# Patient Record
Sex: FEMALE | Race: WHITE | NOT HISPANIC OR LATINO | Employment: UNEMPLOYED | URBAN - METROPOLITAN AREA
[De-identification: names, ages, dates, MRNs, and addresses within clinical notes are randomized per-mention and may not be internally consistent; named-entity substitution may affect disease eponyms.]

---

## 2017-03-07 ENCOUNTER — TRANSCRIBE ORDERS (OUTPATIENT)
Dept: LAB | Facility: HOSPITAL | Age: 57
End: 2017-03-07

## 2017-03-07 ENCOUNTER — LAB (OUTPATIENT)
Dept: LAB | Facility: HOSPITAL | Age: 57
End: 2017-03-07

## 2017-03-07 DIAGNOSIS — M25.572 LEFT ANKLE PAIN, UNSPECIFIED CHRONICITY: Primary | ICD-10-CM

## 2017-03-07 DIAGNOSIS — M25.572 LEFT ANKLE PAIN, UNSPECIFIED CHRONICITY: ICD-10-CM

## 2017-03-07 LAB
25(OH)D3 SERPL-MCNC: 42.8 NG/ML
CALCIUM SPEC-SCNC: 10.3 MG/DL (ref 8.7–10.4)
CRP SERPL-MCNC: 0.4 MG/DL (ref 0–10)
ERYTHROCYTE [SEDIMENTATION RATE] IN BLOOD: 14 MM/HR (ref 0–30)
HBA1C MFR BLD: 5.5 % (ref 4.8–5.6)
PREALB SERPL-MCNC: 33.8 MG/DL (ref 10–40)
TSH SERPL DL<=0.05 MIU/L-ACNC: 1.42 MIU/ML (ref 0.35–5.35)

## 2017-03-07 PROCEDURE — 86140 C-REACTIVE PROTEIN: CPT | Performed by: ORTHOPAEDIC SURGERY

## 2017-03-07 PROCEDURE — 84134 ASSAY OF PREALBUMIN: CPT | Performed by: ORTHOPAEDIC SURGERY

## 2017-03-07 PROCEDURE — 82306 VITAMIN D 25 HYDROXY: CPT | Performed by: ORTHOPAEDIC SURGERY

## 2017-03-07 PROCEDURE — 85652 RBC SED RATE AUTOMATED: CPT | Performed by: ORTHOPAEDIC SURGERY

## 2017-03-07 PROCEDURE — 83036 HEMOGLOBIN GLYCOSYLATED A1C: CPT | Performed by: ORTHOPAEDIC SURGERY

## 2017-03-07 PROCEDURE — 84443 ASSAY THYROID STIM HORMONE: CPT | Performed by: ORTHOPAEDIC SURGERY

## 2017-03-07 PROCEDURE — 36415 COLL VENOUS BLD VENIPUNCTURE: CPT | Performed by: ORTHOPAEDIC SURGERY

## 2017-03-07 PROCEDURE — 82310 ASSAY OF CALCIUM: CPT | Performed by: ORTHOPAEDIC SURGERY

## 2017-03-22 ENCOUNTER — APPOINTMENT (OUTPATIENT)
Dept: PREADMISSION TESTING | Facility: HOSPITAL | Age: 57
End: 2017-03-22

## 2017-03-22 VITALS — HEIGHT: 65 IN | BODY MASS INDEX: 26.59 KG/M2 | WEIGHT: 159.61 LBS

## 2017-03-22 LAB
DEPRECATED RDW RBC AUTO: 46 FL (ref 37–54)
ERYTHROCYTE [DISTWIDTH] IN BLOOD BY AUTOMATED COUNT: 12.6 % (ref 11.3–14.5)
HCT VFR BLD AUTO: 37.6 % (ref 34.5–44)
HGB BLD-MCNC: 12 G/DL (ref 11.5–15.5)
MCH RBC QN AUTO: 32 PG (ref 27–31)
MCHC RBC AUTO-ENTMCNC: 31.9 G/DL (ref 32–36)
MCV RBC AUTO: 100.3 FL (ref 80–99)
PLATELET # BLD AUTO: 378 10*3/MM3 (ref 150–450)
PMV BLD AUTO: 9.1 FL (ref 6–12)
POTASSIUM BLD-SCNC: 4.6 MMOL/L (ref 3.5–5.5)
RBC # BLD AUTO: 3.75 10*6/MM3 (ref 3.89–5.14)
WBC NRBC COR # BLD: 5.72 10*3/MM3 (ref 3.5–10.8)

## 2017-03-22 PROCEDURE — 93010 ELECTROCARDIOGRAM REPORT: CPT | Performed by: INTERNAL MEDICINE

## 2017-03-22 RX ORDER — ZOLPIDEM TARTRATE 12.5 MG/1
12.5 TABLET, FILM COATED, EXTENDED RELEASE ORAL NIGHTLY
COMMUNITY
End: 2022-10-05 | Stop reason: HOSPADM

## 2017-03-22 RX ORDER — ATORVASTATIN CALCIUM 80 MG/1
80 TABLET, FILM COATED ORAL DAILY
COMMUNITY
End: 2017-03-22

## 2017-03-22 RX ORDER — LISINOPRIL 20 MG/1
20 TABLET ORAL DAILY
COMMUNITY
End: 2017-03-22

## 2017-03-22 RX ORDER — ATORVASTATIN CALCIUM 80 MG/1
80 TABLET, FILM COATED ORAL DAILY
COMMUNITY
End: 2019-05-15 | Stop reason: HOSPADM

## 2017-03-22 RX ORDER — FLUOXETINE HYDROCHLORIDE 20 MG/1
20 CAPSULE ORAL DAILY
COMMUNITY

## 2017-03-22 RX ORDER — LATANOPROST 50 UG/ML
1 SOLUTION/ DROPS OPHTHALMIC NIGHTLY
COMMUNITY
End: 2017-03-22

## 2017-03-22 RX ORDER — FLUOXETINE HYDROCHLORIDE 20 MG/1
20 CAPSULE ORAL DAILY
COMMUNITY
End: 2017-03-22

## 2017-03-22 RX ORDER — LATANOPROST 50 UG/ML
1 SOLUTION/ DROPS OPHTHALMIC NIGHTLY
COMMUNITY

## 2017-03-22 RX ORDER — LISINOPRIL 20 MG/1
40 TABLET ORAL DAILY
COMMUNITY
End: 2019-05-15 | Stop reason: HOSPADM

## 2017-03-24 ENCOUNTER — APPOINTMENT (OUTPATIENT)
Dept: GENERAL RADIOLOGY | Facility: HOSPITAL | Age: 57
End: 2017-03-24

## 2017-03-24 ENCOUNTER — HOSPITAL ENCOUNTER (OUTPATIENT)
Facility: HOSPITAL | Age: 57
Setting detail: HOSPITAL OUTPATIENT SURGERY
Discharge: HOME | End: 2017-03-24
Attending: ORTHOPAEDIC SURGERY | Admitting: ORTHOPAEDIC SURGERY

## 2017-03-24 ENCOUNTER — ANESTHESIA EVENT (OUTPATIENT)
Dept: PERIOP | Facility: HOSPITAL | Age: 57
End: 2017-03-24

## 2017-03-24 ENCOUNTER — ANESTHESIA (OUTPATIENT)
Dept: PERIOP | Facility: HOSPITAL | Age: 57
End: 2017-03-24

## 2017-03-24 VITALS
SYSTOLIC BLOOD PRESSURE: 117 MMHG | RESPIRATION RATE: 18 BRPM | DIASTOLIC BLOOD PRESSURE: 75 MMHG | HEART RATE: 92 BPM | TEMPERATURE: 97.5 F | OXYGEN SATURATION: 98 %

## 2017-03-24 DIAGNOSIS — M67.472 DIGITAL MUCOUS CYST OF TOE OF LEFT FOOT: ICD-10-CM

## 2017-03-24 PROBLEM — S82.62XK: Status: ACTIVE | Noted: 2017-03-24

## 2017-03-24 LAB
B-HCG UR QL: NEGATIVE
GLUCOSE BLDC GLUCOMTR-MCNC: 95 MG/DL (ref 70–130)
INTERNAL NEGATIVE CONTROL: REACTIVE
INTERNAL POSITIVE CONTROL: REACTIVE
Lab: NORMAL

## 2017-03-24 PROCEDURE — 25010000002 MIDAZOLAM PER 1 MG: Performed by: NURSE ANESTHETIST, CERTIFIED REGISTERED

## 2017-03-24 PROCEDURE — C1713 ANCHOR/SCREW BN/BN,TIS/BN: HCPCS | Performed by: ORTHOPAEDIC SURGERY

## 2017-03-24 PROCEDURE — 82962 GLUCOSE BLOOD TEST: CPT

## 2017-03-24 PROCEDURE — 25010000002 DEXAMETHASONE SODIUM PHOSPHATE 10 MG/ML SOLUTION: Performed by: NURSE ANESTHETIST, CERTIFIED REGISTERED

## 2017-03-24 PROCEDURE — 25010000002 PROPOFOL 10 MG/ML EMULSION: Performed by: NURSE ANESTHETIST, CERTIFIED REGISTERED

## 2017-03-24 PROCEDURE — 88304 TISSUE EXAM BY PATHOLOGIST: CPT | Performed by: ORTHOPAEDIC SURGERY

## 2017-03-24 PROCEDURE — 25010000002 ONDANSETRON PER 1 MG: Performed by: NURSE ANESTHETIST, CERTIFIED REGISTERED

## 2017-03-24 PROCEDURE — 25010000002 FENTANYL CITRATE (PF) 100 MCG/2ML SOLUTION: Performed by: NURSE ANESTHETIST, CERTIFIED REGISTERED

## 2017-03-24 PROCEDURE — 25010000002 ROPIVACAINE PER 1 MG: Performed by: NURSE ANESTHETIST, CERTIFIED REGISTERED

## 2017-03-24 PROCEDURE — 25010000002 PHENYLEPHRINE PER 1 ML: Performed by: NURSE ANESTHETIST, CERTIFIED REGISTERED

## 2017-03-24 PROCEDURE — 25010000002 DEXAMETHASONE PER 1 MG: Performed by: NURSE ANESTHETIST, CERTIFIED REGISTERED

## 2017-03-24 PROCEDURE — 76000 FLUOROSCOPY <1 HR PHYS/QHP: CPT

## 2017-03-24 PROCEDURE — 25010000003 CEFAZOLIN IN DEXTROSE 2-4 GM/100ML-% SOLUTION: Performed by: ORTHOPAEDIC SURGERY

## 2017-03-24 PROCEDURE — 25010000002 PROPOFOL 1000 MG/ML EMULSION: Performed by: NURSE ANESTHETIST, CERTIFIED REGISTERED

## 2017-03-24 PROCEDURE — 25010000002 BUPRENORPHINE PER 0.1 MG: Performed by: NURSE ANESTHETIST, CERTIFIED REGISTERED

## 2017-03-24 DEVICE — PLT FIB PERIART LK D/L 4H 80 LT: Type: IMPLANTABLE DEVICE | Site: ANKLE | Status: FUNCTIONAL

## 2017-03-24 DEVICE — SCRW ULS LK 2.7X16MM: Type: IMPLANTABLE DEVICE | Site: ANKLE | Status: FUNCTIONAL

## 2017-03-24 DEVICE — SCRW ULS LK 2.7X12MM: Type: IMPLANTABLE DEVICE | Site: ANKLE | Status: FUNCTIONAL

## 2017-03-24 DEVICE — SCRW PERIART S/TAP HD/2.7MM 3.5X14MM: Type: IMPLANTABLE DEVICE | Site: ANKLE | Status: FUNCTIONAL

## 2017-03-24 DEVICE — SCRW PERIART S/TAP HD/2.7MM 3.5X12MM: Type: IMPLANTABLE DEVICE | Site: ANKLE | Status: FUNCTIONAL

## 2017-03-24 RX ORDER — FAMOTIDINE 10 MG/ML
20 INJECTION, SOLUTION INTRAVENOUS
Status: DISCONTINUED | OUTPATIENT
Start: 2017-03-24 | End: 2017-03-24 | Stop reason: HOSPADM

## 2017-03-24 RX ORDER — SODIUM CHLORIDE, SODIUM LACTATE, POTASSIUM CHLORIDE, CALCIUM CHLORIDE 600; 310; 30; 20 MG/100ML; MG/100ML; MG/100ML; MG/100ML
100 INJECTION, SOLUTION INTRAVENOUS CONTINUOUS
Status: DISCONTINUED | OUTPATIENT
Start: 2017-03-24 | End: 2017-03-24 | Stop reason: HOSPADM

## 2017-03-24 RX ORDER — FENTANYL CITRATE 50 UG/ML
50 INJECTION, SOLUTION INTRAMUSCULAR; INTRAVENOUS
Status: DISCONTINUED | OUTPATIENT
Start: 2017-03-24 | End: 2017-03-24 | Stop reason: HOSPADM

## 2017-03-24 RX ORDER — BUPIVACAINE HYDROCHLORIDE 2.5 MG/ML
INJECTION, SOLUTION EPIDURAL; INFILTRATION; INTRACAUDAL AS NEEDED
Status: DISCONTINUED | OUTPATIENT
Start: 2017-03-24 | End: 2017-03-24 | Stop reason: HOSPADM

## 2017-03-24 RX ORDER — ONDANSETRON 4 MG/1
4 TABLET, FILM COATED ORAL ONCE AS NEEDED
Status: DISCONTINUED | OUTPATIENT
Start: 2017-03-24 | End: 2017-03-24 | Stop reason: HOSPADM

## 2017-03-24 RX ORDER — ROPIVACAINE HYDROCHLORIDE 2 MG/ML
8 INJECTION, SOLUTION EPIDURAL; INFILTRATION CONTINUOUS
Status: DISCONTINUED | OUTPATIENT
Start: 2017-03-24 | End: 2017-03-24 | Stop reason: HOSPADM

## 2017-03-24 RX ORDER — FAMOTIDINE 20 MG/1
20 TABLET, FILM COATED ORAL
Status: DISCONTINUED | OUTPATIENT
Start: 2017-03-24 | End: 2017-03-24 | Stop reason: HOSPADM

## 2017-03-24 RX ORDER — PROPOFOL 10 MG/ML
VIAL (ML) INTRAVENOUS AS NEEDED
Status: DISCONTINUED | OUTPATIENT
Start: 2017-03-24 | End: 2017-03-24 | Stop reason: SURG

## 2017-03-24 RX ORDER — DOCUSATE SODIUM 100 MG/1
100 CAPSULE, LIQUID FILLED ORAL 2 TIMES DAILY
Qty: 60 CAPSULE | Refills: 0 | Status: ON HOLD | OUTPATIENT
Start: 2017-03-24 | End: 2022-09-26

## 2017-03-24 RX ORDER — MAGNESIUM HYDROXIDE 1200 MG/15ML
LIQUID ORAL AS NEEDED
Status: DISCONTINUED | OUTPATIENT
Start: 2017-03-24 | End: 2017-03-24 | Stop reason: HOSPADM

## 2017-03-24 RX ORDER — BUPRENORPHINE HYDROCHLORIDE 0.32 MG/ML
INJECTION INTRAMUSCULAR; INTRAVENOUS AS NEEDED
Status: DISCONTINUED | OUTPATIENT
Start: 2017-03-24 | End: 2017-03-24 | Stop reason: SURG

## 2017-03-24 RX ORDER — CEFAZOLIN SODIUM 2 G/100ML
2 INJECTION, SOLUTION INTRAVENOUS ONCE
Status: COMPLETED | OUTPATIENT
Start: 2017-03-24 | End: 2017-03-24

## 2017-03-24 RX ORDER — FENTANYL CITRATE 50 UG/ML
INJECTION, SOLUTION INTRAMUSCULAR; INTRAVENOUS AS NEEDED
Status: DISCONTINUED | OUTPATIENT
Start: 2017-03-24 | End: 2017-03-24 | Stop reason: SURG

## 2017-03-24 RX ORDER — MIDAZOLAM HYDROCHLORIDE 1 MG/ML
INJECTION INTRAMUSCULAR; INTRAVENOUS AS NEEDED
Status: DISCONTINUED | OUTPATIENT
Start: 2017-03-24 | End: 2017-03-24 | Stop reason: SURG

## 2017-03-24 RX ORDER — SODIUM CHLORIDE 0.9 % (FLUSH) 0.9 %
1-10 SYRINGE (ML) INJECTION AS NEEDED
Status: DISCONTINUED | OUTPATIENT
Start: 2017-03-24 | End: 2017-03-24 | Stop reason: HOSPADM

## 2017-03-24 RX ORDER — LABETALOL HYDROCHLORIDE 5 MG/ML
5 INJECTION, SOLUTION INTRAVENOUS
Status: DISCONTINUED | OUTPATIENT
Start: 2017-03-24 | End: 2017-03-24 | Stop reason: HOSPADM

## 2017-03-24 RX ORDER — DEXAMETHASONE SODIUM PHOSPHATE 10 MG/ML
INJECTION, SOLUTION INTRAMUSCULAR; INTRAVENOUS AS NEEDED
Status: DISCONTINUED | OUTPATIENT
Start: 2017-03-24 | End: 2017-03-24 | Stop reason: SURG

## 2017-03-24 RX ORDER — BUPIVACAINE HYDROCHLORIDE 2.5 MG/ML
INJECTION, SOLUTION EPIDURAL; INFILTRATION; INTRACAUDAL AS NEEDED
Status: DISCONTINUED | OUTPATIENT
Start: 2017-03-24 | End: 2017-03-24 | Stop reason: SURG

## 2017-03-24 RX ORDER — SODIUM CHLORIDE, SODIUM LACTATE, POTASSIUM CHLORIDE, CALCIUM CHLORIDE 600; 310; 30; 20 MG/100ML; MG/100ML; MG/100ML; MG/100ML
9 INJECTION, SOLUTION INTRAVENOUS CONTINUOUS PRN
Status: DISCONTINUED | OUTPATIENT
Start: 2017-03-24 | End: 2017-03-24 | Stop reason: HOSPADM

## 2017-03-24 RX ORDER — LIDOCAINE HYDROCHLORIDE 10 MG/ML
0.5 INJECTION, SOLUTION EPIDURAL; INFILTRATION; INTRACAUDAL; PERINEURAL ONCE AS NEEDED
Status: COMPLETED | OUTPATIENT
Start: 2017-03-24 | End: 2017-03-24

## 2017-03-24 RX ORDER — OXYCODONE HYDROCHLORIDE AND ACETAMINOPHEN 5; 325 MG/1; MG/1
1 TABLET ORAL ONCE AS NEEDED
Status: DISCONTINUED | OUTPATIENT
Start: 2017-03-24 | End: 2017-03-24 | Stop reason: HOSPADM

## 2017-03-24 RX ORDER — OXYCODONE AND ACETAMINOPHEN 7.5; 325 MG/1; MG/1
1-2 TABLET ORAL EVERY 4 HOURS PRN
Qty: 60 TABLET | Refills: 0 | Status: SHIPPED | OUTPATIENT
Start: 2017-03-24 | End: 2019-05-15 | Stop reason: HOSPADM

## 2017-03-24 RX ORDER — ATRACURIUM BESYLATE 10 MG/ML
INJECTION, SOLUTION INTRAVENOUS AS NEEDED
Status: DISCONTINUED | OUTPATIENT
Start: 2017-03-24 | End: 2017-03-24 | Stop reason: SURG

## 2017-03-24 RX ORDER — MEPERIDINE HYDROCHLORIDE 25 MG/ML
12.5 INJECTION INTRAMUSCULAR; INTRAVENOUS; SUBCUTANEOUS
Status: DISCONTINUED | OUTPATIENT
Start: 2017-03-24 | End: 2017-03-24 | Stop reason: HOSPADM

## 2017-03-24 RX ORDER — OXYCODONE AND ACETAMINOPHEN 7.5; 325 MG/1; MG/1
1 TABLET ORAL ONCE AS NEEDED
Status: DISCONTINUED | OUTPATIENT
Start: 2017-03-24 | End: 2017-03-24 | Stop reason: HOSPADM

## 2017-03-24 RX ORDER — ONDANSETRON 2 MG/ML
4 INJECTION INTRAMUSCULAR; INTRAVENOUS ONCE AS NEEDED
Status: DISCONTINUED | OUTPATIENT
Start: 2017-03-24 | End: 2017-03-24 | Stop reason: HOSPADM

## 2017-03-24 RX ORDER — ONDANSETRON 2 MG/ML
INJECTION INTRAMUSCULAR; INTRAVENOUS AS NEEDED
Status: DISCONTINUED | OUTPATIENT
Start: 2017-03-24 | End: 2017-03-24 | Stop reason: SURG

## 2017-03-24 RX ORDER — NALOXONE HCL 0.4 MG/ML
0.4 VIAL (ML) INJECTION AS NEEDED
Status: DISCONTINUED | OUTPATIENT
Start: 2017-03-24 | End: 2017-03-24 | Stop reason: HOSPADM

## 2017-03-24 RX ORDER — DEXAMETHASONE SODIUM PHOSPHATE 4 MG/ML
INJECTION, SOLUTION INTRA-ARTICULAR; INTRALESIONAL; INTRAMUSCULAR; INTRAVENOUS; SOFT TISSUE AS NEEDED
Status: DISCONTINUED | OUTPATIENT
Start: 2017-03-24 | End: 2017-03-24 | Stop reason: SURG

## 2017-03-24 RX ORDER — LIDOCAINE HYDROCHLORIDE 10 MG/ML
INJECTION, SOLUTION EPIDURAL; INFILTRATION; INTRACAUDAL; PERINEURAL AS NEEDED
Status: DISCONTINUED | OUTPATIENT
Start: 2017-03-24 | End: 2017-03-24 | Stop reason: SURG

## 2017-03-24 RX ADMIN — PROPOFOL 25 MCG/KG/MIN: 10 INJECTION, EMULSION INTRAVENOUS at 07:40

## 2017-03-24 RX ADMIN — DEXAMETHASONE SODIUM PHOSPHATE 8 MG: 4 INJECTION, SOLUTION INTRAMUSCULAR; INTRAVENOUS at 07:37

## 2017-03-24 RX ADMIN — FENTANYL CITRATE 100 MCG: 50 INJECTION, SOLUTION INTRAMUSCULAR; INTRAVENOUS at 07:37

## 2017-03-24 RX ADMIN — BUPRENORPHINE HYDROCHLORIDE 0.15 MG: 0.3 INJECTION INTRAMUSCULAR; INTRAVENOUS at 07:37

## 2017-03-24 RX ADMIN — BUPIVACAINE HYDROCHLORIDE 30 ML: 2.5 INJECTION, SOLUTION EPIDURAL; INFILTRATION; INTRACAUDAL; PERINEURAL at 07:37

## 2017-03-24 RX ADMIN — SODIUM CHLORIDE, POTASSIUM CHLORIDE, SODIUM LACTATE AND CALCIUM CHLORIDE 9 ML/HR: 600; 310; 30; 20 INJECTION, SOLUTION INTRAVENOUS at 06:58

## 2017-03-24 RX ADMIN — BUPIVACAINE HYDROCHLORIDE 15 ML: 2.5 INJECTION, SOLUTION EPIDURAL; INFILTRATION; INTRACAUDAL; PERINEURAL at 07:10

## 2017-03-24 RX ADMIN — MIDAZOLAM HYDROCHLORIDE 2 MG: 1 INJECTION, SOLUTION INTRAMUSCULAR; INTRAVENOUS at 07:00

## 2017-03-24 RX ADMIN — OXYCODONE HYDROCHLORIDE AND ACETAMINOPHEN 1 TABLET: 7.5; 325 TABLET ORAL at 10:36

## 2017-03-24 RX ADMIN — LIDOCAINE HYDROCHLORIDE 30 MG: 10 INJECTION, SOLUTION EPIDURAL; INFILTRATION; INTRACAUDAL; PERINEURAL at 07:37

## 2017-03-24 RX ADMIN — BUPRENORPHINE HYDROCHLORIDE 0.15 MG: 0.3 INJECTION INTRAMUSCULAR; INTRAVENOUS at 07:00

## 2017-03-24 RX ADMIN — DEXAMETHASONE SODIUM PHOSPHATE 2 MG: 10 INJECTION, SOLUTION INTRAMUSCULAR; INTRAVENOUS at 07:37

## 2017-03-24 RX ADMIN — FAMOTIDINE 20 MG: 20 TABLET, FILM COATED ORAL at 06:58

## 2017-03-24 RX ADMIN — FENTANYL CITRATE 100 MCG: 50 INJECTION, SOLUTION INTRAMUSCULAR; INTRAVENOUS at 07:00

## 2017-03-24 RX ADMIN — LIDOCAINE HYDROCHLORIDE 0.5 ML: 10 INJECTION, SOLUTION EPIDURAL; INFILTRATION; INTRACAUDAL; PERINEURAL at 06:59

## 2017-03-24 RX ADMIN — ATRACURIUM BESYLATE 50 MG: 10 INJECTION, SOLUTION INTRAVENOUS at 07:37

## 2017-03-24 RX ADMIN — DEXAMETHASONE SODIUM PHOSPHATE 2 MG: 10 INJECTION, SOLUTION INTRAMUSCULAR; INTRAVENOUS at 07:00

## 2017-03-24 RX ADMIN — FENTANYL CITRATE 50 MCG: 50 INJECTION, SOLUTION INTRAMUSCULAR; INTRAVENOUS at 09:35

## 2017-03-24 RX ADMIN — SODIUM CHLORIDE, POTASSIUM CHLORIDE, SODIUM LACTATE AND CALCIUM CHLORIDE: 600; 310; 30; 20 INJECTION, SOLUTION INTRAVENOUS at 09:10

## 2017-03-24 RX ADMIN — EPHEDRINE SULFATE 10 MG: 50 INJECTION INTRAMUSCULAR; INTRAVENOUS; SUBCUTANEOUS at 08:20

## 2017-03-24 RX ADMIN — PHENYLEPHRINE HYDROCHLORIDE 100 MCG: 10 INJECTION INTRAVENOUS at 07:50

## 2017-03-24 RX ADMIN — CEFAZOLIN SODIUM 2 G: 2 INJECTION, SOLUTION INTRAVENOUS at 07:36

## 2017-03-24 RX ADMIN — BUPIVACAINE HYDROCHLORIDE 20 ML: 2.5 INJECTION, SOLUTION EPIDURAL; INFILTRATION; INTRACAUDAL; PERINEURAL at 07:00

## 2017-03-24 RX ADMIN — PROPOFOL 200 MG: 10 INJECTION, EMULSION INTRAVENOUS at 07:37

## 2017-03-24 RX ADMIN — ONDANSETRON 4 MG: 2 INJECTION INTRAMUSCULAR; INTRAVENOUS at 09:34

## 2017-03-24 RX ADMIN — Medication 8 ML/HR: at 09:51

## 2017-03-24 NOTE — ANESTHESIA PROCEDURE NOTES
Peripheral Block    Patient location during procedure: pre-op  Reason for block: at surgeon's request and post-op pain management  Performed by  Anesthesiologist: MAME CARMONA  CRNA: MAME POND  Assisted by: ROBERT VILLAGOMEZ  Preanesthetic Checklist  Completed: patient identified, site marked, surgical consent, pre-op evaluation, timeout performed, IV checked, risks and benefits discussed and monitors and equipment checked  Peripheral Block Prep:  Sterile barriers:cap, gloves, mask and sterile barriers  Prep: ChloraPrep  Patient monitoring: blood pressure monitoring, continuous pulse oximetry and EKG  Peripheral Procedure  Guidance:ultrasound guided  Images:still images obtained    Laterality:left  Block Type:popliteal  Injection Technique:catheter  Needle Type:echogenic  Needle Gauge:18 G  Catheter Size:20 G  Medications  Local Injected:bupivacaine 0.25% without epinephrine Local Amount Injected:20 (mls)mL  Post Assessment  Injection Assessment: negative aspiration for heme, no paresthesia on injection and incremental injection  Patient Tolerance:comfortable throughout block  Complications:no  Additional Notes  Procedure:                    CATHETER at skin: 10                                           Analgesia was achieved with 1% Lidocaine 2 ml for infiltration of skin      The pt was placed in  lateral position.  The Insertion site was  prepped and Draped in sterile fashion.  The pt was anesthetized with  IV Sedation( see meds).  Skin and cutaneous tissue was infiltrated and anesthetized with 1% Lidocaine via a 25g needle.  A BBraun 4 inch 18g echogenic needle was then  inserted approximately 3 cm proximal to the popliteal tin a at the lateral mid biceps femoris and advanced In-plane with Ultrasound guidance.  Normal Saline PSF was utilized for hydrodissection of tissue.  The popliteal artery was visualized and the common peroneal and tibial bifurcation was located.  LA injection spread was  visualized, injection was incremental 1-5ml, injection pressure was normal or little, no intraneural injection, no vascular injection.  .  A BBraun 20g wire stylet catheter was placed via the needle with ultrasound visualization and confirmation with NS fluid bolus. The labeled Catheter was then secured at insertions site with skin afix,  mastisol, steristrips  and a CHG transparent dressing was placed over. Thank you

## 2017-03-24 NOTE — OP NOTE
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     PROCEDURES PERFORMED:   1.  Exploration of 4-5 interspace with excision of left metatarsophalangeal cyst with decompression of 4-5 interspace.    2.  Open treatment left lateral malleolus nonunion with iliac crest bone graft.      SURGEON: Frank Larson MD    ANESTHESIA: General with regional block for iliac crest bone graft site and indwelling popliteal block for the ankle.     ESTIMATED BLOOD LOSS: Minimal.     TOURNIQUET TIME: See nursing record.     COMPLICATIONS: None apparent.     IMPLANTS USED: Devika locking small frag periarticular fibular plate.     INDICATIONS: Painful nonunion.      DESCRIPTION OF PROCEDURE: After informed consent was obtained, the patient was taken to the operating room. After the smooth induction of general and regional anesthesia, the patient's was given a dose of IV Kefzol. After sterile prep and drape and timeout to verify the site and the procedures to be performed, we began with an incision just over the iliac crest about 3 fingerbreadths behind the ASIS.  We dissected sharply to the outer table.  We then dissected soft tissue in a full-thickness fashion over the superior aspect of the crest. We then created a trap door using an osteotome on the proximal, distal, and lateral cortices. We lifted up the trap door which gave access to the crest cancellous graft.  This was removed using a gouge and curette. This was saved in a specimen cup. We then thoroughly irrigated the incision and closed our trap door down and we closed our deep capsule using 0 Vicryl in an interrupted fashion. We closed our subcutaneous area using 3-0 Vicryl in an interrupted fashion and the skin was closed using a running Monocryl with a Prineo dressing. At this point, we then moved  our attention to the ankle.  We exsanguinated the left lower extremity using an Esmarch bandage and inflated the tourniquet to 300 mmHg. We made a lateral incision directed over the lateral malleolus. Dissected sharply and bluntly, protecting neurovascular structures. We identified the periosteum. This was dissected off of the fibula to create full-thickness flaps for coverage at the end of the case. We used C-arm to help identify our nonunion site. This was excised and was involving about 75% of the cortical area. There was a fragment of bone slipped superiorly towards the syndesmosis. This piece of bone was removed.  The soft tissue and fibrous tissue were removed from the nonunion site. This left us with bleeding cancellous bony beds on both the proximal and distal fragments.  We pulled the fibula out to the correct length and this was clamped. We did not feel that her bone quality was acceptable enough and sufficient enough to tolerate a lag screw and at this point we applied a 4-hole locking periarticular direct lateral fibular plate. This was applied proximally first using a 3.5 cortical screw. We verified that our plate was appropriately located. We then placed 3 points of fixation distally. We placed 2 more points of fixation proximally. We then packed the fracture site and nonunion site with our cancellous bone graft. We then took final C-arm images which showed our nonunion site to be difficult to visualize due to the packing of graft. We took final C-arm images that verified that our ankle mortise was appropriately aligned and symmetric.  There was a negative cotton test at the end of the procedure. We then thoroughly irrigated the incision protecting our bone graft. We closed our periosteum over the plate with a running 2-0 Vicryl. Subcutaneous layer was closed using 3-0 Vicryl and the skin was closed using interrupted nylon's. We then moved our attention to another procedure.      At this point, a  longitudinal incision was made at the 4-5 interspace dissecting sharply and bluntly. We identified our MTP capsule. A cyst was identified and the cyst wall was removed and sent for pathology. We then released the intermetatarsal ligament between the 4th and 5th metatarsals to decompress the digital nerve. We thoroughly irrigated that incision and we closed the skin using nylon suture and placed sterile dressings over the incisions. We took down the drapes, placed a well-padded AO splint in neutral ankle dorsiflexion and deflated the tourniquet. The patient was allowed to wake from anesthetic and transferred to her stretcher and into the recovery room in stable condition.     POSTOPERATIVE PLAN:   1.  Non-weightbearing on left lower extremity.   2.  Discharge home when cleared by anesthesia.  3.  She will be on Percocet for pain control.   4.  She will be given a stool softener.   5.  I will see her back in the office in 2 weeks' time for a wound check and suture removal.   6.  We will follow her radiographically until she appears healed.    7.  We will plan on letting her bear weight at about 6 weeks.         Frank Larson M.D.  EMMANUEL/murtaza  DD: 03/24/2017 10:12:37  DT: 03/24/2017 11:13:35  Voice Rec. ID #[id number]  Voice Original ID #[id number]  Doc ID #[id number]  Rev. #0  cc:    Lona Deleon MD

## 2017-03-24 NOTE — H&P
Patient Care Team:      Chief complaint:  L foot and ankle pain    Subjective:    Patient is a 56 y.o.female presents with old non union ankle fracture   Review of Systems:  General ROS: negative  Cardiovascular ROS: no chest pain or dyspnea on exertion  Respiratory ROS: no cough, shortness of breath, or wheezing      Allergies: No Known Allergies       Latex: no  Contrast Dye: no    Home Meds    Prescriptions Prior to Admission   Medication Sig Dispense Refill Last Dose   • atorvastatin (LIPITOR) 80 MG tablet Take 80 mg by mouth Daily.   3/24/2017 at 0400   • FLUoxetine (PROzac) 20 MG capsule Take 20 mg by mouth Daily.   3/23/2017 at 2200   • latanoprost (XALATAN) 0.005 % ophthalmic solution Administer 1 drop to both eyes Every Night.   3/23/2017 at 2200   • lisinopril (PRINIVIL,ZESTRIL) 20 MG tablet Take 20 mg by mouth Daily.   3/24/2017 at 0400   • metFORMIN (GLUCOPHAGE) 500 MG tablet Take 500 mg by mouth 3 (Three) Times a Day.   3/23/2017 at 1900   • Multiple Vitamins-Minerals (MULTIVITAMIN ADULT PO) Take 1 tablet by mouth Daily.   3/23/2017 at 2200   • Zolpidem Tartrate (AMBIEN CR PO) Take 12.5 mg by mouth Every Night.   3/23/2017 at 2200     PMH:   Past Medical History:   Diagnosis Date   • Anxiety and depression    • Diabetes mellitus    • Hypertension    • Wears glasses      PSH:    Past Surgical History:   Procedure Laterality Date   • AUGMENTATION MAMMAPLASTY Bilateral 1999   • BREAST AUGMENTATION     • BREAST EXCISIONAL BIOPSY Right 2014   • DILATION AND CURETTAGE, DIAGNOSTIC / THERAPEUTIC     • WRIST SURGERY       Immunization History: pneumo: no   Flu: 10/16  Tetanus: ?  Social History:   Tobacco: quit 28 yrs    Alcohol: wine with meals      Physical Exam:/79 (BP Location: Right arm, Patient Position: Lying)  Pulse 77  Temp 98.2 °F (36.8 °C) (Temporal Artery )   LMP 01/22/2016 (Approximate)  SpO2 98%      General Appearance:    Alert, cooperative, no distress, appears stated age   Head:     Normocephalic, without obvious abnormality, atraumatic   Lungs:     Clear to auscultation bilaterally, respirations unlabored    Heart:   Regular rate and rhythm, S1 and S2 normal, no murmur, rub    or gallop    Abdomen:    Soft without tenderness   Breast Exam:    deferred   Genitalia:    deferred   Extremities:   Extremities normal, atraumatic, no cyanosis or edema   Skin:   Skin color, texture, turgor normal, no rashes or lesions   Neurologic:   Grossly intact        Cancer Patient: __ yes __no __unknown; If yes, clinical stage T:__ N:__M:__, stage group    Impression: Painful L foot and ankle    Plan: ORIF with iliac bone graft L ankle, cyst excision, 4/5 innerspace, L foot  Crescencio Self PA-C 3/24/2017 6:56 AM

## 2017-03-24 NOTE — ANESTHESIA PROCEDURE NOTES
Airway  Airway not difficult    General Information and Staff    Patient location during procedure: OR  Anesthesiologist: MAME CARMONA  CRNA: MAME POND    Indications and Patient Condition  Indications for airway management: airway protection    Preoxygenated: yes  MILS not maintained throughout  Mask difficulty assessment: 1 - vent by mask    Final Airway Details  Final airway type: endotracheal airway      Successful airway: ETT  Cuffed: yes   Successful intubation technique: direct laryngoscopy  Endotracheal tube insertion site: oral  Blade: Naranjo  Blade size: #3  ETT size: 7.0 mm  Cormack-Lehane Classification: grade I - full view of glottis  Placement verified by: chest auscultation and capnometry   Cuff volume (mL): 5  Measured from: lips  ETT to lips (cm): 21  Number of attempts at approach: 1    Additional Comments  Negative epigastric sounds, Breath sound equal bilaterally with symmetric chest rise and fall

## 2017-03-24 NOTE — ANESTHESIA PREPROCEDURE EVALUATION
Anesthesia Evaluation     Patient summary reviewed and Nursing notes reviewed   no history of anesthetic complications:  NPO Status: > 8 hours   Airway   Mallampati: II  TM distance: >3 FB  Neck ROM: full  no difficulty expected  Dental - normal exam     Pulmonary    (+) a smoker Current, decreased breath sounds,   Cardiovascular   Exercise tolerance: good (4-7 METS)    ECG reviewed  Rhythm: regular  Rate: normal    (+) hypertension well controlled, hyperlipidemia      Neuro/Psych  (+) psychiatric history Anxiety,    GI/Hepatic/Renal/Endo    (+)  diabetes mellitus type 2 well controlled,     Musculoskeletal     Abdominal  - normal exam    Abdomen: soft.   Substance History      OB/GYN          Other                                    Anesthesia Plan    ASA 2     general and regional     intravenous induction   Anesthetic plan and risks discussed with patient.    Plan discussed with CRNA.

## 2017-03-24 NOTE — BRIEF OP NOTE
ANKLE OPEN REDUCTION INTERNAL FIXATION, ILIAC CREST BONE GRAFT  Procedure Note    Philomena Davis  3/24/2017    Pre-op Diagnosis:   Lateral malleolus non union and 5th MTP cyst    Post-op Diagnosis:     same  Procedure/CPT® Codes:      Procedure(s):  LEFT ANKLE OPEN REDUCTION INTERNAL FIXATION WITH ILIAC CREST BONE GRAFT , EXCISION CYST 4TH/5TH INNERSPACE LEFT FOOT  ILIAC CREST BONE GRAFT    Surgeon(s):  Frank Larson MD    Anesthesia: General with Block    Staff:   Circulator: Rebecca Varela RN  Scrub Person: Roberto Oliva  Nursing Assistant: Jerry Hernandez  Assistant: Crescencio Self PA-C    Estimated Blood Loss:min    Specimens:                  ID Type Source Tests Collected by Time Destination   A : left foot cyst from between 4th and 5th toes Tissue Toe, Left TISSUE EXAM Frank Larson MD 3/24/2017 0937              Complications: none apparent      Frank Larson MD     Date: 3/24/2017  Time: 10:04 AM

## 2017-03-24 NOTE — NURSING NOTE
Acute Pain Service:  On-Q teaching completed with patient's boyfriend Joesph.  Video demonstration, handout and bracelet provided with [facility] on call central phone number.  Instructed to call with any questions or concerns.  Patient verbalized understanding.  Service will continue to follow until catheter DC'd.  Please contact Joesph at [phone] if needed.

## 2017-03-24 NOTE — ANESTHESIA PROCEDURE NOTES
Peripheral Block    Patient location during procedure: post-op  Reason for block: at surgeon's request and post-op pain management  Performed by  Anesthesiologist: MAME CARMONA  CRNA: MAME POND  Assisted by: ROBERT VILLAGOMEZ  Preanesthetic Checklist  Completed: patient identified, site marked, surgical consent, pre-op evaluation, timeout performed, risks and benefits discussed and monitors and equipment checked  Peripheral Block Prep:  Sterile barriers:cap, gloves, mask and sterile barriers  Prep: ChloraPrep  Patient monitoring: blood pressure monitoring, continuous pulse oximetry and EKG  Peripheral Procedure  Guidance:ultrasound guided  Images:still images obtained    Laterality:left  Block Type:adductor canal block  Injection Technique:single-shot  Needle Type:Tuohy  Needle Gauge:18 G  Catheter Size:20 G (20g)  Medications  Local Injected:bupivacaine 0.25% without epinephrine Local Amount Injected:15 (ml)mL  Post Assessment  Injection Assessment: negative aspiration for heme, incremental injection and no paresthesia on injection  Patient Tolerance:comfortable throughout block  Complications:no  Additional Notes  Procedure:                                        Analgesia was achieved with 1% Lidocaine 2 ml for infiltration of skin       The pt was placed in the Supine position.  The Insertion site was  prepped and Draped in sterile fashion.  A BBraun 4 inch 18g echogenic needle was then  inserted approximately midline, mid-thigh and advanced In-plane with Ultrasound guidance.  Normal Saline PSF was utilized for hydrodissection of tissue.  The Vastus medialis and Sartorius muscle where visualized and the needle tip was placed in the adductor canal,  lateral to the femoral artery.  LA injection spread was visualized, injection was incremental 1-5ml, injection pressure was normal or little, no intraneural injection, no vascular injection.  LA dose was injected thru the needle(see dose above).  Thank  you.

## 2017-03-24 NOTE — PLAN OF CARE
Problem: Perioperative Period (Adult)  Goal: Signs and Symptoms of Listed Potential Problems Will be Absent or Manageable (Perioperative Period)  Outcome: Ongoing (interventions implemented as appropriate)    03/24/17 0630   Perioperative Period   Problems Assessed (Perioperative Period) pain   Problems Present (Perioperative Period) none

## 2017-03-24 NOTE — ANESTHESIA PROCEDURE NOTES
"Peripheral Block    Patient location during procedure: pre-op  Reason for block: at surgeon's request and post-op pain management  Performed by  Anesthesiologist: MAME CARMONA  CRNA: JUNIOR HARPREET EDWARDS  Preanesthetic Checklist  Completed: patient identified, site marked, surgical consent, pre-op evaluation, timeout performed, IV checked, risks and benefits discussed and monitors and equipment checked  Peripheral Block Prep:  Sterile barriers:cap, gloves, sterile barriers and mask  Prep: ChloraPrep  Patient monitoring: blood pressure monitoring, continuous pulse oximetry and EKG  Peripheral Procedure  Nursing cardiac assessment comments yes: Sedation, GA, Spinal,Epidural   Guidance:ultrasound guided  Images:still images obtained    Laterality:left  Block Type:TAP  Injection Technique:single-shot  Needle Type:short-bevel  Needle Gauge:20 G  Needle gauge: 20g 4\" Stimuplex.   Medications  Comment:Block Injection:  LA dose for Left block       Adjuncts:  Decadron 2mg PSF, Buprenex 0.15mg (Per total volume of LA)  Local Injected:bupivacaine 0.25% without epinephrine Local Amount Injected:30mL  Post Assessment  Injection Assessment: negative aspiration for heme, incremental injection and no paresthesia on injection  Patient Tolerance:comfortable throughout block  Complications:no  Additional Notes  The pt was placed in the Supine Position and was  anesthetized with:       General Anesthesia     Under Ultrasound guidance, a BBraun 4inch 360 degree needle was advanced with Normal Saline hydro dissection of tissue.  The Internal Oblique and Transversus Abdominus muscles where visualized.  At or before the aponeurosis of Internal Oblique, local anesthetic spread was visualized in the Transversus Abdominus Plane. Injection was made incrementally with aspiration every 5 mls.  There was no  intravascular injection,  injection pressure was normal, there was no neural injection, and the procedure was completed without " difficulty.  Thank You.

## 2017-03-24 NOTE — ANESTHESIA POSTPROCEDURE EVALUATION
Patient: Philomena KAMARA Thorpe    Procedure Summary     Date Anesthesia Start Anesthesia Stop Room / Location    03/24/17 0732  [facility] / [facility]       Procedure Diagnosis Surgeon Provider    LEFT ANKLE OPEN REDUCTION INTERNAL FIXATION WITH ILIAC CREST BONE GRAFT , EXCISION CYST 4TH/5TH INNERSPACE LEFT FOOT (Left Ankle); ILIAC CREST BONE GRAFT (Left Hip) No diagnosis on file. MD Sunil Mitchell MD          Anesthesia Type: general, regional  Last vitals  BP   126/90   Temp 97.6   Pulse 102   Resp 16   SpO2 99     Post Anesthesia Care and Evaluation    Patient location during evaluation: PACU  Patient participation: complete - patient participated  Level of consciousness: awake and alert  Pain score: 0  Pain management: adequate  Airway patency: patent  Anesthetic complications: No anesthetic complications  PONV Status: none  Cardiovascular status: hemodynamically stable and acceptable  Respiratory status: nonlabored ventilation, acceptable and nasal cannula  Hydration status: acceptable

## 2017-03-26 NOTE — PROGRESS NOTES
[facility]    Nerve Cath Post Op Call    Patient Name: Philomena Davis  :  1960  MRN:  [id number]  Date of Discharge: 3/24/2017    Nerve Cath Post Op Call:    Analgesia:Excellent  Pain Score:2/10  Side Effects:None  Catheter Site:clean  Patient Controlled ON Q pump infusion rate: 6ml/hr  Catheter Plan:Will continue with plan at home without changes and The patient was instructed to call ON CALL Anesthesia provider for any questions or problems

## 2017-03-26 NOTE — PROGRESS NOTES
[facility]    Nerve Cath Post Op Call    Patient Name: Philomena Davis  :  1960  MRN:  [id number]  Date of Discharge: 3/24/2017    Nerve Cath Post Op Call:    Catheter Plan:Patient called/No answer/Message left to call [facility] pain service for any questions or complaints

## 2017-03-27 LAB
CYTO UR: NORMAL
LAB AP CASE REPORT: NORMAL
LAB AP CLINICAL INFORMATION: NORMAL
Lab: NORMAL
PATH REPORT.FINAL DX SPEC: NORMAL
PATH REPORT.GROSS SPEC: NORMAL

## 2017-03-29 NOTE — PROGRESS NOTES
[facility]    Nerve Cath Post Op Call    Patient Name: Philomena Davis  :  1960  MRN:  [id number]  Date of Discharge: 3/24/2017    Nerve Cath Post Op Call:    Catheter Plan:Patient called/No answer/Message left to call [facility] pain service for any questions or complaints

## 2017-03-29 NOTE — PROGRESS NOTES
[facility]    Nerve Cath Post Op Call    Patient Name: Philomena Davis  :  1960  MRN:  [id number]  Date of Discharge: 3/24/2017    Nerve Cath Post Op Call:    Analgesia:Good  Catheter Plan:The patient was instructed to call ON CALL Anesthesia provider for any questions or problems and Patient/Family member report nerve catheter previously discontinued, tip intact

## 2017-04-21 RX ORDER — TRETINOIN 1 MG/G
CREAM TOPICAL
Refills: 2 | OUTPATIENT
Start: 2017-04-21

## 2017-06-13 ENCOUNTER — TRANSCRIBE ORDERS (OUTPATIENT)
Dept: ADMINISTRATIVE | Facility: HOSPITAL | Age: 57
End: 2017-06-13

## 2017-06-13 DIAGNOSIS — Z12.31 VISIT FOR SCREENING MAMMOGRAM: Primary | ICD-10-CM

## 2017-07-24 ENCOUNTER — APPOINTMENT (OUTPATIENT)
Dept: MAMMOGRAPHY | Facility: HOSPITAL | Age: 57
End: 2017-07-24

## 2017-07-31 ENCOUNTER — APPOINTMENT (OUTPATIENT)
Dept: MAMMOGRAPHY | Facility: HOSPITAL | Age: 57
End: 2017-07-31

## 2017-08-04 ENCOUNTER — HOSPITAL ENCOUNTER (OUTPATIENT)
Dept: MAMMOGRAPHY | Facility: HOSPITAL | Age: 57
Discharge: HOME | End: 2017-08-04
Admitting: INTERNAL MEDICINE

## 2017-08-04 DIAGNOSIS — Z12.31 VISIT FOR SCREENING MAMMOGRAM: ICD-10-CM

## 2017-08-04 PROCEDURE — 77063 BREAST TOMOSYNTHESIS BI: CPT

## 2017-08-04 PROCEDURE — G0202 SCR MAMMO BI INCL CAD: HCPCS

## 2017-08-04 PROCEDURE — 77067 SCR MAMMO BI INCL CAD: CPT | Performed by: RADIOLOGY

## 2017-08-04 PROCEDURE — 77063 BREAST TOMOSYNTHESIS BI: CPT | Performed by: RADIOLOGY

## 2017-08-21 ENCOUNTER — APPOINTMENT (OUTPATIENT)
Dept: MAMMOGRAPHY | Facility: HOSPITAL | Age: 57
End: 2017-08-21

## 2017-08-25 ENCOUNTER — APPOINTMENT (OUTPATIENT)
Dept: MAMMOGRAPHY | Facility: HOSPITAL | Age: 57
End: 2017-08-25

## 2017-09-06 ENCOUNTER — APPOINTMENT (OUTPATIENT)
Dept: MAMMOGRAPHY | Facility: HOSPITAL | Age: 57
End: 2017-09-06

## 2017-09-11 ENCOUNTER — HOSPITAL ENCOUNTER (OUTPATIENT)
Dept: MAMMOGRAPHY | Facility: HOSPITAL | Age: 57
Discharge: HOME | End: 2017-09-11
Admitting: INTERNAL MEDICINE

## 2017-09-11 ENCOUNTER — HOSPITAL ENCOUNTER (OUTPATIENT)
Dept: MAMMOGRAPHY | Facility: HOSPITAL | Age: 57
Discharge: HOME | End: 2017-09-11

## 2017-09-11 ENCOUNTER — HOSPITAL ENCOUNTER (OUTPATIENT)
Dept: ULTRASOUND IMAGING | Facility: HOSPITAL | Age: 57
Discharge: HOME | End: 2017-09-11

## 2017-09-11 ENCOUNTER — HOSPITAL ENCOUNTER (OUTPATIENT)
Dept: ULTRASOUND IMAGING | Facility: HOSPITAL | Age: 57
Discharge: HOME | End: 2017-09-11
Attending: RADIOLOGY

## 2017-09-11 DIAGNOSIS — R92.8 ABNORMAL MAMMOGRAM: ICD-10-CM

## 2017-09-11 PROCEDURE — G0279 TOMOSYNTHESIS, MAMMO: HCPCS

## 2017-09-11 PROCEDURE — 19083 BX BREAST 1ST LESION US IMAG: CPT | Performed by: RADIOLOGY

## 2017-09-11 PROCEDURE — G0206 DX MAMMO INCL CAD UNI: HCPCS

## 2017-09-11 PROCEDURE — 88305 TISSUE EXAM BY PATHOLOGIST: CPT | Performed by: RADIOLOGY

## 2017-09-11 PROCEDURE — 77065 DX MAMMO INCL CAD UNI: CPT | Performed by: RADIOLOGY

## 2017-09-11 PROCEDURE — A4648 IMPLANTABLE TISSUE MARKER: HCPCS

## 2017-09-11 PROCEDURE — 76642 ULTRASOUND BREAST LIMITED: CPT | Performed by: RADIOLOGY

## 2017-09-11 PROCEDURE — 77061 BREAST TOMOSYNTHESIS UNI: CPT | Performed by: RADIOLOGY

## 2017-09-11 PROCEDURE — 76642 ULTRASOUND BREAST LIMITED: CPT

## 2017-09-11 RX ORDER — LIDOCAINE HYDROCHLORIDE AND EPINEPHRINE 10; 10 MG/ML; UG/ML
10 INJECTION, SOLUTION INFILTRATION; PERINEURAL ONCE
Status: COMPLETED | OUTPATIENT
Start: 2017-09-11 | End: 2017-09-11

## 2017-09-11 RX ORDER — LIDOCAINE HYDROCHLORIDE 10 MG/ML
5 INJECTION, SOLUTION INFILTRATION; PERINEURAL ONCE
Status: COMPLETED | OUTPATIENT
Start: 2017-09-11 | End: 2017-09-11

## 2017-09-11 RX ADMIN — LIDOCAINE HYDROCHLORIDE 5 ML: 10 INJECTION, SOLUTION INFILTRATION; PERINEURAL at 17:21

## 2017-09-11 RX ADMIN — LIDOCAINE HYDROCHLORIDE,EPINEPHRINE BITARTRATE 9 ML: 10; .01 INJECTION, SOLUTION INFILTRATION; PERINEURAL at 17:21

## 2017-09-11 NOTE — PROGRESS NOTES
Alert and orientated. Post-care instructions explained prior to procedure. Ice packs given. Verbalizes and demonstrates understanding of post-care instructions and written copy given.  Keerthi Latham  RT applied steri-strips & gauze bandage and reports pt in no distress when departing.

## 2017-09-13 LAB
CYTO UR: NORMAL
LAB AP CASE REPORT: NORMAL
LAB AP CLINICAL INFORMATION: NORMAL
LAB AP DIAGNOSIS COMMENT: NORMAL
Lab: NORMAL
PATH REPORT.FINAL DX SPEC: NORMAL
PATH REPORT.GROSS SPEC: NORMAL

## 2017-09-14 ENCOUNTER — TELEPHONE (OUTPATIENT)
Dept: MAMMOGRAPHY | Facility: HOSPITAL | Age: 57
End: 2017-09-14

## 2017-09-14 NOTE — TELEPHONE ENCOUNTER
09.14.17 @ 1410: Pt notified of pathology results and recommendations. Verbalizes understanding. Denies discomfort. Denies any signs and symptoms of infection. Patient desires Dr REN Alicea for surgical consult. Patient notified of appointment on 09.26.17 @ 1130. Told to bring photo ID, insurance cards & list of current medications. Patient was encouraged to call back with any questions or concerns. Patient verbalized understanding.

## 2017-10-10 ENCOUNTER — TRANSCRIBE ORDERS (OUTPATIENT)
Dept: MAMMOGRAPHY | Facility: HOSPITAL | Age: 57
End: 2017-10-10

## 2017-10-10 DIAGNOSIS — N63.10 UNSPECIFIED LUMP IN THE RIGHT BREAST, UNSPECIFIED QUADRANT: Primary | ICD-10-CM

## 2017-11-01 ENCOUNTER — APPOINTMENT (OUTPATIENT)
Dept: PREADMISSION TESTING | Facility: HOSPITAL | Age: 57
End: 2017-11-01

## 2017-11-03 ENCOUNTER — APPOINTMENT (OUTPATIENT)
Dept: MAMMOGRAPHY | Facility: HOSPITAL | Age: 57
End: 2017-11-03
Attending: SURGERY

## 2017-11-15 ENCOUNTER — APPOINTMENT (OUTPATIENT)
Dept: PREADMISSION TESTING | Facility: HOSPITAL | Age: 57
End: 2017-11-15

## 2017-11-15 LAB
HCT VFR BLD AUTO: 39.8 % (ref 34.5–44)
POTASSIUM BLD-SCNC: 4.5 MMOL/L (ref 3.5–5.5)

## 2017-11-15 PROCEDURE — 84132 ASSAY OF SERUM POTASSIUM: CPT | Performed by: SURGERY

## 2017-11-15 PROCEDURE — 36415 COLL VENOUS BLD VENIPUNCTURE: CPT

## 2017-11-15 PROCEDURE — 93005 ELECTROCARDIOGRAM TRACING: CPT

## 2017-11-15 PROCEDURE — 85014 HEMATOCRIT: CPT | Performed by: SURGERY

## 2017-11-15 PROCEDURE — 93010 ELECTROCARDIOGRAM REPORT: CPT | Performed by: INTERNAL MEDICINE

## 2017-11-17 ENCOUNTER — LAB REQUISITION (OUTPATIENT)
Dept: LAB | Facility: HOSPITAL | Age: 57
End: 2017-11-17

## 2017-11-17 ENCOUNTER — HOSPITAL ENCOUNTER (OUTPATIENT)
Dept: MAMMOGRAPHY | Facility: HOSPITAL | Age: 57
Discharge: HOME | End: 2017-11-17
Attending: SURGERY | Admitting: SURGERY

## 2017-11-17 ENCOUNTER — HOSPITAL ENCOUNTER (OUTPATIENT)
Dept: MAMMOGRAPHY | Facility: HOSPITAL | Age: 57
Discharge: HOME | End: 2017-11-17

## 2017-11-17 ENCOUNTER — HOSPITAL ENCOUNTER (OUTPATIENT)
Dept: ULTRASOUND IMAGING | Facility: HOSPITAL | Age: 57
Discharge: HOME | End: 2017-11-17

## 2017-11-17 DIAGNOSIS — N63.10 UNSPECIFIED LUMP IN THE RIGHT BREAST, UNSPECIFIED QUADRANT: ICD-10-CM

## 2017-11-17 DIAGNOSIS — N64.0 FISSURE AND FISTULA OF NIPPLE: ICD-10-CM

## 2017-11-17 PROCEDURE — 76098 X-RAY EXAM SURGICAL SPECIMEN: CPT

## 2017-11-17 PROCEDURE — 88307 TISSUE EXAM BY PATHOLOGIST: CPT | Performed by: SURGERY

## 2017-11-17 RX ORDER — LIDOCAINE HYDROCHLORIDE 10 MG/ML
5 INJECTION, SOLUTION INFILTRATION; PERINEURAL ONCE
Status: COMPLETED | OUTPATIENT
Start: 2017-11-17 | End: 2017-11-17

## 2017-11-17 RX ADMIN — METHYLENE BLUE 10 MG: 10 INJECTION INTRAVENOUS at 08:50

## 2017-11-17 RX ADMIN — LIDOCAINE HYDROCHLORIDE 5 ML: 10 INJECTION, SOLUTION INFILTRATION; PERINEURAL at 08:40

## 2017-11-21 PROCEDURE — 77065 DX MAMMO INCL CAD UNI: CPT | Performed by: RADIOLOGY

## 2017-11-21 PROCEDURE — 76098 X-RAY EXAM SURGICAL SPECIMEN: CPT | Performed by: RADIOLOGY

## 2017-11-21 PROCEDURE — 19285 PERQ DEV BREAST 1ST US IMAG: CPT | Performed by: RADIOLOGY

## 2017-12-01 ENCOUNTER — TRANSCRIBE ORDERS (OUTPATIENT)
Dept: MAMMOGRAPHY | Facility: HOSPITAL | Age: 57
End: 2017-12-01

## 2017-12-01 DIAGNOSIS — R92.8 ABNORMAL MAMMOGRAM: Primary | ICD-10-CM

## 2018-05-18 ENCOUNTER — APPOINTMENT (OUTPATIENT)
Dept: MAMMOGRAPHY | Facility: HOSPITAL | Age: 58
End: 2018-05-18

## 2018-08-13 ENCOUNTER — APPOINTMENT (OUTPATIENT)
Dept: MAMMOGRAPHY | Facility: HOSPITAL | Age: 58
End: 2018-08-13

## 2018-11-08 ENCOUNTER — TRANSCRIBE ORDERS (OUTPATIENT)
Dept: ADMINISTRATIVE | Facility: HOSPITAL | Age: 58
End: 2018-11-08

## 2018-11-08 DIAGNOSIS — R92.8 ABNORMAL MAMMOGRAM: Primary | ICD-10-CM

## 2019-05-10 ENCOUNTER — HOSPITAL ENCOUNTER (INPATIENT)
Facility: HOSPITAL | Age: 59
LOS: 5 days | Discharge: HOME | End: 2019-05-15
Attending: EMERGENCY MEDICINE | Admitting: INTERNAL MEDICINE

## 2019-05-10 DIAGNOSIS — E86.0 DEHYDRATION: ICD-10-CM

## 2019-05-10 DIAGNOSIS — K92.0 HEMATEMESIS WITH NAUSEA: ICD-10-CM

## 2019-05-10 DIAGNOSIS — F10.930 ALCOHOL WITHDRAWAL SYNDROME WITHOUT COMPLICATION (HCC): Primary | ICD-10-CM

## 2019-05-10 DIAGNOSIS — F10.29 ALCOHOL DEPENDENCE WITH UNSPECIFIED ALCOHOL-INDUCED DISORDER (HCC): ICD-10-CM

## 2019-05-10 DIAGNOSIS — R74.02 ELEVATED SERUM LACTATE DEHYDROGENASE: ICD-10-CM

## 2019-05-10 PROBLEM — IMO0001 ALCOHOLISM /ALCOHOL ABUSE: Status: ACTIVE | Noted: 2019-05-10

## 2019-05-10 PROBLEM — F41.1 GENERALIZED ANXIETY DISORDER: Status: ACTIVE | Noted: 2019-05-10

## 2019-05-10 PROBLEM — I10 ESSENTIAL HYPERTENSION: Status: ACTIVE | Noted: 2019-05-10

## 2019-05-10 PROBLEM — E78.5 HYPERLIPIDEMIA: Status: ACTIVE | Noted: 2019-05-10

## 2019-05-10 PROBLEM — F10.939 ALCOHOL WITHDRAWAL (HCC): Status: ACTIVE | Noted: 2019-05-10

## 2019-05-10 PROBLEM — E11.9 DIABETES MELLITUS, TYPE 2: Status: ACTIVE | Noted: 2019-05-10

## 2019-05-10 PROBLEM — H40.9 GLAUCOMA: Status: ACTIVE | Noted: 2019-05-10

## 2019-05-10 LAB
ABO GROUP BLD: NORMAL
ALBUMIN SERPL-MCNC: 4.8 G/DL (ref 3.5–5.2)
ALBUMIN/GLOB SERPL: 1.4 G/DL
ALP SERPL-CCNC: 100 U/L (ref 39–117)
ALT SERPL W P-5'-P-CCNC: 79 U/L (ref 1–33)
AMPHET+METHAMPHET UR QL: NEGATIVE
AMPHETAMINES UR QL: NEGATIVE
ANION GAP SERPL CALCULATED.3IONS-SCNC: 30 MMOL/L
AST SERPL-CCNC: 130 U/L (ref 1–32)
BACTERIA UR QL AUTO: ABNORMAL /HPF
BARBITURATES UR QL SCN: NEGATIVE
BASOPHILS # BLD AUTO: 0.03 10*3/MM3 (ref 0–0.2)
BASOPHILS NFR BLD AUTO: 0.3 % (ref 0–1.5)
BENZODIAZ UR QL SCN: NEGATIVE
BILIRUB SERPL-MCNC: 1.3 MG/DL (ref 0.2–1.2)
BILIRUB UR QL STRIP: NEGATIVE
BLD GP AB SCN SERPL QL: NEGATIVE
BUN BLD-MCNC: 12 MG/DL (ref 6–20)
BUN/CREAT SERPL: 14.5 (ref 7–25)
BUPRENORPHINE SERPL-MCNC: NEGATIVE NG/ML
CALCIUM SPEC-SCNC: 11.5 MG/DL (ref 8.6–10.5)
CANNABINOIDS SERPL QL: NEGATIVE
CHLORIDE SERPL-SCNC: 87 MMOL/L (ref 98–107)
CLARITY UR: ABNORMAL
CO2 SERPL-SCNC: 19 MMOL/L (ref 22–29)
COCAINE UR QL: NEGATIVE
COLOR UR: YELLOW
CREAT BLD-MCNC: 0.83 MG/DL (ref 0.57–1)
D-LACTATE SERPL-SCNC: 0.9 MMOL/L (ref 0.5–2)
D-LACTATE SERPL-SCNC: 4.7 MMOL/L (ref 0.5–2)
DEPRECATED RDW RBC AUTO: 55.5 FL (ref 37–54)
EOSINOPHIL # BLD AUTO: 0.01 10*3/MM3 (ref 0–0.4)
EOSINOPHIL NFR BLD AUTO: 0.1 % (ref 0.3–6.2)
ERYTHROCYTE [DISTWIDTH] IN BLOOD BY AUTOMATED COUNT: 14.1 % (ref 12.3–15.4)
ETHANOL BLD-MCNC: 14 MG/DL (ref 0–10)
FOLATE SERPL-MCNC: <2 NG/ML (ref 4.78–24.2)
GASTROCULT GAST QL: NEGATIVE
GFR SERPL CREATININE-BSD FRML MDRD: 71 ML/MIN/1.73
GLOBULIN UR ELPH-MCNC: 3.4 GM/DL
GLUCOSE BLD-MCNC: 65 MG/DL (ref 65–99)
GLUCOSE BLDC GLUCOMTR-MCNC: 66 MG/DL (ref 70–130)
GLUCOSE BLDC GLUCOMTR-MCNC: 87 MG/DL (ref 70–130)
GLUCOSE UR STRIP-MCNC: NEGATIVE MG/DL
HCT VFR BLD AUTO: 40.3 % (ref 34–46.6)
HGB BLD-MCNC: 13.1 G/DL (ref 12–15.9)
HGB UR QL STRIP.AUTO: NEGATIVE
HOLD SPECIMEN: NORMAL
HYALINE CASTS UR QL AUTO: ABNORMAL /LPF
IMM GRANULOCYTES # BLD AUTO: 0.03 10*3/MM3 (ref 0–0.05)
IMM GRANULOCYTES NFR BLD AUTO: 0.3 % (ref 0–0.5)
INR PPP: 1.06 (ref 0.85–1.16)
KETONES UR QL STRIP: ABNORMAL
LEUKOCYTE ESTERASE UR QL STRIP.AUTO: NEGATIVE
LYMPHOCYTES # BLD AUTO: 1.44 10*3/MM3 (ref 0.7–3.1)
LYMPHOCYTES NFR BLD AUTO: 14.8 % (ref 19.6–45.3)
MAGNESIUM SERPL-MCNC: 2.2 MG/DL (ref 1.6–2.6)
MCH RBC QN AUTO: 35.3 PG (ref 26.6–33)
MCHC RBC AUTO-ENTMCNC: 32.5 G/DL (ref 31.5–35.7)
MCV RBC AUTO: 108.6 FL (ref 79–97)
METHADONE UR QL SCN: NEGATIVE
MONOCYTES # BLD AUTO: 1.03 10*3/MM3 (ref 0.1–0.9)
MONOCYTES NFR BLD AUTO: 10.6 % (ref 5–12)
NEUTROPHILS # BLD AUTO: 7.25 10*3/MM3 (ref 1.7–7)
NEUTROPHILS NFR BLD AUTO: 74.2 % (ref 42.7–76)
NITRITE UR QL STRIP: NEGATIVE
OPIATES UR QL: NEGATIVE
OXYCODONE UR QL SCN: NEGATIVE
PCP UR QL SCN: NEGATIVE
PH GAST: 2 [PH]
PH UR STRIP.AUTO: <=5 [PH] (ref 5–8)
PLATELET # BLD AUTO: 297 10*3/MM3 (ref 140–450)
PMV BLD AUTO: 10.4 FL (ref 6–12)
POTASSIUM BLD-SCNC: 4.7 MMOL/L (ref 3.5–5.2)
PROPOXYPH UR QL: NEGATIVE
PROT SERPL-MCNC: 8.2 G/DL (ref 6–8.5)
PROT UR QL STRIP: ABNORMAL
PROTHROMBIN TIME: 13.3 SECONDS (ref 11.2–14.3)
RBC # BLD AUTO: 3.71 10*6/MM3 (ref 3.77–5.28)
RBC # UR: ABNORMAL /HPF
REF LAB TEST METHOD: ABNORMAL
RH BLD: POSITIVE
SODIUM BLD-SCNC: 136 MMOL/L (ref 136–145)
SP GR UR STRIP: 1.02 (ref 1–1.03)
SQUAMOUS #/AREA URNS HPF: ABNORMAL /HPF
T&S EXPIRATION DATE: NORMAL
TRICYCLICS UR QL SCN: NEGATIVE
UROBILINOGEN UR QL STRIP: ABNORMAL
VIT B12 BLD-MCNC: 681 PG/ML (ref 211–946)
WBC NRBC COR # BLD: 9.76 10*3/MM3 (ref 3.4–10.8)
WBC UR QL AUTO: ABNORMAL /HPF
WHOLE BLOOD HOLD SPECIMEN: NORMAL
WHOLE BLOOD HOLD SPECIMEN: NORMAL

## 2019-05-10 PROCEDURE — 99223 1ST HOSP IP/OBS HIGH 75: CPT | Performed by: INTERNAL MEDICINE

## 2019-05-10 PROCEDURE — 82607 VITAMIN B-12: CPT | Performed by: EMERGENCY MEDICINE

## 2019-05-10 PROCEDURE — 99285 EMERGENCY DEPT VISIT HI MDM: CPT

## 2019-05-10 PROCEDURE — 25010000002 LORAZEPAM PER 2 MG

## 2019-05-10 PROCEDURE — G0378 HOSPITAL OBSERVATION PER HR: HCPCS

## 2019-05-10 PROCEDURE — 86850 RBC ANTIBODY SCREEN: CPT | Performed by: EMERGENCY MEDICINE

## 2019-05-10 PROCEDURE — 80307 DRUG TEST PRSMV CHEM ANLYZR: CPT | Performed by: EMERGENCY MEDICINE

## 2019-05-10 PROCEDURE — 86900 BLOOD TYPING SEROLOGIC ABO: CPT | Performed by: EMERGENCY MEDICINE

## 2019-05-10 PROCEDURE — 86901 BLOOD TYPING SEROLOGIC RH(D): CPT | Performed by: EMERGENCY MEDICINE

## 2019-05-10 PROCEDURE — 25010000002 THIAMINE PER 100 MG: Performed by: EMERGENCY MEDICINE

## 2019-05-10 PROCEDURE — 80306 DRUG TEST PRSMV INSTRMNT: CPT | Performed by: EMERGENCY MEDICINE

## 2019-05-10 PROCEDURE — HZ2ZZZZ DETOXIFICATION SERVICES FOR SUBSTANCE ABUSE TREATMENT: ICD-10-PCS | Performed by: INTERNAL MEDICINE

## 2019-05-10 PROCEDURE — 25010000002 ONDANSETRON PER 1 MG: Performed by: EMERGENCY MEDICINE

## 2019-05-10 PROCEDURE — 86900 BLOOD TYPING SEROLOGIC ABO: CPT

## 2019-05-10 PROCEDURE — 83735 ASSAY OF MAGNESIUM: CPT | Performed by: EMERGENCY MEDICINE

## 2019-05-10 PROCEDURE — 82746 ASSAY OF FOLIC ACID SERUM: CPT | Performed by: EMERGENCY MEDICINE

## 2019-05-10 PROCEDURE — 85610 PROTHROMBIN TIME: CPT | Performed by: EMERGENCY MEDICINE

## 2019-05-10 PROCEDURE — 86901 BLOOD TYPING SEROLOGIC RH(D): CPT

## 2019-05-10 PROCEDURE — 83605 ASSAY OF LACTIC ACID: CPT | Performed by: EMERGENCY MEDICINE

## 2019-05-10 PROCEDURE — 80053 COMPREHEN METABOLIC PANEL: CPT | Performed by: EMERGENCY MEDICINE

## 2019-05-10 PROCEDURE — 82271 OCCULT BLOOD OTHER SOURCES: CPT | Performed by: EMERGENCY MEDICINE

## 2019-05-10 PROCEDURE — 81001 URINALYSIS AUTO W/SCOPE: CPT | Performed by: EMERGENCY MEDICINE

## 2019-05-10 PROCEDURE — 85025 COMPLETE CBC W/AUTO DIFF WBC: CPT | Performed by: EMERGENCY MEDICINE

## 2019-05-10 PROCEDURE — 25010000002 LORAZEPAM PER 2 MG: Performed by: EMERGENCY MEDICINE

## 2019-05-10 PROCEDURE — 82962 GLUCOSE BLOOD TEST: CPT

## 2019-05-10 PROCEDURE — 93005 ELECTROCARDIOGRAM TRACING: CPT | Performed by: EMERGENCY MEDICINE

## 2019-05-10 RX ORDER — ONDANSETRON 2 MG/ML
4 INJECTION INTRAMUSCULAR; INTRAVENOUS ONCE
Status: COMPLETED | OUTPATIENT
Start: 2019-05-10 | End: 2019-05-10

## 2019-05-10 RX ORDER — ONDANSETRON 2 MG/ML
4 INJECTION INTRAMUSCULAR; INTRAVENOUS EVERY 6 HOURS PRN
Status: DISCONTINUED | OUTPATIENT
Start: 2019-05-10 | End: 2019-05-15 | Stop reason: HOSPADM

## 2019-05-10 RX ORDER — ONDANSETRON 4 MG/1
4 TABLET, FILM COATED ORAL EVERY 6 HOURS PRN
Status: DISCONTINUED | OUTPATIENT
Start: 2019-05-10 | End: 2019-05-15 | Stop reason: HOSPADM

## 2019-05-10 RX ORDER — LORAZEPAM 2 MG/ML
1 INJECTION INTRAMUSCULAR EVERY 6 HOURS
Status: DISCONTINUED | OUTPATIENT
Start: 2019-05-10 | End: 2019-05-10 | Stop reason: ALTCHOICE

## 2019-05-10 RX ORDER — SODIUM CHLORIDE 9 MG/ML
75 INJECTION, SOLUTION INTRAVENOUS CONTINUOUS
Status: DISCONTINUED | OUTPATIENT
Start: 2019-05-10 | End: 2019-05-13

## 2019-05-10 RX ORDER — LORAZEPAM 2 MG/ML
1 INJECTION INTRAMUSCULAR ONCE
Status: COMPLETED | OUTPATIENT
Start: 2019-05-10 | End: 2019-05-10

## 2019-05-10 RX ORDER — LORAZEPAM 1 MG/1
1 TABLET ORAL
Status: DISCONTINUED | OUTPATIENT
Start: 2019-05-10 | End: 2019-05-15 | Stop reason: HOSPADM

## 2019-05-10 RX ORDER — DIAZEPAM 2 MG/1
2 TABLET ORAL ONCE
Status: COMPLETED | OUTPATIENT
Start: 2019-05-10 | End: 2019-05-11

## 2019-05-10 RX ORDER — LORAZEPAM 2 MG/ML
INJECTION INTRAMUSCULAR
Status: COMPLETED
Start: 2019-05-10 | End: 2019-05-10

## 2019-05-10 RX ORDER — LORAZEPAM 1 MG/1
2 TABLET ORAL
Status: DISCONTINUED | OUTPATIENT
Start: 2019-05-10 | End: 2019-05-15 | Stop reason: HOSPADM

## 2019-05-10 RX ORDER — LORAZEPAM 2 MG/ML
2 INJECTION INTRAMUSCULAR
Status: DISCONTINUED | OUTPATIENT
Start: 2019-05-10 | End: 2019-05-15 | Stop reason: HOSPADM

## 2019-05-10 RX ORDER — PANTOPRAZOLE SODIUM 40 MG/10ML
40 INJECTION, POWDER, LYOPHILIZED, FOR SOLUTION INTRAVENOUS
Status: DISCONTINUED | OUTPATIENT
Start: 2019-05-11 | End: 2019-05-13

## 2019-05-10 RX ORDER — PANTOPRAZOLE SODIUM 40 MG/10ML
80 INJECTION, POWDER, LYOPHILIZED, FOR SOLUTION INTRAVENOUS ONCE
Status: COMPLETED | OUTPATIENT
Start: 2019-05-10 | End: 2019-05-10

## 2019-05-10 RX ORDER — LISINOPRIL 40 MG/1
40 TABLET ORAL DAILY
Status: DISCONTINUED | OUTPATIENT
Start: 2019-05-11 | End: 2019-05-15 | Stop reason: HOSPADM

## 2019-05-10 RX ORDER — LATANOPROST 50 UG/ML
1 SOLUTION/ DROPS OPHTHALMIC NIGHTLY
Status: DISCONTINUED | OUTPATIENT
Start: 2019-05-10 | End: 2019-05-15 | Stop reason: HOSPADM

## 2019-05-10 RX ORDER — DIAZEPAM 5 MG/1
10 TABLET ORAL 3 TIMES DAILY
Status: DISCONTINUED | OUTPATIENT
Start: 2019-05-10 | End: 2019-05-12

## 2019-05-10 RX ORDER — FAMOTIDINE 10 MG/ML
20 INJECTION, SOLUTION INTRAVENOUS ONCE
Status: COMPLETED | OUTPATIENT
Start: 2019-05-10 | End: 2019-05-10

## 2019-05-10 RX ORDER — SODIUM CHLORIDE 0.9 % (FLUSH) 0.9 %
10 SYRINGE (ML) INJECTION AS NEEDED
Status: DISCONTINUED | OUTPATIENT
Start: 2019-05-10 | End: 2019-05-15 | Stop reason: HOSPADM

## 2019-05-10 RX ORDER — LORAZEPAM 2 MG/ML
1 INJECTION INTRAMUSCULAR EVERY 8 HOURS
Status: DISCONTINUED | OUTPATIENT
Start: 2019-05-11 | End: 2019-05-10 | Stop reason: ALTCHOICE

## 2019-05-10 RX ORDER — LORAZEPAM 2 MG/ML
1 INJECTION INTRAMUSCULAR
Status: DISCONTINUED | OUTPATIENT
Start: 2019-05-10 | End: 2019-05-15 | Stop reason: HOSPADM

## 2019-05-10 RX ORDER — TRAZODONE HYDROCHLORIDE 50 MG/1
50 TABLET ORAL NIGHTLY
Status: DISCONTINUED | OUTPATIENT
Start: 2019-05-10 | End: 2019-05-15 | Stop reason: HOSPADM

## 2019-05-10 RX ADMIN — ONDANSETRON 4 MG: 2 INJECTION INTRAMUSCULAR; INTRAVENOUS at 18:06

## 2019-05-10 RX ADMIN — LATANOPROST 1 DROP: 50 SOLUTION OPHTHALMIC at 23:08

## 2019-05-10 RX ADMIN — LORAZEPAM 1 MG: 2 INJECTION INTRAMUSCULAR; INTRAVENOUS at 19:06

## 2019-05-10 RX ADMIN — FAMOTIDINE 20 MG: 10 INJECTION, SOLUTION INTRAVENOUS at 19:57

## 2019-05-10 RX ADMIN — TRAZODONE HYDROCHLORIDE 50 MG: 50 TABLET ORAL at 23:12

## 2019-05-10 RX ADMIN — SODIUM CHLORIDE 1000 ML: 9 INJECTION, SOLUTION INTRAVENOUS at 19:57

## 2019-05-10 RX ADMIN — PANTOPRAZOLE SODIUM 80 MG: 40 INJECTION, POWDER, FOR SOLUTION INTRAVENOUS at 19:58

## 2019-05-10 RX ADMIN — SODIUM CHLORIDE 100 ML/HR: 9 INJECTION, SOLUTION INTRAVENOUS at 23:10

## 2019-05-10 RX ADMIN — LORAZEPAM 1 MG: 2 INJECTION INTRAMUSCULAR at 19:06

## 2019-05-10 RX ADMIN — THIAMINE HYDROCHLORIDE 1000 ML/HR: 100 INJECTION, SOLUTION INTRAMUSCULAR; INTRAVENOUS at 18:07

## 2019-05-10 RX ADMIN — DIAZEPAM 10 MG: 5 TABLET ORAL at 23:08

## 2019-05-10 RX ADMIN — LORAZEPAM 1 MG: 2 INJECTION INTRAMUSCULAR; INTRAVENOUS at 18:06

## 2019-05-10 NOTE — ED PROVIDER NOTES
Subjective   Philomena Davis is a 58 y.o.female who presents to the ED with complaints of hematemesis. The patient reports she has vomited blood for the past three days. She states she is currently detoxing from alcohol and her last drink was earlier today. She has not taken any medication for her symptoms. She also complains of nausea, epistaxis, diarrhea, and a cough, but she denies any hematochezia, fever, congestion, dysuria, hematuria, change in frequency of urination, change in urgency of urination, or abdominal pain. Mroeover, she states she drinks about a pint per day. There are no other complaints at this time.         History provided by:  Patient  Vomiting   The primary symptoms include nausea, vomiting (hematemesis), diarrhea and hematemesis. Primary symptoms do not include fever, abdominal pain, hematochezia or dysuria. The illness began 2 days ago. The onset was sudden. The problem has not changed since onset.  Risk factors for a gastrointestinal illness include alcohol use.       Review of Systems   Constitutional: Negative for fever.   HENT: Positive for nosebleeds. Negative for congestion.    Respiratory: Positive for cough.    Gastrointestinal: Positive for diarrhea, hematemesis, nausea and vomiting (hematemesis). Negative for abdominal pain, blood in stool and hematochezia.   Genitourinary: Negative for dysuria, frequency, hematuria and urgency.   All other systems reviewed and are negative.      Past Medical History:   Diagnosis Date   • Anxiety and depression    • Closed displaced fracture of lateral malleolus of left fibula    • Diabetes mellitus (CMS/HCC)    • Ganglion cyst of left foot    • Glaucoma    • Hypertension    • Wears glasses        Allergies   Allergen Reactions   • Codeine Nausea Only       Past Surgical History:   Procedure Laterality Date   • ANKLE OPEN REDUCTION INTERNAL FIXATION Left 3/24/2017    Procedure: LEFT ANKLE OPEN REDUCTION INTERNAL FIXATION WITH ILIAC CREST BONE  GRAFT , EXCISION CYST 4TH/5TH INNERSPACE LEFT FOOT;  Surgeon: Frank Larson MD;  Location:  [facility];  Service:    • AUGMENTATION MAMMAPLASTY Bilateral 1999   • BREAST AUGMENTATION     • BREAST EXCISIONAL BIOPSY Right 2014   • DILATION AND CURETTAGE, DIAGNOSTIC / THERAPEUTIC     • VA FIX NON/MALUNION FEMUR,W GRAFT Left 3/24/2017    Procedure: ILIAC CREST BONE GRAFT;  Surgeon: Frank Larson MD;  Location:  [facility];  Service: Orthopedics   • WRIST SURGERY         Family History   Problem Relation Age of Onset   • Cancer Mother    • No Known Problems Father    • No Known Problems Sister    • No Known Problems Brother    • No Known Problems Daughter    • No Known Problems Son    • No Known Problems Maternal Grandmother    • Breast cancer Paternal Grandmother 68        met to brain   • No Known Problems Maternal Aunt    • No Known Problems Paternal Aunt    • Rheum arthritis Other    • Heart disease Other    • Cancer Other    • Stroke Other    • Hypertension Other    • BRCA 1/2 Neg Hx    • Colon cancer Neg Hx    • Endometrial cancer Neg Hx    • Ovarian cancer Neg Hx        Social History     Socioeconomic History   • Marital status:      Spouse name: Not on file   • Number of children: Not on file   • Years of education: Not on file   • Highest education level: Not on file   Tobacco Use   • Smoking status: Former Smoker     Packs/day: 1.50     Years: 20.00     Pack years: 30.00     Types: Cigarettes     Last attempt to quit: 3/22/2009     Years since quitting: 10.1   • Smokeless tobacco: Never Used   Substance and Sexual Activity   • Alcohol use: Yes     Alcohol/week: 4.2 oz     Types: 7 Glasses of wine per week     Comment: 1/5 VODKA EVERY TWO DAYS   • Drug use: No   • Sexual activity: Defer         Objective   Physical Exam   Constitutional: She is oriented to person, place, and time. She appears well-developed and well-nourished. No distress.   HENT:   Head: Normocephalic and atraumatic.    Nose: Nose normal.   Eyes: Conjunctivae are normal. No scleral icterus.   Neck: Normal range of motion. Neck supple.   Cardiovascular: Normal rate, regular rhythm, normal heart sounds and intact distal pulses.   No murmur heard.  Pulmonary/Chest: Effort normal and breath sounds normal. No stridor. No respiratory distress.   Abdominal: Soft. Bowel sounds are normal. There is no tenderness.   Musculoskeletal: Normal range of motion. She exhibits no edema.   Neurological: She is alert and oriented to person, place, and time. She displays tremor.   Skin: Skin is warm and dry. No pallor.   Psychiatric: She has a normal mood and affect. Her behavior is normal.   Nursing note and vitals reviewed.      Procedures         ED Course  ED Course as of May 11 0046   Fri May 10, 2019   1934 She had significant tremor with progressive increase.  She is treated with lorazepam a milligram initially with a repeat milligram.  Her symptoms are improved.  She received a banana bag.  Her ketones were 160 and her lactate is 4.7.  She has no sign of obvious infection but is quite dehydrated.  She will be treated with a second liter of fluids.  Her nausea is now controlled.  [HH]   1943 I discussed findings at length with the patient who absolutely needs inpatient rehab however at the current time she needs hydration and an initial medical evaluation and treatment.  A second liter of fluids is pending.  Have discussed the finding with Dr. Jonas will admit for definitive inpatient care to the hospitalist monitored care service.  [HH]      ED Course User Index  [HH] Akshat Mcrae MD     Recent Results (from the past 24 hour(s))   Comprehensive Metabolic Panel    Collection Time: 05/10/19  5:57 PM   Result Value Ref Range    Glucose 65 65 - 99 mg/dL    BUN 12 6 - 20 mg/dL    Creatinine 0.83 0.57 - 1.00 mg/dL    Sodium 136 136 - 145 mmol/L    Potassium 4.7 3.5 - 5.2 mmol/L    Chloride 87 (L) 98 - 107 mmol/L    CO2 19.0 (L) 22.0 - 29.0  mmol/L    Calcium 11.5 (H) 8.6 - 10.5 mg/dL    Total Protein 8.2 6.0 - 8.5 g/dL    Albumin 4.80 3.50 - 5.20 g/dL    ALT (SGPT) 79 (H) 1 - 33 U/L    AST (SGOT) 130 (H) 1 - 32 U/L    Alkaline Phosphatase 100 39 - 117 U/L    Total Bilirubin 1.3 (H) 0.2 - 1.2 mg/dL    eGFR Non African Amer 71 >60 mL/min/1.73    Globulin 3.4 gm/dL    A/G Ratio 1.4 g/dL    BUN/Creatinine Ratio 14.5 7.0 - 25.0    Anion Gap 30.0 mmol/L   Protime-INR    Collection Time: 05/10/19  5:57 PM   Result Value Ref Range    Protime 13.3 11.2 - 14.3 Seconds    INR 1.06 0.85 - 1.16   Lactic Acid, Plasma    Collection Time: 05/10/19  5:57 PM   Result Value Ref Range    Lactate 4.7 (C) 0.5 - 2.0 mmol/L   Magnesium    Collection Time: 05/10/19  5:57 PM   Result Value Ref Range    Magnesium 2.2 1.6 - 2.6 mg/dL   Light Blue Top    Collection Time: 05/10/19  5:57 PM   Result Value Ref Range    Extra Tube hold for add-on    Green Top (Gel)    Collection Time: 05/10/19  5:57 PM   Result Value Ref Range    Extra Tube Hold for add-ons.    Lavender Top    Collection Time: 05/10/19  5:57 PM   Result Value Ref Range    Extra Tube hold for add-on    Gold Top - SST    Collection Time: 05/10/19  5:57 PM   Result Value Ref Range    Extra Tube Hold for add-ons.    CBC Auto Differential    Collection Time: 05/10/19  5:57 PM   Result Value Ref Range    WBC 9.76 3.40 - 10.80 10*3/mm3    RBC 3.71 (L) 3.77 - 5.28 10*6/mm3    Hemoglobin 13.1 12.0 - 15.9 g/dL    Hematocrit 40.3 34.0 - 46.6 %    .6 (H) 79.0 - 97.0 fL    MCH 35.3 (H) 26.6 - 33.0 pg    MCHC 32.5 31.5 - 35.7 g/dL    RDW 14.1 12.3 - 15.4 %    RDW-SD 55.5 (H) 37.0 - 54.0 fl    MPV 10.4 6.0 - 12.0 fL    Platelets 297 140 - 450 10*3/mm3    Neutrophil % 74.2 42.7 - 76.0 %    Lymphocyte % 14.8 (L) 19.6 - 45.3 %    Monocyte % 10.6 5.0 - 12.0 %    Eosinophil % 0.1 (L) 0.3 - 6.2 %    Basophil % 0.3 0.0 - 1.5 %    Immature Grans % 0.3 0.0 - 0.5 %    Neutrophils, Absolute 7.25 (H) 1.70 - 7.00 10*3/mm3    Lymphocytes,  Absolute 1.44 0.70 - 3.10 10*3/mm3    Monocytes, Absolute 1.03 (H) 0.10 - 0.90 10*3/mm3    Eosinophils, Absolute 0.01 0.00 - 0.40 10*3/mm3    Basophils, Absolute 0.03 0.00 - 0.20 10*3/mm3    Immature Grans, Absolute 0.03 0.00 - 0.05 10*3/mm3   Ethanol    Collection Time: 05/10/19  5:57 PM   Result Value Ref Range    Ethanol 14 (H) 0 - 10 mg/dL   Lactic Acid, Reflex Timer (This will reflex a repeat order 3-3:15 hours after ordered.)    Collection Time: 05/10/19  5:57 PM   Result Value Ref Range    Extra Tube Hold for add-ons.    Vitamin B12    Collection Time: 05/10/19  5:57 PM   Result Value Ref Range    Vitamin B-12 681 211 - 946 pg/mL   Folate    Collection Time: 05/10/19  5:57 PM   Result Value Ref Range    Folate <2.00 (L) 4.78 - 24.20 ng/mL   Type & Screen    Collection Time: 05/10/19  6:01 PM   Result Value Ref Range    ABO Type O     RH type Positive     Antibody Screen Negative     T&S Expiration Date 5/13/2019 11:59:59 PM    Occult Blood Gastric / Duodenum - Gastric Contents,    Collection Time: 05/10/19  6:33 PM   Result Value Ref Range    Gastroccult Negative Negative    pH, Gastric 2    ABO RH Specimen Verification    Collection Time: 05/10/19  6:50 PM   Result Value Ref Range    ABO Type O     RH type Positive    Urinalysis With Microscopic If Indicated (No Culture) - Urine, Clean Catch    Collection Time: 05/10/19  7:08 PM   Result Value Ref Range    Color, UA Yellow Yellow, Straw    Appearance, UA Cloudy (A) Clear    pH, UA <=5.0 5.0 - 8.0    Specific Gravity, UA 1.022 1.001 - 1.030    Glucose, UA Negative Negative    Ketones, UA >=160 mg/dL (4+) (A) Negative    Bilirubin, UA Negative Negative    Blood, UA Negative Negative    Protein, UA 30 mg/dL (1+) (A) Negative    Leuk Esterase, UA Negative Negative    Nitrite, UA Negative Negative    Urobilinogen, UA 0.2 E.U./dL 0.2 - 1.0 E.U./dL   Urine Drug Screen - Urine, Clean Catch    Collection Time: 05/10/19  7:08 PM   Result Value Ref Range    THC,  Screen, Urine Negative Negative    Phencyclidine (PCP), Urine Negative Negative    Cocaine Screen, Urine Negative Negative    Methamphetamine, Ur Negative Negative    Opiate Screen Negative Negative    Amphetamine Screen, Urine Negative Negative    Benzodiazepine Screen, Urine Negative Negative    Tricyclic Antidepressants Screen Negative Negative    Methadone Screen, Urine Negative Negative    Barbiturates Screen, Urine Negative Negative    Oxycodone Screen, Urine Negative Negative    Propoxyphene Screen Negative Negative    Buprenorphine, Screen, Urine Negative Negative   Urinalysis, Microscopic Only - Urine, Clean Catch    Collection Time: 05/10/19  7:08 PM   Result Value Ref Range    RBC, UA 0-2 None Seen, 0-2 /HPF    WBC, UA 3-5 (A) None Seen, 0-2 /HPF    Bacteria, UA None Seen None Seen, Trace /HPF    Squamous Epithelial Cells, UA 7-12 (A) None Seen, 0-2 /HPF    Hyaline Casts, UA 0-6 0 - 6 /LPF    Methodology Automated Microscopy    POC Glucose Once    Collection Time: 05/10/19 10:06 PM   Result Value Ref Range    Glucose 66 (L) 70 - 130 mg/dL   Lactic Acid, Reflex    Collection Time: 05/10/19 10:25 PM   Result Value Ref Range    Lactate 0.9 0.5 - 2.0 mmol/L   POC Glucose Once    Collection Time: 05/10/19 11:01 PM   Result Value Ref Range    Glucose 87 70 - 130 mg/dL     Note: In addition to lab results from this visit, the labs listed above may include labs taken at another facility or during a different encounter within the last 24 hours. Please correlate lab times with ED admission and discharge times for further clarification of the services performed during this visit.    No orders to display     Vitals:    05/10/19 2030 05/10/19 2127 05/10/19 2130 05/10/19 2132   BP: 131/73 127/84 136/86    BP Location:  Right arm Left arm    Patient Position:  Lying Lying    Pulse: 105  98    Resp:   18    Temp:   98.6 °F (37 °C)    TempSrc:   Oral    SpO2: 94%  99%    Weight:    73.2 kg (161 lb 6.4 oz)   Height:     "162.6 cm (64\")     Medications   sodium chloride 0.9 % flush 10 mL (not administered)   latanoprost (XALATAN) 0.005 % ophthalmic solution 1 drop (1 drop Both Eyes Given 5/10/19 2308)   lisinopril (PRINIVIL,ZESTRIL) tablet 40 mg (not administered)   sodium chloride 0.9 % infusion (100 mL/hr Intravenous New Bag 5/10/19 2310)   ondansetron (ZOFRAN) tablet 4 mg (not administered)     Or   ondansetron (ZOFRAN) injection 4 mg (not administered)   LORazepam (ATIVAN) tablet 1 mg (not administered)     Or   LORazepam (ATIVAN) injection 1 mg (not administered)     Or   LORazepam (ATIVAN) tablet 2 mg (not administered)     Or   LORazepam (ATIVAN) injection 2 mg (not administered)     Or   LORazepam (ATIVAN) injection 2 mg (not administered)     Or   LORazepam (ATIVAN) injection 2 mg (not administered)   diazePAM (VALIUM) tablet 2 mg (2 mg Oral Not Given 5/10/19 2258)   pantoprazole (PROTONIX) injection 40 mg (not administered)   diazePAM (VALIUM) tablet 10 mg (10 mg Oral Given 5/10/19 2308)   traZODone (DESYREL) tablet 50 mg (50 mg Oral Given 5/10/19 2312)   multiple vitamin (M.V.I. Adult) 10 mL, thiamine (B-1) 100 mg, folic acid 1 mg in sodium chloride 0.9 % 1,000 mL infusion (0 mL/hr Intravenous Stopped 5/10/19 1906)   LORazepam (ATIVAN) injection 1 mg (1 mg Intravenous Given 5/10/19 1806)   ondansetron (ZOFRAN) injection 4 mg (4 mg Intravenous Given 5/10/19 1806)   LORazepam (ATIVAN) injection 1 mg (1 mg Intravenous Given 5/10/19 1906)   sodium chloride 0.9 % bolus 1,000 mL (0 mL Intravenous Stopped 5/10/19 2309)   famotidine (PEPCID) injection 20 mg (20 mg Intravenous Given 5/10/19 1957)   pantoprazole (PROTONIX) injection 80 mg (80 mg Intravenous Given 5/10/19 1958)     ECG/EMG Results (last 24 hours)     ** No results found for the last 24 hours. **        ECG 12 Lead   Final Result   Test Reason : GI Bleed   Blood Pressure : **/** mmHG   Vent. Rate : 097 BPM     Atrial Rate : 097 BPM      P-R Int : 126 ms          QRS " Dur : 074 ms       QT Int : 362 ms       P-R-T Axes : 038 045 033 degrees      QTc Int : 459 ms      Normal sinus rhythm   Nonspecific T wave abnormality   Abnormal ECG   When compared with ECG of 15-NOV-2017 12:16,   Inverted T waves have replaced nonspecific T wave abnormality in Anterior   leads   Confirmed by CHRISTELLE MCRAE MD ([id number]) on 5/10/2019 6:29:57 PM      Referred By:  juan           Confirmed By:CHRISTELLE MCRAE MD                          [facility]    Final diagnoses:   Alcohol withdrawal syndrome without complication (CMS/HCC)   Hematemesis with nausea   Dehydration   Elevated serum lactate dehydrogenase   Alcohol dependence with unspecified alcohol-induced disorder (CMS/HCC)       Documentation assistance provided by shannon Cardona.  Information recorded by the scribe was done at my direction and has been verified and validated by me.     Cedric Cardona  05/10/19 1731       Cedric Cardona  05/10/19 1834       Cedric Cardona  05/10/19 1946       Christelle Mcrae MD  05/11/19 0046

## 2019-05-11 PROBLEM — F10.930 ALCOHOL WITHDRAWAL SYNDROME WITHOUT COMPLICATION (HCC): Status: ACTIVE | Noted: 2019-05-11

## 2019-05-11 LAB
ABO GROUP BLD: NORMAL
ALBUMIN SERPL-MCNC: 3.7 G/DL (ref 3.5–5.2)
ALBUMIN/GLOB SERPL: 1.4 G/DL
ALP SERPL-CCNC: 87 U/L (ref 39–117)
ALT SERPL W P-5'-P-CCNC: 56 U/L (ref 1–33)
ANION GAP SERPL CALCULATED.3IONS-SCNC: 18 MMOL/L
AST SERPL-CCNC: 96 U/L (ref 1–32)
BASOPHILS # BLD AUTO: 0.03 10*3/MM3 (ref 0–0.2)
BASOPHILS NFR BLD AUTO: 0.5 % (ref 0–1.5)
BILIRUB SERPL-MCNC: 1.5 MG/DL (ref 0.2–1.2)
BUN BLD-MCNC: 13 MG/DL (ref 6–20)
BUN/CREAT SERPL: 15.1 (ref 7–25)
CALCIUM SPEC-SCNC: 9.6 MG/DL (ref 8.6–10.5)
CHLORIDE SERPL-SCNC: 97 MMOL/L (ref 98–107)
CO2 SERPL-SCNC: 22 MMOL/L (ref 22–29)
CREAT BLD-MCNC: 0.86 MG/DL (ref 0.57–1)
DEPRECATED RDW RBC AUTO: 55.8 FL (ref 37–54)
EOSINOPHIL # BLD AUTO: 0.02 10*3/MM3 (ref 0–0.4)
EOSINOPHIL NFR BLD AUTO: 0.3 % (ref 0.3–6.2)
ERYTHROCYTE [DISTWIDTH] IN BLOOD BY AUTOMATED COUNT: 14 % (ref 12.3–15.4)
GFR SERPL CREATININE-BSD FRML MDRD: 68 ML/MIN/1.73
GLOBULIN UR ELPH-MCNC: 2.7 GM/DL
GLUCOSE BLD-MCNC: 92 MG/DL (ref 65–99)
GLUCOSE BLDC GLUCOMTR-MCNC: 114 MG/DL (ref 70–130)
GLUCOSE BLDC GLUCOMTR-MCNC: 90 MG/DL (ref 70–130)
GLUCOSE BLDC GLUCOMTR-MCNC: 92 MG/DL (ref 70–130)
GLUCOSE BLDC GLUCOMTR-MCNC: 99 MG/DL (ref 70–130)
HBA1C MFR BLD: <4.3 % (ref 4.8–5.6)
HCT VFR BLD AUTO: 35.8 % (ref 34–46.6)
HGB BLD-MCNC: 11.5 G/DL (ref 12–15.9)
IMM GRANULOCYTES # BLD AUTO: 0.04 10*3/MM3 (ref 0–0.05)
IMM GRANULOCYTES NFR BLD AUTO: 0.6 % (ref 0–0.5)
LYMPHOCYTES # BLD AUTO: 1.09 10*3/MM3 (ref 0.7–3.1)
LYMPHOCYTES NFR BLD AUTO: 17.5 % (ref 19.6–45.3)
MAGNESIUM SERPL-MCNC: 1.9 MG/DL (ref 1.6–2.6)
MCH RBC QN AUTO: 34.8 PG (ref 26.6–33)
MCHC RBC AUTO-ENTMCNC: 32.1 G/DL (ref 31.5–35.7)
MCV RBC AUTO: 108.5 FL (ref 79–97)
MONOCYTES # BLD AUTO: 0.86 10*3/MM3 (ref 0.1–0.9)
MONOCYTES NFR BLD AUTO: 13.8 % (ref 5–12)
NEUTROPHILS # BLD AUTO: 4.24 10*3/MM3 (ref 1.7–7)
NEUTROPHILS NFR BLD AUTO: 67.9 % (ref 42.7–76)
PHOSPHATE SERPL-MCNC: 1.1 MG/DL (ref 2.5–4.5)
PHOSPHATE SERPL-MCNC: 1.8 MG/DL (ref 2.5–4.5)
PLATELET # BLD AUTO: 219 10*3/MM3 (ref 140–450)
PMV BLD AUTO: 10.9 FL (ref 6–12)
POTASSIUM BLD-SCNC: 4.3 MMOL/L (ref 3.5–5.2)
PROT SERPL-MCNC: 6.4 G/DL (ref 6–8.5)
RBC # BLD AUTO: 3.3 10*6/MM3 (ref 3.77–5.28)
RH BLD: POSITIVE
SODIUM BLD-SCNC: 137 MMOL/L (ref 136–145)
WBC NRBC COR # BLD: 6.24 10*3/MM3 (ref 3.4–10.8)

## 2019-05-11 PROCEDURE — 84100 ASSAY OF PHOSPHORUS: CPT | Performed by: INTERNAL MEDICINE

## 2019-05-11 PROCEDURE — 99233 SBSQ HOSP IP/OBS HIGH 50: CPT | Performed by: INTERNAL MEDICINE

## 2019-05-11 PROCEDURE — 85025 COMPLETE CBC W/AUTO DIFF WBC: CPT | Performed by: INTERNAL MEDICINE

## 2019-05-11 PROCEDURE — 80053 COMPREHEN METABOLIC PANEL: CPT | Performed by: INTERNAL MEDICINE

## 2019-05-11 PROCEDURE — 83036 HEMOGLOBIN GLYCOSYLATED A1C: CPT | Performed by: INTERNAL MEDICINE

## 2019-05-11 PROCEDURE — 83735 ASSAY OF MAGNESIUM: CPT | Performed by: INTERNAL MEDICINE

## 2019-05-11 PROCEDURE — 82962 GLUCOSE BLOOD TEST: CPT

## 2019-05-11 RX ORDER — POTASSIUM CHLORIDE 750 MG/1
40 CAPSULE, EXTENDED RELEASE ORAL AS NEEDED
Status: DISCONTINUED | OUTPATIENT
Start: 2019-05-11 | End: 2019-05-15 | Stop reason: HOSPADM

## 2019-05-11 RX ORDER — ACETAMINOPHEN 325 MG/1
650 TABLET ORAL EVERY 6 HOURS PRN
Status: DISCONTINUED | OUTPATIENT
Start: 2019-05-11 | End: 2019-05-15 | Stop reason: HOSPADM

## 2019-05-11 RX ORDER — POTASSIUM CHLORIDE 7.45 MG/ML
10 INJECTION INTRAVENOUS
Status: DISCONTINUED | OUTPATIENT
Start: 2019-05-11 | End: 2019-05-15 | Stop reason: HOSPADM

## 2019-05-11 RX ORDER — THIAMINE MONONITRATE (VIT B1) 100 MG
100 TABLET ORAL DAILY
Status: DISCONTINUED | OUTPATIENT
Start: 2019-05-11 | End: 2019-05-15 | Stop reason: HOSPADM

## 2019-05-11 RX ORDER — DIAZEPAM 2 MG/1
2 TABLET ORAL ONCE
Status: DISCONTINUED | OUTPATIENT
Start: 2019-05-11 | End: 2019-05-13

## 2019-05-11 RX ORDER — MAGNESIUM SULFATE HEPTAHYDRATE 40 MG/ML
4 INJECTION, SOLUTION INTRAVENOUS AS NEEDED
Status: DISCONTINUED | OUTPATIENT
Start: 2019-05-11 | End: 2019-05-15 | Stop reason: HOSPADM

## 2019-05-11 RX ORDER — MAGNESIUM SULFATE HEPTAHYDRATE 40 MG/ML
2 INJECTION, SOLUTION INTRAVENOUS AS NEEDED
Status: DISCONTINUED | OUTPATIENT
Start: 2019-05-11 | End: 2019-05-15 | Stop reason: HOSPADM

## 2019-05-11 RX ORDER — POTASSIUM CHLORIDE 1.5 G/1.77G
40 POWDER, FOR SOLUTION ORAL AS NEEDED
Status: DISCONTINUED | OUTPATIENT
Start: 2019-05-11 | End: 2019-05-15 | Stop reason: HOSPADM

## 2019-05-11 RX ADMIN — SODIUM CHLORIDE 100 ML/HR: 9 INJECTION, SOLUTION INTRAVENOUS at 14:18

## 2019-05-11 RX ADMIN — SODIUM CHLORIDE 100 ML/HR: 9 INJECTION, SOLUTION INTRAVENOUS at 04:36

## 2019-05-11 RX ADMIN — PANTOPRAZOLE SODIUM 40 MG: 40 INJECTION, POWDER, FOR SOLUTION INTRAVENOUS at 05:06

## 2019-05-11 RX ADMIN — DIAZEPAM 10 MG: 5 TABLET ORAL at 08:33

## 2019-05-11 RX ADMIN — DIAZEPAM 10 MG: 5 TABLET ORAL at 15:35

## 2019-05-11 RX ADMIN — LISINOPRIL 40 MG: 40 TABLET ORAL at 08:33

## 2019-05-11 RX ADMIN — THIAMINE HCL TAB 100 MG 100 MG: 100 TAB at 10:35

## 2019-05-11 RX ADMIN — MAGNESIUM SULFATE HEPTAHYDRATE 4 G: 40 INJECTION, SOLUTION INTRAVENOUS at 19:55

## 2019-05-11 RX ADMIN — POTASSIUM & SODIUM PHOSPHATES POWDER PACK 280-160-250 MG 2 PACKET: 280-160-250 PACK at 23:00

## 2019-05-11 RX ADMIN — DIAZEPAM 10 MG: 5 TABLET ORAL at 22:00

## 2019-05-11 RX ADMIN — LATANOPROST 1 DROP: 50 SOLUTION OPHTHALMIC at 21:50

## 2019-05-11 RX ADMIN — DIAZEPAM 2 MG: 2 TABLET ORAL at 20:21

## 2019-05-11 RX ADMIN — TRAZODONE HYDROCHLORIDE 50 MG: 50 TABLET ORAL at 22:00

## 2019-05-11 RX ADMIN — POTASSIUM & SODIUM PHOSPHATES POWDER PACK 280-160-250 MG 2 PACKET: 280-160-250 PACK at 15:35

## 2019-05-11 NOTE — PROGRESS NOTES
Malnutrition Severity Assessment    Patient Name:  Philomena Davis  YOB: 1960  MRN: [id number]  Admit Date:  5/10/2019    Patient meets criteria for : Severe malnutrition    Comments:  Based on patient recent wt loss and poor PO intake, patient meets criteria for severe malnutrition r/t acute illness/injury. MD please attest and include in pt diagnosis as you deem appropriate.        Malnutrition Type: Acute Illness/Injury Malnutrition     Malnutrition Type (last 8 hours)      Malnutrition Severity Assessment     Row Name 05/11/19 1407       Malnutrition Severity Assessment    Malnutrition Type  Acute Illness/Injury Malnutrition    Row Name 05/11/19 1407       Weight Status (Acute)    Weight Loss  Severe (>5% / 1 mo) 10.6% x 2 months per patient report     Row Name 05/11/19 1407       Energy Intake Status (Acute)    Energy Intake  Severe (< or equal to 50% / > or equal to 5d) minimal PO intake in the past month due increased ETOH consumption     Row Name 05/11/19 1407       Criteria Met (Must meet criteria for severity in at least 2 of these categories: M Wasting, Fat Loss, Fluid, Secondary Signs, Wt. Status, Intake)    Patient meets criteria for   Severe malnutrition          Electronically signed by:  Rosey Rivero RD  05/11/19 2:09 PM

## 2019-05-11 NOTE — H&P
"[facility]  HISTORY AND PHYSICAL    Patient Name: Philomena Davis  : 1960  MRN: [id number]  Primary Care Physician: Lona Deleon MD    Subjective   Subjective     Chief Complaint:  Vomiting blood     HPI:  Philomena Davsi is a 58 y.o. female with a past medical history significant for essential hypertension, hyperlipidemia, glaucoma and alcohol abuse presents to the ED with complaints of \"vomiting blood.\"  Patient reports that she currently drinks about a fifth of vodka every other day for the past several years.  She began drinking as a teenager and her addiction has progressed over the past several years.  She reports vomiting blood for several weeks but states over the past two days it significantly worsened.  Initially her emesis was pink tinged but over the past two days she has been vomiting bile with a significant amount of bright red blood.  She denies any chest pain, shortness of air, abdominal pain, diarrhea, diaphoresis or cough.  She states that she has never sought help for her alcohol addiction, this is the first.  She reports she is currently detoxing and her last drink was earlier today. She has never had any type of upper of lower scoping in the past.  Patient will be admitted to [facility] Under the care of the Hospitalist for further evaluation and treatment.     Review of Systems   Constitutional: Positive for appetite change. Negative for activity change, chills, diaphoresis, fatigue, fever and unexpected weight change.   HENT: Negative.    Eyes: Negative for photophobia and visual disturbance.   Respiratory: Negative for cough and shortness of breath.    Cardiovascular: Negative for chest pain, palpitations and leg swelling.   Gastrointestinal: Positive for nausea and vomiting. Negative for abdominal distention, abdominal pain, blood in stool, constipation and diarrhea.   Genitourinary: Negative.    Musculoskeletal: Negative.    Skin: Negative.  "   Neurological: Negative.    Psychiatric/Behavioral: Negative.         Otherwise 10-system ROS reviewed and is negative except as mentioned in the HPI.    Personal History     Past Medical History:   Diagnosis Date   • Anxiety and depression    • Closed displaced fracture of lateral malleolus of left fibula    • Diabetes mellitus (CMS/HCC)    • Ganglion cyst of left foot    • Glaucoma    • Hypertension    • Wears glasses        Past Surgical History:   Procedure Laterality Date   • ANKLE OPEN REDUCTION INTERNAL FIXATION Left 3/24/2017    Procedure: LEFT ANKLE OPEN REDUCTION INTERNAL FIXATION WITH ILIAC CREST BONE GRAFT , EXCISION CYST 4TH/5TH INNERSPACE LEFT FOOT;  Surgeon: Frank Larson MD;  Location:  [facility] OR;  Service:    • AUGMENTATION MAMMAPLASTY Bilateral 1999   • BREAST AUGMENTATION     • BREAST EXCISIONAL BIOPSY Right 2014   • DILATION AND CURETTAGE, DIAGNOSTIC / THERAPEUTIC     • KS FIX NON/MALUNION FEMUR,W GRAFT Left 3/24/2017    Procedure: ILIAC CREST BONE GRAFT;  Surgeon: Frank Larson MD;  Location:  [facility] OR;  Service: Orthopedics   • WRIST SURGERY         Family History: family history includes Breast cancer (age of onset: 68) in her paternal grandmother; Cancer in her mother and other; Heart disease in her other; Hypertension in her other; No Known Problems in her brother, daughter, father, maternal aunt, maternal grandmother, paternal aunt, sister, and son; Rheum arthritis in her other; Stroke in her other.     Social History:  reports that she quit smoking about 10 years ago. Her smoking use included cigarettes. She has a 30.00 pack-year smoking history. She has never used smokeless tobacco. She reports that she drinks about 4.2 oz of alcohol per week. She reports that she does not use drugs.    Medications:    (Not in a hospital admission)    Allergies   Allergen Reactions   • Codeine Nausea Only       Objective   Objective     Vital Signs:   Temp:  [98 °F (36.7 °C)] 98 °F  (36.7 °C)  Heart Rate:  [91-99] 99  Resp:  [18] 18  BP: (134-148)/(86-99) 134/86        Physical Exam   Constitutional: She is oriented to person, place, and time. She appears well-developed and well-nourished. No distress.   Alert and oriented x4   HENT:   Head: Normocephalic and atraumatic.   Eyes: Conjunctivae and EOM are normal. Pupils are equal, round, and reactive to light. Right eye exhibits no discharge. Left eye exhibits no discharge. No scleral icterus.   Neck: Normal range of motion. Neck supple. No JVD present. No tracheal deviation present. No thyromegaly present.   Cardiovascular: Normal rate, regular rhythm, normal heart sounds and intact distal pulses. Exam reveals no gallop and no friction rub.   No murmur heard.  Pulmonary/Chest: Effort normal and breath sounds normal. No stridor. No respiratory distress. She has no wheezes. She has no rales. She exhibits no tenderness.   Abdominal: Soft. Bowel sounds are normal. She exhibits no distension and no mass. There is no tenderness. There is no rebound and no guarding. No hernia.   Musculoskeletal: Normal range of motion. She exhibits no edema, tenderness or deformity.   Lymphadenopathy:     She has no cervical adenopathy.   Neurological: She is alert and oriented to person, place, and time.   Skin: Skin is warm and dry. No rash noted. She is not diaphoretic. No erythema. No pallor.   Psychiatric: She has a normal mood and affect. Her behavior is normal. Judgment and thought content normal.   Nursing note and vitals reviewed.       Results Reviewed:  I have personally reviewed current lab, radiology, and data and agree.    Results from last 7 days   Lab Units 05/10/19  1757   WBC 10*3/mm3 9.76   HEMOGLOBIN g/dL 13.1   HEMATOCRIT % 40.3   PLATELETS 10*3/mm3 297   INR  1.06     Results from last 7 days   Lab Units 05/10/19  1757   SODIUM mmol/L 136   POTASSIUM mmol/L 4.7   CHLORIDE mmol/L 87*   CO2 mmol/L 19.0*   BUN mg/dL 12   CREATININE mg/dL 0.83    GLUCOSE mg/dL 65   CALCIUM mg/dL 11.5*   ALT (SGPT) U/L 79*   AST (SGOT) U/L 130*     No results found for: BNP  No results found for: PHART, PCO2  Imaging Results (last 24 hours)     ** No results found for the last 24 hours. **        Results for orders placed during the hospital encounter of 06/10/14   CONVERTED (HISTORICAL) ECHO    Narrative Patient:      SIMONA JIMENEZ    Med Rec#:     [id number]               :          1960            Date:         06/10/2014            Age:          53y                   Height:       165 cm / 65.0 in  Weight:       84 kg / 185.1 lbs  Sex:          F                     BSA:          1.91  Room#:        [facility]                       Sonographer:  Kavitha Richter RDCS  Referring:    KEYSHA  Reading:      Deandre Larkin MD  ______________________________________________________________________    Transthoracic Echocardiogram    Indication:  DYSPNEA  BP:           125/70    Conclusions  1. The study quality is technically difficult;  all measurements are  approximate.   2. Mild concentric left ventricular hypertrophy is observed.  3. There is normal left ventricular systolic function.  4. Abnormal left ventricular diastolic filling is observed, consistent  with impaired relaxation.   5. The right ventricular global systolic function is normal.  6. There is no evidence of aortic stenosis.  7. There is no evidence of mitral valve prolapse.  8. There is mild mitral regurgitation.   9. There is mild tricuspid regurgitation.  10. There is no evidence of pericardial effusion.  11. There is no dilatation of the aortic root.  12. The venous system appears normal.    Findings       Technical Comments:  The study quality is technically difficult.  The study is technically  limited due to poor acoustic windows.  The study is technically limited  due to patient body habitus. BREAST IMPLANTS     Left Ventricle:  The left ventricular chamber size is normal. Mild concentric  left  ventricular hypertrophy is observed. Global left ventricular wall motion  and contractility are within normal limits. There is normal left  ventricular systolic function. The estimated ejection fraction is  greater than 65%.  Abnormal left ventricular diastolic filling is  observed, consistent with impaired relaxation.      Left Atrium:  The left atrial chamber size is normal.     Right Ventricle:  The right ventricular cavity size is normal. The right ventricular  global systolic function is normal.   Right Atrium:  The right atrial cavity size is normal. No atrial septal defect is  visualized.     Aortic Valve:  The aortic valve is trileaflet. There is no evidence of aortic  regurgitation. There is no evidence of aortic stenosis.     Mitral Valve:  The mitral valve leaflets appear normal. There is no evidence of mitral  valve prolapse. There is mild mitral regurgitation.  There is no  evidence of mitral stenosis.     Tricuspid Valve:  The tricuspid valve leaflets are normal.  There is mild tricuspid  regurgitation.     Pulmonic Valve:  The pulmonic valve appears normal. There is a trace pulmonic  regurgitation.      Pericardium:  There is no evidence of pericardial effusion.     Aorta:  There is no dilatation of the aortic root.     Pulmonary Artery:  The main pulmonary artery appears normal.     Venous:  The venous system appears normal.     Measurements   Chambers  Name                    Value           RVIDd (AP) 2D           2.2 cm          IVSd (2D)               1.2 cm          LVIDd (2D)              4.4 cm          LVIDs (2D)              2.8 cm          LVPWd (2D)              1.2 cm          EF (2D)                 68 %            Ao root diameter (2D)   2.8 cm          LA dimension (AP) 2D    3.5 cm            Diastolic/Systolic Function  Name                    Value           MV E-wave Vmax          0.8 m/sec       MV A-wave Vmax          0.81 m/sec        Tricuspid Valve  Name                   "  Value           TR Vmax                 2.2 m/sec       RAP                     10 mmHg         RVSP                    29 mmHg           Pulmonic Valve/Qp:Qs  Name                    Value           PV acceleration time    154 msec          Electronically signed by: Deandre Larkin MD on 06/10/2014 10:59:01       Assessment/Plan   Assessment / Plan     Active Hospital Problems    Diagnosis   • Alcohol withdrawal (CMS/HCC)   • Alcoholism /alcohol abuse (CMS/HCC)   • Hematemesis   • Diabetes mellitus, type 2 (CMS/HCC)   • Essential hypertension   • Generalized anxiety disorder   • Hyperlipidemia         Assessment & Plan:  58 year old female presents to the ED with hematemesis for the past several weeks that has progressively worsened over the past two days.     1) Hematemesis  -? Kristen díaz tear   -H&H stable at 13.1 and 40.3  -gastric occult negative  -continue protonix and pepcid  - Consider GI consult if hemoglobin drops or patient continues to have hematemesis. Suspect this will be self-limited as history suspicious for kristen díaz.   -type and screen    2) Alcohol withdrawal  -start CIWA protocol, scheduled and prn ativan  -s/p 2 mg of IV ativan in the ED  - Start scheduled valium 10mg TID.  -rally pack given in the ED  -check b12 and folate  -thiamine daily  -consult CM/SS  -reports she drinks fifth of vodka every other day   -liver enzymes : ALT: 79 AST: 130 tbili: 1.3    3) Dehydration  -lactic acid 4.7  -s/p 1L normal saline in the ED  -continue aggressive hydration  -reflex lactic pending    4) essential hypertension  -continue lisinopril    5) Hyperlipidemia  -reports she stopped taking her lipitor recently for \"no reason.\"   -check FLP    6) Glaucoma  -continue xalatan drops    7) Diabetes mellitus type 2  -reports was told she was \"prediabetic\"   -currently on metformin  -check hgb A1c  -fingersticks achs  -may need to start ldssi, glucose currently 65        DVT prophylaxis:scds    CODE STATUS:  " Full code  There are no questions and answers to display.       Admission Status:  I believe this patient meets INPATIENT status due to the need for care which can only be reasonably provided in an hospital setting such as possible need for aggressive/expedited ancillary services and/or consultation services, IV medications, close physician monitoring, and/or procedures.  In such, I feel patient’s risk for adverse outcomes and need for care warrant INPATIENT evaluation and predict the patient’s care encounter to likely last beyond 2 midnights.    CANDY Nascimento   05/10/19   8:27 PM      Brief Attending Admission Attestation     I have seen and examined the patient, performing an independent face-to-face diagnostic evaluation with plan of care reviewed and developed with the advanced practice clinician (APC).      Brief Summary Statement:   Philomena Davis is a 58 y.o. female with history of alcohol dependence, HTN, anxiety/depression who presents with alcohol withdrawal. Patient reports that she drinks 1/5th of vodka daily starting first thing in the morning. She states that her drinking is related to her long standing anxiety and depression. She previously had been sober for 8 years in the past. She really wants to quit and is interested in going to rehab. Tachycardic on my exam but no significant tremors but very anxious and tearful. She also reports a history of hematemesis and suspect that this is secondary to kristen díaz given her history. Hopefully this will be self limited but will repeat Hg in the am. Currently her Hg is stable from prior Hg in 2017.  Will start CIWA protocol. Start scheduled valium 10TID and taper. She has a metabolic acidosis with elevated lactate. Will continue IVFs and obtain serial lactates until resolution. SW consult.       Remainder of detailed HPI is as noted above and has been reviewed and/or edited by me for completeness.      Attending Physical Exam:  Constitutional:  tearful and anxious, no acute distress  Eyes: PERRLA, sclerae anicteric, no conjunctival injection  HENT: NCAT, mucous membranes moist  Neck: Supple, no thyromegaly, no lymphadenopathy, trachea midline  Respiratory: Clear to auscultation bilaterally, nonlabored respirations   Cardiovascular: tachycardic, no murmurs, rubs, or gallops, palpable pedal pulses bilaterally  Gastrointestinal: Positive bowel sounds, soft, nontender, nondistended  Musculoskeletal: No bilateral ankle edema, no clubbing or cyanosis to extremities  Psychiatric: Anxious, mood labile.   Neurologic: Oriented x 3, strength symmetric in all extremities, no tremors  Skin: No rashes        Brief Assessment/Plan :  See above for further detailed assessment and plan developed with APC which I have reviewed and/or edited for completeness.      Electronically signed by Liliya Jonas MD, 05/10/19, 10:41 PM.

## 2019-05-11 NOTE — PROGRESS NOTES
Discharge Planning Assessment  [facility]     Patient Name: Philomena Davis  MRN: [id number]  Today's Date: 5/11/2019    Admit Date: 5/10/2019    Discharge Needs Assessment     Row Name 05/11/19 1226       Living Environment    Lives With  alone    Current Living Arrangements  home/apartment/condo    Primary Care Provided by  self    Provides Primary Care For  no one    Family Caregiver if Needed  other relative(s);friend(s)    Quality of Family Relationships  helpful;supportive       Resource/Environmental Concerns    Resource/Environmental Concerns  financial    Financial Concerns  food, unable to afford       Transition Planning    Patient/Family Anticipates Transition to  home;other (see comments) interested in alcohol rehab    Patient/Family Anticipated Services at Transition      Transportation Anticipated  family or friend will provide;other (see comments) May need help with transportation       Discharge Needs Assessment    Readmission Within the Last 30 Days  no previous admission in last 30 days    Concerns to be Addressed  discharge planning;substance/tobacco abuse/use;basic needs    Equipment Currently Used at Home  none    Current Discharge Risk  substance use/abuse;lives alone;financial support inadequate        Discharge Plan     Row Name 05/11/19 1228       Plan    Plan  Home vs other    Patient/Family in Agreement with Plan  yes    Plan Comments  Met with patient in the room to initiate discharge planning. Patient lives alone in a home in [place]. She is independent with ADLs and mobility and does not use any DME. Patient states she has not been caring for herself d/t her drinking. CM will the  to speak with patient. Patient states she would like information on alcohol rehab options as well as information on other  available to her. Patient may need assistance with transportation at discharge. CM will continue to follow.         Destination      No  service coordination in this encounter.      Durable Medical Equipment      No service coordination in this encounter.      Dialysis/Infusion      No service coordination in this encounter.      Home Medical Care      No service coordination in this encounter.      Therapy      No service coordination in this encounter.      Community Resources      No service coordination in this encounter.          Demographic Summary     Row Name 05/11/19 1224       General Information    Admission Type  inpatient    Referral Source  admission list    Reason for Consult  discharge planning    General Information Comments  PCP is Lona Deleon       Contact Information    Permission Granted to Share Info With  ;family/designee cousinMadelin        Functional Status     Row Name 05/11/19 1225       Functional Status    Usual Activity Tolerance  good       Functional Status, IADL    Medications  independent    Meal Preparation  independent    Housekeeping  independent    Laundry  independent    Shopping  independent       Employment/    Employment/ Comments  Confirmed with patient that she has medical and rx coverage through [employer]. She has been able to afford her medications so far.         Psychosocial    No documentation.       Abuse/Neglect    No documentation.       Legal    No documentation.       Substance Abuse    No documentation.       Patient Forms    No documentation.           Tasha Cornell

## 2019-05-11 NOTE — PROGRESS NOTES
"                  Clinical Nutrition     Nutrition Assessment  Reason for Visit:   Identified at risk by screening criteria    Patient Name: Philomena Davis  YOB: 1960  MRN: [id number]  Date of Encounter: 05/11/19 1:41 PM  Admission date: 5/10/2019       -. Recommend change thiamine to 500mg Q8hrs x 3 days, then 100mg daily   -  recommend start folic acid 1 mg daily   - will change diet to regular without restriction (cardiac and diabetic restriction not warranted at this time)    - check phos and mag levels   - replace electrolytes as indicated     Nutrition Assessment   Admission Diagnosis    Alcohol withdrawal (CMS/HCC)    Additional applicable diagnosis, conditions, procedures    Alcoholism /alcohol abuse (CMS/HCC)    Hematemesis    Alcohol withdrawal syndrome without complication (CMS/HCC)    Applicable PMH/ PSxH    Diabetes mellitus, type 2 (CMS/HCC)    Essential hypertension    Generalized anxiety disorder    Hyperlipidemia    Glaucoma     Reported/Observed/Food/Nutrition Related History:     Reports weight loss in the past couple of month due to increased alcohol intake and gastritis sx.  Feels appetite starting to  since admission.  No issues with hematemesis.  C/o swallowing difficulty, note plan for GI consult once withdrawal sx managed.      Denies any history of DMII.    Anthropometrics   Admit weight: 161# (standing scale)  Admit height: 64\"  BMI: 27.6    IBW: 120#    UBW:-180#    Weight changes: 19# weight change in past two months per patient (10.6%)     Labs reviewed     Results from last 7 days   Lab Units 05/11/19  0525   SODIUM mmol/L 137   POTASSIUM mmol/L 4.3   CHLORIDE mmol/L 97*   CO2 mmol/L 22.0   BUN mg/dL 13   CREATININE mg/dL 0.86   CALCIUM mg/dL 9.6   BILIRUBIN mg/dL 1.5*   ALK PHOS U/L 87   ALT (SGPT) U/L 56*   AST (SGOT) U/L 96*   GLUCOSE mg/dL 92     Results from last 7 days   Lab Units 05/11/19  1128 05/11/19  0710 05/10/19  2301 05/10/19  2206   GLUCOSE mg/dL " 90 92 87 66*     Lab Results   Lab Value Date/Time    HGBA1C <4.30 (L) 05/11/2019 0526    HGBA1C 5.50 03/07/2017 1511     Medications reviewed   Pertinent:  Protonix, thiamine 100mg daily     Current Nutrition Prescription     PO: Diet Regular, cardiac, DM     Intake: isuf data     Nutrition Diagnosis   5/11  Problem Malnutrition    Etiology Weight loss, decreased intake    Signs/Symptoms Report of 19# weight loss x 2 months due to decreased appetite r/t increased ETOH consumption     Nutrition Intervention     Interventions Goal    General: Nutrition support treatment    Nutrition Interventions   1.  Follow treatment progress, Care plan reviewed, Interview for preferences, Supplement provided   - will start Ensure HP daily   -. Recommend change thiamine to 500mg Q8hrs x 3 days, then 100mg daily   -  recommend start folic acid 1 mg daily   - will change diet to regular without restriction (cardiac and diabetic restriction not warranted at this time)    - check phos and mag levels   - replace electrolytes as indicated     Monitor/ Evaluation    Per protocol, Pertinent labs, Symptoms    Will Continue to follow per protocol    Rosey Rivero RD  Time Spent: 30min

## 2019-05-11 NOTE — PROGRESS NOTES
[facility] Medicine Services  PROGRESS NOTE    Patient Name: Philomena Davis  : 1960  MRN: [id number]    Date of Admission: 5/10/2019  Length of Stay: 0  Primary Care Physician: Lona Deleon MD    Subjective   Subjective     CC:  Vomiting     HPI:  Able to eat some food today. Nausea and vomiting improved. Dysphagia over the last few months.     Review of Systems  Gen- No fevers, chills  CV- No chest pain, palpitations  Resp- No cough, dyspnea  GI- + N/V no D, abd pain    Otherwise ROS is negative except as mentioned in the HPI.    Objective   Objective     Vital Signs:   Temp:  [98 °F (36.7 °C)-99.3 °F (37.4 °C)] 99.3 °F (37.4 °C)  Heart Rate:  [] 99  Resp:  [16-18] 16  BP: (105-148)/(65-99) 105/65        Physical Exam:  Constitutional: No acute distress, awake, alert  HENT: NCAT, mucous membranes moist  Respiratory: Clear to auscultation bilaterally, respiratory effort normal   Cardiovascular: tachycardic, no murmurs, rubs, or gallops, palpable pedal pulses bilaterally  Gastrointestinal: Positive bowel sounds, soft, nontender, nondistended  Musculoskeletal: No bilateral ankle edema  Psychiatric: Appropriate affect, cooperative  Neurologic: Oriented x 3, strength symmetric in all extremities, Cranial Nerves grossly intact to confrontation, speech clear  Skin: No rashes    Results Reviewed:  I have personally reviewed current lab, radiology, and data and agree.    Results from last 7 days   Lab Units 19  0525 05/10/19  1757   WBC 10*3/mm3 6.24 9.76   HEMOGLOBIN g/dL 11.5* 13.1   HEMATOCRIT % 35.8 40.3   PLATELETS 10*3/mm3 219 297   INR   --  1.06     Results from last 7 days   Lab Units 19  0525 05/10/19  1757   SODIUM mmol/L 137 136   POTASSIUM mmol/L 4.3 4.7   CHLORIDE mmol/L 97* 87*   CO2 mmol/L 22.0 19.0*   BUN mg/dL 13 12   CREATININE mg/dL 0.86 0.83   GLUCOSE mg/dL 92 65   CALCIUM mg/dL 9.6 11.5*   ALT (SGPT) U/L 56* 79*   AST (SGOT) U/L 96* 130*      Estimated Creatinine Clearance: 73.5 mL/min (by C-G formula based on SCr of 0.86 mg/dL).    No results found for: BNP    Microbiology Results Abnormal     None          Imaging Results (last 24 hours)     ** No results found for the last 24 hours. **          Results for orders placed during the hospital encounter of 06/10/14   CONVERTED (HISTORICAL) ECHO    Narrative Patient:      SIMONA JIMENEZ    Med Rec#:     [id number]               :          1960            Date:         06/10/2014            Age:          53y                   Height:       165 cm / 65.0 in  Weight:       84 kg / 185.1 lbs  Sex:          F                     BSA:          1.91  Room#:        [facility]                       Sonographer:  Kavitha Richter ROSARIO  Referring:    KEYSHA  Reading:      Deandre Larkin MD  ______________________________________________________________________    Transthoracic Echocardiogram    Indication:  DYSPNEA  BP:           125/70    Conclusions  1. The study quality is technically difficult;  all measurements are  approximate.   2. Mild concentric left ventricular hypertrophy is observed.  3. There is normal left ventricular systolic function.  4. Abnormal left ventricular diastolic filling is observed, consistent  with impaired relaxation.   5. The right ventricular global systolic function is normal.  6. There is no evidence of aortic stenosis.  7. There is no evidence of mitral valve prolapse.  8. There is mild mitral regurgitation.   9. There is mild tricuspid regurgitation.  10. There is no evidence of pericardial effusion.  11. There is no dilatation of the aortic root.  12. The venous system appears normal.    Findings       Technical Comments:  The study quality is technically difficult.  The study is technically  limited due to poor acoustic windows.  The study is technically limited  due to patient body habitus. BREAST IMPLANTS     Left Ventricle:  The left ventricular chamber size is  normal. Mild concentric left  ventricular hypertrophy is observed. Global left ventricular wall motion  and contractility are within normal limits. There is normal left  ventricular systolic function. The estimated ejection fraction is  greater than 65%.  Abnormal left ventricular diastolic filling is  observed, consistent with impaired relaxation.      Left Atrium:  The left atrial chamber size is normal.     Right Ventricle:  The right ventricular cavity size is normal. The right ventricular  global systolic function is normal.   Right Atrium:  The right atrial cavity size is normal. No atrial septal defect is  visualized.     Aortic Valve:  The aortic valve is trileaflet. There is no evidence of aortic  regurgitation. There is no evidence of aortic stenosis.     Mitral Valve:  The mitral valve leaflets appear normal. There is no evidence of mitral  valve prolapse. There is mild mitral regurgitation.  There is no  evidence of mitral stenosis.     Tricuspid Valve:  The tricuspid valve leaflets are normal.  There is mild tricuspid  regurgitation.     Pulmonic Valve:  The pulmonic valve appears normal. There is a trace pulmonic  regurgitation.      Pericardium:  There is no evidence of pericardial effusion.     Aorta:  There is no dilatation of the aortic root.     Pulmonary Artery:  The main pulmonary artery appears normal.     Venous:  The venous system appears normal.     Measurements   Chambers  Name                    Value           RVIDd (AP) 2D           2.2 cm          IVSd (2D)               1.2 cm          LVIDd (2D)              4.4 cm          LVIDs (2D)              2.8 cm          LVPWd (2D)              1.2 cm          EF (2D)                 68 %            Ao root diameter (2D)   2.8 cm          LA dimension (AP) 2D    3.5 cm            Diastolic/Systolic Function  Name                    Value           MV E-wave Vmax          0.8 m/sec       MV A-wave Vmax          0.81 m/sec        Tricuspid  Valve  Name                    Value           TR Vmax                 2.2 m/sec       RAP                     10 mmHg         RVSP                    29 mmHg           Pulmonic Valve/Qp:Qs  Name                    Value           PV acceleration time    154 msec          Electronically signed by: Deandre Larkin MD on 06/10/2014 10:59:01       I have reviewed the medications:    Current Facility-Administered Medications:   •  diazePAM (VALIUM) tablet 10 mg, 10 mg, Oral, TID, Liliya Jonas MD, 10 mg at 05/11/19 0833  •  diazePAM (VALIUM) tablet 2 mg, 2 mg, Oral, Once, Liliya Jonas MD  •  latanoprost (XALATAN) 0.005 % ophthalmic solution 1 drop, 1 drop, Both Eyes, Nightly, Liliya Jonas MD, 1 drop at 05/10/19 2308  •  lisinopril (PRINIVIL,ZESTRIL) tablet 40 mg, 40 mg, Oral, Daily, Liliya Jonas MD, 40 mg at 05/11/19 0833  •  LORazepam (ATIVAN) tablet 1 mg, 1 mg, Oral, Q2H PRN **OR** LORazepam (ATIVAN) injection 1 mg, 1 mg, Intravenous, Q2H PRN **OR** LORazepam (ATIVAN) tablet 2 mg, 2 mg, Oral, Q1H PRN **OR** LORazepam (ATIVAN) injection 2 mg, 2 mg, Intravenous, Q1H PRN **OR** LORazepam (ATIVAN) injection 2 mg, 2 mg, Intravenous, Q15 Min PRN **OR** LORazepam (ATIVAN) injection 2 mg, 2 mg, Intramuscular, Q15 Min PRN, Liliya Jonas MD  •  ondansetron (ZOFRAN) tablet 4 mg, 4 mg, Oral, Q6H PRN **OR** ondansetron (ZOFRAN) injection 4 mg, 4 mg, Intravenous, Q6H PRN, Liliya Jonas MD  •  pantoprazole (PROTONIX) injection 40 mg, 40 mg, Intravenous, Q AM, Graham, Brooke, APRN, 40 mg at 05/11/19 0506  •  sodium chloride 0.9 % flush 10 mL, 10 mL, Intravenous, PRN, Akshat Mcrae MD  •  sodium chloride 0.9 % infusion, 100 mL/hr, Intravenous, Continuous, Liliya Jonas MD, Last Rate: 100 mL/hr at 05/11/19 0745, 100 mL/hr at 05/11/19 0745  •  traZODone (DESYREL) tablet 50 mg, 50 mg, Oral, Nightly, Liliya Jonas MD, 50 mg at 05/10/19 1670      Assessment/Plan   Assessment / Plan     Active  "Hospital Problems    Diagnosis POA   • Alcohol withdrawal syndrome without complication (CMS/HCC) [F10.230] Yes   • Alcohol withdrawal (CMS/HCC) [F10.239] Yes   • Alcoholism /alcohol abuse (CMS/HCC) [F10.20] Yes   • Hematemesis [K92.0] Yes   • Diabetes mellitus, type 2 (CMS/HCC) [E11.9] Yes   • Essential hypertension [I10] Yes   • Generalized anxiety disorder [F41.1] Yes   • Hyperlipidemia [E78.5] Yes   • Glaucoma [H40.9] Yes          Brief Hospital Course to date:  58 year old female presents to the ED with hematemesis for the past several weeks that has progressively worsened over the past two days.      Hematemesis  Dysphagia   -? Esperanza díaz tear   -- h/h dropped but likely dilutional   -gastric occult negative  -continue protonix and pepcid  - progressive dysphagia over the last few months - hasn't been able to keep much down   - Consider GI consult after withdrawal managed      Alcohol withdrawal  Complex social situation   - Has been drinking 1/5 ETOH daily; this has progressed over the last 1.5 years. Minimal support system. Currently with very difficult finances. Sober in the past for 8 years when she had a support system. Has not tried to get sober recently. No history of seizures/DTs mostly because she hasn't not quit drinking.   - Continue scheduled valium  - CIWA and PRN ativan   - Cont thiamine   - Social work consult     Dehydration - improved   -lactic acid 4.7 on admission and improved. Continue fluids      essential hypertension  -continue lisinopril     Hyperlipidemia  -reports she stopped taking her lipitor recently for \"no reason.\"   -check FLP     Glaucoma  -continue xalatan drops      Diabetes mellitus type 2  -reports was told she was \"prediabetic\"   - A1C <4.3%; continue fingerstick mostly for hypoglycemia      DVT prophylaxis:scds    CODE STATUS:   Code Status and Medical Interventions:   Ordered at: 05/10/19 2111     Level Of Support Discussed With:    Patient     Code Status:    CPR     " Medical Interventions (Level of Support Prior to Arrest):    Full         Electronically signed by Terri Rodriguez DO, 05/11/19, 10:06 AM.

## 2019-05-11 NOTE — PAYOR COMM NOTE
"Anisha Holliday RN   Phone [phone]  Fax [phone]    Philomena Davis (58 y.o. Female)     Date of Birth Social Security Number Address Home Phone MRN    1960  [address] [facility] [address] [phone] [id number]    Rastafari Marital Status          Restorationism        Admission Date Admission Type Admitting Provider Attending Provider Department, Room/Bed    5/10/19 Emergency Liliya oJnas MD Roesch, Lyndsey, DO [facility] 6B, N640/1    Discharge Date Discharge Disposition Discharge Destination                       Attending Provider:  Terri Rodriguez DO    Allergies:  Codeine    Isolation:  None   Infection:  None   Code Status:  CPR    Ht:  162.6 cm (64\")   Wt:  77.2 kg (170 lb 3.1 oz)    Admission Cmt:  None   Principal Problem:  None                Active Insurance as of 5/10/2019     Primary Coverage     Payor Plan Insurance Group Employer/Plan Group    [employer] [place] [employer] [place]ICA[place]      Payor Plan Address Payor Plan Phone Number Payor Plan Fax Number Effective Dates    [address] [address] [phone]  2018 - None Entered    [facility] [id number]       Subscriber Name Subscriber Birth Date Member ID       PHILOMENA DAVIS [id number]                 Emergency Contacts      (Rel.) Home Phone Work Phone Mobile Phone    Nikki Arias (Friend) -- -- [phone]               History & Physical      Liliya Jonas MD at 5/10/2019  8:27 PM          [facility] Medicine Services  HISTORY AND PHYSICAL    Patient Name: Philomena Davis  : 1960  MRN: [id number]  Primary Care Physician: Lona Deleon MD    Subjective   Subjective     Chief Complaint:  Vomiting blood     HPI:  Philomena Davis is a 58 y.o. female with a past medical history significant for essential hypertension, hyperlipidemia, glaucoma and alcohol abuse presents to the ED with complaints of \"vomiting blood.\"  Patient reports that she " currently drinks about a fifth of vodka every other day for the past several years.  She began drinking as a teenager and her addiction has progressed over the past several years.  She reports vomiting blood for several weeks but states over the past two days it significantly worsened.  Initially her emesis was pink tinged but over the past two days she has been vomiting bile with a significant amount of bright red blood.  She denies any chest pain, shortness of air, abdominal pain, diarrhea, diaphoresis or cough.  She states that she has never sought help for her alcohol addiction, this is the first.  She reports she is currently detoxing and her last drink was earlier today. She has never had any type of upper of lower scoping in the past.  Patient will be admitted to [facility] Under the care of the Hospitalist for further evaluation and treatment.     Review of Systems   Constitutional: Positive for appetite change. Negative for activity change, chills, diaphoresis, fatigue, fever and unexpected weight change.   HENT: Negative.    Eyes: Negative for photophobia and visual disturbance.   Respiratory: Negative for cough and shortness of breath.    Cardiovascular: Negative for chest pain, palpitations and leg swelling.   Gastrointestinal: Positive for nausea and vomiting. Negative for abdominal distention, abdominal pain, blood in stool, constipation and diarrhea.   Genitourinary: Negative.    Musculoskeletal: Negative.    Skin: Negative.    Neurological: Negative.    Psychiatric/Behavioral: Negative.         Otherwise 10-system ROS reviewed and is negative except as mentioned in the HPI.    Personal History     Past Medical History:   Diagnosis Date   • Anxiety and depression    • Closed displaced fracture of lateral malleolus of left fibula    • Diabetes mellitus (CMS/HCC)    • Ganglion cyst of left foot    • Glaucoma    • Hypertension    • Wears glasses        Past Surgical History:   Procedure Laterality Date   •  ANKLE OPEN REDUCTION INTERNAL FIXATION Left 3/24/2017    Procedure: LEFT ANKLE OPEN REDUCTION INTERNAL FIXATION WITH ILIAC CREST BONE GRAFT , EXCISION CYST 4TH/5TH INNERSPACE LEFT FOOT;  Surgeon: Frank Larson MD;  Location: [facility] OR;  Service:    • AUGMENTATION MAMMAPLASTY Bilateral 1999   • BREAST AUGMENTATION     • BREAST EXCISIONAL BIOPSY Right 2014   • DILATION AND CURETTAGE, DIAGNOSTIC / THERAPEUTIC     • NC FIX NON/MALUNION FEMUR,W GRAFT Left 3/24/2017    Procedure: ILIAC CREST BONE GRAFT;  Surgeon: Frank Larson MD;  Location: [facility] OR;  Service: Orthopedics   • WRIST SURGERY         Family History: family history includes Breast cancer (age of onset: 68) in her paternal grandmother; Cancer in her mother and other; Heart disease in her other; Hypertension in her other; No Known Problems in her brother, daughter, father, maternal aunt, maternal grandmother, paternal aunt, sister, and son; Rheum arthritis in her other; Stroke in her other.     Social History:  reports that she quit smoking about 10 years ago. Her smoking use included cigarettes. She has a 30.00 pack-year smoking history. She has never used smokeless tobacco. She reports that she drinks about 4.2 oz of alcohol per week. She reports that she does not use drugs.    Medications:    (Not in a hospital admission)    Allergies   Allergen Reactions   • Codeine Nausea Only       Objective   Objective     Vital Signs:   Temp:  [98 °F (36.7 °C)] 98 °F (36.7 °C)  Heart Rate:  [91-99] 99  Resp:  [18] 18  BP: (134-148)/(86-99) 134/86        Physical Exam   Constitutional: She is oriented to person, place, and time. She appears well-developed and well-nourished. No distress.   Alert and oriented x4   HENT:   Head: Normocephalic and atraumatic.   Eyes: Conjunctivae and EOM are normal. Pupils are equal, round, and reactive to light. Right eye exhibits no discharge. Left eye exhibits no discharge. No scleral icterus.   Neck: Normal range  of motion. Neck supple. No JVD present. No tracheal deviation present. No thyromegaly present.   Cardiovascular: Normal rate, regular rhythm, normal heart sounds and intact distal pulses. Exam reveals no gallop and no friction rub.   No murmur heard.  Pulmonary/Chest: Effort normal and breath sounds normal. No stridor. No respiratory distress. She has no wheezes. She has no rales. She exhibits no tenderness.   Abdominal: Soft. Bowel sounds are normal. She exhibits no distension and no mass. There is no tenderness. There is no rebound and no guarding. No hernia.   Musculoskeletal: Normal range of motion. She exhibits no edema, tenderness or deformity.   Lymphadenopathy:     She has no cervical adenopathy.   Neurological: She is alert and oriented to person, place, and time.   Skin: Skin is warm and dry. No rash noted. She is not diaphoretic. No erythema. No pallor.   Psychiatric: She has a normal mood and affect. Her behavior is normal. Judgment and thought content normal.   Nursing note and vitals reviewed.       Results Reviewed:  I have personally reviewed current lab, radiology, and data and agree.    Results from last 7 days   Lab Units 05/10/19  1757   WBC 10*3/mm3 9.76   HEMOGLOBIN g/dL 13.1   HEMATOCRIT % 40.3   PLATELETS 10*3/mm3 297   INR  1.06     Results from last 7 days   Lab Units 05/10/19  1757   SODIUM mmol/L 136   POTASSIUM mmol/L 4.7   CHLORIDE mmol/L 87*   CO2 mmol/L 19.0*   BUN mg/dL 12   CREATININE mg/dL 0.83   GLUCOSE mg/dL 65   CALCIUM mg/dL 11.5*   ALT (SGPT) U/L 79*   AST (SGOT) U/L 130*     No results found for: BNP  No results found for: PHART, PCO2  Imaging Results (last 24 hours)     ** No results found for the last 24 hours. **        Results for orders placed during the hospital encounter of 06/10/14   CONVERTED (HISTORICAL) ECHO    Narrative Patient:      SIMONA JIMENEZ    Med Rec#:     [id number]               :          1960            Date:         06/10/2014            Age:           53y                   Height:       165 cm / 65.0 in  Weight:       84 kg / 185.1 lbs  Sex:          F                     BSA:          1.91  Room#:        [facility]                       Sonographer:  Kavitha Richter [facility]  Referring:    KEYSHA  Reading:      Deandre Larkin MD  ______________________________________________________________________    Transthoracic Echocardiogram    Indication:  DYSPNEA  BP:           125/70    Conclusions  1. The study quality is technically difficult;  all measurements are  approximate.   2. Mild concentric left ventricular hypertrophy is observed.  3. There is normal left ventricular systolic function.  4. Abnormal left ventricular diastolic filling is observed, consistent  with impaired relaxation.   5. The right ventricular global systolic function is normal.  6. There is no evidence of aortic stenosis.  7. There is no evidence of mitral valve prolapse.  8. There is mild mitral regurgitation.   9. There is mild tricuspid regurgitation.  10. There is no evidence of pericardial effusion.  11. There is no dilatation of the aortic root.  12. The venous system appears normal.    Findings       Technical Comments:  The study quality is technically difficult.  The study is technically  limited due to poor acoustic windows.  The study is technically limited  due to patient body habitus. BREAST IMPLANTS     Left Ventricle:  The left ventricular chamber size is normal. Mild concentric left  ventricular hypertrophy is observed. Global left ventricular wall motion  and contractility are within normal limits. There is normal left  ventricular systolic function. The estimated ejection fraction is  greater than 65%.  Abnormal left ventricular diastolic filling is  observed, consistent with impaired relaxation.      Left Atrium:  The left atrial chamber size is normal.     Right Ventricle:  The right ventricular cavity size is normal. The right ventricular  global systolic function is  normal.   Right Atrium:  The right atrial cavity size is normal. No atrial septal defect is  visualized.     Aortic Valve:  The aortic valve is trileaflet. There is no evidence of aortic  regurgitation. There is no evidence of aortic stenosis.     Mitral Valve:  The mitral valve leaflets appear normal. There is no evidence of mitral  valve prolapse. There is mild mitral regurgitation.  There is no  evidence of mitral stenosis.     Tricuspid Valve:  The tricuspid valve leaflets are normal.  There is mild tricuspid  regurgitation.     Pulmonic Valve:  The pulmonic valve appears normal. There is a trace pulmonic  regurgitation.      Pericardium:  There is no evidence of pericardial effusion.     Aorta:  There is no dilatation of the aortic root.     Pulmonary Artery:  The main pulmonary artery appears normal.     Venous:  The venous system appears normal.     Measurements   Chambers  Name                    Value           RVIDd (AP) 2D           2.2 cm          IVSd (2D)               1.2 cm          LVIDd (2D)              4.4 cm          LVIDs (2D)              2.8 cm          LVPWd (2D)              1.2 cm          EF (2D)                 68 %            Ao root diameter (2D)   2.8 cm          LA dimension (AP) 2D    3.5 cm            Diastolic/Systolic Function  Name                    Value           MV E-wave Vmax          0.8 m/sec       MV A-wave Vmax          0.81 m/sec        Tricuspid Valve  Name                    Value           TR Vmax                 2.2 m/sec       RAP                     10 mmHg         RVSP                    29 mmHg           Pulmonic Valve/Qp:Qs  Name                    Value           PV acceleration time    154 msec          Electronically signed by: Deandre Larkin MD on 06/10/2014 10:59:01       Assessment/Plan   Assessment / Plan     Active Hospital Problems    Diagnosis   • Alcohol withdrawal (CMS/HCC)   • Alcoholism /alcohol abuse (CMS/HCC)   • Hematemesis   • Diabetes  "mellitus, type 2 (CMS/[facility])   • Essential hypertension   • Generalized anxiety disorder   • Hyperlipidemia         Assessment & Plan:  58 year old female presents to the ED with hematemesis for the past several weeks that has progressively worsened over the past two days.     1) Hematemesis  -? Kristen díaz tear   -H&H stable at 13.1 and 40.3  -gastric occult negative  -continue protonix and pepcid  - Consider GI consult if hemoglobin drops or patient continues to have hematemesis. Suspect this will be self-limited as history suspicious for kristen díaz.   -type and screen    2) Alcohol withdrawal  -start CIWA protocol, scheduled and prn ativan  -s/p 2 mg of IV ativan in the ED  - Start scheduled valium 10mg TID.  -rally pack given in the ED  -check b12 and folate  -thiamine daily  -consult CM/SS  -reports she drinks fifth of vodka every other day   -liver enzymes : ALT: 79 AST: 130 tbili: 1.3    3) Dehydration  -lactic acid 4.7  -s/p 1L normal saline in the ED  -continue aggressive hydration  -reflex lactic pending    4) essential hypertension  -continue lisinopril    5) Hyperlipidemia  -reports she stopped taking her lipitor recently for \"no reason.\"   -check FLP    6) Glaucoma  -continue xalatan drops    7) Diabetes mellitus type 2  -reports was told she was \"prediabetic\"   -currently on metformin  -check hgb A1c  -fingersticks achs  -may need to start ldssi, glucose currently 65        DVT prophylaxis:scds    CODE STATUS:  Full code  There are no questions and answers to display.       Admission Status:  I believe this patient meets INPATIENT status due to the need for care which can only be reasonably provided in an hospital setting such as possible need for aggressive/expedited ancillary services and/or consultation services, IV medications, close physician monitoring, and/or procedures.  In such, I feel patient’s risk for adverse outcomes and need for care warrant INPATIENT evaluation and predict the " patient’s care encounter to likely last beyond 2 midnights.    CANDY Nascimento   05/10/19   8:27 PM      Brief Attending Admission Attestation     I have seen and examined the patient, performing an independent face-to-face diagnostic evaluation with plan of care reviewed and developed with the advanced practice clinician (APC).      Brief Summary Statement:   Philomena Davis is a 58 y.o. female with history of alcohol dependence, HTN, anxiety/depression who presents with alcohol withdrawal. Patient reports that she drinks 1/5th of vodka daily starting first thing in the morning. She states that her drinking is related to her long standing anxiety and depression. She previously had been sober for 8 years in the past. She really wants to quit and is interested in going to rehab. Tachycardic on my exam but no significant tremors but very anxious and tearful. She also reports a history of hematemesis and suspect that this is secondary to kristen díaz given her history. Hopefully this will be self limited but will repeat Hg in the am. Currently her Hg is stable from prior Hg in 2017.  Will start CIWA protocol. Start scheduled valium 10TID and taper. She has a metabolic acidosis with elevated lactate. Will continue IVFs and obtain serial lactates until resolution. SW consult.       Remainder of detailed HPI is as noted above and has been reviewed and/or edited by me for completeness.      Attending Physical Exam:  Constitutional: tearful and anxious, no acute distress  Eyes: PERRLA, sclerae anicteric, no conjunctival injection  HENT: NCAT, mucous membranes moist  Neck: Supple, no thyromegaly, no lymphadenopathy, trachea midline  Respiratory: Clear to auscultation bilaterally, nonlabored respirations   Cardiovascular: tachycardic, no murmurs, rubs, or gallops, palpable pedal pulses bilaterally  Gastrointestinal: Positive bowel sounds, soft, nontender, nondistended  Musculoskeletal: No bilateral ankle edema, no  clubbing or cyanosis to extremities  Psychiatric: Anxious, mood labile.   Neurologic: Oriented x 3, strength symmetric in all extremities, no tremors  Skin: No rashes        Brief Assessment/Plan :  See above for further detailed assessment and plan developed with APC which I have reviewed and/or edited for completeness.      Electronically signed by Liliya Jonas MD, 05/10/19, 10:41 PM.             Electronically signed by Liliya Jonas MD at 5/10/2019 10:50 PM          Emergency Department Notes      Akshat Mcrae MD at 5/10/2019  5:24 PM          Subjective   Philomena Davis is a 58 y.o.female who presents to the ED with complaints of hematemesis. The patient reports she has vomited blood for the past three days. She states she is currently detoxing from alcohol and her last drink was earlier today. She has not taken any medication for her symptoms. She also complains of nausea, epistaxis, diarrhea, and a cough, but she denies any hematochezia, fever, congestion, dysuria, hematuria, change in frequency of urination, change in urgency of urination, or abdominal pain. Mroeover, she states she drinks about a pint per day. There are no other complaints at this time.         History provided by:  Patient  Vomiting   The primary symptoms include nausea, vomiting (hematemesis), diarrhea and hematemesis. Primary symptoms do not include fever, abdominal pain, hematochezia or dysuria. The illness began 2 days ago. The onset was sudden. The problem has not changed since onset.  Risk factors for a gastrointestinal illness include alcohol use.       Review of Systems   Constitutional: Negative for fever.   HENT: Positive for nosebleeds. Negative for congestion.    Respiratory: Positive for cough.    Gastrointestinal: Positive for diarrhea, hematemesis, nausea and vomiting (hematemesis). Negative for abdominal pain, blood in stool and hematochezia.   Genitourinary: Negative for dysuria, frequency, hematuria and  urgency.   All other systems reviewed and are negative.      Past Medical History:   Diagnosis Date   • Anxiety and depression    • Closed displaced fracture of lateral malleolus of left fibula    • Diabetes mellitus (CMS/HCC)    • Ganglion cyst of left foot    • Glaucoma    • Hypertension    • Wears glasses        Allergies   Allergen Reactions   • Codeine Nausea Only       Past Surgical History:   Procedure Laterality Date   • ANKLE OPEN REDUCTION INTERNAL FIXATION Left 3/24/2017    Procedure: LEFT ANKLE OPEN REDUCTION INTERNAL FIXATION WITH ILIAC CREST BONE GRAFT , EXCISION CYST 4TH/5TH INNERSPACE LEFT FOOT;  Surgeon: Frank Larson MD;  Location:  [facility] OR;  Service:    • AUGMENTATION MAMMAPLASTY Bilateral 1999   • BREAST AUGMENTATION     • BREAST EXCISIONAL BIOPSY Right 2014   • DILATION AND CURETTAGE, DIAGNOSTIC / THERAPEUTIC     • DE FIX NON/MALUNION FEMUR,W GRAFT Left 3/24/2017    Procedure: ILIAC CREST BONE GRAFT;  Surgeon: Frank Larson MD;  Location:  [facility] OR;  Service: Orthopedics   • WRIST SURGERY         Family History   Problem Relation Age of Onset   • Cancer Mother    • No Known Problems Father    • No Known Problems Sister    • No Known Problems Brother    • No Known Problems Daughter    • No Known Problems Son    • No Known Problems Maternal Grandmother    • Breast cancer Paternal Grandmother 68        met to brain   • No Known Problems Maternal Aunt    • No Known Problems Paternal Aunt    • Rheum arthritis Other    • Heart disease Other    • Cancer Other    • Stroke Other    • Hypertension Other    • BRCA 1/2 Neg Hx    • Colon cancer Neg Hx    • Endometrial cancer Neg Hx    • Ovarian cancer Neg Hx        Social History     Socioeconomic History   • Marital status:      Spouse name: Not on file   • Number of children: Not on file   • Years of education: Not on file   • Highest education level: Not on file   Tobacco Use   • Smoking status: Former Smoker     Packs/day:  1.50     Years: 20.00     Pack years: 30.00     Types: Cigarettes     Last attempt to quit: 3/22/2009     Years since quitting: 10.1   • Smokeless tobacco: Never Used   Substance and Sexual Activity   • Alcohol use: Yes     Alcohol/week: 4.2 oz     Types: 7 Glasses of wine per week     Comment: 1/5 VODKA EVERY TWO DAYS   • Drug use: No   • Sexual activity: Defer         Objective   Physical Exam   Constitutional: She is oriented to person, place, and time. She appears well-developed and well-nourished. No distress.   HENT:   Head: Normocephalic and atraumatic.   Nose: Nose normal.   Eyes: Conjunctivae are normal. No scleral icterus.   Neck: Normal range of motion. Neck supple.   Cardiovascular: Normal rate, regular rhythm, normal heart sounds and intact distal pulses.   No murmur heard.  Pulmonary/Chest: Effort normal and breath sounds normal. No stridor. No respiratory distress.   Abdominal: Soft. Bowel sounds are normal. There is no tenderness.   Musculoskeletal: Normal range of motion. She exhibits no edema.   Neurological: She is alert and oriented to person, place, and time. She displays tremor.   Skin: Skin is warm and dry. No pallor.   Psychiatric: She has a normal mood and affect. Her behavior is normal.   Nursing note and vitals reviewed.      Procedures        ED Course  ED Course as of May 11 0046   Fri May 10, 2019   1934 She had significant tremor with progressive increase.  She is treated with lorazepam a milligram initially with a repeat milligram.  Her symptoms are improved.  She received a banana bag.  Her ketones were 160 and her lactate is 4.7.  She has no sign of obvious infection but is quite dehydrated.  She will be treated with a second liter of fluids.  Her nausea is now controlled.  [HH]   1943 I discussed findings at length with the patient who absolutely needs inpatient rehab however at the current time she needs hydration and an initial medical evaluation and treatment.  A second liter of  fluids is pending.  Have discussed the finding with Dr. Jonas will admit for definitive inpatient care to the hospitalist monitored care service.  [HH]      ED Course User Index  [HH] Akshat Mcrae MD     Recent Results (from the past 24 hour(s))   Comprehensive Metabolic Panel    Collection Time: 05/10/19  5:57 PM   Result Value Ref Range    Glucose 65 65 - 99 mg/dL    BUN 12 6 - 20 mg/dL    Creatinine 0.83 0.57 - 1.00 mg/dL    Sodium 136 136 - 145 mmol/L    Potassium 4.7 3.5 - 5.2 mmol/L    Chloride 87 (L) 98 - 107 mmol/L    CO2 19.0 (L) 22.0 - 29.0 mmol/L    Calcium 11.5 (H) 8.6 - 10.5 mg/dL    Total Protein 8.2 6.0 - 8.5 g/dL    Albumin 4.80 3.50 - 5.20 g/dL    ALT (SGPT) 79 (H) 1 - 33 U/L    AST (SGOT) 130 (H) 1 - 32 U/L    Alkaline Phosphatase 100 39 - 117 U/L    Total Bilirubin 1.3 (H) 0.2 - 1.2 mg/dL    eGFR Non African Amer 71 >60 mL/min/1.73    Globulin 3.4 gm/dL    A/G Ratio 1.4 g/dL    BUN/Creatinine Ratio 14.5 7.0 - 25.0    Anion Gap 30.0 mmol/L   Protime-INR    Collection Time: 05/10/19  5:57 PM   Result Value Ref Range    Protime 13.3 11.2 - 14.3 Seconds    INR 1.06 0.85 - 1.16   Lactic Acid, Plasma    Collection Time: 05/10/19  5:57 PM   Result Value Ref Range    Lactate 4.7 (C) 0.5 - 2.0 mmol/L   Magnesium    Collection Time: 05/10/19  5:57 PM   Result Value Ref Range    Magnesium 2.2 1.6 - 2.6 mg/dL   Light Blue Top    Collection Time: 05/10/19  5:57 PM   Result Value Ref Range    Extra Tube hold for add-on    Green Top (Gel)    Collection Time: 05/10/19  5:57 PM   Result Value Ref Range    Extra Tube Hold for add-ons.    Lavender Top    Collection Time: 05/10/19  5:57 PM   Result Value Ref Range    Extra Tube hold for add-on    Gold Top - SST    Collection Time: 05/10/19  5:57 PM   Result Value Ref Range    Extra Tube Hold for add-ons.    CBC Auto Differential    Collection Time: 05/10/19  5:57 PM   Result Value Ref Range    WBC 9.76 3.40 - 10.80 10*3/mm3    RBC 3.71 (L) 3.77 - 5.28 10*6/mm3     Hemoglobin 13.1 12.0 - 15.9 g/dL    Hematocrit 40.3 34.0 - 46.6 %    .6 (H) 79.0 - 97.0 fL    MCH 35.3 (H) 26.6 - 33.0 pg    MCHC 32.5 31.5 - 35.7 g/dL    RDW 14.1 12.3 - 15.4 %    RDW-SD 55.5 (H) 37.0 - 54.0 fl    MPV 10.4 6.0 - 12.0 fL    Platelets 297 140 - 450 10*3/mm3    Neutrophil % 74.2 42.7 - 76.0 %    Lymphocyte % 14.8 (L) 19.6 - 45.3 %    Monocyte % 10.6 5.0 - 12.0 %    Eosinophil % 0.1 (L) 0.3 - 6.2 %    Basophil % 0.3 0.0 - 1.5 %    Immature Grans % 0.3 0.0 - 0.5 %    Neutrophils, Absolute 7.25 (H) 1.70 - 7.00 10*3/mm3    Lymphocytes, Absolute 1.44 0.70 - 3.10 10*3/mm3    Monocytes, Absolute 1.03 (H) 0.10 - 0.90 10*3/mm3    Eosinophils, Absolute 0.01 0.00 - 0.40 10*3/mm3    Basophils, Absolute 0.03 0.00 - 0.20 10*3/mm3    Immature Grans, Absolute 0.03 0.00 - 0.05 10*3/mm3   Ethanol    Collection Time: 05/10/19  5:57 PM   Result Value Ref Range    Ethanol 14 (H) 0 - 10 mg/dL   Lactic Acid, Reflex Timer (This will reflex a repeat order 3-3:15 hours after ordered.)    Collection Time: 05/10/19  5:57 PM   Result Value Ref Range    Extra Tube Hold for add-ons.    Vitamin B12    Collection Time: 05/10/19  5:57 PM   Result Value Ref Range    Vitamin B-12 681 211 - 946 pg/mL   Folate    Collection Time: 05/10/19  5:57 PM   Result Value Ref Range    Folate <2.00 (L) 4.78 - 24.20 ng/mL   Type & Screen    Collection Time: 05/10/19  6:01 PM   Result Value Ref Range    ABO Type O     RH type Positive     Antibody Screen Negative     T&S Expiration Date 5/13/2019 11:59:59 PM    Occult Blood Gastric / Duodenum - Gastric Contents,    Collection Time: 05/10/19  6:33 PM   Result Value Ref Range    Gastroccult Negative Negative    pH, Gastric 2    ABO RH Specimen Verification    Collection Time: 05/10/19  6:50 PM   Result Value Ref Range    ABO Type O     RH type Positive    Urinalysis With Microscopic If Indicated (No Culture) - Urine, Clean Catch    Collection Time: 05/10/19  7:08 PM   Result Value Ref Range     Color, UA Yellow Yellow, Straw    Appearance, UA Cloudy (A) Clear    pH, UA <=5.0 5.0 - 8.0    Specific Gravity, UA 1.022 1.001 - 1.030    Glucose, UA Negative Negative    Ketones, UA >=160 mg/dL (4+) (A) Negative    Bilirubin, UA Negative Negative    Blood, UA Negative Negative    Protein, UA 30 mg/dL (1+) (A) Negative    Leuk Esterase, UA Negative Negative    Nitrite, UA Negative Negative    Urobilinogen, UA 0.2 E.U./dL 0.2 - 1.0 E.U./dL   Urine Drug Screen - Urine, Clean Catch    Collection Time: 05/10/19  7:08 PM   Result Value Ref Range    THC, Screen, Urine Negative Negative    Phencyclidine (PCP), Urine Negative Negative    Cocaine Screen, Urine Negative Negative    Methamphetamine, Ur Negative Negative    Opiate Screen Negative Negative    Amphetamine Screen, Urine Negative Negative    Benzodiazepine Screen, Urine Negative Negative    Tricyclic Antidepressants Screen Negative Negative    Methadone Screen, Urine Negative Negative    Barbiturates Screen, Urine Negative Negative    Oxycodone Screen, Urine Negative Negative    Propoxyphene Screen Negative Negative    Buprenorphine, Screen, Urine Negative Negative   Urinalysis, Microscopic Only - Urine, Clean Catch    Collection Time: 05/10/19  7:08 PM   Result Value Ref Range    RBC, UA 0-2 None Seen, 0-2 /HPF    WBC, UA 3-5 (A) None Seen, 0-2 /HPF    Bacteria, UA None Seen None Seen, Trace /HPF    Squamous Epithelial Cells, UA 7-12 (A) None Seen, 0-2 /HPF    Hyaline Casts, UA 0-6 0 - 6 /LPF    Methodology Automated Microscopy    POC Glucose Once    Collection Time: 05/10/19 10:06 PM   Result Value Ref Range    Glucose 66 (L) 70 - 130 mg/dL   Lactic Acid, Reflex    Collection Time: 05/10/19 10:25 PM   Result Value Ref Range    Lactate 0.9 0.5 - 2.0 mmol/L   POC Glucose Once    Collection Time: 05/10/19 11:01 PM   Result Value Ref Range    Glucose 87 70 - 130 mg/dL     Note: In addition to lab results from this visit, the labs listed above may include labs taken  "at another facility or during a different encounter within the last 24 hours. Please correlate lab times with ED admission and discharge times for further clarification of the services performed during this visit.    No orders to display     Vitals:    05/10/19 2030 05/10/19 2127 05/10/19 2130 05/10/19 2132   BP: 131/73 127/84 136/86    BP Location:  Right arm Left arm    Patient Position:  Lying Lying    Pulse: 105  98    Resp:   18    Temp:   98.6 °F (37 °C)    TempSrc:   Oral    SpO2: 94%  99%    Weight:    73.2 kg (161 lb 6.4 oz)   Height:    162.6 cm (64\")     Medications   sodium chloride 0.9 % flush 10 mL (not administered)   latanoprost (XALATAN) 0.005 % ophthalmic solution 1 drop (1 drop Both Eyes Given 5/10/19 2308)   lisinopril (PRINIVIL,ZESTRIL) tablet 40 mg (not administered)   sodium chloride 0.9 % infusion (100 mL/hr Intravenous New Bag 5/10/19 2310)   ondansetron (ZOFRAN) tablet 4 mg (not administered)     Or   ondansetron (ZOFRAN) injection 4 mg (not administered)   LORazepam (ATIVAN) tablet 1 mg (not administered)     Or   LORazepam (ATIVAN) injection 1 mg (not administered)     Or   LORazepam (ATIVAN) tablet 2 mg (not administered)     Or   LORazepam (ATIVAN) injection 2 mg (not administered)     Or   LORazepam (ATIVAN) injection 2 mg (not administered)     Or   LORazepam (ATIVAN) injection 2 mg (not administered)   diazePAM (VALIUM) tablet 2 mg (2 mg Oral Not Given 5/10/19 2258)   pantoprazole (PROTONIX) injection 40 mg (not administered)   diazePAM (VALIUM) tablet 10 mg (10 mg Oral Given 5/10/19 2308)   traZODone (DESYREL) tablet 50 mg (50 mg Oral Given 5/10/19 2312)   multiple vitamin (M.V.I. Adult) 10 mL, thiamine (B-1) 100 mg, folic acid 1 mg in sodium chloride 0.9 % 1,000 mL infusion (0 mL/hr Intravenous Stopped 5/10/19 1906)   LORazepam (ATIVAN) injection 1 mg (1 mg Intravenous Given 5/10/19 1806)   ondansetron (ZOFRAN) injection 4 mg (4 mg Intravenous Given 5/10/19 1806)   LORazepam " (ATIVAN) injection 1 mg (1 mg Intravenous Given 5/10/19 1906)   sodium chloride 0.9 % bolus 1,000 mL (0 mL Intravenous Stopped 5/10/19 2309)   famotidine (PEPCID) injection 20 mg (20 mg Intravenous Given 5/10/19 1957)   pantoprazole (PROTONIX) injection 80 mg (80 mg Intravenous Given 5/10/19 1958)     ECG/EMG Results (last 24 hours)     ** No results found for the last 24 hours. **        ECG 12 Lead   Final Result   Test Reason : GI Bleed   Blood Pressure : **/** mmHG   Vent. Rate : 097 BPM     Atrial Rate : 097 BPM      P-R Int : 126 ms          QRS Dur : 074 ms       QT Int : 362 ms       P-R-T Axes : 038 045 033 degrees      QTc Int : 459 ms      Normal sinus rhythm   Nonspecific T wave abnormality   Abnormal ECG   When compared with ECG of 15-NOV-2017 12:16,   Inverted T waves have replaced nonspecific T wave abnormality in Anterior   leads   Confirmed by CHRISTELLE MCRAE MD ([id number]) on 5/10/2019 6:29:57 PM      Referred By:  juan           Confirmed By:CHRISTELLE MCRAE MD                          [facility]    Final diagnoses:   Alcohol withdrawal syndrome without complication (CMS/HCC)   Hematemesis with nausea   Dehydration   Elevated serum lactate dehydrogenase   Alcohol dependence with unspecified alcohol-induced disorder (CMS/HCC)       Documentation assistance provided by shannon Cardona.  Information recorded by the scribe was done at my direction and has been verified and validated by me.     Cedric Cardona  05/10/19 1731       Cedric Cardona  05/10/19 1834       Cedric Cardona  05/10/19 1946       Christelle Mcrae MD  05/11/19 0046      Electronically signed by Christelle Mcrae MD at 5/11/2019 12:46 AM       ICU Vital Signs     Row Name 05/11/19 1126 05/11/19 0833 05/11/19 0800 05/11/19 0710 05/11/19 0627       Height and Weight    Weight  --  --  --  --  77.2 kg (170 lb 3.1 oz)    Weight Method  --  --  --  --  Bed scale       Vitals    Temp  97.8 °F (36.6 °C)  --  --  99.3 °F (37.4 °C)  --    Temp src  " Oral  --  --  Oral  --    Pulse  89  99  --  104  --    Heart Rate Source  Monitor  --  --  Monitor  --    Resp  16  --  --  16  --    Resp Rate Source  Visual  --  --  Visual  --    BP  116/76  105/65  --  105/65  --    Noninvasive MAP (mmHg)  88  --  --  79  --    BP Location  Right arm  --  --  Right arm  --    BP Method  Automatic  --  --  Automatic  --    Patient Position  Lying  --  --  Lying  --       Oxygen Therapy    Device (Oxygen Therapy)  --  --  room air  --  --    Row Name 05/11/19 0323 05/10/19 2200 05/10/19 2132 05/10/19 2130 05/10/19 2127       Height and Weight    Height  --  --  162.6 cm (64\")  --  --    Height Method  --  --  Stated  --  --    Weight  --  --  73.2 kg (161 lb 6.4 oz)  --  --    Weight Method  --  --  Standing scale  --  --    Ideal Body Weight (IBW) (kg)  --  --  55  --  --    BSA (Calculated - sq m)  --  --  1.79 sq meters  --  --    BMI (Calculated)  --  --  27.7  --  --    Weight in (lb) to have BMI = 25  --  --  145.3  --  --       Vitals    Temp  98.6 °F (37 °C)  --  --  98.6 °F (37 °C)  --    Temp src  Oral  --  --  Oral  --    Pulse  --  --  --  98  --    Heart Rate Source  Monitor  --  --  Monitor  --    Resp  16  --  --  18  --    Resp Rate Source  Visual  --  --  Visual  --    BP  105/68  --  --  136/86  127/84    Noninvasive MAP (mmHg)  --  --  --  100  101    BP Location  Right arm  --  --  Left arm  Right arm    BP Method  Automatic  --  --  Automatic  Automatic    Patient Position  Lying  --  --  Lying  Lying       Oxygen Therapy    SpO2  --  --  --  99 %  --    Pulse Oximetry Type  --  --  --  Continuous  --    Device (Oxygen Therapy)  room air  room air  --  room air  --    Row Name 05/10/19 2030 05/10/19 2000 05/10/19 1930 05/10/19 1900 05/10/19 1830       Vitals    Pulse  105  115  101  98  98    BP  131/73  128/78  126/75  127/73  129/82    Noninvasive MAP (mmHg)  95  96  93  93  91       Oxygen Therapy    SpO2  94 %  93 %  92 %  96 %  96 %    Device (Oxygen " "Therapy)  --  --  room air  --  --    Row Name 05/10/19 18:28:31 05/10/19 1803 05/10/19 1730 05/10/19 1720          Height and Weight    Height  --  --  --  162.6 cm (64\")     Height Method  --  --  --  Stated     Weight  --  --  --  54.4 kg (120 lb)     Weight Method  --  --  --  Stated     Ideal Body Weight (IBW) (kg)  --  --  --  55     BSA (Calculated - sq m)  --  --  --  1.57 sq meters     BMI (Calculated)  --  --  --  20.6     Weight in (lb) to have BMI = 25  --  --  --  145.3        Vitals    Temp  --  --  --  98 °F (36.7 °C)     Temp src  --  --  --  Oral     Pulse  99  103  98  91     Heart Rate Source  --  --  --  Monitor     Resp  --  --  --  18     Resp Rate Source  --  --  --  Visual     BP  134/86  134/88  148/93  148/99     Noninvasive MAP (mmHg)  --  112  122  --     BP Method  --  --  --  Automatic     Patient Position  --  --  --  Sitting        Oxygen Therapy    SpO2  96 %  98 %  98 %  98 %     Pulse Oximetry Type  Continuous  --  --  Continuous     Device (Oxygen Therapy)  room air  --  --  room air         CIWA (last 2 days)     Date/Time CIWA-Ar Score    05/11/19 1200  0    05/11/19 0800  1    05/11/19 0600  1    05/11/19 0400  1    05/11/19 0200  1    05/11/19 0000  1    05/10/19 2200  2    05/10/19 1930  2    05/10/19 17:26:27  2        Hospital Medications (all)       Dose Frequency Start End    acetaminophen (TYLENOL) tablet 650 mg 650 mg Every 6 Hours PRN 5/11/2019     Sig - Route: Take 2 tablets by mouth Every 6 (Six) Hours As Needed for Mild Pain . - Oral    diazePAM (VALIUM) tablet 10 mg 10 mg 3 Times Daily 5/10/2019 5/20/2019    Sig - Route: Take 2 tablets by mouth 3 (Three) Times a Day. - Oral    diazePAM (VALIUM) tablet 2 mg 2 mg Once 5/10/2019     Sig - Route: Take 1 tablet by mouth 1 (One) Time. - Oral    famotidine (PEPCID) injection 20 mg 20 mg Once 5/10/2019 5/10/2019    Sig - Route: Infuse 2 mL into a venous catheter 1 (One) Time. - Intravenous    latanoprost (XALATAN) 0.005 % " "ophthalmic solution 1 drop 1 drop Nightly 5/10/2019     Sig - Route: Administer 1 drop to both eyes Every Night. - Both Eyes    lisinopril (PRINIVIL,ZESTRIL) tablet 40 mg 40 mg Daily 5/11/2019     Sig - Route: Take 1 tablet by mouth Daily. - Oral    LORazepam (ATIVAN) injection 1 mg 1 mg Once 5/10/2019 5/10/2019    Sig - Route: Infuse 0.5 mL into a venous catheter 1 (One) Time. - Intravenous    LORazepam (ATIVAN) injection 1 mg 1 mg Once 5/10/2019 5/10/2019    Sig - Route: Infuse 0.5 mL into a venous catheter 1 (One) Time. - Intravenous    LORazepam (ATIVAN) injection 1 mg 1 mg Every 2 Hours PRN 5/10/2019 5/20/2019    Sig - Route: Infuse 0.5 mL into a venous catheter Every 2 (Two) Hours As Needed for Withdrawal (For CIWA-Ar 8-10). - Intravenous    Linked Group 1:  \"Or\" Linked Group Details        LORazepam (ATIVAN) injection 2 mg 2 mg Every 1 Hour PRN 5/10/2019 5/20/2019    Sig - Route: Infuse 1 mL into a venous catheter Every 1 (One) Hour As Needed for Withdrawal (For CIWA-Ar 11-15). - Intravenous    Linked Group 1:  \"Or\" Linked Group Details        LORazepam (ATIVAN) injection 2 mg 2 mg Every 15 Minutes PRN 5/10/2019 5/20/2019    Sig - Route: Infuse 1 mL into a venous catheter Every 15 (Fifteen) Minutes As Needed for Anxiety (For CIWA-Ar Greater Than 15.  Repeat Dose in 15 Minutes if CIWA-Ar Does Not Decrease). - Intravenous    Linked Group 1:  \"Or\" Linked Group Details        LORazepam (ATIVAN) injection 2 mg 2 mg Every 15 Minutes PRN 5/10/2019 5/20/2019    Sig - Route: Inject 1 mL into the appropriate muscle as directed by prescriber Every 15 (Fifteen) Minutes As Needed for Withdrawal (If Unable to Administer IV - For CIWA-Ar Greater Than 15.  Repeat Dose in 15 Minutes if CIWA-Ar Does Not Decrease). - Intramuscular    Linked Group 1:  \"Or\" Linked Group Details        LORazepam (ATIVAN) tablet 1 mg 1 mg Every 2 Hours PRN 5/10/2019 5/20/2019    Sig - Route: Take 1 tablet by mouth Every 2 (Two) Hours As Needed for " "Withdrawal (For CIWA-Ar 8-10). - Oral    Linked Group 1:  \"Or\" Linked Group Details        LORazepam (ATIVAN) tablet 2 mg 2 mg Every 1 Hour PRN 5/10/2019 5/20/2019    Sig - Route: Take 2 tablets by mouth Every 1 (One) Hour As Needed for Withdrawal (For CIWA-Ar 11-15). - Oral    Linked Group 1:  \"Or\" Linked Group Details        multiple vitamin (M.V.I. Adult) 10 mL, thiamine (B-1) 100 mg, folic acid 1 mg in sodium chloride 0.9 % 1,000 mL infusion 1,000 mL/hr Once 5/10/2019 5/10/2019    Sig - Route: Infuse 1,000 mL/hr into a venous catheter 1 (One) Time. - Intravenous    ondansetron (ZOFRAN) injection 4 mg 4 mg Once 5/10/2019 5/10/2019    Sig - Route: Infuse 2 mL into a venous catheter 1 (One) Time. - Intravenous    ondansetron (ZOFRAN) injection 4 mg 4 mg Every 6 Hours PRN 5/10/2019     Sig - Route: Infuse 2 mL into a venous catheter Every 6 (Six) Hours As Needed for Nausea or Vomiting. - Intravenous    Linked Group 2:  \"Or\" Linked Group Details        ondansetron (ZOFRAN) tablet 4 mg 4 mg Every 6 Hours PRN 5/10/2019     Sig - Route: Take 1 tablet by mouth Every 6 (Six) Hours As Needed for Nausea or Vomiting. - Oral    Linked Group 2:  \"Or\" Linked Group Details        pantoprazole (PROTONIX) injection 40 mg 40 mg Every Early Morning 5/11/2019     Sig - Route: Infuse 10 mL into a venous catheter Every Morning. - Intravenous    pantoprazole (PROTONIX) injection 80 mg 80 mg Once 5/10/2019 5/10/2019    Sig - Route: Infuse 20 mL into a venous catheter 1 (One) Time. - Intravenous    sodium chloride 0.9 % bolus 1,000 mL 1,000 mL Once 5/10/2019 5/10/2019    Sig - Route: Infuse 1,000 mL into a venous catheter 1 (One) Time. - Intravenous    sodium chloride 0.9 % flush 10 mL 10 mL As Needed 5/10/2019     Sig - Route: Infuse 10 mL into a venous catheter As Needed for Line Care. - Intravenous    sodium chloride 0.9 % infusion 100 mL/hr Continuous 5/10/2019     Sig - Route: Infuse 100 mL/hr into a venous catheter Continuous. - " "Intravenous    thiamine (VITAMIN B-1) tablet 100 mg 100 mg Daily 5/11/2019     Sig - Route: Take 1 tablet by mouth Daily. - Oral    traZODone (DESYREL) tablet 50 mg 50 mg Nightly 5/10/2019     Sig - Route: Take 1 tablet by mouth Every Night. - Oral    LORazepam (ATIVAN) injection 1 mg (Discontinued) 1 mg Every 6 Hours 5/10/2019 5/10/2019    Sig - Route: Infuse 0.5 mL into a venous catheter Every 6 (Six) Hours. - Intravenous    Reason for Discontinue: Alternate therapy    Linked Group 3:  \"Followed by\" Linked Group Details        LORazepam (ATIVAN) injection 1 mg (Discontinued) 1 mg Every 8 Hours 5/11/2019 5/10/2019    Sig - Route: Infuse 0.5 mL into a venous catheter Every 8 (Eight) Hours. - Intravenous    Reason for Discontinue: Alternate therapy    Linked Group 3:  \"Followed by\" Linked Group Details              Lab Results (last 48 hours)     Procedure Component Value Units Date/Time    POC Glucose Once [[id number]]  (Normal) Collected:  05/11/19 1128    Specimen:  Blood Updated:  05/11/19 1129     Glucose 90 mg/dL     Hemoglobin A1c [[id number]]  (Abnormal) Collected:  05/11/19 0526    Specimen:  Blood Updated:  05/11/19 0726     Hemoglobin A1C <4.30 %     Narrative:       Hemoglobin A1C Ranges:    Increased Risk for Diabetes  5.7% to 6.4%  Diabetes                     >= 6.5%  Diabetic Goal                < 7.0%    CBC Auto Differential [[id number]]  (Abnormal) Collected:  05/11/19 0525    Specimen:  Blood Updated:  05/11/19 0715     WBC 6.24 10*3/mm3      RBC 3.30 10*6/mm3      Hemoglobin 11.5 g/dL      Hematocrit 35.8 %      .5 fL      MCH 34.8 pg      MCHC 32.1 g/dL      RDW 14.0 %      RDW-SD 55.8 fl      MPV 10.9 fL      Platelets 219 10*3/mm3      Neutrophil % 67.9 %      Lymphocyte % 17.5 %      Monocyte % 13.8 %      Eosinophil % 0.3 %      Basophil % 0.5 %      Immature Grans % 0.6 %      Neutrophils, Absolute 4.24 10*3/mm3      Lymphocytes, Absolute 1.09 10*3/mm3      Monocytes, Absolute 0.86 " 10*3/mm3      Eosinophils, Absolute 0.02 10*3/mm3      Basophils, Absolute 0.03 10*3/mm3      Immature Grans, Absolute 0.04 10*3/mm3     POC Glucose Once [[id number]]  (Normal) Collected:  05/11/19 0710    Specimen:  Blood Updated:  05/11/19 0711     Glucose 92 mg/dL     Comprehensive Metabolic Panel [[id number]]  (Abnormal) Collected:  05/11/19 0525    Specimen:  Blood Updated:  05/11/19 0711     Glucose 92 mg/dL      BUN 13 mg/dL      Creatinine 0.86 mg/dL      Sodium 137 mmol/L      Potassium 4.3 mmol/L      Chloride 97 mmol/L      CO2 22.0 mmol/L      Calcium 9.6 mg/dL      Total Protein 6.4 g/dL      Albumin 3.70 g/dL      ALT (SGPT) 56 U/L      AST (SGOT) 96 U/L      Alkaline Phosphatase 87 U/L      Total Bilirubin 1.5 mg/dL      eGFR Non African Amer 68 mL/min/1.73      Globulin 2.7 gm/dL      A/G Ratio 1.4 g/dL      BUN/Creatinine Ratio 15.1     Anion Gap 18.0 mmol/L     Narrative:       GFR Normal >60  Chronic Kidney Disease <60  Kidney Failure <15    POC Glucose Once [[id number]]  (Normal) Collected:  05/10/19 2301    Specimen:  Blood Updated:  05/10/19 2305     Glucose 87 mg/dL     Folate [[id number]]  (Abnormal) Collected:  05/10/19 1757    Specimen:  Blood Updated:  05/10/19 2258     Folate <2.00 ng/mL     Lactic Acid, Reflex [[id number]]  (Normal) Collected:  05/10/19 2225    Specimen:  Blood Updated:  05/10/19 2256     Lactate 0.9 mmol/L      Comment: Falsely depressed results may occur on samples drawn from patients receiving N-Acetylcysteine (NAC) or Metamizole.       Vitamin B12 [[id number]]  (Normal) Collected:  05/10/19 1757    Specimen:  Blood Updated:  05/10/19 2253     Vitamin B-12 681 pg/mL     POC Glucose Once [[id number]]  (Abnormal) Collected:  05/10/19 2206    Specimen:  Blood Updated:  05/10/19 2216     Glucose 66 mg/dL     Lactic Acid, Reflex Timer (This will reflex a repeat order 3-3:15 hours after ordered.) [[id number]] Collected:  05/10/19 1757    Specimen:  Blood Updated:  05/10/19 6147      Extra Tube Hold for add-ons.     Comment: Auto resulted.       Urine Drug Screen - Urine, Clean Catch [[id number]]  (Normal) Collected:  05/10/19 1908    Specimen:  Urine, Clean Catch Updated:  05/10/19 1935     THC, Screen, Urine Negative     Phencyclidine (PCP), Urine Negative     Cocaine Screen, Urine Negative     Methamphetamine, Ur Negative     Opiate Screen Negative     Amphetamine Screen, Urine Negative     Benzodiazepine Screen, Urine Negative     Tricyclic Antidepressants Screen Negative     Methadone Screen, Urine Negative     Barbiturates Screen, Urine Negative     Oxycodone Screen, Urine Negative     Propoxyphene Screen Negative     Buprenorphine, Screen, Urine Negative    Narrative:       Cutoff For Drugs Screened:    Amphetamines               500 ng/ml  Barbiturates               200 ng/ml  Benzodiazepines            150 ng/ml  Cocaine                    150 ng/ml  Methadone                  200 ng/ml  Opiates                    100 ng/ml  Phencyclidine               25 ng/ml  THC                            50 ng/ml  Methamphetamine            500 ng/ml  Tricyclic Antidepressants  300 ng/ml  Oxycodone                  100 ng/ml  Propoxyphene               300 ng/ml  Buprenorphine               10 ng/ml    The normal value for all drugs tested is negative. This report includes unconfirmed screening results, with the cutoff values listed, to be used for medical treatment purposes only.  Unconfirmed results must not be used for non-medical purposes such as employment or legal testing.  Clinical consideration should be applied to any drug of abuse test, particularly when unconfirmed results are used.      Urinalysis With Microscopic If Indicated (No Culture) - Urine, Clean Catch [[id number]]  (Abnormal) Collected:  05/10/19 1908    Specimen:  Urine, Clean Catch Updated:  05/10/19 1928     Color, UA Yellow     Appearance, UA Cloudy     pH, UA <=5.0     Specific Gravity, UA 1.022     Glucose, UA Negative      Ketones, UA >=160 mg/dL (4+)     Bilirubin, UA Negative     Blood, UA Negative     Protein, UA 30 mg/dL (1+)     Leuk Esterase, UA Negative     Nitrite, UA Negative     Urobilinogen, UA 0.2 E.U./dL    Urinalysis, Microscopic Only - Urine, Clean Catch [[id number]]  (Abnormal) Collected:  05/10/19 1908    Specimen:  Urine, Clean Catch Updated:  05/10/19 1928     RBC, UA 0-2 /HPF      WBC, UA 3-5 /HPF      Bacteria, UA None Seen /HPF      Squamous Epithelial Cells, UA 7-12 /HPF      Hyaline Casts, UA 0-6 /LPF      Methodology Automated Microscopy    Occult Blood Gastric / Duodenum - Gastric Contents, [[id number]] Collected:  05/10/19 1833    Specimen:  Gastric Contents Updated:  05/10/19 1901     Gastroccult Negative     pH, Gastric 2     Comment: pH not reported at [place]       [place] Draw [[id number]] Collected:  05/10/19 1757    Specimen:  Blood Updated:  05/10/19 1900    Narrative:       The following orders were created for panel order [place] Draw.  Procedure                               Abnormality         Status                     ---------                               -----------         ------                     Light Blue Top[[id number]]                                   Final result               Green Top (Gel)[[id number]]                                  Final result               Lavender Top[[id number]]                                     Final result               Gold Top - SST[[id number]]                                   Final result               Green Top (No Gel)[[id number]]                                                            Please view results for these tests on the individual orders.    Light Blue Top [[id number]] Collected:  05/10/19 1757    Specimen:  Blood Updated:  05/10/19 1900     Extra Tube hold for add-on     Comment: Auto resulted       Green Top (Gel) [[id number]] Collected:  05/10/19 1757    Specimen:  Blood Updated:  05/10/19 1900     Extra Tube Hold for add-ons.     Comment:  Auto resulted.       Lavender Top [[id number]] Collected:  05/10/19 1757    Specimen:  Blood Updated:  05/10/19 1900     Extra Tube hold for add-on     Comment: Auto resulted       Gold Top - SST [[id number]] Collected:  05/10/19 1757    Specimen:  Blood Updated:  05/10/19 1900     Extra Tube Hold for add-ons.     Comment: Auto resulted.       Comprehensive Metabolic Panel [[id number]]  (Abnormal) Collected:  05/10/19 1757    Specimen:  Blood Updated:  05/10/19 1831     Glucose 65 mg/dL      BUN 12 mg/dL      Creatinine 0.83 mg/dL      Sodium 136 mmol/L      Potassium 4.7 mmol/L      Chloride 87 mmol/L      CO2 19.0 mmol/L      Calcium 11.5 mg/dL      Total Protein 8.2 g/dL      Albumin 4.80 g/dL      ALT (SGPT) 79 U/L      AST (SGOT) 130 U/L      Alkaline Phosphatase 100 U/L      Total Bilirubin 1.3 mg/dL      eGFR Non African Amer 71 mL/min/1.73      Globulin 3.4 gm/dL      A/G Ratio 1.4 g/dL      BUN/Creatinine Ratio 14.5     Anion Gap 30.0 mmol/L     Narrative:       GFR Normal >60  Chronic Kidney Disease <60  Kidney Failure <15    Ethanol [[id number]]  (Abnormal) Collected:  05/10/19 1757    Specimen:  Blood Updated:  05/10/19 1831     Ethanol 14 mg/dL     Magnesium [[id number]]  (Normal) Collected:  05/10/19 1757    Specimen:  Blood Updated:  05/10/19 1831     Magnesium 2.2 mg/dL     Lactic Acid, Plasma [[id number]]  (Abnormal) Collected:  05/10/19 1757    Specimen:  Blood Updated:  05/10/19 1831     Lactate 4.7 mmol/L      Comment: Falsely depressed results may occur on samples drawn from patients receiving N-Acetylcysteine (NAC) or Metamizole.       Protime-INR [[id number]]  (Normal) Collected:  05/10/19 1757    Specimen:  Blood Updated:  05/10/19 1827     Protime 13.3 Seconds      INR 1.06    CBC & Differential [[id number]] Collected:  05/10/19 1757    Specimen:  Blood Updated:  05/10/19 1815    Narrative:       The following orders were created for panel order CBC & Differential.  Procedure                                Abnormality         Status                     ---------                               -----------         ------                     Scan Slide[[id number]]                                                                  CBC Auto Differential[[id number]]        Abnormal            Final result                 Please view results for these tests on the individual orders.    CBC Auto Differential [[id number]]  (Abnormal) Collected:  05/10/19 1757    Specimen:  Blood Updated:  05/10/19 1815     WBC 9.76 10*3/mm3      RBC 3.71 10*6/mm3      Hemoglobin 13.1 g/dL      Hematocrit 40.3 %      .6 fL      MCH 35.3 pg      MCHC 32.5 g/dL      RDW 14.1 %      RDW-SD 55.5 fl      MPV 10.4 fL      Platelets 297 10*3/mm3      Neutrophil % 74.2 %      Lymphocyte % 14.8 %      Monocyte % 10.6 %      Eosinophil % 0.1 %      Basophil % 0.3 %      Immature Grans % 0.3 %      Neutrophils, Absolute 7.25 10*3/mm3      Lymphocytes, Absolute 1.44 10*3/mm3      Monocytes, Absolute 1.03 10*3/mm3      Eosinophils, Absolute 0.01 10*3/mm3      Basophils, Absolute 0.03 10*3/mm3      Immature Grans, Absolute 0.03 10*3/mm3           Lab Results (last 48 hours)     Procedure Component Value Units Date/Time    POC Glucose Once [[id number]]  (Normal) Collected:  05/11/19 1128    Specimen:  Blood Updated:  05/11/19 1129     Glucose 90 mg/dL     Hemoglobin A1c [[id number]]  (Abnormal) Collected:  05/11/19 0526    Specimen:  Blood Updated:  05/11/19 0726     Hemoglobin A1C <4.30 %     Narrative:       Hemoglobin A1C Ranges:    Increased Risk for Diabetes  5.7% to 6.4%  Diabetes                     >= 6.5%  Diabetic Goal                < 7.0%    CBC Auto Differential [[id number]]  (Abnormal) Collected:  05/11/19 0525    Specimen:  Blood Updated:  05/11/19 0715     WBC 6.24 10*3/mm3      RBC 3.30 10*6/mm3      Hemoglobin 11.5 g/dL      Hematocrit 35.8 %      .5 fL      MCH 34.8 pg      MCHC 32.1 g/dL      RDW 14.0 %      RDW-SD 55.8 fl       MPV 10.9 fL      Platelets 219 10*3/mm3      Neutrophil % 67.9 %      Lymphocyte % 17.5 %      Monocyte % 13.8 %      Eosinophil % 0.3 %      Basophil % 0.5 %      Immature Grans % 0.6 %      Neutrophils, Absolute 4.24 10*3/mm3      Lymphocytes, Absolute 1.09 10*3/mm3      Monocytes, Absolute 0.86 10*3/mm3      Eosinophils, Absolute 0.02 10*3/mm3      Basophils, Absolute 0.03 10*3/mm3      Immature Grans, Absolute 0.04 10*3/mm3     POC Glucose Once [[id number]]  (Normal) Collected:  05/11/19 0710    Specimen:  Blood Updated:  05/11/19 0711     Glucose 92 mg/dL     Comprehensive Metabolic Panel [[id number]]  (Abnormal) Collected:  05/11/19 0525    Specimen:  Blood Updated:  05/11/19 0711     Glucose 92 mg/dL      BUN 13 mg/dL      Creatinine 0.86 mg/dL      Sodium 137 mmol/L      Potassium 4.3 mmol/L      Chloride 97 mmol/L      CO2 22.0 mmol/L      Calcium 9.6 mg/dL      Total Protein 6.4 g/dL      Albumin 3.70 g/dL      ALT (SGPT) 56 U/L      AST (SGOT) 96 U/L      Alkaline Phosphatase 87 U/L      Total Bilirubin 1.5 mg/dL      eGFR Non African Amer 68 mL/min/1.73      Globulin 2.7 gm/dL      A/G Ratio 1.4 g/dL      BUN/Creatinine Ratio 15.1     Anion Gap 18.0 mmol/L     Narrative:       GFR Normal >60  Chronic Kidney Disease <60  Kidney Failure <15    POC Glucose Once [[id number]]  (Normal) Collected:  05/10/19 2301    Specimen:  Blood Updated:  05/10/19 2305     Glucose 87 mg/dL     Folate [[id number]]  (Abnormal) Collected:  05/10/19 1757    Specimen:  Blood Updated:  05/10/19 2258     Folate <2.00 ng/mL     Lactic Acid, Reflex [[id number]]  (Normal) Collected:  05/10/19 2225    Specimen:  Blood Updated:  05/10/19 2256     Lactate 0.9 mmol/L      Comment: Falsely depressed results may occur on samples drawn from patients receiving N-Acetylcysteine (NAC) or Metamizole.       Vitamin B12 [[id number]]  (Normal) Collected:  05/10/19 1757    Specimen:  Blood Updated:  05/10/19 2253     Vitamin B-12 681 pg/mL     POC  Glucose Once [[id number]]  (Abnormal) Collected:  05/10/19 2206    Specimen:  Blood Updated:  05/10/19 2216     Glucose 66 mg/dL     Lactic Acid, Reflex Timer (This will reflex a repeat order 3-3:15 hours after ordered.) [[id number]] Collected:  05/10/19 1757    Specimen:  Blood Updated:  05/10/19 2145     Extra Tube Hold for add-ons.     Comment: Auto resulted.       Urine Drug Screen - Urine, Clean Catch [[id number]]  (Normal) Collected:  05/10/19 1908    Specimen:  Urine, Clean Catch Updated:  05/10/19 1935     THC, Screen, Urine Negative     Phencyclidine (PCP), Urine Negative     Cocaine Screen, Urine Negative     Methamphetamine, Ur Negative     Opiate Screen Negative     Amphetamine Screen, Urine Negative     Benzodiazepine Screen, Urine Negative     Tricyclic Antidepressants Screen Negative     Methadone Screen, Urine Negative     Barbiturates Screen, Urine Negative     Oxycodone Screen, Urine Negative     Propoxyphene Screen Negative     Buprenorphine, Screen, Urine Negative    Narrative:       Cutoff For Drugs Screened:    Amphetamines               500 ng/ml  Barbiturates               200 ng/ml  Benzodiazepines            150 ng/ml  Cocaine                    150 ng/ml  Methadone                  200 ng/ml  Opiates                    100 ng/ml  Phencyclidine               25 ng/ml  THC                            50 ng/ml  Methamphetamine            500 ng/ml  Tricyclic Antidepressants  300 ng/ml  Oxycodone                  100 ng/ml  Propoxyphene               300 ng/ml  Buprenorphine               10 ng/ml    The normal value for all drugs tested is negative. This report includes unconfirmed screening results, with the cutoff values listed, to be used for medical treatment purposes only.  Unconfirmed results must not be used for non-medical purposes such as employment or legal testing.  Clinical consideration should be applied to any drug of abuse test, particularly when unconfirmed results are used.       Urinalysis With Microscopic If Indicated (No Culture) - Urine, Clean Catch [[id number]]  (Abnormal) Collected:  05/10/19 1908    Specimen:  Urine, Clean Catch Updated:  05/10/19 1928     Color, UA Yellow     Appearance, UA Cloudy     pH, UA <=5.0     Specific Gravity, UA 1.022     Glucose, UA Negative     Ketones, UA >=160 mg/dL (4+)     Bilirubin, UA Negative     Blood, UA Negative     Protein, UA 30 mg/dL (1+)     Leuk Esterase, UA Negative     Nitrite, UA Negative     Urobilinogen, UA 0.2 E.U./dL    Urinalysis, Microscopic Only - Urine, Clean Catch [[id number]]  (Abnormal) Collected:  05/10/19 1908    Specimen:  Urine, Clean Catch Updated:  05/10/19 1928     RBC, UA 0-2 /HPF      WBC, UA 3-5 /HPF      Bacteria, UA None Seen /HPF      Squamous Epithelial Cells, UA 7-12 /HPF      Hyaline Casts, UA 0-6 /LPF      Methodology Automated Microscopy    Occult Blood Gastric / Duodenum - Gastric Contents, [[id number]] Collected:  05/10/19 1833    Specimen:  Gastric Contents Updated:  05/10/19 1901     Gastroccult Negative     pH, Gastric 2     Comment: pH not reported at [facility]       [place] Draw [[id number]] Collected:  05/10/19 1757    Specimen:  Blood Updated:  05/10/19 1900    Narrative:       The following orders were created for panel order [place] Draw.  Procedure                               Abnormality         Status                     ---------                               -----------         ------                     Light Blue Top[[id number]]                                   Final result               Green Top (Gel)[[id number]]                                  Final result               Lavender Top[[id number]]                                     Final result               Gold Top - SST[[id number]]                                   Final result               Green Top (No Gel)[[id number]]                                                            Please view results for these tests on the individual orders.     Light Blue Top [[id number]] Collected:  05/10/19 1757    Specimen:  Blood Updated:  05/10/19 1900     Extra Tube hold for add-on     Comment: Auto resulted       Green Top (Gel) [[id number]] Collected:  05/10/19 1757    Specimen:  Blood Updated:  05/10/19 1900     Extra Tube Hold for add-ons.     Comment: Auto resulted.       Lavender Top [[id number]] Collected:  05/10/19 1757    Specimen:  Blood Updated:  05/10/19 1900     Extra Tube hold for add-on     Comment: Auto resulted       Gold Top - SST [[id number]] Collected:  05/10/19 1757    Specimen:  Blood Updated:  05/10/19 1900     Extra Tube Hold for add-ons.     Comment: Auto resulted.       Comprehensive Metabolic Panel [[id number]]  (Abnormal) Collected:  05/10/19 1757    Specimen:  Blood Updated:  05/10/19 1831     Glucose 65 mg/dL      BUN 12 mg/dL      Creatinine 0.83 mg/dL      Sodium 136 mmol/L      Potassium 4.7 mmol/L      Chloride 87 mmol/L      CO2 19.0 mmol/L      Calcium 11.5 mg/dL      Total Protein 8.2 g/dL      Albumin 4.80 g/dL      ALT (SGPT) 79 U/L      AST (SGOT) 130 U/L      Alkaline Phosphatase 100 U/L      Total Bilirubin 1.3 mg/dL      eGFR Non African Amer 71 mL/min/1.73      Globulin 3.4 gm/dL      A/G Ratio 1.4 g/dL      BUN/Creatinine Ratio 14.5     Anion Gap 30.0 mmol/L     Narrative:       GFR Normal >60  Chronic Kidney Disease <60  Kidney Failure <15    Ethanol [[id number]]  (Abnormal) Collected:  05/10/19 1757    Specimen:  Blood Updated:  05/10/19 1831     Ethanol 14 mg/dL     Magnesium [[id number]]  (Normal) Collected:  05/10/19 1757    Specimen:  Blood Updated:  05/10/19 1831     Magnesium 2.2 mg/dL     Lactic Acid, Plasma [[id number]]  (Abnormal) Collected:  05/10/19 1757    Specimen:  Blood Updated:  05/10/19 1831     Lactate 4.7 mmol/L      Comment: Falsely depressed results may occur on samples drawn from patients receiving N-Acetylcysteine (NAC) or Metamizole.       Protime-INR [[id number]]  (Normal) Collected:  05/10/19 1181     Specimen:  Blood Updated:  05/10/19 1827     Protime 13.3 Seconds      INR 1.06    CBC & Differential [[id number]] Collected:  05/10/19 1757    Specimen:  Blood Updated:  05/10/19 1815    Narrative:       The following orders were created for panel order CBC & Differential.  Procedure                               Abnormality         Status                     ---------                               -----------         ------                     Scan Slide[[id number]]                                                                  CBC Auto Differential[[id number]]        Abnormal            Final result                 Please view results for these tests on the individual orders.    CBC Auto Differential [[id number]]  (Abnormal) Collected:  05/10/19 1757    Specimen:  Blood Updated:  05/10/19 1815     WBC 9.76 10*3/mm3      RBC 3.71 10*6/mm3      Hemoglobin 13.1 g/dL      Hematocrit 40.3 %      .6 fL      MCH 35.3 pg      MCHC 32.5 g/dL      RDW 14.1 %      RDW-SD 55.5 fl      MPV 10.4 fL      Platelets 297 10*3/mm3      Neutrophil % 74.2 %      Lymphocyte % 14.8 %      Monocyte % 10.6 %      Eosinophil % 0.1 %      Basophil % 0.3 %      Immature Grans % 0.3 %      Neutrophils, Absolute 7.25 10*3/mm3      Lymphocytes, Absolute 1.44 10*3/mm3      Monocytes, Absolute 1.03 10*3/mm3      Eosinophils, Absolute 0.01 10*3/mm3      Basophils, Absolute 0.03 10*3/mm3      Immature Grans, Absolute 0.03 10*3/mm3            Physician Progress Notes (last 48 hours) (Notes from 2019 12:11 PM through 2019 12:11 PM)      Terri Rodriguez, DO at 2019 10:06 AM              [facility] Medicine Services  PROGRESS NOTE    Patient Name: Philomena Davis  : 1960  MRN: [id number]    Date of Admission: 5/10/2019  Length of Stay: 0  Primary Care Physician: Lona Deleon MD    Subjective   Subjective     CC:  Vomiting     HPI:  Able to eat some food today. Nausea and vomiting improved.  Dysphagia over the last few months.     Review of Systems  Gen- No fevers, chills  CV- No chest pain, palpitations  Resp- No cough, dyspnea  GI- + N/V no D, abd pain    Otherwise ROS is negative except as mentioned in the HPI.    Objective   Objective     Vital Signs:   Temp:  [98 °F (36.7 °C)-99.3 °F (37.4 °C)] 99.3 °F (37.4 °C)  Heart Rate:  [] 99  Resp:  [16-18] 16  BP: (105-148)/(65-99) 105/65        Physical Exam:  Constitutional: No acute distress, awake, alert  HENT: NCAT, mucous membranes moist  Respiratory: Clear to auscultation bilaterally, respiratory effort normal   Cardiovascular: tachycardic, no murmurs, rubs, or gallops, palpable pedal pulses bilaterally  Gastrointestinal: Positive bowel sounds, soft, nontender, nondistended  Musculoskeletal: No bilateral ankle edema  Psychiatric: Appropriate affect, cooperative  Neurologic: Oriented x 3, strength symmetric in all extremities, Cranial Nerves grossly intact to confrontation, speech clear  Skin: No rashes    Results Reviewed:  I have personally reviewed current lab, radiology, and data and agree.    Results from last 7 days   Lab Units 05/11/19  0525 05/10/19  1757   WBC 10*3/mm3 6.24 9.76   HEMOGLOBIN g/dL 11.5* 13.1   HEMATOCRIT % 35.8 40.3   PLATELETS 10*3/mm3 219 297   INR   --  1.06     Results from last 7 days   Lab Units 05/11/19  0525 05/10/19  1757   SODIUM mmol/L 137 136   POTASSIUM mmol/L 4.3 4.7   CHLORIDE mmol/L 97* 87*   CO2 mmol/L 22.0 19.0*   BUN mg/dL 13 12   CREATININE mg/dL 0.86 0.83   GLUCOSE mg/dL 92 65   CALCIUM mg/dL 9.6 11.5*   ALT (SGPT) U/L 56* 79*   AST (SGOT) U/L 96* 130*     Estimated Creatinine Clearance: 73.5 mL/min (by C-G formula based on SCr of 0.86 mg/dL).    No results found for: BNP    Microbiology Results Abnormal     None          Imaging Results (last 24 hours)     ** No results found for the last 24 hours. **          Results for orders placed during the hospital encounter of 06/10/14   CONVERTED  (HISTORICAL) ECHO    Narrative Patient:      SIMONA JIMENEZ    [facility] Rec#:     [id number]               :          1960            Date:         06/10/2014            Age:          53y                   Height:       165 cm / 65.0 in  Weight:       84 kg / 185.1 lbs  Sex:          F                     BSA:          1.91  Room#:        [facility]                       Sonographer:  Kavitha Richter [facility] [[email or web address]]  Referring:    KEYSHA  Reading:      Deandre Larkin MD  ______________________________________________________________________    Transthoracic Echocardiogram    Indication:  DYSPNEA  BP:           125/70    Conclusions  1. The study quality is technically difficult;  all measurements are  approximate.   2. Mild concentric left ventricular hypertrophy is observed.  3. There is normal left ventricular systolic function.  4. Abnormal left ventricular diastolic filling is observed, consistent  with impaired relaxation.   5. The right ventricular global systolic function is normal.  6. There is no evidence of aortic stenosis.  7. There is no evidence of mitral valve prolapse.  8. There is mild mitral regurgitation.   9. There is mild tricuspid regurgitation.  10. There is no evidence of pericardial effusion.  11. There is no dilatation of the aortic root.  12. The venous system appears normal.    Findings       Technical Comments:  The study quality is technically difficult.  The study is technically  limited due to poor acoustic windows.  The study is technically limited  due to patient body habitus. BREAST IMPLANTS     Left Ventricle:  The left ventricular chamber size is normal. Mild concentric left  ventricular hypertrophy is observed. Global left ventricular wall motion  and contractility are within normal limits. There is normal left  ventricular systolic function. The estimated ejection fraction is  greater than 65%.  Abnormal left ventricular diastolic filling is  observed, consistent with impaired  relaxation.      Left Atrium:  The left atrial chamber size is normal.     Right Ventricle:  The right ventricular cavity size is normal. The right ventricular  global systolic function is normal.   Right Atrium:  The right atrial cavity size is normal. No atrial septal defect is  visualized.     Aortic Valve:  The aortic valve is trileaflet. There is no evidence of aortic  regurgitation. There is no evidence of aortic stenosis.     Mitral Valve:  The mitral valve leaflets appear normal. There is no evidence of mitral  valve prolapse. There is mild mitral regurgitation.  There is no  evidence of mitral stenosis.     Tricuspid Valve:  The tricuspid valve leaflets are normal.  There is mild tricuspid  regurgitation.     Pulmonic Valve:  The pulmonic valve appears normal. There is a trace pulmonic  regurgitation.      Pericardium:  There is no evidence of pericardial effusion.     Aorta:  There is no dilatation of the aortic root.     Pulmonary Artery:  The main pulmonary artery appears normal.     Venous:  The venous system appears normal.     Measurements   Chambers  Name                    Value           RVIDd (AP) 2D           2.2 cm          IVSd (2D)               1.2 cm          LVIDd (2D)              4.4 cm          LVIDs (2D)              2.8 cm          LVPWd (2D)              1.2 cm          EF (2D)                 68 %            Ao root diameter (2D)   2.8 cm          LA dimension (AP) 2D    3.5 cm            Diastolic/Systolic Function  Name                    Value           MV E-wave Vmax          0.8 m/sec       MV A-wave Vmax          0.81 m/sec        Tricuspid Valve  Name                    Value           TR Vmax                 2.2 m/sec       RAP                     10 mmHg         RVSP                    29 mmHg           Pulmonic Valve/Qp:Qs  Name                    Value           PV acceleration time    154 msec          Electronically signed by: Deandre Larkin MD on 06/10/2014 10:59:01        I have reviewed the medications:    Current Facility-Administered Medications:   •  diazePAM (VALIUM) tablet 10 mg, 10 mg, Oral, TID, Liliya Jonas MD, 10 mg at 05/11/19 0833  •  diazePAM (VALIUM) tablet 2 mg, 2 mg, Oral, Once, Liliya Jonas MD  •  latanoprost (XALATAN) 0.005 % ophthalmic solution 1 drop, 1 drop, Both Eyes, Nightly, Liliya Jonas MD, 1 drop at 05/10/19 2308  •  lisinopril (PRINIVIL,ZESTRIL) tablet 40 mg, 40 mg, Oral, Daily, Liliya Jonas MD, 40 mg at 05/11/19 0833  •  LORazepam (ATIVAN) tablet 1 mg, 1 mg, Oral, Q2H PRN **OR** LORazepam (ATIVAN) injection 1 mg, 1 mg, Intravenous, Q2H PRN **OR** LORazepam (ATIVAN) tablet 2 mg, 2 mg, Oral, Q1H PRN **OR** LORazepam (ATIVAN) injection 2 mg, 2 mg, Intravenous, Q1H PRN **OR** LORazepam (ATIVAN) injection 2 mg, 2 mg, Intravenous, Q15 Min PRN **OR** LORazepam (ATIVAN) injection 2 mg, 2 mg, Intramuscular, Q15 Min PRN, Liliya Jonas MD  •  ondansetron (ZOFRAN) tablet 4 mg, 4 mg, Oral, Q6H PRN **OR** ondansetron (ZOFRAN) injection 4 mg, 4 mg, Intravenous, Q6H PRN, Liliya Jonas MD  •  pantoprazole (PROTONIX) injection 40 mg, 40 mg, Intravenous, Q AM, Brooke Stevenson, APRN, 40 mg at 05/11/19 0506  •  sodium chloride 0.9 % flush 10 mL, 10 mL, Intravenous, PRN, Akshat Mcrae MD  •  sodium chloride 0.9 % infusion, 100 mL/hr, Intravenous, Continuous, Liliya Jonas MD, Last Rate: 100 mL/hr at 05/11/19 0745, 100 mL/hr at 05/11/19 0745  •  traZODone (DESYREL) tablet 50 mg, 50 mg, Oral, Nightly, Liliya Jonas MD, 50 mg at 05/10/19 2312      Assessment/Plan   Assessment / Plan     Active Hospital Problems    Diagnosis POA   • Alcohol withdrawal syndrome without complication (CMS/HCC) [F10.230] Yes   • Alcohol withdrawal (CMS/HCC) [F10.239] Yes   • Alcoholism /alcohol abuse (CMS/HCC) [F10.20] Yes   • Hematemesis [K92.0] Yes   • Diabetes mellitus, type 2 (CMS/HCC) [E11.9] Yes   • Essential hypertension [I10] Yes   •  "Generalized anxiety disorder [F41.1] Yes   • Hyperlipidemia [E78.5] Yes   • Glaucoma [H40.9] Yes          Brief Hospital Course to date:  58 year old female presents to the ED with hematemesis for the past several weeks that has progressively worsened over the past two days.      Hematemesis  Dysphagia   -? Esperanza díaz tear   -- h/h dropped but likely dilutional   -gastric occult negative  -continue protonix and pepcid  - progressive dysphagia over the last few months - hasn't been able to keep much down   - Consider GI consult after withdrawal managed      Alcohol withdrawal  Complex social situation   - Has been drinking 1/5 ETOH daily; this has progressed over the last 1.5 years. Minimal support system. Currently with very difficult finances. Sober in the past for 8 years when she had a support system. Has not tried to get sober recently. No history of seizures/DTs mostly because she hasn't not quit drinking.   - Continue scheduled valium  - CIWA and PRN ativan   - Cont thiamine   - Social work consult     Dehydration - improved   -lactic acid 4.7 on admission and improved. Continue fluids      essential hypertension  -continue lisinopril     Hyperlipidemia  -reports she stopped taking her lipitor recently for \"no reason.\"   -check FLP     Glaucoma  -continue xalatan drops      Diabetes mellitus type 2  -reports was told she was \"prediabetic\"   - A1C <4.3%; continue fingerstick mostly for hypoglycemia      DVT prophylaxis:scds    CODE STATUS:   Code Status and Medical Interventions:   Ordered at: 05/10/19 2111     Level Of Support Discussed With:    Patient     Code Status:    CPR     Medical Interventions (Level of Support Prior to Arrest):    Full         Electronically signed by Terri Rodriguez DO, 05/11/19, 10:06 AM.        Electronically signed by Terri Rodriguez DO at 5/11/2019 10:16 AM       Consult Notes (last 48 hours) (Notes from 5/9/2019 12:11 PM through 5/11/2019 12:11 PM)     No notes of this type " exist for this encounter.

## 2019-05-11 NOTE — PLAN OF CARE
Problem: Fall Risk (Adult)  Goal: Identify Related Risk Factors and Signs and Symptoms  Outcome: Ongoing (interventions implemented as appropriate)    Goal: Absence of Fall  Outcome: Ongoing (interventions implemented as appropriate)      Problem: Patient Care Overview  Goal: Plan of Care Review  Outcome: Ongoing (interventions implemented as appropriate)  Patient A+Ox4, has denied pain or SOA.  No s/s of withdrawal noted.  IV fluids continue, electrolyte replacement given.  S   05/11/19 7522   Coping/Psychosocial   Plan of Care Reviewed With patient   Plan of Care Review   Progress no change     Goal: Discharge Needs Assessment  Outcome: Ongoing (interventions implemented as appropriate)      Problem: Alcohol Withdrawal Acute, Risk/Actual (Adult)  Goal: Signs and Symptoms of Listed Potential Problems Will be Absent, Minimized or Managed (Alcohol Withdrawal Acute, Risk/Actual)  Outcome: Ongoing (interventions implemented as appropriate)

## 2019-05-11 NOTE — PLAN OF CARE
Problem: Fall Risk (Adult)  Goal: Identify Related Risk Factors and Signs and Symptoms  Outcome: Ongoing (interventions implemented as appropriate)   05/11/19 0243   Fall Risk (Adult)   Related Risk Factors (Fall Risk) culprit medication(s);fatigue/slow reaction;polypharmacy;environment unfamiliar   Signs and Symptoms (Fall Risk) presence of risk factors     Goal: Absence of Fall  Outcome: Ongoing (interventions implemented as appropriate)   05/11/19 0243   Fall Risk (Adult)   Absence of Fall making progress toward outcome       Problem: Patient Care Overview  Goal: Plan of Care Review  Outcome: Ongoing (interventions implemented as appropriate)   05/11/19 0243   Coping/Psychosocial   Plan of Care Reviewed With patient   Plan of Care Review   Progress no change   OTHER   Outcome Summary New admit       Problem: Alcohol Withdrawal Acute, Risk/Actual (Adult)  Goal: Signs and Symptoms of Listed Potential Problems Will be Absent, Minimized or Managed (Alcohol Withdrawal Acute, Risk/Actual)  Outcome: Ongoing (interventions implemented as appropriate)   05/11/19 0243   Goal/Outcome Evaluation   Problems Assessed (Alcohol Withdrawal Syndrome) all   Problems Present (Alcohol W/D Syndrome) situational response

## 2019-05-12 LAB
ANION GAP SERPL CALCULATED.3IONS-SCNC: 13 MMOL/L
BUN BLD-MCNC: 10 MG/DL (ref 6–20)
BUN/CREAT SERPL: 15.4 (ref 7–25)
CALCIUM SPEC-SCNC: 8.5 MG/DL (ref 8.6–10.5)
CHLORIDE SERPL-SCNC: 103 MMOL/L (ref 98–107)
CO2 SERPL-SCNC: 24 MMOL/L (ref 22–29)
CREAT BLD-MCNC: 0.65 MG/DL (ref 0.57–1)
DEPRECATED RDW RBC AUTO: 53.9 FL (ref 37–54)
ERYTHROCYTE [DISTWIDTH] IN BLOOD BY AUTOMATED COUNT: 13.8 % (ref 12.3–15.4)
GFR SERPL CREATININE-BSD FRML MDRD: 94 ML/MIN/1.73
GLUCOSE BLD-MCNC: 110 MG/DL (ref 65–99)
GLUCOSE BLDC GLUCOMTR-MCNC: 104 MG/DL (ref 70–130)
GLUCOSE BLDC GLUCOMTR-MCNC: 120 MG/DL (ref 70–130)
GLUCOSE BLDC GLUCOMTR-MCNC: 146 MG/DL (ref 70–130)
GLUCOSE BLDC GLUCOMTR-MCNC: 97 MG/DL (ref 70–130)
HCT VFR BLD AUTO: 33.3 % (ref 34–46.6)
HGB BLD-MCNC: 11 G/DL (ref 12–15.9)
MAGNESIUM SERPL-MCNC: 2.8 MG/DL (ref 1.6–2.6)
MCH RBC QN AUTO: 35.5 PG (ref 26.6–33)
MCHC RBC AUTO-ENTMCNC: 33 G/DL (ref 31.5–35.7)
MCV RBC AUTO: 107.4 FL (ref 79–97)
PHOSPHATE SERPL-MCNC: 1.3 MG/DL (ref 2.5–4.5)
PHOSPHATE SERPL-MCNC: 1.5 MG/DL (ref 2.5–4.5)
PLATELET # BLD AUTO: 172 10*3/MM3 (ref 140–450)
PMV BLD AUTO: 10.5 FL (ref 6–12)
POTASSIUM BLD-SCNC: 4.1 MMOL/L (ref 3.5–5.2)
RBC # BLD AUTO: 3.1 10*6/MM3 (ref 3.77–5.28)
SODIUM BLD-SCNC: 140 MMOL/L (ref 136–145)
WBC NRBC COR # BLD: 4.21 10*3/MM3 (ref 3.4–10.8)

## 2019-05-12 PROCEDURE — 82962 GLUCOSE BLOOD TEST: CPT

## 2019-05-12 PROCEDURE — 83735 ASSAY OF MAGNESIUM: CPT | Performed by: INTERNAL MEDICINE

## 2019-05-12 PROCEDURE — 84100 ASSAY OF PHOSPHORUS: CPT | Performed by: INTERNAL MEDICINE

## 2019-05-12 PROCEDURE — 99233 SBSQ HOSP IP/OBS HIGH 50: CPT | Performed by: INTERNAL MEDICINE

## 2019-05-12 PROCEDURE — 85027 COMPLETE CBC AUTOMATED: CPT | Performed by: INTERNAL MEDICINE

## 2019-05-12 PROCEDURE — 80048 BASIC METABOLIC PNL TOTAL CA: CPT | Performed by: INTERNAL MEDICINE

## 2019-05-12 PROCEDURE — 84100 ASSAY OF PHOSPHORUS: CPT | Performed by: PHYSICIAN ASSISTANT

## 2019-05-12 RX ORDER — DIAZEPAM 5 MG/1
5 TABLET ORAL EVERY 8 HOURS PRN
Status: DISCONTINUED | OUTPATIENT
Start: 2019-05-12 | End: 2019-05-12

## 2019-05-12 RX ORDER — DIAZEPAM 5 MG/1
10 TABLET ORAL 2 TIMES DAILY
Status: DISCONTINUED | OUTPATIENT
Start: 2019-05-12 | End: 2019-05-12

## 2019-05-12 RX ORDER — LORAZEPAM 1 MG/1
1 TABLET ORAL EVERY 8 HOURS PRN
Status: COMPLETED | OUTPATIENT
Start: 2019-05-12 | End: 2019-05-15

## 2019-05-12 RX ORDER — DIAZEPAM 5 MG/1
10 TABLET ORAL 3 TIMES DAILY
Status: DISCONTINUED | OUTPATIENT
Start: 2019-05-12 | End: 2019-05-13

## 2019-05-12 RX ADMIN — POTASSIUM & SODIUM PHOSPHATES POWDER PACK 280-160-250 MG 2 PACKET: 280-160-250 PACK at 11:55

## 2019-05-12 RX ADMIN — DIAZEPAM 10 MG: 5 TABLET ORAL at 21:11

## 2019-05-12 RX ADMIN — DIAZEPAM 10 MG: 5 TABLET ORAL at 08:04

## 2019-05-12 RX ADMIN — DIAZEPAM 10 MG: 5 TABLET ORAL at 15:01

## 2019-05-12 RX ADMIN — POTASSIUM PHOSPHATE, MONOBASIC AND POTASSIUM PHOSPHATE, DIBASIC 30 MMOL: 224; 236 INJECTION, SOLUTION INTRAVENOUS at 21:11

## 2019-05-12 RX ADMIN — SODIUM CHLORIDE 100 ML/HR: 9 INJECTION, SOLUTION INTRAVENOUS at 04:39

## 2019-05-12 RX ADMIN — PANTOPRAZOLE SODIUM 40 MG: 40 INJECTION, POWDER, FOR SOLUTION INTRAVENOUS at 04:44

## 2019-05-12 RX ADMIN — TRAZODONE HYDROCHLORIDE 50 MG: 50 TABLET ORAL at 21:11

## 2019-05-12 RX ADMIN — SODIUM CHLORIDE 100 ML/HR: 9 INJECTION, SOLUTION INTRAVENOUS at 14:04

## 2019-05-12 RX ADMIN — POTASSIUM & SODIUM PHOSPHATES POWDER PACK 280-160-250 MG 2 PACKET: 280-160-250 PACK at 04:41

## 2019-05-12 RX ADMIN — LATANOPROST 1 DROP: 50 SOLUTION OPHTHALMIC at 21:11

## 2019-05-12 RX ADMIN — THIAMINE HCL TAB 100 MG 100 MG: 100 TAB at 08:04

## 2019-05-12 RX ADMIN — LISINOPRIL 40 MG: 40 TABLET ORAL at 08:04

## 2019-05-12 NOTE — PLAN OF CARE
Problem: Fall Risk (Adult)  Goal: Identify Related Risk Factors and Signs and Symptoms  Outcome: Ongoing (interventions implemented as appropriate)   05/12/19 0357   Fall Risk (Adult)   Related Risk Factors (Fall Risk) depression/anxiety;impaired vision;environment unfamiliar   Signs and Symptoms (Fall Risk) presence of risk factors     Goal: Absence of Fall  Outcome: Ongoing (interventions implemented as appropriate)   05/12/19 0357   Fall Risk (Adult)   Absence of Fall making progress toward outcome       Problem: Patient Care Overview  Goal: Plan of Care Review  Outcome: Ongoing (interventions implemented as appropriate)   05/12/19 0357   Coping/Psychosocial   Plan of Care Reviewed With patient   Plan of Care Review   Progress improving       Problem: Alcohol Withdrawal Acute, Risk/Actual (Adult)  Goal: Signs and Symptoms of Listed Potential Problems Will be Absent, Minimized or Managed (Alcohol Withdrawal Acute, Risk/Actual)  Outcome: Ongoing (interventions implemented as appropriate)   05/12/19 0357   Goal/Outcome Evaluation   Problems Assessed (Alcohol Withdrawal Syndrome) all   Problems Present (Alcohol W/D Syndrome) none

## 2019-05-12 NOTE — PLAN OF CARE
Problem: Fall Risk (Adult)  Goal: Identify Related Risk Factors and Signs and Symptoms  Outcome: Ongoing (interventions implemented as appropriate)    Goal: Absence of Fall  Outcome: Ongoing (interventions implemented as appropriate)      Problem: Patient Care Overview  Goal: Plan of Care Review  Outcome: Ongoing (interventions implemented as appropriate)  Patient Ciwa scores all less than 3.  No s/s of withdrawal noted.  Patients VSS.     05/12/19 1317   Coping/Psychosocial   Plan of Care Reviewed With patient   Plan of Care Review   Progress no change     Goal: Discharge Needs Assessment  Outcome: Ongoing (interventions implemented as appropriate)      Problem: Alcohol Withdrawal Acute, Risk/Actual (Adult)  Goal: Signs and Symptoms of Listed Potential Problems Will be Absent, Minimized or Managed (Alcohol Withdrawal Acute, Risk/Actual)  Outcome: Ongoing (interventions implemented as appropriate)

## 2019-05-12 NOTE — PROGRESS NOTES
[facility] Medicine Services  PROGRESS NOTE    Patient Name: Philomena Davis  : 1960  MRN: [id number]    Date of Admission: 5/10/2019  Length of Stay: 1  Primary Care Physician: Lona Deleon MD    Subjective   Subjective     CC:  Vomiting     HPI:  Able to eat some food and drinking some water today. Nausea, vomiting and hematemesis resolved. Patient reports that she was upset because she had a panic attack and was frustrated because the PO valium did not help. She is concerned about going down on the valium today. She states that a lot of her anxiety is financial as she has no money in her back account and doesn't know what she is going to do. She isn't sure if she will go to rehab.     Review of Systems  Gen- No fevers, chills  CV- No chest pain, palpitations  Resp- No cough, dyspnea  GI- No nausea/vomiting, abd pain    Otherwise ROS is negative except as mentioned in the HPI.    Objective   Objective     Vital Signs:   Temp:  [98 °F (36.7 °C)-98.8 °F (37.1 °C)] 98.4 °F (36.9 °C)  Heart Rate:  [] 90  Resp:  [16-18] 18  BP: (101-139)/(67-96) 139/96        Physical Exam:  Constitutional: No acute distress, awake, alert  HENT: NCAT, mucous membranes moist  Respiratory: Clear to auscultation bilaterally, respiratory effort normal   Cardiovascular: RRR, no murmurs, rubs, or gallops, palpable pedal pulses bilaterally  Gastrointestinal: Positive bowel sounds, soft, nontender, nondistended  Musculoskeletal: No bilateral ankle edema  Psychiatric: Appropriate affect, cooperative  Neurologic: Oriented x 3, strength symmetric in all extremities, Cranial Nerves grossly intact to confrontation, speech clear  Skin: No rashes    Results Reviewed:  I have personally reviewed current lab, radiology, and data and agree.    Results from last 7 days   Lab Units 19  0613 19  0525 05/10/19  1757   WBC 10*3/mm3 4.21 6.24 9.76   HEMOGLOBIN g/dL 11.0* 11.5* 13.1   HEMATOCRIT %  33.3* 35.8 40.3   PLATELETS 10*3/mm3 172 219 297   INR   --   --  1.06     Results from last 7 days   Lab Units 19  0800 19  0525 05/10/19  1757   SODIUM mmol/L 140 137 136   POTASSIUM mmol/L 4.1 4.3 4.7   CHLORIDE mmol/L 103 97* 87*   CO2 mmol/L 24.0 22.0 19.0*   BUN mg/dL 10 13 12   CREATININE mg/dL 0.65 0.86 0.83   GLUCOSE mg/dL 110* 92 65   CALCIUM mg/dL 8.5* 9.6 11.5*   ALT (SGPT) U/L  --  56* 79*   AST (SGOT) U/L  --  96* 130*     Estimated Creatinine Clearance: 97.9 mL/min (by C-G formula based on SCr of 0.65 mg/dL).    No results found for: BNP    Microbiology Results Abnormal     None          Imaging Results (last 24 hours)     ** No results found for the last 24 hours. **          Results for orders placed during the hospital encounter of 06/10/14   CONVERTED (HISTORICAL) ECHO    Narrative Patient:      SIMONA JIMENEZ    [facility] Rec#:     [id number]               :          1960            Date:         06/10/2014            Age:          53y                   Height:       165 cm / 65.0 in  Weight:       84 kg / 185.1 lbs  Sex:          F                     BSA:          1.91  Room#:        [facility]                       Sonographer:  Kavitha Richter [facility]  Referring:    KEYSHA  Reading:      Deandre Larkin MD  ______________________________________________________________________    Transthoracic Echocardiogram    Indication:  DYSPNEA  BP:           125/70    Conclusions  1. The study quality is technically difficult;  all measurements are  approximate.   2. Mild concentric left ventricular hypertrophy is observed.  3. There is normal left ventricular systolic function.  4. Abnormal left ventricular diastolic filling is observed, consistent  with impaired relaxation.   5. The right ventricular global systolic function is normal.  6. There is no evidence of aortic stenosis.  7. There is no evidence of mitral valve prolapse.  8. There is mild mitral regurgitation.   9. There is mild  tricuspid regurgitation.  10. There is no evidence of pericardial effusion.  11. There is no dilatation of the aortic root.  12. The venous system appears normal.    Findings       Technical Comments:  The study quality is technically difficult.  The study is technically  limited due to poor acoustic windows.  The study is technically limited  due to patient body habitus. BREAST IMPLANTS     Left Ventricle:  The left ventricular chamber size is normal. Mild concentric left  ventricular hypertrophy is observed. Global left ventricular wall motion  and contractility are within normal limits. There is normal left  ventricular systolic function. The estimated ejection fraction is  greater than 65%.  Abnormal left ventricular diastolic filling is  observed, consistent with impaired relaxation.      Left Atrium:  The left atrial chamber size is normal.     Right Ventricle:  The right ventricular cavity size is normal. The right ventricular  global systolic function is normal.   Right Atrium:  The right atrial cavity size is normal. No atrial septal defect is  visualized.     Aortic Valve:  The aortic valve is trileaflet. There is no evidence of aortic  regurgitation. There is no evidence of aortic stenosis.     Mitral Valve:  The mitral valve leaflets appear normal. There is no evidence of mitral  valve prolapse. There is mild mitral regurgitation.  There is no  evidence of mitral stenosis.     Tricuspid Valve:  The tricuspid valve leaflets are normal.  There is mild tricuspid  regurgitation.     Pulmonic Valve:  The pulmonic valve appears normal. There is a trace pulmonic  regurgitation.      Pericardium:  There is no evidence of pericardial effusion.     Aorta:  There is no dilatation of the aortic root.     Pulmonary Artery:  The main pulmonary artery appears normal.     Venous:  The venous system appears normal.     Measurements   Chambers  Name                    Value           RVIDd (AP) 2D           2.2 cm           IVSd (2D)               1.2 cm          LVIDd (2D)              4.4 cm          LVIDs (2D)              2.8 cm          LVPWd (2D)              1.2 cm          EF (2D)                 68 %            Ao root diameter (2D)   2.8 cm          LA dimension (AP) 2D    3.5 cm            Diastolic/Systolic Function  Name                    Value           MV E-wave Vmax          0.8 m/sec       MV A-wave Vmax          0.81 m/sec        Tricuspid Valve  Name                    Value           TR Vmax                 2.2 m/sec       RAP                     10 mmHg         RVSP                    29 mmHg           Pulmonic Valve/Qp:Qs  Name                    Value           PV acceleration time    154 msec          Electronically signed by: Deandre Larkin MD on 06/10/2014 10:59:01       I have reviewed the medications:    Current Facility-Administered Medications:   •  acetaminophen (TYLENOL) tablet 650 mg, 650 mg, Oral, Q6H PRN, Terri Rodriguez DO  •  diazePAM (VALIUM) tablet 10 mg, 10 mg, Oral, TID, Liliya Jonas MD  •  diazePAM (VALIUM) tablet 2 mg, 2 mg, Oral, Once, Haider Ku MD  •  diazePAM (VALIUM) tablet 5 mg, 5 mg, Oral, Q8H PRN, Liliya Jonas MD  •  latanoprost (XALATAN) 0.005 % ophthalmic solution 1 drop, 1 drop, Both Eyes, Nightly, Liliya Jonas MD, 1 drop at 05/11/19 2150  •  lisinopril (PRINIVIL,ZESTRIL) tablet 40 mg, 40 mg, Oral, Daily, Liliya Jonas MD, 40 mg at 05/12/19 0804  •  LORazepam (ATIVAN) tablet 1 mg, 1 mg, Oral, Q2H PRN **OR** LORazepam (ATIVAN) injection 1 mg, 1 mg, Intravenous, Q2H PRN **OR** LORazepam (ATIVAN) tablet 2 mg, 2 mg, Oral, Q1H PRN **OR** LORazepam (ATIVAN) injection 2 mg, 2 mg, Intravenous, Q1H PRN **OR** LORazepam (ATIVAN) injection 2 mg, 2 mg, Intravenous, Q15 Min PRN **OR** LORazepam (ATIVAN) injection 2 mg, 2 mg, Intramuscular, Q15 Min PRN, Liliya Jonas MD  •  Magnesium Sulfate 2 gram Bolus, followed by 8 gram infusion (total Mg dose 10  grams)- Mg less than or equal to 1mg/dL, 2 g, Intravenous, PRN **OR** Magnesium Sulfate 2 gram / 50mL Infusion (GIVE X 3 BAGS TO EQUAL 6GM TOTAL DOSE) - Mg 1.1 - 1.5 mg/dl, 2 g, Intravenous, PRN **OR** Magnesium Sulfate 4 gram infusion- Mg 1.6-1.9 mg/dL, 4 g, Intravenous, PRN, Terri Rodriguez DO, Last Rate: 25 mL/hr at 05/11/19 1955, 4 g at 05/11/19 1955  •  ondansetron (ZOFRAN) tablet 4 mg, 4 mg, Oral, Q6H PRN **OR** ondansetron (ZOFRAN) injection 4 mg, 4 mg, Intravenous, Q6H PRN, Liliya Jonas MD  •  pantoprazole (PROTONIX) injection 40 mg, 40 mg, Intravenous, Q AM, Graham, Brooke, APRN, 40 mg at 05/12/19 0444  •  potassium & sodium phosphates (PHOS-NAK) 280-160-250 MG packet - for Phosphorus less than 1.25 mg/dL, 2 packet, Oral, Q6H PRN **OR** potassium & sodium phosphates (PHOS-NAK) 280-160-250 MG packet - for Phosphorus 1.25 - 2.5 mg/dL, 2 packet, Oral, Once PRN, Liliya Jonas MD  •  potassium chloride (MICRO-K) CR capsule 40 mEq, 40 mEq, Oral, PRN **OR** potassium chloride (KLOR-CON) packet 40 mEq, 40 mEq, Oral, PRN **OR** potassium chloride 10 mEq in 100 mL IVPB, 10 mEq, Intravenous, Q1H PRN, Terri Rodriguez DO  •  sodium chloride 0.9 % flush 10 mL, 10 mL, Intravenous, PRN, Akshat Mcrae MD  •  sodium chloride 0.9 % infusion, 100 mL/hr, Intravenous, Continuous, Liliya Jonas MD, Last Rate: 100 mL/hr at 05/12/19 0439, 100 mL/hr at 05/12/19 0439  •  thiamine (VITAMIN B-1) tablet 100 mg, 100 mg, Oral, Daily, Terri Rodriguez DO, 100 mg at 05/12/19 0804  •  traZODone (DESYREL) tablet 50 mg, 50 mg, Oral, Nightly, Liliya Jonas MD, 50 mg at 05/11/19 2200      Assessment/Plan   Assessment / Plan     Active Hospital Problems    Diagnosis POA   • Alcohol withdrawal syndrome without complication (CMS/HCC) [F10.230] Yes   • Alcohol withdrawal (CMS/HCC) [F10.239] Yes   • Alcoholism /alcohol abuse (CMS/HCC) [F10.20] Yes   • Hematemesis [K92.0] Yes   • Diabetes mellitus, type 2 (CMS/HCC) [E11.9]  "Yes   • Essential hypertension [I10] Yes   • Generalized anxiety disorder [F41.1] Yes   • Hyperlipidemia [E78.5] Yes   • Glaucoma [H40.9] Yes          Brief Hospital Course to date:  58 year old female presents to the ED with hematemesis for the past several weeks that has progressively worsened over the past two days.      Hematemesis  Dysphagia   -? Esperanza díaz tear. h/h dropped but likely dilutional but now stable.   -gastric occult negative  -continue protonix and pepcid  - progressive dysphagia over the last few months - hasn't been able to keep much down   - Consider GI consult after withdrawal managed       Alcohol withdrawal  Complex social situation   - Has been drinking 1/5 ETOH daily; this has progressed over the last 1.5 years. Minimal support system. Currently with very difficult finances. Sober in the past for 8 years when she had a support system. Has not tried to get sober recently. No history of seizures/DTs mostly because she hasn't not quit drinking.   - Continue scheduled valium TID today. Plan to decrease to BID tomorrow.   - Added additional PRN ativan PO for anxiety/panic attacks (only 3 doses available)  - CIWA and PRN ativan   - Cont thiamine   - Social work consult     Dehydration - improved   -lactic acid 4.7 on admission and improved. Continue fluids     Hypophosphatemia:  - Likely secondary to alcoholism and poor nutrition.   - Replace per protocol.   - Repeat phos in the am.      essential hypertension  -continue lisinopril     Hyperlipidemia  -reports she stopped taking her lipitor recently for \"no reason.\"   -check FLP     Glaucoma  -continue xalatan drops      Diabetes mellitus type 2  -reports was told she was \"prediabetic\"   - A1C <4.3%; continue fingerstick mostly for hypoglycemia      DVT prophylaxis:scds    Dispo: Patient unsure is she would like to go to inpatient rehab at discharge. Will need to talk with SW tomorrow.     CODE STATUS:   Code Status and Medical Interventions: "   Ordered at: 05/10/19 2111     Level Of Support Discussed With:    Patient     Code Status:    CPR     Medical Interventions (Level of Support Prior to Arrest):    Full         Electronically signed by Liliya Jonas MD, 05/12/19, 11:53 AM.

## 2019-05-13 LAB
ANION GAP SERPL CALCULATED.3IONS-SCNC: 10 MMOL/L
BUN BLD-MCNC: 5 MG/DL (ref 6–20)
BUN/CREAT SERPL: 7.9 (ref 7–25)
CALCIUM SPEC-SCNC: 7.4 MG/DL (ref 8.6–10.5)
CHLORIDE SERPL-SCNC: 111 MMOL/L (ref 98–107)
CO2 SERPL-SCNC: 23 MMOL/L (ref 22–29)
CREAT BLD-MCNC: 0.63 MG/DL (ref 0.57–1)
GFR SERPL CREATININE-BSD FRML MDRD: 98 ML/MIN/1.73
GLUCOSE BLD-MCNC: 103 MG/DL (ref 65–99)
GLUCOSE BLDC GLUCOMTR-MCNC: 113 MG/DL (ref 70–130)
GLUCOSE BLDC GLUCOMTR-MCNC: 98 MG/DL (ref 70–130)
MAGNESIUM SERPL-MCNC: 2 MG/DL (ref 1.6–2.6)
PHOSPHATE SERPL-MCNC: 1.9 MG/DL (ref 2.5–4.5)
PHOSPHATE SERPL-MCNC: 2.5 MG/DL (ref 2.5–4.5)
POTASSIUM BLD-SCNC: 4.1 MMOL/L (ref 3.5–5.2)
SODIUM BLD-SCNC: 144 MMOL/L (ref 136–145)

## 2019-05-13 PROCEDURE — 83735 ASSAY OF MAGNESIUM: CPT | Performed by: INTERNAL MEDICINE

## 2019-05-13 PROCEDURE — 99232 SBSQ HOSP IP/OBS MODERATE 35: CPT | Performed by: PHYSICIAN ASSISTANT

## 2019-05-13 PROCEDURE — 84100 ASSAY OF PHOSPHORUS: CPT | Performed by: INTERNAL MEDICINE

## 2019-05-13 PROCEDURE — 80048 BASIC METABOLIC PNL TOTAL CA: CPT | Performed by: INTERNAL MEDICINE

## 2019-05-13 PROCEDURE — 82962 GLUCOSE BLOOD TEST: CPT

## 2019-05-13 RX ORDER — DIAZEPAM 5 MG/1
10 TABLET ORAL EVERY 12 HOURS
Status: DISCONTINUED | OUTPATIENT
Start: 2019-05-13 | End: 2019-05-15 | Stop reason: HOSPADM

## 2019-05-13 RX ORDER — PANTOPRAZOLE SODIUM 40 MG/1
40 TABLET, DELAYED RELEASE ORAL
Status: DISCONTINUED | OUTPATIENT
Start: 2019-05-14 | End: 2019-05-15 | Stop reason: HOSPADM

## 2019-05-13 RX ADMIN — DIAZEPAM 10 MG: 5 TABLET ORAL at 08:05

## 2019-05-13 RX ADMIN — TRAZODONE HYDROCHLORIDE 50 MG: 50 TABLET ORAL at 21:21

## 2019-05-13 RX ADMIN — LORAZEPAM 1 MG: 1 TABLET ORAL at 15:54

## 2019-05-13 RX ADMIN — SODIUM CHLORIDE 75 ML/HR: 9 INJECTION, SOLUTION INTRAVENOUS at 10:09

## 2019-05-13 RX ADMIN — DIAZEPAM 10 MG: 5 TABLET ORAL at 20:32

## 2019-05-13 RX ADMIN — SODIUM CHLORIDE 100 ML/HR: 9 INJECTION, SOLUTION INTRAVENOUS at 00:39

## 2019-05-13 RX ADMIN — LISINOPRIL 40 MG: 40 TABLET ORAL at 08:05

## 2019-05-13 RX ADMIN — LATANOPROST 1 DROP: 50 SOLUTION OPHTHALMIC at 20:32

## 2019-05-13 RX ADMIN — PANTOPRAZOLE SODIUM 40 MG: 40 INJECTION, POWDER, FOR SOLUTION INTRAVENOUS at 06:37

## 2019-05-13 RX ADMIN — SODIUM PHOSPHATE, MONOBASIC, MONOHYDRATE 15 MMOL: 276; 142 INJECTION, SOLUTION INTRAVENOUS at 13:48

## 2019-05-13 RX ADMIN — THIAMINE HCL TAB 100 MG 100 MG: 100 TAB at 08:05

## 2019-05-13 NOTE — PROGRESS NOTES
Continued Stay Note  [facility]     Patient Name: Philomena Davis  MRN: [id number]  Today's Date: 5/13/2019    Admit Date: 5/10/2019    Discharge Plan     Row Name 05/13/19 1347       Plan    Plan  Social work provided the patient with alcohol recovery resources and explained the [place] inpatient and intensive outpatient services.  The patient  is also requesting information about financial services. Social  Work is gathering more social service information for the patient to take back to the patient today.        Discharge Codes    No documentation.       Expected Discharge Date and Time     Expected Discharge Date Expected Discharge Time    May 15, 2019             SILKE Quiles

## 2019-05-13 NOTE — PROGRESS NOTES
[facility] Medicine Services  PROGRESS NOTE    Patient Name: Philomena Davis  : 1960  MRN: [id number]    Date of Admission: 5/10/2019  Length of Stay: 2  Primary Care Physician: Lona Deleon MD    Subjective     CC: f/u hematemesis, ETOH abuse     HPI:  Eating/drinking better, agreeable to have IV fluids stopped. No further nausea/vomiting or hematemesis. Anxious to have valium decreased today - we discussed CIWA protocol and PRNs, this made her feel better. Agreeable to meet with social work to discuss rehab options.     Review of Systems  Gen- No fevers, chills  CV- No chest pain, palpitations  Resp- No cough, dyspnea  GI- No nausea/vomiting, abd pain    Otherwise ROS is negative except as mentioned in the HPI.    Objective     Vital Signs:   Temp:  [97.8 °F (36.6 °C)-99.5 °F (37.5 °C)] 97.8 °F (36.6 °C)  Heart Rate:  [80-96] 93  Resp:  [18] 18  BP: (114-129)/(73-86) 127/86        Physical Exam:  Constitutional: No acute distress, awake, alert  HENT: NCAT, mucous membranes moist  Respiratory: Clear to auscultation bilaterally, respiratory effort normal   Cardiovascular: RRR, no murmurs, rubs, or gallops, palpable pedal pulses bilaterally  Gastrointestinal: Positive bowel sounds, soft, nontender, nondistended  Musculoskeletal: No bilateral ankle edema  Psychiatric: Appropriate affect, cooperative  Neurologic: Oriented x 3, strength symmetric in all extremities, Cranial Nerves grossly intact to confrontation, speech clear  Skin: No rashes    Results Reviewed:  I have personally reviewed current lab, radiology, and data and agree.    Results from last 7 days   Lab Units 19  0613 19  0525 05/10/19  1757   WBC 10*3/mm3 4.21 6.24 9.76   HEMOGLOBIN g/dL 11.0* 11.5* 13.1   HEMATOCRIT % 33.3* 35.8 40.3   PLATELETS 10*3/mm3 172 219 297   INR   --   --  1.06     Results from last 7 days   Lab Units 19  0713 19  0800 19  0525 05/10/19  1757   SODIUM mmol/L  144 140 137 136   POTASSIUM mmol/L 4.1 4.1 4.3 4.7   CHLORIDE mmol/L 111* 103 97* 87*   CO2 mmol/L 23.0 24.0 22.0 19.0*   BUN mg/dL 5* 10 13 12   CREATININE mg/dL 0.63 0.65 0.86 0.83   GLUCOSE mg/dL 103* 110* 92 65   CALCIUM mg/dL 7.4* 8.5* 9.6 11.5*   ALT (SGPT) U/L  --   --  56* 79*   AST (SGOT) U/L  --   --  96* 130*     Estimated Creatinine Clearance: 104.8 mL/min (by C-G formula based on SCr of 0.63 mg/dL).  No results found for: BNP    Microbiology Results Abnormal     None        Imaging Results (last 24 hours)     ** No results found for the last 24 hours. **        I have reviewed the medications:  Scheduled Meds:  diazePAM 10 mg Oral Q12H   latanoprost 1 drop Both Eyes Nightly   lisinopril 40 mg Oral Daily   [START ON 5/14/2019] pantoprazole 40 mg Oral QAM AC   thiamine 100 mg Oral Daily   traZODone 50 mg Oral Nightly     PRN Meds:  •  acetaminophen  •  LORazepam **OR** LORazepam **OR** LORazepam **OR** LORazepam **OR** LORazepam **OR** LORazepam  •  LORazepam  •  magnesium sulfate **OR** magnesium sulfate **OR** magnesium sulfate  •  ondansetron **OR** ondansetron  •  potassium & sodium phosphates **OR** potassium & sodium phosphates  •  potassium chloride **OR** potassium chloride **OR** potassium chloride  •  sodium chloride    Assessment / Plan     Active Hospital Problems    Diagnosis POA   • Alcohol withdrawal syndrome without complication (CMS/HCC) [F10.230] Yes   • Alcohol withdrawal (CMS/HCC) [F10.239] Yes   • Alcoholism /alcohol abuse (CMS/HCC) [F10.20] Yes   • Hematemesis [K92.0] Yes   • Diabetes mellitus, type 2 (CMS/HCC) [E11.9] Yes   • Essential hypertension [I10] Yes   • Generalized anxiety disorder [F41.1] Yes   • Hyperlipidemia [E78.5] Yes   • Glaucoma [H40.9] Yes     Brief Hospital Course to date:  58 year old female presents to the ED with hematemesis for the past several weeks that has progressively worsened over the past two days.      Hematemesis  Dysphagia   -? Esperanza díaz tear.  "h/h dropped but likely dilutional but now stable.   -gastric occult negative  -continue protonix and pepcid  - progressive dysphagia over the last few months - hasn't been able to keep much down   - Consider GI consult after withdrawal managed       Alcohol withdrawal  Complex social situation   - Has been drinking 1/5 ETOH daily; this has progressed over the last 1.5 years. Minimal support system. Currently with very difficult finances. Sober in the past for 8 years when she had a support system. Has not tried to get sober recently. No history of seizures/DTs mostly because she hasn't not quit drinking.   - Decrease scheduled valium to BID today    - PRN ativan PO TID for anxiety/panic attacks   - CIWA and PRN ativan   - Cont thiamine   - Social work consult     Generalized anxiety  - Consider SSRI +/- buspar     Dehydration, resolved  - IV fluids stopped     Hypophosphatemia:  - Likely secondary to alcoholism and poor nutrition.   - Replace per protocol.   - Repeat phos in the am.      essential hypertension  -continue lisinopril     Hyperlipidemia  -reports she stopped taking her lipitor recently for \"no reason.\"   - AM FLP      Glaucoma  -continue xalatan drops      Diabetes mellitus type 2  -reports was told she was \"prediabetic\"   - A1C <4.3%; no hypoglycemia at this point. Stop accuchecks       DVT prophylaxis:scds    Dispo: Patient unsure if she would like to go to inpatient rehab at discharge. SW consulted to discuss options.     CODE STATUS:   Code Status and Medical Interventions:   Ordered at: 05/10/19 2111     Level Of Support Discussed With:    Patient     Code Status:    CPR     Medical Interventions (Level of Support Prior to Arrest):    Full     Electronically signed by Wendy Patricia PA-C, 05/13/19, 12:14 PM.      "

## 2019-05-13 NOTE — PLAN OF CARE
Problem: Fall Risk (Adult)  Goal: Identify Related Risk Factors and Signs and Symptoms  Outcome: Ongoing (interventions implemented as appropriate)    Goal: Absence of Fall  Outcome: Ongoing (interventions implemented as appropriate)      Problem: Patient Care Overview  Goal: Plan of Care Review  Outcome: Ongoing (interventions implemented as appropriate)   05/13/19 4576   Coping/Psychosocial   Plan of Care Reviewed With patient   Plan of Care Review   Progress no change   OTHER   Outcome Summary No s/sx of ETOH withdrawal noted; CIWA score between 0-1 during shift; Phos being replaced per MD order; NAD noted during shift; Will cont to monitor     Goal: Discharge Needs Assessment  Outcome: Ongoing (interventions implemented as appropriate)      Problem: Alcohol Withdrawal Acute, Risk/Actual (Adult)  Goal: Signs and Symptoms of Listed Potential Problems Will be Absent, Minimized or Managed (Alcohol Withdrawal Acute, Risk/Actual)  Outcome: Ongoing (interventions implemented as appropriate)

## 2019-05-13 NOTE — PLAN OF CARE
Problem: Fall Risk (Adult)  Goal: Identify Related Risk Factors and Signs and Symptoms  Outcome: Ongoing (interventions implemented as appropriate)    Goal: Absence of Fall  Outcome: Ongoing (interventions implemented as appropriate)      Problem: Patient Care Overview  Goal: Plan of Care Review  Outcome: Ongoing (interventions implemented as appropriate)  Patient reports she feels like she is catching a cold, also at times her heart rate is in the low 100s.  Ciwas have been 3 or less, no PRNs given as of 1300.  Patient continues to have loose stools at times, MD aware.  IV fluids d/c'd.  Patient at times emotional, 1:1 time spent with the patient with + outcomes.     05/13/19 1304   Coping/Psychosocial   Plan of Care Reviewed With patient   Plan of Care Review   Progress no change     Goal: Discharge Needs Assessment  Outcome: Ongoing (interventions implemented as appropriate)      Problem: Alcohol Withdrawal Acute, Risk/Actual (Adult)  Goal: Signs and Symptoms of Listed Potential Problems Will be Absent, Minimized or Managed (Alcohol Withdrawal Acute, Risk/Actual)  Outcome: Ongoing (interventions implemented as appropriate)

## 2019-05-14 LAB
ANION GAP SERPL CALCULATED.3IONS-SCNC: 9 MMOL/L
BUN BLD-MCNC: 4 MG/DL (ref 6–20)
BUN/CREAT SERPL: 7.1 (ref 7–25)
CALCIUM SPEC-SCNC: 8.6 MG/DL (ref 8.6–10.5)
CHLORIDE SERPL-SCNC: 110 MMOL/L (ref 98–107)
CHOLEST SERPL-MCNC: 214 MG/DL (ref 0–200)
CO2 SERPL-SCNC: 23 MMOL/L (ref 22–29)
CREAT BLD-MCNC: 0.56 MG/DL (ref 0.57–1)
DEPRECATED RDW RBC AUTO: 56.3 FL (ref 37–54)
ERYTHROCYTE [DISTWIDTH] IN BLOOD BY AUTOMATED COUNT: 14.2 % (ref 12.3–15.4)
GFR SERPL CREATININE-BSD FRML MDRD: 112 ML/MIN/1.73
GLUCOSE BLD-MCNC: 106 MG/DL (ref 65–99)
HCT VFR BLD AUTO: 38.5 % (ref 34–46.6)
HDLC SERPL-MCNC: 62 MG/DL (ref 40–60)
HGB BLD-MCNC: 12.2 G/DL (ref 12–15.9)
LDLC SERPL CALC-MCNC: 118 MG/DL (ref 0–100)
LDLC/HDLC SERPL: 1.9 {RATIO}
MAGNESIUM SERPL-MCNC: 2.2 MG/DL (ref 1.6–2.6)
MCH RBC QN AUTO: 34.9 PG (ref 26.6–33)
MCHC RBC AUTO-ENTMCNC: 31.7 G/DL (ref 31.5–35.7)
MCV RBC AUTO: 110 FL (ref 79–97)
PHOSPHATE SERPL-MCNC: 2.3 MG/DL (ref 2.5–4.5)
PHOSPHATE SERPL-MCNC: 2.4 MG/DL (ref 2.5–4.5)
PHOSPHATE SERPL-MCNC: 2.4 MG/DL (ref 2.5–4.5)
PLATELET # BLD AUTO: 211 10*3/MM3 (ref 140–450)
PMV BLD AUTO: 11 FL (ref 6–12)
POTASSIUM BLD-SCNC: 4.1 MMOL/L (ref 3.5–5.2)
RBC # BLD AUTO: 3.5 10*6/MM3 (ref 3.77–5.28)
SODIUM BLD-SCNC: 142 MMOL/L (ref 136–145)
TRIGL SERPL-MCNC: 172 MG/DL (ref 0–150)
VLDLC SERPL-MCNC: 34.4 MG/DL
WBC NRBC COR # BLD: 4.89 10*3/MM3 (ref 3.4–10.8)

## 2019-05-14 PROCEDURE — 85027 COMPLETE CBC AUTOMATED: CPT | Performed by: PHYSICIAN ASSISTANT

## 2019-05-14 PROCEDURE — 84100 ASSAY OF PHOSPHORUS: CPT | Performed by: INTERNAL MEDICINE

## 2019-05-14 PROCEDURE — 80048 BASIC METABOLIC PNL TOTAL CA: CPT | Performed by: PHYSICIAN ASSISTANT

## 2019-05-14 PROCEDURE — 80061 LIPID PANEL: CPT | Performed by: PHYSICIAN ASSISTANT

## 2019-05-14 PROCEDURE — 99232 SBSQ HOSP IP/OBS MODERATE 35: CPT | Performed by: PHYSICIAN ASSISTANT

## 2019-05-14 PROCEDURE — 84100 ASSAY OF PHOSPHORUS: CPT | Performed by: PHYSICIAN ASSISTANT

## 2019-05-14 PROCEDURE — 83735 ASSAY OF MAGNESIUM: CPT | Performed by: PHYSICIAN ASSISTANT

## 2019-05-14 RX ORDER — FOLIC ACID 1 MG/1
1 TABLET ORAL DAILY
Status: DISCONTINUED | OUTPATIENT
Start: 2019-05-14 | End: 2019-05-15 | Stop reason: HOSPADM

## 2019-05-14 RX ORDER — LANOLIN ALCOHOL/MO/W.PET/CERES
500 CREAM (GRAM) TOPICAL DAILY
Status: DISCONTINUED | OUTPATIENT
Start: 2019-05-14 | End: 2019-05-15 | Stop reason: HOSPADM

## 2019-05-14 RX ADMIN — DIAZEPAM 10 MG: 5 TABLET ORAL at 21:24

## 2019-05-14 RX ADMIN — LORAZEPAM 2 MG: 1 TABLET ORAL at 15:39

## 2019-05-14 RX ADMIN — PANTOPRAZOLE SODIUM 40 MG: 40 TABLET, DELAYED RELEASE ORAL at 08:25

## 2019-05-14 RX ADMIN — FOLIC ACID 1 MG: 1 TABLET ORAL at 12:34

## 2019-05-14 RX ADMIN — TRAZODONE HYDROCHLORIDE 50 MG: 50 TABLET ORAL at 21:25

## 2019-05-14 RX ADMIN — LATANOPROST 1 DROP: 50 SOLUTION OPHTHALMIC at 21:25

## 2019-05-14 RX ADMIN — THIAMINE HCL TAB 100 MG 100 MG: 100 TAB at 08:25

## 2019-05-14 RX ADMIN — LORAZEPAM 1 MG: 1 TABLET ORAL at 03:45

## 2019-05-14 RX ADMIN — DIAZEPAM 10 MG: 5 TABLET ORAL at 08:25

## 2019-05-14 RX ADMIN — CYANOCOBALAMIN TAB 1000 MCG 500 MCG: 1000 TAB at 15:39

## 2019-05-14 RX ADMIN — LISINOPRIL 40 MG: 40 TABLET ORAL at 08:25

## 2019-05-14 NOTE — PROGRESS NOTES
Continued Stay Note  [facility]ano     Patient Name: Philomena Davis  MRN: [id number]  Today's Date: 5/14/2019    Admit Date: 5/10/2019    Discharge Plan     Row Name 05/14/19 1355       Plan    Plan  update    Patient/Family in Agreement with Plan  yes    Plan Comments  Spoke with patient at bedside regarding discharge plan.  Patient reports that she was seen by SW who provided resources.  Patient indicates that she has reviewed the information and has decided to first go home and then pursue outpatient rehab.  No discharge needs verbalized.  CM following.  Patient plan is to discharge home via with LY for transport.      Final Discharge Disposition Code  01 - home or self-care        Discharge Codes    No documentation.       Expected Discharge Date and Time     Expected Discharge Date Expected Discharge Time    May 15, 2019             Dalia Reveles RN

## 2019-05-14 NOTE — PLAN OF CARE
Problem: Fall Risk (Adult)  Goal: Identify Related Risk Factors and Signs and Symptoms  Outcome: Ongoing (interventions implemented as appropriate)    Goal: Absence of Fall  Outcome: Ongoing (interventions implemented as appropriate)      Problem: Patient Care Overview  Goal: Plan of Care Review  Outcome: Ongoing (interventions implemented as appropriate)   05/14/19 0453   Coping/Psychosocial   Plan of Care Reviewed With patient   Plan of Care Review   Progress no change   OTHER   Outcome Summary No acute overnight events-- pt c/o diarrhea and anxiousness this shift. PRN Ativan given for anxiety. No other s/sx of ETOH withdrawal noted this shift. VSS. Continue current POC       Problem: Alcohol Withdrawal Acute, Risk/Actual (Adult)  Goal: Signs and Symptoms of Listed Potential Problems Will be Absent, Minimized or Managed (Alcohol Withdrawal Acute, Risk/Actual)  Outcome: Ongoing (interventions implemented as appropriate)

## 2019-05-14 NOTE — PLAN OF CARE
Problem: Patient Care Overview  Goal: Plan of Care Review  Outcome: Ongoing (interventions implemented as appropriate)   05/14/19 3091   Coping/Psychosocial   Plan of Care Reviewed With patient   Plan of Care Review   Progress improving   OTHER   Outcome Summary The patient was anxious throughout the shift, she asked for and was given Ativan in the early afternoon. She is ready to go home. No acute issues arose during the shift. Will continue POC.

## 2019-05-14 NOTE — PROGRESS NOTES
[facility] Medicine Services  PROGRESS NOTE    Patient Name: Philomena Davis  : 1960  MRN: [id number]    Date of Admission: 5/10/2019  Length of Stay: 3  Primary Care Physician: Lona Deleon MD    Subjective     CC: f/u hematemesis, ETOH abuse     HPI:  Doing well. Biggest issue is anxiety. She is considering outpatient rehab but is concerned about her financial situation and wants to go home to get things sorted out first. She is tearful. Phos still low despite continued replacement, both PO and IV. She reports she was eating minimally for months prior to admission due to drinking and finances.     Review of Systems  Gen- No fevers, chills  CV- No chest pain, palpitations  Resp- No cough, dyspnea  GI- No nausea/vomiting, abd pain    Otherwise ROS is negative except as mentioned in the HPI.    Objective     Vital Signs:   Temp:  [97.7 °F (36.5 °C)-98.8 °F (37.1 °C)] 97.7 °F (36.5 °C)  Heart Rate:  [] 81  Resp:  [18-20] 18  BP: (123-144)/(76-92) 144/87        Physical Exam:  Constitutional: No acute distress, awake, alert  HENT: NCAT, mucous membranes moist  Respiratory: Clear to auscultation bilaterally, respiratory effort normal   Cardiovascular: RRR, no murmurs, rubs, or gallops, palpable pedal pulses bilaterally  Gastrointestinal: Positive bowel sounds, soft, nontender, nondistended  Musculoskeletal: No bilateral ankle edema  Psychiatric: Appropriate affect, cooperative  Neurologic: Oriented x 3, strength symmetric in all extremities, Cranial Nerves grossly intact to confrontation, speech clear  Skin: No rashes    Results Reviewed:  I have personally reviewed current lab, radiology, and data and agree.    Results from last 7 days   Lab Units 19  0725 19  0613 19  0525 05/10/19  1757   WBC 10*3/mm3 4.89 4.21 6.24 9.76   HEMOGLOBIN g/dL 12.2 11.0* 11.5* 13.1   HEMATOCRIT % 38.5 33.3* 35.8 40.3   PLATELETS 10*3/mm3 211 172 219 297   INR   --   --   --   1.06     Results from last 7 days   Lab Units 05/14/19  0725 05/13/19  0713 05/12/19  0800 05/11/19  0525 05/10/19  1757   SODIUM mmol/L 142 144 140 137 136   POTASSIUM mmol/L 4.1 4.1 4.1 4.3 4.7   CHLORIDE mmol/L 110* 111* 103 97* 87*   CO2 mmol/L 23.0 23.0 24.0 22.0 19.0*   BUN mg/dL 4* 5* 10 13 12   CREATININE mg/dL 0.56* 0.63 0.65 0.86 0.83   GLUCOSE mg/dL 106* 103* 110* 92 65   CALCIUM mg/dL 8.6 7.4* 8.5* 9.6 11.5*   ALT (SGPT) U/L  --   --   --  56* 79*   AST (SGOT) U/L  --   --   --  96* 130*     Results for SIMONA JIMENEZ (MRN [id number]) as of 5/14/2019 12:50   Ref. Range 5/13/2019 12:18 5/14/2019 00:08 5/14/2019 07:25   Phosphorus Latest Ref Range: 2.5 - 4.5 mg/dL 1.9 (C) 2.4 (L) 2.3 (L)     Estimated Creatinine Clearance: 117.9 mL/min (A) (by C-G formula based on SCr of 0.56 mg/dL (L)).  No results found for: BNP    Microbiology Results Abnormal     None        Imaging Results (last 24 hours)     ** No results found for the last 24 hours. **        I have reviewed the medications:  Scheduled Meds:    diazePAM 10 mg Oral Q12H   folic acid 1 mg Oral Daily   latanoprost 1 drop Both Eyes Nightly   lisinopril 40 mg Oral Daily   pantoprazole 40 mg Oral QAM AC   thiamine 100 mg Oral Daily   traZODone 50 mg Oral Nightly     PRN Meds:  •  acetaminophen  •  LORazepam **OR** LORazepam **OR** LORazepam **OR** LORazepam **OR** LORazepam **OR** LORazepam  •  LORazepam  •  magnesium sulfate **OR** magnesium sulfate **OR** magnesium sulfate  •  ondansetron **OR** ondansetron  •  potassium chloride **OR** potassium chloride **OR** potassium chloride  •  potassium phosphate infusion greater than 15 mMoles **OR** potassium phosphate infusion greater than 15 mMoles **OR** potassium phosphate **OR** sodium phosphate IVPB **OR** sodium phosphate IVPB **OR** sodium phosphate IVPB  •  sodium chloride    Assessment / Plan     Active Hospital Problems    Diagnosis POA   • Alcohol withdrawal syndrome without complication  "(CMS/formerly [facility]) [F10.230] Yes   • Alcohol withdrawal (CMS/formerly [facility]) [F10.239] Yes   • Alcoholism /alcohol abuse (CMS/formerly [facility]) [F10.20] Yes   • Hematemesis [K92.0] Yes   • Diabetes mellitus, type 2 (CMS/formerly [facility]) [E11.9] Yes   • Essential hypertension [I10] Yes   • Generalized anxiety disorder [F41.1] Yes   • Hyperlipidemia [E78.5] Yes   • Glaucoma [H40.9] Yes     Brief Hospital Course to date:  58 year old female presents to the ED with hematemesis for the past several weeks that has progressively worsened over the past two days.      Hematemesis  Dysphagia   -? Esperanza díaz tear. h/h dropped but likely dilutional but now stable.   -gastric occult negative  -continue protonix and pepcid  - progressive dysphagia over the last few months - hasn't been able to keep much down   - Consider GI consult after withdrawal managed       Alcohol withdrawal  Complex social situation   - Has been drinking 1/5 ETOH daily; this has progressed over the last 1.5 years. Minimal support system. Currently with very difficult finances. Sober in the past for 8 years when she had a support system. Has not tried to get sober recently. No history of seizures/DTs mostly because she hasn't not quit drinking.   - Decrease scheduled valium to BID today  - send home with quick taper at d/c   - PRN ativan PO TID for anxiety/panic attacks   - CIWA and PRN ativan   - Cont thiamine   - Social work consult     Generalized anxiety  - Takes Prozac at baseline, may need dose increased, consider addition of Buspar for anxiety    Dehydration, resolved  - IV fluids stopped     Hypophosphatemia:  - Likely secondary to alcoholism and poor nutrition.   - Replace per protocol.   - Repeat phos in the am.      essential hypertension  -continue lisinopril     Hyperlipidemia  -reports she stopped taking her lipitor recently for \"no reason.\"   - Total cholesterol 214,  and trigs 172   - Hold off on statin. 10-year ASCVD risk = 1.5%      Glaucoma  -continue xalatan drops      " "Diabetes mellitus type 2  -reports was told she was \"prediabetic\"   - A1C <4.3%; no hypoglycemia at this point. Stop accuchecks       DVT prophylaxis:scds    Dispo: Home tomorrow.     CODE STATUS:   Code Status and Medical Interventions:   Ordered at: 05/10/19 2111     Level Of Support Discussed With:    Patient     Code Status:    CPR     Medical Interventions (Level of Support Prior to Arrest):    Full     Electronically signed by Wendy Patricia PA-C, 05/14/19, 12:48 PM.      "

## 2019-05-14 NOTE — PROGRESS NOTES
"                  Clinical Nutrition     Nutrition Assessment  Reason for Visit:   Follow-up protocol    Patient Name: Philomena Davis  YOB: 1960  MRN: [id number]  Date of Encounter: 05/14/19 12:38 PM  Admission date: 5/10/2019     - RD recommends replacing low phosphorus as able.   - Continue to monitor electrolytes and replace per protocol.     Nutrition Assessment   Admission Diagnosis    Alcohol withdrawal (CMS/HCC)    Additional applicable diagnosis, conditions, procedures    Alcoholism /alcohol abuse (CMS/HCC)    Hematemesis    Alcohol withdrawal syndrome without complication (CMS/HCC)    Applicable PMH/ PSxH    Diabetes mellitus, type 2 (CMS/HCC)    Essential hypertension    Generalized anxiety disorder    Hyperlipidemia    Glaucoma     Anthropometrics   Admit weight: 161# (standing scale)  Admit height: 64\"  BMI: 27.6    IBW: 120#    UBW:180#    Weight changes: 19# weight change in past two months per patient (10.6%)     Labs reviewed   Yes; Phosphorus 2.3    Results from last 7 days   Lab Units 05/14/19  0725  05/11/19  0525   SODIUM mmol/L 142   < > 137   POTASSIUM mmol/L 4.1   < > 4.3   CHLORIDE mmol/L 110*   < > 97*   CO2 mmol/L 23.0   < > 22.0   BUN mg/dL 4*   < > 13   CREATININE mg/dL 0.56*   < > 0.86   CALCIUM mg/dL 8.6   < > 9.6   BILIRUBIN mg/dL  --   --  1.5*   ALK PHOS U/L  --   --  87   ALT (SGPT) U/L  --   --  56*   AST (SGOT) U/L  --   --  96*   GLUCOSE mg/dL 106*   < > 92    < > = values in this interval not displayed.     Results from last 7 days   Lab Units 05/13/19  1148 05/13/19  0746 05/12/19  2111 05/12/19  1617 05/12/19  1137 05/12/19  0712   GLUCOSE mg/dL 113 98 104 120 146* 97     Lab Results   Lab Value Date/Time    HGBA1C <4.30 (L) 05/11/2019 0526    HGBA1C 5.50 03/07/2017 1511     Medications reviewed   Pertinent:  Protonix, thiamine, valium  PRN: ativan, zofran, Micro-K    Current Nutrition Prescription     PO: Diet Regular     Oral Nutrition Supplement: Ensure HP " daily with Breakfast    Intake: 75% of 7 meals     Nutrition Diagnosis   5/11; reviewed 5/14  Problem Malnutrition    Etiology Weight loss, decreased intake    Signs/Symptoms Report of 19# weight loss x 2 months due to decreased appetite r/t increased ETOH consumption   Status: ongoing    Nutrition Intervention     Interventions Goal    General: Nutrition support treatment    Nutrition Interventions   1.  Follow treatment progress, Care plan reviewed   - RD recommends replacing low phosphorus as able.   - Continue to monitor electrolytes and replace per protocol.     Monitor/ Evaluation    Per protocol, PO intake, Supplement intake, Pertinent labs, Symptoms    Will Continue to follow per protocol    Nelli Mcgregor RD  Time Spent: 15 minutes

## 2019-05-15 VITALS
BODY MASS INDEX: 31.28 KG/M2 | DIASTOLIC BLOOD PRESSURE: 81 MMHG | SYSTOLIC BLOOD PRESSURE: 133 MMHG | HEIGHT: 64 IN | OXYGEN SATURATION: 93 % | WEIGHT: 183.2 LBS | TEMPERATURE: 97.8 F | RESPIRATION RATE: 18 BRPM | HEART RATE: 86 BPM

## 2019-05-15 LAB
ANION GAP SERPL CALCULATED.3IONS-SCNC: 10 MMOL/L
BUN BLD-MCNC: 5 MG/DL (ref 6–20)
BUN/CREAT SERPL: 9.1 (ref 7–25)
CALCIUM SPEC-SCNC: 8.9 MG/DL (ref 8.6–10.5)
CHLORIDE SERPL-SCNC: 107 MMOL/L (ref 98–107)
CO2 SERPL-SCNC: 25 MMOL/L (ref 22–29)
CREAT BLD-MCNC: 0.55 MG/DL (ref 0.57–1)
GFR SERPL CREATININE-BSD FRML MDRD: 114 ML/MIN/1.73
GLUCOSE BLD-MCNC: 102 MG/DL (ref 65–99)
PHOSPHATE SERPL-MCNC: 3.2 MG/DL (ref 2.5–4.5)
POTASSIUM BLD-SCNC: 3.8 MMOL/L (ref 3.5–5.2)
SODIUM BLD-SCNC: 142 MMOL/L (ref 136–145)

## 2019-05-15 PROCEDURE — 84100 ASSAY OF PHOSPHORUS: CPT | Performed by: PHYSICIAN ASSISTANT

## 2019-05-15 PROCEDURE — 99239 HOSP IP/OBS DSCHRG MGMT >30: CPT | Performed by: NURSE PRACTITIONER

## 2019-05-15 PROCEDURE — 80048 BASIC METABOLIC PNL TOTAL CA: CPT | Performed by: PHYSICIAN ASSISTANT

## 2019-05-15 RX ORDER — PANTOPRAZOLE SODIUM 40 MG/1
40 TABLET, DELAYED RELEASE ORAL
Qty: 30 TABLET | Refills: 0 | Status: SHIPPED | OUTPATIENT
Start: 2019-05-16

## 2019-05-15 RX ORDER — CHOLECALCIFEROL (VITAMIN D3) 125 MCG
500 CAPSULE ORAL DAILY
Qty: 30 TABLET | Refills: 0 | Status: ON HOLD | OUTPATIENT
Start: 2019-05-16 | End: 2022-09-26

## 2019-05-15 RX ORDER — TRAZODONE HYDROCHLORIDE 50 MG/1
50 TABLET ORAL NIGHTLY
Qty: 15 TABLET | Refills: 0 | Status: SHIPPED | OUTPATIENT
Start: 2019-05-15 | End: 2022-12-04 | Stop reason: HOSPADM

## 2019-05-15 RX ORDER — FOLIC ACID 1 MG/1
1 TABLET ORAL DAILY
Qty: 30 TABLET | Refills: 0 | Status: ON HOLD | OUTPATIENT
Start: 2019-05-16 | End: 2022-09-26

## 2019-05-15 RX ORDER — LISINOPRIL 40 MG/1
40 TABLET ORAL DAILY
Qty: 30 TABLET | Refills: 0 | Status: SHIPPED | OUTPATIENT
Start: 2019-05-16 | End: 2022-10-05 | Stop reason: HOSPADM

## 2019-05-15 RX ORDER — DIAZEPAM 10 MG/1
10 TABLET ORAL EVERY 12 HOURS
Qty: 7 TABLET | Refills: 0 | Status: ON HOLD | OUTPATIENT
Start: 2019-05-15 | End: 2022-09-26

## 2019-05-15 RX ORDER — LANOLIN ALCOHOL/MO/W.PET/CERES
100 CREAM (GRAM) TOPICAL DAILY
Qty: 30 TABLET | Refills: 0 | Status: ON HOLD | OUTPATIENT
Start: 2019-05-16 | End: 2022-09-26

## 2019-05-15 RX ADMIN — LISINOPRIL 40 MG: 40 TABLET ORAL at 09:37

## 2019-05-15 RX ADMIN — THIAMINE HCL TAB 100 MG 100 MG: 100 TAB at 09:37

## 2019-05-15 RX ADMIN — PANTOPRAZOLE SODIUM 40 MG: 40 TABLET, DELAYED RELEASE ORAL at 09:37

## 2019-05-15 RX ADMIN — CYANOCOBALAMIN TAB 1000 MCG 500 MCG: 1000 TAB at 09:37

## 2019-05-15 RX ADMIN — FOLIC ACID 1 MG: 1 TABLET ORAL at 09:37

## 2019-05-15 RX ADMIN — LORAZEPAM 1 MG: 1 TABLET ORAL at 12:11

## 2019-05-15 RX ADMIN — DIAZEPAM 10 MG: 5 TABLET ORAL at 09:37

## 2019-05-15 NOTE — DISCHARGE SUMMARY
[facility] Medicine Services  DISCHARGE SUMMARY    Patient Name: Philomena Davis  : 1960  MRN: [id number]    Date of Admission: 5/10/2019  Date of Discharge:  5/15/19  Primary Care Physician: Lona Deleon MD    Consults     No orders found from 2019 to 2019.          Hospital Course     Presenting Problem:   Alcohol withdrawal syndrome without complication (CMS/HCC) [F10.230]    Active Hospital Problems    Diagnosis  POA   • Alcohol withdrawal syndrome without complication (CMS/HCC) [F10.230]  Yes   • Alcohol withdrawal (CMS/HCC) [F10.239]  Yes   • Alcoholism /alcohol abuse (CMS/HCC) [F10.20]  Yes   • Hematemesis [K92.0]  Yes   • Diabetes mellitus, type 2 (CMS/HCC) [E11.9]  Yes   • Essential hypertension [I10]  Yes   • Generalized anxiety disorder [F41.1]  Yes   • Hyperlipidemia [E78.5]  Yes   • Glaucoma [H40.9]  Yes      Resolved Hospital Problems   No resolved problems to display.          Hospital Course:  Philomena Davis is a 58 y.o. female  presented to the ED with hematemesis for the past several weeks that has progressively worsened over the past two days. Patient endorses ETOH abuse that has been progressive over the last 1-2 years with several life stressors. Likely hematemesis was due to a Esperanza Badillo tear as patient endorses she had a lot of dry heaving, and gastric occult was negative and no further event of hematemesis throughout hospital course. Started on PPI and Valium, which will be tapered after discharge home. Given materials for outpatient rehab for ETOH abuse. Cont home Prozac for anxiety. PCP follow up in 1 week.     Discharge Follow Up Recommendations for labs/diagnostics:  PCP 1 week     Day of Discharge     HPI:   Sitting up in bed, NAD. Eager to return home to her dog today. Feels well, eating and sleeping well. No active signs or symptoms of withdrawal. Is interested in outpatient alcohol rehab and has a desire to stop drinking again.  "States she was scared to death when she saw the blood in her vomit, and she currently has no desire to drink. \"A wake up call\". Denies f/c, n/v/d, soa or cp     Review of Systems    Otherwise ROS is negative except as mentioned in the HPI.    Vital Signs:   Temp:  [97.6 °F (36.4 °C)-98.5 °F (36.9 °C)] 97.8 °F (36.6 °C)  Heart Rate:  [75-91] 86  Resp:  [18] 18  BP: (123-146)/(78-98) 133/81     Physical Exam:  Constitutional: Awake, alert, sitting up in bed   Eyes: PERRLA, sclerae anicteric, no conjunctival injection  HENT: NCAT, mucous membranes moist  Neck: Supple, no thyromegaly, no lymphadenopathy, trachea midline  Respiratory: Clear to auscultation bilaterally, nonlabored respirations   Cardiovascular: RRR, no murmurs, rubs, or gallops, palpable pedal pulses bilaterally  Gastrointestinal: Positive bowel sounds, soft, nontender, nondistended  Musculoskeletal: No bilateral ankle edema, no clubbing or cyanosis to extremities  Psychiatric: Appropriate affect, cooperative  Neurologic: Oriented x 3, strength symmetric in all extremities, Cranial Nerves grossly intact to confrontation, speech clear  Skin: No rashes     Pertinent  and/or Most Recent Results     Results from last 7 days   Lab Units 05/15/19  0528 05/14/19  0725 05/13/19  0713 05/12/19  0800 05/12/19  0613 05/11/19  0525 05/10/19  1757   WBC 10*3/mm3  --  4.89  --   --  4.21 6.24 9.76   HEMOGLOBIN g/dL  --  12.2  --   --  11.0* 11.5* 13.1   HEMATOCRIT %  --  38.5  --   --  33.3* 35.8 40.3   PLATELETS 10*3/mm3  --  211  --   --  172 219 297   SODIUM mmol/L 142 142 144 140  --  137 136   POTASSIUM mmol/L 3.8 4.1 4.1 4.1  --  4.3 4.7   CHLORIDE mmol/L 107 110* 111* 103  --  97* 87*   CO2 mmol/L 25.0 23.0 23.0 24.0  --  22.0 19.0*   BUN mg/dL 5* 4* 5* 10  --  13 12   CREATININE mg/dL 0.55* 0.56* 0.63 0.65  --  0.86 0.83   GLUCOSE mg/dL 102* 106* 103* 110*  --  92 65   CALCIUM mg/dL 8.9 8.6 7.4* 8.5*  --  9.6 11.5*     Results from last 7 days   Lab Units " 19  0525 05/10/19  1757   BILIRUBIN mg/dL 1.5* 1.3*   ALK PHOS U/L 87 100   ALT (SGPT) U/L 56* 79*   AST (SGOT) U/L 96* 130*   PROTIME Seconds  --  13.3   INR   --  1.06     Results from last 7 days   Lab Units 19  0725   CHOLESTEROL mg/dL 214*   TRIGLYCERIDES mg/dL 172*   HDL CHOL mg/dL 62*     Results from last 7 days   Lab Units 19  0526 05/10/19  2225 05/10/19  1757   HEMOGLOBIN A1C % <4.30*  --   --    LACTATE mmol/L  --  0.9 4.7*       Brief Urine Lab Results  (Last result in the past 365 days)      Color   Clarity   Blood   Leuk Est   Nitrite   Protein   CREAT   Urine HCG        05/10/19 1908 Yellow Cloudy Negative Negative Negative 30 mg/dL (1+)               Microbiology Results Abnormal     None          Imaging Results (all)     None                    Results for orders placed during the hospital encounter of 06/10/14   CONVERTED (HISTORICAL) ECHO    Narrative Patient:      SIMONA JIMENEZ    [facility] Rec#:     [id number]               :          1960            Date:         06/10/2014            Age:          53y                   Height:       165 cm / 65.0 in  Weight:       84 kg / 185.1 lbs  Sex:          F                     BSA:          1.91  Room#:        [facility]                       Sonographer:  Kavitha Richter [facility]  Referring:    KEYSHA  Reading:      Deandre Larkin MD  ______________________________________________________________________    Transthoracic Echocardiogram    Indication:  DYSPNEA  BP:           125/70    Conclusions  1. The study quality is technically difficult;  all measurements are  approximate.   2. Mild concentric left ventricular hypertrophy is observed.  3. There is normal left ventricular systolic function.  4. Abnormal left ventricular diastolic filling is observed, consistent  with impaired relaxation.   5. The right ventricular global systolic function is normal.  6. There is no evidence of aortic stenosis.  7. There is no evidence of mitral  valve prolapse.  8. There is mild mitral regurgitation.   9. There is mild tricuspid regurgitation.  10. There is no evidence of pericardial effusion.  11. There is no dilatation of the aortic root.  12. The venous system appears normal.    Findings       Technical Comments:  The study quality is technically difficult.  The study is technically  limited due to poor acoustic windows.  The study is technically limited  due to patient body habitus. BREAST IMPLANTS     Left Ventricle:  The left ventricular chamber size is normal. Mild concentric left  ventricular hypertrophy is observed. Global left ventricular wall motion  and contractility are within normal limits. There is normal left  ventricular systolic function. The estimated ejection fraction is  greater than 65%.  Abnormal left ventricular diastolic filling is  observed, consistent with impaired relaxation.      Left Atrium:  The left atrial chamber size is normal.     Right Ventricle:  The right ventricular cavity size is normal. The right ventricular  global systolic function is normal.   Right Atrium:  The right atrial cavity size is normal. No atrial septal defect is  visualized.     Aortic Valve:  The aortic valve is trileaflet. There is no evidence of aortic  regurgitation. There is no evidence of aortic stenosis.     Mitral Valve:  The mitral valve leaflets appear normal. There is no evidence of mitral  valve prolapse. There is mild mitral regurgitation.  There is no  evidence of mitral stenosis.     Tricuspid Valve:  The tricuspid valve leaflets are normal.  There is mild tricuspid  regurgitation.     Pulmonic Valve:  The pulmonic valve appears normal. There is a trace pulmonic  regurgitation.      Pericardium:  There is no evidence of pericardial effusion.     Aorta:  There is no dilatation of the aortic root.     Pulmonary Artery:  The main pulmonary artery appears normal.     Venous:  The venous system appears normal.     Measurements    Chambers  Name                    Value           RVIDd (AP) 2D           2.2 cm          IVSd (2D)               1.2 cm          LVIDd (2D)              4.4 cm          LVIDs (2D)              2.8 cm          LVPWd (2D)              1.2 cm          EF (2D)                 68 %            Ao root diameter (2D)   2.8 cm          LA dimension (AP) 2D    3.5 cm            Diastolic/Systolic Function  Name                    Value           MV E-wave Vmax          0.8 m/sec       MV A-wave Vmax          0.81 m/sec        Tricuspid Valve  Name                    Value           TR Vmax                 2.2 m/sec       RAP                     10 mmHg         RVSP                    29 mmHg           Pulmonic Valve/Qp:Qs  Name                    Value           PV acceleration time    154 msec          Electronically signed by: Deandre Larkin MD on 06/10/2014 10:59:01         Discharge Details        Discharge Medications      New Medications      Instructions Start Date   cyanocobalamin 500 MCG tablet  Commonly known as:  VITAMIN B-12   500 mcg, Oral, Daily   Start Date:  5/16/2019     diazePAM 10 MG tablet  Commonly known as:  VALIUM   10 mg, Oral, Every 12 Hours, Take 1 tab twice daily x 2 days, then 1 tab daily x 2 days, then 0.5 tab daily x 2 days      folic acid 1 MG tablet  Commonly known as:  FOLVITE   1 mg, Oral, Daily   Start Date:  5/16/2019     pantoprazole 40 MG EC tablet  Commonly known as:  PROTONIX   40 mg, Oral, Every Morning Before Breakfast   Start Date:  5/16/2019     thiamine 100 MG tablet  Commonly known as:  VITAMIN B1   100 mg, Oral, Daily   Start Date:  5/16/2019     traZODone 50 MG tablet  Commonly known as:  DESYREL   50 mg, Oral, Nightly         Changes to Medications      Instructions Start Date   lisinopril 40 MG tablet  Commonly known as:  PRINIVILZESTRIL  What changed:  medication strength   40 mg, Oral, Daily   Start Date:  5/16/2019        Continue These Medications      Instructions Start  Date   AMBIEN CR PO   12.5 mg, Oral, Nightly      docusate sodium 100 MG capsule  Commonly known as:  COLACE   100 mg, Oral, 2 Times Daily      FLUoxetine 20 MG capsule  Commonly known as:  PROzac   20 mg, Oral, Daily      latanoprost 0.005 % ophthalmic solution  Commonly known as:  XALATAN   1 drop, Both Eyes, Nightly      MULTIVITAMIN ADULT PO   1 tablet, Oral, Daily         Stop These Medications    atorvastatin 80 MG tablet  Commonly known as:  LIPITOR     metFORMIN 500 MG tablet  Commonly known as:  GLUCOPHAGE     oxyCODONE-acetaminophen 5-325 MG per tablet  Commonly known as:  PERCOCET     oxyCODONE-acetaminophen 7.5-325 MG per tablet  Commonly known as:  PERCOCET            Allergies   Allergen Reactions   • Codeine Nausea Only         Discharge Disposition:  Home or Self Care    Discharge Diet:  Diet Order   Procedures   • Diet Regular         Discharge Activity:   Activity Instructions     Activity as Tolerated              CODE STATUS:    Code Status and Medical Interventions:   Ordered at: 05/10/19 2111     Level Of Support Discussed With:    Patient     Code Status:    CPR     Medical Interventions (Level of Support Prior to Arrest):    Full         No future appointments.    Additional Instructions for the Follow-ups that You Need to Schedule     Discharge Follow-up with PCP   As directed       Currently Documented PCP:    Lona Deleon MD    PCP Phone Number:    [phone]     Follow Up Details:  1 week               Time Spent on Discharge:  40 minutes    Electronically signed by CANDY Monroe, 05/15/19, 11:33 AM.

## 2019-05-15 NOTE — PLAN OF CARE
Problem: Patient Care Overview  Goal: Plan of Care Review  Outcome: Ongoing (interventions implemented as appropriate)   05/15/19 1207   Coping/Psychosocial   Plan of Care Reviewed With patient   Plan of Care Review   Progress improving   OTHER   Outcome Summary VSS, patient anxious at times, no s&s of ETOH withdrawl noted. Patient to be discharged, all instructions reviewed.      Goal: Individualization and Mutuality  Outcome: Ongoing (interventions implemented as appropriate)    Goal: Discharge Needs Assessment  Outcome: Ongoing (interventions implemented as appropriate)    Goal: Interprofessional Rounds/Family Conf  Outcome: Ongoing (interventions implemented as appropriate)      Problem: Alcohol Withdrawal Acute, Risk/Actual (Adult)  Goal: Signs and Symptoms of Listed Potential Problems Will be Absent, Minimized or Managed (Alcohol Withdrawal Acute, Risk/Actual)  Outcome: Ongoing (interventions implemented as appropriate)

## 2019-05-15 NOTE — PROGRESS NOTES
Case Management Discharge Note    Final Note: Spoke with patient at bedside regarding discharge plan.  Patient reports that she is going home and  has the information provided to her by DANNA.  No discharge need verbalized.  Patient plan is to discharge home today and pursue outpatient ETOH rehab via car with family to transport.     Destination      No service has been selected for the patient.      Durable Medical Equipment      No service has been selected for the patient.      Dialysis/Infusion      No service has been selected for the patient.      Home Medical Care      No service has been selected for the patient.      Therapy      No service has been selected for the patient.      Community Resources      No service has been selected for the patient.             Final Discharge Disposition Code: 01 - home or self-care

## 2019-05-15 NOTE — PLAN OF CARE
Problem: Fall Risk (Adult)  Goal: Identify Related Risk Factors and Signs and Symptoms  Outcome: Ongoing (interventions implemented as appropriate)    Goal: Absence of Fall  Outcome: Ongoing (interventions implemented as appropriate)      Problem: Patient Care Overview  Goal: Plan of Care Review  Outcome: Ongoing (interventions implemented as appropriate)   05/15/19 0308   Coping/Psychosocial   Plan of Care Reviewed With patient   Plan of Care Review   Progress improving   OTHER   Outcome Summary Pt rested well overnight. VSS. No s/sx of withdrawal noted. Plan to D/C home today       Problem: Alcohol Withdrawal Acute, Risk/Actual (Adult)  Goal: Signs and Symptoms of Listed Potential Problems Will be Absent, Minimized or Managed (Alcohol Withdrawal Acute, Risk/Actual)  Outcome: Ongoing (interventions implemented as appropriate)

## 2019-05-15 NOTE — DISCHARGE INSTR - APPOINTMENTS
Lona Deleon MD  PCP - General  PCP - Family Medicine Internal Medicine [phone] [address] [id number]     Next Steps: Follow up in 1 week(s)        APPOINTMENT: Thursday  May 23, 2019 @ 3:00

## 2019-05-16 ENCOUNTER — READMISSION MANAGEMENT (OUTPATIENT)
Dept: CALL CENTER | Facility: HOSPITAL | Age: 59
End: 2019-05-16

## 2019-05-16 NOTE — OUTREACH NOTE
Prep Survey      Responses   Facility patient discharged from?  [facility]   Is patient eligible?  No   What are the reasons patient is not eligible?  Behavioral [facility] [ETOH abuse]   Does the patient have one of the following disease processes/diagnoses(primary or secondary)?  Other   Prep survey completed?  Yes          Alena Contreras RN

## 2020-02-04 ENCOUNTER — TRANSCRIBE ORDERS (OUTPATIENT)
Dept: ADMINISTRATIVE | Facility: HOSPITAL | Age: 60
End: 2020-02-04

## 2020-02-06 ENCOUNTER — TRANSCRIBE ORDERS (OUTPATIENT)
Dept: MAMMOGRAPHY | Facility: HOSPITAL | Age: 60
End: 2020-02-06

## 2020-02-06 DIAGNOSIS — N60.19 FIBROCYSTIC BREAST DISEASE (FCBD), UNSPECIFIED LATERALITY: Primary | ICD-10-CM

## 2020-03-05 ENCOUNTER — APPOINTMENT (OUTPATIENT)
Dept: MAMMOGRAPHY | Facility: HOSPITAL | Age: 60
End: 2020-03-05

## 2020-04-02 ENCOUNTER — HOSPITAL ENCOUNTER (OUTPATIENT)
Dept: MAMMOGRAPHY | Facility: HOSPITAL | Age: 60
Discharge: HOME | End: 2020-04-02

## 2020-04-22 ENCOUNTER — APPOINTMENT (OUTPATIENT)
Dept: MAMMOGRAPHY | Facility: HOSPITAL | Age: 60
End: 2020-04-22

## 2022-09-26 ENCOUNTER — HOSPITAL ENCOUNTER (INPATIENT)
Facility: HOSPITAL | Age: 62
LOS: 9 days | Discharge: NURSING, REHAB OR LONG-TERM CARE | End: 2022-10-05
Attending: EMERGENCY MEDICINE | Admitting: HOSPITALIST

## 2022-09-26 ENCOUNTER — APPOINTMENT (OUTPATIENT)
Dept: ULTRASOUND IMAGING | Facility: HOSPITAL | Age: 62
End: 2022-09-26

## 2022-09-26 ENCOUNTER — APPOINTMENT (OUTPATIENT)
Dept: GENERAL RADIOLOGY | Facility: HOSPITAL | Age: 62
End: 2022-09-26

## 2022-09-26 DIAGNOSIS — K29.20 ACUTE ALCOHOLIC GASTRITIS WITHOUT HEMORRHAGE: ICD-10-CM

## 2022-09-26 DIAGNOSIS — F10.932 ALCOHOL WITHDRAWAL SYNDROME WITH PERCEPTUAL DISTURBANCE: ICD-10-CM

## 2022-09-26 DIAGNOSIS — E87.20 LACTIC ACIDOSIS: ICD-10-CM

## 2022-09-26 DIAGNOSIS — F10.930 ALCOHOL WITHDRAWAL SYNDROME WITHOUT COMPLICATION: Primary | ICD-10-CM

## 2022-09-26 DIAGNOSIS — I10 ESSENTIAL HYPERTENSION: ICD-10-CM

## 2022-09-26 DIAGNOSIS — E86.9 VOLUME DEPLETION: ICD-10-CM

## 2022-09-26 PROBLEM — R79.89 ELEVATED LFTS: Status: ACTIVE | Noted: 2022-09-26

## 2022-09-26 LAB
ALBUMIN SERPL-MCNC: 3.6 G/DL (ref 3.5–5.2)
ALBUMIN/GLOB SERPL: 0.9 G/DL
ALP SERPL-CCNC: 165 U/L (ref 39–117)
ALT SERPL W P-5'-P-CCNC: 44 U/L (ref 1–33)
ANION GAP SERPL CALCULATED.3IONS-SCNC: 16 MMOL/L (ref 5–15)
AST SERPL-CCNC: 130 U/L (ref 1–32)
BASOPHILS # BLD AUTO: 0.04 10*3/MM3 (ref 0–0.2)
BASOPHILS NFR BLD AUTO: 0.4 % (ref 0–1.5)
BILIRUB SERPL-MCNC: 2.1 MG/DL (ref 0–1.2)
BUN SERPL-MCNC: 3 MG/DL (ref 8–23)
BUN/CREAT SERPL: 5.7 (ref 7–25)
CALCIUM SPEC-SCNC: 9.4 MG/DL (ref 8.6–10.5)
CHLORIDE SERPL-SCNC: 90 MMOL/L (ref 98–107)
CO2 SERPL-SCNC: 24 MMOL/L (ref 22–29)
CREAT SERPL-MCNC: 0.53 MG/DL (ref 0.57–1)
D-LACTATE SERPL-SCNC: 4.5 MMOL/L (ref 0.5–2)
DEPRECATED RDW RBC AUTO: 58.8 FL (ref 37–54)
EGFRCR SERPLBLD CKD-EPI 2021: 106 ML/MIN/1.73
EOSINOPHIL # BLD AUTO: 0.04 10*3/MM3 (ref 0–0.4)
EOSINOPHIL NFR BLD AUTO: 0.4 % (ref 0.3–6.2)
ERYTHROCYTE [DISTWIDTH] IN BLOOD BY AUTOMATED COUNT: 13.2 % (ref 12.3–15.4)
ETHANOL BLD-MCNC: <10 MG/DL (ref 0–10)
FOLATE SERPL-MCNC: <2 NG/ML (ref 4.78–24.2)
GLOBULIN UR ELPH-MCNC: 3.9 GM/DL
GLUCOSE SERPL-MCNC: 106 MG/DL (ref 65–99)
HCT VFR BLD AUTO: 36 % (ref 34–46.6)
HGB BLD-MCNC: 13.1 G/DL (ref 12–15.9)
HOLD SPECIMEN: NORMAL
IMM GRANULOCYTES # BLD AUTO: 0.05 10*3/MM3 (ref 0–0.05)
IMM GRANULOCYTES NFR BLD AUTO: 0.5 % (ref 0–0.5)
INR PPP: 1.03 (ref 0.84–1.13)
LIPASE SERPL-CCNC: 33 U/L (ref 13–60)
LYMPHOCYTES # BLD AUTO: 1.41 10*3/MM3 (ref 0.7–3.1)
LYMPHOCYTES NFR BLD AUTO: 13.1 % (ref 19.6–45.3)
MACROCYTES BLD QL SMEAR: NORMAL
MCH RBC QN AUTO: 43.8 PG (ref 26.6–33)
MCHC RBC AUTO-ENTMCNC: 36.4 G/DL (ref 31.5–35.7)
MCV RBC AUTO: 120.4 FL (ref 79–97)
MONOCYTES # BLD AUTO: 0.83 10*3/MM3 (ref 0.1–0.9)
MONOCYTES NFR BLD AUTO: 7.7 % (ref 5–12)
NEUTROPHILS NFR BLD AUTO: 77.9 % (ref 42.7–76)
NEUTROPHILS NFR BLD AUTO: 8.36 10*3/MM3 (ref 1.7–7)
NRBC BLD AUTO-RTO: 0 /100 WBC (ref 0–0.2)
NT-PROBNP SERPL-MCNC: 101.9 PG/ML (ref 0–900)
PLAT MORPH BLD: NORMAL
PLATELET # BLD AUTO: 243 10*3/MM3 (ref 140–450)
PMV BLD AUTO: 9.4 FL (ref 6–12)
POTASSIUM SERPL-SCNC: 4.1 MMOL/L (ref 3.5–5.2)
PROT SERPL-MCNC: 7.5 G/DL (ref 6–8.5)
PROTHROMBIN TIME: 13.4 SECONDS (ref 11.4–14.4)
RBC # BLD AUTO: 2.99 10*6/MM3 (ref 3.77–5.28)
SODIUM SERPL-SCNC: 130 MMOL/L (ref 136–145)
STOMATOCYTES BLD QL SMEAR: NORMAL
TROPONIN T SERPL-MCNC: <0.01 NG/ML (ref 0–0.03)
VIT B12 BLD-MCNC: 302 PG/ML (ref 211–946)
WBC MORPH BLD: NORMAL
WBC NRBC COR # BLD: 10.73 10*3/MM3 (ref 3.4–10.8)
WHOLE BLOOD HOLD COAG: NORMAL
WHOLE BLOOD HOLD SPECIMEN: NORMAL

## 2022-09-26 PROCEDURE — 85025 COMPLETE CBC W/AUTO DIFF WBC: CPT | Performed by: EMERGENCY MEDICINE

## 2022-09-26 PROCEDURE — 25010000002 MIDAZOLAM PER 1 MG: Performed by: INTERNAL MEDICINE

## 2022-09-26 PROCEDURE — 83880 ASSAY OF NATRIURETIC PEPTIDE: CPT | Performed by: EMERGENCY MEDICINE

## 2022-09-26 PROCEDURE — 25010000002 THIAMINE PER 100 MG: Performed by: EMERGENCY MEDICINE

## 2022-09-26 PROCEDURE — 71045 X-RAY EXAM CHEST 1 VIEW: CPT

## 2022-09-26 PROCEDURE — 82746 ASSAY OF FOLIC ACID SERUM: CPT | Performed by: INTERNAL MEDICINE

## 2022-09-26 PROCEDURE — 83690 ASSAY OF LIPASE: CPT | Performed by: INTERNAL MEDICINE

## 2022-09-26 PROCEDURE — 25010000002 ONDANSETRON PER 1 MG: Performed by: EMERGENCY MEDICINE

## 2022-09-26 PROCEDURE — 80053 COMPREHEN METABOLIC PANEL: CPT | Performed by: EMERGENCY MEDICINE

## 2022-09-26 PROCEDURE — 25010000002 MAGNESIUM SULFATE IN D5W 1G/100ML (PREMIX) 1-5 GM/100ML-% SOLUTION: Performed by: EMERGENCY MEDICINE

## 2022-09-26 PROCEDURE — 93005 ELECTROCARDIOGRAM TRACING: CPT | Performed by: EMERGENCY MEDICINE

## 2022-09-26 PROCEDURE — 99285 EMERGENCY DEPT VISIT HI MDM: CPT

## 2022-09-26 PROCEDURE — 85610 PROTHROMBIN TIME: CPT | Performed by: INTERNAL MEDICINE

## 2022-09-26 PROCEDURE — 76981 USE PARENCHYMA: CPT

## 2022-09-26 PROCEDURE — 93005 ELECTROCARDIOGRAM TRACING: CPT

## 2022-09-26 PROCEDURE — 76705 ECHO EXAM OF ABDOMEN: CPT

## 2022-09-26 PROCEDURE — 82077 ASSAY SPEC XCP UR&BREATH IA: CPT | Performed by: EMERGENCY MEDICINE

## 2022-09-26 PROCEDURE — 82607 VITAMIN B-12: CPT | Performed by: INTERNAL MEDICINE

## 2022-09-26 PROCEDURE — 36415 COLL VENOUS BLD VENIPUNCTURE: CPT

## 2022-09-26 PROCEDURE — 99223 1ST HOSP IP/OBS HIGH 75: CPT | Performed by: INTERNAL MEDICINE

## 2022-09-26 PROCEDURE — 25010000002 DIAZEPAM PER 5 MG: Performed by: EMERGENCY MEDICINE

## 2022-09-26 PROCEDURE — 85007 BL SMEAR W/DIFF WBC COUNT: CPT | Performed by: EMERGENCY MEDICINE

## 2022-09-26 PROCEDURE — 84484 ASSAY OF TROPONIN QUANT: CPT | Performed by: EMERGENCY MEDICINE

## 2022-09-26 PROCEDURE — 83605 ASSAY OF LACTIC ACID: CPT | Performed by: EMERGENCY MEDICINE

## 2022-09-26 RX ORDER — MIDAZOLAM HYDROCHLORIDE 1 MG/ML
2 INJECTION INTRAMUSCULAR; INTRAVENOUS
Status: ACTIVE | OUTPATIENT
Start: 2022-09-26 | End: 2022-10-03

## 2022-09-26 RX ORDER — DIAZEPAM 2 MG/1
8 TABLET ORAL EVERY 12 HOURS
Status: COMPLETED | OUTPATIENT
Start: 2022-09-30 | End: 2022-10-01

## 2022-09-26 RX ORDER — MIDAZOLAM HYDROCHLORIDE 1 MG/ML
1 INJECTION INTRAMUSCULAR; INTRAVENOUS DAILY
Status: DISCONTINUED | OUTPATIENT
Start: 2022-10-04 | End: 2022-09-26

## 2022-09-26 RX ORDER — PANTOPRAZOLE SODIUM 40 MG/1
40 TABLET, DELAYED RELEASE ORAL
Status: DISCONTINUED | OUTPATIENT
Start: 2022-09-29 | End: 2022-10-05 | Stop reason: HOSPADM

## 2022-09-26 RX ORDER — MIDAZOLAM HYDROCHLORIDE 1 MG/ML
2 INJECTION INTRAMUSCULAR; INTRAVENOUS EVERY 8 HOURS
Status: DISCONTINUED | OUTPATIENT
Start: 2022-09-28 | End: 2022-09-26

## 2022-09-26 RX ORDER — TRAZODONE HYDROCHLORIDE 50 MG/1
50 TABLET ORAL NIGHTLY
Status: DISCONTINUED | OUTPATIENT
Start: 2022-09-26 | End: 2022-10-05 | Stop reason: HOSPADM

## 2022-09-26 RX ORDER — MAGNESIUM SULFATE 1 G/100ML
1 INJECTION INTRAVENOUS ONCE
Status: COMPLETED | OUTPATIENT
Start: 2022-09-26 | End: 2022-09-26

## 2022-09-26 RX ORDER — SODIUM CHLORIDE 0.9 % (FLUSH) 0.9 %
10 SYRINGE (ML) INJECTION AS NEEDED
Status: DISCONTINUED | OUTPATIENT
Start: 2022-09-26 | End: 2022-10-05 | Stop reason: HOSPADM

## 2022-09-26 RX ORDER — UBIDECARENONE 75 MG
100 CAPSULE ORAL DAILY
Status: DISCONTINUED | OUTPATIENT
Start: 2022-09-26 | End: 2022-09-27

## 2022-09-26 RX ORDER — DIAZEPAM 2 MG/1
8 TABLET ORAL EVERY 8 HOURS
Status: COMPLETED | OUTPATIENT
Start: 2022-09-28 | End: 2022-09-29

## 2022-09-26 RX ORDER — FOLIC ACID 5 MG/ML
1 INJECTION, SOLUTION INTRAMUSCULAR; INTRAVENOUS; SUBCUTANEOUS DAILY
Status: DISCONTINUED | OUTPATIENT
Start: 2022-09-26 | End: 2022-09-26

## 2022-09-26 RX ORDER — ONDANSETRON 2 MG/ML
4 INJECTION INTRAMUSCULAR; INTRAVENOUS ONCE
Status: COMPLETED | OUTPATIENT
Start: 2022-09-26 | End: 2022-09-26

## 2022-09-26 RX ORDER — MIDAZOLAM HYDROCHLORIDE 1 MG/ML
2 INJECTION INTRAMUSCULAR; INTRAVENOUS EVERY 6 HOURS
Status: DISCONTINUED | OUTPATIENT
Start: 2022-09-26 | End: 2022-09-26

## 2022-09-26 RX ORDER — PANTOPRAZOLE SODIUM 40 MG/10ML
40 INJECTION, POWDER, LYOPHILIZED, FOR SOLUTION INTRAVENOUS EVERY 12 HOURS SCHEDULED
Status: COMPLETED | OUTPATIENT
Start: 2022-09-26 | End: 2022-09-28

## 2022-09-26 RX ORDER — MIDAZOLAM HYDROCHLORIDE 1 MG/ML
4 INJECTION INTRAMUSCULAR; INTRAVENOUS
Status: ACTIVE | OUTPATIENT
Start: 2022-09-26 | End: 2022-10-03

## 2022-09-26 RX ORDER — ACETAMINOPHEN 500 MG
500 TABLET ORAL EVERY 6 HOURS PRN
Status: DISCONTINUED | OUTPATIENT
Start: 2022-09-26 | End: 2022-10-05 | Stop reason: HOSPADM

## 2022-09-26 RX ORDER — LORAZEPAM 1 MG/1
1 TABLET ORAL
Status: DISPENSED | OUTPATIENT
Start: 2022-09-26 | End: 2022-10-03

## 2022-09-26 RX ORDER — CLONIDINE HYDROCHLORIDE 0.1 MG/1
0.1 TABLET ORAL EVERY 6 HOURS PRN
Status: ACTIVE | OUTPATIENT
Start: 2022-09-26 | End: 2022-10-01

## 2022-09-26 RX ORDER — THIAMINE HYDROCHLORIDE 100 MG/ML
100 INJECTION, SOLUTION INTRAMUSCULAR; INTRAVENOUS ONCE
Status: COMPLETED | OUTPATIENT
Start: 2022-09-26 | End: 2022-09-26

## 2022-09-26 RX ORDER — SODIUM CHLORIDE 9 MG/ML
100 INJECTION, SOLUTION INTRAVENOUS CONTINUOUS
Status: ACTIVE | OUTPATIENT
Start: 2022-09-26 | End: 2022-09-27

## 2022-09-26 RX ORDER — MIDAZOLAM HYDROCHLORIDE 1 MG/ML
2 INJECTION INTRAMUSCULAR; INTRAVENOUS EVERY 12 HOURS
Status: DISCONTINUED | OUTPATIENT
Start: 2022-09-30 | End: 2022-09-26

## 2022-09-26 RX ORDER — DIAZEPAM 5 MG/1
5 TABLET ORAL DAILY
Status: DISCONTINUED | OUTPATIENT
Start: 2022-10-04 | End: 2022-10-04

## 2022-09-26 RX ORDER — FOLIC ACID 1 MG/1
1 TABLET ORAL ONCE
Status: DISCONTINUED | OUTPATIENT
Start: 2022-09-26 | End: 2022-09-26

## 2022-09-26 RX ORDER — LORAZEPAM 1 MG/1
2 TABLET ORAL
Status: ACTIVE | OUTPATIENT
Start: 2022-09-26 | End: 2022-10-03

## 2022-09-26 RX ORDER — DIAZEPAM 5 MG/ML
5 INJECTION, SOLUTION INTRAMUSCULAR; INTRAVENOUS ONCE
Status: COMPLETED | OUTPATIENT
Start: 2022-09-26 | End: 2022-09-26

## 2022-09-26 RX ORDER — MIDAZOLAM HYDROCHLORIDE 1 MG/ML
2 INJECTION INTRAMUSCULAR; INTRAVENOUS ONCE
Status: COMPLETED | OUTPATIENT
Start: 2022-09-26 | End: 2022-09-26

## 2022-09-26 RX ORDER — PANTOPRAZOLE SODIUM 40 MG/10ML
40 INJECTION, POWDER, LYOPHILIZED, FOR SOLUTION INTRAVENOUS ONCE
Status: COMPLETED | OUTPATIENT
Start: 2022-09-26 | End: 2022-09-26

## 2022-09-26 RX ORDER — FOLIC ACID 1 MG/1
1 TABLET ORAL DAILY
Status: DISCONTINUED | OUTPATIENT
Start: 2022-10-01 | End: 2022-10-05 | Stop reason: HOSPADM

## 2022-09-26 RX ORDER — MIDAZOLAM HYDROCHLORIDE 1 MG/ML
1 INJECTION INTRAMUSCULAR; INTRAVENOUS EVERY 12 HOURS
Status: DISCONTINUED | OUTPATIENT
Start: 2022-10-02 | End: 2022-09-26

## 2022-09-26 RX ORDER — ONDANSETRON 4 MG/1
4 TABLET, FILM COATED ORAL EVERY 6 HOURS PRN
Status: DISCONTINUED | OUTPATIENT
Start: 2022-09-26 | End: 2022-10-05 | Stop reason: HOSPADM

## 2022-09-26 RX ORDER — FLUOXETINE HYDROCHLORIDE 20 MG/1
20 CAPSULE ORAL DAILY
Status: DISCONTINUED | OUTPATIENT
Start: 2022-09-26 | End: 2022-10-05 | Stop reason: HOSPADM

## 2022-09-26 RX ORDER — ACETAMINOPHEN 160 MG/5ML
500 SOLUTION ORAL EVERY 6 HOURS PRN
Status: DISCONTINUED | OUTPATIENT
Start: 2022-09-26 | End: 2022-10-05 | Stop reason: HOSPADM

## 2022-09-26 RX ORDER — ONDANSETRON 2 MG/ML
4 INJECTION INTRAMUSCULAR; INTRAVENOUS EVERY 6 HOURS PRN
Status: DISCONTINUED | OUTPATIENT
Start: 2022-09-26 | End: 2022-10-05 | Stop reason: HOSPADM

## 2022-09-26 RX ORDER — DIAZEPAM 5 MG/1
5 TABLET ORAL EVERY 12 HOURS
Status: DISPENSED | OUTPATIENT
Start: 2022-10-02 | End: 2022-10-03

## 2022-09-26 RX ORDER — LATANOPROST 50 UG/ML
1 SOLUTION/ DROPS OPHTHALMIC NIGHTLY
Status: DISCONTINUED | OUTPATIENT
Start: 2022-09-26 | End: 2022-10-05 | Stop reason: HOSPADM

## 2022-09-26 RX ORDER — ENOXAPARIN SODIUM 100 MG/ML
40 INJECTION SUBCUTANEOUS DAILY
Status: CANCELLED | OUTPATIENT
Start: 2022-09-26

## 2022-09-26 RX ORDER — FOLIC ACID 1 MG/1
1 TABLET ORAL DAILY
Status: DISCONTINUED | OUTPATIENT
Start: 2022-10-01 | End: 2022-09-26

## 2022-09-26 RX ORDER — SODIUM CHLORIDE 0.9 % (FLUSH) 0.9 %
10 SYRINGE (ML) INJECTION EVERY 12 HOURS SCHEDULED
Status: DISCONTINUED | OUTPATIENT
Start: 2022-09-26 | End: 2022-10-05 | Stop reason: HOSPADM

## 2022-09-26 RX ORDER — DIAZEPAM 2 MG/1
8 TABLET ORAL EVERY 6 HOURS SCHEDULED
Status: COMPLETED | OUTPATIENT
Start: 2022-09-26 | End: 2022-09-27

## 2022-09-26 RX ADMIN — SODIUM CHLORIDE 100 ML/HR: 9 INJECTION, SOLUTION INTRAVENOUS at 20:06

## 2022-09-26 RX ADMIN — THIAMINE HYDROCHLORIDE 100 MG: 100 INJECTION, SOLUTION INTRAMUSCULAR; INTRAVENOUS at 06:46

## 2022-09-26 RX ADMIN — MIDAZOLAM 2 MG: 1 INJECTION INTRAMUSCULAR; INTRAVENOUS at 09:10

## 2022-09-26 RX ADMIN — DIAZEPAM 8 MG: 2 TABLET ORAL at 17:40

## 2022-09-26 RX ADMIN — TRAZODONE HYDROCHLORIDE 50 MG: 50 TABLET ORAL at 20:07

## 2022-09-26 RX ADMIN — ONDANSETRON 4 MG: 2 INJECTION INTRAMUSCULAR; INTRAVENOUS at 06:44

## 2022-09-26 RX ADMIN — FOLIC ACID 1 MG: 5 INJECTION, SOLUTION INTRAMUSCULAR; INTRAVENOUS; SUBCUTANEOUS at 13:29

## 2022-09-26 RX ADMIN — SODIUM CHLORIDE 1000 ML: 9 INJECTION, SOLUTION INTRAVENOUS at 06:58

## 2022-09-26 RX ADMIN — Medication 10 ML: at 20:07

## 2022-09-26 RX ADMIN — DIAZEPAM 8 MG: 2 TABLET ORAL at 23:57

## 2022-09-26 RX ADMIN — LORAZEPAM 1 MG: 1 TABLET ORAL at 11:21

## 2022-09-26 RX ADMIN — SODIUM CHLORIDE 100 ML/HR: 9 INJECTION, SOLUTION INTRAVENOUS at 09:13

## 2022-09-26 RX ADMIN — FLUOXETINE HYDROCHLORIDE 20 MG: 20 CAPSULE ORAL at 11:22

## 2022-09-26 RX ADMIN — LORAZEPAM 1 MG: 1 TABLET ORAL at 20:07

## 2022-09-26 RX ADMIN — PANTOPRAZOLE SODIUM 40 MG: 40 INJECTION, POWDER, FOR SOLUTION INTRAVENOUS at 20:06

## 2022-09-26 RX ADMIN — DIAZEPAM 8 MG: 2 TABLET ORAL at 13:29

## 2022-09-26 RX ADMIN — MAGNESIUM SULFATE HEPTAHYDRATE 1 G: 1 INJECTION, SOLUTION INTRAVENOUS at 06:56

## 2022-09-26 RX ADMIN — PANTOPRAZOLE SODIUM 40 MG: 40 INJECTION, POWDER, FOR SOLUTION INTRAVENOUS at 06:43

## 2022-09-26 RX ADMIN — DIAZEPAM 5 MG: 5 INJECTION, SOLUTION INTRAMUSCULAR; INTRAVENOUS at 06:50

## 2022-09-27 LAB
ALBUMIN SERPL-MCNC: 2.8 G/DL (ref 3.5–5.2)
ALBUMIN/GLOB SERPL: 1 G/DL
ALP SERPL-CCNC: 123 U/L (ref 39–117)
ALT SERPL W P-5'-P-CCNC: 30 U/L (ref 1–33)
ANION GAP SERPL CALCULATED.3IONS-SCNC: 7 MMOL/L (ref 5–15)
AST SERPL-CCNC: 84 U/L (ref 1–32)
BASOPHILS # BLD AUTO: 0.02 10*3/MM3 (ref 0–0.2)
BASOPHILS NFR BLD AUTO: 0.3 % (ref 0–1.5)
BILIRUB SERPL-MCNC: 2.1 MG/DL (ref 0–1.2)
BUN SERPL-MCNC: 6 MG/DL (ref 8–23)
BUN/CREAT SERPL: 9.4 (ref 7–25)
CALCIUM SPEC-SCNC: 8.3 MG/DL (ref 8.6–10.5)
CHLORIDE SERPL-SCNC: 102 MMOL/L (ref 98–107)
CO2 SERPL-SCNC: 27 MMOL/L (ref 22–29)
CREAT SERPL-MCNC: 0.64 MG/DL (ref 0.57–1)
D-LACTATE SERPL-SCNC: 1.6 MMOL/L (ref 0.5–2)
DEPRECATED RDW RBC AUTO: 61.1 FL (ref 37–54)
EGFRCR SERPLBLD CKD-EPI 2021: 101.3 ML/MIN/1.73
EOSINOPHIL # BLD AUTO: 0.04 10*3/MM3 (ref 0–0.4)
EOSINOPHIL NFR BLD AUTO: 0.6 % (ref 0.3–6.2)
ERYTHROCYTE [DISTWIDTH] IN BLOOD BY AUTOMATED COUNT: 13.5 % (ref 12.3–15.4)
GLOBULIN UR ELPH-MCNC: 2.9 GM/DL
GLUCOSE SERPL-MCNC: 92 MG/DL (ref 65–99)
HCT VFR BLD AUTO: 30 % (ref 34–46.6)
HGB BLD-MCNC: 10.7 G/DL (ref 12–15.9)
IMM GRANULOCYTES # BLD AUTO: 0.04 10*3/MM3 (ref 0–0.05)
IMM GRANULOCYTES NFR BLD AUTO: 0.6 % (ref 0–0.5)
LYMPHOCYTES # BLD AUTO: 1.22 10*3/MM3 (ref 0.7–3.1)
LYMPHOCYTES NFR BLD AUTO: 19.2 % (ref 19.6–45.3)
MCH RBC QN AUTO: 44.2 PG (ref 26.6–33)
MCHC RBC AUTO-ENTMCNC: 35.7 G/DL (ref 31.5–35.7)
MCV RBC AUTO: 124 FL (ref 79–97)
MONOCYTES # BLD AUTO: 0.54 10*3/MM3 (ref 0.1–0.9)
MONOCYTES NFR BLD AUTO: 8.5 % (ref 5–12)
NEUTROPHILS NFR BLD AUTO: 4.48 10*3/MM3 (ref 1.7–7)
NEUTROPHILS NFR BLD AUTO: 70.8 % (ref 42.7–76)
NRBC BLD AUTO-RTO: 0 /100 WBC (ref 0–0.2)
PLATELET # BLD AUTO: 148 10*3/MM3 (ref 140–450)
PMV BLD AUTO: 9.3 FL (ref 6–12)
POTASSIUM SERPL-SCNC: 3.7 MMOL/L (ref 3.5–5.2)
PROT SERPL-MCNC: 5.7 G/DL (ref 6–8.5)
QT INTERVAL: 344 MS
QTC INTERVAL: 469 MS
RBC # BLD AUTO: 2.42 10*6/MM3 (ref 3.77–5.28)
SODIUM SERPL-SCNC: 136 MMOL/L (ref 136–145)
WBC NRBC COR # BLD: 6.34 10*3/MM3 (ref 3.4–10.8)

## 2022-09-27 PROCEDURE — 85025 COMPLETE CBC W/AUTO DIFF WBC: CPT | Performed by: INTERNAL MEDICINE

## 2022-09-27 PROCEDURE — 97161 PT EVAL LOW COMPLEX 20 MIN: CPT | Performed by: PHYSICAL THERAPIST

## 2022-09-27 PROCEDURE — 83605 ASSAY OF LACTIC ACID: CPT | Performed by: INTERNAL MEDICINE

## 2022-09-27 PROCEDURE — 25010000002 CYANOCOBALAMIN PER 1000 MCG: Performed by: INTERNAL MEDICINE

## 2022-09-27 PROCEDURE — 97165 OT EVAL LOW COMPLEX 30 MIN: CPT

## 2022-09-27 PROCEDURE — 97535 SELF CARE MNGMENT TRAINING: CPT

## 2022-09-27 PROCEDURE — 80053 COMPREHEN METABOLIC PANEL: CPT | Performed by: INTERNAL MEDICINE

## 2022-09-27 PROCEDURE — 99233 SBSQ HOSP IP/OBS HIGH 50: CPT | Performed by: INTERNAL MEDICINE

## 2022-09-27 RX ORDER — CYANOCOBALAMIN 1000 UG/ML
1000 INJECTION, SOLUTION INTRAMUSCULAR; SUBCUTANEOUS
Status: DISCONTINUED | OUTPATIENT
Start: 2022-11-07 | End: 2022-10-05 | Stop reason: HOSPADM

## 2022-09-27 RX ORDER — CYANOCOBALAMIN 1000 UG/ML
1000 INJECTION, SOLUTION INTRAMUSCULAR; SUBCUTANEOUS WEEKLY
Status: DISCONTINUED | OUTPATIENT
Start: 2022-10-10 | End: 2022-10-05 | Stop reason: HOSPADM

## 2022-09-27 RX ORDER — CYANOCOBALAMIN 1000 UG/ML
1000 INJECTION, SOLUTION INTRAMUSCULAR; SUBCUTANEOUS DAILY
Status: COMPLETED | OUTPATIENT
Start: 2022-09-27 | End: 2022-10-03

## 2022-09-27 RX ORDER — KETOROLAC TROMETHAMINE 10 MG/1
10 TABLET, FILM COATED ORAL EVERY 6 HOURS PRN
Status: DISPENSED | OUTPATIENT
Start: 2022-09-27 | End: 2022-10-02

## 2022-09-27 RX ORDER — CYANOCOBALAMIN 1000 UG/ML
1000 INJECTION, SOLUTION INTRAMUSCULAR; SUBCUTANEOUS DAILY
Status: DISCONTINUED | OUTPATIENT
Start: 2022-09-27 | End: 2022-09-27 | Stop reason: SDUPTHER

## 2022-09-27 RX ADMIN — Medication 10 ML: at 20:05

## 2022-09-27 RX ADMIN — DICLOFENAC 4 G: 10 GEL TOPICAL at 12:12

## 2022-09-27 RX ADMIN — PANTOPRAZOLE SODIUM 40 MG: 40 INJECTION, POWDER, FOR SOLUTION INTRAVENOUS at 20:04

## 2022-09-27 RX ADMIN — VITAM B12 100 MCG: 100 TAB at 08:33

## 2022-09-27 RX ADMIN — CYANOCOBALAMIN 1000 MCG: 1000 INJECTION, SOLUTION INTRAMUSCULAR; SUBCUTANEOUS at 11:25

## 2022-09-27 RX ADMIN — KETOROLAC TROMETHAMINE 10 MG: 10 TABLET, FILM COATED ORAL at 17:15

## 2022-09-27 RX ADMIN — FOLIC ACID 1 MG: 5 INJECTION, SOLUTION INTRAMUSCULAR; INTRAVENOUS; SUBCUTANEOUS at 09:32

## 2022-09-27 RX ADMIN — DICLOFENAC 4 G: 10 GEL TOPICAL at 20:06

## 2022-09-27 RX ADMIN — LATANOPROST 1 DROP: 50 SOLUTION OPHTHALMIC at 20:05

## 2022-09-27 RX ADMIN — LORAZEPAM 1 MG: 1 TABLET ORAL at 16:53

## 2022-09-27 RX ADMIN — PANTOPRAZOLE SODIUM 40 MG: 40 INJECTION, POWDER, FOR SOLUTION INTRAVENOUS at 08:33

## 2022-09-27 RX ADMIN — Medication 10 ML: at 09:35

## 2022-09-27 RX ADMIN — DIAZEPAM 8 MG: 2 TABLET ORAL at 05:53

## 2022-09-27 RX ADMIN — TRAZODONE HYDROCHLORIDE 50 MG: 50 TABLET ORAL at 20:05

## 2022-09-27 RX ADMIN — DIAZEPAM 8 MG: 2 TABLET ORAL at 23:16

## 2022-09-27 RX ADMIN — DIAZEPAM 8 MG: 2 TABLET ORAL at 18:31

## 2022-09-27 RX ADMIN — FLUOXETINE HYDROCHLORIDE 20 MG: 20 CAPSULE ORAL at 08:32

## 2022-09-27 RX ADMIN — DIAZEPAM 8 MG: 2 TABLET ORAL at 12:31

## 2022-09-28 ENCOUNTER — APPOINTMENT (OUTPATIENT)
Dept: GENERAL RADIOLOGY | Facility: HOSPITAL | Age: 62
End: 2022-09-28

## 2022-09-28 LAB
ALBUMIN SERPL-MCNC: 2.4 G/DL (ref 3.5–5.2)
ALBUMIN/GLOB SERPL: 0.8 G/DL
ALP SERPL-CCNC: 140 U/L (ref 39–117)
ALT SERPL W P-5'-P-CCNC: 32 U/L (ref 1–33)
ANION GAP SERPL CALCULATED.3IONS-SCNC: 9 MMOL/L (ref 5–15)
AST SERPL-CCNC: 129 U/L (ref 1–32)
BILIRUB SERPL-MCNC: 1.9 MG/DL (ref 0–1.2)
BUN SERPL-MCNC: 7 MG/DL (ref 8–23)
BUN/CREAT SERPL: 11.5 (ref 7–25)
CALCIUM SPEC-SCNC: 8.2 MG/DL (ref 8.6–10.5)
CHLORIDE SERPL-SCNC: 102 MMOL/L (ref 98–107)
CO2 SERPL-SCNC: 25 MMOL/L (ref 22–29)
CREAT SERPL-MCNC: 0.61 MG/DL (ref 0.57–1)
DEPRECATED RDW RBC AUTO: 62.2 FL (ref 37–54)
EGFRCR SERPLBLD CKD-EPI 2021: 102.5 ML/MIN/1.73
ERYTHROCYTE [DISTWIDTH] IN BLOOD BY AUTOMATED COUNT: 13.8 % (ref 12.3–15.4)
GLOBULIN UR ELPH-MCNC: 3 GM/DL
GLUCOSE SERPL-MCNC: 95 MG/DL (ref 65–99)
HCT VFR BLD AUTO: 27 % (ref 34–46.6)
HGB BLD-MCNC: 9.8 G/DL (ref 12–15.9)
INR PPP: 1.13 (ref 0.84–1.13)
MCH RBC QN AUTO: 44.5 PG (ref 26.6–33)
MCHC RBC AUTO-ENTMCNC: 36.3 G/DL (ref 31.5–35.7)
MCV RBC AUTO: 122.7 FL (ref 79–97)
PLATELET # BLD AUTO: 138 10*3/MM3 (ref 140–450)
PMV BLD AUTO: 9.3 FL (ref 6–12)
POTASSIUM SERPL-SCNC: 4.1 MMOL/L (ref 3.5–5.2)
PROT SERPL-MCNC: 5.4 G/DL (ref 6–8.5)
PROTHROMBIN TIME: 14.4 SECONDS (ref 11.4–14.4)
RBC # BLD AUTO: 2.2 10*6/MM3 (ref 3.77–5.28)
SODIUM SERPL-SCNC: 136 MMOL/L (ref 136–145)
WBC NRBC COR # BLD: 6.45 10*3/MM3 (ref 3.4–10.8)

## 2022-09-28 PROCEDURE — 97530 THERAPEUTIC ACTIVITIES: CPT

## 2022-09-28 PROCEDURE — 85610 PROTHROMBIN TIME: CPT | Performed by: INTERNAL MEDICINE

## 2022-09-28 PROCEDURE — 97116 GAIT TRAINING THERAPY: CPT | Performed by: PHYSICAL THERAPIST

## 2022-09-28 PROCEDURE — 97535 SELF CARE MNGMENT TRAINING: CPT

## 2022-09-28 PROCEDURE — 80053 COMPREHEN METABOLIC PANEL: CPT | Performed by: INTERNAL MEDICINE

## 2022-09-28 PROCEDURE — 99232 SBSQ HOSP IP/OBS MODERATE 35: CPT | Performed by: INTERNAL MEDICINE

## 2022-09-28 PROCEDURE — 97110 THERAPEUTIC EXERCISES: CPT | Performed by: PHYSICAL THERAPIST

## 2022-09-28 PROCEDURE — 85027 COMPLETE CBC AUTOMATED: CPT | Performed by: INTERNAL MEDICINE

## 2022-09-28 PROCEDURE — 25010000002 CYANOCOBALAMIN PER 1000 MCG: Performed by: INTERNAL MEDICINE

## 2022-09-28 PROCEDURE — 73130 X-RAY EXAM OF HAND: CPT

## 2022-09-28 RX ORDER — HYDROXYZINE HYDROCHLORIDE 25 MG/1
25 TABLET, FILM COATED ORAL 3 TIMES DAILY PRN
Status: DISCONTINUED | OUTPATIENT
Start: 2022-09-28 | End: 2022-10-05 | Stop reason: HOSPADM

## 2022-09-28 RX ORDER — ECHINACEA PURPUREA EXTRACT 125 MG
1 TABLET ORAL AS NEEDED
Status: DISCONTINUED | OUTPATIENT
Start: 2022-09-28 | End: 2022-10-05 | Stop reason: HOSPADM

## 2022-09-28 RX ORDER — HYDROCODONE BITARTRATE AND ACETAMINOPHEN 5; 325 MG/1; MG/1
1 TABLET ORAL EVERY 6 HOURS PRN
Status: DISCONTINUED | OUTPATIENT
Start: 2022-09-28 | End: 2022-10-05 | Stop reason: HOSPADM

## 2022-09-28 RX ADMIN — DIAZEPAM 8 MG: 2 TABLET ORAL at 16:05

## 2022-09-28 RX ADMIN — SALINE NASAL SPRAY 1 SPRAY: 1.5 SOLUTION NASAL at 04:04

## 2022-09-28 RX ADMIN — TRAZODONE HYDROCHLORIDE 50 MG: 50 TABLET ORAL at 21:07

## 2022-09-28 RX ADMIN — DICLOFENAC 4 G: 10 GEL TOPICAL at 08:30

## 2022-09-28 RX ADMIN — Medication 10 ML: at 21:06

## 2022-09-28 RX ADMIN — LATANOPROST 1 DROP: 50 SOLUTION OPHTHALMIC at 21:07

## 2022-09-28 RX ADMIN — FLUOXETINE HYDROCHLORIDE 20 MG: 20 CAPSULE ORAL at 08:29

## 2022-09-28 RX ADMIN — PANTOPRAZOLE SODIUM 40 MG: 40 INJECTION, POWDER, FOR SOLUTION INTRAVENOUS at 08:30

## 2022-09-28 RX ADMIN — CYANOCOBALAMIN 1000 MCG: 1000 INJECTION, SOLUTION INTRAMUSCULAR; SUBCUTANEOUS at 08:30

## 2022-09-28 RX ADMIN — HYDROCODONE BITARTRATE AND ACETAMINOPHEN 1 TABLET: 5; 325 TABLET ORAL at 16:56

## 2022-09-28 RX ADMIN — DICLOFENAC 4 G: 10 GEL TOPICAL at 21:07

## 2022-09-28 RX ADMIN — FOLIC ACID 1 MG: 5 INJECTION, SOLUTION INTRAMUSCULAR; INTRAVENOUS; SUBCUTANEOUS at 08:30

## 2022-09-28 RX ADMIN — KETOROLAC TROMETHAMINE 10 MG: 10 TABLET, FILM COATED ORAL at 09:40

## 2022-09-28 RX ADMIN — Medication 10 ML: at 08:30

## 2022-09-28 RX ADMIN — DIAZEPAM 8 MG: 2 TABLET ORAL at 08:29

## 2022-09-28 RX ADMIN — PANTOPRAZOLE SODIUM 40 MG: 40 INJECTION, POWDER, FOR SOLUTION INTRAVENOUS at 21:06

## 2022-09-29 LAB
ALBUMIN SERPL-MCNC: 3 G/DL (ref 3.5–5.2)
ALBUMIN/GLOB SERPL: 0.9 G/DL
ALP SERPL-CCNC: 146 U/L (ref 39–117)
ALT SERPL W P-5'-P-CCNC: 39 U/L (ref 1–33)
ANION GAP SERPL CALCULATED.3IONS-SCNC: 7 MMOL/L (ref 5–15)
AST SERPL-CCNC: 139 U/L (ref 1–32)
BILIRUB SERPL-MCNC: 1.5 MG/DL (ref 0–1.2)
BUN SERPL-MCNC: 7 MG/DL (ref 8–23)
BUN/CREAT SERPL: 12.3 (ref 7–25)
CA-I SERPL ISE-MCNC: 1.27 MMOL/L (ref 1.12–1.32)
CALCIUM SPEC-SCNC: 8.8 MG/DL (ref 8.6–10.5)
CHLORIDE SERPL-SCNC: 103 MMOL/L (ref 98–107)
CO2 SERPL-SCNC: 27 MMOL/L (ref 22–29)
CREAT SERPL-MCNC: 0.57 MG/DL (ref 0.57–1)
DEPRECATED RDW RBC AUTO: 63.2 FL (ref 37–54)
EGFRCR SERPLBLD CKD-EPI 2021: 104.2 ML/MIN/1.73
ERYTHROCYTE [DISTWIDTH] IN BLOOD BY AUTOMATED COUNT: 14 % (ref 12.3–15.4)
GLOBULIN UR ELPH-MCNC: 3.2 GM/DL
GLUCOSE SERPL-MCNC: 95 MG/DL (ref 65–99)
HCT VFR BLD AUTO: 29.1 % (ref 34–46.6)
HGB BLD-MCNC: 10.5 G/DL (ref 12–15.9)
MAGNESIUM SERPL-MCNC: 2 MG/DL (ref 1.6–2.4)
MCH RBC QN AUTO: 44.1 PG (ref 26.6–33)
MCHC RBC AUTO-ENTMCNC: 36.1 G/DL (ref 31.5–35.7)
MCV RBC AUTO: 122.3 FL (ref 79–97)
PHOSPHATE SERPL-MCNC: 3.2 MG/DL (ref 2.5–4.5)
PLATELET # BLD AUTO: 154 10*3/MM3 (ref 140–450)
PMV BLD AUTO: 9.5 FL (ref 6–12)
POTASSIUM SERPL-SCNC: 4 MMOL/L (ref 3.5–5.2)
PROT SERPL-MCNC: 6.2 G/DL (ref 6–8.5)
RBC # BLD AUTO: 2.38 10*6/MM3 (ref 3.77–5.28)
SODIUM SERPL-SCNC: 137 MMOL/L (ref 136–145)
WBC NRBC COR # BLD: 6.92 10*3/MM3 (ref 3.4–10.8)

## 2022-09-29 PROCEDURE — 97116 GAIT TRAINING THERAPY: CPT

## 2022-09-29 PROCEDURE — 97535 SELF CARE MNGMENT TRAINING: CPT

## 2022-09-29 PROCEDURE — 83735 ASSAY OF MAGNESIUM: CPT | Performed by: INTERNAL MEDICINE

## 2022-09-29 PROCEDURE — 25010000002 CYANOCOBALAMIN PER 1000 MCG: Performed by: INTERNAL MEDICINE

## 2022-09-29 PROCEDURE — 97110 THERAPEUTIC EXERCISES: CPT

## 2022-09-29 PROCEDURE — 84100 ASSAY OF PHOSPHORUS: CPT | Performed by: INTERNAL MEDICINE

## 2022-09-29 PROCEDURE — 82330 ASSAY OF CALCIUM: CPT | Performed by: INTERNAL MEDICINE

## 2022-09-29 PROCEDURE — 99232 SBSQ HOSP IP/OBS MODERATE 35: CPT | Performed by: INTERNAL MEDICINE

## 2022-09-29 PROCEDURE — 85027 COMPLETE CBC AUTOMATED: CPT | Performed by: INTERNAL MEDICINE

## 2022-09-29 PROCEDURE — 80053 COMPREHEN METABOLIC PANEL: CPT | Performed by: INTERNAL MEDICINE

## 2022-09-29 RX ORDER — OXYMETAZOLINE HYDROCHLORIDE 0.05 G/100ML
2 SPRAY NASAL 2 TIMES DAILY
Qty: 2 ML | Refills: 0
Start: 2022-09-29 | End: 2022-10-02

## 2022-09-29 RX ORDER — POLYETHYLENE GLYCOL 3350 17 G/17G
17 POWDER, FOR SOLUTION ORAL DAILY
Status: ON HOLD
Start: 2022-09-29 | End: 2022-12-01

## 2022-09-29 RX ORDER — CYANOCOBALAMIN 1000 UG/ML
1000 INJECTION, SOLUTION INTRAMUSCULAR; SUBCUTANEOUS DAILY
Qty: 4 ML | Refills: 0 | Status: ON HOLD | OUTPATIENT
Start: 2022-09-30 | End: 2022-12-01

## 2022-09-29 RX ORDER — DIAZEPAM 2 MG/1
TABLET ORAL
Qty: 42 TABLET | Refills: 0 | Status: SHIPPED | OUTPATIENT
Start: 2022-09-29 | End: 2022-10-04 | Stop reason: HOSPADM

## 2022-09-29 RX ORDER — OXYMETAZOLINE HYDROCHLORIDE 0.05 G/100ML
2 SPRAY NASAL 2 TIMES DAILY
Status: DISPENSED | OUTPATIENT
Start: 2022-09-29 | End: 2022-10-02

## 2022-09-29 RX ORDER — FOLIC ACID 1 MG/1
1 TABLET ORAL DAILY
Qty: 30 TABLET | Refills: 0 | Status: SHIPPED | OUTPATIENT
Start: 2022-10-01

## 2022-09-29 RX ORDER — POLYETHYLENE GLYCOL 3350 17 G/17G
17 POWDER, FOR SOLUTION ORAL DAILY
Status: DISCONTINUED | OUTPATIENT
Start: 2022-09-29 | End: 2022-10-05 | Stop reason: HOSPADM

## 2022-09-29 RX ADMIN — DIAZEPAM 8 MG: 2 TABLET ORAL at 08:31

## 2022-09-29 RX ADMIN — TRAZODONE HYDROCHLORIDE 50 MG: 50 TABLET ORAL at 20:55

## 2022-09-29 RX ADMIN — CYANOCOBALAMIN 1000 MCG: 1000 INJECTION, SOLUTION INTRAMUSCULAR; SUBCUTANEOUS at 08:32

## 2022-09-29 RX ADMIN — KETOROLAC TROMETHAMINE 10 MG: 10 TABLET, FILM COATED ORAL at 00:24

## 2022-09-29 RX ADMIN — OXYMETAZOLINE HCL 2 SPRAY: 0.05 SPRAY NASAL at 20:58

## 2022-09-29 RX ADMIN — POLYETHYLENE GLYCOL 3350 17 G: 17 POWDER, FOR SOLUTION ORAL at 11:54

## 2022-09-29 RX ADMIN — DICLOFENAC 4 G: 10 GEL TOPICAL at 08:32

## 2022-09-29 RX ADMIN — Medication 10 ML: at 20:56

## 2022-09-29 RX ADMIN — HYDROCODONE BITARTRATE AND ACETAMINOPHEN 1 TABLET: 5; 325 TABLET ORAL at 09:08

## 2022-09-29 RX ADMIN — PANTOPRAZOLE SODIUM 40 MG: 40 TABLET, DELAYED RELEASE ORAL at 08:31

## 2022-09-29 RX ADMIN — DIAZEPAM 8 MG: 2 TABLET ORAL at 00:24

## 2022-09-29 RX ADMIN — LATANOPROST 1 DROP: 50 SOLUTION OPHTHALMIC at 20:56

## 2022-09-29 RX ADMIN — DIAZEPAM 8 MG: 2 TABLET ORAL at 16:42

## 2022-09-29 RX ADMIN — FOLIC ACID 1 MG: 5 INJECTION, SOLUTION INTRAMUSCULAR; INTRAVENOUS; SUBCUTANEOUS at 08:32

## 2022-09-29 RX ADMIN — FLUOXETINE HYDROCHLORIDE 20 MG: 20 CAPSULE ORAL at 08:33

## 2022-09-29 RX ADMIN — Medication 10 ML: at 08:31

## 2022-09-30 ENCOUNTER — HOME HEALTH ADMISSION (OUTPATIENT)
Dept: HOME HEALTH SERVICES | Facility: HOME HEALTHCARE | Age: 62
End: 2022-09-30

## 2022-09-30 PROCEDURE — 25010000002 CYANOCOBALAMIN PER 1000 MCG: Performed by: INTERNAL MEDICINE

## 2022-09-30 PROCEDURE — 99232 SBSQ HOSP IP/OBS MODERATE 35: CPT | Performed by: INTERNAL MEDICINE

## 2022-09-30 RX ADMIN — DIAZEPAM 8 MG: 2 TABLET ORAL at 00:44

## 2022-09-30 RX ADMIN — LATANOPROST 1 DROP: 50 SOLUTION OPHTHALMIC at 20:02

## 2022-09-30 RX ADMIN — DICLOFENAC 4 G: 10 GEL TOPICAL at 11:37

## 2022-09-30 RX ADMIN — DIAZEPAM 8 MG: 2 TABLET ORAL at 11:37

## 2022-09-30 RX ADMIN — FOLIC ACID 1 MG: 5 INJECTION, SOLUTION INTRAMUSCULAR; INTRAVENOUS; SUBCUTANEOUS at 08:39

## 2022-09-30 RX ADMIN — HYDROCODONE BITARTRATE AND ACETAMINOPHEN 1 TABLET: 5; 325 TABLET ORAL at 12:00

## 2022-09-30 RX ADMIN — Medication 10 ML: at 20:03

## 2022-09-30 RX ADMIN — DICLOFENAC 4 G: 10 GEL TOPICAL at 20:02

## 2022-09-30 RX ADMIN — HYDROCODONE BITARTRATE AND ACETAMINOPHEN 1 TABLET: 5; 325 TABLET ORAL at 17:28

## 2022-09-30 RX ADMIN — Medication 10 ML: at 08:39

## 2022-09-30 RX ADMIN — TRAZODONE HYDROCHLORIDE 50 MG: 50 TABLET ORAL at 20:02

## 2022-09-30 RX ADMIN — HYDROXYZINE HYDROCHLORIDE 25 MG: 25 TABLET, FILM COATED ORAL at 17:28

## 2022-09-30 RX ADMIN — DICLOFENAC 4 G: 10 GEL TOPICAL at 08:40

## 2022-09-30 RX ADMIN — PANTOPRAZOLE SODIUM 40 MG: 40 TABLET, DELAYED RELEASE ORAL at 05:22

## 2022-09-30 RX ADMIN — DICLOFENAC 4 G: 10 GEL TOPICAL at 17:21

## 2022-09-30 RX ADMIN — CYANOCOBALAMIN 1000 MCG: 1000 INJECTION, SOLUTION INTRAMUSCULAR; SUBCUTANEOUS at 08:39

## 2022-09-30 RX ADMIN — POLYETHYLENE GLYCOL 3350 17 G: 17 POWDER, FOR SOLUTION ORAL at 08:38

## 2022-09-30 RX ADMIN — OXYMETAZOLINE HCL 2 SPRAY: 0.05 SPRAY NASAL at 08:38

## 2022-09-30 RX ADMIN — OXYMETAZOLINE HCL 2 SPRAY: 0.05 SPRAY NASAL at 20:02

## 2022-09-30 RX ADMIN — FLUOXETINE HYDROCHLORIDE 20 MG: 20 CAPSULE ORAL at 08:38

## 2022-10-01 ENCOUNTER — APPOINTMENT (OUTPATIENT)
Dept: GENERAL RADIOLOGY | Facility: HOSPITAL | Age: 62
End: 2022-10-01

## 2022-10-01 PROCEDURE — 99232 SBSQ HOSP IP/OBS MODERATE 35: CPT | Performed by: INTERNAL MEDICINE

## 2022-10-01 PROCEDURE — 25010000002 CYANOCOBALAMIN PER 1000 MCG: Performed by: INTERNAL MEDICINE

## 2022-10-01 PROCEDURE — 71045 X-RAY EXAM CHEST 1 VIEW: CPT

## 2022-10-01 RX ADMIN — DIAZEPAM 8 MG: 2 TABLET ORAL at 01:21

## 2022-10-01 RX ADMIN — PANTOPRAZOLE SODIUM 40 MG: 40 TABLET, DELAYED RELEASE ORAL at 05:43

## 2022-10-01 RX ADMIN — OXYMETAZOLINE HCL 2 SPRAY: 0.05 SPRAY NASAL at 20:52

## 2022-10-01 RX ADMIN — DICLOFENAC 4 G: 10 GEL TOPICAL at 11:47

## 2022-10-01 RX ADMIN — TRAZODONE HYDROCHLORIDE 50 MG: 50 TABLET ORAL at 20:49

## 2022-10-01 RX ADMIN — FLUOXETINE HYDROCHLORIDE 20 MG: 20 CAPSULE ORAL at 09:44

## 2022-10-01 RX ADMIN — HYDROCODONE BITARTRATE AND ACETAMINOPHEN 1 TABLET: 5; 325 TABLET ORAL at 20:49

## 2022-10-01 RX ADMIN — HYDROCODONE BITARTRATE AND ACETAMINOPHEN 1 TABLET: 5; 325 TABLET ORAL at 09:44

## 2022-10-01 RX ADMIN — Medication 10 ML: at 09:44

## 2022-10-01 RX ADMIN — DICLOFENAC 4 G: 10 GEL TOPICAL at 20:49

## 2022-10-01 RX ADMIN — DICLOFENAC 4 G: 10 GEL TOPICAL at 17:51

## 2022-10-01 RX ADMIN — FOLIC ACID 1 MG: 1 TABLET ORAL at 09:44

## 2022-10-01 RX ADMIN — OXYMETAZOLINE HCL 2 SPRAY: 0.05 SPRAY NASAL at 09:44

## 2022-10-01 RX ADMIN — LATANOPROST 1 DROP: 50 SOLUTION OPHTHALMIC at 20:49

## 2022-10-01 RX ADMIN — DIAZEPAM 8 MG: 2 TABLET ORAL at 11:46

## 2022-10-01 RX ADMIN — DICLOFENAC 4 G: 10 GEL TOPICAL at 09:44

## 2022-10-01 RX ADMIN — Medication 10 ML: at 20:55

## 2022-10-01 RX ADMIN — CYANOCOBALAMIN 1000 MCG: 1000 INJECTION, SOLUTION INTRAMUSCULAR; SUBCUTANEOUS at 09:43

## 2022-10-01 RX ADMIN — POLYETHYLENE GLYCOL 3350 17 G: 17 POWDER, FOR SOLUTION ORAL at 09:44

## 2022-10-01 NOTE — PROGRESS NOTES
[facility] Medicine Services  PROGRESS NOTE    Patient Name: Philomena Davis  : 1960  MRN: [id number]    Date of Admission: 2022  Primary Care Physician: No primary care provider on file.    Subjective   Subjective     CC:  etoh WD    HPI:  Feels ok today without complaints.  Feels ready to go to [facility] though a little nervous about switching facilities.    ROS:  Gen- No fevers, chills  CV- No chest pain, palpitations  Resp- No cough, dyspnea  GI- No N/V/D, abd pain        Objective   Objective     Vital Signs:   Temp:  [98 °F (36.7 °C)-99 °F (37.2 °C)] 99 °F (37.2 °C)  Heart Rate:  [] 92  Resp:  [18] 18  BP: (130-145)/(83-89) 130/88  Flow (L/min):  [2] 2     Physical Exam:  Constitutional: No acute distress, awake, alert, sitting up in bed  HENT: NCAT, mucous membranes moist  Respiratory: Clear to auscultation bilaterally, respiratory effort normal   Cardiovascular: RRR, no murmurs, rubs, or gallops  Gastrointestinal: Positive bowel sounds, soft, nontender, nondistended  Musculoskeletal: trace if any swelling of distal RUE, normal rom of hand  Psychiatric: Appropriate affect, cooperative  Neurologic: Oriented x 3, strength symmetric in all extremities, Cranial Nerves grossly intact to confrontation, speech clear  Skin: No rashes      Results Reviewed:  LAB RESULTS:      Lab 22  0422 22  0320 22  0400 22  0359 22  0604   WBC 6.92 6.45 6.34  --  10.73   HEMOGLOBIN 10.5* 9.8* 10.7*  --  13.1   HEMATOCRIT 29.1* 27.0* 30.0*  --  36.0   PLATELETS 154 138* 148  --  243   NEUTROS ABS  --   --  4.48  --  8.36*   IMMATURE GRANS (ABS)  --   --  0.04  --  0.05   LYMPHS ABS  --   --  1.22  --  1.41   MONOS ABS  --   --  0.54  --  0.83   EOS ABS  --   --  0.04  --  0.04   .3* 122.7* 124.0*  --  120.4*   LACTATE  --   --   --  1.6 4.5*   PROTIME  --  14.4  --   --  13.4         Lab 22  0422 22  0320 22  0400 22  0604   SODIUM  137 136 136 130*   POTASSIUM 4.0 4.1 3.7 4.1   CHLORIDE 103 102 102 90*   CO2 27.0 25.0 27.0 24.0   ANION GAP 7.0 9.0 7.0 16.0*   BUN 7* 7* 6* 3*   CREATININE 0.57 0.61 0.64 0.53*   EGFR 104.2 102.5 101.3 106.0   GLUCOSE 95 95 92 106*   CALCIUM 8.8 8.2* 8.3* 9.4   IONIZED CALCIUM 1.27  --   --   --    MAGNESIUM 2.0  --   --   --    PHOSPHORUS 3.2  --   --   --          Lab 09/29/22  0422 09/28/22  0320 09/27/22  0400 09/26/22  0604   TOTAL PROTEIN 6.2 5.4* 5.7* 7.5   ALBUMIN 3.00* 2.40* 2.80* 3.60   GLOBULIN 3.2 3.0 2.9 3.9   ALT (SGPT) 39* 32 30 44*   AST (SGOT) 139* 129* 84* 130*   BILIRUBIN 1.5* 1.9* 2.1* 2.1*   ALK PHOS 146* 140* 123* 165*   LIPASE  --   --   --  33         Lab 09/28/22  0320 09/26/22  0604   PROBNP  --  101.9   TROPONIN T  --  <0.010   PROTIME 14.4 13.4   INR 1.13 1.03             Lab 09/26/22  0810   FOLATE <2.00*   VITAMIN B 12 302         Brief Urine Lab Results     None          Microbiology Results Abnormal     None          No radiology results from the last 24 hrs        I have reviewed the medications:  Scheduled Meds:cyanocobalamin, 1,000 mcg, Intramuscular, Daily   Followed by  [START ON 10/10/2022] cyanocobalamin, 1,000 mcg, Intramuscular, Weekly   Followed by  [START ON 11/7/2022] cyanocobalamin, 1,000 mcg, Intramuscular, Q30 Days  [START ON 10/2/2022] diazePAM, 5 mg, Oral, Q12H   Followed by  [START ON 10/4/2022] diazePAM, 5 mg, Oral, Daily  Diclofenac Sodium, 4 g, Topical, 4x Daily  FLUoxetine, 20 mg, Oral, Daily  folic acid, 1 mg, Oral, Daily  latanoprost, 1 drop, Both Eyes, Nightly  oxymetazoline, 2 spray, Each Nare, BID  pantoprazole, 40 mg, Oral, Q AM  polyethylene glycol, 17 g, Oral, Daily  sodium chloride, 10 mL, Intravenous, Q12H  traZODone, 50 mg, Oral, Nightly      Continuous Infusions:   PRN Meds:.•  acetaminophen **OR** acetaminophen **OR** acetaminophen  •  HYDROcodone-acetaminophen  •  hydrOXYzine  •  ketorolac  •  LORazepam **OR** midazolam **OR** LORazepam **OR**  midazolam **OR** midazolam  •  ondansetron **OR** ondansetron  •  sodium chloride  •  sodium chloride  •  sodium chloride    Assessment & Plan   Assessment & Plan     Active Hospital Problems    Diagnosis  POA   • **Alcohol withdrawal syndrome with perceptual disturbance (HCC) [F10.932]  Yes   • Elevated LFTs [R79.89]  Yes   • Hyperlipidemia [E78.5]  Yes   • Essential hypertension [I10]  Yes   • Generalized anxiety disorder [F41.1]  Yes      Resolved Hospital Problems   No resolved problems to display.        Brief Hospital Course to date:  Philomena Davis is a 62 y.o. female with chronic heavy alcohol abuse who presents with multiple complaints, predominantly abdominal pain and whole body swelling     Assessment/plan     EtOH abuse with dependence  Early alcohol withdrawal  Abnormal LFTs/hyperbilirubinemia  Cirrhosis  - lactate potentially from volume depletion, but could be poor hepatic clearance, given her complaints of swelling will change fluid bolus to continuous  - Reports drinking from when she wakes up until she goes to bed  - INR mildly elevated  - US liver shows suggestive of clinically significant portal HTN, hepatic cirrhosis, echogenic sludge in gallbladder  -Scheduled Valium, taper ordered, CIWA protocol w/ additional PRN's  -PO protonix  --addiction team following.   Patient declines rehab, AA meetings.     Respiratory failure  --sats 80-86% on RA.   --no acute on CXR 9/26.  Repeat CXR today.  Add nebs.  Likely will need home O2      Acute debility  -Reports inability to ambulate safely due to swelling and generalized weakness  -PT/OT     Macrocytosis  - Previous folate in 2019 undetectable; folate low with low normal B12, will replace both  -s/p folate IV x day 5/5 days, transition to p.o.    Painful Hands  --xrays showed mild OA of DIP joints and moderate OA of first carpometacarpal joint  --Continue PRN toradol    Anxiety  --continue vistaril prn     Hypertension  Hyperlipidemia  -Holding  lisinopril 2/2 potential BP changes with regular scheduled benzodiazepines  -PRN clonidine for SBP >180mmHg    Expected Discharge Location and Transportation: [facility] pending insurance approval  Expected Discharge Date: 10/2/22    DVT prophylaxis:  Mechanical DVT prophylaxis orders are present.     AM-PAC 6 Clicks Score (PT): 19 (10/01/22 0800)    CODE STATUS:   Code Status and Medical Interventions:   Ordered at: 09/26/22 0746     Code Status (Patient has no pulse and is not breathing):    CPR (Attempt to Resuscitate)     Medical Interventions (Patient has pulse or is breathing):    Full Support       Daniel Al MD  10/01/22

## 2022-10-01 NOTE — PLAN OF CARE
Goal Outcome Evaluation:         Waiting on insurance approval to go to .  Pt up to bathroom frequently.  She appears unstable.  BSC used to keep pt safe from falls.  Will continue to monitor.

## 2022-10-02 LAB — URATE SERPL-MCNC: 5.3 MG/DL (ref 2.4–5.7)

## 2022-10-02 PROCEDURE — 25010000002 CYANOCOBALAMIN PER 1000 MCG: Performed by: INTERNAL MEDICINE

## 2022-10-02 PROCEDURE — 84550 ASSAY OF BLOOD/URIC ACID: CPT | Performed by: HOSPITALIST

## 2022-10-02 PROCEDURE — 99232 SBSQ HOSP IP/OBS MODERATE 35: CPT | Performed by: HOSPITALIST

## 2022-10-02 RX ADMIN — Medication 10 ML: at 09:00

## 2022-10-02 RX ADMIN — POLYETHYLENE GLYCOL 3350 17 G: 17 POWDER, FOR SOLUTION ORAL at 09:00

## 2022-10-02 RX ADMIN — Medication 10 ML: at 20:51

## 2022-10-02 RX ADMIN — PANTOPRAZOLE SODIUM 40 MG: 40 TABLET, DELAYED RELEASE ORAL at 05:14

## 2022-10-02 RX ADMIN — FOLIC ACID 1 MG: 1 TABLET ORAL at 09:00

## 2022-10-02 RX ADMIN — CYANOCOBALAMIN 1000 MCG: 1000 INJECTION, SOLUTION INTRAMUSCULAR; SUBCUTANEOUS at 09:00

## 2022-10-02 RX ADMIN — HYDROCODONE BITARTRATE AND ACETAMINOPHEN 1 TABLET: 5; 325 TABLET ORAL at 12:47

## 2022-10-02 RX ADMIN — DICLOFENAC 4 G: 10 GEL TOPICAL at 17:23

## 2022-10-02 RX ADMIN — FLUOXETINE HYDROCHLORIDE 20 MG: 20 CAPSULE ORAL at 09:00

## 2022-10-02 RX ADMIN — DIAZEPAM 5 MG: 5 TABLET ORAL at 23:32

## 2022-10-02 RX ADMIN — LATANOPROST 1 DROP: 50 SOLUTION OPHTHALMIC at 20:51

## 2022-10-02 RX ADMIN — TRAZODONE HYDROCHLORIDE 50 MG: 50 TABLET ORAL at 20:51

## 2022-10-02 RX ADMIN — HYDROXYZINE HYDROCHLORIDE 25 MG: 25 TABLET, FILM COATED ORAL at 20:51

## 2022-10-02 RX ADMIN — SALINE NASAL SPRAY 1 SPRAY: 1.5 SOLUTION NASAL at 09:04

## 2022-10-02 RX ADMIN — DICLOFENAC 4 G: 10 GEL TOPICAL at 09:00

## 2022-10-02 RX ADMIN — DICLOFENAC 4 G: 10 GEL TOPICAL at 12:43

## 2022-10-02 RX ADMIN — HYDROCODONE BITARTRATE AND ACETAMINOPHEN 1 TABLET: 5; 325 TABLET ORAL at 05:15

## 2022-10-02 RX ADMIN — DICLOFENAC 4 G: 10 GEL TOPICAL at 20:51

## 2022-10-02 NOTE — PROGRESS NOTES
[facility] Medicine Services  PROGRESS NOTE    Patient Name: Philomena Davis  : 1960  MRN: [id number]    Date of Admission: 2022  Primary Care Physician: No primary care provider on file.    Subjective   Subjective     CC:  ETOH use    HPI:  Notes joint pain in R hand/elbow, with stiffness. No f/c. Good appetite/thirst. No n/v. No dyspnea.    Review of Systems   Constitutional: Positive for activity change and fatigue.   HENT: Negative.    Respiratory: Negative.    Cardiovascular: Negative.    Gastrointestinal: Negative.    Genitourinary: Negative.    Musculoskeletal: Positive for arthralgias and myalgias.   Neurological: Negative.    Psychiatric/Behavioral: Negative.        Objective   Objective     Vital Signs:   Temp:  [97.5 °F (36.4 °C)-98.3 °F (36.8 °C)] 97.5 °F (36.4 °C)  Heart Rate:  [100-108] 108  Resp:  [18-20] 20  BP: (136)/(78) 136/78  Flow (L/min):  [2] 2     Physical Exam:  NAD, alert and oriented  OP clear, MMM  Neck supple  No LAD  Tachy  CTAB  +BS, ND, NT, soft  MASSEY  R hand stiffness, without obvious effusions of hand/wrist/elbow, TTP elbow  No rashes  Normal affect    Results Reviewed:  LAB RESULTS:      Lab 22  0422 22  0320 22  0400 22  0359 22  0604   WBC 6.92 6.45 6.34  --  10.73   HEMOGLOBIN 10.5* 9.8* 10.7*  --  13.1   HEMATOCRIT 29.1* 27.0* 30.0*  --  36.0   PLATELETS 154 138* 148  --  243   NEUTROS ABS  --   --  4.48  --  8.36*   IMMATURE GRANS (ABS)  --   --  0.04  --  0.05   LYMPHS ABS  --   --  1.22  --  1.41   MONOS ABS  --   --  0.54  --  0.83   EOS ABS  --   --  0.04  --  0.04   .3* 122.7* 124.0*  --  120.4*   LACTATE  --   --   --  1.6 4.5*   PROTIME  --  14.4  --   --  13.4         Lab 22  0422 22  0320 22  0400 22  0604   SODIUM 137 136 136 130*   POTASSIUM 4.0 4.1 3.7 4.1   CHLORIDE 103 102 102 90*   CO2 27.0 25.0 27.0 24.0   ANION GAP 7.0 9.0 7.0 16.0*   BUN 7* 7* 6* 3*    CREATININE 0.57 0.61 0.64 0.53*   EGFR 104.2 102.5 101.3 106.0   GLUCOSE 95 95 92 106*   CALCIUM 8.8 8.2* 8.3* 9.4   IONIZED CALCIUM 1.27  --   --   --    MAGNESIUM 2.0  --   --   --    PHOSPHORUS 3.2  --   --   --          Lab 09/29/22  0422 09/28/22  0320 09/27/22  0400 09/26/22  0604   TOTAL PROTEIN 6.2 5.4* 5.7* 7.5   ALBUMIN 3.00* 2.40* 2.80* 3.60   GLOBULIN 3.2 3.0 2.9 3.9   ALT (SGPT) 39* 32 30 44*   AST (SGOT) 139* 129* 84* 130*   BILIRUBIN 1.5* 1.9* 2.1* 2.1*   ALK PHOS 146* 140* 123* 165*   LIPASE  --   --   --  33         Lab 09/28/22  0320 09/26/22  0604   PROBNP  --  101.9   TROPONIN T  --  <0.010   PROTIME 14.4 13.4   INR 1.13 1.03             Lab 09/26/22  0810   FOLATE <2.00*   VITAMIN B 12 302         Brief Urine Lab Results     None          Microbiology Results Abnormal     None          XR Chest 1 View    Result Date: 10/1/2022  XR CHEST 1 VW-  Date of Exam: 10/1/2022 1:28 PM  Indication: following hypoxia; F10.930-Alcohol use, unspecified with withdrawal, uncomplicated; K29.20-Alcoholic gastritis without bleeding; E87.20-Acidosis, unspecified; E86.9-Volume depletion, unspecified; F10.932-Alcohol use, unspecified with withdrawal with perceptual disturbance; I10-Essential (primary) hypertension.  Comparison:?09/26/2022  Technique:?A single view of the chest was obtained.  FINDINGS:  ?Heart size and pulmonary vessels are within normal limits.  There is a new small right pleural effusion.  There is right basilar atelectasis. Left lung appears clear.  No evidence pneumothorax.        Impression:  New small right pleural effusion with minimal right basilar atelectasis.   This report was finalized on 10/1/2022 2:50 PM by Noel Baker MD.            I have reviewed the medications:  Scheduled Meds:cyanocobalamin, 1,000 mcg, Intramuscular, Daily   Followed by  [START ON 10/10/2022] cyanocobalamin, 1,000 mcg, Intramuscular, Weekly   Followed by  [START ON 11/7/2022] cyanocobalamin, 1,000 mcg,  Intramuscular, Q30 Days  diazePAM, 5 mg, Oral, Q12H   Followed by  [START ON 10/4/2022] diazePAM, 5 mg, Oral, Daily  Diclofenac Sodium, 4 g, Topical, 4x Daily  FLUoxetine, 20 mg, Oral, Daily  folic acid, 1 mg, Oral, Daily  latanoprost, 1 drop, Both Eyes, Nightly  pantoprazole, 40 mg, Oral, Q AM  polyethylene glycol, 17 g, Oral, Daily  sodium chloride, 10 mL, Intravenous, Q12H  traZODone, 50 mg, Oral, Nightly      Continuous Infusions:   PRN Meds:.•  acetaminophen **OR** acetaminophen **OR** acetaminophen  •  HYDROcodone-acetaminophen  •  hydrOXYzine  •  ketorolac  •  LORazepam **OR** midazolam **OR** LORazepam **OR** midazolam **OR** midazolam  •  ondansetron **OR** ondansetron  •  sodium chloride  •  sodium chloride  •  sodium chloride    Assessment & Plan   Assessment & Plan     Active Hospital Problems    Diagnosis  POA   • **Alcohol withdrawal syndrome with perceptual disturbance (HCC) [F10.932]  Yes   • Elevated LFTs [R79.89]  Yes   • Hyperlipidemia [E78.5]  Yes   • Essential hypertension [I10]  Yes   • Generalized anxiety disorder [F41.1]  Yes      Resolved Hospital Problems   No resolved problems to display.        Brief Hospital Course to date:  Philomena Davis is a 62 y.o. female with chronic heavy alcohol abuse who presents with multiple complaints, predominantly abdominal pain and whole body swelling     Assessment/plan     ETOH abuse with dependence  Early alcohol withdrawal  Abnormal LFTs/hyperbilirubinemia  Cirrhosis  -US c/w portal HTN, cirrhosis  -PO protonix  -addiction team following    Respiratory failure  -nebs prn, pulmonary toilet, wean oxygen as tolerated     Acute debility  -Reports inability to ambulate safely due to swelling and generalized weakness  -PT/OT     Macrocytosis  -Previous folate in 2019 undetectable; folate low with low normal B12, will replace both  -s/p folate IV x day 5/5 days, transitioned to p.o.    Painful Hands  --xrays showed mild OA of DIP joints and moderate OA of  first carpometacarpal joint  --Continue PRN toradol  --check uric acid    Anxiety  --continue vistaril prn     Hypertension  Hyperlipidemia  -Holding lisinopril 2/2 potential BP changes with regular scheduled benzodiazepines  -PRN clonidine for SBP >180mmHg    Expected Discharge Location and Transportation: [facility] pending insurance approval  Expected Discharge Date: 10/3/22    DVT prophylaxis:  Mechanical DVT prophylaxis orders are present.     AM-PAC 6 Clicks Score (PT): 20 (10/01/22 2000)    CODE STATUS:   Code Status and Medical Interventions:   Ordered at: 09/26/22 0746     Code Status (Patient has no pulse and is not breathing):    CPR (Attempt to Resuscitate)     Medical Interventions (Patient has pulse or is breathing):    Full Support       Jose J Hutchins MD  10/02/22

## 2022-10-02 NOTE — PLAN OF CARE
Problem: Alcohol Withdrawal  Goal: Alcohol Withdrawal Symptom Control  Outcome: Ongoing, Progressing     Problem: Acute Neurologic Deterioration (Alcohol Withdrawal)  Goal: Optimal Neurologic Function  Outcome: Ongoing, Progressing     Problem: Substance Misuse (Alcohol Withdrawal)  Goal: Readiness for Change Identified  Outcome: Ongoing, Progressing  Intervention: Promote Psychosocial Wellbeing  Recent Flowsheet Documentation  Taken 10/2/2022 1600 by Harley Hernandez RN  Family/Support System Care:   support provided   self-care encouraged  Taken 10/2/2022 1400 by Harley Hernandez RN  Family/Support System Care:   self-care encouraged   support provided  Taken 10/2/2022 1247 by Harley Hernandez RN  Family/Support System Care:   self-care encouraged   support provided  Taken 10/2/2022 1000 by Harley Hernandez RN  Family/Support System Care:   self-care encouraged   support provided  Taken 10/2/2022 0800 by Harley Hernandez RN  Family/Support System Care:   self-care encouraged   support provided     Problem: Adult Inpatient Plan of Care  Goal: Plan of Care Review  Outcome: Ongoing, Progressing  Flowsheets (Taken 10/2/2022 1731)  Progress: improving  Plan of Care Reviewed With: patient  Goal: Patient-Specific Goal (Individualized)  Outcome: Ongoing, Progressing  Goal: Absence of Hospital-Acquired Illness or Injury  Outcome: Ongoing, Progressing  Intervention: Identify and Manage Fall Risk  Recent Flowsheet Documentation  Taken 10/2/2022 1600 by Harley Hernandez RN  Safety Promotion/Fall Prevention:   activity supervised   assistive device/personal items within reach   clutter free environment maintained   toileting scheduled   safety round/check completed   room organization consistent   nonskid shoes/slippers when out of bed   fall prevention program maintained  Taken 10/2/2022 1400 by Harley Hernandez RN  Safety Promotion/Fall Prevention:   activity supervised   clutter free environment maintained    assistive device/personal items within reach   toileting scheduled   safety round/check completed   room organization consistent   nonskid shoes/slippers when out of bed   fall prevention program maintained  Taken 10/2/2022 1247 by Harley Hernandez RN  Safety Promotion/Fall Prevention:   activity supervised   clutter free environment maintained   assistive device/personal items within reach   toileting scheduled   safety round/check completed   room organization consistent   nonskid shoes/slippers when out of bed   fall prevention program maintained  Taken 10/2/2022 1000 by Harley Hernandez RN  Safety Promotion/Fall Prevention:   clutter free environment maintained   assistive device/personal items within reach   activity supervised   safety round/check completed   room organization consistent   toileting scheduled   nonskid shoes/slippers when out of bed   fall prevention program maintained  Taken 10/2/2022 0800 by Harley Hernandez RN  Safety Promotion/Fall Prevention:   activity supervised   assistive device/personal items within reach   clutter free environment maintained   fall prevention program maintained   nonskid shoes/slippers when out of bed   safety round/check completed   toileting scheduled   room organization consistent  Intervention: Prevent Skin Injury  Recent Flowsheet Documentation  Taken 10/2/2022 1600 by Harley Hernandez RN  Body Position:   position changed independently   supine, legs elevated  Skin Protection:   adhesive use limited   incontinence pads utilized   tubing/devices free from skin contact   transparent dressing maintained   skin-to-skin areas padded   skin-to-device areas padded  Taken 10/2/2022 1400 by Harley Hernandez RN  Body Position:   position changed independently   supine, legs elevated  Skin Protection:   adhesive use limited   incontinence pads utilized   tubing/devices free from skin contact   transparent dressing maintained   skin-to-device areas padded  Taken  10/2/2022 1247 by Harley Hernandez RN  Body Position:   position changed independently   supine, legs elevated  Skin Protection:   adhesive use limited   incontinence pads utilized   tubing/devices free from skin contact   transparent dressing maintained   skin-to-skin areas padded   skin-to-device areas padded  Taken 10/2/2022 1000 by Harley Hernandez RN  Body Position:   position changed independently   supine, legs elevated  Skin Protection:   adhesive use limited   tubing/devices free from skin contact   transparent dressing maintained   skin-to-device areas padded   skin-to-skin areas padded  Taken 10/2/2022 0800 by Harley Hernandez RN  Body Position:   position changed independently   supine, legs elevated  Skin Protection:   adhesive use limited   incontinence pads utilized   tubing/devices free from skin contact   transparent dressing maintained   skin-to-skin areas padded   skin-to-device areas padded  Intervention: Prevent and Manage VTE (Venous Thromboembolism) Risk  Recent Flowsheet Documentation  Taken 10/2/2022 1600 by Harley Hernandez RN  Activity Management:   activity adjusted per tolerance   activity encouraged  Taken 10/2/2022 1400 by Harley Hernanedz RN  Activity Management:   activity adjusted per tolerance   activity encouraged  Taken 10/2/2022 1247 by Harley Hernandez RN  Activity Management:   activity adjusted per tolerance   activity encouraged  Taken 10/2/2022 1000 by Harley Hernandez RN  Activity Management:   activity adjusted per tolerance   activity encouraged  Taken 10/2/2022 0800 by Harley Hernandez RN  Activity Management:   activity adjusted per tolerance   activity encouraged  Intervention: Prevent Infection  Recent Flowsheet Documentation  Taken 10/2/2022 1600 by Harley Hernandez RN  Infection Prevention:   environmental surveillance performed   hand hygiene promoted   personal protective equipment utilized   rest/sleep promoted  Taken 10/2/2022 1400 by Harley Hernandez  RN  Infection Prevention:   environmental surveillance performed   hand hygiene promoted   personal protective equipment utilized   rest/sleep promoted  Taken 10/2/2022 1247 by Harley Hernandez RN  Infection Prevention:   environmental surveillance performed   hand hygiene promoted   rest/sleep promoted   personal protective equipment utilized  Taken 10/2/2022 1000 by Harley Hernandez RN  Infection Prevention:   environmental surveillance performed   hand hygiene promoted   personal protective equipment utilized   rest/sleep promoted  Taken 10/2/2022 0800 by Harley Hernandez RN  Infection Prevention:   environmental surveillance performed   rest/sleep promoted  Goal: Optimal Comfort and Wellbeing  Outcome: Ongoing, Progressing  Intervention: Monitor Pain and Promote Comfort  Recent Flowsheet Documentation  Taken 10/2/2022 1600 by Harley Hernandez RN  Pain Management Interventions:   quiet environment facilitated   relaxation techniques promoted  Taken 10/2/2022 1400 by Harley Hernandez RN  Pain Management Interventions:   quiet environment facilitated   relaxation techniques promoted  Taken 10/2/2022 1300 by Harley Hernandez RN  Pain Management Interventions:   quiet environment facilitated   relaxation techniques promoted  Taken 10/2/2022 1247 by Harley Hernandez RN  Pain Management Interventions:   see MAR   quiet environment facilitated   relaxation techniques promoted   pillow support provided   position adjusted  Taken 10/2/2022 1000 by Harley Hernandez RN  Pain Management Interventions:   quiet environment facilitated   relaxation techniques promoted  Taken 10/2/2022 0800 by Harley Hernandez RN  Pain Management Interventions:   quiet environment facilitated   relaxation techniques promoted  Intervention: Provide Person-Centered Care  Recent Flowsheet Documentation  Taken 10/2/2022 1600 by Harley Hernandez RN  Trust Relationship/Rapport:   care explained   choices provided   emotional support  provided   empathic listening provided   questions answered   questions encouraged   reassurance provided   thoughts/feelings acknowledged  Taken 10/2/2022 1400 by Harley Hernandez RN  Trust Relationship/Rapport:   care explained   choices provided   emotional support provided   empathic listening provided   questions encouraged   reassurance provided   thoughts/feelings acknowledged   questions answered  Taken 10/2/2022 1247 by Harley Hernandez RN  Trust Relationship/Rapport:   care explained   choices provided   emotional support provided   empathic listening provided   thoughts/feelings acknowledged   reassurance provided   questions encouraged   questions answered  Taken 10/2/2022 1000 by Harley Hernandez RN  Trust Relationship/Rapport:   choices provided   emotional support provided   care explained   empathic listening provided   thoughts/feelings acknowledged   reassurance provided   questions encouraged   questions answered  Taken 10/2/2022 0800 by Harley Hernandez RN  Trust Relationship/Rapport:   care explained   choices provided   emotional support provided   empathic listening provided   reassurance provided   thoughts/feelings acknowledged   questions encouraged   questions answered  Goal: Readiness for Transition of Care  Outcome: Ongoing, Progressing     Problem: Fall Injury Risk  Goal: Absence of Fall and Fall-Related Injury  Outcome: Ongoing, Progressing  Intervention: Identify and Manage Contributors  Recent Flowsheet Documentation  Taken 10/2/2022 0800 by Harley Hernandez RN  Medication Review/Management: medications reviewed  Intervention: Promote Injury-Free Environment  Recent Flowsheet Documentation  Taken 10/2/2022 1600 by Harley Hernandez RN  Safety Promotion/Fall Prevention:   activity supervised   assistive device/personal items within reach   clutter free environment maintained   toileting scheduled   safety round/check completed   room organization consistent   nonskid  shoes/slippers when out of bed   fall prevention program maintained  Taken 10/2/2022 1400 by Harley Hernandez RN  Safety Promotion/Fall Prevention:   activity supervised   clutter free environment maintained   assistive device/personal items within reach   toileting scheduled   safety round/check completed   room organization consistent   nonskid shoes/slippers when out of bed   fall prevention program maintained  Taken 10/2/2022 1247 by Harley Hernandez RN  Safety Promotion/Fall Prevention:   activity supervised   clutter free environment maintained   assistive device/personal items within reach   toileting scheduled   safety round/check completed   room organization consistent   nonskid shoes/slippers when out of bed   fall prevention program maintained  Taken 10/2/2022 1000 by Harley Hernandez RN  Safety Promotion/Fall Prevention:   clutter free environment maintained   assistive device/personal items within reach   activity supervised   safety round/check completed   room organization consistent   toileting scheduled   nonskid shoes/slippers when out of bed   fall prevention program maintained  Taken 10/2/2022 0800 by Harley Hernandez RN  Safety Promotion/Fall Prevention:   activity supervised   assistive device/personal items within reach   clutter free environment maintained   fall prevention program maintained   nonskid shoes/slippers when out of bed   safety round/check completed   toileting scheduled   room organization consistent   Goal Outcome Evaluation:  Plan of Care Reviewed With: patient        Progress: improving

## 2022-10-02 NOTE — PLAN OF CARE
Goal Outcome Evaluation:  Plan of Care Reviewed With: patient           Outcome Evaluation: VSS, NSR on monitor; c/o general weakness;  very weak states has tenederness in right arm extending into her hand and tenderness in left hand; LS diminshed, 02 down to 2 liters; resting with eyes closed in no apparent distress ; bed alarm activated call light within her reach; will conitnued to monitor

## 2022-10-03 PROCEDURE — 99232 SBSQ HOSP IP/OBS MODERATE 35: CPT | Performed by: NURSE PRACTITIONER

## 2022-10-03 PROCEDURE — 97530 THERAPEUTIC ACTIVITIES: CPT

## 2022-10-03 PROCEDURE — 97535 SELF CARE MNGMENT TRAINING: CPT

## 2022-10-03 PROCEDURE — 25010000002 CYANOCOBALAMIN PER 1000 MCG: Performed by: INTERNAL MEDICINE

## 2022-10-03 PROCEDURE — 97110 THERAPEUTIC EXERCISES: CPT

## 2022-10-03 RX ADMIN — POLYETHYLENE GLYCOL 3350 17 G: 17 POWDER, FOR SOLUTION ORAL at 08:32

## 2022-10-03 RX ADMIN — HYDROXYZINE HYDROCHLORIDE 25 MG: 25 TABLET, FILM COATED ORAL at 08:32

## 2022-10-03 RX ADMIN — DICLOFENAC 4 G: 10 GEL TOPICAL at 11:55

## 2022-10-03 RX ADMIN — DICLOFENAC 4 G: 10 GEL TOPICAL at 20:05

## 2022-10-03 RX ADMIN — FOLIC ACID 1 MG: 1 TABLET ORAL at 08:32

## 2022-10-03 RX ADMIN — HYDROXYZINE HYDROCHLORIDE 25 MG: 25 TABLET, FILM COATED ORAL at 20:05

## 2022-10-03 RX ADMIN — FLUOXETINE HYDROCHLORIDE 20 MG: 20 CAPSULE ORAL at 08:33

## 2022-10-03 RX ADMIN — Medication 10 ML: at 08:33

## 2022-10-03 RX ADMIN — DIAZEPAM 5 MG: 5 TABLET ORAL at 11:55

## 2022-10-03 RX ADMIN — DICLOFENAC 4 G: 10 GEL TOPICAL at 18:13

## 2022-10-03 RX ADMIN — TRAZODONE HYDROCHLORIDE 50 MG: 50 TABLET ORAL at 20:05

## 2022-10-03 RX ADMIN — DICLOFENAC 4 G: 10 GEL TOPICAL at 08:32

## 2022-10-03 RX ADMIN — LATANOPROST 1 DROP: 50 SOLUTION OPHTHALMIC at 20:05

## 2022-10-03 RX ADMIN — CYANOCOBALAMIN 1000 MCG: 1000 INJECTION, SOLUTION INTRAMUSCULAR; SUBCUTANEOUS at 08:32

## 2022-10-03 RX ADMIN — PANTOPRAZOLE SODIUM 40 MG: 40 TABLET, DELAYED RELEASE ORAL at 05:26

## 2022-10-03 NOTE — PAYOR COMM NOTE
"Latoya Le RN  Utilization Management  P:[phone]  F:[phone]  Auth #[id number]  Philomena Davis (62 y.o. Female)             Date of Birth   1960    Social Security Number       Address   [address] [place] [address]    Home Phone   [phone]    MRN   [id number]       Nondenominational   Cheondoism    Marital Status                               Admission Date   9/26/22    Admission Type   Emergency    Admitting Provider   Jose J Hutchins MD    Attending Provider   Jose J Hutchins MD    Department, Room/Bed   [facility] 5H, S581/1       Discharge Date       Discharge Disposition       Discharge Destination                               Attending Provider: Jose J Hutchins MD    Allergies: Codeine    Isolation: None   Infection: None   Code Status: CPR   Advance Care Planning Activity    Ht: 165.1 cm (65\")   Wt: 81.7 kg (180 lb 3.2 oz)    Admission Cmt: None   Principal Problem: Alcohol withdrawal syndrome with perceptual disturbance (HCC) [F10.932]                 Active Insurance as of 9/26/2022     Primary Coverage     Payor Plan Insurance Group Employer/Plan Group    [employer] [place] [employer] [place]      Payor Plan Address Payor Plan Phone Number Payor Plan Fax Number Effective Dates    [address] [address] [phone]  11/8/2018 - None Entered    [facility] [id number]       Subscriber Name Subscriber Birth Date Member ID       PHILOMENA DAVIS 1960 [id number]                 Emergency Contacts      (Rel.) Home Phone Work Phone Mobile Phone    Madelin Null (Relative) [phone] -- [phone]              Current Facility-Administered Medications   Medication Dose Route Frequency Provider Last Rate Last Admin   • acetaminophen (TYLENOL) tablet 500 mg  500 mg Oral Q6H PRN Ravi Self, DO        Or   • acetaminophen (TYLENOL) 160 MG/5ML solution 500 mg  500 mg Oral Q6H PRN Ravi Self DO        Or   • acetaminophen (TYLENOL) suppository " 325 mg  325 mg Rectal Q6H PRN Ravi Self DO       • [START ON 10/10/2022] cyanocobalamin injection 1,000 mcg  1,000 mcg Intramuscular Weekly Daniel Al MD        Followed by   • [START ON 11/7/2022] cyanocobalamin injection 1,000 mcg  1,000 mcg Intramuscular Q30 Days Daniel Al MD       • diazePAM (VALIUM) tablet 5 mg  5 mg Oral Q12H Ravi Self DO   5 mg at 10/03/22 1155    Followed by   • [START ON 10/4/2022] diazePAM (VALIUM) tablet 5 mg  5 mg Oral Daily Ravi Self DO       • Diclofenac Sodium (VOLTAREN) 1 % gel 4 g  4 g Topical 4x Daily Daniel Al MD   4 g at 10/03/22 1155   • FLUoxetine (PROzac) capsule 20 mg  20 mg Oral Daily Ravi Self DO   20 mg at 10/03/22 0833   • folic acid (FOLVITE) tablet 1 mg  1 mg Oral Daily Ravi Self DO   1 mg at 10/03/22 0832   • HYDROcodone-acetaminophen (NORCO) 5-325 MG per tablet 1 tablet  1 tablet Oral Q6H PRN Daniel Al MD   1 tablet at 10/02/22 1247   • hydrOXYzine (ATARAX) tablet 25 mg  25 mg Oral TID PRN Daniel Al MD   25 mg at 10/03/22 0832   • latanoprost (XALATAN) 0.005 % ophthalmic solution 1 drop  1 drop Both Eyes Nightly Ravi Self DO   1 drop at 10/02/22 2051   • ondansetron (ZOFRAN) tablet 4 mg  4 mg Oral Q6H PRN Ravi Self DO        Or   • ondansetron (ZOFRAN) injection 4 mg  4 mg Intravenous Q6H PRN Ravi Self DO       • pantoprazole (PROTONIX) EC tablet 40 mg  40 mg Oral Q AM Ravi Self DO   40 mg at 10/03/22 0526   • polyethylene glycol (MIRALAX) packet 17 g  17 g Oral Daily Daniel Al MD   17 g at 10/03/22 0832   • sodium chloride 0.9 % flush 10 mL  10 mL Intravenous PRN Rj Villa MD       • sodium chloride 0.9 % flush 10 mL  10 mL Intravenous Q12H Ravi Self DO   10 mL at 10/03/22 0833   • sodium chloride 0.9 % flush 10 mL  10 mL Intravenous PRN Ravi Self DO       • sodium chloride nasal spray 1  spray  1 spray Each Nare Ana Rosa Duarte APRN   1 spray at 10/02/22 0904   • traZODone (DESYREL) tablet 50 mg  50 mg Oral Nightly Ravi Self DO   50 mg at 10/02/22 2051     Lab Results (last 48 hours)     Procedure Component Value Units Date/Time    Uric Acid [[id number]]  (Normal) Collected: 10/02/22 1412    Specimen: Blood Updated: 10/02/22 1517     Uric Acid 5.3 mg/dL      Comment: Falsely depressed results may occur on samples drawn from patients receiving N-Acetylcysteine (NAC) or Metamizole.             Imaging Results (Last 48 Hours)     Procedure Component Value Units Date/Time    XR Chest 1 View [[id number]] Collected: 10/01/22 1449     Updated: 10/01/22 1453    Narrative:      XR CHEST 1 VW-     Date of Exam: 10/1/2022 1:28 PM     Indication: following hypoxia; F10.930-Alcohol use, unspecified with  withdrawal, uncomplicated; K29.20-Alcoholic gastritis without bleeding;  E87.20-Acidosis, unspecified; E86.9-Volume depletion, unspecified;  F10.932-Alcohol use, unspecified with withdrawal with perceptual  disturbance; I10-Essential (primary) hypertension.     Comparison:?09/26/2022     Technique:?A single view of the chest was obtained.     FINDINGS:     ?Heart size and pulmonary vessels are within normal limits.  There is a  new small right pleural effusion.  There is right basilar atelectasis.   Left lung appears clear.  No evidence pneumothorax.             Impression:         New small right pleural effusion with minimal right basilar atelectasis.        This report was finalized on 10/1/2022 2:50 PM by Noel Baker MD.           Operative/Procedure Notes (last 48 hours)  Notes from 10/01/22 1446 through 10/03/22 1446   No notes of this type exist for this encounter.          Physician Progress Notes (last 48 hours)      Melisa Villalobos APRN at 10/03/22 1020              [facility] Medicine Services  PROGRESS NOTE    Patient Name: Philomena KAMARA  Susan  : 1960  MRN: [id number]    Date of Admission: 2022  Primary Care Physician: No primary care provider on file.    Subjective   Subjective     CC:  ETOH use    HPI:  Patient seen resting back in bed in no acute distress.  Awake and alert.  Watching TV.  States she still feels very weak and anxious but no tremors.  Denies shortness of air with oxygen in place and at rest.  Had a good bowel movement this morning.  States she continues to have bilateral hands and forearm numbness and tingling issues.  Stable.  Awaiting rehab.    Review of Systems   Gen- No fevers, chills  CV- No chest pain, palpitations  Resp- No cough, dyspnea  GI- No N/V/D, abd pain    Objective   Objective     Vital Signs:   Temp:  [98.1 °F (36.7 °C)-99.1 °F (37.3 °C)] 98.2 °F (36.8 °C)  Heart Rate:  [] 102  Resp:  [16-18] 16  BP: (121-132)/(75-84) 132/77  Flow (L/min):  [2] 2     Physical Exam:  Constitutional: No acute distress, awake, alert.  Resting up in bed.  No visitors at bedside.  HENT: NCAT, mucous membranes moist  Respiratory: Clear to auscultation bilaterally, respiratory effort normal on 2 LNC with sats 93%.  Cardiovascular: RRR, no murmurs, rubs, or gallops  Gastrointestinal: Positive bowel sounds, soft, nontender, nondistended  Musculoskeletal: Trace bilateral ankle edema.  MASSEY spontaneously.  BLE hands and forearms stiff and tingly.  (Stable per patient).  Psychiatric: Appropriate affect, cooperative  Neurologic: Oriented x 3, strength symmetric in all extremities, Cranial Nerves grossly intact to confrontation, speech clear and appropriate.  Follows commands.  Skin: No rashes      Results Reviewed:  LAB RESULTS:      Lab 22  0422 22  0320 22  0400 22  0359   WBC 6.92 6.45 6.34  --    HEMOGLOBIN 10.5* 9.8* 10.7*  --    HEMATOCRIT 29.1* 27.0* 30.0*  --    PLATELETS 154 138* 148  --    NEUTROS ABS  --   --  4.48  --    IMMATURE GRANS (ABS)  --   --  0.04  --    LYMPHS ABS  --   --  1.22   --    MONOS ABS  --   --  0.54  --    EOS ABS  --   --  0.04  --    .3* 122.7* 124.0*  --    LACTATE  --   --   --  1.6   PROTIME  --  14.4  --   --          Lab 09/29/22 0422 09/28/22 0320 09/27/22  0400   SODIUM 137 136 136   POTASSIUM 4.0 4.1 3.7   CHLORIDE 103 102 102   CO2 27.0 25.0 27.0   ANION GAP 7.0 9.0 7.0   BUN 7* 7* 6*   CREATININE 0.57 0.61 0.64   EGFR 104.2 102.5 101.3   GLUCOSE 95 95 92   CALCIUM 8.8 8.2* 8.3*   IONIZED CALCIUM 1.27  --   --    MAGNESIUM 2.0  --   --    PHOSPHORUS 3.2  --   --          Lab 09/29/22 0422 09/28/22 0320 09/27/22  0400   TOTAL PROTEIN 6.2 5.4* 5.7*   ALBUMIN 3.00* 2.40* 2.80*   GLOBULIN 3.2 3.0 2.9   ALT (SGPT) 39* 32 30   AST (SGOT) 139* 129* 84*   BILIRUBIN 1.5* 1.9* 2.1*   ALK PHOS 146* 140* 123*         Lab 09/28/22  0320   PROTIME 14.4   INR 1.13                 Brief Urine Lab Results     None          Microbiology Results Abnormal     None          XR Chest 1 View    Result Date: 10/1/2022  XR CHEST 1 VW-  Date of Exam: 10/1/2022 1:28 PM  Indication: following hypoxia; F10.930-Alcohol use, unspecified with withdrawal, uncomplicated; K29.20-Alcoholic gastritis without bleeding; E87.20-Acidosis, unspecified; E86.9-Volume depletion, unspecified; F10.932-Alcohol use, unspecified with withdrawal with perceptual disturbance; I10-Essential (primary) hypertension.  Comparison:?09/26/2022  Technique:?A single view of the chest was obtained.  FINDINGS:  ?Heart size and pulmonary vessels are within normal limits.  There is a new small right pleural effusion.  There is right basilar atelectasis. Left lung appears clear.  No evidence pneumothorax.        Impression:  New small right pleural effusion with minimal right basilar atelectasis.   This report was finalized on 10/1/2022 2:50 PM by Noel Baker MD.            I have reviewed the medications:  Scheduled Meds:[START ON 10/10/2022] cyanocobalamin, 1,000 mcg, Intramuscular, Weekly   Followed by  [START ON  11/7/2022] cyanocobalamin, 1,000 mcg, Intramuscular, Q30 Days  diazePAM, 5 mg, Oral, Q12H   Followed by  [START ON 10/4/2022] diazePAM, 5 mg, Oral, Daily  Diclofenac Sodium, 4 g, Topical, 4x Daily  FLUoxetine, 20 mg, Oral, Daily  folic acid, 1 mg, Oral, Daily  latanoprost, 1 drop, Both Eyes, Nightly  pantoprazole, 40 mg, Oral, Q AM  polyethylene glycol, 17 g, Oral, Daily  sodium chloride, 10 mL, Intravenous, Q12H  traZODone, 50 mg, Oral, Nightly      Continuous Infusions:   PRN Meds:.•  acetaminophen **OR** acetaminophen **OR** acetaminophen  •  HYDROcodone-acetaminophen  •  hydrOXYzine  •  ondansetron **OR** ondansetron  •  sodium chloride  •  sodium chloride  •  sodium chloride    Assessment & Plan   Assessment & Plan     Active Hospital Problems    Diagnosis  POA   • **Alcohol withdrawal syndrome with perceptual disturbance (HCC) [F10.932]  Yes   • Elevated LFTs [R79.89]  Yes   • Hyperlipidemia [E78.5]  Yes   • Essential hypertension [I10]  Yes   • Generalized anxiety disorder [F41.1]  Yes      Resolved Hospital Problems   No resolved problems to display.        Brief Hospital Course to date:  Philomena Davis is a 62 y.o. female with chronic heavy alcohol abuse who presents with multiple complaints, predominantly abdominal pain and whole body swelling     This patient's problems and plans were partially entered by my partner and updated as appropriate by me 10/03/22.    Assessment/plan:  Patient is new to me today     ETOH abuse with dependence  Early alcohol withdrawal  Abnormal LFTs/hyperbilirubinemia  Cirrhosis  -US c/w portal HTN, cirrhosis  -PO protonix  -addiction team following  -- Continue seizure/withdrawal precautions  -- Last labs 9/29.  We will recheck a.m. labs.    Respiratory failure  -nebs prn, pulmonary toilet, wean oxygen as tolerated  --Stable on 2 LNC.  Sats drop on room air.  Denies shortness of breath at rest on oxygen.     Acute debility  -Reports inability to ambulate safely due to  swelling and generalized weakness  -PT/OT following.  --Patient now awaiting short-term rehab.  CM following for disposition.     Macrocytosis  -Previous folate in 2019 undetectable; folate low with low normal B12, partner prior replaced both  -s/p folate IV x day 5/5 days, transitioned to p.o.    Painful Hands/forearms  --xrays showed mild OA of DIP joints and moderate OA of first carpometacarpal joint  --No erythema, or edema noted.  --Continue PRN toradol  --checked uric acid level, normal.    Anxiety  --continue vistaril prn  --Stable     Hypertension  Hyperlipidemia  -Holding lisinopril 2/2 potential BP changes with regular scheduled benzodiazepines  -PRN clonidine for SBP >180mmHg    Expected Discharge Location and Transportation: [facility] pending insurance approval  Expected Discharge Date: ~10/4/22    DVT prophylaxis:  Mechanical DVT prophylaxis orders are present.     AM-PAC 6 Clicks Score (PT): 21 (10/03/22 0832)    CODE STATUS:   Code Status and Medical Interventions:   Ordered at: 22 0746     Code Status (Patient has no pulse and is not breathing):    CPR (Attempt to Resuscitate)     Medical Interventions (Patient has pulse or is breathing):    Full Support       CANDY Villagomez  10/03/22                Electronically signed by Melisa Villalobos APRN at 10/03/22 1255     Jose J Hutchins MD at 10/02/22 0957              [facility] Medicine Services  PROGRESS NOTE    Patient Name: Philomena Davis  : 1960  MRN: [id number]    Date of Admission: 2022  Primary Care Physician: No primary care provider on file.    Subjective   Subjective     CC:  ETOH use    HPI:  Notes joint pain in R hand/elbow, with stiffness. No f/c. Good appetite/thirst. No n/v. No dyspnea.    Review of Systems   Constitutional: Positive for activity change and fatigue.   HENT: Negative.    Respiratory: Negative.    Cardiovascular: Negative.    Gastrointestinal: Negative.     Genitourinary: Negative.    Musculoskeletal: Positive for arthralgias and myalgias.   Neurological: Negative.    Psychiatric/Behavioral: Negative.        Objective   Objective     Vital Signs:   Temp:  [97.5 °F (36.4 °C)-98.3 °F (36.8 °C)] 97.5 °F (36.4 °C)  Heart Rate:  [100-108] 108  Resp:  [18-20] 20  BP: (136)/(78) 136/78  Flow (L/min):  [2] 2     Physical Exam:  NAD, alert and oriented  OP clear, MMM  Neck supple  No LAD  Tachy  CTAB  +BS, ND, NT, soft  MASSEY  R hand stiffness, without obvious effusions of hand/wrist/elbow, TTP elbow  No rashes  Normal affect    Results Reviewed:  LAB RESULTS:      Lab 09/29/22 0422 09/28/22 0320 09/27/22 0400 09/27/22  0359 09/26/22  0604   WBC 6.92 6.45 6.34  --  10.73   HEMOGLOBIN 10.5* 9.8* 10.7*  --  13.1   HEMATOCRIT 29.1* 27.0* 30.0*  --  36.0   PLATELETS 154 138* 148  --  243   NEUTROS ABS  --   --  4.48  --  8.36*   IMMATURE GRANS (ABS)  --   --  0.04  --  0.05   LYMPHS ABS  --   --  1.22  --  1.41   MONOS ABS  --   --  0.54  --  0.83   EOS ABS  --   --  0.04  --  0.04   .3* 122.7* 124.0*  --  120.4*   LACTATE  --   --   --  1.6 4.5*   PROTIME  --  14.4  --   --  13.4         Lab 09/29/22 0422 09/28/22 0320 09/27/22  0400 09/26/22  0604   SODIUM 137 136 136 130*   POTASSIUM 4.0 4.1 3.7 4.1   CHLORIDE 103 102 102 90*   CO2 27.0 25.0 27.0 24.0   ANION GAP 7.0 9.0 7.0 16.0*   BUN 7* 7* 6* 3*   CREATININE 0.57 0.61 0.64 0.53*   EGFR 104.2 102.5 101.3 106.0   GLUCOSE 95 95 92 106*   CALCIUM 8.8 8.2* 8.3* 9.4   IONIZED CALCIUM 1.27  --   --   --    MAGNESIUM 2.0  --   --   --    PHOSPHORUS 3.2  --   --   --          Lab 09/29/22 0422 09/28/22 0320 09/27/22 0400 09/26/22  0604   TOTAL PROTEIN 6.2 5.4* 5.7* 7.5   ALBUMIN 3.00* 2.40* 2.80* 3.60   GLOBULIN 3.2 3.0 2.9 3.9   ALT (SGPT) 39* 32 30 44*   AST (SGOT) 139* 129* 84* 130*   BILIRUBIN 1.5* 1.9* 2.1* 2.1*   ALK PHOS 146* 140* 123* 165*   LIPASE  --   --   --  33         Lab 09/28/22 0320 09/26/22  0604    PROBNP  --  101.9   TROPONIN T  --  <0.010   PROTIME 14.4 13.4   INR 1.13 1.03             Lab 09/26/22  0810   FOLATE <2.00*   VITAMIN B 12 302         Brief Urine Lab Results     None          Microbiology Results Abnormal     None          XR Chest 1 View    Result Date: 10/1/2022  XR CHEST 1 VW-  Date of Exam: 10/1/2022 1:28 PM  Indication: following hypoxia; F10.930-Alcohol use, unspecified with withdrawal, uncomplicated; K29.20-Alcoholic gastritis without bleeding; E87.20-Acidosis, unspecified; E86.9-Volume depletion, unspecified; F10.932-Alcohol use, unspecified with withdrawal with perceptual disturbance; I10-Essential (primary) hypertension.  Comparison:?09/26/2022  Technique:?A single view of the chest was obtained.  FINDINGS:  ?Heart size and pulmonary vessels are within normal limits.  There is a new small right pleural effusion.  There is right basilar atelectasis. Left lung appears clear.  No evidence pneumothorax.        Impression:  New small right pleural effusion with minimal right basilar atelectasis.   This report was finalized on 10/1/2022 2:50 PM by Noel Baker MD.            I have reviewed the medications:  Scheduled Meds:cyanocobalamin, 1,000 mcg, Intramuscular, Daily   Followed by  [START ON 10/10/2022] cyanocobalamin, 1,000 mcg, Intramuscular, Weekly   Followed by  [START ON 11/7/2022] cyanocobalamin, 1,000 mcg, Intramuscular, Q30 Days  diazePAM, 5 mg, Oral, Q12H   Followed by  [START ON 10/4/2022] diazePAM, 5 mg, Oral, Daily  Diclofenac Sodium, 4 g, Topical, 4x Daily  FLUoxetine, 20 mg, Oral, Daily  folic acid, 1 mg, Oral, Daily  latanoprost, 1 drop, Both Eyes, Nightly  pantoprazole, 40 mg, Oral, Q AM  polyethylene glycol, 17 g, Oral, Daily  sodium chloride, 10 mL, Intravenous, Q12H  traZODone, 50 mg, Oral, Nightly      Continuous Infusions:   PRN Meds:.•  acetaminophen **OR** acetaminophen **OR** acetaminophen  •  HYDROcodone-acetaminophen  •  hydrOXYzine  •  ketorolac  •  LORazepam  **OR** midazolam **OR** LORazepam **OR** midazolam **OR** midazolam  •  ondansetron **OR** ondansetron  •  sodium chloride  •  sodium chloride  •  sodium chloride    Assessment & Plan   Assessment & Plan     Active Hospital Problems    Diagnosis  POA   • **Alcohol withdrawal syndrome with perceptual disturbance (HCC) [F10.932]  Yes   • Elevated LFTs [R79.89]  Yes   • Hyperlipidemia [E78.5]  Yes   • Essential hypertension [I10]  Yes   • Generalized anxiety disorder [F41.1]  Yes      Resolved Hospital Problems   No resolved problems to display.        Brief Hospital Course to date:  Philomena Davis is a 62 y.o. female with chronic heavy alcohol abuse who presents with multiple complaints, predominantly abdominal pain and whole body swelling     Assessment/plan     ETOH abuse with dependence  Early alcohol withdrawal  Abnormal LFTs/hyperbilirubinemia  Cirrhosis  -US c/w portal HTN, cirrhosis  -PO protonix  -addiction team following    Respiratory failure  -nebs prn, pulmonary toilet, wean oxygen as tolerated     Acute debility  -Reports inability to ambulate safely due to swelling and generalized weakness  -PT/OT     Macrocytosis  -Previous folate in 2019 undetectable; folate low with low normal B12, will replace both  -s/p folate IV x day 5/5 days, transitioned to p.o.    Painful Hands  --xrays showed mild OA of DIP joints and moderate OA of first carpometacarpal joint  --Continue PRN toradol  --check uric acid    Anxiety  --continue vistaril prn     Hypertension  Hyperlipidemia  -Holding lisinopril 2/2 potential BP changes with regular scheduled benzodiazepines  -PRN clonidine for SBP >180mmHg    Expected Discharge Location and Transportation: [facility] pending insurance approval  Expected Discharge Date: 10/3/22    DVT prophylaxis:  Mechanical DVT prophylaxis orders are present.     AM-PAC 6 Clicks Score (PT): 20 (10/01/22 2000)    CODE STATUS:   Code Status and Medical Interventions:   Ordered at: 09/26/22 0746     Code  Status (Patient has no pulse and is not breathing):    CPR (Attempt to Resuscitate)     Medical Interventions (Patient has pulse or is breathing):    Full Support       Jose J Hutchins MD  10/02/22                Electronically signed by Jose J Hutchins MD at 10/02/22 0968

## 2022-10-03 NOTE — CASE MANAGEMENT/SOCIAL WORK
Continued Stay Note  BHARAT Solano     Patient Name: Philomena Davis  MRN: [id number]  Today's Date: 10/3/2022    Admit Date: 9/26/2022    Plan: still waiting on insurance to approve [facility]   Discharge Plan     Row Name 10/03/22 1154       Plan    Plan still waiting on insurance to approve [facility]    Plan Comments I talked with Venkata/HILL this morning and talked with patient. [facility] is still waiting on insurance approval. I discussed this with patient and made her aware if insurance denies then she will need to go home. Patient started c/o about her hands still being numb and I pointed out to patient she needs to be out of the bed and sitting up in the chair during the day. Patient is on RA, RW in the room I ordered last week. Patient doesn't have a PCP, have one scheduled for 10-6 if discharged. Once she goes to her PCP then they can arrange HH if still needed. Анна    Final Discharge Disposition Code 62 - inpatient rehab facility               Discharge Codes    No documentation.               Expected Discharge Date and Time     Expected Discharge Date Expected Discharge Time    Oct 1, 2022             Jackie George, RN

## 2022-10-03 NOTE — PLAN OF CARE
Problem: Adult Inpatient Plan of Care  Goal: Absence of Hospital-Acquired Illness or Injury  Intervention: Identify and Manage Fall Risk  Recent Flowsheet Documentation  Taken 10/3/2022 0200 by Vanessa Al RN  Safety Promotion/Fall Prevention:   assistive device/personal items within reach   activity supervised   clutter free environment maintained  Taken 10/3/2022 0000 by Vanesas Al RN  Safety Promotion/Fall Prevention:   activity supervised   assistive device/personal items within reach   clutter free environment maintained  Taken 10/2/2022 2200 by Vanessa Al RN  Safety Promotion/Fall Prevention:   activity supervised   clutter free environment maintained   assistive device/personal items within reach  Taken 10/2/2022 2000 by Vanessa Al RN  Safety Promotion/Fall Prevention:   activity supervised   assistive device/personal items within reach   clutter free environment maintained  Intervention: Prevent Skin Injury  Recent Flowsheet Documentation  Taken 10/3/2022 0200 by Vanessa Al RN  Skin Protection: adhesive use limited  Taken 10/3/2022 0000 by Vanessa Al RN  Skin Protection: adhesive use limited  Taken 10/2/2022 2200 by Vanessa Al RN  Skin Protection: adhesive use limited  Taken 10/2/2022 2000 by Vanessa Al RN  Skin Protection:   adhesive use limited   tubing/devices free from skin contact   skin-to-device areas padded   incontinence pads utilized  Intervention: Prevent and Manage VTE (Venous Thromboembolism) Risk  Recent Flowsheet Documentation  Taken 10/3/2022 0200 by Vanessa Al RN  Activity Management: activity adjusted per tolerance  Taken 10/2/2022 2200 by Vanessa Al RN  Activity Management: activity adjusted per tolerance  Taken 10/2/2022 2000 by Vanessa Al RN  Activity Management: activity adjusted per tolerance  Intervention: Prevent Infection  Recent Flowsheet Documentation  Taken 10/3/2022 0200 by Vanessa Al RN  Infection Prevention:  rest/sleep promoted  Taken 10/3/2022 0000 by Vanessa Al RN  Infection Prevention: rest/sleep promoted  Taken 10/2/2022 2200 by Vanessa Al RN  Infection Prevention: rest/sleep promoted  Taken 10/2/2022 2000 by Vanessa Al RN  Infection Prevention: rest/sleep promoted     Problem: Fall Injury Risk  Goal: Absence of Fall and Fall-Related Injury  Intervention: Promote Injury-Free Environment  Recent Flowsheet Documentation  Taken 10/3/2022 0200 by Vanessa Al RN  Safety Promotion/Fall Prevention:   assistive device/personal items within reach   activity supervised   clutter free environment maintained  Taken 10/3/2022 0000 by Vanessa Al RN  Safety Promotion/Fall Prevention:   activity supervised   assistive device/personal items within reach   clutter free environment maintained  Taken 10/2/2022 2200 by Vanessa Al RN  Safety Promotion/Fall Prevention:   activity supervised   clutter free environment maintained   assistive device/personal items within reach  Taken 10/2/2022 2000 by Vanessa Al RN  Safety Promotion/Fall Prevention:   activity supervised   assistive device/personal items within reach   clutter free environment maintained   Goal Outcome Evaluation:

## 2022-10-03 NOTE — PLAN OF CARE
Goal Outcome Evaluation:  Plan of Care Reviewed With: patient           Outcome Evaluation: Pt mod ind supine to sit,  SBA STS,  CGA to ambulate in room with RW, setup to brush teeth standing sinkside,  min A LBD,  min A sit to supine.  Pt completed 10 reps BUE TE reporting fatigue and declined  strengthening d/t hand pain.  Pt progressing but limited by weakness and decreased activity tolerance.

## 2022-10-03 NOTE — PROGRESS NOTES
[facility]  PROGRESS NOTE    Patient Name: Philomena Davis  : 1960  MRN: [id number]    Date of Admission: 2022  Primary Care Physician: No primary care provider on file.    Subjective   Subjective     CC:  ETOH use    HPI:  Patient seen resting back in bed in no acute distress.  Awake and alert.  Watching TV.  States she still feels very weak and anxious but no tremors.  Denies shortness of air with oxygen in place and at rest.  Had a good bowel movement this morning.  States she continues to have bilateral hands and forearm numbness and tingling issues.  Stable.  Awaiting rehab.    Review of Systems   Gen- No fevers, chills  CV- No chest pain, palpitations  Resp- No cough, dyspnea  GI- No N/V/D, abd pain    Objective   Objective     Vital Signs:   Temp:  [98.1 °F (36.7 °C)-99.1 °F (37.3 °C)] 98.2 °F (36.8 °C)  Heart Rate:  [] 102  Resp:  [16-18] 16  BP: (121-132)/(75-84) 132/77  Flow (L/min):  [2] 2     Physical Exam:  Constitutional: No acute distress, awake, alert.  Resting up in bed.  No visitors at bedside.  HENT: NCAT, mucous membranes moist  Respiratory: Clear to auscultation bilaterally, respiratory effort normal on 2 LNC with sats 93%.  Cardiovascular: RRR, no murmurs, rubs, or gallops  Gastrointestinal: Positive bowel sounds, soft, nontender, nondistended  Musculoskeletal: Trace bilateral ankle edema.  MASSEY spontaneously.  BLE hands and forearms stiff and tingly.  (Stable per patient).  Psychiatric: Appropriate affect, cooperative  Neurologic: Oriented x 3, strength symmetric in all extremities, Cranial Nerves grossly intact to confrontation, speech clear and appropriate.  Follows commands.  Skin: No rashes      Results Reviewed:  LAB RESULTS:      Lab 22  0422 22  0320 22  0400 22  0359   WBC 6.92 6.45 6.34  --    HEMOGLOBIN 10.5* 9.8* 10.7*  --    HEMATOCRIT 29.1* 27.0* 30.0*  --    PLATELETS 154 138* 148  --     NEUTROS ABS  --   --  4.48  --    IMMATURE GRANS (ABS)  --   --  0.04  --    LYMPHS ABS  --   --  1.22  --    MONOS ABS  --   --  0.54  --    EOS ABS  --   --  0.04  --    .3* 122.7* 124.0*  --    LACTATE  --   --   --  1.6   PROTIME  --  14.4  --   --          Lab 09/29/22 0422 09/28/22 0320 09/27/22  0400   SODIUM 137 136 136   POTASSIUM 4.0 4.1 3.7   CHLORIDE 103 102 102   CO2 27.0 25.0 27.0   ANION GAP 7.0 9.0 7.0   BUN 7* 7* 6*   CREATININE 0.57 0.61 0.64   EGFR 104.2 102.5 101.3   GLUCOSE 95 95 92   CALCIUM 8.8 8.2* 8.3*   IONIZED CALCIUM 1.27  --   --    MAGNESIUM 2.0  --   --    PHOSPHORUS 3.2  --   --          Lab 09/29/22 0422 09/28/22 0320 09/27/22  0400   TOTAL PROTEIN 6.2 5.4* 5.7*   ALBUMIN 3.00* 2.40* 2.80*   GLOBULIN 3.2 3.0 2.9   ALT (SGPT) 39* 32 30   AST (SGOT) 139* 129* 84*   BILIRUBIN 1.5* 1.9* 2.1*   ALK PHOS 146* 140* 123*         Lab 09/28/22  0320   PROTIME 14.4   INR 1.13                 Brief Urine Lab Results     None          Microbiology Results Abnormal     None          XR Chest 1 View    Result Date: 10/1/2022  XR CHEST 1 VW-  Date of Exam: 10/1/2022 1:28 PM  Indication: following hypoxia; F10.930-Alcohol use, unspecified with withdrawal, uncomplicated; K29.20-Alcoholic gastritis without bleeding; E87.20-Acidosis, unspecified; E86.9-Volume depletion, unspecified; F10.932-Alcohol use, unspecified with withdrawal with perceptual disturbance; I10-Essential (primary) hypertension.  Comparison:?09/26/2022  Technique:?A single view of the chest was obtained.  FINDINGS:  ?Heart size and pulmonary vessels are within normal limits.  There is a new small right pleural effusion.  There is right basilar atelectasis. Left lung appears clear.  No evidence pneumothorax.        Impression:  New small right pleural effusion with minimal right basilar atelectasis.   This report was finalized on 10/1/2022 2:50 PM by Noel Baker MD.            I have reviewed the medications:  Scheduled  Meds:[START ON 10/10/2022] cyanocobalamin, 1,000 mcg, Intramuscular, Weekly   Followed by  [START ON 11/7/2022] cyanocobalamin, 1,000 mcg, Intramuscular, Q30 Days  diazePAM, 5 mg, Oral, Q12H   Followed by  [START ON 10/4/2022] diazePAM, 5 mg, Oral, Daily  Diclofenac Sodium, 4 g, Topical, 4x Daily  FLUoxetine, 20 mg, Oral, Daily  folic acid, 1 mg, Oral, Daily  latanoprost, 1 drop, Both Eyes, Nightly  pantoprazole, 40 mg, Oral, Q AM  polyethylene glycol, 17 g, Oral, Daily  sodium chloride, 10 mL, Intravenous, Q12H  traZODone, 50 mg, Oral, Nightly      Continuous Infusions:   PRN Meds:.•  acetaminophen **OR** acetaminophen **OR** acetaminophen  •  HYDROcodone-acetaminophen  •  hydrOXYzine  •  ondansetron **OR** ondansetron  •  sodium chloride  •  sodium chloride  •  sodium chloride    Assessment & Plan   Assessment & Plan     Active Hospital Problems    Diagnosis  POA   • **Alcohol withdrawal syndrome with perceptual disturbance (HCC) [F10.932]  Yes   • Elevated LFTs [R79.89]  Yes   • Hyperlipidemia [E78.5]  Yes   • Essential hypertension [I10]  Yes   • Generalized anxiety disorder [F41.1]  Yes      Resolved Hospital Problems   No resolved problems to display.        Brief Hospital Course to date:  Philomena Davis is a 62 y.o. female with chronic heavy alcohol abuse who presents with multiple complaints, predominantly abdominal pain and whole body swelling     This patient's problems and plans were partially entered by my partner and updated as appropriate by me 10/03/22.    Assessment/plan:  Patient is new to me today     ETOH abuse with dependence  Early alcohol withdrawal  Abnormal LFTs/hyperbilirubinemia  Cirrhosis  -US c/w portal HTN, cirrhosis  -PO protonix  -addiction team following  -- Continue seizure/withdrawal precautions  -- Last labs 9/29.  We will recheck a.m. labs.    Respiratory failure  -nebs prn, pulmonary toilet, wean oxygen as tolerated  --Stable on 2 LNC.  Sats drop on room air.  Denies shortness  of breath at rest on oxygen.     Acute debility  -Reports inability to ambulate safely due to swelling and generalized weakness  -PT/OT following.  --Patient now awaiting short-term rehab.  CM following for disposition.     Macrocytosis  -Previous folate in 2019 undetectable; folate low with low normal B12, partner prior replaced both  -s/p folate IV x day 5/5 days, transitioned to p.o.    Painful Hands/forearms  --xrays showed mild OA of DIP joints and moderate OA of first carpometacarpal joint  --No erythema, or edema noted.  --Continue PRN toradol  --checked uric acid level, normal.    Anxiety  --continue vistaril prn  --Stable     Hypertension  Hyperlipidemia  -Holding lisinopril 2/2 potential BP changes with regular scheduled benzodiazepines  -PRN clonidine for SBP >180mmHg    Expected Discharge Location and Transportation: [facility] pending insurance approval  Expected Discharge Date: ~10/4/22    DVT prophylaxis:  Mechanical DVT prophylaxis orders are present.     AM-PAC 6 Clicks Score (PT): 21 (10/03/22 0832)    CODE STATUS:   Code Status and Medical Interventions:   Ordered at: 09/26/22 0746     Code Status (Patient has no pulse and is not breathing):    CPR (Attempt to Resuscitate)     Medical Interventions (Patient has pulse or is breathing):    Full Support       Melisa Villalobos, APRN  10/03/22

## 2022-10-03 NOTE — PLAN OF CARE
Problem: Alcohol Withdrawal  Goal: Alcohol Withdrawal Symptom Control  Outcome: Ongoing, Progressing  Intervention: Minimize or Manage Alcohol Withdrawal Symptoms  Description: Assess and monitor withdrawal symptom severity with a validated alcohol withdrawal tool.  Verify last intake of alcohol and consumption habits; recognize that time since last alcohol use and average daily amount consumed may not predict the severity of alcohol withdrawal.  Assess physiologic status frequently, including neurologic, hemodynamic and cardiac condition. Be prepared to implement emergency measures if symptoms progress or condition deteriorates.  Assess and monitor airway, breathing and circulation; maintain close surveillance for deterioration.  Maintain patent airway with use of positioning, airway adjuncts and secretion clearance.  Provide oxygen therapy judiciously, if hypoxemia present; use lung-protective measures, if positive-pressure ventilation needed.  Screen for malnutrition risk, such as unintentional weight loss and poor appetite; if vitamin and mineral deficiency suspected, such as thiamine, anticipate providing supplementation.  Provide intravenous fluids; monitor and address fluid and electrolyte imbalances; reintroduce oral fluids when safe to swallow.  Anticipate administering pharmacologic treatment using a loading dose strategy, such as benzodiazepine, a predetermined fixed medication tapering schedule with additional dosing as needed or a symptom-triggered approach.  Provide a safe environment (e.g., seizure precautions, fall risk). Adjust environment to minimize stimulation.  Recent Flowsheet Documentation  Taken 10/3/2022 0832 by Eileen Pierre RN  Sensory Stimulation Regulation: care clustered     Problem: Acute Neurologic Deterioration (Alcohol Withdrawal)  Goal: Optimal Neurologic Function  Outcome: Ongoing, Progressing  Intervention: Minimize or Manage Acute Neurologic Symptoms  Description: Maintain  close observation. Assess neurologic status frequently for progression of symptoms. Monitor electrocardiogram closely; address changes in rate and rhythm.  Elevate head of bed; place head in midline position, as tolerated, to enhance venous outflow and maintain cerebral perfusion.  Minimize stimulation and activity that increases intrathoracic or intra-abdominal pressure, such as hip flexion, Valsalva maneuver, coughing or vomiting. Provide a quiet and calm environment.  Anticipate fluid and pharmacologic therapy to maintain blood pressure and cerebral perfusion; titrate to targeted threshold and patient response.  Administer pharmacologic therapy as prescribed (e.g., benzodiazepine, sedative, anticonvulsant, beta-blocker, thiamine).  Avoid hypoglycemia; maintain glycemic control.  Manage fever to preserve cerebral metabolism; use rapid cooling measures, such as external-cooling device, sponge or tub bath, internal-cooling method.  Anticipate the need for pharmacologic therapy for refractory delirium tremens.  Anticipate diagnostic testing, such as EEG (electroencephalogram), MRI (magnetic resonance imaging) or CT (computed tomography) for new onset seizures or unexplained neurologic symptoms.  Recent Flowsheet Documentation  Taken 10/3/2022 0832 by Eileen Pierre RN  Sensory Stimulation Regulation: care clustered  Airway/Ventilation Management: calming measures promoted     Problem: Substance Misuse (Alcohol Withdrawal)  Goal: Readiness for Change Identified  Outcome: Ongoing, Progressing     Problem: Adult Inpatient Plan of Care  Goal: Plan of Care Review  Outcome: Ongoing, Progressing  Flowsheets  Taken 10/3/2022 1023 by Arelis Amezcua OT  Plan of Care Reviewed With: patient  Outcome Evaluation: Pt mod ind supine to sit,  SBA STS,  CGA to ambulate in room with RW, setup to brush teeth standing sinkside,  min A LBD,  min A sit to supine.  Pt completed 10 reps BUE TE reporting fatigue and declined   strengthening d/t hand pain.  Pt progressing but limtied by weakness and decreased activity tolerance.  Taken 10/2/2022 1731 by Harley Hernandez RN  Progress: improving  Goal: Patient-Specific Goal (Individualized)  Outcome: Ongoing, Progressing  Flowsheets (Taken 10/2/2022 0232 by Nuria Villa LPN)  Patient-Specific Goals (Include Timeframe): free from injury with this admission  Individualized Care Needs:   monitor for proper swallowing of medication     Anxieties, Fears or Concerns: pt appears to general anxieties  Goal: Absence of Hospital-Acquired Illness or Injury  Outcome: Ongoing, Progressing  Intervention: Identify and Manage Fall Risk  Description: Perform standard risk assessment on admission using a validated tool or comprehensive approach appropriate to the patient; reassess fall risk frequently, with change in status or transfer to another level of care.  Communicate fall injury risk to interprofessional healthcare team.  Determine need for increased observation, equipment and environmental modification, such as low bed, signage and supportive, nonskid footwear.  Adjust safety measures to individual developmental age, stage and identified risk factors.  Reinforce the importance of safety and physical activity with patient and family.  Perform regular intentional rounding to assess need for position change, pain assessment and personal needs, including assistance with toileting.  Flowsheets  Taken 10/3/2022 1655  Safety Promotion/Fall Prevention:   activity supervised   assistive device/personal items within reach   clutter free environment maintained   fall prevention program maintained   muscle strengthening facilitated   room organization consistent   safety round/check completed   toileting scheduled  Taken 10/3/2022 1500  Safety Promotion/Fall Prevention:   activity supervised   clutter free environment maintained   fall prevention program maintained   nonskid shoes/slippers when out of bed    safety round/check completed   room organization consistent   toileting scheduled  Taken 10/3/2022 1200  Safety Promotion/Fall Prevention:   activity supervised   assistive device/personal items within reach   clutter free environment maintained   fall prevention program maintained   nonskid shoes/slippers when out of bed   room organization consistent   safety round/check completed   toileting scheduled  Taken 10/3/2022 1059  Safety Promotion/Fall Prevention:   activity supervised   assistive device/personal items within reach   clutter free environment maintained   fall prevention program maintained   nonskid shoes/slippers when out of bed   room organization consistent   safety round/check completed   toileting scheduled  Taken 10/3/2022 0832  Safety Promotion/Fall Prevention:   activity supervised   assistive device/personal items within reach   clutter free environment maintained   fall prevention program maintained   nonskid shoes/slippers when out of bed   room organization consistent   safety round/check completed   toileting scheduled  Intervention: Prevent Skin Injury  Description: Perform a screening for skin injury risk, such as pressure or moisture associated skin damage on admission and at regular intervals throughout hospital stay.  Keep all areas of skin (especially folds) clean and dry.  Maintain adequate skin hydration.  Relieve and redistribute pressure and protect bony prominences; implement measures based on patient-specific risk factors.  Match turning and repositioning schedule to clinical condition.  Encourage weight shift frequently; assist with reposition if unable to complete independently.  Float heels off bed; avoid pressure on the Achilles tendon.  Keep skin free from extended contact with medical devices.  Encourage functional activity and mobility, as early as tolerated.  Use aids (e.g., slide boards, mechanical lift) during transfer.  Flowsheets  Taken 10/3/2022 1500 by Ignacio  DANDY Arellano  Body Position: position changed independently  Taken 10/3/2022 1200 by Eileen Pierre RN  Body Position: position changed independently  Taken 10/3/2022 1059 by Eileen Pierre RN  Body Position: position changed independently  Taken 10/3/2022 0832 by Eileen Pierre RN  Body Position: position changed independently  Taken 10/3/2022 0400 by Vanessa Al RN  Skin Protection: adhesive use limited  Intervention: Prevent and Manage VTE (Venous Thromboembolism) Risk  Description: Assess for VTE (venous thromboembolism) risk.  Encourage and assist with early ambulation.  Initiate and maintain compression or other therapy, as indicated, based on identified risk in accordance with organizational protocol and provider order.  Encourage both active and passive leg exercises while in bed, if unable to ambulate.  Flowsheets  Taken 10/3/2022 1505 by Maryjane Ceballos PCT  Activity Management: activity adjusted per tolerance  Taken 10/3/2022 1500 by Eileen Pierre RN  Activity Management:   activity encouraged   up to bedside commode  Taken 10/3/2022 1200 by Eileen Pierre RN  Activity Management:   activity adjusted per tolerance   patient refuses activity   ambulated outside room  Taken 10/3/2022 0832 by Eileen Pierre RN  Activity Management:   activity adjusted per tolerance   dorsiflexion/plantar flexion performed  VTE Prevention/Management:   patient refused intervention   sequential compression devices off  Range of Motion: active ROM (range of motion) encouraged  Intervention: Prevent Infection  Description: Maintain skin and mucous membrane integrity; promote hand, oral and pulmonary hygiene.  Optimize fluid balance, nutrition, sleep and glycemic control to maximize infection resistance.  Identify potential sources of infection early to prevent or mitigate progression of infection (e.g., wound, lines, devices).  Evaluate ongoing need for invasive devices; remove promptly when no longer  indicated.  Flowsheets  Taken 10/3/2022 1655  Infection Prevention: hand hygiene promoted  Taken 10/3/2022 1500  Infection Prevention: equipment surfaces disinfected  Taken 10/3/2022 1200  Infection Prevention: hand hygiene promoted  Taken 10/3/2022 1059  Infection Prevention: hand hygiene promoted  Taken 10/3/2022 0832  Infection Prevention: hand hygiene promoted  Goal: Optimal Comfort and Wellbeing  Outcome: Ongoing, Progressing  Intervention: Monitor Pain and Promote Comfort  Description: Assess pain level, treatment efficacy and patient response at regular intervals using a consistent pain scale.  Consider the presence and impact of preexisting chronic pain.  Encourage patient and caregiver involvement in pain assessment, interventions and safety measures.  Flowsheets (Taken 10/2/2022 1800 by Harley Hernandez RN)  Pain Management Interventions:   quiet environment facilitated   relaxation techniques promoted  Intervention: Provide Person-Centered Care  Description: Use a family-focused approach to care.  Develop trust and rapport by proactively providing information, encouraging questions, addressing concerns and offering reassurance.  Acknowledge emotional response to hospitalization.  Recognize and utilize personal coping strategies.  Honor spiritual and cultural preferences.  Flowsheets (Taken 10/3/2022 1059)  Trust Relationship/Rapport: care explained  Goal: Readiness for Transition of Care  Outcome: Ongoing, Progressing  Intervention: Mutually Develop Transition Plan  Description: Identify available resources for support (e.g., family, friends, community).  Identify and address barriers to ongoing treatment and home management (e.g., environmental, financial).  Provide opportunities to practice self-management skills.  Assess and monitor emotional readiness for transition.  Establish or reconnect linkage with outpatient providers or community-based services.  Flowsheets  Taken 9/26/2022 1049 by Click,  Gabby BROTHERS RN  Discharge Coordination/Progress: Lives in [place] by herself.  Does have friends that can come help if needed.  No DME or history of HH.  Has advanced directive.  Equipment Currently Used at Home: none  Transportation Anticipated: family or friend will provide  Transportation Concerns: none  Concerns to be Addressed: denies needs/concerns at this time  Patient/Family Anticipated Services at Transition:   Patient/Family Anticipates Transition to: home  Taken 9/26/2022 1014 by Dora Tariq RN  Anticipated Changes Related to Illness: none  Current Discharge Risk:   substance use/abuse   lives alone   chronically ill  Readmission Within the Last 30 Days: no previous admission in last 30 days     Problem: Fall Injury Risk  Goal: Absence of Fall and Fall-Related Injury  Outcome: Ongoing, Progressing  Intervention: Identify and Manage Contributors  Description: Develop a fall prevention plan with the patient and caregiver/family.  Provide reorientation, appropriate sensory stimulation and routines with changes in mental status to decrease risk of fall.  Promote use of personal vision and auditory aids.  Assess assistance level required for safe and effective self-care; provide support as needed, such as toileting, mobilization. For age 65 and older, implement timed toileting with assistance.  Encourage physical activity, such as performance of mobility and self-care at highest level of patient ability, multicomponent exercise program and provision of appropriate assistive devices.  If fall occurs, assess the severity of injury; implement fall injury protocol. Determine the cause and revise fall injury prevention plan.  Regularly review medication contribution to fall risk; adjust medication administration times to minimize risk of falling.  Consider risk related to polypharmacy and age.  Balance adequate pain management with potential for oversedation.  Flowsheets  Taken 10/3/2022  1655  Medication Review/Management: medications reviewed  Taken 10/3/2022 1500  Medication Review/Management: medications reviewed  Taken 10/3/2022 1200  Medication Review/Management: medications reviewed  Taken 10/3/2022 1059  Medication Review/Management: medications reviewed  Intervention: Promote Injury-Free Environment  Description: Provide a safe, barrier-free environment that encourages independent activity.  Keep care area uncluttered and well-lighted.  Determine need for increased observation or monitoring.  Avoid use of devices that minimize mobility, such as restraints or indwelling urinary catheter.  Recent Flowsheet Documentation  Taken 10/3/2022 1655 by Eileen Pierre, RN  Safety Promotion/Fall Prevention:   activity supervised   assistive device/personal items within reach   clutter free environment maintained   fall prevention program maintained   muscle strengthening facilitated   room organization consistent   safety round/check completed   toileting scheduled  Taken 10/3/2022 1500 by Eileen Pierre, RN  Safety Promotion/Fall Prevention:   activity supervised   clutter free environment maintained   fall prevention program maintained   nonskid shoes/slippers when out of bed   safety round/check completed   room organization consistent   toileting scheduled  Taken 10/3/2022 1200 by Eileen Pierre, RN  Safety Promotion/Fall Prevention:   activity supervised   assistive device/personal items within reach   clutter free environment maintained   fall prevention program maintained   nonskid shoes/slippers when out of bed   room organization consistent   safety round/check completed   toileting scheduled  Taken 10/3/2022 1059 by Eileen Pierre, RN  Safety Promotion/Fall Prevention:   activity supervised   assistive device/personal items within reach   clutter free environment maintained   fall prevention program maintained   nonskid shoes/slippers when out of bed   room organization consistent   safety  round/check completed   toileting scheduled  Taken 10/3/2022 0832 by Eileen Pierre, RN  Safety Promotion/Fall Prevention:   activity supervised   assistive device/personal items within reach   clutter free environment maintained   fall prevention program maintained   nonskid shoes/slippers when out of bed   room organization consistent   safety round/check completed   toileting scheduled   Goal Outcome Evaluation:      Pt alert and oriented x4 vss on 2LNC. Pt had multiple bowel movements this shift. pt c/o pain in hands and reports it keeps her from doing adl's. See mar for treatment. pt ambulated 100ft outside of room with nursing staff x1 today. Up to chair x1 for supper.

## 2022-10-04 PROBLEM — F10.932 ALCOHOL WITHDRAWAL SYNDROME WITH PERCEPTUAL DISTURBANCE: Status: RESOLVED | Noted: 2019-05-11 | Resolved: 2022-10-04

## 2022-10-04 LAB
ALBUMIN SERPL-MCNC: 3.2 G/DL (ref 3.5–5.2)
ALBUMIN/GLOB SERPL: 0.8 G/DL
ALP SERPL-CCNC: 129 U/L (ref 39–117)
ALT SERPL W P-5'-P-CCNC: 39 U/L (ref 1–33)
ANION GAP SERPL CALCULATED.3IONS-SCNC: 10 MMOL/L (ref 5–15)
AST SERPL-CCNC: 113 U/L (ref 1–32)
BASOPHILS # BLD AUTO: 0.04 10*3/MM3 (ref 0–0.2)
BASOPHILS NFR BLD AUTO: 0.5 % (ref 0–1.5)
BILIRUB SERPL-MCNC: 1.3 MG/DL (ref 0–1.2)
BUN SERPL-MCNC: 5 MG/DL (ref 8–23)
BUN/CREAT SERPL: 11.9 (ref 7–25)
CALCIUM SPEC-SCNC: 9.3 MG/DL (ref 8.6–10.5)
CHLORIDE SERPL-SCNC: 100 MMOL/L (ref 98–107)
CO2 SERPL-SCNC: 27 MMOL/L (ref 22–29)
CREAT SERPL-MCNC: 0.42 MG/DL (ref 0.57–1)
DEPRECATED RDW RBC AUTO: 62 FL (ref 37–54)
EGFRCR SERPLBLD CKD-EPI 2021: 112.1 ML/MIN/1.73
EOSINOPHIL # BLD AUTO: 0.07 10*3/MM3 (ref 0–0.4)
EOSINOPHIL NFR BLD AUTO: 0.9 % (ref 0.3–6.2)
ERYTHROCYTE [DISTWIDTH] IN BLOOD BY AUTOMATED COUNT: 13.6 % (ref 12.3–15.4)
GLOBULIN UR ELPH-MCNC: 3.9 GM/DL
GLUCOSE SERPL-MCNC: 142 MG/DL (ref 65–99)
HCT VFR BLD AUTO: 31.3 % (ref 34–46.6)
HGB BLD-MCNC: 11 G/DL (ref 12–15.9)
IMM GRANULOCYTES # BLD AUTO: 0.06 10*3/MM3 (ref 0–0.05)
IMM GRANULOCYTES NFR BLD AUTO: 0.7 % (ref 0–0.5)
LYMPHOCYTES # BLD AUTO: 1.24 10*3/MM3 (ref 0.7–3.1)
LYMPHOCYTES NFR BLD AUTO: 15.3 % (ref 19.6–45.3)
MCH RBC QN AUTO: 43.1 PG (ref 26.6–33)
MCHC RBC AUTO-ENTMCNC: 35.1 G/DL (ref 31.5–35.7)
MCV RBC AUTO: 122.7 FL (ref 79–97)
MONOCYTES # BLD AUTO: 1 10*3/MM3 (ref 0.1–0.9)
MONOCYTES NFR BLD AUTO: 12.3 % (ref 5–12)
NEUTROPHILS NFR BLD AUTO: 5.71 10*3/MM3 (ref 1.7–7)
NEUTROPHILS NFR BLD AUTO: 70.3 % (ref 42.7–76)
NRBC BLD AUTO-RTO: 0 /100 WBC (ref 0–0.2)
PLATELET # BLD AUTO: 253 10*3/MM3 (ref 140–450)
PMV BLD AUTO: 9.5 FL (ref 6–12)
POTASSIUM SERPL-SCNC: 3.6 MMOL/L (ref 3.5–5.2)
PROT SERPL-MCNC: 7.1 G/DL (ref 6–8.5)
RBC # BLD AUTO: 2.55 10*6/MM3 (ref 3.77–5.28)
SODIUM SERPL-SCNC: 137 MMOL/L (ref 136–145)
WBC NRBC COR # BLD: 8.12 10*3/MM3 (ref 3.4–10.8)

## 2022-10-04 PROCEDURE — 85025 COMPLETE CBC W/AUTO DIFF WBC: CPT | Performed by: NURSE PRACTITIONER

## 2022-10-04 PROCEDURE — 80053 COMPREHEN METABOLIC PANEL: CPT | Performed by: NURSE PRACTITIONER

## 2022-10-04 PROCEDURE — 99239 HOSP IP/OBS DSCHRG MGMT >30: CPT | Performed by: NURSE PRACTITIONER

## 2022-10-04 RX ORDER — HYDROXYZINE HYDROCHLORIDE 25 MG/1
25 TABLET, FILM COATED ORAL EVERY 12 HOURS SCHEDULED
Status: DISCONTINUED | OUTPATIENT
Start: 2022-10-04 | End: 2022-10-05 | Stop reason: HOSPADM

## 2022-10-04 RX ADMIN — DIAZEPAM 5 MG: 5 TABLET ORAL at 00:34

## 2022-10-04 RX ADMIN — PANTOPRAZOLE SODIUM 40 MG: 40 TABLET, DELAYED RELEASE ORAL at 05:59

## 2022-10-04 RX ADMIN — LATANOPROST 1 DROP: 50 SOLUTION OPHTHALMIC at 20:15

## 2022-10-04 RX ADMIN — DIAZEPAM 5 MG: 5 TABLET ORAL at 08:54

## 2022-10-04 RX ADMIN — FLUOXETINE HYDROCHLORIDE 20 MG: 20 CAPSULE ORAL at 08:54

## 2022-10-04 RX ADMIN — HYDROXYZINE HYDROCHLORIDE 25 MG: 25 TABLET, FILM COATED ORAL at 16:00

## 2022-10-04 RX ADMIN — FOLIC ACID 1 MG: 1 TABLET ORAL at 08:54

## 2022-10-04 RX ADMIN — SALINE NASAL SPRAY 1 SPRAY: 1.5 SOLUTION NASAL at 20:17

## 2022-10-04 RX ADMIN — POLYETHYLENE GLYCOL 3350 17 G: 17 POWDER, FOR SOLUTION ORAL at 08:54

## 2022-10-04 RX ADMIN — Medication 10 ML: at 08:55

## 2022-10-04 RX ADMIN — TRAZODONE HYDROCHLORIDE 50 MG: 50 TABLET ORAL at 20:15

## 2022-10-04 RX ADMIN — Medication 10 ML: at 20:16

## 2022-10-04 RX ADMIN — DICLOFENAC 4 G: 10 GEL TOPICAL at 20:15

## 2022-10-04 NOTE — PLAN OF CARE
Goal Outcome Evaluation:  Plan of Care Reviewed With: patient      Progress: no change      Problem: Adult Inpatient Plan of Care  Goal: Absence of Hospital-Acquired Illness or Injury  Intervention: Identify and Manage Fall Risk  Recent Flowsheet Documentation  Taken 10/3/2022 2000 by Dominick Dickerson RN  Safety Promotion/Fall Prevention: activity supervised  Intervention: Prevent Skin Injury  Recent Flowsheet Documentation  Taken 10/3/2022 2000 by Dominick Dickerson RN  Body Position: position changed independently  Intervention: Prevent and Manage VTE (Venous Thromboembolism) Risk  Recent Flowsheet Documentation  Taken 10/3/2022 2000 by Dominick Dickerson RN  Activity Management: up to bedside commode     Problem: Adult Inpatient Plan of Care  Goal: Absence of Hospital-Acquired Illness or Injury  Intervention: Prevent Skin Injury  Recent Flowsheet Documentation  Taken 10/3/2022 2000 by Dominick Dickerson RN  Body Position: position changed independently     Problem: Adult Inpatient Plan of Care  Goal: Absence of Hospital-Acquired Illness or Injury  Intervention: Prevent and Manage VTE (Venous Thromboembolism) Risk  Recent Flowsheet Documentation  Taken 10/3/2022 2000 by Dominick Dickerson RN  Activity Management: up to bedside commode

## 2022-10-04 NOTE — PLAN OF CARE
"Goal Outcome Evaluation: pt was prepared for discharged, received call from  pt not able to discharge because she's being \"tapered off\" of valium and MD at [facility] won't accept her. Notified pt. Will CTM.     Problem: Alcohol Withdrawal  Goal: Alcohol Withdrawal Symptom Control  Outcome: Met     Problem: Acute Neurologic Deterioration (Alcohol Withdrawal)  Goal: Optimal Neurologic Function  Outcome: Met     Problem: Substance Misuse (Alcohol Withdrawal)  Goal: Readiness for Change Identified  Outcome: Met     Problem: Adult Inpatient Plan of Care  Goal: Plan of Care Review  Outcome: Met  Goal: Patient-Specific Goal (Individualized)  Outcome: Met  Goal: Absence of Hospital-Acquired Illness or Injury  Outcome: Met  Intervention: Identify and Manage Fall Risk  Description: Perform standard risk assessment on admission using a validated tool or comprehensive approach appropriate to the patient; reassess fall risk frequently, with change in status or transfer to another level of care.  Communicate fall injury risk to interprofessional healthcare team.  Determine need for increased observation, equipment and environmental modification, such as low bed, signage and supportive, nonskid footwear.  Adjust safety measures to individual developmental age, stage and identified risk factors.  Reinforce the importance of safety and physical activity with patient and family.  Perform regular intentional rounding to assess need for position change, pain assessment and personal needs, including assistance with toileting.  Recent Flowsheet Documentation  Taken 10/4/2022 0370 by Maria Alejandra Zamora RN  Safety Promotion/Fall Prevention:  • activity supervised  • clutter free environment maintained  • assistive device/personal items within reach  • fall prevention program maintained  • nonskid shoes/slippers when out of bed  • room organization consistent  • toileting scheduled  • safety round/check completed  Intervention: " Prevent Skin Injury  Description: Perform a screening for skin injury risk, such as pressure or moisture associated skin damage on admission and at regular intervals throughout hospital stay.  Keep all areas of skin (especially folds) clean and dry.  Maintain adequate skin hydration.  Relieve and redistribute pressure and protect bony prominences; implement measures based on patient-specific risk factors.  Match turning and repositioning schedule to clinical condition.  Encourage weight shift frequently; assist with reposition if unable to complete independently.  Float heels off bed; avoid pressure on the Achilles tendon.  Keep skin free from extended contact with medical devices.  Encourage functional activity and mobility, as early as tolerated.  Use aids (e.g., slide boards, mechanical lift) during transfer.  Recent Flowsheet Documentation  Taken 10/4/2022 0858 by Maria Alejandra Zamora, RN  Body Position: position changed independently  Skin Protection: adhesive use limited  Intervention: Prevent and Manage VTE (Venous Thromboembolism) Risk  Description: Assess for VTE (venous thromboembolism) risk.  Encourage and assist with early ambulation.  Initiate and maintain compression or other therapy, as indicated, based on identified risk in accordance with organizational protocol and provider order.  Encourage both active and passive leg exercises while in bed, if unable to ambulate.  Recent Flowsheet Documentation  Taken 10/4/2022 0858 by Maria Alejandra Zamora, RN  Activity Management: activity adjusted per tolerance  Intervention: Prevent Infection  Description: Maintain skin and mucous membrane integrity; promote hand, oral and pulmonary hygiene.  Optimize fluid balance, nutrition, sleep and glycemic control to maximize infection resistance.  Identify potential sources of infection early to prevent or mitigate progression of infection (e.g., wound, lines, devices).  Evaluate ongoing need for invasive devices; remove  promptly when no longer indicated.  Recent Flowsheet Documentation  Taken 10/4/2022 0858 by Maria Alejandra Zamora RN  Infection Prevention: environmental surveillance performed  Goal: Optimal Comfort and Wellbeing  Outcome: Met  Intervention: Monitor Pain and Promote Comfort  Description: Assess pain level, treatment efficacy and patient response at regular intervals using a consistent pain scale.  Consider the presence and impact of preexisting chronic pain.  Encourage patient and caregiver involvement in pain assessment, interventions and safety measures.  Recent Flowsheet Documentation  Taken 10/4/2022 0858 by Maria Alejandra Zamora RN  Pain Management Interventions: see MAR  Intervention: Provide Person-Centered Care  Description: Use a family-focused approach to care.  Develop trust and rapport by proactively providing information, encouraging questions, addressing concerns and offering reassurance.  Acknowledge emotional response to hospitalization.  Recognize and utilize personal coping strategies.  Honor spiritual and cultural preferences.  Recent Flowsheet Documentation  Taken 10/4/2022 0858 by Maria Alejandra Zamora RN  Trust Relationship/Rapport:  • care explained  • choices provided  • empathic listening provided  • questions answered  • thoughts/feelings acknowledged  Goal: Readiness for Transition of Care  Outcome: Met     Problem: Adult Inpatient Plan of Care  Goal: Optimal Comfort and Wellbeing  Outcome: Met  Intervention: Monitor Pain and Promote Comfort  Description: Assess pain level, treatment efficacy and patient response at regular intervals using a consistent pain scale.  Consider the presence and impact of preexisting chronic pain.  Encourage patient and caregiver involvement in pain assessment, interventions and safety measures.  Recent Flowsheet Documentation  Taken 10/4/2022 0858 by Maria Alejandra Zamora RN  Pain Management Interventions: see MAR  Intervention: Provide Person-Centered  Care  Description: Use a family-focused approach to care.  Develop trust and rapport by proactively providing information, encouraging questions, addressing concerns and offering reassurance.  Acknowledge emotional response to hospitalization.  Recognize and utilize personal coping strategies.  Honor spiritual and cultural preferences.  Recent Flowsheet Documentation  Taken 10/4/2022 0858 by Maria Alejandra Zamora, RN  Trust Relationship/Rapport:  • care explained  • choices provided  • empathic listening provided  • questions answered  • thoughts/feelings acknowledged     Problem: Fall Injury Risk  Goal: Absence of Fall and Fall-Related Injury  Outcome: Met  Intervention: Identify and Manage Contributors  Description: Develop a fall prevention plan with the patient and caregiver/family.  Provide reorientation, appropriate sensory stimulation and routines with changes in mental status to decrease risk of fall.  Promote use of personal vision and auditory aids.  Assess assistance level required for safe and effective self-care; provide support as needed, such as toileting, mobilization. For age 65 and older, implement timed toileting with assistance.  Encourage physical activity, such as performance of mobility and self-care at highest level of patient ability, multicomponent exercise program and provision of appropriate assistive devices.  If fall occurs, assess the severity of injury; implement fall injury protocol. Determine the cause and revise fall injury prevention plan.  Regularly review medication contribution to fall risk; adjust medication administration times to minimize risk of falling.  Consider risk related to polypharmacy and age.  Balance adequate pain management with potential for oversedation.  Recent Flowsheet Documentation  Taken 10/4/2022 0858 by Maria Alejandra Zamora, RN  Medication Review/Management: medications reviewed  Intervention: Promote Injury-Free Environment  Description: Provide a safe,  barrier-free environment that encourages independent activity.  Keep care area uncluttered and well-lighted.  Determine need for increased observation or monitoring.  Avoid use of devices that minimize mobility, such as restraints or indwelling urinary catheter.  Recent Flowsheet Documentation  Taken 10/4/2022 0804 by Maria Alejandra Zamora, RN  Safety Promotion/Fall Prevention:  • activity supervised  • clutter free environment maintained  • assistive device/personal items within reach  • fall prevention program maintained  • nonskid shoes/slippers when out of bed  • room organization consistent  • toileting scheduled  • safety round/check completed

## 2022-10-04 NOTE — DISCHARGE SUMMARY
[facility] Medicine Services  TRANSFER SUMMARY    Patient Name: Philomena Davis  : 1960  MRN: [id number]    Date of Admission: 2022  Date of Discharge:  10/4/2022  Length of Stay: 8  Primary Care Physician: No primary care provider on file.    Consults     No orders found from 2022 to 2022.          Hospital Course     Presenting Problem:   Alcohol withdrawal syndrome without complication (HCC) [F10.930]    Active Hospital Problems    Diagnosis  POA   • Elevated LFTs [R79.89]  Yes   • Hyperlipidemia [E78.5]  Yes   • Essential hypertension [I10]  Yes   • Generalized anxiety disorder [F41.1]  Yes      Resolved Hospital Problems    Diagnosis Date Resolved POA   • **Alcohol withdrawal syndrome with perceptual disturbance (HCC) [F10.932] 10/04/2022 Yes          Hospital Course:  Philomena Davis is a 62 y.o. female with chronic heavy alcohol abuse who presents with multiple complaints, predominantly abdominal pain and whole body swelling     ETOH abuse with dependence  Early alcohol withdrawal  Abnormal LFTs/hyperbilirubinemia  Cirrhosis  -US c/w portal HTN, cirrhosis  -PO protonix  -addiction team following  -- Continue seizure/withdrawal precautions  --  Labs stable.  Completed slow Valium taper prior to discharge today.  No further benzos required.     Respiratory failure  -nebs prn, pulmonary toilet, wean oxygen as tolerated  --Stable on 2 LNC.  Sats drop on room air.  Denies shortness of breath at rest on oxygen.     Acute debility  -Reports inability to ambulate safely due to swelling and generalized weakness  -PT/OT following.  -- Patient has not received a bed at rehab with plans to transfer today.     Macrocytosis  -Previous folate in 2019 undetectable; folate low with low normal B12, partner prior replaced both  -s/p folate IV x day 5/5 days, transitioned to p.o. follow-up PCP     Painful Hands/forearms  --xrays showed mild OA of DIP joints and moderate OA of  first carpometacarpal joint  --No erythema, or edema noted.  --Continue PRN toradol  --checked uric acid level, normal.  --Follow-up PCP     Anxiety  --continue vistaril prn  --Stable     Hypertension  Hyperlipidemia  -Holding lisinopril 2/2 potential BP changes with regular scheduled benzodiazepines.  Completed Valium taper.  No further withdrawal issues.  Valium discontinued  -PRN clonidine for SBP >180mmHg    Patient is seen resting back in bed in no acute distress.  Hemodynamically stable and afebrile.  Cleared for transfer to rehab today.  Going on medications below.  Meds and follow-ups as noted.        Discharge Follow Up Recommendations for outpatient labs/diagnostics:  Patient is cleared for transfer to rehab today.  Going on medications as below.  Follow-up as noted    --To be seen by provider at rehab on arrival, PCP 1 week of discharge    Day of Discharge     HPI:   Patient seen resting up in bed awake and alert.  No acute distress.  Chronically ill-appearing.  Denies nausea or vomiting.  Bilateral hand and forearm pain intermittent but currently still 8/10 scale.  Pain medications help.  No seizure-like activity.  Overall feels much improved but anxious about going to rehab.    Review of Systems  Gen- No fevers, chills  CV- No chest pain, palpitations  Resp- No cough, dyspnea  GI- No N/V/D, abd pain      Vital Signs:   Temp:  [97.7 °F (36.5 °C)-99 °F (37.2 °C)] 99 °F (37.2 °C)  Heart Rate:  [] 101  Resp:  [14-22] 18  BP: (124-133)/(78-86) 124/81     Physical Exam:  Constitutional: No acute distress, awake, alert.  Resting up in bed. No visitors at bedside.  HENT: NCAT, mucous membranes moist  Respiratory: Clear to auscultation bilaterally, respiratory effort normal on 2 LNC with sats 93%.  Cardiovascular: RRR, no murmurs, rubs, or gallops  Gastrointestinal: Positive bowel sounds, soft, nontender, nondistended  Musculoskeletal: Trace bilateral ankle edema.  MASSEY spontaneously. BLE hands and forearms  stiff and tingly intermittently (stable).  Psychiatric: Appropriate affect, cooperative  Neurologic: Oriented x 3, strength symmetric in all extremities, Cranial Nerves grossly intact to confrontation, speech clear and appropriate.  Follows commands.  Skin: No rashes    Pertinent Results     Results from last 7 days   Lab Units 10/04/22  0410 09/29/22  0422 09/28/22  0320   WBC 10*3/mm3 8.12 6.92 6.45   HEMOGLOBIN g/dL 11.0* 10.5* 9.8*   HEMATOCRIT % 31.3* 29.1* 27.0*   PLATELETS 10*3/mm3 253 154 138*   SODIUM mmol/L 137 137 136   POTASSIUM mmol/L 3.6 4.0 4.1   CHLORIDE mmol/L 100 103 102   CO2 mmol/L 27.0 27.0 25.0   BUN mg/dL 5* 7* 7*   CREATININE mg/dL 0.42* 0.57 0.61   GLUCOSE mg/dL 142* 95 95   CALCIUM mg/dL 9.3 8.8 8.2*     Results from last 7 days   Lab Units 10/04/22  0410 09/29/22  0422 09/28/22  0320   BILIRUBIN mg/dL 1.3* 1.5* 1.9*   ALK PHOS U/L 129* 146* 140*   ALT (SGPT) U/L 39* 39* 32   AST (SGOT) U/L 113* 139* 129*   PROTIME Seconds  --   --  14.4   INR   --   --  1.13           Invalid input(s): TG, LDLCALC, LDLREALC      Brief Urine Lab Results     None          Microbiology Results Abnormal     None          Imaging Results (All)     Procedure Component Value Units Date/Time    XR Chest 1 View [[id number]] Collected: 10/01/22 1449     Updated: 10/01/22 1453    Narrative:      XR CHEST 1 VW-     Date of Exam: 10/1/2022 1:28 PM     Indication: following hypoxia; F10.930-Alcohol use, unspecified with  withdrawal, uncomplicated; K29.20-Alcoholic gastritis without bleeding;  E87.20-Acidosis, unspecified; E86.9-Volume depletion, unspecified;  F10.932-Alcohol use, unspecified with withdrawal with perceptual  disturbance; I10-Essential (primary) hypertension.     Comparison:?09/26/2022     Technique:?A single view of the chest was obtained.     FINDINGS:     ?Heart size and pulmonary vessels are within normal limits.  There is a  new small right pleural effusion.  There is right basilar atelectasis.   Left  lung appears clear.  No evidence pneumothorax.             Impression:         New small right pleural effusion with minimal right basilar atelectasis.        This report was finalized on 10/1/2022 2:50 PM by Noel Baker MD.       XR Hand 3+ View Bilateral [[id number]] Collected: 09/28/22 1526     Updated: 09/28/22 1531    Narrative:      XR HAND 3+ VW BILATERAL-     Date of Exam: 9/28/2022 2:50 PM     Indication: bilateral hand pain; F10.230-Alcohol dependence with  withdrawal, uncomplicated; K29.20-Alcoholic gastritis without bleeding;  E87.2-Acidosis; E86.9-Volume depletion, unspecified     Comparison Exams: None available.     FINDINGS:     Right Hand and Wrist: Mild diffuse osteopenia. Alignment is anatomic. No  focal soft tissue swelling. Mild joint space narrowing interphalangeal  joints. Moderate joint space narrowing first carpal metacarpal joint..  No osseous erosions. No chondrocalcinosis or periosteal reaction. No  fractures or dislocations.     Left Hand and Wrist: Mild diffuse osteopenia. Alignment is anatomic. No  focal soft tissue swelling. Mild joint space narrowing interphalangeal  joints. Moderate joint space narrowing first carpal metacarpal joint..  No osseous erosions. No chondrocalcinosis or periosteal reaction. No  fractures or dislocations.       Impression:      No radiographic findings of erosive arthritis. Mild osteoarthritis  distal interphalangeal joints and moderate osteoarthritis first  carpometacarpal joint. Osteopenia.     This report was finalized on 9/28/2022 3:28 PM by Isabela Henderson MD.       US Liver [[id number]] Collected: 09/27/22 0847     Updated: 09/27/22 0858    Narrative:      EXAMINATION: US LIVER-     DATE OF EXAM:9/26/2022 11:15 AM     INDICATION: Alcohol abuse, elevated liver enzymes, possible alcoholic  cirrhosis.     COMPARISON: None available.     TECHNIQUE:  Sonographic grayscale, color Doppler, and spectral Doppler images of the  liver were obtained with  representative examples submitted to PACS for  interpretation.      Point shear wave elastography (pSWE) of the liver was performed using  [employer] software/hardware.      Reference Guidelines from:   Jean-Paul RG, Jamie SR, Vinay D, et al. Update to the Society of  Radiologists in Ultrasound Liver Elastography Consensus Statement.  Radiology. Volume 296:Number2-August 2020; p.263-274     FINDINGS:  US LIVER:  The hepatic echogenicity is diffusely increased. The liver has a nodular  contour consistent with hepatic cirrhosis. There is normal directional  flow in the main portal vein. The hepatic veins are patent. There is  echogenic sludge in the gallbladder. The common bile duct caliber is  normal measuring 4 mm. The right kidney is normal. It measures 9.1 x 4.5  x 4.3 cm. There is no ascites.     US ELASTOGRAPHY:  10 shear wave speed measurements were obtained using a right intercostal  approach. The IQR/Median was 0.16 which is < 0.30 indicating a good  quality dataset.     The median liver shear wave speed is 3.05 m/s. This rules in compensated  advanced chronic liver disease and is suggestive of clinically  significant portal hypertension..     NOTE:  In some patients with NAFLD, the cutoff values for compensated advanced  chronic liver disease may be lower and follow up or additional testing  would be recommended with those between 1.5 and 1.7 m/s.     Several conditions can increase liver stiffness unrelated to liver  fibrosis including acute hepatitis, liver inflammation, ALT levels >5x  normal, obstructive cholestasis, hepatic congestion, and infiltrative  liver diseases such as amyloidosis, lymphoma, or extramedullary  hematopoiesis. In all these conditions, however, stiffness values within  the normal range exclude significant fibrosis.       Impression:      1. Median liver shear wave speed is 3.05 m/s. This rules in compensated  advanced chronic liver disease and is suggestive of clinically  significant  portal hypertension.  2. Hepatic cirrhosis.  3. Echogenic sludge in the gallbladder.        This report was finalized on 9/27/2022 8:55 AM by Kraig Kohler MD.       XR Chest 1 View [[id number]] Collected: 09/26/22 0628     Updated: 09/26/22 0629    Narrative:      FRONTAL VIEW OF THE CHEST    CLINICAL INDICATION: Shortness of breath    COMPARISON: May 27, 2014.    FINDINGS:     Lines: None    No focal consolidation, pleural effusion or pneumothorax. Cardiomediastinal morphology is normal.  Osseous structures are unremarkable.        Impression:      No acute cardiopulmonary abnormality.    Electronically signed by:  Sujata Witt D.O.    9/26/2022 4:28 AM Mountain Time                  Discharge Details        Discharge Medications      New Medications      Instructions Start Date   cyanocobalamin 1000 MCG/ML injection   Inject 1 mL into the appropriate muscle as directed by prescriber once daily for 4 doses. Then weekly for 1 month. Then use monthly.      folic acid 1 MG tablet  Commonly known as: FOLVITE   1 mg, Oral, Daily      polyethylene glycol 17 g packet  Commonly known as: MIRALAX   17 g, Oral, Daily         Continue These Medications      Instructions Start Date   FLUoxetine 20 MG capsule  Commonly known as: PROzac   20 mg, Oral, Daily      latanoprost 0.005 % ophthalmic solution  Commonly known as: XALATAN   1 drop, Both Eyes, Nightly      multivitamin with minerals tablet tablet   1 tablet, Oral, Daily      pantoprazole 40 MG EC tablet  Commonly known as: PROTONIX   40 mg, Oral, Every Morning Before Breakfast      traZODone 50 MG tablet  Commonly known as: DESYREL   50 mg, Oral, Nightly         Stop These Medications    lisinopril 40 MG tablet  Commonly known as: PRINIVIL,ZESTRIL     zolpidem CR 12.5 MG CR tablet  Commonly known as: AMBIEN CR        ASK your doctor about these medications      Instructions Start Date   oxymetazoline 0.05 % nasal spray  Commonly known as: AFRIN  Ask about: Should I take this  medication?   2 sprays, Nasal, 2 Times Daily             Allergies   Allergen Reactions   • Codeine Nausea Only         Discharge Disposition:  Rehab Facility or Unit (DC - External)    Discharge Diet:  Diet Order   Procedures   • Diet Regular; GI Soft       Discharge Activity:  Activity Instructions     Activity as Tolerated      Measure Blood Pressure              CODE STATUS:    Code Status and Medical Interventions:   Ordered at: 09/26/22 0746     Code Status (Patient has no pulse and is not breathing):    CPR (Attempt to Resuscitate)     Medical Interventions (Patient has pulse or is breathing):    Full Support         Future Appointments   Date Time Provider Department Center   10/6/2022 11:30 AM Ainsley Blackmon APRN [facility]       Additional Instructions for the Follow-ups that You Need to Schedule     Discharge Follow-up with PCP   As directed       Currently Documented PCP:    No primary care provider on file.    PCP Phone Number:    None     Follow Up Details: with PCP in 1 week         Discharge Follow-up with Specified Provider: hepatology/GI   As directed      To: hepatology/GI    Follow Up Details: for new cirrhosis, likely 2nd to etoh                 Electronically signed by CANDY Villagomez, 10/04/22, 12:08 PM EDT.      Time Spent on Discharge: I spent 40 minutes on this discharge activity which included: face-to-face encounter with the patient, reviewing the data in the system, coordination of the care with the nursing staff as well as consultants, documentation, and entering orders.

## 2022-10-04 NOTE — CASE MANAGEMENT/SOCIAL WORK
Continued Stay Note  [facility]     Patient Name: Philomena Davis  MRN: [id number]  Today's Date: 10/4/2022    Admit Date: 9/26/2022    Plan: Discharge for 10-4  cancelled to [facility]   Discharge Plan     Row Name 10/04/22 1525       Plan    Plan Discharge for 10-4  cancelled to [facility]    Patient/Family in Agreement with Plan yes    Plan Comments Discharge for  cancelled today per MD at [facility] related to the tapering of PO Valium. I notified  and SHAKIRA Peterson and  felt it was ok to discontinue the Valium since patient was at the end of tapering dose, just a couple days left. All this information passed to Venkata with  and she talked with  admitting MD and still refused to take patient with Valium discontinued.  MD felt this was not the right thing to do. Patient aware not discharging today. Venkata with  asked to push back discharge til tomorrow and se how patient does 24 hours off Valium and hopefully MD will take her 10-5. Her insurance auth is good til 10-10. [facility] van will  on 10-5 at 1430. Анна    Final Discharge Disposition Code 62 - inpatient rehab facility    Row Name 10/04/22 1402       Plan    Plan Discharge for 1-4 cancelled    Plan Comments Discharge for [facility] cancelled today per MD at  b/c of tapering dose of Valium. I notified  and NP Nathalie Peterson and  felt it was okay to discontinue the Valium but MD at [facility] said no to this and still refused taking patient. I called the pharmacist on [address] and patient's taper is up on 10-7. Patient has been tapering for several days and felt it was ok to stop Valium too. Pharmacist offered to call someone at  about this. I called Venkata back at  and she said to push d/c back til tomorrow and leave Valium off and she how she does over the next 24 hours off Valium. I cancelled [facility] van today pushed back to 10-5 at 1430. Patient aware not going today. Анна    Final Discharge Disposition Code 62 - inpatient rehab  facility               Discharge Codes    No documentation.               Expected Discharge Date and Time     Expected Discharge Date Expected Discharge Time    Oct 4, 2022             Jackie George RN

## 2022-10-04 NOTE — CASE MANAGEMENT/SOCIAL WORK
Discharge Planning Assessment  [facility]     Patient Name: Philomena Davis  MRN: [id number]  Today's Date: 10/4/2022    Admit Date: 9/26/2022    Plan: [facility] today 10-4   Discharge Needs Assessment    No documentation.                Discharge Plan     Row Name 10/04/22 1142       Plan    Plan [facility] today 10-4    Patient/Family in Agreement with Plan yes    Plan Comments Patient has  bed today at [facility], acute bed,  10-4. Insurance approved. [facility] van to  at 1430 at the maternity entrance, [address]. Please, have patient at entrance by 1415. Covid test not needed. I talked with patient at  to let her know the good news and she still agrees to go. Please, send RW with her. I ordered that last week when we thought she was going home. Call report to [facility] @ [phone] and fax discharge summary to [phone]. Анна    Final Discharge Disposition Code 62 - inpatient rehab facility              Continued Care and Services - Admitted Since 9/26/2022     Destination Coordination complete.    Service Provider Request Status Selected Services Address Phone Fax Patient Preferred    [facility]   Selected Inpatient Rehabilitation [address] [id number] [phone] --          Durable Medical Equipment Coordination complete.    Service Provider Request Status Selected Services Address Phone Fax Patient Preferred    [facility]   Selected Durable Medical Equipment [address] [facility] [address] [phone] --              Expected Discharge Date and Time     Expected Discharge Date Expected Discharge Time    Oct 4, 2022          Demographic Summary    No documentation.                Functional Status    No documentation.                Psychosocial    No documentation.                Abuse/Neglect    No documentation.                Legal    No documentation.                Substance Abuse    No documentation.                Patient  Forms    No documentation.                   Jackie George RN

## 2022-10-05 VITALS
WEIGHT: 175.8 LBS | TEMPERATURE: 98.2 F | HEIGHT: 65 IN | BODY MASS INDEX: 29.29 KG/M2 | RESPIRATION RATE: 20 BRPM | OXYGEN SATURATION: 93 % | SYSTOLIC BLOOD PRESSURE: 131 MMHG | HEART RATE: 101 BPM | DIASTOLIC BLOOD PRESSURE: 77 MMHG

## 2022-10-05 PROCEDURE — 97110 THERAPEUTIC EXERCISES: CPT

## 2022-10-05 PROCEDURE — 97535 SELF CARE MNGMENT TRAINING: CPT

## 2022-10-05 RX ORDER — HYDROXYZINE HYDROCHLORIDE 25 MG/1
25 TABLET, FILM COATED ORAL EVERY 12 HOURS SCHEDULED
Qty: 10 TABLET | Refills: 0 | Status: SHIPPED | OUTPATIENT
Start: 2022-10-05 | End: 2022-10-10

## 2022-10-05 RX ADMIN — PANTOPRAZOLE SODIUM 40 MG: 40 TABLET, DELAYED RELEASE ORAL at 05:19

## 2022-10-05 RX ADMIN — FLUOXETINE HYDROCHLORIDE 20 MG: 20 CAPSULE ORAL at 08:39

## 2022-10-05 RX ADMIN — SALINE NASAL SPRAY 1 SPRAY: 1.5 SOLUTION NASAL at 08:39

## 2022-10-05 RX ADMIN — FOLIC ACID 1 MG: 1 TABLET ORAL at 08:39

## 2022-10-05 RX ADMIN — Medication 10 ML: at 08:39

## 2022-10-05 RX ADMIN — HYDROXYZINE HYDROCHLORIDE 25 MG: 25 TABLET, FILM COATED ORAL at 08:39

## 2022-10-05 RX ADMIN — DICLOFENAC 4 G: 10 GEL TOPICAL at 08:39

## 2022-10-05 RX ADMIN — POLYETHYLENE GLYCOL 3350 17 G: 17 POWDER, FOR SOLUTION ORAL at 08:40

## 2022-10-05 NOTE — PAYOR COMM NOTE
"Latoya Le RN  Utilization Management  P:[phone]  F:[phone]  Auth# [id number]  Philomena Davis (62 y.o. Female)             Date of Birth   1960    Social Security Number       Address   [address] [place] [address]    Home Phone   [phone]    MRN   [id number]       Denominational   Amish    Marital Status                               Admission Date   22    Admission Type   Emergency    Admitting Provider   Jose J Hutchins MD    Attending Provider       Department, Room/Bed   [facility] 5H, S581/1       Discharge Date   10/5/2022    Discharge Disposition   Rehab Facility or Unit (DC - External)    Discharge Destination                               Attending Provider: (none)   Allergies: Codeine    Isolation: None   Infection: None   Code Status: CPR   Advance Care Planning Activity    Ht: 165.1 cm (65\")   Wt: 79.7 kg (175 lb 12.8 oz)    Admission Cmt: None   Principal Problem: Alcohol withdrawal syndrome with perceptual disturbance (HCC) [F10.932]                 Active Insurance as of 2022     Primary Coverage     Payor Plan Insurance Group Employer/Plan Group    [employer] [place] [employer] [place]ICA[place]      Payor Plan Address Payor Plan Phone Number Payor Plan Fax Number Effective Dates    [address] [address] [phone]  2018 - None Entered    [place] [address]       Subscriber Name Subscriber Birth Date Member ID       PHILOMENA DAVIS 1960 [id number]                 Emergency Contacts      (Rel.) Home Phone Work Phone Mobile Phone    Madelin Null (Relative) [phone] -- [phone]               Discharge Summary      Daniel Al MD at 22 1141              [facility]  DISCHARGE SUMMARY    Patient Name: Philomena Davis  : 1960  MRN: [id number]    Date of Admission: 2022  5:44 AM  Date of Discharge:  2022  Primary Care Physician: No primary care provider on " file.    Consults     No orders found from 8/28/2022 to 9/27/2022.          Hospital Course     Presenting Problem:   Alcohol withdrawal syndrome without complication (HCC) [F10.230]    Active Hospital Problems    Diagnosis  POA   • **Alcohol withdrawal syndrome with perceptual disturbance (HCC) [F10.232]  Yes   • Elevated LFTs [R79.89]  Yes   • Hyperlipidemia [E78.5]  Yes   • Essential hypertension [I10]  Yes   • Generalized anxiety disorder [F41.1]  Yes      Resolved Hospital Problems   No resolved problems to display.          Hospital Course:  Philomena Davis is a 62 y.o. female female with chronic heavy alcohol abuse who presents with multiple complaints, predominantly abdominal pain and whole body swelling     Assessment/plan     EtOH abuse with dependence  Early alcohol withdrawal  Abnormal LFTs/hyperbilirubinemia  Cirrhosis  - lactate potentially from volume depletion, but could be poor hepatic clearance, given her complaints of swelling will change fluid bolus to continuous  - Reports drinking from when she wakes up until she goes to bed  - INR mildly elevated  - US liver shows suggestive of clinically significant portal HTN, hepatic cirrhosis, echogenic sludge in gallbladder  -Scheduled Valium, not needing PRN's, will continue valium taper at home.  Hold home ambien.  Advised no drinking with valium and no driving.  -BID IV Protonix x day 3/3 then transition to po  --addiction team following.   Patient declines rehab, AA meetings.   --New cirrhosis needs f/u with GI outpatient.  Counseled about new cirrhosis diagnosis and advised can not drink any more etoh.      Acute debility  -Reports inability to ambulate safely due to swelling and generalized weakness  -PT/OT recs rolling walker with HH PT     Macrocytosis  - Previous folate in 2019 undetectable; folate low with low normal B12, will replace both  -s/p 4 days IV folate.  Will transition to PO folic acid daily at home     Painful Hands  --xrays show  mild OA DIP joints and moderate OA first  prn tylenol(no more than 2GM/day)/ibuprofen at home     Anxiety     Hypertension  Hyperlipidemia  -Holding lisinopril as BP has been controlled without it here (may be d/t the benzos for WD)    Discharge Follow Up Recommendations for outpatient labs/diagnostics:   f/u with PCP in 1 week  F/u with hepatology for cirrhosis    Day of Discharge     HPI:   WD is ok, no tremors.  States that would like home health to help at home.    Review of Systems  Gen- No fevers, chills  CV- No chest pain, palpitations  Resp- No cough, dyspnea  GI- No N/V/D, abd pain        Vital Signs:   Temp:  [97.6 °F (36.4 °C)-98.4 °F (36.9 °C)] 98.4 °F (36.9 °C)  Heart Rate:  [] 100  Resp:  [16-20] 18  BP: ()/(54-89) 138/88  Flow (L/min):  [1-3] 1      Physical Exam:  Constitutional: No acute distress, awake, alert  HENT: NCAT, mucous membranes moist  Respiratory: Clear to auscultation bilaterally, respiratory effort normal   Cardiovascular: RRR, no murmurs, rubs, or gallops  Gastrointestinal: Positive bowel sounds, soft, nontender, nondistended  Musculoskeletal: No bilateral ankle edema  Psychiatric: Appropriate affect, cooperative  Neurologic: Oriented x 3, strength symmetric in all extremities, Cranial Nerves grossly intact to confrontation, speech clear, no tremor  Skin: No rashes      Pertinent  and/or Most Recent Results     LAB RESULTS:      Lab 09/29/22  0422 09/28/22  0320 09/27/22  0400 09/27/22  0359 09/26/22  0604   WBC 6.92 6.45 6.34  --  10.73   HEMOGLOBIN 10.5* 9.8* 10.7*  --  13.1   HEMATOCRIT 29.1* 27.0* 30.0*  --  36.0   PLATELETS 154 138* 148  --  243   NEUTROS ABS  --   --  4.48  --  8.36*   IMMATURE GRANS (ABS)  --   --  0.04  --  0.05   LYMPHS ABS  --   --  1.22  --  1.41   MONOS ABS  --   --  0.54  --  0.83   EOS ABS  --   --  0.04  --  0.04   .3* 122.7* 124.0*  --  120.4*   LACTATE  --   --   --  1.6 4.5*   PROTIME  --  14.4  --   --  13.4         Lab  09/29/22 0422 09/28/22 0320 09/27/22 0400 09/26/22  0604   SODIUM 137 136 136 130*   POTASSIUM 4.0 4.1 3.7 4.1   CHLORIDE 103 102 102 90*   CO2 27.0 25.0 27.0 24.0   ANION GAP 7.0 9.0 7.0 16.0*   BUN 7* 7* 6* 3*   CREATININE 0.57 0.61 0.64 0.53*   EGFR 104.2 102.5 101.3 106.0   GLUCOSE 95 95 92 106*   CALCIUM 8.8 8.2* 8.3* 9.4   IONIZED CALCIUM 1.27  --   --   --    MAGNESIUM 2.0  --   --   --    PHOSPHORUS 3.2  --   --   --          Lab 09/29/22 0422 09/28/22 0320 09/27/22 0400 09/26/22  0604   TOTAL PROTEIN 6.2 5.4* 5.7* 7.5   ALBUMIN 3.00* 2.40* 2.80* 3.60   GLOBULIN 3.2 3.0 2.9 3.9   ALT (SGPT) 39* 32 30 44*   AST (SGOT) 139* 129* 84* 130*   BILIRUBIN 1.5* 1.9* 2.1* 2.1*   ALK PHOS 146* 140* 123* 165*   LIPASE  --   --   --  33         Lab 09/28/22 0320 09/26/22  0604   PROBNP  --  101.9   TROPONIN T  --  <0.010   PROTIME 14.4 13.4   INR 1.13 1.03             Lab 09/26/22  0810   FOLATE <2.00*   VITAMIN B 12 302         Brief Urine Lab Results     None        Microbiology Results (last 10 days)     ** No results found for the last 240 hours. **          US Liver    Result Date: 9/27/2022  EXAMINATION: US LIVER-  DATE OF EXAM:9/26/2022 11:15 AM  INDICATION: Alcohol abuse, elevated liver enzymes, possible alcoholic cirrhosis.  COMPARISON: None available.  TECHNIQUE: Sonographic grayscale, color Doppler, and spectral Doppler images of the liver were obtained with representative examples submitted to PACS for interpretation.  Point shear wave elastography (pSWE) of the liver was performed using Siemens software/hardware.  Reference Guidelines from: Jean-Paul HERNÁNDEZ, Jamie SR, Vinay D, et al. Update to the Society of Radiologists in Ultrasound Liver Elastography Consensus Statement. Radiology. Volume 296:Number2-August 2020; p.263-274  FINDINGS: US LIVER: The hepatic echogenicity is diffusely increased. The liver has a nodular contour consistent with hepatic cirrhosis. There is normal directional flow in the main  portal vein. The hepatic veins are patent. There is echogenic sludge in the gallbladder. The common bile duct caliber is normal measuring 4 mm. The right kidney is normal. It measures 9.1 x 4.5 x 4.3 cm. There is no ascites.  US ELASTOGRAPHY: 10 shear wave speed measurements were obtained using a right intercostal approach. The IQR/Median was 0.16 which is < 0.30 indicating a good quality dataset.  The median liver shear wave speed is 3.05 m/s. This rules in compensated advanced chronic liver disease and is suggestive of clinically significant portal hypertension..  NOTE: In some patients with NAFLD, the cutoff values for compensated advanced chronic liver disease may be lower and follow up or additional testing would be recommended with those between 1.5 and 1.7 m/s.  Several conditions can increase liver stiffness unrelated to liver fibrosis including acute hepatitis, liver inflammation, ALT levels >5x normal, obstructive cholestasis, hepatic congestion, and infiltrative liver diseases such as amyloidosis, lymphoma, or extramedullary hematopoiesis. In all these conditions, however, stiffness values within the normal range exclude significant fibrosis.      1. Median liver shear wave speed is 3.05 m/s. This rules in compensated advanced chronic liver disease and is suggestive of clinically significant portal hypertension. 2. Hepatic cirrhosis. 3. Echogenic sludge in the gallbladder.   This report was finalized on 9/27/2022 8:55 AM by Kraig Kohler MD.      XR Chest 1 View    Result Date: 9/26/2022  FRONTAL VIEW OF THE CHEST CLINICAL INDICATION: Shortness of breath COMPARISON: May 27, 2014. FINDINGS: Lines: None No focal consolidation, pleural effusion or pneumothorax. Cardiomediastinal morphology is normal.  Osseous structures are unremarkable.     No acute cardiopulmonary abnormality. Electronically signed by:  Sujata Witt D.O.  9/26/2022 4:28 AM Mountain Time    XR Hand 3+ View Bilateral    Result Date:  9/28/2022  XR HAND 3+ VW BILATERAL-  Date of Exam: 9/28/2022 2:50 PM  Indication: bilateral hand pain; F10.230-Alcohol dependence with withdrawal, uncomplicated; K29.20-Alcoholic gastritis without bleeding; E87.2-Acidosis; E86.9-Volume depletion, unspecified  Comparison Exams: None available.  FINDINGS:  Right Hand and Wrist: Mild diffuse osteopenia. Alignment is anatomic. No focal soft tissue swelling. Mild joint space narrowing interphalangeal joints. Moderate joint space narrowing first carpal metacarpal joint.. No osseous erosions. No chondrocalcinosis or periosteal reaction. No fractures or dislocations.  Left Hand and Wrist: Mild diffuse osteopenia. Alignment is anatomic. No focal soft tissue swelling. Mild joint space narrowing interphalangeal joints. Moderate joint space narrowing first carpal metacarpal joint.. No osseous erosions. No chondrocalcinosis or periosteal reaction. No fractures or dislocations.      No radiographic findings of erosive arthritis. Mild osteoarthritis distal interphalangeal joints and moderate osteoarthritis first carpometacarpal joint. Osteopenia.  This report was finalized on 9/28/2022 3:28 PM by Isabela Henderson MD.                    Plan for Follow-up of Pending Labs/Results:     Discharge Details        Discharge Medications      New Medications      Instructions Start Date   cyanocobalamin 1000 MCG/ML injection   1,000 mcg, Intramuscular, Daily, Daily x 4 more days Then weekly x 1 mos Then monthly   Start Date: September 30, 2022     diazePAM 2 MG tablet  Commonly known as: VALIUM   Take 4 tabs TID x 1 day Then take 4 tabs BID x 2 days Then take 2 tabs BID x 2 days Then take 1 tab BID x 2 days Then 1 tab daily x 2 days Then off      folic acid 1 MG tablet  Commonly known as: FOLVITE   1 mg, Oral, Daily   Start Date: October 1, 2022     oxymetazoline 0.05 % nasal spray  Commonly known as: AFRIN   2 sprays, Nasal, 2 Times Daily      polyethylene glycol 17 g packet  Commonly known  as: MIRALAX   17 g, Oral, Daily         Continue These Medications      Instructions Start Date   FLUoxetine 20 MG capsule  Commonly known as: PROzac   20 mg, Oral, Daily      latanoprost 0.005 % ophthalmic solution  Commonly known as: XALATAN   1 drop, Both Eyes, Nightly      multivitamin with minerals tablet tablet   1 tablet, Oral, Daily      pantoprazole 40 MG EC tablet  Commonly known as: PROTONIX   40 mg, Oral, Every Morning Before Breakfast      traZODone 50 MG tablet  Commonly known as: DESYREL   50 mg, Oral, Nightly         Stop These Medications    lisinopril 40 MG tablet  Commonly known as: PRINIVIL,ZESTRIL     zolpidem CR 12.5 MG CR tablet  Commonly known as: AMBIEN CR            Allergies   Allergen Reactions   • Codeine Nausea Only         Discharge Disposition:  Home or Self Care    Diet:  Hospital:  Diet Order   Procedures   • Diet Regular; GI Soft       Activity:      Restrictions or Other Recommendations:         CODE STATUS:    Code Status and Medical Interventions:   Ordered at: 09/26/22 0746     Code Status (Patient has no pulse and is not breathing):    CPR (Attempt to Resuscitate)     Medical Interventions (Patient has pulse or is breathing):    Full Support       No future appointments.    Additional Instructions for the Follow-ups that You Need to Schedule     Discharge Follow-up with PCP   As directed       Currently Documented PCP:    No primary care provider on file.    PCP Phone Number:    None     Follow Up Details: with PCP in 1 week                     Daniel Al MD  09/29/22      Time Spent on Discharge:  I spent  42 minutes on this discharge activity which included: face-to-face encounter with the patient, reviewing the data in the system, coordination of the care with the nursing staff as well as consultants, documentation, and entering orders.            Electronically signed by Daniel Al MD at 09/29/22 1144     Melisa Villalobos APRN at 10/04/22 0451      Attestation signed by Jose J Hutchins MD at 10/04/22 2382    I have reviewed this documentation and agree.                      [facility]  TRANSFER SUMMARY    Patient Name: Philomena Davis  : 1960  MRN: [id number]    Date of Admission: 2022  Date of Discharge:  10/4/2022  Length of Stay: 8  Primary Care Physician: No primary care provider on file.    Consults     No orders found from 2022 to 2022.          Hospital Course     Presenting Problem:   Alcohol withdrawal syndrome without complication (HCC) [F10.930]    Active Hospital Problems    Diagnosis  POA   • Elevated LFTs [R79.89]  Yes   • Hyperlipidemia [E78.5]  Yes   • Essential hypertension [I10]  Yes   • Generalized anxiety disorder [F41.1]  Yes      Resolved Hospital Problems    Diagnosis Date Resolved POA   • **Alcohol withdrawal syndrome with perceptual disturbance (HCC) [F10.932] 10/04/2022 Yes          Hospital Course:  Philomena Davis is a 62 y.o. female with chronic heavy alcohol abuse who presents with multiple complaints, predominantly abdominal pain and whole body swelling     ETOH abuse with dependence  Early alcohol withdrawal  Abnormal LFTs/hyperbilirubinemia  Cirrhosis  -US c/w portal HTN, cirrhosis  -PO protonix  -addiction team following  -- Continue seizure/withdrawal precautions  --  Labs stable.  Completed slow Valium taper prior to discharge today.  No further benzos required.     Respiratory failure  -nebs prn, pulmonary toilet, wean oxygen as tolerated  --Stable on 2 LNC.  Sats drop on room air.  Denies shortness of breath at rest on oxygen.     Acute debility  -Reports inability to ambulate safely due to swelling and generalized weakness  -PT/OT following.  -- Patient has not received a bed at rehab with plans to transfer today.     Macrocytosis  -Previous folate in 2019 undetectable; folate low with low normal B12, partner prior replaced both  -s/p folate IV x day   days, transitioned to p.o. follow-up PCP     Painful Hands/forearms  --xrays showed mild OA of DIP joints and moderate OA of first carpometacarpal joint  --No erythema, or edema noted.  --Continue PRN toradol  --checked uric acid level, normal.  --Follow-up PCP     Anxiety  --continue vistaril prn  --Stable     Hypertension  Hyperlipidemia  -Holding lisinopril 2/2 potential BP changes with regular scheduled benzodiazepines.  Completed Valium taper.  No further withdrawal issues.  Valium discontinued  -PRN clonidine for SBP >180mmHg    Patient is seen resting back in bed in no acute distress.  Hemodynamically stable and afebrile.  Cleared for transfer to rehab today.  Going on medications below.  Meds and follow-ups as noted.        Discharge Follow Up Recommendations for outpatient labs/diagnostics:  Patient is cleared for transfer to rehab today.  Going on medications as below.  Follow-up as noted    --To be seen by provider at rehab on arrival, PCP 1 week of discharge    Day of Discharge     HPI:   Patient seen resting up in bed awake and alert.  No acute distress.  Chronically ill-appearing.  Denies nausea or vomiting.  Bilateral hand and forearm pain intermittent but currently still 8/10 scale.  Pain medications help.  No seizure-like activity.  Overall feels much improved but anxious about going to rehab.    Review of Systems  Gen- No fevers, chills  CV- No chest pain, palpitations  Resp- No cough, dyspnea  GI- No N/V/D, abd pain      Vital Signs:   Temp:  [97.7 °F (36.5 °C)-99 °F (37.2 °C)] 99 °F (37.2 °C)  Heart Rate:  [] 101  Resp:  [14-22] 18  BP: (124-133)/(78-86) 124/81     Physical Exam:  Constitutional: No acute distress, awake, alert.  Resting up in bed. No visitors at bedside.  HENT: NCAT, mucous membranes moist  Respiratory: Clear to auscultation bilaterally, respiratory effort normal on 2 LNC with sats 93%.  Cardiovascular: RRR, no murmurs, rubs, or gallops  Gastrointestinal: Positive bowel  sounds, soft, nontender, nondistended  Musculoskeletal: Trace bilateral ankle edema.  MASSEY spontaneously. BLE hands and forearms stiff and tingly intermittently (stable).  Psychiatric: Appropriate affect, cooperative  Neurologic: Oriented x 3, strength symmetric in all extremities, Cranial Nerves grossly intact to confrontation, speech clear and appropriate.  Follows commands.  Skin: No rashes    Pertinent Results     Results from last 7 days   Lab Units 10/04/22  0410 09/29/22  0422 09/28/22  0320   WBC 10*3/mm3 8.12 6.92 6.45   HEMOGLOBIN g/dL 11.0* 10.5* 9.8*   HEMATOCRIT % 31.3* 29.1* 27.0*   PLATELETS 10*3/mm3 253 154 138*   SODIUM mmol/L 137 137 136   POTASSIUM mmol/L 3.6 4.0 4.1   CHLORIDE mmol/L 100 103 102   CO2 mmol/L 27.0 27.0 25.0   BUN mg/dL 5* 7* 7*   CREATININE mg/dL 0.42* 0.57 0.61   GLUCOSE mg/dL 142* 95 95   CALCIUM mg/dL 9.3 8.8 8.2*     Results from last 7 days   Lab Units 10/04/22  0410 09/29/22  0422 09/28/22  0320   BILIRUBIN mg/dL 1.3* 1.5* 1.9*   ALK PHOS U/L 129* 146* 140*   ALT (SGPT) U/L 39* 39* 32   AST (SGOT) U/L 113* 139* 129*   PROTIME Seconds  --   --  14.4   INR   --   --  1.13           Invalid input(s): TG, LDLCALC, LDLREALC      Brief Urine Lab Results     None          Microbiology Results Abnormal     None          Imaging Results (All)     Procedure Component Value Units Date/Time    XR Chest 1 View [[id number]] Collected: 10/01/22 1449     Updated: 10/01/22 1453    Narrative:      XR CHEST 1 VW-     Date of Exam: 10/1/2022 1:28 PM     Indication: following hypoxia; F10.930-Alcohol use, unspecified with  withdrawal, uncomplicated; K29.20-Alcoholic gastritis without bleeding;  E87.20-Acidosis, unspecified; E86.9-Volume depletion, unspecified;  F10.932-Alcohol use, unspecified with withdrawal with perceptual  disturbance; I10-Essential (primary) hypertension.     Comparison:?09/26/2022     Technique:?A single view of the chest was obtained.     FINDINGS:     ?Heart size and  pulmonary vessels are within normal limits.  There is a  new small right pleural effusion.  There is right basilar atelectasis.   Left lung appears clear.  No evidence pneumothorax.             Impression:         New small right pleural effusion with minimal right basilar atelectasis.        This report was finalized on 10/1/2022 2:50 PM by Noel Baker MD.       XR Hand 3+ View Bilateral [[id number]] Collected: 09/28/22 1526     Updated: 09/28/22 1531    Narrative:      XR HAND 3+ VW BILATERAL-     Date of Exam: 9/28/2022 2:50 PM     Indication: bilateral hand pain; F10.230-Alcohol dependence with  withdrawal, uncomplicated; K29.20-Alcoholic gastritis without bleeding;  E87.2-Acidosis; E86.9-Volume depletion, unspecified     Comparison Exams: None available.     FINDINGS:     Right Hand and Wrist: Mild diffuse osteopenia. Alignment is anatomic. No  focal soft tissue swelling. Mild joint space narrowing interphalangeal  joints. Moderate joint space narrowing first carpal metacarpal joint..  No osseous erosions. No chondrocalcinosis or periosteal reaction. No  fractures or dislocations.     Left Hand and Wrist: Mild diffuse osteopenia. Alignment is anatomic. No  focal soft tissue swelling. Mild joint space narrowing interphalangeal  joints. Moderate joint space narrowing first carpal metacarpal joint..  No osseous erosions. No chondrocalcinosis or periosteal reaction. No  fractures or dislocations.       Impression:      No radiographic findings of erosive arthritis. Mild osteoarthritis  distal interphalangeal joints and moderate osteoarthritis first  carpometacarpal joint. Osteopenia.     This report was finalized on 9/28/2022 3:28 PM by Isabela Henderson MD.       US Liver [[id number]] Collected: 09/27/22 0847     Updated: 09/27/22 0858    Narrative:      EXAMINATION: US LIVER-     DATE OF EXAM:9/26/2022 11:15 AM     INDICATION: Alcohol abuse, elevated liver enzymes, possible alcoholic  cirrhosis.     COMPARISON: None  available.     TECHNIQUE:  Sonographic grayscale, color Doppler, and spectral Doppler images of the  liver were obtained with representative examples submitted to PACS for  interpretation.      Point shear wave elastography (pSWE) of the liver was performed using  Siemens software/hardware.      Reference Guidelines from:   Jean-Paul HERNÁNDEZ, Jamie SR, Vinay D, et al. Update to the Society of  Radiologists in Ultrasound Liver Elastography Consensus Statement.  Radiology. Volume 296:Number2-August 2020; p.263-274     FINDINGS:  US LIVER:  The hepatic echogenicity is diffusely increased. The liver has a nodular  contour consistent with hepatic cirrhosis. There is normal directional  flow in the main portal vein. The hepatic veins are patent. There is  echogenic sludge in the gallbladder. The common bile duct caliber is  normal measuring 4 mm. The right kidney is normal. It measures 9.1 x 4.5  x 4.3 cm. There is no ascites.     US ELASTOGRAPHY:  10 shear wave speed measurements were obtained using a right intercostal  approach. The IQR/Median was 0.16 which is < 0.30 indicating a good  quality dataset.     The median liver shear wave speed is 3.05 m/s. This rules in compensated  advanced chronic liver disease and is suggestive of clinically  significant portal hypertension..     NOTE:  In some patients with NAFLD, the cutoff values for compensated advanced  chronic liver disease may be lower and follow up or additional testing  would be recommended with those between 1.5 and 1.7 m/s.     Several conditions can increase liver stiffness unrelated to liver  fibrosis including acute hepatitis, liver inflammation, ALT levels >5x  normal, obstructive cholestasis, hepatic congestion, and infiltrative  liver diseases such as amyloidosis, lymphoma, or extramedullary  hematopoiesis. In all these conditions, however, stiffness values within  the normal range exclude significant fibrosis.       Impression:      1. Median liver shear wave  speed is 3.05 m/s. This rules in compensated  advanced chronic liver disease and is suggestive of clinically  significant portal hypertension.  2. Hepatic cirrhosis.  3. Echogenic sludge in the gallbladder.        This report was finalized on 9/27/2022 8:55 AM by Kraig Kohler MD.       XR Chest 1 View [[id number]] Collected: 09/26/22 0628     Updated: 09/26/22 0629    Narrative:      FRONTAL VIEW OF THE CHEST    CLINICAL INDICATION: Shortness of breath    COMPARISON: May 27, 2014.    FINDINGS:     Lines: None    No focal consolidation, pleural effusion or pneumothorax. Cardiomediastinal morphology is normal.  Osseous structures are unremarkable.        Impression:      No acute cardiopulmonary abnormality.    Electronically signed by:  Sujata Witt D.O.    9/26/2022 4:28 AM Mountain Time                  Discharge Details        Discharge Medications      New Medications      Instructions Start Date   cyanocobalamin 1000 MCG/ML injection   Inject 1 mL into the appropriate muscle as directed by prescriber once daily for 4 doses. Then weekly for 1 month. Then use monthly.      folic acid 1 MG tablet  Commonly known as: FOLVITE   1 mg, Oral, Daily      polyethylene glycol 17 g packet  Commonly known as: MIRALAX   17 g, Oral, Daily         Continue These Medications      Instructions Start Date   FLUoxetine 20 MG capsule  Commonly known as: PROzac   20 mg, Oral, Daily      latanoprost 0.005 % ophthalmic solution  Commonly known as: XALATAN   1 drop, Both Eyes, Nightly      multivitamin with minerals tablet tablet   1 tablet, Oral, Daily      pantoprazole 40 MG EC tablet  Commonly known as: PROTONIX   40 mg, Oral, Every Morning Before Breakfast      traZODone 50 MG tablet  Commonly known as: DESYREL   50 mg, Oral, Nightly         Stop These Medications    lisinopril 40 MG tablet  Commonly known as: PRINIVIL,ZESTRIL     zolpidem CR 12.5 MG CR tablet  Commonly known as: AMBIEN CR        ASK your doctor about these medications       Instructions Start Date   oxymetazoline 0.05 % nasal spray  Commonly known as: AFRIN  Ask about: Should I take this medication?   2 sprays, Nasal, 2 Times Daily             Allergies   Allergen Reactions   • Codeine Nausea Only         Discharge Disposition:  Rehab Facility or Unit (DC - External)    Discharge Diet:  Diet Order   Procedures   • Diet Regular; GI Soft       Discharge Activity:  Activity Instructions     Activity as Tolerated      Measure Blood Pressure              CODE STATUS:    Code Status and Medical Interventions:   Ordered at: 22 0746     Code Status (Patient has no pulse and is not breathing):    CPR (Attempt to Resuscitate)     Medical Interventions (Patient has pulse or is breathing):    Full Support         Future Appointments   Date Time Provider Department Center   10/6/2022 11:30 AM Ainsley Blackmon APRN [facility]       Additional Instructions for the Follow-ups that You Need to Schedule     Discharge Follow-up with PCP   As directed       Currently Documented PCP:    No primary care provider on file.    PCP Phone Number:    None     Follow Up Details: with PCP in 1 week         Discharge Follow-up with Specified Provider: hepatology/GI   As directed      To: hepatology/GI    Follow Up Details: for new cirrhosis, likely 2nd to etoh                 Electronically signed by CANDY Villagomez, 10/04/22, 12:08 PM EDT.      Time Spent on Discharge: I spent 40 minutes on this discharge activity which included: face-to-face encounter with the patient, reviewing the data in the system, coordination of the care with the nursing staff as well as consultants, documentation, and entering orders.        Electronically signed by Jose J Hutchins MD at 10/04/22 1343     Jose J Hutchins MD at 10/05/22 1118              [facility] Medicine Services  TRANSFER SUMMARY    Patient Name: Philomena Davis  : 1960  MRN: [id number]    Date of  Admission: 9/26/2022  Date of Discharge:  10/4/2022  Length of Stay: 9  Primary Care Physician: No primary care provider on file.    Consults     No orders found from 8/28/2022 to 9/27/2022.          Hospital Course     Presenting Problem:   Alcohol withdrawal syndrome without complication (HCC) [F10.930]    Active Hospital Problems    Diagnosis  POA   • Elevated LFTs [R79.89]  Yes   • Hyperlipidemia [E78.5]  Yes   • Essential hypertension [I10]  Yes   • Generalized anxiety disorder [F41.1]  Yes      Resolved Hospital Problems    Diagnosis Date Resolved POA   • **Alcohol withdrawal syndrome with perceptual disturbance (HCC) [F10.932] 10/04/2022 Yes          Hospital Course:  Philomena Davis is a 62 y.o. female with chronic heavy alcohol abuse who presents with multiple complaints, predominantly abdominal pain and whole body swelling     ETOH abuse with dependence  Early alcohol withdrawal  Abnormal LFTs/hyperbilirubinemia  Cirrhosis  -US c/w portal HTN, cirrhosis  -PO protonix  -addiction team following  -completed her valium taper on 10/4 without incident     Respiratory failure  -nebs prn, pulmonary toilet, wean oxygen as tolerated  --Stable on 2 LNC.  Sats drop on room air.  Denies shortness of breath at rest on oxygen.     Acute debility  -Reports inability to ambulate safely due to swelling and generalized weakness  -PT/OT following.  -- Patient has not received a bed at rehab with plans to transfer today.     Macrocytosis  -Previous folate in 2019 undetectable; folate low with low normal B12, partner prior replaced both  -s/p folate IV x day 5/5 days, transitioned to p.o. follow-up PCP     Painful Hands/forearms  --xrays showed mild OA of DIP joints and moderate OA of first carpometacarpal joint  --No erythema, or edema noted.  --Continue PRN toradol  --checked uric acid level, normal.  --Follow-up PCP  --continues to improve with mobility     Anxiety  --continue vistaril  prn  --Stable     Hypertension  Hyperlipidemia  -Holding lisinopril 2/2 potential BP changes with regular scheduled benzodiazepines.  Completed Valium taper.  No further withdrawal issues.  Valium discontinued  -PRN clonidine for SBP >180mmHg    Patient is seen resting back in bed in no acute distress.  Hemodynamically stable and afebrile.  Cleared for transfer to rehab today.  Going on medications below.  Meds and follow-ups as noted.        Discharge Follow Up Recommendations for outpatient labs/diagnostics:  Patient is cleared for transfer to rehab today.  Going on medications as below.  Follow-up as noted    --To be seen by provider at rehab on arrival, PCP 1 week of discharge    Day of Discharge     HPI:   Notes joint stiffness/improving.  Review of Systems  Gen- No fevers, chills  CV- No chest pain, palpitations  Resp- No cough, dyspnea  GI- No N/V/D, abd pain      Vital Signs:   Temp:  [97.9 °F (36.6 °C)-98.4 °F (36.9 °C)] 98.4 °F (36.9 °C)  Heart Rate:  [94-99] 98  Resp:  [16-18] 16  BP: (120-129)/(71-83) 123/81     Physical Exam:  Constitutional: No acute distress, awake, alert.  Resting up in bed. No visitors at bedside.  HENT: NCAT, mucous membranes moist  Respiratory: Clear to auscultation bilaterally, respiratory effort normal on 2 LNC with sats 93%.  Cardiovascular: RRR, no murmurs, rubs, or gallops  Gastrointestinal: Positive bowel sounds, soft, nontender, nondistended  Musculoskeletal: Trace bilateral ankle edema.  MASSEY spontaneously. BLE hands and forearms stiff and tingly intermittently (stable).  Psychiatric: Appropriate affect, cooperative  Neurologic: Oriented x 3, strength symmetric in all extremities, Cranial Nerves grossly intact to confrontation, speech clear and appropriate.  Follows commands.  Skin: No rashes    Pertinent Results     Results from last 7 days   Lab Units 10/04/22  0410 09/29/22  0422   WBC 10*3/mm3 8.12 6.92   HEMOGLOBIN g/dL 11.0* 10.5*   HEMATOCRIT % 31.3* 29.1*    PLATELETS 10*3/mm3 253 154   SODIUM mmol/L 137 137   POTASSIUM mmol/L 3.6 4.0   CHLORIDE mmol/L 100 103   CO2 mmol/L 27.0 27.0   BUN mg/dL 5* 7*   CREATININE mg/dL 0.42* 0.57   GLUCOSE mg/dL 142* 95   CALCIUM mg/dL 9.3 8.8     Results from last 7 days   Lab Units 10/04/22  0410 09/29/22  0422   BILIRUBIN mg/dL 1.3* 1.5*   ALK PHOS U/L 129* 146*   ALT (SGPT) U/L 39* 39*   AST (SGOT) U/L 113* 139*           Invalid input(s): TG, LDLCALC, LDLREALC      Brief Urine Lab Results     None          Microbiology Results Abnormal     None          Imaging Results (All)     Procedure Component Value Units Date/Time    XR Chest 1 View [[id number]] Collected: 10/01/22 1449     Updated: 10/01/22 1453    Narrative:      XR CHEST 1 VW-     Date of Exam: 10/1/2022 1:28 PM     Indication: following hypoxia; F10.930-Alcohol use, unspecified with  withdrawal, uncomplicated; K29.20-Alcoholic gastritis without bleeding;  E87.20-Acidosis, unspecified; E86.9-Volume depletion, unspecified;  F10.932-Alcohol use, unspecified with withdrawal with perceptual  disturbance; I10-Essential (primary) hypertension.     Comparison:?09/26/2022     Technique:?A single view of the chest was obtained.     FINDINGS:     ?Heart size and pulmonary vessels are within normal limits.  There is a  new small right pleural effusion.  There is right basilar atelectasis.   Left lung appears clear.  No evidence pneumothorax.             Impression:         New small right pleural effusion with minimal right basilar atelectasis.        This report was finalized on 10/1/2022 2:50 PM by Noel Baker MD.       XR Hand 3+ View Bilateral [[id number]] Collected: 09/28/22 1526     Updated: 09/28/22 1531    Narrative:      XR HAND 3+ VW BILATERAL-     Date of Exam: 9/28/2022 2:50 PM     Indication: bilateral hand pain; F10.230-Alcohol dependence with  withdrawal, uncomplicated; K29.20-Alcoholic gastritis without bleeding;  E87.2-Acidosis; E86.9-Volume depletion,  unspecified     Comparison Exams: None available.     FINDINGS:     Right Hand and Wrist: Mild diffuse osteopenia. Alignment is anatomic. No  focal soft tissue swelling. Mild joint space narrowing interphalangeal  joints. Moderate joint space narrowing first carpal metacarpal joint..  No osseous erosions. No chondrocalcinosis or periosteal reaction. No  fractures or dislocations.     Left Hand and Wrist: Mild diffuse osteopenia. Alignment is anatomic. No  focal soft tissue swelling. Mild joint space narrowing interphalangeal  joints. Moderate joint space narrowing first carpal metacarpal joint..  No osseous erosions. No chondrocalcinosis or periosteal reaction. No  fractures or dislocations.       Impression:      No radiographic findings of erosive arthritis. Mild osteoarthritis  distal interphalangeal joints and moderate osteoarthritis first  carpometacarpal joint. Osteopenia.     This report was finalized on 9/28/2022 3:28 PM by Isabela Henderson MD.       US Liver [[id number]] Collected: 09/27/22 0847     Updated: 09/27/22 0858    Narrative:      EXAMINATION: US LIVER-     DATE OF EXAM:9/26/2022 11:15 AM     INDICATION: Alcohol abuse, elevated liver enzymes, possible alcoholic  cirrhosis.     COMPARISON: None available.     TECHNIQUE:  Sonographic grayscale, color Doppler, and spectral Doppler images of the  liver were obtained with representative examples submitted to PACS for  interpretation.      Point shear wave elastography (pSWE) of the liver was performed using  Siem[employer] software/hardware.      Reference Guidelines from:   Jean-Paul HERNÁNDEZ, Jamie SR, Vinay D, et al. Update to the Society of  Radiologists in Ultrasound Liver Elastography Consensus Statement.  Radiology. Volume 296:Number2-August 2020; p.263-274     FINDINGS:  US LIVER:  The hepatic echogenicity is diffusely increased. The liver has a nodular  contour consistent with hepatic cirrhosis. There is normal directional  flow in the main portal vein. The hepatic  veins are patent. There is  echogenic sludge in the gallbladder. The common bile duct caliber is  normal measuring 4 mm. The right kidney is normal. It measures 9.1 x 4.5  x 4.3 cm. There is no ascites.     US ELASTOGRAPHY:  10 shear wave speed measurements were obtained using a right intercostal  approach. The IQR/Median was 0.16 which is < 0.30 indicating a good  quality dataset.     The median liver shear wave speed is 3.05 m/s. This rules in compensated  advanced chronic liver disease and is suggestive of clinically  significant portal hypertension..     NOTE:  In some patients with NAFLD, the cutoff values for compensated advanced  chronic liver disease may be lower and follow up or additional testing  would be recommended with those between 1.5 and 1.7 m/s.     Several conditions can increase liver stiffness unrelated to liver  fibrosis including acute hepatitis, liver inflammation, ALT levels >5x  normal, obstructive cholestasis, hepatic congestion, and infiltrative  liver diseases such as amyloidosis, lymphoma, or extramedullary  hematopoiesis. In all these conditions, however, stiffness values within  the normal range exclude significant fibrosis.       Impression:      1. Median liver shear wave speed is 3.05 m/s. This rules in compensated  advanced chronic liver disease and is suggestive of clinically  significant portal hypertension.  2. Hepatic cirrhosis.  3. Echogenic sludge in the gallbladder.        This report was finalized on 9/27/2022 8:55 AM by Kraig Kohler MD.       XR Chest 1 View [[id number]] Collected: 09/26/22 0628     Updated: 09/26/22 0629    Narrative:      FRONTAL VIEW OF THE CHEST    CLINICAL INDICATION: Shortness of breath    COMPARISON: May 27, 2014.    FINDINGS:     Lines: None    No focal consolidation, pleural effusion or pneumothorax. Cardiomediastinal morphology is normal.  Osseous structures are unremarkable.        Impression:      No acute cardiopulmonary  abnormality.    Electronically signed by:  Sujata Witt D.O.    9/26/2022 4:28 AM Mountain Time                  Discharge Details        Discharge Medications      New Medications      Instructions Start Date   cyanocobalamin 1000 MCG/ML injection   Inject 1 mL into the appropriate muscle as directed by prescriber once daily for 4 doses. Then weekly for 1 month. Then use monthly.      folic acid 1 MG tablet  Commonly known as: FOLVITE   1 mg, Oral, Daily      hydrOXYzine 25 MG tablet  Commonly known as: ATARAX   25 mg, Oral, Every 12 Hours Scheduled      polyethylene glycol 17 g packet  Commonly known as: MIRALAX   17 g, Oral, Daily         Continue These Medications      Instructions Start Date   FLUoxetine 20 MG capsule  Commonly known as: PROzac   20 mg, Oral, Daily      latanoprost 0.005 % ophthalmic solution  Commonly known as: XALATAN   1 drop, Both Eyes, Nightly      multivitamin with minerals tablet tablet   1 tablet, Oral, Daily      pantoprazole 40 MG EC tablet  Commonly known as: PROTONIX   40 mg, Oral, Every Morning Before Breakfast      traZODone 50 MG tablet  Commonly known as: DESYREL   50 mg, Oral, Nightly         Stop These Medications    lisinopril 40 MG tablet  Commonly known as: PRINIVIL,ZESTRIL     zolpidem CR 12.5 MG CR tablet  Commonly known as: AMBIEN CR        ASK your doctor about these medications      Instructions Start Date   oxymetazoline 0.05 % nasal spray  Commonly known as: AFRIN  Ask about: Should I take this medication?   2 sprays, Nasal, 2 Times Daily             Allergies   Allergen Reactions   • Codeine Nausea Only         Discharge Disposition:  Rehab Facility or Unit (DC - External)    Discharge Diet:  Diet Order   Procedures   • Diet Regular; GI Soft       Discharge Activity:  Activity Instructions     Activity as Tolerated      Measure Blood Pressure              CODE STATUS:    Code Status and Medical Interventions:   Ordered at: 09/26/22 0746     Code Status (Patient has no  pulse and is not breathing):    CPR (Attempt to Resuscitate)     Medical Interventions (Patient has pulse or is breathing):    Full Support         Future Appointments   Date Time Provider Department Center   10/6/2022 11:30 AM Ainsley Blackmon APRN [facility]       Additional Instructions for the Follow-ups that You Need to Schedule     Discharge Follow-up with PCP   As directed       Currently Documented PCP:    No primary care provider on file.    PCP Phone Number:    None     Follow Up Details: with PCP in 1 week         Discharge Follow-up with Specified Provider: hepatology/GI   As directed      To: hepatology/GI    Follow Up Details: for new cirrhosis, likely 2nd to etoh                 Electronically signed by CANDY Villagomez, 10/04/22, 12:08 PM EDT.      Time Spent on Discharge: I spent 40 minutes on this discharge activity which included: face-to-face encounter with the patient, reviewing the data in the system, coordination of the care with the nursing staff as well as consultants, documentation, and entering orders.        Electronically signed by Jose J Hutchins MD at 10/05/22 1125

## 2022-10-05 NOTE — NURSING NOTE
Attempted to call report to nurse receiving patient. [place] stated she would have the nurse call me and took my name and number. Patient leaving by [facility] carlos at 1430. Will attempt to call again.

## 2022-10-05 NOTE — CASE MANAGEMENT/SOCIAL WORK
Case Management Discharge Note      Final Note: Patient is transferring to [facility] today, 10-5. Patient going to the [facility] and call report to [phone] and fax dishcarge summary to [phone]. Covid test not needed. [facility] van to poick up at 1430 at the Maternity Entrance, [address] at 1430. Please have patient at entrance by 1415. I talked with patient this morning and she agrees to go. Patient is nervous about leaving here and going to a new place. Анна         Selected Continued Care - Admitted Since 9/26/2022     Destination Coordination complete.    Service Provider Selected Services Address Phone Fax Patient Preferred    [facility]  Inpatient Rehabilitation [address] [address] [phone] --          Durable Medical Equipment Coordination complete.    Service Provider Selected Services Address Phone Fax Patient Preferred    [facility] [place]  Durable Medical Equipment [address] [place] [address] [phone] --          Dialysis/Infusion    No services have been selected for the patient.              Home Medical Care    No services have been selected for the patient.              Therapy    No services have been selected for the patient.              Community Resources    No services have been selected for the patient.              Community & DME    No services have been selected for the patient.                  Transportation Services  Ambulance: [facility] Transportation    Final Discharge Disposition Code: 62 - inpatient rehab facility

## 2022-10-05 NOTE — THERAPY TREATMENT NOTE
Patient Name: Philomena Davis  : 1960    MRN: [id number]                              Today's Date: 10/5/2022       Admit Date: 2022    Visit Dx:     ICD-10-CM ICD-9-CM   1. Alcohol withdrawal syndrome without complication (HCC)  F10.230 291.81   2. Acute alcoholic gastritis without hemorrhage  K29.20 535.30   3. Lactic acidosis  E87.2 276.2   4. Volume depletion  E86.9 276.50   5. Alcohol withdrawal syndrome with perceptual disturbance (HCC)  F10.232 291.81   6. 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   • Digital mucous cyst of toe of left foot   • Alcohol withdrawal (HCC)   • Alcoholism /alcohol abuse   • Hematemesis   • Diabetes mellitus, type 2 (HCC)   • Essential hypertension   • Generalized anxiety disorder   • Hyperlipidemia   • Glaucoma   • Elevated LFTs     Past Medical History:   Diagnosis Date   • Anxiety and depression    • Closed displaced fracture of lateral malleolus of left fibula    • Diabetes mellitus (HCC)    • Ganglion cyst of left foot    • Glaucoma    • Hypertension    • Wears glasses      Past Surgical History:   Procedure Laterality Date   • ANKLE OPEN REDUCTION INTERNAL FIXATION Left 3/24/2017    Procedure: LEFT ANKLE OPEN REDUCTION INTERNAL FIXATION WITH ILIAC CREST BONE GRAFT , EXCISION CYST 4TH/5TH INNERSPACE LEFT FOOT;  Surgeon: Frank Larson MD;  Location:  [facility];  Service:    • AUGMENTATION MAMMAPLASTY Bilateral    • BREAST AUGMENTATION     • BREAST EXCISIONAL BIOPSY Right    • DILATION AND CURETTAGE, DIAGNOSTIC / THERAPEUTIC     • IA FIX NON/MALUNION FEMUR,W GRAFT Left 3/24/2017    Procedure: ILIAC CREST BONE GRAFT;  Surgeon: Frank Larson MD;  Location:  [facility];  Service: Orthopedics   • WRIST SURGERY        General Information     Row Name 10/05/22 0735          OT Time and Intention    Document Type therapy note (daily note)  -AC      Mode of Treatment occupational therapy  -     Row Name 10/05/22 0735          General Information    Patient Profile Reviewed yes  -     Prior Level of Function independent:;all household mobility;ADL's  -     Existing Precautions/Restrictions fall;seizures  -     Row Name 10/05/22 0735          Cognition    Orientation Status (Cognition) oriented x 3  -     Row Name 10/05/22 0735          Safety Issues, Functional Mobility    Safety Issues Affecting Function (Mobility) safety precaution awareness;insight into deficits/self-awareness  -     Impairments Affecting Function (Mobility) balance;endurance/activity tolerance;pain;strength  -           User Key  (r) = Recorded By, (t) = Taken By, (c) = Cosigned By    Initials Name Provider Type     Arelis Amezcua OT Occupational Therapist                 Mobility/ADL's     Row Name 10/05/22 0735          Bed Mobility    Bed Mobility supine-sit;sit-supine  -     Supine-Sit [place] (Bed Mobility) modified independence  -     Sit-Supine [place] (Bed Mobility) modified independence  -     Assistive Device (Bed Mobility) head of bed elevated;bed rails  -     Row Name 10/05/22 0735          Transfers    Transfers sit-stand transfer  -     Sit-Stand [place] (Transfers) standby assist  -     [place] Level (Toilet Transfer) minimum assist (75% patient effort)  -     Assistive Device (Toilet Transfer) commode;grab bars/safety frame  -     Row Name 10/05/22 0735          Sit-Stand Transfer    Assistive Device (Sit-Stand Transfers) walker, front-wheeled  -     Row Name 10/05/22 0735          Toilet Transfer    Type (Toilet Transfer) sit-stand  -     Row Name 10/05/22 0735          Functional Mobility    Functional Mobility- Ind. Level supervision required  -     Functional Mobility- Device walker, front-wheeled  -     Functional Mobility-Distance (Feet) 30  -     Functional Mobility- Safety Issues step length decreased   -     Row Name 10/05/22 0735          Activities of Daily Living    BADL Assessment/Intervention lower body dressing;grooming;toileting  -     Row Name 10/05/22 0735          Lower Body Dressing Assessment/Training    [place] Level (Lower Body Dressing) don;socks;minimum assist (75% patient effort)  -     Position (Lower Body Dressing) edge of bed sitting  -     Row Name 10/05/22 0735          Grooming Assessment/Training    [place] Level (Grooming) wash face, hands;set up  -     Position (Grooming) unsupported sitting  -     Row Name 10/05/22 0735          Toileting Assessment/Training    [place] Level (Toileting) adjust/manage clothing;perform perineal hygiene;standby assist  -     Assistive Devices (Toileting) commode;grab bar/safety frame  -     Position (Toileting) unsupported sitting;supported standing  -           User Key  (r) = Recorded By, (t) = Taken By, (c) = Cosigned By    Initials Name Provider Type    Arelis Wen, OT Occupational Therapist               Obj/Interventions     Row Name 10/05/22 0838          Shoulder (Therapeutic Exercise)    Shoulder AROM (Therapeutic Exercise) bilateral;flexion;extension;aBduction;aDduction  12 reps  -     Row Name 10/05/22 0838          Elbow/Forearm (Therapeutic Exercise)    Elbow/Forearm (Therapeutic Exercise) AROM (active range of motion)  -     Elbow/Forearm AROM (Therapeutic Exercise) bilateral;flexion;extension  12 reps  -     Row Name 10/05/22 0838          Motor Skills    Therapeutic Exercise shoulder;elbow/forearm  -           User Key  (r) = Recorded By, (t) = Taken By, (c) = Cosigned By    Initials Name Provider Type    Arelis Wen, OT Occupational Therapist               Goals/Plan    No documentation.                Clinical Impression     Row Name 10/05/22 0839          Pain Assessment    Pretreatment Pain Rating 6/10  -     Posttreatment Pain Rating 6/10  -     Pain Location - Side/Orientation  Bilateral  -AC     Pain Location - hand  -AC     Pain Intervention(s) Repositioned;Ambulation/increased activity  -AC     Row Name 10/05/22 0839          Plan of Care Review    Plan of Care Reviewed With patient  -AC     Outcome Evaluation Pt mod ind supine to sit,  min A to don socks, setup to wash face/hands,  min A commode transfer, SBA toileting tasks,  supervision to ambulate in room with RW.  Pt is progressing, but limtied by weakness and decreased activity tolerance.  Recommend IP rehab upon d/c.  -AC     Row Name 10/05/22 0839          Vital Signs    Intra Systolic BP Rehab 123  -AC     Intra Treatment Diastolic BP 81  -AC     Pretreatment Heart Rate (beats/min) 102  -AC     Posttreatment Heart Rate (beats/min) 101  -AC     Pre SpO2 (%) 93  -AC     O2 Delivery Pre Treatment supplemental O2  -AC     O2 Delivery Intra Treatment supplemental O2  -AC     Post SpO2 (%) 94  -AC     O2 Delivery Post Treatment supplemental O2  -AC     Pre Patient Position Supine  -AC     Post Patient Position Supine  -AC     Row Name 10/05/22 0839          Positioning and Restraints    Pre-Treatment Position in bed  -AC     Post Treatment Position bed  -AC     In Bed notified nsg;supine;call light within reach;encouraged to call for assist;exit alarm on  -AC           User Key  (r) = Recorded By, (t) = Taken By, (c) = Cosigned By    Initials Name Provider Type    AC Arelis Amezcua, OT Occupational Therapist               Outcome Measures     Row Name 10/05/22 0844          How much help from another is currently needed...    Putting on and taking off regular lower body clothing? 3  -AC     Bathing (including washing, rinsing, and drying) 3  -AC     Toileting (which includes using toilet bed pan or urinal) 3  -AC     Putting on and taking off regular upper body clothing 3  -AC     Taking care of personal grooming (such as brushing teeth) 3  -AC     Eating meals 3  -AC     AM-PAC 6 Clicks Score (OT) 18  -AC     Row Name 10/05/22 0844           Functional Assessment    Outcome Measure Options AM-PAC 6 Clicks Daily Activity (OT)  -AC           User Key  (r) = Recorded By, (t) = Taken By, (c) = Cosigned By    Initials Name Provider Type    Arelis Wen OT Occupational Therapist                Occupational Therapy Education                 Title: PT OT SLP Therapies (In Progress)     Topic: Occupational Therapy (In Progress)     Point: ADL training (Done)     Description:   Instruct learner(s) on proper safety adaptation and remediation techniques during self care or transfers.   Instruct in proper use of assistive devices.              Learning Progress Summary           Patient Acceptance, E, VU by AC at 10/5/2022 0844    Acceptance, E, NR by AC at 10/3/2022 1029    Acceptance, TB, NR by PS at 9/30/2022 1505    Acceptance, E, VU by DEANGELO at 9/29/2022 1201    Comment: Energy conservation/pacing; PLB; incentive spirometer use    Acceptance, E, VU by AC at 9/28/2022 1537    Acceptance, E, VU by  at 9/27/2022 0845                   Point: Home exercise program (In Progress)     Description:   Instruct learner(s) on appropriate technique for monitoring, assisting and/or progressing therapeutic exercises/activities.              Learning Progress Summary           Patient Acceptance, TB, NR by PS at 9/30/2022 1505                   Point: Precautions (In Progress)     Description:   Instruct learner(s) on prescribed precautions during self-care and functional transfers.              Learning Progress Summary           Patient Acceptance, TB, NR by PS at 9/30/2022 1505    Acceptance, E, VU by DEANGELO at 9/29/2022 1201    Comment: Energy conservation/pacing; PLB; incentive spirometer use                   Point: Body mechanics (In Progress)     Description:   Instruct learner(s) on proper positioning and spine alignment during self-care, functional mobility activities and/or exercises.              Learning Progress Summary           Patient Acceptance,  TB, NR by PS at 9/30/2022 1505    Acceptance, E, VU by DEANGELO at 9/29/2022 1201    Comment: Energy conservation/pacing; PLB; incentive spirometer use                               User Key     Initials Effective Dates Name Provider Type Discipline     06/16/21 -  Arelis Amezcua, OT Occupational Therapist OT    PS 06/16/21 -  Elizabeth Maurice, RN Registered Nurse Nurse    DEANGELO 06/16/21 -  Vanessa Panchal OT Occupational Therapist OT              OT Recommendation and Plan  Planned Therapy Interventions (OT): activity tolerance training, BADL retraining, functional balance retraining, occupation/activity based interventions, patient/caregiver education/training, strengthening exercise, transfer/mobility retraining  Therapy Frequency (OT): daily  Plan of Care Review  Plan of Care Reviewed With: patient  Outcome Evaluation: Pt mod ind supine to sit,  min A to don socks, setup to wash face/hands,  min A commode transfer, SBA toileting tasks,  supervision to ambulate in room with RW.  Pt is progressing, but limtied by weakness and decreased activity tolerance.  Recommend IP rehab upon d/c.     Time Calculation:    Time Calculation- OT     Row Name 10/05/22 0735             Time Calculation- OT    OT Start Time 0735  -AC      OT Received On 10/05/22  -AC      OT Goal Re-Cert Due Date 10/07/22  -AC              Timed Charges    65519 - OT Therapeutic Exercise Minutes 5  -AC      21165 - OT Therapeutic Activity Minutes 5  -AC      75322 - OT Self Care/Mgmt Minutes 20  -AC              Total Minutes    Timed Charges Total Minutes 30  -AC       Total Minutes 30  -AC            User Key  (r) = Recorded By, (t) = Taken By, (c) = Cosigned By    Initials Name Provider Type    AC Arelis Amezcua, OT Occupational Therapist              Therapy Charges for Today     Code Description Service Date Service Provider Modifiers Qty    11124596384 HC OT THER PROC EA 15 MIN 10/5/2022 Arelis Amezcua OT GO 1    15141115978 HC OT SELF  CARE/MGMT/TRAIN EA 15 MIN 10/5/2022 Arelis Amezcua, OT GO 1               Arelis Amezcua, OT  10/5/2022

## 2022-10-05 NOTE — DISCHARGE SUMMARY
[facility] Medicine Services  TRANSFER SUMMARY    Patient Name: Philomena Davis  : 1960  MRN: [id number]    Date of Admission: 2022  Date of Discharge:  10/4/2022  Length of Stay: 9  Primary Care Physician: No primary care provider on file.    Consults     No orders found from 2022 to 2022.          Hospital Course     Presenting Problem:   Alcohol withdrawal syndrome without complication (HCC) [F10.930]    Active Hospital Problems    Diagnosis  POA   • Elevated LFTs [R79.89]  Yes   • Hyperlipidemia [E78.5]  Yes   • Essential hypertension [I10]  Yes   • Generalized anxiety disorder [F41.1]  Yes      Resolved Hospital Problems    Diagnosis Date Resolved POA   • **Alcohol withdrawal syndrome with perceptual disturbance (HCC) [F10.932] 10/04/2022 Yes          Hospital Course:  Philomena Davis is a 62 y.o. female with chronic heavy alcohol abuse who presents with multiple complaints, predominantly abdominal pain and whole body swelling     ETOH abuse with dependence  Early alcohol withdrawal  Abnormal LFTs/hyperbilirubinemia  Cirrhosis  -US c/w portal HTN, cirrhosis  -PO protonix  -addiction team following  -completed her valium taper on 10/4 without incident     Respiratory failure  -nebs prn, pulmonary toilet, wean oxygen as tolerated  --Stable on 2 LNC.  Sats drop on room air.  Denies shortness of breath at rest on oxygen.     Acute debility  -Reports inability to ambulate safely due to swelling and generalized weakness  -PT/OT following.  -- Patient has not received a bed at rehab with plans to transfer today.     Macrocytosis  -Previous folate in 2019 undetectable; folate low with low normal B12, partner prior replaced both  -s/p folate IV x day 5/5 days, transitioned to p.o. follow-up PCP     Painful Hands/forearms  --xrays showed mild OA of DIP joints and moderate OA of first carpometacarpal joint  --No erythema, or edema noted.  --Continue PRN toradol  --checked  uric acid level, normal.  --Follow-up PCP  --continues to improve with mobility     Anxiety  --continue vistaril prn  --Stable     Hypertension  Hyperlipidemia  -Holding lisinopril 2/2 potential BP changes with regular scheduled benzodiazepines.  Completed Valium taper.  No further withdrawal issues.  Valium discontinued  -PRN clonidine for SBP >180mmHg    Patient is seen resting back in bed in no acute distress.  Hemodynamically stable and afebrile.  Cleared for transfer to rehab today.  Going on medications below.  Meds and follow-ups as noted.        Discharge Follow Up Recommendations for outpatient labs/diagnostics:  Patient is cleared for transfer to rehab today.  Going on medications as below.  Follow-up as noted    --To be seen by provider at rehab on arrival, PCP 1 week of discharge    Day of Discharge     HPI:   Notes joint stiffness/improving.  Review of Systems  Gen- No fevers, chills  CV- No chest pain, palpitations  Resp- No cough, dyspnea  GI- No N/V/D, abd pain      Vital Signs:   Temp:  [97.9 °F (36.6 °C)-98.4 °F (36.9 °C)] 98.4 °F (36.9 °C)  Heart Rate:  [94-99] 98  Resp:  [16-18] 16  BP: (120-129)/(71-83) 123/81     Physical Exam:  Constitutional: No acute distress, awake, alert.  Resting up in bed. No visitors at bedside.  HENT: NCAT, mucous membranes moist  Respiratory: Clear to auscultation bilaterally, respiratory effort normal on 2 LNC with sats 93%.  Cardiovascular: RRR, no murmurs, rubs, or gallops  Gastrointestinal: Positive bowel sounds, soft, nontender, nondistended  Musculoskeletal: Trace bilateral ankle edema.  MASSEY spontaneously. BLE hands and forearms stiff and tingly intermittently (stable).  Psychiatric: Appropriate affect, cooperative  Neurologic: Oriented x 3, strength symmetric in all extremities, Cranial Nerves grossly intact to confrontation, speech clear and appropriate.  Follows commands.  Skin: No rashes    Pertinent Results     Results from last 7 days   Lab Units  10/04/22  0410 09/29/22  0422   WBC 10*3/mm3 8.12 6.92   HEMOGLOBIN g/dL 11.0* 10.5*   HEMATOCRIT % 31.3* 29.1*   PLATELETS 10*3/mm3 253 154   SODIUM mmol/L 137 137   POTASSIUM mmol/L 3.6 4.0   CHLORIDE mmol/L 100 103   CO2 mmol/L 27.0 27.0   BUN mg/dL 5* 7*   CREATININE mg/dL 0.42* 0.57   GLUCOSE mg/dL 142* 95   CALCIUM mg/dL 9.3 8.8     Results from last 7 days   Lab Units 10/04/22  0410 09/29/22  0422   BILIRUBIN mg/dL 1.3* 1.5*   ALK PHOS U/L 129* 146*   ALT (SGPT) U/L 39* 39*   AST (SGOT) U/L 113* 139*           Invalid input(s): TG, LDLCALC, LDLREALC      Brief Urine Lab Results     None          Microbiology Results Abnormal     None          Imaging Results (All)     Procedure Component Value Units Date/Time    XR Chest 1 View [[id number]] Collected: 10/01/22 1449     Updated: 10/01/22 1453    Narrative:      XR CHEST 1 VW-     Date of Exam: 10/1/2022 1:28 PM     Indication: following hypoxia; F10.930-Alcohol use, unspecified with  withdrawal, uncomplicated; K29.20-Alcoholic gastritis without bleeding;  E87.20-Acidosis, unspecified; E86.9-Volume depletion, unspecified;  F10.932-Alcohol use, unspecified with withdrawal with perceptual  disturbance; I10-Essential (primary) hypertension.     Comparison:?09/26/2022     Technique:?A single view of the chest was obtained.     FINDINGS:     ?Heart size and pulmonary vessels are within normal limits.  There is a  new small right pleural effusion.  There is right basilar atelectasis.   Left lung appears clear.  No evidence pneumothorax.             Impression:         New small right pleural effusion with minimal right basilar atelectasis.        This report was finalized on 10/1/2022 2:50 PM by Noel Baker MD.       XR Hand 3+ View Bilateral [[id number]] Collected: 09/28/22 1526     Updated: 09/28/22 1531    Narrative:      XR HAND 3+ VW BILATERAL-     Date of Exam: 9/28/2022 2:50 PM     Indication: bilateral hand pain; F10.230-Alcohol dependence with  withdrawal,  uncomplicated; K29.20-Alcoholic gastritis without bleeding;  E87.2-Acidosis; E86.9-Volume depletion, unspecified     Comparison Exams: None available.     FINDINGS:     Right Hand and Wrist: Mild diffuse osteopenia. Alignment is anatomic. No  focal soft tissue swelling. Mild joint space narrowing interphalangeal  joints. Moderate joint space narrowing first carpal metacarpal joint..  No osseous erosions. No chondrocalcinosis or periosteal reaction. No  fractures or dislocations.     Left Hand and Wrist: Mild diffuse osteopenia. Alignment is anatomic. No  focal soft tissue swelling. Mild joint space narrowing interphalangeal  joints. Moderate joint space narrowing first carpal metacarpal joint..  No osseous erosions. No chondrocalcinosis or periosteal reaction. No  fractures or dislocations.       Impression:      No radiographic findings of erosive arthritis. Mild osteoarthritis  distal interphalangeal joints and moderate osteoarthritis first  carpometacarpal joint. Osteopenia.     This report was finalized on 9/28/2022 3:28 PM by Isabela Henderson MD.       US Liver [[id number]] Collected: 09/27/22 0847     Updated: 09/27/22 0858    Narrative:      EXAMINATION: US LIVER-     DATE OF EXAM:9/26/2022 11:15 AM     INDICATION: Alcohol abuse, elevated liver enzymes, possible alcoholic  cirrhosis.     COMPARISON: None available.     TECHNIQUE:  Sonographic grayscale, color Doppler, and spectral Doppler images of the  liver were obtained with representative examples submitted to PACS for  interpretation.      Point shear wave elastography (pSWE) of the liver was performed using  [employer] software/hardware.      Reference Guidelines from:   Jean-Paul RG, Jamie SR, Vinay D, et al. Update to the Society of  Radiologists in Ultrasound Liver Elastography Consensus Statement.  Radiology. Volume 296:Number2-August 2020; p.263-274     FINDINGS:  US LIVER:  The hepatic echogenicity is diffusely increased. The liver has a nodular  contour  consistent with hepatic cirrhosis. There is normal directional  flow in the main portal vein. The hepatic veins are patent. There is  echogenic sludge in the gallbladder. The common bile duct caliber is  normal measuring 4 mm. The right kidney is normal. It measures 9.1 x 4.5  x 4.3 cm. There is no ascites.     US ELASTOGRAPHY:  10 shear wave speed measurements were obtained using a right intercostal  approach. The IQR/Median was 0.16 which is < 0.30 indicating a good  quality dataset.     The median liver shear wave speed is 3.05 m/s. This rules in compensated  advanced chronic liver disease and is suggestive of clinically  significant portal hypertension..     NOTE:  In some patients with NAFLD, the cutoff values for compensated advanced  chronic liver disease may be lower and follow up or additional testing  would be recommended with those between 1.5 and 1.7 m/s.     Several conditions can increase liver stiffness unrelated to liver  fibrosis including acute hepatitis, liver inflammation, ALT levels >5x  normal, obstructive cholestasis, hepatic congestion, and infiltrative  liver diseases such as amyloidosis, lymphoma, or extramedullary  hematopoiesis. In all these conditions, however, stiffness values within  the normal range exclude significant fibrosis.       Impression:      1. Median liver shear wave speed is 3.05 m/s. This rules in compensated  advanced chronic liver disease and is suggestive of clinically  significant portal hypertension.  2. Hepatic cirrhosis.  3. Echogenic sludge in the gallbladder.        This report was finalized on 9/27/2022 8:55 AM by Kraig Kohler MD.       XR Chest 1 View [[id number]] Collected: 09/26/22 0628     Updated: 09/26/22 0629    Narrative:      FRONTAL VIEW OF THE CHEST    CLINICAL INDICATION: Shortness of breath    COMPARISON: May 27, 2014.    FINDINGS:     Lines: None    No focal consolidation, pleural effusion or pneumothorax. Cardiomediastinal morphology is normal.   Osseous structures are unremarkable.        Impression:      No acute cardiopulmonary abnormality.    Electronically signed by:  Sujata Witt D.O.    9/26/2022 4:28 AM Mountain Time                  Discharge Details        Discharge Medications      New Medications      Instructions Start Date   cyanocobalamin 1000 MCG/ML injection   Inject 1 mL into the appropriate muscle as directed by prescriber once daily for 4 doses. Then weekly for 1 month. Then use monthly.      folic acid 1 MG tablet  Commonly known as: FOLVITE   1 mg, Oral, Daily      hydrOXYzine 25 MG tablet  Commonly known as: ATARAX   25 mg, Oral, Every 12 Hours Scheduled      polyethylene glycol 17 g packet  Commonly known as: MIRALAX   17 g, Oral, Daily         Continue These Medications      Instructions Start Date   FLUoxetine 20 MG capsule  Commonly known as: PROzac   20 mg, Oral, Daily      latanoprost 0.005 % ophthalmic solution  Commonly known as: XALATAN   1 drop, Both Eyes, Nightly      multivitamin with minerals tablet tablet   1 tablet, Oral, Daily      pantoprazole 40 MG EC tablet  Commonly known as: PROTONIX   40 mg, Oral, Every Morning Before Breakfast      traZODone 50 MG tablet  Commonly known as: DESYREL   50 mg, Oral, Nightly         Stop These Medications    lisinopril 40 MG tablet  Commonly known as: PRINIVIL,ZESTRIL     zolpidem CR 12.5 MG CR tablet  Commonly known as: AMBIEN CR        ASK your doctor about these medications      Instructions Start Date   oxymetazoline 0.05 % nasal spray  Commonly known as: AFRIN  Ask about: Should I take this medication?   2 sprays, Nasal, 2 Times Daily             Allergies   Allergen Reactions   • Codeine Nausea Only         Discharge Disposition:  Rehab Facility or Unit (DC - External)    Discharge Diet:  Diet Order   Procedures   • Diet Regular; GI Soft       Discharge Activity:  Activity Instructions     Activity as Tolerated      Measure Blood Pressure              CODE STATUS:    Code Status  and Medical Interventions:   Ordered at: 09/26/22 0746     Code Status (Patient has no pulse and is not breathing):    CPR (Attempt to Resuscitate)     Medical Interventions (Patient has pulse or is breathing):    Full Support         Future Appointments   Date Time Provider Department Center   10/6/2022 11:30 AM Ainsley Blackmon APRN [facility]       Additional Instructions for the Follow-ups that You Need to Schedule     Discharge Follow-up with PCP   As directed       Currently Documented PCP:    No primary care provider on file.    PCP Phone Number:    None     Follow Up Details: with PCP in 1 week         Discharge Follow-up with Specified Provider: hepatology/GI   As directed      To: hepatology/GI    Follow Up Details: for new cirrhosis, likely 2nd to etoh                 Electronically signed by CANDY Villagomez, 10/04/22, 12:08 PM EDT.      Time Spent on Discharge: I spent 40 minutes on this discharge activity which included: face-to-face encounter with the patient, reviewing the data in the system, coordination of the care with the nursing staff as well as consultants, documentation, and entering orders.

## 2022-10-05 NOTE — PLAN OF CARE
Goal Outcome Evaluation:  Plan of Care Reviewed With: patient           Outcome Evaluation: Pt mod ind supine to sit,  min A to don socks, setup to wash face/hands,  min A commode transfer, SBA toileting tasks,  supervision to ambulate in room with RW.  Pt is progressing, but limtied by weakness and decreased activity tolerance.  Recommend IP rehab upon d/c.

## 2022-10-17 ENCOUNTER — DOCUMENTATION (OUTPATIENT)
Dept: SOCIAL WORK | Facility: HOSPITAL | Age: 62
End: 2022-10-17

## 2022-11-28 ENCOUNTER — APPOINTMENT (OUTPATIENT)
Dept: GENERAL RADIOLOGY | Facility: HOSPITAL | Age: 62
End: 2022-11-28

## 2022-11-28 ENCOUNTER — HOSPITAL ENCOUNTER (INPATIENT)
Facility: HOSPITAL | Age: 62
LOS: 6 days | Discharge: HOME HEALTH | End: 2022-12-04
Attending: EMERGENCY MEDICINE | Admitting: INTERNAL MEDICINE

## 2022-11-28 DIAGNOSIS — R53.1 GENERALIZED WEAKNESS: Primary | ICD-10-CM

## 2022-11-28 DIAGNOSIS — Z78.9 IMPAIRED MOBILITY AND ADLS: ICD-10-CM

## 2022-11-28 DIAGNOSIS — F41.9 ANXIETY: ICD-10-CM

## 2022-11-28 DIAGNOSIS — Z74.09 IMPAIRED MOBILITY AND ADLS: ICD-10-CM

## 2022-11-28 DIAGNOSIS — R63.4 WEIGHT LOSS: ICD-10-CM

## 2022-11-28 DIAGNOSIS — K70.30 ALCOHOLIC CIRRHOSIS OF LIVER WITHOUT ASCITES: ICD-10-CM

## 2022-11-28 DIAGNOSIS — E87.6 ACUTE HYPOKALEMIA: ICD-10-CM

## 2022-11-28 DIAGNOSIS — E46 MALNUTRITION, UNSPECIFIED TYPE: ICD-10-CM

## 2022-11-28 LAB
ALBUMIN SERPL-MCNC: 3.5 G/DL (ref 3.5–5.2)
ALBUMIN/GLOB SERPL: 0.9 G/DL
ALP SERPL-CCNC: 70 U/L (ref 39–117)
ALT SERPL W P-5'-P-CCNC: 20 U/L (ref 1–33)
ANION GAP SERPL CALCULATED.3IONS-SCNC: 19 MMOL/L (ref 5–15)
AST SERPL-CCNC: 67 U/L (ref 1–32)
BACTERIA UR QL AUTO: ABNORMAL /HPF
BASOPHILS # BLD AUTO: 0.06 10*3/MM3 (ref 0–0.2)
BASOPHILS NFR BLD AUTO: 1.9 % (ref 0–1.5)
BILIRUB SERPL-MCNC: 1.6 MG/DL (ref 0–1.2)
BILIRUB UR QL STRIP: ABNORMAL
BUN SERPL-MCNC: 11 MG/DL (ref 8–23)
BUN/CREAT SERPL: 10.5 (ref 7–25)
CALCIUM SPEC-SCNC: 9.6 MG/DL (ref 8.6–10.5)
CHLORIDE SERPL-SCNC: 89 MMOL/L (ref 98–107)
CK SERPL-CCNC: 170 U/L (ref 20–180)
CLARITY UR: ABNORMAL
CO2 SERPL-SCNC: 29 MMOL/L (ref 22–29)
COD CRY URNS QL: ABNORMAL /HPF
COLOR UR: ABNORMAL
CREAT SERPL-MCNC: 1.05 MG/DL (ref 0.57–1)
DEPRECATED RDW RBC AUTO: 57.2 FL (ref 37–54)
EGFRCR SERPLBLD CKD-EPI 2021: 61 ML/MIN/1.73
EOSINOPHIL # BLD AUTO: 0.04 10*3/MM3 (ref 0–0.4)
EOSINOPHIL NFR BLD AUTO: 1.3 % (ref 0.3–6.2)
ERYTHROCYTE [DISTWIDTH] IN BLOOD BY AUTOMATED COUNT: 15.5 % (ref 12.3–15.4)
FLUAV RNA RESP QL NAA+PROBE: NOT DETECTED
FLUBV RNA RESP QL NAA+PROBE: NOT DETECTED
GLOBULIN UR ELPH-MCNC: 4 GM/DL
GLUCOSE SERPL-MCNC: 82 MG/DL (ref 65–99)
GLUCOSE UR STRIP-MCNC: NEGATIVE MG/DL
HCT VFR BLD AUTO: 39.9 % (ref 34–46.6)
HGB BLD-MCNC: 13.1 G/DL (ref 12–15.9)
HGB UR QL STRIP.AUTO: NEGATIVE
HOLD SPECIMEN: NORMAL
HYALINE CASTS UR QL AUTO: ABNORMAL /LPF
IMM GRANULOCYTES # BLD AUTO: 0.01 10*3/MM3 (ref 0–0.05)
IMM GRANULOCYTES NFR BLD AUTO: 0.3 % (ref 0–0.5)
KETONES UR QL STRIP: ABNORMAL
LEUKOCYTE ESTERASE UR QL STRIP.AUTO: ABNORMAL
LYMPHOCYTES # BLD AUTO: 0.91 10*3/MM3 (ref 0.7–3.1)
LYMPHOCYTES NFR BLD AUTO: 28.4 % (ref 19.6–45.3)
MAGNESIUM SERPL-MCNC: 1.8 MG/DL (ref 1.6–2.4)
MAGNESIUM SERPL-MCNC: 1.8 MG/DL (ref 1.6–2.4)
MCH RBC QN AUTO: 33.6 PG (ref 26.6–33)
MCHC RBC AUTO-ENTMCNC: 32.8 G/DL (ref 31.5–35.7)
MCV RBC AUTO: 102.3 FL (ref 79–97)
MONOCYTES # BLD AUTO: 0.52 10*3/MM3 (ref 0.1–0.9)
MONOCYTES NFR BLD AUTO: 16.3 % (ref 5–12)
NEUTROPHILS NFR BLD AUTO: 1.66 10*3/MM3 (ref 1.7–7)
NEUTROPHILS NFR BLD AUTO: 51.8 % (ref 42.7–76)
NITRITE UR QL STRIP: NEGATIVE
NRBC BLD AUTO-RTO: 0 /100 WBC (ref 0–0.2)
PH UR STRIP.AUTO: 6 [PH] (ref 5–8)
PHOSPHATE SERPL-MCNC: 3.6 MG/DL (ref 2.5–4.5)
PLATELET # BLD AUTO: 167 10*3/MM3 (ref 140–450)
PMV BLD AUTO: 11.7 FL (ref 6–12)
POTASSIUM SERPL-SCNC: 2.5 MMOL/L (ref 3.5–5.2)
PROT SERPL-MCNC: 7.5 G/DL (ref 6–8.5)
PROT UR QL STRIP: ABNORMAL
RBC # BLD AUTO: 3.9 10*6/MM3 (ref 3.77–5.28)
RBC # UR STRIP: ABNORMAL /HPF
REF LAB TEST METHOD: ABNORMAL
SARS-COV-2 RNA RESP QL NAA+PROBE: NOT DETECTED
SODIUM SERPL-SCNC: 137 MMOL/L (ref 136–145)
SP GR UR STRIP: 1.02 (ref 1–1.03)
SQUAMOUS #/AREA URNS HPF: ABNORMAL /HPF
TROPONIN T SERPL-MCNC: 0.02 NG/ML (ref 0–0.03)
TSH SERPL DL<=0.05 MIU/L-ACNC: 5.31 UIU/ML (ref 0.27–4.2)
UROBILINOGEN UR QL STRIP: ABNORMAL
WBC # UR STRIP: ABNORMAL /HPF
WBC NRBC COR # BLD: 3.2 10*3/MM3 (ref 3.4–10.8)
WHOLE BLOOD HOLD COAG: NORMAL
WHOLE BLOOD HOLD SPECIMEN: NORMAL

## 2022-11-28 PROCEDURE — 80050 GENERAL HEALTH PANEL: CPT | Performed by: EMERGENCY MEDICINE

## 2022-11-28 PROCEDURE — 83735 ASSAY OF MAGNESIUM: CPT | Performed by: INTERNAL MEDICINE

## 2022-11-28 PROCEDURE — 99223 1ST HOSP IP/OBS HIGH 75: CPT | Performed by: INTERNAL MEDICINE

## 2022-11-28 PROCEDURE — 93005 ELECTROCARDIOGRAM TRACING: CPT | Performed by: EMERGENCY MEDICINE

## 2022-11-28 PROCEDURE — 36415 COLL VENOUS BLD VENIPUNCTURE: CPT

## 2022-11-28 PROCEDURE — 84484 ASSAY OF TROPONIN QUANT: CPT | Performed by: EMERGENCY MEDICINE

## 2022-11-28 PROCEDURE — 83735 ASSAY OF MAGNESIUM: CPT | Performed by: EMERGENCY MEDICINE

## 2022-11-28 PROCEDURE — 0 POTASSIUM CHLORIDE 10 MEQ/100ML SOLUTION: Performed by: EMERGENCY MEDICINE

## 2022-11-28 PROCEDURE — 0 MAGNESIUM SULFATE 4 GM/100ML SOLUTION: Performed by: INTERNAL MEDICINE

## 2022-11-28 PROCEDURE — 99285 EMERGENCY DEPT VISIT HI MDM: CPT

## 2022-11-28 PROCEDURE — 81001 URINALYSIS AUTO W/SCOPE: CPT | Performed by: EMERGENCY MEDICINE

## 2022-11-28 PROCEDURE — 87636 SARSCOV2 & INF A&B AMP PRB: CPT | Performed by: INTERNAL MEDICINE

## 2022-11-28 PROCEDURE — 71045 X-RAY EXAM CHEST 1 VIEW: CPT

## 2022-11-28 PROCEDURE — 25010000002 HEPARIN (PORCINE) PER 1000 UNITS: Performed by: INTERNAL MEDICINE

## 2022-11-28 PROCEDURE — 82550 ASSAY OF CK (CPK): CPT | Performed by: INTERNAL MEDICINE

## 2022-11-28 PROCEDURE — 25010000002 CYANOCOBALAMIN PER 1000 MCG: Performed by: INTERNAL MEDICINE

## 2022-11-28 PROCEDURE — 99284 EMERGENCY DEPT VISIT MOD MDM: CPT

## 2022-11-28 PROCEDURE — 84100 ASSAY OF PHOSPHORUS: CPT | Performed by: INTERNAL MEDICINE

## 2022-11-28 RX ORDER — ONDANSETRON 4 MG/1
4 TABLET, FILM COATED ORAL EVERY 6 HOURS PRN
Status: DISCONTINUED | OUTPATIENT
Start: 2022-11-28 | End: 2022-12-04 | Stop reason: HOSPADM

## 2022-11-28 RX ORDER — HYDROXYZINE HYDROCHLORIDE 25 MG/1
25 TABLET, FILM COATED ORAL 2 TIMES DAILY
COMMUNITY
End: 2022-12-04 | Stop reason: HOSPADM

## 2022-11-28 RX ORDER — POTASSIUM CHLORIDE 750 MG/1
40 CAPSULE, EXTENDED RELEASE ORAL AS NEEDED
Status: DISCONTINUED | OUTPATIENT
Start: 2022-11-28 | End: 2022-12-04 | Stop reason: HOSPADM

## 2022-11-28 RX ORDER — ACETAMINOPHEN 500 MG
500 TABLET ORAL EVERY 8 HOURS PRN
Status: DISCONTINUED | OUTPATIENT
Start: 2022-11-28 | End: 2022-12-04 | Stop reason: HOSPADM

## 2022-11-28 RX ORDER — HYDROXYZINE PAMOATE 25 MG/1
25 CAPSULE ORAL 2 TIMES DAILY PRN
Status: DISCONTINUED | OUTPATIENT
Start: 2022-11-28 | End: 2022-11-28

## 2022-11-28 RX ORDER — POTASSIUM CHLORIDE 7.45 MG/ML
10 INJECTION INTRAVENOUS ONCE
Status: DISCONTINUED | OUTPATIENT
Start: 2022-11-28 | End: 2022-11-28

## 2022-11-28 RX ORDER — MAGNESIUM SULFATE HEPTAHYDRATE 40 MG/ML
2 INJECTION, SOLUTION INTRAVENOUS AS NEEDED
Status: DISCONTINUED | OUTPATIENT
Start: 2022-11-28 | End: 2022-12-04 | Stop reason: HOSPADM

## 2022-11-28 RX ORDER — FLUTICASONE PROPIONATE AND SALMETEROL 250; 50 UG/1; UG/1
2 POWDER RESPIRATORY (INHALATION)
Status: ON HOLD | COMMUNITY
End: 2022-12-04 | Stop reason: SDUPTHER

## 2022-11-28 RX ORDER — POTASSIUM CHLORIDE 7.45 MG/ML
10 INJECTION INTRAVENOUS
Status: DISCONTINUED | OUTPATIENT
Start: 2022-11-28 | End: 2022-12-04 | Stop reason: HOSPADM

## 2022-11-28 RX ORDER — CYANOCOBALAMIN 1000 UG/ML
1000 INJECTION, SOLUTION INTRAMUSCULAR; SUBCUTANEOUS DAILY
Status: COMPLETED | OUTPATIENT
Start: 2022-11-28 | End: 2022-11-29

## 2022-11-28 RX ORDER — ONDANSETRON 2 MG/ML
4 INJECTION INTRAMUSCULAR; INTRAVENOUS EVERY 6 HOURS PRN
Status: DISCONTINUED | OUTPATIENT
Start: 2022-11-28 | End: 2022-12-04 | Stop reason: HOSPADM

## 2022-11-28 RX ORDER — HEPARIN SODIUM 5000 [USP'U]/ML
5000 INJECTION, SOLUTION INTRAVENOUS; SUBCUTANEOUS EVERY 8 HOURS SCHEDULED
Status: DISCONTINUED | OUTPATIENT
Start: 2022-11-28 | End: 2022-12-04 | Stop reason: HOSPADM

## 2022-11-28 RX ORDER — LATANOPROST 50 UG/ML
1 SOLUTION/ DROPS OPHTHALMIC NIGHTLY
Status: DISCONTINUED | OUTPATIENT
Start: 2022-11-28 | End: 2022-12-04 | Stop reason: HOSPADM

## 2022-11-28 RX ORDER — SODIUM CHLORIDE 9 MG/ML
40 INJECTION, SOLUTION INTRAVENOUS AS NEEDED
Status: DISCONTINUED | OUTPATIENT
Start: 2022-11-28 | End: 2022-12-04 | Stop reason: HOSPADM

## 2022-11-28 RX ORDER — POTASSIUM CHLORIDE 7.45 MG/ML
10 INJECTION INTRAVENOUS ONCE
Status: COMPLETED | OUTPATIENT
Start: 2022-11-28 | End: 2022-11-28

## 2022-11-28 RX ORDER — SODIUM CHLORIDE 0.9 % (FLUSH) 0.9 %
10 SYRINGE (ML) INJECTION AS NEEDED
Status: DISCONTINUED | OUTPATIENT
Start: 2022-11-28 | End: 2022-12-04 | Stop reason: HOSPADM

## 2022-11-28 RX ORDER — HYDROXYZINE HYDROCHLORIDE 25 MG/1
25 TABLET, FILM COATED ORAL 3 TIMES DAILY PRN
Status: DISCONTINUED | OUTPATIENT
Start: 2022-11-28 | End: 2022-12-04 | Stop reason: HOSPADM

## 2022-11-28 RX ORDER — PANTOPRAZOLE SODIUM 40 MG/1
40 TABLET, DELAYED RELEASE ORAL
Status: DISCONTINUED | OUTPATIENT
Start: 2022-11-29 | End: 2022-11-30

## 2022-11-28 RX ORDER — SODIUM CHLORIDE 0.9 % (FLUSH) 0.9 %
10 SYRINGE (ML) INJECTION EVERY 12 HOURS SCHEDULED
Status: DISCONTINUED | OUTPATIENT
Start: 2022-11-28 | End: 2022-12-04 | Stop reason: HOSPADM

## 2022-11-28 RX ORDER — TRAZODONE HYDROCHLORIDE 50 MG/1
100 TABLET ORAL NIGHTLY
Status: DISCONTINUED | OUTPATIENT
Start: 2022-11-28 | End: 2022-11-30

## 2022-11-28 RX ORDER — MAGNESIUM SULFATE HEPTAHYDRATE 40 MG/ML
4 INJECTION, SOLUTION INTRAVENOUS AS NEEDED
Status: DISCONTINUED | OUTPATIENT
Start: 2022-11-28 | End: 2022-12-04 | Stop reason: HOSPADM

## 2022-11-28 RX ORDER — MULTIPLE VITAMINS W/ MINERALS TAB 9MG-400MCG
1 TAB ORAL DAILY
Status: DISCONTINUED | OUTPATIENT
Start: 2022-11-29 | End: 2022-12-04 | Stop reason: HOSPADM

## 2022-11-28 RX ORDER — TRAZODONE HYDROCHLORIDE 50 MG/1
50 TABLET ORAL NIGHTLY
Status: DISCONTINUED | OUTPATIENT
Start: 2022-11-28 | End: 2022-11-28

## 2022-11-28 RX ORDER — FOLIC ACID 1 MG/1
1 TABLET ORAL DAILY
Status: DISCONTINUED | OUTPATIENT
Start: 2022-11-29 | End: 2022-12-04 | Stop reason: HOSPADM

## 2022-11-28 RX ORDER — FLUOXETINE HYDROCHLORIDE 20 MG/1
20 CAPSULE ORAL DAILY
Status: DISCONTINUED | OUTPATIENT
Start: 2022-11-29 | End: 2022-11-29

## 2022-11-28 RX ORDER — DEXTROSE AND SODIUM CHLORIDE 5; .9 G/100ML; G/100ML
75 INJECTION, SOLUTION INTRAVENOUS CONTINUOUS
Status: DISCONTINUED | OUTPATIENT
Start: 2022-11-28 | End: 2022-11-30

## 2022-11-28 RX ORDER — POTASSIUM CHLORIDE 1.5 G/1.77G
40 POWDER, FOR SOLUTION ORAL AS NEEDED
Status: DISCONTINUED | OUTPATIENT
Start: 2022-11-28 | End: 2022-12-04 | Stop reason: HOSPADM

## 2022-11-28 RX ORDER — IBUPROFEN 600 MG/1
600 TABLET ORAL EVERY 8 HOURS PRN
COMMUNITY
End: 2022-12-04 | Stop reason: HOSPADM

## 2022-11-28 RX ORDER — HYDROXYZINE HYDROCHLORIDE 25 MG/1
25 TABLET, FILM COATED ORAL 2 TIMES DAILY PRN
Status: DISCONTINUED | OUTPATIENT
Start: 2022-11-28 | End: 2022-11-28

## 2022-11-28 RX ADMIN — POTASSIUM CHLORIDE 40 MEQ: 750 CAPSULE, EXTENDED RELEASE ORAL at 18:48

## 2022-11-28 RX ADMIN — DEXTROSE AND SODIUM CHLORIDE 75 ML/HR: 5; 900 INJECTION, SOLUTION INTRAVENOUS at 18:00

## 2022-11-28 RX ADMIN — POTASSIUM CHLORIDE 10 MEQ: 7.46 INJECTION, SOLUTION INTRAVENOUS at 17:04

## 2022-11-28 RX ADMIN — TRAZODONE HYDROCHLORIDE 100 MG: 50 TABLET ORAL at 20:25

## 2022-11-28 RX ADMIN — LATANOPROST 1 DROP: 50 SOLUTION OPHTHALMIC at 20:25

## 2022-11-28 RX ADMIN — POTASSIUM CHLORIDE 10 MEQ: 7.46 INJECTION, SOLUTION INTRAVENOUS at 15:09

## 2022-11-28 RX ADMIN — HYDROXYZINE HYDROCHLORIDE 25 MG: 25 TABLET, FILM COATED ORAL at 20:25

## 2022-11-28 RX ADMIN — MAGNESIUM SULFATE HEPTAHYDRATE 4 G: 40 INJECTION, SOLUTION INTRAVENOUS at 19:41

## 2022-11-28 RX ADMIN — HEPARIN SODIUM 5000 UNITS: 5000 INJECTION INTRAVENOUS; SUBCUTANEOUS at 20:25

## 2022-11-28 RX ADMIN — CYANOCOBALAMIN 1000 MCG: 1000 INJECTION, SOLUTION INTRAMUSCULAR; SUBCUTANEOUS at 18:49

## 2022-11-29 ENCOUNTER — ANESTHESIA EVENT (OUTPATIENT)
Dept: GASTROENTEROLOGY | Facility: HOSPITAL | Age: 62
End: 2022-11-29

## 2022-11-29 ENCOUNTER — APPOINTMENT (OUTPATIENT)
Dept: CT IMAGING | Facility: HOSPITAL | Age: 62
End: 2022-11-29

## 2022-11-29 PROBLEM — R63.4 WEIGHT LOSS: Status: ACTIVE | Noted: 2022-11-28

## 2022-11-29 PROBLEM — K70.30 ALCOHOLIC CIRRHOSIS OF LIVER WITHOUT ASCITES: Status: ACTIVE | Noted: 2022-11-28

## 2022-11-29 LAB
ALBUMIN SERPL-MCNC: 3.1 G/DL (ref 3.5–5.2)
ALBUMIN/GLOB SERPL: 0.9 G/DL
ALP SERPL-CCNC: 63 U/L (ref 39–117)
ALT SERPL W P-5'-P-CCNC: 17 U/L (ref 1–33)
AMMONIA BLD-SCNC: 28 UMOL/L (ref 11–51)
ANION GAP SERPL CALCULATED.3IONS-SCNC: 19 MMOL/L (ref 5–15)
AST SERPL-CCNC: 63 U/L (ref 1–32)
BASOPHILS # BLD AUTO: 0.06 10*3/MM3 (ref 0–0.2)
BASOPHILS NFR BLD AUTO: 1.6 % (ref 0–1.5)
BILIRUB SERPL-MCNC: 1.2 MG/DL (ref 0–1.2)
BUN SERPL-MCNC: 9 MG/DL (ref 8–23)
BUN/CREAT SERPL: 10.5 (ref 7–25)
CALCIUM SPEC-SCNC: 8.6 MG/DL (ref 8.6–10.5)
CHLORIDE SERPL-SCNC: 94 MMOL/L (ref 98–107)
CO2 SERPL-SCNC: 23 MMOL/L (ref 22–29)
CREAT SERPL-MCNC: 0.86 MG/DL (ref 0.57–1)
DEPRECATED RDW RBC AUTO: 54.5 FL (ref 37–54)
EGFRCR SERPLBLD CKD-EPI 2021: 77.5 ML/MIN/1.73
EOSINOPHIL # BLD AUTO: 0.04 10*3/MM3 (ref 0–0.4)
EOSINOPHIL NFR BLD AUTO: 1.1 % (ref 0.3–6.2)
ERYTHROCYTE [DISTWIDTH] IN BLOOD BY AUTOMATED COUNT: 15.2 % (ref 12.3–15.4)
GLOBULIN UR ELPH-MCNC: 3.3 GM/DL
GLUCOSE BLDC GLUCOMTR-MCNC: 73 MG/DL (ref 70–130)
GLUCOSE BLDC GLUCOMTR-MCNC: 81 MG/DL (ref 70–130)
GLUCOSE BLDC GLUCOMTR-MCNC: 88 MG/DL (ref 70–130)
GLUCOSE BLDC GLUCOMTR-MCNC: 90 MG/DL (ref 70–130)
GLUCOSE SERPL-MCNC: 52 MG/DL (ref 65–99)
HBV SURFACE AB SER RIA-ACNC: NORMAL
HBV SURFACE AG SERPL QL IA: NORMAL
HCT VFR BLD AUTO: 35.4 % (ref 34–46.6)
HCV AB SER DONR QL: NORMAL
HGB BLD-MCNC: 11.8 G/DL (ref 12–15.9)
IMM GRANULOCYTES # BLD AUTO: 0.01 10*3/MM3 (ref 0–0.05)
IMM GRANULOCYTES NFR BLD AUTO: 0.3 % (ref 0–0.5)
LYMPHOCYTES # BLD AUTO: 1.29 10*3/MM3 (ref 0.7–3.1)
LYMPHOCYTES NFR BLD AUTO: 34.2 % (ref 19.6–45.3)
MAGNESIUM SERPL-MCNC: 3.5 MG/DL (ref 1.6–2.4)
MCH RBC QN AUTO: 33.5 PG (ref 26.6–33)
MCHC RBC AUTO-ENTMCNC: 33.3 G/DL (ref 31.5–35.7)
MCV RBC AUTO: 100.6 FL (ref 79–97)
MONOCYTES # BLD AUTO: 0.67 10*3/MM3 (ref 0.1–0.9)
MONOCYTES NFR BLD AUTO: 17.8 % (ref 5–12)
NEUTROPHILS NFR BLD AUTO: 1.7 10*3/MM3 (ref 1.7–7)
NEUTROPHILS NFR BLD AUTO: 45 % (ref 42.7–76)
NRBC BLD AUTO-RTO: 0 /100 WBC (ref 0–0.2)
PLATELET # BLD AUTO: 146 10*3/MM3 (ref 140–450)
PMV BLD AUTO: 11.8 FL (ref 6–12)
POTASSIUM SERPL-SCNC: 3.9 MMOL/L (ref 3.5–5.2)
PROT SERPL-MCNC: 6.4 G/DL (ref 6–8.5)
RBC # BLD AUTO: 3.52 10*6/MM3 (ref 3.77–5.28)
SODIUM SERPL-SCNC: 136 MMOL/L (ref 136–145)
WBC NRBC COR # BLD: 3.77 10*3/MM3 (ref 3.4–10.8)

## 2022-11-29 PROCEDURE — 87340 HEPATITIS B SURFACE AG IA: CPT | Performed by: PHYSICIAN ASSISTANT

## 2022-11-29 PROCEDURE — 97162 PT EVAL MOD COMPLEX 30 MIN: CPT

## 2022-11-29 PROCEDURE — 82140 ASSAY OF AMMONIA: CPT | Performed by: INTERNAL MEDICINE

## 2022-11-29 PROCEDURE — 80053 COMPREHEN METABOLIC PANEL: CPT | Performed by: INTERNAL MEDICINE

## 2022-11-29 PROCEDURE — 82962 GLUCOSE BLOOD TEST: CPT

## 2022-11-29 PROCEDURE — 0 IOPAMIDOL PER 1 ML: Performed by: INTERNAL MEDICINE

## 2022-11-29 PROCEDURE — 86706 HEP B SURFACE ANTIBODY: CPT | Performed by: PHYSICIAN ASSISTANT

## 2022-11-29 PROCEDURE — 25010000002 HEPARIN (PORCINE) PER 1000 UNITS: Performed by: INTERNAL MEDICINE

## 2022-11-29 PROCEDURE — 97166 OT EVAL MOD COMPLEX 45 MIN: CPT

## 2022-11-29 PROCEDURE — 83735 ASSAY OF MAGNESIUM: CPT | Performed by: INTERNAL MEDICINE

## 2022-11-29 PROCEDURE — 86803 HEPATITIS C AB TEST: CPT | Performed by: PHYSICIAN ASSISTANT

## 2022-11-29 PROCEDURE — 74178 CT ABD&PLV WO CNTR FLWD CNTR: CPT

## 2022-11-29 PROCEDURE — 99232 SBSQ HOSP IP/OBS MODERATE 35: CPT | Performed by: INTERNAL MEDICINE

## 2022-11-29 PROCEDURE — 99222 1ST HOSP IP/OBS MODERATE 55: CPT | Performed by: PHYSICIAN ASSISTANT

## 2022-11-29 PROCEDURE — 25010000002 CYANOCOBALAMIN PER 1000 MCG: Performed by: INTERNAL MEDICINE

## 2022-11-29 PROCEDURE — 85025 COMPLETE CBC W/AUTO DIFF WBC: CPT | Performed by: INTERNAL MEDICINE

## 2022-11-29 RX ORDER — SODIUM CHLORIDE 0.9 % (FLUSH) 0.9 %
10 SYRINGE (ML) INJECTION EVERY 12 HOURS SCHEDULED
Status: CANCELLED | OUTPATIENT
Start: 2022-11-29

## 2022-11-29 RX ORDER — MIDAZOLAM HYDROCHLORIDE 1 MG/ML
1 INJECTION INTRAMUSCULAR; INTRAVENOUS
Status: CANCELLED | OUTPATIENT
Start: 2022-11-29

## 2022-11-29 RX ORDER — LIDOCAINE HYDROCHLORIDE 10 MG/ML
0.5 INJECTION, SOLUTION EPIDURAL; INFILTRATION; INTRACAUDAL; PERINEURAL ONCE AS NEEDED
Status: CANCELLED | OUTPATIENT
Start: 2022-11-29

## 2022-11-29 RX ORDER — THIAMINE HYDROCHLORIDE 100 MG/ML
100 INJECTION, SOLUTION INTRAMUSCULAR; INTRAVENOUS DAILY
Status: DISCONTINUED | OUTPATIENT
Start: 2022-11-30 | End: 2022-12-01

## 2022-11-29 RX ORDER — FLUOXETINE HYDROCHLORIDE 20 MG/1
40 CAPSULE ORAL DAILY
Status: DISCONTINUED | OUTPATIENT
Start: 2022-11-30 | End: 2022-12-04 | Stop reason: HOSPADM

## 2022-11-29 RX ORDER — ZOLPIDEM TARTRATE 5 MG/1
5 TABLET ORAL NIGHTLY PRN
Status: DISCONTINUED | OUTPATIENT
Start: 2022-11-29 | End: 2022-12-04 | Stop reason: HOSPADM

## 2022-11-29 RX ORDER — FAMOTIDINE 20 MG/1
20 TABLET, FILM COATED ORAL ONCE
Status: CANCELLED | OUTPATIENT
Start: 2022-11-29 | End: 2022-11-29

## 2022-11-29 RX ADMIN — HEPARIN SODIUM 5000 UNITS: 5000 INJECTION INTRAVENOUS; SUBCUTANEOUS at 05:37

## 2022-11-29 RX ADMIN — HEPARIN SODIUM 5000 UNITS: 5000 INJECTION INTRAVENOUS; SUBCUTANEOUS at 20:11

## 2022-11-29 RX ADMIN — THIAMINE HCL TAB 100 MG 100 MG: 100 TAB at 09:32

## 2022-11-29 RX ADMIN — HEPARIN SODIUM 5000 UNITS: 5000 INJECTION INTRAVENOUS; SUBCUTANEOUS at 17:32

## 2022-11-29 RX ADMIN — CYANOCOBALAMIN 1000 MCG: 1000 INJECTION, SOLUTION INTRAMUSCULAR; SUBCUTANEOUS at 12:15

## 2022-11-29 RX ADMIN — IOPAMIDOL 85 ML: 755 INJECTION, SOLUTION INTRAVENOUS at 15:19

## 2022-11-29 RX ADMIN — FOLIC ACID 1 MG: 1 TABLET ORAL at 09:32

## 2022-11-29 RX ADMIN — PANTOPRAZOLE SODIUM 40 MG: 40 TABLET, DELAYED RELEASE ORAL at 09:32

## 2022-11-29 RX ADMIN — ZOLPIDEM TARTRATE 5 MG: 5 TABLET ORAL at 20:11

## 2022-11-29 RX ADMIN — LATANOPROST 1 DROP: 50 SOLUTION OPHTHALMIC at 20:11

## 2022-11-29 RX ADMIN — TRAZODONE HYDROCHLORIDE 100 MG: 50 TABLET ORAL at 20:11

## 2022-11-29 RX ADMIN — Medication 10 ML: at 09:33

## 2022-11-29 RX ADMIN — HYDROXYZINE HYDROCHLORIDE 25 MG: 25 TABLET, FILM COATED ORAL at 09:45

## 2022-11-29 RX ADMIN — HYDROXYZINE HYDROCHLORIDE 25 MG: 25 TABLET, FILM COATED ORAL at 20:11

## 2022-11-29 RX ADMIN — MULTIPLE VITAMINS W/ MINERALS TAB 1 TABLET: TAB at 09:32

## 2022-11-29 RX ADMIN — FLUOXETINE 20 MG: 20 CAPSULE ORAL at 09:32

## 2022-11-30 ENCOUNTER — ANESTHESIA (OUTPATIENT)
Dept: GASTROENTEROLOGY | Facility: HOSPITAL | Age: 62
End: 2022-11-30

## 2022-11-30 PROBLEM — K21.9 GERD WITHOUT ESOPHAGITIS: Status: ACTIVE | Noted: 2022-11-30

## 2022-11-30 PROBLEM — E43 SEVERE MALNUTRITION: Status: ACTIVE | Noted: 2022-11-30

## 2022-11-30 LAB
25(OH)D3 SERPL-MCNC: <5 NG/ML (ref 30–100)
ALBUMIN SERPL-MCNC: 3.9 G/DL (ref 3.5–5.2)
ALBUMIN/GLOB SERPL: 1.5 G/DL
ALP SERPL-CCNC: 71 U/L (ref 39–117)
ALPHA-FETOPROTEIN: 2.54 NG/ML (ref 0–8.3)
ALT SERPL W P-5'-P-CCNC: 16 U/L (ref 1–33)
ANION GAP SERPL CALCULATED.3IONS-SCNC: 12 MMOL/L (ref 5–15)
AST SERPL-CCNC: 65 U/L (ref 1–32)
BILIRUB SERPL-MCNC: 1.1 MG/DL (ref 0–1.2)
BUN SERPL-MCNC: 5 MG/DL (ref 8–23)
BUN/CREAT SERPL: 6 (ref 7–25)
CALCIUM SPEC-SCNC: 8.4 MG/DL (ref 8.6–10.5)
CHLORIDE SERPL-SCNC: 98 MMOL/L (ref 98–107)
CO2 SERPL-SCNC: 27 MMOL/L (ref 22–29)
CREAT SERPL-MCNC: 0.83 MG/DL (ref 0.57–1)
EGFRCR SERPLBLD CKD-EPI 2021: 80.8 ML/MIN/1.73
FERRITIN SERPL-MCNC: 1286 NG/ML (ref 13–150)
GLOBULIN UR ELPH-MCNC: 2.6 GM/DL
GLUCOSE BLDC GLUCOMTR-MCNC: 120 MG/DL (ref 70–130)
GLUCOSE BLDC GLUCOMTR-MCNC: 65 MG/DL (ref 70–130)
GLUCOSE BLDC GLUCOMTR-MCNC: 71 MG/DL (ref 70–130)
GLUCOSE BLDC GLUCOMTR-MCNC: 71 MG/DL (ref 70–130)
GLUCOSE BLDC GLUCOMTR-MCNC: 80 MG/DL (ref 70–130)
GLUCOSE BLDC GLUCOMTR-MCNC: 84 MG/DL (ref 70–130)
GLUCOSE BLDC GLUCOMTR-MCNC: 98 MG/DL (ref 70–130)
GLUCOSE SERPL-MCNC: 77 MG/DL (ref 65–99)
INR PPP: 1.18 (ref 0.84–1.13)
IRON 24H UR-MRATE: 87 MCG/DL (ref 37–145)
IRON SATN MFR SERPL: 46 % (ref 20–50)
POTASSIUM SERPL-SCNC: 3.4 MMOL/L (ref 3.5–5.2)
PROT SERPL-MCNC: 6.5 G/DL (ref 6–8.5)
PROTHROMBIN TIME: 14.9 SECONDS (ref 11.4–14.4)
SODIUM SERPL-SCNC: 137 MMOL/L (ref 136–145)
T4 FREE SERPL-MCNC: 1.32 NG/DL (ref 0.93–1.7)
TIBC SERPL-MCNC: 188 MCG/DL (ref 298–536)
TRANSFERRIN SERPL-MCNC: 126 MG/DL (ref 200–360)

## 2022-11-30 PROCEDURE — 25010000002 HEPARIN (PORCINE) PER 1000 UNITS: Performed by: INTERNAL MEDICINE

## 2022-11-30 PROCEDURE — 25010000002 THIAMINE PER 100 MG: Performed by: INTERNAL MEDICINE

## 2022-11-30 PROCEDURE — 82962 GLUCOSE BLOOD TEST: CPT

## 2022-11-30 PROCEDURE — 25010000002 PROPOFOL 10 MG/ML EMULSION: Performed by: NURSE ANESTHETIST, CERTIFIED REGISTERED

## 2022-11-30 PROCEDURE — 82105 ALPHA-FETOPROTEIN SERUM: CPT | Performed by: PHYSICIAN ASSISTANT

## 2022-11-30 PROCEDURE — 82728 ASSAY OF FERRITIN: CPT | Performed by: INTERNAL MEDICINE

## 2022-11-30 PROCEDURE — 84466 ASSAY OF TRANSFERRIN: CPT | Performed by: INTERNAL MEDICINE

## 2022-11-30 PROCEDURE — 0DB78ZX EXCISION OF STOMACH, PYLORUS, VIA NATURAL OR ARTIFICIAL OPENING ENDOSCOPIC, DIAGNOSTIC: ICD-10-PCS | Performed by: INTERNAL MEDICINE

## 2022-11-30 PROCEDURE — 85610 PROTHROMBIN TIME: CPT | Performed by: INTERNAL MEDICINE

## 2022-11-30 PROCEDURE — 82306 VITAMIN D 25 HYDROXY: CPT | Performed by: PHYSICIAN ASSISTANT

## 2022-11-30 PROCEDURE — 99233 SBSQ HOSP IP/OBS HIGH 50: CPT | Performed by: INTERNAL MEDICINE

## 2022-11-30 PROCEDURE — 86708 HEPATITIS A ANTIBODY: CPT | Performed by: PHYSICIAN ASSISTANT

## 2022-11-30 PROCEDURE — 83540 ASSAY OF IRON: CPT | Performed by: INTERNAL MEDICINE

## 2022-11-30 PROCEDURE — 43239 EGD BIOPSY SINGLE/MULTIPLE: CPT | Performed by: INTERNAL MEDICINE

## 2022-11-30 PROCEDURE — 84439 ASSAY OF FREE THYROXINE: CPT | Performed by: INTERNAL MEDICINE

## 2022-11-30 PROCEDURE — 80053 COMPREHEN METABOLIC PANEL: CPT | Performed by: INTERNAL MEDICINE

## 2022-11-30 PROCEDURE — 97530 THERAPEUTIC ACTIVITIES: CPT

## 2022-11-30 PROCEDURE — 88305 TISSUE EXAM BY PATHOLOGIST: CPT | Performed by: INTERNAL MEDICINE

## 2022-11-30 RX ORDER — PROPOFOL 10 MG/ML
VIAL (ML) INTRAVENOUS AS NEEDED
Status: DISCONTINUED | OUTPATIENT
Start: 2022-11-30 | End: 2022-11-30 | Stop reason: SURG

## 2022-11-30 RX ORDER — MIRTAZAPINE 15 MG/1
7.5 TABLET, FILM COATED ORAL NIGHTLY
Status: DISCONTINUED | OUTPATIENT
Start: 2022-11-30 | End: 2022-12-04 | Stop reason: HOSPADM

## 2022-11-30 RX ORDER — SODIUM CHLORIDE, SODIUM LACTATE, POTASSIUM CHLORIDE, CALCIUM CHLORIDE 600; 310; 30; 20 MG/100ML; MG/100ML; MG/100ML; MG/100ML
9 INJECTION, SOLUTION INTRAVENOUS CONTINUOUS
Status: DISCONTINUED | OUTPATIENT
Start: 2022-11-30 | End: 2022-12-04 | Stop reason: HOSPADM

## 2022-11-30 RX ORDER — PANTOPRAZOLE SODIUM 40 MG/1
40 TABLET, DELAYED RELEASE ORAL
Status: DISCONTINUED | OUTPATIENT
Start: 2022-11-30 | End: 2022-12-04 | Stop reason: HOSPADM

## 2022-11-30 RX ORDER — SODIUM CHLORIDE 0.9 % (FLUSH) 0.9 %
10 SYRINGE (ML) INJECTION AS NEEDED
Status: DISCONTINUED | OUTPATIENT
Start: 2022-11-30 | End: 2022-11-30 | Stop reason: HOSPADM

## 2022-11-30 RX ORDER — TRAZODONE HYDROCHLORIDE 50 MG/1
50 TABLET ORAL NIGHTLY
Status: DISCONTINUED | OUTPATIENT
Start: 2022-11-30 | End: 2022-11-30

## 2022-11-30 RX ORDER — MELATONIN
1000 DAILY
Status: DISCONTINUED | OUTPATIENT
Start: 2022-12-01 | End: 2022-12-04 | Stop reason: HOSPADM

## 2022-11-30 RX ORDER — CHOLECALCIFEROL (VITAMIN D3) 125 MCG
5 CAPSULE ORAL NIGHTLY
Status: DISCONTINUED | OUTPATIENT
Start: 2022-11-30 | End: 2022-12-04 | Stop reason: HOSPADM

## 2022-11-30 RX ORDER — SODIUM CHLORIDE 9 MG/ML
40 INJECTION, SOLUTION INTRAVENOUS AS NEEDED
Status: DISCONTINUED | OUTPATIENT
Start: 2022-11-30 | End: 2022-11-30 | Stop reason: HOSPADM

## 2022-11-30 RX ORDER — IPRATROPIUM BROMIDE AND ALBUTEROL SULFATE 2.5; .5 MG/3ML; MG/3ML
3 SOLUTION RESPIRATORY (INHALATION) ONCE AS NEEDED
Status: DISCONTINUED | OUTPATIENT
Start: 2022-11-30 | End: 2022-11-30 | Stop reason: HOSPADM

## 2022-11-30 RX ORDER — LIDOCAINE HYDROCHLORIDE 10 MG/ML
INJECTION, SOLUTION EPIDURAL; INFILTRATION; INTRACAUDAL; PERINEURAL AS NEEDED
Status: DISCONTINUED | OUTPATIENT
Start: 2022-11-30 | End: 2022-11-30 | Stop reason: SURG

## 2022-11-30 RX ORDER — FAMOTIDINE 10 MG/ML
20 INJECTION, SOLUTION INTRAVENOUS ONCE
Status: COMPLETED | OUTPATIENT
Start: 2022-11-30 | End: 2022-11-30

## 2022-11-30 RX ORDER — ONDANSETRON 2 MG/ML
4 INJECTION INTRAMUSCULAR; INTRAVENOUS ONCE AS NEEDED
Status: DISCONTINUED | OUTPATIENT
Start: 2022-11-30 | End: 2022-11-30 | Stop reason: HOSPADM

## 2022-11-30 RX ADMIN — LIDOCAINE HYDROCHLORIDE 100 MG: 10 INJECTION, SOLUTION EPIDURAL; INFILTRATION; INTRACAUDAL; PERINEURAL at 08:59

## 2022-11-30 RX ADMIN — PANTOPRAZOLE SODIUM 40 MG: 40 TABLET, DELAYED RELEASE ORAL at 16:44

## 2022-11-30 RX ADMIN — PROPOFOL 30 MG: 10 INJECTION, EMULSION INTRAVENOUS at 09:01

## 2022-11-30 RX ADMIN — PROPOFOL 80 MG: 10 INJECTION, EMULSION INTRAVENOUS at 08:59

## 2022-11-30 RX ADMIN — Medication 5 MG: at 20:12

## 2022-11-30 RX ADMIN — FLUOXETINE 40 MG: 20 CAPSULE ORAL at 10:51

## 2022-11-30 RX ADMIN — HYDROXYZINE HYDROCHLORIDE 25 MG: 25 TABLET, FILM COATED ORAL at 16:44

## 2022-11-30 RX ADMIN — FAMOTIDINE 20 MG: 10 INJECTION INTRAVENOUS at 08:21

## 2022-11-30 RX ADMIN — Medication 10 ML: at 08:18

## 2022-11-30 RX ADMIN — HEPARIN SODIUM 5000 UNITS: 5000 INJECTION INTRAVENOUS; SUBCUTANEOUS at 05:15

## 2022-11-30 RX ADMIN — LATANOPROST 1 DROP: 50 SOLUTION OPHTHALMIC at 20:13

## 2022-11-30 RX ADMIN — PROPOFOL 30 MG: 10 INJECTION, EMULSION INTRAVENOUS at 09:03

## 2022-11-30 RX ADMIN — PANTOPRAZOLE SODIUM 40 MG: 40 TABLET, DELAYED RELEASE ORAL at 10:50

## 2022-11-30 RX ADMIN — PROPOFOL 30 MG: 10 INJECTION, EMULSION INTRAVENOUS at 09:05

## 2022-11-30 RX ADMIN — MULTIPLE VITAMINS W/ MINERALS TAB 1 TABLET: TAB at 10:51

## 2022-11-30 RX ADMIN — MIRTAZAPINE 7.5 MG: 15 TABLET, FILM COATED ORAL at 20:12

## 2022-11-30 RX ADMIN — HEPARIN SODIUM 5000 UNITS: 5000 INJECTION INTRAVENOUS; SUBCUTANEOUS at 20:12

## 2022-11-30 RX ADMIN — FOLIC ACID 1 MG: 1 TABLET ORAL at 10:51

## 2022-11-30 RX ADMIN — DEXTROSE AND SODIUM CHLORIDE 75 ML/HR: 5; 900 INJECTION, SOLUTION INTRAVENOUS at 10:52

## 2022-11-30 RX ADMIN — THIAMINE HYDROCHLORIDE 100 MG: 100 INJECTION, SOLUTION INTRAMUSCULAR; INTRAVENOUS at 10:51

## 2022-11-30 RX ADMIN — HEPARIN SODIUM 5000 UNITS: 5000 INJECTION INTRAVENOUS; SUBCUTANEOUS at 16:44

## 2022-11-30 RX ADMIN — SODIUM CHLORIDE, POTASSIUM CHLORIDE, SODIUM LACTATE AND CALCIUM CHLORIDE 9 ML/HR: 600; 310; 30; 20 INJECTION, SOLUTION INTRAVENOUS at 08:40

## 2022-11-30 RX ADMIN — Medication 10 ML: at 10:51

## 2022-11-30 RX ADMIN — SODIUM CHLORIDE, POTASSIUM CHLORIDE, SODIUM LACTATE AND CALCIUM CHLORIDE 9 ML/HR: 600; 310; 30; 20 INJECTION, SOLUTION INTRAVENOUS at 08:18

## 2022-11-30 NOTE — ANESTHESIA POSTPROCEDURE EVALUATION
Patient: Philomena Davis    Procedure Summary     Date: 11/30/22 Room / Location:  [facility] /  [facility]    Anesthesia Start: 0855 Anesthesia Stop: 0915    Procedure: ESOPHAGOGASTRODUODENOSCOPY Diagnosis:       Alcoholic cirrhosis of liver without ascites (HCC)      Weight loss      (Alcoholic cirrhosis of liver without ascites (HCC) [K70.30])      (Weight loss [R63.4])    Surgeons: Arelis Solano MD Provider: Noah Montero MD    Anesthesia Type: general, MAC ASA Status: 3          Anesthesia Type: general, MAC    Vitals  Vitals Value Taken Time   /53 11/30/22 0915   Temp 97.6 °F (36.4 °C) 11/30/22 0915   Pulse 77 11/30/22 0915   Resp 14 11/30/22 0915   SpO2 93 % 11/30/22 0915           Post Anesthesia Care and Evaluation    Patient location during evaluation: PACU  Patient participation: complete - patient participated  Level of consciousness: awake and alert  Pain management: adequate    Airway patency: patent  Anesthetic complications: No anesthetic complications  PONV Status: none  Cardiovascular status: hemodynamically stable and acceptable  Respiratory status: nonlabored ventilation, acceptable and nasal cannula  Hydration status: acceptable

## 2022-11-30 NOTE — ANESTHESIA PREPROCEDURE EVALUATION
Anesthesia Evaluation     Patient summary reviewed and Nursing notes reviewed   NPO Solid Status: > 8 hours  NPO Liquid Status: > 2 hours           Airway   Mallampati: II  TM distance: >3 FB  Neck ROM: full  No difficulty expected  Dental      Pulmonary    (+) a smoker,   Cardiovascular     ECG reviewed    (+) hypertension, hyperlipidemia,   (-) past MI, dysrhythmias, angina, cardiac stents    ROS comment: ECG NSR q in inf leads TW abn     ECHO 2014 Mild concentric LVH   normal left ventricular systolic function.  LVDDwith impaired relaxation.       Neuro/Psych  (+) psychiatric history (ETOH dependence ) Anxiety and Depression,    (-) seizures, CVA  GI/Hepatic/Renal/Endo    (+)  GI bleeding , liver disease cirrhosis, diabetes mellitus (a1C <5) type 2 well controlled,     ROS Comment: No ascites no known Esophageal varices     Musculoskeletal     Abdominal    Substance History      OB/GYN          Other        ROS/Med Hx Other: HCT 35   Admitted for weakness malnutition in alcoholic pt who states she hasnt used EtOh for 2 months    Pt concerned with what she claims is oversedation last night.  BP low in pre op -responded to IV fluid bolus      Phys Exam Other:  [place] 3 grade 1 view                Anesthesia Plan    ASA 3     general and MAC     (PFL TIVA Aim MAP >65 )  intravenous induction     Anesthetic plan, risks, benefits, and alternatives have been provided, discussed and informed consent has been obtained with: patient.    Plan discussed with CRNA.        CODE STATUS:    Level Of Support Discussed With: Patient  Code Status (Patient has no pulse and is not breathing): CPR (Attempt to Resuscitate)  Medical Interventions (Patient has pulse or is breathing): Full Support

## 2022-12-01 ENCOUNTER — APPOINTMENT (OUTPATIENT)
Dept: CT IMAGING | Facility: HOSPITAL | Age: 62
End: 2022-12-01

## 2022-12-01 LAB
ANION GAP SERPL CALCULATED.3IONS-SCNC: 9 MMOL/L (ref 5–15)
BUN SERPL-MCNC: 3 MG/DL (ref 8–23)
BUN/CREAT SERPL: 4.3 (ref 7–25)
CALCIUM SPEC-SCNC: 8.5 MG/DL (ref 8.6–10.5)
CHLORIDE SERPL-SCNC: 104 MMOL/L (ref 98–107)
CK SERPL-CCNC: 280 U/L (ref 20–180)
CO2 SERPL-SCNC: 28 MMOL/L (ref 22–29)
CREAT SERPL-MCNC: 0.7 MG/DL (ref 0.57–1)
CYTO UR: NORMAL
EGFRCR SERPLBLD CKD-EPI 2021: 99.2 ML/MIN/1.73
GLUCOSE BLDC GLUCOMTR-MCNC: 100 MG/DL (ref 70–130)
GLUCOSE BLDC GLUCOMTR-MCNC: 78 MG/DL (ref 70–130)
GLUCOSE BLDC GLUCOMTR-MCNC: 89 MG/DL (ref 70–130)
GLUCOSE BLDC GLUCOMTR-MCNC: 95 MG/DL (ref 70–130)
GLUCOSE SERPL-MCNC: 87 MG/DL (ref 65–99)
HAV AB SER QL IA: POSITIVE
LAB AP CASE REPORT: NORMAL
LAB AP CLINICAL INFORMATION: NORMAL
PATH REPORT.FINAL DX SPEC: NORMAL
PATH REPORT.GROSS SPEC: NORMAL
POTASSIUM SERPL-SCNC: 3 MMOL/L (ref 3.5–5.2)
SODIUM SERPL-SCNC: 141 MMOL/L (ref 136–145)
VIT B12 BLD-MCNC: >2000 PG/ML (ref 211–946)

## 2022-12-01 PROCEDURE — 82962 GLUCOSE BLOOD TEST: CPT

## 2022-12-01 PROCEDURE — 25010000002 THIAMINE PER 100 MG: Performed by: INTERNAL MEDICINE

## 2022-12-01 PROCEDURE — 82550 ASSAY OF CK (CPK): CPT | Performed by: INTERNAL MEDICINE

## 2022-12-01 PROCEDURE — 25010000002 HEPARIN (PORCINE) PER 1000 UNITS: Performed by: INTERNAL MEDICINE

## 2022-12-01 PROCEDURE — 99232 SBSQ HOSP IP/OBS MODERATE 35: CPT | Performed by: INTERNAL MEDICINE

## 2022-12-01 PROCEDURE — 71250 CT THORAX DX C-: CPT

## 2022-12-01 PROCEDURE — 80048 BASIC METABOLIC PNL TOTAL CA: CPT | Performed by: INTERNAL MEDICINE

## 2022-12-01 PROCEDURE — 97530 THERAPEUTIC ACTIVITIES: CPT

## 2022-12-01 PROCEDURE — 82607 VITAMIN B-12: CPT | Performed by: INTERNAL MEDICINE

## 2022-12-01 RX ADMIN — HYDROXYZINE HYDROCHLORIDE 25 MG: 25 TABLET, FILM COATED ORAL at 16:24

## 2022-12-01 RX ADMIN — LATANOPROST 1 DROP: 50 SOLUTION OPHTHALMIC at 20:35

## 2022-12-01 RX ADMIN — HEPARIN SODIUM 5000 UNITS: 5000 INJECTION INTRAVENOUS; SUBCUTANEOUS at 14:00

## 2022-12-01 RX ADMIN — THIAMINE HYDROCHLORIDE 100 MG: 100 INJECTION, SOLUTION INTRAMUSCULAR; INTRAVENOUS at 09:09

## 2022-12-01 RX ADMIN — FOLIC ACID 1 MG: 1 TABLET ORAL at 09:08

## 2022-12-01 RX ADMIN — POTASSIUM CHLORIDE 40 MEQ: 750 CAPSULE, EXTENDED RELEASE ORAL at 14:40

## 2022-12-01 RX ADMIN — PANTOPRAZOLE SODIUM 40 MG: 40 TABLET, DELAYED RELEASE ORAL at 09:09

## 2022-12-01 RX ADMIN — HEPARIN SODIUM 5000 UNITS: 5000 INJECTION INTRAVENOUS; SUBCUTANEOUS at 20:33

## 2022-12-01 RX ADMIN — MULTIPLE VITAMINS W/ MINERALS TAB 1 TABLET: TAB at 09:08

## 2022-12-01 RX ADMIN — MIRTAZAPINE 7.5 MG: 15 TABLET, FILM COATED ORAL at 20:32

## 2022-12-01 RX ADMIN — HEPARIN SODIUM 5000 UNITS: 5000 INJECTION INTRAVENOUS; SUBCUTANEOUS at 05:41

## 2022-12-01 RX ADMIN — FLUOXETINE 40 MG: 20 CAPSULE ORAL at 09:08

## 2022-12-01 RX ADMIN — Medication 1000 UNITS: at 09:09

## 2022-12-01 RX ADMIN — Medication 10 ML: at 09:08

## 2022-12-01 NOTE — THERAPY TREATMENT NOTE
Patient Name: Philomena Davis  : 1960    MRN: [id number]                              Today's Date: 2022       Admit Date: 2022    Visit Dx:     ICD-10-CM ICD-9-CM   1. Generalized weakness  R53.1 780.79   2. Acute hypokalemia  E87.6 276.8   3. Anxiety  F41.9 300.00   4. Malnutrition, unspecified type (HCC)  E46 263.9   5. Alcoholic cirrhosis of liver without ascites (HCC)  K70.30 571.2   6. Weight loss  R63.4 783.21   7. 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   • Digital mucous cyst of toe of left foot   • Alcohol withdrawal (HCC)   • Alcoholism /alcohol abuse   • Hematemesis   • Diabetes mellitus, type 2 (HCC)   • Essential hypertension   • Generalized anxiety disorder   • Hyperlipidemia   • Glaucoma   • Elevated LFTs   • Generalized weakness   • Alcoholic cirrhosis of liver without ascites (HCC)   • Weight loss   • Severe malnutrition (HCC)   • GERD without esophagitis     Past Medical History:   Diagnosis Date   • Anxiety and depression    • Cirrhosis of liver (HCC)    • Closed displaced fracture of lateral malleolus of left fibula    • Diabetes mellitus (HCC)    • Ganglion cyst of left foot    • Glaucoma    • Hypertension    • Wears glasses      Past Surgical History:   Procedure Laterality Date   • ANKLE OPEN REDUCTION INTERNAL FIXATION Left 3/24/2017    Procedure: LEFT ANKLE OPEN REDUCTION INTERNAL FIXATION WITH ILIAC CREST BONE GRAFT , EXCISION CYST / INNERSPACE LEFT FOOT;  Surgeon: Frank Larson MD;  Location: [facility] OR;  Service:    • AUGMENTATION MAMMAPLASTY Bilateral    • BREAST AUGMENTATION     • BREAST EXCISIONAL BIOPSY Right    • DILATION AND CURETTAGE, DIAGNOSTIC / THERAPEUTIC     • AK FIX NON/MALUNION FEMUR,W GRAFT Left 3/24/2017    Procedure: ILIAC CREST BONE GRAFT;  Surgeon: Frank Larson MD;  Location: [facility] OR;   Service: Orthopedics   • WRIST SURGERY        General Information     Row Name 12/01/22 0930          OT Time and Intention    Document Type therapy note (daily note)  -TB     Mode of Treatment occupational therapy;individual therapy  -TB     Row Name 12/01/22 0930          General Information    Patient Profile Reviewed yes  -TB     Existing Precautions/Restrictions fall  -TB     Barriers to Rehab medically complex;previous functional deficit  -TB     Row Name 12/01/22 0930          Cognition    Orientation Status (Cognition) oriented x 4  -TB     Row Name 12/01/22 0930          Safety Issues, Functional Mobility    Impairments Affecting Function (Mobility) balance;endurance/activity tolerance;pain;strength  -TB           User Key  (r) = Recorded By, (t) = Taken By, (c) = Cosigned By    Initials Name Provider Type    TB Chelsea Luciano, OT Occupational Therapist                 Mobility/ADL's     Row Name 12/01/22 0931          Bed Mobility    Bed Mobility supine-sit;scooting/bridging  -TB     Scooting/Bridging [place] (Bed Mobility) supervision  -TB     Supine-Sit [place] (Bed Mobility) supervision  -TB     Comment, (Bed Mobility) Transitions to EOB sitting with encouragement and time  -TB     Row Name 12/01/22 0931          Transfers    Transfers sit-stand transfer;stand-sit transfer;bed-chair transfer;toilet transfer  -TB     Row Name 12/01/22 0931          Bed-Chair Transfer    Bed-Chair [place] (Transfers) contact guard;1 person assist;verbal cues  -TB     Assistive Device (Bed-Chair Transfers) walker, front-wheeled  -TB     Row Name 12/01/22 0931          Sit-Stand Transfer    Sit-Stand [place] (Transfers) contact guard;1 person assist;verbal cues  -TB     Assistive Device (Sit-Stand Transfers) walker, front-wheeled  -TB     Row Name 12/01/22 0931          Stand-Sit Transfer    Stand-Sit [place] (Transfers) contact guard;1 person assist;verbal cues  -TB     Assistive Device  (Stand-Sit Transfers) walker, front-wheeled  -TB     Row Name 12/01/22 0931          Toilet Transfer    Type (Toilet Transfer) sit-stand;stand-sit  -TB     [place] (Toilet Transfer) contact guard;1 person assist;verbal cues  -TB     Assistive Device (Toilet Transfer) commode, bedside without drop arms;walker, front-wheeled  -TB     Comment, (Toilet Transfer) Pt to [facility] this session due to low BP and Low BG  -TB     Row Name 12/01/22 0931          Activities of Daily Living    BADL Assessment/Intervention upper body dressing;grooming;feeding;toileting  -TB     Row Name 12/01/22 0931          Upper Body Dressing Assessment/Training    [place] Level (Upper Body Dressing) don;augustine/jacquie;set up;verbal cues  -TB     Position (Upper Body Dressing) edge of bed sitting  -TB     Row Name 12/01/22 0931          Toileting Assessment/Training    [place] Level (Toileting) contact guard assist;adjust/manage clothing;set up;perform perineal hygiene  -TB     Position (Toileting) unsupported sitting;supported standing  -TB     Row Name 12/01/22 0931          Grooming Assessment/Training    [place] (Grooming) set up;wash face, hands  -TB     Position (Grooming) supported sitting  -TB     Row Name 12/01/22 0931          Self-Feeding Assessment/Training    [place] (Feeding) set up;prepare tray/open items;independent;scoop food and bring to mouth;liquids to mouth  -TB     Position (Self-Feeding) supported sitting  -TB     Comment, (Feeding) Limited with set-up due to peripheral neuropathy B hands  -TB           User Key  (r) = Recorded By, (t) = Taken By, (c) = Cosigned By    Initials Name Provider Type    Chelsea Cr OT Occupational Therapist               Obj/Interventions     Row Name 12/01/22 0933          Balance    Balance Assessment sitting dynamic balance;sit to stand dynamic balance;standing dynamic balance  -TB     Dynamic Sitting Balance supervision  -TB     Position,  Sitting Balance unsupported;sitting in chair;sitting edge of bed  BSC  -TB     Sit to Stand Dynamic Balance contact guard;1-person assist;verbal cues  -TB     Dynamic Standing Balance contact guard;1-person assist;verbal cues  -TB     Position/Device Used, Standing Balance walker, front-wheeled  -TB     Balance Interventions sitting;standing;sit to stand;supported;dynamic;dynamic reaching;occupation based/functional task;UE activity with balance activity  -TB           User Key  (r) = Recorded By, (t) = Taken By, (c) = Cosigned By    Initials Name Provider Type    TB Chelsea Luciano, [facility] Occupational Therapist               Goals/Plan    No documentation.                Clinical Impression     Row Name 12/01/22 0934          Pain Assessment    Pain Intervention(s) Ambulation/increased activity;Repositioned  -TB     Additional Documentation Pain Scale: FACES Pre/Post-Treatment (Group)  -TB     Row Name 12/01/22 0934          Pain Scale: FACES Pre/Post-Treatment    Pain: FACES Scale, Pretreatment 2-->hurts little bit  -TB     Posttreatment Pain Rating 2-->hurts little bit  -TB     Pain Location generalized  -TB     Row Name 12/01/22 0934          Plan of Care Review    Plan of Care Reviewed With patient  -TB     Progress improving  -TB     Outcome Evaluation Pt is in better spirits today and participates in therapy with good effort. Transitions to sitting EOB with Supervision. CGAx1 transfers to BSC and chair using RW. Continues to be limited by strength and endurance deficits. Set-up simple UB ADLs. OT will continue to follow IP.  -TB     Row Name 12/01/22 0934          Therapy Plan Review/Discharge Plan (OT)    Anticipated Discharge Disposition (OT) home with assist;home with home health  -TB     Row Name 12/01/22 0934          Vital Signs    Pre Systolic BP Rehab --  RN cleared OT  -TB     Pre SpO2 (%) 98  -TB     O2 Delivery Pre Treatment supplemental O2  -TB     O2 Delivery Intra Treatment supplemental O2   -TB     Post SpO2 (%) 96  -TB     O2 Delivery Post Treatment supplemental O2  -TB     Pre Patient Position Supine  -TB     Intra Patient Position Standing  -TB     Post Patient Position Sitting  -TB     Row Name 12/01/22 0934          Positioning and Restraints    Pre-Treatment Position in bed  -TB     Post Treatment Position chair  -TB     In Chair reclined;notified nsg;call light within reach;encouraged to call for assist;exit alarm on;waffle cushion;legs elevated  -TB           User Key  (r) = Recorded By, (t) = Taken By, (c) = Cosigned By    Initials Name Provider Type    Chelsea Cr OT Occupational Therapist               Outcome Measures     Row Name 12/01/22 0936          How much help from another is currently needed...    Putting on and taking off regular lower body clothing? 2  -TB     Bathing (including washing, rinsing, and drying) 3  -TB     Toileting (which includes using toilet bed pan or urinal) 3  -TB     Putting on and taking off regular upper body clothing 3  -TB     Taking care of personal grooming (such as brushing teeth) 3  -TB     Eating meals 3  -TB     AM-PAC 6 Clicks Score (OT) 17  -TB     Row Name 12/01/22 0936          Functional Assessment    Outcome Measure Options AM-PAC 6 Clicks Daily Activity (OT)  -TB           User Key  (r) = Recorded By, (t) = Taken By, (c) = Cosigned By    Initials Name Provider Type    Chelsea Cr OT Occupational Therapist                Occupational Therapy Education     Title: PT OT SLP Therapies (In Progress)     Topic: Occupational Therapy (In Progress)     Point: ADL training (Done)     Description:   Instruct learner(s) on proper safety adaptation and remediation techniques during self care or transfers.   Instruct in proper use of assistive devices.              Learning Progress Summary           Patient Acceptance, E,D, VU,DU,NR by TB at 12/1/2022 0937    Acceptance, E,D, VU,NR by TB at 11/30/2022 1515    Acceptance, E,D,  JOSE,LEXY,NR by  at 11/29/2022 1155                   Point: Home exercise program (Done)     Description:   Instruct learner(s) on appropriate technique for monitoring, assisting and/or progressing therapeutic exercises/activities.              Learning Progress Summary           Patient Acceptance, E,D, JOSE,NR by  at 11/30/2022 1515                   Point: Precautions (Not Started)     Description:   Instruct learner(s) on prescribed precautions during self-care and functional transfers.              Learner Progress:  Not documented in this visit.          Point: Body mechanics (Not Started)     Description:   Instruct learner(s) on proper positioning and spine alignment during self-care, functional mobility activities and/or exercises.              Learner Progress:  Not documented in this visit.                      User Key     Initials Effective Dates Name Provider Type Discipline     06/16/21 -  Chelsea Luciano OT Occupational Therapist OT              OT Recommendation and Plan  Therapy Frequency (OT): daily  Plan of Care Review  Plan of Care Reviewed With: patient  Progress: improving  Outcome Evaluation: Pt is in better spirits today and participates in therapy with good effort. Transitions to sitting EOB with Supervision. CGAx1 transfers to BSC and chair using RW. Continues to be limited by strength and endurance deficits. Set-up simple UB ADLs. OT will continue to follow IP.     Time Calculation:    Time Calculation- OT     Row Name 12/01/22 0824             Time Calculation- OT    OT Start Time 0824  -      OT Received On 12/01/22  -      OT Goal Re-Cert Due Date 12/09/22  -            User Key  (r) = Recorded By, (t) = Taken By, (c) = Cosigned By    Initials Name Provider Type     Chelsea Luciano OT Occupational Therapist              Therapy Charges for Today     Code Description Service Date Service Provider Modifiers Qty    [id number]  OT THERAPEUTIC ACT EA 15 MIN  11/30/2022 Chelsea Luciano OT GO 3    [id number]  OT THERAPEUTIC ACT EA 15 MIN 12/1/2022 Chelsea Luciano OT GO 3               Chelsea Luciano OT  12/1/2022

## 2022-12-01 NOTE — PLAN OF CARE
Goal Outcome Evaluation:                 Problem: Adult Inpatient Plan of Care  Goal: Absence of Hospital-Acquired Illness or Injury  Intervention: Identify and Manage Fall Risk  Recent Flowsheet Documentation  Taken 12/1/2022 0200 by Bobby Pedro RN  Safety Promotion/Fall Prevention:   activity supervised   safety round/check completed   toileting scheduled  Taken 12/1/2022 0000 by Bobby Pedro RN  Safety Promotion/Fall Prevention:   activity supervised   safety round/check completed   toileting scheduled  Taken 11/30/2022 2200 by Bobby Pedro RN  Safety Promotion/Fall Prevention:   activity supervised   safety round/check completed   toileting scheduled  Taken 11/30/2022 2000 by Bobby Pedro RN  Safety Promotion/Fall Prevention:   activity supervised   safety round/check completed   toileting scheduled  Intervention: Prevent Skin Injury  Recent Flowsheet Documentation  Taken 12/1/2022 0200 by Bobby Pedro RN  Body Position: supine  Skin Protection: adhesive use limited  Taken 12/1/2022 0000 by Bobby Pedro RN  Body Position: supine  Skin Protection: adhesive use limited  Taken 11/30/2022 2200 by Bobby Pedro RN  Body Position: supine  Skin Protection: adhesive use limited  Taken 11/30/2022 2000 by Bobby Pedro RN  Body Position: supine  Skin Protection: adhesive use limited  Intervention: Prevent and Manage VTE (Venous Thromboembolism) Risk  Recent Flowsheet Documentation  Taken 12/1/2022 0200 by Bobby Pedro RN  VTE Prevention/Management:   bilateral   sequential compression devices off  Taken 12/1/2022 0000 by Bobby Pedro RN  VTE Prevention/Management:   bilateral   sequential compression devices off  Taken 11/30/2022 2200 by Bobby Pedro RN  VTE Prevention/Management:   bilateral   sequential compression devices off  Taken 11/30/2022 2000 by Bobby Pedro RN  VTE Prevention/Management:   bilateral   sequential compression devices off  Intervention: Prevent  Infection  Recent Flowsheet Documentation  Taken 12/1/2022 0200 by Bobby Pedro RN  Infection Prevention: environmental surveillance performed  Taken 12/1/2022 0000 by Bobby Pedro RN  Infection Prevention: environmental surveillance performed  Taken 11/30/2022 2200 by Bobby Pedro RN  Infection Prevention: environmental surveillance performed  Taken 11/30/2022 2000 by Bobby Pedro RN  Infection Prevention: environmental surveillance performed  Goal: Optimal Comfort and Wellbeing  Intervention: Monitor Pain and Promote Comfort  Recent Flowsheet Documentation  Taken 12/1/2022 0200 by Bobby Pedro RN  Pain Management Interventions: quiet environment facilitated  Taken 12/1/2022 0000 by Bobby Pedro RN  Pain Management Interventions: quiet environment facilitated  Taken 11/30/2022 2200 by Bobby Pedro RN  Pain Management Interventions: quiet environment facilitated  Taken 11/30/2022 2000 by Bobby Pedor RN  Pain Management Interventions: quiet environment facilitated  Intervention: Provide Person-Centered Care  Recent Flowsheet Documentation  Taken 12/1/2022 0200 by Bobby Pedro RN  Trust Relationship/Rapport: care explained  Taken 12/1/2022 0000 by Bobby Pedro RN  Trust Relationship/Rapport: care explained  Taken 11/30/2022 2200 by Bobby Pedro RN  Trust Relationship/Rapport: care explained  Taken 11/30/2022 2000 by Bobby Pedro RN  Trust Relationship/Rapport: care explained     VSS, voids well, rested throughout the night, will continue to monitor for changes.

## 2022-12-01 NOTE — PROGRESS NOTES
"                    Clinical Nutrition     Patient Name: Philomena Davis  YOB: 1960  MRN: [id number]  Date of Encounter: 22 10:53 EST  Admission date: 2022    Reason for Visit   Follow up    EMR reviewed    Yes    Diet Nutrition Related History     Patient reports her appetite as \"good\" and is eating 44% x 4 recorded meals. Patient reports drinking all of her Boost+ at lunch and dinner. Patient reports her esophagus is sore due to her procedure yesterday and is on a GI Soft diet. Discussed some patient preferences and took her order for lunch. (Message sent to the kitchen.) Patient denies any N/V/D and her last BM was on 22.     Current Nutrition Prescription    Diet: Gastrointestinal Diets; Fiber-Restricted; Texture: Regular Texture (IDDSI 7); Fluid Consistency: Thin (IDDSI 0)  Orders Placed This Encounter      Dietary Nutrition Supplements Boost Plus    Average Intake from [place]% x 4 meals    Actions:    Care plan reviewed, Interview for preferences, Menu provided, Encourage intake    Monitor Per Protocol    Maryjane Landers,   Time Spent: 20 minutes   "
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
## 2022-12-01 NOTE — CASE MANAGEMENT/SOCIAL WORK
Continued Stay Note   [facility]     Patient Name: Philomena Davis  MRN: [id number]  Today's Date: 12/1/2022    Admit Date: 11/28/2022    Plan: Home with HH   Discharge Plan     Row Name 12/01/22 1717       Plan    Plan Home with HH    Plan Comments Case mgt f/u. I spoke with Ms Davis again re: d/c plan. Advised that [facility] has declined referral. Plan is home with Home Health services,update referral faxed to [facility] for PT/OT/skilled nursing. She is reluctant to d/c home, but verbalizes understanding. Case mgt will con't to follow               Discharge Codes    No documentation.               Expected Discharge Date and Time     Expected Discharge Date Expected Discharge Time    Dec 2, 2022             Sonja C Kellerman, RN

## 2022-12-01 NOTE — PROGRESS NOTES
"GI Daily Progress Note  Subjective:    Chief Complaint:  \"I am a little sore\"    Patient reports that she is due for a colonoscopy.  I clarified with Dr. Anderson and patient is due for one but instead of doing this inpatient, better to get calorie count and colonoscopy as an outpatient to let esophagitis heal.   Patient denies abdominal pain    Objective:    BP 95/61 (BP Location: Left arm, Patient Position: Lying)   Pulse 87   Temp 98.3 °F (36.8 °C) (Oral)   Resp 16   Ht 162.6 cm (64\")   Wt 67.1 kg (148 lb)   LMP 01/22/2016 (Approximate)   SpO2 99%   BMI 25.40 kg/m²     Physical Exam  Constitutional:       Appearance: Normal appearance.   HENT:      Nose: Nose normal.      Mouth/Throat:      Mouth: Mucous membranes are dry.   Cardiovascular:      Rate and Rhythm: Normal rate and regular rhythm.   Pulmonary:      Effort: Pulmonary effort is normal.   Abdominal:      General: Abdomen is flat. Bowel sounds are normal.      Palpations: Abdomen is soft.   Neurological:      Mental Status: She is alert.   Psychiatric:         Mood and Affect: Mood normal.         Behavior: Behavior normal.         Lab  Lab Results   Component Value Date    WBC 3.77 11/29/2022    HGB 11.8 (L) 11/29/2022    HGB 13.1 11/28/2022    HGB 11.0 (L) 10/04/2022    .6 (H) 11/29/2022     11/29/2022    INR 1.18 (H) 11/30/2022    INR 1.13 09/28/2022    INR 1.03 09/26/2022    INR 1.06 05/10/2019       Lab Results   Component Value Date    GLUCOSE 87 12/01/2022    BUN 3 (L) 12/01/2022    CREATININE 0.70 12/01/2022    EGFRIFNONA 114 05/15/2019    BCR 4.3 (L) 12/01/2022     12/01/2022    K 3.0 (L) 12/01/2022    CO2 28.0 12/01/2022    CALCIUM 8.5 (L) 12/01/2022    ALBUMIN 3.90 11/30/2022    ALKPHOS 71 11/30/2022    BILITOT 1.1 11/30/2022    ALT 16 11/30/2022    AST 65 (H) 11/30/2022           Assessment:      Generalized weakness    Generalized anxiety disorder    Alcoholic cirrhosis of liver without ascites (HCC)    Weight loss   "  Severe malnutrition (HCC)    GERD without esophagitis            1. Loss of appetite   2. Alcohol liver cirrhosis.  MELD 9   3. Hypokalemia, improved.    4. Unintentional weight loss     Discussed case with Dr Anderson.  Agree that patient needs nutrition assessment rather than colonoscopy.  Awaiting AFP as well  Continue thiamine  Noted CT did not show mass    Need outpatient GI care         Arelis Solano MD  12/01/22  15:01 EST

## 2022-12-01 NOTE — CONSULTS
Nutrition Services    Patient Name:  Philomena Davis  YOB: 1960  MRN: [id number]  Admit Date:  11/28/2022    Discussed pt in great detail with MD. Concerns about PO adequacy. Initiated 2 day calorie count. Will continue to monitor PO intake daily.      Electronically signed by:  Vanessa Paredes MS,RD,LD  12/01/22 14:14 EST

## 2022-12-01 NOTE — PROGRESS NOTES
[facility] Medicine Services  PROGRESS NOTE    Patient Name: Philomena Davis  : 1960  MRN: [id number]    Date of Admission: 2022  Primary Care Physician: Provider, No Known    Subjective   Subjective     CC:  weakness    HPI:  Eating little. Picked at oatmeal this morning. Not drinking Ensures. Denies pain w swallowing or nausea, just lack of appetite. We discuss eating even when you don't feel hungry  Profound weakness persists    ROS:  Gen- No fevers, chills  CV- No chest pain, palpitations  Resp- No cough, dyspnea  GI- No N/V/D, abd pain     Objective   Objective     Vital Signs:   Temp:  [97.9 °F (36.6 °C)-98.5 °F (36.9 °C)] 98.3 °F (36.8 °C)  Heart Rate:  [70-88] 87  Resp:  [16] 16  BP: ()/(52-69) 95/61  Flow (L/min):  [1.5-2] 1.5     Physical Exam:  Constitutional: Chronically ill appearing female sitting up in recliner picking at breakfast  HENT: NCAT, mucous membranes moist  Respiratory: Clear to auscultation bilaterally, respiratory effort normal   Cardiovascular: RRR, no murmurs, rubs, or gallops  Gastrointestinal: Soft, nontender, nondistended  Musculoskeletal: Muscle tone decreased throughout, no joint effusions appreciated  Psychiatric: Anxious, teary about circumstances  Neurologic: Alert and oriented, facial movements symmetric and spontaneous movement of all 4 extremities grossly equal bilaterally, speech clear  Skin: No rashes    Results Reviewed: Hypokalemia persists (not replaced yesterday)  LAB RESULTS:      Lab 22  0647 22  0352 22  1129   WBC  --  3.77 3.20*   HEMOGLOBIN  --  11.8* 13.1   HEMATOCRIT  --  35.4 39.9   PLATELETS  --  146 167   NEUTROS ABS  --  1.70 1.66*   IMMATURE GRANS (ABS)  --  0.01 0.01   LYMPHS ABS  --  1.29 0.91   MONOS ABS  --  0.67 0.52   EOS ABS  --  0.04 0.04   MCV  --  100.6* 102.3*   PROTIME 14.9*  --   --          Lab 22  0358 22  0647 22  0352 22  1755 22  1129   SODIUM  141 137 136  --  137   POTASSIUM 3.0* 3.4* 3.9  --  2.5*   CHLORIDE 104 98 94*  --  89*   CO2 28.0 27.0 23.0  --  29.0   ANION GAP 9.0 12.0 19.0*  --  19.0*   BUN 3* 5* 9  --  11   CREATININE 0.70 0.83 0.86  --  1.05*   EGFR 99.2 80.8 77.5  --  61.0   GLUCOSE 87 77 52*  --  82   CALCIUM 8.5* 8.4* 8.6  --  9.6   MAGNESIUM  --   --  3.5* 1.8 1.8   PHOSPHORUS  --   --   --   --  3.6   TSH  --   --   --   --  5.310*         Lab 11/30/22  0647 11/29/22  0352 11/28/22  1129   TOTAL PROTEIN 6.5 6.4 7.5   ALBUMIN 3.90 3.10* 3.50   GLOBULIN 2.6 3.3 4.0   ALT (SGPT) 16 17 20   AST (SGOT) 65* 63* 67*   BILIRUBIN 1.1 1.2 1.6*   ALK PHOS 71 63 70         Lab 11/30/22  0647 11/28/22  1129   TROPONIN T  --  0.023   PROTIME 14.9*  --    INR 1.18*  --              Lab 12/01/22  0358 11/30/22  0647   IRON  --  87   IRON SATURATION  --  46   TIBC  --  188*   TRANSFERRIN  --  126*   FERRITIN  --  1,286.00*   VITAMIN B 12 >2,000*  --          Brief Urine Lab Results  (Last result in the past 365 days)      Color   Clarity   Blood   Leuk Est   Nitrite   Protein   CREAT   Urine HCG        11/28/22 1545 Dark Yellow   Cloudy   Negative   Trace   Negative   30 mg/dL (1+)                 Microbiology Results Abnormal     Procedure Component Value - Date/Time    COVID PRE-OP / PRE-PROCEDURE SCREENING ORDER (NO ISOLATION) - Swab, Nasopharynx [[id number]]  (Normal) Collected: 11/28/22 1743    Lab Status: Final result Specimen: Swab from Nasopharynx Updated: 11/28/22 1803    Narrative:      The following orders were created for panel order COVID PRE-OP / PRE-PROCEDURE SCREENING ORDER (NO ISOLATION) - Swab, Nasopharynx.  Procedure                               Abnormality         Status                     ---------                               -----------         ------                     COVID-19 and FLU A/B PCR...[[id number]]  Normal              Final result                 Please view results for these tests on the individual orders.     COVID-19 and FLU A/B PCR - Swab, Nasopharynx [[id number]]  (Normal) Collected: 11/28/22 1743    Lab Status: Final result Specimen: Swab from Nasopharynx Updated: 11/28/22 1803     COVID19 Not Detected     Influenza A PCR Not Detected     Influenza B PCR Not Detected    Narrative:      Fact sheet for providers: https://www.fda.gov/media/096631/download    Fact sheet for patients: https://www.fda.gov/media/972572/download    Test performed by PCR.          CT Abdomen Pelvis With & Without Contrast    Result Date: 11/29/2022  DATE OF EXAM: 11/29/2022 2:58 PM  PROCEDURE: CT ABDOMEN PELVIS W WO CONTRAST-  INDICATIONS: LIVER MASS PROTOCOL; R53.1-Weakness; E87.6-Hypokalemia; F41.9-Anxiety disorder, unspecified; E46-Unspecified protein-calorie malnutrition; K70.30-Alcoholic cirrhosis of liver without ascites; R63.4-Abnormal weight loss  COMPARISON: No comparisons available.  TECHNIQUE: Routine transaxial slices were obtained through the abdomen and pelvis without the administration of intravenous contrast. Additional scanning through the abdomen and pelvis followed by the intravenous administration of 100 mL of Isovue 300. Reconstructed coronal and sagittal images were also obtained. Automated exposure control and iterative construction methods were used.  The radiation dose reduction device was turned on for each scan per the ALARA (As Low as Reasonably Achievable) protocol.  FINDINGS: The lung bases are grossly clear. Evaluation of the body wall soft tissues demonstrates no acute or suspicious findings. Evaluation of the osseous structures demonstrates no evidence of acute fracture or aggressive osseous lesion. The liver demonstrates homogeneous arterial and portal venous enhancement, without evidence of suspicious focal parenchymal lesion. There is no biliary ductal dilatation. The gallbladder is mildly distended, otherwise normal. The spleen, pancreas and bilateral adrenal glands demonstrate homogeneous enhancement  without evidence of suspicious focal lesion. A simple appearing renal cyst is present on the left. The kidneys demonstrate symmetric nephrogram and contrast excretion without evidence of hydronephrosis or contour deforming mass. Small and large bowel loops are nondilated. There is no suspicious focal bowel wall thickening. The appendix is normal. There is no free fluid or pneumoperitoneum. Mildly atherosclerotic, nonaneurysmal abdominal aorta. No bulky retroperitoneal lymphadenopathy. The pelvic viscera demonstrate no acute findings.      Impression: Essentially normal contrast-enhanced CT of the abdomen and pelvis. Specifically the liver demonstrates homogeneous multiphase peripheral enhancement, without evidence of suspicious focal parenchymal lesion.  This report was finalized on 11/29/2022 7:04 PM by Anderson Kessler.          I have reviewed the medications:  Scheduled Meds:cholecalciferol, 1,000 Units, Oral, Daily  FLUoxetine, 40 mg, Oral, Daily  folic acid, 1 mg, Oral, Daily  heparin (porcine), 5,000 Units, Subcutaneous, Q8H  latanoprost, 1 drop, Both Eyes, Nightly  melatonin, 5 mg, Oral, Nightly  mirtazapine, 7.5 mg, Oral, Nightly  multivitamin with minerals, 1 tablet, Oral, Daily  pantoprazole, 40 mg, Oral, BID AC  sodium chloride, 10 mL, Intravenous, Q12H  [START ON 12/2/2022] thiamine, 100 mg, Oral, Daily      Continuous Infusions:lactated ringers, 9 mL/hr, Last Rate: 9 mL/hr (11/30/22 0855)      PRN Meds:.•  acetaminophen  •  hydrOXYzine  •  magnesium sulfate **OR** magnesium sulfate **OR** magnesium sulfate  •  ondansetron **OR** ondansetron  •  potassium chloride **OR** potassium chloride **OR** potassium chloride  •  sodium chloride  •  sodium chloride  •  sodium chloride  •  zolpidem    Assessment & Plan   Assessment & Plan     Active Hospital Problems    Diagnosis  POA   • **Generalized weakness [R53.1]  Yes   • Severe malnutrition (HCC) [E43]  Yes   • GERD without esophagitis [K21.9]  Yes   •  "Alcoholic cirrhosis of liver without ascites (HCC) [K70.30]  Yes   • Weight loss [R63.4]  Yes   • Generalized anxiety disorder [F41.1]  Yes      Resolved Hospital Problems   No resolved problems to display.        Brief Hospital Course to date:  Philomena Davis is a 62 y.o. female w h/o heavy ETOH use who presents after recent admission in 10/2022 with profound debility, weakness, anorexia.     1) Profound debility, weight loss, anorexia  -down 30 lbs from last admission 10/2022. Patient reports \"not eating for weeks\" at home after [facility] dispo early October. 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 Patient up to date on mammogram and colonoscopy  -suspect depression component: started mirtazapine 11/30  -d/w nutrition again this date. Patient w very poor PO intake - counseled on eating even when not feeling hungry    2) Alcoholic liver cirrhosis, compensated  -MELD=9, failure to thrive out of proportion to cirrhosis  -EGD negative for varices, f/u needed in 3 years  -needs Hep B vaccine before dc (Hep A Ab positive)  -f/u with GI clinic    3) H/o heavy alcohol use - stopped 8/2022 per patient report. Counseled  4) Esophagitis - PPI bid x 30 days, then daily thereafter    Mood disorder - fluoxetine + new start mirtazapine as above  Hypoglycemia in setting of NPO status - monitor on diet  Folate deficiency - folate <2 on 9/26/2022, continue replacement  Vit D deficiency - Vit D <5, profound. Replace w 1,000 units daily - repeat needed in 30 days      Expected Discharge Location and Transportation: home  Expected Discharge Date: 12/2    DVT prophylaxis:  Medical DVT prophylaxis orders are present.     AM-PAC 6 Clicks " Score (PT): 18 (11/30/22 1045)    CODE STATUS:   Code Status and Medical Interventions:   Ordered at: 11/28/22 1520     Level Of Support Discussed With:    Patient     Code Status (Patient has no pulse and is not breathing):    CPR (Attempt to Resuscitate)     Medical Interventions (Patient has pulse or is breathing):    Full Support       Unique Anderson MD  12/01/22

## 2022-12-01 NOTE — PLAN OF CARE
Problem: Adult Inpatient Plan of Care  Goal: Plan of Care Review  Recent Flowsheet Documentation  Taken 12/1/2022 0934 by Chelsea Luciano OT  Progress: improving  Plan of Care Reviewed With: patient  Outcome Evaluation: Pt is in better spirits today and participates in therapy with good effort. Transitions to sitting EOB with Supervision. CGAx1 transfers to BSC and chair using RW. Continues to be limited by strength and endurance deficits. Set-up simple UB ADLs. OT will continue to follow IP.

## 2022-12-02 LAB
ALBUMIN SERPL-MCNC: 2.3 G/DL (ref 3.5–5.2)
ANION GAP SERPL CALCULATED.3IONS-SCNC: 8 MMOL/L (ref 5–15)
BUN SERPL-MCNC: 3 MG/DL (ref 8–23)
BUN/CREAT SERPL: 4.7 (ref 7–25)
CALCIUM SPEC-SCNC: 7.8 MG/DL (ref 8.6–10.5)
CHLORIDE SERPL-SCNC: 107 MMOL/L (ref 98–107)
CO2 SERPL-SCNC: 25 MMOL/L (ref 22–29)
CREAT SERPL-MCNC: 0.64 MG/DL (ref 0.57–1)
EGFRCR SERPLBLD CKD-EPI 2021: 101.3 ML/MIN/1.73
GLUCOSE BLDC GLUCOMTR-MCNC: 103 MG/DL (ref 70–130)
GLUCOSE BLDC GLUCOMTR-MCNC: 146 MG/DL (ref 70–130)
GLUCOSE BLDC GLUCOMTR-MCNC: 71 MG/DL (ref 70–130)
GLUCOSE BLDC GLUCOMTR-MCNC: 91 MG/DL (ref 70–130)
GLUCOSE SERPL-MCNC: 78 MG/DL (ref 65–99)
MAGNESIUM SERPL-MCNC: 2.1 MG/DL (ref 1.6–2.4)
PHOSPHATE SERPL-MCNC: 2.5 MG/DL (ref 2.5–4.5)
POTASSIUM SERPL-SCNC: 3.3 MMOL/L (ref 3.5–5.2)
SODIUM SERPL-SCNC: 140 MMOL/L (ref 136–145)

## 2022-12-02 PROCEDURE — 97116 GAIT TRAINING THERAPY: CPT

## 2022-12-02 PROCEDURE — 80069 RENAL FUNCTION PANEL: CPT | Performed by: INTERNAL MEDICINE

## 2022-12-02 PROCEDURE — 99232 SBSQ HOSP IP/OBS MODERATE 35: CPT | Performed by: PHYSICIAN ASSISTANT

## 2022-12-02 PROCEDURE — 83735 ASSAY OF MAGNESIUM: CPT | Performed by: INTERNAL MEDICINE

## 2022-12-02 PROCEDURE — 97530 THERAPEUTIC ACTIVITIES: CPT

## 2022-12-02 PROCEDURE — 25010000002 HEPARIN (PORCINE) PER 1000 UNITS: Performed by: INTERNAL MEDICINE

## 2022-12-02 PROCEDURE — 82962 GLUCOSE BLOOD TEST: CPT

## 2022-12-02 PROCEDURE — 99232 SBSQ HOSP IP/OBS MODERATE 35: CPT | Performed by: INTERNAL MEDICINE

## 2022-12-02 RX ORDER — BUSPIRONE HYDROCHLORIDE 10 MG/1
5 TABLET ORAL 3 TIMES DAILY
Status: DISCONTINUED | OUTPATIENT
Start: 2022-12-02 | End: 2022-12-04 | Stop reason: HOSPADM

## 2022-12-02 RX ADMIN — SODIUM CHLORIDE 500 ML: 9 INJECTION, SOLUTION INTRAVENOUS at 00:29

## 2022-12-02 RX ADMIN — HEPARIN SODIUM 5000 UNITS: 5000 INJECTION INTRAVENOUS; SUBCUTANEOUS at 15:03

## 2022-12-02 RX ADMIN — PANTOPRAZOLE SODIUM 40 MG: 40 TABLET, DELAYED RELEASE ORAL at 08:38

## 2022-12-02 RX ADMIN — MIRTAZAPINE 7.5 MG: 15 TABLET, FILM COATED ORAL at 22:12

## 2022-12-02 RX ADMIN — BUSPIRONE HYDROCHLORIDE 5 MG: 10 TABLET ORAL at 18:20

## 2022-12-02 RX ADMIN — BUSPIRONE HYDROCHLORIDE 5 MG: 10 TABLET ORAL at 22:12

## 2022-12-02 RX ADMIN — Medication 5 MG: at 22:12

## 2022-12-02 RX ADMIN — SODIUM CHLORIDE 500 ML: 9 INJECTION, SOLUTION INTRAVENOUS at 04:20

## 2022-12-02 RX ADMIN — LATANOPROST 1 DROP: 50 SOLUTION OPHTHALMIC at 22:11

## 2022-12-02 RX ADMIN — Medication 1000 UNITS: at 08:38

## 2022-12-02 RX ADMIN — FOLIC ACID 1 MG: 1 TABLET ORAL at 08:38

## 2022-12-02 RX ADMIN — MULTIPLE VITAMINS W/ MINERALS TAB 1 TABLET: TAB at 08:38

## 2022-12-02 RX ADMIN — Medication 10 ML: at 22:12

## 2022-12-02 RX ADMIN — FLUOXETINE 40 MG: 20 CAPSULE ORAL at 08:38

## 2022-12-02 RX ADMIN — POTASSIUM CHLORIDE 40 MEQ: 750 CAPSULE, EXTENDED RELEASE ORAL at 15:03

## 2022-12-02 RX ADMIN — PANTOPRAZOLE SODIUM 40 MG: 40 TABLET, DELAYED RELEASE ORAL at 18:21

## 2022-12-02 RX ADMIN — THIAMINE HCL TAB 100 MG 100 MG: 100 TAB at 08:38

## 2022-12-02 RX ADMIN — HEPARIN SODIUM 5000 UNITS: 5000 INJECTION INTRAVENOUS; SUBCUTANEOUS at 22:11

## 2022-12-02 RX ADMIN — POTASSIUM CHLORIDE 40 MEQ: 750 CAPSULE, EXTENDED RELEASE ORAL at 08:53

## 2022-12-02 NOTE — CASE MANAGEMENT/SOCIAL WORK
Discharge Planning Assessment  [facility]     Patient Name: Philomena Davis  MRN: [id number]  Today's Date: 12/2/2022    Admit Date: 11/28/2022    Plan: Home tomorrow with    Discharge Needs Assessment    No documentation.                Discharge Plan     Row Name 12/02/22 1444       Plan    Plan Home tomorrow with     Patient/Family in Agreement with Plan yes    Plan Comments Plan is home tomorrow with HH. I called [facility] for nursing and PT. I called the office at [phone] and fax never rec'd. I talked with them and told them pateint d/c on 12-3. I refaxed the order for nursing and PT and clinical information to [phone]. HH told me they do not have OT for [facility]. So patient will get nusing and PT. Анна    Final Discharge Disposition Code 06 - home with home health care              Continued Care and Services - Admitted Since 11/28/2022     Home Medical Care Coordination complete.    Service Provider Request Status Selected Services Address Phone Fax Patient Preferred    [facility] [place]  Selected Home Health Services ,  Home Nursing ,  Home Rehabilitation [address] [facility] [address] [id number] [phone] --            Selected Continued Care - Prior Encounters Includes continued care and service providers with selected services from prior encounters from 8/30/2022 to 12/2/2022    Discharged on 10/5/2022 Admission date: 9/26/2022 - Discharge disposition: Rehab Facility or Unit (DC - External)    Destination     Service Provider Selected Services Address Phone Fax Patient Preferred    [facility] [address] [facility] [id number] [phone] --          Durable Medical Equipment     Service Provider Selected Services Address Phone Fax Patient Preferred    [facility] [place] [facility] [address] [facility] [address] [phone] --                     Expected Discharge Date and Time     Expected Discharge Date Expected Discharge Time    Dec 2, 2022          Demographic Summary    No documentation.                Functional Status    No documentation.                Psychosocial    No documentation.                Abuse/Neglect    No documentation.                Legal    No documentation.                Substance Abuse    No documentation.                Patient Forms    No documentation.                   Jackie George RN

## 2022-12-02 NOTE — PROGRESS NOTES
Follow up on labs for EtOH cirrhosis without ascites; AFP normal. MELD-Na 9. Per recommendation yesterday by Dr. Solano, pt should be optimized from nutritional standpoint and follow up in outpatient setting for further GI care and CSY.    GI will sign off. Please call with questions.

## 2022-12-02 NOTE — DISCHARGE PLACEMENT REQUEST
"Philomena Davis (62 y.o. Female) Clnical information and orders for Nursing and PT. I am aware that you do not offer OT so ignore that order. Call me if you have questions Jackie George, [phone].    Date of Birth   1960    Social Security Number       Address   [address] [address]    Home Phone   [phone]    MRN   [id number]       Mormonism   Yazdanism    Marital Status                               Admission Date   22    Admission Type   Emergency    Admitting Provider   Unique Anderson MD    Attending Provider   Unique Anderson MD    Department, Room/Bed   [facility] 3H, S381/1       Discharge Date       Discharge Disposition       Discharge Destination                               Attending Provider: Unique Anderson MD    Allergies: Codeine    Isolation: None   Infection: None   Code Status: CPR    Ht: 162.6 cm (64\")   Wt: 67.1 kg (148 lb)    Admission Cmt: None   Principal Problem: Generalized weakness [R53.1]                 Active Insurance as of 2022     Primary Coverage     Payor Plan Insurance Group Employer/Plan Group    [employer] [place] [employer] [place]      Payor Plan Address Payor Plan Phone Number Payor Plan Fax Number Effective Dates    [address] [address] [phone]  2018 - None Entered    [facility] [id number]       Subscriber Name Subscriber Birth Date Member ID       PHILOMENA DAVIS 1960 [id number]                 Emergency Contacts      (Rel.) Home Phone Work Phone Mobile Phone    Madelin Null (Relative) [phone] -- [phone]    MARCELLO ZAIDI (Friend) -- -- [phone]        [address] [address]  Phone:  [phone]  Fax:  [phone] Date: Dec 1, 2022      Ambulatory Referral to Home Health     Patient:  Philomena Davis MRN:  [id number]   [address] [id number] :  1960  SSN:    Phone: [phone] Sex:  F "      INSURANCE PAYOR PLAN GROUP # SUBSCRIBER ID   [facility]:    [id number]   [id number]      Referring Provider Information:  LADONNA LOBATO Phone: [phone] Fax: [phone]       Referral Information:   # Visits:  999 Referral Type: Home Health [42]   Urgency:  Routine Referral Reason: Specialty Services Required   Start Date: Dec 1, 2022 End Date:  To be determined by Insurer   Diagnosis: Generalized weakness (R53.1 [ICD-10-CM] 780.79 [ICD-9-CM])  Malnutrition, unspecified type (HCC) (E46 [ICD-10-CM] 263.9 [ICD-9-CM])  Alcoholic cirrhosis of liver without ascites (HCC) (K70.30 [ICD-10-CM] 571.2 [ICD-9-CM])  Weight loss (R63.4 [ICD-10-CM] 783.21 [ICD-9-CM])  Impaired mobility and ADLs (Z74.09,Z78.9 [ICD-10-CM] V49.89 [ICD-9-CM])      Refer to Dept:   Refer to Provider:   Refer to Provider Phone:   Refer to Facility:       Face to Face Visit Date: 12/1/2022  Follow-up provider for Plan of Care? I treated the patient in an acute care facility and will not continue treatment after discharge.  Follow-up provider: LADONNA LOBATO [[id number]]  Reason/Clinical Findings: recent hospitalization for weakness,malnutrition  Describe mobility limitations that make leaving home difficult: impaired functional gait ,mobility,ADLs,  Nursing/Therapeutic Services Requested: Physical Therapy  Nursing/Therapeutic Services Requested: Occupational Therapy  Nursing/Therapeutic Services Requested: Skilled Nursing  Skilled nursing orders: Medication education (monitor nutrition)  Skilled nursing orders: Other  PT orders: Gait Training  PT orders: Strengthening  Weight Bearing Status: Full Weight Bearing  Occupational orders: Activities of daily living  Occupational orders: Strengthening  Occupational orders: Home safety assessment  Frequency: 1 Week 1     This document serves as a request of services and does not constitute Insurance authorization or approval of services.  To determine eligibility, please contact the members  Insurance carrier to verify and review coverage.     If you have medical questions regarding this request for services. Please contact [address] at [phone] during normal business hours.        Authorizing Provider:Unique Anderson MD  Authorizing Provider's NPI: [id number]  Order Entered By: Kellerman, Sonja C, RN 2022  4:50 PM     Electronically signed by: Unique Anderson MD 2022  4:50 PM            History & Physical      Anastacia, MD Jossy at 22 1721              [facility] Medicine Services  HISTORY AND PHYSICAL    Patient Name: Philomena Davis  : 1960  MRN: [id number]  Primary Care Physician: Provider, No Known  Date of admission: 2022      Subjective    Subjective     Chief Complaint:  Loss of appetite and weakness     HPI:  Philomena Davsi is a 62 y.o. female who lives alone at home and has history of cirrhosis from Etoh as well as HTN.  She was recently admitted from late September to early October for ETOH withdrawal, and at that time was diagnosed with cirrhosis.  She was dicharged to [facility] for 3 weeks where she did well, then went home ambulatory with a walker.  For the first two weeks of November she felt fine.  However, she then stopped eating - had no appetite or 'no will to make food' - she is not sure which.  She has been subsisting on Boost and juice but no solid food.  She denies fevers, chills, abd pain, diarrhea, nausea/vomiting, or blood per rectum.  Along with the loss of appetite and poor intake, she has progressively lost strength and now can barely stand. She has near-sycnopal spells at times when she stands, that cause her to sit back down to avoid LOC.     Since discharge in early October, she has not had any alcohol.     Review of Systems   Gen- No fevers, chills  CV- No chest pain, palpitations  Resp- No cough, dyspnea.  History of COPD, uses a discus she cannot name  GI- No N/V/D, abd pain.  Denies abd swelling  beyond baseline   Neuro - no tremor no seizure no somnolence or confusion       All other systems reviewed and are negative.     Personal History     Past Medical History:   Diagnosis Date   • Anxiety and depression    • Closed displaced fracture of lateral malleolus of left fibula    • Diabetes mellitus (HCC)    • Ganglion cyst of left foot    • Glaucoma    • Hypertension    • Wears glasses              Past Surgical History:   Procedure Laterality Date   • ANKLE OPEN REDUCTION INTERNAL FIXATION Left 3/24/2017    Procedure: LEFT ANKLE OPEN REDUCTION INTERNAL FIXATION WITH ILIAC CREST BONE GRAFT , EXCISION CYST 4TH/5TH INNERSPACE LEFT FOOT;  Surgeon: Frank Larson MD;  Location:  [facility];  Service:    • AUGMENTATION MAMMAPLASTY Bilateral 1999   • BREAST AUGMENTATION     • BREAST EXCISIONAL BIOPSY Right 2014   • DILATION AND CURETTAGE, DIAGNOSTIC / THERAPEUTIC     • [place] FIX NON/MALUNION FEMUR,W GRAFT Left 3/24/2017    Procedure: ILIAC CREST BONE GRAFT;  Surgeon: Frank Larson MD;  Location:  [facility] OR;  Service: Orthopedics   • WRIST SURGERY         Family History: family history includes Breast cancer (age of onset: 68) in her paternal grandmother; Cancer in her mother and another family member; Heart disease in an other family member; Hypertension in an other family member; No Known Problems in her brother, daughter, father, maternal aunt, maternal grandmother, paternal aunt, sister, and son; Rheum arthritis in an other family member; Stroke in an other family member. Otherwise pertinent FHx was reviewed and unremarkable.     Social History:  reports that she quit smoking about 13 years ago. Her smoking use included cigarettes. She has a 30.00 pack-year smoking history. She has never used smokeless tobacco. She reports current alcohol use of about 7.0 standard drinks per week. She reports that she does not use drugs.  Social History     Social History Narrative   • Not on file        Medications:  Available home medication information reviewed.  (Not in a hospital admission)      Allergies   Allergen Reactions   • Codeine Nausea Only       Objective    Objective     Vital Signs:   Temp:  [98.1 °F (36.7 °C)] 98.1 °F (36.7 °C)  Heart Rate:  [85-93] 87  Resp:  [16] 16  BP: (109-120)/(74-81) 120/75       Physical Exam   Constitutional: Awake, alert very pleasant, seen in ED.  Conversant and a good historian.   Eyes: PER, sclerae anicteric, no conjunctival injection  HENT: NCAT, mucous membranes moist  Neck: Supple, trachea midline  Respiratory: Clear to auscultation bilaterally, nonlabored respirations   Cardiovascular: RRR, no murmurs,  Gastrointestinal: Positive bowel sounds, soft, nontender, mildly distended, no rebound, sl enlarged liver   Musculoskeletal: mild nonlpitting ankle edema, no calf tenderness, no clubbing or cyanosis to extremities  Psychiatric: Appropriate affect, cooperative  Neurologic: Oriented x 3, strength symmetric in all extremities, Cranial Nerves grossly intact to confrontation, speech clear  Skin: diffuse dry flaking skin      Result Review:  I have personally reviewed the results from the time of this admission to 11/28/2022 15:07 EST and agree with these findings:  []  Laboratory list / accordion  []  Microbiology  []  Radiology  []  EKG/Telemetry   []  Cardiology/Vascular   []  Pathology  []  Old records  []  Other:  Most notable findings include: K 2.5 Creat 1.0, recent liver US shows cirrhosis         LAB RESULTS:      Lab 11/28/22  1129   WBC 3.20*   HEMOGLOBIN 13.1   HEMATOCRIT 39.9   PLATELETS 167   NEUTROS ABS 1.66*   IMMATURE GRANS (ABS) 0.01   LYMPHS ABS 0.91   MONOS ABS 0.52   EOS ABS 0.04   .3*         Lab 11/28/22  1129   SODIUM 137   POTASSIUM 2.5*   CHLORIDE 89*   CO2 29.0   ANION GAP 19.0*   BUN 11   CREATININE 1.05*   EGFR 61.0   GLUCOSE 82   CALCIUM 9.6   MAGNESIUM 1.8         Lab 11/28/22  1129   TOTAL PROTEIN 7.5   ALBUMIN 3.50   GLOBULIN  4.0   ALT (SGPT) 20   AST (SGOT) 67*   BILIRUBIN 1.6*   ALK PHOS 70         Lab 11/28/22  1129   TROPONIN T 0.023                     Microbiology Results (last 10 days)     ** No results found for the last 240 hours. **          XR Chest 1 View    Result Date: 11/28/2022  DATE OF EXAM: 11/28/2022 11:05 AM  PROCEDURE: XR CHEST 1 VW-  INDICATIONS: Weak/Dizzy/AMS triage protocol.  COMPARISON: Chest x-ray 10/1/2022  TECHNIQUE: Single frontal view of the chest.  FINDINGS: Normal cardiomediastinal silhouette. The lungs are clear without focal consolidation. No pleural effusion or pneumothorax. No acute osseous findings. Bilateral breast prostheses are noted.      Impression: No acute cardiopulmonary findings.  This report was finalized on 11/28/2022 11:30 AM by Dusty Mckoy MD.            Assessment & Plan   Assessment & Plan     There are no active hospital problems to display for this patient.    62 yr old woman with history of HTN, depression, heavy etoh use diagnosed with cirrhosis last 9/2021 and recently admitted from 9/26 to 10/5/2022 for alcohol withdrawal and a new diagnosis of cirrhosis.  She now presents with progressive weakness over two weeks in the setting of entirely losing her appetite and taking in very little po.       Weakness, acute on chronic   Loss of appetite   - suspect weakness is mostly from poor intake and hypokalemia; also check phosphorus and TSH and ammonia   - continue replacing folic acid and B12  - she requests broth today - will start with clears but plan to advance to high protein, low socium diet ASAP   - monitor PO intake, consider nutrition consult   - PT/OT  - check COVID as possible cause of loss of appetite     Hypokalemia  - replace  - check phosphorus and magnesium     JAMMIE  - -baseline creatinine is 0.4, now is 1.0  - poor PO intake recently  - will give D5NS x 500ml     Cirrhosis  etoh dependence  - states she stopped drinking after her discharge 10/5/2022  - check ammonia  in AM  - last admission, liver US with elastography showed cirrhosis and portal HTN  - consider GI consult    DVT prophylaxis:  heparin      CODE STATUS:  Full, discussed w her  There are no questions and answers to display.         Jossy Galaviz MD  22      Electronically signed by Jossy Galaviz MD at 22 1552          Physician Progress Notes (most recent note)      Ainsley Naranjo PA-C at 22 1202     Attestation signed by Arelis Solano MD at 22 1211    I have reviewed this documentation and agree.                Follow up on labs for EtOH cirrhosis without ascites; AFP normal. MELD-Na 9. Per recommendation yesterday by Dr. Solano, pt should be optimized from nutritional standpoint and follow up in outpatient setting for further GI care and CSY.    GI will sign off. Please call with questions.     Electronically signed by Arelis Solano MD at 22 1211          Physical Therapy Notes (most recent note)      Ean Curiel, PT at 22 1015  Version 2 of 2         Patient Name: Philomena Davis  : 1960    MRN: [id number]                              Today's Date: 2022       Admit Date: 2022    Visit Dx:     ICD-10-CM ICD-9-CM   1. Generalized weakness  R53.1 780.79   2. Acute hypokalemia  E87.6 276.8   3. Anxiety  F41.9 300.00   4. Malnutrition, unspecified type (HCC)  E46 263.9   5. Alcoholic cirrhosis of liver without ascites (HCC)  K70.30 571.2   6. Weight loss  R63.4 783.21   7. 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   • Digital mucous cyst of toe of left foot   • Alcohol withdrawal (HCC)   • Alcoholism /alcohol abuse   • Hematemesis   • Diabetes mellitus, type 2 (HCC)   • Essential hypertension   • Generalized anxiety disorder   • Hyperlipidemia   • Glaucoma   • Elevated LFTs   • Generalized weakness   • Alcoholic cirrhosis of liver  without ascites (HCC)   • Weight loss   • Severe malnutrition (HCC)   • GERD without esophagitis     Past Medical History:   Diagnosis Date   • Anxiety and depression    • Cirrhosis of liver (HCC)    • Closed displaced fracture of lateral malleolus of left fibula    • Diabetes mellitus (HCC)    • Ganglion cyst of left foot    • Glaucoma    • Hypertension    • Wears glasses      Past Surgical History:   Procedure Laterality Date   • ANKLE OPEN REDUCTION INTERNAL FIXATION Left 3/24/2017    Procedure: LEFT ANKLE OPEN REDUCTION INTERNAL FIXATION WITH ILIAC CREST BONE GRAFT , EXCISION CYST 4TH/5TH INNERSPACE LEFT FOOT;  Surgeon: Frank Larson MD;  Location:  [facility];  Service:    • AUGMENTATION MAMMAPLASTY Bilateral 1999   • BREAST AUGMENTATION     • BREAST EXCISIONAL BIOPSY Right 2014   • DILATION AND CURETTAGE, DIAGNOSTIC / THERAPEUTIC     • ENDOSCOPY N/A 11/30/2022    Procedure: ESOPHAGOGASTRODUODENOSCOPY;  Surgeon: Arelis Solano MD;  Location:  [facility];  Service: Gastroenterology;  Laterality: N/A;   • [place] FIX NON/MALUNION FEMUR,W GRAFT Left 3/24/2017    Procedure: ILIAC CREST BONE GRAFT;  Surgeon: Frank Larson MD;  Location:  [facility];  Service: Orthopedics   • WRIST SURGERY        General Information     Row Name 12/02/22 1119          Physical Therapy Time and Intention    Document Type therapy note (daily note)  -SC     Mode of Treatment physical therapy  -SC     Row Name 12/02/22 1119          General Information    Patient Profile Reviewed yes  -SC     Existing Precautions/Restrictions fall  check BP  -SC     Row Name 12/02/22 1119          Cognition    Orientation Status (Cognition) oriented x 4  -SC     Row Name 12/02/22 1119          Safety Issues, Functional Mobility    Impairments Affecting Function (Mobility) balance;endurance/activity tolerance;pain;strength  -SC     Comment, Safety Issues/Impairments (Mobility) alert, emotional-crying at times, following commands  -SC            User Key  (r) = Recorded By, (t) = Taken By, (c) = Cosigned By    Initials Name Provider Type    SC Ean Curiel PT Physical Therapist               Mobility     Row Name 12/02/22 1120          Bed Mobility    Bed Mobility supine-sit;scooting/bridging  -SC     Scooting/Bridging Travis (Bed Mobility) modified independence  -SC     Supine-Sit Travis (Bed Mobility) modified independence  -SC     Assistive Device (Bed Mobility) bed rails  -SC     Comment, (Bed Mobility) extra time needed to get to EOB. Some dizziness noted in sitting that improved with time  -SC     Row Name 12/02/22 1120          Transfers    Comment, (Transfers) cue for hand placement getting off EOB and commode. Patient required extra help getting off commode with some leg shaking noted  -SC     Row Name 12/02/22 [facility]0          Sit-Stand Transfer    Sit-Stand Travis (Transfers) 1 person assist;verbal cues;minimum assist (75% patient effort)  -SC     Assistive Device (Sit-Stand Transfers) walker, front-wheeled  -SC     Row Name 12/02/22 [facility]0          Gait/Stairs (Locomotion)    Travis Level (Gait) 1 person assist;contact guard  -SC     Assistive Device (Gait) walker, front-wheeled  -SC     Distance in Feet (Gait) 40  -SC     Deviations/Abnormal Patterns (Gait) bilateral deviations;weight shifting decreased;stride length decreased;gait speed decreased  -SC     Bilateral Gait Deviations forward flexed posture  -SC     Comment, (Gait/Stairs) Cues to stay closer to walker during ambulaiton and improve posture. Patient demonstrated very slow unsteady gait. No LOB  -SC           User Key  (r) = Recorded By, (t) = Taken By, (c) = Cosigned By    Initials Name Provider Type    SC Ean Curiel PT Physical Therapist               Obj/Interventions     Row Name 12/02/22 1123          Motor Skills    Therapeutic Exercise ankle;knee  spirometer x10  -SC     Row Name 12/02/22 1123          Knee (Therapeutic Exercise)    Knee  (Therapeutic Exercise) AROM (active range of motion)  -SC     Knee AROM (Therapeutic Exercise) bilateral;flexion;extension;sitting;10 repetitions  -[facility] Name 12/02/22 1123          Ankle (Therapeutic Exercise)    Ankle (Therapeutic Exercise) AROM (active range of motion)  -SC     Ankle AROM (Therapeutic Exercise) bilateral;dorsiflexion;plantarflexion  -[facility] Name 12/02/22 1123          Balance    Dynamic Standing Balance 1-person assist;contact guard  -SC     Position/Device Used, Standing Balance supported;walker, rolling  -SC     Comment, Balance unsteady gait that get worse with more ambulation  -SC           User Key  (r) = Recorded By, (t) = Taken By, (c) = Cosigned By    Initials Name Provider Type    SC Ean Curiel, PT Physical Therapist               Goals/Plan    No documentation.                Clinical Impression     [facility] Name 12/02/22 1125          Pain    Pretreatment Pain Rating 0/10 - no pain  -SC     Posttreatment Pain Rating 0/10 - no pain  -[facility] Name 12/02/22 1125          Plan of Care Review    Plan of Care Reviewed With patient  -SC     Progress no change  -SC     Outcome Evaluation Patient continues to be very weak overall, requiring assistance to get up off commods.  -[facility] Name 12/02/22 1125          Therapy Assessment/Plan (PT)    Rehab Potential (PT) good, to achieve stated therapy goals  -SC     Criteria for Skilled Interventions Met (PT) yes;skilled treatment is necessary;meets criteria  -SC     Therapy Frequency (PT) daily  -[facility] Name 12/02/22 1125          Vital Signs    Pre Systolic BP Rehab 117  -SC     Pre Treatment Diastolic BP 70  sitting EOB  -SC     Intra SpO2 (%) 88  -SC     O2 Delivery Intra Treatment room air  -SC     Post SpO2 (%) 95  -SC     O2 Delivery Post Treatment supplemental O2  -SC     Recovery Time 1 minute when o2 replaced  -[facility] Name 12/02/22 1125          Positioning and Restraints    Pre-Treatment Position in bed  -SC     Post  Treatment Position chair  -SC     In Chair notified nsg;reclined;sitting;call light within reach;encouraged to call for assist;exit alarm on  -SC           User Key  (r) = Recorded By, (t) = Taken By, (c) = Cosigned By    Initials Name Provider Type    Ean Godwin PT Physical Therapist               Outcome Measures     Row Name 12/02/22 1127          How much help from another person do you currently need...    Turning from your back to your side while in flat bed without using bedrails? 4  -SC     Moving from lying on back to sitting on the side of a flat bed without bedrails? 3  -SC     Moving to and from a bed to a chair (including a wheelchair)? 3  -SC     Standing up from a chair using your arms (e.g., wheelchair, bedside chair)? 3  -SC     Climbing 3-5 steps with a railing? 1  -SC     To walk in hospital room? 3  -SC     AM-PAC 6 Clicks Score (PT) 17  -SC     Highest level of mobility 5 --> Static standing  -SC     Row Name 12/02/22 1127          Functional Assessment    Outcome Measure Options AM-PAC 6 Clicks Basic Mobility (PT)  -SC           User Key  (r) = Recorded By, (t) = Taken By, (c) = Cosigned By    Initials Name Provider Type    Ean Godwin PT Physical Therapist                             Physical Therapy Education     Title: PT OT SLP Therapies (In Progress)     Topic: Physical Therapy (Done)     Point: Mobility training (Done)     Learning Progress Summary           Patient SHERRIE Kiser VU by SC at 12/2/2022 1128    Comment: reviewed spirometer use    Acceptance, E,D, VU,DU by  at 11/29/2022 1354                   Point: Home exercise program (Done)     Learning Progress Summary           Patient SHERRIE Kiser, VU by SC at 12/2/2022 1128    Comment: reviewed spirometer use    Acceptance, E,D, VU,DU by  at 11/29/2022 1354                   Point: Body mechanics (Done)     Learning Progress Summary           Patient SHERRIE Kiser, VU by SC at 12/2/2022 1128    Comment: reviewed spirometer  use    Acceptance, E,D, VU,DU by  at 11/29/2022 1354                   Point: Precautions (Done)     Learning Progress Summary           Patient Eager, SHERRIE VU by SC at 12/2/2022 1128    Comment: reviewed spirometer use    Acceptance, E,D, VU,DU by  at 11/29/2022 1354                               User Key     Initials Effective Dates Name Provider Type Discipline    SC 06/16/21 -  Ean Curiel PT Physical Therapist PT    HP 06/01/21 -  Faiza Calhoun PT Physical Therapist PT              PT Recommendation and Plan     Plan of Care Reviewed With: patient  Progress: no change  Outcome Evaluation: Patient continues to be very weak overall, requiring assistance to get up off commods.     Time Calculation:    PT Charges     Row Name 12/02/22 1015             Time Calculation    Start Time 1015  -SC      PT Received On 12/02/22  -SC      PT Goal Re-Cert Due Date 12/09/22  -SC         Time Calculation- PT    Total Timed Code Minutes- PT 23 minute(s)  -SC         Timed Charges    38708 - PT Therapeutic Exercise Minutes 5  -SC      12799 - Gait Training Minutes  8  -SC      06637 - PT Therapeutic Activity Minutes 10  -SC         Total Minutes    Timed Charges Total Minutes 23  -SC       Total Minutes 23  -SC            User Key  (r) = Recorded By, (t) = Taken By, (c) = Cosigned By    Initials Name Provider Type    SC Ean Curiel, PT Physical Therapist              Therapy Charges for Today     Code Description Service Date Service Provider Modifiers Qty    58429395061 HC GAIT TRAINING EA 15 MIN 12/2/2022 Ean Curiel, PT GP 1    88968017387 HC PT THERAPEUTIC ACT EA 15 MIN 12/2/2022 Ean Curiel PT GP 1          PT G-Codes  Outcome Measure Options: AM-PAC 6 Clicks Basic Mobility (PT)  AM-PAC 6 Clicks Score (PT): 17  AM-PAC 6 Clicks Score (OT): 17  PT Discharge Summary  Anticipated Discharge Disposition (PT): home with assist, home with home health    Ean Curiel PT  12/2/2022      Electronically signed  by Ean Curiel PT at 22 1133     Ean Curiel PT at 22 1015  Version 1 of 2         Patient Name: Philomena Davis  : 1960    MRN: [id number]                              Today's Date: 2022       Admit Date: 2022    Visit Dx:     ICD-10-CM ICD-9-CM   1. Generalized weakness  R53.1 780.79   2. Acute hypokalemia  E87.6 276.8   3. Anxiety  F41.9 300.00   4. Malnutrition, unspecified type (HCC)  E46 263.9   5. Alcoholic cirrhosis of liver without ascites (HCC)  K70.30 571.2   6. Weight loss  R63.4 783.21   7. 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   • Digital mucous cyst of toe of left foot   • Alcohol withdrawal (HCC)   • Alcoholism /alcohol abuse   • Hematemesis   • Diabetes mellitus, type 2 (HCC)   • Essential hypertension   • Generalized anxiety disorder   • Hyperlipidemia   • Glaucoma   • Elevated LFTs   • Generalized weakness   • Alcoholic cirrhosis of liver without ascites (HCC)   • Weight loss   • Severe malnutrition (HCC)   • GERD without esophagitis     Past Medical History:   Diagnosis Date   • Anxiety and depression    • Cirrhosis of liver (HCC)    • Closed displaced fracture of lateral malleolus of left fibula    • Diabetes mellitus (HCC)    • Ganglion cyst of left foot    • Glaucoma    • Hypertension    • Wears glasses      Past Surgical History:   Procedure Laterality Date   • ANKLE OPEN REDUCTION INTERNAL FIXATION Left 3/24/2017    Procedure: LEFT ANKLE OPEN REDUCTION INTERNAL FIXATION WITH ILIAC CREST BONE GRAFT , EXCISION CYST 4TH/5TH INNERSPACE LEFT FOOT;  Surgeon: Frank Larson MD;  Location: [facility];  Service:    • AUGMENTATION MAMMAPLASTY Bilateral    • BREAST AUGMENTATION     • BREAST EXCISIONAL BIOPSY Right    • DILATION AND CURETTAGE, DIAGNOSTIC / THERAPEUTIC     • ENDOSCOPY N/A 2022    Procedure:  ESOPHAGOGASTRODUODENOSCOPY;  Surgeon: Arelis Solano MD;  Location: [facility] ENDOSCOPY;  Service: Gastroenterology;  Laterality: N/A;   • WI FIX NON/MALUNION FEMUR,W GRAFT Left 3/24/2017    Procedure: ILIAC CREST BONE GRAFT;  Surgeon: Frank Larson MD;  Location: [facility] OR;  Service: Orthopedics   • WRIST SURGERY        General Information     Row Name 12/02/22 1119          Physical Therapy Time and Intention    Document Type therapy note (daily note)  -SC     Mode of Treatment physical therapy  -SC     Row Name 12/02/22 1119          General Information    Patient Profile Reviewed yes  -SC     Existing Precautions/Restrictions fall  check BP  -SC     Row Name 12/02/22 1119          Cognition    Orientation Status (Cognition) oriented x 4  -SC     Row Name 12/02/22 1119          Safety Issues, Functional Mobility    Impairments Affecting Function (Mobility) balance;endurance/activity tolerance;pain;strength  -SC     Comment, Safety Issues/Impairments (Mobility) alert, emotional-crying at times, following commands  -SC           User Key  (r) = Recorded By, (t) = Taken By, (c) = Cosigned By    Initials Name Provider Type    SC Ean Curiel, PT Physical Therapist               Mobility     Row Name 12/02/22 1120          Bed Mobility    Bed Mobility supine-sit;scooting/bridging  -SC     Scooting/Bridging [place] (Bed Mobility) modified independence  -SC     Supine-Sit [place] (Bed Mobility) modified independence  -SC     Assistive Device (Bed Mobility) bed rails  -SC     Comment, (Bed Mobility) extra time needed to get to EOB. Some dizziness noted in sitting that improved with time  -SC     Row Name 12/02/22 1120          Transfers    Comment, (Transfers) cue for hand placement getting off EOB and commode. Patient required extra help getting off commode with some leg shaking noted  -SC     Row Name 12/02/22 1120          Sit-Stand Transfer    Sit-Stand [place] (Transfers) 1 person  assist;verbal cues;minimum assist (75% patient effort)  -SC     Assistive Device (Sit-Stand Transfers) walker, front-wheeled  -SC     Row Name 12/02/22 1120          Gait/Stairs (Locomotion)    [place] Level (Gait) 1 person assist;contact guard  -SC     Assistive Device (Gait) walker, front-wheeled  -SC     Distance in Feet (Gait) 40  -SC     Deviations/Abnormal Patterns (Gait) bilateral deviations;weight shifting decreased;stride length decreased;gait speed decreased  -SC     Bilateral Gait Deviations forward flexed posture  -SC     Comment, (Gait/Stairs) Cues to stay closer to walker during ambulaiton and improve posture. Patient demonstrated very slow unsteady gait. No LOB  -SC           User Key  (r) = Recorded By, (t) = Taken By, (c) = Cosigned By    Initials Name Provider Type    SC Ean Curiel, PT Physical Therapist               Obj/Interventions     Row Name 12/02/22 1123          Motor Skills    Therapeutic Exercise ankle;knee  spirometer x10  -SC     Row Name 12/02/22 1123          Knee (Therapeutic Exercise)    Knee (Therapeutic Exercise) AROM (active range of motion)  -SC     Knee AROM (Therapeutic Exercise) bilateral;flexion;extension;sitting;10 repetitions  -[facility] Name 12/02/22 1123          Ankle (Therapeutic Exercise)    Ankle (Therapeutic Exercise) AROM (active range of motion)  -SC     Ankle AROM (Therapeutic Exercise) bilateral;dorsiflexion;plantarflexion  -[facility] Name 12/02/22 1123          Balance    Dynamic Standing Balance 1-person assist;contact guard  -SC     Position/Device Used, Standing Balance supported;walker, rolling  -SC     Comment, Balance unsteady gait that get worse with more ambulation  -SC           User Key  (r) = Recorded By, (t) = Taken By, (c) = Cosigned By    Initials Name Provider Type    SC Ean Curiel, PT Physical Therapist               Goals/Plan    No documentation.                Clinical Impression     Row Name 12/02/22 1125          Pain     Pretreatment Pain Rating 0/10 - no pain  -SC     Posttreatment Pain Rating 0/10 - no pain  -[facility] Name 12/02/22 1125          Plan of Care Review    Plan of Care Reviewed With patient  -SC     Progress no change  -SC     Outcome Evaluation Patient continues to be very weak oveall, requiring assistance to get up off commods.  -[facility] Name 12/02/22 1125          Therapy Assessment/Plan (PT)    Rehab Potential (PT) good, to achieve stated therapy goals  -SC     Criteria for Skilled Interventions Met (PT) yes;skilled treatment is necessary;meets criteria  -SC     Therapy Frequency (PT) daily  -[facility] Name 12/02/22 1125          Vital Signs    Pre Systolic BP Rehab 117  -SC     Pre Treatment Diastolic BP 70  sitting EOB  -SC     Intra SpO2 (%) 88  -SC     O2 Delivery Intra Treatment room air  -SC     Post SpO2 (%) 95  -SC     O2 Delivery Post Treatment supplemental O2  -SC     Recovery Time 1 minute when o2 replaced  -[facility] Name 12/02/22 1125          Positioning and Restraints    Pre-Treatment Position in bed  -SC     Post Treatment Position chair  -SC     In Chair notified nsg;reclined;sitting;call light within reach;encouraged to call for assist;exit alarm on  -SC           User Key  (r) = Recorded By, (t) = Taken By, (c) = Cosigned By    Initials Name Provider Type    Ean Godwin, PT Physical Therapist               Outcome Measures     [facility] Name 12/02/22 1127          How much help from another person do you currently need...    Turning from your back to your side while in flat bed without using bedrails? 4  -SC     Moving from lying on back to sitting on the side of a flat bed without bedrails? 3  -SC     Moving to and from a bed to a chair (including a wheelchair)? 3  -SC     Standing up from a chair using your arms (e.g., wheelchair, bedside chair)? 3  -SC     Climbing 3-5 steps with a railing? 1  -SC     To walk in hospital room? 3  -SC     AM-PAC 6 Clicks Score (PT) 17  -SC     Highest  level of mobility 5 --> Static standing  -SC     Row Name 12/02/22 1127          Functional Assessment    Outcome Measure Options AM-PAC 6 Clicks Basic Mobility (PT)  -SC           User Key  (r) = Recorded By, (t) = Taken By, (c) = Cosigned By    Initials Name Provider Type    SC Ean Curiel, PT Physical Therapist                             Physical Therapy Education     Title: PT OT SLP Therapies (In Progress)     Topic: Physical Therapy (Done)     Point: Mobility training (Done)     Learning Progress Summary           Patient Eager, E, VU by SC at 12/2/2022 1128    Comment: reviewed spirometer use    Acceptance, E,D, VU,DU by  at 11/29/2022 1354                   Point: Home exercise program (Done)     Learning Progress Summary           Patient Eager, E, VU by SC at 12/2/2022 1128    Comment: reviewed spirometer use    Acceptance, E,D, VU,DU by  at 11/29/2022 1354                   Point: Body mechanics (Done)     Learning Progress Summary           Patient Eager, E, VU by SC at 12/2/2022 1128    Comment: reviewed spirometer use    Acceptance, E,D, VU,DU by  at 11/29/2022 1354                   Point: Precautions (Done)     Learning Progress Summary           Patient Eager, E, VU by SC at 12/2/2022 1128    Comment: reviewed spirometer use    Acceptance, E,D, VU,DU by  at 11/29/2022 1354                               User Key     Initials Effective Dates Name Provider Type Discipline    SC 06/16/21 -  Ean Curiel PT Physical Therapist PT     06/01/21 -  Faiza Calhoun PT Physical Therapist PT              PT Recommendation and Plan     Plan of Care Reviewed With: patient  Progress: no change  Outcome Evaluation: Patient continues to be very weak oveall, requiring assistance to get up off commods.     Time Calculation:    PT Charges     Row Name 12/02/22 1015             Time Calculation    Start Time 1015  -SC      PT Received On 12/02/22  -SC      PT Goal Re-Cert Due Date 12/09/22  -SC          Time Calculation- PT    Total Timed Code Minutes- PT 23 minute(s)  -SC         Timed Charges    32022 - PT Therapeutic Exercise Minutes 5  -SC      19592 - Gait Training Minutes  8  -SC      07330 - PT Therapeutic Activity Minutes 10  -SC         Total Minutes    Timed Charges Total Minutes 23  -SC       Total Minutes 23  -SC            User Key  (r) = Recorded By, (t) = Taken By, (c) = Cosigned By    Initials Name Provider Type    SC Ean Curiel, PT Physical Therapist              Therapy Charges for Today     Code Description Service Date Service Provider Modifiers Qty    79131332007 HC GAIT TRAINING EA 15 MIN 12/2/2022 Ean Curiel, PT GP 1    40753778778 HC PT THERAPEUTIC ACT EA 15 MIN 12/2/2022 Ean Curiel, PT GP 1          PT G-Codes  Outcome Measure Options: AM-PAC 6 Clicks Basic Mobility (PT)  AM-PAC 6 Clicks Score (PT): 17  AM-PAC 6 Clicks Score (OT): 17  PT Discharge Summary  Anticipated Discharge Disposition (PT): home with assist, home with home health    Ean Curiel PT  12/2/2022      Electronically signed by Ean Curiel PT at 12/02/22 1132

## 2022-12-02 NOTE — PROGRESS NOTES
[facility] Medicine Services  PROGRESS NOTE    Patient Name: Philomena Davis  : 1960  MRN: [id number]    Date of Admission: 2022  Primary Care Physician: Provider, No Known    Subjective   Subjective     CC:  weakness    HPI:  Ate a few bites of breakfast. Has had no Boosts to drink. Denies any n/v. Just does not like food.  We discussed importance of PO intake for her overall health  Required IVF overnight 2/2 lower Bps - not lightheaded or dizzy today    ROS:  Gen- No fevers, chills  CV- No chest pain, palpitations  Resp- No cough, dyspnea  GI- No N/V/D, abd pain     Objective   Objective     Vital Signs:   Temp:  [97 °F (36.1 °C)-98.2 °F (36.8 °C)] 98.1 °F (36.7 °C)  Heart Rate:  [68-92] 76  Resp:  [16] 16  BP: ()/(55-68) 109/68  Flow (L/min):  [2] 2     Physical Exam:  Constitutional: Chronically ill appearing female sitting up in recliner  HENT: NCAT, mucous membranes moist  Respiratory: Clear to auscultation bilaterally, respiratory effort normal   Cardiovascular: RRR, no murmurs, rubs, or gallops  Gastrointestinal: Soft, nontender, nondistended  Musculoskeletal: Muscle tone decreased throughout, no joint effusions appreciated  Psychiatric: Anxious but appropriate  Neurologic: Alert and oriented, facial movements symmetric and spontaneous movement of all 4 extremities grossly equal bilaterally, speech clear  Skin: No rashes    Results Reviewed: Hypokalemia persists but slightly improved - on protocol  LAB RESULTS:      Lab 22  0647 22  0352 22  1129   WBC  --  3.77 3.20*   HEMOGLOBIN  --  11.8* 13.1   HEMATOCRIT  --  35.4 39.9   PLATELETS  --  146 167   NEUTROS ABS  --  1.70 1.66*   IMMATURE GRANS (ABS)  --  0.01 0.01   LYMPHS ABS  --  1.29 0.91   MONOS ABS  --  0.67 0.52   EOS ABS  --  0.04 0.04   MCV  --  100.6* 102.3*   PROTIME 14.9*  --   --          Lab 22  0514 22  0358 22  0647 22  0352 22  1755 22  1129    SODIUM 140 141 137 136  --  137   POTASSIUM 3.3* 3.0* 3.4* 3.9  --  2.5*   CHLORIDE 107 104 98 94*  --  89*   CO2 25.0 28.0 27.0 23.0  --  29.0   ANION GAP 8.0 9.0 12.0 19.0*  --  19.0*   BUN 3* 3* 5* 9  --  11   CREATININE 0.64 0.70 0.83 0.86  --  1.05*   EGFR 101.3 99.2 80.8 77.5  --  61.0   GLUCOSE 78 87 77 52*  --  82   CALCIUM 7.8* 8.5* 8.4* 8.6  --  9.6   MAGNESIUM 2.1  --   --  3.5* 1.8 1.8   PHOSPHORUS 2.5  --   --   --   --  3.6   TSH  --   --   --   --   --  5.310*         Lab 12/02/22  0514 11/30/22  0647 11/29/22  0352 11/28/22  1129   TOTAL PROTEIN  --  6.5 6.4 7.5   ALBUMIN 2.30* 3.90 3.10* 3.50   GLOBULIN  --  2.6 3.3 4.0   ALT (SGPT)  --  16 17 20   AST (SGOT)  --  65* 63* 67*   BILIRUBIN  --  1.1 1.2 1.6*   ALK PHOS  --  71 63 70         Lab 11/30/22  0647 11/28/22  1129   TROPONIN T  --  0.023   PROTIME 14.9*  --    INR 1.18*  --              Lab 12/01/22  0358 11/30/22  0647   IRON  --  87   IRON SATURATION  --  46   TIBC  --  188*   TRANSFERRIN  --  126*   FERRITIN  --  1,286.00*   VITAMIN B 12 >2,000*  --          Brief Urine Lab Results  (Last result in the past 365 days)      Color   Clarity   Blood   Leuk Est   Nitrite   Protein   CREAT   Urine HCG        11/28/22 1545 Dark Yellow   Cloudy   Negative   Trace   Negative   30 mg/dL (1+)                 Microbiology Results Abnormal     Procedure Component Value - Date/Time    COVID PRE-OP / PRE-PROCEDURE SCREENING ORDER (NO ISOLATION) - Swab, Nasopharynx [[id number]]  (Normal) Collected: 11/28/22 4420    Lab Status: Final result Specimen: Swab from Nasopharynx Updated: 11/28/22 1803    Narrative:      The following orders were created for panel order COVID PRE-OP / PRE-PROCEDURE SCREENING ORDER (NO ISOLATION) - Swab, Nasopharynx.  Procedure                               Abnormality         Status                     ---------                               -----------         ------                     COVID-19 and FLU A/B PCR...[[id number]]   Normal              Final result                 Please view results for these tests on the individual orders.    COVID-19 and FLU A/B PCR - Swab, Nasopharynx [[id number]]  (Normal) Collected: 11/28/22 1743    Lab Status: Final result Specimen: Swab from Nasopharynx Updated: 11/28/22 1803     COVID19 Not Detected     Influenza A PCR Not Detected     Influenza B PCR Not Detected    Narrative:      Fact sheet for providers: https://www.fda.gov/media/211875/download    Fact sheet for patients: https://www.fda.gov/media/591565/download    Test performed by PCR.          CT Chest Without Contrast Diagnostic    Result Date: 12/2/2022  DATE OF EXAM: 12/1/2022 3:30 PM  PROCEDURE: CT CHEST WO CONTRAST DIAGNOSTIC-  INDICATIONS: Dyspnea, chronic, unclear etiology; R53.1-Weakness; E87.6-Hypokalemia; F41.9-Anxiety disorder, unspecified; E46-Unspecified protein-calorie malnutrition; K70.30-Alcoholic cirrhosis of liver without ascites; R63.4-Abnormal weight loss; Z74.09-Other reduced mobility; Z78.9-Other specified health status  COMPARISON: Chest radiograph 11/28/2022. No previous chest CT scan.  TECHNIQUE: Routine transaxial slices were obtained through the chest without the administration of intravenous contrast. Reconstructed coronal and sagittal images were also obtained. Automated exposure control and iterative construction methods were used.  The radiation dose reduction device was turned on for each scan per the ALARA (As Low as Reasonably Achievable) protocol.  FINDINGS: Bilateral breast prostheses are noted, the right breast prosthesis mostly out of the scan field. No axillary or lower cervical lymphadenopathy is seen. No mediastinal or hilar adenopathy is seen. There is a trace amount of pericardial fluid or pericardial thickening. Thoracic aorta is not enlarged, 3.3 cm in diameter. No coronary artery calcium is seen. There is trace aortic valve calcification. There is mild patchy ground glass opacity of the lingula,  nonspecific. There is minimal dependent atelectasis of the posterior lower lobes. A small, 3 mm pleural-based micronodule in the posterior left upper lobe is likely an incidental granuloma.  Included images of the upper abdomen show mild diffuse fatty liver change. No significant abnormalities are seen of the included portions of the spleen, gallbladder, pancreatic tail, adrenal glands, or upper renal poles. Bony structures appear to be intact.        Impression:  1. Small area of patchy ground glass disease in the left lower lobe, possibly postinflammatory scarring. If this represents pneumonia, it is minimal in extent. 2. Mild chronic-appearing lung changes elsewhere. 3 mm pleural-based micronodule of the posterior left lower lobe, likely an incidental granuloma. If considered by Fleischner Society recommendations, no follow-up would be needed in a low risk patient. A 12 month follow-up would be suggested in a high risk patient, with no further follow-up if it remains stable at that time.  This report was finalized on 12/2/2022 7:47 AM by Dr. Jose J Ramírez MD.          I have reviewed the medications:  Scheduled Meds:busPIRone, 5 mg, Oral, TID  cholecalciferol, 1,000 Units, Oral, Daily  FLUoxetine, 40 mg, Oral, Daily  folic acid, 1 mg, Oral, Daily  heparin (porcine), 5,000 Units, Subcutaneous, Q8H  latanoprost, 1 drop, Both Eyes, Nightly  melatonin, 5 mg, Oral, Nightly  mirtazapine, 7.5 mg, Oral, Nightly  multivitamin with minerals, 1 tablet, Oral, Daily  pantoprazole, 40 mg, Oral, BID AC  sodium chloride, 10 mL, Intravenous, Q12H  thiamine, 100 mg, Oral, Daily      Continuous Infusions:lactated ringers, 9 mL/hr, Last Rate: 9 mL/hr (11/30/22 0855)      PRN Meds:.•  acetaminophen  •  hydrOXYzine  •  magnesium sulfate **OR** magnesium sulfate **OR** magnesium sulfate  •  ondansetron **OR** ondansetron  •  potassium chloride **OR** potassium chloride **OR** potassium chloride  •  sodium chloride  •  sodium chloride  •   "sodium chloride  •  zolpidem    Assessment & Plan   Assessment & Plan     Active Hospital Problems    Diagnosis  POA   • **Generalized weakness [R53.1]  Yes   • Severe malnutrition (HCC) [E43]  Yes   • GERD without esophagitis [K21.9]  Yes   • Alcoholic cirrhosis of liver without ascites (HCC) [K70.30]  Yes   • Weight loss [R63.4]  Yes   • Generalized anxiety disorder [F41.1]  Yes      Resolved Hospital Problems   No resolved problems to display.        Brief Hospital Course to date:  Philomena Davis is a 62 y.o. female w h/o heavy ETOH use who presents after recent admission in 10/2022 with profound debility, weakness, anorexia.     1) Profound debility, weight loss, anorexia  -down 30 lbs from last admission 10/2022. Patient reports \"not eating for weeks\" at home after CH dispo early October. 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 Patient up to date on mammogram and colonoscopy (OP colonoscopy within next 1 year)  -suspect depression component: started mirtazapine 11/30  -on calorie count currently. Patient is not eating without any active GI symptoms. We discuss that further w/u is inappropriate until calorie intake is good. It is unclear to me her barriers to eating now and appears to be 2/2 lack of effort unfortunately    2) Alcoholic liver cirrhosis, compensated  -MELD=9, failure to thrive out of proportion to cirrhosis  -EGD negative for varices, f/u needed in 3 years  -needs Hep B vaccine before dc (Hep A Ab positive)  -f/u with GI clinic    3) H/o heavy alcohol use - stopped 8/2022 per patient report. Counseled  4) Esophagitis - PPI bid x 30 days, then daily thereafter    Anxiety/Mood " disorder - fluoxetine + new start mirtazapine as above. Start buspar  Insomnia - discussed risk/benefit of meds. Melatonin alone for now  Hypoglycemia in setting of NPO status - appropriate on diet  Folate deficiency - folate <2 on 9/26/2022, continue replacement  Vit D deficiency - Vit D <5, profound. Replace w 1,000 units daily - repeat needed in 30 days    Expected Discharge Location and Transportation: home w  PT/OT  Expected Discharge Date: 12/3 (Discharge date is tentative pending patient's medical condition and is subject to change)    DVT prophylaxis:  Medical DVT prophylaxis orders are present.     AM-PAC 6 Clicks Score (PT): 17 (12/02/22 1127)    CODE STATUS:   Code Status and Medical Interventions:   Ordered at: 11/28/22 1520     Level Of Support Discussed With:    Patient     Code Status (Patient has no pulse and is not breathing):    CPR (Attempt to Resuscitate)     Medical Interventions (Patient has pulse or is breathing):    Full Support       Unique Anderson MD  12/02/22

## 2022-12-02 NOTE — PROGRESS NOTES
Nutrition Services    Patient Name:  Philomena Davis  YOB: 1960  MRN: [id number]  Admit Date:  11/28/2022    Calorie Count started at breakfast today (12/2) - will calculate day 1 in the morning. Pt expressed concerns with Boost not being added to calorie count sheet - added breakfast and Lunch supplement to calorie count sheet. Pt continues to express concern with dietary restrictions. If pt is going to continue with limited PO acceptance - will plan to liberalize diet to improve intake.    Ordered Boost 4x daily  Continue calorie count  Will continue to monitor per protocol.     Electronically signed by:  Vanessa Paredes MS,RD,LD  12/02/22 14:41 EST

## 2022-12-02 NOTE — PLAN OF CARE
Goal Outcome Evaluation:                 Problem: Adult Inpatient Plan of Care  Goal: Absence of Hospital-Acquired Illness or Injury  Intervention: Identify and Manage Fall Risk  Recent Flowsheet Documentation  Taken 12/2/2022 0200 by Bobby Pedro RN  Safety Promotion/Fall Prevention:   activity supervised   safety round/check completed   toileting scheduled  Taken 12/2/2022 0000 by Bobby Pedro RN  Safety Promotion/Fall Prevention:   activity supervised   safety round/check completed   toileting scheduled  Taken 12/1/2022 2200 by Bobby Pedro RN  Safety Promotion/Fall Prevention:   activity supervised   safety round/check completed   toileting scheduled  Taken 12/1/2022 2000 by Bobby Pedro RN  Safety Promotion/Fall Prevention:   activity supervised   safety round/check completed   toileting scheduled  Intervention: Prevent Skin Injury  Recent Flowsheet Documentation  Taken 12/2/2022 0200 by Bobby Pedro RN  Body Position: supine  Skin Protection: adhesive use limited  Taken 12/2/2022 0000 by Bobby Pedro RN  Body Position: supine  Skin Protection: adhesive use limited  Taken 12/1/2022 2200 by Bobby Pedro RN  Body Position: supine  Taken 12/1/2022 2000 by Bobby Pedro RN  Body Position: supine  Skin Protection: adhesive use limited  Intervention: Prevent and Manage VTE (Venous Thromboembolism) Risk  Recent Flowsheet Documentation  Taken 12/2/2022 0200 by Bobby Pedro RN  VTE Prevention/Management:   bilateral   sequential compression devices off  Taken 12/2/2022 0000 by Bobby Pedro RN  VTE Prevention/Management:   bilateral   sequential compression devices off  Taken 12/1/2022 2200 by Bobby Pedro RN  VTE Prevention/Management:   bilateral   sequential compression devices off  Taken 12/1/2022 2000 by Bobby Pedro RN  VTE Prevention/Management:   bilateral   sequential compression devices off  Intervention: Prevent Infection  Recent Flowsheet Documentation  Taken  12/2/2022 0200 by Bobby Pedro RN  Infection Prevention: environmental surveillance performed  Taken 12/2/2022 0000 by Bobby Pedro RN  Infection Prevention: environmental surveillance performed  Taken 12/1/2022 2200 by Bobby Pedro RN  Infection Prevention: environmental surveillance performed  Taken 12/1/2022 2000 by Bobby ePdro RN  Infection Prevention: environmental surveillance performed  Goal: Optimal Comfort and Wellbeing  Intervention: Monitor Pain and Promote Comfort  Recent Flowsheet Documentation  Taken 12/2/2022 0200 by Bobby Pedro RN  Pain Management Interventions: quiet environment facilitated  Taken 12/2/2022 0000 by Bobby Pedro RN  Pain Management Interventions: quiet environment facilitated  Taken 12/1/2022 2200 by Bobby Pedro RN  Pain Management Interventions: quiet environment facilitated  Taken 12/1/2022 2000 by Bobby Pedro RN  Pain Management Interventions: quiet environment facilitated  Intervention: Provide Person-Centered Care  Recent Flowsheet Documentation  Taken 12/2/2022 0200 by Bobby Pedro RN  Trust Relationship/Rapport: care explained  Taken 12/2/2022 0000 by Bobby Pedro RN  Trust Relationship/Rapport: care explained  Taken 12/1/2022 2200 by Bobby Pedro RN  Trust Relationship/Rapport: care explained  Taken 12/1/2022 2000 by Bobby Pedro RN  Trust Relationship/Rapport: care explained     VSS, besides bolus needed for BP, rested throughout the night, will continue to monitor for changes.

## 2022-12-02 NOTE — PLAN OF CARE
Goal Outcome Evaluation:  Plan of Care Reviewed With: patient        Progress: no change  Outcome Evaluation: Patient continues to be very weak overall, requiring assistance to get up off commods.

## 2022-12-02 NOTE — THERAPY TREATMENT NOTE
Patient Name: Philomena Davis  : 1960    MRN: [id number]                              Today's Date: 2022       Admit Date: 2022    Visit Dx:     ICD-10-CM ICD-9-CM   1. Generalized weakness  R53.1 780.79   2. Acute hypokalemia  E87.6 276.8   3. Anxiety  F41.9 300.00   4. Malnutrition, unspecified type (HCC)  E46 263.9   5. Alcoholic cirrhosis of liver without ascites (HCC)  K70.30 571.2   6. Weight loss  R63.4 783.21   7. 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   • Digital mucous cyst of toe of left foot   • Alcohol withdrawal (HCC)   • Alcoholism /alcohol abuse   • Hematemesis   • Diabetes mellitus, type 2 (HCC)   • Essential hypertension   • Generalized anxiety disorder   • Hyperlipidemia   • Glaucoma   • Elevated LFTs   • Generalized weakness   • Alcoholic cirrhosis of liver without ascites (HCC)   • Weight loss   • Severe malnutrition (HCC)   • GERD without esophagitis     Past Medical History:   Diagnosis Date   • Anxiety and depression    • Cirrhosis of liver (HCC)    • Closed displaced fracture of lateral malleolus of left fibula    • Diabetes mellitus (HCC)    • Ganglion cyst of left foot    • Glaucoma    • Hypertension    • Wears glasses      Past Surgical History:   Procedure Laterality Date   • ANKLE OPEN REDUCTION INTERNAL FIXATION Left 3/24/2017    Procedure: LEFT ANKLE OPEN REDUCTION INTERNAL FIXATION WITH ILIAC CREST BONE GRAFT , EXCISION CYST  INNERSPACE LEFT FOOT;  Surgeon: Frank Larson MD;  Location: [facility] OR;  Service:    • AUGMENTATION MAMMAPLASTY Bilateral    • BREAST AUGMENTATION     • BREAST EXCISIONAL BIOPSY Right    • DILATION AND CURETTAGE, DIAGNOSTIC / THERAPEUTIC     • ENDOSCOPY N/A 2022    Procedure: ESOPHAGOGASTRODUODENOSCOPY;  Surgeon: Arelis Solano MD;  Location: [facility] ENDOSCOPY;  Service: Gastroenterology;   Laterality: N/A;   • AZ FIX NON/MALUNION FEMUR,W GRAFT Left 3/24/2017    Procedure: ILIAC CREST BONE GRAFT;  Surgeon: Frank Larson MD;  Location: [facility];  Service: Orthopedics   • WRIST SURGERY        General Information     Row Name 12/02/22 1119          Physical Therapy Time and Intention    Document Type therapy note (daily note)  -SC     Mode of Treatment physical therapy  -SC     Row Name 12/02/22 1119          General Information    Patient Profile Reviewed yes  -SC     Existing Precautions/Restrictions fall  check BP  -SC     Row Name 12/02/22 1119          Cognition    Orientation Status (Cognition) oriented x 4  -SC     Row Name 12/02/22 1119          Safety Issues, Functional Mobility    Impairments Affecting Function (Mobility) balance;endurance/activity tolerance;pain;strength  -SC     Comment, Safety Issues/Impairments (Mobility) alert, emotional-crying at times, following commands  -SC           User Key  (r) = Recorded By, (t) = Taken By, (c) = Cosigned By    Initials Name Provider Type    SC Ean Curiel PT Physical Therapist               Mobility     Row Name 12/02/22 1120          Bed Mobility    Bed Mobility supine-sit;scooting/bridging  -SC     Scooting/Bridging [place] (Bed Mobility) modified independence  -SC     Supine-Sit [place] (Bed Mobility) modified independence  -SC     Assistive Device (Bed Mobility) bed rails  -SC     Comment, (Bed Mobility) extra time needed to get to EOB. Some dizziness noted in sitting that improved with time  -SC     Row Name 12/02/22 1120          Transfers    Comment, (Transfers) cue for hand placement getting off EOB and commode. Patient required extra help getting off commode with some leg shaking noted  -SC     Row Name 12/02/22 1120          Sit-Stand Transfer    Sit-Stand [place] (Transfers) 1 person assist;verbal cues;minimum assist (75% patient effort)  -SC     Assistive Device (Sit-Stand Transfers) walker, front-wheeled   -[facility] Name 12/02/22 1120          Gait/Stairs (Locomotion)    [place] Level (Gait) 1 person assist;contact guard  -SC     Assistive Device (Gait) walker, front-wheeled  -SC     Distance in Feet (Gait) 40  -SC     Deviations/Abnormal Patterns (Gait) bilateral deviations;weight shifting decreased;stride length decreased;gait speed decreased  -SC     Bilateral Gait Deviations forward flexed posture  -SC     Comment, (Gait/Stairs) Cues to stay closer to walker during ambulaiton and improve posture. Patient demonstrated very slow unsteady gait. No LOB  -SC           User Key  (r) = Recorded By, (t) = Taken By, (c) = Cosigned By    Initials Name Provider Type    SC Ean Curiel, PT Physical Therapist               Obj/Interventions     [facility] Name 12/02/22 [facility]          Motor Skills    Therapeutic Exercise ankle;knee  spirometer x10  -[facility] Name 12/02/22 [facility]          Knee (Therapeutic Exercise)    Knee (Therapeutic Exercise) AROM (active range of motion)  -SC     Knee AROM (Therapeutic Exercise) bilateral;flexion;extension;sitting;10 repetitions  -[facility] Name 12/02/22 [facility]          Ankle (Therapeutic Exercise)    Ankle (Therapeutic Exercise) AROM (active range of motion)  -SC     Ankle AROM (Therapeutic Exercise) bilateral;dorsiflexion;plantarflexion  -[facility] Name 12/02/22 [facility]          Balance    Dynamic Standing Balance 1-person assist;contact guard  -SC     Position/Device Used, Standing Balance supported;walker, rolling  -SC     Comment, Balance unsteady gait that get worse with more ambulation  -SC           User Key  (r) = Recorded By, (t) = Taken By, (c) = Cosigned By    Initials Name Provider Type    SC Ean Curiel, PT Physical Therapist               Goals/Plan    No documentation.                Clinical Impression     [facility] Name 12/02/22 [facility]          Pain    Pretreatment Pain Rating 0/10 - no pain  -SC     Posttreatment Pain Rating 0/10 - no pain  -[facility] Name 12/02/22 [facility]           Plan of Care Review    Plan of Care Reviewed With patient  -SC     Progress no change  -SC     Outcome Evaluation Patient continues to be very weak overall, requiring assistance to get up off [employer]s.  -SC     Row Name 12/02/22 1125          Therapy Assessment/Plan (PT)    Rehab Potential (PT) good, to achieve stated therapy goals  -SC     Criteria for Skilled Interventions Met (PT) yes;skilled treatment is necessary;meets criteria  -SC     Therapy Frequency (PT) daily  -SC     Row Name 12/02/22 1125          Vital Signs    Pre Systolic BP Rehab 117  -SC     Pre Treatment Diastolic BP 70  sitting EOB  -SC     Intra SpO2 (%) 88  -SC     O2 Delivery Intra Treatment room air  -SC     Post SpO2 (%) 95  -SC     O2 Delivery Post Treatment supplemental O2  -SC     Recovery Time 1 minute when o2 replaced  -SC     Row Name 12/02/22 1125          Positioning and Restraints    Pre-Treatment Position in bed  -SC     Post Treatment Position chair  -SC     In Chair notified nsg;reclined;sitting;call light within reach;encouraged to call for assist;exit alarm on  -SC           User Key  (r) = Recorded By, (t) = Taken By, (c) = Cosigned By    Initials Name Provider Type    SC Ean Curiel, PT Physical Therapist               Outcome Measures     Row Name 12/02/22 1127          How much help from another person do you currently need...    Turning from your back to your side while in flat bed without using bedrails? 4  -SC     Moving from lying on back to sitting on the side of a flat bed without bedrails? 3  -SC     Moving to and from a bed to a chair (including a wheelchair)? 3  -SC     Standing up from a chair using your arms (e.g., wheelchair, bedside chair)? 3  -SC     Climbing 3-5 steps with a railing? 1  -SC     To walk in hospital room? 3  -SC     AM-PAC 6 Clicks Score (PT) 17  -SC     Highest level of mobility 5 --> Static standing  -SC     Row Name 12/02/22 1127          Functional Assessment    Outcome Measure  Options AM-PAC 6 Clicks Basic Mobility (PT)  -SC           User Key  (r) = Recorded By, (t) = Taken By, (c) = Cosigned By    Initials Name Provider Type    SC Ean Curiel PT Physical Therapist                             Physical Therapy Education     Title: PT OT SLP Therapies (In Progress)     Topic: Physical Therapy (Done)     Point: Mobility training (Done)     Learning Progress Summary           Patient Eager, E, VU by SC at 12/2/2022 1128    Comment: reviewed spirometer use    Acceptance, E,D, VU,DU by  at 11/29/2022 1354                   Point: Home exercise program (Done)     Learning Progress Summary           Patient Eager, E, VU by SC at 12/2/2022 1128    Comment: reviewed spirometer use    Acceptance, E,D, VU,DU by  at 11/29/2022 1354                   Point: Body mechanics (Done)     Learning Progress Summary           Patient Eager, E, VU by SC at 12/2/2022 1128    Comment: reviewed spirometer use    Acceptance, E,D, VU,DU by  at 11/29/2022 1354                   Point: Precautions (Done)     Learning Progress Summary           Patient Eager, E, VU by SC at 12/2/2022 1128    Comment: reviewed spirometer use    Acceptance, E,D, VU,DU by  at 11/29/2022 1354                               User Key     Initials Effective Dates Name Provider Type [facility] 06/16/21 -  Ean Curiel PT Physical Therapist PT     06/01/21 -  Faiza Calhoun PT Physical Therapist PT              PT Recommendation and Plan     Plan of Care Reviewed With: patient  Progress: no change  Outcome Evaluation: Patient continues to be very weak overall, requiring assistance to get up off [employer]s.     Time Calculation:    PT Charges     Row Name 12/02/22 1015             Time Calculation    Start Time 1015  -SC      PT Received On 12/02/22  -SC      PT Goal Re-Cert Due Date 12/09/22  -SC         Time Calculation- PT    Total Timed Code Minutes- PT 23 minute(s)  -SC         Timed Charges    82180 - PT Therapeutic  Exercise Minutes 5  -SC      25130 - Gait Training Minutes  8  -SC      86771 - PT Therapeutic Activity Minutes 10  -SC         Total Minutes    Timed Charges Total Minutes 23  -SC       Total Minutes 23  -SC            User Key  (r) = Recorded By, (t) = Taken By, (c) = Cosigned By    Initials Name Provider Type    SC Ean Curiel PT Physical Therapist              Therapy Charges for Today     Code Description Service Date Service Provider Modifiers Qty    32058676791 HC GAIT TRAINING EA 15 MIN 12/2/2022 Ean Curiel, PT GP 1    [id number]  PT THERAPEUTIC ACT EA 15 MIN 12/2/2022 Ean Curiel PT GP 1          PT G-Codes  Outcome Measure Options: AM-PAC 6 Clicks Basic Mobility (PT)  AM-PAC 6 Clicks Score (PT): 17  AM-PAC 6 Clicks Score (OT): 17  PT Discharge Summary  Anticipated Discharge Disposition (PT): home with assist, home with home health    Ean Curiel, PT  12/2/2022

## 2022-12-03 LAB
ANION GAP SERPL CALCULATED.3IONS-SCNC: 7 MMOL/L (ref 5–15)
BUN SERPL-MCNC: 3 MG/DL (ref 8–23)
BUN/CREAT SERPL: 4.3 (ref 7–25)
CALCIUM SPEC-SCNC: 8.3 MG/DL (ref 8.6–10.5)
CHLORIDE SERPL-SCNC: 107 MMOL/L (ref 98–107)
CO2 SERPL-SCNC: 24 MMOL/L (ref 22–29)
CREAT SERPL-MCNC: 0.7 MG/DL (ref 0.57–1)
EGFRCR SERPLBLD CKD-EPI 2021: 99.2 ML/MIN/1.73
GLUCOSE BLDC GLUCOMTR-MCNC: 111 MG/DL (ref 70–130)
GLUCOSE BLDC GLUCOMTR-MCNC: 113 MG/DL (ref 70–130)
GLUCOSE BLDC GLUCOMTR-MCNC: 92 MG/DL (ref 70–130)
GLUCOSE BLDC GLUCOMTR-MCNC: 98 MG/DL (ref 70–130)
GLUCOSE SERPL-MCNC: 95 MG/DL (ref 65–99)
MAGNESIUM SERPL-MCNC: 1.7 MG/DL (ref 1.6–2.4)
POTASSIUM SERPL-SCNC: 4.3 MMOL/L (ref 3.5–5.2)
QT INTERVAL: 438 MS
QTC INTERVAL: 505 MS
SODIUM SERPL-SCNC: 138 MMOL/L (ref 136–145)

## 2022-12-03 PROCEDURE — 97535 SELF CARE MNGMENT TRAINING: CPT

## 2022-12-03 PROCEDURE — 25010000002 HEPARIN (PORCINE) PER 1000 UNITS: Performed by: INTERNAL MEDICINE

## 2022-12-03 PROCEDURE — 83735 ASSAY OF MAGNESIUM: CPT | Performed by: INTERNAL MEDICINE

## 2022-12-03 PROCEDURE — 97530 THERAPEUTIC ACTIVITIES: CPT

## 2022-12-03 PROCEDURE — 99232 SBSQ HOSP IP/OBS MODERATE 35: CPT | Performed by: INTERNAL MEDICINE

## 2022-12-03 PROCEDURE — 97110 THERAPEUTIC EXERCISES: CPT

## 2022-12-03 PROCEDURE — 80048 BASIC METABOLIC PNL TOTAL CA: CPT | Performed by: INTERNAL MEDICINE

## 2022-12-03 PROCEDURE — 82962 GLUCOSE BLOOD TEST: CPT

## 2022-12-03 RX ADMIN — HEPARIN SODIUM 5000 UNITS: 5000 INJECTION INTRAVENOUS; SUBCUTANEOUS at 22:43

## 2022-12-03 RX ADMIN — Medication 10 ML: at 20:43

## 2022-12-03 RX ADMIN — HYDROXYZINE HYDROCHLORIDE 25 MG: 25 TABLET, FILM COATED ORAL at 12:09

## 2022-12-03 RX ADMIN — THIAMINE HCL TAB 100 MG 100 MG: 100 TAB at 08:52

## 2022-12-03 RX ADMIN — BUSPIRONE HYDROCHLORIDE 5 MG: 10 TABLET ORAL at 08:52

## 2022-12-03 RX ADMIN — Medication 5 MG: at 20:43

## 2022-12-03 RX ADMIN — PANTOPRAZOLE SODIUM 40 MG: 40 TABLET, DELAYED RELEASE ORAL at 08:52

## 2022-12-03 RX ADMIN — Medication 10 ML: at 08:52

## 2022-12-03 RX ADMIN — MIRTAZAPINE 7.5 MG: 15 TABLET, FILM COATED ORAL at 20:43

## 2022-12-03 RX ADMIN — LATANOPROST 1 DROP: 50 SOLUTION OPHTHALMIC at 22:43

## 2022-12-03 RX ADMIN — BUSPIRONE HYDROCHLORIDE 5 MG: 10 TABLET ORAL at 15:22

## 2022-12-03 RX ADMIN — MULTIPLE VITAMINS W/ MINERALS TAB 1 TABLET: TAB at 08:52

## 2022-12-03 RX ADMIN — HEPARIN SODIUM 5000 UNITS: 5000 INJECTION INTRAVENOUS; SUBCUTANEOUS at 06:38

## 2022-12-03 RX ADMIN — FOLIC ACID 1 MG: 1 TABLET ORAL at 08:52

## 2022-12-03 RX ADMIN — BUSPIRONE HYDROCHLORIDE 5 MG: 10 TABLET ORAL at 20:42

## 2022-12-03 RX ADMIN — Medication 1000 UNITS: at 08:52

## 2022-12-03 RX ADMIN — PANTOPRAZOLE SODIUM 40 MG: 40 TABLET, DELAYED RELEASE ORAL at 17:39

## 2022-12-03 RX ADMIN — SODIUM CHLORIDE 500 ML: 9 INJECTION, SOLUTION INTRAVENOUS at 01:06

## 2022-12-03 RX ADMIN — HEPARIN SODIUM 5000 UNITS: 5000 INJECTION INTRAVENOUS; SUBCUTANEOUS at 14:31

## 2022-12-03 RX ADMIN — FLUOXETINE 40 MG: 20 CAPSULE ORAL at 08:51

## 2022-12-03 NOTE — PLAN OF CARE
Goal Outcome Evaluation:  Plan of Care Reviewed With: patient        Progress: improving  Outcome Evaluation: Pt. great progress to all goals.  Very little anxiety noted.  Pt. completed toileting SBA, LBD undergarment and socks with only assist to hold up gown.  Mvmt. to bathroom and back CGA.  Goals updated as needed.  Pt. tolerated 10-15 reps each UE TE.  Recommend attempting pants next session and progressing to yellow tband TE.  Continue OT POC.

## 2022-12-03 NOTE — PROGRESS NOTES
[facility] Medicine Services  PROGRESS NOTE    Patient Name: Philomena Davis  : 1960  MRN: [id number]    Date of Admission: 2022  Primary Care Physician: Provider, No Known    Subjective   Subjective     CC:  weakness    HPI:  Ate breakfast this AM.  States that she's eating better.  Feeling better since ate some.  She denies any abdominal pain or n/v.  States that she stopped eating at home because she didn't feel good overall.  States that she is doing well with her prozac as far as depression. Feels like she needs more PT before going home.     ROS:  Gen- No fevers, chills  CV- No chest pain, palpitations  Resp- No cough, dyspnea  GI- No N/V/D, abd pain     Objective   Objective     Vital Signs:   Temp:  [97.7 °F (36.5 °C)-98.6 °F (37 °C)] 97.7 °F (36.5 °C)  Heart Rate:  [79-83] 81  Resp:  [16-17] 17  BP: ()/(52-65) 97/64  Flow (L/min):  [2] 2     Physical Exam:  Constitutional: Chronically ill appearing female sitting up in bed  HENT: NCAT, mucous membranes moist  Respiratory: Clear to auscultation bilaterally, respiratory effort normal   Cardiovascular: RRR, no murmurs, rubs, or gallops  Gastrointestinal: Soft, nontender, nondistended  Musculoskeletal: Muscle tone decreased throughout, no joint effusions appreciated  Psychiatric: Anxious but appropriate  Neurologic: Alert and oriented, facial movements symmetric and spontaneous movement of all 4 extremities grossly equal bilaterally, speech clear  Skin: No rashes    Results Reviewed: labs reviewed  LAB RESULTS:      Lab 22  0647 22  0352 22  1129   WBC  --  3.77 3.20*   HEMOGLOBIN  --  11.8* 13.1   HEMATOCRIT  --  35.4 39.9   PLATELETS  --  146 167   NEUTROS ABS  --  1.70 1.66*   IMMATURE GRANS (ABS)  --  0.01 0.01   LYMPHS ABS  --  1.29 0.91   MONOS ABS  --  0.67 0.52   EOS ABS  --  0.04 0.04   MCV  --  100.6* 102.3*   PROTIME 14.9*  --   --          Lab 22  0514 22  0358  11/30/22  0647 11/29/22  0352 11/28/22  1755 11/28/22  1129   SODIUM 140 141 137 136  --  137   POTASSIUM 3.3* 3.0* 3.4* 3.9  --  2.5*   CHLORIDE 107 104 98 94*  --  89*   CO2 25.0 28.0 27.0 23.0  --  29.0   ANION GAP 8.0 9.0 12.0 19.0*  --  19.0*   BUN 3* 3* 5* 9  --  11   CREATININE 0.64 0.70 0.83 0.86  --  1.05*   EGFR 101.3 99.2 80.8 77.5  --  61.0   GLUCOSE 78 87 77 52*  --  82   CALCIUM 7.8* 8.5* 8.4* 8.6  --  9.6   MAGNESIUM 2.1  --   --  3.5* 1.8 1.8   PHOSPHORUS 2.5  --   --   --   --  3.6   TSH  --   --   --   --   --  5.310*         Lab 12/02/22  0514 11/30/22  0647 11/29/22  0352 11/28/22  1129   TOTAL PROTEIN  --  6.5 6.4 7.5   ALBUMIN 2.30* 3.90 3.10* 3.50   GLOBULIN  --  2.6 3.3 4.0   ALT (SGPT)  --  16 17 20   AST (SGOT)  --  65* 63* 67*   BILIRUBIN  --  1.1 1.2 1.6*   ALK PHOS  --  71 63 70         Lab 11/30/22  0647 11/28/22  1129   TROPONIN T  --  0.023   PROTIME 14.9*  --    INR 1.18*  --              Lab 12/01/22  0358 11/30/22  0647   IRON  --  87   IRON SATURATION  --  46   TIBC  --  188*   TRANSFERRIN  --  126*   FERRITIN  --  1,286.00*   VITAMIN B 12 >2,000*  --          Brief Urine Lab Results  (Last result in the past 365 days)      Color   Clarity   Blood   Leuk Est   Nitrite   Protein   CREAT   Urine HCG        11/28/22 1545 Dark Yellow   Cloudy   Negative   Trace   Negative   30 mg/dL (1+)                 Microbiology Results Abnormal     Procedure Component Value - Date/Time    COVID PRE-OP / PRE-PROCEDURE SCREENING ORDER (NO ISOLATION) - Swab, Nasopharynx [[id number]]  (Normal) Collected: 11/28/22 1743    Lab Status: Final result Specimen: Swab from Nasopharynx Updated: 11/28/22 1803    Narrative:      The following orders were created for panel order COVID PRE-OP / PRE-PROCEDURE SCREENING ORDER (NO ISOLATION) - Swab, Nasopharynx.  Procedure                               Abnormality         Status                     ---------                               -----------         ------                      COVID-19 and FLU A/B PCR...[[id number]]  Normal              Final result                 Please view results for these tests on the individual orders.    COVID-19 and FLU A/B PCR - Swab, Nasopharynx [[id number]]  (Normal) Collected: 11/28/22 1743    Lab Status: Final result Specimen: Swab from Nasopharynx Updated: 11/28/22 1803     COVID19 Not Detected     Influenza A PCR Not Detected     Influenza B PCR Not Detected    Narrative:      Fact sheet for providers: https://www.fda.gov/media/814057/download    Fact sheet for patients: https://www.fda.gov/media/690169/download    Test performed by PCR.          CT Chest Without Contrast Diagnostic    Result Date: 12/2/2022  DATE OF EXAM: 12/1/2022 3:30 PM  PROCEDURE: CT CHEST WO CONTRAST DIAGNOSTIC-  INDICATIONS: Dyspnea, chronic, unclear etiology; R53.1-Weakness; E87.6-Hypokalemia; F41.9-Anxiety disorder, unspecified; E46-Unspecified protein-calorie malnutrition; K70.30-Alcoholic cirrhosis of liver without ascites; R63.4-Abnormal weight loss; Z74.09-Other reduced mobility; Z78.9-Other specified health status  COMPARISON: Chest radiograph 11/28/2022. No previous chest CT scan.  TECHNIQUE: Routine transaxial slices were obtained through the chest without the administration of intravenous contrast. Reconstructed coronal and sagittal images were also obtained. Automated exposure control and iterative construction methods were used.  The radiation dose reduction device was turned on for each scan per the ALARA (As Low as Reasonably Achievable) protocol.  FINDINGS: Bilateral breast prostheses are noted, the right breast prosthesis mostly out of the scan field. No axillary or lower cervical lymphadenopathy is seen. No mediastinal or hilar adenopathy is seen. There is a trace amount of pericardial fluid or pericardial thickening. Thoracic aorta is not enlarged, 3.3 cm in diameter. No coronary artery calcium is seen. There is trace aortic valve calcification.  There is mild patchy ground glass opacity of the lingula, nonspecific. There is minimal dependent atelectasis of the posterior lower lobes. A small, 3 mm pleural-based micronodule in the posterior left upper lobe is likely an incidental granuloma.  Included images of the upper abdomen show mild diffuse fatty liver change. No significant abnormalities are seen of the included portions of the spleen, gallbladder, pancreatic tail, adrenal glands, or upper renal poles. Bony structures appear to be intact.        Impression:  1. Small area of patchy ground glass disease in the left lower lobe, possibly postinflammatory scarring. If this represents pneumonia, it is minimal in extent. 2. Mild chronic-appearing lung changes elsewhere. 3 mm pleural-based micronodule of the posterior left lower lobe, likely an incidental granuloma. If considered by Fleischner Society recommendations, no follow-up would be needed in a low risk patient. A 12 month follow-up would be suggested in a high risk patient, with no further follow-up if it remains stable at that time.  This report was finalized on 12/2/2022 7:47 AM by Dr. Jose J Ramírez MD.          I have reviewed the medications:  Scheduled Meds:busPIRone, 5 mg, Oral, TID  cholecalciferol, 1,000 Units, Oral, Daily  FLUoxetine, 40 mg, Oral, Daily  folic acid, 1 mg, Oral, Daily  heparin (porcine), 5,000 Units, Subcutaneous, Q8H  latanoprost, 1 drop, Both Eyes, Nightly  melatonin, 5 mg, Oral, Nightly  mirtazapine, 7.5 mg, Oral, Nightly  multivitamin with minerals, 1 tablet, Oral, Daily  pantoprazole, 40 mg, Oral, BID AC  sodium chloride, 10 mL, Intravenous, Q12H  thiamine, 100 mg, Oral, Daily      Continuous Infusions:lactated ringers, 9 mL/hr, Last Rate: 9 mL/hr (11/30/22 0855)      PRN Meds:.•  acetaminophen  •  hydrOXYzine  •  magnesium sulfate **OR** magnesium sulfate **OR** magnesium sulfate  •  ondansetron **OR** ondansetron  •  potassium chloride **OR** potassium chloride **OR**  "potassium chloride  •  sodium chloride  •  sodium chloride  •  sodium chloride  •  zolpidem    Assessment & Plan   Assessment & Plan     Active Hospital Problems    Diagnosis  POA   • **Generalized weakness [R53.1]  Yes   • Severe malnutrition (HCC) [E43]  Yes   • GERD without esophagitis [K21.9]  Yes   • Alcoholic cirrhosis of liver without ascites (HCC) [K70.30]  Yes   • Weight loss [R63.4]  Yes   • Generalized anxiety disorder [F41.1]  Yes      Resolved Hospital Problems   No resolved problems to display.        Brief Hospital Course to date:  Philomena Davis is a 62 y.o. female w h/o heavy ETOH use who presents after recent admission in 10/2022 with profound debility, weakness, anorexia.     1) Profound debility, weight loss, anorexia  -down 30 lbs from last admission 10/2022. Patient reports \"not eating for weeks\" at home after [facility] dispo early October. 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 Patient up to date on mammogram and colonoscopy (OP colonoscopy within next 1 year)  -suspect depression component: started mirtazapine 11/30  -on calorie count currently. Patient is not eating without any active GI symptoms. We discuss that further w/u is inappropriate until calorie intake is good. It is unclear to me her barriers to eating now and appears to be 2/2 lack of effort unfortunately  -GI recommended to optimize nutrition and f/u GI outpatient  -eating better today.  IF not better could consider cyproheptadine 4mg qhs to help stimulate appetite.  Was started on remeron.     2) Alcoholic liver cirrhosis, compensated  -MELD=9, failure to thrive out of proportion to cirrhosis  -EGD negative for " varices, f/u needed in 3 years  -needs Hep B vaccine before dc (Hep A Ab positive)  -f/u with GI clinic    3) H/o heavy alcohol use - stopped 8/2022 per patient report. Counseled  4) Esophagitis - PPI bid x 30 days, then daily thereafter    Anxiety/Mood disorder - fluoxetine + new start mirtazapine as above. Started on buspar  Insomnia - discussed risk/benefit of meds. Melatonin alone for now  Hypoglycemia in setting of NPO status - appropriate on diet  Folate deficiency - folate <2 on 9/26/2022, continue replacement  Vit D deficiency - Vit D <5, profound. Replace w 1,000 units daily - repeat needed in 30 days    Expected Discharge Location and Transportation: home w  PT/OT  Expected Discharge Date: 12/5 (Discharge date is tentative pending patient's medical condition and is subject to change)    DVT prophylaxis:  Medical DVT prophylaxis orders are present.     AM-PAC 6 Clicks Score (PT): 17 (12/02/22 1127)    CODE STATUS:   Code Status and Medical Interventions:   Ordered at: 11/28/22 1520     Level Of Support Discussed With:    Patient     Code Status (Patient has no pulse and is not breathing):    CPR (Attempt to Resuscitate)     Medical Interventions (Patient has pulse or is breathing):    Full Support       Daniel Al MD  12/03/22

## 2022-12-03 NOTE — THERAPY TREATMENT NOTE
Patient Name: Philomena Davis  : 1960    MRN: [id number]                              Today's Date: 12/3/2022       Admit Date: 2022    Visit Dx:     ICD-10-CM ICD-9-CM   1. Generalized weakness  R53.1 780.79   2. Acute hypokalemia  E87.6 276.8   3. Anxiety  F41.9 300.00   4. Malnutrition, unspecified type (HCC)  E46 263.9   5. Alcoholic cirrhosis of liver without ascites (HCC)  K70.30 571.2   6. Weight loss  R63.4 783.21   7. 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   • Digital mucous cyst of toe of left foot   • Alcohol withdrawal (HCC)   • Alcoholism /alcohol abuse   • Hematemesis   • Diabetes mellitus, type 2 (HCC)   • Essential hypertension   • Generalized anxiety disorder   • Hyperlipidemia   • Glaucoma   • Elevated LFTs   • Generalized weakness   • Alcoholic cirrhosis of liver without ascites (HCC)   • Weight loss   • Severe malnutrition (HCC)   • GERD without esophagitis     Past Medical History:   Diagnosis Date   • Anxiety and depression    • Cirrhosis of liver (HCC)    • Closed displaced fracture of lateral malleolus of left fibula    • Diabetes mellitus (HCC)    • Ganglion cyst of left foot    • Glaucoma    • Hypertension    • Wears glasses      Past Surgical History:   Procedure Laterality Date   • ANKLE OPEN REDUCTION INTERNAL FIXATION Left 3/24/2017    Procedure: LEFT ANKLE OPEN REDUCTION INTERNAL FIXATION WITH ILIAC CREST BONE GRAFT , EXCISION CYST  INNERSPACE LEFT FOOT;  Surgeon: Frank Larson MD;  Location: [facility] OR;  Service:    • AUGMENTATION MAMMAPLASTY Bilateral    • BREAST AUGMENTATION     • BREAST EXCISIONAL BIOPSY Right    • DILATION AND CURETTAGE, DIAGNOSTIC / THERAPEUTIC     • ENDOSCOPY N/A 2022    Procedure: ESOPHAGOGASTRODUODENOSCOPY;  Surgeon: Arelis Solano MD;  Location: [facility] ENDOSCOPY;  Service: Gastroenterology;   Laterality: N/A;   • MN FIX NON/MALUNION FEMUR,W GRAFT Left 3/24/2017    Procedure: ILIAC CREST BONE GRAFT;  Surgeon: Frank Larson MD;  Location: [facility];  Service: Orthopedics   • WRIST SURGERY        General Information     Row Name 12/03/22 1606          OT Time and Intention    Document Type therapy note (daily note)  -DEANGELO     Mode of Treatment individual therapy;occupational therapy  -DEANGELO     Row Name 12/03/22 1606          General Information    Patient Profile Reviewed yes  -DEANGELO     Existing Precautions/Restrictions fall  -DEANGELO     Barriers to Rehab medically complex;previous functional deficit  -DEANGELO     Row Name 12/03/22 1606          Cognition    Orientation Status (Cognition) oriented to;person  -DEANGELO     Row Name 12/03/22 1606          Safety Issues, Functional Mobility    Safety Issues Affecting Function (Mobility) safety precaution awareness;safety precautions follow-through/compliance  -DEANGELO     Impairments Affecting Function (Mobility) balance;endurance/activity tolerance;strength;sensation/sensory awareness  -           User Key  (r) = Recorded By, (t) = Taken By, (c) = Cosigned By    Initials Name Provider Type    DEANGELO Jenny Saini, OT Occupational Therapist                 Mobility/ADL's     Row Name 12/03/22 1607          Bed Mobility    Comment, (Bed Mobility) UI on arrival  -     Row Name 12/03/22 1607          Transfers    Transfers sit-stand transfer;stand-sit transfer;toilet transfer  -     Row Name 12/03/22 1607          Sit-Stand Transfer    Sit-Stand [place] (Transfers) contact guard;verbal cues  -     Assistive Device (Sit-Stand Transfers) walker, front-wheeled  -     Comment, (Sit-Stand Transfer) intermittent cues for hand placement  -     Row Name 12/03/22 1607          Stand-Sit Transfer    Stand-Sit [place] (Transfers) contact guard;verbal cues  -     Assistive Device (Stand-Sit Transfers) walker, front-wheeled  -     Row Name 12/03/22 1607           Toilet Transfer    Type (Toilet Transfer) sit-stand;stand-sit  -DEANGELO     [place] Level (Toilet Transfer) contact guard;verbal cues  -     Assistive Device (Toilet Transfer) grab bars/safety frame;commode  -     Row Name 12/03/22 1607          Functional Mobility    Functional Mobility- Ind. Level contact guard assist  -     Functional Mobility-Distance (Feet) --  16 + 16 grossly  -     Row Name 12/03/22 1607          Activities of Daily Living    BADL Assessment/Intervention lower body dressing;grooming;toileting  -     Row Name 12/03/22 1607          Toileting Assessment/Training    [place] Level (Toileting) perform perineal hygiene;supervision  -DEANGELO     Position (Toileting) unsupported sitting  -DEANGELO     Comment, (Toileting) pt. able to get toilet paper and wipe front and back maddie area, extra time to get toilet paper from roll due to hand sensory deficit,  front gown on only  -     Row Name 12/03/22 1607          Lower Body Dressing Assessment/Training    [place] Level (Lower Body Dressing) doff;don;socks;independent;pants/bottoms;minimum assist (75% patient effort)  -DEANGELO     Position (Lower Body Dressing) supported sitting;unsupported standing  -DEANGELO     Comment, (Lower Body Dressing) pt. donned and doffed undergarment with extra time and effort, assist to hold up front gown out of way and SBA for balance  -     Row Name 12/03/22 1607          Grooming Assessment/Training    [place] Level (Grooming) supervision  -DEANGELO     Position (Grooming) sink side;unsupported standing  -DEANGELO     Comment, (Grooming) pt. washed hands post toileting with fair thoroughness, limiting self due to water cold  -DEANGELO     Row Name 12/03/22 1607          Self-Feeding Assessment/Training    [place] Level (Feeding) liquids to mouth;independent  -DEANGELO     Position (Self-Feeding) supported sitting  -DEANGELO           User Key  (r) = Recorded By, (t) = Taken By, (c) = Cosigned By    Initials Name Provider Type    DEANGELO Saini  Jenny Bianchi OT Occupational Therapist               Obj/Interventions     [facility] Name 12/03/22 1611          Shoulder (Therapeutic Exercise)    Shoulder AROM (Therapeutic Exercise) bilateral;flexion;extension;aBduction;aDduction;horizontal aBduction/aDduction;10 repetitions;sitting  -     Row Name 12/03/22 1611          Elbow/Forearm (Therapeutic Exercise)    Elbow/Forearm (Therapeutic Exercise) strengthening exercise  -     Elbow/Forearm Strengthening (Therapeutic Exercise) bilateral;flexion;extension;sitting;15 repititions  mild manual resistance tolerated  -     Row Name 12/03/22 1611          Motor Skills    Therapeutic Exercise shoulder;elbow/forearm  -[facility] Name 12/03/22 1611          Balance    Static Sitting Balance independent  -     Dynamic Sitting Balance standby assist  SBA given for safety with dressing task, but no unsteadiness noted  -     Position, Sitting Balance unsupported;sitting in chair  -     Static Standing Balance standby assist  -     Dynamic Standing Balance standby assist  -     Position/Device Used, Standing Balance supported;unsupported;walker, rolling  -     Balance Interventions sit to stand;occupation based/functional task  -     Comment, Balance LBD, grooming, toileting  -           User Key  (r) = Recorded By, (t) = Taken By, (c) = Cosigned By    Initials Name Provider Type    Jenny Zamora, OT Occupational Therapist               Goals/Plan     [facility] Name 12/03/22 1616          Transfer Goal 1 (OT)    Progress/Outcome (Transfer Goal 1, OT) good progress toward goal  -[facility] Name 12/03/22 1616          Bathing Goal 1 (OT)    Progress/Outcomes (Bathing Goal 1, OT) goal ongoing  -     Row Name 12/03/22 1616          Dressing Goal 1 (OT)    Progress/Outcome (Dressing Goal 1, OT) good progress toward goal  -[facility] Name 12/03/22 1616          Toileting Goal 1 (OT)    Progress/Outcome (Toileting Goal 1, OT) good progress toward goal  -DEANGELO     Row Name  12/03/22 1616          Strength Goal 1 (OT)    Strength Goal 1 (OT) Pt. will complete 10 reps each BUE yellow tband TE to support ADL independence.  -DEANGELO     Time Frame (Strength Goal 1, OT) long term goal (LTG);10 days  -DEANGELO     Progress/Outcome (Strength Goal 1, OT) new goal  -DEANGELO     Row Name 12/03/22 1616          Therapy Assessment/Plan (OT)    Planned Therapy Interventions (OT) activity tolerance training;BADL retraining;functional balance retraining;occupation/activity based interventions;ROM/therapeutic exercise;strengthening exercise;patient/caregiver education/training;transfer/mobility retraining  -DEANGELO           User Key  (r) = Recorded By, (t) = Taken By, (c) = Cosigned By    Initials Name Provider Type    Jenny Zamora, SAGE Occupational Therapist               Clinical Impression     Row Name 12/03/22 1612          Pain Assessment    Pretreatment Pain Rating 0/10 - no pain  -DEANGELO     Posttreatment Pain Rating 0/10 - no pain  -DEANGELO     Row Name 12/03/22 1612          Plan of Care Review    Plan of Care Reviewed With patient  -DEANGELO     Progress improving  -DEANGELO     Outcome Evaluation Pt. great progress to all goals.  Very little anxiety noted.  Pt. completed toileting SBA, LBD undergarment and socks with only assist to hold up gown.  Mvmt. to bathroom and back CGA.  Goals updated as needed.  Pt. tolerated 10-15 reps each UE TE.  Recommend attempting pants next session and progressing to yellow tband TE.  Continue OT POC.  -DEANGELO     Row Name 12/03/22 1612          Therapy Assessment/Plan (OT)    Therapy Frequency (OT) daily  -DEANGELO     Row Name 12/03/22 1612          Therapy Plan Review/Discharge Plan (OT)    Anticipated Discharge Disposition (OT) home with home health;home with assist  -DEANGELO     Row Name 12/03/22 1612          Vital Signs    Pre Systolic BP Rehab 115  -DEANGELO     Pre Treatment Diastolic BP 73  -DEANGELO     Post Systolic BP Rehab 113  -DEANGELO     Post Treatment Diastolic BP 74  -DEANGELO     Pretreatment Heart Rate  (beats/min) 86  -DEANGELO     Posttreatment Heart Rate (beats/min) 96  -DEANGELO     Pre SpO2 (%) 93  -DEANGELO     O2 Delivery Pre Treatment room air  -DEANGELO     Intra SpO2 (%) 93  -DEANGELO     O2 Delivery Intra Treatment room air  -DEANGELO     Post SpO2 (%) 95  -DEANGELO     O2 Delivery Post Treatment room air  -DEANGELO     Pre Patient Position Sitting  -DEANGELO     Intra Patient Position Standing  -DEANGELO     Post Patient Position Sitting  -DEANGELO     Row Name 12/03/22 1612          Positioning and Restraints    Pre-Treatment Position sitting in chair/recliner  -DEANGELO     Post Treatment Position chair  -DEANGELO     In Chair reclined;call light within reach;encouraged to call for assist;exit alarm on;legs elevated  -DEANGELO           User Key  (r) = Recorded By, (t) = Taken By, (c) = Cosigned By    Initials Name Provider Type    Jenny Zamora, OT Occupational Therapist               Outcome Measures     Row Name 12/03/22 1617          How much help from another is currently needed...    Putting on and taking off regular lower body clothing? 3  -DEANGELO     Bathing (including washing, rinsing, and drying) 3  -DEANGELO     Toileting (which includes using toilet bed pan or urinal) 3  -DEANGELO     Putting on and taking off regular upper body clothing 3  -DEANGELO     Taking care of personal grooming (such as brushing teeth) 3  -DEANGELO     Eating meals 3  -DEANGELO     AM-PAC 6 Clicks Score (OT) 18  -DEANGELO     Row Name 12/03/22 1520 12/03/22 0851       How much help from another person do you currently need...    Turning from your back to your side while in flat bed without using bedrails? 4  -JH 4  -KB    Moving from lying on back to sitting on the side of a flat bed without bedrails? 4  - 3  -KB    Moving to and from a bed to a chair (including a wheelchair)? 3  -JH 3  -KB    Standing up from a chair using your arms (e.g., wheelchair, bedside chair)? 3  -JH 3  -KB    Climbing 3-5 steps with a railing? 2  -JH 1  -KB    To walk in hospital room? 3  -JH 3  -KB    AM-PAC 6 Clicks Score (PT) 19  -JH 17  -KB     Highest level of mobility 6 --> Walked 10 steps or more  - 5 --> Static standing  -    Row Name 12/03/22 1617 12/03/22 1520       Functional Assessment    Outcome Measure Options AM-PAC 6 Clicks Daily Activity (OT)  -DEANGELO AM-PAC 6 Clicks Basic Mobility (PT)  -          User Key  (r) = Recorded By, (t) = Taken By, (c) = Cosigned By    Initials Name Provider Type    Jenny Zamora, OT Occupational Therapist    Paul Rendon, PT Physical Therapist    Helen Rojas, RN Registered Nurse                Occupational Therapy Education     Title: PT OT SLP Therapies (In Progress)     Topic: Occupational Therapy (In Progress)     Point: ADL training (Done)     Description:   Instruct learner(s) on proper safety adaptation and remediation techniques during self care or transfers.   Instruct in proper use of assistive devices.              Learning Progress Summary           Patient Acceptance, E,D, VU,NR by  at 12/3/2022 1617    Comment: BADL progression, UE TE, transfer safety    Acceptance, E,D, VU,DU,NR by  at 12/1/2022 0937    Acceptance, E,D, VU,NR by  at 11/30/2022 1515    Acceptance, E,D, VU,DU,NR by  at 11/29/2022 1155                   Point: Home exercise program (Done)     Description:   Instruct learner(s) on appropriate technique for monitoring, assisting and/or progressing therapeutic exercises/activities.              Learning Progress Summary           Patient Acceptance, E,D, VU,NR by  at 12/3/2022 1617    Comment: BADL progression, UE TE, transfer safety    Acceptance, E,D, VU,NR by  at 11/30/2022 1515                   Point: Precautions (Done)     Description:   Instruct learner(s) on prescribed precautions during self-care and functional transfers.              Learning Progress Summary           Patient Acceptance, E,D, VU,NR by  at 12/3/2022 1617    Comment: BADL progression, UE TE, transfer safety                   Point: Body mechanics (Not Started)     Description:    Instruct learner(s) on proper positioning and spine alignment during self-care, functional mobility activities and/or exercises.              Learner Progress:  Not documented in this visit.                      User Key     Initials Effective Dates Name Provider Type Discipline     06/16/21 -  Chelsea Luciano OT Occupational Therapist OT    DEANGELO 06/16/21 -  Jenny Saini OT Occupational Therapist OT              OT Recommendation and Plan  Planned Therapy Interventions (OT): activity tolerance training, BADL retraining, functional balance retraining, occupation/activity based interventions, ROM/therapeutic exercise, strengthening exercise, patient/caregiver education/training, transfer/mobility retraining  Therapy Frequency (OT): daily  Plan of Care Review  Plan of Care Reviewed With: patient  Progress: improving  Outcome Evaluation: Pt. great progress to all goals.  Very little anxiety noted.  Pt. completed toileting SBA, LBD undergarment and socks with only assist to hold up gown.  Mvmt. to bathroom and back CGA.  Goals updated as needed.  Pt. tolerated 10-15 reps each UE TE.  Recommend attempting pants next session and progressing to yellow tband TE.  Continue OT POC.     Time Calculation:    Time Calculation- OT     Row Name 12/03/22 1618             Time Calculation- OT    OT Start Time 1535  -DEANGELO      OT Received On 12/03/22  -DEANGELO      OT Goal Re-Cert Due Date 12/09/22  -DEANGELO         Timed Charges    38679 - OT Therapeutic Exercise Minutes 8  -DEANGELO      89436 - OT Therapeutic Activity Minutes 6  -DEANGELO      79885 - OT Self Care/Mgmt Minutes 16  -DEANGELO         Total Minutes    Timed Charges Total Minutes 30  -DEANGELO       Total Minutes 30  -DEANGELO            User Key  (r) = Recorded By, (t) = Taken By, (c) = Cosigned By    Initials Name Provider Type    Jenny Zamora, OT Occupational Therapist              Therapy Charges for Today     Code Description Service Date Service Provider Modifiers Qty    [id number]  HC OT THER PROC EA 15 MIN 12/3/2022 Jenny Saini, OT GO 1    [id number] HC OT SELF CARE/MGMT/TRAIN EA 15 MIN 12/3/2022 Jenny Saini, OT GO 1               Jenny Saini, OT  12/3/2022

## 2022-12-03 NOTE — PLAN OF CARE
Goal Outcome Evaluation:  Plan of Care Reviewed With: patient        Progress: improving  Outcome Evaluation: Patient continues to have increased weakness and anxiety limiting mobility but able to ambulate 40 feet this date with walker.

## 2022-12-03 NOTE — PLAN OF CARE
Problem: Adult Inpatient Plan of Care  Goal: Optimal Comfort and Wellbeing  Outcome: Ongoing, Progressing  Intervention: Provide Person-Centered Care  Description: Use a family-focused approach to care.  Develop trust and rapport by proactively providing information, encouraging questions, addressing concerns and offering reassurance.  Acknowledge emotional response to hospitalization.  Recognize and utilize personal coping strategies.  Honor spiritual and cultural preferences.     Problem: Skin Injury Risk Increased  Goal: Skin Health and Integrity  Outcome: Ongoing, Progressing  Intervention: Optimize Skin Protection  Description: Perform a full pressure injury risk assessment, as indicated by screening, upon admission to care unit.  Reassess skin (injury risk, full inspection) frequently (e.g., scheduled interval, with change in condition) to provide optimal early detection and prevention.  Maintain adequate tissue perfusion (e.g., encourage fluid balance; avoid crossing legs, constrictive clothing or devices) to promote tissue oxygenation.  Maintain head of bed at lowest degree of elevation tolerated, considering medical condition and other restrictions.  Avoid positioning onto an area that remains reddened.  Minimize incontinence and moisture (e.g., toileting schedule; moisture-wicking pad, diaper or incontinence collection device; skin moisture barrier).  Cleanse skin promptly and gently when soiled utilizing a pH-balanced cleanser.  Relieve and redistribute pressure (e.g., scheduled position changes, weight shifts, use of support surface, medical device repositioning, protective dressing application, use of positioning device, microclimate control, use of pressure-injury-monitor  Encourage increased activity, such as sitting in a chair at the bedside or early mobilization, when able to tolerate.     Problem: Mobility Impairment  Goal: Optimal Mobility  Outcome: Ongoing, Progressing  Intervention: Optimize  Mobility  Description: Assess mobility skills (e.g., bed, transfers, ambulation, gait, stair climbing, wheelchair) and factors influencing mobility, such as balance, safety, range of motion, strength, muscle tone, cognition and sensory processing.  Instruct in transfer and mobility techniques supporting highest level of independence while ensuring safety.  Consider any contraindications or precautions to individualize treatment plan (e.g., joint or ligament instability, weightbearing restrictions).  Encourage early mobilization and performance of daily activities, if able, while providing level of assistance needed for safety.  Schedule mobility activities when pain and fatigue are at a minimum to encourage optimal performance.  Pace activity; allow adequate time and rest periods to conserve energy.  Provide frequent encouragement, along with prompting and assistance as needed.  Individualize instructions and prompts to patient’s cognitive status to promote effective communication; simplify verbal directions, give encouragement and provide demonstrated cues as needed.  Design and implement therapeutic interventions to address impairments (e.g., functional mobility training, mat and standing balance activities, strengthening).  Train in and reinforce use of adaptive equipment and assistive devices, such as a walker or transfer board.  Utilize appropriate modalities, devices or techniques to facilitate mobility (e.g., ankle foot orthosis, electrical stimulation, sit-to-stand lift, treadmill-training).  Assess fall risk using standardized tool; implement appropriate interventions, such as behavioral or environmental modifications.  Use proper body mechanics and patient alignment during mobility to ensure safety.     Problem: Activity and Energy Impairment (Anxiety Signs/Symptoms)  Goal: Optimized Energy Level (Anxiety Signs/Symptoms)  Outcome: Ongoing, Progressing     Problem: Malnutrition  Goal: Improved Nutritional  Intake  Outcome: Ongoing, Progressing     Problem: Fall Injury Risk  Goal: Absence of Fall and Fall-Related Injury  Outcome: Ongoing, Progressing  Intervention: Identify and Manage Contributors  Description: Develop a fall prevention plan with the patient and caregiver/family.  Provide reorientation, appropriate sensory stimulation and routines with changes in mental status to decrease risk of fall.  Promote use of personal vision and auditory aids.  Assess assistance level required for safe and effective self-care; provide support as needed, such as toileting, mobilization. For age 65 and older, implement timed toileting with assistance.  Encourage physical activity, such as performance of mobility and self-care at highest level of patient ability, multicomponent exercise program and provision of appropriate assistive devices.  If fall occurs, assess the severity of injury; implement fall injury protocol. Determine the cause and revise fall injury prevention plan.  Regularly review medication contribution to fall risk; adjust medication administration times to minimize risk of falling.  Consider risk related to polypharmacy and age.  Balance adequate pain management with potential for oversedation.  Intervention: Promote Injury-Free Environment  Description: Provide a safe, barrier-free environment that encourages independent activity.  Keep care area uncluttered and well-lighted.  Determine need for increased observation or monitoring.  Avoid use of devices that minimize mobility, such as restraints or indwelling urinary catheter.   Goal Outcome Evaluation:      Patient had hypotensive episode, bolus once with 500 ml 0.9% NS, no other complaints voiced up to toilet with 1 person assist

## 2022-12-03 NOTE — THERAPY TREATMENT NOTE
Patient Name: Philomena Davis  : 1960    MRN: [id number]                              Today's Date: 12/3/2022       Admit Date: 2022    Visit Dx:     ICD-10-CM ICD-9-CM   1. Generalized weakness  R53.1 780.79   2. Acute hypokalemia  E87.6 276.8   3. Anxiety  F41.9 300.00   4. Malnutrition, unspecified type (HCC)  E46 263.9   5. Alcoholic cirrhosis of liver without ascites (HCC)  K70.30 571.2   6. Weight loss  R63.4 783.21   7. 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   • Digital mucous cyst of toe of left foot   • Alcohol withdrawal (HCC)   • Alcoholism /alcohol abuse   • Hematemesis   • Diabetes mellitus, type 2 (HCC)   • Essential hypertension   • Generalized anxiety disorder   • Hyperlipidemia   • Glaucoma   • Elevated LFTs   • Generalized weakness   • Alcoholic cirrhosis of liver without ascites (HCC)   • Weight loss   • Severe malnutrition (HCC)   • GERD without esophagitis     Past Medical History:   Diagnosis Date   • Anxiety and depression    • Cirrhosis of liver (HCC)    • Closed displaced fracture of lateral malleolus of left fibula    • Diabetes mellitus (HCC)    • Ganglion cyst of left foot    • Glaucoma    • Hypertension    • Wears glasses      Past Surgical History:   Procedure Laterality Date   • ANKLE OPEN REDUCTION INTERNAL FIXATION Left 3/24/2017    Procedure: LEFT ANKLE OPEN REDUCTION INTERNAL FIXATION WITH ILIAC CREST BONE GRAFT , EXCISION CYST  INNERSPACE LEFT FOOT;  Surgeon: Frank Larson MD;  Location: [facility] OR;  Service:    • AUGMENTATION MAMMAPLASTY Bilateral    • BREAST AUGMENTATION     • BREAST EXCISIONAL BIOPSY Right    • DILATION AND CURETTAGE, DIAGNOSTIC / THERAPEUTIC     • ENDOSCOPY N/A 2022    Procedure: ESOPHAGOGASTRODUODENOSCOPY;  Surgeon: Arelis Solano MD;  Location: [facility] ENDOSCOPY;  Service: Gastroenterology;   Laterality: N/A;   • OH FIX NON/MALUNION FEMUR,W GRAFT Left 3/24/2017    Procedure: ILIAC CREST BONE GRAFT;  Surgeon: Frank Larson MD;  [facility];  Service: Orthopedics   • WRIST SURGERY        General Information     [facility] Name 12/03/22 1503          Physical Therapy Time and Intention    Document Type therapy note (daily note)  -     Mode of Treatment physical therapy  -     Row Name 12/03/22 1503          General Information    Patient Profile Reviewed yes  -     Existing Precautions/Restrictions fall  -     Row Name 12/03/22 1503          Cognition    Orientation Status (Cognition) oriented x 4  -     Row Name 12/03/22 1503          Safety Issues, Functional Mobility    Safety Issues Affecting Function (Mobility) safety precaution awareness;safety precautions follow-through/compliance;sequencing abilities  -     Impairments Affecting Function (Mobility) balance;endurance/activity tolerance;pain;strength  -           User Key  (r) = Recorded By, (t) = Taken By, (c) = Cosigned By    Initials Name Provider Type     Paul Thurman, PT Physical Therapist               Mobility     Row Name 12/03/22 1511          Bed Mobility    Bed Mobility supine-sit;scooting/bridging  -     Scooting/Bridging [place] (Bed Mobility) modified independence  -     Supine-Sit [place] (Bed Mobility) modified independence  -     Assistive Device (Bed Mobility) bed rails  -     Comment, (Bed Mobility) Slow and extra time to transition edge of bed during bed mobility  -     Row Name 12/03/22 1511          Transfers    Comment, (Transfers) Cues for sequencing and safety with standing transfers from edge of bed and toilet  -     Row Name 12/03/22 1511          Sit-Stand Transfer    Sit-Stand [place] (Transfers) contact guard;verbal cues  -     Assistive Device (Sit-Stand Transfers) walker, front-wheeled  -     Row Name 12/03/22 1511          Gait/Stairs (Locomotion)     [place] Level (Gait) 1 person assist;contact guard  -     Assistive Device (Gait) walker, front-wheeled  -     Deviations/Abnormal Patterns (Gait) bilateral deviations;weight shifting decreased;stride length decreased;gait speed decreased  -     Bilateral Gait Deviations forward flexed posture  -     Comment, (Gait/Stairs) patient ambulated in room working on improving strength, endurance, and balance.  -           User Key  (r) = Recorded By, (t) = Taken By, (c) = Cosigned By    Initials Name Provider Type    Paul Rendon, KEILA Physical Therapist               Obj/Interventions     Row Name 12/03/22 1513          Balance    Balance Assessment standing static balance;standing dynamic balance;sitting static balance;sitting dynamic balance;sit to stand dynamic balance  -     Static Sitting Balance standby assist  -     Dynamic Sitting Balance standby assist  -     Position, Sitting Balance unsupported;sitting edge of bed  -     Sit to Stand Dynamic Balance contact guard;verbal cues  -     Static Standing Balance contact guard  -     Dynamic Standing Balance contact guard  -     Position/Device Used, Standing Balance supported;walker, rolling  -     Balance Interventions sitting;standing;sit to stand;supported;static;dynamic;minimal challenge  -           User Key  (r) = Recorded By, (t) = Taken By, (c) = Cosigned By    Initials Name Provider Type    Paul Rendon, PT Physical Therapist               Goals/Plan    No documentation.                Clinical Impression     Row Name 12/03/22 1519          Pain    Pretreatment Pain Rating 0/10 - no pain  -     Posttreatment Pain Rating 0/10 - no pain  -     Row Name 12/03/22 1519          Plan of Care Review    Plan of Care Reviewed With patient  -     Progress improving  -     Outcome Evaluation Patient continues to have increased weakness and anxiety limiting mobility but able to ambulate 40 feet this date with walker.  -      Row Name 12/03/22 1519          Therapy Assessment/Plan (PT)    Rehab Potential (PT) good, to achieve stated therapy goals  -     Criteria for Skilled Interventions Met (PT) yes;skilled treatment is necessary;meets criteria  -     Therapy Frequency (PT) daily  -[facility] Name 12/03/22 1519          Vital Signs    Pre Patient Position Supine  -     Intra Patient Position Standing  -     Post Patient Position Sitting  -[facility] Name 12/03/22 1519          Positioning and Restraints    Pre-Treatment Position in bed  -     Post Treatment Position chair  -     In Chair notified nsg;reclined;call light within reach;encouraged to call for assist;exit alarm on  -           User Key  (r) = Recorded By, (t) = Taken By, (c) = Cosigned By    Initials Name Provider Type    Paul Rendon, PT Physical Therapist               Outcome Measures     Row Name 12/03/22 1520 12/03/22 0851       How much help from another person do you currently need...    Turning from your back to your side while in flat bed without using bedrails? 4  - 4  -KB    Moving from lying on back to sitting on the side of a flat bed without bedrails? 4  - 3  -KB    Moving to and from a bed to a chair (including a wheelchair)? 3  - 3  -KB    Standing up from a chair using your arms (e.g., wheelchair, bedside chair)? 3  - 3  -KB    Climbing 3-5 steps with a railing? 2  - 1  -KB    To walk in hospital room? 3  - 3  -KB    AM-PAC 6 Clicks Score (PT) 19  - 17  -KB    Highest level of mobility 6 --> Walked 10 steps or more  - 5 --> Static standing  -[facility] Name 12/03/22 1520          Functional Assessment    Outcome Measure Options AM-PAC 6 Clicks Basic Mobility (PT)  -           User Key  (r) = Recorded By, (t) = Taken By, (c) = Cosigned By    Initials Name Provider Type    Paul Rendon, PT Physical Therapist    Helen Rojas, RN Registered Nurse                             Physical Therapy Education     Title:  PT OT SLP Therapies (In Progress)     Topic: Physical Therapy (Done)     Point: Mobility training (Done)     Learning Progress Summary           Patient Acceptance, E,TB, VU by  at 12/3/2022 1521    Eager, E, VU by SC at 12/2/2022 1128    Comment: reviewed spirometer use    Acceptance, E,D, VU,DU by  at 11/29/2022 1354                   Point: Home exercise program (Done)     Learning Progress Summary           Patient Acceptance, E,TB, VU by  at 12/3/2022 1521    Eager, E, VU by SC at 12/2/2022 1128    Comment: reviewed spirometer use    Acceptance, E,D, VU,DU by  at 11/29/2022 1354                   Point: Body mechanics (Done)     Learning Progress Summary           Patient Acceptance, E,TB, VU by  at 12/3/2022 1521    Eager, E, VU by SC at 12/2/2022 1128    Comment: reviewed spirometer use    Acceptance, E,D, VU,DU by  at 11/29/2022 1354                   Point: Precautions (Done)     Learning Progress Summary           Patient Acceptance, E,TB, VU by  at 12/3/2022 1521    Eager, E, VU by SC at 12/2/2022 1128    Comment: reviewed spirometer use    Acceptance, E,D, VU,DU by  at 11/29/2022 1354                               User Key     Initials Effective Dates Name Provider Type Discipline    SC 06/16/21 -  Ean Curiel, PT Physical Therapist PT     09/22/20 -  Paul Thurman, KEILA Physical Therapist PT     06/01/21 -  Faiza Calhoun PT Physical Therapist PT              PT Recommendation and Plan     Plan of Care Reviewed With: patient  Progress: improving  Outcome Evaluation: Patient continues to have increased weakness and anxiety limiting mobility but able to ambulate 40 feet this date with walker.     Time Calculation:    PT Charges     Row Name 12/03/22 1522             Time Calculation    Start Time 1440  -      PT Received On 12/03/22  -      PT Goal Re-Cert Due Date 12/09/22  -         Time Calculation- PT    Total Timed Code Minutes- PT 23 minute(s)  -         Timed Charges     08583 - PT Therapeutic Activity Minutes 23  -JH         Total Minutes    Timed Charges Total Minutes 23  -JH       Total Minutes 23  -JH            User Key  (r) = Recorded By, (t) = Taken By, (c) = Cosigned By    Initials Name Provider Type    Paul Rendon, PT Physical Therapist              Therapy Charges for Today     Code Description Service Date Service Provider Modifiers Qty    [id number] HC PT THERAPEUTIC ACT EA 15 MIN 12/3/2022 Paul Thurman, PT GP 2          PT G-Codes  Outcome Measure Options: AM-PAC 6 Clicks Basic Mobility (PT)  AM-PAC 6 Clicks Score (PT): 19  AM-PAC 6 Clicks Score (OT): 17  PT Discharge Summary  Anticipated Discharge Disposition (PT): home with assist, home with home health    Paul Thurman PT  12/3/2022

## 2022-12-04 VITALS
OXYGEN SATURATION: 94 % | HEART RATE: 82 BPM | SYSTOLIC BLOOD PRESSURE: 133 MMHG | WEIGHT: 148 LBS | TEMPERATURE: 98.1 F | RESPIRATION RATE: 18 BRPM | HEIGHT: 64 IN | BODY MASS INDEX: 25.27 KG/M2 | DIASTOLIC BLOOD PRESSURE: 80 MMHG

## 2022-12-04 LAB
ALBUMIN SERPL-MCNC: 2.5 G/DL (ref 3.5–5.2)
ALBUMIN/GLOB SERPL: 0.9 G/DL
ALP SERPL-CCNC: 68 U/L (ref 39–117)
ALT SERPL W P-5'-P-CCNC: 23 U/L (ref 1–33)
ANION GAP SERPL CALCULATED.3IONS-SCNC: 9 MMOL/L (ref 5–15)
AST SERPL-CCNC: 83 U/L (ref 1–32)
BILIRUB SERPL-MCNC: 1 MG/DL (ref 0–1.2)
BUN SERPL-MCNC: 6 MG/DL (ref 8–23)
BUN/CREAT SERPL: 8.5 (ref 7–25)
CA-I SERPL ISE-MCNC: 1.22 MMOL/L (ref 1.12–1.32)
CALCIUM SPEC-SCNC: 8.4 MG/DL (ref 8.6–10.5)
CHLORIDE SERPL-SCNC: 107 MMOL/L (ref 98–107)
CO2 SERPL-SCNC: 24 MMOL/L (ref 22–29)
CREAT SERPL-MCNC: 0.71 MG/DL (ref 0.57–1)
EGFRCR SERPLBLD CKD-EPI 2021: 97.5 ML/MIN/1.73
GLOBULIN UR ELPH-MCNC: 2.8 GM/DL
GLUCOSE BLDC GLUCOMTR-MCNC: 88 MG/DL (ref 70–130)
GLUCOSE BLDC GLUCOMTR-MCNC: 93 MG/DL (ref 70–130)
GLUCOSE SERPL-MCNC: 94 MG/DL (ref 65–99)
MAGNESIUM SERPL-MCNC: 1.9 MG/DL (ref 1.6–2.4)
PHOSPHATE SERPL-MCNC: 1.7 MG/DL (ref 2.5–4.5)
POTASSIUM SERPL-SCNC: 3.8 MMOL/L (ref 3.5–5.2)
PROT SERPL-MCNC: 5.3 G/DL (ref 6–8.5)
SODIUM SERPL-SCNC: 140 MMOL/L (ref 136–145)

## 2022-12-04 PROCEDURE — 83735 ASSAY OF MAGNESIUM: CPT | Performed by: INTERNAL MEDICINE

## 2022-12-04 PROCEDURE — 99239 HOSP IP/OBS DSCHRG MGMT >30: CPT | Performed by: INTERNAL MEDICINE

## 2022-12-04 PROCEDURE — 82330 ASSAY OF CALCIUM: CPT | Performed by: INTERNAL MEDICINE

## 2022-12-04 PROCEDURE — 84100 ASSAY OF PHOSPHORUS: CPT | Performed by: INTERNAL MEDICINE

## 2022-12-04 PROCEDURE — 80053 COMPREHEN METABOLIC PANEL: CPT | Performed by: INTERNAL MEDICINE

## 2022-12-04 PROCEDURE — 82962 GLUCOSE BLOOD TEST: CPT

## 2022-12-04 PROCEDURE — 25010000002 HEPARIN (PORCINE) PER 1000 UNITS: Performed by: INTERNAL MEDICINE

## 2022-12-04 RX ORDER — BUSPIRONE HYDROCHLORIDE 5 MG/1
5 TABLET ORAL 3 TIMES DAILY
Qty: 90 TABLET | Refills: 0 | Status: SHIPPED | OUTPATIENT
Start: 2022-12-04

## 2022-12-04 RX ORDER — MELATONIN
1000 DAILY
Qty: 30 TABLET | Refills: 0 | Status: SHIPPED | OUTPATIENT
Start: 2022-12-05

## 2022-12-04 RX ORDER — FLUTICASONE PROPIONATE AND SALMETEROL 250; 50 UG/1; UG/1
1 POWDER RESPIRATORY (INHALATION)
Start: 2022-12-04

## 2022-12-04 RX ORDER — LANOLIN ALCOHOL/MO/W.PET/CERES
100 CREAM (GRAM) TOPICAL DAILY
Qty: 30 TABLET | Refills: 0 | Status: SHIPPED | OUTPATIENT
Start: 2022-12-05

## 2022-12-04 RX ORDER — MIRTAZAPINE 7.5 MG/1
7.5 TABLET, FILM COATED ORAL NIGHTLY
Qty: 30 TABLET | Refills: 0 | OUTPATIENT
Start: 2022-12-04 | End: 2022-12-07

## 2022-12-04 RX ORDER — HYDROXYZINE HYDROCHLORIDE 25 MG/1
25 TABLET, FILM COATED ORAL 3 TIMES DAILY PRN
Qty: 90 TABLET | Refills: 0 | Status: SHIPPED | OUTPATIENT
Start: 2022-12-04

## 2022-12-04 RX ADMIN — FOLIC ACID 1 MG: 1 TABLET ORAL at 08:36

## 2022-12-04 RX ADMIN — HYDROXYZINE HYDROCHLORIDE 25 MG: 25 TABLET, FILM COATED ORAL at 06:00

## 2022-12-04 RX ADMIN — Medication 1000 UNITS: at 08:36

## 2022-12-04 RX ADMIN — HEPARIN SODIUM 5000 UNITS: 5000 INJECTION INTRAVENOUS; SUBCUTANEOUS at 05:34

## 2022-12-04 RX ADMIN — MULTIPLE VITAMINS W/ MINERALS TAB 1 TABLET: TAB at 08:36

## 2022-12-04 RX ADMIN — PANTOPRAZOLE SODIUM 40 MG: 40 TABLET, DELAYED RELEASE ORAL at 08:36

## 2022-12-04 RX ADMIN — FLUOXETINE 40 MG: 20 CAPSULE ORAL at 08:36

## 2022-12-04 RX ADMIN — THIAMINE HCL TAB 100 MG 100 MG: 100 TAB at 08:36

## 2022-12-04 RX ADMIN — BUSPIRONE HYDROCHLORIDE 5 MG: 10 TABLET ORAL at 08:36

## 2022-12-04 RX ADMIN — Medication 10 ML: at 08:37

## 2022-12-04 NOTE — DISCHARGE PLACEMENT REQUEST
"Case Management  Gabby Urias -[phone]    Philomena Davis (62 y.o. Female)     Date of Birth   1960    Social Security Number       Address   [address] [address]    Home Phone   [phone]    MRN   [id number]       Hinduism   Judaism    Marital Status                               Admission Date   22    Admission Type   Emergency    Admitting Provider   Daniel Al MD    Attending Provider   Daniel Al MD    Department, Room/Bed   [facility] 3H, S381/1       Discharge Date       Discharge Disposition   Home or Self Care    Discharge Destination                               Attending Provider: Daniel Al MD    Allergies: Codeine    Isolation: None   Infection: None   Code Status: CPR    Ht: 162.6 cm (64\")   Wt: 67.1 kg (148 lb)    Admission Cmt: None   Principal Problem: Generalized weakness [R53.1]                 Active Insurance as of 2022     Primary Coverage     Payor Plan Insurance Group Employer/Plan Group    [employer] [place] [employer]      Payor Plan Address Payor Plan Phone Number Payor Plan Fax Number Effective Dates    [address] [address] [phone]  2018 - None Entered    [facility] [id number]       Subscriber Name Subscriber Birth Date Member ID       PHILOMENA DAVIS 1960 [id number]                 Emergency Contacts      (Rel.) Home Phone Work Phone Mobile Phone    Madelin Null (Relative) [phone] -- [phone]    RAJNI ZAIDICY (Friend) -- -- [phone]               Discharge Summary      Daniel Al MD at [address] [place] [address] Medicine Services  DISCHARGE SUMMARY    Patient Name: Philomena Davis  : 1960  MRN: [id number]    Date of Admission: 2022 11:03 AM  Date of Discharge:  2022  Primary Care Physician: Provider, No Known    Consults     Date and Time Order Name Status Description    2022  9:47 AM Inpatient " "Gastroenterology Consult Completed           Hospital Course     Presenting Problem:   Generalized weakness [R53.1]    Active Hospital Problems    Diagnosis  POA   • **Generalized weakness [R53.1]  Yes   • Severe malnutrition (HCC) [E43]  Yes   • GERD without esophagitis [K21.9]  Yes   • Alcoholic cirrhosis of liver without ascites (HCC) [K70.30]  Yes   • Weight loss [R63.4]  Yes   • Generalized anxiety disorder [F41.1]  Yes      Resolved Hospital Problems   No resolved problems to display.          Hospital Course:  Philomena Davis is a 62 y.o. female w h/o heavy ETOH use who presents after recent admission in 10/2022 with profound debility, weakness, anorexia.      Profound debility, weight loss, anorexia  -down 30 lbs from last admission 10/2022. Patient reports \"not eating for weeks\" at home after [facility] dispo early October. 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  Continue PO thiamine at home  -TSH elevated but free T4 normal, B12 normal  -CT chest w 3 mm micronodule that will need f/u in 12 months - discussed this w patient. Patient up to date on mammogram and colonoscopy (OP colonoscopy within next 1 year)  -suspect depression component: started mirtazapine 11/30  -GI recommended to optimize nutrition and f/u GI outpatient  - Was started on remeron while here and seems to be doing better.  Currently is eating much better and feels ready to go home.      Alcoholic liver cirrhosis, compensated  -MELD=9, failure to thrive out of proportion to cirrhosis  -EGD negative for varices, f/u needed in 3 years  -Will give Hep B vaccine today prior to leaving(Hep A Ab positive)  -f/u with GI clinic     H/o heavy alcohol use - stopped 8/2022 per patient report. Counseled  Esophagitis - PPI bid x 30 days, then daily " thereafter    Hypophosphatemia  --replace     Anxiety/Mood disorder - fluoxetine + new start mirtazapine as above. Started on buspar  Insomnia - discussed risk/benefit of meds. Melatonin alone for now  Hypoglycemia in setting of NPO status - appropriate on diet  Folate deficiency - folate <2 on 9/26/2022, continue replacement  Vit D deficiency - Vit D <5, profound. Replace w 1,000 units daily - repeat needed in 30 days      Discharge Follow Up Recommendations for outpatient labs/diagnostics:  f/u with PCP in 1 week  F/u with GI    Day of Discharge     HPI:   States that she is eating and feels much better.  Feels ready to go home.      Review of Systems  Gen- No fevers, chills  CV- No chest pain, palpitations  Resp- No cough, dyspnea  GI- No N/V/D, abd pain        Vital Signs:   Temp:  [97.8 °F (36.6 °C)-98.5 °F (36.9 °C)] 98.4 °F (36.9 °C)  Heart Rate:  [] 82  Resp:  [17-18] 17  BP: (109-135)/(66-90) 118/67  Flow (L/min):  [2] 2      Physical Exam:  Constitutional: No acute distress, awake, alert  HENT: NCAT, mucous membranes moist  Respiratory: Clear to auscultation bilaterally, respiratory effort normal   Cardiovascular: RRR, no murmurs, rubs, or gallops  Gastrointestinal: Positive bowel sounds, soft, nontender, nondistended  Musculoskeletal: No bilateral ankle edema  Psychiatric: Appropriate affect, cooperative  Neurologic: Oriented x 3, strength symmetric in all extremities, Cranial Nerves grossly intact to confrontation, speech clear  Skin: No rashes      Pertinent  and/or Most Recent Results     LAB RESULTS:      Lab 11/30/22  0647 11/29/22  0352 11/28/22  1129   WBC  --  3.77 3.20*   HEMOGLOBIN  --  11.8* 13.1   HEMATOCRIT  --  35.4 39.9   PLATELETS  --  146 167   NEUTROS ABS  --  1.70 1.66*   IMMATURE GRANS (ABS)  --  0.01 0.01   LYMPHS ABS  --  1.29 0.91   MONOS ABS  --  0.67 0.52   EOS ABS  --  0.04 0.04   MCV  --  100.6* 102.3*   PROTIME 14.9*  --   --          Lab 12/04/22  0652 12/03/22  0808  12/02/22  0514 12/01/22  0358 11/30/22  0647 11/29/22  0352 11/28/22  1755 11/28/22  1129   SODIUM 140 138 140 141 137 136  --  137   POTASSIUM 3.8 4.3 3.3* 3.0* 3.4* 3.9  --  2.5*   CHLORIDE 107 107 107 104 98 94*  --  89*   CO2 24.0 24.0 25.0 28.0 27.0 23.0  --  29.0   ANION GAP 9.0 7.0 8.0 9.0 12.0 19.0*  --  19.0*   BUN 6* 3* 3* 3* 5* 9  --  11   CREATININE 0.71 0.70 0.64 0.70 0.83 0.86  --  1.05*   EGFR 97.5 99.2 101.3 99.2 80.8 77.5  --  61.0   GLUCOSE 94 95 78 87 77 52*  --  82   CALCIUM 8.4* 8.3* 7.8* 8.5* 8.4* 8.6  --  9.6   IONIZED CALCIUM 1.22  --   --   --   --   --   --   --    MAGNESIUM 1.9 1.7 2.1  --   --  3.5* 1.8 1.8   PHOSPHORUS 1.7*  --  2.5  --   --   --   --  3.6   TSH  --   --   --   --   --   --   --  5.310*         Lab 12/04/22  0652 12/02/22  0514 11/30/22  0647 11/29/22 0352 11/28/22  1129   TOTAL PROTEIN 5.3*  --  6.5 6.4 7.5   ALBUMIN 2.50* 2.30* 3.90 3.10* 3.50   GLOBULIN 2.8  --  2.6 3.3 4.0   ALT (SGPT) 23  --  16 17 20   AST (SGOT) 83*  --  65* 63* 67*   BILIRUBIN 1.0  --  1.1 1.2 1.6*   ALK PHOS 68  --  71 63 70         Lab 11/30/22  0647 11/28/22  1129   TROPONIN T  --  0.023   PROTIME 14.9*  --    INR 1.18*  --              Lab 12/01/22  0358 11/30/22  0647   IRON  --  87   IRON SATURATION  --  46   TIBC  --  188*   TRANSFERRIN  --  126*   FERRITIN  --  1,286.00*   VITAMIN B 12 >2,000*  --          Brief Urine Lab Results  (Last result in the past 365 days)      Color   Clarity   Blood   Leuk Est   Nitrite   Protein   CREAT   Urine HCG        11/28/22 1545 Dark Yellow   Cloudy   Negative   Trace   Negative   30 mg/dL (1+)               Microbiology Results (last 10 days)     Procedure Component Value - Date/Time    COVID PRE-OP / PRE-PROCEDURE SCREENING ORDER (NO ISOLATION) - Swab, Nasopharynx [[id number]]  (Normal) Collected: 11/28/22 1743    Lab Status: Final result Specimen: Swab from Nasopharynx Updated: 11/28/22 1803    Narrative:      The following orders were created for  panel order COVID PRE-OP / PRE-PROCEDURE SCREENING ORDER (NO ISOLATION) - Swab, Nasopharynx.  Procedure                               Abnormality         Status                     ---------                               -----------         ------                     COVID-19 and FLU A/B PCR...[[id number]]  Normal              Final result                 Please view results for these tests on the individual orders.    COVID-19 and FLU A/B PCR - Swab, Nasopharynx [[id number]]  (Normal) Collected: 11/28/22 1743    Lab Status: Final result Specimen: Swab from Nasopharynx Updated: 11/28/22 1803     COVID19 Not Detected     Influenza A PCR Not Detected     Influenza B PCR Not Detected    Narrative:      Fact sheet for providers: https://www.fda.gov/media/396343/download    Fact sheet for patients: https://www.fda.gov/media/112345/download    Test performed by PCR.          CT Abdomen Pelvis With & Without Contrast    Result Date: 11/29/2022  DATE OF EXAM: 11/29/2022 2:58 PM  PROCEDURE: CT ABDOMEN PELVIS W WO CONTRAST-  INDICATIONS: LIVER MASS PROTOCOL; R53.1-Weakness; E87.6-Hypokalemia; F41.9-Anxiety disorder, unspecified; E46-Unspecified protein-calorie malnutrition; K70.30-Alcoholic cirrhosis of liver without ascites; R63.4-Abnormal weight loss  COMPARISON: No comparisons available.  TECHNIQUE: Routine transaxial slices were obtained through the abdomen and pelvis without the administration of intravenous contrast. Additional scanning through the abdomen and pelvis followed by the intravenous administration of 100 mL of Isovue 300. Reconstructed coronal and sagittal images were also obtained. Automated exposure control and iterative construction methods were used.  The radiation dose reduction device was turned on for each scan per the ALARA (As Low as Reasonably Achievable) protocol.  FINDINGS: The lung bases are grossly clear. Evaluation of the body wall soft tissues demonstrates no acute or suspicious findings.  Evaluation of the osseous structures demonstrates no evidence of acute fracture or aggressive osseous lesion. The liver demonstrates homogeneous arterial and portal venous enhancement, without evidence of suspicious focal parenchymal lesion. There is no biliary ductal dilatation. The gallbladder is mildly distended, otherwise normal. The spleen, pancreas and bilateral adrenal glands demonstrate homogeneous enhancement without evidence of suspicious focal lesion. A simple appearing renal cyst is present on the left. The kidneys demonstrate symmetric nephrogram and contrast excretion without evidence of hydronephrosis or contour deforming mass. Small and large bowel loops are nondilated. There is no suspicious focal bowel wall thickening. The appendix is normal. There is no free fluid or pneumoperitoneum. Mildly atherosclerotic, nonaneurysmal abdominal aorta. No bulky retroperitoneal lymphadenopathy. The pelvic viscera demonstrate no acute findings.      Essentially normal contrast-enhanced CT of the abdomen and pelvis. Specifically the liver demonstrates homogeneous multiphase peripheral enhancement, without evidence of suspicious focal parenchymal lesion.  This report was finalized on 11/29/2022 7:04 PM by Anderson Kessler.      CT Chest Without Contrast Diagnostic    Result Date: 12/2/2022  DATE OF EXAM: 12/1/2022 3:30 PM  PROCEDURE: CT CHEST WO CONTRAST DIAGNOSTIC-  INDICATIONS: Dyspnea, chronic, unclear etiology; R53.1-Weakness; E87.6-Hypokalemia; F41.9-Anxiety disorder, unspecified; E46-Unspecified protein-calorie malnutrition; K70.30-Alcoholic cirrhosis of liver without ascites; R63.4-Abnormal weight loss; Z74.09-Other reduced mobility; Z78.9-Other specified health status  COMPARISON: Chest radiograph 11/28/2022. No previous chest CT scan.  TECHNIQUE: Routine transaxial slices were obtained through the chest without the administration of intravenous contrast. Reconstructed coronal and sagittal images were also  obtained. Automated exposure control and iterative construction methods were used.  The radiation dose reduction device was turned on for each scan per the ALARA (As Low as Reasonably Achievable) protocol.  FINDINGS: Bilateral breast prostheses are noted, the right breast prosthesis mostly out of the scan field. No axillary or lower cervical lymphadenopathy is seen. No mediastinal or hilar adenopathy is seen. There is a trace amount of pericardial fluid or pericardial thickening. Thoracic aorta is not enlarged, 3.3 cm in diameter. No coronary artery calcium is seen. There is trace aortic valve calcification. There is mild patchy ground glass opacity of the lingula, nonspecific. There is minimal dependent atelectasis of the posterior lower lobes. A small, 3 mm pleural-based micronodule in the posterior left upper lobe is likely an incidental granuloma.  Included images of the upper abdomen show mild diffuse fatty liver change. No significant abnormalities are seen of the included portions of the spleen, gallbladder, pancreatic tail, adrenal glands, or upper renal poles. Bony structures appear to be intact.         1. Small area of patchy ground glass disease in the left lower lobe, possibly postinflammatory scarring. If this represents pneumonia, it is minimal in extent. 2. Mild chronic-appearing lung changes elsewhere. 3 mm pleural-based micronodule of the posterior left lower lobe, likely an incidental granuloma. If considered by Fleischner Society recommendations, no follow-up would be needed in a low risk patient. A 12 month follow-up would be suggested in a high risk patient, with no further follow-up if it remains stable at that time.  This report was finalized on 12/2/2022 7:47 AM by Dr. Jose J Ramírez MD.      XR Chest 1 View    Result Date: 11/28/2022  DATE OF EXAM: 11/28/2022 11:05 AM  PROCEDURE: XR CHEST 1 VW-  INDICATIONS: Weak/Dizzy/AMS triage protocol.  COMPARISON: Chest x-ray 10/1/2022  TECHNIQUE: Single  frontal view of the chest.  FINDINGS: Normal cardiomediastinal silhouette. The lungs are clear without focal consolidation. No pleural effusion or pneumothorax. No acute osseous findings. Bilateral breast prostheses are noted.      No acute cardiopulmonary findings.  This report was finalized on 11/28/2022 11:30 AM by Dusty Mckoy MD.                    Plan for Follow-up of Pending Labs/Results:     Discharge Details        Discharge Medications      New Medications      Instructions Start Date   busPIRone 5 MG tablet  Commonly known as: BUSPAR   5 mg, Oral, 3 Times Daily      cholecalciferol 25 MCG (1000 UT) tablet  Commonly known as: VITAMIN D3   1,000 Units, Oral, Daily   Start Date: December 5, 2022     mirtazapine 7.5 MG tablet  Commonly known as: REMERON   7.5 mg, Oral, Nightly      thiamine 100 MG tablet  Commonly known as: VITAMIN B1   100 mg, Oral, Daily   Start Date: December 5, 2022        Changes to Medications      Instructions Start Date   Fluticasone-Salmeterol 250-50 MCG/ACT DISKUS  Commonly known as: ADVAIR/WIXELA  What changed: how much to take   1 puff, Inhalation, 2 Times Daily - RT      hydrOXYzine 25 MG tablet  Commonly known as: ATARAX  What changed:   · when to take this  · reasons to take this   25 mg, Oral, 3 Times Daily PRN         Continue These Medications      Instructions Start Date   FLUoxetine 20 MG capsule  Commonly known as: PROzac   20 mg, Oral, Daily      folic acid 1 MG tablet  Commonly known as: FOLVITE   1 mg, Oral, Daily      latanoprost 0.005 % ophthalmic solution  Commonly known as: XALATAN   1 drop, Both Eyes, Nightly      multivitamin with minerals tablet tablet   1 tablet, Oral, Daily, OTC      pantoprazole 40 MG EC tablet  Commonly known as: PROTONIX   40 mg, Oral, Every Morning Before Breakfast         Stop These Medications    ibuprofen 600 MG tablet  Commonly known as: ADVIL,MOTRIN     traZODone 50 MG tablet  Commonly known as: DESYREL            Allergies    Allergen Reactions   • Codeine Nausea Only         Discharge Disposition:  Home or Self Care    Diet:  Hospital:  Diet Order   Procedures   • Diet: Regular/House Diet; Texture: Regular Texture (IDDSI 7); Fluid Consistency: Thin (IDDSI 0)       Activity:  Activity Instructions    Activity as tolerated           Restrictions or Other Recommendations:         CODE STATUS:    Code Status and Medical Interventions:   Ordered at: 11/28/22 1520     Level Of Support Discussed With:    Patient     Code Status (Patient has no pulse and is not breathing):    CPR (Attempt to Resuscitate)     Medical Interventions (Patient has pulse or is breathing):    Full Support       No future appointments.    Additional Instructions for the Follow-ups that You Need to Schedule     Ambulatory Referral to Home Health   As directed      Face to Face Visit Date: 12/1/2022    Follow-up provider for Plan of Care?: I treated the patient in an acute care facility and will not continue treatment after discharge.    Follow-up provider: LADONNA LOBATO [[id number]]    Reason/Clinical Findings: recent hospitalization for weakness,malnutrition    Describe mobility limitations that make leaving home difficult: impaired functional gait ,mobility,ADLs,    Nursing/Therapeutic Services Requested: Physical Therapy Occupational Therapy Skilled Nursing    Skilled nursing orders: Medication education (monitor nutrition) Other    PT orders: Gait Training Strengthening    Weight Bearing Status: Full Weight Bearing    Occupational orders: Activities of daily living Strengthening Home safety assessment    Frequency: 1 Week 1         Discharge Follow-up with PCP   As directed       Currently Documented PCP:    Provider, No Known    PCP Phone Number:    None     Follow Up Details: with PCP in 1 week         Discharge Follow-up with Specified Provider: GI   As directed      To: GI    Follow Up Details: anorexia, cirrhosis                     Daniel Al,  MD  12/04/22      Time Spent on Discharge:  I spent  37  minutes on this discharge activity which included: face-to-face encounter with the patient, reviewing the data in the system, coordination of the care with the nursing staff as well as consultants, documentation, and entering orders.            Electronically signed by Daniel Al MD at 12/04/22 2559

## 2022-12-04 NOTE — PLAN OF CARE
Problem: Skin Injury Risk Increased  Goal: Skin Health and Integrity  Outcome: Ongoing, Progressing  Intervention: Optimize Skin Protection  Description: Perform a full pressure injury risk assessment, as indicated by screening, upon admission to care unit.  Reassess skin (injury risk, full inspection) frequently (e.g., scheduled interval, with change in condition) to provide optimal early detection and prevention.  Maintain adequate tissue perfusion (e.g., encourage fluid balance; avoid crossing legs, constrictive clothing or devices) to promote tissue oxygenation.  Maintain head of bed at lowest degree of elevation tolerated, considering medical condition and other restrictions.  Avoid positioning onto an area that remains reddened.  Minimize incontinence and moisture (e.g., toileting schedule; moisture-wicking pad, diaper or incontinence collection device; skin moisture barrier).  Cleanse skin promptly and gently when soiled utilizing a pH-balanced cleanser.  Relieve and redistribute pressure (e.g., scheduled position changes, weight shifts, use of support surface, medical device repositioning, protective dressing application, use of positioning device, microclimate control, use of pressure-injury-monitor  Encourage increased activity, such as sitting in a chair at the bedside or early mobilization, when able to tolerate.     Problem: Mobility Impairment  Goal: Optimal Mobility  Outcome: Ongoing, Progressing  Intervention: Optimize Mobility  Description: Assess mobility skills (e.g., bed, transfers, ambulation, gait, stair climbing, wheelchair) and factors influencing mobility, such as balance, safety, range of motion, strength, muscle tone, cognition and sensory processing.  Instruct in transfer and mobility techniques supporting highest level of independence while ensuring safety.  Consider any contraindications or precautions to individualize treatment plan (e.g., joint or ligament instability, weightbearing  restrictions).  Encourage early mobilization and performance of daily activities, if able, while providing level of assistance needed for safety.  Schedule mobility activities when pain and fatigue are at a minimum to encourage optimal performance.  Pace activity; allow adequate time and rest periods to conserve energy.  Provide frequent encouragement, along with prompting and assistance as needed.  Individualize instructions and prompts to patient’s cognitive status to promote effective communication; simplify verbal directions, give encouragement and provide demonstrated cues as needed.  Design and implement therapeutic interventions to address impairments (e.g., functional mobility training, mat and standing balance activities, strengthening).  Train in and reinforce use of adaptive equipment and assistive devices, such as a walker or transfer board.  Utilize appropriate modalities, devices or techniques to facilitate mobility (e.g., ankle foot orthosis, electrical stimulation, sit-to-stand lift, treadmill-training).  Assess fall risk using standardized tool; implement appropriate interventions, such as behavioral or environmental modifications.  Use proper body mechanics and patient alignment during mobility to ensure safety.     Problem: Activity and Energy Impairment (Anxiety Signs/Symptoms)  Goal: Optimized Energy Level (Anxiety Signs/Symptoms)  Outcome: Ongoing, Progressing     Problem: Malnutrition  Goal: Improved Nutritional Intake  Outcome: Ongoing, Progressing   Goal Outcome Evaluation:      Patient verbalized wanting to go home today and tired of being att the hospital and has a friend that can provide transportation and has home health contacted her already.

## 2022-12-04 NOTE — DISCHARGE SUMMARY
"    [facility] Medicine Services  DISCHARGE SUMMARY    Patient Name: Philomena Davis  : 1960  MRN: [id number]    Date of Admission: 2022 11:03 AM  Date of Discharge:  2022  Primary Care Physician: Provider, No Known    Consults     Date and Time Order Name Status Description    2022  9:47 AM Inpatient Gastroenterology Consult Completed           Hospital Course     Presenting Problem:   Generalized weakness [R53.1]    Active Hospital Problems    Diagnosis  POA   • **Generalized weakness [R53.1]  Yes   • Severe malnutrition (HCC) [E43]  Yes   • GERD without esophagitis [K21.9]  Yes   • Alcoholic cirrhosis of liver without ascites (HCC) [K70.30]  Yes   • Weight loss [R63.4]  Yes   • Generalized anxiety disorder [F41.1]  Yes      Resolved Hospital Problems   No resolved problems to display.          Hospital Course:  Philomena Davis is a 62 y.o. female w h/o heavy ETOH use who presents after recent admission in 10/2022 with profound debility, weakness, anorexia.      Profound debility, weight loss, anorexia  -down 30 lbs from last admission 10/2022. Patient reports \"not eating for weeks\" at home after [facility] dispo early October. 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  Continue PO thiamine at home  -TSH elevated but free T4 normal, B12 normal  -CT chest w 3 mm micronodule that will need f/u in 12 months - discussed this w patient. Patient up to date on mammogram and colonoscopy (OP colonoscopy within next 1 year)  -suspect depression component: started mirtazapine   -GI recommended to optimize nutrition and f/u GI outpatient  - Was started on remeron while here and seems to be doing better.  Currently is eating much better and feels ready to go home.      Alcoholic liver cirrhosis, " compensated  -MELD=9, failure to thrive out of proportion to cirrhosis  -EGD negative for varices, f/u needed in 3 years  -Will give Hep B vaccine today prior to leaving(Hep A Ab positive)  -f/u with GI clinic     H/o heavy alcohol use - stopped 8/2022 per patient report. Counseled  Esophagitis - PPI bid x 30 days, then daily thereafter    Hypophosphatemia  --replace     Anxiety/Mood disorder - fluoxetine + new start mirtazapine as above. Started on buspar  Insomnia - discussed risk/benefit of meds. Melatonin alone for now  Hypoglycemia in setting of NPO status - appropriate on diet  Folate deficiency - folate <2 on 9/26/2022, continue replacement  Vit D deficiency - Vit D <5, profound. Replace w 1,000 units daily - repeat needed in 30 days      Discharge Follow Up Recommendations for outpatient labs/diagnostics:  f/u with PCP in 1 week  F/u with GI    Day of Discharge     HPI:   States that she is eating and feels much better.  Feels ready to go home.      Review of Systems  Gen- No fevers, chills  CV- No chest pain, palpitations  Resp- No cough, dyspnea  GI- No N/V/D, abd pain        Vital Signs:   Temp:  [97.8 °F (36.6 °C)-98.5 °F (36.9 °C)] 98.4 °F (36.9 °C)  Heart Rate:  [] 82  Resp:  [17-18] 17  BP: (109-135)/(66-90) 118/67  Flow (L/min):  [2] 2      Physical Exam:  Constitutional: No acute distress, awake, alert  HENT: NCAT, mucous membranes moist  Respiratory: Clear to auscultation bilaterally, respiratory effort normal   Cardiovascular: RRR, no murmurs, rubs, or gallops  Gastrointestinal: Positive bowel sounds, soft, nontender, nondistended  Musculoskeletal: No bilateral ankle edema  Psychiatric: Appropriate affect, cooperative  Neurologic: Oriented x 3, strength symmetric in all extremities, Cranial Nerves grossly intact to confrontation, speech clear  Skin: No rashes      Pertinent  and/or Most Recent Results     LAB RESULTS:      Lab 11/30/22  0647 11/29/22  0352 11/28/22  1129   WBC  --  3.77  3.20*   HEMOGLOBIN  --  11.8* 13.1   HEMATOCRIT  --  35.4 39.9   PLATELETS  --  146 167   NEUTROS ABS  --  1.70 1.66*   IMMATURE GRANS (ABS)  --  0.01 0.01   LYMPHS ABS  --  1.29 0.91   MONOS ABS  --  0.67 0.52   EOS ABS  --  0.04 0.04   MCV  --  100.6* 102.3*   PROTIME 14.9*  --   --          Lab 12/04/22  0652 12/03/22  0808 12/02/22  0514 12/01/22 0358 11/30/22 0647 11/29/22 0352 11/28/22  1755 11/28/22  1129   SODIUM 140 138 140 141 137 136  --  137   POTASSIUM 3.8 4.3 3.3* 3.0* 3.4* 3.9  --  2.5*   CHLORIDE 107 107 107 104 98 94*  --  89*   CO2 24.0 24.0 25.0 28.0 27.0 23.0  --  29.0   ANION GAP 9.0 7.0 8.0 9.0 12.0 19.0*  --  19.0*   BUN 6* 3* 3* 3* 5* 9  --  11   CREATININE 0.71 0.70 0.64 0.70 0.83 0.86  --  1.05*   EGFR 97.5 99.2 101.3 99.2 80.8 77.5  --  61.0   GLUCOSE 94 95 78 87 77 52*  --  82   CALCIUM 8.4* 8.3* 7.8* 8.5* 8.4* 8.6  --  9.6   IONIZED CALCIUM 1.22  --   --   --   --   --   --   --    MAGNESIUM 1.9 1.7 2.1  --   --  3.5* 1.8 1.8   PHOSPHORUS 1.7*  --  2.5  --   --   --   --  3.6   TSH  --   --   --   --   --   --   --  5.310*         Lab 12/04/22  0652 12/02/22  0514 11/30/22  0647 11/29/22 0352 11/28/22  1129   TOTAL PROTEIN 5.3*  --  6.5 6.4 7.5   ALBUMIN 2.50* 2.30* 3.90 3.10* 3.50   GLOBULIN 2.8  --  2.6 3.3 4.0   ALT (SGPT) 23  --  16 17 20   AST (SGOT) 83*  --  65* 63* 67*   BILIRUBIN 1.0  --  1.1 1.2 1.6*   ALK PHOS 68  --  71 63 70         Lab 11/30/22  0647 11/28/22  1129   TROPONIN T  --  0.023   PROTIME 14.9*  --    INR 1.18*  --              Lab 12/01/22  0358 11/30/22  0647   IRON  --  87   IRON SATURATION  --  46   TIBC  --  188*   TRANSFERRIN  --  126*   FERRITIN  --  1,286.00*   VITAMIN B 12 >2,000*  --          Brief Urine Lab Results  (Last result in the past 365 days)      Color   Clarity   Blood   Leuk Est   Nitrite   Protein   CREAT   Urine HCG        11/28/22 1545 Dark Yellow   Cloudy   Negative   Trace   Negative   30 mg/dL (1+)               Microbiology Results  (last 10 days)     Procedure Component Value - Date/Time    COVID PRE-OP / PRE-PROCEDURE SCREENING ORDER (NO ISOLATION) - Swab, Nasopharynx [[id number]]  (Normal) Collected: 11/28/22 1743    Lab Status: Final result Specimen: Swab from Nasopharynx Updated: 11/28/22 1803    Narrative:      The following orders were created for panel order COVID PRE-OP / PRE-PROCEDURE SCREENING ORDER (NO ISOLATION) - Swab, Nasopharynx.  Procedure                               Abnormality         Status                     ---------                               -----------         ------                     COVID-19 and FLU A/B PCR...[[id number]]  Normal              Final result                 Please view results for these tests on the individual orders.    COVID-19 and FLU A/B PCR - Swab, Nasopharynx [[id number]]  (Normal) Collected: 11/28/22 1743    Lab Status: Final result Specimen: Swab from Nasopharynx Updated: 11/28/22 1803     COVID19 Not Detected     Influenza A PCR Not Detected     Influenza B PCR Not Detected    Narrative:      Fact sheet for providers: https://www.fda.gov/media/756052/download    Fact sheet for patients: https://www.fda.gov/media/734675/download    Test performed by PCR.          CT Abdomen Pelvis With & Without Contrast    Result Date: 11/29/2022  DATE OF EXAM: 11/29/2022 2:58 PM  PROCEDURE: CT ABDOMEN PELVIS W WO CONTRAST-  INDICATIONS: LIVER MASS PROTOCOL; R53.1-Weakness; E87.6-Hypokalemia; F41.9-Anxiety disorder, unspecified; E46-Unspecified protein-calorie malnutrition; K70.30-Alcoholic cirrhosis of liver without ascites; R63.4-Abnormal weight loss  COMPARISON: No comparisons available.  TECHNIQUE: Routine transaxial slices were obtained through the abdomen and pelvis without the administration of intravenous contrast. Additional scanning through the abdomen and pelvis followed by the intravenous administration of 100 mL of Isovue 300. Reconstructed coronal and sagittal images were also obtained.  Automated exposure control and iterative construction methods were used.  The radiation dose reduction device was turned on for each scan per the ALARA (As Low as Reasonably Achievable) protocol.  FINDINGS: The lung bases are grossly clear. Evaluation of the body wall soft tissues demonstrates no acute or suspicious findings. Evaluation of the osseous structures demonstrates no evidence of acute fracture or aggressive osseous lesion. The liver demonstrates homogeneous arterial and portal venous enhancement, without evidence of suspicious focal parenchymal lesion. There is no biliary ductal dilatation. The gallbladder is mildly distended, otherwise normal. The spleen, pancreas and bilateral adrenal glands demonstrate homogeneous enhancement without evidence of suspicious focal lesion. A simple appearing renal cyst is present on the left. The kidneys demonstrate symmetric nephrogram and contrast excretion without evidence of hydronephrosis or contour deforming mass. Small and large bowel loops are nondilated. There is no suspicious focal bowel wall thickening. The appendix is normal. There is no free fluid or pneumoperitoneum. Mildly atherosclerotic, nonaneurysmal abdominal aorta. No bulky retroperitoneal lymphadenopathy. The pelvic viscera demonstrate no acute findings.      Essentially normal contrast-enhanced CT of the abdomen and pelvis. Specifically the liver demonstrates homogeneous multiphase peripheral enhancement, without evidence of suspicious focal parenchymal lesion.  This report was finalized on 11/29/2022 7:04 PM by Anderson Kessler.      CT Chest Without Contrast Diagnostic    Result Date: 12/2/2022  DATE OF EXAM: 12/1/2022 3:30 PM  PROCEDURE: CT CHEST WO CONTRAST DIAGNOSTIC-  INDICATIONS: Dyspnea, chronic, unclear etiology; R53.1-Weakness; E87.6-Hypokalemia; F41.9-Anxiety disorder, unspecified; E46-Unspecified protein-calorie malnutrition; K70.30-Alcoholic cirrhosis of liver without ascites;  R63.4-Abnormal weight loss; Z74.09-Other reduced mobility; Z78.9-Other specified health status  COMPARISON: Chest radiograph 11/28/2022. No previous chest CT scan.  TECHNIQUE: Routine transaxial slices were obtained through the chest without the administration of intravenous contrast. Reconstructed coronal and sagittal images were also obtained. Automated exposure control and iterative construction methods were used.  The radiation dose reduction device was turned on for each scan per the ALARA (As Low as Reasonably Achievable) protocol.  FINDINGS: Bilateral breast prostheses are noted, the right breast prosthesis mostly out of the scan field. No axillary or lower cervical lymphadenopathy is seen. No mediastinal or hilar adenopathy is seen. There is a trace amount of pericardial fluid or pericardial thickening. Thoracic aorta is not enlarged, 3.3 cm in diameter. No coronary artery calcium is seen. There is trace aortic valve calcification. There is mild patchy ground glass opacity of the lingula, nonspecific. There is minimal dependent atelectasis of the posterior lower lobes. A small, 3 mm pleural-based micronodule in the posterior left upper lobe is likely an incidental granuloma.  Included images of the upper abdomen show mild diffuse fatty liver change. No significant abnormalities are seen of the included portions of the spleen, gallbladder, pancreatic tail, adrenal glands, or upper renal poles. Bony structures appear to be intact.         1. Small area of patchy ground glass disease in the left lower lobe, possibly postinflammatory scarring. If this represents pneumonia, it is minimal in extent. 2. Mild chronic-appearing lung changes elsewhere. 3 mm pleural-based micronodule of the posterior left lower lobe, likely an incidental granuloma. If considered by Fleischner Society recommendations, no follow-up would be needed in a low risk patient. A 12 month follow-up would be suggested in a high risk patient, with  no further follow-up if it remains stable at that time.  This report was finalized on 12/2/2022 7:47 AM by Dr. Jose J Ramírez MD.      XR Chest 1 View    Result Date: 11/28/2022  DATE OF EXAM: 11/28/2022 11:05 AM  PROCEDURE: XR CHEST 1 VW-  INDICATIONS: Weak/Dizzy/AMS triage protocol.  COMPARISON: Chest x-ray 10/1/2022  TECHNIQUE: Single frontal view of the chest.  FINDINGS: Normal cardiomediastinal silhouette. The lungs are clear without focal consolidation. No pleural effusion or pneumothorax. No acute osseous findings. Bilateral breast prostheses are noted.      No acute cardiopulmonary findings.  This report was finalized on 11/28/2022 11:30 AM by Dusty Mckoy MD.                    Plan for Follow-up of Pending Labs/Results:     Discharge Details        Discharge Medications      New Medications      Instructions Start Date   busPIRone 5 MG tablet  Commonly known as: BUSPAR   5 mg, Oral, 3 Times Daily      cholecalciferol 25 MCG (1000 UT) tablet  Commonly known as: VITAMIN D3   1,000 Units, Oral, Daily   Start Date: December 5, 2022     mirtazapine 7.5 MG tablet  Commonly known as: REMERON   7.5 mg, Oral, Nightly      thiamine 100 MG tablet  Commonly known as: VITAMIN B1   100 mg, Oral, Daily   Start Date: December 5, 2022        Changes to Medications      Instructions Start Date   Fluticasone-Salmeterol 250-50 MCG/ACT DISKUS  Commonly known as: ADVAIR/WIXELA  What changed: how much to take   1 puff, Inhalation, 2 Times Daily - RT      hydrOXYzine 25 MG tablet  Commonly known as: ATARAX  What changed:   · when to take this  · reasons to take this   25 mg, Oral, 3 Times Daily PRN         Continue These Medications      Instructions Start Date   FLUoxetine 20 MG capsule  Commonly known as: PROzac   20 mg, Oral, Daily      folic acid 1 MG tablet  Commonly known as: FOLVITE   1 mg, Oral, Daily      latanoprost 0.005 % ophthalmic solution  Commonly known as: XALATAN   1 drop, Both Eyes, Nightly      multivitamin  with minerals tablet tablet   1 tablet, Oral, Daily, OTC      pantoprazole 40 MG EC tablet  Commonly known as: PROTONIX   40 mg, Oral, Every Morning Before Breakfast         Stop These Medications    ibuprofen 600 MG tablet  Commonly known as: ADVIL,MOTRIN     traZODone 50 MG tablet  Commonly known as: DESYREL            Allergies   Allergen Reactions   • Codeine Nausea Only         Discharge Disposition:  Home or Self Care    Diet:  Hospital:  Diet Order   Procedures   • Diet: Regular/House Diet; Texture: Regular Texture (IDDSI 7); Fluid Consistency: Thin (IDDSI 0)       Activity:  Activity Instructions    Activity as tolerated           Restrictions or Other Recommendations:         CODE STATUS:    Code Status and Medical Interventions:   Ordered at: 11/28/22 1520     Level Of Support Discussed With:    Patient     Code Status (Patient has no pulse and is not breathing):    CPR (Attempt to Resuscitate)     Medical Interventions (Patient has pulse or is breathing):    Full Support       No future appointments.    Additional Instructions for the Follow-ups that You Need to Schedule     Ambulatory Referral to Home Health   As directed      Face to Face Visit Date: 12/1/2022    Follow-up provider for Plan of Care?: I treated the patient in an acute care facility and will not continue treatment after discharge.    Follow-up provider: LADONNA LOBATO [[id number]]    Reason/Clinical Findings: recent hospitalization for weakness,malnutrition    Describe mobility limitations that make leaving home difficult: impaired functional gait ,mobility,ADLs,    Nursing/Therapeutic Services Requested: Physical Therapy Occupational Therapy Skilled Nursing    Skilled nursing orders: Medication education (monitor nutrition) Other    PT orders: Gait Training Strengthening    Weight Bearing Status: Full Weight Bearing    Occupational orders: Activities of daily living Strengthening Home safety assessment    Frequency: 1 Week 1         Discharge  Follow-up with PCP   As directed       Currently Documented PCP:    Provider, No Known    PCP Phone Number:    None     Follow Up Details: with PCP in 1 week         Discharge Follow-up with Specified Provider: GI   As directed      To: GI    Follow Up Details: anorexia, cirrhosis                     Daniel Al MD  12/04/22      Time Spent on Discharge:  I spent  37  minutes on this discharge activity which included: face-to-face encounter with the patient, reviewing the data in the system, coordination of the care with the nursing staff as well as consultants, documentation, and entering orders.

## 2022-12-04 NOTE — CONSULTS
Clinical Nutrition   Reason For Visit: Assessment, Calorie count    Patient Name: Philomena Davis  YOB: 1960  MRN: [id number]  Date of Encounter: 12/03/22 20:16 EST  Admission date: 11/28/2022      Comments:   Calorie Count  12/2          1271 Kcal 85% est need 63 g PRO 79% est need      Nutrition Assessment     Admission Problem List:    Generalized weakness    Generalized anxiety disorder    Alcoholic cirrhosis of liver without ascites (HCC)    Weight loss    Severe malnutrition (HCC)    GERD without esophagitis    Esophagitis    Applicable Nutrition-Related Information: loss of appetite PTA,DM     Applicable medical tests/procedures since admission:    PMH: She  has a past medical history of Anxiety and depression, Cirrhosis of liver (HCC), Closed displaced fracture of lateral malleolus of left fibula, Diabetes mellitus (HCC), Ganglion cyst of left foot, Glaucoma, Hypertension, and Wears glasses.   PSxH: She  has a past surgical history that includes Breast excisional biopsy (Right, 2014); Dilation and curettage, diagnostic / therapeutic; Breast Augmentation; Wrist surgery; pr fix non/malunion femur,w graft (Left, 3/24/2017); Ankle fracture surgery (Left, 3/24/2017); Augmentation mammaplasty (Bilateral, 1999); and Esophagogastroduodenoscopy (N/A, 11/30/2022).        Reported/Observed/Food/Nutrition Related History     12/3  Pt rpt did well w food yesterday but today has been teary and anxious. Has menu to use.    11/29  Pt with visible mild muscle loss sitting up in chair. Reports significant anorexia and general indifference toward food for the past 1-2 months. Prior to recent appetite change, was eating microwave meals as pt lives alone. Educated on importance of high protein/low sodium diet, verbalized understanding. Pt has Boost at home but has not been drinking in the past few months. Denies difficulty chewing/swallowing or N/V/D. NKFA.     Anthropometrics   Height: 64in  Weight: 148lbs  BMI:  25.4  BMI classification: Overweight: 25.0-29.9kg/m2    IBW: 120lbs    UBW:     Last 15 Recorded Weights  View Complete Flowsheet  Weight Weight (kg) Weight (lbs) Weight Method   11/28/2022 67.132 kg 148 lb Standing scale   11/28/2022 80.74 kg 178 lb Stated   10/4/2022 79.742 kg 175 lb 12.8 oz Standing scale   10/3/2022 81.738 kg 180 lb 3.2 oz Standing scale   10/2/2022 81.307 kg 179 lb 4 oz Standing scale   10/1/2022 82.725 kg 182 lb 6 oz Standing scale   9/30/2022 85.333 kg 188 lb 2 oz Standing scale   9/29/2022 85.446 kg 188 lb 6 oz Standing scale   9/28/2022 89.721 kg 197 lb 12.8 oz Bed scale   9/27/2022 87.091 kg 192 lb Bed scale   9/26/2022 84.369 kg 186 lb Bed scale   9/26/2022 80.74 kg 178 lb Stated   5/15/2019 83.1 kg 183 lb 3.2 oz -   5/13/2019 84.5 kg 186 lb 4.6 oz -   5/12/2019 78.472 kg 173 lb -     Weight change:   Had a documented 27# weight loss in ~1month      Nutrition Focused Physical Exam     11/29  Meets criteria for severe acute malnutrition see flowsheet note.    Subcutaneous fat:  Orbital Mild   Buccal Mild   Tricep Mild   Ribs- thoracic and lumbar region, lower back, midaxillary line Unable to determine at this time     Muscle:  Temple/temporalis Mild   Clavicle/acromion- pectoralis, deltoid Mild   Shoulder- deltoid Mild   Scapula- trapezius, latissimus dorsi Mild   Interosseous- dorsal hand Mild   Thigh- quadriceps No muscle loss identified   Calf- gastrocnemius No muscle loss identified     Edema: No edema observed    Decline in functional status: Unable to determine at this time      Labs reviewed   Labs reviewed: Yes    Results from last 7 days   Lab Units 12/03/22  0808 12/02/22  0514 12/01/22  0358 11/30/22  0647 11/29/22  0352 11/28/22  1755 11/28/22  1129   SODIUM mmol/L 138 140 141   < > 136  --  137   POTASSIUM mmol/L 4.3 3.3* 3.0*   < > 3.9  --  2.5*   CHLORIDE mmol/L 107 107 104   < > 94*  --  89*   CO2 mmol/L 24.0 25.0 28.0   < > 23.0  --  29.0   BUN mg/dL 3* 3* 3*   < > 9  --   11   CREATININE mg/dL 0.70 0.64 0.70   < > 0.86  --  1.05*   GLUCOSE mg/dL 95 78 87   < > 52*  --  82   CALCIUM mg/dL 8.3* 7.8* 8.5*   < > 8.6  --  9.6   PHOSPHORUS mg/dL  --  2.5  --   --   --   --  3.6   MAGNESIUM mg/dL 1.7 2.1  --   --  3.5*   < > 1.8    < > = values in this interval not displayed.     Results from last 7 days   Lab Units 12/02/22  0514 11/30/22  0647 11/29/22  0352 11/28/22  1129   WBC 10*3/mm3  --   --  3.77 3.20*   ALBUMIN g/dL 2.30* 3.90 3.10* 3.50     Results from last 7 days   Lab Units 12/03/22  1942 12/03/22  1534 12/03/22  1111 12/03/22  0715 12/02/22  2043 12/02/22  1626   GLUCOSE mg/dL 111 92 113 98 103 146*     Lab Results   Lab Value Date/Time    HGBA1C <4.30 (L) 05/11/2019 0526    HGBA1C 5.50 03/07/2017 1511     Medications reviewed   Medications reviewed: Yes  Scheduled:vitamin D, folic acid, multi vit min, thiamine, remeron, protonix  LR    Needs Assessment  Date: 12/3   Height used:   Weight used: 67.1 Kg  IBW:     Estimated Energy needs:  ~ 1500 Kcal/da based on 20-25 Kcal/Kg      Estimated Protein needs:  ~ 80 g/da based on 1.2 g/Kg      Estimated Fluid needs: 1500 ml per RDA method    Current Nutrition Prescription   PO: Diet: Regular/House Diet; Texture: Regular Texture (IDDSI 7); Fluid Consistency: Thin (IDDSI 0)  Oral Nutrition Supplement: Boost Plus 3x/da     Average PO intake:   Calorie Count  12/2          1271 Kcal 85% est need 63 g PRO 79% est need    Nutrition Diagnosis   11/29  Problem Malnutrition Severe   Etiology Inadequate protein-energy intake in the context of acute illness   Signs/Symptoms Significant weight loss of 15.4% on 1 months, <75% of EEN in 1-2 months   Status: ongoing    11/29, 12/3  Problem Inadequate energy intake   Etiology anoerxia   Signs/Symptoms Reported decreased interest in PO intake   Status: improved on 12/2 per documentation      Goal:   General: Nutrition to support treatment  PO: Increase intake     Intervention   Intervention: Follow  treatment progress, Care plan reviewed, Advise alternate selection, Encourage intake    Monitoring/Evaluation:   Monitoring/Evaluation: Per protocol, I&O, PO intake, Supplement intake, Pertinent labs, Weight, GI status, Symptoms    Tiny Sahu RD  Time Spent: 30 min

## 2022-12-05 ENCOUNTER — NURSE TRIAGE (OUTPATIENT)
Dept: CALL CENTER | Facility: HOSPITAL | Age: 62
End: 2022-12-05

## 2022-12-05 ENCOUNTER — READMISSION MANAGEMENT (OUTPATIENT)
Dept: CALL CENTER | Facility: HOSPITAL | Age: 62
End: 2022-12-05

## 2022-12-05 NOTE — TELEPHONE ENCOUNTER
Reason for Disposition  • Caller has NON-URGENT medicine question about med that PCP or specialist prescribed and triager unable to answer question    Additional Information  • Negative: Intentional drug overdose and suicidal thoughts or ideas  • Negative: Drug overdose and triager unable to answer question  • Negative: Caller requesting a renewal or refill of a medicine patient is currently taking  • Negative: Caller requesting information unrelated to medicine  • Negative: Caller requesting information about COVID-19 Vaccine  • Negative: Caller requesting information about Emergency Contraception  • Negative: Caller requesting information about Combined Birth Control Pills  • Negative: Caller requesting information about Progestin Birth Control Pills  • Negative: Caller requesting information about Post-Op pain or medicines  • Negative: Caller requesting a prescription antibiotic (such as penicillin) for Strep throat and has a positive culture result  • Negative: Caller requesting a prescription anti-viral med (such as Tamiflu) and has influenza (flu) symptoms  • Negative: Immunization reaction suspected  • Negative: Rash while taking a medicine or within 3 days of stopping it  • Negative: Asthma and having symptoms of asthma (cough, wheezing, etc.)  • Negative: Symptom of illness (e.g., headache, abdominal pain, earache, vomiting) that are more than mild  • Negative: Breastfeeding questions about mother's medicines and diet  • Negative: MORE THAN A DOUBLE DOSE of a prescription or over-the-counter (OTC) drug  • Negative: DOUBLE DOSE (an extra dose or lesser amount) of prescription drug and any symptoms (e.g., dizziness, nausea, pain, sleepiness)  • Negative: DOUBLE DOSE (an extra dose or lesser amount) of over-the-counter (OTC) drug and any symptoms (e.g., dizziness, nausea, pain, sleepiness)  • Negative: Took another person's prescription drug  • Negative: DOUBLE DOSE (an extra dose or lesser amount) of  "prescription drug and NO symptoms  (Exception: A double dose of antibiotics.)  • Negative: Diabetes drug error or overdose (e.g., took wrong type of insulin or took extra dose)  • Negative: Caller has medication question about med NOT prescribed by PCP and triager unable to answer question (e.g., compatibility with other med, storage)  • Negative: Prescription not at pharmacy and was prescribed by PCP recently  (Exception: triager has access to EMR and prescription is recorded there. Go to Home [facility] and confirm for pharmacy.)  • Negative: Pharmacy calling with prescription question and triager unable to answer question  • Negative: Caller has URGENT medicine question about med that PCP or specialist prescribed and triager unable to answer question  • Negative: Caller wants to use a complementary or alternative medicine  • Negative: Medicine patch causing local rash or itching    Answer Assessment - Initial Assessment Questions  1. NAME of MEDICATION: \"What medicine are you calling about?\"      Potassium  2. QUESTION: \"What is your question?\" (e.g., double dose of medicine, side effect)      When did I have in the hospital  3. PRESCRIBING HCP: \"Who prescribed it?\" Reason: if prescribed by specialist, call should be referred to that group.      hospitalist  4. SYMPTOMS: \"Do you have any symptoms?\"      Tingling of bilateral hands and feet  5. SEVERITY: If symptoms are present, ask \"Are they mild, moderate or severe?\"      Mild  6. PREGNANCY:  \"Is there any chance that you are pregnant?\" \"When was your last menstrual period?\"     Nio    Protocols used: MEDICATION QUESTION CALL-ADULT-[place]      "

## 2022-12-05 NOTE — OUTREACH NOTE
Prep Survey    Flowsheet Row Responses   [facility] facility patient discharged from? [place]   Is LACE score < 7 ? No   Emergency Room discharge w/ pulse ox? No   Eligibility Not Eligible   What are the reasons patient is not eligible? Other   Does the patient have one of the following disease processes/diagnoses(primary or secondary)? Other   Prep survey completed? Yes          LIANET FISHMAN - Registered Nurse

## 2022-12-07 ENCOUNTER — HOSPITAL ENCOUNTER (EMERGENCY)
Facility: HOSPITAL | Age: 62
Discharge: HOME | End: 2022-12-07
Attending: EMERGENCY MEDICINE | Admitting: EMERGENCY MEDICINE

## 2022-12-07 VITALS
TEMPERATURE: 98 F | BODY MASS INDEX: 24.49 KG/M2 | RESPIRATION RATE: 20 BRPM | SYSTOLIC BLOOD PRESSURE: 128 MMHG | WEIGHT: 147 LBS | HEIGHT: 65 IN | HEART RATE: 90 BPM | OXYGEN SATURATION: 90 % | DIASTOLIC BLOOD PRESSURE: 86 MMHG

## 2022-12-07 DIAGNOSIS — T50.905A MEDICATION ADVERSE EFFECT, INITIAL ENCOUNTER: Primary | ICD-10-CM

## 2022-12-07 DIAGNOSIS — E87.6 ACUTE HYPOKALEMIA: ICD-10-CM

## 2022-12-07 DIAGNOSIS — N39.0 ACUTE URINARY TRACT INFECTION: ICD-10-CM

## 2022-12-07 LAB
ALBUMIN SERPL-MCNC: 3.3 G/DL (ref 3.5–5.2)
ALBUMIN/GLOB SERPL: 0.9 G/DL
ALP SERPL-CCNC: 74 U/L (ref 39–117)
ALT SERPL W P-5'-P-CCNC: 35 U/L (ref 1–33)
AMPHET+METHAMPHET UR QL: NEGATIVE
AMPHETAMINES UR QL: NEGATIVE
ANION GAP SERPL CALCULATED.3IONS-SCNC: 10 MMOL/L (ref 5–15)
AST SERPL-CCNC: 92 U/L (ref 1–32)
BACTERIA UR QL AUTO: ABNORMAL /HPF
BARBITURATES UR QL SCN: NEGATIVE
BASOPHILS # BLD AUTO: 0.06 10*3/MM3 (ref 0–0.2)
BASOPHILS NFR BLD AUTO: 1.3 % (ref 0–1.5)
BENZODIAZ UR QL SCN: POSITIVE
BILIRUB SERPL-MCNC: 1.2 MG/DL (ref 0–1.2)
BILIRUB UR QL STRIP: ABNORMAL
BUN SERPL-MCNC: 8 MG/DL (ref 8–23)
BUN/CREAT SERPL: 9.6 (ref 7–25)
BUPRENORPHINE SERPL-MCNC: NEGATIVE NG/ML
CALCIUM SPEC-SCNC: 9.6 MG/DL (ref 8.6–10.5)
CANNABINOIDS SERPL QL: NEGATIVE
CHLORIDE SERPL-SCNC: 105 MMOL/L (ref 98–107)
CLARITY UR: ABNORMAL
CO2 SERPL-SCNC: 23 MMOL/L (ref 22–29)
COCAINE UR QL: NEGATIVE
COLOR UR: ABNORMAL
CREAT SERPL-MCNC: 0.83 MG/DL (ref 0.57–1)
DEPRECATED RDW RBC AUTO: 58.7 FL (ref 37–54)
EGFRCR SERPLBLD CKD-EPI 2021: 80.8 ML/MIN/1.73
EOSINOPHIL # BLD AUTO: 0.05 10*3/MM3 (ref 0–0.4)
EOSINOPHIL NFR BLD AUTO: 1 % (ref 0.3–6.2)
ERYTHROCYTE [DISTWIDTH] IN BLOOD BY AUTOMATED COUNT: 16.6 % (ref 12.3–15.4)
ETHANOL BLD-MCNC: <10 MG/DL (ref 0–10)
GLOBULIN UR ELPH-MCNC: 3.6 GM/DL
GLUCOSE SERPL-MCNC: 122 MG/DL (ref 65–99)
GLUCOSE UR STRIP-MCNC: NEGATIVE MG/DL
HCT VFR BLD AUTO: 33.2 % (ref 34–46.6)
HGB BLD-MCNC: 11.1 G/DL (ref 12–15.9)
HGB UR QL STRIP.AUTO: NEGATIVE
HOLD SPECIMEN: NORMAL
HYALINE CASTS UR QL AUTO: ABNORMAL /LPF
IMM GRANULOCYTES # BLD AUTO: 0.01 10*3/MM3 (ref 0–0.05)
IMM GRANULOCYTES NFR BLD AUTO: 0.2 % (ref 0–0.5)
KETONES UR QL STRIP: ABNORMAL
LEUKOCYTE ESTERASE UR QL STRIP.AUTO: ABNORMAL
LYMPHOCYTES # BLD AUTO: 0.87 10*3/MM3 (ref 0.7–3.1)
LYMPHOCYTES NFR BLD AUTO: 18.1 % (ref 19.6–45.3)
MAGNESIUM SERPL-MCNC: 1.5 MG/DL (ref 1.6–2.4)
MCH RBC QN AUTO: 33.4 PG (ref 26.6–33)
MCHC RBC AUTO-ENTMCNC: 33.4 G/DL (ref 31.5–35.7)
MCV RBC AUTO: 100 FL (ref 79–97)
METHADONE UR QL SCN: NEGATIVE
MONOCYTES # BLD AUTO: 0.71 10*3/MM3 (ref 0.1–0.9)
MONOCYTES NFR BLD AUTO: 14.8 % (ref 5–12)
NEUTROPHILS NFR BLD AUTO: 3.1 10*3/MM3 (ref 1.7–7)
NEUTROPHILS NFR BLD AUTO: 64.6 % (ref 42.7–76)
NITRITE UR QL STRIP: NEGATIVE
NRBC BLD AUTO-RTO: 0 /100 WBC (ref 0–0.2)
NT-PROBNP SERPL-MCNC: 596.2 PG/ML (ref 0–900)
OPIATES UR QL: NEGATIVE
OXYCODONE UR QL SCN: NEGATIVE
PCP UR QL SCN: NEGATIVE
PH UR STRIP.AUTO: 6.5 [PH] (ref 5–8)
PLATELET # BLD AUTO: 188 10*3/MM3 (ref 140–450)
PMV BLD AUTO: 11.1 FL (ref 6–12)
POTASSIUM SERPL-SCNC: 2.9 MMOL/L (ref 3.5–5.2)
PROPOXYPH UR QL: NEGATIVE
PROT SERPL-MCNC: 6.9 G/DL (ref 6–8.5)
PROT UR QL STRIP: ABNORMAL
RBC # BLD AUTO: 3.32 10*6/MM3 (ref 3.77–5.28)
RBC # UR STRIP: ABNORMAL /HPF
REF LAB TEST METHOD: ABNORMAL
SODIUM SERPL-SCNC: 138 MMOL/L (ref 136–145)
SP GR UR STRIP: 1.02 (ref 1–1.03)
SQUAMOUS #/AREA URNS HPF: ABNORMAL /HPF
TRICYCLICS UR QL SCN: NEGATIVE
TROPONIN T SERPL-MCNC: <0.01 NG/ML (ref 0–0.03)
UROBILINOGEN UR QL STRIP: ABNORMAL
WBC # UR STRIP: ABNORMAL /HPF
WBC CASTS #/AREA URNS LPF: ABNORMAL /LPF
WBC NRBC COR # BLD: 4.8 10*3/MM3 (ref 3.4–10.8)
WHOLE BLOOD HOLD COAG: NORMAL
WHOLE BLOOD HOLD SPECIMEN: NORMAL

## 2022-12-07 PROCEDURE — 93005 ELECTROCARDIOGRAM TRACING: CPT

## 2022-12-07 PROCEDURE — 81001 URINALYSIS AUTO W/SCOPE: CPT | Performed by: EMERGENCY MEDICINE

## 2022-12-07 PROCEDURE — 36415 COLL VENOUS BLD VENIPUNCTURE: CPT

## 2022-12-07 PROCEDURE — 99283 EMERGENCY DEPT VISIT LOW MDM: CPT

## 2022-12-07 PROCEDURE — 80053 COMPREHEN METABOLIC PANEL: CPT

## 2022-12-07 PROCEDURE — 84484 ASSAY OF TROPONIN QUANT: CPT

## 2022-12-07 PROCEDURE — 85025 COMPLETE CBC W/AUTO DIFF WBC: CPT

## 2022-12-07 PROCEDURE — 87086 URINE CULTURE/COLONY COUNT: CPT | Performed by: EMERGENCY MEDICINE

## 2022-12-07 PROCEDURE — 83735 ASSAY OF MAGNESIUM: CPT | Performed by: EMERGENCY MEDICINE

## 2022-12-07 PROCEDURE — 83880 ASSAY OF NATRIURETIC PEPTIDE: CPT

## 2022-12-07 PROCEDURE — 82077 ASSAY SPEC XCP UR&BREATH IA: CPT | Performed by: EMERGENCY MEDICINE

## 2022-12-07 PROCEDURE — 80306 DRUG TEST PRSMV INSTRMNT: CPT | Performed by: EMERGENCY MEDICINE

## 2022-12-07 RX ORDER — SODIUM CHLORIDE 0.9 % (FLUSH) 0.9 %
10 SYRINGE (ML) INJECTION AS NEEDED
Status: DISCONTINUED | OUTPATIENT
Start: 2022-12-07 | End: 2022-12-07 | Stop reason: HOSPADM

## 2022-12-07 RX ORDER — CEFDINIR 300 MG/1
300 CAPSULE ORAL 2 TIMES DAILY
Qty: 14 CAPSULE | Refills: 0 | OUTPATIENT
Start: 2022-12-07 | End: 2022-12-09

## 2022-12-07 RX ORDER — POTASSIUM CHLORIDE 1.5 G/1.77G
40 POWDER, FOR SOLUTION ORAL ONCE
Status: COMPLETED | OUTPATIENT
Start: 2022-12-07 | End: 2022-12-07

## 2022-12-07 RX ORDER — POTASSIUM CHLORIDE 20 MEQ/1
20 TABLET, EXTENDED RELEASE ORAL 2 TIMES DAILY
Qty: 14 TABLET | Refills: 0 | Status: SHIPPED | OUTPATIENT
Start: 2022-12-07 | End: 2022-12-14

## 2022-12-07 RX ADMIN — POTASSIUM CHLORIDE 40 MEQ: 1.5 POWDER, FOR SOLUTION ORAL at 14:39

## 2022-12-07 NOTE — ED PROVIDER NOTES
Subjective   History of Present Illness  62-year-old female who presents with complaint of anxiety tremors and heavy sensation.  The patient reports that she was recently admitted to our hospital for what ultimately was felt to be anxiety.  She reports that she had several new medications initiated which included Remeron, and BuSpar.  She reports that she not continue taking the Remeron after she was initially discharged from the hospital.  But she does state that she accidentally took 1 last night.  In the middle the night she began to feel like her extremities were heavy and that she was twitching and jerking diffusely and felt very agitated and restless.  This is continued into this morning.  She discussed this with her pharmacist who feels the Remeron is a contributor.  She denies fever, bodies, chills.  No reported chest pain, cough or shortness of breath.  No abdominal pain, no nausea or vomiting, no reported change in bowel or urinary function.  No other medication adjustments.  She does report a previous history of alcoholism but has not drank alcohol in over 2-1/2 months.  She exhibits normal mentation and answers all questions appropriate but does appear anxious with an elevated mood.  No other acute complaints        Review of Systems   Constitutional: Positive for fatigue. Negative for chills and fever.   HENT: Negative for congestion, ear pain, postnasal drip, sinus pressure and sore throat.    Eyes: Negative for pain, redness and visual disturbance.   Respiratory: Negative for cough, chest tightness and shortness of breath.    Cardiovascular: Negative for chest pain, palpitations and leg swelling.   Gastrointestinal: Negative for abdominal pain, anal bleeding, blood in stool, diarrhea, nausea and vomiting.   Endocrine: Negative for polydipsia and polyuria.   Genitourinary: Negative for difficulty urinating, dysuria, frequency and urgency.   Musculoskeletal: Negative for arthralgias, back pain and neck  pain.   Skin: Negative for pallor and rash.   Allergic/Immunologic: Negative for environmental allergies and immunocompromised state.   Neurological: Positive for tremors. Negative for dizziness, weakness and headaches.   Hematological: Negative for adenopathy.   Psychiatric/Behavioral: Positive for agitation. Negative for confusion, self-injury and suicidal ideas. The patient is nervous/anxious.    All other systems reviewed and are negative.      Past Medical History:   Diagnosis Date   • Anxiety and depression    • Cirrhosis of liver (HCC)    • Closed displaced fracture of lateral malleolus of left fibula    • Diabetes mellitus (HCC)    • Ganglion cyst of left foot    • Glaucoma    • Hypertension    • Wears glasses        Allergies   Allergen Reactions   • Codeine Nausea Only   • Mirtazapine Anxiety       Past Surgical History:   Procedure Laterality Date   • ANKLE OPEN REDUCTION INTERNAL FIXATION Left 3/24/2017    Procedure: LEFT ANKLE OPEN REDUCTION INTERNAL FIXATION WITH ILIAC CREST BONE GRAFT , EXCISION CYST 4TH/5TH INNERSPACE LEFT FOOT;  Surgeon: Frank Larson MD;  Location:  [facility];  Service:    • AUGMENTATION MAMMAPLASTY Bilateral 1999   • BREAST AUGMENTATION     • BREAST EXCISIONAL BIOPSY Right 2014   • DILATION AND CURETTAGE, DIAGNOSTIC / THERAPEUTIC     • ENDOSCOPY N/A 11/30/2022    Procedure: ESOPHAGOGASTRODUODENOSCOPY;  Surgeon: Arelis Solano MD;  Location:  [facility];  Service: Gastroenterology;  Laterality: N/A;   • AK FIX NON/MALUNION FEMUR,W GRAFT Left 3/24/2017    Procedure: ILIAC CREST BONE GRAFT;  Surgeon: Frank Larson MD;  Location:  [facility];  Service: Orthopedics   • WRIST SURGERY         Family History   Problem Relation Age of Onset   • Cancer Mother    • No Known Problems Father    • No Known Problems Sister    • No Known Problems Brother    • No Known Problems Daughter    • No Known Problems Son    • No Known Problems Maternal Grandmother    • Breast cancer  Paternal Grandmother 68        met to brain   • No Known Problems Maternal Aunt    • No Known Problems Paternal Aunt    • Rheum arthritis Other    • Heart disease Other    • Cancer Other    • Stroke Other    • Hypertension Other    • BRCA 1/2 Neg Hx    • Colon cancer Neg Hx    • Endometrial cancer Neg Hx    • Ovarian cancer Neg Hx        Social History     Socioeconomic History   • Marital status:    Tobacco Use   • Smoking status: Former     Packs/day: 1.50     Years: 20.00     Pack years: 30.00     Types: Cigarettes     Quit date: 3/22/2009     Years since quittin.7   • Smokeless tobacco: Never   Substance and Sexual Activity   • Alcohol use: Yes     Alcohol/week: 7.0 standard drinks     Types: 7 Glasses of wine per week     Comment:  VODKA EVERY TWO DAYS   • Drug use: No   • Sexual activity: Defer           Objective   Physical Exam  Vitals and nursing note reviewed.   Constitutional:       General: She is not in acute distress.     Appearance: Normal appearance. She is well-developed. She is not toxic-appearing or diaphoretic.   HENT:      Head: Normocephalic and atraumatic.      Right Ear: External ear normal.      Left Ear: External ear normal.      Nose: Nose normal.   Eyes:      General: Lids are normal.      Pupils: Pupils are equal, round, and reactive to light.   Neck:      Trachea: No tracheal deviation.   Cardiovascular:      Rate and Rhythm: Normal rate and regular rhythm.      Pulses: No decreased pulses.      Heart sounds: Normal heart sounds. No murmur heard.    No friction rub. No gallop.   Pulmonary:      Effort: Pulmonary effort is normal. No respiratory distress.      Breath sounds: Normal breath sounds. No decreased breath sounds, wheezing, rhonchi or rales.   Abdominal:      General: Bowel sounds are normal.      Palpations: Abdomen is soft.      Tenderness: There is no abdominal tenderness. There is no guarding or rebound.   Musculoskeletal:         General: No deformity.  Normal range of motion.      Cervical back: Normal range of motion and neck supple.   Lymphadenopathy:      Cervical: No cervical adenopathy.   Skin:     General: Skin is warm and dry.      Findings: No rash.   Neurological:      Mental Status: She is alert and oriented to person, place, and time.      GCS: GCS eye subscore is 4. GCS verbal subscore is 5. GCS motor subscore is 6.      Cranial Nerves: No cranial nerve deficit.      Sensory: No sensory deficit.      Comments: She appears restless and moves her legs in a restless fashion.   Psychiatric:         Mood and Affect: Mood is anxious.         Speech: Speech normal.         Behavior: Behavior is agitated.         Thought Content: Thought content normal.         Judgment: Judgment normal.      Comments: The patient appears agitated, at times has pressured speech.  At times appears anxious and even is intermittently tearful.         Procedures           ED Course                                   ACACIA reviewed by Mario Alex MD       MDM  Number of Diagnoses or Management Options  Acute hypokalemia: new and requires workup  Acute urinary tract infection: new and requires workup  Medication adverse effect, initial encounter: new and requires workup  Diagnosis management comments: Lab evaluation shows potassium is low.  I will initiate potassium to be taken twice daily for the next 7 days.    There is also 2+ bacteria in the urine, she does not complain of acute urinary symptoms but I will cover with antibiotics in the form of Omnicef.    The patient has recently initiated mirtazapine which can cause tremor hypoesthesia       Amount and/or Complexity of Data Reviewed  Clinical lab tests: ordered and reviewed  Tests in the radiology section of CPT®: ordered and reviewed  Decide to obtain previous medical records or to obtain history from someone other than the patient: yes  Review and summarize past medical records: yes  Independent visualization of  images, tracings, or specimens: yes        Final diagnoses:   Medication adverse effect, initial encounter   Acute hypokalemia   Acute urinary tract infection       ED Disposition  ED Disposition     ED Disposition   Discharge    Condition   Stable    Comment   --             No follow-up provider specified.       Medication List      New Prescriptions    cefdinir 300 MG capsule  Commonly known as: OMNICEF  Take 1 capsule by mouth 2 (Two) Times a Day for 7 days.     potassium chloride 20 MEQ CR tablet  Commonly known as: K-DUR,KLOR-CON  Take 1 tablet by mouth 2 (Two) Times a Day for 7 days.        Stop    mirtazapine 7.5 MG tablet  Commonly known as: REMERON           Where to Get Your Medications      These medications were sent to [facility] [id number] - [place], [place] - [address] AT [facility] [address] - [phone]  - [phone] FX  [address] [address]    Phone: [phone]   · cefdinir 300 MG capsule  · potassium chloride 20 MEQ CR tablet          Mario Alex MD  12/07/22 1427

## 2022-12-07 NOTE — DISCHARGE INSTRUCTIONS
Take potassium as prescribed.    Make sure to drink plenty of fluids.    Take antibiotics as prescribed for treatment of urinary tract infection patient    Discontinue mirtazapine.    Follow-up with primary care physician for recheck in 2 to 3 days for

## 2022-12-08 ENCOUNTER — HOSPITAL ENCOUNTER (EMERGENCY)
Facility: HOSPITAL | Age: 62
Discharge: HOME | End: 2022-12-09
Attending: EMERGENCY MEDICINE | Admitting: EMERGENCY MEDICINE

## 2022-12-08 DIAGNOSIS — F41.9 ANXIETY: Primary | ICD-10-CM

## 2022-12-08 DIAGNOSIS — R19.5 OCCULT BLOOD IN STOOLS: ICD-10-CM

## 2022-12-08 DIAGNOSIS — T50.905A MEDICATION ADVERSE EFFECT, INITIAL ENCOUNTER: ICD-10-CM

## 2022-12-08 LAB
ALBUMIN SERPL-MCNC: 3.8 G/DL (ref 3.5–5.2)
ALBUMIN/GLOB SERPL: 0.9 G/DL
ALP SERPL-CCNC: 77 U/L (ref 39–117)
ALT SERPL W P-5'-P-CCNC: 41 U/L (ref 1–33)
ANION GAP SERPL CALCULATED.3IONS-SCNC: 13 MMOL/L (ref 5–15)
AST SERPL-CCNC: 88 U/L (ref 1–32)
BACTERIA SPEC AEROBE CULT: NORMAL
BASOPHILS # BLD AUTO: 0.09 10*3/MM3 (ref 0–0.2)
BASOPHILS NFR BLD AUTO: 1.4 % (ref 0–1.5)
BILIRUB SERPL-MCNC: 1.1 MG/DL (ref 0–1.2)
BUN SERPL-MCNC: 10 MG/DL (ref 8–23)
BUN/CREAT SERPL: 11.8 (ref 7–25)
CALCIUM SPEC-SCNC: 9.9 MG/DL (ref 8.6–10.5)
CHLORIDE SERPL-SCNC: 105 MMOL/L (ref 98–107)
CO2 SERPL-SCNC: 23 MMOL/L (ref 22–29)
CREAT SERPL-MCNC: 0.85 MG/DL (ref 0.57–1)
D-LACTATE SERPL-SCNC: 1.4 MMOL/L (ref 0.5–2)
DEPRECATED RDW RBC AUTO: 60.5 FL (ref 37–54)
EGFRCR SERPLBLD CKD-EPI 2021: 78.5 ML/MIN/1.73
EOSINOPHIL # BLD AUTO: 0.1 10*3/MM3 (ref 0–0.4)
EOSINOPHIL NFR BLD AUTO: 1.5 % (ref 0.3–6.2)
ERYTHROCYTE [DISTWIDTH] IN BLOOD BY AUTOMATED COUNT: 17.2 % (ref 12.3–15.4)
GLOBULIN UR ELPH-MCNC: 4.2 GM/DL
GLUCOSE SERPL-MCNC: 117 MG/DL (ref 65–99)
HCT VFR BLD AUTO: 33.8 % (ref 34–46.6)
HGB BLD-MCNC: 11.1 G/DL (ref 12–15.9)
HOLD SPECIMEN: NORMAL
HOLD SPECIMEN: NORMAL
IMM GRANULOCYTES # BLD AUTO: 0.01 10*3/MM3 (ref 0–0.05)
IMM GRANULOCYTES NFR BLD AUTO: 0.2 % (ref 0–0.5)
LYMPHOCYTES # BLD AUTO: 1.45 10*3/MM3 (ref 0.7–3.1)
LYMPHOCYTES NFR BLD AUTO: 22 % (ref 19.6–45.3)
MCH RBC QN AUTO: 32.6 PG (ref 26.6–33)
MCHC RBC AUTO-ENTMCNC: 32.8 G/DL (ref 31.5–35.7)
MCV RBC AUTO: 99.1 FL (ref 79–97)
MONOCYTES # BLD AUTO: 0.81 10*3/MM3 (ref 0.1–0.9)
MONOCYTES NFR BLD AUTO: 12.3 % (ref 5–12)
NEUTROPHILS NFR BLD AUTO: 4.14 10*3/MM3 (ref 1.7–7)
NEUTROPHILS NFR BLD AUTO: 62.6 % (ref 42.7–76)
NRBC BLD AUTO-RTO: 0 /100 WBC (ref 0–0.2)
PLATELET # BLD AUTO: 270 10*3/MM3 (ref 140–450)
PMV BLD AUTO: 10.6 FL (ref 6–12)
POTASSIUM SERPL-SCNC: 3.3 MMOL/L (ref 3.5–5.2)
PROT SERPL-MCNC: 8 G/DL (ref 6–8.5)
RBC # BLD AUTO: 3.41 10*6/MM3 (ref 3.77–5.28)
SODIUM SERPL-SCNC: 141 MMOL/L (ref 136–145)
TROPONIN T SERPL-MCNC: <0.01 NG/ML (ref 0–0.03)
WBC NRBC COR # BLD: 6.6 10*3/MM3 (ref 3.4–10.8)
WHOLE BLOOD HOLD COAG: NORMAL
WHOLE BLOOD HOLD SPECIMEN: NORMAL

## 2022-12-08 PROCEDURE — 83605 ASSAY OF LACTIC ACID: CPT | Performed by: PHYSICIAN ASSISTANT

## 2022-12-08 PROCEDURE — 36415 COLL VENOUS BLD VENIPUNCTURE: CPT

## 2022-12-08 PROCEDURE — 80053 COMPREHEN METABOLIC PANEL: CPT | Performed by: PHYSICIAN ASSISTANT

## 2022-12-08 PROCEDURE — 99283 EMERGENCY DEPT VISIT LOW MDM: CPT

## 2022-12-08 PROCEDURE — 84484 ASSAY OF TROPONIN QUANT: CPT | Performed by: PHYSICIAN ASSISTANT

## 2022-12-08 PROCEDURE — 85025 COMPLETE CBC W/AUTO DIFF WBC: CPT | Performed by: PHYSICIAN ASSISTANT

## 2022-12-09 ENCOUNTER — NURSE TRIAGE (OUTPATIENT)
Dept: CALL CENTER | Facility: HOSPITAL | Age: 62
End: 2022-12-09

## 2022-12-09 VITALS
DIASTOLIC BLOOD PRESSURE: 60 MMHG | OXYGEN SATURATION: 99 % | TEMPERATURE: 98.6 F | BODY MASS INDEX: 24.49 KG/M2 | HEIGHT: 65 IN | WEIGHT: 147 LBS | RESPIRATION RATE: 18 BRPM | SYSTOLIC BLOOD PRESSURE: 110 MMHG | HEART RATE: 80 BPM

## 2022-12-09 LAB
BACTERIA UR QL AUTO: NORMAL /HPF
BILIRUB UR QL STRIP: NEGATIVE
CLARITY UR: ABNORMAL
COLOR UR: YELLOW
GLUCOSE UR STRIP-MCNC: NEGATIVE MG/DL
HGB UR QL STRIP.AUTO: NEGATIVE
HOLD SPECIMEN: NORMAL
HYALINE CASTS UR QL AUTO: NORMAL /LPF
KETONES UR QL STRIP: NEGATIVE
LEUKOCYTE ESTERASE UR QL STRIP.AUTO: NEGATIVE
NITRITE UR QL STRIP: NEGATIVE
PH UR STRIP.AUTO: 6 [PH] (ref 5–8)
PROT UR QL STRIP: NEGATIVE
RBC # UR STRIP: NORMAL /HPF
REF LAB TEST METHOD: NORMAL
SP GR UR STRIP: 1.01 (ref 1–1.03)
SQUAMOUS #/AREA URNS HPF: NORMAL /HPF
UROBILINOGEN UR QL STRIP: ABNORMAL
WBC # UR STRIP: NORMAL /HPF

## 2022-12-09 PROCEDURE — 81001 URINALYSIS AUTO W/SCOPE: CPT | Performed by: PHYSICIAN ASSISTANT

## 2022-12-09 NOTE — ED PROVIDER NOTES
[place]    EMERGENCY DEPARTMENT ENCOUNTER      Pt Name: Philomena Davis  MRN: [id number]  YOB: 1960  Date of evaluation: 12/8/2022  Provider: CLAUDE Lange    CHIEF COMPLAINT       Chief Complaint   Patient presents with   • Rectal Bleeding         HISTORY OF PRESENT ILLNESS  (Location/Symptom, Timing/Onset, Context/Setting, Quality, Duration, Modifying Factors, Severity.)   Philomena Davis is a 62 y.o. female who presents to the emergency department with complaints that she believes she is having a reaction to her restless leg medication. Patient was seen and evaluated yesterday at this facility for the same complaints with a benign workup. Patient states that today she feels as if her fingers are moving uncontrollably. She also feels as if she has been constipated lately and noticed some blood when she wiped after a bowel movement. She was instructed to not take her Remeron but it was in her pill dispenser and she accidentally took it.      HPI   Nursing notes were reviewed.    REVIEW OF SYSTEMS    (2-9 systems for level 4, 10 or more for level 5)   Review of Systems   Constitutional: Positive for activity change. Negative for chills, fatigue and fever.   HENT: Negative.    Respiratory: Negative.    Cardiovascular: Negative.    Gastrointestinal: Positive for blood in stool and constipation. Negative for diarrhea, nausea and vomiting.   Genitourinary: Negative.    Musculoskeletal: Positive for arthralgias and myalgias.   Skin: Negative.    Neurological: Negative.    Psychiatric/Behavioral: The patient is nervous/anxious and is hyperactive.         All systems reviewed and negative except for those discussed in HPI.   PAST MEDICAL HISTORY     Past Medical History:   Diagnosis Date   • Anxiety and depression    • Cirrhosis of liver (HCC)    • Closed displaced fracture of lateral malleolus of left fibula    • Diabetes mellitus (HCC)    • Ganglion cyst of left foot    • Glaucoma    • Hypertension     • Wears glasses          SURGICAL HISTORY       Past Surgical History:   Procedure Laterality Date   • ANKLE OPEN REDUCTION INTERNAL FIXATION Left 3/24/2017    Procedure: LEFT ANKLE OPEN REDUCTION INTERNAL FIXATION WITH ILIAC CREST BONE GRAFT , EXCISION CYST 4TH/5TH INNERSPACE LEFT FOOT;  Surgeon: Frank Larson MD;  Location:  [facility];  Service:    • AUGMENTATION MAMMAPLASTY Bilateral 1999   • BREAST AUGMENTATION     • BREAST EXCISIONAL BIOPSY Right 2014   • DILATION AND CURETTAGE, DIAGNOSTIC / THERAPEUTIC     • ENDOSCOPY N/A 11/30/2022    Procedure: ESOPHAGOGASTRODUODENOSCOPY;  Surgeon: Arelsi Solano MD;  Location:  [facility];  Service: Gastroenterology;  Laterality: N/A;   • AK FIX NON/MALUNION FEMUR,W GRAFT Left 3/24/2017    Procedure: ILIAC CREST BONE GRAFT;  Surgeon: Frank Larson MD;  Location:  [facility];  Service: Orthopedics   • WRIST SURGERY           CURRENT MEDICATIONS     No current facility-administered medications for this encounter.    Current Outpatient Medications:   •  busPIRone (BUSPAR) 5 MG tablet, Take 1 tablet by mouth 3 (Three) Times a Day., Disp: 90 tablet, Rfl: 0  •  cholecalciferol (VITAMIN D3) 25 MCG (1000 UT) tablet, Take 1 tablet by mouth Daily., Disp: 30 tablet, Rfl: 0  •  FLUoxetine (PROzac) 20 MG capsule, Take 20 mg by mouth Daily., Disp: , Rfl:   •  Fluticasone-Salmeterol (ADVAIR/WIXELA) 250-50 MCG/ACT DISKUS, Inhale 1 puff 2 (Two) Times a Day., Disp: , Rfl:   •  folic acid (FOLVITE) 1 MG tablet, Take 1 tablet by mouth Daily., Disp: 30 tablet, Rfl: 0  •  hydrOXYzine (ATARAX) 25 MG tablet, Take 1 tablet by mouth 3 (Three) Times a Day As Needed for Anxiety., Disp: 90 tablet, Rfl: 0  •  latanoprost (XALATAN) 0.005 % ophthalmic solution, Administer 1 drop to both eyes Every Night., Disp: , Rfl:   •  Multiple Vitamins-Minerals (MULTIVITAMIN ADULT PO), Take 1 tablet by mouth Daily. OTC, Disp: , Rfl:   •  pantoprazole (PROTONIX) 40 MG EC tablet, Take 1 tablet by  mouth Every Morning Before Breakfast., Disp: 30 tablet, Rfl: 0  •  potassium chloride (K-DUR,KLOR-CON) 20 MEQ CR tablet, Take 1 tablet by mouth 2 (Two) Times a Day for 7 days., Disp: 14 tablet, Rfl: 0  •  thiamine (VITAMIN B1) 100 MG tablet, Take 1 tablet by mouth Daily., Disp: 30 tablet, Rfl: 0    ALLERGIES     Codeine and Mirtazapine    FAMILY HISTORY       Family History   Problem Relation Age of Onset   • Cancer Mother    • No Known Problems Father    • No Known Problems Sister    • No Known Problems Brother    • No Known Problems Daughter    • No Known Problems Son    • No Known Problems Maternal Grandmother    • Breast cancer Paternal Grandmother 68        met to brain   • No Known Problems Maternal Aunt    • No Known Problems Paternal Aunt    • Rheum arthritis Other    • Heart disease Other    • Cancer Other    • Stroke Other    • Hypertension Other    • BRCA 1/2 Neg Hx    • Colon cancer Neg Hx    • Endometrial cancer Neg Hx    • Ovarian cancer Neg Hx           SOCIAL HISTORY       Social History     Socioeconomic History   • Marital status:    Tobacco Use   • Smoking status: Former     Packs/day: 1.50     Years: 20.00     Pack years: 30.00     Types: Cigarettes     Quit date: 3/22/2009     Years since quittin.7   • Smokeless tobacco: Never   Substance and Sexual Activity   • Alcohol use: Yes     Alcohol/week: 7.0 standard drinks     Types: 7 Glasses of wine per week     Comment: 1/5 VODKA EVERY TWO DAYS   • Drug use: No   • Sexual activity: Defer         PHYSICAL EXAM    (up to 7 for level 4, 8 or more for level 5)   Physical Exam  Constitutional:       General: She is not in acute distress.     Appearance: She is not ill-appearing or toxic-appearing.   HENT:      Head: Normocephalic and atraumatic.      Nose: Nose normal.      Mouth/Throat:      Mouth: Mucous membranes are moist.   Eyes:      Extraocular Movements: Extraocular movements intact.   Cardiovascular:      Rate and Rhythm: Normal  rate.   Pulmonary:      Effort: Pulmonary effort is normal.      Breath sounds: Normal breath sounds.   Abdominal:      Palpations: Abdomen is soft.      Tenderness: There is abdominal tenderness.   Genitourinary:     Rectum: Normal. Guaiac result positive.   Musculoskeletal:      Cervical back: Normal range of motion and neck supple.   Skin:     General: Skin is warm and dry.   Neurological:      Mental Status: She is alert.   Psychiatric:         Attention and Perception: Attention and perception normal.         Mood and Affect: Mood is anxious.         Speech: Speech normal.         Behavior: Behavior is cooperative.         Thought Content: Thought content normal.         Cognition and Memory: Cognition and memory normal.         Judgment: Judgment normal.        DIAGNOSTIC RESULTS     EKG: All EKGs are interpreted by the Emergency Department Physician who either signs or Co-signs this chart in the absence of a cardiologist.    No orders to display       RADIOLOGY:   Non-plain film images such as CT, Ultrasound and MRI are read by the radiologist. Plain radiographic images are visualized and preliminarily interpreted by the emergency physician with the below findings:      [] Radiologist's Report Reviewed:  No orders to display         ED BEDSIDE ULTRASOUND:   Performed by ED Physician - none    LABS:    I have reviewed and interpreted all of the currently available lab results from this visit (if applicable):  Results for orders placed or performed during the hospital encounter of 12/08/22   Comprehensive Metabolic Panel    Specimen: Blood   Result Value Ref Range    Glucose 117 (H) 65 - 99 mg/dL    BUN 10 8 - 23 mg/dL    Creatinine 0.85 0.57 - 1.00 mg/dL    Sodium 141 136 - 145 mmol/L    Potassium 3.3 (L) 3.5 - 5.2 mmol/L    Chloride 105 98 - 107 mmol/L    CO2 23.0 22.0 - 29.0 mmol/L    Calcium 9.9 8.6 - 10.5 mg/dL    Total Protein 8.0 6.0 - 8.5 g/dL    Albumin 3.80 3.50 - 5.20 g/dL    ALT (SGPT) 41 (H) 1 - 33  U/L    AST (SGOT) 88 (H) 1 - 32 U/L    Alkaline Phosphatase 77 39 - 117 U/L    Total Bilirubin 1.1 0.0 - 1.2 mg/dL    Globulin 4.2 gm/dL    A/G Ratio 0.9 g/dL    BUN/Creatinine Ratio 11.8 7.0 - 25.0    Anion Gap 13.0 5.0 - 15.0 mmol/L    eGFR 78.5 >60.0 mL/min/1.73   Urinalysis With Microscopic If Indicated (No Culture) - Urine, Clean Catch    Specimen: Urine, Clean Catch   Result Value Ref Range    Color, UA Yellow Yellow, Straw    Appearance, UA Cloudy (A) Clear    pH, UA 6.0 5.0 - 8.0    Specific Gravity, UA 1.009 1.001 - 1.030    Glucose, UA Negative Negative    Ketones, UA Negative Negative    Bilirubin, UA Negative Negative    Blood, UA Negative Negative    Protein, UA Negative Negative    Leuk Esterase, UA Negative Negative    Nitrite, UA Negative Negative    Urobilinogen, UA 0.2 E.U./dL 0.2 - 1.0 E.U./dL   Lactic Acid, Plasma    Specimen: Blood   Result Value Ref Range    Lactate 1.4 0.5 - 2.0 mmol/L   CBC Auto Differential    Specimen: Blood   Result Value Ref Range    WBC 6.60 3.40 - 10.80 10*3/mm3    RBC 3.41 (L) 3.77 - 5.28 10*6/mm3    Hemoglobin 11.1 (L) 12.0 - 15.9 g/dL    Hematocrit 33.8 (L) 34.0 - 46.6 %    MCV 99.1 (H) 79.0 - 97.0 fL    MCH 32.6 26.6 - 33.0 pg    MCHC 32.8 31.5 - 35.7 g/dL    RDW 17.2 (H) 12.3 - 15.4 %    RDW-SD 60.5 (H) 37.0 - 54.0 fl    MPV 10.6 6.0 - 12.0 fL    Platelets 270 140 - 450 10*3/mm3    Neutrophil % 62.6 42.7 - 76.0 %    Lymphocyte % 22.0 19.6 - 45.3 %    Monocyte % 12.3 (H) 5.0 - 12.0 %    Eosinophil % 1.5 0.3 - 6.2 %    Basophil % 1.4 0.0 - 1.5 %    Immature Grans % 0.2 0.0 - 0.5 %    Neutrophils, Absolute 4.14 1.70 - 7.00 10*3/mm3    Lymphocytes, Absolute 1.45 0.70 - 3.10 10*3/mm3    Monocytes, Absolute 0.81 0.10 - 0.90 10*3/mm3    Eosinophils, Absolute 0.10 0.00 - 0.40 10*3/mm3    Basophils, Absolute 0.09 0.00 - 0.20 10*3/mm3    Immature Grans, Absolute 0.01 0.00 - 0.05 10*3/mm3    nRBC 0.0 0.0 - 0.2 /100 WBC   Troponin    Specimen: Blood   Result Value Ref Range     "Troponin T <0.010 0.000 - 0.030 ng/mL   Urinalysis, Microscopic Only - Urine, Clean Catch    Specimen: Urine, Clean Catch   Result Value Ref Range    RBC, UA 0-2 None Seen, 0-2 /HPF    WBC, UA 0-2 None Seen, 0-2 /HPF    Bacteria, UA None Seen None Seen, Trace /HPF    Squamous Epithelial Cells, UA 0-2 None Seen, 0-2 /HPF    Hyaline Casts, UA None Seen 0 - 6 /LPF    Methodology Manual Light Microscopy    Green Top (Gel)   Result Value Ref Range    Extra Tube Hold for add-ons.    Lavender Top   Result Value Ref Range    Extra Tube hold for add-on    Gold Top - SST   Result Value Ref Range    Extra Tube Hold for add-ons.    Gray Top   Result Value Ref Range    Extra Tube Hold for add-ons.    Light Blue Top   Result Value Ref Range    Extra Tube Hold for add-ons.         All other labs were within normal range or not returned as of this dictation.      EMERGENCY DEPARTMENT COURSE and DIFFERENTIAL DIAGNOSIS/MDM:   Vitals:    Vitals:    12/08/22 2004 12/09/22 0200 12/09/22 0231   BP: 130/80 106/55 110/60   BP Location: Right arm  Right arm   Patient Position: Sitting  Sitting   Pulse: 94  80   Resp: 18  18   Temp: 97.9 °F (36.6 °C)  98.6 °F (37 °C)   TempSrc: Oral  Oral   SpO2: 97% 97% 99%   Weight: 66.7 kg (147 lb)     Height: 165.1 cm (65\")         ED Course as of 12/13/22 1459   Fri Dec 09, 2022   0203 Patient presents to ED with complaints of blood in stool and symptoms concerning for gastrointestinal bleeding. Differential diagnosis includes diverticulitis, hemorrhoids or other forms of colitis. Presentation not consistent with mesenteric ischemia or ischemic colitis. No concern for life threatening gastrointestinal bleed as patient has no evidence of hemorrhagic shock. GUIAC + on exam. Labs/urinalysis obtained and reviewed demonstrate no acute or emergent abnormalities.  Hgb 11.1, Hct 33.8. Patient not in need of acute medical intervention, recommended outpatient follow up to GI for non-emergent colonoscopy. No " symptoms of medication reaction while in ED. At time of discharge disposition patient is hemodynamically stable, afebrile, nontoxic appearing, vital signs stable and able to maintain O2 sats of 99% on room air. Patient will be discharged home with symptomatic care and outpatient follow up.  [JG]      ED Course User Index  [JG] Anderson Hammond PA       MDM  Number of Diagnoses or Management Options  Anxiety: new, needed workup  Medication adverse effect, initial encounter: new, needed workup  Occult blood in stools: new, needed workup     Amount and/or Complexity of Data Reviewed  Clinical lab tests: reviewed    Risk of Complications, Morbidity, and/or Mortality  Presenting problems: moderate  Diagnostic procedures: moderate  Management options: moderate    Patient Progress  Patient progress: stable       I had a discussion with the patient/family regarding diagnosis, diagnostic results, treatment plan, and medications.  The patient/family indicated understanding of these instructions.  I spent adequate time at the bedside preceding discharge necessary to personally discuss the aftercare instructions, giving patient education, providing explanations of the results of our evaluations/findings, and my decision making to assure that the patient/family understand the plan of care.  Time was allotted to answer questions at that time and throughout the ED course.  Emphasis was placed on timely follow-up after discharge.  I also discussed the potential for the development of an acute emergent condition requiring further evaluation, admission, or even surgical intervention. I discussed that we found nothing during the visit today indicating the need for further workup, admission, or the presence of an unstable medical condition.  I encouraged the patient to return to the emergency department immediately for ANY concerns, worsening, new complaints, or if symptoms persist and unable to seek follow-up in a timely fashion.  The  patient/family expressed understanding and agreement with this plan.  The patient will follow-up with primary care for reevaluation.       MEDICATIONS ADMINISTERED IN ED:  Medications - No data to display    PROCEDURES:  Procedures          CRITICAL CARE TIME    Total Critical Care time was 0 minutes, excluding separately reportable procedures.   There was a high probability of clinically significant/life threatening deterioration in the patient's condition which required my urgent intervention.      FINAL IMPRESSION      1. Anxiety    2. Medication adverse effect, initial encounter    3. Occult blood in stools          DISPOSITION/PLAN     ED Disposition     ED Disposition   Discharge    Condition   Stable    Comment   --             PATIENT REFERRED TO:  Kia Marshall, APRN  [address] [facility] [address] [facility] [address]  [place] [address]  [phone]      Keep follow-up appointment as scheduled      DISCHARGE MEDICATIONS:     Medication List      CONTINUE taking these medications    busPIRone 5 MG tablet  Commonly known as: BUSPAR  Take 1 tablet by mouth 3 (Three) Times a Day.     cholecalciferol 25 MCG (1000 UT) tablet  Commonly known as: VITAMIN D3  Take 1 tablet by mouth Daily.     FLUoxetine 20 MG capsule  Commonly known as: PROzac     Fluticasone-Salmeterol 250-50 MCG/ACT DISKUS  Commonly known as: ADVAIR/WIXELA  Inhale 1 puff 2 (Two) Times a Day.     folic acid 1 MG tablet  Commonly known as: FOLVITE  Take 1 tablet by mouth Daily.     hydrOXYzine 25 MG tablet  Commonly known as: ATARAX  Take 1 tablet by mouth 3 (Three) Times a Day As Needed for Anxiety.     latanoprost 0.005 % ophthalmic solution  Commonly known as: XALATAN     multivitamin with minerals tablet tablet     pantoprazole 40 MG EC tablet  Commonly known as: PROTONIX  Take 1 tablet by mouth Every Morning Before Breakfast.     potassium chloride 20 MEQ CR tablet  Commonly known as: K-DUR,KLOR-CON  Take 1 tablet by mouth 2 (Two) Times a Day for 7  days.     thiamine 100 MG tablet  Commonly known as: VITAMIN B1  Take 1 tablet by mouth Daily.        STOP taking these medications    cefdinir 300 MG capsule  Commonly known as: OMNICEF                Comment: Please note this report has been produced using speech recognition software.      CLADUE Lange Jason C, PA  12/13/22 [id number]

## 2022-12-09 NOTE — DISCHARGE INSTRUCTIONS
Symptomatic care is recommended. Take all medications as prescribed and instructed. Follow up with primary care as scheduled or return to Emergency Department with worsening of symptoms.

## 2022-12-10 NOTE — TELEPHONE ENCOUNTER
Reviewed guideline with caller, caller is upset that this issue was not addressed in ED last night. Advised she can wean off the Buspar, decrease to 2 doses tomorrow and 1 on Sunday, to call PCP on Monday. States she has an appointment with  psychiatry later this week.     Reason for Disposition  • [1] Caller has medicine question about med NOT prescribed by PCP AND [2] triager unable to answer question (e.g., compatibility with other med, storage)    Additional Information  • Negative: [1] Intentional drug overdose AND [2] suicidal thoughts or ideas  • Negative: Drug overdose and triager unable to answer question  • Negative: Caller requesting information unrelated to medicine  • Negative: Caller requesting information about COVID-19 Vaccine  • Negative: Caller requesting information about Emergency Contraception  • Negative: Caller requesting information about Combined Birth Control Pills  • Negative: Caller requesting information about Progestin Birth Control Pills  • Negative: Caller requesting information about Post-Op pain or medicines  • Negative: Caller requesting a prescription antibiotic (such as Penicillin) for Strep throat and has a positive culture result  • Negative: Caller requesting a prescription anti-viral med (such as Tamiflu) and has influenza (flu)  symptoms  • Negative: Immunization reaction suspected  • Negative: Rash while taking a medicine or within 3 days of stopping it  • Negative: [1] Asthma and [2] having symptoms of asthma (cough, wheezing, etc.)  • Negative: [1] Symptom of illness (e.g., headache, abdominal pain, earache, vomiting) AND [2] more than mild  • Negative: Breastfeeding questions about mother's medicines and diet  • Negative: MORE THAN A DOUBLE DOSE of a prescription or over-the-counter (OTC) drug  • Negative: [1] DOUBLE DOSE (an extra dose or lesser amount) of prescription drug AND [2] any symptoms (e.g., dizziness, nausea, pain, sleepiness)  • Negative: [1] DOUBLE DOSE  "(an extra dose or lesser amount) of over-the-counter (OTC) drug AND [2] any symptoms (e.g., dizziness, nausea, pain, sleepiness)  • Negative: Took another person's prescription drug  • Negative: [1] DOUBLE DOSE (an extra dose or lesser amount) of prescription drug AND [2] NO symptoms (Exception: a double dose of antibiotics)  • Negative: Diabetes drug error or overdose (e.g., took wrong type of insulin or took extra dose)  • Negative: [1] Prescription refill request for ESSENTIAL medicine (i.e., likelihood of harm to patient if not taken) AND [2] triager unable to refill per department policy  • Negative: [1] Prescription not at pharmacy AND [2] was prescribed by PCP recently (Exception: triager has access to EMR and prescription is recorded there. Go to Home Care and confirm for pharmacy.)  • Negative: [1] Pharmacy calling with prescription question AND [2] triager unable to answer question  • Negative: [1] Caller has URGENT medicine question about med that PCP or specialist prescribed AND [2] triager unable to answer question  • Negative: Medicine patch causing local rash or itching    Answer Assessment - Initial Assessment Questions  1. NAME of MEDICATION: \"What medicine are you calling about?\"      Buspar   2. QUESTION: \"What is your question?\" (e.g., medication refill, side effect)      States she is having uncontrolled muscle movements, anxiety  3. PRESCRIBING HCP: \"Who prescribed it?\" Reason: if prescribed by specialist, call should be referred to that group.      Prescribed at discharge  4. SYMPTOMS: \"Do you have any symptoms?\"      Uncontrolled muscle movements, anxiety, confusion  5. SEVERITY: If symptoms are present, ask \"Are they mild, moderate or severe?\"      *No Answer*  6. PREGNANCY:  \"Is there any chance that you are pregnant?\" \"When was your last menstrual period?\"      *No Answer*    Protocols used: MEDICATION QUESTION CALL-ADULT-      "

## 2022-12-20 LAB
QT INTERVAL: 386 MS
QTC INTERVAL: 469 MS

## 2023-05-19 PROCEDURE — 87086 URINE CULTURE/COLONY COUNT: CPT | Performed by: NURSE PRACTITIONER

## 2023-05-19 PROCEDURE — 87077 CULTURE AEROBIC IDENTIFY: CPT | Performed by: NURSE PRACTITIONER

## 2023-05-19 PROCEDURE — 87186 SC STD MICRODIL/AGAR DIL: CPT | Performed by: NURSE PRACTITIONER

## 2023-05-20 ENCOUNTER — TELEPHONE (OUTPATIENT)
Dept: URGENT CARE | Facility: CLINIC | Age: 63
End: 2023-05-20

## 2023-05-24 ENCOUNTER — APPOINTMENT (OUTPATIENT)
Dept: CT IMAGING | Facility: HOSPITAL | Age: 63
End: 2023-05-24
Payer: COMMERCIAL

## 2023-05-24 ENCOUNTER — APPOINTMENT (OUTPATIENT)
Dept: GENERAL RADIOLOGY | Facility: HOSPITAL | Age: 63
End: 2023-05-24
Payer: COMMERCIAL

## 2023-05-24 ENCOUNTER — ANESTHESIA EVENT CONVERTED (OUTPATIENT)
Dept: ANESTHESIOLOGY | Facility: HOSPITAL | Age: 63
End: 2023-05-24
Payer: COMMERCIAL

## 2023-05-24 ENCOUNTER — ANESTHESIA EVENT (OUTPATIENT)
Dept: PERIOP | Facility: HOSPITAL | Age: 63
End: 2023-05-24
Payer: COMMERCIAL

## 2023-05-24 ENCOUNTER — ANESTHESIA EVENT (OUTPATIENT)
Dept: TELEMETRY | Facility: HOSPITAL | Age: 63
End: 2023-05-24
Payer: COMMERCIAL

## 2023-05-24 ENCOUNTER — HOSPITAL ENCOUNTER (OUTPATIENT)
Facility: HOSPITAL | Age: 63
Setting detail: OBSERVATION
Discharge: HOME | End: 2023-05-26
Attending: EMERGENCY MEDICINE | Admitting: INTERNAL MEDICINE
Payer: COMMERCIAL

## 2023-05-24 ENCOUNTER — ANESTHESIA (OUTPATIENT)
Dept: TELEMETRY | Facility: HOSPITAL | Age: 63
End: 2023-05-24
Payer: COMMERCIAL

## 2023-05-24 DIAGNOSIS — R53.1 GENERALIZED WEAKNESS: ICD-10-CM

## 2023-05-24 DIAGNOSIS — S82.851A CLOSED TRIMALLEOLAR FRACTURE OF RIGHT ANKLE, INITIAL ENCOUNTER: ICD-10-CM

## 2023-05-24 DIAGNOSIS — S82.851A CLOSED DISPLACED TRIMALLEOLAR FRACTURE OF RIGHT ANKLE, INITIAL ENCOUNTER: Primary | ICD-10-CM

## 2023-05-24 DIAGNOSIS — M25.571 ACUTE RIGHT ANKLE PAIN: ICD-10-CM

## 2023-05-24 PROBLEM — E87.6 HYPOKALEMIA: Status: ACTIVE | Noted: 2023-05-24

## 2023-05-24 LAB
ALBUMIN SERPL-MCNC: 4.1 G/DL (ref 3.5–5.2)
ALBUMIN/GLOB SERPL: 1.2 G/DL
ALP SERPL-CCNC: 109 U/L (ref 39–117)
ALT SERPL W P-5'-P-CCNC: 18 U/L (ref 1–33)
AMPHET+METHAMPHET UR QL: NEGATIVE
AMPHETAMINES UR QL: NEGATIVE
ANION GAP SERPL CALCULATED.3IONS-SCNC: 13 MMOL/L (ref 5–15)
ANION GAP SERPL CALCULATED.3IONS-SCNC: 15 MMOL/L (ref 5–15)
AST SERPL-CCNC: 32 U/L (ref 1–32)
BARBITURATES UR QL SCN: NEGATIVE
BASOPHILS # BLD AUTO: 0.06 10*3/MM3 (ref 0–0.2)
BASOPHILS NFR BLD AUTO: 0.8 % (ref 0–1.5)
BENZODIAZ UR QL SCN: NEGATIVE
BILIRUB SERPL-MCNC: 0.4 MG/DL (ref 0–1.2)
BUN SERPL-MCNC: 13 MG/DL (ref 8–23)
BUN SERPL-MCNC: 14 MG/DL (ref 8–23)
BUN/CREAT SERPL: 16.9 (ref 7–25)
BUN/CREAT SERPL: 18.2 (ref 7–25)
BUPRENORPHINE SERPL-MCNC: NEGATIVE NG/ML
CALCIUM SPEC-SCNC: 9.3 MG/DL (ref 8.6–10.5)
CALCIUM SPEC-SCNC: 9.3 MG/DL (ref 8.6–10.5)
CANNABINOIDS SERPL QL: NEGATIVE
CHLORIDE SERPL-SCNC: 101 MMOL/L (ref 98–107)
CHLORIDE SERPL-SCNC: 101 MMOL/L (ref 98–107)
CO2 SERPL-SCNC: 23 MMOL/L (ref 22–29)
CO2 SERPL-SCNC: 25 MMOL/L (ref 22–29)
COCAINE UR QL: NEGATIVE
CREAT SERPL-MCNC: 0.77 MG/DL (ref 0.57–1)
CREAT SERPL-MCNC: 0.77 MG/DL (ref 0.57–1)
DEPRECATED RDW RBC AUTO: 50.4 FL (ref 37–54)
EGFRCR SERPLBLD CKD-EPI 2021: 88.4 ML/MIN/1.73
EGFRCR SERPLBLD CKD-EPI 2021: 88.4 ML/MIN/1.73
EOSINOPHIL # BLD AUTO: 0.23 10*3/MM3 (ref 0–0.4)
EOSINOPHIL NFR BLD AUTO: 3.1 % (ref 0.3–6.2)
ERYTHROCYTE [DISTWIDTH] IN BLOOD BY AUTOMATED COUNT: 13.9 % (ref 12.3–15.4)
ETHANOL BLD-MCNC: 318 MG/DL (ref 0–10)
FENTANYL UR-MCNC: POSITIVE NG/ML
GLOBULIN UR ELPH-MCNC: 3.5 GM/DL
GLUCOSE BLDC GLUCOMTR-MCNC: 113 MG/DL (ref 70–130)
GLUCOSE BLDC GLUCOMTR-MCNC: 126 MG/DL (ref 70–130)
GLUCOSE BLDC GLUCOMTR-MCNC: 167 MG/DL (ref 70–130)
GLUCOSE BLDC GLUCOMTR-MCNC: 260 MG/DL (ref 70–130)
GLUCOSE BLDC GLUCOMTR-MCNC: 93 MG/DL (ref 70–130)
GLUCOSE SERPL-MCNC: 97 MG/DL (ref 65–99)
GLUCOSE SERPL-MCNC: 99 MG/DL (ref 65–99)
HBA1C MFR BLD: 5.3 % (ref 4.8–5.6)
HCT VFR BLD AUTO: 40.6 % (ref 34–46.6)
HGB BLD-MCNC: 13.6 G/DL (ref 12–15.9)
HOLD SPECIMEN: NORMAL
IMM GRANULOCYTES # BLD AUTO: 0.08 10*3/MM3 (ref 0–0.05)
IMM GRANULOCYTES NFR BLD AUTO: 1.1 % (ref 0–0.5)
LYMPHOCYTES # BLD AUTO: 1.77 10*3/MM3 (ref 0.7–3.1)
LYMPHOCYTES NFR BLD AUTO: 23.6 % (ref 19.6–45.3)
MCH RBC QN AUTO: 33.3 PG (ref 26.6–33)
MCHC RBC AUTO-ENTMCNC: 33.5 G/DL (ref 31.5–35.7)
MCV RBC AUTO: 99.5 FL (ref 79–97)
METHADONE UR QL SCN: NEGATIVE
MONOCYTES # BLD AUTO: 0.55 10*3/MM3 (ref 0.1–0.9)
MONOCYTES NFR BLD AUTO: 7.3 % (ref 5–12)
NEUTROPHILS NFR BLD AUTO: 4.82 10*3/MM3 (ref 1.7–7)
NEUTROPHILS NFR BLD AUTO: 64.1 % (ref 42.7–76)
NRBC BLD AUTO-RTO: 0 /100 WBC (ref 0–0.2)
OPIATES UR QL: NEGATIVE
OXYCODONE UR QL SCN: NEGATIVE
PCP UR QL SCN: NEGATIVE
PLATELET # BLD AUTO: 221 10*3/MM3 (ref 140–450)
PMV BLD AUTO: 9.3 FL (ref 6–12)
POTASSIUM SERPL-SCNC: 3.2 MMOL/L (ref 3.5–5.2)
POTASSIUM SERPL-SCNC: 3.2 MMOL/L (ref 3.5–5.2)
POTASSIUM SERPL-SCNC: 4.5 MMOL/L (ref 3.5–5.2)
PROPOXYPH UR QL: NEGATIVE
PROT SERPL-MCNC: 7.6 G/DL (ref 6–8.5)
RBC # BLD AUTO: 4.08 10*6/MM3 (ref 3.77–5.28)
SODIUM SERPL-SCNC: 139 MMOL/L (ref 136–145)
SODIUM SERPL-SCNC: 139 MMOL/L (ref 136–145)
TRICYCLICS UR QL SCN: NEGATIVE
WBC NRBC COR # BLD: 7.51 10*3/MM3 (ref 3.4–10.8)
WHOLE BLOOD HOLD COAG: NORMAL
WHOLE BLOOD HOLD SPECIMEN: NORMAL

## 2023-05-24 PROCEDURE — 25010000002 ONDANSETRON PER 1 MG: Performed by: EMERGENCY MEDICINE

## 2023-05-24 PROCEDURE — 82077 ASSAY SPEC XCP UR&BREATH IA: CPT | Performed by: PHYSICIAN ASSISTANT

## 2023-05-24 PROCEDURE — 96375 TX/PRO/DX INJ NEW DRUG ADDON: CPT

## 2023-05-24 PROCEDURE — 73630 X-RAY EXAM OF FOOT: CPT

## 2023-05-24 PROCEDURE — 85025 COMPLETE CBC W/AUTO DIFF WBC: CPT | Performed by: EMERGENCY MEDICINE

## 2023-05-24 PROCEDURE — 25010000002 FENTANYL CITRATE (PF) 50 MCG/ML SOLUTION: Performed by: EMERGENCY MEDICINE

## 2023-05-24 PROCEDURE — 96376 TX/PRO/DX INJ SAME DRUG ADON: CPT

## 2023-05-24 PROCEDURE — 84132 ASSAY OF SERUM POTASSIUM: CPT | Performed by: INTERNAL MEDICINE

## 2023-05-24 PROCEDURE — G0378 HOSPITAL OBSERVATION PER HR: HCPCS

## 2023-05-24 PROCEDURE — 25010000002 ROPIVACAINE PER 1 MG: Performed by: NURSE ANESTHETIST, CERTIFIED REGISTERED

## 2023-05-24 PROCEDURE — 82948 REAGENT STRIP/BLOOD GLUCOSE: CPT

## 2023-05-24 PROCEDURE — 73700 CT LOWER EXTREMITY W/O DYE: CPT

## 2023-05-24 PROCEDURE — 99285 EMERGENCY DEPT VISIT HI MDM: CPT

## 2023-05-24 PROCEDURE — 73610 X-RAY EXAM OF ANKLE: CPT

## 2023-05-24 PROCEDURE — 25010000002 HYDROMORPHONE PER 4 MG: Performed by: INTERNAL MEDICINE

## 2023-05-24 PROCEDURE — 63710000001 INSULIN LISPRO (HUMAN) PER 5 UNITS: Performed by: PHYSICIAN ASSISTANT

## 2023-05-24 PROCEDURE — 80307 DRUG TEST PRSMV CHEM ANLYZR: CPT | Performed by: PHYSICIAN ASSISTANT

## 2023-05-24 PROCEDURE — 25010000002 DEXAMETHASONE SODIUM PHOSPHATE 10 MG/ML SOLUTION: Performed by: NURSE ANESTHETIST, CERTIFIED REGISTERED

## 2023-05-24 PROCEDURE — 96374 THER/PROPH/DIAG INJ IV PUSH: CPT

## 2023-05-24 PROCEDURE — 93005 ELECTROCARDIOGRAM TRACING: CPT | Performed by: EMERGENCY MEDICINE

## 2023-05-24 PROCEDURE — 99222 1ST HOSP IP/OBS MODERATE 55: CPT | Performed by: PHYSICIAN ASSISTANT

## 2023-05-24 PROCEDURE — 83036 HEMOGLOBIN GLYCOSYLATED A1C: CPT | Performed by: PHYSICIAN ASSISTANT

## 2023-05-24 PROCEDURE — 25010000002 THIAMINE PER 100 MG: Performed by: INTERNAL MEDICINE

## 2023-05-24 PROCEDURE — 25010000002 MORPHINE PER 10 MG: Performed by: INTERNAL MEDICINE

## 2023-05-24 PROCEDURE — 25010000002 MIDAZOLAM PER 1 MG: Performed by: EMERGENCY MEDICINE

## 2023-05-24 PROCEDURE — 80053 COMPREHEN METABOLIC PANEL: CPT | Performed by: EMERGENCY MEDICINE

## 2023-05-24 RX ORDER — LORAZEPAM 2 MG/ML
2 INJECTION INTRAMUSCULAR
Status: DISCONTINUED | OUTPATIENT
Start: 2023-05-24 | End: 2023-05-26 | Stop reason: HOSPADM

## 2023-05-24 RX ORDER — BISACODYL 10 MG
10 SUPPOSITORY, RECTAL RECTAL DAILY PRN
Status: DISCONTINUED | OUTPATIENT
Start: 2023-05-24 | End: 2023-05-26 | Stop reason: HOSPADM

## 2023-05-24 RX ORDER — MORPHINE SULFATE 2 MG/ML
2 INJECTION, SOLUTION INTRAMUSCULAR; INTRAVENOUS EVERY 4 HOURS PRN
Status: DISCONTINUED | OUTPATIENT
Start: 2023-05-24 | End: 2023-05-24

## 2023-05-24 RX ORDER — SODIUM CHLORIDE, SODIUM LACTATE, POTASSIUM CHLORIDE, CALCIUM CHLORIDE 600; 310; 30; 20 MG/100ML; MG/100ML; MG/100ML; MG/100ML
100 INJECTION, SOLUTION INTRAVENOUS CONTINUOUS
Status: DISCONTINUED | OUTPATIENT
Start: 2023-05-24 | End: 2023-05-25 | Stop reason: SDUPTHER

## 2023-05-24 RX ORDER — THIAMINE HYDROCHLORIDE 100 MG/ML
200 INJECTION, SOLUTION INTRAMUSCULAR; INTRAVENOUS EVERY 8 HOURS SCHEDULED
Status: DISCONTINUED | OUTPATIENT
Start: 2023-05-24 | End: 2023-05-26 | Stop reason: HOSPADM

## 2023-05-24 RX ORDER — ONDANSETRON 4 MG/1
4 TABLET, FILM COATED ORAL EVERY 6 HOURS PRN
Status: DISCONTINUED | OUTPATIENT
Start: 2023-05-24 | End: 2023-05-25 | Stop reason: SDUPTHER

## 2023-05-24 RX ORDER — HYDROMORPHONE HYDROCHLORIDE 1 MG/ML
0.5 INJECTION, SOLUTION INTRAMUSCULAR; INTRAVENOUS; SUBCUTANEOUS
Status: DISCONTINUED | OUTPATIENT
Start: 2023-05-24 | End: 2023-05-26 | Stop reason: HOSPADM

## 2023-05-24 RX ORDER — SODIUM CHLORIDE 0.9 % (FLUSH) 0.9 %
10 SYRINGE (ML) INJECTION EVERY 12 HOURS SCHEDULED
Status: DISCONTINUED | OUTPATIENT
Start: 2023-05-24 | End: 2023-05-26 | Stop reason: HOSPADM

## 2023-05-24 RX ORDER — SODIUM CHLORIDE 9 MG/ML
40 INJECTION, SOLUTION INTRAVENOUS AS NEEDED
Status: DISCONTINUED | OUTPATIENT
Start: 2023-05-24 | End: 2023-05-26 | Stop reason: HOSPADM

## 2023-05-24 RX ORDER — ACETAMINOPHEN 325 MG/1
650 TABLET ORAL EVERY 4 HOURS PRN
Status: DISCONTINUED | OUTPATIENT
Start: 2023-05-24 | End: 2023-05-26 | Stop reason: HOSPADM

## 2023-05-24 RX ORDER — LORAZEPAM 1 MG/1
1 TABLET ORAL EVERY 8 HOURS
Status: DISCONTINUED | OUTPATIENT
Start: 2023-05-25 | End: 2023-05-24

## 2023-05-24 RX ORDER — MULTIPLE VITAMINS W/ MINERALS TAB 9MG-400MCG
1 TAB ORAL DAILY
Status: DISCONTINUED | OUTPATIENT
Start: 2023-05-24 | End: 2023-05-26 | Stop reason: HOSPADM

## 2023-05-24 RX ORDER — DEXAMETHASONE SODIUM PHOSPHATE 10 MG/ML
INJECTION, SOLUTION INTRAMUSCULAR; INTRAVENOUS
Status: COMPLETED | OUTPATIENT
Start: 2023-05-24 | End: 2023-05-24

## 2023-05-24 RX ORDER — SODIUM CHLORIDE 0.9 % (FLUSH) 0.9 %
10 SYRINGE (ML) INJECTION AS NEEDED
Status: DISCONTINUED | OUTPATIENT
Start: 2023-05-24 | End: 2023-05-26 | Stop reason: HOSPADM

## 2023-05-24 RX ORDER — PANTOPRAZOLE SODIUM 40 MG/1
40 TABLET, DELAYED RELEASE ORAL DAILY
Status: DISCONTINUED | OUTPATIENT
Start: 2023-05-24 | End: 2023-05-26 | Stop reason: HOSPADM

## 2023-05-24 RX ORDER — BUPIVACAINE HYDROCHLORIDE 2.5 MG/ML
INJECTION, SOLUTION EPIDURAL; INFILTRATION; INTRACAUDAL
Status: COMPLETED | OUTPATIENT
Start: 2023-05-24 | End: 2023-05-24

## 2023-05-24 RX ORDER — AMOXICILLIN 250 MG
2 CAPSULE ORAL 2 TIMES DAILY
Status: DISCONTINUED | OUTPATIENT
Start: 2023-05-24 | End: 2023-05-26 | Stop reason: HOSPADM

## 2023-05-24 RX ORDER — ONDANSETRON 2 MG/ML
4 INJECTION INTRAMUSCULAR; INTRAVENOUS EVERY 6 HOURS PRN
Status: DISCONTINUED | OUTPATIENT
Start: 2023-05-24 | End: 2023-05-25 | Stop reason: SDUPTHER

## 2023-05-24 RX ORDER — FENTANYL CITRATE 50 UG/ML
50 INJECTION, SOLUTION INTRAMUSCULAR; INTRAVENOUS ONCE
Status: COMPLETED | OUTPATIENT
Start: 2023-05-24 | End: 2023-05-24

## 2023-05-24 RX ORDER — LORAZEPAM 1 MG/1
1 TABLET ORAL
Status: DISCONTINUED | OUTPATIENT
Start: 2023-05-24 | End: 2023-05-26 | Stop reason: HOSPADM

## 2023-05-24 RX ORDER — POTASSIUM CHLORIDE 7.45 MG/ML
10 INJECTION INTRAVENOUS
Status: DISCONTINUED | OUTPATIENT
Start: 2023-05-24 | End: 2023-05-24

## 2023-05-24 RX ORDER — POTASSIUM CHLORIDE 750 MG/1
40 CAPSULE, EXTENDED RELEASE ORAL EVERY 4 HOURS
Status: COMPLETED | OUTPATIENT
Start: 2023-05-24 | End: 2023-05-24

## 2023-05-24 RX ORDER — IBUPROFEN 600 MG/1
1 TABLET ORAL
Status: DISCONTINUED | OUTPATIENT
Start: 2023-05-24 | End: 2023-05-26 | Stop reason: HOSPADM

## 2023-05-24 RX ORDER — BISACODYL 5 MG/1
5 TABLET, DELAYED RELEASE ORAL DAILY PRN
Status: DISCONTINUED | OUTPATIENT
Start: 2023-05-24 | End: 2023-05-26 | Stop reason: HOSPADM

## 2023-05-24 RX ORDER — ONDANSETRON 2 MG/ML
4 INJECTION INTRAMUSCULAR; INTRAVENOUS ONCE
Status: COMPLETED | OUTPATIENT
Start: 2023-05-24 | End: 2023-05-24

## 2023-05-24 RX ORDER — FAMOTIDINE 20 MG/1
20 TABLET, FILM COATED ORAL ONCE
Status: CANCELLED | OUTPATIENT
Start: 2023-05-24 | End: 2023-05-24

## 2023-05-24 RX ORDER — LORAZEPAM 1 MG/1
2 TABLET ORAL
Status: DISCONTINUED | OUTPATIENT
Start: 2023-05-24 | End: 2023-05-26 | Stop reason: HOSPADM

## 2023-05-24 RX ORDER — OXYCODONE HYDROCHLORIDE AND ACETAMINOPHEN 5; 325 MG/1; MG/1
1 TABLET ORAL EVERY 6 HOURS PRN
Status: DISCONTINUED | OUTPATIENT
Start: 2023-05-24 | End: 2023-05-26 | Stop reason: HOSPADM

## 2023-05-24 RX ORDER — CHOLECALCIFEROL (VITAMIN D3) 125 MCG
5 CAPSULE ORAL NIGHTLY PRN
Status: DISCONTINUED | OUTPATIENT
Start: 2023-05-24 | End: 2023-05-26 | Stop reason: HOSPADM

## 2023-05-24 RX ORDER — NICOTINE POLACRILEX 4 MG
15 LOZENGE BUCCAL
Status: DISCONTINUED | OUTPATIENT
Start: 2023-05-24 | End: 2023-05-26 | Stop reason: HOSPADM

## 2023-05-24 RX ORDER — MIDAZOLAM HYDROCHLORIDE 1 MG/ML
2 INJECTION INTRAMUSCULAR; INTRAVENOUS ONCE
Status: COMPLETED | OUTPATIENT
Start: 2023-05-24 | End: 2023-05-24

## 2023-05-24 RX ORDER — ROPIVACAINE HYDROCHLORIDE 2 MG/ML
INJECTION, SOLUTION EPIDURAL; INFILTRATION; PERINEURAL CONTINUOUS
Status: DISCONTINUED | OUTPATIENT
Start: 2023-05-24 | End: 2023-05-26 | Stop reason: HOSPADM

## 2023-05-24 RX ORDER — LORAZEPAM 2 MG/ML
1 INJECTION INTRAMUSCULAR
Status: DISCONTINUED | OUTPATIENT
Start: 2023-05-24 | End: 2023-05-26 | Stop reason: HOSPADM

## 2023-05-24 RX ORDER — DEXTROSE MONOHYDRATE 25 G/50ML
25 INJECTION, SOLUTION INTRAVENOUS
Status: DISCONTINUED | OUTPATIENT
Start: 2023-05-24 | End: 2023-05-26 | Stop reason: HOSPADM

## 2023-05-24 RX ORDER — POLYETHYLENE GLYCOL 3350 17 G/17G
17 POWDER, FOR SOLUTION ORAL DAILY PRN
Status: DISCONTINUED | OUTPATIENT
Start: 2023-05-24 | End: 2023-05-26 | Stop reason: HOSPADM

## 2023-05-24 RX ORDER — INSULIN LISPRO 100 [IU]/ML
2-7 INJECTION, SOLUTION INTRAVENOUS; SUBCUTANEOUS
Status: DISCONTINUED | OUTPATIENT
Start: 2023-05-24 | End: 2023-05-26 | Stop reason: HOSPADM

## 2023-05-24 RX ORDER — LORAZEPAM 1 MG/1
1 TABLET ORAL EVERY 6 HOURS
Status: DISCONTINUED | OUTPATIENT
Start: 2023-05-24 | End: 2023-05-24

## 2023-05-24 RX ORDER — HYDROCHLOROTHIAZIDE 12.5 MG/1
12.5 TABLET ORAL DAILY
Status: DISCONTINUED | OUTPATIENT
Start: 2023-05-24 | End: 2023-05-26 | Stop reason: HOSPADM

## 2023-05-24 RX ORDER — FOLIC ACID 1 MG/1
1 TABLET ORAL DAILY
Status: DISCONTINUED | OUTPATIENT
Start: 2023-05-24 | End: 2023-05-26 | Stop reason: HOSPADM

## 2023-05-24 RX ADMIN — HYDROMORPHONE HYDROCHLORIDE 0.5 MG: 1 INJECTION, SOLUTION INTRAMUSCULAR; INTRAVENOUS; SUBCUTANEOUS at 08:00

## 2023-05-24 RX ADMIN — THIAMINE HYDROCHLORIDE 200 MG: 100 INJECTION, SOLUTION INTRAMUSCULAR; INTRAVENOUS at 21:20

## 2023-05-24 RX ADMIN — THIAMINE HYDROCHLORIDE 200 MG: 100 INJECTION, SOLUTION INTRAMUSCULAR; INTRAVENOUS at 15:38

## 2023-05-24 RX ADMIN — SODIUM CHLORIDE, POTASSIUM CHLORIDE, SODIUM LACTATE AND CALCIUM CHLORIDE 100 ML/HR: 600; 310; 30; 20 INJECTION, SOLUTION INTRAVENOUS at 08:37

## 2023-05-24 RX ADMIN — FOLIC ACID 1 MG: 1 TABLET ORAL at 13:21

## 2023-05-24 RX ADMIN — OXYCODONE HYDROCHLORIDE AND ACETAMINOPHEN 1 TABLET: 5; 325 TABLET ORAL at 20:30

## 2023-05-24 RX ADMIN — BUPIVACAINE HYDROCHLORIDE 30 ML: 2.5 INJECTION, SOLUTION EPIDURAL; INFILTRATION; INTRACAUDAL; PERINEURAL at 07:56

## 2023-05-24 RX ADMIN — DOCUSATE SODIUM 50 MG AND SENNOSIDES 8.6 MG 2 TABLET: 8.6; 5 TABLET, FILM COATED ORAL at 20:30

## 2023-05-24 RX ADMIN — Medication 1000 MG: at 17:13

## 2023-05-24 RX ADMIN — ONDANSETRON 4 MG: 2 INJECTION INTRAMUSCULAR; INTRAVENOUS at 02:46

## 2023-05-24 RX ADMIN — OXYCODONE HYDROCHLORIDE AND ACETAMINOPHEN 1 TABLET: 5; 325 TABLET ORAL at 10:27

## 2023-05-24 RX ADMIN — INSULIN LISPRO 2 UNITS: 100 INJECTION, SOLUTION INTRAVENOUS; SUBCUTANEOUS at 13:21

## 2023-05-24 RX ADMIN — DEXAMETHASONE SODIUM PHOSPHATE 2 MG: 10 INJECTION, SOLUTION INTRAMUSCULAR; INTRAVENOUS at 07:57

## 2023-05-24 RX ADMIN — HYDROCHLOROTHIAZIDE 12.5 MG: 12.5 CAPSULE ORAL at 08:00

## 2023-05-24 RX ADMIN — FENTANYL CITRATE 50 MCG: 50 INJECTION, SOLUTION INTRAMUSCULAR; INTRAVENOUS at 02:47

## 2023-05-24 RX ADMIN — MORPHINE SULFATE 2 MG: 2 INJECTION, SOLUTION INTRAMUSCULAR; INTRAVENOUS at 05:19

## 2023-05-24 RX ADMIN — BUPIVACAINE HYDROCHLORIDE 30 ML: 2.5 INJECTION, SOLUTION EPIDURAL; INFILTRATION; INTRACAUDAL at 07:57

## 2023-05-24 RX ADMIN — MIDAZOLAM HYDROCHLORIDE 2 MG: 1 INJECTION, SOLUTION INTRAMUSCULAR; INTRAVENOUS at 02:47

## 2023-05-24 RX ADMIN — PANTOPRAZOLE SODIUM 40 MG: 40 TABLET, DELAYED RELEASE ORAL at 08:00

## 2023-05-24 RX ADMIN — POTASSIUM CHLORIDE 40 MEQ: 10 CAPSULE, COATED, EXTENDED RELEASE ORAL at 10:27

## 2023-05-24 RX ADMIN — MULTIPLE VITAMINS W/ MINERALS TAB 1 TABLET: TAB at 13:21

## 2023-05-24 RX ADMIN — Medication 10 ML: at 08:38

## 2023-05-24 RX ADMIN — POTASSIUM CHLORIDE 40 MEQ: 10 CAPSULE, COATED, EXTENDED RELEASE ORAL at 13:21

## 2023-05-24 NOTE — PROGRESS NOTES
[facility]    Acute pain service Inpatient Progress Note    Patient Name: Philomena Davis  :  1960  MRN:  [id number]        Acute Pain  Service Inpatient Progress Note:    Analgesia:Fair  Pain Score:8/10  LOC: alert and awake  Resp Status: room air  Cardiac: VS stable  Side Effects:None  Catheter Site:clean, dressing intact and dry  Cath type: peripheral nerve cath with ON Q  Volume: 1mL,5ml, 5ml InfuSystem Pump.  Catheter Plan:Catheter to remain Insitu and Continue catheter infusion rate unchanged  Comments: Patient patient seen and examined this morning by the acute pain service.  We were consulted by the nursing staff to help manage her pain.  I discussed her case with Dr. Baig who intends to surgically fix her right ankle fracture tomorrow morning.  He says that she is at a very low risk for compartment syndrome, and has requested us to block her today.  In meeting with the patient she is agreeable to a popliteal nerve catheter and a single shot femoral nerve block.  She does have some bilateral numbness to both lower extremities that occurs below the knee.  She is able to move her toes she has normal sensation in her toes and in her legs bilaterally.    The neuro assessment of the operative extremity includes the ability to flex and extend the toes. The neuro exam of the patient also includes sensory function throughout the operative extremity.    We will proceed with the nerve blocks this morning in anticipation of managing her pain well and surgery tomorrow.

## 2023-05-24 NOTE — CONSULTS
Orthopedic Consult  Patient: Philomena Davis                                        YOB: 1960    Date of Admission: 5/24/2023  1:45 AM    Medical Record Number: [id number]    Attending Physician: Braxton Edge MD    Consulting Physician: Deandre Reynoso MD    Reason for Consult: right ankle fracture    History of Present Illness: 62 y.o. female admitted to [facility] with Closed displaced trimalleolar fracture of right ankle, initial encounter [S82.451B].     The patient was evaluated in the emergency room and was diagnosed with ankle injury.   Secondary to the age and multiple medical comorbidities the patient was admitted to the hospitalist.   As I was on call for the emergency room I was consulted for further evaluation and treatment.   The patient was in the usual state of health and fell from standing height in ankle pain, Resulting in sudden onset ankle pain and inability to ambulate.   Denies any history of loss of consciousness, headache, vomiting, or seizures.   Denies any other injuries.   The patient is accompanied by  family members  to this hospital visit.     The patient denies any prior  pre-existing pain in the ankle.  Patient is a  home/ community ambulator. Patient denies walker/cane as an assistive device.   The patient  lives at home with  family , is quite active and independent in activities of daily living.  The patient denies history of dementia.  She does have a history of ankle fracture on the left side that was fixed by Dr. Louie Griggs several years ago    Patient denies any history of: DVT/PE, MRSA, COPD, CHF, CAD, Diabetes mellitus, Dementia or A-Fib.   The patient has history of :  The patient is on anticoagulants: none    Past medical history, past surgical history, family history ALLERGIES, and home medications have been reviewed by me.     Past Medical History:   Diagnosis Date   • Anxiety and depression    • Cirrhosis of liver    • Closed displaced fracture  of lateral malleolus of left fibula    • Diabetes mellitus    • Ganglion cyst of left foot    • Glaucoma    • Hypertension    • Wears glasses      Past Surgical History:   Procedure Laterality Date   • ANKLE OPEN REDUCTION INTERNAL FIXATION Left 3/24/2017    Procedure: LEFT ANKLE OPEN REDUCTION INTERNAL FIXATION WITH ILIAC CREST BONE GRAFT , EXCISION CYST 4TH/5TH INNERSPACE LEFT FOOT;  Surgeon: Frank Larson MD;  Location:  [facility];  Service:    • AUGMENTATION MAMMAPLASTY Bilateral    • BREAST AUGMENTATION     • BREAST EXCISIONAL BIOPSY Right    • DILATION AND CURETTAGE, DIAGNOSTIC / THERAPEUTIC     • ENDOSCOPY N/A 2022    Procedure: ESOPHAGOGASTRODUODENOSCOPY;  Surgeon: Arelis Solano MD;  Location:  [facility];  Service: Gastroenterology;  Laterality: N/A;   • [place] RPR NON/MAL FEMUR DSTL H/N W/ILIAC/AUTOG BONE Left 3/24/2017    Procedure: ILIAC CREST BONE GRAFT;  Surgeon: Frank Larson MD;  Location:  [facility];  Service: Orthopedics   • WRIST SURGERY       Social History     Occupational History   • Not on file   Tobacco Use   • Smoking status: Former     Packs/day: 1.50     Years: 20.00     Pack years: 30.00     Types: Cigarettes     Quit date: 3/22/2009     Years since quittin.1   • Smokeless tobacco: Never   Vaping Use   • Vaping Use: Never used   Substance and Sexual Activity   • Alcohol use: Yes     Alcohol/week: 7.0 standard drinks     Types: 7 Glasses of wine per week     Comment: 1/5 VODKA EVERY TWO DAYS   • Drug use: No   • Sexual activity: Defer      Social History     Social History Narrative   • Not on file     Family History   Problem Relation Age of Onset   • Cancer Mother    • No Known Problems Father    • No Known Problems Sister    • No Known Problems Brother    • No Known Problems Daughter    • No Known Problems Son    • No Known Problems Maternal Grandmother    • Breast cancer Paternal Grandmother 68        met to brain   • No Known Problems Maternal  Aunt    • No Known Problems Paternal Aunt    • Rheum arthritis Other    • Heart disease Other    • Cancer Other    • Stroke Other    • Hypertension Other    • BRCA 1/2 Neg Hx    • Colon cancer Neg Hx    • Endometrial cancer Neg Hx    • Ovarian cancer Neg Hx           Allergies   Allergen Reactions   • Buspirone Anxiety   • Codeine Nausea Only   • Mirtazapine Anxiety       Home Medications:  Medications Prior to Admission   Medication Sig Dispense Refill Last Dose   • cholecalciferol (VITAMIN D3) 25 MCG (1000 UT) tablet Take 1 tablet by mouth Daily. 30 tablet 0    • fluconazole (DIFLUCAN) 150 MG tablet Take 1 tablet today.  If symptoms remain in 3 days take the remaining tablet 2 tablet 0    • Fluticasone-Salmeterol (ADVAIR/WIXELA) 100-50 MCG/ACT DISKUS Inhale 2 (Two) Times a Day.      • hydroCHLOROthiazide (HYDRODIURIL) 12.5 MG tablet       • latanoprost (XALATAN) 0.005 % ophthalmic solution Administer 1 drop to both eyes Every Night.      • nitrofurantoin, macrocrystal-monohydrate, (MACROBID) 100 MG capsule Take 1 capsule by mouth 2 (Two) Times a Day for 5 days. 10 capsule 0    • pantoprazole (PROTONIX) 40 MG EC tablet Take 1 tablet by mouth Daily.      • ursodiol (ACTIGALL) 300 MG capsule           Current Medications:  Scheduled Meds:hydroCHLOROthiazide, 12.5 mg, Oral, Daily  insulin lispro, 2-7 Units, Subcutaneous, TID With Meals  pantoprazole, 40 mg, Oral, Daily  potassium chloride ER, 40 mEq, Oral, Q4H  senna-docusate sodium, 2 tablet, Oral, BID  sodium chloride, 10 mL, Intravenous, Q12H      Continuous Infusions:lactated ringers, 100 mL/hr, Last Rate: 100 mL/hr (05/24/23 0837)  ropivacaine,       PRN Meds:.•  acetaminophen  •  senna-docusate sodium **AND** polyethylene glycol **AND** bisacodyl **AND** bisacodyl  •  dextrose  •  dextrose  •  glucagon (human recombinant)  •  HYDROmorphone  •  melatonin  •  ondansetron **OR** ondansetron  •  oxyCODONE-acetaminophen  •  Potassium Replacement - Follow Nurse / BPA  Driven Protocol  •  sodium chloride  •  [COMPLETED] Insert Peripheral IV **AND** sodium chloride  •  sodium chloride  •  sodium chloride      Review of Systems:   A 12 point system review was reviewed with the patient and from the chart  and is negative except for as mentioned in the history.     Physical Exam: 62 y.o. female                    Vitals:    05/24/23 0545 05/24/23 0602 05/24/23 0617 05/24/23 0632   BP: 114/66 95/55 114/63 119/71   BP Location:   Left arm    Patient Position:   Lying    Pulse: 73 90 82 81   Resp:   18    Temp:   97.6 °F (36.4 °C)    TempSrc:   Oral    SpO2: 93% 94% 98% 97%   Weight:       Height:            Gait: Could not be tested , patient is nonambulatory.    Mental/HEENT/cardio/skin: The patient's general appearance was well-nourished, well-hydrated, no acute distress.  Orientation was alert and oriented ×3.  The patient's mood was normal.   Pulmonary exam shows normal late exchange, no labored breathing, or shortness of breath.    The skin exam showed normal temperature and color in the area of examination.    Extremities: right   lower extremity is in a posterior splint. Positive tenderness over the ankle, especially over the lateral malleolus.. The patient  is able to do gentle active range of motion of toes. Gross sensation is intact over the toes.    Pulses: Pulses could not be checked on the injured side Secondary to the splint. There is good capillary refill.     Diagnostic Tests:    Results from last 7 days   Lab Units 05/24/23  0152   WBC 10*3/mm3 7.51   HEMOGLOBIN g/dL 13.6   HEMATOCRIT % 40.6   PLATELETS 10*3/mm3 221     Results from last 7 days   Lab Units 05/24/23  0152   SODIUM mmol/L 139  139   POTASSIUM mmol/L 3.2*  3.2*   CHLORIDE mmol/L 101  101   CO2 mmol/L 23.0  25.0   BUN mg/dL 13  14   CREATININE mg/dL 0.77  0.77   GLUCOSE mg/dL 97  99   CALCIUM mg/dL 9.3  9.3         Lab Results   Component Value Date    URICACID 5.3 10/02/2022     No results found  for: CRYSTAL  Microbiology Results (last 10 days)     Procedure Component Value - Date/Time    STI PANEL, [facility] (CT,NG,TV) - Urine, Urine, Random Void [[id number]] Collected: 05/19/23 0911    Lab Status: Final result Specimen: Urine, Random Void Updated: 05/22/23 0837     Chlamydia Trachomatis DNA, SDA Not Detected     Neisseria gonorrhoeae PCR Not Detected     Trich vag by SHARRI Not Detected    Urine Culture - Urine, Urine, Clean Catch [[id number]]  (Abnormal)  (Susceptibility) Collected: 05/19/23 0908    Lab Status: Final result Specimen: Urine, Clean Catch Updated: 05/21/23 0934     Urine Culture >100,000 CFU/mL Escherichia coli    Narrative:      Colonization of the urinary tract without infection is common. Treatment is discouraged unless the patient is symptomatic, pregnant, or undergoing an invasive urologic procedure.    Susceptibility      Escherichia coli      ALENA      Ampicillin Susceptible      Ampicillin + Sulbactam Susceptible      Cefazolin Susceptible      Cefepime Susceptible      Ceftazidime Susceptible      Ceftriaxone Susceptible      Gentamicin Susceptible      Levofloxacin Susceptible      Nitrofurantoin Susceptible      Piperacillin + Tazobactam Susceptible      Trimethoprim + Sulfamethoxazole Susceptible                               XR Ankle 3+ View Right    Result Date: 5/24/2023  Impression: Improved alignment. Fracture dislocation of the ankle. There is now anatomic alignment of the tibiotalar joint. Ankle mortise is disrupted. Displaced trimalleolar fracture is present with soft tissue swelling. Electronically Signed: Isabela Henderson  5/24/2023 6:53 AM EDT  Workstation ID: [id number]    XR Ankle 3+ View Right    Result Date: 5/24/2023  1.  Trimalleolar ankle fracture. 2.  Lateral dislocation of the talus off the tibia. Electronically signed by:  Aníbal Gonsalez M.D.  5/24/2023 1:19 AM Mountain Time    Assessment: right Tri malleolar  ankle fracture.       Closed displaced trimalleolar fracture  of right ankle, initial encounter    Diabetes mellitus, type 2    Essential hypertension    Generalized anxiety disorder    Hyperlipidemia    Alcoholic cirrhosis of liver without ascites    GERD without esophagitis    Closed trimalleolar fracture of right ankle    Hypokalemia      Plan:  The patient is indicated for an open reduction and internal fixation of the ankle fracture. I discussed the options including nonoperative treatment versus operative treatment. The patient voiced understanding of the risks, benefits, and alternative forms of treatment that were discussed including but not limited to infection, DVT, pulmonary embolism, nonunion, malunion, posttraumatic stiffness, posttraumatic arthritis, prominent palpable hardware have been discussed in detail. Despite the above risks the patient consents to proceed with right ankle open reduction and internal fixation.   The patient is scheduled for the above surgery tentatively for 5/25/ 2023.   The patient's admitting service has seen the patient and the patient is cleared to the operating room.   Plan will be for surgery tomorrow on the 25th  CT scan today to evaluate for posterior malleolus fragment size  Okay to eat today  Anesthesia is performed nerve blocks as help the patient's from a pain control standpoint  Nonweightbearing on the right lower extremity  Mechanical DVT prophylaxis for [place]  N.p.o. after midnight for or tomorrow    Date: 5/24/2023        CC: Provider, No Known; MD Kely Stoll Wycliffe, MD

## 2023-05-24 NOTE — H&P
[facility] Medicine Services  HISTORY AND PHYSICAL    Patient Name: Philomena Davis  : 1960  MRN: [id number]  Primary Care Physician: Provider, No Known  Date of admission: 2023    Subjective   Subjective     Chief Complaint:  Fall. Right ankle pain    HPI:  Philomena Davis is a 62 y.o. female with a history of alcoholic cirrhosis, HTN, HLD, T2DM, and anxiety/depression who presents to [facility] ED for complaint of right ankle pain. She states that she has peripheral neuropathy and ongoing issues with her gait for some time now, and is reportedly seeing neurology and PT. She states that today while walking through her kitchen, her legs gave out from under her, causing her to fall to the ground. She felt immediate pain in the right ankle. EMS brought here here for further evaluation.         Review of Systems   Constitutional: Negative for chills, fatigue, fever and unexpected weight change.   HENT: Negative for nosebleeds, sore throat and trouble swallowing.    Eyes: Negative for photophobia and visual disturbance.   Respiratory: Negative for cough, shortness of breath and wheezing.    Cardiovascular: Negative for chest pain and palpitations.   Gastrointestinal: Negative for abdominal pain, diarrhea, nausea and vomiting.   Genitourinary: Negative for dysuria and hematuria.   Musculoskeletal: Positive for arthralgias (right ankle). Negative for myalgias.   Skin: Negative.    Neurological: Negative for tremors, syncope, speech difficulty and weakness.   Psychiatric/Behavioral: Negative for confusion. The patient is not nervous/anxious.           Personal History     Past Medical History:   Diagnosis Date   • Anxiety and depression    • Cirrhosis of liver    • Closed displaced fracture of lateral malleolus of left fibula    • Diabetes mellitus    • Ganglion cyst of left foot    • Glaucoma    • Hypertension    • Wears glasses              Past Surgical History:   Procedure  Laterality Date   • ANKLE OPEN REDUCTION INTERNAL FIXATION Left 3/24/2017    Procedure: LEFT ANKLE OPEN REDUCTION INTERNAL FIXATION WITH ILIAC CREST BONE GRAFT , EXCISION CYST 4TH/5TH INNERSPACE LEFT FOOT;  Surgeon: Frank Larson MD;  Location:  [facility];  Service:    • AUGMENTATION MAMMAPLASTY Bilateral 1999   • BREAST AUGMENTATION     • BREAST EXCISIONAL BIOPSY Right 2014   • DILATION AND CURETTAGE, DIAGNOSTIC / THERAPEUTIC     • ENDOSCOPY N/A 11/30/2022    Procedure: ESOPHAGOGASTRODUODENOSCOPY;  Surgeon: Arelis Solano MD;  Location:  [facility];  Service: Gastroenterology;  Laterality: N/A;   • [place] RPR NON/MAL FEMUR DSTL H/N W/ILIAC/AUTOG BONE Left 3/24/2017    Procedure: ILIAC CREST BONE GRAFT;  Surgeon: Frank Larson MD;  Location:  [facility];  Service: Orthopedics   • WRIST SURGERY         Family History:  family history includes Breast cancer (age of onset: 68) in her paternal grandmother; Cancer in her mother and another family member; Heart disease in an other family member; Hypertension in an other family member; No Known Problems in her brother, daughter, father, maternal aunt, maternal grandmother, paternal aunt, sister, and son; Rheum arthritis in an other family member; Stroke in an other family member.     Social History:  reports that she quit smoking about 14 years ago. Her smoking use included cigarettes. She has a 30.00 pack-year smoking history. She has never used smokeless tobacco. She reports current alcohol use of about 7.0 standard drinks per week. She reports that she does not use drugs.  Social History     Social History Narrative   • Not on file       Medications:  Fluticasone-Salmeterol, cholecalciferol, fluconazole, hydroCHLOROthiazide, latanoprost, nitrofurantoin (macrocrystal-monohydrate), pantoprazole, and ursodiol    Allergies   Allergen Reactions   • Buspirone Anxiety   • Codeine Nausea Only   • Mirtazapine Anxiety       Objective   Objective     Vital Signs:    Temp:  [97 °F (36.1 °C)] 97 °F (36.1 °C)  Heart Rate:  [66-87] 79  Resp:  [18] 18  BP: ()/(55-71) 100/68  Flow (L/min):  [3] 3    Physical Exam  Constitutional:       General: She is not in acute distress.     Appearance: Normal appearance.   HENT:      Head: Atraumatic.      Right Ear: External ear normal.      Left Ear: External ear normal.      Nose: Nose normal.   Eyes:      Extraocular Movements: Extraocular movements intact.      Conjunctiva/sclera: Conjunctivae normal.      Pupils: Pupils are equal, round, and reactive to light.   Cardiovascular:      Rate and Rhythm: Normal rate and regular rhythm.      Pulses: Normal pulses.      Heart sounds: Normal heart sounds. No murmur heard.  Pulmonary:      Effort: Pulmonary effort is normal. No respiratory distress.      Breath sounds: Normal breath sounds. No wheezing, rhonchi or rales.   Abdominal:      General: Bowel sounds are normal. There is no distension.      Tenderness: There is no abdominal tenderness. There is no guarding or rebound.   Musculoskeletal:         General: Tenderness present.      Cervical back: No rigidity.      Right lower leg: No edema.      Left lower leg: No edema.      Comments: Right ankle wrapped in splint.    Skin:     General: Skin is warm and dry.      Coloration: Skin is not jaundiced.      Findings: No lesion or rash.   Neurological:      General: No focal deficit present.      Mental Status: She is alert and oriented to person, place, and time.   Psychiatric:         Attention and Perception: Attention normal.         Mood and Affect: Mood normal.         Behavior: Behavior normal.         Thought Content: Thought content normal.          Result Review:  I have personally reviewed the results from the time of this admission to 5/24/2023 05:04 EDT and agree with these findings:  [x]  Laboratory list / accordion  []  Microbiology  [x]  Radiology  []  EKG/Telemetry   []  Cardiology/Vascular   []  Pathology  [x]  Old  records  []  Other:      LAB RESULTS:      Lab 05/24/23  0152   WBC 7.51   HEMOGLOBIN 13.6   HEMATOCRIT 40.6   PLATELETS 221   NEUTROS ABS 4.82   IMMATURE GRANS (ABS) 0.08*   LYMPHS ABS 1.77   MONOS ABS 0.55   EOS ABS 0.23   MCV 99.5*         Lab 05/24/23  0152   SODIUM 139  139   POTASSIUM 3.2*  3.2*   CHLORIDE 101  101   CO2 23.0  25.0   ANION GAP 15.0  13.0   BUN 13  14   CREATININE 0.77  0.77   EGFR 88.4  88.4   GLUCOSE 97  99   CALCIUM 9.3  9.3         Lab 05/24/23  0152   TOTAL PROTEIN 7.6   ALBUMIN 4.1   GLOBULIN 3.5   ALT (SGPT) 18   AST (SGOT) 32   BILIRUBIN 0.4   ALK PHOS 109                     Brief Urine Lab Results  (Last result in the past 365 days)      Color   Clarity   Blood   Leuk Est   Nitrite   Protein   CREAT   Urine HCG        05/19/23 0834 Orange  Comment: on azo   Cloudy   3+   Large (3+)   Positive   3+               Microbiology Results (last 10 days)     Procedure Component Value - Date/Time    STI PANEL, [facility] (CT,NG,TV) - Urine, Urine, Random Void [[id number]] Collected: 05/19/23 0911    Lab Status: Final result Specimen: Urine, Random Void Updated: 05/22/23 0837     Chlamydia Trachomatis DNA, SDA Not Detected     Neisseria gonorrhoeae PCR Not Detected     Trich vag by SHARRI Not Detected    Urine Culture - Urine, Urine, Clean Catch [[id number]]  (Abnormal)  (Susceptibility) Collected: 05/19/23 0908    Lab Status: Final result Specimen: Urine, Clean Catch Updated: 05/21/23 0934     Urine Culture >100,000 CFU/mL Escherichia coli    Narrative:      Colonization of the urinary tract without infection is common. Treatment is discouraged unless the patient is symptomatic, pregnant, or undergoing an invasive urologic procedure.    Susceptibility      Escherichia coli      ALENA      Ampicillin Susceptible      Ampicillin + Sulbactam Susceptible      Cefazolin Susceptible      Cefepime Susceptible      Ceftazidime Susceptible      Ceftriaxone Susceptible      Gentamicin Susceptible       Levofloxacin Susceptible      Nitrofurantoin Susceptible      Piperacillin + Tazobactam Susceptible      Trimethoprim + Sulfamethoxazole Susceptible                                 XR Ankle 3+ View Right    Result Date: 5/24/2023  EXAMINATION: XR ANKLE 3+ VW RIGHT  DATE OF EXAM: 5/24/2023 2:31 AM HISTORY: Trauma COMPARISON: None. FINDINGS: There is a trimalleolar ankle fracture with lateral dislocation of the talus off the tibia. There is diffuse soft tissue swelling.     Impression: 1.  Trimalleolar ankle fracture. 2.  Lateral dislocation of the talus off the tibia. Electronically signed by:  Aníbal Gonsalez M.D.  5/24/2023 1:19 AM Mountain Time          Assessment & Plan   Assessment & Plan       Closed displaced trimalleolar fracture of right ankle, initial encounter    Closed trimalleolar fracture of right ankle    Diabetes mellitus, type 2    Essential hypertension    Hyperlipidemia    Alcoholic cirrhosis of liver without ascites    Generalized anxiety disorder    GERD without esophagitis    Hypokalemia      Philomena Davis is a 62 y.o. female with a history of alcoholic cirrhosis, HTN, HLD, T2DM, and anxiety/depression who presents to [facility] ED for complaint of right ankle pain.     Closed trimalleolar fracture of right ankle  -XR of ankle shows a trimalleolar fracture with lateral dislocation of the talus off the tibia.  -Orthosurg consult for the am  -Pain control  -Bed rest.   -NPO for now.     Alcoholic cirrhosis  Hx alcohol abuse  -Reports not having a drink in the last 8 months.   Liver enzymes normal today.       HTN  HLD  -Takes HCTZ. Continue  -Not currently on a statin    T2DM  -Blood glucose well controlled tonight at 97  -Check A1C  -Fingerstick achs. SSI    Hypokalemia  -K+ 3.2  -Replace per protocol      DVT prophylaxis:  SCDS    CODE STATUS:  Full Code  Code Status (Patient has no pulse and is not breathing): CPR (Attempt to Resuscitate)  Medical Interventions (Patient has pulse or is  breathing): Full Support      Expected Discharge 1-2 days      This note has been completed as part of a split-shared workflow.     Signature: Electronically signed by Javad Quintero PA-C, 05/24/23, 4:59 AM EDT

## 2023-05-24 NOTE — PROGRESS NOTES
[facility] Medicine Services  ADMISSION FOLLOW-UP NOTE          Patient admitted after midnight, H&P by my partner performed earlier on today's date reviewed.  Interim findings, labs, and charting also reviewed.        [facility] Hospital Problem List has been managed and updated to include any new diagnoses:  Active Hospital Problems    Diagnosis  POA   • **Closed displaced trimalleolar fracture of right ankle, initial encounter [S82.851A]  Yes   • Closed trimalleolar fracture of right ankle [S82.851A]  Yes   • Hypokalemia [E87.6]  Unknown   • GERD without esophagitis [K21.9]  Yes   • Alcoholic cirrhosis of liver without ascites [K70.30]  Yes   • Diabetes mellitus, type 2 [E11.9]  Yes   • Essential hypertension [I10]  Yes   • Generalized anxiety disorder [F41.1]  Yes   • Hyperlipidemia [E78.5]  Yes      Resolved Hospital Problems   No resolved problems to display.     62-year-old female with history of type 2 diabetes, hyperlipidemia, general anxiety disorder, EtOH cirrhosis, GERD who presented to the ED with right ankle pain status post fall, found to have right trimalleolar fracture.    Right trimalleolar fracture s/p fall  -Ortho consulted, awaiting their evaluation  -Continue pain control, status post block  -PT/OT when appropriate    Well-controlled type 2 diabetes with A1c 4.9%  -Continue SSI for now    Hypertension  -BP currently stable continue HCTZ    EtOH cirrhosis  History of EtOH abuse  -Compensated  -Reports last echo was on Monday, couple glasses of wine  -Continue to closely monitor, will likely need CIWA    Hypokalemia  -Monitor and replete per protocol    General anxiety disorder  Hyperlipidemia  -Not on any home meds for these    Otherwise continue plan of care per admission H&P    Expected Discharge   Expected Discharge Date: 5/26/2023; Expected Discharge Time:      Braxton Edge MD  05/24/23

## 2023-05-24 NOTE — ANESTHESIA PROCEDURE NOTES
Right Single Shot Femoral Nerve block      Patient reassessed immediately prior to procedure    Start time: 5/24/2023 7:57 AM  Reason for block: at surgeon's request and post-op pain management  Performed by  CRNA/CAA: Howard Manjarrez, VIOLA  Assisted by: Anisha Talbert RN  Preanesthetic Checklist  Completed: patient identified, IV checked, site marked, risks and benefits discussed, surgical consent, monitors and equipment checked, pre-op evaluation and timeout performed  Prep:  Pt Position: supine  Sterile barriers:gloves, cap, sterile barriers, mask and washed/disinfected hands  Prep: ChloraPrep  Patient monitoring: blood pressure monitoring, continuous pulse oximetry and EKG  Procedure    Sedation: no    Guidance:ultrasound guided    ULTRASOUND INTERPRETATION.  Using ultrasound guidance a 20 G gauge needle was placed in close proximity to the femoral nerve, at which point, under ultrasound guidance anesthetic was injected in the area of the nerve and spread of the anesthesia was seen on ultrasound in close proximity thereto.  There were no abnormalities seen on ultrasound; a digital image was taken; and the patient tolerated the procedure with no complications. Images:still images obtained, printed/placed on chart    Laterality:right  Block Type:femoral  Injection Technique:single-shot  Needle Type:short-bevel  Needle Gauge:20 G  Resistance on Injection: none    Medications Used: dexamethasone sodium phosphate injection - Injection   2 mg - 5/24/2023 7:57:00 AM  bupivacaine PF (MARCAINE) 0.25 % injection - Injection   30 mL - 5/24/2023 7:57:00 AM      Medications  Preservative Free Saline:5ml    Post Assessment  Injection Assessment: negative aspiration for heme, no paresthesia on injection and incremental injection  Patient Tolerance:comfortable throughout block  Complications:no  Additional Notes  SINGLE Shot  A high-frequency linear transducer, with sterile cover, was placed in the inguinal crease to  "visualize the Femoral Vein, Artery, and Nerve (medial to lateral). The insertion site was prepped in sterile fashion. Skin and cutaneous tissue was infiltrated with 2-5 ml of 1% Lidocaine. Using ultrasound-guidance, a 20-gauge B-Laws 4\" Ultraplex 360 non-stimulating echogenic needle was then inserted and advanced in-plane from lateral to medial with ultrasound guidance. The needle was directed below Fascia Iliacus towards the Femoral nerve. Preservative-free normal saline was utilized for hydro-dissection of tissue. Local anesthetic injection spread, in incremental 3-5 ml injections, was visualized lateral to the artery to surround the femoral nerve. Aspiration every 5 ml to prevent intravascular injection. Injection was completed with negative aspiration of blood and negative intravascular injection. Injection pressures were normal with minimal resistance.           "

## 2023-05-24 NOTE — ED PROVIDER NOTES
Subjective   History of Present Illness  62-year-old female states that she was walking in her residence and lost her balance and fell.  She is brought by EMS with severe right ankle pain with deformity.  She denies other injuries.  She has a history of chronic neuropathy and balance problems, and has chronic numbness to bilateral feet which persists currently.  Patient lives alone and states that she was not near her phone and had to crawl to reach her phone prior to calling EMS.  Pain is severe to right ankle.  Patient denies right knee pain.  Pain is nonradiating.  There is associated bruising and swelling to the right ankle area.  There is no associated bleeding to the right ankle area.            Past Medical History:   Diagnosis Date   • Anxiety and depression    • Cirrhosis of liver    • Closed displaced fracture of lateral malleolus of left fibula    • Diabetes mellitus    • Ganglion cyst of left foot    • Glaucoma    • Hypertension    • Wears glasses        Allergies   Allergen Reactions   • Buspirone Anxiety   • Codeine Nausea Only   • Mirtazapine Anxiety       Past Surgical History:   Procedure Laterality Date   • ANKLE OPEN REDUCTION INTERNAL FIXATION Left 3/24/2017    Procedure: LEFT ANKLE OPEN REDUCTION INTERNAL FIXATION WITH ILIAC CREST BONE GRAFT , EXCISION CYST 4TH/5TH INNERSPACE LEFT FOOT;  Surgeon: Frank Larson MD;  Location:  [facility];  Service:    • AUGMENTATION MAMMAPLASTY Bilateral 1999   • BREAST AUGMENTATION     • BREAST EXCISIONAL BIOPSY Right 2014   • DILATION AND CURETTAGE, DIAGNOSTIC / THERAPEUTIC     • ENDOSCOPY N/A 11/30/2022    Procedure: ESOPHAGOGASTRODUODENOSCOPY;  Surgeon: Arelis Solano MD;  Location:  [facility];  Service: Gastroenterology;  Laterality: N/A;   • OR RPR NON/MAL FEMUR DSTL H/N W/ILIAC/AUTOG BONE Left 3/24/2017    Procedure: ILIAC CREST BONE GRAFT;  Surgeon: Frank Larson MD;  Location:  [facility];  Service: Orthopedics   • WRIST SURGERY          Family History   Problem Relation Age of Onset   • Cancer Mother    • No Known Problems Father    • No Known Problems Sister    • No Known Problems Brother    • No Known Problems Daughter    • No Known Problems Son    • No Known Problems Maternal Grandmother    • Breast cancer Paternal Grandmother 68        met to brain   • No Known Problems Maternal Aunt    • No Known Problems Paternal Aunt    • Rheum arthritis Other    • Heart disease Other    • Cancer Other    • Stroke Other    • Hypertension Other    • BRCA 1/2 Neg Hx    • Colon cancer Neg Hx    • Endometrial cancer Neg Hx    • Ovarian cancer Neg Hx        Social History     Socioeconomic History   • Marital status:    Tobacco Use   • Smoking status: Former     Packs/day: 1.50     Years: 20.00     Pack years: 30.00     Types: Cigarettes     Quit date: 3/22/2009     Years since quittin.1   • Smokeless tobacco: Never   Substance and Sexual Activity   • Alcohol use: Yes     Alcohol/week: 7.0 standard drinks     Types: 7 Glasses of wine per week     Comment: / VODKA EVERY TWO DAYS   • Drug use: No   • Sexual activity: Defer           Objective   Physical Exam  Vitals and nursing note reviewed.   Constitutional:       General: She is not in acute distress.     Appearance: She is not diaphoretic.   HENT:      Mouth/Throat:      Mouth: Mucous membranes are moist.   Eyes:      General: No scleral icterus.     Comments: No photophobia.   Neck:      Comments: No meningismus.  Pulmonary:      Effort: Pulmonary effort is normal.      Breath sounds: No stridor.   Musculoskeletal:         General: Deformity present.      Cervical back: Neck supple.      Comments: 2+ right dorsalis pedis pulse.  Brisk distal capillary refill to the toes of the right lower extremity.  Right ankle deformity noted without break in the skin overlying deformed area.  Ecchymosis noted overlying deformed area to right ankle.  Nontender bony prominences of the right knee and right  fibular head.  Nontender right tibial tuberosity.   Skin:     General: Skin is warm and dry.      Coloration: Skin is not cyanotic.   Neurological:      Comments: Normal speech, no dysarthria.  No facial droop.  Moves all extremities.  Decreased sensation bilateral feet to light touch.         FX Dislocation    Date/Time: 5/24/2023 6:21 AM  Performed by: Nela Nathan MD  Authorized by: Braxton Edge MD     Consent:     Consent obtained:  Verbal    Consent given by:  Patient    Risks, benefits, and alternatives were discussed: yes      Risks discussed:  Pain (Skin breakdown if reduction is not performed)    Alternatives discussed:  Observation  Universal protocol:     Imaging studies available: yes      Patient identity confirmed:  Arm band  Injury:     Injury location:  Ankle    Ankle injury location:  R ankle    Ankle fracture type: trimalleolar    Pre-procedure details:     Distal neurologic exam:  Numbness    Distal perfusion: distal pulses strong and brisk capillary refill    Sedation:     Sedation type:  Anxiolysis (Versed 2 mg IV, fentanyl IV as per EMR)  Procedure details:     Manipulation performed: yes      Reduction successful: yes      X-ray confirmed reduction: yes      Immobilization:  Splint    Splint type: Posterior slab with stirrup.    Supplies used:  Fiberglass    Additional details:  Molded, RN assisted    Attestation: Splint applied and adjusted personally by me    Post-procedure details:     Distal neurologic exam:  Numbness    Distal perfusion: distal pulses strong      Procedure completion:  Tolerated well, no immediate complications    Fracture management: I provided definitive fracture management    Comments:      Chronic numbness, unchanged.  History of neuropathy.               ED Course                                           Medical Decision Making  Cortical fracture, distal neurovascular intact.  Patient lives alone with chronic neuropathy with decree sensation to her feet as  well as balance problems.  She states that she would be unable to remain nonweightbearing to the right lower extremity if she were to be discharged.  She denies having stairs in her residence.  She does not have a walker or a wheelchair at home.    Acute right ankle pain: complicated acute illness or injury  Closed displaced trimalleolar fracture of right ankle, initial encounter: complicated acute illness or injury  Amount and/or Complexity of Data Reviewed  Independent Historian: EMS  Labs: ordered. Decision-making details documented in ED Course.  Radiology: ordered and independent interpretation performed. Decision-making details documented in ED Course.     Details: X-rays personally interpreted by me.  Currently postreduction x-ray report is not available at 06 20 4 AM.  Postreduction x-ray shows improved alignment of trimalleolar right ankle fracture, and splint in place.  I did personally also review the initial images of the right ankle prior to reduction, and it shows trimalleolar right ankle fracture with displacement of the talus laterally from the tibia.  ECG/medicine tests: ordered and independent interpretation performed. Decision-making details documented in ED Course.     Details: EKG at 0355 shows normal sinus rhythm at a rate of 74 bpm, nonspecific ST-T wave changes, no significant change compared to prior EKG from 11/20/2022.  Discussion of management or test interpretation with external provider(s): Case discussed with hospitalist Dr. Murry via [facility] chat who accepts the patient in admission.  Orthopedic surgeon on-call Dr. Reynoso was contacted through the [facility] orthopedics answering service at approximately 0540. I discussed with him by phone that he received the voicemail at approximately 6:14AM and is aware of the consultation.    Risk  Prescription drug management.  Decision regarding hospitalization.        Recent Results (from the past 24 hour(s))   Green Top (Gel)    Collection Time:  05/24/23  1:52 AM   Result Value Ref Range    Extra Tube Hold for add-ons.    Lavender Top    Collection Time: 05/24/23  1:52 AM   Result Value Ref Range    Extra Tube hold for add-on    Gold Top - SST    Collection Time: 05/24/23  1:52 AM   Result Value Ref Range    Extra Tube Hold for add-ons.    Gray Top    Collection Time: 05/24/23  1:52 AM   Result Value Ref Range    Extra Tube Hold for add-ons.    Light Blue Top    Collection Time: 05/24/23  1:52 AM   Result Value Ref Range    Extra Tube Hold for add-ons.    Basic Metabolic Panel    Collection Time: 05/24/23  1:52 AM    Specimen: Blood   Result Value Ref Range    Glucose 99 65 - 99 mg/dL    BUN 14 8 - 23 mg/dL    Creatinine 0.77 0.57 - 1.00 mg/dL    Sodium 139 136 - 145 mmol/L    Potassium 3.2 (L) 3.5 - 5.2 mmol/L    Chloride 101 98 - 107 mmol/L    CO2 25.0 22.0 - 29.0 mmol/L    Calcium 9.3 8.6 - 10.5 mg/dL    BUN/Creatinine Ratio 18.2 7.0 - 25.0    Anion Gap 13.0 5.0 - 15.0 mmol/L    eGFR 88.4 >60.0 mL/min/1.73   CBC Auto Differential    Collection Time: 05/24/23  1:52 AM    Specimen: Blood   Result Value Ref Range    WBC 7.51 3.40 - 10.80 10*3/mm3    RBC 4.08 3.77 - 5.28 10*6/mm3    Hemoglobin 13.6 12.0 - 15.9 g/dL    Hematocrit 40.6 34.0 - 46.6 %    MCV 99.5 (H) 79.0 - 97.0 fL    MCH 33.3 (H) 26.6 - 33.0 pg    MCHC 33.5 31.5 - 35.7 g/dL    RDW 13.9 12.3 - 15.4 %    RDW-SD 50.4 37.0 - 54.0 fl    MPV 9.3 6.0 - 12.0 fL    Platelets 221 140 - 450 10*3/mm3    Neutrophil % 64.1 42.7 - 76.0 %    Lymphocyte % 23.6 19.6 - 45.3 %    Monocyte % 7.3 5.0 - 12.0 %    Eosinophil % 3.1 0.3 - 6.2 %    Basophil % 0.8 0.0 - 1.5 %    Immature Grans % 1.1 (H) 0.0 - 0.5 %    Neutrophils, Absolute 4.82 1.70 - 7.00 10*3/mm3    Lymphocytes, Absolute 1.77 0.70 - 3.10 10*3/mm3    Monocytes, Absolute 0.55 0.10 - 0.90 10*3/mm3    Eosinophils, Absolute 0.23 0.00 - 0.40 10*3/mm3    Basophils, Absolute 0.06 0.00 - 0.20 10*3/mm3    Immature Grans, Absolute 0.08 (H) 0.00 - 0.05 10*3/mm3     nRBC 0.0 0.0 - 0.2 /100 WBC   Comprehensive Metabolic Panel    Collection Time: 05/24/23  1:52 AM    Specimen: Blood   Result Value Ref Range    Glucose 97 65 - 99 mg/dL    BUN 13 8 - 23 mg/dL    Creatinine 0.77 0.57 - 1.00 mg/dL    Sodium 139 136 - 145 mmol/L    Potassium 3.2 (L) 3.5 - 5.2 mmol/L    Chloride 101 98 - 107 mmol/L    CO2 23.0 22.0 - 29.0 mmol/L    Calcium 9.3 8.6 - 10.5 mg/dL    Total Protein 7.6 6.0 - 8.5 g/dL    Albumin 4.1 3.5 - 5.2 g/dL    ALT (SGPT) 18 1 - 33 U/L    AST (SGOT) 32 1 - 32 U/L    Alkaline Phosphatase 109 39 - 117 U/L    Total Bilirubin 0.4 0.0 - 1.2 mg/dL    Globulin 3.5 gm/dL    A/G Ratio 1.2 g/dL    BUN/Creatinine Ratio 16.9 7.0 - 25.0    Anion Gap 15.0 5.0 - 15.0 mmol/L    eGFR 88.4 >60.0 mL/min/1.73   ECG 12 Lead Pre-Op / Pre-Procedure    Collection Time: 05/24/23  3:55 AM   Result Value Ref Range    QT Interval 424 ms    QTC Interval 470 ms   Urine Drug Screen - Urine, Clean Catch    Collection Time: 05/24/23  4:26 AM    Specimen: Urine, Clean Catch   Result Value Ref Range    THC, Screen, Urine Negative Negative    Phencyclidine (PCP), Urine Negative Negative    Cocaine Screen, Urine Negative Negative    Methamphetamine, Ur Negative Negative    Opiate Screen Negative Negative    Amphetamine Screen, Urine Negative Negative    Benzodiazepine Screen, Urine Negative Negative    Tricyclic Antidepressants Screen Negative Negative    Methadone Screen, Urine Negative Negative    Barbiturates Screen, Urine Negative Negative    Oxycodone Screen, Urine Negative Negative    Propoxyphene Screen Negative Negative    Buprenorphine, Screen, Urine Negative Negative   Fentanyl, Urine - Urine, Clean Catch    Collection Time: 05/24/23  4:26 AM    Specimen: Urine, Clean Catch   Result Value Ref Range    Fentanyl, Urine Positive (A) Negative     Note: In addition to lab results from this visit, the labs listed above may include labs taken at another facility or during a different encounter within  the last 24 hours. Please correlate lab times with ED admission and discharge times for further clarification of the services performed during this visit.    XR Ankle 3+ View Right   Final Result      1.  Trimalleolar ankle fracture.   2.  Lateral dislocation of the talus off the tibia.       Electronically signed by:  Aníbal Gonsalez M.D.     5/24/2023 1:19 AM Mountain Time      XR Ankle 3+ View Right    (Results Pending)     Vitals:    05/24/23 0500 05/24/23 0515 05/24/23 0530 05/24/23 0545   BP: 107/62 111/69 115/68 114/66   BP Location:       Patient Position:       Pulse: 84 75 84 73   Resp:       Temp:       TempSrc:       SpO2: 98% 96% 92% 93%   Weight:       Height:         Medications   sodium chloride 0.9 % flush 10 mL (has no administration in time range)   sodium chloride 0.9 % flush 10 mL (has no administration in time range)   morphine injection 2 mg (2 mg Intravenous Given 5/24/23 0519)   dextrose (GLUTOSE) oral gel 15 g (has no administration in time range)   dextrose (D50W) (25 g/50 mL) IV injection 25 g (has no administration in time range)   glucagon (GLUCAGEN) injection 1 mg (has no administration in time range)   Insulin Lispro (humaLOG) injection 2-7 Units (has no administration in time range)   Potassium Replacement - Follow Nurse / BPA Driven Protocol (has no administration in time range)   ondansetron (ZOFRAN) injection 4 mg (4 mg Intravenous Given 5/24/23 0246)   fentaNYL citrate (PF) (SUBLIMAZE) injection 50 mcg (50 mcg Intravenous Given 5/24/23 0247)   midazolam (VERSED) injection 2 mg (2 mg Intravenous Given 5/24/23 0247)     ECG/EMG Results (last 24 hours)     Procedure Component Value Units Date/Time    ECG 12 Lead Pre-Op / Pre-Procedure [[id number]] Collected: 05/24/23 0355     Updated: 05/24/23 0355     QT Interval 424 ms      QTC Interval 470 ms     Narrative:      Test Reason : Pre-Op / Pre-Procedure  Blood Pressure :   */*   mmHG  Vent. Rate :  74 BPM     Atrial Rate :  74  BPM     P-R Int : 142 ms          QRS Dur :  92 ms      QT Int : 424 ms       P-R-T Axes :  39  68  35 degrees     QTc Int : 470 ms    Normal sinus rhythm  Normal ECG  When compared with ECG of 07-DEC-2022 12:27,  Nonspecific T wave abnormality, improved in Inferior leads  Nonspecific T wave abnormality no longer evident in Lateral leads    Referred By: EDMD           Confirmed By:         ECG 12 Lead Pre-Op / Pre-Procedure   Preliminary Result   Test Reason : Pre-Op / Pre-Procedure   Blood Pressure :   */*   mmHG   Vent. Rate :  74 BPM     Atrial Rate :  74 BPM      P-R Int : 142 ms          QRS Dur :  92 ms       QT Int : 424 ms       P-R-T Axes :  39  68  35 degrees      QTc Int : 470 ms      Normal sinus rhythm   Normal ECG   When compared with ECG of 07-DEC-2022 12:27,   Nonspecific T wave abnormality, improved in Inferior leads   Nonspecific T wave abnormality no longer evident in Lateral leads      Referred By: EDMD           Confirmed By:               Final diagnoses:   Closed displaced trimalleolar fracture of right ankle, initial encounter   Acute right ankle pain       ED Disposition  ED Disposition     ED Disposition   Decision to Admit    Condition   --    Comment   Level of Care: Telemetry [5]   Diagnosis: Closed displaced trimalleolar fracture of right ankle, initial encounter [[id number]]   Admitting Physician: PATTIE GUERIN [[id number]]   Attending Physician: PATTIE GUERIN [[id number]]               No follow-up provider specified.       Medication List      No changes were made to your prescriptions during this visit.          Nela Nathan MD  05/24/23 0667

## 2023-05-24 NOTE — CASE MANAGEMENT/SOCIAL WORK
Discharge Planning Assessment  [facility]     Patient Name: Philomena Davis  MRN: [id number]  Today's Date: 5/24/2023    Admit Date: 5/24/2023    Plan: TBD   Discharge Needs Assessment     Row Name 05/24/23 0934       Living Environment    People in Home alone    Current Living Arrangements home    Potentially Unsafe Housing Conditions none    Primary Care Provided by self    Provides Primary Care For no one    Family Caregiver if Needed none       Resource/Environmental Concerns    Resource/Environmental Concerns none    Transportation Concerns none       Food Insecurity    Within the past 12 months, you worried that your food would run out before you got the money to buy more. Never true    Within the past 12 months, the food you bought just didn't last and you didn't have money to get more. Never true       Transition Planning    Patient/Family Anticipates Transition to inpatient rehabilitation facility    Patient/Family Anticipated Services at Transition ;rehabilitation services    Transportation Anticipated family or friend will provide       Discharge Needs Assessment    Readmission Within the Last 30 Days no previous admission in last 30 days    Equipment Currently Used at Home walker, rolling;shower chair    Concerns to be Addressed discharge planning    Anticipated Changes Related to Illness none    Current Discharge Risk lives alone;physical impairment;substance use/abuse               Discharge Plan     Row Name 05/24/23 0936       Plan    Plan TBD    Plan Comments CM spoke with patient at bedside regarding DC planning. Patient resides in [facility], alone. Patient is independent with ADL’s, denies any DME use. Patient states she has a shower chair and a rolling walker in her home but does not use. Patient denies any current home health or outpatient services. Patient has medical insurance, prescription coverage and is able to afford/obtain medications without difficulty. Patient states she  has a new PCP, appointment has been set up, however, she cannot recall the name of the provider nor date of appointment. Patient has no advanced directives. Patient states she will be having surgery on Thursday, 5/25. Patient states she will most likely need rehab and is interested in a referral to [facility], if warranted. Case Management will continue to follow plan of care, make referrals to selected facilities for rehab and continue to assist with discharge planning needs as recommendations are available.    Final Discharge Disposition Code 30 - still a patient              Continued Care and Services - Admitted Since 5/24/2023    Coordination has not been started for this encounter.       Expected Discharge Date and Time     Expected Discharge Date Expected Discharge Time    May 26, 2023          Demographic Summary     Row Name 05/24/23 0932       General Information    Arrived From home    Referral Source emergency department    Reason for Consult discharge planning    Preferred Language English       Contact Information    Contact Information Comments Madelin Null Relative [phone]               Functional Status     Row Name 05/24/23 0933       Functional Status    Current Activity Tolerance poor  ankle fx       Physical Activity    On average, how many days per week do you engage in moderate to strenuous exercise (like a brisk walk)? 0 days    On average, how many minutes do you engage in exercise at this level? 0 min    Number of minutes of exercise per week 0       Assessment of Health Literacy    How often do you have someone help you read hospital materials? Often    How often do you have problems learning about your medical condition because of difficulty understanding written information? Often    How often do you have a problem understanding what is told to you about your medical condition? Often    How confident are you filling out medical forms by yourself? A little bit    Health Literacy  Moderate       Functional Status, IADL    Medications independent    Meal Preparation independent    Housekeeping independent    Laundry independent    Shopping independent       Mental Status    General Appearance WDL WDL       Mental Status Summary    Recent Changes in Mental Status/Cognitive Functioning no changes       Employment/    Employment Status unemployed               Psychosocial    No documentation.                Abuse/Neglect    No documentation.                Legal    No documentation.                Substance Abuse    No documentation.                Patient Forms    No documentation.                   Dora Tariq RN

## 2023-05-24 NOTE — ANESTHESIA PROCEDURE NOTES
Right Popliteal Catheter      Patient reassessed immediately prior to procedure    Patient location during procedure: floor  Start time: 5/24/2023 7:56 AM  Reason for block: at surgeon's request and post-op pain management  Performed by  CRNA/CAA: Howard Manjarrez, VIOLA  Assisted by: Anisha Talbert RN  Preanesthetic Checklist  Completed: patient identified, IV checked, site marked, risks and benefits discussed, surgical consent, monitors and equipment checked, pre-op evaluation and timeout performed  Prep:  Pt Position: left lateral decubitus  Sterile barriers:cap, gloves, mask and washed/disinfected hands  Prep: ChloraPrep  Patient monitoring: blood pressure monitoring, continuous pulse oximetry and EKG  Procedure    Sedation: yes  Performed under: local infiltration  Guidance:ultrasound guided    ULTRASOUND INTERPRETATION.  Using ultrasound guidance a 20 G gauge needle was placed in close proximity to the nerve, at which point, under ultrasound guidance anesthetic was injected in the area of the nerve and spread of the anesthesia was seen on ultrasound in close proximity thereto.  There were no abnormalities seen on ultrasound; a digital image was taken; and the patient tolerated the procedure with no complications. Images:still images obtained, printed/placed on chart    Laterality:right  Block Type:popliteal  Injection Technique:catheter  Needle Type:echogenic and Tuohy  Needle Gauge:18 G  Resistance on Injection: none  Catheter Size:20 G  Cath Depth at skin: 7 cm    Medications Used: bupivacaine PF (MARCAINE) 0.25 % injection - Injection   30 mL - 5/24/2023 7:56:00 AM      Medications  Preservative Free Saline:5ml    Post Assessment  Injection Assessment: negative aspiration for heme, no paresthesia on injection and incremental injection  Patient Tolerance:comfortable throughout block  Complications:no  Additional Notes  CATHETER                               A high-frequency linear transducer, with sterile  "cover, was placed in the popliteal fossa to identify the popliteal artery and vein, Tibial nerve (TN) and Common Peroneal nerve (CP). The transducer was then moved in a cephalad fashion to observe the TN and CP nerve bifurcation to form the Sciatic Nerve. The insertion site was prepped and draped in sterile fashion. Skin and cutaneous tissue was infiltrated with 2-5 ml of 1% Lidocaine. Using ultrasound-guidance, an 18-gauge Contiplex Ultra 360 Touhy needle was advanced in plane from lateral to medial. Preservative-free normal saline was utilized for hydro-dissection of tissue, advancement of Touhy needle, and to confirm final needle placement posterior to the nerves. Local anesthetic injection spread, in incremental 3-5 ml injections, to surround both nerve structures. Aspiration every 5 ml to prevent intravascular injection. Injection was completed with negative aspiration of blood and negative intravascular injection. Injection pressures were normal with minimal resistance. A 20-gauge Contiplex Echo catheter was placed through the needle and advance out the tip of the Touhy 1-3 cm. The Touhy needle was then removed, and final catheter position verified (Below/above or Anterior/Posterior) the nerve structures. The catheter was secured in the usual fashion with skin glue, benzoin, steri-strips, CHG tegaderm and Label noting \"Nerve Block Catheter\". Jerk tape applied at yellow connector and catheter connection.            "

## 2023-05-25 ENCOUNTER — ANESTHESIA EVENT CONVERTED (OUTPATIENT)
Dept: ANESTHESIOLOGY | Facility: HOSPITAL | Age: 63
End: 2023-05-25
Payer: COMMERCIAL

## 2023-05-25 ENCOUNTER — ANESTHESIA (OUTPATIENT)
Dept: PERIOP | Facility: HOSPITAL | Age: 63
End: 2023-05-25
Payer: COMMERCIAL

## 2023-05-25 ENCOUNTER — APPOINTMENT (OUTPATIENT)
Dept: GENERAL RADIOLOGY | Facility: HOSPITAL | Age: 63
End: 2023-05-25
Payer: COMMERCIAL

## 2023-05-25 LAB
ANION GAP SERPL CALCULATED.3IONS-SCNC: 10 MMOL/L (ref 5–15)
BASOPHILS # BLD AUTO: 0.04 10*3/MM3 (ref 0–0.2)
BASOPHILS NFR BLD AUTO: 0.4 % (ref 0–1.5)
BUN SERPL-MCNC: 15 MG/DL (ref 8–23)
BUN/CREAT SERPL: 19.5 (ref 7–25)
CALCIUM SPEC-SCNC: 9.6 MG/DL (ref 8.6–10.5)
CHLORIDE SERPL-SCNC: 102 MMOL/L (ref 98–107)
CO2 SERPL-SCNC: 27 MMOL/L (ref 22–29)
CREAT SERPL-MCNC: 0.77 MG/DL (ref 0.57–1)
DEPRECATED RDW RBC AUTO: 50.7 FL (ref 37–54)
EGFRCR SERPLBLD CKD-EPI 2021: 88.4 ML/MIN/1.73
EOSINOPHIL # BLD AUTO: 0.02 10*3/MM3 (ref 0–0.4)
EOSINOPHIL NFR BLD AUTO: 0.2 % (ref 0.3–6.2)
ERYTHROCYTE [DISTWIDTH] IN BLOOD BY AUTOMATED COUNT: 13.9 % (ref 12.3–15.4)
GLUCOSE BLDC GLUCOMTR-MCNC: 186 MG/DL (ref 70–130)
GLUCOSE BLDC GLUCOMTR-MCNC: 212 MG/DL (ref 70–130)
GLUCOSE BLDC GLUCOMTR-MCNC: 74 MG/DL (ref 70–130)
GLUCOSE BLDC GLUCOMTR-MCNC: 88 MG/DL (ref 70–130)
GLUCOSE SERPL-MCNC: 89 MG/DL (ref 65–99)
HCT VFR BLD AUTO: 36.8 % (ref 34–46.6)
HGB BLD-MCNC: 12.4 G/DL (ref 12–15.9)
IMM GRANULOCYTES # BLD AUTO: 0.05 10*3/MM3 (ref 0–0.05)
IMM GRANULOCYTES NFR BLD AUTO: 0.5 % (ref 0–0.5)
LYMPHOCYTES # BLD AUTO: 1.5 10*3/MM3 (ref 0.7–3.1)
LYMPHOCYTES NFR BLD AUTO: 16.1 % (ref 19.6–45.3)
MCH RBC QN AUTO: 33.5 PG (ref 26.6–33)
MCHC RBC AUTO-ENTMCNC: 33.7 G/DL (ref 31.5–35.7)
MCV RBC AUTO: 99.5 FL (ref 79–97)
MONOCYTES # BLD AUTO: 0.97 10*3/MM3 (ref 0.1–0.9)
MONOCYTES NFR BLD AUTO: 10.4 % (ref 5–12)
NEUTROPHILS NFR BLD AUTO: 6.72 10*3/MM3 (ref 1.7–7)
NEUTROPHILS NFR BLD AUTO: 72.4 % (ref 42.7–76)
NRBC BLD AUTO-RTO: 0 /100 WBC (ref 0–0.2)
PLATELET # BLD AUTO: 184 10*3/MM3 (ref 140–450)
PMV BLD AUTO: 9.7 FL (ref 6–12)
POTASSIUM SERPL-SCNC: 4.3 MMOL/L (ref 3.5–5.2)
QT INTERVAL: 424 MS
QTC INTERVAL: 470 MS
RBC # BLD AUTO: 3.7 10*6/MM3 (ref 3.77–5.28)
SODIUM SERPL-SCNC: 139 MMOL/L (ref 136–145)
WBC NRBC COR # BLD: 9.3 10*3/MM3 (ref 3.4–10.8)

## 2023-05-25 PROCEDURE — 76000 FLUOROSCOPY <1 HR PHYS/QHP: CPT

## 2023-05-25 PROCEDURE — 25010000002 HYDROMORPHONE PER 4 MG: Performed by: ORTHOPAEDIC SURGERY

## 2023-05-25 PROCEDURE — G0378 HOSPITAL OBSERVATION PER HR: HCPCS

## 2023-05-25 PROCEDURE — 25010000002 ROPIVACAINE PER 1 MG: Performed by: NURSE ANESTHETIST, CERTIFIED REGISTERED

## 2023-05-25 PROCEDURE — 25010000002 ONDANSETRON PER 1 MG

## 2023-05-25 PROCEDURE — 25010000002 ONDANSETRON PER 1 MG: Performed by: NURSE ANESTHETIST, CERTIFIED REGISTERED

## 2023-05-25 PROCEDURE — 25010000002 THIAMINE PER 100 MG: Performed by: INTERNAL MEDICINE

## 2023-05-25 PROCEDURE — 25010000002 BUPRENORPHINE PER 0.1 MG: Performed by: NURSE ANESTHETIST, CERTIFIED REGISTERED

## 2023-05-25 PROCEDURE — 25010000002 CEFAZOLIN IN DEXTROSE 2-4 GM/100ML-% SOLUTION: Performed by: ORTHOPAEDIC SURGERY

## 2023-05-25 PROCEDURE — 25010000002 DEXAMETHASONE SODIUM PHOSPHATE 10 MG/ML SOLUTION: Performed by: NURSE ANESTHETIST, CERTIFIED REGISTERED

## 2023-05-25 PROCEDURE — 25010000002 PROPOFOL 10 MG/ML EMULSION: Performed by: NURSE ANESTHETIST, CERTIFIED REGISTERED

## 2023-05-25 PROCEDURE — C1713 ANCHOR/SCREW BN/BN,TIS/BN: HCPCS | Performed by: ORTHOPAEDIC SURGERY

## 2023-05-25 PROCEDURE — 82948 REAGENT STRIP/BLOOD GLUCOSE: CPT

## 2023-05-25 PROCEDURE — 80048 BASIC METABOLIC PNL TOTAL CA: CPT | Performed by: PHYSICIAN ASSISTANT

## 2023-05-25 PROCEDURE — 85025 COMPLETE CBC W/AUTO DIFF WBC: CPT | Performed by: PHYSICIAN ASSISTANT

## 2023-05-25 PROCEDURE — 25010000002 FENTANYL CITRATE (PF) 50 MCG/ML SOLUTION

## 2023-05-25 PROCEDURE — 27822 TREATMENT OF ANKLE FRACTURE: CPT | Performed by: PHYSICIAN ASSISTANT

## 2023-05-25 PROCEDURE — 97161 PT EVAL LOW COMPLEX 20 MIN: CPT

## 2023-05-25 PROCEDURE — 99232 SBSQ HOSP IP/OBS MODERATE 35: CPT | Performed by: INTERNAL MEDICINE

## 2023-05-25 PROCEDURE — 73610 X-RAY EXAM OF ANKLE: CPT

## 2023-05-25 PROCEDURE — 25010000002 THIAMINE PER 100 MG: Performed by: ORTHOPAEDIC SURGERY

## 2023-05-25 PROCEDURE — 97530 THERAPEUTIC ACTIVITIES: CPT

## 2023-05-25 PROCEDURE — 25010000002 MIDAZOLAM PER 1 MG: Performed by: ANESTHESIOLOGY

## 2023-05-25 PROCEDURE — 96376 TX/PRO/DX INJ SAME DRUG ADON: CPT

## 2023-05-25 DEVICE — SCRW ULS LK 2.7X12MM: Type: IMPLANTABLE DEVICE | Site: ANKLE | Status: FUNCTIONAL

## 2023-05-25 DEVICE — IMPLANTABLE DEVICE: Type: IMPLANTABLE DEVICE | Site: ANKLE | Status: FUNCTIONAL

## 2023-05-25 DEVICE — SCRW PERIART S/TAP HD/2.7MM 3.5X12MM: Type: IMPLANTABLE DEVICE | Site: ANKLE | Status: FUNCTIONAL

## 2023-05-25 DEVICE — SCRW ULS LK 2.7X16MM: Type: IMPLANTABLE DEVICE | Site: ANKLE | Status: FUNCTIONAL

## 2023-05-25 DEVICE — SCRW ULS LK PT S/TAP 2.7X14MM: Type: IMPLANTABLE DEVICE | Site: ANKLE | Status: FUNCTIONAL

## 2023-05-25 DEVICE — SCRW CORT FUL/THRD S/TAP HEX/SM 2.7X16MM: Type: IMPLANTABLE DEVICE | Site: ANKLE | Status: FUNCTIONAL

## 2023-05-25 DEVICE — PLT FIB PERIART LK D/L 6H 106 RT: Type: IMPLANTABLE DEVICE | Site: ANKLE | Status: FUNCTIONAL

## 2023-05-25 DEVICE — SCRW CANN SD/ST 1/2THRD 4X40MM: Type: IMPLANTABLE DEVICE | Site: ANKLE | Status: FUNCTIONAL

## 2023-05-25 RX ORDER — BUPRENORPHINE HYDROCHLORIDE 0.32 MG/ML
INJECTION INTRAMUSCULAR; INTRAVENOUS
Status: COMPLETED | OUTPATIENT
Start: 2023-05-25 | End: 2023-05-25

## 2023-05-25 RX ORDER — TRANEXAMIC ACID 10 MG/ML
1000 INJECTION, SOLUTION INTRAVENOUS ONCE
Status: COMPLETED | OUTPATIENT
Start: 2023-05-25 | End: 2023-05-25

## 2023-05-25 RX ORDER — DEXAMETHASONE SODIUM PHOSPHATE 10 MG/ML
INJECTION, SOLUTION INTRAMUSCULAR; INTRAVENOUS
Status: COMPLETED | OUTPATIENT
Start: 2023-05-25 | End: 2023-05-25

## 2023-05-25 RX ORDER — SODIUM CHLORIDE 9 MG/ML
40 INJECTION, SOLUTION INTRAVENOUS AS NEEDED
Status: DISCONTINUED | OUTPATIENT
Start: 2023-05-25 | End: 2023-05-25 | Stop reason: HOSPADM

## 2023-05-25 RX ORDER — SODIUM CHLORIDE, SODIUM LACTATE, POTASSIUM CHLORIDE, CALCIUM CHLORIDE 600; 310; 30; 20 MG/100ML; MG/100ML; MG/100ML; MG/100ML
9 INJECTION, SOLUTION INTRAVENOUS CONTINUOUS
Status: DISCONTINUED | OUTPATIENT
Start: 2023-05-25 | End: 2023-05-26 | Stop reason: HOSPADM

## 2023-05-25 RX ORDER — FENTANYL CITRATE 50 UG/ML
INJECTION, SOLUTION INTRAMUSCULAR; INTRAVENOUS
Status: COMPLETED
Start: 2023-05-25 | End: 2023-05-25

## 2023-05-25 RX ORDER — DIPHENHYDRAMINE HYDROCHLORIDE 50 MG/ML
25 INJECTION INTRAMUSCULAR; INTRAVENOUS EVERY 6 HOURS PRN
Status: DISCONTINUED | OUTPATIENT
Start: 2023-05-25 | End: 2023-05-26 | Stop reason: HOSPADM

## 2023-05-25 RX ORDER — ACETAMINOPHEN 325 MG/1
650 TABLET ORAL ONCE AS NEEDED
Status: DISCONTINUED | OUTPATIENT
Start: 2023-05-25 | End: 2023-05-25

## 2023-05-25 RX ORDER — OXYCODONE HYDROCHLORIDE 5 MG/1
10 TABLET ORAL EVERY 4 HOURS PRN
Status: DISCONTINUED | OUTPATIENT
Start: 2023-05-25 | End: 2023-05-26 | Stop reason: HOSPADM

## 2023-05-25 RX ORDER — LIDOCAINE HYDROCHLORIDE 10 MG/ML
0.5 INJECTION, SOLUTION EPIDURAL; INFILTRATION; INTRACAUDAL; PERINEURAL ONCE AS NEEDED
Status: DISCONTINUED | OUTPATIENT
Start: 2023-05-25 | End: 2023-05-25 | Stop reason: HOSPADM

## 2023-05-25 RX ORDER — CEFAZOLIN SODIUM 2 G/100ML
2 INJECTION, SOLUTION INTRAVENOUS EVERY 8 HOURS
Status: COMPLETED | OUTPATIENT
Start: 2023-05-25 | End: 2023-05-26

## 2023-05-25 RX ORDER — ONDANSETRON 2 MG/ML
4 INJECTION INTRAMUSCULAR; INTRAVENOUS ONCE AS NEEDED
Status: COMPLETED | OUTPATIENT
Start: 2023-05-25 | End: 2023-05-25

## 2023-05-25 RX ORDER — LIDOCAINE HYDROCHLORIDE 10 MG/ML
INJECTION, SOLUTION EPIDURAL; INFILTRATION; INTRACAUDAL; PERINEURAL AS NEEDED
Status: DISCONTINUED | OUTPATIENT
Start: 2023-05-25 | End: 2023-05-25 | Stop reason: SURG

## 2023-05-25 RX ORDER — SODIUM CHLORIDE, SODIUM LACTATE, POTASSIUM CHLORIDE, CALCIUM CHLORIDE 600; 310; 30; 20 MG/100ML; MG/100ML; MG/100ML; MG/100ML
100 INJECTION, SOLUTION INTRAVENOUS CONTINUOUS
Status: DISCONTINUED | OUTPATIENT
Start: 2023-05-25 | End: 2023-05-26 | Stop reason: HOSPADM

## 2023-05-25 RX ORDER — LABETALOL HYDROCHLORIDE 5 MG/ML
5 INJECTION, SOLUTION INTRAVENOUS
Status: DISCONTINUED | OUTPATIENT
Start: 2023-05-25 | End: 2023-05-25

## 2023-05-25 RX ORDER — DEXAMETHASONE SODIUM PHOSPHATE 10 MG/ML
INJECTION, SOLUTION INTRAMUSCULAR; INTRAVENOUS AS NEEDED
Status: DISCONTINUED | OUTPATIENT
Start: 2023-05-25 | End: 2023-05-25 | Stop reason: SURG

## 2023-05-25 RX ORDER — FENTANYL CITRATE 50 UG/ML
50 INJECTION, SOLUTION INTRAMUSCULAR; INTRAVENOUS
Status: DISCONTINUED | OUTPATIENT
Start: 2023-05-25 | End: 2023-05-25

## 2023-05-25 RX ORDER — ACETAMINOPHEN 650 MG/1
650 SUPPOSITORY RECTAL EVERY 4 HOURS PRN
Status: DISCONTINUED | OUTPATIENT
Start: 2023-05-25 | End: 2023-05-25

## 2023-05-25 RX ORDER — OXYCODONE HYDROCHLORIDE 5 MG/1
5 TABLET ORAL EVERY 4 HOURS PRN
Status: DISCONTINUED | OUTPATIENT
Start: 2023-05-25 | End: 2023-05-26 | Stop reason: HOSPADM

## 2023-05-25 RX ORDER — PROPOFOL 10 MG/ML
VIAL (ML) INTRAVENOUS AS NEEDED
Status: DISCONTINUED | OUTPATIENT
Start: 2023-05-25 | End: 2023-05-25 | Stop reason: SURG

## 2023-05-25 RX ORDER — HYDROMORPHONE HCL 110MG/55ML
0.5 PATIENT CONTROLLED ANALGESIA SYRINGE INTRAVENOUS
Status: DISCONTINUED | OUTPATIENT
Start: 2023-05-25 | End: 2023-05-26 | Stop reason: HOSPADM

## 2023-05-25 RX ORDER — ASPIRIN 81 MG/1
81 TABLET ORAL EVERY 12 HOURS SCHEDULED
Status: DISCONTINUED | OUTPATIENT
Start: 2023-05-26 | End: 2023-05-26 | Stop reason: HOSPADM

## 2023-05-25 RX ORDER — SODIUM CHLORIDE 0.9 % (FLUSH) 0.9 %
10 SYRINGE (ML) INJECTION AS NEEDED
Status: DISCONTINUED | OUTPATIENT
Start: 2023-05-25 | End: 2023-05-25 | Stop reason: HOSPADM

## 2023-05-25 RX ORDER — NALOXONE HCL 0.4 MG/ML
0.1 VIAL (ML) INJECTION
Status: DISCONTINUED | OUTPATIENT
Start: 2023-05-25 | End: 2023-05-26 | Stop reason: HOSPADM

## 2023-05-25 RX ORDER — BUPIVACAINE HYDROCHLORIDE 2.5 MG/ML
INJECTION, SOLUTION EPIDURAL; INFILTRATION; INTRACAUDAL
Status: COMPLETED | OUTPATIENT
Start: 2023-05-25 | End: 2023-05-25

## 2023-05-25 RX ORDER — MAGNESIUM HYDROXIDE 1200 MG/15ML
LIQUID ORAL AS NEEDED
Status: DISCONTINUED | OUTPATIENT
Start: 2023-05-25 | End: 2023-05-25 | Stop reason: HOSPADM

## 2023-05-25 RX ORDER — DIPHENHYDRAMINE HCL 25 MG
25 CAPSULE ORAL EVERY 6 HOURS PRN
Status: DISCONTINUED | OUTPATIENT
Start: 2023-05-25 | End: 2023-05-26 | Stop reason: HOSPADM

## 2023-05-25 RX ORDER — ONDANSETRON 2 MG/ML
INJECTION INTRAMUSCULAR; INTRAVENOUS AS NEEDED
Status: DISCONTINUED | OUTPATIENT
Start: 2023-05-25 | End: 2023-05-25 | Stop reason: SURG

## 2023-05-25 RX ORDER — MELOXICAM 15 MG/1
15 TABLET ORAL DAILY
Status: DISCONTINUED | OUTPATIENT
Start: 2023-05-25 | End: 2023-05-26 | Stop reason: HOSPADM

## 2023-05-25 RX ORDER — ROPIVACAINE HYDROCHLORIDE 2 MG/ML
INJECTION, SOLUTION EPIDURAL; INFILTRATION; PERINEURAL AS NEEDED
Status: DISCONTINUED | OUTPATIENT
Start: 2023-05-25 | End: 2023-05-25 | Stop reason: SURG

## 2023-05-25 RX ORDER — TRAMADOL HYDROCHLORIDE 50 MG/1
50 TABLET ORAL EVERY 8 HOURS PRN
Status: DISCONTINUED | OUTPATIENT
Start: 2023-05-25 | End: 2023-05-26 | Stop reason: HOSPADM

## 2023-05-25 RX ORDER — EPHEDRINE SULFATE 50 MG/ML
INJECTION INTRAVENOUS AS NEEDED
Status: DISCONTINUED | OUTPATIENT
Start: 2023-05-25 | End: 2023-05-25 | Stop reason: SURG

## 2023-05-25 RX ORDER — FAMOTIDINE 10 MG/ML
20 INJECTION, SOLUTION INTRAVENOUS ONCE
Status: COMPLETED | OUTPATIENT
Start: 2023-05-25 | End: 2023-05-25

## 2023-05-25 RX ORDER — OXYCODONE HYDROCHLORIDE 5 MG/1
TABLET ORAL
Status: COMPLETED
Start: 2023-05-25 | End: 2023-05-25

## 2023-05-25 RX ORDER — OXYCODONE HYDROCHLORIDE 5 MG/1
5 TABLET ORAL ONCE AS NEEDED
Status: COMPLETED | OUTPATIENT
Start: 2023-05-25 | End: 2023-05-25

## 2023-05-25 RX ORDER — SODIUM CHLORIDE 0.9 % (FLUSH) 0.9 %
10 SYRINGE (ML) INJECTION EVERY 12 HOURS SCHEDULED
Status: DISCONTINUED | OUTPATIENT
Start: 2023-05-25 | End: 2023-05-25 | Stop reason: HOSPADM

## 2023-05-25 RX ORDER — ACETAMINOPHEN 500 MG
1000 TABLET ORAL EVERY 8 HOURS
Status: DISCONTINUED | OUTPATIENT
Start: 2023-05-25 | End: 2023-05-26 | Stop reason: HOSPADM

## 2023-05-25 RX ORDER — EPHEDRINE SULFATE 50 MG/ML
5 INJECTION, SOLUTION INTRAVENOUS ONCE AS NEEDED
Status: DISCONTINUED | OUTPATIENT
Start: 2023-05-25 | End: 2023-05-25

## 2023-05-25 RX ORDER — ONDANSETRON 4 MG/1
4 TABLET, FILM COATED ORAL EVERY 6 HOURS PRN
Status: DISCONTINUED | OUTPATIENT
Start: 2023-05-25 | End: 2023-05-26 | Stop reason: HOSPADM

## 2023-05-25 RX ORDER — CEFAZOLIN SODIUM 2 G/100ML
2 INJECTION, SOLUTION INTRAVENOUS ONCE
Status: COMPLETED | OUTPATIENT
Start: 2023-05-25 | End: 2023-05-25

## 2023-05-25 RX ORDER — ONDANSETRON 2 MG/ML
INJECTION INTRAMUSCULAR; INTRAVENOUS
Status: COMPLETED
Start: 2023-05-25 | End: 2023-05-25

## 2023-05-25 RX ORDER — ONDANSETRON 2 MG/ML
4 INJECTION INTRAMUSCULAR; INTRAVENOUS EVERY 6 HOURS PRN
Status: DISCONTINUED | OUTPATIENT
Start: 2023-05-25 | End: 2023-05-26 | Stop reason: HOSPADM

## 2023-05-25 RX ORDER — MIDAZOLAM HYDROCHLORIDE 1 MG/ML
1 INJECTION INTRAMUSCULAR; INTRAVENOUS
Status: DISCONTINUED | OUTPATIENT
Start: 2023-05-25 | End: 2023-05-25 | Stop reason: HOSPADM

## 2023-05-25 RX ADMIN — MELOXICAM 15 MG: 15 TABLET ORAL at 18:17

## 2023-05-25 RX ADMIN — Medication 10 ML: at 20:17

## 2023-05-25 RX ADMIN — OXYCODONE 5 MG: 5 TABLET ORAL at 15:33

## 2023-05-25 RX ADMIN — ACETAMINOPHEN 1000 MG: 500 TABLET ORAL at 23:28

## 2023-05-25 RX ADMIN — OXYCODONE HYDROCHLORIDE 5 MG: 5 TABLET ORAL at 15:33

## 2023-05-25 RX ADMIN — FENTANYL CITRATE 50 MCG: 50 INJECTION, SOLUTION INTRAMUSCULAR; INTRAVENOUS at 15:14

## 2023-05-25 RX ADMIN — BUPIVACAINE HYDROCHLORIDE 30 ML: 2.5 INJECTION, SOLUTION EPIDURAL; INFILTRATION; INTRACAUDAL; PERINEURAL at 11:48

## 2023-05-25 RX ADMIN — OXYCODONE 10 MG: 5 TABLET ORAL at 18:17

## 2023-05-25 RX ADMIN — SODIUM CHLORIDE, POTASSIUM CHLORIDE, SODIUM LACTATE AND CALCIUM CHLORIDE: 600; 310; 30; 20 INJECTION, SOLUTION INTRAVENOUS at 12:40

## 2023-05-25 RX ADMIN — ONDANSETRON 4 MG: 2 INJECTION INTRAMUSCULAR; INTRAVENOUS at 15:34

## 2023-05-25 RX ADMIN — DEXAMETHASONE SODIUM PHOSPHATE 6 MG: 10 INJECTION, SOLUTION INTRAMUSCULAR; INTRAVENOUS at 11:54

## 2023-05-25 RX ADMIN — PROPOFOL 200 MG: 10 INJECTION, EMULSION INTRAVENOUS at 11:45

## 2023-05-25 RX ADMIN — MIDAZOLAM HYDROCHLORIDE 2 MG: 1 INJECTION, SOLUTION INTRAMUSCULAR; INTRAVENOUS at 10:35

## 2023-05-25 RX ADMIN — BUPRENORPHINE HYDROCHLORIDE 0.3 MG: 0.32 INJECTION INTRAMUSCULAR; INTRAVENOUS at 11:48

## 2023-05-25 RX ADMIN — CEFAZOLIN SODIUM 2 G: 2 INJECTION, SOLUTION INTRAVENOUS at 20:05

## 2023-05-25 RX ADMIN — ROPIVACAINE HYDROCHLORIDE 20 ML: 2 INJECTION, SOLUTION EPIDURAL; INFILTRATION at 11:26

## 2023-05-25 RX ADMIN — THIAMINE HYDROCHLORIDE 200 MG: 100 INJECTION, SOLUTION INTRAMUSCULAR; INTRAVENOUS at 23:28

## 2023-05-25 RX ADMIN — HYDROMORPHONE HYDROCHLORIDE 0.5 MG: 1 INJECTION, SOLUTION INTRAMUSCULAR; INTRAVENOUS; SUBCUTANEOUS at 20:11

## 2023-05-25 RX ADMIN — LIDOCAINE HYDROCHLORIDE 50 MG: 10 INJECTION, SOLUTION EPIDURAL; INFILTRATION; INTRACAUDAL; PERINEURAL at 11:45

## 2023-05-25 RX ADMIN — TRANEXAMIC ACID 1000 MG: 10 INJECTION, SOLUTION INTRAVENOUS at 13:29

## 2023-05-25 RX ADMIN — ONDANSETRON 4 MG: 2 INJECTION INTRAMUSCULAR; INTRAVENOUS at 11:56

## 2023-05-25 RX ADMIN — EPHEDRINE SULFATE 15 MG: 50 INJECTION INTRAVENOUS at 12:09

## 2023-05-25 RX ADMIN — PROPOFOL 25 MCG/KG/MIN: 10 INJECTION, EMULSION INTRAVENOUS at 11:53

## 2023-05-25 RX ADMIN — ACETAMINOPHEN 1000 MG: 500 TABLET ORAL at 18:16

## 2023-05-25 RX ADMIN — THIAMINE HYDROCHLORIDE 200 MG: 100 INJECTION, SOLUTION INTRAMUSCULAR; INTRAVENOUS at 05:34

## 2023-05-25 RX ADMIN — DOCUSATE SODIUM 50 MG AND SENNOSIDES 8.6 MG 2 TABLET: 8.6; 5 TABLET, FILM COATED ORAL at 20:05

## 2023-05-25 RX ADMIN — CEFAZOLIN SODIUM 2 G: 2 INJECTION, SOLUTION INTRAVENOUS at 11:43

## 2023-05-25 RX ADMIN — SODIUM CHLORIDE, POTASSIUM CHLORIDE, SODIUM LACTATE AND CALCIUM CHLORIDE 9 ML/HR: 600; 310; 30; 20 INJECTION, SOLUTION INTRAVENOUS at 10:30

## 2023-05-25 RX ADMIN — FAMOTIDINE 20 MG: 10 INJECTION INTRAVENOUS at 10:30

## 2023-05-25 RX ADMIN — DEXAMETHASONE SODIUM PHOSPHATE 4 MG: 10 INJECTION, SOLUTION INTRAMUSCULAR; INTRAVENOUS at 11:48

## 2023-05-25 RX ADMIN — TRANEXAMIC ACID 1000 MG: 10 INJECTION, SOLUTION INTRAVENOUS at 11:51

## 2023-05-25 NOTE — OP NOTE
Operative  Note    Patient: Philomena Davis    YOB: 1960    Medical Record Number: [id number]    Attending Physician: Braxton Edge MD Kevin M Denehy, MD    Primary Care Physician: Lilly Beasley DO    Surgeon: Deandre Reynoso MD    Date of Service: 5/25/2023     Pre-op Diagnosis:   Closed trimalleolar fracture of right ankle, initial encounter [L72.998M]    Post-Op Diagnosis:  Post-Op Diagnosis Codes:     * Closed trimalleolar fracture of right ankle, initial encounter [A22.940E]    Procedure(s):  ANKLE OPEN REDUCTION INTERNAL FIXATION  right ankle  fracture  utilizing a  anatomically contoured fibular locking plate and 4.0 cannulated cancellous screws         Implant Name Type Inv. Item Serial No.  Lot No. LRB No. Used Action   SCRW ANUPAMA SD/ST 1/2THRD 4X40MM - [id number] Implant SCRW ANUPAMA SD/ST 1/2THRD 4X40MM  [employer]  Right 2 Implanted   SCRW PERIART S/TAP HD/2.7MM 3.5X12MM - [id number] Implant SCRW PERIART S/TAP HD/2.7MM 3.5X12MM  [employer]  Right 4 Implanted   SCRW FERDINAND FUL/THRD S/TAP HEX/SM 2.7X16MM - [id number] Implant SCRW FERDINAND FUL/THRD S/TAP HEX/SM 2.7X16MM  [employer]  Right 1 Implanted   SCRW FERDINAND FUL/THRD S/TAP HEX/SM 2.7X16MM - [id number] Implant SCRW FERDINAND FUL/THRD S/TAP HEX/SM 2.7X16MM  [employer]  Right 1 Explanted   SCRW ULS LK 2.7X16MM - [id number] Implant SCRW ULS LK 2.7X16MM  [employer]  Right 3 Implanted   SCRW ULS LK 2.7X16MM - [id number] Implant SCRW ULS LK 2.7X16MM  [employer]  Right 1 Explanted   SCRW ULS LK PT S/TAP 2.7X14MM - [id number] Implant SCRW ULS LK PT S/TAP 2.7X14MM  [employer]  Right 1 Implanted   SCRW ULS LK 2.7X12MM - [id number] Implant SCRW ULS LK 2.7X12MM  [employer]  Right 1 Implanted   PLT FIB PERIART LK D/L 6H 106 RT - [id number] Implant PLT FIB PERIART LK D/L 6H 106 RT  [employer]  Right 1 Implanted   PLT LK HS STR 5HL - [id number] Implant PLT LK HS STR 5HL  [employer]  Right 1 Implanted   SCRW PEG  FUL/THRD 2.5X10MM - [id number] Implant SCRW PEG FUL/THRD 2.5X10MM  [employer]  Right 3 Implanted   SCRW PEG FUL/THRD 2.5X12MM - [id number] Implant SCRW PEG FUL/THRD 2.5X12MM  [employer]  Right 1 Implanted   SCRW PEG NL 2.5X10MM - [id number] Implant SCRW PEG NL 2.5X10MM  [employer]  Right 1 Implanted   SCRW PEG NL 2.5X14MM - [id number] Implant SCRW PEG NL 2.5X14MM  [employer]  Right 1 Implanted   SCRW FERDINAND FUL/THRD S/TAP HEX/SM 2.7X16MM - [id number] Implant SCRW FERDINAND FUL/THRD S/TAP HEX/SM 2.7X16MM  [employer]  Right 1 Implanted   SCRW FERDINAND FUL/THRD S/TAP HEX/SM 2.7X16MM - [id number] Implant SCRW FERDINAND FUL/THRD S/TAP HEX/SM 2.7X16MM  [employer]  Right 1 Explanted       ANESTHESIA: General with Block  Anesthesiologist: David Ricardo MD  CRNA: Amina Turpin CRNA       Estimated Blood Loss: 100ml    Specimens: * No orders in the log *     COMPLICATIONS: Nil.     DRAINS:  External Urinary Catheter (Active)   Site Assessment Skin intact;Clean 05/25/23 0300   Application/Removal skin care provided;external catheter changed 05/25/23 0300   Collection Container Wall suction 05/24/23 2032   Wall suction (mmHG) 110 mmHG 05/24/23 2032   Securement Method Securing device 05/24/23 2032   Catheter care complete Yes 05/25/23 0300   Output (mL) 200 mL 05/25/23 0828       [REMOVED] External Urinary Catheter (Removed)       [REMOVED] External Urinary Catheter (Removed)       [REMOVED] External Urinary Catheter (Removed)       SURGEON:Deandre Reynoso M.D.    ASSISTANTS: Haider ARCE    The services of a skilled  PA were necessary for performing the procedure safely and expeditiously.  The first assist was present for the entire duration of the case and helped with positioning, retraction and closure of the incision.      INDICATIONS: The patient is a 62 y.o. female  who presented to fall with a history of injury and ankle fracture.  The patient was evaluated and scheduled for surgery . Options were discussed. The  patient was indicated for an open reduction and internal fixation of ankle fracture.       Likely risks and benefits of the procedure including, but not limited to infection, malunion, nonunion, posttraumatic stiffness, posttraumatic arthritis, possibility of injury to nerves, vessels or tendons have been discussed in detail. Despite the risks involved, the patient elected to proceed and informed consent was obtained and was scheduled for surgery. The patient was seen in the preoperative holding area and the operative site was marked.    [facility]ED[facility]: [facility] Preoperative antibiotic Kefzol  Intravenously  were given prior to the placement of tourniquet . After achieving adequate anesthesia, a well-padded tourniquet was placed over the proximal aspect of the operative thigh. The leg was prepped and draped in the usual sterile fashion. Surgical timeout was done. Correct patient surgical side and site were identified. Tourniquet was elevated to a pressure of 250 mmHg.     A skin incision was made on the medial malleolus centering over the fracture site. The skin and subcutaneous tissue were incised and the fracture site was identified. There was hematoma this was cleared. The medial malleolus fracture was reduced and temporarily fixed with two threaded K wires. The reduction of the medial malleolus was found to be satisfactory. The position of K wires for the 4.0 cancellous screws was found to be satisfactory.     Attention was then directed to the lateral malleolus.  A skin incision was made centering over the fracture site and the lateral malleolar fracture was exposed after incising the skin and subcutaneous tissue. The fracture site was cleared of the hematoma and by gentle manipulation and traction,   the lateral malleolar fragment was mobilized to restore the fibular length.  The reduction was found to be satisfactory under image intensifier views.   This was further stabilized with an anatomically contoured fibular locking plate .  The position of the plate was found to be satisfactory  locking screws were placed into the lateral malleolus, followed by non locking screws proximal to the fracture site into the shaft of the fibula.  The reduction of the fracture and position of the hardware was found to be satisfactory.  The ankle mortise was found to be restored satisfactorily.  Fibular length was found to be adequate.  There is an anterior fragment the fibula at the insertion of the ATFL that had avulsed off a large piece of bone we used a mini frag plate anteriorly on the fibula to anatomically reduce and hold this fracture fragment     Attention was again directed to the medial malleolus and cannulated reamer was utilized and partially-threaded 4.0 cannulated cancellous screws were seated over the guidewires.  The reduction of the fracture,  Fixation of the fracture and position of the hardware was found to be satisfactory and stable.  The posterior malleolus fragment was very small and satisfactorily reduced in view of this, no internal fixation is being used for the posterior malleolar fragment.    The ankle mortise was stressed with external rotation there was no widening of the syndesmosis.    The sponge count and needle count was found to be correct. The incisions were thoroughly irrigated and were closed in layers.  Sterile dressings were placed and the patient was transferred to the recovery room in a stable condition with a well-padded posterior splint.  The patient tolerated the procedure well.  There were no complications.  The patient  is being discharged home with instructions to keep the operated lower extremity elevated above heart level.  The patient  will remain nonweightbearing on the operated lower extremity.        I discussed the satisfactory performance of the procedure with the patient's family and discussed with them The postoperative  management.     5/25/2023  14:47 EDT  Deandre Reynoso MD    CC: Lilly Beasley DO; MD Kely Stoll Wycliffe, MD

## 2023-05-25 NOTE — ANESTHESIA PREPROCEDURE EVALUATION
Anesthesia Evaluation     Patient summary reviewed and Nursing notes reviewed                Airway   Mallampati: II  TM distance: >3 FB  Neck ROM: full  No difficulty expected  Dental - normal exam     Pulmonary - normal exam   (+) a smoker (2009) Former,   Cardiovascular - normal exam    (+) hypertension, hyperlipidemia,       Neuro/Psych- negative ROS  GI/Hepatic/Renal/Endo    (+)  GERD,  liver disease cirrhosis, diabetes mellitus,     Musculoskeletal (-) negative ROS    Abdominal  - normal exam    Bowel sounds: normal.   Substance History   (+) alcohol use,      OB/GYN negative ob/gyn ROS         Other                        Anesthesia Plan    ASA 3     general     (Use existing peripheral nerve block cath)  intravenous induction     Anesthetic plan, risks, benefits, and alternatives have been provided, discussed and informed consent has been obtained with: patient.    Plan discussed with CRNA.        CODE STATUS:    Code Status (Patient has no pulse and is not breathing): CPR (Attempt to Resuscitate)  Medical Interventions (Patient has pulse or is breathing): Full Support

## 2023-05-25 NOTE — PLAN OF CARE
Goal Outcome Evaluation:  Plan of Care Reviewed With: patient, significant other        Progress: no change  Outcome Evaluation: PT eval complete. Pt demonstrated good ability to maintain NWB status with STS and SPT to chair with FWW and CGAx2. No knee buckling or LOB noted. Activity limited by fatigue. Recommend d/c home with assist when medically appropriate. Plan to assess stairs and trial knee scooter next session.

## 2023-05-25 NOTE — ANESTHESIA POSTPROCEDURE EVALUATION
Patient: Philomena Davis    Procedure Summary     Date: 05/25/23 Room / Location:  [facility]  /  [facility]    Anesthesia Start: 1139 Anesthesia Stop: 1432    Procedure: ANKLE OPEN REDUCTION INTERNAL FIXATION (Right: Ankle) Diagnosis:       Closed trimalleolar fracture of right ankle, initial encounter      (Closed trimalleolar fracture of right ankle, initial encounter [S82.851A])    Surgeons: Deandre Reynoso MD Provider: David Ricardo MD    Anesthesia Type: general ASA Status: 3          Anesthesia Type: general    Vitals  Vitals Value Taken Time   BP     Temp     Pulse 67 05/25/23 1431   Resp     SpO2 93 % 05/25/23 1431   Vitals shown include unvalidated device data.        Post Anesthesia Care and Evaluation    Patient location during evaluation: PACU  Patient participation: complete - patient participated  Level of consciousness: sleepy but conscious  Pain management: adequate    Airway patency: patent  Anesthetic complications: No anesthetic complications  PONV Status: none  Cardiovascular status: hemodynamically stable and acceptable  Respiratory status: nonlabored ventilation, acceptable, nasal cannula and oral airway  Hydration status: acceptable

## 2023-05-25 NOTE — PROGRESS NOTES
[facility]    Acute pain service Inpatient Progress Note    Patient Name: Philomena Davis  :  1960  MRN:  [id number]        Acute Pain  Service Inpatient Progress Note:    Analgesia:Excellent  Pain Score:0/10  LOC: alert and awake  Side Effects:None  Catheter Site:clean, dry and dressing intact  Volume: .  Catheter Plan:Catheter to remain Insitu and Continue catheter infusion rate unchanged  Comments: Surgery today

## 2023-05-25 NOTE — THERAPY EVALUATION
Patient Name: Philomena Davis  : 1960    MRN: [id number]                              Today's Date: 2023       Admit Date: 2023    Visit Dx:     ICD-10-CM ICD-9-CM   1. Closed displaced trimalleolar fracture of right ankle, initial encounter  S82.851A 824.6   2. Acute right ankle pain  M25.571 719.47     338.19   3. 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   • Digital mucous cyst of toe of left foot   • Alcohol withdrawal   • Alcoholism /alcohol abuse   • Hematemesis   • Diabetes mellitus, type 2   • Essential hypertension   • Generalized anxiety disorder   • Hyperlipidemia   • Glaucoma   • Elevated LFTs   • Generalized weakness   • Alcoholic cirrhosis of liver without ascites   • Weight loss   • Severe malnutrition   • GERD without esophagitis   • Closed trimalleolar fracture of right ankle   • Hypokalemia   • Closed displaced trimalleolar fracture of right ankle, initial encounter     Past Medical History:   Diagnosis Date   • Anxiety and depression    • Cirrhosis of liver    • Closed displaced fracture of lateral malleolus of left fibula    • Diabetes mellitus    • Ganglion cyst of left foot    • Glaucoma    • Hypertension    • Wears glasses      Past Surgical History:   Procedure Laterality Date   • ANKLE OPEN REDUCTION INTERNAL FIXATION Left 3/24/2017    Procedure: LEFT ANKLE OPEN REDUCTION INTERNAL FIXATION WITH ILIAC CREST BONE GRAFT , EXCISION CYST  INNERSPACE LEFT FOOT;  Surgeon: Frank Larson MD;  Location: [facility] OR;  Service:    • AUGMENTATION MAMMAPLASTY Bilateral    • BREAST AUGMENTATION     • BREAST EXCISIONAL BIOPSY Right    • DILATION AND CURETTAGE, DIAGNOSTIC / THERAPEUTIC     • ENDOSCOPY N/A 2022    Procedure: ESOPHAGOGASTRODUODENOSCOPY;  Surgeon: Arelis Solano MD;  Location: [facility] ENDOSCOPY;   Service: Gastroenterology;  Laterality: N/A;   • MN RPR NON/MAL FEMUR DSTL H/N W/ILIAC/AUTOG BONE Left 3/24/2017    Procedure: ILIAC CREST BONE GRAFT;  Surgeon: Frank Larson MD;  Location: [facility];  Service: Orthopedics   • WRIST SURGERY        General Information     Row Name 05/25/23 1715          Physical Therapy Time and Intention    Document Type evaluation  -     Mode of Treatment physical therapy  -     Row Name 05/25/23 1715          General Information    Patient Profile Reviewed yes  -HP     Prior Level of Function min assist:;all household mobility;community mobility;gait;transfer;bed mobility;ADL's  -     Existing Precautions/Restrictions fall;non-weight bearing  adductor canal nerve cath  -     Barriers to Rehab none identified  -     Row Name 05/25/23 1715          Living Environment    People in Home alone;other (see comments)  SO to stay with pt at d/c  -     Row Name 05/25/23 1715          Home Main Entrance    Number of Stairs, Main Entrance three  -HP     Stair Railings, Main Entrance railing on left side (ascending)  -     Row Name 05/25/23 1715          Stairs Within Home, Primary    Stairs, Within Home, Primary pt plans to slay on main level  -     Number of Stairs, Within Home, Primary twelve  -HP     Row Name 05/25/23 1715          Cognition    Orientation Status (Cognition) oriented x 3  -HP     Row Name 05/25/23 1715          Safety Issues, Functional Mobility    Safety Issues Affecting Function (Mobility) insight into deficits/self-awareness;awareness of need for assistance;safety precaution awareness  -     Impairments Affecting Function (Mobility) balance;endurance/activity tolerance;pain;strength;sensation/sensory awareness;range of motion (ROM)  -           User Key  (r) = Recorded By, (t) = Taken By, (c) = Cosigned By    Initials Name Provider Type     Faiza Calhoun PT Physical Therapist               Mobility     Row Name 05/25/23 171           Bed Mobility    Bed Mobility supine-sit  -HP     Supine-Sit [place] (Bed Mobility) modified independence  -     Row Name 05/25/23 1717          Transfers    Comment, (Transfers) VC for sequencing with demonstration; Pt able to perform STS and SPT to chair with CGAx2. Good ability to maintain NWB on RLE. Difficulty clearing L foot from floor. Pt encouraged to utilize B UE for support and perform heel rise to pivot on LLE. No LOB noted.  -     Row Name 05/25/23 1717          Bed-Chair Transfer    Bed-Chair [place] (Transfers) contact guard;2 person assist  -     Assistive Device (Bed-Chair Transfers) walker, front-wheeled  -     Row Name 05/25/23 1717          Sit-Stand Transfer    Sit-Stand [place] (Transfers) contact guard;2 person assist  -     Assistive Device (Sit-Stand Transfers) walker, front-wheeled  -     Row Name 05/25/23 1717          Gait/Stairs (Locomotion)    [place] Level (Gait) unable to assess  -     Row Name 05/25/23 1717          Mobility    Extremity Weight-bearing Status right lower extremity  -     Right Lower Extremity (Weight-bearing Status) non weight-bearing (NWB)  -           User Key  (r) = Recorded By, (t) = Taken By, (c) = Cosigned By    Initials Name Provider Type    Faiza Lazcano PT Physical Therapist               Obj/Interventions     Row Name 05/25/23 1718          Range of Motion Comprehensive    General Range of Motion lower extremity range of motion deficits identified  -     Comment, General Range of Motion R ankle ROM limited in splint  -     Row Name 05/25/23 1718          Strength Comprehensive (MMT)    General Manual Muscle Testing (MMT) Assessment lower extremity strength deficits identified  -     Comment, General Manual Muscle Testing (MMT) Assessment Pt able to perform B SLR  -     Row Name 05/25/23 1718          Balance    Balance Assessment sitting static balance;sitting dynamic balance;sit to stand dynamic  balance;standing static balance;standing dynamic balance  -HP     Static Sitting Balance standby assist  -     Dynamic Sitting Balance standby assist  -HP     Position, Sitting Balance sitting edge of bed  -HP     Static Standing Balance contact guard  -HP     Dynamic Standing Balance contact guard  -HP     Position/Device Used, Standing Balance supported;walker, rolling  -HP     Balance Interventions standing;sitting;sit to stand;occupation based/functional task  -     Row Name 05/25/23 1718          Sensory Assessment (Somatosensory)    Sensory Assessment (Somatosensory) LE sensation intact  -           User Key  (r) = Recorded By, (t) = Taken By, (c) = Cosigned By    Initials Name Provider Type     Faiza Calhoun, PT Physical Therapist               Goals/Plan     Row Name 05/25/23 1721          Transfer Goal 1 (PT)    Activity/Assistive Device (Transfer Goal 1, PT) sit-to-stand/stand-to-sit;bed-to-chair/chair-to-bed  -HP     [place] Level/Cues Needed (Transfer Goal 1, PT) modified independence  -HP     Time Frame (Transfer Goal 1, PT) long term goal (LTG);5 days  -HP     Progress/Outcome (Transfer Goal 1, PT) goal ongoing  -     Row Name 05/25/23 1721          Gait Training Goal 1 (PT)    Activity/Assistive Device (Gait Training Goal 1, PT) gait (walking locomotion)  -HP     [place] Level (Gait Training Goal 1, PT) modified independence  -HP     Distance (Gait Training Goal 1, PT) 150  -HP     Time Frame (Gait Training Goal 1, PT) long term goal (LTG);5 days  -HP     Progress/Outcome (Gait Training Goal 1, PT) goal ongoing  -     Row Name 05/25/23 1721          Stairs Goal 1 (PT)    Activity/Assistive Device (Stairs Goal 1, PT) ascending stairs;descending stairs  -HP     [place] Level/Cues Needed (Stairs Goal 1, PT) contact guard required  -HP     Number of Stairs (Stairs Goal 1, PT) 3  -HP     Time Frame (Stairs Goal 1, PT) long term goal (LTG);5 days  -HP     Progress/Outcome (Stairs  Goal 1, PT) goal ongoing  -     Row Name 05/25/23 1721          Therapy Assessment/Plan (PT)    Planned Therapy Interventions (PT) balance training;bed mobility training;gait training;home exercise program;patient/family education;transfer training;stretching;strengthening;stair training;ROM (range of motion)  -           User Key  (r) = Recorded By, (t) = Taken By, (c) = Cosigned By    Initials Name Provider Type     Faiza Calhoun, PT Physical Therapist               Clinical Impression     Row Name 05/25/23 1719          Pain    Pretreatment Pain Rating 0/10 - no pain  -     Posttreatment Pain Rating 0/10 - no pain  -     Row Name 05/25/23 1719          Plan of Care Review    Plan of Care Reviewed With patient;significant other  -     Progress no change  -     Outcome Evaluation PT eval complete. Pt demonstrated good ability to maintain NWB status with STS and SPT to chair with FWW and CGAx2. No knee buckling or LOB noted. Activity limited by fatigue. Recommend d/c home with assist when medically appropriate. Plan to assess stairs and trial knee scooter next session.  -     Row Name 05/25/23 1719          Therapy Assessment/Plan (PT)    Rehab Potential (PT) good, to achieve stated therapy goals  -     Criteria for Skilled Interventions Met (PT) yes;meets criteria;skilled treatment is necessary  -     Therapy Frequency (PT) daily  -     Row Name 05/25/23 1719          Vital Signs    Pre Systolic BP Rehab --  VSS  -HP     Pre Patient Position Supine  -HP     Intra Patient Position Standing  -HP     Post Patient Position Sitting  -     Row Name 05/25/23 1719          Positioning and Restraints    Pre-Treatment Position in bed  -HP     Post Treatment Position chair  -HP     In Chair notified nsg;reclined;sitting;call light within reach;encouraged to call for assist;exit alarm on;with family/caregiver;legs elevated;on mechanical lift sling  -           User Key  (r) = Recorded By, (t) = Taken  By, (c) = Cosigned By    Initials Name Provider Type     Faiza Calhoun PT Physical Therapist               Outcome Measures     Row Name 05/25/23 1721          How much help from another person do you currently need...    Turning from your back to your side while in flat bed without using bedrails? 4  -HP     Moving from lying on back to sitting on the side of a flat bed without bedrails? 4  -HP     Moving to and from a bed to a chair (including a wheelchair)? 3  -HP     Standing up from a chair using your arms (e.g., wheelchair, bedside chair)? 3  -HP     Climbing 3-5 steps with a railing? 2  -HP     To walk in hospital room? 2  -HP     AM-PAC 6 Clicks Score (PT) 18  -HP     Highest level of mobility 6 --> Walked 10 steps or more  -     Row Name 05/25/23 1721          Functional Assessment    Outcome Measure Options AM-PAC 6 Clicks Basic Mobility (PT)  -           User Key  (r) = Recorded By, (t) = Taken By, (c) = Cosigned By    Initials Name Provider Type     Faiza Calhoun PT Physical Therapist                             Physical Therapy Education     Title: PT OT SLP Therapies (In Progress)     Topic: Physical Therapy (In Progress)     Point: Mobility training (Done)     Learning Progress Summary           Patient Acceptance, E,D, VU,DU by  at 5/25/2023 1722                   Point: Home exercise program (Not Started)     Learner Progress:  Not documented in this visit.          Point: Body mechanics (Done)     Learning Progress Summary           Patient Acceptance, E,D, VU,DU by  at 5/25/2023 1722                   Point: Precautions (Done)     Learning Progress Summary           Patient Acceptance, E,D, VU,DU by  at 5/25/2023 1722                               User Key     Initials Effective Dates Name Provider Type Discipline     06/01/21 -  Faiza Calhoun PT Physical Therapist PT              PT Recommendation and Plan  Planned Therapy Interventions (PT): balance training, bed mobility  training, gait training, home exercise program, patient/family education, transfer training, stretching, strengthening, stair training, ROM (range of motion)  Plan of Care Reviewed With: patient, significant other  Progress: no change  Outcome Evaluation: PT eval complete. Pt demonstrated good ability to maintain NWB status with STS and SPT to chair with FWW and CGAx2. No knee buckling or LOB noted. Activity limited by fatigue. Recommend d/c home with assist when medically appropriate. Plan to assess stairs and trial knee scooter next session.     Time Calculation:    PT Charges     Row Name 05/25/23 1700             Time Calculation    Start Time 1700  -HP      PT Received On 05/25/23  -HP      PT Goal Re-Cert Due Date 06/04/23  -HP         Timed Charges    86347 - PT Therapeutic Activity Minutes 10  -HP         Untimed Charges    PT Eval/Re-eval Minutes 23  -HP         Total Minutes    Timed Charges Total Minutes 10  -HP      Untimed Charges Total Minutes 23  -HP       Total Minutes 33  -HP            User Key  (r) = Recorded By, (t) = Taken By, (c) = Cosigned By    Initials Name Provider Type     Faiza Calhoun, KEILA Physical Therapist              Therapy Charges for Today     Code Description Service Date Service Provider Modifiers Qty    68919303632 HC PT THERAPEUTIC ACT EA 15 MIN 5/25/2023 Faiza Calhoun, PT GP 1    46020777772 HC PT EVAL LOW COMPLEXITY 2 5/25/2023 Faiza Calhoun, PT GP 1    64988306700 HC PT THER SUPP EA 15 MIN 5/25/2023 Faiza Calhoun, PT GP 3          PT G-Codes  Outcome Measure Options: AM-PAC 6 Clicks Basic Mobility (PT)  AM-PAC 6 Clicks Score (PT): 18  PT Discharge Summary  Anticipated Discharge Disposition (PT): home with assist    Faiza Calhoun PT  5/25/2023

## 2023-05-25 NOTE — DISCHARGE INSTRUCTIONS
INCISION CARE Ankle fracture:  You have a sterile Splint in place please keep clean and dry.  Sutures will be removed between 10-14 days postoperation.  This will be done during your return visit to Dr. Reynoso's office.  No creams or ointments to the incision for 1 month after surgery. After 1 month, it is recommended to massage the scar with vitamin E cream to help decrease scar formation.  Check incision every day and notify surgeon immediately if any of the following signs or symptoms are noted:  Increase in redness  Increase in swelling around the incision and of the entire extremity  Increase in pain  Drainage oozing from the incision  Pulling apart of the edges of the incision  Increase in overall body temperature (greater than 100.5 degrees)    Anticoagulants: You will be discharged on an anticoagulant. This is a prophylactic medication that helps prevent blood clots during your post-operative period. Most patients will be on *** Aspirin 81 mg Enteric coated every 12 hours orally for 30 days. Some patients, due to increased risk factors, will need to be on a stronger anticoagulant. Dr. Reynoso will discuss this need with you.   While taking the anticoagulant, you should avoid taking any additional aspirin, and limit ibuprofen (Advil or Motrin), Aleve (Naprosyn) or other non-steroidal anti-inflammatory medications.   Notify your surgeon immediately if any leda bleeding is noted in the urine, stool, vomit, or from the nose or the incision. Blood in the stool will often appear as black rather than red. Blood in urine may appear as pink. Blood in vomit may appear as brown/black like coffee grounds.  You will need to apply pressure for longer periods of time to any cuts or abrasions to stop bleeding  Avoid alcohol while taking anticoagulants  Sequential Compression Device: You maybe be discharged home with a compression device that helps promote blood flow and prevent clots in your legs. Wear these at all times  for the first two weeks.   Mobilization: The best way to avoid a blood clot is to get up and walk. 10 times a day get up and walk for 5 minutes for the first two weeks. Walking for longer periods of time will increase pain and swelling, making therapy more difficult. If taking any long travel (car or plane) in the first 1-2 months, be sure to get up and walk at least every one hour.     Stool Softeners: You will be at greater risk of constipation after surgery because of being less mobile and taking the pain medications.   Take stool softeners as instructed by your surgeon while on pain medications. Use over the counter Colace 100 mg 1-2 capsules twice daily.   If stools become too loose or too frequent, decreases the dosage or stop the stool softener.  If constipation occurs despite use of stool softeners, you are to continue the stool softeners and add a laxative (Milk of Magnesia 1 ounce daily as needed).  Dulcolax oral tabs or suppository or a fleets enema can also be utilized for constipation and can be obtained over the counter.   If above interventions are unsuccessful in inducing bowel movements, please contact your family physician's office/surgeon's office.  Drink plenty of fluids and eat fruits and vegetables during your recovery time    Pain Medications utilized after surgery are narcotics and the law requires that the following information be given to all patients that are prescribed narcotics:  CLASSIFICATION: Pain medications are called Opioids and are narcotics  LEGALITIES: It is illegal to share narcotics with others and to drive within 24 hours of taking narcotics  POTENTIAL SIDE EFFECTS: Potential side effects of opioids include: nausea, vomiting, itching, dizziness, drowsiness, dry mouth, constipation, and difficulty urinating.  POTENTIAL ADVERSE EFFECTS:   Opioid tolerance can develop with use of pain medications and this simply means that it requires more and more of the medication to control  pain; however, this is seen more in patients that use Opioids for longer periods of time.  Opioid dependence can develop with use of Opioids and this simply means that to stop the medication can cause withdrawal symptoms; however, this is seen with patients that use Opioids for longer periods of time.  Opioid addiction can develop with use of Opioids and the incidence of this is very unlikely in patients who take the medications as ordered and stop the medications as instructed.  Opioid overdose can be dangerous, but is unlikely when the medication is taken as ordered and stopped when ordered. It is important not to mix opioids with alcohol or with any type of sedative, such as Benadryl, as this can lead to over sedation and respiratory difficulty.  DOSAGE:   Pain medications may be needed consistently for the first week to decrease pain and promote adequate pain relief and participation in physical therapy.  After the initial surgical pain begins to resolve, you may begin to decrease the pain medication and only take it as needed. By the end of 6 weeks, you should be off of pain medications.  You can decrease your pain medication consumption by slowly spacing out the time in between the medication, and using 650mg Tylenol when the pain is not as severe. Do not exceed 3500mg of Tylenol in 24 hours.   Refills will not be given by the office during evening hours, on weekends, or after 6 weeks post-op.  To seek refills on pain medications during the initial 6 week post-operative period, you must call the office 48 hours in advance to request the refill. The office will then notify you when to  the prescription. DO NOT wait until you are out of the medication to request a refill. InfuBLOCK - Patient Information  Nerve Catheter Removal Instructions  When your device is empty:    Remove your catheter by pulling the dressing off slowly (like you would remove a regular bandage). The catheter should pull right out of  the skin.  Check that the BLUE tip is intact.                                                                                     If the catheter is stuck, reposition your   extremity and pull slowly until removed.  *If catheter is HURTING and WON'T come out, stop and call [phone] for further assistance.    Remove medication bag from the black carrying case.  Cut the tubing on right and left side of pump, and discard the medication bag and tubing into garbage.  Place the pump and black carrying case into the plastic bag and then place this into the return box.  Seal box with blue stickers and return to [place] postal service.    THIS IS PRE-PAID POSTAGE.   What is a pain pump?  InfuBLOCK is a postoperative, non-narcotic pain relief system that delivers local anesthetic to or near the surgical site. This is a pain minimizing therapy that delivers an anesthetic (numbing) medicine to the nerve.    The InfuBLOCK pain pump will continuously deliver a local anesthetic medication to block the pain in the area of your procedure.    Where can I find information about my pain pump?           For more information about your pain pump, scan the QR code.  For additional patient resources, visit [email or web address].                                                                                             The [employer] Nursing Hotline is Here for You 24/7.     Call 1-617.623.8403 for Assistance.  While your physician is your primary source for information about your treatment., there may be times during your treatment that you need assistance with your infusion pump. Our team of compassionate and knowledgeable Registered Nursed (RN) is here to assist every step of the way.    Answers to questions about your infusion pump                 Tubing disconnect  Assistance with pump alarms                                                      Dislodged catheter  Excessive leakage noted from pump                                          Inadequate pain control  [facility] COLD THERAPY - PATIENT INSTRUCTION SHEET    Cold Compression Therapy for your comfort and rehabilitation  Your caregivers want you to be productive in your rehab and comfortable during your stay. In keeping with those goals, you will be receiving an SMI Cold Therapy Wrap to help ease post-operative pain and swelling that might keep you from getting back on track! Your SMI Cold Therapy Wrap is effective and simple-to-use, and you will be encouraged to apply it throughout your hospital stay and at home through the duration of your recovery.    When you are ready to go home  Be sure to take your SMI Cold Therapy Wrap and both sets of Gel Bags with you for continued comfort and use throughout your rehabilitation. If you don't already have them, ask your nurse or aide to retrieve your SMI Gel Bags from the patient freezer.    Home use precautions  Always follow your medical professional's application instructions upon discharge. Your SMI Cold Therapy Wrap and Gel Bags are designed to last for months following your surgery. Never heat the Gel Bags unless specified by your healthcare provider. Supervision is advised when using this product on children or geriatric patients. To avoid danger of suffocation, please keep the outer plastic packaging away from children & pets.    Cold Therapy Instructions  Place Gel Bags in a freezer set ¾ of the way to max temperature for at least (4) hours. For best results, lay the Gel Bags flat and [id number] in the freezer. Once frozen, slide Gel Bags into the gel pouch and secure your wrap to the affected area with the straps.  Gel wraps that have been stored in a freezer for an extended period of time may require a (10) minute period of softening up in a room temperature environment before application.  The gel pouch acts as a protective barrier. NEVER place frozen bags directly onto skin, as this may cause frostbite injury.  The [facility]  Cold Therapy Wrap is designed to be able to be worm while ambulating. The compression straps can be secured well enough so that the Wrap won't fall off while moving.  Wrap Application Videos can be viewed at [email or web address].  An additional protective barrier such as clothing, a washcloth, hand-towel or pillowcase may be used during prolonged treatment applications.  The Gel-Pouch and Wrap are both Latex-Free and the Gel Bag ingredients are non toxic.    SMI Wrap care instructions  The [facility] Cold Therapy Wrap may be hand washed and hung to dry when needed.    [facility] re-order information  Additional [facility] body specific wraps and/or Gel Bags can be re-ordered from [email or web address] or call [employer]1-ICE-WRAP ([phone])

## 2023-05-25 NOTE — ANESTHESIA PROCEDURE NOTES
Airway  Urgency: elective    Date/Time: 5/25/2023 11:46 AM  Airway not difficult    General Information and Staff    Patient location during procedure: OR  CRNA/CAA: Amina Turpin CRNA    Indications and Patient Condition  Indications for airway management: airway protection    Preoxygenated: yes  Mask difficulty assessment: 1 - vent by mask    Final Airway Details  Final airway type: supraglottic airway      Successful airway: I-gel  Size 4     Number of attempts at approach: 1  Assessment: lips, teeth, and gum same as pre-op    Additional Comments  LMA placed without difficulty, ventilation with assist, equal breath sounds and symmetric chest rise and fall, atraumatic, teeth intact

## 2023-05-25 NOTE — PROGRESS NOTES
[facility] Medicine Services  PROGRESS NOTE    Patient Name: Philomena Davis  : 1960  MRN: [id number]    Date of Admission: 2023  Primary Care Physician: Provider, No Known    Subjective   Subjective     CC: Follow-up right trimalleolar fracture    HPI: No acute events overnight, patient rested well, pain is controlled.    ROS:  Gen- No fevers, chills  CV- No chest pain, palpitations  Resp- No cough, dyspnea  GI- No N/V/D, abd pain    Objective   Objective     Vital Signs:   Temp:  [97.7 °F (36.5 °C)-98.5 °F (36.9 °C)] 98.5 °F (36.9 °C)  Heart Rate:  [74-94] 85  Resp:  [16-18] 16  BP: (104-132)/(63-81) 132/81  Flow (L/min):  [2] 2     Physical Exam:  Constitutional: No acute distress, awake, alert  HENT: NCAT, mucous membranes moist  Respiratory: Clear to auscultation bilaterally, respiratory effort normal   Cardiovascular: RRR, no murmurs, rubs, or gallops  Gastrointestinal: Positive bowel sounds, soft, nontender, nondistended  Musculoskeletal: Right leg under splint  Psychiatric: Appropriate affect, cooperative  Neurologic: Oriented x 3, nonfocal  Skin: No rashes    Results Reviewed:  LAB RESULTS:      Lab 23   WBC 9.30 7.51   HEMOGLOBIN 12.4 13.6   HEMATOCRIT 36.8 40.6   PLATELETS 184 221   NEUTROS ABS 6.72 4.82   IMMATURE GRANS (ABS) 0.05 0.08*   LYMPHS ABS 1.50 1.77   MONOS ABS 0.97* 0.55   EOS ABS 0.02 0.23   MCV 99.5* 99.5*         Lab 23   SODIUM 139  --  139  139   POTASSIUM 4.3 4.5 3.2*  3.2*   CHLORIDE 102  --  101  101   CO2 27.0  --  23.0  25.0   ANION GAP 10.0  --  15.0  13.0   BUN 15  --  13  14   CREATININE 0.77  --  0.77  0.77   EGFR 88.4  --  88.4  88.4   GLUCOSE 89  --  97  99   CALCIUM 9.6  --  9.3  9.3   HEMOGLOBIN A1C  --   --  5.30         Lab 23  0152   TOTAL PROTEIN 7.6   ALBUMIN 4.1   GLOBULIN 3.5   ALT (SGPT) 18   AST (SGOT) 32   BILIRUBIN 0.4   ALK PHOS 109                      Brief Urine Lab Results  (Last result in the past 365 days)      Color   Clarity   Blood   Leuk Est   Nitrite   Protein   CREAT   Urine HCG        05/19/23 0834 Orange  Comment: on azo   Cloudy   3+   Large (3+)   Positive   3+                 Microbiology Results Abnormal     None          XR Ankle 3+ View Right    Result Date: 5/24/2023  XR ANKLE 3+ VW RIGHT Date of Exam: 5/24/2023 4:01 AM EDT Indication: stabilization of injury Comparison: Ankle x-ray 0231 hours same date Findings: Patient has been placed in cast. There is improved alignment of the fracture dislocation of the ankle. There is still abnormal widening of the mortise medially. There is a displaced fracture distal fibula Nino B with the distal fracture fragment displaced lateral by 4 mm. The medial malleolar fracture fragment is displaced laterally by 8 mm. A posterior malleolar fracture fragment is displaced posteriorly and superiorly by 5 mm. There is soft tissue swelling.     Impression: Impression: Improved alignment. Fracture dislocation of the ankle. There is now anatomic alignment of the tibiotalar joint. Ankle mortise is disrupted. Displaced trimalleolar fracture is present with soft tissue swelling. Electronically Signed: Isabela Henderson  5/24/2023 6:53 AM EDT  Workstation ID: [id number]    XR Ankle 3+ View Right    Result Date: 5/24/2023  EXAMINATION: XR ANKLE 3+ VW RIGHT  DATE OF EXAM: 5/24/2023 2:31 AM HISTORY: Trauma COMPARISON: None. FINDINGS: There is a trimalleolar ankle fracture with lateral dislocation of the talus off the tibia. There is diffuse soft tissue swelling.     Impression: 1.  Trimalleolar ankle fracture. 2.  Lateral dislocation of the talus off the tibia. Electronically signed by:  Aníbal Gonsalez M.D.  5/24/2023 1:19 AM Mountain Time    Right Single Shot Femoral Nerve block    Result Date: 5/24/2023  Howard Manjarrez, VIOLA     5/24/2023  7:57 AM Right Single Shot Femoral Nerve block Patient reassessed  immediately prior to procedure Start time: 5/24/2023 7:57 AM Reason for block: at surgeon's request and post-op pain management Performed by CRNA/CAA: Howard Manjarrez, CRNA Assisted by: Anisha Talbert RN Preanesthetic Checklist Completed: patient identified, IV checked, site marked, risks and benefits discussed, surgical consent, monitors and equipment checked, pre-op evaluation and timeout performed Prep: Pt Position: supine Sterile barriers:gloves, cap, sterile barriers, mask and washed/disinfected hands Prep: ChloraPrep Patient monitoring: blood pressure monitoring, continuous pulse oximetry and EKG Procedure Sedation: no Guidance:ultrasound guided ULTRASOUND INTERPRETATION.  Using ultrasound guidance a 20 G gauge needle was placed in close proximity to the femoral nerve, at which point, under ultrasound guidance anesthetic was injected in the area of the nerve and spread of the anesthesia was seen on ultrasound in close proximity thereto.  There were no abnormalities seen on ultrasound; a digital image was taken; and the patient tolerated the procedure with no complications. Images:still images obtained, printed/placed on chart Laterality:right Block Type:femoral Injection Technique:single-shot Needle Type:short-bevel Needle Gauge:20 G Resistance on Injection: none Medications Used: dexamethasone sodium phosphate injection - Injection  2 mg - 5/24/2023 7:57:00 AM bupivacaine PF (MARCAINE) 0.25 % injection - Injection  30 mL - 5/24/2023 7:57:00 AM Medications Preservative Free Saline:5ml Post Assessment Injection Assessment: negative aspiration for heme, no paresthesia on injection and incremental injection Patient Tolerance:comfortable throughout block Complications:no Additional Notes SINGLE Shot A high-frequency linear transducer, with sterile cover, was placed in the inguinal crease to visualize the Femoral Vein, Artery, and Nerve (medial to lateral). The insertion site was prepped in sterile fashion. Skin  "and cutaneous tissue was infiltrated with 2-5 ml of 1% Lidocaine. Using ultrasound-guidance, a 20-gauge B-Laws 4\" Ultraplex 360 non-stimulating echogenic needle was then inserted and advanced in-plane from lateral to medial with ultrasound guidance. The needle was directed below Fascia Iliacus towards the Femoral nerve. Preservative-free normal saline was utilized for hydro-dissection of tissue. Local anesthetic injection spread, in incremental 3-5 ml injections, was visualized lateral to the artery to surround the femoral nerve. Aspiration every 5 ml to prevent intravascular injection. Injection was completed with negative aspiration of blood and negative intravascular injection. Injection pressures were normal with minimal resistance.     Right Popliteal Catheter    Result Date: 5/24/2023  Howard Manjarrez CRNA     5/24/2023  7:56 AM Right Popliteal Catheter Patient reassessed immediately prior to procedure Patient location during procedure: floor Start time: 5/24/2023 7:56 AM Reason for block: at surgeon's request and post-op pain management Performed by VIOLA/CAA: Howard Manjarrez CRNA Assisted by: Anisha Talbert RN Preanesthetic Checklist Completed: patient identified, IV checked, site marked, risks and benefits discussed, surgical consent, monitors and equipment checked, pre-op evaluation and timeout performed Prep: Pt Position: left lateral decubitus Sterile barriers:cap, gloves, mask and washed/disinfected hands Prep: ChloraPrep Patient monitoring: blood pressure monitoring, continuous pulse oximetry and EKG Procedure Sedation: yes Performed under: local infiltration Guidance:ultrasound guided ULTRASOUND INTERPRETATION.  Using ultrasound guidance a 20 G gauge needle was placed in close proximity to the nerve, at which point, under ultrasound guidance anesthetic was injected in the area of the nerve and spread of the anesthesia was seen on ultrasound in close proximity thereto.  There were no abnormalities " seen on ultrasound; a digital image was taken; and the patient tolerated the procedure with no complications. Images:still images obtained, printed/placed on chart Laterality:right Block Type:popliteal Injection Technique:catheter Needle Type:echogenic and Tuohy Needle Gauge:18 G Resistance on Injection: none Catheter Size:20 G Cath Depth at skin: 7 cm Medications Used: bupivacaine PF (MARCAINE) 0.25 % injection - Injection  30 mL - 5/24/2023 7:56:00 AM Medications Preservative Free Saline:5ml Post Assessment Injection Assessment: negative aspiration for heme, no paresthesia on injection and incremental injection Patient Tolerance:comfortable throughout block Complications:no Additional Notes CATHETER                             A high-frequency linear transducer, with sterile cover, was placed in the popliteal fossa to identify the popliteal artery and vein, Tibial nerve (TN) and Common Peroneal nerve (CP). The transducer was then moved in a cephalad fashion to observe the TN and CP nerve bifurcation to form the Sciatic Nerve. The insertion site was prepped and draped in sterile fashion. Skin and cutaneous tissue was infiltrated with 2-5 ml of 1% Lidocaine. Using ultrasound-guidance, an 18-gauge Contiplex Ultra 360 Touhy needle was advanced in plane from lateral to medial. Preservative-free normal saline was utilized for hydro-dissection of tissue, advancement of Touhy needle, and to confirm final needle placement posterior to the nerves. Local anesthetic injection spread, in incremental 3-5 ml injections, to surround both nerve structures. Aspiration every 5 ml to prevent intravascular injection. Injection was completed with negative aspiration of blood and negative intravascular injection. Injection pressures were normal with minimal resistance. A 20-gauge Contiplex Echo catheter was placed through the needle and advance out the tip of the Touhy 1-3 cm. The Touhy needle was then removed, and final catheter  "position verified (Below/above or Anterior/Posterior) the nerve structures. The catheter was secured in the usual fashion with skin glue, benzoin, steri-strips, CHG tegaderm and Label noting \"Nerve Block Catheter\". Jerk tape applied at yellow connector and catheter connection.     CT Lower Extremity Right Without Contrast    Result Date: 5/24/2023  CT LOWER EXTREMITY RIGHT WO CONTRAST Date of Exam: 5/24/2023 11:15 AM EDT Indication: Fracture, tib/fib. Preop. Comparison: Ankle radiograph 5/4/2023 Technique: Axial CT images were obtained of the right lower extremity without contrast administration.  Reconstructed coronal and sagittal images were also obtained. Automated exposure control and iterative construction methods were used. Findings: There is a trimalleolar fracture. Widening of the medial ankle mortise and narrowing of the lateral mortise. The distal fibular fracture is comminuted. The distal fracture fragment involving the tip of the fibula measures about 3.2 x 1.7 x 1.3 cm. It is displaced posterior to the proximal fracture fragment by 9 mm and superiorly displaced by about 4 mm. There is a displaced fracture fragment from the anterior aspect of the fibula which is rotated and displaced medially. This fracture fragment measures approximately 1 x 0.5 x 1.6 cm. It is anteriorly displaced by 9 mm and rotated 90 degrees. The posterior malleolus fracture fragment measures 0.6 x 2.3 x 1.7 cm. It is displaced posteriorly by about 3 mm and superiorly by 3 mm. The medial malleolus fracture fragment measures 0.7 x 1.8 x 1.7 cm. It is displaced inferiorly by 6 mm. There is a fracture at the plantar aspect base of the first metatarsal. The small triangular fracture fragment is intra-articular and measures maximally 3 mm. It is nondisplaced.. The bone mineral density is normal. Soft tissues are better evaluated with MRI exam. There is soft tissue swelling most pronounced in the anterior and lateral aspect. The Achilles " tendon is intact. The peroneal tendons, extensor and flexor tendons are intact.    Impression: Impression: Displaced trimalleolar fracture of ankle. Fracture fragment from the comminuted distal fibular fracture is most displaced and rotated by 90 degrees along the anterior lateral joint line. Please see findings for details. There is a nondisplaced fracture at the base of the first metatarsal plantar aspect. Electronically Signed: Isabela Henderson  5/24/2023 12:01 PM EDT  Workstation ID: [id number]          Current medications:  Scheduled Meds:folic acid, 1 mg, Oral, Daily  hydroCHLOROthiazide, 12.5 mg, Oral, Daily  insulin lispro, 2-7 Units, Subcutaneous, TID With Meals  multivitamin with minerals, 1 tablet, Oral, Daily  pantoprazole, 40 mg, Oral, Daily  senna-docusate sodium, 2 tablet, Oral, BID  sodium chloride, 10 mL, Intravenous, Q12H  thiamine (B-1) IV, 200 mg, Intravenous, Q8H   Followed by  [START ON 5/29/2023] thiamine, 100 mg, Oral, Daily      Continuous Infusions:lactated ringers, 100 mL/hr, Last Rate: 100 mL/hr (05/24/23 1539)  ropivacaine,       PRN Meds:.•  acetaminophen  •  senna-docusate sodium **AND** polyethylene glycol **AND** bisacodyl **AND** bisacodyl  •  dextrose  •  dextrose  •  glucagon (human recombinant)  •  HYDROmorphone  •  LORazepam **OR** LORazepam **OR** LORazepam **OR** LORazepam **OR** LORazepam **OR** LORazepam  •  Magnesium Standard Dose Replacement - Follow Nurse / BPA Driven Protocol  •  melatonin  •  ondansetron **OR** ondansetron  •  oxyCODONE-acetaminophen  •  Potassium Replacement - Follow Nurse / BPA Driven Protocol  •  sodium chloride  •  [COMPLETED] Insert Peripheral IV **AND** sodium chloride  •  sodium chloride  •  sodium chloride    Assessment & Plan   Assessment & Plan     Active Hospital Problems    Diagnosis  POA   • **Closed displaced trimalleolar fracture of right ankle, initial encounter [P58.294H]  Yes   • Closed trimalleolar fracture of right ankle [R12.281A]  Yes   •  Hypokalemia [E87.6]  Unknown   • GERD without esophagitis [K21.9]  Yes   • Alcoholic cirrhosis of liver without ascites [K70.30]  Yes   • Diabetes mellitus, type 2 [E11.9]  Yes   • Essential hypertension [I10]  Yes   • Generalized anxiety disorder [F41.1]  Yes   • Hyperlipidemia [E78.5]  Yes      Resolved Hospital Problems   No resolved problems to display.        Brief Hospital Course to date:  62-year-old female with history of type 2 diabetes, hyperlipidemia, general anxiety disorder, EtOH cirrhosis, GERD who presented to the ED with right ankle pain status post fall, found to have right trimalleolar fracture.     Right trimalleolar fracture s/p fall  -Ortho consulted, plan for OR today  -Continue pain control  -PT/OT when appropriate     Well-controlled type 2 diabetes with A1c 4.9%  -Continue SSI for now      Hypertension  -BP currently stable continue HCTZ     EtOH cirrhosis  -Compensated     EtOH abuse  -Ethanol level of 318 on presentation  -Continue CIWA per protocol     Hypokalemia  -Monitor and replete per protocol     General anxiety disorder  Hyperlipidemia  -Not on any home meds for these    Expected Discharge Location and Transportation: Home versus rehab  Expected Discharge  Expected Discharge Date: 5/26/2023; Expected Discharge Time:      DVT prophylaxis:  Mechanical DVT prophylaxis orders are present.     AM-PAC 6 Clicks Score (PT): 13 (05/24/23 0800)    CODE STATUS:   Code Status and Medical Interventions:   Ordered at: 05/24/23 0502     Code Status (Patient has no pulse and is not breathing):    CPR (Attempt to Resuscitate)     Medical Interventions (Patient has pulse or is breathing):    Full Support       Braxton Edge MD  05/25/23

## 2023-05-25 NOTE — ANESTHESIA PROCEDURE NOTES
SS Adductor      Patient reassessed immediately prior to procedure    Start time: 5/25/2023 11:48 AM  Reason for block: at surgeon's request and post-op pain management  Performed by  VIOLA/CAA: Sunil Hough CRNA  Preanesthetic Checklist  Completed: patient identified, IV checked, site marked, risks and benefits discussed, surgical consent, monitors and equipment checked, pre-op evaluation and timeout performed  Prep:  Pt Position: supine  Sterile barriers:cap, gloves, mask, sterile barriers and washed/disinfected hands  Prep: ChloraPrep  Patient monitoring: blood pressure monitoring, continuous pulse oximetry and EKG  Procedure  Performed under: spinal  Guidance:ultrasound guided    ULTRASOUND INTERPRETATION.  Using ultrasound guidance a 20 G gauge needle was placed in close proximity to the nerve, at which point, under ultrasound guidance anesthetic was injected in the area of the nerve and spread of the anesthesia was seen on ultrasound in close proximity thereto.  There were no abnormalities seen on ultrasound; a digital image was taken; and the patient tolerated the procedure with no complications. Images:still images obtained, printed/placed on chart    Laterality:right  Block Type:adductor canal block  Injection Technique:single-shot  Needle Type:echogenic and short-bevel  Needle Gauge:20 G  Resistance on Injection: none  Catheter Size:20 G (20g)    Medications Used: buprenorphine (BUPRENEX) injection - Injection   0.3 mg - 5/25/2023 11:48:00 AM  dexamethasone sodium phosphate injection - Injection   4 mg - 5/25/2023 11:48:00 AM  bupivacaine PF (MARCAINE) 0.25 % injection - Injection   30 mL - 5/25/2023 11:48:00 AM      Post Assessment  Injection Assessment: negative aspiration for heme, incremental injection and no paresthesia on injection  Patient Tolerance:comfortable throughout block  Complications:no  Additional Notes  SINGLE shot   A high-frequency linear transducer, with sterile cover, was placed on  "the anterior mid-thigh (between the anterior superior iliac spine and patella). The transducer was then moved medially to identify the Sartorius muscle (Franky), Vastus Medialis muscle (VMM), Superficial Femoral Artery (SFA) and Vein. The transducer was then moved cephalad or caudad to position the SFA in the middle of the Franky. The insertion site was prepped and draped in sterile fashion. Skin and cutaneous tissue was infiltrated with 2-5 ml of 1% Lidocaine. Using ultrasound-guidance, a 20-gauge B-Laws 4\" Ultraplex 360 non-stimulating echogenic needle was advanced in plane from lateral to medial. Preservative-free normal saline was utilized for hydro-dissection of tissue, advancement of needle, and to confirm needle placement below the fascial plane of the rFanky where the Nerve to the VMM is located. Local anesthetic (LA) 5 ml deposited here. The needle continues its path lateral to the SFA at the level of the Saphenous Nerve. The remainder of the LA was deposited at the 10-11 o'clock position of the SFA. This injection created a space between the Franky and the SFA. Aspiration every 5 ml to prevent intravascular injection. Injection was completed with negative aspiration of blood and negative intravascular injection. Injection pressures were normal with minimal resistance.           "

## 2023-05-26 ENCOUNTER — TELEPHONE (OUTPATIENT)
Dept: FAMILY MEDICINE CLINIC | Facility: CLINIC | Age: 63
End: 2023-05-26

## 2023-05-26 ENCOUNTER — READMISSION MANAGEMENT (OUTPATIENT)
Dept: CALL CENTER | Facility: HOSPITAL | Age: 63
End: 2023-05-26
Payer: COMMERCIAL

## 2023-05-26 VITALS
WEIGHT: 160 LBS | BODY MASS INDEX: 27.31 KG/M2 | DIASTOLIC BLOOD PRESSURE: 79 MMHG | TEMPERATURE: 97.9 F | HEART RATE: 79 BPM | RESPIRATION RATE: 18 BRPM | SYSTOLIC BLOOD PRESSURE: 124 MMHG | HEIGHT: 64 IN | OXYGEN SATURATION: 97 %

## 2023-05-26 LAB
ANION GAP SERPL CALCULATED.3IONS-SCNC: 10 MMOL/L (ref 5–15)
BASOPHILS # BLD AUTO: 0.02 10*3/MM3 (ref 0–0.2)
BASOPHILS NFR BLD AUTO: 0.2 % (ref 0–1.5)
BUN SERPL-MCNC: 10 MG/DL (ref 8–23)
BUN/CREAT SERPL: 11.1 (ref 7–25)
CALCIUM SPEC-SCNC: 9.1 MG/DL (ref 8.6–10.5)
CHLORIDE SERPL-SCNC: 99 MMOL/L (ref 98–107)
CO2 SERPL-SCNC: 29 MMOL/L (ref 22–29)
CREAT SERPL-MCNC: 0.9 MG/DL (ref 0.57–1)
DEPRECATED RDW RBC AUTO: 51.2 FL (ref 37–54)
EGFRCR SERPLBLD CKD-EPI 2021: 73.3 ML/MIN/1.73
EOSINOPHIL # BLD AUTO: 0 10*3/MM3 (ref 0–0.4)
EOSINOPHIL NFR BLD AUTO: 0 % (ref 0.3–6.2)
ERYTHROCYTE [DISTWIDTH] IN BLOOD BY AUTOMATED COUNT: 13.7 % (ref 12.3–15.4)
GLUCOSE BLDC GLUCOMTR-MCNC: 107 MG/DL (ref 70–130)
GLUCOSE BLDC GLUCOMTR-MCNC: 95 MG/DL (ref 70–130)
GLUCOSE BLDC GLUCOMTR-MCNC: 96 MG/DL (ref 70–130)
GLUCOSE SERPL-MCNC: 117 MG/DL (ref 65–99)
HCT VFR BLD AUTO: 36.5 % (ref 34–46.6)
HGB BLD-MCNC: 12.4 G/DL (ref 12–15.9)
IMM GRANULOCYTES # BLD AUTO: 0.04 10*3/MM3 (ref 0–0.05)
IMM GRANULOCYTES NFR BLD AUTO: 0.4 % (ref 0–0.5)
LYMPHOCYTES # BLD AUTO: 0.76 10*3/MM3 (ref 0.7–3.1)
LYMPHOCYTES NFR BLD AUTO: 8 % (ref 19.6–45.3)
MCH RBC QN AUTO: 34.5 PG (ref 26.6–33)
MCHC RBC AUTO-ENTMCNC: 34 G/DL (ref 31.5–35.7)
MCV RBC AUTO: 101.7 FL (ref 79–97)
MONOCYTES # BLD AUTO: 0.99 10*3/MM3 (ref 0.1–0.9)
MONOCYTES NFR BLD AUTO: 10.4 % (ref 5–12)
NEUTROPHILS NFR BLD AUTO: 7.71 10*3/MM3 (ref 1.7–7)
NEUTROPHILS NFR BLD AUTO: 81 % (ref 42.7–76)
NRBC BLD AUTO-RTO: 0 /100 WBC (ref 0–0.2)
PLATELET # BLD AUTO: 163 10*3/MM3 (ref 140–450)
PMV BLD AUTO: 9.6 FL (ref 6–12)
POTASSIUM SERPL-SCNC: 3.6 MMOL/L (ref 3.5–5.2)
RBC # BLD AUTO: 3.59 10*6/MM3 (ref 3.77–5.28)
SODIUM SERPL-SCNC: 138 MMOL/L (ref 136–145)
WBC NRBC COR # BLD: 9.52 10*3/MM3 (ref 3.4–10.8)

## 2023-05-26 PROCEDURE — 80048 BASIC METABOLIC PNL TOTAL CA: CPT | Performed by: ORTHOPAEDIC SURGERY

## 2023-05-26 PROCEDURE — 97530 THERAPEUTIC ACTIVITIES: CPT

## 2023-05-26 PROCEDURE — 25010000002 LORAZEPAM PER 2 MG: Performed by: ORTHOPAEDIC SURGERY

## 2023-05-26 PROCEDURE — 97116 GAIT TRAINING THERAPY: CPT

## 2023-05-26 PROCEDURE — G0378 HOSPITAL OBSERVATION PER HR: HCPCS

## 2023-05-26 PROCEDURE — 25010000002 CEFAZOLIN IN DEXTROSE 2-4 GM/100ML-% SOLUTION: Performed by: ORTHOPAEDIC SURGERY

## 2023-05-26 PROCEDURE — 85025 COMPLETE CBC W/AUTO DIFF WBC: CPT | Performed by: ORTHOPAEDIC SURGERY

## 2023-05-26 PROCEDURE — 25010000002 THIAMINE PER 100 MG: Performed by: ORTHOPAEDIC SURGERY

## 2023-05-26 PROCEDURE — 99238 HOSP IP/OBS DSCHRG MGMT 30/<: CPT | Performed by: INTERNAL MEDICINE

## 2023-05-26 PROCEDURE — 82948 REAGENT STRIP/BLOOD GLUCOSE: CPT

## 2023-05-26 RX ORDER — ASPIRIN 81 MG/1
81 TABLET ORAL EVERY 12 HOURS SCHEDULED
Qty: 84 TABLET | Refills: 0 | Status: SHIPPED | OUTPATIENT
Start: 2023-05-26 | End: 2023-07-07

## 2023-05-26 RX ORDER — POTASSIUM CHLORIDE 20 MEQ/1
40 TABLET, EXTENDED RELEASE ORAL EVERY 4 HOURS
Status: COMPLETED | OUTPATIENT
Start: 2023-05-26 | End: 2023-05-26

## 2023-05-26 RX ORDER — OXYCODONE HYDROCHLORIDE AND ACETAMINOPHEN 5; 325 MG/1; MG/1
1 TABLET ORAL EVERY 6 HOURS PRN
Qty: 12 TABLET | Refills: 0 | Status: SHIPPED | OUTPATIENT
Start: 2023-05-26 | End: 2023-05-29

## 2023-05-26 RX ADMIN — LORAZEPAM 2 MG: 2 INJECTION INTRAMUSCULAR; INTRAVENOUS at 09:52

## 2023-05-26 RX ADMIN — POTASSIUM CHLORIDE 40 MEQ: 1500 TABLET, EXTENDED RELEASE ORAL at 14:18

## 2023-05-26 RX ADMIN — DOCUSATE SODIUM 50 MG AND SENNOSIDES 8.6 MG 2 TABLET: 8.6; 5 TABLET, FILM COATED ORAL at 08:48

## 2023-05-26 RX ADMIN — CEFAZOLIN SODIUM 2 G: 2 INJECTION, SOLUTION INTRAVENOUS at 03:58

## 2023-05-26 RX ADMIN — Medication 10 ML: at 08:50

## 2023-05-26 RX ADMIN — MULTIPLE VITAMINS W/ MINERALS TAB 1 TABLET: TAB at 08:48

## 2023-05-26 RX ADMIN — POTASSIUM CHLORIDE 40 MEQ: 1500 TABLET, EXTENDED RELEASE ORAL at 08:48

## 2023-05-26 RX ADMIN — PANTOPRAZOLE SODIUM 40 MG: 40 TABLET, DELAYED RELEASE ORAL at 08:48

## 2023-05-26 RX ADMIN — HYDROCHLOROTHIAZIDE 12.5 MG: 12.5 CAPSULE ORAL at 08:48

## 2023-05-26 RX ADMIN — THIAMINE HYDROCHLORIDE 200 MG: 100 INJECTION, SOLUTION INTRAMUSCULAR; INTRAVENOUS at 06:14

## 2023-05-26 RX ADMIN — OXYCODONE 10 MG: 5 TABLET ORAL at 08:48

## 2023-05-26 RX ADMIN — MELOXICAM 15 MG: 15 TABLET ORAL at 08:48

## 2023-05-26 RX ADMIN — THIAMINE HYDROCHLORIDE 200 MG: 100 INJECTION, SOLUTION INTRAMUSCULAR; INTRAVENOUS at 14:18

## 2023-05-26 RX ADMIN — Medication 81 MG: at 08:48

## 2023-05-26 RX ADMIN — FOLIC ACID 1 MG: 1 TABLET ORAL at 08:48

## 2023-05-26 RX ADMIN — OXYCODONE 5 MG: 5 TABLET ORAL at 14:18

## 2023-05-26 RX ADMIN — OXYCODONE 10 MG: 5 TABLET ORAL at 03:57

## 2023-05-26 NOTE — CASE MANAGEMENT/SOCIAL WORK
Continued Stay Note  [facility]     Patient Name: Philomena Davis  MRN: [id number]  Today's Date: 5/26/2023    Admit Date: 5/24/2023    Plan: TBD   Discharge Plan     Row Name 05/26/23 1217       Plan    Plan Comments MSW spoke with pt at bedside and provided wheels transportation information as pt had requested. Pt also asked about other resources as well as the phone number to the social security office so she can follow up with her disability application. MSW provided all of this information to pt as well as a complete [facility] resource guide packet. Pt had no further ss questions at this time.               Discharge Codes    No documentation.               Expected Discharge Date and Time     Expected Discharge Date Expected Discharge Time    May 26, 2023             SILKE Rowe

## 2023-05-26 NOTE — PLAN OF CARE
Goal Outcome Evaluation:  Plan of Care Reviewed With: patient        Progress: improving  Outcome Evaluation: Order received for additional stair training this afternoon. Patient completed brief ambulation to steps with bilat axillary crutches with CGA and completed stair training with unilateral crutch/handrail per patient's home setup with CGAx2. She demonstrates improved sequencing and balance on steps this afternoon. Boyfriend present for training and verbalized good understanding of sequencing. He was educated on use of gait belt for stair navigation. Patient is cleared from a mobility standpoint to D/C home with assist today with use of axillary crutches and then knee scooter when it arrives.

## 2023-05-26 NOTE — TELEPHONE ENCOUNTER
Contacted Deion & relayed PCP's message. He verbalized understanding & had no further questions at this time.

## 2023-05-26 NOTE — DISCHARGE PLACEMENT REQUEST
"Philomena Dvais (62 y.o. Female)     HOME HEALTH REFERRAL FROM ED DORMAN BSN, CASE MANAGEMENT, [phone].  THANK YOU      Date of Birth   1960    Social Security Number       Address   [address] [address]    Home Phone   [phone]    MRN   [id number]       Jainism   Uatsdin    Marital Status                               Admission Date   23    Admission Type   Emergency    Admitting Provider   Braxton Edge MD    Attending Provider   Braxton Edge MD    Department, Room/Bed   [facility] 3G, S356/1       Discharge Date       Discharge Disposition   Home or Self Care    Discharge Destination                               Attending Provider: Braxton Edge MD    Allergies: Buspirone, Codeine, Mirtazapine    Isolation: None   Infection: None   Code Status: CPR    Ht: 162.6 cm (64\")   Wt: 72.6 kg (160 lb)    Admission Cmt: None   Principal Problem: Closed displaced trimalleolar fracture of right ankle, initial encounter [S82.851A]                 Active Insurance as of 2023     Primary Coverage     Payor Plan Insurance Group Employer/Plan Group    [employer] [place] [employer] [place]ICA[place]      Payor Plan Address Payor Plan Phone Number Payor Plan Fax Number Effective Dates    [address] [address] [phone]  2018 - None Entered    [facility] [address]       Subscriber Name Subscriber Birth Date Member ID       PHILOMENA DAVIS 1960 [id number]                 Emergency Contacts      (Rel.) Home Phone Work Phone Mobile Phone    Madelin Null (Relative) [phone] -- [phone]    MARCELLO ZAIDI (Friend) -- -- [phone]          [facility]  [address] [facility] [address]  Phone:  [phone]  Fax:   Date: May 26, 2023      Ambulatory Referral to Home Health     Patient:  Philomena Davis MRN:  [id number]   [address] [facility] [address] :  1960  SSN:    Phone: [phone] Sex:  F "      INSURANCE PAYOR PLAN GROUP # SUBSCRIBER ID   [id number]   [id number]      Referring Provider Information:  SHELLY STEVENS Phone: [phone] Fax: [phone]       Referral Information:   # Visits:  999 Referral Type: Home Health [42]   Urgency:  Routine Referral Reason: Specialty Services Required   Start Date: May 26, 2023 End Date:  To be determined by Insurer   Diagnosis: Closed displaced trimalleolar fracture of right ankle, initial encounter (S82.851A [ICD-10-CM] 824.6 [ICD-9-CM])  Acute right ankle pain (M25.571 [ICD-10-CM] 719.47,338.19 [ICD-9-CM])  Closed trimalleolar fracture of right ankle, initial encounter (S82.851A [ICD-10-CM] 824.6 [ICD-9-CM])  Generalized weakness (R53.1 [ICD-10-CM] 780.79 [ICD-9-CM])      Refer to Dept:   Refer to Provider:   Refer to Provider Phone:   Refer to Facility:       Face to Face Visit Date: 5/26/2023  Follow-up provider for Plan of Care? I treated the patient in an acute care facility and will not continue treatment after discharge.  Follow-up provider: ROLANDO MOJICA [[id number]]  Reason/Clinical Findings: s/p right ankle surgery  Describe mobility limitations that make leaving home difficult: Impaired functional mobility, balance, gait and endurance.  Nursing/Therapeutic Services Requested: Physical Therapy  PT orders: Transfer training  PT orders: Strengthening  PT orders: Home safety assessment  PT orders: Therapeutic exercise  Frequency: Other     This document serves as a request of services and does not constitute Insurance authorization or approval of services.  To determine eligibility, please contact the members Insurance carrier to verify and review coverage.     If you have medical questions regarding this request for services. Please contact [address] at [phone] during normal business hours.        Authorizing Provider:Shelly Stevens MD  Authorizing Provider's NPI: [id number]  Order Entered By: Ron  Sapna COHEN RN 5/26/2023 12:01 PM     Electronically signed by: Braxton Edge MD 5/26/2023 12:01 PM              Operative/Procedure Notes (most recent note)      Deandre Reynoso MD at 05/25/23 1213               Operative  Note    Patient: Philomena Davis    YOB: 1960    Medical Record Number: [id number]    Attending Physician: Braxton Edge MD Kevin M Denehy, MD    Primary Care Physician: Lilly Beasley DO    Surgeon: Deandre Reynoso MD    Date of Service: 5/25/2023     Pre-op Diagnosis:   Closed trimalleolar fracture of right ankle, initial encounter [S82.851A]    Post-Op Diagnosis:  Post-Op Diagnosis Codes:     * Closed trimalleolar fracture of right ankle, initial encounter [S82.851A]    Procedure(s):  ANKLE OPEN REDUCTION INTERNAL FIXATION  right ankle  fracture  utilizing a  anatomically contoured fibular locking plate and 4.0 cannulated cancellous screws         Implant Name Type Inv. Item Serial No.  Lot No. LRB No. Used Action   SCRW ANUPAMA SD/ST 1/2THRD 4X40MM - [id number] Implant SCRW ANUPAMA SD/ST 1/2THRD 4X40MM  [employer]  Right 2 Implanted   SCRW PERIART S/TAP HD/2.7MM 3.5X12MM - [id number] Implant SCRW PERIART S/TAP HD/2.7MM 3.5X12MM  [employer]  Right 4 Implanted   SCRW FERDINAND FUL/THRD S/TAP HEX/SM 2.7X16MM - [id number] Implant SCRW FERDINAND FUL/THRD S/TAP HEX/SM 2.7X16MM  [employer]  Right 1 Implanted   SCRW FERDINAND FUL/THRD S/TAP HEX/SM 2.7X16MM - [id number] Implant SCRW FERDINAND FUL/THRD S/TAP HEX/SM 2.7X16MM  [employer]  Right 1 Explanted   SCRW ULS LK 2.7X16MM - [id number] Implant SCRW ULS LK 2.7X16MM  [employer]  Right 3 Implanted   SCRW ULS LK 2.7X16MM - [id number] Implant SCRW ULS LK 2.7X16MM  [employer]  Right 1 Explanted   SCRW ULS LK PT S/TAP 2.7X14MM - [id number] Implant SCRW ULS LK PT S/TAP 2.7X14MM  [employer]  Right 1 Implanted   SCRW ULS LK 2.7X12MM - [id number] Implant SCRW ULS LK 2.7X12MM  [employer]  Right 1 Implanted   PLT FIB PERIART LK D/L  6H 106 RT - [id number] Implant PLT FIB PERIART LK D/L 6H 106 RT  [employer]  Right 1 Implanted   PLT LK HS STR 5HL - [id number] Implant PLT LK HS STR 5HL  [employer]  Right 1 Implanted   SCRW PEG FUL/THRD 2.5X10MM - [id number] Implant SCRW PEG FUL/THRD 2.5X10MM  [employer]  Right 3 Implanted   SCRW PEG FUL/THRD 2.5X12MM - [id number] Implant SCRW PEG FUL/THRD 2.5X12MM  [employer]  Right 1 Implanted   SCRW PEG NL 2.5X10MM - [id number] Implant SCRW PEG NL 2.5X10MM  [employer]  Right 1 Implanted   SCRW PEG NL 2.5X14MM - [id number] Implant SCRW PEG NL 2.5X14MM  [employer]  Right 1 Implanted   SCRW FERDINAND FUL/THRD S/TAP HEX/SM 2.7X16MM - [id number] Implant SCRW FERDINAND FUL/THRD S/TAP HEX/SM 2.7X16MM  [employer]  Right 1 Implanted   SCRW FERDINAND FUL/THRD S/TAP HEX/SM 2.7X16MM - [id number] Implant SCRW FERDINAND FUL/THRD S/TAP HEX/SM 2.7X16MM  [employer]  Right 1 Explanted       ANESTHESIA: General with Block  Anesthesiologist: David Ricardo MD  CRNA: Amina Turpin CRNA       Estimated Blood Loss: 100ml    Specimens: * No orders in the log *     COMPLICATIONS: Nil.     DRAINS:  External Urinary Catheter (Active)   Site Assessment Skin intact;Clean 05/25/23 0300   Application/Removal skin care provided;external catheter changed 05/25/23 0300   Collection Container Wall suction 05/24/23 2032   Wall suction (mmHG) 110 mmHG 05/24/23 2032   Securement Method Securing device 05/24/23 2032   Catheter care complete Yes 05/25/23 0300   Output (mL) 200 mL 05/25/23 0828       [REMOVED] External Urinary Catheter (Removed)       [REMOVED] External Urinary Catheter (Removed)       [REMOVED] External Urinary Catheter (Removed)       SURGEON:Deandre Reynoso M.D.    ASSISTANTS: Haider ARCE    The services of a skilled  PA were necessary for performing the procedure safely and expeditiously.  The first assist was present for the entire duration of the case and helped with positioning, retraction and closure of the incision.       INDICATIONS: The patient is a 62 y.o. female  who presented to fall with a history of injury and ankle fracture.  The patient was evaluated and scheduled for surgery . Options were discussed. The patient was indicated for an open reduction and internal fixation of ankle fracture.       Likely risks and benefits of the procedure including, but not limited to infection, malunion, nonunion, posttraumatic stiffness, posttraumatic arthritis, possibility of injury to nerves, vessels or tendons have been discussed in detail. Despite the risks involved, the patient elected to proceed and informed consent was obtained and was scheduled for surgery. The patient was seen in the preoperative holding area and the operative site was marked.    [facility]ED[facility] Preoperative antibiotic Kefzol  Intravenously  were given prior to the placement of tourniquet . After achieving adequate anesthesia, a well-padded tourniquet was placed over the proximal aspect of the operative thigh. The leg was prepped and draped in the usual sterile fashion. Surgical timeout was done. Correct patient surgical side and site were identified. Tourniquet was elevated to a pressure of 250 mmHg.     A skin incision was made on the medial malleolus centering over the fracture site. The skin and subcutaneous tissue were incised and the fracture site was identified. There was hematoma this was cleared. The medial malleolus fracture was reduced and temporarily fixed with two threaded K wires. The reduction of the medial malleolus was found to be satisfactory. The position of K wires for the 4.0 cancellous screws was found to be satisfactory.     Attention was then directed to the lateral malleolus.  A skin incision was made centering over the fracture site and the lateral malleolar fracture was exposed after incising the skin and subcutaneous tissue. The fracture site was cleared of the  hematoma and by gentle manipulation and traction,   the lateral malleolar fragment was mobilized to restore the fibular length.  The reduction was found to be satisfactory under image intensifier views.  This was further stabilized with an anatomically contoured fibular locking plate .  The position of the plate was found to be satisfactory  locking screws were placed into the lateral malleolus, followed by non locking screws proximal to the fracture site into the shaft of the fibula.  The reduction of the fracture and position of the hardware was found to be satisfactory.  The ankle mortise was found to be restored satisfactorily.  Fibular length was found to be adequate.  There is an anterior fragment the fibula at the insertion of the ATFL that had avulsed off a large piece of bone we used a mini frag plate anteriorly on the fibula to anatomically reduce and hold this fracture fragment     Attention was again directed to the medial malleolus and cannulated reamer was utilized and partially-threaded 4.0 cannulated cancellous screws were seated over the guidewires.  The reduction of the fracture,  Fixation of the fracture and position of the hardware was found to be satisfactory and stable.  The posterior malleolus fragment was very small and satisfactorily reduced in view of this, no internal fixation is being used for the posterior malleolar fragment.    The ankle mortise was stressed with external rotation there was no widening of the syndesmosis.    The sponge count and needle count was found to be correct. The incisions were thoroughly irrigated and were closed in layers.  Sterile dressings were placed and the patient was transferred to the recovery room in a stable condition with a well-padded posterior splint.  The patient tolerated the procedure well.  There were no complications.  The patient  is being discharged home with instructions to keep the operated lower extremity elevated above heart level.  The  patient  will remain nonweightbearing on the operated lower extremity.        I discussed the satisfactory performance of the procedure with the patient's family and discussed with them The postoperative management.     5/25/2023  14:47 EDT  Deandre Reynoso MD    CC: Lilly Beasley DO; MD Kely Stoll Wycliffe, MD                                    Electronically signed by Deandre Reynoso MD at 05/25/23 1451          Physician Progress Notes (most recent note)      Deandre Reynoso MD at 05/26/23 0834            Orthopedic Progress Note      Patient: Philomena Davis  YOB: 1960     Date of Admission: 5/24/2023  1:45 AM Medical Record Number: [id number]     Attending Physician: Braxton Edge MD    Status Post:  Procedure(s):  ANKLE OPEN REDUCTION INTERNAL FIXATION Post Operative Day Number: 1    Subjective : No new orthopaedic complaints     Pain Relief: some relief with present medication.     Systemic Complaints: No Complaints  Vitals:    05/25/23 1858 05/25/23 2345 05/26/23 0349 05/26/23 0712   BP: 158/94 144/87 123/63 138/79   BP Location: Right arm Right arm Right arm Left arm   Patient Position: Lying Lying Lying Lying   Pulse: 119 76 66 69   Resp: 16 16 16 18   Temp: 97.4 °F (36.3 °C) 97.7 °F (36.5 °C) 97.8 °F (36.6 °C) 97.9 °F (36.6 °C)   TempSrc: Oral Oral Oral Oral   SpO2: 95% 94% 93% 99%   Weight:       Height:           Physical Exam: 62 y.o. female    General Appearance:       Alert, cooperative, in no acute distress                  Extremities:    Dressing Clean, Dry and Intact             No clinical sign of DVT        Able to do good movements of digits    Pulses:   Pulses palpable and equal bilaterally           Diagnostic Tests:     Results from last 7 days   Lab Units 05/26/23  0502 05/25/23  0357 05/24/23  0152   WBC 10*3/mm3 9.52 9.30 7.51   HEMOGLOBIN g/dL 12.4 12.4 13.6   HEMATOCRIT % 36.5 36.8 40.6   PLATELETS 10*3/mm3 163 184 221     Results from last 7 days    Lab Units 05/26/23  0502 05/25/23  0357 05/24/23 1959 05/24/23  0152   SODIUM mmol/L 138 139  --  139  139   POTASSIUM mmol/L 3.6 4.3 4.5 3.2*  3.2*   CHLORIDE mmol/L 99 102  --  101  101   CO2 mmol/L 29.0 27.0  --  23.0  25.0   BUN mg/dL 10 15  --  13  14   CREATININE mg/dL 0.90 0.77  --  0.77  0.77   GLUCOSE mg/dL 117* 89  --  97  99   CALCIUM mg/dL 9.1 9.6  --  9.3  9.3         Lab Results   Component Value Date    URICACID 5.3 10/02/2022     No results found for: CRYSTAL  Microbiology Results (last 10 days)     Procedure Component Value - Date/Time    STI PANEL, DEJAH (CT,NG,TV) - Urine, Urine, Random Void [[id number]] Collected: 05/19/23 0911    Lab Status: Final result Specimen: Urine, Random Void Updated: 05/22/23 0837     Chlamydia Trachomatis DNA, SDA Not Detected     Neisseria gonorrhoeae PCR Not Detected     Trich vag by SHARRI Not Detected    Urine Culture - Urine, Urine, Clean Catch [[id number]]  (Abnormal)  (Susceptibility) Collected: 05/19/23 0908    Lab Status: Final result Specimen: Urine, Clean Catch Updated: 05/21/23 0934     Urine Culture >100,000 CFU/mL Escherichia coli    Narrative:      Colonization of the urinary tract without infection is common. Treatment is discouraged unless the patient is symptomatic, pregnant, or undergoing an invasive urologic procedure.    Susceptibility      Escherichia coli      ALENA      Ampicillin Susceptible      Ampicillin + Sulbactam Susceptible      Cefazolin Susceptible      Cefepime Susceptible      Ceftazidime Susceptible      Ceftriaxone Susceptible      Gentamicin Susceptible      Levofloxacin Susceptible      Nitrofurantoin Susceptible      Piperacillin + Tazobactam Susceptible      Trimethoprim + Sulfamethoxazole Susceptible                               XR Ankle 3+ View Right    Result Date: 5/25/2023  Impression: Postoperative changes of open reduction internal fixation of a trimalleolar fracture with improved alignment. Electronically Signed:  Alon Beauchamp  5/25/2023 3:42 PM EDT  Workstation ID: [id number]    XR Ankle 3+ View Right    Result Date: 5/24/2023  Impression: Improved alignment. Fracture dislocation of the ankle. There is now anatomic alignment of the tibiotalar joint. Ankle mortise is disrupted. Displaced trimalleolar fracture is present with soft tissue swelling. Electronically Signed: Isabela Henderson  5/24/2023 6:53 AM EDT  Workstation ID: [id number]    XR Ankle 3+ View Right    Result Date: 5/24/2023  1.  Trimalleolar ankle fracture. 2.  Lateral dislocation of the talus off the tibia. Electronically signed by:  Aníbal Gonsalez M.D.  5/24/2023 1:19 AM Mountain Time    CT Lower Extremity Right Without Contrast    Result Date: 5/24/2023  Impression: Displaced trimalleolar fracture of ankle. Fracture fragment from the comminuted distal fibular fracture is most displaced and rotated by 90 degrees along the anterior lateral joint line. Please see findings for details. There is a nondisplaced fracture at the base of the first metatarsal plantar aspect. Electronically Signed: Isabela Henderson  5/24/2023 12:01 PM EDT  Workstation ID: [id number]        Current Medications:  Scheduled Meds:acetaminophen, 1,000 mg, Oral, Q8H  aspirin, 81 mg, Oral, Q12H  folic acid, 1 mg, Oral, Daily  hydroCHLOROthiazide, 12.5 mg, Oral, Daily  insulin lispro, 2-7 Units, Subcutaneous, TID With Meals  meloxicam, 15 mg, Oral, Daily  multivitamin with minerals, 1 tablet, Oral, Daily  pantoprazole, 40 mg, Oral, Daily  potassium chloride ER, 40 mEq, Oral, Q4H  senna-docusate sodium, 2 tablet, Oral, BID  sodium chloride, 10 mL, Intravenous, Q12H  thiamine (B-1) IV, 200 mg, Intravenous, Q8H   Followed by  [START ON 5/29/2023] thiamine, 100 mg, Oral, Daily      Continuous Infusions:lactated ringers, 9 mL/hr, Last Rate: Stopped (05/25/23 1806)  lactated ringers, 100 mL/hr, Last Rate: 100 mL/hr (05/25/23 1806)  ropivacaine,       PRN Meds:.•  acetaminophen  •  senna-docusate sodium **AND**  polyethylene glycol **AND** bisacodyl **AND** bisacodyl  •  dextrose  •  dextrose  •  diphenhydrAMINE **OR** diphenhydrAMINE  •  glucagon (human recombinant)  •  HYDROmorphone  •  HYDROmorphone **AND** naloxone  •  LORazepam **OR** LORazepam **OR** LORazepam **OR** LORazepam **OR** LORazepam **OR** LORazepam  •  Magnesium Standard Dose Replacement - Follow Nurse / BPA Driven Protocol  •  melatonin  •  ondansetron **OR** ondansetron  •  oxyCODONE  •  oxyCODONE  •  oxyCODONE-acetaminophen  •  Potassium Replacement - Follow Nurse / BPA Driven Protocol  •  sodium chloride  •  [COMPLETED] Insert Peripheral IV **AND** sodium chloride  •  sodium chloride  •  sodium chloride  •  traMADol    Assessment: Status post  No admission procedures for hospital encounter.    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   • Digital mucous cyst of toe of left foot   • Alcohol withdrawal   • Alcoholism /alcohol abuse   • Hematemesis   • Diabetes mellitus, type 2   • Essential hypertension   • Generalized anxiety disorder   • Hyperlipidemia   • Glaucoma   • Elevated LFTs   • Generalized weakness   • Alcoholic cirrhosis of liver without ascites   • Weight loss   • Severe malnutrition   • GERD without esophagitis   • Closed trimalleolar fracture of right ankle   • Hypokalemia   • Closed displaced trimalleolar fracture of right ankle, initial encounter       PLAN:   Continues current post-op course  Anticoagulation: Aspirin started, will need 6 week course   Mobilize with PT as tolerated per protocol  Follow up with me in 2 weeks     Weight Bearing: NWB  Discharge Plan: OK to plan for discharge in  today to home  from orthopaedic perspective.    Deandre Reynoso MD    Date: 5/26/2023    Time: 08:34 EDT    Electronically signed by Deandre Reynoso MD at 05/26/23 0835          Consult Notes (most recent note)      Deandre Reynoso MD at 05/24/23 1022      Consult Orders    1.  Inpatient Orthopedic Surgery Consult [[id number]] ordered by Javad Quintero PA-C at 05/24/23 5436    2. Inpatient Orthopedic Surgery Consult [[id number]] ordered by Simona Murry MD at 05/24/23 0420                   Orthopedic Consult  Patient: Philomena Davis                                        YOB: 1960    Date of Admission: 5/24/2023  1:45 AM    Medical Record Number: [id number]    Attending Physician: Braxton Edge MD    Consulting Physician: Deandre Reynoso MD    Reason for Consult: right ankle fracture    History of Present Illness: 62 y.o. female admitted to [facility] with Closed displaced trimalleolar fracture of right ankle, initial encounter [S82.514L].     The patient was evaluated in the emergency room and was diagnosed with ankle injury.   Secondary to the age and multiple medical comorbidities the patient was admitted to the hospitalist.   As I was on call for the emergency room I was consulted for further evaluation and treatment.   The patient was in the usual state of health and fell from standing height in ankle pain, Resulting in sudden onset ankle pain and inability to ambulate.   Denies any history of loss of consciousness, headache, vomiting, or seizures.   Denies any other injuries.   The patient is accompanied by  family members  to this hospital visit.     The patient denies any prior  pre-existing pain in the ankle.  Patient is a  home/ community ambulator. Patient denies walker/cane as an assistive device.   The patient  lives at home with  family , is quite active and independent in activities of daily living.  The patient denies history of dementia.  She does have a history of ankle fracture on the left side that was fixed by Dr. Louie Griggs several years ago    Patient denies any history of: DVT/PE, MRSA, COPD, CHF, CAD, Diabetes mellitus, Dementia or A-Fib.   The patient has history of :  The patient is on anticoagulants: none    Past medical  history, past surgical history, family history ALLERGIES, and home medications have been reviewed by me.     Past Medical History:   Diagnosis Date   • Anxiety and depression    • Cirrhosis of liver    • Closed displaced fracture of lateral malleolus of left fibula    • Diabetes mellitus    • Ganglion cyst of left foot    • Glaucoma    • Hypertension    • Wears glasses      Past Surgical History:   Procedure Laterality Date   • ANKLE OPEN REDUCTION INTERNAL FIXATION Left 3/24/2017    Procedure: LEFT ANKLE OPEN REDUCTION INTERNAL FIXATION WITH ILIAC CREST BONE GRAFT , EXCISION CYST 4TH/5TH INNERSPACE LEFT FOOT;  Surgeon: Frank Larson MD;  Location:  [facility];  Service:    • AUGMENTATION MAMMAPLASTY Bilateral    • BREAST AUGMENTATION     • BREAST EXCISIONAL BIOPSY Right    • DILATION AND CURETTAGE, DIAGNOSTIC / THERAPEUTIC     • ENDOSCOPY N/A 2022    Procedure: ESOPHAGOGASTRODUODENOSCOPY;  Surgeon: Arelis Solano MD;  Location:  [facility];  Service: Gastroenterology;  Laterality: N/A;   • CO RPR NON/MAL FEMUR DSTL H/N W/ILIAC/AUTOG BONE Left 3/24/2017    Procedure: ILIAC CREST BONE GRAFT;  Surgeon: Frank Larson MD;  Location:  [facility];  Service: Orthopedics   • WRIST SURGERY       Social History     Occupational History   • Not on file   Tobacco Use   • Smoking status: Former     Packs/day: 1.50     Years: 20.00     Pack years: 30.00     Types: Cigarettes     Quit date: 3/22/2009     Years since quittin.1   • Smokeless tobacco: Never   Vaping Use   • Vaping Use: Never used   Substance and Sexual Activity   • Alcohol use: Yes     Alcohol/week: 7.0 standard drinks     Types: 7 Glasses of wine per week     Comment: 1/5 VODKA EVERY TWO DAYS   • Drug use: No   • Sexual activity: Defer      Social History     Social History Narrative   • Not on file     Family History   Problem Relation Age of Onset   • Cancer Mother    • No Known Problems Father    • No Known Problems Sister     • No Known Problems Brother    • No Known Problems Daughter    • No Known Problems Son    • No Known Problems Maternal Grandmother    • Breast cancer Paternal Grandmother 68        met to brain   • No Known Problems Maternal Aunt    • No Known Problems Paternal Aunt    • Rheum arthritis Other    • Heart disease Other    • Cancer Other    • Stroke Other    • Hypertension Other    • BRCA 1/2 Neg Hx    • Colon cancer Neg Hx    • Endometrial cancer Neg Hx    • Ovarian cancer Neg Hx           Allergies   Allergen Reactions   • Buspirone Anxiety   • Codeine Nausea Only   • Mirtazapine Anxiety       Home Medications:  Medications Prior to Admission   Medication Sig Dispense Refill Last Dose   • cholecalciferol (VITAMIN D3) 25 MCG (1000 UT) tablet Take 1 tablet by mouth Daily. 30 tablet 0    • fluconazole (DIFLUCAN) 150 MG tablet Take 1 tablet today.  If symptoms remain in 3 days take the remaining tablet 2 tablet 0    • Fluticasone-Salmeterol (ADVAIR/WIXELA) 100-50 MCG/ACT DISKUS Inhale 2 (Two) Times a Day.      • hydroCHLOROthiazide (HYDRODIURIL) 12.5 MG tablet       • latanoprost (XALATAN) 0.005 % ophthalmic solution Administer 1 drop to both eyes Every Night.      • nitrofurantoin, macrocrystal-monohydrate, (MACROBID) 100 MG capsule Take 1 capsule by mouth 2 (Two) Times a Day for 5 days. 10 capsule 0    • pantoprazole (PROTONIX) 40 MG EC tablet Take 1 tablet by mouth Daily.      • ursodiol (ACTIGALL) 300 MG capsule           Current Medications:  Scheduled Meds:hydroCHLOROthiazide, 12.5 mg, Oral, Daily  insulin lispro, 2-7 Units, Subcutaneous, TID With Meals  pantoprazole, 40 mg, Oral, Daily  potassium chloride ER, 40 mEq, Oral, Q4H  senna-docusate sodium, 2 tablet, Oral, BID  sodium chloride, 10 mL, Intravenous, Q12H      Continuous Infusions:lactated ringers, 100 mL/hr, Last Rate: 100 mL/hr (05/24/23 0837)  ropivacaine,       PRN Meds:.•  acetaminophen  •  senna-docusate sodium **AND** polyethylene glycol **AND**  bisacodyl **AND** bisacodyl  •  dextrose  •  dextrose  •  glucagon (human recombinant)  •  HYDROmorphone  •  melatonin  •  ondansetron **OR** ondansetron  •  oxyCODONE-acetaminophen  •  Potassium Replacement - Follow Nurse / BPA Driven Protocol  •  sodium chloride  •  [COMPLETED] Insert Peripheral IV **AND** sodium chloride  •  sodium chloride  •  sodium chloride      Review of Systems:   A 12 point system review was reviewed with the patient and from the chart  and is negative except for as mentioned in the history.     Physical Exam: 62 y.o. female                    Vitals:    05/24/23 0545 05/24/23 0602 05/24/23 0617 05/24/23 0632   BP: 114/66 95/55 114/63 119/71   BP Location:   Left arm    Patient Position:   Lying    Pulse: 73 90 82 81   Resp:   18    Temp:   97.6 °F (36.4 °C)    TempSrc:   Oral    SpO2: 93% 94% 98% 97%   Weight:       Height:            Gait: Could not be tested , patient is nonambulatory.    Mental/HEENT/cardio/skin: The patient's general appearance was well-nourished, well-hydrated, no acute distress.  Orientation was alert and oriented ×3.  The patient's mood was normal.   Pulmonary exam shows normal late exchange, no labored breathing, or shortness of breath.    The skin exam showed normal temperature and color in the area of examination.    Extremities: right   lower extremity is in a posterior splint. Positive tenderness over the ankle, especially over the lateral malleolus.. The patient  is able to do gentle active range of motion of toes. Gross sensation is intact over the toes.    Pulses: Pulses could not be checked on the injured side Secondary to the splint. There is good capillary refill.     Diagnostic Tests:    Results from last 7 days   Lab Units 05/24/23  0152   WBC 10*3/mm3 7.51   HEMOGLOBIN g/dL 13.6   HEMATOCRIT % 40.6   PLATELETS 10*3/mm3 221     Results from last 7 days   Lab Units 05/24/23  0152   SODIUM mmol/L 139  139   POTASSIUM mmol/L 3.2*  3.2*   CHLORIDE mmol/L  101  101   CO2 mmol/L 23.0  25.0   BUN mg/dL 13  14   CREATININE mg/dL 0.77  0.77   GLUCOSE mg/dL 97  99   CALCIUM mg/dL 9.3  9.3         Lab Results   Component Value Date    URICACID 5.3 10/02/2022     No results found for: CRYSTAL  Microbiology Results (last 10 days)     Procedure Component Value - Date/Time    STI PANEL, LEXAR (CT,NG,TV) - Urine, Urine, Random Void [[id number]] Collected: 05/19/23 0911    Lab Status: Final result Specimen: Urine, Random Void Updated: 05/22/23 0837     Chlamydia Trachomatis DNA, SDA Not Detected     Neisseria gonorrhoeae PCR Not Detected     Trich vag by SHARRI Not Detected    Urine Culture - Urine, Urine, Clean Catch [[id number]]  (Abnormal)  (Susceptibility) Collected: 05/19/23 0908    Lab Status: Final result Specimen: Urine, Clean Catch Updated: 05/21/23 0934     Urine Culture >100,000 CFU/mL Escherichia coli    Narrative:      Colonization of the urinary tract without infection is common. Treatment is discouraged unless the patient is symptomatic, pregnant, or undergoing an invasive urologic procedure.    Susceptibility      Escherichia coli      ALENA      Ampicillin Susceptible      Ampicillin + Sulbactam Susceptible      Cefazolin Susceptible      Cefepime Susceptible      Ceftazidime Susceptible      Ceftriaxone Susceptible      Gentamicin Susceptible      Levofloxacin Susceptible      Nitrofurantoin Susceptible      Piperacillin + Tazobactam Susceptible      Trimethoprim + Sulfamethoxazole Susceptible                               XR Ankle 3+ View Right    Result Date: 5/24/2023  Impression: Improved alignment. Fracture dislocation of the ankle. There is now anatomic alignment of the tibiotalar joint. Ankle mortise is disrupted. Displaced trimalleolar fracture is present with soft tissue swelling. Electronically Signed: Isabela Henderson  5/24/2023 6:53 AM EDT  Workstation ID: [id number]    XR Ankle 3+ View Right    Result Date: 5/24/2023  1.  Trimalleolar ankle fracture. 2.   Lateral dislocation of the talus off the tibia. Electronically signed by:  Aníbal Gonsalez M.D.  5/24/2023 1:19 AM Mountain Time    Assessment: right Tri malleolar  ankle fracture.       Closed displaced trimalleolar fracture of right ankle, initial encounter    Diabetes mellitus, type 2    Essential hypertension    Generalized anxiety disorder    Hyperlipidemia    Alcoholic cirrhosis of liver without ascites    GERD without esophagitis    Closed trimalleolar fracture of right ankle    Hypokalemia      Plan:  The patient is indicated for an open reduction and internal fixation of the ankle fracture. I discussed the options including nonoperative treatment versus operative treatment. The patient voiced understanding of the risks, benefits, and alternative forms of treatment that were discussed including but not limited to infection, DVT, pulmonary embolism, nonunion, malunion, posttraumatic stiffness, posttraumatic arthritis, prominent palpable hardware have been discussed in detail. Despite the above risks the patient consents to proceed with right ankle open reduction and internal fixation.   The patient is scheduled for the above surgery tentatively for 5/25/ 2023.   The patient's admitting service has seen the patient and the patient is cleared to the operating room.   Plan will be for surgery tomorrow on the 25th  CT scan today to evaluate for posterior malleolus fragment size  Okay to eat today  Anesthesia is performed nerve blocks as help the patient's from a pain control standpoint  Nonweightbearing on the right lower extremity  Mechanical DVT prophylaxis for [place]  N.p.o. after midnight for or tomorrow    Date: 5/24/2023        CC: Provider, No Known; MD Kely Stoll Wycliffe, MD                  Electronically signed by Deandre Reynoso MD at 05/24/23 1031          Physical Therapy Notes (most recent note)      Angela Juarez PT at 05/26/23 0837  Version 1 of 1         Patient Name:  Philomena Davis  : 1960    MRN: [id number]                              Today's Date: 2023       Admit Date: 2023    Visit Dx:     ICD-10-CM ICD-9-CM   1. Closed displaced trimalleolar fracture of right ankle, initial encounter  S82.851A 824.6   2. Acute right ankle pain  M25.571 719.47     338.19   3. 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   • Digital mucous cyst of toe of left foot   • Alcohol withdrawal   • Alcoholism /alcohol abuse   • Hematemesis   • Diabetes mellitus, type 2   • Essential hypertension   • Generalized anxiety disorder   • Hyperlipidemia   • Glaucoma   • Elevated LFTs   • Generalized weakness   • Alcoholic cirrhosis of liver without ascites   • Weight loss   • Severe malnutrition   • GERD without esophagitis   • Closed trimalleolar fracture of right ankle   • Hypokalemia   • Closed displaced trimalleolar fracture of right ankle, initial encounter     Past Medical History:   Diagnosis Date   • Anxiety and depression    • Cirrhosis of liver    • Closed displaced fracture of lateral malleolus of left fibula    • Diabetes mellitus    • Ganglion cyst of left foot    • Glaucoma    • Hypertension    • Wears glasses      Past Surgical History:   Procedure Laterality Date   • ANKLE OPEN REDUCTION INTERNAL FIXATION Left 3/24/2017    Procedure: LEFT ANKLE OPEN REDUCTION INTERNAL FIXATION WITH ILIAC CREST BONE GRAFT , EXCISION CYST  INNERSPACE LEFT FOOT;  Surgeon: Frank Larson MD;  Location: [facility];  Service:    • AUGMENTATION MAMMAPLASTY Bilateral    • BREAST AUGMENTATION     • BREAST EXCISIONAL BIOPSY Right    • DILATION AND CURETTAGE, DIAGNOSTIC / THERAPEUTIC     • ENDOSCOPY N/A 2022    Procedure: ESOPHAGOGASTRODUODENOSCOPY;  Surgeon: Arelis Solano MD;  Location: [facility] ENDOSCOPY;  Service:  Gastroenterology;  Laterality: N/A;   • NJ RPR NON/MAL FEMUR DSTL H/N W/ILIAC/AUTOG BONE Left 3/24/2017    Procedure: ILIAC CREST BONE GRAFT;  Surgeon: Frank Larson MD;  Location: [facility];  Service: Orthopedics   • WRIST SURGERY        General Information     Row Name 05/26/23 1010          Physical Therapy Time and Intention    Document Type therapy note (daily note)  -CM     Mode of Treatment physical therapy;individual therapy  -CM     Row Name 05/26/23 1010          General Information    Patient Profile Reviewed yes  -CM     Existing Precautions/Restrictions fall;non-weight bearing;other (see comments)  popliteal nerve cath, NWB RLE  -CM     Barriers to Rehab none identified  -CM     Row Name 05/26/23 1010          Cognition    Orientation Status (Cognition) oriented x 4  -CM     Row Name 05/26/23 1010          Safety Issues, Functional Mobility    Safety Issues Affecting Function (Mobility) insight into deficits/self-awareness;safety precaution awareness  -CM     Impairments Affecting Function (Mobility) balance;endurance/activity tolerance;pain;strength;sensation/sensory awareness;range of motion (ROM)  -CM           User Key  (r) = Recorded By, (t) = Taken By, (c) = Cosigned By    Initials Name Provider Type    CM Angela Juarez, PT Physical Therapist               Mobility     Row Name 05/26/23 1011          Bed Mobility    Bed Mobility supine-sit  -CM     Supine-Sit [place] (Bed Mobility) modified independence  -CM     Assistive Device (Bed Mobility) head of bed elevated  -CM     Row Name 05/26/23 1011          Sit-Stand Transfer    Sit-Stand [place] (Transfers) contact guard  -CM     Assistive Device (Sit-Stand Transfers) walker, knee scooter  -CM     Comment, (Sit-Stand Transfer) Cues provided for optimal knee scooter placement, to set breaks prior to transfer, and for safe hand placement  -CM     Row Name 05/26/23 1011          Gait/Stairs (Locomotion)    [place] Level  (Gait) contact guard  -CM     Assistive Device (Gait) walker, knee scooter  -CM     Distance in Feet (Gait) 800  -CM     Deviations/Abnormal Patterns (Gait) gait speed decreased  -CM     [place] Level (Stairs) moderate assist (50% patient effort);2 person assist;verbal cues  -CM     Assistive Device (Stairs) crutches, axillary  -CM     Number of Steps (Stairs) 3  -CM     Ascending Technique (Stairs) step-to-step  -CM     Descending Technique (Stairs) step-to-step  -CM     Stairs, Safety Issues sequencing ability decreased  -CM     Stairs, Impairments strength decreased;impaired balance  -CM     Comment, (Gait/Stairs) Patient completed ambulation with knee scooter in singer with good safety awareness and sequencing. Cues provided for decreased speed when turning. Patient takes multiple brief standing rest breaks to complete ambulation distance on knee scooter. Patient ascended and descended 3 steps with variable assist levels at most modAx2. She requires frequent cues for sequencing with crutches to complete and assistance primarily for balance and crutch placement. Good maintenance of weightbearing status. She would benefit from additional stair training and potential with a single crutch and handrail for increased balance and steadiness.  -CM     Row Name 05/26/23 1011          Mobility    Extremity Weight-bearing Status right lower extremity  -CM     Right Lower Extremity (Weight-bearing Status) non weight-bearing (NWB)  -CM           User Key  (r) = Recorded By, (t) = Taken By, (c) = Cosigned By    Initials Name Provider Type    CM Angela Juarez PT Physical Therapist               Obj/Interventions     Row Name 05/26/23 1016          Balance    Balance Assessment sitting static balance;standing static balance;standing dynamic balance  -CM     Static Sitting Balance independent  -CM     Position, Sitting Balance unsupported;sitting edge of bed  -CM     Static Standing Balance standby assist  -CM      Dynamic Standing Balance contact guard  -CM     Position/Device Used, Standing Balance supported;other (see comments)  knee scooter  -CM     Comment, Balance balance good on knee scooter, decreased balance while on crutches on stairs requiring up to modAx2 at times  -CM           User Key  (r) = Recorded By, (t) = Taken By, (c) = Cosigned By    Initials Name Provider Type    Angela Longo PT Physical Therapist               Goals/Plan    No documentation.                Clinical Impression     Row Name 05/26/23 1017          Pain    Pretreatment Pain Rating 9/10  -CM     Posttreatment Pain Rating 9/10  -CM     Pain Location - Side/Orientation Right  -CM     Pain Location lower  -CM     Pain Location - extremity  -CM     Pre/Posttreatment Pain Comment RN administered pain medication during treatment  -CM     Pain Intervention(s) Ambulation/increased activity;Repositioned;Nursing Notified  -     Row Name 05/26/23 1017          Plan of Care Review    Plan of Care Reviewed With patient  -CM     Progress improving  -CM     Outcome Evaluation Patient showing improvement with mobility completing ambualtion on knee scooter for 800' CGA with good safety awareness. She did perform stair training with up to modAx2 and bilateral crutches demonstrating impaired balance and sequencing. She would benefit from an additional stair training session to ensure a safe D/C home. She does have a handrail and may benefit from use of single crutch and handrail. Continue to recommend D/C home with assist pending improvement with stair training.  -     Row Name 05/26/23 1017          Vital Signs    Pre Systolic BP Rehab 124  -CM     Pre Treatment Diastolic BP 79  -CM     Pretreatment Heart Rate (beats/min) 88  -CM     Intratreatment Heart Rate (beats/min) 157  -CM     Posttreatment Heart Rate (beats/min) 96  -CM     Pre SpO2 (%) 94  -CM     O2 Delivery Pre Treatment nasal cannula  -CM     Intra SpO2 (%) 99  -CM     O2 Delivery  Intra Treatment room air  -CM     Post SpO2 (%) 95  -CM     O2 Delivery Post Treatment room air  -CM     Pre Patient Position Supine  -CM     Intra Patient Position Standing  -CM     Post Patient Position Sitting  -CM     Row Name 05/26/23 1017          Positioning and Restraints    Pre-Treatment Position in bed  -CM     Post Treatment Position chair  -CM     In Chair reclined;call light within reach;encouraged to call for assist;exit alarm on;RLE elevated;waffle cushion;on mechanical lift sling;notified nsg  -CM           User Key  (r) = Recorded By, (t) = Taken By, (c) = Cosigned By    Initials Name Provider Type    Angela Longo PT Physical Therapist               Outcome Measures     Row Name 05/26/23 1022          How much help from another person do you currently need...    Turning from your back to your side while in flat bed without using bedrails? 4  -CM     Moving from lying on back to sitting on the side of a flat bed without bedrails? 4  -CM     Moving to and from a bed to a chair (including a wheelchair)? 3  -CM     Standing up from a chair using your arms (e.g., wheelchair, bedside chair)? 3  -CM     Climbing 3-5 steps with a railing? 2  -CM     To walk in hospital room? 3  -CM     AM-PAC 6 Clicks Score (PT) 19  -CM     Highest level of mobility 6 --> Walked 10 steps or more  -CM     Row Name 05/26/23 1022          Functional Assessment    Outcome Measure Options AM-PAC 6 Clicks Basic Mobility (PT)  -CM           User Key  (r) = Recorded By, (t) = Taken By, (c) = Cosigned By    Initials Name Provider Type    Angela Longo, KEILA Physical Therapist                             Physical Therapy Education     Title: PT OT SLP Therapies (In Progress)     Topic: Physical Therapy (In Progress)     Point: Mobility training (Done)     Learning Progress Summary           Patient Acceptance, E, VU by PERI at 5/26/2023 1022    Acceptance, E,D, VU,DU by UBALDO at 5/25/2023 1722                   Point:  Home exercise program (Not Started)     Learner Progress:  Not documented in this visit.          Point: Body mechanics (Done)     Learning Progress Summary           Patient Acceptance, E, VU by  at 5/26/2023 1022    Acceptance, E,D, VU,DU by  at 5/25/2023 1722                   Point: Precautions (Done)     Learning Progress Summary           Patient Acceptance, E, VU by  at 5/26/2023 1022    Acceptance, E,D, VU,DU by  at 5/25/2023 1722                               User Key     Initials Effective Dates Name Provider Type Discipline     06/01/21 -  Faiza Calhoun, KEILA Physical Therapist PT     09/22/22 -  Angela Juarez PT Physical Therapist PT              PT Recommendation and Plan     Plan of Care Reviewed With: patient  Progress: improving  Outcome Evaluation: Patient showing improvement with mobility completing ambualtion on knee scooter for 800' CGA with good safety awareness. She did perform stair training with up to modAx2 and bilateral crutches demonstrating impaired balance and sequencing. She would benefit from an additional stair training session to ensure a safe D/C home. She does have a handrail and may benefit from use of single crutch and handrail. Continue to recommend D/C home with assist pending improvement with stair training.     Time Calculation:    PT Charges     Row Name 05/26/23 1022             Time Calculation    Start Time 0837  -CM      PT Received On 05/26/23  -CM      PT Goal Re-Cert Due Date 06/04/23  -CM         Timed Charges    34870 - Gait Training Minutes  38  -CM      29412 - PT Therapeutic Activity Minutes 15  -CM         Total Minutes    Timed Charges Total Minutes 53  -CM       Total Minutes 53  -CM            User Key  (r) = Recorded By, (t) = Taken By, (c) = Cosigned By    Initials Name Provider Type    Angela Longo, PT Physical Therapist              Therapy Charges for Today     Code Description Service Date Service Provider Modifiers Qty     25579324617 HC GAIT TRAINING EA 15 MIN 5/26/2023 Angela Juarez, PT GP 3    75352904823 HC PT THERAPEUTIC ACT EA 15 MIN 5/26/2023 Angela Juarez, PT GP 1          PT G-Codes  Outcome Measure Options: AM-PAC 6 Clicks Basic Mobility (PT)  AM-PAC 6 Clicks Score (PT): 19       Angela Juarez, PT  5/26/2023      Electronically signed by Angela Juarez, PT at 05/26/23 1024

## 2023-05-26 NOTE — THERAPY TREATMENT NOTE
Patient Name: Philomena Davis  : 1960    MRN: [id number]                              Today's Date: 2023       Admit Date: 2023    Visit Dx:     ICD-10-CM ICD-9-CM   1. Closed displaced trimalleolar fracture of right ankle, initial encounter  S82.851A 824.6   2. Acute right ankle pain  M25.571 719.47     338.19   3. Closed trimalleolar fracture of right ankle, initial encounter  S82.851A 824.6   4. 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   • Digital mucous cyst of toe of left foot   • Alcohol withdrawal   • Alcoholism /alcohol abuse   • Hematemesis   • Diabetes mellitus, type 2   • Essential hypertension   • Generalized anxiety disorder   • Hyperlipidemia   • Glaucoma   • Elevated LFTs   • Generalized weakness   • Alcoholic cirrhosis of liver without ascites   • Weight loss   • Severe malnutrition   • GERD without esophagitis   • Closed trimalleolar fracture of right ankle   • Hypokalemia   • Closed displaced trimalleolar fracture of right ankle, initial encounter     Past Medical History:   Diagnosis Date   • Anxiety and depression    • Cirrhosis of liver    • Closed displaced fracture of lateral malleolus of left fibula    • Diabetes mellitus    • Ganglion cyst of left foot    • Glaucoma    • Hypertension    • Wears glasses      Past Surgical History:   Procedure Laterality Date   • ANKLE OPEN REDUCTION INTERNAL FIXATION Left 3/24/2017    Procedure: LEFT ANKLE OPEN REDUCTION INTERNAL FIXATION WITH ILIAC CREST BONE GRAFT , EXCISION CYST 4TH/5TH INNERSPACE LEFT FOOT;  Surgeon: Frank Larson MD;  Location: [facility];  Service:    • AUGMENTATION MAMMAPLASTY Bilateral    • BREAST AUGMENTATION     • BREAST EXCISIONAL BIOPSY Right    • DILATION AND CURETTAGE, DIAGNOSTIC / THERAPEUTIC     • ENDOSCOPY N/A 2022    Procedure: ESOPHAGOGASTRODUODENOSCOPY;  Surgeon: Arelis Solano,  MD;  Location: [facility] ENDOSCOPY;  Service: Gastroenterology;  Laterality: N/A;   • WV RPR NON/MAL FEMUR DSTL H/N W/ILIAC/AUTOG BONE Left 3/24/2017    Procedure: ILIAC CREST BONE GRAFT;  Surgeon: Frank Larson MD;  Location: [facility] OR;  Service: Orthopedics   • WRIST SURGERY        General Information     Row Name 05/26/23 1417 05/26/23 1010       Physical Therapy Time and Intention    Document Type therapy note (daily note)  -CM therapy note (daily note)  -CM    Mode of Treatment physical therapy;individual therapy  -CM physical therapy;individual therapy  -CM    Row Name 05/26/23 1417 05/26/23 1010       General Information    Patient Profile Reviewed yes  -CM yes  -CM    Existing Precautions/Restrictions fall;non-weight bearing;other (see comments)  popliteal nerve cath, NWB RLE  -CM fall;non-weight bearing;other (see comments)  popliteal nerve cath, NWB RLE  -CM    Barriers to Rehab none identified  -CM none identified  -CM    Row Name 05/26/23 1417 05/26/23 1010       Cognition    Orientation Status (Cognition) oriented x 4  -CM oriented x 4  -CM    Row Name 05/26/23 1417 05/26/23 1010       Safety Issues, Functional Mobility    Safety Issues Affecting Function (Mobility) insight into deficits/self-awareness;safety precaution awareness  -CM insight into deficits/self-awareness;safety precaution awareness  -CM    Impairments Affecting Function (Mobility) balance;endurance/activity tolerance;pain;strength;sensation/sensory awareness;range of motion (ROM)  -CM balance;endurance/activity tolerance;pain;strength;sensation/sensory awareness;range of motion (ROM)  -CM          User Key  (r) = Recorded By, (t) = Taken By, (c) = Cosigned By    Initials Name Provider Type    CM Angela Juarez PT Physical Therapist               Mobility     Row Name 05/26/23 1417 05/26/23 1011       Bed Mobility    Bed Mobility supine-sit;sit-supine  -CM supine-sit  -CM    Supine-Sit [place] (Bed Mobility) modified  independence  -CM modified independence  -CM    Sit-Supine [place] (Bed Mobility) modified independence  -CM --    Assistive Device (Bed Mobility) head of bed elevated  -CM head of bed elevated  -CM    Row Name 05/26/23 1417          Bed-Chair Transfer    Bed-Chair [place] (Transfers) contact guard;1 person assist  -CM     Assistive Device (Bed-Chair Transfers) crutches, axillary  -CM     Comment, (Bed-Chair Transfer) good sequencing with crutches  -CM     Row Name 05/26/23 1417 05/26/23 1011       Sit-Stand Transfer    Sit-Stand [place] (Transfers) contact guard;verbal cues  -CM contact guard  -CM    Assistive Device (Sit-Stand Transfers) crutches, axillary  -CM walker, knee scooter  -CM    Comment, (Sit-Stand Transfer) cues for optimal crutch placement prior to standing, multiple cues to remove crutches from axilla prior to sitting  -CM Cues provided for optimal knee scooter placement, to set breaks prior to transfer, and for safe hand placement  -CM    Row Name 05/26/23 1417 05/26/23 1011       Gait/Stairs (Locomotion)    [place] Level (Gait) contact guard  -CM contact guard  -CM    Assistive Device (Gait) crutches, axillary  -CM walker, knee scooter  -CM    Distance in Feet (Gait) 8  -  -CM    Deviations/Abnormal Patterns (Gait) bilateral deviations;gait speed decreased  -CM gait speed decreased  -CM    [place] Level (Stairs) contact guard;2 person assist  -CM moderate assist (50% patient effort);2 person assist;verbal cues  -CM    Assistive Device (Stairs) crutches, axillary  -CM crutches, axillary  -CM    Handrail Location (Stairs) right side (ascending);left side (descending)  -CM --    Number of Steps (Stairs) 3  -CM 3  -CM    Ascending Technique (Stairs) step-to-step  -CM step-to-step  -CM    Descending Technique (Stairs) step-to-step  -CM step-to-step  -CM    Stairs, Safety Issues -- sequencing ability decreased  -CM    Stairs, Impairments -- strength decreased;impaired  balance  -CM    Comment, (Gait/Stairs) Patient completed brief ambulation to stairs from chair and back to chair from stairs with bilat axillary crutches. Cues provided for optimal crutch placement close to feet. Stair training completed with unilateral crutch on L and handrail on R per patient's home setup. Patient demonstrates improved sequencing and balance this afternoon. Still with cues for sequencing, however boyfriend present and verbalizes very good understanding of sequencing and states he will be with patient 24/7 at D/C. Encouraged use of gait belt for stair navigation at D/C.  -CM Patient completed ambulation with knee scooter in singer with good safety awareness and sequencing. Cues provided for decreased speed when turning. Patient takes multiple brief standing rest breaks to complete ambulation distance on knee scooter. Patient ascended and descended 3 steps with variable assist levels at most modAx2. She requires frequent cues for sequencing with crutches to complete and assistance primarily for balance and crutch placement. Good maintenance of weightbearing status. She would benefit from additional stair training and potential with a single crutch and handrail for increased balance and steadiness.  -CM    Row Name 05/26/23 1417 05/26/23 1011       Mobility    Extremity Weight-bearing Status right lower extremity  -CM right lower extremity  -CM    Right Lower Extremity (Weight-bearing Status) non weight-bearing (NWB)  -CM non weight-bearing (NWB)  -CM          User Key  (r) = Recorded By, (t) = Taken By, (c) = Cosigned By    Initials Name Provider Type    Angela Longo PT Physical Therapist               Obj/Interventions     Row Name 05/26/23 1422 05/26/23 1016       Balance    Balance Assessment sitting static balance;standing static balance;standing dynamic balance  -CM sitting static balance;standing static balance;standing dynamic balance  -CM    Static Sitting Balance independent  -CM  independent  -CM    Position, Sitting Balance unsupported;sitting edge of bed  -CM unsupported;sitting edge of bed  -CM    Static Standing Balance contact guard  -CM standby assist  -CM    Dynamic Standing Balance contact guard  -CM contact guard  -CM    Position/Device Used, Standing Balance supported;crutches, axillary  -CM supported;other (see comments)  knee scooter  -CM    Comment, Balance -- balance good on knee scooter, decreased balance while on crutches on stairs requiring up to modAx2 at times  -CM          User Key  (r) = Recorded By, (t) = Taken By, (c) = Cosigned By    Initials Name Provider Type    Angela Longo PT Physical Therapist               Goals/Plan    No documentation.                Clinical Impression     Row Name 05/26/23 1422 05/26/23 1017       Pain    Pretreatment Pain Rating 8/10  -CM 9/10  -CM    Posttreatment Pain Rating 8/10  -CM 9/10  -CM    Pain Location - Side/Orientation Right  -CM Right  -CM    Pain Location lower  -CM lower  -CM    Pain Location - extremity  -CM extremity  -CM    Pre/Posttreatment Pain Comment -- RN administered pain medication during treatment  -CM    Pain Intervention(s) Ambulation/increased activity;Repositioned;Nursing Notified  -CM Ambulation/increased activity;Repositioned;Nursing Notified  -CM    Row Name 05/26/23 1422 05/26/23 1017       Plan of Care Review    Plan of Care Reviewed With patient  -CM patient  -CM    Progress improving  -CM improving  -CM    Outcome Evaluation Order received for additional stair training this afternoon. Patient completed brief ambulation to steps with bilat axillary crutches with CGA and completed stair training with unilateral crutch/handrail per patient's home setup with CGAx2. She demonstrates improved sequencing and balance on steps this afternoon. Boyfriend present for training and verbalized good understanding of sequencing. He was educated on use of gait belt for stair navigation. Patient is cleared  from a mobility standpoint to D/C home with assist today with use of axillary crutches and then knee scooter when it arrives.  -CM Patient showing improvement with mobility completing ambualtion on knee scooter for 800' CGA with good safety awareness. She did perform stair training with up to modAx2 and bilateral crutches demonstrating impaired balance and sequencing. She would benefit from an additional stair training session to ensure a safe D/C home. She does have a handrail and may benefit from use of single crutch and handrail. Continue to recommend D/C home with assist pending improvement with stair training.  -CM    Row Name 05/26/23 1422 05/26/23 1017       Vital Signs    Pre Systolic BP Rehab --  VSS  -  -CM    Pre Treatment Diastolic BP -- 79  -CM    Pretreatment Heart Rate (beats/min) -- 88  -CM    Intratreatment Heart Rate (beats/min) -- 157  -CM    Posttreatment Heart Rate (beats/min) -- 96  -CM    Pre SpO2 (%) -- 94  -CM    O2 Delivery Pre Treatment nasal cannula  -CM nasal cannula  -CM    Intra SpO2 (%) -- 99  -CM    O2 Delivery Intra Treatment room air  -CM room air  -CM    Post SpO2 (%) -- 95  -CM    O2 Delivery Post Treatment room air  -CM room air  -CM    Pre Patient Position Supine  -CM Supine  -CM    Intra Patient Position Standing  -CM Standing  -CM    Post Patient Position Supine  -CM Sitting  -CM    Row Name 05/26/23 1422 05/26/23 1017       Positioning and Restraints    Pre-Treatment Position in bed  -CM in bed  -CM    Post Treatment Position bed  -CM chair  -CM    In Bed supine;call light within reach;encouraged to call for assist;notified nsg;RLE elevated  -CM --    In Chair -- reclined;call light within reach;encouraged to call for assist;exit alarm on;RLE elevated;waffle cushion;on mechanical lift sling;notified nsg  -CM          User Key  (r) = Recorded By, (t) = Taken By, (c) = Cosigned By    Initials Name Provider Type    Angela Longo, PT Physical Therapist                Outcome Measures     Row Name 05/26/23 1428 05/26/23 1022       How much help from another person do you currently need...    Turning from your back to your side while in flat bed without using bedrails? 4  -CM 4  -CM    Moving from lying on back to sitting on the side of a flat bed without bedrails? 4  -CM 4  -CM    Moving to and from a bed to a chair (including a wheelchair)? 3  -CM 3  -CM    Standing up from a chair using your arms (e.g., wheelchair, bedside chair)? 3  -CM 3  -CM    Climbing 3-5 steps with a railing? 3  -CM 2  -CM    To walk in hospital room? 3  -CM 3  -CM    AM-PAC 6 Clicks Score (PT) 20  -CM 19  -CM    Highest level of mobility 6 --> Walked 10 steps or more  -CM 6 --> Walked 10 steps or more  -CM    Row Name 05/26/23 1428 05/26/23 1022       Functional Assessment    Outcome Measure Options AM-PAC 6 Clicks Basic Mobility (PT)  -CM AM-PAC 6 Clicks Basic Mobility (PT)  -CM          User Key  (r) = Recorded By, (t) = Taken By, (c) = Cosigned By    Initials Name Provider Type    Angela Longo, PT Physical Therapist                             Physical Therapy Education     Title: PT OT SLP Therapies (In Progress)     Topic: Physical Therapy (In Progress)     Point: Mobility training (Done)     Learning Progress Summary           Patient Acceptance, E, VU by CM at 5/26/2023 1428    Acceptance, E, VU by CM at 5/26/2023 1022    Acceptance, E,D, VU,DU by HP at 5/25/2023 1722   Significant Other Acceptance, E, VU by CM at 5/26/2023 1428                   Point: Home exercise program (Not Started)     Learner Progress:  Not documented in this visit.          Point: Body mechanics (Done)     Learning Progress Summary           Patient Acceptance, E, VU by CM at 5/26/2023 1428    Acceptance, E, VU by CM at 5/26/2023 1022    Acceptance, E,D, VU,DU by HP at 5/25/2023 1722   Significant Other Acceptance, E, VU by CM at 5/26/2023 1428                   Point: Precautions (Done)     Learning  Progress Summary           Patient Acceptance, E, VU by CM at 5/26/2023 1428    Acceptance, E, VU by CM at 5/26/2023 1022    Acceptance, E,D, VU,DU by  at 5/25/2023 1722   Significant Other Acceptance, E, VU by CM at 5/26/2023 1428                               User Key     Initials Effective Dates Name Provider Type Discipline     06/01/21 -  Faiza Calhoun PT Physical Therapist PT    CM 09/22/22 -  Angela Juarez PT Physical Therapist PT              PT Recommendation and Plan     Plan of Care Reviewed With: patient  Progress: improving  Outcome Evaluation: Order received for additional stair training this afternoon. Patient completed brief ambulation to steps with bilat axillary crutches with CGA and completed stair training with unilateral crutch/handrail per patient's home setup with CGAx2. She demonstrates improved sequencing and balance on steps this afternoon. Boyfriend present for training and verbalized good understanding of sequencing. He was educated on use of gait belt for stair navigation. Patient is cleared from a mobility standpoint to D/C home with assist today with use of axillary crutches and then knee scooter when it arrives.     Time Calculation:    PT Charges     Row Name 05/26/23 1429 05/26/23 1022          Time Calculation    Start Time 1340  -CM 0837  -CM     PT Received On 05/26/23  -CM 05/26/23  -CM     PT Goal Re-Cert Due Date 06/04/23  -CM 06/04/23  -CM        Timed Charges    34955 - Gait Training Minutes  15  -CM 38  -CM     86174 - PT Therapeutic Activity Minutes 15  -CM 15  -CM        Total Minutes    Timed Charges Total Minutes 30  -CM 53  -CM      Total Minutes 30  -CM 53  -CM           User Key  (r) = Recorded By, (t) = Taken By, (c) = Cosigned By    Initials Name Provider Type    Angela Longo, KEILA Physical Therapist              Therapy Charges for Today     Code Description Service Date Service Provider Modifiers Qty    82253446674 HC GAIT TRAINING EA 15  MIN 5/26/2023 Angela Juarez, PT GP 3    69404123544 HC PT THERAPEUTIC ACT EA 15 MIN 5/26/2023 Angela Juarez, PT GP 1    09082299661 HC GAIT TRAINING EA 15 MIN 5/26/2023 Angela Juarez, PT GP 1    32952008075 HC PT THERAPEUTIC ACT EA 15 MIN 5/26/2023 Angela Juarez, PT GP 1    08971899342 HC PT THER SUPP EA 15 MIN 5/26/2023 Angela Juarez, PT GP 2          PT G-Codes  Outcome Measure Options: AM-PAC 6 Clicks Basic Mobility (PT)  AM-PAC 6 Clicks Score (PT): 20       Angela Juarez, PT  5/26/2023

## 2023-05-26 NOTE — OUTREACH NOTE
Prep Survey    Flowsheet Row Responses   [facility] patient discharged from? [facility]   Is LACE score < 7 ? No   Eligibility [facility]   Date of Admission 05/24/23   Date of Discharge 05/26/23   Discharge Disposition Home or Self Care   Discharge diagnosis s/pANKLE OPEN REDUCTION INTERNAL FIXATION   Does the patient have one of the following disease processes/diagnoses(primary or secondary)? General Surgery   Does the patient have Home health ordered? Yes   What is the Home health agency?  [facility]   Is there a DME ordered? Yes   What DME was ordered? pt has ordered knee scooter from [employer]   Prep survey completed? Yes          Shiela FISHMAN - Registered Nurse

## 2023-05-26 NOTE — CASE MANAGEMENT/SOCIAL WORK
Discharge Planning Assessment  [facility]     Patient Name: Philomena Davis  MRN: [id number]  Today's Date: 5/26/2023    Admit Date: 5/24/2023    Plan: Home with significant other's assistance, [facility] and a kneescooter from [employer]                   Discharge Plan     Row Name 05/26/23 1300       Plan    Plan Home with significant other's assistance, [facility] and a kneescooter from [employer]    Patient/Family in Agreement with Plan yes    Plan Comments Followed up with Ms. Davis, at the bedside, for discharge planning.  Ms. Davis is being discharged to home today.    She requested a kneescooter and did not have preference for provider.  CM contacted [employer], [employer], [employer] and they do not have any kneescooters available at this time.  Ms. Davis ordered a kneescooter from [employer] and it will be delivered to the patient's home tomorrow.  She declined any additional DME needs for home.  She has a rolling walker, crutches and st cane at home.   Ms. Davis did  request to continue working with [facility] for PT.  Referral called and faxed to Deion at [facility], ph [phone], fax [phone].  [facility].    Ms. Davis also requested to see social work for resources.  Ree EDOUARD spoke with the patient today.    Ms. Davis states that her boyfriend, Juliane, will be assisting her at home as needed.  She will also have friends from [facility] check in on her and transport her to appointments.    Juliane will be transporting Ms. Davis home by personal vehicle when discharged.    CM will continue to follow.    Final Discharge Disposition Code 06 - home with home health care    Row Name 05/26/23 1217       Plan    Plan Comments MSW spoke with pt at bedside and provided wheels transportation information as pt had requested. Pt also asked about other resources as well as the phone number to the social security office so she can follow up  with her disability application. MSW provided all of this information to pt as well as a complete [facility] resource guide packet. Pt had no further ss questions at this time.              Continued Care and Services - Admitted Since 5/24/2023     Home Medical Care     Service Provider Request Status Selected Services Address Phone Fax Patient Preferred    [facility] [address] [facility] [address], [facility] [address] [phone] --              Expected Discharge Date and Time     Expected Discharge Date Expected Discharge Time    May 26, 2023                    Sapna Velasquez, RN

## 2023-05-26 NOTE — DISCHARGE SUMMARY
[facility] Medicine Services  DISCHARGE SUMMARY    Patient Name: Philomena Davis  : 1960  MRN: [id number]    Date of Admission: 2023  1:45 AM  Date of Discharge:  2023  Primary Care Physician: Lilly Beasley DO    Consults     Date and Time Order Name Status Description    2023  7:35 AM Inpatient Orthopedic Surgery Consult Completed     2023  4:27 AM Inpatient Orthopedic Surgery Consult Completed           Hospital Course     Presenting Problem: Right ankle pain    Active Hospital Problems    Diagnosis  POA   • **Closed displaced trimalleolar fracture of right ankle, initial encounter [S82.851A]  Yes   • Closed trimalleolar fracture of right ankle [S82.851A]  Yes   • Hypokalemia [E87.6]  Unknown   • GERD without esophagitis [K21.9]  Yes   • Alcoholic cirrhosis of liver without ascites [K70.30]  Yes   • Diabetes mellitus, type 2 [E11.9]  Yes   • Essential hypertension [I10]  Yes   • Generalized anxiety disorder [F41.1]  Yes   • Hyperlipidemia [E78.5]  Yes      Resolved Hospital Problems   No resolved problems to display.        Hospital Course:  62-year-old female with history of type 2 diabetes, hyperlipidemia, general anxiety disorder, EtOH cirrhosis, GERD who presented to the ED with right ankle pain status post fall, found to have right trimalleolar fracture.     Right trimalleolar fracture s/p fall  -Ortho consulted, s/p repair 2023 by Dr. Reynoso   -Continue postop management, recommend aspirin 81 mg twice daily DVT prophylaxis for 6 weeks  -Continue pain control  -PT/OT  evaluated  -She will follow-up in clinic in 2 weeks     Well-controlled type 2 diabetes with A1c 4.9%  -Continue SSI for now      Hypertension  -BP currently stable continue HCTZ     EtOH cirrhosis  -Compensated      EtOH abuse  -Ethanol level of 318 on presentation  -Continue CIWA per protocol     Hypokalemia  -Monitor and replete per protocol     General anxiety  disorder  Hyperlipidemia  -Not on any home meds for these     Discharge Follow Up Recommendations for outpatient labs/diagnostics:  Follow-up with Ortho in 2 weeks    Day of Discharge     HPI: No acute events overnight, patient rested well, pain is controlled    Review of Systems  Gen- No fevers, chills  CV- No chest pain, palpitations  Resp- No cough, dyspnea  GI- No N/V/D, abd pain    Vital Signs:   Temp:  [97.4 °F (36.3 °C)-98.2 °F (36.8 °C)] 97.9 °F (36.6 °C)  Heart Rate:  [] 82  Resp:  [12-18] 18  BP: (101-158)/(57-95) 124/79  Flow (L/min):  [2-6] 2      Physical Exam:  Constitutional: No acute distress, awake, alert  HENT: NCAT, mucous membranes moist  Respiratory: Clear to auscultation bilaterally, respiratory effort normal   Cardiovascular: RRR, no murmurs, rubs, or gallops  Gastrointestinal: Positive bowel sounds, soft, nontender, nondistended  Musculoskeletal: No bilateral ankle edema right leg under dressing  Psychiatric: Appropriate affect, cooperative  Neurologic: Oriented x 3, nonfocal  Skin: No rashes    Pertinent  and/or Most Recent Results     LAB RESULTS:      Lab 05/26/23  0502 05/25/23 0357 05/24/23 0152   WBC 9.52 9.30 7.51   HEMOGLOBIN 12.4 12.4 13.6   HEMATOCRIT 36.5 36.8 40.6   PLATELETS 163 184 221   NEUTROS ABS 7.71* 6.72 4.82   IMMATURE GRANS (ABS) 0.04 0.05 0.08*   LYMPHS ABS 0.76 1.50 1.77   MONOS ABS 0.99* 0.97* 0.55   EOS ABS 0.00 0.02 0.23   .7* 99.5* 99.5*         Lab 05/26/23  0502 05/25/23 0357 05/24/23 1959 05/24/23 0152   SODIUM 138 139  --  139  139   POTASSIUM 3.6 4.3 4.5 3.2*  3.2*   CHLORIDE 99 102  --  101  101   CO2 29.0 27.0  --  23.0  25.0   ANION GAP 10.0 10.0  --  15.0  13.0   BUN 10 15  --  13  14   CREATININE 0.90 0.77  --  0.77  0.77   EGFR 73.3 88.4  --  88.4  88.4   GLUCOSE 117* 89  --  97  99   CALCIUM 9.1 9.6  --  9.3  9.3   HEMOGLOBIN A1C  --   --   --  5.30         Lab 05/24/23  0152   TOTAL PROTEIN 7.6   ALBUMIN 4.1   GLOBULIN 3.5    ALT (SGPT) 18   AST (SGOT) 32   BILIRUBIN 0.4   ALK PHOS 109                     Brief Urine Lab Results  (Last result in the past 365 days)      Color   Clarity   Blood   Leuk Est   Nitrite   Protein   CREAT   Urine HCG        05/19/23 0834 Orange  Comment: on azo   Cloudy   3+   Large (3+)   Positive   3+               Microbiology Results (last 10 days)     Procedure Component Value - Date/Time    STI PANEL, LEXAR (CT,NG,TV) - Urine, Urine, Random Void [[id number]] Collected: 05/19/23 0911    Lab Status: Final result Specimen: Urine, Random Void Updated: 05/22/23 0837     Chlamydia Trachomatis DNA, SDA Not Detected     Neisseria gonorrhoeae PCR Not Detected     Trich vag by SHARRI Not Detected    Urine Culture - Urine, Urine, Clean Catch [[id number]]  (Abnormal)  (Susceptibility) Collected: 05/19/23 0908    Lab Status: Final result Specimen: Urine, Clean Catch Updated: 05/21/23 0934     Urine Culture >100,000 CFU/mL Escherichia coli    Narrative:      Colonization of the urinary tract without infection is common. Treatment is discouraged unless the patient is symptomatic, pregnant, or undergoing an invasive urologic procedure.    Susceptibility      Escherichia coli      ALENA      Ampicillin Susceptible      Ampicillin + Sulbactam Susceptible      Cefazolin Susceptible      Cefepime Susceptible      Ceftazidime Susceptible      Ceftriaxone Susceptible      Gentamicin Susceptible      Levofloxacin Susceptible      Nitrofurantoin Susceptible      Piperacillin + Tazobactam Susceptible      Trimethoprim + Sulfamethoxazole Susceptible                                 XR Ankle 3+ View Right    Result Date: 5/25/2023  XR ANKLE 3+ VW RIGHT Date of Exam: 5/25/2023 2:56 PM EDT Indication: post op Comparison: 5/24/2023 CT ankle, ankle radiographs 5/24/2023 Findings: Overlying cast material obscures evaluation of fine bone detail. Postoperative changes of open reduction internal fixation of a trimalleolar fracture by means of  lateral plate and screw construct and 2 partially threaded medial malleolus screws. No hardware for the posterior malleolus though alignment appears improved  from prior examinations. Ankle mortise appears grossly symmetric. Talar dome appears intact. No evidence of hardware complication or periprosthetic fracture. Overall alignment appears improved.     Impression: Postoperative changes of open reduction internal fixation of a trimalleolar fracture with improved alignment. Electronically Signed: Alon SalasNito  5/25/2023 3:42 PM EDT  Workstation ID: [id number]    XR Ankle 3+ View Right    Result Date: 5/24/2023  XR ANKLE 3+ VW RIGHT Date of Exam: 5/24/2023 4:01 AM EDT Indication: stabilization of injury Comparison: Ankle x-ray 0231 hours same date Findings: Patient has been placed in cast. There is improved alignment of the fracture dislocation of the ankle. There is still abnormal widening of the mortise medially. There is a displaced fracture distal fibula Nino B with the distal fracture fragment displaced lateral by 4 mm. The medial malleolar fracture fragment is displaced laterally by 8 mm. A posterior malleolar fracture fragment is displaced posteriorly and superiorly by 5 mm. There is soft tissue swelling.     Impression: Improved alignment. Fracture dislocation of the ankle. There is now anatomic alignment of the tibiotalar joint. Ankle mortise is disrupted. Displaced trimalleolar fracture is present with soft tissue swelling. Electronically Signed: Isabela Henderson  5/24/2023 6:53 AM EDT  Workstation ID: [id number]    XR Ankle 3+ View Right    Result Date: 5/24/2023  EXAMINATION: XR ANKLE 3+ VW RIGHT  DATE OF EXAM: 5/24/2023 2:31 AM HISTORY: Trauma COMPARISON: None. FINDINGS: There is a trimalleolar ankle fracture with lateral dislocation of the talus off the tibia. There is diffuse soft tissue swelling.     1.  Trimalleolar ankle fracture. 2.  Lateral dislocation of the talus off the tibia. Electronically signed  by:  Aníbal Gonsalez M.D.  5/24/2023 1:19 AM Mountain Time    SS Adductor    Result Date: 5/25/2023  Amina Turpin CRNA     5/25/2023 12:01 PM SS Adductor Patient reassessed immediately prior to procedure Start time: 5/25/2023 11:48 AM Reason for block: at surgeon's request and post-op pain management Performed by VIOLA/CAA: Sunil Hough, VIOLA Preanesthetic Checklist Completed: patient identified, IV checked, site marked, risks and benefits discussed, surgical consent, monitors and equipment checked, pre-op evaluation and timeout performed Prep: Pt Position: supine Sterile barriers:cap, gloves, mask, sterile barriers and washed/disinfected hands Prep: ChloraPrep Patient monitoring: blood pressure monitoring, continuous pulse oximetry and EKG Procedure Performed under: spinal Guidance:ultrasound guided ULTRASOUND INTERPRETATION.  Using ultrasound guidance a 20 G gauge needle was placed in close proximity to the nerve, at which point, under ultrasound guidance anesthetic was injected in the area of the nerve and spread of the anesthesia was seen on ultrasound in close proximity thereto.  There were no abnormalities seen on ultrasound; a digital image was taken; and the patient tolerated the procedure with no complications. Images:still images obtained, printed/placed on chart Laterality:right Block Type:adductor canal block Injection Technique:single-shot Needle Type:echogenic and short-bevel Needle Gauge:20 G Resistance on Injection: none Catheter Size:20 G (20g) Medications Used: buprenorphine (BUPRENEX) injection - Injection  0.3 mg - 5/25/2023 11:48:00 AM dexamethasone sodium phosphate injection - Injection  4 mg - 5/25/2023 11:48:00 AM bupivacaine PF (MARCAINE) 0.25 % injection - Injection  30 mL - 5/25/2023 11:48:00 AM Post Assessment Injection Assessment: negative aspiration for heme, incremental injection and no paresthesia on injection Patient Tolerance:comfortable throughout block Complications:no  "Additional Notes SINGLE shot A high-frequency linear transducer, with sterile cover, was placed on the anterior mid-thigh (between the anterior superior iliac spine and patella). The transducer was then moved medially to identify the Sartorius muscle (Franky), Vastus Medialis muscle (VMM), Superficial Femoral Artery (SFA) and Vein. The transducer was then moved cephalad or caudad to position the SFA in the middle of the Franky. The insertion site was prepped and draped in sterile fashion. Skin and cutaneous tissue was infiltrated with 2-5 ml of 1% Lidocaine. Using ultrasound-guidance, a 20-gauge B-Laws 4\" Ultraplex 360 non-stimulating echogenic needle was advanced in plane from lateral to medial. Preservative-free normal saline was utilized for hydro-dissection of tissue, advancement of needle, and to confirm needle placement below the fascial plane of the Franky where the Nerve to the VMM is located. Local anesthetic (LA) 5 ml deposited here. The needle continues its path lateral to the SFA at the level of the Saphenous Nerve. The remainder of the LA was deposited at the 10-11 o'clock position of the SFA. This injection created a space between the Franky and the SFA. Aspiration every 5 ml to prevent intravascular injection. Injection was completed with negative aspiration of blood and negative intravascular injection. Injection pressures were normal with minimal resistance.     Right Single Shot Femoral Nerve block    Result Date: 5/24/2023  Howard Manjarrez CRNA     5/24/2023  7:57 AM Right Single Shot Femoral Nerve block Patient reassessed immediately prior to procedure Start time: 5/24/2023 7:57 AM Reason for block: at surgeon's request and post-op pain management Performed by VIOLA/CAA: Howard Manjarrez CRNA Assisted by: Anisha Talbert RN Preanesthetic Checklist Completed: patient identified, IV checked, site marked, risks and benefits discussed, surgical consent, monitors and equipment checked, pre-op evaluation and " "timeout performed Prep: Pt Position: supine Sterile barriers:gloves, cap, sterile barriers, mask and washed/disinfected hands Prep: ChloraPrep Patient monitoring: blood pressure monitoring, continuous pulse oximetry and EKG Procedure Sedation: no Guidance:ultrasound guided ULTRASOUND INTERPRETATION.  Using ultrasound guidance a 20 G gauge needle was placed in close proximity to the femoral nerve, at which point, under ultrasound guidance anesthetic was injected in the area of the nerve and spread of the anesthesia was seen on ultrasound in close proximity thereto.  There were no abnormalities seen on ultrasound; a digital image was taken; and the patient tolerated the procedure with no complications. Images:still images obtained, printed/placed on chart Laterality:right Block Type:femoral Injection Technique:single-shot Needle Type:short-bevel Needle Gauge:20 G Resistance on Injection: none Medications Used: dexamethasone sodium phosphate injection - Injection  2 mg - 5/24/2023 7:57:00 AM bupivacaine PF (MARCAINE) 0.25 % injection - Injection  30 mL - 5/24/2023 7:57:00 AM Medications Preservative Free Saline:5ml Post Assessment Injection Assessment: negative aspiration for heme, no paresthesia on injection and incremental injection Patient Tolerance:comfortable throughout block Complications:no Additional Notes SINGLE Shot A high-frequency linear transducer, with sterile cover, was placed in the inguinal crease to visualize the Femoral Vein, Artery, and Nerve (medial to lateral). The insertion site was prepped in sterile fashion. Skin and cutaneous tissue was infiltrated with 2-5 ml of 1% Lidocaine. Using ultrasound-guidance, a 20-gauge B-Laws 4\" Ultraplex 360 non-stimulating echogenic needle was then inserted and advanced in-plane from lateral to medial with ultrasound guidance. The needle was directed below Fascia Iliacus towards the Femoral nerve. Preservative-free normal saline was utilized for hydro-dissection " of tissue. Local anesthetic injection spread, in incremental 3-5 ml injections, was visualized lateral to the artery to surround the femoral nerve. Aspiration every 5 ml to prevent intravascular injection. Injection was completed with negative aspiration of blood and negative intravascular injection. Injection pressures were normal with minimal resistance.     Right Popliteal Catheter    Result Date: 5/24/2023  Howard Manjarrez CRNA     5/24/2023  7:56 AM Right Popliteal Catheter Patient reassessed immediately prior to procedure Patient location during procedure: floor Start time: 5/24/2023 7:56 AM Reason for block: at surgeon's request and post-op pain management Performed by VIOLA/CAA: Howard Manjarrez CRNA Assisted by: Anisha Talbert RN Preanesthetic Checklist Completed: patient identified, IV checked, site marked, risks and benefits discussed, surgical consent, monitors and equipment checked, pre-op evaluation and timeout performed Prep: Pt Position: left lateral decubitus Sterile barriers:cap, gloves, mask and washed/disinfected hands Prep: ChloraPrep Patient monitoring: blood pressure monitoring, continuous pulse oximetry and EKG Procedure Sedation: yes Performed under: local infiltration Guidance:ultrasound guided ULTRASOUND INTERPRETATION.  Using ultrasound guidance a 20 G gauge needle was placed in close proximity to the nerve, at which point, under ultrasound guidance anesthetic was injected in the area of the nerve and spread of the anesthesia was seen on ultrasound in close proximity thereto.  There were no abnormalities seen on ultrasound; a digital image was taken; and the patient tolerated the procedure with no complications. Images:still images obtained, printed/placed on chart Laterality:right Block Type:popliteal Injection Technique:catheter Needle Type:echogenic and Tuohy Needle Gauge:18 G Resistance on Injection: none Catheter Size:20 G Cath Depth at skin: 7 cm Medications Used: bupivacaine PF  "(MARCAINE) 0.25 % injection - Injection  30 mL - 5/24/2023 7:56:00 AM Medications Preservative Free Saline:5ml Post Assessment Injection Assessment: negative aspiration for heme, no paresthesia on injection and incremental injection Patient Tolerance:comfortable throughout block Complications:no Additional Notes CATHETER                             A high-frequency linear transducer, with sterile cover, was placed in the popliteal fossa to identify the popliteal artery and vein, Tibial nerve (TN) and Common Peroneal nerve (CP). The transducer was then moved in a cephalad fashion to observe the TN and CP nerve bifurcation to form the Sciatic Nerve. The insertion site was prepped and draped in sterile fashion. Skin and cutaneous tissue was infiltrated with 2-5 ml of 1% Lidocaine. Using ultrasound-guidance, an 18-gauge Contiplex Ultra 360 Touhy needle was advanced in plane from lateral to medial. Preservative-free normal saline was utilized for hydro-dissection of tissue, advancement of Touhy needle, and to confirm final needle placement posterior to the nerves. Local anesthetic injection spread, in incremental 3-5 ml injections, to surround both nerve structures. Aspiration every 5 ml to prevent intravascular injection. Injection was completed with negative aspiration of blood and negative intravascular injection. Injection pressures were normal with minimal resistance. A 20-gauge Contiplex Echo catheter was placed through the needle and advance out the tip of the Touhy 1-3 cm. The Touhy needle was then removed, and final catheter position verified (Below/above or Anterior/Posterior) the nerve structures. The catheter was secured in the usual fashion with skin glue, benzoin, steri-strips, CHG tegaderm and Label noting \"Nerve Block Catheter\". Jerk tape applied at yellow connector and catheter connection.     FL C Arm During Surgery    Result Date: 5/25/2023  This procedure was auto-finalized with no dictation " required.    CT Lower Extremity Right Without Contrast    Result Date: 5/24/2023  CT LOWER EXTREMITY RIGHT WO CONTRAST Date of Exam: 5/24/2023 11:15 AM EDT Indication: Fracture, tib/fib. Preop. Comparison: Ankle radiograph 5/4/2023 Technique: Axial CT images were obtained of the right lower extremity without contrast administration.  Reconstructed coronal and sagittal images were also obtained. Automated exposure control and iterative construction methods were used. Findings: There is a trimalleolar fracture. Widening of the medial ankle mortise and narrowing of the lateral mortise. The distal fibular fracture is comminuted. The distal fracture fragment involving the tip of the fibula measures about 3.2 x 1.7 x 1.3 cm. It is displaced posterior to the proximal fracture fragment by 9 mm and superiorly displaced by about 4 mm. There is a displaced fracture fragment from the anterior aspect of the fibula which is rotated and displaced medially. This fracture fragment measures approximately 1 x 0.5 x 1.6 cm. It is anteriorly displaced by 9 mm and rotated 90 degrees. The posterior malleolus fracture fragment measures 0.6 x 2.3 x 1.7 cm. It is displaced posteriorly by about 3 mm and superiorly by 3 mm. The medial malleolus fracture fragment measures 0.7 x 1.8 x 1.7 cm. It is displaced inferiorly by 6 mm. There is a fracture at the plantar aspect base of the first metatarsal. The small triangular fracture fragment is intra-articular and measures maximally 3 mm. It is nondisplaced.. The bone mineral density is normal. Soft tissues are better evaluated with MRI exam. There is soft tissue swelling most pronounced in the anterior and lateral aspect. The Achilles tendon is intact. The peroneal tendons, extensor and flexor tendons are intact.    Impression: Displaced trimalleolar fracture of ankle. Fracture fragment from the comminuted distal fibular fracture is most displaced and rotated by 90 degrees along the anterior lateral  joint line. Please see findings for details. There is a nondisplaced fracture at the base of the first metatarsal plantar aspect. Electronically Signed: Isabela Henderson  5/24/2023 12:01 PM EDT  Workstation ID: [id number]                Plan for Follow-up of Pending Labs/Results:     Discharge Details        Discharge Medications      New Medications      Instructions Start Date   aspirin 81 MG EC tablet   81 mg, Oral, Every 12 Hours Scheduled      oxyCODONE-acetaminophen 5-325 MG per tablet  Commonly known as: PERCOCET   1 tablet, Oral, Every 6 Hours PRN         Continue These Medications      Instructions Start Date   cholecalciferol 25 MCG (1000 UT) tablet  Commonly known as: VITAMIN D3   1,000 Units, Oral, Daily      Fluticasone-Salmeterol 100-50 MCG/ACT DISKUS  Commonly known as: ADVAIR/WIXELA   Inhalation, 2 Times Daily - RT      hydroCHLOROthiazide 12.5 MG tablet  Commonly known as: HYDRODIURIL   No dose, route, or frequency recorded.      latanoprost 0.005 % ophthalmic solution  Commonly known as: XALATAN   1 drop, Both Eyes, Nightly      pantoprazole 40 MG EC tablet  Commonly known as: PROTONIX   1 tablet, Oral, Daily      ursodiol 300 MG capsule  Commonly known as: ACTIGALL   No dose, route, or frequency recorded.         Stop These Medications    fluconazole 150 MG tablet  Commonly known as: DIFLUCAN     nitrofurantoin (macrocrystal-monohydrate) 100 MG capsule  Commonly known as: MACROBID            Allergies   Allergen Reactions   • Buspirone Anxiety   • Codeine Nausea Only   • Mirtazapine Anxiety       Discharge Disposition: Home  Home or Self Care  Diet:  Hospital:  Diet Order   Procedures   • Diet: Regular/House Diet; Texture: Regular Texture (IDDSI 7); Fluid Consistency: Thin (IDDSI 0)       Activity: As tolerated      Restrictions or Other Recommendations:  Per Ortho       CODE STATUS:    Code Status and Medical Interventions:   Ordered at: 05/25/23 8560     Level Of Support Discussed With:    Patient      Code Status (Patient has no pulse and is not breathing):    CPR (Attempt to Resuscitate)     Medical Interventions (Patient has pulse or is breathing):    Full       Future Appointments   Date Time Provider Department Center   5/26/2023 10:15 AM Lilly Beasley DO [facility] [place] Opii, MD  05/26/23      Time Spent on Discharge:  I spent  25  minutes on this discharge activity which included: face-to-face encounter with the patient, reviewing the data in the system, coordination of the care with the nursing staff as well as consultants, documentation, and entering orders.

## 2023-05-26 NOTE — PLAN OF CARE
Goal Outcome Evaluation:  Plan of Care Reviewed With: patient        Progress: improving  Outcome Evaluation: Patient showing improvement with mobility completing ambualtion on knee scooter for 800' CGA with good safety awareness. She did perform stair training with up to modAx2 and bilateral crutches demonstrating impaired balance and sequencing. She would benefit from an additional stair training session to ensure a safe D/C home. She does have a handrail and may benefit from use of single crutch and handrail. Continue to recommend D/C home with assist pending improvement with stair training.

## 2023-05-26 NOTE — THERAPY TREATMENT NOTE
Patient Name: Philomena Davis  : 1960    MRN: [id number]                              Today's Date: 2023       Admit Date: 2023    Visit Dx:     ICD-10-CM ICD-9-CM   1. Closed displaced trimalleolar fracture of right ankle, initial encounter  S82.851A 824.6   2. Acute right ankle pain  M25.571 719.47     338.19   3. 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   • Digital mucous cyst of toe of left foot   • Alcohol withdrawal   • Alcoholism /alcohol abuse   • Hematemesis   • Diabetes mellitus, type 2   • Essential hypertension   • Generalized anxiety disorder   • Hyperlipidemia   • Glaucoma   • Elevated LFTs   • Generalized weakness   • Alcoholic cirrhosis of liver without ascites   • Weight loss   • Severe malnutrition   • GERD without esophagitis   • Closed trimalleolar fracture of right ankle   • Hypokalemia   • Closed displaced trimalleolar fracture of right ankle, initial encounter     Past Medical History:   Diagnosis Date   • Anxiety and depression    • Cirrhosis of liver    • Closed displaced fracture of lateral malleolus of left fibula    • Diabetes mellitus    • Ganglion cyst of left foot    • Glaucoma    • Hypertension    • Wears glasses      Past Surgical History:   Procedure Laterality Date   • ANKLE OPEN REDUCTION INTERNAL FIXATION Left 3/24/2017    Procedure: LEFT ANKLE OPEN REDUCTION INTERNAL FIXATION WITH ILIAC CREST BONE GRAFT , EXCISION CYST  INNERSPACE LEFT FOOT;  Surgeon: Frank Larson MD;  Location: [facility] OR;  Service:    • AUGMENTATION MAMMAPLASTY Bilateral    • BREAST AUGMENTATION     • BREAST EXCISIONAL BIOPSY Right    • DILATION AND CURETTAGE, DIAGNOSTIC / THERAPEUTIC     • ENDOSCOPY N/A 2022    Procedure: ESOPHAGOGASTRODUODENOSCOPY;  Surgeon: Arelis Solano MD;  Location: [facility] ENDOSCOPY;   Service: Gastroenterology;  Laterality: N/A;   • NH RPR NON/MAL FEMUR DSTL H/N W/ILIAC/AUTOG BONE Left 3/24/2017    Procedure: ILIAC CREST BONE GRAFT;  Surgeon: Frank Larson MD;  Location: [facility];  Service: Orthopedics   • WRIST SURGERY        General Information     Row Name 05/26/23 1010          Physical Therapy Time and Intention    Document Type therapy note (daily note)  -CM     Mode of Treatment physical therapy;individual therapy  -CM     Row Name 05/26/23 1010          General Information    Patient Profile Reviewed yes  -CM     Existing Precautions/Restrictions fall;non-weight bearing;other (see comments)  popliteal nerve cath, NWB RLE  -CM     Barriers to Rehab none identified  -CM     Row Name 05/26/23 1010          Cognition    Orientation Status (Cognition) oriented x 4  -CM     Row Name 05/26/23 1010          Safety Issues, Functional Mobility    Safety Issues Affecting Function (Mobility) insight into deficits/self-awareness;safety precaution awareness  -CM     Impairments Affecting Function (Mobility) balance;endurance/activity tolerance;pain;strength;sensation/sensory awareness;range of motion (ROM)  -CM           User Key  (r) = Recorded By, (t) = Taken By, (c) = Cosigned By    Initials Name Provider Type    CM Angela Juarez, PT Physical Therapist               Mobility     Row Name 05/26/23 1011          Bed Mobility    Bed Mobility supine-sit  -CM     Supine-Sit [place] (Bed Mobility) modified independence  -CM     Assistive Device (Bed Mobility) head of bed elevated  -CM     Row Name 05/26/23 1011          Sit-Stand Transfer    Sit-Stand [place] (Transfers) contact guard  -CM     Assistive Device (Sit-Stand Transfers) walker, knee scooter  -CM     Comment, (Sit-Stand Transfer) Cues provided for optimal knee scooter placement, to set breaks prior to transfer, and for safe hand placement  -CM     Row Name 05/26/23 1011          Gait/Stairs (Locomotion)     [place] Level (Gait) contact guard  -CM     Assistive Device (Gait) walker, knee scooter  -CM     Distance in Feet (Gait) 800  -CM     Deviations/Abnormal Patterns (Gait) gait speed decreased  -CM     [place] Level (Stairs) moderate assist (50% patient effort);2 person assist;verbal cues  -CM     Assistive Device (Stairs) crutches, axillary  -CM     Number of Steps (Stairs) 3  -CM     Ascending Technique (Stairs) step-to-step  -CM     Descending Technique (Stairs) step-to-step  -CM     Stairs, Safety Issues sequencing ability decreased  -CM     Stairs, Impairments strength decreased;impaired balance  -CM     Comment, (Gait/Stairs) Patient completed ambulation with knee scooter in singer with good safety awareness and sequencing. Cues provided for decreased speed when turning. Patient takes multiple brief standing rest breaks to complete ambulation distance on knee scooter. Patient ascended and descended 3 steps with variable assist levels at most modAx2. She requires frequent cues for sequencing with crutches to complete and assistance primarily for balance and crutch placement. Good maintenance of weightbearing status. She would benefit from additional stair training and potential with a single crutch and handrail for increased balance and steadiness.  -CM     Row Name 05/26/23 1011          Mobility    Extremity Weight-bearing Status right lower extremity  -CM     Right Lower Extremity (Weight-bearing Status) non weight-bearing (NWB)  -CM           User Key  (r) = Recorded By, (t) = Taken By, (c) = Cosigned By    Initials Name Provider Type    CM Angela Juarez PT Physical Therapist               Obj/Interventions     Row Name 05/26/23 1016          Balance    Balance Assessment sitting static balance;standing static balance;standing dynamic balance  -CM     Static Sitting Balance independent  -CM     Position, Sitting Balance unsupported;sitting edge of bed  -CM     Static Standing Balance standby  assist  -CM     Dynamic Standing Balance contact guard  -CM     Position/Device Used, Standing Balance supported;other (see comments)  knee scooter  -CM     Comment, Balance balance good on knee scooter, decreased balance while on crutches on stairs requiring up to modAx2 at times  -CM           User Key  (r) = Recorded By, (t) = Taken By, (c) = Cosigned By    Initials Name Provider Type    Angela Longo PT Physical Therapist               Goals/Plan    No documentation.                Clinical Impression     Row Name 05/26/23 1017          Pain    Pretreatment Pain Rating 9/10  -CM     Posttreatment Pain Rating 9/10  -CM     Pain Location - Side/Orientation Right  -CM     Pain Location lower  -CM     Pain Location - extremity  -CM     Pre/Posttreatment Pain Comment RN administered pain medication during treatment  -CM     Pain Intervention(s) Ambulation/increased activity;Repositioned;Nursing Notified  -     Row Name 05/26/23 1017          Plan of Care Review    Plan of Care Reviewed With patient  -CM     Progress improving  -CM     Outcome Evaluation Patient showing improvement with mobility completing ambualtion on knee scooter for 800' CGA with good safety awareness. She did perform stair training with up to modAx2 and bilateral crutches demonstrating impaired balance and sequencing. She would benefit from an additional stair training session to ensure a safe D/C home. She does have a handrail and may benefit from use of single crutch and handrail. Continue to recommend D/C home with assist pending improvement with stair training.  -     Row Name 05/26/23 1017          Vital Signs    Pre Systolic BP Rehab 124  -CM     Pre Treatment Diastolic BP 79  -CM     Pretreatment Heart Rate (beats/min) 88  -CM     Intratreatment Heart Rate (beats/min) 157  -CM     Posttreatment Heart Rate (beats/min) 96  -CM     Pre SpO2 (%) 94  -CM     O2 Delivery Pre Treatment nasal cannula  -CM     Intra SpO2 (%) 99  -CM      O2 Delivery Intra Treatment room air  -CM     Post SpO2 (%) 95  -CM     O2 Delivery Post Treatment room air  -CM     Pre Patient Position Supine  -CM     Intra Patient Position Standing  -CM     Post Patient Position Sitting  -CM     Row Name 05/26/23 1017          Positioning and Restraints    Pre-Treatment Position in bed  -CM     Post Treatment Position chair  -CM     In Chair reclined;call light within reach;encouraged to call for assist;exit alarm on;RLE elevated;waffle cushion;on mechanical lift sling;notified nsg  -CM           User Key  (r) = Recorded By, (t) = Taken By, (c) = Cosigned By    Initials Name Provider Type    Angela Longo PT Physical Therapist               Outcome Measures     Row Name 05/26/23 1022          How much help from another person do you currently need...    Turning from your back to your side while in flat bed without using bedrails? 4  -CM     Moving from lying on back to sitting on the side of a flat bed without bedrails? 4  -CM     Moving to and from a bed to a chair (including a wheelchair)? 3  -CM     Standing up from a chair using your arms (e.g., wheelchair, bedside chair)? 3  -CM     Climbing 3-5 steps with a railing? 2  -CM     To walk in hospital room? 3  -CM     AM-PAC 6 Clicks Score (PT) 19  -CM     Highest level of mobility 6 --> Walked 10 steps or more  -CM     Row Name 05/26/23 1022          Functional Assessment    Outcome Measure Options AM-PAC 6 Clicks Basic Mobility (PT)  -CM           User Key  (r) = Recorded By, (t) = Taken By, (c) = Cosigned By    Initials Name Provider Type    Angela Longo PT Physical Therapist                             Physical Therapy Education     Title: PT OT SLP Therapies (In Progress)     Topic: Physical Therapy (In Progress)     Point: Mobility training (Done)     Learning Progress Summary           Patient Acceptance, E, VU by PERI at 5/26/2023 1022    Acceptance, E,D, VU,DU by UBALDO at 5/25/2023 1722                    Point: Home exercise program (Not Started)     Learner Progress:  Not documented in this visit.          Point: Body mechanics (Done)     Learning Progress Summary           Patient Acceptance, E, VU by  at 5/26/2023 1022    Acceptance, E,D, VU,DU by  at 5/25/2023 1722                   Point: Precautions (Done)     Learning Progress Summary           Patient Acceptance, E, VU by  at 5/26/2023 1022    Acceptance, E,D, VU,DU by  at 5/25/2023 1722                               User Key     Initials Effective Dates Name Provider Type Discipline     06/01/21 -  Faiza Calhoun, KEILA Physical Therapist PT     09/22/22 -  Angela Juarez PT Physical Therapist PT              PT Recommendation and Plan     Plan of Care Reviewed With: patient  Progress: improving  Outcome Evaluation: Patient showing improvement with mobility completing ambualtion on knee scooter for 800' CGA with good safety awareness. She did perform stair training with up to modAx2 and bilateral crutches demonstrating impaired balance and sequencing. She would benefit from an additional stair training session to ensure a safe D/C home. She does have a handrail and may benefit from use of single crutch and handrail. Continue to recommend D/C home with assist pending improvement with stair training.     Time Calculation:    PT Charges     Row Name 05/26/23 1022             Time Calculation    Start Time 0837  -CM      PT Received On 05/26/23  -CM      PT Goal Re-Cert Due Date 06/04/23  -CM         Timed Charges    28881 - Gait Training Minutes  38  -CM      32119 - PT Therapeutic Activity Minutes 15  -CM         Total Minutes    Timed Charges Total Minutes 53  -CM       Total Minutes 53  -CM            User Key  (r) = Recorded By, (t) = Taken By, (c) = Cosigned By    Initials Name Provider Type    Angela Longo, PT Physical Therapist              Therapy Charges for Today     Code Description Service Date Service Provider  Modifiers Qty    56751893752 HC GAIT TRAINING EA 15 MIN 5/26/2023 Angela Juarez, PT GP 3    95629893769 HC PT THERAPEUTIC ACT EA 15 MIN 5/26/2023 Angela Juarez, PT GP 1          PT G-Codes  Outcome Measure Options: AM-PAC 6 Clicks Basic Mobility (PT)  AM-PAC 6 Clicks Score (PT): 19       Angela Juarez, PT  5/26/2023

## 2023-05-26 NOTE — PLAN OF CARE
Goal Outcome Evaluation:  Plan of Care Reviewed With: patient        Progress: improving  Outcome Evaluation: VSS, alert and oriented x4, pain controlled with infu block and prn pain medication, very pleasant and no issues overnight.

## 2023-05-26 NOTE — PROGRESS NOTES
Orthopedic Progress Note      Patient: Philomena Davis  YOB: 1960     Date of Admission: 5/24/2023  1:45 AM Medical Record Number: [id number]     Attending Physician: Braxton Edge MD    Status Post:  Procedure(s):  ANKLE OPEN REDUCTION INTERNAL FIXATION Post Operative Day Number: 1    Subjective : No new orthopaedic complaints     Pain Relief: some relief with present medication.     Systemic Complaints: No Complaints  Vitals:    05/25/23 1858 05/25/23 2345 05/26/23 0349 05/26/23 0712   BP: 158/94 144/87 123/63 138/79   BP Location: Right arm Right arm Right arm Left arm   Patient Position: Lying Lying Lying Lying   Pulse: 119 76 66 69   Resp: 16 16 16 18   Temp: 97.4 °F (36.3 °C) 97.7 °F (36.5 °C) 97.8 °F (36.6 °C) 97.9 °F (36.6 °C)   TempSrc: Oral Oral Oral Oral   SpO2: 95% 94% 93% 99%   Weight:       Height:           Physical Exam: 62 y.o. female    General Appearance:       Alert, cooperative, in no acute distress                  Extremities:    Dressing Clean, Dry and Intact             No clinical sign of DVT        Able to do good movements of digits    Pulses:   Pulses palpable and equal bilaterally           Diagnostic Tests:     Results from last 7 days   Lab Units 05/26/23  0502 05/25/23 0357 05/24/23  0152   WBC 10*3/mm3 9.52 9.30 7.51   HEMOGLOBIN g/dL 12.4 12.4 13.6   HEMATOCRIT % 36.5 36.8 40.6   PLATELETS 10*3/mm3 163 184 221     Results from last 7 days   Lab Units 05/26/23  0502 05/25/23  0357 05/24/23 1959 05/24/23  0152   SODIUM mmol/L 138 139  --  139  139   POTASSIUM mmol/L 3.6 4.3 4.5 3.2*  3.2*   CHLORIDE mmol/L 99 102  --  101  101   CO2 mmol/L 29.0 27.0  --  23.0  25.0   BUN mg/dL 10 15  --  13  14   CREATININE mg/dL 0.90 0.77  --  0.77  0.77   GLUCOSE mg/dL 117* 89  --  97  99   CALCIUM mg/dL 9.1 9.6  --  9.3  9.3         Lab Results   Component Value Date    URICACID 5.3 10/02/2022     No results found for: CRYSTAL  Microbiology Results (last 10 days)      Procedure Component Value - Date/Time    STI PANELDEJAH (CT,NG,TV) - Urine, Urine, Random Void [[id number]] Collected: 05/19/23 0911    Lab Status: Final result Specimen: Urine, Random Void Updated: 05/22/23 0837     Chlamydia Trachomatis DNA, SDA Not Detected     Neisseria gonorrhoeae PCR Not Detected     Trich vag by SHARRI Not Detected    Urine Culture - Urine, Urine, Clean Catch [[id number]]  (Abnormal)  (Susceptibility) Collected: 05/19/23 0908    Lab Status: Final result Specimen: Urine, Clean Catch Updated: 05/21/23 0934     Urine Culture >100,000 CFU/mL Escherichia coli    Narrative:      Colonization of the urinary tract without infection is common. Treatment is discouraged unless the patient is symptomatic, pregnant, or undergoing an invasive urologic procedure.    Susceptibility      Escherichia coli      ALENA      Ampicillin Susceptible      Ampicillin + Sulbactam Susceptible      Cefazolin Susceptible      Cefepime Susceptible      Ceftazidime Susceptible      Ceftriaxone Susceptible      Gentamicin Susceptible      Levofloxacin Susceptible      Nitrofurantoin Susceptible      Piperacillin + Tazobactam Susceptible      Trimethoprim + Sulfamethoxazole Susceptible                               XR Ankle 3+ View Right    Result Date: 5/25/2023  Impression: Postoperative changes of open reduction internal fixation of a trimalleolar fracture with improved alignment. Electronically Signed: Alon Beauchamp  5/25/2023 3:42 PM EDT  Workstation ID: [id number]    XR Ankle 3+ View Right    Result Date: 5/24/2023  Impression: Improved alignment. Fracture dislocation of the ankle. There is now anatomic alignment of the tibiotalar joint. Ankle mortise is disrupted. Displaced trimalleolar fracture is present with soft tissue swelling. Electronically Signed: Isabela Henderson  5/24/2023 6:53 AM EDT  Workstation ID: [id number]    XR Ankle 3+ View Right    Result Date: 5/24/2023  1.  Trimalleolar ankle fracture. 2.  Lateral dislocation  of the talus off the tibia. Electronically signed by:  Aníbal Gonsalez M.D.  5/24/2023 1:19 AM Mountain Time    CT Lower Extremity Right Without Contrast    Result Date: 5/24/2023  Impression: Displaced trimalleolar fracture of ankle. Fracture fragment from the comminuted distal fibular fracture is most displaced and rotated by 90 degrees along the anterior lateral joint line. Please see findings for details. There is a nondisplaced fracture at the base of the first metatarsal plantar aspect. Electronically Signed: Isabela Henderson  5/24/2023 12:01 PM EDT  Workstation ID: [id number]        Current Medications:  Scheduled Meds:acetaminophen, 1,000 mg, Oral, Q8H  aspirin, 81 mg, Oral, Q12H  folic acid, 1 mg, Oral, Daily  hydroCHLOROthiazide, 12.5 mg, Oral, Daily  insulin lispro, 2-7 Units, Subcutaneous, TID With Meals  meloxicam, 15 mg, Oral, Daily  multivitamin with minerals, 1 tablet, Oral, Daily  pantoprazole, 40 mg, Oral, Daily  potassium chloride ER, 40 mEq, Oral, Q4H  senna-docusate sodium, 2 tablet, Oral, BID  sodium chloride, 10 mL, Intravenous, Q12H  thiamine (B-1) IV, 200 mg, Intravenous, Q8H   Followed by  [START ON 5/29/2023] thiamine, 100 mg, Oral, Daily      Continuous Infusions:lactated ringers, 9 mL/hr, Last Rate: Stopped (05/25/23 1806)  lactated ringers, 100 mL/hr, Last Rate: 100 mL/hr (05/25/23 1806)  ropivacaine,       PRN Meds:.•  acetaminophen  •  senna-docusate sodium **AND** polyethylene glycol **AND** bisacodyl **AND** bisacodyl  •  dextrose  •  dextrose  •  diphenhydrAMINE **OR** diphenhydrAMINE  •  glucagon (human recombinant)  •  HYDROmorphone  •  HYDROmorphone **AND** naloxone  •  LORazepam **OR** LORazepam **OR** LORazepam **OR** LORazepam **OR** LORazepam **OR** LORazepam  •  Magnesium Standard Dose Replacement - Follow Nurse / BPA Driven Protocol  •  melatonin  •  ondansetron **OR** ondansetron  •  oxyCODONE  •  oxyCODONE  •  oxyCODONE-acetaminophen  •  Potassium Replacement - Follow Nurse  / BPA Driven Protocol  •  sodium chloride  •  [COMPLETED] Insert Peripheral IV **AND** sodium chloride  •  sodium chloride  •  sodium chloride  •  traMADol    Assessment: Status post  No admission procedures for hospital encounter.    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   • Digital mucous cyst of toe of left foot   • Alcohol withdrawal   • Alcoholism /alcohol abuse   • Hematemesis   • Diabetes mellitus, type 2   • Essential hypertension   • Generalized anxiety disorder   • Hyperlipidemia   • Glaucoma   • Elevated LFTs   • Generalized weakness   • Alcoholic cirrhosis of liver without ascites   • Weight loss   • Severe malnutrition   • GERD without esophagitis   • Closed trimalleolar fracture of right ankle   • Hypokalemia   • Closed displaced trimalleolar fracture of right ankle, initial encounter       PLAN:   Continues current post-op course  Anticoagulation: Aspirin started, will need 6 week course   Mobilize with PT as tolerated per protocol  Follow up with me in 2 weeks     Weight Bearing: NWB  Discharge Plan: OK to plan for discharge in  today to home  from orthopaedic perspective.    Deandre Reynoso MD    Date: 5/26/2023    Time: 08:34 EDT

## 2023-05-26 NOTE — PROGRESS NOTES
Acute pain service Inpatient Progress Note    Patient Name: Philomena Davis  :  1960  MRN:  [id number]        Acute Pain  Service Inpatient Progress Note:    Analgesia:Good  LOC: alert and awake  Resp Status: room air  Cardiac: VS stable  Side Effects:None  Catheter Site:clean, dressing intact and dry  Cath type: peripheral nerve cath with ON Q  Volume: 1mL,5ml, 5ml InfuSystem Pump.  Catheter Plan:Catheter to remain Insitu and Continue catheter infusion rate unchanged  Comments: Pt eating , very conversational, toes numb but move, reports medial ankle pain, some new lateral ankle pain, will bolus and pt to report to rn if ineffective for evaluation of pain catheter.  Thank you

## 2023-05-26 NOTE — TELEPHONE ENCOUNTER
[facility] CALLED TO SEE IF THE PATIENTS PCP WILL FOLLOW HER INTO HOME HEALTH      PLEASE ADVISE

## 2023-05-29 ENCOUNTER — TRANSITIONAL CARE MANAGEMENT TELEPHONE ENCOUNTER (OUTPATIENT)
Dept: CALL CENTER | Facility: HOSPITAL | Age: 63
End: 2023-05-29

## 2023-05-29 NOTE — OUTREACH NOTE
Call Center TCM Note    Flowsheet Row Responses   [facility] patient discharged from? [place]   Does the patient have one of the following disease processes/diagnoses(primary or secondary)? General Surgery   TCM attempt successful? No  [none listed on VR]   Unsuccessful attempts Attempt 1   Call Status Left message          Ayesha Haile RN    5/29/2023, 16:41 EDT

## 2023-05-29 NOTE — OUTREACH NOTE
Call Center TCM Note    Flowsheet Row Responses   [facility] patient discharged from? [place]   Does the patient have one of the following disease processes/diagnoses(primary or secondary)? General Surgery   TCM attempt successful? Yes   Call start time 1727   Call end time 1737   Discharge diagnosis s/pANKLE OPEN REDUCTION INTERNAL FIXATION   Person spoke with today (if not patient) and relationship patient   Meds reviewed with patient/caregiver? Yes   Is the patient having any side effects they believe may be caused by any medication additions or changes? No   Does the patient have all medications related to this admission filled (includes all antibiotics, pain medications, etc.) Yes   Is the patient taking all medications as directed (includes completed medication regime)? Yes   Comments Pt is having mobility issues and is unsure when she can get to any appointments.  She will call provider to schedule this week.  TCM complete.   Does the patient have an appointment with their PCP within 7 days of discharge? No   Nursing Interventions Patient declined scheduling/rescheduling appointment at this time   What is the Home health agency?  [facility]   Has home health visited the patient within 72 hours of discharge? Call prior to 72 hours   Home health comments Pt has called HH and reports they will visit this week.   DME comments pt is unable to use crutches due to balance issues.  Using a knee scooter.  Pt reports she is unable to get down the stairs, unable to f/u with appts.   Psychosocial issues? No   Did the patient receive a copy of their discharge instructions? Yes   Nursing interventions Reviewed instructions with patient   Is the patient/caregiver able to teach back steps to recovery at home? Set small, achievable goals for return to baseline health, Rest and rebuild strength, gradually increase activity   Is the patient/caregiver able to teach back the hierarchy of who to call/visit for  symptoms/problems? PCP, Specialist, Home health nurse, Urgent Care, ED, 911 Yes   TCM call completed? Yes   Wrap up additional comments Pt reports she is home alone.  She is having some mobility issues.  She feels she is stable at this time using a knee scooter but cannot ambulate down 3 steps to leave home.  She does have family who can help when needed.  She will call PCP to schedule appt.   Call end time [phone]   Would this patient benefit from a Referral to [facility]? No   Is the patient interested in additional calls from an ambulatory ?  NOTE:  applies to high risk patients requiring additional follow-up. No          Catie Briones RN    5/29/2023, 17:38 EDT

## 2023-05-30 ENCOUNTER — NURSE TRIAGE (OUTPATIENT)
Dept: CALL CENTER | Facility: HOSPITAL | Age: 63
End: 2023-05-30

## 2023-05-30 NOTE — OUTREACH NOTE
Case Management Call Center Follow-up    Flowsheet Row Responses   [facility] Call Center Tracking Reason? Other (specify in comments)   Other Tracking Comments Now requesting rehab   Has the Call Center Case Management Follow-up issue been resolved? No   Follow-up Comments DANNA'aime callled patinet no answer, left a mesage to discuss options. Awaiting return call.          SILKE Alexander (Kay)    5/30/2023, 11:39 CDT

## 2023-05-30 NOTE — TELEPHONE ENCOUNTER
She was discharged from [facility]- on 05/26/23- with a closed displaced trimalleolar fracture of right ankle - She is needing a CM - Requesting to go to a rehab facility. The CM she saw was Sapna Velasquez RN-send a message per CM - phone - [phone]- Explained will call CM, and send a secure message, encouraged to call Orthopedic.

## 2023-05-30 NOTE — TELEPHONE ENCOUNTER
"She was discharged from [facility]- on 05/26/23- with a closed displaced trimalleolar fracture of right ankle - She is needing a CM - Requesting to go to a rehab facility. The CM she saw was Sapna Velasquez RN-send a message per CM - phone - [phone]- Explained will call CM, and send a secure message, encouraged to call Orthopedic.   Reason for Disposition   General information question, no triage required and triager able to answer question    Additional Information   Negative: [1] Caller is not with the adult (patient) AND [2] reporting urgent symptoms   Negative: Lab result questions   Negative: Medication questions   Negative: Caller can't be reached by phone   Negative: Caller has already spoken to PCP or another triager   Negative: RN needs further essential information from caller in order to complete triage   Negative: Requesting regular office appointment   Negative: [1] Caller requesting NON-URGENT health information AND [2] PCP's office is the best resource   Negative: Health Information question, no triage required and triager able to answer question    Answer Assessment - Initial Assessment Questions  1. REASON FOR CALL or QUESTION: \"What is your reason for calling today?\" or \"How can I best help you?\" or \"What question do you have that I can help answer?\"      See note    Protocols used: Information Only Call-ADULT-    "

## 2023-06-14 ENCOUNTER — READMISSION MANAGEMENT (OUTPATIENT)
Dept: CALL CENTER | Facility: HOSPITAL | Age: 63
End: 2023-06-14
Payer: COMMERCIAL

## 2023-06-14 NOTE — OUTREACH NOTE
General Surgery Week 3 Survey      Flowsheet Row Responses   [facility] patient discharged from? [place]   Does the patient have one of the following disease processes/diagnoses(primary or secondary)? General Surgery   Week 3 attempt successful? No   Unsuccessful attempts Attempt 1   Discharge diagnosis s/pANKLE OPEN REDUCTION INTERNAL FIXATION            ABELINO TRUJILLO - Registered Nurse

## 2023-06-19 ENCOUNTER — READMISSION MANAGEMENT (OUTPATIENT)
Dept: CALL CENTER | Facility: HOSPITAL | Age: 63
End: 2023-06-19
Payer: COMMERCIAL

## 2023-06-19 NOTE — OUTREACH NOTE
General Surgery Week 3 Survey      Flowsheet Row Responses   [facility] [place] patient discharged from? [place]   Does the patient have one of the following disease processes/diagnoses(primary or secondary)? General Surgery   Week 3 attempt successful? No   Unsuccessful attempts Attempt 2   Discharge diagnosis s/pANKLE OPEN REDUCTION INTERNAL FIXATION            ABELINO TRUJILLO - Registered Nurse

## 2023-09-12 ENCOUNTER — OFFICE VISIT (OUTPATIENT)
Dept: FAMILY MEDICINE CLINIC | Facility: CLINIC | Age: 63
End: 2023-09-12
Payer: COMMERCIAL

## 2023-09-12 ENCOUNTER — LAB (OUTPATIENT)
Dept: LAB | Facility: HOSPITAL | Age: 63
End: 2023-09-12
Payer: COMMERCIAL

## 2023-09-12 VITALS
DIASTOLIC BLOOD PRESSURE: 90 MMHG | SYSTOLIC BLOOD PRESSURE: 130 MMHG | OXYGEN SATURATION: 98 % | HEIGHT: 64 IN | WEIGHT: 165 LBS | BODY MASS INDEX: 28.17 KG/M2 | HEART RATE: 110 BPM

## 2023-09-12 DIAGNOSIS — F17.200 ENCOUNTER FOR SCREENING FOR MALIGNANT NEOPLASM OF LUNG IN CURRENT SMOKER WITH 30 PACK YEAR HISTORY OR GREATER: ICD-10-CM

## 2023-09-12 DIAGNOSIS — K70.30 ALCOHOLIC CIRRHOSIS OF LIVER WITHOUT ASCITES: ICD-10-CM

## 2023-09-12 DIAGNOSIS — R79.89 ABNORMAL TSH: ICD-10-CM

## 2023-09-12 DIAGNOSIS — E66.3 OVERWEIGHT WITH BODY MASS INDEX (BMI) OF 28 TO 28.9 IN ADULT: ICD-10-CM

## 2023-09-12 DIAGNOSIS — Z12.4 SCREENING FOR MALIGNANT NEOPLASM OF CERVIX: ICD-10-CM

## 2023-09-12 DIAGNOSIS — F41.1 GENERALIZED ANXIETY DISORDER: ICD-10-CM

## 2023-09-12 DIAGNOSIS — F17.211 CIGARETTE NICOTINE DEPENDENCE IN REMISSION: ICD-10-CM

## 2023-09-12 DIAGNOSIS — Z12.2 ENCOUNTER FOR SCREENING FOR MALIGNANT NEOPLASM OF LUNG IN CURRENT SMOKER WITH 30 PACK YEAR HISTORY OR GREATER: ICD-10-CM

## 2023-09-12 DIAGNOSIS — G47.9 SLEEP DISTURBANCE: ICD-10-CM

## 2023-09-12 DIAGNOSIS — Z00.00 ENCOUNTER FOR MEDICAL EXAMINATION TO ESTABLISH CARE: ICD-10-CM

## 2023-09-12 DIAGNOSIS — Z00.00 ANNUAL PHYSICAL EXAM: ICD-10-CM

## 2023-09-12 DIAGNOSIS — G60.9 IDIOPATHIC PERIPHERAL NEUROPATHY: ICD-10-CM

## 2023-09-12 DIAGNOSIS — Z13.220 SCREENING FOR LIPID DISORDERS: ICD-10-CM

## 2023-09-12 DIAGNOSIS — Z00.00 ANNUAL PHYSICAL EXAM: Primary | ICD-10-CM

## 2023-09-12 DIAGNOSIS — Z78.0 POST-MENOPAUSAL: ICD-10-CM

## 2023-09-12 DIAGNOSIS — Z12.11 SCREEN FOR COLON CANCER: ICD-10-CM

## 2023-09-12 DIAGNOSIS — F10.90 ALCOHOL USE DISORDER: ICD-10-CM

## 2023-09-12 DIAGNOSIS — R03.0 ELEVATED BLOOD PRESSURE READING: ICD-10-CM

## 2023-09-12 PROBLEM — S82.62XK: Status: RESOLVED | Noted: 2017-03-24 | Resolved: 2023-09-12

## 2023-09-12 PROBLEM — R63.4 WEIGHT LOSS: Status: RESOLVED | Noted: 2022-11-28 | Resolved: 2023-09-12

## 2023-09-12 PROBLEM — R53.1 GENERALIZED WEAKNESS: Status: RESOLVED | Noted: 2022-11-28 | Resolved: 2023-09-12

## 2023-09-12 PROBLEM — M67.472: Status: RESOLVED | Noted: 2017-03-24 | Resolved: 2023-09-12

## 2023-09-12 PROBLEM — IMO0001 ALCOHOLISM /ALCOHOL ABUSE: Status: RESOLVED | Noted: 2019-05-10 | Resolved: 2023-09-12

## 2023-09-12 PROBLEM — S82.851A CLOSED TRIMALLEOLAR FRACTURE OF RIGHT ANKLE: Status: RESOLVED | Noted: 2023-05-24 | Resolved: 2023-09-12

## 2023-09-12 PROBLEM — S82.851A CLOSED DISPLACED TRIMALLEOLAR FRACTURE OF RIGHT ANKLE, INITIAL ENCOUNTER: Status: RESOLVED | Noted: 2023-05-24 | Resolved: 2023-09-12

## 2023-09-12 PROBLEM — K92.0 HEMATEMESIS: Status: RESOLVED | Noted: 2019-05-10 | Resolved: 2023-09-12

## 2023-09-12 PROBLEM — F10.939 ALCOHOL WITHDRAWAL: Status: RESOLVED | Noted: 2019-05-10 | Resolved: 2023-09-12

## 2023-09-12 PROBLEM — E87.6 HYPOKALEMIA: Status: RESOLVED | Noted: 2023-05-24 | Resolved: 2023-09-12

## 2023-09-12 PROBLEM — F41.0 SEVERE ANXIETY WITH PANIC: Status: ACTIVE | Noted: 2022-12-28

## 2023-09-12 LAB
ALBUMIN SERPL-MCNC: 5.3 G/DL (ref 3.5–5.2)
ALBUMIN/GLOB SERPL: 1.2 G/DL
ALP SERPL-CCNC: 167 U/L (ref 39–117)
ALT SERPL W P-5'-P-CCNC: 41 U/L (ref 1–33)
ANION GAP SERPL CALCULATED.3IONS-SCNC: 21.2 MMOL/L (ref 5–15)
AST SERPL-CCNC: 72 U/L (ref 1–32)
BILIRUB SERPL-MCNC: 2.3 MG/DL (ref 0–1.2)
BUN SERPL-MCNC: 19 MG/DL (ref 8–23)
BUN/CREAT SERPL: 13.6 (ref 7–25)
CALCIUM SPEC-SCNC: 10.8 MG/DL (ref 8.6–10.5)
CHLORIDE SERPL-SCNC: 89 MMOL/L (ref 98–107)
CHOLEST SERPL-MCNC: 430 MG/DL (ref 0–200)
CO2 SERPL-SCNC: 26.8 MMOL/L (ref 22–29)
CREAT SERPL-MCNC: 1.4 MG/DL (ref 0.57–1)
DEPRECATED RDW RBC AUTO: 42 FL (ref 37–54)
EGFRCR SERPLBLD CKD-EPI 2021: 42.9 ML/MIN/1.73
ERYTHROCYTE [DISTWIDTH] IN BLOOD BY AUTOMATED COUNT: 11.7 % (ref 12.3–15.4)
GLOBULIN UR ELPH-MCNC: 4.4 GM/DL
GLUCOSE SERPL-MCNC: 75 MG/DL (ref 65–99)
HBA1C MFR BLD: 5.6 % (ref 4.8–5.6)
HCT VFR BLD AUTO: 44.7 % (ref 34–46.6)
HDLC SERPL-MCNC: 159 MG/DL (ref 40–60)
HGB BLD-MCNC: 15.8 G/DL (ref 12–15.9)
LDLC SERPL CALC-MCNC: 243 MG/DL (ref 0–100)
LDLC/HDLC SERPL: 1.5 {RATIO}
MCH RBC QN AUTO: 34.5 PG (ref 26.6–33)
MCHC RBC AUTO-ENTMCNC: 35.3 G/DL (ref 31.5–35.7)
MCV RBC AUTO: 97.6 FL (ref 79–97)
PLATELET # BLD AUTO: 209 10*3/MM3 (ref 140–450)
PMV BLD AUTO: 9.5 FL (ref 6–12)
POTASSIUM SERPL-SCNC: 3.6 MMOL/L (ref 3.5–5.2)
PROT SERPL-MCNC: 9.7 G/DL (ref 6–8.5)
RBC # BLD AUTO: 4.58 10*6/MM3 (ref 3.77–5.28)
SODIUM SERPL-SCNC: 137 MMOL/L (ref 136–145)
TRIGL SERPL-MCNC: 159 MG/DL (ref 0–150)
TSH SERPL DL<=0.05 MIU/L-ACNC: 6.34 UIU/ML (ref 0.27–4.2)
VLDLC SERPL-MCNC: 28 MG/DL (ref 5–40)
WBC NRBC COR # BLD: 8.07 10*3/MM3 (ref 3.4–10.8)

## 2023-09-12 PROCEDURE — 80050 GENERAL HEALTH PANEL: CPT

## 2023-09-12 PROCEDURE — 83036 HEMOGLOBIN GLYCOSYLATED A1C: CPT

## 2023-09-12 PROCEDURE — 80061 LIPID PANEL: CPT

## 2023-09-12 RX ORDER — PSYLLIUM HUSK 0.4 G
CAPSULE ORAL
COMMUNITY
Start: 2023-06-05

## 2023-09-12 RX ORDER — CLONAZEPAM 0.12 MG/1
TABLET, ORALLY DISINTEGRATING ORAL
COMMUNITY
Start: 2023-05-16

## 2023-09-12 RX ORDER — AMITRIPTYLINE HYDROCHLORIDE 10 MG/1
10 TABLET, FILM COATED ORAL
COMMUNITY
Start: 2023-08-09 | End: 2023-09-12 | Stop reason: SDUPTHER

## 2023-09-12 RX ORDER — RAMELTEON 8 MG/1
8 TABLET ORAL NIGHTLY
Status: CANCELLED | OUTPATIENT
Start: 2023-09-12

## 2023-09-12 RX ORDER — RAMELTEON 8 MG/1
8 TABLET ORAL
COMMUNITY
Start: 2023-07-18 | End: 2023-09-12

## 2023-09-12 RX ORDER — CLONIDINE HYDROCHLORIDE 0.1 MG/1
0.1 TABLET ORAL
COMMUNITY
Start: 2023-05-22 | End: 2023-09-18 | Stop reason: SDUPTHER

## 2023-09-12 RX ORDER — DESVENLAFAXINE SUCCINATE 50 MG/1
50 TABLET, EXTENDED RELEASE ORAL DAILY
COMMUNITY
Start: 2023-08-07

## 2023-09-12 RX ORDER — AMITRIPTYLINE HYDROCHLORIDE 25 MG/1
25 TABLET, FILM COATED ORAL NIGHTLY
Qty: 30 TABLET | Refills: 5 | Status: SHIPPED | OUTPATIENT
Start: 2023-09-12

## 2023-09-12 NOTE — ASSESSMENT & PLAN NOTE
Mammogram: DUE 3/2024  DEXA: Ordered 9/2023  Pap: 9/12/2023  CT Lung Cancer: DUE 12/2023  BMI: 28.32  Tdap: 6/28/2016  Shingrix: 5/2023, booster DUE  Pneumonia: PCV 20 9/2023  Flu: Due at follow-up  COVID: Due at follow-up

## 2023-09-12 NOTE — ASSESSMENT & PLAN NOTE
Patient reports she does not desire to stop drinking at this time  Reviewed health risk associated with ongoing alcohol use, especially in the setting of cirrhosis  Continue to address at follow-up

## 2023-09-12 NOTE — ASSESSMENT & PLAN NOTE
Previously following with [facility], recently discharged  New behavioral health referral placed  Recommend wean and ultimate DC of benzodiazepine therapy given ongoing alcohol use

## 2023-09-12 NOTE — ASSESSMENT & PLAN NOTE
Compensated, diagnosed 2022  Following with [facility]  Ongoing alcohol use, see counseling as below  EGD done 12/2022  Referral for colonoscopy placed  May need HAV and HBV vaccinations at follow-up if not administered by GI provider

## 2023-09-12 NOTE — PROGRESS NOTES
"    New Patient Office Visit      Date: 2023   Patient Name: Philomena Davis  : 1960   MRN: [id number]     Chief Complaint:    Chief Complaint   Patient presents with    new patient preventative medicine service        History of Present Illness: Philomena Davis is a 63 y.o. female with past medical history of alcoholic cirrhosis, chronic alcohol use disorder, peripheral neuropathy, anxiety and depression who is here today to establish care/annual exam.       Subjective      HPI:  Patient presents today to establish care.  Requesting physical.  Chronic conditions and medical history were reviewed below.  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  Diagnosis was made in .  Etiology suspected to be alcohol.  After diagnosis patient did have a brief period of sobriety but reports that she is still actively drinking now, avg 10 glasses of wine per week.  Last note from  GI is reviewed (3/2023), was started on ursodiol at that time.  She is tolerating medication well.  Had EGD in  which showed esophageal varices.  She is due for colonoscopy.  Has follow-up scheduled with GI on 2023.  She reports that she has not discussed liver transplant with her provider.  She is aware that she is not a candidate as long as she is actively drinking.    She is not interested in stopping drinking at this time.  Alcohol started approximately 40 years ago.  Previously drank much more than her current consumption-drink liquor and wine.  At 1 point was drinking 5 glasses/day on average.  Alcohol consumption increased during the pandemic.  She did have a period of sobriety after her cirrhosis diagnosis was made.  States that she has tried acamprosate in the past.  Recently broke up with her boyfriend and this has caused depressed mood.  Feels like she is coping \"OK\" for the most part.    Peripheral neuropathy-symptom onset a few years ago.  She has already had consultation with neurology.  Most recent note from " 8/17/2023 was reviewed.  When her symptoms first began they progressed to the point of losing the ability to walk.  She ultimately presented to the hospital and was admitted for stroke work-up.  On hospital discharge she was discharged to [facility] for inpatient rehab.  By February 2023 she had regained the ability to walk.  Has been following with outpatient PT OT since that time.  She does have a history of frequent falls which resulted in an ankle fracture in the past.  This was surgically repaired and she is still undergoing rehab for the ankle.  She is currently taking vitamin D supplement at the recommendation of her neurologist.  Vitamin B12 has been low in the past and it appears that she was started on supplementation.  Most recent B12 levels within the last 6 months have been normal.  She does still struggle with numbness and tingling of the hands and feet but states that her weakness has improved.    Pulmonary nodule -request updated chest x-ray today.  States that she was told she had a lung nodule in the past.  She does have a history of smoking.  Quit in 2009, 30-pack-year history.  Had CT chest in December 2022 which showed a 3 mm left lower lobe nodule.  Denies current SOB or chest pain.  Denies any other occupational or chemical exposures.  Mother had lung cancer and passed from this at age 63.    Would like to discuss sleep disturbance.  Has had issues with poor sleep for years.  Denies difficulty falling asleep but has nighttime awakenings.  Will wake up after 3 to 4 hours and generally unable to go back to sleep.  Previously tried: Ambien in the past which helped (CR 12.5 and 10mg), trazodone (side effects).  Currently taking Ramelteon and Amitriptyline 10mg started by her behavioral health provider.  She was referred to sleep medicine at  but is requesting a new referral to [facility] provider.    Recently discharged from her behavioral health provider through .  Medications reported  include Pristiq 50 mg and Klonopin 0.125 mg.  She feels like currently her anxiety and depression symptoms are fairly well controlled.  Does have some situational stressors with her recent break-up.  Would like new referral.       Review of Systems:   Negative/not pertinent unless otherwise noted above in HPI.     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 03/24/2017    Closed trimalleolar fracture of right ankle 05/24/2023    Diabetes mellitus     Digital mucous cyst of toe of left foot 03/24/2017    Ganglion cyst of left foot     Glaucoma     Hypertension     Wears glasses        Past Surgical History:   Past Surgical History:   Procedure Laterality Date    ANKLE OPEN REDUCTION INTERNAL FIXATION Left 3/24/2017    Procedure: LEFT ANKLE OPEN REDUCTION INTERNAL FIXATION WITH ILIAC CREST BONE GRAFT , EXCISION CYST 4TH/5TH INNERSPACE LEFT FOOT;  Surgeon: Frank Larson MD;  Location:  [facility];  Service:     ANKLE OPEN REDUCTION INTERNAL FIXATION Right 5/25/2023    Procedure: ANKLE OPEN REDUCTION INTERNAL FIXATION;  Surgeon: Deandre Reynoso MD;  Location:  [facility];  Service: Orthopedics;  Laterality: Right;    AUGMENTATION MAMMAPLASTY Bilateral 1999    BREAST AUGMENTATION      BREAST EXCISIONAL BIOPSY Right 2014    DILATION AND CURETTAGE, DIAGNOSTIC / THERAPEUTIC      ENDOSCOPY N/A 11/30/2022    Procedure: ESOPHAGOGASTRODUODENOSCOPY;  Surgeon: Arelis Solano MD;  Location:  [facility];  Service: Gastroenterology;  Laterality: N/A;    LA RPR NON/MAL FEMUR DSTL H/N W/ILIAC/AUTOG BONE Left 3/24/2017    Procedure: ILIAC CREST BONE GRAFT;  Surgeon: Frank Larson MD;  Location:  [facility];  Service: Orthopedics    WRIST SURGERY         Family History:   Family History    Problem Relation Age of Onset    Cancer Mother     No Known Problems Father     No Known Problems Sister     No Known Problems Brother     No Known Problems Daughter     No Known Problems Son     No Known Problems Maternal Grandmother     Breast cancer Paternal Grandmother 68        met to brain    No Known Problems Maternal Aunt     No Known Problems Paternal Aunt     Rheum arthritis Other     Heart disease Other     Cancer Other     Stroke Other     Hypertension Other     BRCA 1/2 Neg Hx     Colon cancer Neg Hx     Endometrial cancer Neg Hx     Ovarian cancer Neg Hx        Social History:   Social History     Socioeconomic History    Marital status:    Tobacco Use    Smoking status: Former     Packs/day: 1.50     Years: 20.00     Pack years: 30.00     Types: Cigarettes     Quit date: 3/22/2009     Years since quittin.4    Smokeless tobacco: Never   Vaping Use    Vaping Use: Never used   Substance and Sexual Activity    Alcohol use: Yes     Alcohol/week: 7.0 standard drinks     Types: 7 Glasses of wine per week     Comment:  VODKA EVERY TWO DAYS    Drug use: No    Sexual activity: Defer       Medications:     Current Outpatient Medications:     amitriptyline (ELAVIL) 25 MG tablet, Take 1 tablet by mouth Every Night., Disp: 30 tablet, Rfl: 5    Calcium Carb-Cholecalciferol (Calcium 1000 + D) 1000-20 MG-MCG tablet, , Disp: , Rfl:     cholecalciferol (VITAMIN D3) 25 MCG (1000 UT) tablet, Take 1 tablet by mouth Daily., Disp: 30 tablet, Rfl: 0    clonazePAM (KlonoPIN) 0.125 MG disintegrating tablet, , Disp: , Rfl:     cloNIDine (CATAPRES) 0.1 MG tablet, Take 1 tablet by mouth., Disp: , Rfl:     desvenlafaxine (PRISTIQ) 50 MG 24 hr tablet, Take 1 tablet by mouth Daily., Disp: , Rfl:     Fluticasone-Salmeterol (ADVAIR/WIXELA) 100-50 MCG/ACT DISKUS, Inhale 2 (Two) Times a Day., Disp: , Rfl:     hydroCHLOROthiazide (HYDRODIURIL) 12.5 MG tablet, , Disp: , Rfl:     latanoprost (XALATAN) 0.005 % ophthalmic  solution, Administer 1 drop to both eyes Every Night., Disp: , Rfl:     pantoprazole (PROTONIX) 40 MG EC tablet, Take 1 tablet by mouth Daily., Disp: , Rfl:     ursodiol (ACTIGALL) 300 MG capsule, , Disp: , Rfl:     Allergies:   Allergies   Allergen Reactions    Buspirone Anxiety    Codeine Nausea Only    Mirtazapine Anxiety       Immunizations:  Immunization History   Administered Date(s) Administered    COVID-19 (MODERNA) 1st,2nd,3rd Dose Monovalent 04/05/2021, 05/03/2021    COVID-19 (MODERNA) BIVALENT 12+YRS 03/23/2023    COVID-19 (MODERNA) Monovalent Original Booster 06/25/2022    Flu Vaccine Quad PF >36MO 12/19/2018    Fluzone (or Fluarix & Flulaval for VFC) >6mos 12/19/2018    Influenza Quad Vaccine (Inpatient) 12/29/2017    Influenza TIV (IM) 10/15/2014    Pneumococcal Conjugate 20-Valent (PCV20) 09/12/2023    Shingrix 05/03/2023    Tdap 06/28/2016       Colorectal Screening:   ORDERED, no family history of colon cancer.  Last colonoscopy 5-6 years ago, benign polyps @ [place].    Last Completed Colonoscopy       This patient has no relevant Health Maintenance data.          Pap:  Has been over 5 years. Has had abnormal paps in the past but no subsequent procedures were done per her report.  Last Completed Pap Smear       This patient has no relevant Health Maintenance data.           Mammogram:  Up-To-Date, DUE MARCH 2024  Last Completed Mammogram            MAMMOGRAM (Every 2 Years) Next due on 3/28/2025      03/28/2023  Outside Procedure: PA TOMOSYNTHESIS MAMMOGRAPHY    09/11/2017  Mammo diagnostic digital tomosynthesis right w CAD    08/04/2017  Mammo Screening Digital Tomosynthesis Bilateral With CAD    07/22/2016  Mammo diagnostic digital tomosynthesis bilateral w cad    01/15/2016  Mammo diagnostic bilateral    Only the first 5 history entries have been loaded, but more history exists.                     CT for Smoker (Age 50-80, 20 pk yr):  Quit in 2009, DUE 12/2023  Bone Density/DEXA (Age 65 or  "high risk): DEXA scan ordered   Hep C (Age 18-79 once):  previously negative 2022  HIV (Age 15-65 once): previously negative 2022  A1c: ordered  Lipid panel: ordered    Patient has been erroneously marked as diabetic. Based on the available clinical information, she does not have diabetes and should therefore be excluded from diabetic health maintenance and quality measures for the remainder of the reporting period.        Tobacco Use: Medium Risk    Smoking Tobacco Use: Former    Smokeless Tobacco Use: Never    Passive Exposure: Not on file       Social History     Substance and Sexual Activity   Alcohol Use Yes    Alcohol/week: 7.0 standard drinks    Types: 7 Glasses of wine per week    Comment: 1/5 VODKA EVERY TWO DAYS   10 glasses/week     Social History     Substance and Sexual Activity   Drug Use No     Menopause: post menopausal, 40s?    Osteoporosis: DEXA ordered    Objective     Physical Exam:  Vital Signs:   Vitals:    09/12/23 0748   BP: 130/90   Pulse: 110   SpO2: 98%   Weight: 74.8 kg (165 lb)   Height: 162.6 cm (64\")     Body mass index is 28.32 kg/m².    Physical Exam  Exam conducted with a chaperone present.   Constitutional:       Appearance: She is normal weight. She is not ill-appearing.   HENT:      Head: Normocephalic and atraumatic.      Right Ear: Tympanic membrane normal.      Left Ear: Tympanic membrane normal.      Mouth/Throat:      Mouth: Mucous membranes are moist.      Pharynx: Oropharynx is clear. No oropharyngeal exudate or posterior oropharyngeal erythema.   Eyes:      Extraocular Movements: Extraocular movements intact.      Conjunctiva/sclera: Conjunctivae normal.   Cardiovascular:      Rate and Rhythm: Normal rate and regular rhythm.      Heart sounds: Normal heart sounds.   Pulmonary:      Breath sounds: Normal breath sounds. No wheezing or rhonchi.   Abdominal:      General: Abdomen is flat.      Palpations: Abdomen is soft.      Tenderness: There is no abdominal tenderness. "   Genitourinary:     General: Normal vulva.      Pubic Area: No rash.       Labia:         Right: No lesion.         Left: No lesion.       Comments: Labia minora and majora without lesions or rash  Urethra normal, patent, without lesions or discharge  Introitus without lesions or evidence of trauma  Vagina with scant white mucoid discharge, no lesions  Cervix without visible lesions, min friable with Pap collection  Uterus midline without mass or tenderness  Adnexa without mass or tenderness bilaterally  Perineum intact without lesions  Anus without hemorrhoids or lesions  Musculoskeletal:         General: Normal range of motion.      Cervical back: Normal range of motion and neck supple.   Lymphadenopathy:      Cervical: No cervical adenopathy.   Skin:     Findings: Bruising (Noted to arms and legs) present.   Neurological:      General: No focal deficit present.      Mental Status: She is alert.   Psychiatric:         Mood and Affect: Mood normal.         Thought Content: Thought content normal.       Procedures    Labs:   Hemoglobin A1C   Date Value Ref Range Status   05/24/2023 5.30 4.80 - 5.60 % Final   02/13/2023 4.9 <5.7 % Final     TSH   Date Value Ref Range Status   11/28/2022 5.310 (H) 0.270 - 4.200 uIU/mL Final          Assessment / Plan      Assessment/Plan:   Diagnoses and all orders for this visit:    1. Annual physical exam (Primary)  Assessment & Plan:  Mammogram: DUE 3/2024  DEXA: Ordered 9/2023  Pap: 9/12/2023  CT Lung Cancer: DUE 12/2023  BMI: 28.32  Tdap: 6/28/2016  Shingrix: 5/2023, booster DUE  Pneumonia: PCV 20 9/2023  Flu: Due at follow-up  COVID: Due at follow-up      Orders:  -     CBC (No Diff); Future  -     Lipid Panel; Future  -     Hemoglobin A1c; Future  -     Comprehensive Metabolic Panel; Future  -     TSH; Future    2. Encounter for medical examination to establish care    3. Sleep disturbance  Assessment & Plan:  Uncontrolled  Recommend DC of ramelteon given hepatic  impairment  We will trial increase of amitriptyline from 10 to 25 mg  Counseled on risk of serotonin syndrome with concomitant use of Pristiq.  Reviewed signs and symptoms to watch for.  Discussed healthy sleep hygiene practices  Referral to sleep medicine placed for [facility]    Orders:  -     Ambulatory Referral to Sleep Medicine  -     amitriptyline (ELAVIL) 25 MG tablet; Take 1 tablet by mouth Every Night.  Dispense: 30 tablet; Refill: 5  -     Ambulatory Referral to Psychiatry    4. Alcoholic cirrhosis of liver without ascites  Assessment & Plan:  Compensated, diagnosed 2022  Following with  GI  Ongoing alcohol use, see counseling as below  EGD done 12/2022  Referral for colonoscopy placed  May need HAV and HBV vaccinations at follow-up if not administered by GI provider      Orders:  -     DEXA Bone Density Axial; Future  -     Pneumococcal Conjugate Vaccine 20-Valent (PCV20)  -     Comprehensive Metabolic Panel; Future    5. Alcohol use disorder  Assessment & Plan:  Patient reports she does not desire to stop drinking at this time  Reviewed health risk associated with ongoing alcohol use, especially in the setting of cirrhosis  Continue to address at follow-up    Orders:  -     Ambulatory Referral to Psychiatry    6. Idiopathic peripheral neuropathy  Assessment & Plan:  Possible alcohol induced?  Established with neurology  Fall risk  Encouraged alcohol cessation      7. Abnormal TSH  -     TSH; Future    8. Generalized anxiety disorder  Assessment & Plan:  Previously following with  psychiatry, recently discharged  New behavioral health referral placed  Recommend wean and ultimate DC of benzodiazepine therapy given ongoing alcohol use      Orders:  -     Ambulatory Referral to Psychiatry    9. Overweight with body mass index (BMI) of 28 to 28.9 in adult  -     Lipid Panel; Future  -     Hemoglobin A1c; Future    10. Post-menopausal  -     DEXA Bone Density Axial; Future    11. Screening for  malignant neoplasm of cervix  -     LIQUID-BASED PAP SMEAR WITH HPV GENOTYPING IF ASCUS (EPIFANIO,COR,MAD); Future  -     LIQUID-BASED PAP SMEAR WITH HPV GENOTYPING IF ASCUS (EPIFANIO,COR,MAD)    12. Screen for colon cancer  -     Ambulatory Referral For Screening Colonoscopy    13. Screening for lipid disorders  -     Lipid Panel; Future    14. Cigarette nicotine dependence in remission  -     Cancel:  CT Chest Low Dose Cancer Screening WO; Future  -      CT Chest Low Dose Cancer Screening WO; Future    15. Encounter for screening for malignant neoplasm of lung in current smoker with 30 pack year history or greater  -     Cancel:  CT Chest Low Dose Cancer Screening WO; Future  -      CT Chest Low Dose Cancer Screening WO; Future    16. Elevated blood pressure reading  Comments:  Attention at follow-up        Healthcare Maintenance:  Counseling provided based on age appropriate USPSTF guidelines.  BMI is >= 25 and <30. (Overweight) The following options were offered after discussion;: weight loss educational material (shared in after visit summary)    Philomena Davis voices understanding and acceptance of this advice and will call back with any further questions or concerns. AVS with preventive healthcare tips printed for patient.     “Discussed risks/benefits to vaccination, reviewed components of the vaccine, discussed VIS, discussed informed consent, informed consent obtained. Patient/Parent was allowed to accept or refuse vaccine. Questions answered to satisfactory state of patient/Parent. We reviewed typical age appropriate and seasonally appropriate vaccinations. Reviewed immunization history and updated state vaccination form as needed. Patient was counseled on Prevnar 20    Follow Up:   Return in about 6 weeks (around 10/24/2023) for Recheck insomnia.        Unique Brandt DO  [facility] [address]

## 2023-09-12 NOTE — ASSESSMENT & PLAN NOTE
Uncontrolled  Recommend DC of ramelteon given hepatic impairment  We will trial increase of amitriptyline from 10 to 25 mg  Counseled on risk of serotonin syndrome with concomitant use of Pristiq.  Reviewed signs and symptoms to watch for.  Discussed healthy sleep hygiene practices  Referral to sleep medicine placed for [facility]

## 2023-09-12 NOTE — LETTER
[facility]  Vaccine Consent Form    Patient Name:  Philomena Davis  Patient :  1960     Vaccine(s) Ordered    Pneumococcal Conjugate Vaccine 20-Valent (PCV20)        Screening Checklist  The following questions should be completed prior to vaccination. If you answer “yes” to any question, it does not necessarily mean you should not be vaccinated. It just means we may need to clarify or ask more questions. If a question is unclear, please ask your healthcare provider to explain it.    Yes No   Any fever or moderate to severe illness today (mild illness and/or antibiotic treatment are not contraindications)?     Do you have a history of a serious reaction to any previous vaccinations, such as anaphylaxis, encephalopathy within 7 days, Guillain-Norwich syndrome within 6 weeks, seizure?     Have you received any live vaccine(s) in the past month (MMR, NICOLE)?     Do you have an anaphylactic allergy to latex (DTaP, DTaP-IPV, Hep A, Hep B, MenB, RV, Td, Tdap), baker’s yeast (Hep B, HPV), or gelatin (NICOLE, MMR)?     Do you have an anaphylactic allergy to neomycin (Rabies, NICOLE, MMR, IPV, Hep A), polymyxin B (IPV), or streptomycin (IPV)?      Any cancer, leukemia, AIDS, or other immune system disorder? (NICOLE, MMR, RV)     Do you have a parent, brother, or sister with an immune system problem (if immune competence of vaccine recipient clinically verified, can proceed)? (MMR, NICOLE)     Any recent steroid treatments for >2 weeks, chemotherapy, or radiation treatment? (NICOLE, MMR)     Have you received antibody-containing blood transfusions or IVIG in the past 11 months (recommended interval is dependent on product)? (MMR, NICOLE)     Have you taken antiviral drugs (acyclovir, famciclovir, valacyclovir) in the last 24 or 48 hours, respectively (NICOEL)?      Are you pregnant or planning to become pregnant within 1 month? (NICOLE, MMR, HPV, IPV, MenB; For hep B- refer to Engerix-B)     For infants, have you ever been told your child  has had intussusception or a medical emergency involving obstruction of the intestine (RV)? If not for an infant, can skip this question.         *Ordering Physician/APC should be consulted if “yes” is checked by the patient or guardian above.      I have received, read, and understand the Vaccine Information Statement (VIS) for each vaccine ordered above.  I have considered my health status as well as the health status of my close contacts.  I have taken the opportunity to discuss my vaccine questions with my health care provider.   I have requested that the ordered vaccine(s) be given to me.  I understand the benefits and risks of the vaccines.  I understand that I should remain in the clinic for 15 minutes after receiving the vaccine(s).  _________________________________________________________  Signature of Patient or Parent/Legal Guardian ____________________  Date

## 2023-09-13 ENCOUNTER — PATIENT MESSAGE (OUTPATIENT)
Dept: FAMILY MEDICINE CLINIC | Facility: CLINIC | Age: 63
End: 2023-09-13
Payer: COMMERCIAL

## 2023-09-13 DIAGNOSIS — G47.9 SLEEP DISTURBANCE: Primary | ICD-10-CM

## 2023-09-14 ENCOUNTER — PATIENT ROUNDING (BHMG ONLY) (OUTPATIENT)
Dept: FAMILY MEDICINE CLINIC | Facility: CLINIC | Age: 63
End: 2023-09-14
Payer: COMMERCIAL

## 2023-09-14 LAB — REF LAB TEST METHOD: NORMAL

## 2023-09-18 RX ORDER — CLONIDINE HYDROCHLORIDE 0.1 MG/1
0.2 TABLET ORAL NIGHTLY
Qty: 60 TABLET | Refills: 3 | Status: SHIPPED | OUTPATIENT
Start: 2023-09-18

## 2023-09-18 NOTE — TELEPHONE ENCOUNTER
From: Philomena Davis  To: Unique Brandt  Sent: 9/13/2023 7:27 AM EDT  Subject: Test results    I have some concerns and questions about my lab results particularly the ones that flagged high. Thank you?

## 2023-10-25 ENCOUNTER — OFFICE VISIT (OUTPATIENT)
Dept: FAMILY MEDICINE CLINIC | Facility: CLINIC | Age: 63
End: 2023-10-25
Payer: COMMERCIAL

## 2023-10-25 ENCOUNTER — LAB (OUTPATIENT)
Dept: LAB | Facility: HOSPITAL | Age: 63
End: 2023-10-25
Payer: COMMERCIAL

## 2023-10-25 VITALS
WEIGHT: 176.2 LBS | HEIGHT: 64 IN | OXYGEN SATURATION: 92 % | SYSTOLIC BLOOD PRESSURE: 110 MMHG | BODY MASS INDEX: 30.08 KG/M2 | HEART RATE: 100 BPM | DIASTOLIC BLOOD PRESSURE: 80 MMHG

## 2023-10-25 DIAGNOSIS — R79.89 ELEVATED TSH: ICD-10-CM

## 2023-10-25 DIAGNOSIS — L57.0 ACTINIC KERATOSES: ICD-10-CM

## 2023-10-25 DIAGNOSIS — G62.9 NEUROPATHY: ICD-10-CM

## 2023-10-25 DIAGNOSIS — J44.9 CHRONIC OBSTRUCTIVE PULMONARY DISEASE, UNSPECIFIED COPD TYPE: Primary | ICD-10-CM

## 2023-10-25 DIAGNOSIS — E78.5 HYPERLIPIDEMIA, UNSPECIFIED HYPERLIPIDEMIA TYPE: ICD-10-CM

## 2023-10-25 DIAGNOSIS — F10.90 ALCOHOL USE DISORDER: ICD-10-CM

## 2023-10-25 DIAGNOSIS — I10 ESSENTIAL HYPERTENSION: ICD-10-CM

## 2023-10-25 LAB
ALBUMIN SERPL-MCNC: 4.9 G/DL (ref 3.5–5.2)
ALBUMIN/GLOB SERPL: 1.1 G/DL
ALP SERPL-CCNC: 175 U/L (ref 39–117)
ALT SERPL W P-5'-P-CCNC: 28 U/L (ref 1–33)
ANION GAP SERPL CALCULATED.3IONS-SCNC: 14 MMOL/L (ref 5–15)
AST SERPL-CCNC: 70 U/L (ref 1–32)
BILIRUB SERPL-MCNC: 1 MG/DL (ref 0–1.2)
BUN SERPL-MCNC: 19 MG/DL (ref 8–23)
BUN/CREAT SERPL: 14.7 (ref 7–25)
CALCIUM SPEC-SCNC: 11.3 MG/DL (ref 8.6–10.5)
CHLORIDE SERPL-SCNC: 93 MMOL/L (ref 98–107)
CHOLEST SERPL-MCNC: 376 MG/DL (ref 0–200)
CO2 SERPL-SCNC: 31 MMOL/L (ref 22–29)
CREAT SERPL-MCNC: 1.29 MG/DL (ref 0.57–1)
EGFRCR SERPLBLD CKD-EPI 2021: 47.3 ML/MIN/1.73
FOLATE SERPL-MCNC: 9.13 NG/ML (ref 4.78–24.2)
GLOBULIN UR ELPH-MCNC: 4.3 GM/DL
GLUCOSE SERPL-MCNC: 90 MG/DL (ref 65–99)
HDLC SERPL-MCNC: 128 MG/DL (ref 40–60)
LDLC SERPL CALC-MCNC: 227 MG/DL (ref 0–100)
LDLC/HDLC SERPL: 1.74 {RATIO}
POTASSIUM SERPL-SCNC: 4.5 MMOL/L (ref 3.5–5.2)
PROT SERPL-MCNC: 9.2 G/DL (ref 6–8.5)
SODIUM SERPL-SCNC: 138 MMOL/L (ref 136–145)
T4 FREE SERPL-MCNC: 1.05 NG/DL (ref 0.93–1.7)
TRIGL SERPL-MCNC: 128 MG/DL (ref 0–150)
TSH SERPL DL<=0.05 MIU/L-ACNC: 5.57 UIU/ML (ref 0.27–4.2)
VIT B12 BLD-MCNC: 982 PG/ML (ref 211–946)
VLDLC SERPL-MCNC: 21 MG/DL (ref 5–40)

## 2023-10-25 PROCEDURE — 84443 ASSAY THYROID STIM HORMONE: CPT

## 2023-10-25 PROCEDURE — 80061 LIPID PANEL: CPT

## 2023-10-25 PROCEDURE — 84439 ASSAY OF FREE THYROXINE: CPT

## 2023-10-25 PROCEDURE — 80053 COMPREHEN METABOLIC PANEL: CPT

## 2023-10-25 PROCEDURE — 82746 ASSAY OF FOLIC ACID SERUM: CPT

## 2023-10-25 PROCEDURE — 82607 VITAMIN B-12: CPT

## 2023-10-25 RX ORDER — ALBUTEROL SULFATE 90 UG/1
2 AEROSOL, METERED RESPIRATORY (INHALATION) EVERY 4 HOURS PRN
Qty: 6.7 G | Refills: 2 | Status: SHIPPED | OUTPATIENT
Start: 2023-10-25

## 2023-10-25 RX ORDER — PREDNISONE 20 MG/1
40 TABLET ORAL DAILY
Qty: 10 TABLET | Refills: 0 | Status: SHIPPED | OUTPATIENT
Start: 2023-10-25 | End: 2023-10-30

## 2023-10-25 RX ORDER — AZITHROMYCIN 250 MG/1
TABLET, FILM COATED ORAL
Qty: 6 TABLET | Refills: 0 | Status: SHIPPED | OUTPATIENT
Start: 2023-10-25

## 2023-10-25 NOTE — ASSESSMENT & PLAN NOTE
3 week h/o cough w wheezing, symptoms c/w COPD exacerbation  Treat w Prednisone and Azithromycin  On Advair for maintenance, albuterol rescue sent to pharmacy  No longer smoking

## 2023-10-25 NOTE — ASSESSMENT & PLAN NOTE
Referral to Case Management to provide resources for detox facilities  Desires to quit in near future, declines pharmacotherapy or referral for admission today

## 2023-10-25 NOTE — ASSESSMENT & PLAN NOTE
Lab Results   Component Value Date    CHOL 430 (H) 09/12/2023    CHLPL 245 (H) 02/19/2014    TRIG 159 (H) 09/12/2023     (H) 09/12/2023     (H) 09/12/2023     Uncontrolled at last visit  Recheck FLP today  If persistently elevated will plan to start statin therapy as long as liver enzymes are stable

## 2023-10-25 NOTE — PROGRESS NOTES
Chief Complaint   Patient presents with    Follow-up     6 month follow up depression       HPI:  Philomena Davis is a 63 y.o. female with PMHx alcoholic cirrhosis, chronic alcohol use disorder, peripheral neuropathy, anxiety and depression who presents today for FU on concerns below.    Seen last month to [facility]. Addressed several issues at that visit, was asked to come back for close follow up.  Had labs drawn after initial appt which showed liver function test elevated (AST, ALT, bilirubin). She is already est w GI. Still actively drinking ~ 6 shots/day. No h/o withdrawal seizures or Dt's but afraid to stop on her own. Does not want to go to ER for admission because they have discharged her in the past. She would like additional information on inpatient detox programs in the area.   Follows w [facility] at . Has appt next month.    Her boyfriend broke up with her recently and this has been hard on her. Feels like she's coping OK. Sleep is not great but this is a chronic issue for her. She is taking her Pristiq. Not taking clonidine or amitriptyline any longer. Has already been referral to Behavioral Health.     Peripheral neuropathy symptoms are still bothersome to her. Has tingling in hands/feet.     Has cough, wheezing for the last 3 weeks. H/o tobacco use but quit ~20 years ago. She has been diagnosed w COPD and takes Advair for this. No rescue inhaler. Cough is productive w white/yellow sputum.    PE:  Vitals:    10/25/23 0819   BP: 110/80   Pulse: 100   SpO2: 92%      Body mass index is 30.24 kg/m².    Gen Appearance: NAD  HEENT: Normocephalic, PERRL, no thyromegaly, trachea midline  Heart: RRR, normal S1 and S2, no murmur  Lungs: +expiratory wheezing throughout bilateral lower lobes  MSK: Moves all extremities well, normal gait, no peripheral edema  Skin: Multiple AK's noted to bilateral arms  Pulses: Palpable and equal b/l  Neuro: No focal deficits    Current Outpatient Medications   Medication Sig Dispense  Refill    amitriptyline (ELAVIL) 25 MG tablet Take 1 tablet by mouth Every Night. 30 tablet 5    Calcium Carb-Cholecalciferol (Calcium 1000 + D) 1000-20 MG-MCG tablet       cholecalciferol (VITAMIN D3) 25 MCG (1000 UT) tablet Take 1 tablet by mouth Daily. 30 tablet 0    desvenlafaxine (PRISTIQ) 50 MG 24 hr tablet Take 1 tablet by mouth Daily.      Fluticasone-Salmeterol (ADVAIR/WIXELA) 100-50 MCG/ACT DISKUS Inhale 2 (Two) Times a Day.      hydroCHLOROthiazide (HYDRODIURIL) 12.5 MG tablet       latanoprost (XALATAN) 0.005 % ophthalmic solution Administer 1 drop to both eyes Every Night.      pantoprazole (PROTONIX) 40 MG EC tablet Take 1 tablet by mouth Daily.      ursodiol (ACTIGALL) 300 MG capsule       albuterol sulfate  (90 Base) MCG/ACT inhaler Inhale 2 puffs Every 4 (Four) Hours As Needed for Wheezing. 6.7 g 2    azithromycin (ZITHROMAX) 250 MG tablet Take 2 tablets the first day, then 1 tablet daily for 4 days. 6 tablet 0    cloNIDine (CATAPRES) 0.1 MG tablet Take 2 tablets by mouth Every Night. (Patient not taking: Reported on 10/25/2023) 60 tablet 3    predniSONE (DELTASONE) 20 MG tablet Take 2 tablets by mouth Daily for 5 days. 10 tablet 0     No current facility-administered medications for this visit.        A/P:  Diagnoses and all orders for this visit:    1. Chronic obstructive pulmonary disease, unspecified COPD type (Primary)  Assessment & Plan:  3 week h/o cough w wheezing, symptoms c/w COPD exacerbation  Treat w Prednisone and Azithromycin  On Advair for maintenance, albuterol rescue sent to pharmacy  No longer smoking        Orders:  -     albuterol sulfate  (90 Base) MCG/ACT inhaler; Inhale 2 puffs Every 4 (Four) Hours As Needed for Wheezing.  Dispense: 6.7 g; Refill: 2  -     azithromycin (ZITHROMAX) 250 MG tablet; Take 2 tablets the first day, then 1 tablet daily for 4 days.  Dispense: 6 tablet; Refill: 0  -     predniSONE (DELTASONE) 20 MG tablet; Take 2 tablets by mouth Daily for  5 days.  Dispense: 10 tablet; Refill: 0    2. Hyperlipidemia, unspecified hyperlipidemia type  Assessment & Plan:  Lab Results   Component Value Date    CHOL 430 (H) 09/12/2023    CHLPL 245 (H) 02/19/2014    TRIG 159 (H) 09/12/2023     (H) 09/12/2023     (H) 09/12/2023     Uncontrolled at last visit  Recheck FLP today  If persistently elevated will plan to start statin therapy as long as liver enzymes are stable    Orders:  -     Lipid Panel; Future    3. Essential hypertension  Assessment & Plan:  Improved, now BP at goal      4. Alcohol use disorder  Assessment & Plan:  Referral to Case Management to provide resources for detox facilities  Desires to quit in near future, declines pharmacotherapy or referral for admission today    Orders:  -     Comprehensive Metabolic Panel; Future  -     Ambulatory Referral to Case Management Caregiving/Support    5. Elevated TSH  Comments:  Recheck today  Orders:  -     TSH Rfx On Abnormal To Free T4; Future    6. Neuropathy  -     Vitamin B12; Future  -     Folate; Future    7. Actinic keratoses  -     Ambulatory Referral to Dermatology    Other orders  -     Fluzone >6 Months ([id number])         Return in about 4 weeks (around 11/22/2023) for Recheck.   -FU alcohol use, depression, neuropathy sx's    Dictated Utilizing Dragon Dictation    Please note that portions of this note were completed with a voice recognition program.    Part of this note may be an electronic transcription/translation of spoken language to printed text using the Dragon Dictation System.

## 2023-10-26 ENCOUNTER — REFERRAL TRIAGE (OUTPATIENT)
Dept: CASE MANAGEMENT | Facility: OTHER | Age: 63
End: 2023-10-26
Payer: COMMERCIAL

## 2023-10-26 ENCOUNTER — TELEPHONE (OUTPATIENT)
Dept: FAMILY MEDICINE CLINIC | Facility: CLINIC | Age: 63
End: 2023-10-26
Payer: COMMERCIAL

## 2023-10-26 RX ORDER — ROSUVASTATIN CALCIUM 5 MG/1
5 TABLET, COATED ORAL DAILY
Qty: 90 TABLET | Refills: 0 | Status: SHIPPED | OUTPATIENT
Start: 2023-10-26

## 2023-10-26 NOTE — TELEPHONE ENCOUNTER
Called pt and left v/m letting her know she has been scheduled with [facility] on 12/7/23 at 10am. She can call them at [phone] to reschedule. RELAY.

## 2023-10-27 ENCOUNTER — PATIENT OUTREACH (OUTPATIENT)
Dept: CASE MANAGEMENT | Facility: OTHER | Age: 63
End: 2023-10-27
Payer: COMMERCIAL

## 2023-10-27 NOTE — OUTREACH NOTE
Social Work Assessment  Questions/Answers      Flowsheet Row Most Recent Value   Referral Source physician   Reason for Consult community resources   Preferred Language English   Advance Care Planning Reviewed no concerns identified   People in Home alone   Current Living Arrangements home   Potentially Unsafe Housing Conditions none   Primary Care Provided by self   Provides Primary Care For no one   Family Caregiver if Needed none   Quality of Family Relationships supportive   Employment Status disabled   Current or Previous Occupation not applicable   Source of Income none   Application for Public Assistance applied   Medications independent   Meal Preparation independent   Housekeeping independent   Laundry independent   Shopping independent        SDOH updated and reviewed with the patient during this program:  Financial Resource Strain: Not on file      Food Insecurity: No Food Insecurity (5/24/2023)    Hunger Vital Sign     Worried About Running Out of Food in the Last Year: Never true     Ran Out of Food in the Last Year: Never true      Transportation Needs: No Transportation Needs (10/27/2023)    PRAPARE - Transportation     Lack of Transportation (Medical): No     Lack of Transportation (Non-Medical): No      Housing Stability: Not At Risk (10/27/2023)    Housing Stability     Current Living Arrangements: home     Potentially Unsafe Housing Conditions: none      Continuing Care   Community [facility] [place] [address], [facility] [address], [place] [address]    Phone: [phone]    OF THE [place] [address] [facility] [address], [place] [address]    Phone: [phone]   [facility]    [address], [place] [address]    Phone: [phone]    Resource for: Alcohol Use, Depression, Employment Status, Safety and Environment, Stress, Tobacco Use   Patient Outreach    SW contacted pt to discuss resources. Pt is interested in a short stay inpatient detox followed by outpatient  treatment. SW provided options for outpatient and inpatient treatment. Pt states she would rather go to the ER for detox. SW advised that it would be up to attending as to whether or not she was admitted. Pt also shared that she has no income currently. She was just denied SSI. SW provided options for help with her SSI application through her insurance as well as contact for . Pt does own her home and has SNAP benefits and denies any issues with transportation. SW encouraged her to call with questions.    Luciana CAT -   Ambulatory Case Management    10/27/2023, 15:49 EDT

## 2023-11-15 ENCOUNTER — TELEPHONE (OUTPATIENT)
Dept: FAMILY MEDICINE CLINIC | Facility: CLINIC | Age: 63
End: 2023-11-15
Payer: COMMERCIAL

## 2023-11-15 NOTE — TELEPHONE ENCOUNTER
Unique Brandt, DO  You2 minutes ago (11:21 AM)       Would recommend that she schedule in person follow up appointment

## 2023-11-15 NOTE — TELEPHONE ENCOUNTER
Caller: Philomena Davis    Relationship: Self    Best call back number: [phone]    What is the best time to reach you: ANYTIME     Who are you requesting to speak with (clinical staff, provider,  specific staff member): CLINCIAL STAFF     What was the call regarding: PATIENT STATES THAT SHE HAS FINISHED THE STEROIDS BUT SHE STILL HAS THE COUGH AND CONGESTION. SHE IS WANTING TO KNOW WHAT WOULD BE HER NEXT STEPS.     Is it okay if the provider responds through MyChart: YES

## 2023-11-17 ENCOUNTER — TELEMEDICINE (OUTPATIENT)
Dept: FAMILY MEDICINE CLINIC | Facility: CLINIC | Age: 63
End: 2023-11-17
Payer: COMMERCIAL

## 2023-11-17 DIAGNOSIS — R03.0 ELEVATED BLOOD PRESSURE READING: ICD-10-CM

## 2023-11-17 DIAGNOSIS — J44.9 CHRONIC OBSTRUCTIVE PULMONARY DISEASE, UNSPECIFIED COPD TYPE: Primary | ICD-10-CM

## 2023-11-17 RX ORDER — AMOXICILLIN AND CLAVULANATE POTASSIUM 875; 125 MG/1; MG/1
1 TABLET, FILM COATED ORAL 2 TIMES DAILY
Qty: 14 TABLET | Refills: 0 | Status: SHIPPED | OUTPATIENT
Start: 2023-11-17 | End: 2023-11-24

## 2023-11-17 RX ORDER — TIOTROPIUM BROMIDE 18 UG/1
1 CAPSULE ORAL; RESPIRATORY (INHALATION)
Qty: 30 CAPSULE | Refills: 2 | Status: SHIPPED | OUTPATIENT
Start: 2023-11-17

## 2023-11-17 NOTE — PROGRESS NOTES
Chief Complaint   Patient presents with    Cough     Persistent >4 weeks     Mode of Visit: Video  Location of patient: Home  Location of Provider: [facility]  Patient has chosen to receive care through a telehealth visit.  Does the patient consent to use a video/audio device for their medical care today: Yes  The visit included audio and video interaction: Yes      HPI:  Philomena Davis is a 63 y.o. female who presents today via  for persistent cough.    Patient presents today to discuss persistent cough symptoms.  At her last office visit in October she was treated for presumed COPD exacerbation after reporting 3-week history of productive cough and was given steroids and azithromycin.  Take these until completion at does report symptomatic improvement after finishing them, however felt like her symptoms never truly went away.  Over the last several days feels like the cough is now becoming more frequent and more productive.  Denies any fevers.  Denies any worsening shortness of breath than usual for her.  She uses Advair inhaler as maintenance therapy albuterol for rescue.  Denies having to use her albuterol inhaler.  She has never been on an antimuscarinic inhaler to her knowledge.    She also notes that when she was at her hematology appointment on 11/6 her blood pressure was very elevated.  Note is reviewed and blood pressure was recorded at 160/101.  She does state that she has been getting occasional headaches but denies any chest pain.  Does not have a cuff at home to monitor her blood pressure.  States that her last drink prior to the appointment was 1 day before.  She is still drinking.  Takes HCTZ 12.5 mg for her blood pressure.    PE:  There were no vitals filed for this visit.   There is no height or weight on file to calculate BMI.    Gen Appearance: NAD  HEENT: Normocephalic, EOMI  Pulm: Normal speech, no apparent increased WOB  Psych: Speech    Current Outpatient Medications   Medication Sig  Dispense Refill    albuterol sulfate  (90 Base) MCG/ACT inhaler Inhale 2 puffs Every 4 (Four) Hours As Needed for Wheezing. 6.7 g 2    amitriptyline (ELAVIL) 25 MG tablet Take 1 tablet by mouth Every Night. 30 tablet 5    amoxicillin-clavulanate (AUGMENTIN) 875-125 MG per tablet Take 1 tablet by mouth 2 (Two) Times a Day for 7 days. 14 tablet 0    cholecalciferol (VITAMIN D3) 25 MCG (1000 UT) tablet Take 1 tablet by mouth Daily. 30 tablet 0    desvenlafaxine (PRISTIQ) 50 MG 24 hr tablet Take 1 tablet by mouth Daily.      Fluticasone-Salmeterol (ADVAIR/WIXELA) 100-50 MCG/ACT DISKUS Inhale 2 (Two) Times a Day.      hydroCHLOROthiazide (HYDRODIURIL) 12.5 MG tablet       latanoprost (XALATAN) 0.005 % ophthalmic solution Administer 1 drop to both eyes Every Night.      pantoprazole (PROTONIX) 40 MG EC tablet Take 1 tablet by mouth Daily.      rosuvastatin (Crestor) 5 MG tablet Take 1 tablet by mouth Daily. 90 tablet 0    tiotropium (Spiriva HandiHaler) 18 MCG per inhalation capsule Place 1 capsule into inhaler and inhale Daily. 30 capsule 2    ursodiol (ACTIGALL) 300 MG capsule        No current facility-administered medications for this visit.        A/P:  Diagnoses and all orders for this visit:    1. Chronic obstructive pulmonary disease, unspecified COPD type (Primary)  -     tiotropium (Spiriva HandiHaler) 18 MCG per inhalation capsule; Place 1 capsule into inhaler and inhale Daily.  Dispense: 30 capsule; Refill: 2  -     amoxicillin-clavulanate (AUGMENTIN) 875-125 MG per tablet; Take 1 tablet by mouth 2 (Two) Times a Day for 7 days.  Dispense: 14 tablet; Refill: 0    2. Elevated blood pressure reading       COPD  -Minimal improvement after prednisone burst and Z-Priyank prescribed in October.  Continues to report productive cough >4 weeks.  Will broaden therapy with Augmentin.  We will also escalate her maintenance regimen and add Spiriva inhaler.  Patient was advised to schedule in person follow-up, has  appointment on 11/27    Elevated BP  -Advised that she needs in person appointment so we can check her blood pressure. Can take 2 tablets of her HCTZ for total of 25mg in meantime, but I strongly advised she avoid alcohol as this is likely playing a role in BP elevation. Discussed salt restriction. FU as scheduled on 11/27.    No follow-ups on file.     Dictated Utilizing Dragon Dictation    Please note that portions of this note were completed with a voice recognition program.    Part of this note may be an electronic transcription/translation of spoken language to printed text using the Dragon Dictation System.

## 2023-11-21 ENCOUNTER — PRIOR AUTHORIZATION (OUTPATIENT)
Dept: FAMILY MEDICINE CLINIC | Facility: CLINIC | Age: 63
End: 2023-11-21
Payer: COMMERCIAL

## 2023-12-06 ENCOUNTER — TELEPHONE (OUTPATIENT)
Dept: PULMONOLOGY | Facility: CLINIC | Age: 63
End: 2023-12-06

## 2023-12-06 NOTE — TELEPHONE ENCOUNTER
Caller: Philomena Davis    Relationship to patient: Self    Best call back number: [phone]    Patient is needing: PLEASE MAIL NEW PATIENT PAPER WORK

## 2023-12-14 ENCOUNTER — TELEPHONE (OUTPATIENT)
Dept: FAMILY MEDICINE CLINIC | Facility: CLINIC | Age: 63
End: 2023-12-14
Payer: COMMERCIAL

## 2023-12-15 NOTE — TELEPHONE ENCOUNTER
Left detailed message regarding pt needing to come in for a follow up    Ok for hub to relay and ananth

## 2023-12-22 NOTE — TELEPHONE ENCOUNTER
Rx Refill Note  Requested Prescriptions      No prescriptions requested or ordered in this encounter      Last office visit with prescribing clinician: 10/25/2023   Last telemedicine visit with prescribing clinician: 11/17/2023   Next office visit with prescribing clinician: Visit date not found                         Would you like a call back once the refill request has been completed: [] Yes [] No    If the office needs to give you a call back, can they leave a voicemail: [] Yes [] No    Maninder Bliss MA  12/22/23, 09:02 EST

## 2024-01-02 ENCOUNTER — PATIENT OUTREACH (OUTPATIENT)
Dept: CASE MANAGEMENT | Facility: OTHER | Age: 64
End: 2024-01-02
Payer: COMMERCIAL

## 2024-01-02 NOTE — OUTREACH NOTE
"Patient Outreach    SW contacted pt for follow up. Pt states that she had to sell her house. She will be able to remain in her home for four months and then plans to use money from the sale to get an apartment. SW offered information on low-income housing but pt is not interested. She reports she is working on SSI application but tired of \"jumping through hoops.\" SW suggested contacting an  and provided information about  and how to obtain  a private  through the [employer]. Pt asked for a list of attorneys but SW advised that SW is unable to make recommendations on a specific  to use. Pt denies any other current needs so SW will close referral.    Luciana CAT -   Ambulatory Case Management    1/2/2024, 15:11 EST  b  "

## 2024-01-09 ENCOUNTER — TELEPHONE (OUTPATIENT)
Dept: FAMILY MEDICINE CLINIC | Facility: CLINIC | Age: 64
End: 2024-01-09
Payer: COMMERCIAL

## 2024-01-09 NOTE — TELEPHONE ENCOUNTER
Pt called requesting an increase in her Clonidine. She states she is only able to sleep for 3 hours at a time. Please advise.

## 2024-01-10 ENCOUNTER — TELEMEDICINE (OUTPATIENT)
Dept: FAMILY MEDICINE CLINIC | Facility: CLINIC | Age: 64
End: 2024-01-10
Payer: COMMERCIAL

## 2024-01-10 DIAGNOSIS — G47.9 SLEEP DISTURBANCE: Primary | ICD-10-CM

## 2024-01-10 RX ORDER — CLONIDINE HYDROCHLORIDE 0.2 MG/1
0.2 TABLET ORAL 2 TIMES DAILY
Qty: 60 TABLET | Refills: 1 | Status: SHIPPED | OUTPATIENT
Start: 2024-01-10

## 2024-01-10 NOTE — PROGRESS NOTES
Chief Complaint   Patient presents with    sleep disturbance     Mode of Visit: Video  Location of patient: Home  Location of Provider: [facility]  Patient has chosen to receive care through a telehealth visit.  Does the patient consent to use a video/audio device for their medical care today: Yes  The visit included audio and video interaction: Yes    HPI:  Philomena Davis is a 63 y.o. female who presents today for sleep issues.    Patient has chronically struggled with sleep issues.  Mainly has trouble with sleep maintenance.  Can fall asleep quite easily.  She has tried and failed several medications for this in the past including trazodone (over sedation), amitriptyline (ineffective), doxepin (ineffective), OTC melatonin and other supplements (ineffective).  Clonidine was started to help with sleep and this has been the only thing to mildly improve her symptoms.  She typically takes 0.1 mg before going to sleep and another tablet if she wakes up in the middle of the night.  No longer feels that this dose is helping as much as it did when she first started.  She denies any adverse effects with this medication at its current dose.  Requesting trial of increased dose.    PE:  There were no vitals filed for this visit.   There is no height or weight on file to calculate BMI.    Gen Appearance: NAD  HEENT: Normocephalic, EOMI  Exam limited due to telehealth visit    Current Outpatient Medications   Medication Sig Dispense Refill    albuterol sulfate  (90 Base) MCG/ACT inhaler Inhale 2 puffs Every 4 (Four) Hours As Needed for Wheezing. 6.7 g 2    cholecalciferol (VITAMIN D3) 25 MCG (1000 UT) tablet Take 1 tablet by mouth Daily. 30 tablet 0    cloNIDine (Catapres) 0.2 MG tablet Take 1 tablet by mouth 2 (Two) Times a Day. 60 tablet 1    desvenlafaxine (PRISTIQ) 50 MG 24 hr tablet Take 1 tablet by mouth Daily.      Fluticasone-Salmeterol (ADVAIR/WIXELA) 100-50 MCG/ACT DISKUS Inhale 2 (Two) Times a Day.       hydroCHLOROthiazide (HYDRODIURIL) 12.5 MG tablet       latanoprost (XALATAN) 0.005 % ophthalmic solution Administer 1 drop to both eyes Every Night.      pantoprazole (PROTONIX) 40 MG EC tablet Take 1 tablet by mouth Daily.      rosuvastatin (Crestor) 5 MG tablet Take 1 tablet by mouth Daily. 90 tablet 0    tiotropium (Spiriva HandiHaler) 18 MCG per inhalation capsule Place 1 capsule into inhaler and inhale Daily. 30 capsule 2    ursodiol (ACTIGALL) 300 MG capsule        No current facility-administered medications for this visit.        A/P:  Diagnoses and all orders for this visit:    1. Sleep disturbance (Primary)  -     cloNIDine (Catapres) 0.2 MG tablet; Take 1 tablet by mouth 2 (Two) Times a Day.  Dispense: 60 tablet; Refill: 1    Sleep is still not well-controlled  Previously tried and failed trazodone (over sedation), amitriptyline (ineffective), doxepin (ineffective), OTC melatonin and other supplements (ineffective)  Given previous relief with clonidine will trial mild increase in dose to 0.2 mg.  Counseled on potential adverse effects although she has tolerated this well to date.  Suspect that longstanding alcohol use disorder is certainly a contributing factor, will continue attempts to address alcohol cessation in the future  Follow-up in 4 weeks      Return in about 4 weeks (around 2/7/2024).     Dictated Utilizing Dragon Dictation    Please note that portions of this note were completed with a voice recognition program.    Part of this note may be an electronic transcription/translation of spoken language to printed text using the Dragon Dictation System.

## 2024-01-10 NOTE — TELEPHONE ENCOUNTER
Name: Philomena Davis    Relationship: Self    Best Callback Number:     [phone]       HUB PROVIDED THE RELAY MESSAGE FROM THE OFFICE   PATIENT SCHEDULED AS REQUESTED    ADDITIONAL INFORMATION: PATIENT SCHEDULED MY CHART VISIT TODAY 1-10-24

## 2024-01-10 NOTE — TELEPHONE ENCOUNTER
Unable to reach Philomena Davis by phone or leave message.    Hub may relay message and document.    Unique Brandt, DO  You17 hours ago (4:14 PM)       Please have her schedule an appt to discuss any dose changes in meds

## 2024-01-21 DIAGNOSIS — E78.5 HYPERLIPIDEMIA, UNSPECIFIED HYPERLIPIDEMIA TYPE: ICD-10-CM

## 2024-01-22 RX ORDER — ROSUVASTATIN CALCIUM 5 MG/1
5 TABLET, COATED ORAL DAILY
Qty: 90 TABLET | Refills: 0 | Status: SHIPPED | OUTPATIENT
Start: 2024-01-22

## 2024-01-30 ENCOUNTER — HOSPITAL ENCOUNTER (OUTPATIENT)
Facility: HOSPITAL | Age: 64
Discharge: HOME | End: 2024-02-15
Attending: EMERGENCY MEDICINE | Admitting: PEDIATRICS
Payer: COMMERCIAL

## 2024-01-30 ENCOUNTER — APPOINTMENT (OUTPATIENT)
Dept: CT IMAGING | Facility: HOSPITAL | Age: 64
End: 2024-01-30
Payer: COMMERCIAL

## 2024-01-30 ENCOUNTER — APPOINTMENT (OUTPATIENT)
Dept: GENERAL RADIOLOGY | Facility: HOSPITAL | Age: 64
End: 2024-01-30
Payer: COMMERCIAL
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
## 2024-01-30 DIAGNOSIS — S62.92XA CLOSED FRACTURE OF LEFT HAND, INITIAL ENCOUNTER: ICD-10-CM

## 2024-01-30 DIAGNOSIS — K74.60 HEPATIC CIRRHOSIS, UNSPECIFIED HEPATIC CIRRHOSIS TYPE, UNSPECIFIED WHETHER ASCITES PRESENT: ICD-10-CM

## 2024-01-30 DIAGNOSIS — S62.92XD CLOSED FRACTURE OF LEFT HAND WITH ROUTINE HEALING, SUBSEQUENT ENCOUNTER: ICD-10-CM

## 2024-01-30 DIAGNOSIS — K92.1 MELENA: ICD-10-CM

## 2024-01-30 DIAGNOSIS — K92.0 HEMATEMESIS WITHOUT NAUSEA: Primary | ICD-10-CM

## 2024-01-30 DIAGNOSIS — K70.30 ALCOHOLIC CIRRHOSIS OF LIVER WITHOUT ASCITES: ICD-10-CM

## 2024-01-30 DIAGNOSIS — R53.1 GENERALIZED WEAKNESS: ICD-10-CM

## 2024-01-30 DIAGNOSIS — D50.0 BLOOD LOSS ANEMIA: ICD-10-CM

## 2024-01-30 DIAGNOSIS — K92.0 HEMATEMESIS, UNSPECIFIED WHETHER NAUSEA PRESENT: ICD-10-CM

## 2024-01-30 PROBLEM — E87.6 HYPOKALEMIA: Status: ACTIVE | Noted: 2024-01-30

## 2024-01-30 LAB
ABO GROUP BLD: NORMAL
ALBUMIN SERPL-MCNC: 3.6 G/DL (ref 3.5–5.2)
ALBUMIN/GLOB SERPL: 1 G/DL
ALP SERPL-CCNC: 176 U/L (ref 39–117)
ALT SERPL W P-5'-P-CCNC: 24 U/L (ref 1–33)
ANION GAP SERPL CALCULATED.3IONS-SCNC: 13 MMOL/L (ref 5–15)
AST SERPL-CCNC: 89 U/L (ref 1–32)
BASOPHILS # BLD AUTO: 0.06 10*3/MM3 (ref 0–0.2)
BASOPHILS NFR BLD AUTO: 0.7 % (ref 0–1.5)
BILIRUB SERPL-MCNC: 1.1 MG/DL (ref 0–1.2)
BLD GP AB SCN SERPL QL: NEGATIVE
BUN SERPL-MCNC: 32 MG/DL (ref 8–23)
BUN/CREAT SERPL: 34.4 (ref 7–25)
CALCIUM SPEC-SCNC: 9.3 MG/DL (ref 8.6–10.5)
CHLORIDE SERPL-SCNC: 87 MMOL/L (ref 98–107)
CO2 SERPL-SCNC: 34 MMOL/L (ref 22–29)
CREAT SERPL-MCNC: 0.93 MG/DL (ref 0.57–1)
D-LACTATE SERPL-SCNC: 1.1 MMOL/L (ref 0.5–2)
D-LACTATE SERPL-SCNC: 2.2 MMOL/L (ref 0.5–2)
DEPRECATED RDW RBC AUTO: 45.1 FL (ref 37–54)
DEVELOPER EXPIRATION DATE: ABNORMAL
DEVELOPER LOT NUMBER: ABNORMAL
EGFRCR SERPLBLD CKD-EPI 2021: 70.1 ML/MIN/1.73
EOSINOPHIL # BLD AUTO: 0.11 10*3/MM3 (ref 0–0.4)
EOSINOPHIL NFR BLD AUTO: 1.4 % (ref 0.3–6.2)
ERYTHROCYTE [DISTWIDTH] IN BLOOD BY AUTOMATED COUNT: 12.6 % (ref 12.3–15.4)
ETHANOL BLD-MCNC: <10 MG/DL (ref 0–10)
EXPIRATION DATE: ABNORMAL
FECAL OCCULT BLOOD SCREEN, POC: POSITIVE
GLOBULIN UR ELPH-MCNC: 3.6 GM/DL
GLUCOSE SERPL-MCNC: 133 MG/DL (ref 65–99)
HCT VFR BLD AUTO: 35.7 % (ref 34–46.6)
HCT VFR BLD AUTO: 38.5 % (ref 34–46.6)
HGB BLD-MCNC: 11.8 G/DL (ref 12–15.9)
HGB BLD-MCNC: 12.9 G/DL (ref 12–15.9)
HOLD SPECIMEN: NORMAL
IMM GRANULOCYTES # BLD AUTO: 0.11 10*3/MM3 (ref 0–0.05)
IMM GRANULOCYTES NFR BLD AUTO: 1.4 % (ref 0–0.5)
LYMPHOCYTES # BLD AUTO: 1.54 10*3/MM3 (ref 0.7–3.1)
LYMPHOCYTES NFR BLD AUTO: 19.1 % (ref 19.6–45.3)
Lab: ABNORMAL
MAGNESIUM SERPL-MCNC: 2.2 MG/DL (ref 1.6–2.4)
MCH RBC QN AUTO: 32.4 PG (ref 26.6–33)
MCHC RBC AUTO-ENTMCNC: 33.5 G/DL (ref 31.5–35.7)
MCV RBC AUTO: 96.7 FL (ref 79–97)
MONOCYTES # BLD AUTO: 1.54 10*3/MM3 (ref 0.1–0.9)
MONOCYTES NFR BLD AUTO: 19.1 % (ref 5–12)
NEGATIVE CONTROL: NEGATIVE
NEUTROPHILS NFR BLD AUTO: 4.71 10*3/MM3 (ref 1.7–7)
NEUTROPHILS NFR BLD AUTO: 58.3 % (ref 42.7–76)
NRBC BLD AUTO-RTO: 0 /100 WBC (ref 0–0.2)
PLATELET # BLD AUTO: 317 10*3/MM3 (ref 140–450)
PMV BLD AUTO: 9.9 FL (ref 6–12)
POSITIVE CONTROL: POSITIVE
POTASSIUM SERPL-SCNC: 2.2 MMOL/L (ref 3.5–5.2)
PROT SERPL-MCNC: 7.2 G/DL (ref 6–8.5)
RBC # BLD AUTO: 3.98 10*6/MM3 (ref 3.77–5.28)
RH BLD: POSITIVE
SODIUM SERPL-SCNC: 134 MMOL/L (ref 136–145)
T&S EXPIRATION DATE: NORMAL
WBC NRBC COR # BLD AUTO: 8.07 10*3/MM3 (ref 3.4–10.8)
WHOLE BLOOD HOLD COAG: NORMAL
WHOLE BLOOD HOLD SPECIMEN: NORMAL

## 2024-01-30 PROCEDURE — 82270 OCCULT BLOOD FECES: CPT | Performed by: EMERGENCY MEDICINE

## 2024-01-30 PROCEDURE — 25010000002 OCTREOTIDE PER 25 MCG: Performed by: INTERNAL MEDICINE

## 2024-01-30 PROCEDURE — 25010000002 THIAMINE PER 100 MG: Performed by: EMERGENCY MEDICINE

## 2024-01-30 PROCEDURE — 85018 HEMOGLOBIN: CPT | Performed by: INTERNAL MEDICINE

## 2024-01-30 PROCEDURE — 83735 ASSAY OF MAGNESIUM: CPT | Performed by: EMERGENCY MEDICINE

## 2024-01-30 PROCEDURE — 25810000003 SODIUM CHLORIDE 0.9 % SOLUTION: Performed by: EMERGENCY MEDICINE

## 2024-01-30 PROCEDURE — 73130 X-RAY EXAM OF HAND: CPT

## 2024-01-30 PROCEDURE — 85014 HEMATOCRIT: CPT | Performed by: INTERNAL MEDICINE

## 2024-01-30 PROCEDURE — 99291 CRITICAL CARE FIRST HOUR: CPT

## 2024-01-30 PROCEDURE — 36415 COLL VENOUS BLD VENIPUNCTURE: CPT

## 2024-01-30 PROCEDURE — 83605 ASSAY OF LACTIC ACID: CPT | Performed by: EMERGENCY MEDICINE

## 2024-01-30 PROCEDURE — 25010000002 POTASSIUM CHLORIDE 10 MEQ/100ML SOLUTION: Performed by: EMERGENCY MEDICINE

## 2024-01-30 PROCEDURE — G0378 HOSPITAL OBSERVATION PER HR: HCPCS

## 2024-01-30 PROCEDURE — 86900 BLOOD TYPING SEROLOGIC ABO: CPT | Performed by: EMERGENCY MEDICINE

## 2024-01-30 PROCEDURE — 86901 BLOOD TYPING SEROLOGIC RH(D): CPT | Performed by: EMERGENCY MEDICINE

## 2024-01-30 PROCEDURE — 25510000001 IOPAMIDOL 61 % SOLUTION: Performed by: EMERGENCY MEDICINE

## 2024-01-30 PROCEDURE — 96375 TX/PRO/DX INJ NEW DRUG ADDON: CPT

## 2024-01-30 PROCEDURE — 86850 RBC ANTIBODY SCREEN: CPT | Performed by: EMERGENCY MEDICINE

## 2024-01-30 PROCEDURE — 80053 COMPREHEN METABOLIC PANEL: CPT | Performed by: EMERGENCY MEDICINE

## 2024-01-30 PROCEDURE — 25010000002 THIAMINE PER 100 MG: Performed by: INTERNAL MEDICINE

## 2024-01-30 PROCEDURE — 82077 ASSAY SPEC XCP UR&BREATH IA: CPT | Performed by: EMERGENCY MEDICINE

## 2024-01-30 PROCEDURE — 25010000002 POTASSIUM CHLORIDE 10 MEQ/100ML SOLUTION: Performed by: INTERNAL MEDICINE

## 2024-01-30 PROCEDURE — 99223 1ST HOSP IP/OBS HIGH 75: CPT | Performed by: INTERNAL MEDICINE

## 2024-01-30 PROCEDURE — 85025 COMPLETE CBC W/AUTO DIFF WBC: CPT | Performed by: EMERGENCY MEDICINE

## 2024-01-30 PROCEDURE — 96365 THER/PROPH/DIAG IV INF INIT: CPT

## 2024-01-30 PROCEDURE — 74178 CT ABD&PLV WO CNTR FLWD CNTR: CPT

## 2024-01-30 RX ORDER — BUDESONIDE AND FORMOTEROL FUMARATE DIHYDRATE 160; 4.5 UG/1; UG/1
1 AEROSOL RESPIRATORY (INHALATION)
Status: DISCONTINUED | OUTPATIENT
Start: 2024-01-30 | End: 2024-02-15 | Stop reason: HOSPADM

## 2024-01-30 RX ORDER — ONDANSETRON 2 MG/ML
4 INJECTION INTRAMUSCULAR; INTRAVENOUS EVERY 6 HOURS PRN
Status: DISCONTINUED | OUTPATIENT
Start: 2024-01-30 | End: 2024-02-15 | Stop reason: HOSPADM

## 2024-01-30 RX ORDER — POTASSIUM CHLORIDE 20 MEQ/1
40 TABLET, EXTENDED RELEASE ORAL EVERY 4 HOURS
Status: DISCONTINUED | OUTPATIENT
Start: 2024-01-30 | End: 2024-01-30

## 2024-01-30 RX ORDER — NITROGLYCERIN 0.4 MG/1
0.4 TABLET SUBLINGUAL
Status: DISCONTINUED | OUTPATIENT
Start: 2024-01-30 | End: 2024-01-30

## 2024-01-30 RX ORDER — ONDANSETRON 4 MG/1
4 TABLET, ORALLY DISINTEGRATING ORAL EVERY 6 HOURS PRN
Status: DISCONTINUED | OUTPATIENT
Start: 2024-01-30 | End: 2024-02-15 | Stop reason: HOSPADM

## 2024-01-30 RX ORDER — SODIUM CHLORIDE 9 MG/ML
40 INJECTION, SOLUTION INTRAVENOUS AS NEEDED
Status: DISCONTINUED | OUTPATIENT
Start: 2024-01-30 | End: 2024-02-09

## 2024-01-30 RX ORDER — POTASSIUM CHLORIDE 7.45 MG/ML
10 INJECTION INTRAVENOUS
Status: DISCONTINUED | OUTPATIENT
Start: 2024-01-30 | End: 2024-01-30

## 2024-01-30 RX ORDER — LORAZEPAM 1 MG/1
1 TABLET ORAL
Status: DISCONTINUED | OUTPATIENT
Start: 2024-01-30 | End: 2024-02-01

## 2024-01-30 RX ORDER — LORAZEPAM 1 MG/1
2 TABLET ORAL
Status: DISCONTINUED | OUTPATIENT
Start: 2024-01-30 | End: 2024-02-01

## 2024-01-30 RX ORDER — ROSUVASTATIN CALCIUM 10 MG/1
5 TABLET, COATED ORAL NIGHTLY
Status: DISCONTINUED | OUTPATIENT
Start: 2024-01-30 | End: 2024-02-15 | Stop reason: HOSPADM

## 2024-01-30 RX ORDER — NITROGLYCERIN 0.4 MG/1
0.4 TABLET SUBLINGUAL
Status: DISCONTINUED | OUTPATIENT
Start: 2024-01-30 | End: 2024-02-15 | Stop reason: HOSPADM

## 2024-01-30 RX ORDER — BISACODYL 10 MG
10 SUPPOSITORY, RECTAL RECTAL DAILY PRN
Status: DISCONTINUED | OUTPATIENT
Start: 2024-01-30 | End: 2024-02-15 | Stop reason: HOSPADM

## 2024-01-30 RX ORDER — POLYETHYLENE GLYCOL 3350 17 G/17G
17 POWDER, FOR SOLUTION ORAL DAILY PRN
Status: DISCONTINUED | OUTPATIENT
Start: 2024-01-30 | End: 2024-02-15 | Stop reason: HOSPADM

## 2024-01-30 RX ORDER — PANTOPRAZOLE SODIUM 40 MG/10ML
40 INJECTION, POWDER, LYOPHILIZED, FOR SOLUTION INTRAVENOUS
Status: DISCONTINUED | OUTPATIENT
Start: 2024-01-31 | End: 2024-02-06

## 2024-01-30 RX ORDER — SODIUM CHLORIDE 0.9 % (FLUSH) 0.9 %
10 SYRINGE (ML) INJECTION AS NEEDED
Status: DISCONTINUED | OUTPATIENT
Start: 2024-01-30 | End: 2024-02-09

## 2024-01-30 RX ORDER — MIDAZOLAM HYDROCHLORIDE 1 MG/ML
2.5 INJECTION INTRAMUSCULAR; INTRAVENOUS
Status: DISCONTINUED | OUTPATIENT
Start: 2024-01-30 | End: 2024-02-01

## 2024-01-30 RX ORDER — CLONIDINE HYDROCHLORIDE 0.2 MG/1
0.2 TABLET ORAL 2 TIMES DAILY
Status: DISCONTINUED | OUTPATIENT
Start: 2024-01-30 | End: 2024-02-02

## 2024-01-30 RX ORDER — MIDAZOLAM HYDROCHLORIDE 1 MG/ML
2 INJECTION INTRAMUSCULAR; INTRAVENOUS
Status: DISCONTINUED | OUTPATIENT
Start: 2024-01-30 | End: 2024-02-01

## 2024-01-30 RX ORDER — SODIUM CHLORIDE 9 MG/ML
1000 INJECTION, SOLUTION INTRAVENOUS ONCE
Status: COMPLETED | OUTPATIENT
Start: 2024-01-30 | End: 2024-01-30

## 2024-01-30 RX ORDER — SODIUM CHLORIDE 0.9 % (FLUSH) 0.9 %
10 SYRINGE (ML) INJECTION EVERY 12 HOURS SCHEDULED
Status: DISCONTINUED | OUTPATIENT
Start: 2024-01-30 | End: 2024-02-09

## 2024-01-30 RX ORDER — PANTOPRAZOLE SODIUM 40 MG/10ML
40 INJECTION, POWDER, LYOPHILIZED, FOR SOLUTION INTRAVENOUS ONCE
Status: COMPLETED | OUTPATIENT
Start: 2024-01-30 | End: 2024-01-30

## 2024-01-30 RX ORDER — URSODIOL 300 MG/1
300 CAPSULE ORAL DAILY
Status: DISCONTINUED | OUTPATIENT
Start: 2024-01-30 | End: 2024-02-15 | Stop reason: HOSPADM

## 2024-01-30 RX ORDER — MIDAZOLAM HYDROCHLORIDE 1 MG/ML
4 INJECTION INTRAMUSCULAR; INTRAVENOUS
Status: DISCONTINUED | OUTPATIENT
Start: 2024-01-30 | End: 2024-02-01

## 2024-01-30 RX ORDER — POTASSIUM CHLORIDE 7.45 MG/ML
10 INJECTION INTRAVENOUS
Status: COMPLETED | OUTPATIENT
Start: 2024-01-30 | End: 2024-01-31

## 2024-01-30 RX ORDER — FOLIC ACID 1 MG/1
1 TABLET ORAL DAILY
Status: DISCONTINUED | OUTPATIENT
Start: 2024-01-31 | End: 2024-02-15 | Stop reason: HOSPADM

## 2024-01-30 RX ORDER — DESVENLAFAXINE SUCCINATE 50 MG/1
50 TABLET, EXTENDED RELEASE ORAL DAILY
Status: DISCONTINUED | OUTPATIENT
Start: 2024-01-30 | End: 2024-02-15 | Stop reason: HOSPADM

## 2024-01-30 RX ORDER — PANTOPRAZOLE SODIUM 40 MG/1
40 TABLET, DELAYED RELEASE ORAL DAILY
Status: CANCELLED | OUTPATIENT
Start: 2024-01-30

## 2024-01-30 RX ORDER — LORAZEPAM 1 MG/1
1 TABLET ORAL EVERY 6 HOURS
Qty: 4 TABLET | Refills: 0 | Status: DISCONTINUED | OUTPATIENT
Start: 2024-01-31 | End: 2024-01-31 | Stop reason: SDUPTHER

## 2024-01-30 RX ORDER — THIAMINE HYDROCHLORIDE 100 MG/ML
200 INJECTION, SOLUTION INTRAMUSCULAR; INTRAVENOUS EVERY 8 HOURS SCHEDULED
Status: COMPLETED | OUTPATIENT
Start: 2024-01-30 | End: 2024-02-04

## 2024-01-30 RX ORDER — LATANOPROST 50 UG/ML
1 SOLUTION/ DROPS OPHTHALMIC NIGHTLY
Status: DISCONTINUED | OUTPATIENT
Start: 2024-01-30 | End: 2024-02-15 | Stop reason: HOSPADM

## 2024-01-30 RX ORDER — BISACODYL 5 MG/1
5 TABLET, DELAYED RELEASE ORAL DAILY PRN
Status: DISCONTINUED | OUTPATIENT
Start: 2024-01-30 | End: 2024-02-15 | Stop reason: HOSPADM

## 2024-01-30 RX ORDER — THIAMINE HYDROCHLORIDE 100 MG/ML
200 INJECTION, SOLUTION INTRAMUSCULAR; INTRAVENOUS ONCE
Status: COMPLETED | OUTPATIENT
Start: 2024-01-30 | End: 2024-01-30

## 2024-01-30 RX ORDER — LORAZEPAM 1 MG/1
2 TABLET ORAL EVERY 6 HOURS
Qty: 8 TABLET | Refills: 0 | Status: DISCONTINUED | OUTPATIENT
Start: 2024-01-30 | End: 2024-01-31 | Stop reason: SDUPTHER

## 2024-01-30 RX ORDER — AMOXICILLIN 250 MG
2 CAPSULE ORAL 2 TIMES DAILY
Status: DISCONTINUED | OUTPATIENT
Start: 2024-01-30 | End: 2024-02-15 | Stop reason: HOSPADM

## 2024-01-30 RX ADMIN — POTASSIUM CHLORIDE 10 MEQ: 7.46 INJECTION, SOLUTION INTRAVENOUS at 23:21

## 2024-01-30 RX ADMIN — SODIUM CHLORIDE 1000 ML: 9 INJECTION, SOLUTION INTRAVENOUS at 13:43

## 2024-01-30 RX ADMIN — THIAMINE HYDROCHLORIDE 200 MG: 100 INJECTION, SOLUTION INTRAMUSCULAR; INTRAVENOUS at 22:09

## 2024-01-30 RX ADMIN — POTASSIUM CHLORIDE 10 MEQ: 7.46 INJECTION, SOLUTION INTRAVENOUS at 20:24

## 2024-01-30 RX ADMIN — Medication 10 ML: at 22:10

## 2024-01-30 RX ADMIN — THIAMINE HYDROCHLORIDE 200 MG: 100 INJECTION, SOLUTION INTRAMUSCULAR; INTRAVENOUS at 13:44

## 2024-01-30 RX ADMIN — POTASSIUM CHLORIDE 10 MEQ: 7.46 INJECTION, SOLUTION INTRAVENOUS at 16:06

## 2024-01-30 RX ADMIN — PANTOPRAZOLE SODIUM 40 MG: 40 INJECTION, POWDER, FOR SOLUTION INTRAVENOUS at 16:06

## 2024-01-30 RX ADMIN — POTASSIUM CHLORIDE 10 MEQ: 7.46 INJECTION, SOLUTION INTRAVENOUS at 22:11

## 2024-01-30 RX ADMIN — IOPAMIDOL 85 ML: 612 INJECTION, SOLUTION INTRAVENOUS at 15:06

## 2024-01-30 RX ADMIN — LORAZEPAM 2 MG: 1 TABLET ORAL at 23:20

## 2024-01-30 RX ADMIN — LATANOPROST 1 DROP: 50 SOLUTION OPHTHALMIC at 22:10

## 2024-01-30 RX ADMIN — SODIUM CHLORIDE 1000 ML: 9 INJECTION, SOLUTION INTRAVENOUS at 14:35

## 2024-01-30 RX ADMIN — OCTREOTIDE ACETATE 50 MCG/HR: 500 INJECTION, SOLUTION INTRAVENOUS; SUBCUTANEOUS at 20:14

## 2024-01-30 RX ADMIN — LORAZEPAM 2 MG: 1 TABLET ORAL at 18:40

## 2024-01-30 RX ADMIN — POTASSIUM CHLORIDE 10 MEQ: 7.46 INJECTION, SOLUTION INTRAVENOUS at 14:43

## 2024-01-30 RX ADMIN — POTASSIUM CHLORIDE 10 MEQ: 7.46 INJECTION, SOLUTION INTRAVENOUS at 18:25

## 2024-01-30 RX ADMIN — FOLIC ACID 1 MG: 5 INJECTION, SOLUTION INTRAMUSCULAR; INTRAVENOUS; SUBCUTANEOUS at 14:27

## 2024-01-30 NOTE — ED PROVIDER NOTES
Subjective   History of Present Illness  Patient is a pleasant 63-year-old female who presents to the emergency department with generalized weakness, vomiting, dark sticky stools and blood in her emesis.  She has long history of cirrhosis secondary to alcoholism.  She states that she has not drink in an extended period but family states that she likely has been drinking over the past week.  Patient states over the past 1 week she has been getting progressively weaker to the point she is no longer able to stand up from bed.  She tried to walk to the bathroom 2 days ago and fell, injuring her left hand.  She states that when she does try to stand up from the bed she becomes lightheaded and near syncopal.  She had not spoken with family throughout the week and thus family are coming to check on her today.  When they could not get her to answer the phone EMS was called.  Patient has had a similar episodes in the past.  She denies any bright red emesis.  Her weakness has been a progressive weakness over the past 7 days with no drastic change today.  She notes mild, generalized, abdominal discomfort but no focal pain.  Denies fever, chills, chest pain, or other acute complaints.      Review of Systems   All other systems reviewed and are negative.      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 03/24/2017    Closed trimalleolar fracture of right ankle 05/24/2023    Diabetes mellitus     Digital mucous cyst of toe of left foot 03/24/2017    Ganglion cyst of left foot     Glaucoma     Hypertension     Wears glasses        Allergies   Allergen Reactions    Buspirone Anxiety    Codeine Nausea Only    Mirtazapine Anxiety       Past Surgical History:   Procedure Laterality Date     ANKLE OPEN REDUCTION INTERNAL FIXATION Left 3/24/2017    Procedure: LEFT ANKLE OPEN REDUCTION INTERNAL FIXATION WITH ILIAC CREST BONE GRAFT , EXCISION CYST 4TH/5TH INNERSPACE LEFT FOOT;  Surgeon: Frank Larson MD;  Location:  [facility];  Service:     ANKLE OPEN REDUCTION INTERNAL FIXATION Right 2023    Procedure: ANKLE OPEN REDUCTION INTERNAL FIXATION;  Surgeon: Deandre Reynoso MD;  Location:  [facility];  Service: Orthopedics;  Laterality: Right;    AUGMENTATION MAMMAPLASTY Bilateral     BREAST AUGMENTATION      BREAST EXCISIONAL BIOPSY Right     DILATION AND CURETTAGE, DIAGNOSTIC / THERAPEUTIC      ENDOSCOPY N/A 2022    Procedure: ESOPHAGOGASTRODUODENOSCOPY;  Surgeon: Arelis Solano MD;  Location:  [facility];  Service: Gastroenterology;  Laterality: N/A;    [place] RPR NON/MAL FEMUR DSTL H/N W/ILIAC/AUTOG BONE Left 3/24/2017    Procedure: ILIAC CREST BONE GRAFT;  Surgeon: Frank Larson MD;  Location:  [facility];  Service: Orthopedics    WRIST SURGERY         Family History   Problem Relation Age of Onset    Cancer Mother     No Known Problems Father     No Known Problems Sister     No Known Problems Brother     No Known Problems Daughter     No Known Problems Son     No Known Problems Maternal Grandmother     Breast cancer Paternal Grandmother 68        met to brain    No Known Problems Maternal Aunt     No Known Problems Paternal Aunt     Rheum arthritis Other     Heart disease Other     Cancer Other     Stroke Other     Hypertension Other     BRCA 1/2 Neg Hx     Colon cancer Neg Hx     Endometrial cancer Neg Hx     Ovarian cancer Neg Hx        Social History     Socioeconomic History    Marital status:    Tobacco Use    Smoking status: Former     Packs/day: 1.50     Years: 20.00     Additional pack years: 0.00     Total pack years: 30.00     Types: Cigarettes     Quit date: 3/22/2009     Years since quittin.8    Smokeless tobacco: Never   Vaping Use    Vaping Use:  Never used   Substance and Sexual Activity    Alcohol use: Yes     Alcohol/week: 7.0 standard drinks of alcohol     Types: 7 Glasses of wine per week     Comment: 1/5 VODKA EVERY TWO DAYS    Drug use: No    Sexual activity: Defer           Objective   Physical Exam  Vitals and nursing note reviewed.   Constitutional:       General: She is not in acute distress.     Appearance: Normal appearance. She is ill-appearing.   HENT:      Head: Normocephalic and atraumatic.   Eyes:      Conjunctiva/sclera: Conjunctivae normal.      Pupils: Pupils are equal, round, and reactive to light.   Cardiovascular:      Rate and Rhythm: Normal rate and regular rhythm.      Heart sounds: Normal heart sounds.   Pulmonary:      Effort: Pulmonary effort is normal. No respiratory distress.      Breath sounds: Normal breath sounds.   Abdominal:      General: Bowel sounds are normal. There is no distension.      Palpations: Abdomen is soft. There is no mass.      Tenderness: There is no abdominal tenderness. There is no rebound.   Musculoskeletal:         General: Normal range of motion.      Cervical back: Normal range of motion and neck supple.   Skin:     General: Skin is warm and dry.      Capillary Refill: Capillary refill takes less than 2 seconds.   Neurological:      General: No focal deficit present.      Mental Status: She is alert and oriented to person, place, and time.   Psychiatric:         Behavior: Behavior normal.         Thought Content: Thought content normal.         Critical Care    Performed by: Valeriano Ellington DO  Authorized by: Valeriano Ellington DO    Critical care provider statement:     Critical care time (minutes):  35    Critical care time was exclusive of:  Separately billable procedures and treating other patients    Critical care was necessary to treat or prevent imminent or life-threatening deterioration of the following conditions:  Circulatory failure    Critical care was time spent personally by me on the following  activities:  Ordering and performing treatments and interventions, ordering and review of laboratory studies, ordering and review of radiographic studies, pulse oximetry, re-evaluation of patient's condition, review of old charts, obtaining history from patient or surrogate, examination of patient, evaluation of patient's response to treatment, discussions with consultants and development of treatment plan with patient or surrogate    I assumed direction of critical care for this patient from another provider in my specialty: no      Care discussed with: admitting provider               ED Course  ED Course as of 02/13/24 0359   Tue Jan 30, 2024   3979 Case discussed with GI, Dr. Tenorio, and internal medicine.  Patient admitted for further evaluation and management.  Patient was found to have left fifth and fourth metacarpal fractures.  Ulnar gutter splint will be placed. [CP]      ED Course User Index  [CP] Valeriano Ellington DO      Recent Results (from the past 24 hour(s))   Comprehensive Metabolic Panel    Collection Time: 01/30/24 12:34 PM    Specimen: Blood   Result Value Ref Range    Glucose 133 (H) 65 - 99 mg/dL    BUN 32 (H) 8 - 23 mg/dL    Creatinine 0.93 0.57 - 1.00 mg/dL    Sodium 134 (L) 136 - 145 mmol/L    Potassium 2.2 (C) 3.5 - 5.2 mmol/L    Chloride 87 (L) 98 - 107 mmol/L    CO2 34.0 (H) 22.0 - 29.0 mmol/L    Calcium 9.3 8.6 - 10.5 mg/dL    Total Protein 7.2 6.0 - 8.5 g/dL    Albumin 3.6 3.5 - 5.2 g/dL    ALT (SGPT) 24 1 - 33 U/L    AST (SGOT) 89 (H) 1 - 32 U/L    Alkaline Phosphatase 176 (H) 39 - 117 U/L    Total Bilirubin 1.1 0.0 - 1.2 mg/dL    Globulin 3.6 gm/dL    A/G Ratio 1.0 g/dL    BUN/Creatinine Ratio 34.4 (H) 7.0 - 25.0    Anion Gap 13.0 5.0 - 15.0 mmol/L    eGFR 70.1 >60.0 mL/min/1.73   Lactic Acid, Plasma    Collection Time: 01/30/24 12:34 PM    Specimen: Blood   Result Value Ref Range    Lactate 2.2 (C) 0.5 - 2.0 mmol/L   CBC Auto Differential    Collection Time: 01/30/24 12:34 PM    Specimen:  "Blood   Result Value Ref Range    WBC 8.07 3.40 - 10.80 10*3/mm3    RBC 3.98 3.77 - 5.28 10*6/mm3    Hemoglobin 12.9 12.0 - 15.9 g/dL    Hematocrit 38.5 34.0 - 46.6 %    MCV 96.7 79.0 - 97.0 fL    MCH 32.4 26.6 - 33.0 pg    MCHC 33.5 31.5 - 35.7 g/dL    RDW 12.6 12.3 - 15.4 %    RDW-SD 45.1 37.0 - 54.0 fl    MPV 9.9 6.0 - 12.0 fL    Platelets 317 140 - 450 10*3/mm3    Neutrophil % 58.3 42.7 - 76.0 %    Lymphocyte % 19.1 (L) 19.6 - 45.3 %    Monocyte % 19.1 (H) 5.0 - 12.0 %    Eosinophil % 1.4 0.3 - 6.2 %    Basophil % 0.7 0.0 - 1.5 %    Immature Grans % 1.4 (H) 0.0 - 0.5 %    Neutrophils, Absolute 4.71 1.70 - 7.00 10*3/mm3    Lymphocytes, Absolute 1.54 0.70 - 3.10 10*3/mm3    Monocytes, Absolute 1.54 (H) 0.10 - 0.90 10*3/mm3    Eosinophils, Absolute 0.11 0.00 - 0.40 10*3/mm3    Basophils, Absolute 0.06 0.00 - 0.20 10*3/mm3    Immature Grans, Absolute 0.11 (H) 0.00 - 0.05 10*3/mm3    nRBC 0.0 0.0 - 0.2 /100 WBC     Note: In addition to lab results from this visit, the labs listed above may include labs taken at another facility or during a different encounter within the last 24 hours. Please correlate lab times with ED admission and discharge times for further clarification of the services performed during this visit.    No orders to display     Vitals:    01/30/24 1216   BP: 93/65   BP Location: Left arm   Patient Position: Sitting   Pulse: 91   Resp: 14   Temp: 97.8 °F (36.6 °C)   TempSrc: Oral   SpO2: 91%   Weight: 67.1 kg (148 lb)   Height: 162.6 cm (64\")     Medications   sodium chloride 0.9 % flush 10 mL (has no administration in time range)     ECG/EMG Results (last 24 hours)       ** No results found for the last 24 hours. **          No orders to display                                              Medical Decision Making  Amount and/or Complexity of Data Reviewed  Independent Historian: EMS  External Data Reviewed: notes.  Labs: ordered. Decision-making details documented in ED Course.  Radiology: ordered " and independent interpretation performed. Decision-making details documented in ED Course.  ECG/medicine tests: ordered and independent interpretation performed.  Discussion of management or test interpretation with external provider(s): Case discussed with internal medicine attending.  Pt will be admitted for further evaluation and management.    Risk  Prescription drug management.  Decision regarding hospitalization.        Final diagnoses:   Hematemesis without nausea   Melena   Blood loss anemia   Alcoholic cirrhosis of liver without ascites   Closed fracture of left hand, initial encounter       ED Disposition  ED Disposition       ED Disposition   Decision to Admit    Condition   --    Comment   Level of Care: Telemetry [5]   Diagnosis: Hypokalemia [763930]   Admitting Physician: RJ GARCIA [[id number]]                 No follow-up provider specified.       Medication List      No changes were made to your prescriptions during this visit.            Valeriano Ellington DO  02/13/24 5526

## 2024-01-30 NOTE — H&P
[facility] Medicine Services  HISTORY AND PHYSICAL    Patient Name: Philomena Davis  : 1960  MRN: [id number]  Primary Care Physician: Unique Brandt DO  Date of admission: 2024      Subjective   Subjective     Chief Complaint:  too weak to walk.  Hematemesis.     HPI:  Philomena Davis is a 63 y.o. female with history of longstanding alcohol abuse and cirrhosis.  She comes to ED with generalized weakness severe enough that she cannot stand. She gives a contradictory history that amounts to:  she has not drunk alcohol in monhts; she developed a cough over a week ago, then started vomiting, had no diarrhea or fever.  States she has not eaten solid food in a week, has been drinking ginger ale all along, fell 2-3 days ago due to weakness, then crawled back into bed.  Was brought to ED by EMS after her local cousin got worried about her.    At some point she vomited 'dark red blood' into a sink.  She notes she had a BM that was 'black and tarry'.  She currently denies abdominal pain or nausea, is very eager to get food and drink, and states she has not vomited blood in over a day.      Her cousin tells me privately that there was a vodka bottle at the bedside and she does not think patient has stopped drinking; that she drinks 'all the time'.  They do not live together and cousin has not seen patient in some time.         Past Medical History:   Diagnosis Date    Alcohol withdrawal 05/10/2019    Annual physical exam 2023    Anxiety and depression     Cirrhosis of liver     Closed displaced fracture of lateral malleolus of left fibula     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    Closed trimalleolar fracture of right ankle 2023    Diabetes mellitus     Digital mucous cyst of toe of left foot 2017    Ganglion cyst of left foot     Glaucoma     Hypertension     Wears glasses            Past Surgical History:   Procedure  Laterality Date    ANKLE OPEN REDUCTION INTERNAL FIXATION Left 3/24/2017    Procedure: LEFT ANKLE OPEN REDUCTION INTERNAL FIXATION WITH ILIAC CREST BONE GRAFT , EXCISION CYST 4TH/5TH INNERSPACE LEFT FOOT;  Surgeon: Frank Larson MD;  Location:  [facility];  Service:     ANKLE OPEN REDUCTION INTERNAL FIXATION Right 5/25/2023    Procedure: ANKLE OPEN REDUCTION INTERNAL FIXATION;  Surgeon: Deandre Reynoso MD;  Location:  [facility];  Service: Orthopedics;  Laterality: Right;    AUGMENTATION MAMMAPLASTY Bilateral 1999    BREAST AUGMENTATION      BREAST EXCISIONAL BIOPSY Right 2014    DILATION AND CURETTAGE, DIAGNOSTIC / THERAPEUTIC      ENDOSCOPY N/A 11/30/2022    Procedure: ESOPHAGOGASTRODUODENOSCOPY;  Surgeon: Arelis Solano MD;  Location:  [facility];  Service: Gastroenterology;  Laterality: N/A;    [place] RPR NON/MAL FEMUR DSTL H/N W/ILIAC/AUTOG BONE Left 3/24/2017    Procedure: ILIAC CREST BONE GRAFT;  Surgeon: Frank Larson MD;  Location:  [facility];  Service: Orthopedics    WRIST SURGERY         Family History: family history includes Breast cancer (age of onset: 68) in her paternal grandmother; Cancer in her mother and another family member; Heart disease in an other family member; Hypertension in an other family member; No Known Problems in her brother, daughter, father, maternal aunt, maternal grandmother, paternal aunt, sister, and son; Rheum arthritis in an other family member; Stroke in an other family member.     Social History:  reports that she quit smoking about 14 years ago. Her smoking use included cigarettes. She has a 30.00 pack-year smoking history. She has never used smokeless tobacco. She reports current alcohol use of about 7.0 standard drinks of alcohol per week. She reports that she does not use drugs.  Social History     Social History Narrative    Not on file       Medications:  Available home medication information reviewed.  Fluticasone-Salmeterol, albuterol sulfate HFA,  cholecalciferol, cloNIDine, desvenlafaxine, hydroCHLOROthiazide, latanoprost, pantoprazole, rosuvastatin, tiotropium, and ursodiol    Allergies   Allergen Reactions    Buspirone Anxiety    Codeine Nausea Only    Mirtazapine Anxiety       Objective   Objective     Vital Signs:   Temp:  [97.8 °F (36.6 °C)] 97.8 °F (36.6 °C)  Heart Rate:  [73-93] 73  Resp:  [14] 14  BP: ()/(51-78) 109/76  Flow (L/min):  [2] 2       Physical Exam   Gen:  WD/WN owman in ED bed, looks bright and chatty, cousin present   Neuro: alert and oriented, clear speech, follows commands, grossly nonfocal, no tremor or confusion   HEENT:  NC/AT   Neck:  Supple, no LAD  Heart RRR  Lungs CTA no cough no wheeze   Abd:  Soft, nontender, no rebound or guarding, pos BS  Extrem:  No c/c/e      Result Review:  I have personally reviewed the results from the time of this admission to 1/30/2024 17:53 EST and agree with these findings:  [x]  Laboratory list / accordion  []  Microbiology  [x]  Radiology  []  EKG/Telemetry   []  Cardiology/Vascular   []  Pathology  []  Old records  []  Other:  Most notable findings include: k of 2.2  mag 1.9,  Creat nl. Hgb 12. 9       LAB RESULTS:      Lab 01/30/24  1234   WBC 8.07   HEMOGLOBIN 12.9   HEMATOCRIT 38.5   PLATELETS 317   NEUTROS ABS 4.71   IMMATURE GRANS (ABS) 0.11*   LYMPHS ABS 1.54   MONOS ABS 1.54*   EOS ABS 0.11   MCV 96.7   LACTATE 2.2*         Lab 01/30/24  1234   SODIUM 134*   POTASSIUM 2.2*   CHLORIDE 87*   CO2 34.0*   ANION GAP 13.0   BUN 32*   CREATININE 0.93   EGFR 70.1   GLUCOSE 133*   CALCIUM 9.3   MAGNESIUM 2.2         Lab 01/30/24  1234   TOTAL PROTEIN 7.2   ALBUMIN 3.6   GLOBULIN 3.6   ALT (SGPT) 24   AST (SGOT) 89*   BILIRUBIN 1.1   ALK PHOS 176*                 Lab 01/30/24  1234   ABO TYPING O   RH TYPING Positive   ANTIBODY SCREEN Negative         UA          5/19/2023    08:34   Urinalysis   Ketones, UA 1+    Leukocytes, UA Large (3+)        Microbiology Results (last 10 days)       **  No results found for the last 240 hours. **            CT Abdomen Pelvis With & Without Contrast    Result Date: 1/30/2024  CT ABDOMEN PELVIS W WO CONTRAST Date of Exam: 1/30/2024 2:52 PM EST Indication: GI bleed protocol. Comparison: 11/29/2022 Technique: Axial CT images were obtained of the abdomen and pelvis before and after the uneventful intravenous administration of 85 mL Isovue-300. Sagittal and coronal reconstructions were performed.  Automated exposure control and iterative reconstruction methods were used. Findings: There is mild calcific plaque of the abdominal aorta which is normal in size. The celiac, superior mesenteric, renal, and inferior mesenteric arteries opacify normally without plaque or stenoses. There are new patchy infiltrates in the peripheries of the  bilateral lower lobes, greatest on the right. There is fatty infiltration of the liver. There are no focal liver lesions. The gallbladder and biliary tree are nondilated. The spleen size is normal. The pancreas and adrenal glands appear normal. There is  a small left renal cyst. There is no hydronephrosis. The stomach is collapsed. There is no pelvic mass or free fluid. The bladder is poorly distended. There is no large or small bowel dilatation. There is no focal bowel wall thickening. There is a normal appendix. There are no inflammatory changes.    Impression: Impression: Patchy infiltrates of the bilateral lower lobes suggest pneumonia, greatest on the right. No acute process in the abdomen or pelvis. Electronically Signed: Lyla Reyes MD  1/30/2024 3:40 PM EST  Workstation ID: [id number]    XR Hand 3+ View Left    Result Date: 1/30/2024  XR HAND 3+ VW LEFT Date of Exam: 1/30/2024 2:04 PM EST Indication: trauma Comparison: 9/20/2022 Findings: Moderately displaced oblique fracture of the fifth metacarpal diaphysis. Minimally displaced oblique fracture of the fourth metacarpal diaphysis. No additional fracture seen. Scattered mild  degenerative changes of the interphalangeal joints.     Impression: Impression: Moderately displaced oblique fracture of the fifth metacarpal diaphysis. Minimally displaced oblique fracture of the fourth metacarpal diaphysis. Electronically Signed: Alon Beauchamp MD  1/30/2024 2:31 PM EST  Workstation ID: [id number]         Assessment & Plan   Assessment & Plan       Hypokalemia    64 yo with history of ETOHic cirrhosis, HTN DM and anxiety comes to ED for severe generalized weakness; pt states she no longer drinks etoh but family member thinks otherwise.     Weakness  Hypokalemia   Question of recent n/v causing poor intake  - replace K and recheck   - stop HCTZ   - mag is normal   - PT  - watch po intake     Etoh abuse, risk of withdrawal  - pt states no longer drinking etoh; family disagrees.  No current signs of withdrawal  - CIWA protocol  - thiamine   - chem dependency consult   - etoh level pending     Cirrhosis   - supportive care overnight ; GI consult     Question of upper GI bleed   - history unclear ;   - pt relates hematemesis and black tarry stool x 1, but not in past 1-2 days  - hgb nl  - recheck hgb q6h x 2  - PPI BID, octreotide gtt.  Discussed with GI, will consult.    - NPO for now     L hand 5th metacarpal fracture   - Dr Hathaway will see in AM     DVT prophylaxis:  Mechanical DVT prophylaxis orders are present.          CODE STATUS:    Code Status and Medical Interventions:   Ordered at: 01/30/24 7633     Level Of Support Discussed With:    Patient     Code Status (Patient has no pulse and is not breathing):    CPR (Attempt to Resuscitate)     Medical Interventions (Patient has pulse or is breathing):    Full Support       Expected Discharge   Expected discharge date/ time has not been documented.     Jossy Galaviz MD  01/30/24

## 2024-01-30 NOTE — CASE MANAGEMENT/SOCIAL WORK
Discharge Planning Assessment  [facility]     Patient Name: Philomena Davis  MRN: [id number]  Today's Date: 1/30/2024    Admit Date: 1/30/2024    Plan: Home   Discharge Needs Assessment       Row Name 01/30/24 1446       Living Environment    People in Home alone    Current Living Arrangements home    Primary Care Provided by self    Provides Primary Care For no one    Family Caregiver if Needed other relative(s)    Family Caregiver Names Madelin Null, cousin    Quality of Family Relationships helpful;involved;supportive    Able to Return to Prior Arrangements yes       Transition Planning    Patient/Family Anticipates Transition to home with family    Patient/Family Anticipated Services at Transition        Discharge Needs Assessment    Equipment Currently Used at Home shower chair;grab bar    Concerns to be Addressed denies needs/concerns at this time    Discharge Coordination/Progress Lives in a house in [place] by herself, can call on cousin Madelin if needed.  Has grab bars and a shower chair at home.  Has had [facility] home health in the past.  Has an advanced directive.                   Discharge Plan       Row Name 01/30/24 1447       Plan    Plan Home    Patient/Family in Agreement with Plan yes    Plan Comments I spoke with Ms Davis at bedside.  Ms Davis resides in a house in [place] by herself, but can call on her cousin Madelin if needed.  She has a shower chair and grab bars at home.  She has had [facility] home [facility] in the past.  She does have an advanced directive.  Her insurance is [employer], and she denies any issues or lapses in coverage.  Her insurance also helps to cover her prescriptions.  Her PCP is Uniqeu Brandt, and she gets her prescriptions filled at [facility] off of [facility] [address].  At this time, there are no discharge needs.    Final Discharge Disposition Code 01 - home or self-care                  Continued Care and Services - Admitted Since  1/30/2024    Coordination has not been started for this encounter.          Demographic Summary    No documentation.                  Functional Status       Row Name 01/30/24 1445       Functional Status    Usual Activity Tolerance good    Current Activity Tolerance good       Functional Status, IADL    Medications independent    Meal Preparation independent    Housekeeping independent    Laundry independent    Shopping independent       Mental Status    General Appearance WDL WDL                   Psychosocial    No documentation.                  Abuse/Neglect    No documentation.                  Legal    No documentation.                  Substance Abuse    No documentation.                  Patient Forms    No documentation.                     Gabby Urias RN

## 2024-01-30 NOTE — ED NOTES
Philomena Davis    Nursing Report ED to Floor:  Mental status: a&o x4  Ambulatory status: bed red while in ED  Oxygen Therapy:  2L NC  Cardiac Rhythm: NSR  Admitted from: ED  Safety Concerns:  recent fall  Social Issues: none  ED Room #:  11    ED Nurse Phone Extension - [phone] or may call [phone].      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 03/24/2017    Closed trimalleolar fracture of right ankle 05/24/2023    Diabetes mellitus     Digital mucous cyst of toe of left foot 03/24/2017    Ganglion cyst of left foot     Glaucoma     Hypertension     Wears glasses         Past Surgical History:  Past Surgical History:   Procedure Laterality Date    ANKLE OPEN REDUCTION INTERNAL FIXATION Left 3/24/2017    Procedure: LEFT ANKLE OPEN REDUCTION INTERNAL FIXATION WITH ILIAC CREST BONE GRAFT , EXCISION CYST 4TH/5TH INNERSPACE LEFT FOOT;  Surgeon: Frank Larson MD;  Location:  [facility];  Service:     ANKLE OPEN REDUCTION INTERNAL FIXATION Right 5/25/2023    Procedure: ANKLE OPEN REDUCTION INTERNAL FIXATION;  Surgeon: Deandre Reynoso MD;  Location:  [facility];  Service: Orthopedics;  Laterality: Right;    AUGMENTATION MAMMAPLASTY Bilateral 1999    BREAST AUGMENTATION      BREAST EXCISIONAL BIOPSY Right 2014    DILATION AND CURETTAGE, DIAGNOSTIC / THERAPEUTIC      ENDOSCOPY N/A 11/30/2022    Procedure: ESOPHAGOGASTRODUODENOSCOPY;  Surgeon: Arelis Solano MD;  Location:  [facility];  Service: Gastroenterology;  Laterality: N/A;    WA RPR NON/MAL FEMUR DSTL H/N W/ILIAC/AUTOG BONE Left 3/24/2017    Procedure: ILIAC CREST BONE GRAFT;  Surgeon: Frank Larson MD;   Location: [facility];  Service: Orthopedics    WRIST SURGERY          Admitting Doctor:   Jossy Galaviz MD    Consulting Provider(s):  Consults       No orders found from 1/1/2024 to 1/31/2024.             Admitting Diagnosis:   There were no encounter diagnoses.    Most Recent Vitals:   Vitals:    01/30/24 1420 01/30/24 1430 01/30/24 1440 01/30/24 1450   BP: 100/57 104/55 115/77 109/76   BP Location:       Patient Position:       Pulse:  76 73 73   Resp:       Temp:       TempSrc:       SpO2:  94% 97% 96%   Weight:       Height:           Active LDAs/IV Access:   Lines, Drains & Airways       Active LDAs       Name Placement date Placement time Site Days    Peripheral IV Left Antecubital --  --  Antecubital  --    Peripheral IV 01/30/24 1435 Right Antecubital 01/30/24 1435  Antecubital  less than 1                    Labs (abnormal labs have a star):   Labs Reviewed   COMPREHENSIVE METABOLIC PANEL - Abnormal; Notable for the following components:       Result Value    Glucose 133 (*)     BUN 32 (*)     Sodium 134 (*)     Potassium 2.2 (*)     Chloride 87 (*)     CO2 34.0 (*)     AST (SGOT) 89 (*)     Alkaline Phosphatase 176 (*)     BUN/Creatinine Ratio 34.4 (*)     All other components within normal limits    Narrative:     GFR Normal >60  Chronic Kidney Disease <60  Kidney Failure <15     LACTIC ACID, PLASMA - Abnormal; Notable for the following components:    Lactate 2.2 (*)     All other components within normal limits   CBC WITH AUTO DIFFERENTIAL - Abnormal; Notable for the following components:    Lymphocyte % 19.1 (*)     Monocyte % 19.1 (*)     Immature Grans % 1.4 (*)     Monocytes, Absolute 1.54 (*)     Immature Grans, Absolute 0.11 (*)     All other components within normal limits   POCT OCCULT BLOOD STOOL (ED ONLY) - Abnormal; Notable for the following components:    Fecal Occult Blood Positive (*)     All other components within normal limits   MAGNESIUM - Normal   ETHANOL - Normal    Narrative:      Elevated lactic acid concentration and lactate dehydrogenase(LD) activity may falsely elevate enzymatically determined ethanol levels. Not for legal purposes.    RAINBOW DRAW    Narrative:     The following orders were created for panel order [place] Draw.  Procedure                               Abnormality         Status                     ---------                               -----------         ------                     Green Top (Gel)[[id number]]                                  Final result               Lavender Top[[id number]]                                     Final result               Gold Top - SST[[id number]]                                   Final result               Lo Top[[id number]]                                         Final result               Light Blue Top[[id number]]                                   Final result                 Please view results for these tests on the individual orders.   OCCULT BLOOD X 1, STOOL   LACTIC ACID, REFLEX   HEMOGLOBIN AND HEMATOCRIT, BLOOD   TYPE AND SCREEN   CBC AND DIFFERENTIAL    Narrative:     The following orders were created for panel order CBC & Differential.  Procedure                               Abnormality         Status                     ---------                               -----------         ------                     CBC Auto Differential[[id number]]        Abnormal            Final result                 Please view results for these tests on the individual orders.   GREEN TOP   LAVENDER TOP   GOLD TOP - SST   GRAY TOP   LIGHT BLUE TOP       Meds Given in ED:   Medications   sodium chloride 0.9 % flush 10 mL (has no administration in time range)   Potassium Replacement - Follow Nurse / BPA Driven Protocol (has no administration in time range)   Magnesium Standard Dose Replacement - Follow Nurse / BPA Driven Protocol (has no administration in time range)   Phosphorus Replacement - Follow Nurse / BPA Driven Protocol (has no administration  in time range)   potassium chloride 10 mEq in 100 mL IVPB (10 mEq Intravenous New Bag 1/30/24 1606)   sodium chloride 0.9 % flush 10 mL (has no administration in time range)   sodium chloride 0.9 % flush 10 mL (has no administration in time range)   sodium chloride 0.9 % infusion 40 mL (has no administration in time range)   nitroglycerin (NITROSTAT) SL tablet 0.4 mg (has no administration in time range)   ondansetron ODT (ZOFRAN-ODT) disintegrating tablet 4 mg (has no administration in time range)     Or   ondansetron (ZOFRAN) injection 4 mg (has no administration in time range)   thiamine (B-1) injection 200 mg (has no administration in time range)     Followed by   thiamine (VITAMIN B-1) tablet 100 mg (has no administration in time range)   folic acid (FOLVITE) tablet 1 mg (has no administration in time range)   LORazepam (ATIVAN) tablet 2 mg (has no administration in time range)     Followed by   LORazepam (ATIVAN) tablet 1 mg (has no administration in time range)   LORazepam (ATIVAN) tablet 1 mg (has no administration in time range)     Or   midazolam (VERSED) injection 2 mg (has no administration in time range)     Or   LORazepam (ATIVAN) tablet 2 mg (has no administration in time range)     Or   midazolam (VERSED) injection 2.5 mg (has no administration in time range)     Or   midazolam (VERSED) injection 2 mg (has no administration in time range)     Or   midazolam (VERSED) injection 4 mg (has no administration in time range)   pantoprazole (PROTONIX) injection 40 mg (has no administration in time range)   sodium chloride 0.9 % bolus 1,000 mL (1,000 mL Intravenous New Bag 1/30/24 1343)   sodium chloride 0.9 % infusion 1,000 mL (1,000 mL Intravenous New Bag 1/30/24 1435)   folic acid 1 mg in sodium chloride 0.9 % 50 mL IVPB (0 mg Intravenous Stopped 1/30/24 1514)   thiamine (B-1) injection 200 mg (200 mg Intravenous Given 1/30/24 1344)   pantoprazole (PROTONIX) injection 40 mg (40 mg Intravenous Given 1/30/24  1606)   iopamidol (ISOVUE-300) 61 % injection 85 mL (85 mL Intravenous Given 1/30/24 1506)

## 2024-01-30 NOTE — Clinical Note
Level of Care: Telemetry [5]   Diagnosis: Hypokalemia [134209]   Admitting Physician: RJ GARCIA [[id number]]

## 2024-01-31 ENCOUNTER — ANESTHESIA EVENT (OUTPATIENT)
Dept: GASTROENTEROLOGY | Facility: HOSPITAL | Age: 64
End: 2024-01-31
Payer: COMMERCIAL

## 2024-01-31 PROBLEM — D50.0 BLOOD LOSS ANEMIA: Status: ACTIVE | Noted: 2024-01-30

## 2024-01-31 PROBLEM — K92.0 HEMATEMESIS WITHOUT NAUSEA: Status: ACTIVE | Noted: 2024-01-30

## 2024-01-31 PROBLEM — K92.1 MELENA: Status: ACTIVE | Noted: 2024-01-30

## 2024-01-31 PROBLEM — S62.92XA CLOSED FRACTURE OF LEFT HAND: Status: ACTIVE | Noted: 2024-01-31

## 2024-01-31 LAB
ALBUMIN SERPL-MCNC: 3 G/DL (ref 3.5–5.2)
ALBUMIN/GLOB SERPL: 1 G/DL
ALP SERPL-CCNC: 136 U/L (ref 39–117)
ALT SERPL W P-5'-P-CCNC: 21 U/L (ref 1–33)
ANION GAP SERPL CALCULATED.3IONS-SCNC: 12 MMOL/L (ref 5–15)
AST SERPL-CCNC: 77 U/L (ref 1–32)
BILIRUB SERPL-MCNC: 1.1 MG/DL (ref 0–1.2)
BUN SERPL-MCNC: 23 MG/DL (ref 8–23)
BUN/CREAT SERPL: 28.8 (ref 7–25)
CALCIUM SPEC-SCNC: 7.9 MG/DL (ref 8.6–10.5)
CHLORIDE SERPL-SCNC: 96 MMOL/L (ref 98–107)
CO2 SERPL-SCNC: 26 MMOL/L (ref 22–29)
CREAT SERPL-MCNC: 0.8 MG/DL (ref 0.57–1)
EGFRCR SERPLBLD CKD-EPI 2021: 83.9 ML/MIN/1.73
GLOBULIN UR ELPH-MCNC: 3.1 GM/DL
GLUCOSE SERPL-MCNC: 106 MG/DL (ref 65–99)
HCT VFR BLD AUTO: 32.1 % (ref 34–46.6)
HGB BLD-MCNC: 10.6 G/DL (ref 12–15.9)
INR PPP: 1.1 (ref 0.89–1.12)
MAGNESIUM SERPL-MCNC: 2.1 MG/DL (ref 1.6–2.4)
PHOSPHATE SERPL-MCNC: 2.9 MG/DL (ref 2.5–4.5)
POTASSIUM SERPL-SCNC: 3.1 MMOL/L (ref 3.5–5.2)
POTASSIUM SERPL-SCNC: 3.2 MMOL/L (ref 3.5–5.2)
POTASSIUM SERPL-SCNC: 4.5 MMOL/L (ref 3.5–5.2)
PROT SERPL-MCNC: 6.1 G/DL (ref 6–8.5)
PROTHROMBIN TIME: 14.3 SECONDS (ref 12.2–14.5)
SODIUM SERPL-SCNC: 134 MMOL/L (ref 136–145)

## 2024-01-31 PROCEDURE — 85610 PROTHROMBIN TIME: CPT | Performed by: PHYSICIAN ASSISTANT

## 2024-01-31 PROCEDURE — G0378 HOSPITAL OBSERVATION PER HR: HCPCS

## 2024-01-31 PROCEDURE — 94640 AIRWAY INHALATION TREATMENT: CPT

## 2024-01-31 PROCEDURE — 84132 ASSAY OF SERUM POTASSIUM: CPT | Performed by: INTERNAL MEDICINE

## 2024-01-31 PROCEDURE — 85014 HEMATOCRIT: CPT | Performed by: INTERNAL MEDICINE

## 2024-01-31 PROCEDURE — 97530 THERAPEUTIC ACTIVITIES: CPT

## 2024-01-31 PROCEDURE — 84100 ASSAY OF PHOSPHORUS: CPT | Performed by: INTERNAL MEDICINE

## 2024-01-31 PROCEDURE — 83735 ASSAY OF MAGNESIUM: CPT | Performed by: INTERNAL MEDICINE

## 2024-01-31 PROCEDURE — 94799 UNLISTED PULMONARY SVC/PX: CPT

## 2024-01-31 PROCEDURE — 99214 OFFICE O/P EST MOD 30 MIN: CPT | Performed by: PHYSICIAN ASSISTANT

## 2024-01-31 PROCEDURE — 85018 HEMOGLOBIN: CPT | Performed by: INTERNAL MEDICINE

## 2024-01-31 PROCEDURE — 25010000002 THIAMINE PER 100 MG: Performed by: INTERNAL MEDICINE

## 2024-01-31 PROCEDURE — 80053 COMPREHEN METABOLIC PANEL: CPT | Performed by: INTERNAL MEDICINE

## 2024-01-31 PROCEDURE — 25010000002 OCTREOTIDE PER 25 MCG: Performed by: INTERNAL MEDICINE

## 2024-01-31 PROCEDURE — 97161 PT EVAL LOW COMPLEX 20 MIN: CPT

## 2024-01-31 PROCEDURE — 99232 SBSQ HOSP IP/OBS MODERATE 35: CPT | Performed by: INTERNAL MEDICINE

## 2024-01-31 RX ORDER — POTASSIUM CHLORIDE 20 MEQ/1
40 TABLET, EXTENDED RELEASE ORAL EVERY 4 HOURS
Status: COMPLETED | OUTPATIENT
Start: 2024-01-31 | End: 2024-01-31

## 2024-01-31 RX ORDER — LORAZEPAM 1 MG/1
1 TABLET ORAL EVERY 6 HOURS SCHEDULED
Status: DISCONTINUED | OUTPATIENT
Start: 2024-02-01 | End: 2024-02-01

## 2024-01-31 RX ORDER — FAMOTIDINE 20 MG/1
20 TABLET, FILM COATED ORAL ONCE
Status: CANCELLED | OUTPATIENT
Start: 2024-01-31 | End: 2024-01-31

## 2024-01-31 RX ORDER — FAMOTIDINE 10 MG/ML
20 INJECTION, SOLUTION INTRAVENOUS ONCE
Status: CANCELLED | OUTPATIENT
Start: 2024-01-31 | End: 2024-01-31

## 2024-01-31 RX ORDER — MIDAZOLAM HYDROCHLORIDE 1 MG/ML
1 INJECTION INTRAMUSCULAR; INTRAVENOUS
Status: CANCELLED | OUTPATIENT
Start: 2024-01-31

## 2024-01-31 RX ADMIN — TIOTROPIUM BROMIDE INHALATION SPRAY 2 PUFF: 3.12 SPRAY, METERED RESPIRATORY (INHALATION) at 09:48

## 2024-01-31 RX ADMIN — BUDESONIDE AND FORMOTEROL FUMARATE DIHYDRATE 1 PUFF: 160; 4.5 AEROSOL RESPIRATORY (INHALATION) at 09:47

## 2024-01-31 RX ADMIN — CLONIDINE HYDROCHLORIDE 0.2 MG: 0.2 TABLET ORAL at 09:03

## 2024-01-31 RX ADMIN — DESVENLAFAXINE 50 MG: 50 TABLET, FILM COATED, EXTENDED RELEASE ORAL at 09:04

## 2024-01-31 RX ADMIN — OCTREOTIDE ACETATE 50 MCG/HR: 500 INJECTION, SOLUTION INTRAVENOUS; SUBCUTANEOUS at 23:34

## 2024-01-31 RX ADMIN — FOLIC ACID 1 MG: 1 TABLET ORAL at 09:04

## 2024-01-31 RX ADMIN — PANTOPRAZOLE SODIUM 40 MG: 40 INJECTION, POWDER, LYOPHILIZED, FOR SOLUTION INTRAVENOUS at 09:04

## 2024-01-31 RX ADMIN — THIAMINE HYDROCHLORIDE 200 MG: 100 INJECTION, SOLUTION INTRAMUSCULAR; INTRAVENOUS at 13:20

## 2024-01-31 RX ADMIN — POTASSIUM CHLORIDE 40 MEQ: 1500 TABLET, EXTENDED RELEASE ORAL at 18:11

## 2024-01-31 RX ADMIN — CLONIDINE HYDROCHLORIDE 0.2 MG: 0.2 TABLET ORAL at 21:16

## 2024-01-31 RX ADMIN — POTASSIUM CHLORIDE 40 MEQ: 1500 TABLET, EXTENDED RELEASE ORAL at 09:17

## 2024-01-31 RX ADMIN — Medication 10 ML: at 21:16

## 2024-01-31 RX ADMIN — PANTOPRAZOLE SODIUM 40 MG: 40 INJECTION, POWDER, LYOPHILIZED, FOR SOLUTION INTRAVENOUS at 18:07

## 2024-01-31 RX ADMIN — SENNOSIDES AND DOCUSATE SODIUM 2 TABLET: 8.6; 5 TABLET ORAL at 09:03

## 2024-01-31 RX ADMIN — ROSUVASTATIN CALCIUM 5 MG: 10 TABLET, FILM COATED ORAL at 21:15

## 2024-01-31 RX ADMIN — LORAZEPAM 1 MG: 1 TABLET ORAL at 23:34

## 2024-01-31 RX ADMIN — THIAMINE HYDROCHLORIDE 200 MG: 100 INJECTION, SOLUTION INTRAMUSCULAR; INTRAVENOUS at 05:14

## 2024-01-31 RX ADMIN — POTASSIUM CHLORIDE 40 MEQ: 1500 TABLET, EXTENDED RELEASE ORAL at 13:24

## 2024-01-31 RX ADMIN — Medication 10 ML: at 09:11

## 2024-01-31 RX ADMIN — URSODIOL 300 MG: 300 CAPSULE ORAL at 09:04

## 2024-01-31 RX ADMIN — OCTREOTIDE ACETATE 50 MCG/HR: 500 INJECTION, SOLUTION INTRAVENOUS; SUBCUTANEOUS at 13:17

## 2024-01-31 RX ADMIN — LORAZEPAM 2 MG: 1 TABLET ORAL at 11:28

## 2024-01-31 RX ADMIN — THIAMINE HYDROCHLORIDE 200 MG: 100 INJECTION, SOLUTION INTRAMUSCULAR; INTRAVENOUS at 21:15

## 2024-01-31 RX ADMIN — LORAZEPAM 1 MG: 1 TABLET ORAL at 18:13

## 2024-01-31 NOTE — CONSULTS
Orthopedic Consult      Patient: Philomena Davis    Date of Admission: 1/30/2024 12:11 PM    YOB: 1960    Medical Record Number: [id number]    Attending Physician: Tracie Guillermo MD    Consulting Physician: Ravi Hathaway Jr., MD      Chief Complaints: Hypokalemia [E87.6]      History of Present Illness: 63 y.o. female admitted to [facility] with Hypokalemia [E87.6]. I was consulted for further evaluation and treatment of left 5th metacarpal fracture. Onset of symptoms was unknown due to patient's inability to give reliable history.  Symptoms are associated with left hand pain and swelling.  Symptoms are aggravated by motion of LUE.   Symptoms improve with rest. This is a 62yo F with h/o alcohol abuse who was admitted to [facility] yesterday due to generalized weakness and recent hematemesis. She is unsure when her left hand became painful, but workup with [facility] ED revealed 4th and 5th MC fracture. She was splinted in an ulnar gutter splint. Denies any other motor deficits, paresthesias, or pain elsewhere.       Allergies   Allergen Reactions    Buspirone Anxiety    Codeine Nausea Only    Mirtazapine Anxiety        Home Medications:  Medications Prior to Admission   Medication Sig Dispense Refill Last Dose    albuterol sulfate  (90 Base) MCG/ACT inhaler Inhale 2 puffs Every 4 (Four) Hours As Needed for Wheezing. 6.7 g 2     cholecalciferol (VITAMIN D3) 25 MCG (1000 UT) tablet Take 1 tablet by mouth Daily. 30 tablet 0     cloNIDine (Catapres) 0.2 MG tablet Take 1 tablet by mouth 2 (Two) Times a Day. 60 tablet 1     desvenlafaxine (PRISTIQ) 50 MG 24 hr tablet Take 1 tablet by mouth Daily.       Fluticasone-Salmeterol (ADVAIR/WIXELA) 100-50 MCG/ACT DISKUS Inhale 2 (Two) Times a Day.       hydroCHLOROthiazide (HYDRODIURIL) 12.5 MG tablet        latanoprost (XALATAN) 0.005 % ophthalmic solution Administer 1 drop to both eyes Every Night.       pantoprazole (PROTONIX) 40 MG EC tablet Take 1  tablet by mouth Daily.       rosuvastatin (CRESTOR) 5 MG tablet TAKE 1 TABLET BY MOUTH DAILY 90 tablet 0     tiotropium (Spiriva HandiHaler) 18 MCG per inhalation capsule Place 1 capsule into inhaler and inhale Daily. 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 03/24/2017    Closed trimalleolar fracture of right ankle 05/24/2023    Diabetes mellitus     Digital mucous cyst of toe of left foot 03/24/2017    Ganglion cyst of left foot     Glaucoma     Hypertension     Wears glasses         Past Surgical History:   Procedure Laterality Date    ANKLE OPEN REDUCTION INTERNAL FIXATION Left 3/24/2017    Procedure: LEFT ANKLE OPEN REDUCTION INTERNAL FIXATION WITH ILIAC CREST BONE GRAFT , EXCISION CYST 4TH/5TH INNERSPACE LEFT FOOT;  Surgeon: Frank Larson MD;  Location:  [facility];  Service:     ANKLE OPEN REDUCTION INTERNAL FIXATION Right 5/25/2023    Procedure: ANKLE OPEN REDUCTION INTERNAL FIXATION;  Surgeon: Deandre Reynoso MD;  Location:  [facility];  Service: Orthopedics;  Laterality: Right;    AUGMENTATION MAMMAPLASTY Bilateral 1999    BREAST AUGMENTATION      BREAST EXCISIONAL BIOPSY Right 2014    DILATION AND CURETTAGE, DIAGNOSTIC / THERAPEUTIC      ENDOSCOPY N/A 11/30/2022    Procedure: ESOPHAGOGASTRODUODENOSCOPY;  Surgeon: Arelis Solano MD;  Location:  [facility];  Service: Gastroenterology;  Laterality: N/A;    NE RPR NON/MAL FEMUR DSTL H/N W/ILIAC/AUTOG BONE Left 3/24/2017    Procedure: ILIAC CREST BONE GRAFT;  Surgeon: Frank Larson MD;  Location:  [facility];  Service: Orthopedics    WRIST SURGERY          Social History     Occupational History    Not on file   Tobacco Use    Smoking  status: Former     Packs/day: 1.50     Years: 20.00     Additional pack years: 0.00     Total pack years: 30.00     Types: Cigarettes     Quit date: 3/22/2009     Years since quittin.8    Smokeless tobacco: Never   Vaping Use    Vaping Use: Never used   Substance and Sexual Activity    Alcohol use: Yes     Alcohol/week: 7.0 standard drinks of alcohol     Types: 7 Glasses of wine per week     Comment: 1/5 VODKA EVERY TWO DAYS    Drug use: No    Sexual activity: Defer      Social History     Social History Narrative    Not on file        Family History   Problem Relation Age of Onset    Cancer Mother     No Known Problems Father     No Known Problems Sister     No Known Problems Brother     No Known Problems Daughter     No Known Problems Son     No Known Problems Maternal Grandmother     Breast cancer Paternal Grandmother 68        met to brain    No Known Problems Maternal Aunt     No Known Problems Paternal Aunt     Rheum arthritis Other     Heart disease Other     Cancer Other     Stroke Other     Hypertension Other     BRCA 1/2 Neg Hx     Colon cancer Neg Hx     Endometrial cancer Neg Hx     Ovarian cancer Neg Hx          Review of Systems:   HEENT: Patient denies any headaches, vision changes, change in hearing, or tinnitus, Patient denies any rhinorrhea, epistaxis, sinus pain, mouth or dental problems, sore throat or hoarseness, or dysphagia  Pulmonary: Patient denies any cough, congestion, SOA, or wheezing  Cardiovascular: Patient denies any chest pain, dyspnea, palpitations, weakness, intolerance of exercise, varicosities, swelling of extremities, known murmur  Gastrointestinal:  cirrhosis  Genital/Urinary: Patient denies dysuria, change in color of urine, change in frequency of urination, pain with urgency, incontinence, retention, or nocturia.  Musculoskeletal: Patient denies increased warmth; redness; or swelling of joints; limitation of function; deformity; crepitation: pain in a joint or an  extremity, the neck, or the back, especially with movement.  Neurological: Patient denies dizziness, tremor, ataxia, difficulty in speaking, change in speech, paresthesia, loss of sensation, seizures, syncope, changes in memory.  Endocrine system: Patient denies tremors, palpitations, intolerance of heat or cold, polyuria, polydipsia, polyphagia, diaphoresis, exophthalmos, or goiter.  Psychological: Patient denies thoughts/plans or harming self or other; depression,  insomnia, night terrors, lynette, memory loss, disorientation.  Skin: Patient denies any bruising, rashes, discoloration, pruritus, wounds, ulcers, decubiti, changes in the hair or nails  Hematopoietic: recent hematemsis    Physical Exam: 63 y.o. female  General Appearance:    Alert, cooperative, in no acute distress                   Vitals:    01/30/24 2322 01/31/24 0350 01/31/24 0352 01/31/24 0700   BP: 108/68 111/54  105/56   BP Location: Right arm   Right arm   Patient Position: Lying   Lying   Pulse: 72   65   Resp: 16  16 16   Temp: 98 °F (36.7 °C)  97.9 °F (36.6 °C) 97.7 °F (36.5 °C)   TempSrc: Oral  Oral Oral   SpO2: 92%   99%   Weight:       Height:            Head:    Normocephalic, without obvious abnormality, atraumatic      Right Upper Extremity:  No obvious deformity, painless ROM shoulder, elbow, wrist, no joint instability, normal distal strength and sensation to light touch, skin intact without cyanosis, clubbing, edema; +2 radial pulse  Left Upper Extremity:  ulnar gutter splint in place with well perfused digits., normal distal strength and sensation to light touch, skin intact without cyanosis, clubbing, edema; +2 radial pulse  Right Lower Extremity:  No obvious deformity, painless ROM hip, knee, ankle, compartments soft, normal distal strength and sensation to light touch, skin intact without cyanosis, clubbing, edema; +2 dorsalis pedis pulse  Left Lower Extremity:  No obvious deformity, painless ROM hip, knee, ankle, compartments  soft, normal distal strength and sensation to light touch, skin intact without cyanosis, clubbing, edema; +2 dorsalis pedis pulse         Diagnostic Tests:    I have reviewed the labs, radiology results and diagnostic studies:AP/lateral/oblique of L hand report reviewed. 3v of L hand on my exam demonstrates evidence of minimally displaced long, oblique 5th MC shaft fracture withotu significant sagittal malalignment. There is minimally displaced 4th MC shaft fracture present, as well    Results from last 7 days   Lab Units 01/31/24  0739 01/30/24  1808 01/30/24  1234   WBC 10*3/mm3  --   --  8.07   HEMOGLOBIN g/dL 10.6*   < > 12.9   PLATELETS 10*3/mm3  --   --  317    < > = values in this interval not displayed.     Results from last 7 days   Lab Units 01/31/24  0226   SODIUM mmol/L 134*   POTASSIUM mmol/L 3.2*   CO2 mmol/L 26.0   CREATININE mg/dL 0.80   GLUCOSE mg/dL 106*           Assessment: L 4th and 5th MC shaft fractures  Patient Active Problem List   Diagnosis    Essential hypertension    Generalized anxiety disorder    Hyperlipidemia    Glaucoma    Alcoholic cirrhosis of liver without ascites    Severe malnutrition    GERD without esophagitis    Sleep disturbance    Annual physical exam    Idiopathic peripheral neuropathy    Severe anxiety with panic    Alcohol use disorder    Cigarette nicotine dependence in remission    Chronic obstructive pulmonary disease    Hypokalemia           Plan:  The patient voiced understanding of the risks, benefits, and alternative forms of treatment that were discussed and the patient consents to proceed with continues conservative managemetn. Given the lack of angulation, we discussed that non operative mgmt, likely will result in reliable healing with approx 5 weeks of splint/cast wear. She understands and wisehs to continue with conservative mgmt.    SHARON NELSON  Will arrange for f/u with Naomi Duncan PA-C in 1 week.              Ravi Hathaway Jr., MD  01/31/24  08:53  EST

## 2024-01-31 NOTE — CONSULTS
[facility] Consult    Referring Provider: No ref. provider found    PCP: Unique Brandt DO    Reason for Consultation: Hematochezia, Melena, EtOH cirrhosis     History of present illness:    Philomena Davis is a 63 y.o. female, PMH includes HTN, anxiety, GERD, EtOH abuse, COPD, is admitted via ED yesterday for evaluation of EtOH cirrhosis, melena, hematochezia.     Pt notes generalized weakness, hematemesis x 1, melena x 1 prior to coming to the hospital. She takes pantoprazole 40mg daily at home, otherwise denies breakthrough indigestion or abdominal pain. She denies GI complaints since admission. Pt follows with CLAUDE Cox at  Hepatology. She denies current EtOH use.     Patient denies associated fever, chills, diarrhea, constipation, dysphagia, hematochezia, bloating, weight loss or gain, dysuria, jaundice or bruising.    Labs at time of admission significant for Hb 12.9 > 10.6 (today). Hct 38.5, MCV 96.5, MCH 32.4, plt 317, BUN 32, Cr 0.93, Na 134, K+ 2.2, total bili 1.1, AST 89, ALT 24, alk phos 176. Hemoccult (+).     CT A/P w/wo contrast 1/30: No acute process in the abdomen or pelvis    Patient denies personal or FHx of PUD, H Pylori, pancreatitis, colitis, Celiac disease, UC, Crohn's disease, IBS, colon or gastric cancers. Pt denies EtOH, tobacco, illicit substance or NSAID use.    EGD 11/2022 with Dr. Solano. LA Grade C esophagitis. Mild portal hypertensive gastropathy. Mild antral gastritis. Normal duodenum. Recommend repeat EGD in 3 years.     Allergies:  Buspirone, Codeine, and Mirtazapine    Scheduled Meds:  budesonide-formoterol, 1 puff, Inhalation, BID - RT  cloNIDine, 0.2 mg, Oral, BID  desvenlafaxine, 50 mg, Oral, Daily  folic acid, 1 mg, Oral, Daily  latanoprost, 1 drop, Both Eyes, Nightly  LORazepam, 2 mg, Oral, Q6H   Followed by  LORazepam, 1 mg, Oral, Q6H  pantoprazole, 40 mg, Intravenous, BID AC  potassium chloride ER, 40 mEq, Oral, Q4H  rosuvastatin, 5 mg, Oral,  Nightly  senna-docusate sodium, 2 tablet, Oral, BID  sodium chloride, 10 mL, Intravenous, Q12H  sodium chloride, 10 mL, Intravenous, Q12H  thiamine (B-1) IV, 200 mg, Intravenous, Q8H   Followed by  [START ON 2/5/2024] thiamine, 100 mg, Oral, Daily  tiotropium bromide monohydrate, 2 puff, Inhalation, Daily - RT  ursodiol, 300 mg, Oral, Daily         Infusions:  octreotide (SandoSTATIN) infusion, 50 mcg/hr, Last Rate: 50 mcg/hr (01/31/24 1317)        PRN Meds:    senna-docusate sodium **AND** polyethylene glycol **AND** bisacodyl **AND** bisacodyl    LORazepam **OR** midazolam **OR** LORazepam **OR** midazolam **OR** midazolam **OR** midazolam    Magnesium Standard Dose Replacement - Follow Nurse / BPA Driven Protocol    nitroglycerin    ondansetron ODT **OR** ondansetron    Phosphorus Replacement - Follow Nurse / BPA Driven Protocol    Potassium Replacement - Follow Nurse / BPA Driven Protocol    sodium chloride    sodium chloride    sodium chloride    sodium chloride    sodium chloride    Home Meds:  Medications Prior to Admission   Medication Sig Dispense Refill Last Dose    albuterol sulfate  (90 Base) MCG/ACT inhaler Inhale 2 puffs Every 4 (Four) Hours As Needed for Wheezing. 6.7 g 2     cholecalciferol (VITAMIN D3) 25 MCG (1000 UT) tablet Take 1 tablet by mouth Daily. 30 tablet 0     cloNIDine (Catapres) 0.2 MG tablet Take 1 tablet by mouth 2 (Two) Times a Day. 60 tablet 1     desvenlafaxine (PRISTIQ) 50 MG 24 hr tablet Take 1 tablet by mouth Daily.       Fluticasone-Salmeterol (ADVAIR/WIXELA) 100-50 MCG/ACT DISKUS Inhale 2 (Two) Times a Day.       hydroCHLOROthiazide (HYDRODIURIL) 12.5 MG tablet        latanoprost (XALATAN) 0.005 % ophthalmic solution Administer 1 drop to both eyes Every Night.       pantoprazole (PROTONIX) 40 MG EC tablet Take 1 tablet by mouth Daily.       rosuvastatin (CRESTOR) 5 MG tablet TAKE 1 TABLET BY MOUTH DAILY 90 tablet 0     tiotropium (Spiriva HandiHaler) 18 MCG per  inhalation capsule Place 1 capsule into inhaler and inhale Daily. 30 capsule 2     ursodiol (ACTIGALL) 300 MG capsule           ROS: Review of Systems   Constitutional:  Positive for fatigue. Negative for chills, diaphoresis and unexpected weight change.   HENT:  Negative for drooling, facial swelling, mouth sores, nosebleeds, rhinorrhea, sore throat, tinnitus, trouble swallowing and voice change.    Respiratory:  Negative for cough, chest tightness and shortness of breath.    Cardiovascular:  Negative for chest pain, palpitations and leg swelling.   Gastrointestinal:  Positive for blood in stool and vomiting (hematemesis). Negative for abdominal distention, abdominal pain, constipation, diarrhea and nausea.        (+) melena   Genitourinary:  Negative for dysuria, flank pain and hematuria.   Musculoskeletal:  Negative for arthralgias, joint swelling and myalgias.   Skin:  Negative for color change, pallor and rash.   Neurological:  Positive for weakness. Negative for dizziness, tremors, syncope and light-headedness.   Psychiatric/Behavioral:  Negative for confusion and hallucinations.    All other systems reviewed and are negative.      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 03/24/2017    Closed trimalleolar fracture of right ankle 05/24/2023    Diabetes mellitus     Digital mucous cyst of toe of left foot 03/24/2017    Ganglion cyst of left foot     Glaucoma     Hypertension     Wears glasses        PAST SURG HX:  Past Surgical History:   Procedure Laterality Date    ANKLE OPEN REDUCTION INTERNAL FIXATION Left 3/24/2017    Procedure: LEFT ANKLE OPEN REDUCTION INTERNAL FIXATION WITH ILIAC CREST BONE GRAFT , EXCISION CYST 4TH/5TH INNERSPACE LEFT FOOT;   Surgeon: Frank Larson MD;  Location:  [facility];  Service:     ANKLE OPEN REDUCTION INTERNAL FIXATION Right 2023    Procedure: ANKLE OPEN REDUCTION INTERNAL FIXATION;  Surgeon: Deandre Reynoso MD;  Location:  [facility];  Service: Orthopedics;  Laterality: Right;    AUGMENTATION MAMMAPLASTY Bilateral     BREAST AUGMENTATION      BREAST EXCISIONAL BIOPSY Right 2014    DILATION AND CURETTAGE, DIAGNOSTIC / THERAPEUTIC      ENDOSCOPY N/A 2022    Procedure: ESOPHAGOGASTRODUODENOSCOPY;  Surgeon: Arelis Solano MD;  Location:  [facility];  Service: Gastroenterology;  Laterality: N/A;    KY RPR NON/MAL FEMUR DSTL H/N W/ILIAC/AUTOG BONE Left 3/24/2017    Procedure: ILIAC CREST BONE GRAFT;  Surgeon: Frank Larson MD;  Location:  [facility];  Service: Orthopedics    WRIST SURGERY         FAM HX:  Family History   Problem Relation Age of Onset    Cancer Mother     No Known Problems Father     No Known Problems Sister     No Known Problems Brother     No Known Problems Daughter     No Known Problems Son     No Known Problems Maternal Grandmother     Breast cancer Paternal Grandmother 68        met to brain    No Known Problems Maternal Aunt     No Known Problems Paternal Aunt     Rheum arthritis Other     Heart disease Other     Cancer Other     Stroke Other     Hypertension Other     BRCA 1/2 Neg Hx     Colon cancer Neg Hx     Endometrial cancer Neg Hx     Ovarian cancer Neg Hx        SOC HX:  Social History     Socioeconomic History    Marital status:    Tobacco Use    Smoking status: Former     Packs/day: 1.50     Years: 20.00     Additional pack years: 0.00     Total pack years: 30.00     Types: Cigarettes     Quit date: 3/22/2009     Years since quittin.8    Smokeless tobacco: Never   Vaping Use    Vaping Use: Never used   Substance and Sexual Activity    Alcohol use: Yes     Alcohol/week: 7.0 standard drinks of alcohol     Types: 7 Glasses of wine per week     Comment:   "VODKA EVERY TWO DAYS    Drug use: No    Sexual activity: Defer       PHYSICAL EXAM  BP 95/53 (BP Location: Right arm, Patient Position: Lying)   Pulse 79   Temp 98.1 °F (36.7 °C) (Oral)   Resp 16   Ht 162.6 cm (64\")   Wt 67.1 kg (148 lb)   LMP 01/22/2016 (Approximate)   SpO2 100%   BMI 25.40 kg/m²   Wt Readings from Last 3 Encounters:   01/30/24 67.1 kg (148 lb)   10/25/23 79.9 kg (176 lb 3.2 oz)   09/12/23 74.8 kg (165 lb)   ,body mass index is 25.4 kg/m².  Physical Exam  Vitals and nursing note reviewed.   Constitutional:       Appearance: Normal appearance. She is normal weight. She is not ill-appearing or diaphoretic.   HENT:      Head: Normocephalic and atraumatic.      Right Ear: External ear normal.      Left Ear: External ear normal.      Nose: Nose normal.      Mouth/Throat:      Mouth: Mucous membranes are moist.      Pharynx: Oropharynx is clear.   Eyes:      General: No scleral icterus.     Conjunctiva/sclera: Conjunctivae normal.      Pupils: Pupils are equal, round, and reactive to light.   Neck:      Thyroid: No thyromegaly.   Cardiovascular:      Rate and Rhythm: Normal rate and regular rhythm.      Pulses: Normal pulses.      Heart sounds: Normal heart sounds.   Pulmonary:      Effort: Pulmonary effort is normal.      Breath sounds: Normal breath sounds.   Abdominal:      General: Abdomen is flat. Bowel sounds are normal. There is no distension.      Tenderness: There is no abdominal tenderness.   Musculoskeletal:         General: Normal range of motion.      Cervical back: Normal range of motion and neck supple.   Skin:     General: Skin is warm and dry.      Coloration: Skin is not jaundiced.   Neurological:      General: No focal deficit present.      Mental Status: She is oriented to person, place, and time.   Psychiatric:         Mood and Affect: Mood normal.         Results Review:   I reviewed the patient's new clinical results.  I reviewed the patient's new imaging results and agree " with the interpretation.  I reviewed the patient's other test results and agree with the interpretation    Lab Results   Component Value Date    WBC 8.07 01/30/2024    HGB 10.6 (L) 01/31/2024    HGB 11.8 (L) 01/30/2024    HGB 12.9 01/30/2024    HCT 32.1 (L) 01/31/2024    MCV 96.7 01/30/2024     01/30/2024       Lab Results   Component Value Date    INR 1.0 08/09/2023    INR 1.0 02/13/2023    INR 1.18 (H) 11/30/2022       Lab Results   Component Value Date    GLUCOSE 106 (H) 01/31/2024    BUN 23 01/31/2024    CREATININE 0.80 01/31/2024    EGFRIFNONA 114 05/15/2019    BCR 28.8 (H) 01/31/2024     (L) 01/31/2024    K 3.1 (L) 01/31/2024    CO2 26.0 01/31/2024    CALCIUM 7.9 (L) 01/31/2024    ALBUMIN 3.0 (L) 01/31/2024    ALKPHOS 136 (H) 01/31/2024    BILITOT 1.1 01/31/2024    ALT 21 01/31/2024    AST 77 (H) 01/31/2024       ASSESSMENTS/PLANS    EtOH cirrhosis  Melena  Hematochezia  Blood loss anemia  Portal hypertensive gastropathy  Generalized weakness  Hypokalemia   - continue trending H/H and transfuse per hospitalist protocol    - BID PPI   - GI soft diet ordered, NPO at midnight   - plan for EGD tomorrow with Dr. Brunner   - obtain AM CMP, PT/INR   - MELD-Na, Child Ravi score pending labs    I discussed the patient's findings and my recommendations with patient. Thank you very kindly for this consultation. Will continue to follow during this hospitalization.      Ainsley Naranjo PA-C  01/31/24  14:01 EST

## 2024-01-31 NOTE — THERAPY EVALUATION
Patient Name: Philomena Davis  : 1960    MRN: [id number]                              Today's Date: 2024       Admit Date: 2024    Visit Dx: No diagnosis found.  Patient Active Problem List   Diagnosis    Essential hypertension    Generalized anxiety disorder    Hyperlipidemia    Glaucoma    Alcoholic cirrhosis of liver without ascites    Severe malnutrition    GERD without esophagitis    Sleep disturbance    Annual physical exam    Idiopathic peripheral neuropathy    Severe anxiety with panic    Alcohol use disorder    Cigarette nicotine dependence in remission    Chronic obstructive pulmonary disease    Hypokalemia     Past Medical History:   Diagnosis Date    Alcohol withdrawal 05/10/2019    Annual physical exam 2023    Anxiety and depression     Cirrhosis of liver     Closed displaced fracture of lateral malleolus of left fibula     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    Closed trimalleolar fracture of right ankle 2023    Diabetes mellitus     Digital mucous cyst of toe of left foot 2017    Ganglion cyst of left foot     Glaucoma     Hypertension     Wears glasses      Past Surgical History:   Procedure Laterality Date    ANKLE OPEN REDUCTION INTERNAL FIXATION Left 3/24/2017    Procedure: LEFT ANKLE OPEN REDUCTION INTERNAL FIXATION WITH ILIAC CREST BONE GRAFT , EXCISION CYST / INNERSPACE LEFT FOOT;  Surgeon: Frank Larson MD;  Location:  [facility];  Service:     ANKLE OPEN REDUCTION INTERNAL FIXATION Right 2023    Procedure: ANKLE OPEN REDUCTION INTERNAL FIXATION;  Surgeon: Deandre Reynoso MD;  Location:  [facility];  Service: Orthopedics;  Laterality: Right;    AUGMENTATION MAMMAPLASTY Bilateral     BREAST AUGMENTATION      BREAST EXCISIONAL BIOPSY Right     DILATION AND CURETTAGE, DIAGNOSTIC / THERAPEUTIC      ENDOSCOPY N/A 2022    Procedure: ESOPHAGOGASTRODUODENOSCOPY;  Surgeon: Arelis Solano MD;   Location: [facility] END[facility]COPY;  Service: Gastroenterology;  Laterality: N/A;    MO RPR NON/MAL FEMUR DSTL H/N W/ILIAC/AUTOG BONE Left 3/24/2017    Procedure: ILIAC CREST BONE GRAFT;  Surgeon: Frank Larson MD;  Location: [facility] OR;  Service: Orthopedics    WRIST SURGERY        General Information       Row Name 01/31/24 0906          Physical Therapy Time and Intention    Document Type evaluation  -KE     Mode of Treatment physical therapy  -       Row Name 01/31/24 0906          General Information    Patient Profile Reviewed yes  -KE     Prior Level of Function independent:;all household mobility;community mobility;gait;bed mobility;ADL's;home management;cooking;cleaning;driving  Pt ind w/ all mobility and ADLs. Has tub shower with shower chair. Owns no DME otherwise. Reports she has been mostly bed bound for past two weeks and had frequent falls  -KE     Existing Precautions/Restrictions fall;oxygen therapy device and L/min;other (see comments)  L 5th metacarpal fx  -KE     Barriers to Rehab medically complex;previous functional deficit  -KE       Row Name 01/31/24 0906          Living Environment    People in Home alone  -       Row Name 01/31/24 0906          Home Main Entrance    Number of Stairs, Main Entrance three  -KE     Stair Railings, Main Entrance railing on left side (ascending)  -       Row Name 01/31/24 0906          Stairs Within Home, Primary    Stairs, Within Home, Primary flight to second floor, pt does not use  -KE     Number of Stairs, Within Home, Primary twelve  -KE       Row Name 01/31/24 0906          Cognition    Orientation Status (Cognition) oriented x 4  -KE       Row Name 01/31/24 0906          Safety Issues, Functional Mobility    Safety Issues Affecting Function (Mobility) awareness of need for assistance;insight into deficits/self-awareness;safety precaution awareness;safety precautions follow-through/compliance  -KE     Impairments Affecting Function (Mobility)  balance;endurance/activity tolerance;pain;strength;sensation/sensory awareness  -EFRAÍN               User Key  (r) = Recorded By, (t) = Taken By, (c) = Cosigned By      Initials Name Provider Type    Corry Duke PT Physical Therapist                   Mobility       Row Name 01/31/24 0910          Bed Mobility    Bed Mobility supine-sit;sit-supine;scooting/bridging  -EFRAÍN     Scooting/Bridging [place] (Bed Mobility) contact guard;1 person assist;verbal cues  -KE     Supine-Sit [place] (Bed Mobility) minimum assist (75% patient effort);1 person assist;verbal cues  -KE     Sit-Supine [place] (Bed Mobility) contact guard;1 person assist;verbal cues  -KE     Assistive Device (Bed Mobility) bed rails;head of bed elevated  -EFRAÍN     Comment, (Bed Mobility) Pt req Minax1 at trunk to complete sup-sit; VCs for sequencing, pt req increased time and effort  -       Row Name 01/31/24 0910          Transfers    Comment, (Transfers) x2 STS from EOB; steps to HOB  -       Row Name 01/31/24 0910          Sit-Stand Transfer    Sit-Stand [place] (Transfers) minimum assist (75% patient effort);1 person assist;verbal cues  -KE     Comment, (Sit-Stand Transfer) VCs for UE push from bed  -       Row Name 01/31/24 0910          Gait/Stairs (Locomotion)    [place] Level (Gait) minimum assist (75% patient effort);1 person assist;verbal cues  -KE     Assistive Device (Gait) other (see comments)  R UE assist  -EFRAÍN     Comment, (Gait/Stairs) Pt complete x5 steps to HOB w/ R UE support, MinAx1. Pt demo minimal foot clearance with short step length. Pt demo significant unsteadiness/shakiness in stance with increased ant/post sway noted. Further mobility limited by weakness and fatigue.  -EFRAÍN               User Key  (r) = Recorded By, (t) = Taken By, (c) = Cosigned By      Initials Name Provider Type    Corry Duke PT Physical Therapist                   Obj/Interventions       Row Name 01/31/24 0980           Range of Motion Comprehensive    General Range of Motion bilateral lower extremity ROM WFL  -KE       Row Name 01/31/24 0919          Strength Comprehensive (MMT)    General Manual Muscle Testing (MMT) Assessment lower extremity strength deficits identified  -KE     Comment, General Manual Muscle Testing (MMT) Assessment DF 4+/5; PF 4+/5; knee flex/ext: 4-/5; hip flexion 3+/5  -KE       Row Name 01/31/24 0919          Balance    Balance Assessment sitting static balance;sitting dynamic balance;sit to stand dynamic balance;standing static balance;standing dynamic balance  -KE     Static Sitting Balance standby assist  -KE     Dynamic Sitting Balance supervision  -KE     Position, Sitting Balance unsupported;sitting edge of bed  -KE     Sit to Stand Dynamic Balance 1-person assist;verbal cues;minimal assist  -KE     Static Standing Balance minimal assist;1-person assist;verbal cues  -KE     Dynamic Standing Balance minimal assist;1-person assist;verbal cues  -KE     Position/Device Used, Standing Balance supported;other (see comments)  R UE support  -KE     Balance Interventions sitting;standing;sit to stand;supported;static;dynamic  -KE     Comment, Balance Pt demo significant unsteadiness in stance d/t weakness.  -KE       Row Name 01/31/24 0919          Sensory Assessment (Somatosensory)    Sensory Assessment (Somatosensory) bilateral LE  -KE     Bilateral LE Sensory Assessment impaired  -KE               User Key  (r) = Recorded By, (t) = Taken By, (c) = Cosigned By      Initials Name Provider Type    Corry Duke, PT Physical Therapist                   Goals/Plan       Row Name 01/31/24 0927          Bed Mobility Goal 1 (PT)    Activity/Assistive Device (Bed Mobility Goal 1, PT) sit to supine/supine to sit  -KE     [place] Level/Cues Needed (Bed Mobility Goal 1, PT) standby assist  -KE     Time Frame (Bed Mobility Goal 1, PT) long term goal (LTG);10 days  -KE     Progress/Outcomes (Bed Mobility  Goal 1, PT) new goal  -KE       Row Name 01/31/24 0927          Transfer Goal 1 (PT)    Activity/Assistive Device (Transfer Goal 1, PT) sit-to-stand/stand-to-sit;bed-to-chair/chair-to-bed  -KE     [place] Level/Cues Needed (Transfer Goal 1, PT) standby assist  -KE     Time Frame (Transfer Goal 1, PT) long term goal (LTG);10 days  -KE     Progress/Outcome (Transfer Goal 1, PT) new goal  -KE       Row Name 01/31/24 0927          Gait Training Goal 1 (PT)    Activity/Assistive Device (Gait Training Goal 1, PT) gait (walking locomotion)  -KE     [place] Level (Gait Training Goal 1, PT) contact guard required  -KE     Distance (Gait Training Goal 1, PT) 150'  -KE     Time Frame (Gait Training Goal 1, PT) long term goal (LTG);10 days  -KE     Progress/Outcome (Gait Training Goal 1, PT) new goal  -KE       Row Name 01/31/24 0927          Stairs Goal 1 (PT)    Activity/Assistive Device (Stairs Goal 1, PT) stairs, all skills  -KE     [place] Level/Cues Needed (Stairs Goal 1, PT) contact guard required  -KE     Number of Stairs (Stairs Goal 1, PT) 3  -KE     Time Frame (Stairs Goal 1, PT) long term goal (LTG);10 days  -KE     Progress/Outcome (Stairs Goal 1, PT) new goal  -KE       Row Name 01/31/24 0927          Therapy Assessment/Plan (PT)    Planned Therapy Interventions (PT) balance training;bed mobility training;gait training;home exercise program;patient/family education;neuromuscular re-education;postural re-education;ROM (range of motion);stair training;strengthening;stretching;transfer training  -KE               User Key  (r) = Recorded By, (t) = Taken By, (c) = Cosigned By      Initials Name Provider Type    Corry Duke, PT Physical Therapist                   Clinical Impression       Row Name 01/31/24 0922          Pain    Pretreatment Pain Rating 0/10 - no pain  -KE     Posttreatment Pain Rating 0/10 - no pain  -KE     Pain Intervention(s) Ambulation/increased activity;Repositioned  -KE        Row Name 01/31/24 0922          Plan of Care Review    Outcome Evaluation PT eval complete. Pt presents with generalized weakness, balance deficits, and decreased activity tolerance leading to impairments in functional mboility below baseline. Pt completing x5 steps to HOB w/ R UE support, MinAx1. Pt will benefit from skilled IP PT to address impairments and return to PLOF. PT rec IRF at D/c.  -       Row Name 01/31/24 0922          Therapy Assessment/Plan (PT)    Rehab Potential (PT) good, to achieve stated therapy goals  -     Criteria for Skilled Interventions Met (PT) yes;meets criteria;skilled treatment is necessary  -KE     Therapy Frequency (PT) daily  -KE       Row Name 01/31/24 0922          Vital Signs    Pre Systolic BP Rehab 103  -KE     Pre Treatment Diastolic BP 66  -KE     Intra Systolic BP Rehab 104  -KE     Intra Treatment Diastolic BP 71  -KE     Pre SpO2 (%) 86  -KE     O2 Delivery Pre Treatment room air  -KE     O2 Delivery Intra Treatment supplemental O2  -KE     Post SpO2 (%) 96  -KE     O2 Delivery Post Treatment supplemental O2  -KE     Pre Patient Position Supine  -KE     Intra Patient Position Standing  -KE     Post Patient Position Supine  -KE       Row Name 01/31/24 0922          Positioning and Restraints    Pre-Treatment Position in bed  -KE     Post Treatment Position bed  -KE     In Bed notified nsg;supine;side rails up x2;exit alarm on;encouraged to call for assist;call light within reach;with nsg  -KE               User Key  (r) = Recorded By, (t) = Taken By, (c) = Cosigned By      Initials Name Provider Type    Corry Duke, PT Physical Therapist                   Outcome Measures       Row Name 01/31/24 0928          How much help from another person do you currently need...    Turning from your back to your side while in flat bed without using bedrails? 3  -KE     Moving from lying on back to sitting on the side of a flat bed without bedrails? 3  -KE     Moving to  and from a bed to a chair (including a wheelchair)? 2  -KE     Standing up from a chair using your arms (e.g., wheelchair, bedside chair)? 3  -KE     Climbing 3-5 steps with a railing? 2  -KE     To walk in hospital room? 2  -KE     AM-PAC 6 Clicks Score (PT) 15  -KE     Highest Level of Mobility Goal 4 --> Transfer to chair/commode  -KE       Row Name 01/31/24 0928          Functional Assessment    Outcome Measure Options AM-PAC 6 Clicks Basic Mobility (PT)  -KE               User Key  (r) = Recorded By, (t) = Taken By, (c) = Cosigned By      Initials Name Provider Type    Corry Duke, KEILA Physical Therapist                                 Physical Therapy Education       Title: PT OT SLP Therapies (In Progress)       Topic: Physical Therapy (In Progress)       Point: Mobility training (In Progress)       Learning Progress Summary             Patient Acceptance, E, NR by EFRAÍN at 1/31/2024 0833                         Point: Home exercise program (In Progress)       Learning Progress Summary             Patient Acceptance, E, NR by EFRAÍN at 1/31/2024 0833                         Point: Body mechanics (In Progress)       Learning Progress Summary             Patient Acceptance, E, NR by EFRAÍN at 1/31/2024 0833                         Point: Precautions (In Progress)       Learning Progress Summary             Patient Acceptance, E, NR by EFRAÍN at 1/31/2024 0833                                         User Key       Initials Effective Dates Name Provider Type Discipline    EFRAÍN 11/16/23 -  Corry Maldonado, KEILA Physical Therapist PT                  PT Recommendation and Plan  Planned Therapy Interventions (PT): balance training, bed mobility training, gait training, home exercise program, patient/family education, neuromuscular re-education, postural re-education, ROM (range of motion), stair training, strengthening, stretching, transfer training  Outcome Evaluation: PT eval complete. Pt presents with generalized weakness,  balance deficits, and decreased activity tolerance leading to impairments in functional mboility below baseline. Pt completing x5 steps to HOB w/ R UE support, MinAx1. Pt will benefit from skilled IP PT to address impairments and return to PLOF. PT rec IRF at D/c.     Time Calculation:   PT Evaluation Complexity  History, PT Evaluation Complexity: 1-2 personal factors and/or comorbidities  Examination of Body Systems (PT Eval Complexity): total of 3 or more elements  Clinical Presentation (PT Evaluation Complexity): stable  Clinical Decision Making (PT Evaluation Complexity): low complexity  Overall Complexity (PT Evaluation Complexity): low complexity     PT Charges       Row Name 01/31/24 0929             Time Calculation    Start Time 0833  -KE      PT Received On 01/31/24  -KE      PT Goal Re-Cert Due Date 02/10/24  -KE         Timed Charges    12076 - PT Therapeutic Activity Minutes 9  -KE         Untimed Charges    PT Eval/Re-eval Minutes 50  -KE         Total Minutes    Timed Charges Total Minutes 9  -KE      Untimed Charges Total Minutes 50  -KE       Total Minutes 59  -KE                User Key  (r) = Recorded By, (t) = Taken By, (c) = Cosigned By      Initials Name Provider Type    Corry Duke, KEILA Physical Therapist                  Therapy Charges for Today       Code Description Service Date Service Provider Modifiers Qty    54397778376 HC PT EVAL LOW COMPLEXITY 4 1/31/2024 Corry Maldonado, PT GP 1    80959462383 HC PT THERAPEUTIC ACT EA 15 MIN 1/31/2024 Corry Maldonado PT GP 1            PT G-Codes  Outcome Measure Options: AM-PAC 6 Clicks Basic Mobility (PT)  AM-PAC 6 Clicks Score (PT): 15  PT Discharge Summary  Anticipated Discharge Disposition (PT): inpatient rehabilitation facility    Corry Maldonado PT  1/31/2024

## 2024-01-31 NOTE — PROGRESS NOTES
[facility] Services  PROGRESS NOTE    Patient Name: Philomena Davis  : 1960  MRN: [id number]    Date of Admission: 2024  Primary Care Physician: Unique Brandt DO    Subjective   Subjective     CC:  GIB    HPI:  Doing well this am, denies any problems overnight other than her arm hurting.     Objective   Objective     Vital Signs:   Temp:  [97.7 °F (36.5 °C)-98.4 °F (36.9 °C)] 98.1 °F (36.7 °C)  Heart Rate:  [65-93] 70  Resp:  [16-18] 16  BP: ()/(51-78) 95/53  Flow (L/min):  [2-3] 2     Physical Exam:  Constitutional: No acute distress, awake, alert  HENT: NCAT, mucous membranes moist  Respiratory: Clear to auscultation bilaterally, respiratory effort normal   Cardiovascular: RRR, no murmurs, rubs, or gallops  Gastrointestinal: Positive bowel sounds, soft, nontender, nondistended  Musculoskeletal: No bilateral ankle edema, LUE in cast  Psychiatric: Appropriate affect, cooperative  Neurologic: Oriented x 3, strength symmetric in all extremities, Cranial Nerves grossly intact to confrontation, speech clear  Skin: No rashes     Results Reviewed:  LAB RESULTS:      Lab 24  0739 24  1808 24  1234   WBC  --   --  8.07   HEMOGLOBIN 10.6* 11.8* 12.9   HEMATOCRIT 32.1* 35.7 38.5   PLATELETS  --   --  317   NEUTROS ABS  --   --  4.71   IMMATURE GRANS (ABS)  --   --  0.11*   LYMPHS ABS  --   --  1.54   MONOS ABS  --   --  1.54*   EOS ABS  --   --  0.11   MCV  --   --  96.7   LACTATE  --  1.1 2.2*         Lab 24  0739 24  0226 24  1234   SODIUM  --  134* 134*   POTASSIUM 3.1* 3.2* 2.2*   CHLORIDE  --  96* 87*   CO2  --  26.0 34.0*   ANION GAP  --  12.0 13.0   BUN  --  23 32*   CREATININE  --  0.80 0.93   EGFR  --  83.9 70.1   GLUCOSE  --  106* 133*   CALCIUM  --  7.9* 9.3   MAGNESIUM  --  2.1 2.2   PHOSPHORUS  --  2.9  --          Lab 24  0226 24  1234   TOTAL PROTEIN 6.1 7.2   ALBUMIN 3.0* 3.6   GLOBULIN 3.1 3.6   ALT  (SGPT) 21 24   AST (SGOT) 77* 89*   BILIRUBIN 1.1 1.1   ALK PHOS 136* 176*                 Lab 01/30/24  1234   ABO TYPING O   RH TYPING Positive   ANTIBODY SCREEN Negative         Brief Urine Lab Results  (Last result in the past 365 days)        Color   Clarity   Blood   Leuk Est   Nitrite   Protein   CREAT   Urine HCG        05/19/23 0834 [place]  Comment: on azo   Cloudy   3+   Large (3+)   Positive   3+                   Microbiology Results Abnormal       None            CT Abdomen Pelvis With & Without Contrast    Result Date: 1/30/2024  CT ABDOMEN PELVIS W WO CONTRAST Date of Exam: 1/30/2024 2:52 PM EST Indication: GI bleed protocol. Comparison: 11/29/2022 Technique: Axial CT images were obtained of the abdomen and pelvis before and after the uneventful intravenous administration of 85 mL Isovue-300. Sagittal and coronal reconstructions were performed.  Automated exposure control and iterative reconstruction methods were used. Findings: There is mild calcific plaque of the abdominal aorta which is normal in size. The celiac, superior mesenteric, renal, and inferior mesenteric arteries opacify normally without plaque or stenoses. There are new patchy infiltrates in the peripheries of the  bilateral lower lobes, greatest on the right. There is fatty infiltration of the liver. There are no focal liver lesions. The gallbladder and biliary tree are nondilated. The spleen size is normal. The pancreas and adrenal glands appear normal. There is  a small left renal cyst. There is no hydronephrosis. The stomach is collapsed. There is no pelvic mass or free fluid. The bladder is poorly distended. There is no large or small bowel dilatation. There is no focal bowel wall thickening. There is a normal appendix. There are no inflammatory changes.    Impression: Impression: Patchy infiltrates of the bilateral lower lobes suggest pneumonia, greatest on the right. No acute process in the abdomen or pelvis. Electronically  Signed: Lyla Reyes MD  1/30/2024 3:40 PM EST  Workstation ID: [id number]    XR Hand 3+ View Left    Result Date: 1/30/2024  XR HAND 3+ VW LEFT Date of Exam: 1/30/2024 2:04 PM EST Indication: trauma Comparison: 9/20/2022 Findings: Moderately displaced oblique fracture of the fifth metacarpal diaphysis. Minimally displaced oblique fracture of the fourth metacarpal diaphysis. No additional fracture seen. Scattered mild degenerative changes of the interphalangeal joints.     Impression: Impression: Moderately displaced oblique fracture of the fifth metacarpal diaphysis. Minimally displaced oblique fracture of the fourth metacarpal diaphysis. Electronically Signed: Alon Beauchamp MD  1/30/2024 2:31 PM EST  Workstation ID: [id number]         Current medications:  Scheduled Meds:budesonide-formoterol, 1 puff, Inhalation, BID - RT  cloNIDine, 0.2 mg, Oral, BID  desvenlafaxine, 50 mg, Oral, Daily  folic acid, 1 mg, Oral, Daily  latanoprost, 1 drop, Both Eyes, Nightly  LORazepam, 2 mg, Oral, Q6H   Followed by  LORazepam, 1 mg, Oral, Q6H  pantoprazole, 40 mg, Intravenous, BID AC  potassium chloride ER, 40 mEq, Oral, Q4H  rosuvastatin, 5 mg, Oral, Nightly  senna-docusate sodium, 2 tablet, Oral, BID  sodium chloride, 10 mL, Intravenous, Q12H  sodium chloride, 10 mL, Intravenous, Q12H  thiamine (B-1) IV, 200 mg, Intravenous, Q8H   Followed by  [START ON 2/5/2024] thiamine, 100 mg, Oral, Daily  tiotropium bromide monohydrate, 2 puff, Inhalation, Daily - RT  ursodiol, 300 mg, Oral, Daily      Continuous Infusions:octreotide (SandoSTATIN) infusion, 50 mcg/hr, Last Rate: 50 mcg/hr (01/31/24 0912)      PRN Meds:.  senna-docusate sodium **AND** polyethylene glycol **AND** bisacodyl **AND** bisacodyl    LORazepam **OR** midazolam **OR** LORazepam **OR** midazolam **OR** midazolam **OR** midazolam    Magnesium Standard Dose Replacement - Follow Nurse / BPA Driven Protocol    nitroglycerin    ondansetron ODT **OR** ondansetron     Phosphorus Replacement - Follow Nurse / BPA Driven Protocol    Potassium Replacement - Follow Nurse / BPA Driven Protocol    sodium chloride    sodium chloride    sodium chloride    sodium chloride    sodium chloride    Assessment & Plan   Assessment & Plan     Active Hospital Problems    Diagnosis  POA    Closed fracture of left hand [S62.92XA]  Unknown    Hypokalemia [E87.6]  Yes    Alcohol use disorder [F10.90]  Yes    GERD without esophagitis [K21.9]  Yes    Alcoholic cirrhosis of liver without ascites [K70.30]  Yes    Essential hypertension [I10]  Yes    Generalized anxiety disorder [F41.1]  Yes    Hyperlipidemia [E78.5]  Yes      Resolved Hospital Problems   No resolved problems to display.        Brief Hospital Course to date:  Philomena Davis is a 63 y.o. female with history of ETOHic cirrhosis, HTN, DM and anxiety comes to ED for severe generalized weakness; pt states she no longer drinks etoh but family member thinks otherwise.      Weakness  Hypokalemia   Question of recent n/v causing poor intake  - replace K   - stop HCTZ   - mag is normal   - PT  - watch po intake      Etoh abuse, risk of withdrawal  - pt states no longer drinking etoh; family disagrees.  No current signs of withdrawal  - CIWA protocol  - thiamine   - chem dependency consult   - etoh level negative on admission      Cirrhosis   - supportive care ; GI consulted     Question of upper GI bleed   - history unclear ;   - pt relates hematemesis and black tarry stool x 1, but not in past 1-2 days  - hgb nl  - continue to monitor HgB  - PPI BID, octreotide gtt.  Discussed with GI, they are consulted for today  - NPO for now      L hand 5th metacarpal fracture   - Dr Hathaway will see today       Total time spent: Time Spent: Time Spent: 35 minutes  Time spent includes time reviewing chart, face-to-face time, counseling patient/family/caregiver, ordering medications/tests/procedures, communicating with other health care professionals,  documenting clinical information in the electronic health record, and coordination of care.      Expected Discharge Location and Transportation: home  Expected Discharge   Expected Discharge Date: 2/1/2024; Expected Discharge Time:      DVT prophylaxis:  Mechanical DVT prophylaxis orders are present.         AM-PAC 6 Clicks Score (PT): 15 (01/31/24 0988)    CODE STATUS:   Code Status and Medical Interventions:   Ordered at: 01/30/24 4628     Level Of Support Discussed With:    Patient     Code Status (Patient has no pulse and is not breathing):    CPR (Attempt to Resuscitate)     Medical Interventions (Patient has pulse or is breathing):    Full Support       Tracie Guillermo MD  01/31/24

## 2024-01-31 NOTE — PLAN OF CARE
Problem: Hypertension Comorbidity  Goal: Blood Pressure in Desired Range  Outcome: Ongoing, Progressing     Problem: Electrolyte Imbalance  Goal: Electrolyte Balance  Outcome: Ongoing, Progressing   Goal Outcome Evaluation:

## 2024-01-31 NOTE — PLAN OF CARE
Goal Outcome Evaluation:              Outcome Evaluation: PT eval complete. Pt presents with generalized weakness, balance deficits, and decreased activity tolerance leading to impairments in functional mboility below baseline. Pt completing x5 steps to HOB w/ R UE support, MinAx1. Pt will benefit from skilled IP PT to address impairments and return to PLOF. PT rec IRF at D/c.      Anticipated Discharge Disposition (PT): inpatient rehabilitation facility

## 2024-02-01 ENCOUNTER — ANESTHESIA (OUTPATIENT)
Dept: GASTROENTEROLOGY | Facility: HOSPITAL | Age: 64
End: 2024-02-01
Payer: COMMERCIAL

## 2024-02-01 LAB
ALBUMIN SERPL-MCNC: 3.1 G/DL (ref 3.5–5.2)
ALBUMIN/GLOB SERPL: 1.2 G/DL
ALP SERPL-CCNC: 165 U/L (ref 39–117)
ALT SERPL W P-5'-P-CCNC: 28 U/L (ref 1–33)
ANION GAP SERPL CALCULATED.3IONS-SCNC: 9 MMOL/L (ref 5–15)
AST SERPL-CCNC: 106 U/L (ref 1–32)
BASOPHILS # BLD AUTO: 0.06 10*3/MM3 (ref 0–0.2)
BASOPHILS NFR BLD AUTO: 1.1 % (ref 0–1.5)
BILIRUB SERPL-MCNC: 0.9 MG/DL (ref 0–1.2)
BUN SERPL-MCNC: 13 MG/DL (ref 8–23)
BUN/CREAT SERPL: 16.3 (ref 7–25)
CALCIUM SPEC-SCNC: 8.3 MG/DL (ref 8.6–10.5)
CHLORIDE SERPL-SCNC: 104 MMOL/L (ref 98–107)
CO2 SERPL-SCNC: 26 MMOL/L (ref 22–29)
CREAT SERPL-MCNC: 0.8 MG/DL (ref 0.57–1)
DEPRECATED RDW RBC AUTO: 44.7 FL (ref 37–54)
EGFRCR SERPLBLD CKD-EPI 2021: 83.9 ML/MIN/1.73
EOSINOPHIL # BLD AUTO: 0.13 10*3/MM3 (ref 0–0.4)
EOSINOPHIL NFR BLD AUTO: 2.3 % (ref 0.3–6.2)
ERYTHROCYTE [DISTWIDTH] IN BLOOD BY AUTOMATED COUNT: 12.7 % (ref 12.3–15.4)
GLOBULIN UR ELPH-MCNC: 2.6 GM/DL
GLUCOSE SERPL-MCNC: 118 MG/DL (ref 65–99)
HCT VFR BLD AUTO: 32.2 % (ref 34–46.6)
HGB BLD-MCNC: 10.8 G/DL (ref 12–15.9)
IMM GRANULOCYTES # BLD AUTO: 0.17 10*3/MM3 (ref 0–0.05)
IMM GRANULOCYTES NFR BLD AUTO: 3.1 % (ref 0–0.5)
LYMPHOCYTES # BLD AUTO: 1.37 10*3/MM3 (ref 0.7–3.1)
LYMPHOCYTES NFR BLD AUTO: 24.7 % (ref 19.6–45.3)
MCH RBC QN AUTO: 32.4 PG (ref 26.6–33)
MCHC RBC AUTO-ENTMCNC: 33.5 G/DL (ref 31.5–35.7)
MCV RBC AUTO: 96.7 FL (ref 79–97)
MONOCYTES # BLD AUTO: 1.02 10*3/MM3 (ref 0.1–0.9)
MONOCYTES NFR BLD AUTO: 18.4 % (ref 5–12)
NEUTROPHILS NFR BLD AUTO: 2.79 10*3/MM3 (ref 1.7–7)
NEUTROPHILS NFR BLD AUTO: 50.4 % (ref 42.7–76)
NRBC BLD AUTO-RTO: 0 /100 WBC (ref 0–0.2)
PLATELET # BLD AUTO: 264 10*3/MM3 (ref 140–450)
PMV BLD AUTO: 9.9 FL (ref 6–12)
POTASSIUM SERPL-SCNC: 5 MMOL/L (ref 3.5–5.2)
PROT SERPL-MCNC: 5.7 G/DL (ref 6–8.5)
QT INTERVAL: 390 MS
QTC INTERVAL: 458 MS
RBC # BLD AUTO: 3.33 10*6/MM3 (ref 3.77–5.28)
SODIUM SERPL-SCNC: 139 MMOL/L (ref 136–145)
WBC NRBC COR # BLD AUTO: 5.54 10*3/MM3 (ref 3.4–10.8)

## 2024-02-01 PROCEDURE — 25010000002 THIAMINE PER 100 MG: Performed by: INTERNAL MEDICINE

## 2024-02-01 PROCEDURE — 25010000002 PROPOFOL 10 MG/ML EMULSION: Performed by: NURSE ANESTHETIST, CERTIFIED REGISTERED

## 2024-02-01 PROCEDURE — G0378 HOSPITAL OBSERVATION PER HR: HCPCS

## 2024-02-01 PROCEDURE — 93005 ELECTROCARDIOGRAM TRACING: CPT | Performed by: ANESTHESIOLOGY

## 2024-02-01 PROCEDURE — 88305 TISSUE EXAM BY PATHOLOGIST: CPT | Performed by: INTERNAL MEDICINE

## 2024-02-01 PROCEDURE — 94799 UNLISTED PULMONARY SVC/PX: CPT

## 2024-02-01 PROCEDURE — 85025 COMPLETE CBC W/AUTO DIFF WBC: CPT | Performed by: INTERNAL MEDICINE

## 2024-02-01 PROCEDURE — 43239 EGD BIOPSY SINGLE/MULTIPLE: CPT | Performed by: INTERNAL MEDICINE

## 2024-02-01 PROCEDURE — 80053 COMPREHEN METABOLIC PANEL: CPT | Performed by: INTERNAL MEDICINE

## 2024-02-01 PROCEDURE — 99232 SBSQ HOSP IP/OBS MODERATE 35: CPT | Performed by: INTERNAL MEDICINE

## 2024-02-01 PROCEDURE — 25010000002 CEFTRIAXONE PER 250 MG: Performed by: INTERNAL MEDICINE

## 2024-02-01 PROCEDURE — 25810000003 LACTATED RINGERS PER 1000 ML: Performed by: ANESTHESIOLOGY

## 2024-02-01 PROCEDURE — 25010000002 OCTREOTIDE PER 25 MCG: Performed by: INTERNAL MEDICINE

## 2024-02-01 RX ORDER — LIDOCAINE HYDROCHLORIDE 10 MG/ML
INJECTION, SOLUTION EPIDURAL; INFILTRATION; INTRACAUDAL; PERINEURAL AS NEEDED
Status: DISCONTINUED | OUTPATIENT
Start: 2024-02-01 | End: 2024-02-01 | Stop reason: SURG

## 2024-02-01 RX ORDER — CARVEDILOL 3.12 MG/1
3.12 TABLET ORAL 2 TIMES DAILY WITH MEALS
Status: DISCONTINUED | OUTPATIENT
Start: 2024-02-01 | End: 2024-02-15 | Stop reason: HOSPADM

## 2024-02-01 RX ORDER — SODIUM CHLORIDE 0.9 % (FLUSH) 0.9 %
10 SYRINGE (ML) INJECTION AS NEEDED
Status: DISCONTINUED | OUTPATIENT
Start: 2024-02-01 | End: 2024-02-01 | Stop reason: HOSPADM

## 2024-02-01 RX ORDER — SODIUM CHLORIDE, SODIUM LACTATE, POTASSIUM CHLORIDE, CALCIUM CHLORIDE 600; 310; 30; 20 MG/100ML; MG/100ML; MG/100ML; MG/100ML
9 INJECTION, SOLUTION INTRAVENOUS CONTINUOUS
Status: DISCONTINUED | OUTPATIENT
Start: 2024-02-01 | End: 2024-02-09

## 2024-02-01 RX ORDER — SODIUM CHLORIDE 9 MG/ML
40 INJECTION, SOLUTION INTRAVENOUS AS NEEDED
Status: DISCONTINUED | OUTPATIENT
Start: 2024-02-01 | End: 2024-02-01 | Stop reason: HOSPADM

## 2024-02-01 RX ORDER — SODIUM CHLORIDE 0.9 % (FLUSH) 0.9 %
10 SYRINGE (ML) INJECTION EVERY 12 HOURS SCHEDULED
Status: DISCONTINUED | OUTPATIENT
Start: 2024-02-01 | End: 2024-02-01 | Stop reason: HOSPADM

## 2024-02-01 RX ORDER — SODIUM CHLORIDE, SODIUM LACTATE, POTASSIUM CHLORIDE, CALCIUM CHLORIDE 600; 310; 30; 20 MG/100ML; MG/100ML; MG/100ML; MG/100ML
20 INJECTION, SOLUTION INTRAVENOUS CONTINUOUS
Status: DISCONTINUED | OUTPATIENT
Start: 2024-02-01 | End: 2024-02-01

## 2024-02-01 RX ORDER — PROPOFOL 10 MG/ML
VIAL (ML) INTRAVENOUS AS NEEDED
Status: DISCONTINUED | OUTPATIENT
Start: 2024-02-01 | End: 2024-02-01 | Stop reason: SURG

## 2024-02-01 RX ORDER — LIDOCAINE HYDROCHLORIDE 10 MG/ML
0.5 INJECTION, SOLUTION EPIDURAL; INFILTRATION; INTRACAUDAL; PERINEURAL ONCE AS NEEDED
Status: DISCONTINUED | OUTPATIENT
Start: 2024-02-01 | End: 2024-02-01 | Stop reason: HOSPADM

## 2024-02-01 RX ADMIN — LORAZEPAM 1 MG: 1 TABLET ORAL at 13:33

## 2024-02-01 RX ADMIN — SODIUM CHLORIDE 1000 MG: 900 INJECTION INTRAVENOUS at 15:54

## 2024-02-01 RX ADMIN — BUDESONIDE AND FORMOTEROL FUMARATE DIHYDRATE 1 PUFF: 160; 4.5 AEROSOL RESPIRATORY (INHALATION) at 21:44

## 2024-02-01 RX ADMIN — DESVENLAFAXINE 50 MG: 50 TABLET, FILM COATED, EXTENDED RELEASE ORAL at 10:12

## 2024-02-01 RX ADMIN — Medication 10 ML: at 10:13

## 2024-02-01 RX ADMIN — FOLIC ACID 1 MG: 1 TABLET ORAL at 10:12

## 2024-02-01 RX ADMIN — PROPOFOL 150 MG: 10 INJECTION, EMULSION INTRAVENOUS at 08:49

## 2024-02-01 RX ADMIN — SODIUM CHLORIDE, POTASSIUM CHLORIDE, SODIUM LACTATE AND CALCIUM CHLORIDE 20 ML/HR: 600; 310; 30; 20 INJECTION, SOLUTION INTRAVENOUS at 07:55

## 2024-02-01 RX ADMIN — PANTOPRAZOLE SODIUM 40 MG: 40 INJECTION, POWDER, LYOPHILIZED, FOR SOLUTION INTRAVENOUS at 10:13

## 2024-02-01 RX ADMIN — URSODIOL 300 MG: 300 CAPSULE ORAL at 10:12

## 2024-02-01 RX ADMIN — Medication 10 ML: at 21:59

## 2024-02-01 RX ADMIN — THIAMINE HYDROCHLORIDE 200 MG: 100 INJECTION, SOLUTION INTRAMUSCULAR; INTRAVENOUS at 21:58

## 2024-02-01 RX ADMIN — LIDOCAINE HYDROCHLORIDE 100 MG: 10 INJECTION, SOLUTION EPIDURAL; INFILTRATION; INTRACAUDAL; PERINEURAL at 08:49

## 2024-02-01 RX ADMIN — CARVEDILOL 3.12 MG: 3.12 TABLET, FILM COATED ORAL at 17:59

## 2024-02-01 RX ADMIN — THIAMINE HYDROCHLORIDE 200 MG: 100 INJECTION, SOLUTION INTRAMUSCULAR; INTRAVENOUS at 05:18

## 2024-02-01 RX ADMIN — OCTREOTIDE ACETATE 50 MCG/HR: 500 INJECTION, SOLUTION INTRAVENOUS; SUBCUTANEOUS at 10:11

## 2024-02-01 RX ADMIN — ROSUVASTATIN CALCIUM 5 MG: 10 TABLET, FILM COATED ORAL at 21:58

## 2024-02-01 RX ADMIN — LATANOPROST 1 DROP: 50 SOLUTION OPHTHALMIC at 21:58

## 2024-02-01 RX ADMIN — CLONIDINE HYDROCHLORIDE 0.2 MG: 0.2 TABLET ORAL at 22:30

## 2024-02-01 RX ADMIN — THIAMINE HYDROCHLORIDE 200 MG: 100 INJECTION, SOLUTION INTRAMUSCULAR; INTRAVENOUS at 13:33

## 2024-02-01 RX ADMIN — PANTOPRAZOLE SODIUM 40 MG: 40 INJECTION, POWDER, LYOPHILIZED, FOR SOLUTION INTRAVENOUS at 17:59

## 2024-02-01 NOTE — PLAN OF CARE
Problem: Hypertension Comorbidity  Goal: Blood Pressure in Desired Range  Outcome: Ongoing, Progressing     Problem: Electrolyte Imbalance  Goal: Electrolyte Balance  Outcome: Ongoing, Progressing     Problem: Fall Injury Risk  Goal: Absence of Fall and Fall-Related Injury  Outcome: Ongoing, Progressing  Intervention: Identify and Manage Contributors  Recent Flowsheet Documentation  Taken 1/31/2024 2200 by Ed Pandey RN  Self-Care Promotion: independence encouraged  Taken 1/31/2024 2000 by Ed Pandey RN  Self-Care Promotion: independence encouraged  Intervention: Promote Injury-Free Environment  Recent Flowsheet Documentation  Taken 2/1/2024 0400 by Ed Pandey RN  Safety Promotion/Fall Prevention:   assistive device/personal items within reach   clutter free environment maintained   safety round/check completed  Taken 2/1/2024 0200 by Ed Pandey RN  Safety Promotion/Fall Prevention:   clutter free environment maintained   assistive device/personal items within reach   safety round/check completed  Taken 2/1/2024 0000 by Ed Pandey RN  Safety Promotion/Fall Prevention:   clutter free environment maintained   assistive device/personal items within reach   safety round/check completed  Taken 1/31/2024 2200 by Ed Pandey RN  Safety Promotion/Fall Prevention:   clutter free environment maintained   assistive device/personal items within reach   safety round/check completed  Taken 1/31/2024 2000 by Ed Pandey RN  Safety Promotion/Fall Prevention:   assistive device/personal items within reach   clutter free environment maintained   safety round/check completed   Goal Outcome Evaluation:

## 2024-02-01 NOTE — ANESTHESIA POSTPROCEDURE EVALUATION
Patient: Philomena Davis    Procedure Summary       Date: 02/01/24 Room / Location:  [facility] /  [facility]    Anesthesia Start: 0845 Anesthesia Stop: 0855    Procedure: ESOPHAGOGASTRODUODENOSCOPY Diagnosis:       Hematemesis without nausea      Melena      Blood loss anemia      Alcoholic cirrhosis of liver without ascites      (Hematemesis without nausea [K92.0])      (Melena [K92.1])      (Blood loss anemia [D50.0])      (Alcoholic cirrhosis of liver without ascites [K70.30])    Surgeons: Brunner, Mark I, MD Provider: Jhoan Burrows MD    Anesthesia Type: general ASA Status: 3            Anesthesia Type: general    Vitals  No vitals data found for the desired time range.          Post Anesthesia Care and Evaluation    Patient location during evaluation: PACU  Patient participation: complete - patient participated  Level of consciousness: awake and alert  Pain management: adequate    Airway patency: patent  Anesthetic complications: No anesthetic complications  PONV Status: none  Cardiovascular status: hemodynamically stable and acceptable  Respiratory status: nonlabored ventilation, acceptable and nasal cannula  Hydration status: acceptable

## 2024-02-01 NOTE — PROGRESS NOTES
"    [facility] Medicine Services  PROGRESS NOTE    Patient Name: Philomena Davis  : 1960  MRN: [id number]    Date of Admission: 2024  Primary Care Physician: Unique Brandt DO    Subjective   Subjective     CC:  hematemesis    HPI:  Patient reports weeks without drinking but does not remember date of stopping  Reports \"vomiting blood for 9 days\" before arrival here  Weak, but is not wanting to go to rehab 2/2 dog care concerns      Objective   Objective     Vital Signs:   Temp:  [96.5 °F (35.8 °C)-98.6 °F (37 °C)] 98.4 °F (36.9 °C)  Heart Rate:  [72-91] 88  Resp:  [16-22] 16  BP: ()/(56-85) 126/78  Flow (L/min):  [2-4] 2     Physical Exam:  Constitutional: No acute distress, awake, alert female lying flat in bed  HENT: NCAT, mucous membranes moist  Respiratory: Clear to auscultation bilaterally, respiratory effort normal   Cardiovascular: RRR, no murmurs, rubs, or gallops  Gastrointestinal: Soft, nontender, nondistended  Musculoskeletal: Muscle tone mildly decreased throughout, no joint effusions appreciated  Psychiatric: Appropriate affect, cooperative  Neurologic: Alert and oriented, facial movements symmetric and spontaneous movement of all 4 extremities grossly equal bilaterally, speech clear  Skin: No rashes    Results Reviewed:  LAB RESULTS:      Lab 24  0544 24  1608 24  0739 24  1808 24  1234   WBC 5.54  --   --   --  8.07   HEMOGLOBIN 10.8*  --  10.6* 11.8* 12.9   HEMATOCRIT 32.2*  --  32.1* 35.7 38.5   PLATELETS 264  --   --   --  317   NEUTROS ABS 2.79  --   --   --  4.71   IMMATURE GRANS (ABS) 0.17*  --   --   --  0.11*   LYMPHS ABS 1.37  --   --   --  1.54   MONOS ABS 1.02*  --   --   --  1.54*   EOS ABS 0.13  --   --   --  0.11   MCV 96.7  --   --   --  96.7   LACTATE  --   --   --  1.1 2.2*   PROTIME  --  14.3  --   --   --          Lab 24  0544 24  2201 24  0739 24  0226 24  1234   SODIUM 139  -- "   --  134* 134*   POTASSIUM 5.0 4.5 3.1* 3.2* 2.2*   CHLORIDE 104  --   --  96* 87*   CO2 26.0  --   --  26.0 34.0*   ANION GAP 9.0  --   --  12.0 13.0   BUN 13  --   --  23 32*   CREATININE 0.80  --   --  0.80 0.93   EGFR 83.9  --   --  83.9 70.1   GLUCOSE 118*  --   --  106* 133*   CALCIUM 8.3*  --   --  7.9* 9.3   MAGNESIUM  --   --   --  2.1 2.2   PHOSPHORUS  --   --   --  2.9  --          Lab 02/01/24  0544 01/31/24  0226 01/30/24  1234   TOTAL PROTEIN 5.7* 6.1 7.2   ALBUMIN 3.1* 3.0* 3.6   GLOBULIN 2.6 3.1 3.6   ALT (SGPT) 28 21 24   AST (SGOT) 106* 77* 89*   BILIRUBIN 0.9 1.1 1.1   ALK PHOS 165* 136* 176*         Lab 01/31/24  1608   PROTIME 14.3   INR 1.10             Lab 01/30/24  1234   ABO TYPING O   RH TYPING Positive   ANTIBODY SCREEN Negative         Brief Urine Lab Results  (Last result in the past 365 days)        Color   Clarity   Blood   Leuk Est   Nitrite   Protein   CREAT   Urine HCG        05/19/23 0834 Orange  Comment: on azo   Cloudy   3+   Large (3+)   Positive   3+                   Microbiology Results Abnormal       None            No radiology results from the last 24 hrs        Current medications:  Scheduled Meds:budesonide-formoterol, 1 puff, Inhalation, BID - RT  carvedilol, 3.125 mg, Oral, BID With Meals  cefTRIAXone, 1,000 mg, Intravenous, Q24H  cloNIDine, 0.2 mg, Oral, BID  desvenlafaxine, 50 mg, Oral, Daily  folic acid, 1 mg, Oral, Daily  latanoprost, 1 drop, Both Eyes, Nightly  pantoprazole, 40 mg, Intravenous, BID AC  rosuvastatin, 5 mg, Oral, Nightly  senna-docusate sodium, 2 tablet, Oral, BID  sodium chloride, 10 mL, Intravenous, Q12H  sodium chloride, 10 mL, Intravenous, Q12H  thiamine (B-1) IV, 200 mg, Intravenous, Q8H   Followed by  [START ON 2/5/2024] thiamine, 100 mg, Oral, Daily  tiotropium bromide monohydrate, 2 puff, Inhalation, Daily - RT  ursodiol, 300 mg, Oral, Daily      Continuous Infusions:lactated ringers, 9 mL/hr      PRN Meds:.  senna-docusate sodium **AND**  polyethylene glycol **AND** bisacodyl **AND** bisacodyl    Magnesium Standard Dose Replacement - Follow Nurse / BPA Driven Protocol    nitroglycerin    ondansetron ODT **OR** ondansetron    Phosphorus Replacement - Follow Nurse / BPA Driven Protocol    Potassium Replacement - Follow Nurse / BPA Driven Protocol    sodium chloride    sodium chloride    sodium chloride    sodium chloride    sodium chloride    Assessment & Plan   Assessment & Plan     Active Hospital Problems    Diagnosis  POA    Closed fracture of left hand [S62.92XA]  Unknown    Hypokalemia [E87.6]  Yes    Hematemesis without nausea [K92.0]  Unknown    Melena [K92.1]  Unknown    Blood loss anemia [D50.0]  Unknown    Alcohol use disorder [F10.90]  Yes    GERD without esophagitis [K21.9]  Yes    Alcoholic cirrhosis of liver without ascites [K70.30]  Yes    Essential hypertension [I10]  Yes    Generalized anxiety disorder [F41.1]  Yes    Hyperlipidemia [E78.5]  Yes      Resolved Hospital Problems   No resolved problems to display.        Brief Hospital Course to date:  Philomena Davis is a 63 y.o. female w ETOH cirrhosis, HTN, anxiety who presented with significant weakness after reported hematemesis    Severe reflux esophagitis c/b ulceration + gastric ulcer  -BID PPI x 3 months, then daily  -repeat EGD in 3 months  -H&H stable since arrival  -d/c octreotide, abx given concomitant cirrhosis    ETOH cirrhosis w h/o ETOH use  -family report of patient abstinence different from patient's who reports sobriety x weeks  -not scoring on CIWA, d/c  -given compensated, start coreg low dose for hopeful titration up as OP    HTN - only on high dose clonidine as OP, continue. Try to add coreg as above    Severe hypokalemia 2/2 vomiting - now replaced, repeat in AM  LEFT hand 5th metacarpal fracture - f/u Naomi Duncan PA-C 1 week, [facility]  Significant debility - on PT eval 1/31 only walked 5 steps, needing assistance before return home alone    Expected  Discharge Location and Transportation: [facility] ready for rehab  Expected Discharge 2/2 (Discharge date is tentative pending patient's medical condition and is subject to change)  Expected Discharge Date: 2/2/2024; Expected Discharge Time:      DVT prophylaxis:  Mechanical DVT prophylaxis orders are present.         AM-PAC 6 Clicks Score (PT): 15 (02/01/24 1012)    CODE STATUS:   Code Status and Medical Interventions:   Ordered at: 01/30/24 9386     Level Of Support Discussed With:    Patient     Code Status (Patient has no pulse and is not breathing):    CPR (Attempt to Resuscitate)     Medical Interventions (Patient has pulse or is breathing):    Full Support       Unique Anderson MD  02/01/24

## 2024-02-01 NOTE — ANESTHESIA PREPROCEDURE EVALUATION
Anesthesia Evaluation                  Airway   Mallampati: I  TM distance: >3 FB  Neck ROM: full  No difficulty expected  Dental      Pulmonary    (+) COPD,  Cardiovascular     ECG reviewed    (+) hypertension, hyperlipidemia      Neuro/Psych  (+) numbness, psychiatric history  GI/Hepatic/Renal/Endo    (+) GERD, GI bleeding , liver disease, diabetes mellitus    Musculoskeletal     Abdominal    Substance History      OB/GYN          Other                    Anesthesia Plan    ASA 3     general     intravenous induction     Anesthetic plan, risks, benefits, and alternatives have been provided, discussed and informed consent has been obtained with: patient.    Plan discussed with CRNA.    CODE STATUS:    Level Of Support Discussed With: Patient  Code Status (Patient has no pulse and is not breathing): CPR (Attempt to Resuscitate)  Medical Interventions (Patient has pulse or is breathing): Full Support

## 2024-02-01 NOTE — BRIEF OP NOTE
ESOPHAGOGASTRODUODENOSCOPY  Progress Note    Philomena Davis  2/1/2024    EGD revealed severe (LA grade D) reflux esophagitis with extensive ulceration in the lower esophagus (1st image below)  There is moderate portal gastropathy without bleeding.  A 1 cm excavated ulcer with pigmented spot is seen in the gastric antrum (2nd image below).  Biopsy taken for H. pylori.  Duodenum normal.    >> Twice daily PPI for 3 months, then daily thereafter.  >> Await H. pylori biopsy.  >> Recommend surveillance EGD in approximately 3 months for gastric ulcer surveillance.  >> Alcohol abstinence.    Mark I. Brunner, MD     Date: 2/1/2024  Time: 09:00 EST

## 2024-02-01 NOTE — PROGRESS NOTES
Continued Stay Note  [facility]     Patient Name: Philomena Davis  MRN: [id number]  Today's Date: 2/1/2024    Admit Date: 1/30/2024    Plan: Home   Discharge Plan       Row Name 02/01/24 1539       Plan    Plan Comments Met with Mrs. Davis and spoke with her about discharge planning. She said she would agree to a [facility] referral and she also wants to go home with home health because she has a dog that she is trying to get a friend to care for and if she is unable to get a friend to help her with her dog she will have to go home. Social work explained that she would likely be medically ready for discharge soon and she has [facility] Medicaid and would have benefits for acute rehab at [facility] and home health benefits and she agreed to allow a referral to be made to [facility] with a backup plan to go home with home health.                   Discharge Codes    No documentation.                 Expected Discharge Date and Time       Expected Discharge Date Expected Discharge Time    Feb 1, 2024               SILKE Quiles

## 2024-02-01 NOTE — CASE MANAGEMENT/SOCIAL WORK
Continued Stay Note  [facility]     Patient Name: Philomena Davis  MRN: [id number]  Today's Date: 2/1/2024    Admit Date: 1/30/2024    Plan: Ongoing   Discharge Plan       Row Name 02/01/24 1852       Plan    Plan Ongoing    Plan Comments Consult received, thank you.  No BAL on admission  Has not scored on CIWA scale, during this admission.  Pt reports ETOH cessation for approx 10 days.  Declines any AUD resources.                     Discharge Codes    No documentation.                 Expected Discharge Date and Time       Expected Discharge Date Expected Discharge Time    Feb 2, 2024               Mariia Fraser RN MA,BSN-  Addiction Medicine

## 2024-02-02 LAB
ALBUMIN SERPL-MCNC: 3.3 G/DL (ref 3.5–5.2)
ALBUMIN/GLOB SERPL: 1.3 G/DL
ALP SERPL-CCNC: 146 U/L (ref 39–117)
ALT SERPL W P-5'-P-CCNC: 20 U/L (ref 1–33)
ANION GAP SERPL CALCULATED.3IONS-SCNC: 10 MMOL/L (ref 5–15)
AST SERPL-CCNC: 63 U/L (ref 1–32)
BILIRUB SERPL-MCNC: 0.8 MG/DL (ref 0–1.2)
BUN SERPL-MCNC: 7 MG/DL (ref 8–23)
BUN/CREAT SERPL: 9.5 (ref 7–25)
CALCIUM SPEC-SCNC: 8.8 MG/DL (ref 8.6–10.5)
CHLORIDE SERPL-SCNC: 97 MMOL/L (ref 98–107)
CO2 SERPL-SCNC: 27 MMOL/L (ref 22–29)
CREAT SERPL-MCNC: 0.74 MG/DL (ref 0.57–1)
CYTO UR: NORMAL
EGFRCR SERPLBLD CKD-EPI 2021: 92.2 ML/MIN/1.73
GLOBULIN UR ELPH-MCNC: 2.5 GM/DL
GLUCOSE SERPL-MCNC: 116 MG/DL (ref 65–99)
LAB AP CASE REPORT: NORMAL
LAB AP CLINICAL INFORMATION: NORMAL
PATH REPORT.FINAL DX SPEC: NORMAL
PATH REPORT.GROSS SPEC: NORMAL
POTASSIUM SERPL-SCNC: 4.4 MMOL/L (ref 3.5–5.2)
PROT SERPL-MCNC: 5.8 G/DL (ref 6–8.5)
SODIUM SERPL-SCNC: 134 MMOL/L (ref 136–145)

## 2024-02-02 PROCEDURE — 97116 GAIT TRAINING THERAPY: CPT

## 2024-02-02 PROCEDURE — 25010000002 CEFTRIAXONE PER 250 MG: Performed by: INTERNAL MEDICINE

## 2024-02-02 PROCEDURE — 97535 SELF CARE MNGMENT TRAINING: CPT

## 2024-02-02 PROCEDURE — 97530 THERAPEUTIC ACTIVITIES: CPT

## 2024-02-02 PROCEDURE — 94799 UNLISTED PULMONARY SVC/PX: CPT

## 2024-02-02 PROCEDURE — 99232 SBSQ HOSP IP/OBS MODERATE 35: CPT | Performed by: INTERNAL MEDICINE

## 2024-02-02 PROCEDURE — G0378 HOSPITAL OBSERVATION PER HR: HCPCS

## 2024-02-02 PROCEDURE — 94664 DEMO&/EVAL PT USE INHALER: CPT

## 2024-02-02 PROCEDURE — 97166 OT EVAL MOD COMPLEX 45 MIN: CPT

## 2024-02-02 PROCEDURE — 25010000002 THIAMINE PER 100 MG: Performed by: INTERNAL MEDICINE

## 2024-02-02 PROCEDURE — 25810000003 SODIUM CHLORIDE 0.9 % SOLUTION 1,000 ML FLEX CONT: Performed by: INTERNAL MEDICINE

## 2024-02-02 PROCEDURE — 80053 COMPREHEN METABOLIC PANEL: CPT | Performed by: INTERNAL MEDICINE

## 2024-02-02 RX ORDER — ACETAMINOPHEN 325 MG/1
650 TABLET ORAL EVERY 6 HOURS PRN
Status: DISCONTINUED | OUTPATIENT
Start: 2024-02-02 | End: 2024-02-15 | Stop reason: HOSPADM

## 2024-02-02 RX ORDER — CLONIDINE HYDROCHLORIDE 0.2 MG/1
0.1 TABLET ORAL 2 TIMES DAILY
Status: DISCONTINUED | OUTPATIENT
Start: 2024-02-02 | End: 2024-02-05

## 2024-02-02 RX ORDER — TRAZODONE HYDROCHLORIDE 100 MG/1
100 TABLET ORAL NIGHTLY
Status: DISCONTINUED | OUTPATIENT
Start: 2024-02-02 | End: 2024-02-02

## 2024-02-02 RX ADMIN — SENNOSIDES AND DOCUSATE SODIUM 2 TABLET: 8.6; 5 TABLET ORAL at 20:59

## 2024-02-02 RX ADMIN — THIAMINE HYDROCHLORIDE 200 MG: 100 INJECTION, SOLUTION INTRAMUSCULAR; INTRAVENOUS at 06:15

## 2024-02-02 RX ADMIN — Medication 10 ML: at 08:41

## 2024-02-02 RX ADMIN — THIAMINE HYDROCHLORIDE 200 MG: 100 INJECTION, SOLUTION INTRAMUSCULAR; INTRAVENOUS at 20:57

## 2024-02-02 RX ADMIN — CARVEDILOL 3.12 MG: 3.12 TABLET, FILM COATED ORAL at 08:38

## 2024-02-02 RX ADMIN — THIAMINE HYDROCHLORIDE 200 MG: 100 INJECTION, SOLUTION INTRAMUSCULAR; INTRAVENOUS at 14:59

## 2024-02-02 RX ADMIN — SODIUM CHLORIDE 1000 MG: 900 INJECTION INTRAVENOUS at 15:52

## 2024-02-02 RX ADMIN — FOLIC ACID 1 MG: 1 TABLET ORAL at 08:50

## 2024-02-02 RX ADMIN — DESVENLAFAXINE 50 MG: 50 TABLET, FILM COATED, EXTENDED RELEASE ORAL at 08:52

## 2024-02-02 RX ADMIN — URSODIOL 300 MG: 300 CAPSULE ORAL at 08:51

## 2024-02-02 RX ADMIN — LATANOPROST 1 DROP: 50 SOLUTION OPHTHALMIC at 20:54

## 2024-02-02 RX ADMIN — SODIUM CHLORIDE 40 ML: 9 INJECTION, SOLUTION INTRAVENOUS at 19:12

## 2024-02-02 RX ADMIN — ROSUVASTATIN CALCIUM 5 MG: 10 TABLET, FILM COATED ORAL at 20:54

## 2024-02-02 RX ADMIN — Medication 10 ML: at 21:00

## 2024-02-02 RX ADMIN — Medication 10 ML: at 08:40

## 2024-02-02 RX ADMIN — BUDESONIDE AND FORMOTEROL FUMARATE DIHYDRATE 1 PUFF: 160; 4.5 AEROSOL RESPIRATORY (INHALATION) at 19:29

## 2024-02-02 RX ADMIN — TIOTROPIUM BROMIDE INHALATION SPRAY 2 PUFF: 3.12 SPRAY, METERED RESPIRATORY (INHALATION) at 11:09

## 2024-02-02 RX ADMIN — CLONIDINE HYDROCHLORIDE 0.2 MG: 0.2 TABLET ORAL at 10:06

## 2024-02-02 RX ADMIN — BUDESONIDE AND FORMOTEROL FUMARATE DIHYDRATE 1 PUFF: 160; 4.5 AEROSOL RESPIRATORY (INHALATION) at 11:09

## 2024-02-02 RX ADMIN — PANTOPRAZOLE SODIUM 40 MG: 40 INJECTION, POWDER, LYOPHILIZED, FOR SOLUTION INTRAVENOUS at 17:41

## 2024-02-02 RX ADMIN — PANTOPRAZOLE SODIUM 40 MG: 40 INJECTION, POWDER, LYOPHILIZED, FOR SOLUTION INTRAVENOUS at 08:50

## 2024-02-02 NOTE — PROGRESS NOTES
Continued Stay Note  [facility]     Patient Name: Philomena Davis  MRN: [id number]  Today's Date: 2/2/2024    Admit Date: 1/30/2024    Plan: Ongoing   Discharge Plan       Row Name 02/02/24 1519       Plan    Plan Comments Met with the patient and told her that [employer] is working on a precert of her [employer]. If approved she may be able to go to [facility] on Saturday. [facility] van scheduled for 2 pm Saturday if she is approved and has a bed. Danielle martinez requested a private room but the patient was told that there are no guarantees that a private room will be available.                   Discharge Codes    No documentation.                 Expected Discharge Date and Time       Expected Discharge Date Expected Discharge Time    Feb 3, 2024               SILKE Quiles

## 2024-02-02 NOTE — PROGRESS NOTES
Continued Stay Note   [facility]     Patient Name: Philomena Davis  MRN: [id number]  Today's Date: 2/2/2024    Admit Date: 1/30/2024    Plan: Ongoing   Discharge Plan       Row Name 02/02/24 1205       Plan    Plan Comments Met with the patient and explained that [employer] is working on a pre cert of her [employer] and once that is approved she may be able to go to [facility] over the weekend. She does not have transportation to [facility] and case management spoke with her about wheelchair transportation to [facility].    Final Discharge Disposition Code 62 - inpatient rehab facility                   Discharge Codes    No documentation.                 Expected Discharge Date and Time       Expected Discharge Date Expected Discharge Time    Feb 2, 2024               SILKE Quiles

## 2024-02-02 NOTE — PLAN OF CARE
Goal Outcome Evaluation:  Plan of Care Reviewed With: patient           Outcome Evaluation: OT eval completed. Pt presents below baseline for ADL performance, limited by weakness, decreased standing tolerance, and coordination deficits with L hand splinted d/t metacarpal fx's. Pt Dep for donning socks, Naif for STS transfers, ModA for toileting tasks, FANG for grooming seated EOB. IP OT services warranted. Recommend IRF at discharge for optimal outcomes.      Anticipated Discharge Disposition (OT): inpatient rehabilitation facility

## 2024-02-02 NOTE — PLAN OF CARE
Problem: Hypertension Comorbidity  Goal: Blood Pressure in Desired Range  Outcome: Ongoing, Progressing     Problem: Electrolyte Imbalance  Goal: Electrolyte Balance  Outcome: Ongoing, Progressing     Problem: Fall Injury Risk  Goal: Absence of Fall and Fall-Related Injury  Outcome: Ongoing, Progressing  Intervention: Promote Injury-Free Environment  Recent Flowsheet Documentation  Taken 2/2/2024 0400 by Ed Pandey RN  Safety Promotion/Fall Prevention:   assistive device/personal items within reach   clutter free environment maintained   safety round/check completed  Taken 2/2/2024 0200 by Ed Pandey RN  Safety Promotion/Fall Prevention:   clutter free environment maintained   assistive device/personal items within reach   safety round/check completed  Taken 2/2/2024 0000 by Ed Pandey RN  Safety Promotion/Fall Prevention:   assistive device/personal items within reach   clutter free environment maintained   safety round/check completed  Taken 2/1/2024 2200 by Ed Pandey RN  Safety Promotion/Fall Prevention:   assistive device/personal items within reach   clutter free environment maintained   safety round/check completed  Taken 2/1/2024 2000 by Ed Pandey RN  Safety Promotion/Fall Prevention:   clutter free environment maintained   assistive device/personal items within reach   safety round/check completed

## 2024-02-02 NOTE — THERAPY TREATMENT NOTE
Patient Name: Philomena Davis  : 1960    MRN: [id number]                              Today's Date: 2024       Admit Date: 2024    Visit Dx:     ICD-10-CM ICD-9-CM   1. Hematemesis without nausea  K92.0 578.0   2. Melena  K92.1 578.1   3. Blood loss anemia  D50.0 280.0   4. Alcoholic cirrhosis of liver without ascites  K70.30 571.2     Patient Active Problem List   Diagnosis    Essential hypertension    Generalized anxiety disorder    Hyperlipidemia    Glaucoma    Alcoholic cirrhosis of liver without ascites    Severe malnutrition    GERD without esophagitis    Sleep disturbance    Annual physical exam    Idiopathic peripheral neuropathy    Severe anxiety with panic    Alcohol use disorder    Cigarette nicotine dependence in remission    Chronic obstructive pulmonary disease    Hypokalemia    Closed fracture of left hand    Hematemesis without nausea    Melena    Blood loss anemia     Past Medical History:   Diagnosis Date    Alcohol withdrawal 05/10/2019    Annual physical exam 2023    Anxiety and depression     Cirrhosis of liver     Closed displaced fracture of lateral malleolus of left fibula     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    Closed trimalleolar fracture of right ankle 2023    Digital mucous cyst of toe of left foot 2017    Ganglion cyst of left foot     Glaucoma     Hypertension     Wears glasses      Past Surgical History:   Procedure Laterality Date    ANKLE OPEN REDUCTION INTERNAL FIXATION Left 2017    Procedure: LEFT ANKLE OPEN REDUCTION INTERNAL FIXATION WITH ILIAC CREST BONE GRAFT , EXCISION CYST 4TH/5TH INNERSPACE LEFT FOOT;  Surgeon: Frank Larson MD;  Location:  [facility];  Service:     ANKLE OPEN REDUCTION INTERNAL FIXATION Right 2023    Procedure: ANKLE OPEN REDUCTION INTERNAL FIXATION;  Surgeon: Deandre Reynoso MD;  Location:  [facility];  Service: Orthopedics;  Laterality: Right;    AUGMENTATION  MAMMAPLASTY Bilateral 1999    BREAST AUGMENTATION      BREAST EXCISIONAL BIOPSY Right 2014    DILATION AND CURETTAGE, DIAGNOSTIC / THERAPEUTIC      ENDOSCOPY N/A 11/30/2022    Procedure: ESOPHAGOGASTRODUODENOSCOPY;  Surgeon: Arelis Solano MD;  Location:  [facility];  Service: Gastroenterology;  Laterality: N/A;    ENDOSCOPY N/A 2/1/2024    Procedure: ESOPHAGOGASTRODUODENOSCOPY;  Surgeon: Brunner, Mark I, MD;  Location:  [facility];  Service: Gastroenterology;  Laterality: N/A;    WA RPR NON/MAL FEMUR DSTL H/N W/ILIAC/AUTOG BONE Left 03/24/2017    Procedure: ILIAC CREST BONE GRAFT;  Surgeon: Frank Larson MD;  Location:  [facility];  Service: Orthopedics    WRIST SURGERY        General Information       Row Name 02/02/24 1155          Physical Therapy Time and Intention    Document Type therapy note (daily note)  -KE     Mode of Treatment physical therapy  -KE       Row Name 02/02/24 1155          General Information    Patient Profile Reviewed yes  -KE     Existing Precautions/Restrictions fall;oxygen therapy device and L/min;other (see comments);orthostatic hypotension  L 4th and 5th metacarpal fx's with ulnar gutter splint  -KE     Barriers to Rehab medically complex;previous functional deficit;ineffective coping  -KE       Row Name 02/02/24 1155          Cognition    Orientation Status (Cognition) oriented x 4  -KE       Row Name 02/02/24 1155          Safety Issues, Functional Mobility    Safety Issues Affecting Function (Mobility) insight into deficits/self-awareness;safety precaution awareness;safety precautions follow-through/compliance;sequencing abilities  -KE     Impairments Affecting Function (Mobility) balance;endurance/activity tolerance;motor planning;postural/trunk control;shortness of breath;strength;sensation/sensory awareness  -KE     Comment, Safety Issues/Impairments (Mobility) anxious with mobility  -KE               User Key  (r) = Recorded By, (t) = Taken By, (c) = Cosigned  By      Initials Name Provider Type    Corry Duke PT Physical Therapist                   Mobility       Row Name 02/02/24 1157          Bed Mobility    Bed Mobility supine-sit;sit-supine  -KE     Supine-Sit [place] (Bed Mobility) supervision;1 person assist;verbal cues  -KE     Sit-Supine [place] (Bed Mobility) minimum assist (75% patient effort);1 person assist;verbal cues  -KE     Assistive Device (Bed Mobility) head of bed elevated  -KE     Comment, (Bed Mobility) Increased time and effort needed for sup-sit; pt becoming lightheaded sitting EOB req assist for sit-sup at LEs  -KE       Row Name 02/02/24 1157          Transfers    Comment, (Transfers) x1+x5 STS from EOB  -KE       Row Name 02/02/24 1157          Sit-Stand Transfer    Sit-Stand [place] (Transfers) contact guard;1 person assist;verbal cues  -KE     Assistive Device (Sit-Stand Transfers) walker, front-wheeled  -KE     Comment, (Sit-Stand Transfer) VCs for UE placement  -KE       Row Name 02/02/24 1157          Gait/Stairs (Locomotion)    [place] Level (Gait) contact guard;1 person assist;verbal cues  -KE     Assistive Device (Gait) walker, front-wheeled  -KE     Distance in Feet (Gait) 25'  -KE     Deviations/Abnormal Patterns (Gait) bilateral deviations;gait speed decreased;stride length decreased  -KE     Bilateral Gait Deviations forward flexed posture;heel strike decreased  -KE     Comment, (Gait/Stairs) Pt amb 25' w/ FWW, CGA. Demo partial step through gait pattern with forward flexed posture and decreased heel strike. VCs for management of AD and improving gait mechanics. Further distances limited by fatigue and c/o lightheadedness.  -KE               User Key  (r) = Recorded By, (t) = Taken By, (c) = Cosigned By      Initials Name Provider Type    Corry Duke PT Physical Therapist                   Obj/Interventions       Row Name 02/02/24 1310          Balance    Balance Assessment sitting static  balance;sitting dynamic balance;sit to stand dynamic balance;standing static balance;standing dynamic balance  -KE     Static Sitting Balance supervision  -KE     Dynamic Sitting Balance supervision  -KE     Position, Sitting Balance unsupported;sitting edge of bed  -KE     Sit to Stand Dynamic Balance contact guard;1-person assist;verbal cues  -KE     Static Standing Balance contact guard  -KE     Dynamic Standing Balance contact guard  -KE     Position/Device Used, Standing Balance walker, front-wheeled  -KE     Balance Interventions sitting;standing;sit to stand;supported;static;dynamic  -KE     Comment, Balance no overt LOB, unsteadiness noted d/t weakness.  -KE               User Key  (r) = Recorded By, (t) = Taken By, (c) = Cosigned By      Initials Name Provider Type    Corry Duke, PT Physical Therapist                   Goals/Plan    No documentation.                  Clinical Impression       Row Name 02/02/24 1311          Pain    Pretreatment Pain Rating 7/10  -KE     Posttreatment Pain Rating 7/10  -KE     Pain Location - Side/Orientation Left  -KE     Pain Location - hand  -KE     Pre/Posttreatment Pain Comment Tolerated, RN aware and managing  -KE     Pain Intervention(s) Ambulation/increased activity;Repositioned  -KE       Row Name 02/02/24 1311          Plan of Care Review    Plan of Care Reviewed With patient  -KE     Progress improving  -     Outcome Evaluation Pt demo improvement for distance w/ gait training this session, amb 25' w/ FWW CGA. Pt continues to present with balance deficits, weakness, and decreased activity tolerance leading to functional mobility below baseline. Pt limited by orthostatic hypotension this session, however with good effort throughout. Plan to continue progressing current PT POC as tolerated.  -KE       Row Name 02/02/24 1311          Vital Signs    Pre Systolic BP Rehab 118  -KE     Pre Treatment Diastolic BP 76  -KE     Intra Systolic BP Rehab --   following mobility: 58/41; supine: 87/61  -KE     Post Systolic BP Rehab 92  -KE     Post Treatment Diastolic BP 62  -KE     Pretreatment Heart Rate (beats/min) 74  -KE     Pre SpO2 (%) 94  -KE     O2 Delivery Pre Treatment supplemental O2  -KE     Intra SpO2 (%) 88  -KE     O2 Delivery Intra Treatment room air  -KE     Post SpO2 (%) 93  -KE     O2 Delivery Post Treatment supplemental O2  -KE     Pre Patient Position Supine  -KE     Intra Patient Position Standing  -KE     Post Patient Position Supine  -KE       Row Name 02/02/24 1311          Positioning and Restraints    Pre-Treatment Position in bed  -KE     Post Treatment Position bed  -KE     In Bed notified nsg;supine;call light within reach;encouraged to call for assist;exit alarm on;side rails up x2  -KE               User Key  (r) = Recorded By, (t) = Taken By, (c) = Cosigned By      Initials Name Provider Type    Corry Duke, PT Physical Therapist                   Outcome Measures       Row Name 02/02/24 1315 02/02/24 0830       How much help from another person do you currently need...    Turning from your back to your side while in flat bed without using bedrails? 3  -KE 3  -MD    Moving from lying on back to sitting on the side of a flat bed without bedrails? 3  -KE 3  -MD    Moving to and from a bed to a chair (including a wheelchair)? 3  -KE 3  -MD    Standing up from a chair using your arms (e.g., wheelchair, bedside chair)? 3  -KE 3  -MD    Climbing 3-5 steps with a railing? 2  -KE 2  -MD    To walk in hospital room? 3  -KE 2  -MD    AM-PAC 6 Clicks Score (PT) 17  -KE 16  -MD    Highest Level of Mobility Goal 5 --> Static standing  -KE 5 --> Static standing  -MD      Row Name 02/02/24 1315 02/02/24 0949       Functional Assessment    Outcome Measure Options AM-PAC 6 Clicks Basic Mobility (PT)  -KE AM-PAC 6 Clicks Daily Activity (OT)  -DEANGELO              User Key  (r) = Recorded By, (t) = Taken By, (c) = Cosigned By      Initials Name Provider  Type    Vanessa Jackson, OT Occupational Therapist    MD Blackwell, Lisandra CROUCH, RN Registered Nurse    Corry Duke, KEILA Physical Therapist                                 Physical Therapy Education       Title: PT OT SLP Therapies (In Progress)       Topic: Physical Therapy (In Progress)       Point: Mobility training (In Progress)       Learning Progress Summary             Patient Acceptance, E, NR by EFRAÍN at 2/2/2024 1121    Acceptance, E, NR by EFRAÍN at 1/31/2024 0833                         Point: Home exercise program (In Progress)       Learning Progress Summary             Patient Acceptance, E, NR by EFRÍAN at 1/31/2024 0833                         Point: Body mechanics (In Progress)       Learning Progress Summary             Patient Acceptance, E, NR by EFRAÍN at 2/2/2024 1121    Acceptance, E, NR by EFRAÍN at 1/31/2024 0833                         Point: Precautions (In Progress)       Learning Progress Summary             Patient Acceptance, E, NR by EFRAÍN at 2/2/2024 1121    Acceptance, E, NR by EFRAÍN at 1/31/2024 0833                                         User Key       Initials Effective Dates Name Provider Type Discipline    EFRAÍN 11/16/23 -  Corry Maldonado, KEILA Physical Therapist PT                  PT Recommendation and Plan  Planned Therapy Interventions (PT): balance training, bed mobility training, gait training, home exercise program, patient/family education, neuromuscular re-education, postural re-education, ROM (range of motion), stair training, strengthening, stretching, transfer training  Plan of Care Reviewed With: patient  Progress: improving  Outcome Evaluation: Pt demo improvement for distance w/ gait training this session, amb 25' w/ FWW CGA. Pt continues to present with balance deficits, weakness, and decreased activity tolerance leading to functional mobility below baseline. Pt limited by orthostatic hypotension this session, however with good effort throughout. Plan to continue progressing current PT  POC as tolerated.     Time Calculation:   PT Evaluation Complexity  History, PT Evaluation Complexity: 1-2 personal factors and/or comorbidities  Examination of Body Systems (PT Eval Complexity): total of 3 or more elements  Clinical Presentation (PT Evaluation Complexity): stable  Clinical Decision Making (PT Evaluation Complexity): low complexity  Overall Complexity (PT Evaluation Complexity): low complexity     PT Charges       Row Name 02/02/24 1316             Time Calculation    Start Time 1121  -KE      PT Received On 02/02/24  -KE      PT Goal Re-Cert Due Date 02/10/24  -KE         Timed Charges    30924 - Gait Training Minutes  12  -KE      18861 - PT Therapeutic Activity Minutes 17  -KE         Total Minutes    Timed Charges Total Minutes 29  -KE       Total Minutes 29  -KE                User Key  (r) = Recorded By, (t) = Taken By, (c) = Cosigned By      Initials Name Provider Type    Corry Duke, KEILA Physical Therapist                  Therapy Charges for Today       Code Description Service Date Service Provider Modifiers Qty    38693920132 HC GAIT TRAINING EA 15 MIN 2/2/2024 Corry Maldonado, PT GP 1    11043136471 HC PT THERAPEUTIC ACT EA 15 MIN 2/2/2024 Corry Maldonado PT GP 1            PT G-Codes  Outcome Measure Options: AM-PAC 6 Clicks Basic Mobility (PT)  AM-PAC 6 Clicks Score (PT): 17  AM-PAC 6 Clicks Score (OT): 15  PT Discharge Summary  Anticipated Discharge Disposition (PT): inpatient rehabilitation facility    Corry Maldonado PT  2/2/2024

## 2024-02-02 NOTE — PLAN OF CARE
Goal Outcome Evaluation:  Plan of Care Reviewed With: patient        Progress: improving  Outcome Evaluation: Pt demo improvement for distance w/ gait training this session, amb 25' w/ FWW CGA. Pt continues to present with balance deficits, weakness, and decreased activity tolerance leading to functional mobility below baseline. Pt limited by orthostatic hypotension this session, however with good effort throughout. Plan to continue progressing current PT POC as tolerated.      Anticipated Discharge Disposition (PT): inpatient rehabilitation facility

## 2024-02-02 NOTE — PROGRESS NOTES
Antrum pathology is negative for H. Pylori.       >> Twice daily PPI for 3 months, then daily thereafter.  >> Recommend surveillance EGD in approximately 3 months for gastric ulcer surveillance.  >> Alcohol abstinence    Will sign off.  Please call for questions or concerns.

## 2024-02-02 NOTE — PROGRESS NOTES
[facility] Medicine Services  PROGRESS NOTE    Patient Name: Philomena Davis  : 1960  MRN: [id number]    Date of Admission: 2024  Primary Care Physician: Unique Brandt DO    Subjective   Subjective     CC:  hematemesis    HPI:  Insomnia and anxiety concerns - on clonidine for insomnia which has continued here. Offered hydroxyzine or buspar for anxiety support and reports prior bad experiences with both  Blood pressure mildly low when up today      Objective   Objective     Vital Signs:   Temp:  [97.7 °F (36.5 °C)-98.4 °F (36.9 °C)] 97.7 °F (36.5 °C)  Heart Rate:  [69-92] 72  Resp:  [16-18] 18  BP: ()/(69-89) 97/69  Flow (L/min):  [2] 2     Physical Exam:  Constitutional: No acute distress, awake, alert female lying flat in bed  HENT: NCAT, mucous membranes moist  Respiratory: Clear to auscultation bilaterally, respiratory effort normal   Cardiovascular: RRR, no murmurs, rubs, or gallops  Gastrointestinal: Soft, nontender, nondistended  Musculoskeletal: Muscle tone mildly decreased throughout, no joint effusions appreciated  Psychiatric: Appropriate affect, cooperative. Teary at times when discussing condition  Neurologic: Alert and oriented, facial movements symmetric and spontaneous movement of all 4 extremities grossly equal bilaterally, speech clear  Skin: No rashes    Results Reviewed:  LAB RESULTS:      Lab 24  0544 24  1608 24  0739 24  1808 24  1234   WBC 5.54  --   --   --  8.07   HEMOGLOBIN 10.8*  --  10.6* 11.8* 12.9   HEMATOCRIT 32.2*  --  32.1* 35.7 38.5   PLATELETS 264  --   --   --  317   NEUTROS ABS 2.79  --   --   --  4.71   IMMATURE GRANS (ABS) 0.17*  --   --   --  0.11*   LYMPHS ABS 1.37  --   --   --  1.54   MONOS ABS 1.02*  --   --   --  1.54*   EOS ABS 0.13  --   --   --  0.11   MCV 96.7  --   --   --  96.7   LACTATE  --   --   --  1.1 2.2*   PROTIME  --  14.3  --   --   --          Lab 24  0450 24  0544  01/31/24  2201 01/31/24  0739 01/31/24  0226 01/30/24  1234   SODIUM 134* 139  --   --  134* 134*   POTASSIUM 4.4 5.0 4.5 3.1* 3.2* 2.2*   CHLORIDE 97* 104  --   --  96* 87*   CO2 27.0 26.0  --   --  26.0 34.0*   ANION GAP 10.0 9.0  --   --  12.0 13.0   BUN 7* 13  --   --  23 32*   CREATININE 0.74 0.80  --   --  0.80 0.93   EGFR 92.2 83.9  --   --  83.9 70.1   GLUCOSE 116* 118*  --   --  106* 133*   CALCIUM 8.8 8.3*  --   --  7.9* 9.3   MAGNESIUM  --   --   --   --  2.1 2.2   PHOSPHORUS  --   --   --   --  2.9  --          Lab 02/02/24  0450 02/01/24  0544 01/31/24  0226 01/30/24  1234   TOTAL PROTEIN 5.8* 5.7* 6.1 7.2   ALBUMIN 3.3* 3.1* 3.0* 3.6   GLOBULIN 2.5 2.6 3.1 3.6   ALT (SGPT) 20 28 21 24   AST (SGOT) 63* 106* 77* 89*   BILIRUBIN 0.8 0.9 1.1 1.1   ALK PHOS 146* 165* 136* 176*         Lab 01/31/24  1608   PROTIME 14.3   INR 1.10             Lab 01/30/24  1234   ABO TYPING O   RH TYPING Positive   ANTIBODY SCREEN Negative         Brief Urine Lab Results  (Last result in the past 365 days)        Color   Clarity   Blood   Leuk Est   Nitrite   Protein   CREAT   Urine HCG        05/19/23 0834 Orange  Comment: on azo   Cloudy   3+   Large (3+)   Positive   3+                   Microbiology Results Abnormal       None            No radiology results from the last 24 hrs        Current medications:  Scheduled Meds:budesonide-formoterol, 1 puff, Inhalation, BID - RT  carvedilol, 3.125 mg, Oral, BID With Meals  cefTRIAXone, 1,000 mg, Intravenous, Q24H  cloNIDine, 0.1 mg, Oral, BID  desvenlafaxine, 50 mg, Oral, Daily  folic acid, 1 mg, Oral, Daily  latanoprost, 1 drop, Both Eyes, Nightly  pantoprazole, 40 mg, Intravenous, BID AC  rosuvastatin, 5 mg, Oral, Nightly  senna-docusate sodium, 2 tablet, Oral, BID  sodium chloride, 10 mL, Intravenous, Q12H  sodium chloride, 10 mL, Intravenous, Q12H  thiamine (B-1) IV, 200 mg, Intravenous, Q8H   Followed by  [START ON 2/5/2024] thiamine, 100 mg, Oral, Daily  tiotropium  bromide monohydrate, 2 puff, Inhalation, Daily - RT  ursodiol, 300 mg, Oral, Daily      Continuous Infusions:lactated ringers, 9 mL/hr      PRN Meds:.  senna-docusate sodium **AND** polyethylene glycol **AND** bisacodyl **AND** bisacodyl    Magnesium Standard Dose Replacement - Follow Nurse / BPA Driven Protocol    nitroglycerin    ondansetron ODT **OR** ondansetron    Phosphorus Replacement - Follow Nurse / BPA Driven Protocol    Potassium Replacement - Follow Nurse / BPA Driven Protocol    sodium chloride    sodium chloride    sodium chloride    sodium chloride    sodium chloride    Assessment & Plan   Assessment & Plan     Active Hospital Problems    Diagnosis  POA    Closed fracture of left hand [S62.92XA]  Unknown    Hypokalemia [E87.6]  Yes    Hematemesis without nausea [K92.0]  Unknown    Melena [K92.1]  Unknown    Blood loss anemia [D50.0]  Unknown    Alcohol use disorder [F10.90]  Yes    GERD without esophagitis [K21.9]  Yes    Alcoholic cirrhosis of liver without ascites [K70.30]  Yes    Essential hypertension [I10]  Yes    Generalized anxiety disorder [F41.1]  Yes    Hyperlipidemia [E78.5]  Yes      Resolved Hospital Problems   No resolved problems to display.        Brief Hospital Course to date:  Philomena Davis is a 63 y.o. female w ETOH cirrhosis, HTN, anxiety who presented with significant weakness after reported hematemesis. EGD revealed severe esophagitis. Stay c/b persistent debility    Severe reflux esophagitis c/b ulceration + gastric ulcer  -BID PPI x 3 months, then daily  -repeat EGD in 3 months recommended to assess healing  -H&H stable since arrival  -abx given concomitant cirrhosis    ETOH cirrhosis w h/o ETOH use  -family report of patient abstinence different from patient's who reports sobriety x weeks  -not scoring on CIWA, d/c  -given compensated, start coreg low dose for hopeful titration up as OP    HTN - on clonidine OP for insomnia without clear improvement in pt symptoms. Will  down-titrate back to prior 0.1 mg dose and titrate up coreg as able    Anxiety - home desvenlafaxine. Offered hydroxyzine and buspirone but patient reports poor response to both in past    Severe hypokalemia 2/2 vomiting - now replaced and appropriate  LEFT hand 5th metacarpal fracture - f/u Naomi Duncan PA-C 1 week, [facility]  Significant debility - on PT eval 1/31 only walked 5 steps, needing assistance before return home alone    Expected Discharge Location and Transportation: med ready for rehab  Expected Discharge 2/3 (Discharge date is tentative pending patient's medical condition and is subject to change)  Expected Discharge Date: 2/3/2024; Expected Discharge Time:      DVT prophylaxis:  Mechanical DVT prophylaxis orders are present.         AM-PAC 6 Clicks Score (PT): 17 (02/02/24 1315)    CODE STATUS:   Code Status and Medical Interventions:   Ordered at: 01/30/24 0134     Level Of Support Discussed With:    Patient     Code Status (Patient has no pulse and is not breathing):    CPR (Attempt to Resuscitate)     Medical Interventions (Patient has pulse or is breathing):    Full Support       Unique Anderson MD  02/02/24

## 2024-02-03 ENCOUNTER — APPOINTMENT (OUTPATIENT)
Dept: GENERAL RADIOLOGY | Facility: HOSPITAL | Age: 64
End: 2024-02-03
Payer: COMMERCIAL

## 2024-02-03 PROCEDURE — G0378 HOSPITAL OBSERVATION PER HR: HCPCS

## 2024-02-03 PROCEDURE — 71045 X-RAY EXAM CHEST 1 VIEW: CPT

## 2024-02-03 PROCEDURE — 94799 UNLISTED PULMONARY SVC/PX: CPT

## 2024-02-03 PROCEDURE — 25010000002 THIAMINE PER 100 MG: Performed by: INTERNAL MEDICINE

## 2024-02-03 PROCEDURE — 25010000002 CEFTRIAXONE PER 250 MG: Performed by: INTERNAL MEDICINE

## 2024-02-03 PROCEDURE — 94664 DEMO&/EVAL PT USE INHALER: CPT

## 2024-02-03 PROCEDURE — 99232 SBSQ HOSP IP/OBS MODERATE 35: CPT | Performed by: INTERNAL MEDICINE

## 2024-02-03 RX ORDER — ALBUTEROL SULFATE 2.5 MG/3ML
2.5 SOLUTION RESPIRATORY (INHALATION) EVERY 6 HOURS PRN
Status: DISCONTINUED | OUTPATIENT
Start: 2024-02-03 | End: 2024-02-15 | Stop reason: HOSPADM

## 2024-02-03 RX ADMIN — DESVENLAFAXINE 50 MG: 50 TABLET, FILM COATED, EXTENDED RELEASE ORAL at 09:03

## 2024-02-03 RX ADMIN — SENNOSIDES AND DOCUSATE SODIUM 2 TABLET: 8.6; 5 TABLET ORAL at 09:02

## 2024-02-03 RX ADMIN — PANTOPRAZOLE SODIUM 40 MG: 40 INJECTION, POWDER, LYOPHILIZED, FOR SOLUTION INTRAVENOUS at 17:17

## 2024-02-03 RX ADMIN — ACETAMINOPHEN 650 MG: 325 TABLET ORAL at 03:25

## 2024-02-03 RX ADMIN — THIAMINE HYDROCHLORIDE 200 MG: 100 INJECTION, SOLUTION INTRAMUSCULAR; INTRAVENOUS at 05:26

## 2024-02-03 RX ADMIN — CARVEDILOL 3.12 MG: 3.12 TABLET, FILM COATED ORAL at 09:03

## 2024-02-03 RX ADMIN — CARVEDILOL 3.12 MG: 3.12 TABLET, FILM COATED ORAL at 17:16

## 2024-02-03 RX ADMIN — THIAMINE HYDROCHLORIDE 200 MG: 100 INJECTION, SOLUTION INTRAMUSCULAR; INTRAVENOUS at 14:16

## 2024-02-03 RX ADMIN — CLONIDINE HYDROCHLORIDE 0.1 MG: 0.2 TABLET ORAL at 09:02

## 2024-02-03 RX ADMIN — FOLIC ACID 1 MG: 1 TABLET ORAL at 09:02

## 2024-02-03 RX ADMIN — Medication 10 ML: at 09:06

## 2024-02-03 RX ADMIN — THIAMINE HYDROCHLORIDE 200 MG: 100 INJECTION, SOLUTION INTRAMUSCULAR; INTRAVENOUS at 22:05

## 2024-02-03 RX ADMIN — PANTOPRAZOLE SODIUM 40 MG: 40 INJECTION, POWDER, LYOPHILIZED, FOR SOLUTION INTRAVENOUS at 08:58

## 2024-02-03 RX ADMIN — BUDESONIDE AND FORMOTEROL FUMARATE DIHYDRATE 1 PUFF: 160; 4.5 AEROSOL RESPIRATORY (INHALATION) at 09:48

## 2024-02-03 RX ADMIN — Medication 10 ML: at 22:06

## 2024-02-03 RX ADMIN — LATANOPROST 1 DROP: 50 SOLUTION OPHTHALMIC at 22:06

## 2024-02-03 RX ADMIN — SODIUM CHLORIDE 1000 MG: 900 INJECTION INTRAVENOUS at 14:11

## 2024-02-03 RX ADMIN — CLONIDINE HYDROCHLORIDE 0.1 MG: 0.2 TABLET ORAL at 22:55

## 2024-02-03 RX ADMIN — Medication 10 ML: at 22:09

## 2024-02-03 RX ADMIN — BUDESONIDE AND FORMOTEROL FUMARATE DIHYDRATE 1 PUFF: 160; 4.5 AEROSOL RESPIRATORY (INHALATION) at 23:02

## 2024-02-03 RX ADMIN — URSODIOL 300 MG: 300 CAPSULE ORAL at 09:03

## 2024-02-03 RX ADMIN — TIOTROPIUM BROMIDE INHALATION SPRAY 2 PUFF: 3.12 SPRAY, METERED RESPIRATORY (INHALATION) at 09:48

## 2024-02-03 RX ADMIN — ROSUVASTATIN CALCIUM 5 MG: 10 TABLET, FILM COATED ORAL at 22:05

## 2024-02-03 NOTE — CASE MANAGEMENT/SOCIAL WORK
Continued Stay Note  [facility]     Patient Name: Philomena Davis  MRN: [id number]  Today's Date: 2/3/2024    Admit Date: 1/30/2024    Plan: rehab   Discharge Plan       Row Name 02/03/24 0838       Plan    Plan rehab    Patient/Family in Agreement with Plan yes    Plan Comments [facility] liaison called the CM today and stated that this patient's insurance is requesting a Peer to Peer to be done within 7 business days starting from Friday 2/2/24. It will need to be submitted on Monday 2/5/24. The [facility] van transport was cancelled for today. The MD and patient were notified. CM will follow.    Final Discharge Disposition Code 62 - inpatient rehab facility                   Discharge Codes    No documentation.                 Expected Discharge Date and Time       Expected Discharge Date Expected Discharge Time    Feb 3, 2024               Jessenia Kaiser RN

## 2024-02-03 NOTE — PROGRESS NOTES
[facility] Medicine Services  PROGRESS NOTE    Patient Name: Philomena Davis  : 1960  MRN: [id number]    Date of Admission: 2024  Primary Care Physician: Unique Brandt DO    Subjective   Subjective     CC:  hematemesis    HPI:  Insomnia is a chronic persistent issue  Discouraged due to rehab denial by insurance  Working w incentive spirometer, still hanging low on O2 sats without other symptoms      Objective   Objective     Vital Signs:   Temp:  [97.9 °F (36.6 °C)-98.5 °F (36.9 °C)] 98.1 °F (36.7 °C)  Heart Rate:  [66-91] 77  Resp:  [16-18] 17  BP: ()/(57-92) 129/92  Flow (L/min):  [2] 2     Physical Exam:  Constitutional: No acute distress, awake, alert female sitting up in bed, well appearing  HENT: NCAT, mucous membranes moist  Respiratory: Clear to auscultation bilaterally, respiratory effort normal   Cardiovascular: RRR, no murmurs, rubs, or gallops  Gastrointestinal: Soft, nontender, nondistended  Musculoskeletal: Muscle tone mildly decreased throughout, LUE in cast  Psychiatric: Appropriate affect, cooperative. Teary at times when discussing condition  Neurologic: Alert and oriented, facial movements symmetric and spontaneous movement of all 4 extremities grossly equal bilaterally, speech clear  Skin: No rashes    Results Reviewed:  LAB RESULTS:      Lab 24  0544 24  1608 24  0739 24  1808 24  1234   WBC 5.54  --   --   --  8.07   HEMOGLOBIN 10.8*  --  10.6* 11.8* 12.9   HEMATOCRIT 32.2*  --  32.1* 35.7 38.5   PLATELETS 264  --   --   --  317   NEUTROS ABS 2.79  --   --   --  4.71   IMMATURE GRANS (ABS) 0.17*  --   --   --  0.11*   LYMPHS ABS 1.37  --   --   --  1.54   MONOS ABS 1.02*  --   --   --  1.54*   EOS ABS 0.13  --   --   --  0.11   MCV 96.7  --   --   --  96.7   LACTATE  --   --   --  1.1 2.2*   PROTIME  --  14.3  --   --   --          Lab 24  0450 24  0544 24  2201 24  0739 24  0226  01/30/24  1234   SODIUM 134* 139  --   --  134* 134*   POTASSIUM 4.4 5.0 4.5 3.1* 3.2* 2.2*   CHLORIDE 97* 104  --   --  96* 87*   CO2 27.0 26.0  --   --  26.0 34.0*   ANION GAP 10.0 9.0  --   --  12.0 13.0   BUN 7* 13  --   --  23 32*   CREATININE 0.74 0.80  --   --  0.80 0.93   EGFR 92.2 83.9  --   --  83.9 70.1   GLUCOSE 116* 118*  --   --  106* 133*   CALCIUM 8.8 8.3*  --   --  7.9* 9.3   MAGNESIUM  --   --   --   --  2.1 2.2   PHOSPHORUS  --   --   --   --  2.9  --          Lab 02/02/24  0450 02/01/24  0544 01/31/24  0226 01/30/24  1234   TOTAL PROTEIN 5.8* 5.7* 6.1 7.2   ALBUMIN 3.3* 3.1* 3.0* 3.6   GLOBULIN 2.5 2.6 3.1 3.6   ALT (SGPT) 20 28 21 24   AST (SGOT) 63* 106* 77* 89*   BILIRUBIN 0.8 0.9 1.1 1.1   ALK PHOS 146* 165* 136* 176*         Lab 01/31/24  1608   PROTIME 14.3   INR 1.10             Lab 01/30/24  1234   ABO TYPING O   RH TYPING Positive   ANTIBODY SCREEN Negative         Brief Urine Lab Results  (Last result in the past 365 days)        Color   Clarity   Blood   Leuk Est   Nitrite   Protein   CREAT   Urine HCG        05/19/23 0834 Orange  Comment: on azo   Cloudy   3+   Large (3+)   Positive   3+                   Microbiology Results Abnormal       None            No radiology results from the last 24 hrs        Current medications:  Scheduled Meds:budesonide-formoterol, 1 puff, Inhalation, BID - RT  carvedilol, 3.125 mg, Oral, BID With Meals  cefTRIAXone, 1,000 mg, Intravenous, Q24H  cloNIDine, 0.1 mg, Oral, BID  desvenlafaxine, 50 mg, Oral, Daily  folic acid, 1 mg, Oral, Daily  latanoprost, 1 drop, Both Eyes, Nightly  pantoprazole, 40 mg, Intravenous, BID AC  rosuvastatin, 5 mg, Oral, Nightly  senna-docusate sodium, 2 tablet, Oral, BID  sodium chloride, 10 mL, Intravenous, Q12H  sodium chloride, 10 mL, Intravenous, Q12H  thiamine (B-1) IV, 200 mg, Intravenous, Q8H   Followed by  [START ON 2/5/2024] thiamine, 100 mg, Oral, Daily  tiotropium bromide monohydrate, 2 puff, Inhalation, Daily -  RT  ursodiol, 300 mg, Oral, Daily      Continuous Infusions:lactated ringers, 9 mL/hr      PRN Meds:.  acetaminophen    senna-docusate sodium **AND** polyethylene glycol **AND** bisacodyl **AND** bisacodyl    Magnesium Standard Dose Replacement - Follow Nurse / BPA Driven Protocol    nitroglycerin    ondansetron ODT **OR** ondansetron    Phosphorus Replacement - Follow Nurse / BPA Driven Protocol    Potassium Replacement - Follow Nurse / BPA Driven Protocol    sodium chloride    sodium chloride    sodium chloride    sodium chloride    sodium chloride    Assessment & Plan   Assessment & Plan     Active Hospital Problems    Diagnosis  POA    Closed fracture of left hand [S62.92XA]  Unknown    Hypokalemia [E87.6]  Yes    Hematemesis without nausea [K92.0]  Unknown    Melena [K92.1]  Unknown    Blood loss anemia [D50.0]  Unknown    Alcohol use disorder [F10.90]  Yes    GERD without esophagitis [K21.9]  Yes    Alcoholic cirrhosis of liver without ascites [K70.30]  Yes    Essential hypertension [I10]  Yes    Generalized anxiety disorder [F41.1]  Yes    Hyperlipidemia [E78.5]  Yes      Resolved Hospital Problems   No resolved problems to display.        Brief Hospital Course to date:  Philomena Davis is a 63 y.o. female w ETOH cirrhosis, HTN, anxiety who presented with significant weakness after reported hematemesis. EGD revealed severe esophagitis. Also found to have left hand 5th metacarpal fracture which inhibits ability to complete daily casts. Stay c/b persistent debility, only able to walk 5 steps    Severe reflux esophagitis c/b ulceration + gastric ulcer  -BID PPI x 3 months, then daily  -repeat EGD in 3 months recommended to assess healing  -abx given concomitant cirrhosis    ETOH cirrhosis w h/o ETOH use  -family report of patient abstinence different from patient's who reports sobriety x weeks  -not scoring on CIWA, d/c  -given compensated, start coreg low dose for hopeful titration up as OP    LEFT hand 5th  metacarpal fracture - f/u Naomi Duncan PA-C 1 week, NWB LUE    Borderline O2 sat - get CXR today, continue incentive spirometry    HTN - on clonidine OP for insomnia without clear improvement in pt symptoms. Will down-titrate back to prior 0.1 mg dose and titrate up coreg as able    Anxiety - home desvenlafaxine. Offered hydroxyzine and buspirone but patient reports poor response to both in past    Chronic insomnia - home clonidine as above  Severe hypokalemia 2/2 vomiting - now replaced and appropriate  Significant debility - on PT eval 1/31 only walked 5 steps, needing assistance before return home alone    Expected Discharge Location and Transportation: med ready for rehab pending approval  Expected Discharge 2/3 (Discharge date is tentative pending patient's medical condition and is subject to change)  Expected Discharge Date: 2/3/2024; Expected Discharge Time:      DVT prophylaxis:  Mechanical DVT prophylaxis orders are present.         AM-PAC 6 Clicks Score (PT): 17 (02/03/24 2078)    CODE STATUS:   Code Status and Medical Interventions:   Ordered at: 01/30/24 7183     Level Of Support Discussed With:    Patient     Code Status (Patient has no pulse and is not breathing):    CPR (Attempt to Resuscitate)     Medical Interventions (Patient has pulse or is breathing):    Full Support       Unique Anderson MD  02/03/24

## 2024-02-03 NOTE — PLAN OF CARE
Problem: Hypertension Comorbidity  Goal: Blood Pressure in Desired Range  Outcome: Ongoing, Progressing     Problem: Electrolyte Imbalance  Goal: Electrolyte Balance  Outcome: Ongoing, Progressing     Problem: Fall Injury Risk  Goal: Absence of Fall and Fall-Related Injury  Outcome: Ongoing, Progressing  Intervention: Promote Injury-Free Environment  Recent Flowsheet Documentation  Taken 2/3/2024 0600 by Latoya Carrasco RN  Safety Promotion/Fall Prevention:   nonskid shoes/slippers when out of bed   safety round/check completed  Taken 2/3/2024 0400 by Latoya Carrasco RN  Safety Promotion/Fall Prevention:   nonskid shoes/slippers when out of bed   safety round/check completed  Taken 2/3/2024 0200 by Latoya Carrasco RN  Safety Promotion/Fall Prevention:   nonskid shoes/slippers when out of bed   safety round/check completed  Taken 2/3/2024 0000 by Latoya Carrasco RN  Safety Promotion/Fall Prevention:   nonskid shoes/slippers when out of bed   safety round/check completed  Taken 2/2/2024 2104 by Latoya Carrasco RN  Safety Promotion/Fall Prevention:   safety round/check completed   nonskid shoes/slippers when out of bed   assistive device/personal items within reach   clutter free environment maintained   fall prevention program maintained   lighting adjusted   room organization consistent  Taken 2/2/2024 1930 by Latoya Carrasco RN  Safety Promotion/Fall Prevention:   nonskid shoes/slippers when out of bed   safety round/check completed     Problem: Adult Inpatient Plan of Care  Goal: Plan of Care Review  Outcome: Ongoing, Progressing  Goal: Patient-Specific Goal (Individualized)  Outcome: Ongoing, Progressing  Goal: Absence of Hospital-Acquired Illness or Injury  Outcome: Ongoing, Progressing  Intervention: Identify and Manage Fall Risk  Recent Flowsheet Documentation  Taken 2/3/2024 0600 by Latoya Carrasco, RN  Safety Promotion/Fall Prevention:   nonskid shoes/slippers when out of bed   safety round/check completed  Taken 2/3/2024  0400 by Latoya Carrasco RN  Safety Promotion/Fall Prevention:   nonskid shoes/slippers when out of bed   safety round/check completed  Taken 2/3/2024 0200 by Latoya Carrasco RN  Safety Promotion/Fall Prevention:   nonskid shoes/slippers when out of bed   safety round/check completed  Taken 2/3/2024 0000 by Latoya Carrasco RN  Safety Promotion/Fall Prevention:   nonskid shoes/slippers when out of bed   safety round/check completed  Taken 2/2/2024 2104 by Latoya Carrasco RN  Safety Promotion/Fall Prevention:   safety round/check completed   nonskid shoes/slippers when out of bed   assistive device/personal items within reach   clutter free environment maintained   fall prevention program maintained   lighting adjusted   room organization consistent  Taken 2/2/2024 1930 by Latoya Carrasco RN  Safety Promotion/Fall Prevention:   nonskid shoes/slippers when out of bed   safety round/check completed  Intervention: Prevent Skin Injury  Recent Flowsheet Documentation  Taken 2/3/2024 0600 by Latoya Carrasco RN  Body Position: position changed independently  Taken 2/3/2024 0400 by Latoya Carrasco RN  Body Position: position changed independently  Taken 2/3/2024 0200 by Latoya Carrasco RN  Body Position: position changed independently  Taken 2/3/2024 0000 by Latoya Carrasco RN  Body Position: position changed independently  Taken 2/2/2024 2104 by Latoya Carrasco RN  Body Position: position changed independently  Taken 2/2/2024 1930 by Latoya Carrasco RN  Body Position: position changed independently  Intervention: Prevent and Manage VTE (Venous Thromboembolism) Risk  Recent Flowsheet Documentation  Taken 2/3/2024 0600 by Latoya Carrasco RN  Activity Management: activity minimized  Taken 2/3/2024 0400 by Latoya Carrasco RN  Activity Management: activity minimized  Taken 2/3/2024 0200 by Latyoa Carrasco RN  Activity Management: activity minimized  Taken 2/3/2024 0000 by Latoya Carrasco RN  Activity Management: activity minimized  Taken 2/2/2024 2104 by  Luna, Latoya, RN  Activity Management: activity minimized  Goal: Optimal Comfort and Wellbeing  Outcome: Ongoing, Progressing  Intervention: Monitor Pain and Promote Comfort  Recent Flowsheet Documentation  Taken 2/2/2024 2104 by Latoya Carrasco RN  Pain Management Interventions: pain management plan reviewed with patient/caregiver  Intervention: Provide Person-Centered Care  Recent Flowsheet Documentation  Taken 2/2/2024 2104 by Latoya Carrasco RN  Trust Relationship/Rapport:   choices provided   care explained   questions answered   questions encouraged  Goal: Readiness for Transition of Care  Outcome: Ongoing, Progressing     Problem: Skin Injury Risk Increased  Goal: Skin Health and Integrity  Outcome: Ongoing, Progressing  Intervention: Optimize Skin Protection  Recent Flowsheet Documentation  Taken 2/3/2024 0600 by Latoya Carrasco RN  Head of Bed (HOB) Positioning: HOB elevated  Taken 2/3/2024 0400 by Latoya Carrasco RN  Head of Bed (HOB) Positioning: HOB elevated  Taken 2/3/2024 0200 by Latoya Carrasco RN  Head of Bed (HOB) Positioning: HOB elevated  Taken 2/3/2024 0000 by Latoya Carrasco RN  Head of Bed (HOB) Positioning: HOB elevated  Taken 2/2/2024 2104 by Latoya Carrasco RN  Head of Bed (HOB) Positioning: HOB elevated  Taken 2/2/2024 1930 by Latoya Carrasco RN  Head of Bed (HOB) Positioning: HOB elevated   Goal Outcome Evaluation:

## 2024-02-04 LAB
DEPRECATED RDW RBC AUTO: 46.4 FL (ref 37–54)
ERYTHROCYTE [DISTWIDTH] IN BLOOD BY AUTOMATED COUNT: 13.2 % (ref 12.3–15.4)
HCT VFR BLD AUTO: 32.2 % (ref 34–46.6)
HCT VFR BLD AUTO: 32.2 % (ref 34–46.6)
HGB BLD-MCNC: 10.6 G/DL (ref 12–15.9)
HGB BLD-MCNC: 10.7 G/DL (ref 12–15.9)
MCH RBC QN AUTO: 31.6 PG (ref 26.6–33)
MCHC RBC AUTO-ENTMCNC: 32.9 G/DL (ref 31.5–35.7)
MCV RBC AUTO: 96.1 FL (ref 79–97)
PLATELET # BLD AUTO: 293 10*3/MM3 (ref 140–450)
PMV BLD AUTO: 10.2 FL (ref 6–12)
RBC # BLD AUTO: 3.35 10*6/MM3 (ref 3.77–5.28)
WBC NRBC COR # BLD AUTO: 4.64 10*3/MM3 (ref 3.4–10.8)

## 2024-02-04 PROCEDURE — 94664 DEMO&/EVAL PT USE INHALER: CPT

## 2024-02-04 PROCEDURE — 25010000002 THIAMINE PER 100 MG: Performed by: INTERNAL MEDICINE

## 2024-02-04 PROCEDURE — 94799 UNLISTED PULMONARY SVC/PX: CPT

## 2024-02-04 PROCEDURE — 85027 COMPLETE CBC AUTOMATED: CPT | Performed by: INTERNAL MEDICINE

## 2024-02-04 PROCEDURE — G0378 HOSPITAL OBSERVATION PER HR: HCPCS

## 2024-02-04 PROCEDURE — 85018 HEMOGLOBIN: CPT | Performed by: INTERNAL MEDICINE

## 2024-02-04 PROCEDURE — 99232 SBSQ HOSP IP/OBS MODERATE 35: CPT | Performed by: INTERNAL MEDICINE

## 2024-02-04 PROCEDURE — 85014 HEMATOCRIT: CPT | Performed by: INTERNAL MEDICINE

## 2024-02-04 PROCEDURE — 25010000002 CEFTRIAXONE PER 250 MG: Performed by: INTERNAL MEDICINE

## 2024-02-04 PROCEDURE — 94761 N-INVAS EAR/PLS OXIMETRY MLT: CPT

## 2024-02-04 RX ORDER — AZITHROMYCIN 250 MG/1
500 TABLET, FILM COATED ORAL
Status: COMPLETED | OUTPATIENT
Start: 2024-02-04 | End: 2024-02-06

## 2024-02-04 RX ORDER — BUSPIRONE HYDROCHLORIDE 5 MG/1
5 TABLET ORAL 3 TIMES DAILY
Status: DISCONTINUED | OUTPATIENT
Start: 2024-02-04 | End: 2024-02-04

## 2024-02-04 RX ADMIN — URSODIOL 300 MG: 300 CAPSULE ORAL at 08:39

## 2024-02-04 RX ADMIN — BUDESONIDE AND FORMOTEROL FUMARATE DIHYDRATE 1 PUFF: 160; 4.5 AEROSOL RESPIRATORY (INHALATION) at 08:29

## 2024-02-04 RX ADMIN — Medication 10 ML: at 20:51

## 2024-02-04 RX ADMIN — CLONIDINE HYDROCHLORIDE 0.1 MG: 0.2 TABLET ORAL at 20:50

## 2024-02-04 RX ADMIN — PANTOPRAZOLE SODIUM 40 MG: 40 INJECTION, POWDER, LYOPHILIZED, FOR SOLUTION INTRAVENOUS at 17:26

## 2024-02-04 RX ADMIN — BUDESONIDE AND FORMOTEROL FUMARATE DIHYDRATE 1 PUFF: 160; 4.5 AEROSOL RESPIRATORY (INHALATION) at 20:56

## 2024-02-04 RX ADMIN — LATANOPROST 1 DROP: 50 SOLUTION OPHTHALMIC at 20:51

## 2024-02-04 RX ADMIN — Medication 10 ML: at 20:57

## 2024-02-04 RX ADMIN — THIAMINE HYDROCHLORIDE 200 MG: 100 INJECTION, SOLUTION INTRAMUSCULAR; INTRAVENOUS at 14:57

## 2024-02-04 RX ADMIN — Medication 10 ML: at 08:41

## 2024-02-04 RX ADMIN — FOLIC ACID 1 MG: 1 TABLET ORAL at 08:39

## 2024-02-04 RX ADMIN — TIOTROPIUM BROMIDE INHALATION SPRAY 2 PUFF: 3.12 SPRAY, METERED RESPIRATORY (INHALATION) at 08:29

## 2024-02-04 RX ADMIN — CARVEDILOL 3.12 MG: 3.12 TABLET, FILM COATED ORAL at 17:26

## 2024-02-04 RX ADMIN — AZITHROMYCIN 500 MG: 250 TABLET, FILM COATED ORAL at 11:16

## 2024-02-04 RX ADMIN — DESVENLAFAXINE 50 MG: 50 TABLET, FILM COATED, EXTENDED RELEASE ORAL at 08:39

## 2024-02-04 RX ADMIN — SODIUM CHLORIDE 1000 MG: 900 INJECTION INTRAVENOUS at 14:57

## 2024-02-04 RX ADMIN — CARVEDILOL 3.12 MG: 3.12 TABLET, FILM COATED ORAL at 08:39

## 2024-02-04 RX ADMIN — ROSUVASTATIN CALCIUM 5 MG: 10 TABLET, FILM COATED ORAL at 20:50

## 2024-02-04 RX ADMIN — CLONIDINE HYDROCHLORIDE 0.1 MG: 0.2 TABLET ORAL at 10:11

## 2024-02-04 RX ADMIN — THIAMINE HYDROCHLORIDE 200 MG: 100 INJECTION, SOLUTION INTRAMUSCULAR; INTRAVENOUS at 05:57

## 2024-02-04 RX ADMIN — PANTOPRAZOLE SODIUM 40 MG: 40 INJECTION, POWDER, LYOPHILIZED, FOR SOLUTION INTRAVENOUS at 08:39

## 2024-02-04 NOTE — PLAN OF CARE
Goal Outcome Evaluation:        Pt has no new nursing issues at this time. Pt is a/ox4, 2L NC, vital signs stable. Pt has no new nursing issues at this time.      Problem: Hypertension Comorbidity  Goal: Blood Pressure in Desired Range  Outcome: Ongoing, Progressing  Intervention: Maintain Blood Pressure Management  Recent Flowsheet Documentation  Taken 2/3/2024 2000 by Cb Dobson RN  Medication Review/Management: medications reviewed     Problem: Electrolyte Imbalance  Goal: Electrolyte Balance  Outcome: Ongoing, Progressing     Problem: Fall Injury Risk  Goal: Absence of Fall and Fall-Related Injury  Outcome: Ongoing, Progressing  Intervention: Identify and Manage Contributors  Recent Flowsheet Documentation  Taken 2/4/2024 0600 by Cb Dobson RN  Self-Care Promotion: independence encouraged  Taken 2/4/2024 0200 by Cb Dobson RN  Self-Care Promotion: independence encouraged  Taken 2/3/2024 2200 by Cb Dobson RN  Self-Care Promotion: independence encouraged  Taken 2/3/2024 2000 by Cb Dobson RN  Medication Review/Management: medications reviewed  Intervention: Promote Injury-Free Environment  Recent Flowsheet Documentation  Taken 2/4/2024 0600 by Cb Dobson RN  Safety Promotion/Fall Prevention:   activity supervised   assistive device/personal items within reach   safety round/check completed   clutter free environment maintained   nonskid shoes/slippers when out of bed  Taken 2/4/2024 0400 by Cb Dobson RN  Safety Promotion/Fall Prevention:   safety round/check completed   activity supervised   assistive device/personal items within reach   clutter free environment maintained   fall prevention program maintained  Taken 2/4/2024 0200 by Cb Dobson RN  Safety Promotion/Fall Prevention:   activity supervised   assistive device/personal items within reach   safety round/check completed   clutter free environment maintained   nonskid shoes/slippers  when out of bed  Taken 2/4/2024 0000 by Cb Dobson RN  Safety Promotion/Fall Prevention:   safety round/check completed   activity supervised   assistive device/personal items within reach   clutter free environment maintained   fall prevention program maintained  Taken 2/3/2024 2200 by Cb Dobson RN  Safety Promotion/Fall Prevention:   activity supervised   assistive device/personal items within reach   safety round/check completed   clutter free environment maintained   nonskid shoes/slippers when out of bed  Taken 2/3/2024 2000 by Cb Dobson RN  Safety Promotion/Fall Prevention:   safety round/check completed   activity supervised   assistive device/personal items within reach   clutter free environment maintained   fall prevention program maintained     Problem: Adult Inpatient Plan of Care  Goal: Plan of Care Review  Outcome: Ongoing, Progressing  Goal: Patient-Specific Goal (Individualized)  Outcome: Ongoing, Progressing  Goal: Absence of Hospital-Acquired Illness or Injury  Outcome: Ongoing, Progressing  Intervention: Identify and Manage Fall Risk  Recent Flowsheet Documentation  Taken 2/4/2024 0600 by Cb Dobson RN  Safety Promotion/Fall Prevention:   activity supervised   assistive device/personal items within reach   safety round/check completed   clutter free environment maintained   nonskid shoes/slippers when out of bed  Taken 2/4/2024 0400 by Cb Dobson RN  Safety Promotion/Fall Prevention:   safety round/check completed   activity supervised   assistive device/personal items within reach   clutter free environment maintained   fall prevention program maintained  Taken 2/4/2024 0200 by Cb Dobson RN  Safety Promotion/Fall Prevention:   activity supervised   assistive device/personal items within reach   safety round/check completed   clutter free environment maintained   nonskid shoes/slippers when out of bed  Taken 2/4/2024 0000 by Bronson  DANDY Vivar  Safety Promotion/Fall Prevention:   safety round/check completed   activity supervised   assistive device/personal items within reach   clutter free environment maintained   fall prevention program maintained  Taken 2/3/2024 2200 by Cb Dobson RN  Safety Promotion/Fall Prevention:   activity supervised   assistive device/personal items within reach   safety round/check completed   clutter free environment maintained   nonskid shoes/slippers when out of bed  Taken 2/3/2024 2000 by Cb Dobson RN  Safety Promotion/Fall Prevention:   safety round/check completed   activity supervised   assistive device/personal items within reach   clutter free environment maintained   fall prevention program maintained  Intervention: Prevent Skin Injury  Recent Flowsheet Documentation  Taken 2/4/2024 0600 by Cb Dobson RN  Body Position: position changed independently  Taken 2/4/2024 0400 by Cb Dobson RN  Body Position: position changed independently  Taken 2/4/2024 0200 by Cb Dobson RN  Body Position: position changed independently  Skin Protection:   adhesive use limited   incontinence pads utilized   skin-to-device areas padded   tubing/devices free from skin contact  Taken 2/4/2024 0000 by Cb Dobson RN  Body Position: position changed independently  Skin Protection:   adhesive use limited   incontinence pads utilized   skin-to-device areas padded   tubing/devices free from skin contact  Taken 2/3/2024 2200 by Cb Dobson RN  Body Position: position changed independently  Taken 2/3/2024 2000 by Cb Dobson RN  Body Position: position changed independently  Skin Protection:   adhesive use limited   incontinence pads utilized   skin-to-device areas padded   tubing/devices free from skin contact  Intervention: Prevent and Manage VTE (Venous Thromboembolism) Risk  Recent Flowsheet Documentation  Taken 2/4/2024 0600 by Cb Dobson RN  Activity  Management: activity minimized  Taken 2/4/2024 0400 by Cb Dobson RN  Activity Management: activity minimized  Taken 2/4/2024 0200 by Cb Dobson RN  Activity Management: activity minimized  Taken 2/4/2024 0000 by Cb Dobson RN  Activity Management: activity minimized  Taken 2/3/2024 2200 by Cb Dobson RN  Activity Management: activity minimized  Taken 2/3/2024 2000 by Cb Dobson RN  Activity Management: activity encouraged  Intervention: Prevent Infection  Recent Flowsheet Documentation  Taken 2/4/2024 0600 by Cb Dobson RN  Infection Prevention:   environmental surveillance performed   rest/sleep promoted  Taken 2/4/2024 0400 by Cb Dobson RN  Infection Prevention:   hand hygiene promoted   rest/sleep promoted  Taken 2/4/2024 0200 by Cb Dobson RN  Infection Prevention:   environmental surveillance performed   rest/sleep promoted  Taken 2/4/2024 0000 by Cb Dobson RN  Infection Prevention:   hand hygiene promoted   rest/sleep promoted  Taken 2/3/2024 2200 by Cb Dobson RN  Infection Prevention:   environmental surveillance performed   rest/sleep promoted  Taken 2/3/2024 2000 by Cb Dobson RN  Infection Prevention:   cohorting utilized   hand hygiene promoted   rest/sleep promoted  Goal: Optimal Comfort and Wellbeing  Outcome: Ongoing, Progressing  Intervention: Provide Person-Centered Care  Recent Flowsheet Documentation  Taken 2/3/2024 2000 by Cb Dobson RN  Trust Relationship/Rapport:   care explained   emotional support provided   questions answered   questions encouraged   thoughts/feelings acknowledged  Goal: Readiness for Transition of Care  Outcome: Ongoing, Progressing     Problem: Skin Injury Risk Increased  Goal: Skin Health and Integrity  Outcome: Ongoing, Progressing  Intervention: Optimize Skin Protection  Recent Flowsheet Documentation  Taken 2/4/2024 0600 by Cb Dobson RN  Head of Bed  ([facility]) Positioning: HOB elevated  Taken 2/4/2024 0400 by Cb Dobson RN  Head of Bed ([facility]) Positioning: [facility] elevated  Pressure Reduction Devices:   positioning supports utilized   pressure-redistributing mattress utilized  Taken 2/4/2024 0200 by Cb Dobson RN  Pressure Reduction Techniques:   frequent weight shift encouraged   weight shift assistance provided  Head of Bed ([facility]) Positioning: [facility] elevated  Pressure Reduction Devices:   positioning supports utilized   pressure-redistributing mattress utilized  Skin Protection:   adhesive use limited   incontinence pads utilized   skin-to-device areas padded   tubing/devices free from skin contact  Taken 2/4/2024 0000 by Cb Dobson RN  Pressure Reduction Techniques:   frequent weight shift encouraged   weight shift assistance provided  Head of Bed ([facility]) Positioning: [facility] elevated  Pressure Reduction Devices:   positioning supports utilized   pressure-redistributing mattress utilized  Skin Protection:   adhesive use limited   incontinence pads utilized   skin-to-device areas padded   tubing/devices free from skin contact  Taken 2/3/2024 2200 by Cb Dobson RN  Head of Bed ([facility]) Positioning: HOB elevated  Taken 2/3/2024 2000 by Cb Dobson RN  Pressure Reduction Techniques:   frequent weight shift encouraged   weight shift assistance provided  Head of Bed ([facility]) Positioning: [facility] elevated  Pressure Reduction Devices:   pressure-redistributing mattress utilized   positioning supports utilized  Skin Protection:   adhesive use limited   incontinence pads utilized   skin-to-device areas padded   tubing/devices free from skin contact

## 2024-02-04 NOTE — PROGRESS NOTES
[facility] Medicine Services  PROGRESS NOTE    Patient Name: Philomena Davis  : 1960  MRN: [id number]    Date of Admission: 2024  Primary Care Physician: Unique Brandt DO    Subjective   Subjective     CC:  hematemesis    HPI:  Patient reports some cough but very infrequent  We discuss cxr results  Patient requests anxiety medications - we discuss options of hydroxyzine and buspar. We decide together to re-trial buspar at low dose and see  After I leave the room, she declines buspar when brought by nurse      Objective   Objective     Vital Signs:   Temp:  [98 °F (36.7 °C)-98.4 °F (36.9 °C)] 98.3 °F (36.8 °C)  Heart Rate:  [69-86] 73  Resp:  [16-18] 18  BP: (113-132)/(66-94) 124/94  Flow (L/min):  [1.5-2] 2     Physical Exam:  Constitutional: No acute distress, awake, alert female sitting up in bed, well appearing  HENT: NCAT, mucous membranes moist  Respiratory: Clear to auscultation bilaterally, respiratory effort normal on minimal O2  Cardiovascular: RRR, no murmurs, rubs, or gallops  Gastrointestinal: Soft, nontender, nondistended  Musculoskeletal: Muscle tone mildly decreased throughout, LUE in cast  Psychiatric: Appropriate affect, cooperative.  Neurologic: Alert and oriented, facial movements symmetric and spontaneous movement of all 4 extremities grossly equal bilaterally, speech clear  Skin: No rashes    Results Reviewed:  LAB RESULTS:      Lab 24  0319 24  0544 24  1608 24  0739 24  1808 24  1234   WBC 4.64 5.54  --   --   --  8.07   HEMOGLOBIN 10.6*  10.7* 10.8*  --  10.6* 11.8* 12.9   HEMATOCRIT 32.2*  32.2* 32.2*  --  32.1* 35.7 38.5   PLATELETS 293 264  --   --   --  317   NEUTROS ABS  --  2.79  --   --   --  4.71   IMMATURE GRANS (ABS)  --  0.17*  --   --   --  0.11*   LYMPHS ABS  --  1.37  --   --   --  1.54   MONOS ABS  --  1.02*  --   --   --  1.54*   EOS ABS  --  0.13  --   --   --  0.11   MCV 96.1 96.7  --   --    --  96.7   LACTATE  --   --   --   --  1.1 2.2*   PROTIME  --   --  14.3  --   --   --          Lab 02/02/24  0450 02/01/24  0544 01/31/24  2201 01/31/24  0739 01/31/24 0226 01/30/24  1234   SODIUM 134* 139  --   --  134* 134*   POTASSIUM 4.4 5.0 4.5 3.1* 3.2* 2.2*   CHLORIDE 97* 104  --   --  96* 87*   CO2 27.0 26.0  --   --  26.0 34.0*   ANION GAP 10.0 9.0  --   --  12.0 13.0   BUN 7* 13  --   --  23 32*   CREATININE 0.74 0.80  --   --  0.80 0.93   EGFR 92.2 83.9  --   --  83.9 70.1   GLUCOSE 116* 118*  --   --  106* 133*   CALCIUM 8.8 8.3*  --   --  7.9* 9.3   MAGNESIUM  --   --   --   --  2.1 2.2   PHOSPHORUS  --   --   --   --  2.9  --          Lab 02/02/24  0450 02/01/24  0544 01/31/24 0226 01/30/24  1234   TOTAL PROTEIN 5.8* 5.7* 6.1 7.2   ALBUMIN 3.3* 3.1* 3.0* 3.6   GLOBULIN 2.5 2.6 3.1 3.6   ALT (SGPT) 20 28 21 24   AST (SGOT) 63* 106* 77* 89*   BILIRUBIN 0.8 0.9 1.1 1.1   ALK PHOS 146* 165* 136* 176*         Lab 01/31/24  1608   PROTIME 14.3   INR 1.10             Lab 01/30/24  1234   ABO TYPING O   RH TYPING Positive   ANTIBODY SCREEN Negative         Brief Urine Lab Results  (Last result in the past 365 days)        Color   Clarity   Blood   Leuk Est   Nitrite   Protein   CREAT   Urine HCG        05/19/23 0834 Orange  Comment: on azo   Cloudy   3+   Large (3+)   Positive   3+                   Microbiology Results Abnormal       None            XR Chest 1 View    Result Date: 2/3/2024  XR CHEST 1 VW Date of Exam: 2/3/2024 3:30 PM EST Indication: hypoxia Comparison: None available. Findings: Heart size and pulmonary vessels are within normal limits. There is hazy lateral airspace disease. Opacities are more peripherally located in the left lung. No pleural effusion. No evidence pneumothorax. Bony structures are unremarkable.     Impression: Impression: Hazy patchy bilateral airspace disease favored to be due to multifocal pneumonia. Electronically Signed: Justin Baker MD  2/3/2024 3:59 PM EST   Workstation ID: [id number]         Current medications:  Scheduled Meds:azithromycin, 500 mg, Oral, Q24H  budesonide-formoterol, 1 puff, Inhalation, BID - RT  carvedilol, 3.125 mg, Oral, BID With Meals  cefTRIAXone, 1,000 mg, Intravenous, Q24H  cloNIDine, 0.1 mg, Oral, BID  desvenlafaxine, 50 mg, Oral, Daily  folic acid, 1 mg, Oral, Daily  latanoprost, 1 drop, Both Eyes, Nightly  pantoprazole, 40 mg, Intravenous, BID AC  rosuvastatin, 5 mg, Oral, Nightly  senna-docusate sodium, 2 tablet, Oral, BID  sodium chloride, 10 mL, Intravenous, Q12H  sodium chloride, 10 mL, Intravenous, Q12H  thiamine (B-1) IV, 200 mg, Intravenous, Q8H   Followed by  [START ON 2/5/2024] thiamine, 100 mg, Oral, Daily  tiotropium bromide monohydrate, 2 puff, Inhalation, Daily - RT  ursodiol, 300 mg, Oral, Daily      Continuous Infusions:lactated ringers, 9 mL/hr      PRN Meds:.  acetaminophen    albuterol    senna-docusate sodium **AND** polyethylene glycol **AND** bisacodyl **AND** bisacodyl    Magnesium Standard Dose Replacement - Follow Nurse / BPA Driven Protocol    nitroglycerin    ondansetron ODT **OR** ondansetron    Phosphorus Replacement - Follow Nurse / BPA Driven Protocol    Potassium Replacement - Follow Nurse / BPA Driven Protocol    sodium chloride    sodium chloride    sodium chloride    sodium chloride    sodium chloride    Assessment & Plan   Assessment & Plan     Active Hospital Problems    Diagnosis  POA    Closed fracture of left hand [S62.92XA]  Unknown    Hypokalemia [E87.6]  Yes    Hematemesis without nausea [K92.0]  Unknown    Melena [K92.1]  Unknown    Blood loss anemia [D50.0]  Unknown    Alcohol use disorder [F10.90]  Yes    GERD without esophagitis [K21.9]  Yes    Alcoholic cirrhosis of liver without ascites [K70.30]  Yes    Essential hypertension [I10]  Yes    Generalized anxiety disorder [F41.1]  Yes    Hyperlipidemia [E78.5]  Yes      Resolved Hospital Problems   No resolved problems to display.        Brief  Hospital Course to date:  Philomena Davis is a 63 y.o. female w ETOH cirrhosis, HTN, anxiety who presented with significant weakness after reported hematemesis. EGD revealed severe esophagitis. Also found to have left hand 5th metacarpal fracture which inhibits ability to complete daily casts. Stay c/b persistent debility, only able to walk 5 steps, and need for placement    Likely atypical PNA  -borderline O2 sat prompting CXR 2/3 c/w atypical PNA v atelectasis  -already on ceftriaxone for GI bleed see below for many days, start azith x 3 days. Encourage IS    Severe reflux esophagitis c/b ulceration + gastric ulcer  -BID PPI x 3 months, then daily  -repeat EGD in 3 months recommended to assess healing  -complete IV ceftriaxone given UGI bleed+ concomitant cirrhosis    ETOH cirrhosis w h/o ETOH use  -family report of patient abstinence different from patient's who reports sobriety x weeks  -given compensated, start coreg low dose for hopeful titration up as OP    LEFT hand 5th metacarpal fracture - f/u Naomi Duncan PA-C 1 week, NWB ADAME    HTN - on clonidine OP for insomnia without clear improvement in pt symptoms. Will down-titrate back to prior 0.1 mg dose and titrate up coreg as able given orthostasis    Anxiety - home desvenlafaxine. Offered hydroxyzine and buspirone but patient reports poor response to both in past    Chronic insomnia - home clonidine as above  Severe hypokalemia 2/2 vomiting - now replaced and appropriate  Significant debility - on PT eval 1/31 only walked 5 steps, needing assistance before return home alone    Expected Discharge Location and Transportation: med ready for rehab pending approval (insurance peer to peer pending before [facility])  Expected Discharge 2/5(Discharge date is tentative pending patient's medical condition and is subject to change)  Expected Discharge Date: 2/5/2024; Expected Discharge Time:      DVT prophylaxis:  Mechanical DVT prophylaxis orders are present.          AM-PAC 6 Clicks Score (PT): 17 (02/04/24 0815)    CODE STATUS:   Code Status and Medical Interventions:   Ordered at: 01/30/24 1686     Level Of Support Discussed With:    Patient     Code Status (Patient has no pulse and is not breathing):    CPR (Attempt to Resuscitate)     Medical Interventions (Patient has pulse or is breathing):    Full Support       Unique Anderson MD  02/04/24

## 2024-02-05 PROCEDURE — G0378 HOSPITAL OBSERVATION PER HR: HCPCS

## 2024-02-05 PROCEDURE — 97530 THERAPEUTIC ACTIVITIES: CPT

## 2024-02-05 PROCEDURE — 94799 UNLISTED PULMONARY SVC/PX: CPT

## 2024-02-05 PROCEDURE — 99232 SBSQ HOSP IP/OBS MODERATE 35: CPT | Performed by: INTERNAL MEDICINE

## 2024-02-05 PROCEDURE — 25010000002 CEFTRIAXONE PER 250 MG: Performed by: INTERNAL MEDICINE

## 2024-02-05 RX ORDER — CLONIDINE HYDROCHLORIDE 0.1 MG/1
0.1 TABLET ORAL NIGHTLY
Status: DISCONTINUED | OUTPATIENT
Start: 2024-02-05 | End: 2024-02-15 | Stop reason: HOSPADM

## 2024-02-05 RX ADMIN — SENNOSIDES AND DOCUSATE SODIUM 2 TABLET: 8.6; 5 TABLET ORAL at 22:25

## 2024-02-05 RX ADMIN — PANTOPRAZOLE SODIUM 40 MG: 40 INJECTION, POWDER, LYOPHILIZED, FOR SOLUTION INTRAVENOUS at 17:14

## 2024-02-05 RX ADMIN — THIAMINE HCL TAB 100 MG 100 MG: 100 TAB at 09:40

## 2024-02-05 RX ADMIN — PANTOPRAZOLE SODIUM 40 MG: 40 INJECTION, POWDER, LYOPHILIZED, FOR SOLUTION INTRAVENOUS at 09:39

## 2024-02-05 RX ADMIN — CLONIDINE HYDROCHLORIDE 0.1 MG: 0.2 TABLET ORAL at 09:40

## 2024-02-05 RX ADMIN — ROSUVASTATIN CALCIUM 5 MG: 10 TABLET, FILM COATED ORAL at 22:25

## 2024-02-05 RX ADMIN — TIOTROPIUM BROMIDE INHALATION SPRAY 2 PUFF: 3.12 SPRAY, METERED RESPIRATORY (INHALATION) at 10:06

## 2024-02-05 RX ADMIN — DESVENLAFAXINE 50 MG: 50 TABLET, FILM COATED, EXTENDED RELEASE ORAL at 09:40

## 2024-02-05 RX ADMIN — LATANOPROST 1 DROP: 50 SOLUTION OPHTHALMIC at 22:25

## 2024-02-05 RX ADMIN — Medication 10 ML: at 09:42

## 2024-02-05 RX ADMIN — CARVEDILOL 3.12 MG: 3.12 TABLET, FILM COATED ORAL at 09:40

## 2024-02-05 RX ADMIN — BUDESONIDE AND FORMOTEROL FUMARATE DIHYDRATE 1 PUFF: 160; 4.5 AEROSOL RESPIRATORY (INHALATION) at 10:06

## 2024-02-05 RX ADMIN — SODIUM CHLORIDE 1000 MG: 900 INJECTION INTRAVENOUS at 16:09

## 2024-02-05 RX ADMIN — URSODIOL 300 MG: 300 CAPSULE ORAL at 09:40

## 2024-02-05 RX ADMIN — AZITHROMYCIN 500 MG: 250 TABLET, FILM COATED ORAL at 09:40

## 2024-02-05 RX ADMIN — FOLIC ACID 1 MG: 1 TABLET ORAL at 09:40

## 2024-02-05 NOTE — PROGRESS NOTES
[facility] Medicine Services  PROGRESS NOTE    Patient Name: Philomena Davis  : 1960  MRN: [id number]    Date of Admission: 2024  Primary Care Physician: Unique Brandt DO    Subjective   Subjective     CC:  weakness    HPI:  Doing ok, but still weak.  Agreeable to rehab but insurance has denied currently      Objective   Objective     Vital Signs:   Temp:  [97.8 °F (36.6 °C)-98.4 °F (36.9 °C)] 97.8 °F (36.6 °C)  Heart Rate:  [60-77] 71  Resp:  [16] 16  BP: (103-128)/(62-90) 103/64  Flow (L/min):  [1.5-2] 2     Physical Exam:  Constitutional: No acute distress, awake, alert  HENT: NCAT, mucous membranes moist  Respiratory: Respiratory effort normal   Cardiovascular: RRR, no murmurs, rubs, or gallops  Gastrointestinal: Soft, nontender, nondistended  Musculoskeletal: No bilateral ankle edema, Left hand in splint  Psychiatric: Appropriate affect, cooperative  Neurologic: Oriented x 3, speech clear  Skin: No rashes      Results Reviewed:  LAB RESULTS:      Lab 24  0319 24  0544 24  1608 24  0739 24  1808 24  1234   WBC 4.64 5.54  --   --   --  8.07   HEMOGLOBIN 10.6*  10.7* 10.8*  --  10.6* 11.8* 12.9   HEMATOCRIT 32.2*  32.2* 32.2*  --  32.1* 35.7 38.5   PLATELETS 293 264  --   --   --  317   NEUTROS ABS  --  2.79  --   --   --  4.71   IMMATURE GRANS (ABS)  --  0.17*  --   --   --  0.11*   LYMPHS ABS  --  1.37  --   --   --  1.54   MONOS ABS  --  1.02*  --   --   --  1.54*   EOS ABS  --  0.13  --   --   --  0.11   MCV 96.1 96.7  --   --   --  96.7   LACTATE  --   --   --   --  1.1 2.2*   PROTIME  --   --  14.3  --   --   --          Lab 24  0450 24  0544 24  2201 24  0739 24  0226 24  1234   SODIUM 134* 139  --   --  134* 134*   POTASSIUM 4.4 5.0 4.5 3.1* 3.2* 2.2*   CHLORIDE 97* 104  --   --  96* 87*   CO2 27.0 26.0  --   --  26.0 34.0*   ANION GAP 10.0 9.0  --   --  12.0 13.0   BUN 7* 13  --   --  23  32*   CREATININE 0.74 0.80  --   --  0.80 0.93   EGFR 92.2 83.9  --   --  83.9 70.1   GLUCOSE 116* 118*  --   --  106* 133*   CALCIUM 8.8 8.3*  --   --  7.9* 9.3   MAGNESIUM  --   --   --   --  2.1 2.2   PHOSPHORUS  --   --   --   --  2.9  --          Lab 02/02/24  0450 02/01/24  0544 01/31/24  0226 01/30/24  1234   TOTAL PROTEIN 5.8* 5.7* 6.1 7.2   ALBUMIN 3.3* 3.1* 3.0* 3.6   GLOBULIN 2.5 2.6 3.1 3.6   ALT (SGPT) 20 28 21 24   AST (SGOT) 63* 106* 77* 89*   BILIRUBIN 0.8 0.9 1.1 1.1   ALK PHOS 146* 165* 136* 176*         Lab 01/31/24  1608   PROTIME 14.3   INR 1.10             Lab 01/30/24  1234   ABO TYPING O   RH TYPING Positive   ANTIBODY SCREEN Negative         Brief Urine Lab Results  (Last result in the past 365 days)        Color   Clarity   Blood   Leuk Est   Nitrite   Protein   CREAT   Urine HCG        05/19/23 0834 Orange  Comment: on azo   Cloudy   3+   Large (3+)   Positive   3+                   Microbiology Results Abnormal       None            XR Chest 1 View    Result Date: 2/3/2024  XR CHEST 1 VW Date of Exam: 2/3/2024 3:30 PM EST Indication: hypoxia Comparison: None available. Findings: Heart size and pulmonary vessels are within normal limits. There is hazy lateral airspace disease. Opacities are more peripherally located in the left lung. No pleural effusion. No evidence pneumothorax. Bony structures are unremarkable.     Impression: Impression: Hazy patchy bilateral airspace disease favored to be due to multifocal pneumonia. Electronically Signed: Justin Baker MD  2/3/2024 3:59 PM EST  Workstation ID: [id number]         Current medications:  Scheduled Meds:azithromycin, 500 mg, Oral, Q24H  budesonide-formoterol, 1 puff, Inhalation, BID - RT  carvedilol, 3.125 mg, Oral, BID With Meals  cefTRIAXone, 1,000 mg, Intravenous, Q24H  cloNIDine, 0.1 mg, Oral, BID  desvenlafaxine, 50 mg, Oral, Daily  folic acid, 1 mg, Oral, Daily  latanoprost, 1 drop, Both Eyes, Nightly  pantoprazole, 40 mg,  Intravenous, BID AC  rosuvastatin, 5 mg, Oral, Nightly  senna-docusate sodium, 2 tablet, Oral, BID  sodium chloride, 10 mL, Intravenous, Q12H  sodium chloride, 10 mL, Intravenous, Q12H  thiamine, 100 mg, Oral, Daily  tiotropium bromide monohydrate, 2 puff, Inhalation, Daily - RT  ursodiol, 300 mg, Oral, Daily      Continuous Infusions:lactated ringers, 9 mL/hr      PRN Meds:.  acetaminophen    albuterol    senna-docusate sodium **AND** polyethylene glycol **AND** bisacodyl **AND** bisacodyl    Magnesium Standard Dose Replacement - Follow Nurse / BPA Driven Protocol    nitroglycerin    ondansetron ODT **OR** ondansetron    Phosphorus Replacement - Follow Nurse / BPA Driven Protocol    Potassium Replacement - Follow Nurse / BPA Driven Protocol    sodium chloride    sodium chloride    sodium chloride    sodium chloride    sodium chloride    Assessment & Plan   Assessment & Plan     Active Hospital Problems    Diagnosis  POA    Closed fracture of left hand [S62.92XA]  Unknown    Hypokalemia [E87.6]  Yes    Hematemesis without nausea [K92.0]  Unknown    Melena [K92.1]  Unknown    Blood loss anemia [D50.0]  Unknown    Alcohol use disorder [F10.90]  Yes    GERD without esophagitis [K21.9]  Yes    Alcoholic cirrhosis of liver without ascites [K70.30]  Yes    Essential hypertension [I10]  Yes    Generalized anxiety disorder [F41.1]  Yes    Hyperlipidemia [E78.5]  Yes      Resolved Hospital Problems   No resolved problems to display.        Brief Hospital Course to date:  Philomena Davis is a 63 y.o. female with ETOH cirrhosis, HTN, anxiety who presented with significant weakness after reported hematemesis. EGD revealed severe esophagitis. Also found to have left hand 5th metacarpal fracture which inhibits ability to complete daily casts. Stay c/b persistent debility, and difficulty with ADLs and need for placement      This patient's problems and plans were partially entered by my partner and updated as appropriate by me  02/05/24.       Likely atypical PNA  -borderline O2 sat prompting CXR 2/3 c/w atypical PNA v atelectasis v aspiration  -already on ceftriaxone for GI bleed see below for many days, continue azith x 3 days -first dose 2/4.   -Encourage IS     Severe reflux esophagitis c/b ulceration + gastric ulcer  -BID PPI x 3 months, then daily  -repeat EGD in 3 months recommended to assess healing  -complete IV ceftriaxone given UGI bleed+ concomitant cirrhosis     ETOH cirrhosis w h/o ETOH use  -family report of patient abstinence different from patient's who reports sobriety x weeks  -given compensated, started coreg low dose for hopeful titration up as OP     LEFT hand 5th metacarpal fracture - f/u Naomi Duncan PA-C 1 week, NWB ADAME     HTN - on clonidine OP for insomnia without clear improvement in pt symptoms.   -Change to nightly due to low bps noted       Anxiety - home desvenlafaxine. Has been offered hydroxyzine and buspirone but patient reports poor response to both in past     Chronic insomnia - home clonidine as above    Severe hypokalemia 2/2 vomiting, resolved  - now replaced and appropriate    Significant debility   - needing assistance before return home alone    Expected Discharge Location and Transportation: rehab  Expected Discharge   Expected Discharge Date: 2/8/2024; Expected Discharge Time:      DVT prophylaxis:  Mechanical DVT prophylaxis orders are present.         AM-PAC 6 Clicks Score (PT): 17 (02/05/24 0800)    CODE STATUS:   Code Status and Medical Interventions:   Ordered at: 01/30/24 8317     Level Of Support Discussed With:    Patient     Code Status (Patient has no pulse and is not breathing):    CPR (Attempt to Resuscitate)     Medical Interventions (Patient has pulse or is breathing):    Full Support       Stephany Rolon, DO  02/05/24

## 2024-02-05 NOTE — PLAN OF CARE
Problem: Hypertension Comorbidity  Goal: Blood Pressure in Desired Range  Outcome: Ongoing, Progressing     Problem: Electrolyte Imbalance  Goal: Electrolyte Balance  Outcome: Ongoing, Progressing     Problem: Fall Injury Risk  Goal: Absence of Fall and Fall-Related Injury  Outcome: Ongoing, Progressing  Intervention: Promote Injury-Free Environment  Recent Flowsheet Documentation  Taken 2/5/2024 1800 by Kia Bloom RN  Safety Promotion/Fall Prevention:   safety round/check completed   activity supervised  Taken 2/5/2024 1609 by Kia Bloom RN  Safety Promotion/Fall Prevention:   safety round/check completed   activity supervised  Taken 2/5/2024 1400 by Kia Bloom RN  Safety Promotion/Fall Prevention:   safety round/check completed   activity supervised  Taken 2/5/2024 1200 by Kia Bloom RN  Safety Promotion/Fall Prevention:   safety round/check completed   activity supervised  Taken 2/5/2024 1000 by Kia Bloom RN  Safety Promotion/Fall Prevention:   safety round/check completed   activity supervised  Taken 2/5/2024 0800 by Kia Bloom RN  Safety Promotion/Fall Prevention:   safety round/check completed   activity supervised     Problem: Adult Inpatient Plan of Care  Goal: Plan of Care Review  Outcome: Ongoing, Progressing  Goal: Patient-Specific Goal (Individualized)  Outcome: Ongoing, Progressing  Goal: Absence of Hospital-Acquired Illness or Injury  Outcome: Ongoing, Progressing  Intervention: Identify and Manage Fall Risk  Recent Flowsheet Documentation  Taken 2/5/2024 1800 by Kia Bloom RN  Safety Promotion/Fall Prevention:   safety round/check completed   activity supervised  Taken 2/5/2024 1609 by Kia Bloom RN  Safety Promotion/Fall Prevention:   safety round/check completed   activity supervised  Taken 2/5/2024 1400 by Kia Bloom RN  Safety Promotion/Fall Prevention:   safety round/check completed   activity supervised  Taken 2/5/2024 1200 by Kia Bloom RN  Safety Promotion/Fall  Prevention:   safety round/check completed   activity supervised  Taken 2/5/2024 1000 by Kia Bloom RN  Safety Promotion/Fall Prevention:   safety round/check completed   activity supervised  Taken 2/5/2024 0800 by Kia Bloom RN  Safety Promotion/Fall Prevention:   safety round/check completed   activity supervised  Intervention: Prevent Skin Injury  Recent Flowsheet Documentation  Taken 2/5/2024 1800 by Kia Bloom RN  Body Position: position changed independently  Skin Protection:   adhesive use limited   incontinence pads utilized  Taken 2/5/2024 1609 by Kia Bloom RN  Body Position: position changed independently  Skin Protection:   adhesive use limited   incontinence pads utilized  Taken 2/5/2024 1400 by Kia Bloom RN  Body Position: position changed independently  Skin Protection:   adhesive use limited   incontinence pads utilized  Taken 2/5/2024 1200 by Kia Bloom RN  Body Position: position changed independently  Skin Protection:   adhesive use limited   incontinence pads utilized  Taken 2/5/2024 1000 by Kia Bloom RN  Body Position: position changed independently  Skin Protection:   adhesive use limited   incontinence pads utilized  Taken 2/5/2024 0800 by Kia Bloom RN  Body Position:   weight shifting   position changed independently  Skin Protection:   adhesive use limited   incontinence pads utilized  Intervention: Prevent and Manage VTE (Venous Thromboembolism) Risk  Recent Flowsheet Documentation  Taken 2/5/2024 1800 by Kia Bloom RN  Activity Management: activity encouraged  Taken 2/5/2024 1609 by Kia Bloom RN  Activity Management: activity encouraged  Taken 2/5/2024 1400 by Kia Bloom RN  Activity Management: activity encouraged  Taken 2/5/2024 1200 by Kia Bloom RN  Activity Management: activity encouraged  Taken 2/5/2024 1000 by Kia Bloom RN  Activity Management: activity encouraged  Taken 2/5/2024 0800 by Kia Bolom RN  Activity Management: activity minimized  Goal:  Optimal Comfort and Wellbeing  Outcome: Ongoing, Progressing  Intervention: Provide Person-Centered Care  Recent Flowsheet Documentation  Taken 2/5/2024 1800 by Kia Bloom RN  Trust Relationship/Rapport:   care explained   choices provided  Taken 2/5/2024 1609 by Kia Bloom RN  Trust Relationship/Rapport:   care explained   choices provided  Taken 2/5/2024 1400 by Kia Bloom RN  Trust Relationship/Rapport:   choices provided   care explained  Taken 2/5/2024 1200 by Kia Bloom RN  Trust Relationship/Rapport:   care explained   choices provided  Taken 2/5/2024 1000 by Kia Bloom RN  Trust Relationship/Rapport:   care explained   choices provided  Taken 2/5/2024 0800 by Kia Bloom RN  Trust Relationship/Rapport:   care explained   choices provided  Goal: Readiness for Transition of Care  Outcome: Ongoing, Progressing     Problem: Skin Injury Risk Increased  Goal: Skin Health and Integrity  Outcome: Ongoing, Progressing  Intervention: Optimize Skin Protection  Recent Flowsheet Documentation  Taken 2/5/2024 1800 by Kia Bloom RN  Pressure Reduction Techniques:   frequent weight shift encouraged   weight shift assistance provided  Head of Bed (HOB) Positioning: [facility] elevated  Pressure Reduction Devices:   pressure-redistributing mattress utilized   positioning supports utilized  Skin Protection:   adhesive use limited   incontinence pads utilized  Taken 2/5/2024 1609 by Kia Bloom RN  Pressure Reduction Techniques:   frequent weight shift encouraged   weight shift assistance provided  Head of Bed (HOB) Positioning: [facility] elevated  Pressure Reduction Devices:   pressure-redistributing mattress utilized   positioning supports utilized  Skin Protection:   adhesive use limited   incontinence pads utilized  Taken 2/5/2024 1400 by Kia Bloom RN  Pressure Reduction Techniques:   frequent weight shift encouraged   weight shift assistance provided  Head of Bed (HOB) Positioning: [facility] elevated  Pressure Reduction  Devices:   pressure-redistributing mattress utilized   positioning supports utilized  Skin Protection:   adhesive use limited   incontinence pads utilized  Taken 2/5/2024 1200 by Kia Bloom RN  Pressure Reduction Techniques:   frequent weight shift encouraged   weight shift assistance provided  Head of Bed (\[facility]\"") Positioning: \[facility]\"" elevated  Pressure Reduction Devices:   pressure-redistributing mattress utilized   positioning supports utilized  Skin Protection:   adhesive use limited   incontinence pads utilized  Taken 2/5/2024 1000 by Kia Bloom RN  Pressure Reduction Techniques:   frequent weight shift encouraged   weight shift assistance provided  Head of Bed (\[facility]\"") Positioning: \[facility]\"" elevated  Pressure Reduction Devices:   pressure-redistributing mattress utilized   positioning supports utilized  Skin Protection:   adhesive use limited   incontinence pads utilized  Taken 2/5/2024 0800 by Kia Bloom RN  Pressure Reduction Techniques:   frequent weight shift encouraged   weight shift assistance provided  Head of Bed (\[facility]\"") Positioning: \[facility]\"" elevated  Pressure Reduction Devices:   pressure-redistributing mattress utilized   positioning supports utilized  Skin Protection:   adhesive use limited   incontinence pads utilized   Goal Outcome Evaluation: VSS, 3L NC, Aox4, up with 1 assist, no c/o this shift.

## 2024-02-05 NOTE — PLAN OF CARE
Goal Outcome Evaluation:      Pt has no new nursing issues at this time. Pt is a/ox4, 2LNC, vital signs are stable. Pt has no complaints at this time.     Problem: Hypertension Comorbidity  Goal: Blood Pressure in Desired Range  Outcome: Ongoing, Progressing     Problem: Electrolyte Imbalance  Goal: Electrolyte Balance  Outcome: Ongoing, Progressing     Problem: Fall Injury Risk  Goal: Absence of Fall and Fall-Related Injury  Outcome: Ongoing, Progressing  Intervention: Identify and Manage Contributors  Recent Flowsheet Documentation  Taken 2/5/2024 0200 by Cb Dobson, RN  Self-Care Promotion: independence encouraged  Taken 2/4/2024 2200 by Cb Dobson, RN  Self-Care Promotion: independence encouraged  Intervention: Promote Injury-Free Environment  Recent Flowsheet Documentation  Taken 2/5/2024 0400 by Cb Dobson RN  Safety Promotion/Fall Prevention:   safety round/check completed   activity supervised   assistive device/personal items within reach   clutter free environment maintained   fall prevention program maintained  Taken 2/5/2024 0200 by Cb Dobson RN  Safety Promotion/Fall Prevention:   activity supervised   assistive device/personal items within reach   safety round/check completed   clutter free environment maintained   nonskid shoes/slippers when out of bed  Taken 2/5/2024 0000 by Cb Dobson, RN  Safety Promotion/Fall Prevention:   safety round/check completed   activity supervised   assistive device/personal items within reach   clutter free environment maintained   fall prevention program maintained  Taken 2/4/2024 2200 by Cb Dobson, RN  Safety Promotion/Fall Prevention:   activity supervised   assistive device/personal items within reach   safety round/check completed   clutter free environment maintained   nonskid shoes/slippers when out of bed  Taken 2/4/2024 2015 by Cb Dobson, RN  Safety Promotion/Fall Prevention:   safety round/check  completed   activity supervised   assistive device/personal items within reach   clutter free environment maintained   fall prevention program maintained     Problem: Adult Inpatient Plan of Care  Goal: Plan of Care Review  Outcome: Ongoing, Progressing  Goal: Patient-Specific Goal (Individualized)  Outcome: Ongoing, Progressing  Goal: Absence of Hospital-Acquired Illness or Injury  Outcome: Ongoing, Progressing  Intervention: Identify and Manage Fall Risk  Recent Flowsheet Documentation  Taken 2/5/2024 0400 by Cb Dobson RN  Safety Promotion/Fall Prevention:   safety round/check completed   activity supervised   assistive device/personal items within reach   clutter free environment maintained   fall prevention program maintained  Taken 2/5/2024 0200 by Cb Dobson RN  Safety Promotion/Fall Prevention:   activity supervised   assistive device/personal items within reach   safety round/check completed   clutter free environment maintained   nonskid shoes/slippers when out of bed  Taken 2/5/2024 0000 by Cb Dobson RN  Safety Promotion/Fall Prevention:   safety round/check completed   activity supervised   assistive device/personal items within reach   clutter free environment maintained   fall prevention program maintained  Taken 2/4/2024 2200 by Cb Dobson RN  Safety Promotion/Fall Prevention:   activity supervised   assistive device/personal items within reach   safety round/check completed   clutter free environment maintained   nonskid shoes/slippers when out of bed  Taken 2/4/2024 2015 by Cb Dobson RN  Safety Promotion/Fall Prevention:   safety round/check completed   activity supervised   assistive device/personal items within reach   clutter free environment maintained   fall prevention program maintained  Intervention: Prevent Skin Injury  Recent Flowsheet Documentation  Taken 2/5/2024 0400 by Cb Dobson, RN  Body Position: position changed  independently  Skin Protection:   adhesive use limited   incontinence pads utilized   skin-to-device areas padded   tubing/devices free from skin contact  Taken 2/5/2024 0200 by Cb Dobson RN  Body Position: position changed independently  Taken 2/5/2024 0000 by Cb Dobson RN  Body Position: position changed independently  Taken 2/4/2024 2200 by Cb Dobson RN  Body Position: position changed independently  Taken 2/4/2024 2015 by Cb Dobson RN  Body Position: position changed independently  Skin Protection:   adhesive use limited   incontinence pads utilized   skin-to-device areas padded   tubing/devices free from skin contact  Intervention: Prevent and Manage VTE (Venous Thromboembolism) Risk  Recent Flowsheet Documentation  Taken 2/5/2024 0400 by Cb Dobson RN  Activity Management: activity minimized  Taken 2/5/2024 0200 by Cb Dobson RN  Activity Management: activity minimized  Taken 2/5/2024 0000 by Cb Dobson RN  Activity Management: activity minimized  Taken 2/4/2024 2200 by Cb Dobson RN  Activity Management: activity minimized  Taken 2/4/2024 2015 by Cb Dobson RN  Activity Management: activity minimized  Range of Motion: active ROM (range of motion) encouraged  Intervention: Prevent Infection  Recent Flowsheet Documentation  Taken 2/5/2024 0400 by Cb Dobson RN  Infection Prevention:   hand hygiene promoted   rest/sleep promoted  Taken 2/5/2024 0200 by Cb Dobson RN  Infection Prevention:   environmental surveillance performed   rest/sleep promoted  Taken 2/5/2024 0000 by Cb Dobson RN  Infection Prevention:   hand hygiene promoted   rest/sleep promoted  Taken 2/4/2024 2200 by Cb Dobson RN  Infection Prevention:   environmental surveillance performed   rest/sleep promoted  Taken 2/4/2024 2015 by Cb Dobson RN  Infection Prevention:   cohorting utilized   environmental surveillance  performed   rest/sleep promoted  Goal: Optimal Comfort and Wellbeing  Outcome: Ongoing, Progressing  Intervention: Provide Person-Centered Care  Recent Flowsheet Documentation  Taken 2/4/2024 2015 by Cb Dobson RN  Trust Relationship/Rapport:   care explained   choices provided   reassurance provided  Goal: Readiness for Transition of Care  Outcome: Ongoing, Progressing     Problem: Skin Injury Risk Increased  Goal: Skin Health and Integrity  Outcome: Ongoing, Progressing  Intervention: Optimize Skin Protection  Recent Flowsheet Documentation  Taken 2/5/2024 0400 by bC Dobson RN  Pressure Reduction Techniques:   frequent weight shift encouraged   weight shift assistance provided  Head of Bed (HOB) Positioning: HOB elevated  Pressure Reduction Devices:   positioning supports utilized   pressure-redistributing mattress utilized  Skin Protection:   adhesive use limited   incontinence pads utilized   skin-to-device areas padded   tubing/devices free from skin contact  Taken 2/5/2024 0200 by Cb Dobson RN  Head of Bed (HOB) Positioning: HOB elevated  Taken 2/5/2024 0000 by Cb Dobson RN  Head of Bed (HOB) Positioning: HOB elevated  Taken 2/4/2024 2200 by Cb Dobson RN  Head of Bed (HOB) Positioning: HOB elevated  Taken 2/4/2024 2015 by Cb Dobson RN  Pressure Reduction Techniques:   frequent weight shift encouraged   weight shift assistance provided  Head of Bed (HOB) Positioning: HOB elevated  Pressure Reduction Devices:   positioning supports utilized   pressure-redistributing mattress utilized  Skin Protection:   adhesive use limited   incontinence pads utilized   skin-to-device areas padded   tubing/devices free from skin contact

## 2024-02-05 NOTE — PROGRESS NOTES
Continued Stay Note  [facility]     Patient Name: Philomena Davis  MRN: [id number]  Today's Date: 2/5/2024    Admit Date: 1/30/2024    Plan: rehab   Discharge Plan       Row Name 02/05/24 1636       Plan    Plan Comments The patient requested an insurance appeal to be done for rehab at [facility] and clinical has been faxed to [employer] fax and appeal called to [employer] on Monday phone: [phone] Fax:[phone]                   Discharge Codes    No documentation.                 Expected Discharge Date and Time       Expected Discharge Date Expected Discharge Time    Feb 8, 2024               SILKE Quiles

## 2024-02-05 NOTE — THERAPY TREATMENT NOTE
Patient Name: Philomena Davis  : 1960    MRN: [id number]                              Today's Date: 2024       Admit Date: 2024    Visit Dx:     ICD-10-CM ICD-9-CM   1. Hematemesis without nausea  K92.0 578.0   2. Melena  K92.1 578.1   3. Blood loss anemia  D50.0 280.0   4. Alcoholic cirrhosis of liver without ascites  K70.30 571.2     Patient Active Problem List   Diagnosis    Essential hypertension    Generalized anxiety disorder    Hyperlipidemia    Glaucoma    Alcoholic cirrhosis of liver without ascites    Severe malnutrition    GERD without esophagitis    Sleep disturbance    Annual physical exam    Idiopathic peripheral neuropathy    Severe anxiety with panic    Alcohol use disorder    Cigarette nicotine dependence in remission    Chronic obstructive pulmonary disease    Hypokalemia    Closed fracture of left hand    Hematemesis without nausea    Melena    Blood loss anemia     Past Medical History:   Diagnosis Date    Alcohol withdrawal 05/10/2019    Annual physical exam 2023    Anxiety and depression     Cirrhosis of liver     Closed displaced fracture of lateral malleolus of left fibula     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    Closed trimalleolar fracture of right ankle 2023    Digital mucous cyst of toe of left foot 2017    Ganglion cyst of left foot     Glaucoma     Hypertension     Wears glasses      Past Surgical History:   Procedure Laterality Date    ANKLE OPEN REDUCTION INTERNAL FIXATION Left 2017    Procedure: LEFT ANKLE OPEN REDUCTION INTERNAL FIXATION WITH ILIAC CREST BONE GRAFT , EXCISION CYST 4TH/5TH INNERSPACE LEFT FOOT;  Surgeon: Frank Larson MD;  Location:  [facility];  Service:     ANKLE OPEN REDUCTION INTERNAL FIXATION Right 2023    Procedure: ANKLE OPEN REDUCTION INTERNAL FIXATION;  Surgeon: Deandre Reynoso MD;  Location:  [facility];  Service: Orthopedics;  Laterality: Right;    AUGMENTATION  MAMMAPLASTY Bilateral 1999    BREAST AUGMENTATION      BREAST EXCISIONAL BIOPSY Right 2014    DILATION AND CURETTAGE, DIAGNOSTIC / THERAPEUTIC      ENDOSCOPY N/A 11/30/2022    Procedure: ESOPHAGOGASTRODUODENOSCOPY;  Surgeon: Arelis Solano MD;  Location:  [facility];  Service: Gastroenterology;  Laterality: N/A;    ENDOSCOPY N/A 2/1/2024    Procedure: ESOPHAGOGASTRODUODENOSCOPY;  Surgeon: Brunner, Mark I, MD;  Location:  [facility];  Service: Gastroenterology;  Laterality: N/A;    KY RPR NON/MAL FEMUR DSTL H/N W/ILIAC/AUTOG BONE Left 03/24/2017    Procedure: ILIAC CREST BONE GRAFT;  Surgeon: Frank Larson MD;  Location:  [facility];  Service: Orthopedics    WRIST SURGERY        General Information       Row Name 02/05/24 1402          Physical Therapy Time and Intention    Document Type therapy note (daily note)  -AE     Mode of Treatment physical therapy  -AE       Row Name 02/05/24 1402          General Information    Patient Profile Reviewed yes  -AE     Existing Precautions/Restrictions fall;oxygen therapy device and L/min;other (see comments);orthostatic hypotension  L 4th and 5th metacarpal fx's with ulnar gutter splint  -AE     Barriers to Rehab medically complex;previous functional deficit;ineffective coping  -AE       Row Name 02/05/24 1402          Cognition    Orientation Status (Cognition) oriented x 4  -AE       Row Name 02/05/24 1402          Safety Issues, Functional Mobility    Safety Issues Affecting Function (Mobility) awareness of need for assistance;insight into deficits/self-awareness;safety precaution awareness;safety precautions follow-through/compliance;sequencing abilities  -AE     Impairments Affecting Function (Mobility) balance;endurance/activity tolerance;motor planning;postural/trunk control;shortness of breath;strength;sensation/sensory awareness  -AE               User Key  (r) = Recorded By, (t) = Taken By, (c) = Cosigned By      Initials Name Provider Type    AE  Robert Gillespie, PT Physical Therapist                   Mobility       Row Name 02/05/24 1403          Bed Mobility    Bed Mobility supine-sit;sit-supine  -AE     Supine-Sit Morrison (Bed Mobility) standby assist  -AE     Sit-Supine Morrison (Bed Mobility) contact guard;1 person assist  -AE     Assistive Device (Bed Mobility) head of bed elevated;bed rails  -AE     Comment, (Bed Mobility) VCs for hand placement and sequencing. Pt required increased cues and time for bed mobility with use of bed rails to assist.  -AE       Row Name 02/05/24 1403          Transfers    Comment, (Transfers) VCs for hand placement and sequencing.  -AE       Row Name 02/05/24 1403          Sit-Stand Transfer    Sit-Stand Morrison (Transfers) contact guard;1 person assist;verbal cues  -AE     Assistive Device (Sit-Stand Transfers) walker, front-wheeled  -AE       Row Name 02/05/24 1403          Gait/Stairs (Locomotion)    Morrison Level (Gait) contact guard;1 person assist;verbal cues  -AE     Assistive Device (Gait) walker, front-wheeled  -AE     Distance in Feet (Gait) 40  -AE     Deviations/Abnormal Patterns (Gait) bilateral deviations;gait speed decreased;stride length decreased  -AE     Bilateral Gait Deviations forward flexed posture;heel strike decreased  -AE     Comment, (Gait/Stairs) Pt demo step through gait pattern with slowed obdulio and decreased gait speed. Pt required increased cues for sequencing of AD at times. Further distance limited by fatigue.  -AE               User Key  (r) = Recorded By, (t) = Taken By, (c) = Cosigned By      Initials Name Provider Type    AE Robert Gillespie, PT Physical Therapist                   Obj/Interventions       Row Name 02/05/24 2317          Balance    Balance Assessment sitting static balance;sitting dynamic balance;sit to stand dynamic balance;standing static balance;standing dynamic balance  -AE     Static Sitting Balance standby assist  -AE     Dynamic Sitting  Balance contact guard  -AE     Position, Sitting Balance unsupported;sitting edge of bed  -AE     Sit to Stand Dynamic Balance contact guard;1-person assist;verbal cues  -AE     Static Standing Balance contact guard  -AE     Dynamic Standing Balance contact guard  -AE     Position/Device Used, Standing Balance supported;walker, front-wheeled  -AE               User Key  (r) = Recorded By, (t) = Taken By, (c) = Cosigned By      Initials Name Provider Type    AE Robert Gillespie, PT Physical Therapist                   Goals/Plan    No documentation.                  Clinical Impression       Row Name 02/05/24 1439          Pain    Additional Documentation Pain Scale: FACES Pre/Post-Treatment (Group)  -AE       Row Name 02/05/24 1439          Pain Scale: FACES Pre/Post-Treatment    Pain: FACES Scale, Pretreatment 2-->hurts little bit  -AE     Posttreatment Pain Rating 2-->hurts little bit  -AE     Pain Location - Side/Orientation Left  -AE     Pain Location - hand  -AE     Pre/Posttreatment Pain Comment tolerated  -AE       Row Name 02/05/24 1433          Plan of Care Review    Plan of Care Reviewed With patient  -AE     Progress improving  -AE     Outcome Evaluation Pt continues to be limited by decreased endurance with activity and generalized weakness. Pt ambulated 40ft with CGA and RW for support. Continue to progress per pt tolerance.  -AE       Row Name 02/05/24 1439          Vital Signs    Pre Systolic BP Rehab 103  -AE     Pre Treatment Diastolic BP 64  -AE     Pre SpO2 (%) 94  -AE     O2 Delivery Pre Treatment nasal cannula  -AE     O2 Delivery Intra Treatment nasal cannula  -AE     Post SpO2 (%) 91  -AE     O2 Delivery Post Treatment nasal cannula  -AE     Pre Patient Position Supine  -AE     Intra Patient Position Standing  -AE     Post Patient Position Supine  -AE       Row Name 02/05/24 1430          Positioning and Restraints    Pre-Treatment Position in bed  -AE     Post Treatment Position bed  -AE      In Bed notified nsg;supine;call light within reach;encouraged to call for assist;side rails up x2;legs elevated  -AE               User Key  (r) = Recorded By, (t) = Taken By, (c) = Cosigned By      Initials Name Provider Type    Robert Alas, PT Physical Therapist                   Outcome Measures       Row Name 02/05/24 1443 02/05/24 0800       How much help from another person do you currently need...    Turning from your back to your side while in flat bed without using bedrails? 3  -AE 3  -EH    Moving from lying on back to sitting on the side of a flat bed without bedrails? 3  -AE 3  -EH    Moving to and from a bed to a chair (including a wheelchair)? 3  -AE 3  -EH    Standing up from a chair using your arms (e.g., wheelchair, bedside chair)? 3  -AE 3  -EH    Climbing 3-5 steps with a railing? 2  -AE 2  -EH    To walk in hospital room? 3  -AE 3  -EH    AM-PAC 6 Clicks Score (PT) 17  -AE 17  -EH    Highest Level of Mobility Goal 5 --> Static standing  -AE 5 --> Static standing  -EH      Row Name 02/05/24 1443          Functional Assessment    Outcome Measure Options AM-PAC 6 Clicks Basic Mobility (PT)  -AE               User Key  (r) = Recorded By, (t) = Taken By, (c) = Cosigned By      Initials Name Provider Type    Robert Alas, KEILA Physical Therapist    Kia Lloyd, RN Registered Nurse                                 Physical Therapy Education       Title: PT OT SLP Therapies (In Progress)       Topic: Physical Therapy (Done)       Point: Mobility training (Done)       Learning Progress Summary             Patient Acceptance, E, VU by AE at 2/5/2024 1323    Acceptance, E, VU by AG at 2/5/2024 0346    Acceptance, E, NR by KE at 2/2/2024 1121    Acceptance, E, NR by KE at 1/31/2024 0833                         Point: Home exercise program (Done)       Learning Progress Summary             Patient Acceptance, E, VU by AE at 2/5/2024 1323    Acceptance, E, VU by AG at 2/5/2024 0346     Acceptance, E, NR by KE at 1/31/2024 0833                         Point: Body mechanics (Done)       Learning Progress Summary             Patient Acceptance, E, VU by AE at 2/5/2024 1323    Acceptance, E, VU by AG at 2/5/2024 0346    Acceptance, E, NR by KE at 2/2/2024 1121    Acceptance, E, NR by KE at 1/31/2024 0833                         Point: Precautions (Done)       Learning Progress Summary             Patient Acceptance, E, VU by AE at 2/5/2024 1323    Acceptance, E, VU by AG at 2/5/2024 0346    Acceptance, E, NR by KE at 2/2/2024 1121    Acceptance, E, NR by KE at 1/31/2024 0833                                         User Key       Initials Effective Dates Name Provider Type Discipline    AE 09/21/21 -  Robert Gillespie, PT Physical Therapist PT     11/01/23 -  Cb Dobson, RN Registered Nurse Nurse     11/16/23 -  Corry Maldonado, KEILA Physical Therapist PT                  PT Recommendation and Plan     Plan of Care Reviewed With: patient  Progress: improving  Outcome Evaluation: Pt continues to be limited by decreased endurance with activity and generalized weakness. Pt ambulated 40ft with CGA and RW for support. Continue to progress per pt tolerance.     Time Calculation:         PT Charges       Row Name 02/05/24 1444             Time Calculation    Start Time 1323  -AE      PT Received On 02/05/24  -AE      PT Goal Re-Cert Due Date 02/10/24  -AE         Timed Charges    36134 - PT Therapeutic Activity Minutes 25  -AE         Total Minutes    Timed Charges Total Minutes 25  -AE       Total Minutes 25  -AE                User Key  (r) = Recorded By, (t) = Taken By, (c) = Cosigned By      Initials Name Provider Type    AE Robert Gillespie PT Physical Therapist                  Therapy Charges for Today       Code Description Service Date Service Provider Modifiers Qty    [id number] HC PT THERAPEUTIC ACT EA 15 MIN 2/5/2024 Robert Gillespie, PT GP 2            PT G-Codes  Outcome Measure  Options: AM-PAC 6 Clicks Basic Mobility (PT)  AM-PAC 6 Clicks Score (PT): 17  AM-PAC 6 Clicks Score (OT): 15  PT Discharge Summary  Anticipated Discharge Disposition (PT): inpatient rehabilitation facility    Robert Gillespie, KEILA  2/5/2024

## 2024-02-05 NOTE — PLAN OF CARE
Goal Outcome Evaluation:  Plan of Care Reviewed With: patient        Progress: improving  Outcome Evaluation: Pt continues to be limited by decreased endurance with activity and generalized weakness. Pt ambulated 40ft with CGA and RW for support. Continue to progress per pt tolerance.      Anticipated Discharge Disposition (PT): inpatient rehabilitation facility

## 2024-02-06 LAB
ALBUMIN SERPL-MCNC: 3 G/DL (ref 3.5–5.2)
ANION GAP SERPL CALCULATED.3IONS-SCNC: 9 MMOL/L (ref 5–15)
BASOPHILS # BLD AUTO: 0.07 10*3/MM3 (ref 0–0.2)
BASOPHILS NFR BLD AUTO: 1.5 % (ref 0–1.5)
BUN SERPL-MCNC: 5 MG/DL (ref 8–23)
BUN/CREAT SERPL: 8.5 (ref 7–25)
CALCIUM SPEC-SCNC: 8.8 MG/DL (ref 8.6–10.5)
CHLORIDE SERPL-SCNC: 101 MMOL/L (ref 98–107)
CO2 SERPL-SCNC: 28 MMOL/L (ref 22–29)
CREAT SERPL-MCNC: 0.59 MG/DL (ref 0.57–1)
DEPRECATED RDW RBC AUTO: 48.2 FL (ref 37–54)
EGFRCR SERPLBLD CKD-EPI 2021: 102.7 ML/MIN/1.73
EOSINOPHIL # BLD AUTO: 0.12 10*3/MM3 (ref 0–0.4)
EOSINOPHIL NFR BLD AUTO: 2.6 % (ref 0.3–6.2)
ERYTHROCYTE [DISTWIDTH] IN BLOOD BY AUTOMATED COUNT: 13.6 % (ref 12.3–15.4)
GLUCOSE SERPL-MCNC: 94 MG/DL (ref 65–99)
HCT VFR BLD AUTO: 31.2 % (ref 34–46.6)
HGB BLD-MCNC: 10.2 G/DL (ref 12–15.9)
IMM GRANULOCYTES # BLD AUTO: 0.04 10*3/MM3 (ref 0–0.05)
IMM GRANULOCYTES NFR BLD AUTO: 0.9 % (ref 0–0.5)
LYMPHOCYTES # BLD AUTO: 0.88 10*3/MM3 (ref 0.7–3.1)
LYMPHOCYTES NFR BLD AUTO: 19.3 % (ref 19.6–45.3)
MCH RBC QN AUTO: 31.4 PG (ref 26.6–33)
MCHC RBC AUTO-ENTMCNC: 32.7 G/DL (ref 31.5–35.7)
MCV RBC AUTO: 96 FL (ref 79–97)
MONOCYTES # BLD AUTO: 0.58 10*3/MM3 (ref 0.1–0.9)
MONOCYTES NFR BLD AUTO: 12.7 % (ref 5–12)
NEUTROPHILS NFR BLD AUTO: 2.86 10*3/MM3 (ref 1.7–7)
NEUTROPHILS NFR BLD AUTO: 63 % (ref 42.7–76)
NRBC BLD AUTO-RTO: 0 /100 WBC (ref 0–0.2)
PHOSPHATE SERPL-MCNC: 4.3 MG/DL (ref 2.5–4.5)
PLATELET # BLD AUTO: 252 10*3/MM3 (ref 140–450)
PMV BLD AUTO: 10.1 FL (ref 6–12)
POTASSIUM SERPL-SCNC: 3.1 MMOL/L (ref 3.5–5.2)
POTASSIUM SERPL-SCNC: 3.6 MMOL/L (ref 3.5–5.2)
RBC # BLD AUTO: 3.25 10*6/MM3 (ref 3.77–5.28)
SODIUM SERPL-SCNC: 138 MMOL/L (ref 136–145)
WBC NRBC COR # BLD AUTO: 4.55 10*3/MM3 (ref 3.4–10.8)

## 2024-02-06 PROCEDURE — 84132 ASSAY OF SERUM POTASSIUM: CPT | Performed by: INTERNAL MEDICINE

## 2024-02-06 PROCEDURE — 63710000001 ONDANSETRON ODT 4 MG TABLET DISPERSIBLE: Performed by: INTERNAL MEDICINE

## 2024-02-06 PROCEDURE — 94761 N-INVAS EAR/PLS OXIMETRY MLT: CPT

## 2024-02-06 PROCEDURE — 94799 UNLISTED PULMONARY SVC/PX: CPT

## 2024-02-06 PROCEDURE — 99232 SBSQ HOSP IP/OBS MODERATE 35: CPT | Performed by: INTERNAL MEDICINE

## 2024-02-06 PROCEDURE — 94664 DEMO&/EVAL PT USE INHALER: CPT

## 2024-02-06 PROCEDURE — 85025 COMPLETE CBC W/AUTO DIFF WBC: CPT | Performed by: INTERNAL MEDICINE

## 2024-02-06 PROCEDURE — 96374 THER/PROPH/DIAG INJ IV PUSH: CPT

## 2024-02-06 PROCEDURE — 80069 RENAL FUNCTION PANEL: CPT | Performed by: INTERNAL MEDICINE

## 2024-02-06 PROCEDURE — G0378 HOSPITAL OBSERVATION PER HR: HCPCS

## 2024-02-06 RX ORDER — POTASSIUM CHLORIDE 20 MEQ/1
40 TABLET, EXTENDED RELEASE ORAL EVERY 4 HOURS
Status: COMPLETED | OUTPATIENT
Start: 2024-02-06 | End: 2024-02-06

## 2024-02-06 RX ORDER — PANTOPRAZOLE SODIUM 40 MG/1
40 TABLET, DELAYED RELEASE ORAL
Status: DISCONTINUED | OUTPATIENT
Start: 2024-02-06 | End: 2024-02-15 | Stop reason: HOSPADM

## 2024-02-06 RX ORDER — LOPERAMIDE HYDROCHLORIDE 2 MG/1
2 CAPSULE ORAL 3 TIMES DAILY PRN
Status: DISCONTINUED | OUTPATIENT
Start: 2024-02-06 | End: 2024-02-06

## 2024-02-06 RX ORDER — HYDROCODONE BITARTRATE AND ACETAMINOPHEN 7.5; 325 MG/1; MG/1
1 TABLET ORAL EVERY 6 HOURS PRN
Status: DISPENSED | OUTPATIENT
Start: 2024-02-06 | End: 2024-02-11

## 2024-02-06 RX ORDER — SACCHAROMYCES BOULARDII 250 MG
250 CAPSULE ORAL 2 TIMES DAILY
Status: DISCONTINUED | OUTPATIENT
Start: 2024-02-06 | End: 2024-02-15 | Stop reason: HOSPADM

## 2024-02-06 RX ADMIN — THIAMINE HCL TAB 100 MG 100 MG: 100 TAB at 08:53

## 2024-02-06 RX ADMIN — HYDROCODONE BITARTRATE AND ACETAMINOPHEN 1 TABLET: 7.5; 325 TABLET ORAL at 10:14

## 2024-02-06 RX ADMIN — POTASSIUM CHLORIDE 40 MEQ: 1500 TABLET, EXTENDED RELEASE ORAL at 15:24

## 2024-02-06 RX ADMIN — FOLIC ACID 1 MG: 1 TABLET ORAL at 08:53

## 2024-02-06 RX ADMIN — LOPERAMIDE HYDROCHLORIDE 2 MG: 2 CAPSULE ORAL at 15:24

## 2024-02-06 RX ADMIN — POTASSIUM CHLORIDE 40 MEQ: 1500 TABLET, EXTENDED RELEASE ORAL at 10:15

## 2024-02-06 RX ADMIN — BUDESONIDE AND FORMOTEROL FUMARATE DIHYDRATE 1 PUFF: 160; 4.5 AEROSOL RESPIRATORY (INHALATION) at 19:25

## 2024-02-06 RX ADMIN — CARVEDILOL 3.12 MG: 3.12 TABLET, FILM COATED ORAL at 08:53

## 2024-02-06 RX ADMIN — Medication 10 ML: at 08:53

## 2024-02-06 RX ADMIN — BUDESONIDE AND FORMOTEROL FUMARATE DIHYDRATE 1 PUFF: 160; 4.5 AEROSOL RESPIRATORY (INHALATION) at 10:56

## 2024-02-06 RX ADMIN — PANTOPRAZOLE SODIUM 40 MG: 40 TABLET, DELAYED RELEASE ORAL at 18:52

## 2024-02-06 RX ADMIN — DESVENLAFAXINE 50 MG: 50 TABLET, FILM COATED, EXTENDED RELEASE ORAL at 08:53

## 2024-02-06 RX ADMIN — PANTOPRAZOLE SODIUM 40 MG: 40 INJECTION, POWDER, LYOPHILIZED, FOR SOLUTION INTRAVENOUS at 06:30

## 2024-02-06 RX ADMIN — LATANOPROST 1 DROP: 50 SOLUTION OPHTHALMIC at 22:35

## 2024-02-06 RX ADMIN — CARVEDILOL 3.12 MG: 3.12 TABLET, FILM COATED ORAL at 17:22

## 2024-02-06 RX ADMIN — POTASSIUM CHLORIDE 40 MEQ: 1500 TABLET, EXTENDED RELEASE ORAL at 06:28

## 2024-02-06 RX ADMIN — URSODIOL 300 MG: 300 CAPSULE ORAL at 08:53

## 2024-02-06 RX ADMIN — AZITHROMYCIN 500 MG: 250 TABLET, FILM COATED ORAL at 08:53

## 2024-02-06 RX ADMIN — HYDROCODONE BITARTRATE AND ACETAMINOPHEN 1 TABLET: 7.5; 325 TABLET ORAL at 17:22

## 2024-02-06 RX ADMIN — Medication 250 MG: at 22:35

## 2024-02-06 RX ADMIN — TIOTROPIUM BROMIDE INHALATION SPRAY 2 PUFF: 3.12 SPRAY, METERED RESPIRATORY (INHALATION) at 10:56

## 2024-02-06 RX ADMIN — ROSUVASTATIN CALCIUM 5 MG: 10 TABLET, FILM COATED ORAL at 22:35

## 2024-02-06 RX ADMIN — ONDANSETRON 4 MG: 4 TABLET, ORALLY DISINTEGRATING ORAL at 10:14

## 2024-02-06 NOTE — PLAN OF CARE
Goal Outcome Evaluation:  Plan of Care Reviewed With: patient        Progress: improving     Patient pain in LUE relieved by 2 doses of norco. Patient having new onset diarrhea today that was initially malodorous but changed to odorous this evening. Patient stool is frequent, but in small amounts. Patient took 1 dose of imodium before stool became odorous. Stool sample pending bowel movement.    Patient VSS throughout shift and she is resting comfortably at this time.

## 2024-02-06 NOTE — PROGRESS NOTES
Continued Stay Note  [facility]     Patient Name: Philomena Davis  MRN: [id number]  Today's Date: 2/6/2024    Admit Date: 1/30/2024    Plan: rehab   Discharge Plan       Row Name 02/06/24 1512       Plan    Plan Comments The [employer] appeal ID is [id number] for [facility] rehab and the date that the decision should be made is February 8.                   Discharge Codes    No documentation.                 Expected Discharge Date and Time       Expected Discharge Date Expected Discharge Time    Feb 9, 2024               SILKE Quiles

## 2024-02-06 NOTE — PROGRESS NOTES
[facility] Medicine Services  PROGRESS NOTE    Patient Name: Philomena Davis  : 1960  MRN: [id number]    Date of Admission: 2024  Primary Care Physician: Unique Brandt DO    Subjective   Subjective     CC:  debility    HPI:  Having some pain in her left hand, asking for something else other than tylenol      Objective   Objective     Vital Signs:   Temp:  [98.1 °F (36.7 °C)-98.4 °F (36.9 °C)] 98.2 °F (36.8 °C)  Heart Rate:  [63-86] 80  Resp:  [16-18] 18  BP: (102-138)/(58-85) 138/80  Flow (L/min):  [2-3] 2     Physical Exam:  Constitutional: No acute distress, awake, alert  HENT: NCAT, mucous membranes moist  Respiratory: Respiratory effort normal   Cardiovascular: RRR, no murmurs  Gastrointestinal: Soft, nontender, nondistended  Musculoskeletal: No bilateral ankle edema, L hand splinted  Psychiatric: Appropriate affect, cooperative  Neurologic: Oriented x 3, speech clear  Skin: No rashes      Results Reviewed:  LAB RESULTS:      Lab 24  0330 24  0319 24  0544 24  1608 24  0739 24  1808 24  1234   WBC 4.55 4.64 5.54  --   --   --  8.07   HEMOGLOBIN 10.2* 10.6*  10.7* 10.8*  --  10.6* 11.8* 12.9   HEMATOCRIT 31.2* 32.2*  32.2* 32.2*  --  32.1* 35.7 38.5   PLATELETS 252 293 264  --   --   --  317   NEUTROS ABS 2.86  --  2.79  --   --   --  4.71   IMMATURE GRANS (ABS) 0.04  --  0.17*  --   --   --  0.11*   LYMPHS ABS 0.88  --  1.37  --   --   --  1.54   MONOS ABS 0.58  --  1.02*  --   --   --  1.54*   EOS ABS 0.12  --  0.13  --   --   --  0.11   MCV 96.0 96.1 96.7  --   --   --  96.7   LACTATE  --   --   --   --   --  1.1 2.2*   PROTIME  --   --   --  14.3  --   --   --          Lab 24  0330 24  0450 24  0544 24  2201 24  0739 24  0226 24  1234   SODIUM 138 134* 139  --   --  134* 134*   POTASSIUM 3.1* 4.4 5.0 4.5 3.1* 3.2* 2.2*   CHLORIDE 101 97* 104  --   --  96* 87*   CO2 28.0 27.0 26.0   --   --  26.0 34.0*   ANION GAP 9.0 10.0 9.0  --   --  12.0 13.0   BUN 5* 7* 13  --   --  23 32*   CREATININE 0.59 0.74 0.80  --   --  0.80 0.93   EGFR 102.7 92.2 83.9  --   --  83.9 70.1   GLUCOSE 94 116* 118*  --   --  106* 133*   CALCIUM 8.8 8.8 8.3*  --   --  7.9* 9.3   MAGNESIUM  --   --   --   --   --  2.1 2.2   PHOSPHORUS 4.3  --   --   --   --  2.9  --          Lab 02/06/24  0330 02/02/24  0450 02/01/24  0544 01/31/24  0226 01/30/24  1234   TOTAL PROTEIN  --  5.8* 5.7* 6.1 7.2   ALBUMIN 3.0* 3.3* 3.1* 3.0* 3.6   GLOBULIN  --  2.5 2.6 3.1 3.6   ALT (SGPT)  --  20 28 21 24   AST (SGOT)  --  63* 106* 77* 89*   BILIRUBIN  --  0.8 0.9 1.1 1.1   ALK PHOS  --  146* 165* 136* 176*         Lab 01/31/24  1608   PROTIME 14.3   INR 1.10             Lab 01/30/24  1234   ABO TYPING O   RH TYPING Positive   ANTIBODY SCREEN Negative         Brief Urine Lab Results  (Last result in the past 365 days)        Color   Clarity   Blood   Leuk Est   Nitrite   Protein   CREAT   Urine HCG        05/19/23 0834 [place]  Comment: on azo   Cloudy   3+   Large (3+)   Positive   3+                   Microbiology Results Abnormal       None            No radiology results from the last 24 hrs        Current medications:  Scheduled Meds:budesonide-formoterol, 1 puff, Inhalation, BID - RT  carvedilol, 3.125 mg, Oral, BID With Meals  cloNIDine, 0.1 mg, Oral, Nightly  desvenlafaxine, 50 mg, Oral, Daily  folic acid, 1 mg, Oral, Daily  latanoprost, 1 drop, Both Eyes, Nightly  pantoprazole, 40 mg, Intravenous, BID AC  potassium chloride ER, 40 mEq, Oral, Q4H  rosuvastatin, 5 mg, Oral, Nightly  senna-docusate sodium, 2 tablet, Oral, BID  sodium chloride, 10 mL, Intravenous, Q12H  sodium chloride, 10 mL, Intravenous, Q12H  thiamine, 100 mg, Oral, Daily  tiotropium bromide monohydrate, 2 puff, Inhalation, Daily - RT  ursodiol, 300 mg, Oral, Daily      Continuous Infusions:lactated ringers, 9 mL/hr      PRN Meds:.  acetaminophen    albuterol     senna-docusate sodium **AND** polyethylene glycol **AND** bisacodyl **AND** bisacodyl    HYDROcodone-acetaminophen    Magnesium Standard Dose Replacement - Follow Nurse / BPA Driven Protocol    nitroglycerin    ondansetron ODT **OR** ondansetron    Phosphorus Replacement - Follow Nurse / BPA Driven Protocol    Potassium Replacement - Follow Nurse / BPA Driven Protocol    sodium chloride    sodium chloride    sodium chloride    sodium chloride    sodium chloride    Assessment & Plan   Assessment & Plan     Active Hospital Problems    Diagnosis  POA    Closed fracture of left hand [S62.92XA]  Unknown    Hypokalemia [E87.6]  Yes    Hematemesis without nausea [K92.0]  Unknown    Melena [K92.1]  Unknown    Blood loss anemia [D50.0]  Unknown    Alcohol use disorder [F10.90]  Yes    GERD without esophagitis [K21.9]  Yes    Alcoholic cirrhosis of liver without ascites [K70.30]  Yes    Essential hypertension [I10]  Yes    Generalized anxiety disorder [F41.1]  Yes    Hyperlipidemia [E78.5]  Yes      Resolved Hospital Problems   No resolved problems to display.        Brief Hospital Course to date:  Philomena Davis is a 63 y.o. female with ETOH cirrhosis, HTN, anxiety who presented with significant weakness after reported hematemesis. EGD revealed severe esophagitis. Also found to have left hand 5th metacarpal fracture which inhibits ability to complete daily casts. Stay c/b persistent debility, and difficulty with ADLs and need for placement     This patient's problems and plans were partially entered by my partner and updated as appropriate by me 02/06/24.     Likely atypical PNA, suspect aspiration  -CXR 2/3 c/w atypical PNA v atelectasis v aspiration  -completed course of rocephin and azithromax  -Encourage IS     Severe reflux esophagitis c/b ulceration + gastric ulcer  -BID PPI x 3 months, then daily  -repeat EGD in 3 months recommended to assess healing  -completed IV ceftriaxone given UGI bleed and concomitant  cirrhosis     ETOH cirrhosis w h/o ETOH use  -continue coreg with hold parameters     LEFT hand 5th metacarpal fracture - f/u Naomi Duncan PA-C 1 week, [facility]B LESLIE     HTN   - on clonidine OP for insomnia without clear improvement in pt symptoms.   -Changed to nightly due to low bps noted        Anxiety   - Continue home desvenlafaxine.   - Has been offered hydroxyzine and buspirone but patient reports poor response to both in past, refused when partner had offered them     Chronic insomnia      Severe hypokalemia 2/2 vomiting, resolved  - replace as needed     Significant debility   - needing assistance before return home alone, pending insurance appeal    Expected Discharge Location and Transportation: SNF, awaiting [id number].  If denied patient is wanting to file appeal    Expected Discharge   Expected Discharge Date: 2/9/2024; Expected Discharge Time:      DVT prophylaxis:  Mechanical DVT prophylaxis orders are present.         AM-PAC 6 Clicks Score (PT): 17 (02/05/24 2000)    CODE STATUS:   Code Status and Medical Interventions:   Ordered at: 01/30/24 4936     Level Of Support Discussed With:    Patient     Code Status (Patient has no pulse and is not breathing):    CPR (Attempt to Resuscitate)     Medical Interventions (Patient has pulse or is breathing):    Full Support       Stephany Rolon, DO  02/06/24

## 2024-02-07 LAB
ANION GAP SERPL CALCULATED.3IONS-SCNC: 8 MMOL/L (ref 5–15)
BUN SERPL-MCNC: 5 MG/DL (ref 8–23)
BUN/CREAT SERPL: 7.9 (ref 7–25)
C DIFF TOX GENS STL QL NAA+PROBE: NOT DETECTED
CALCIUM SPEC-SCNC: 8.9 MG/DL (ref 8.6–10.5)
CHLORIDE SERPL-SCNC: 106 MMOL/L (ref 98–107)
CO2 SERPL-SCNC: 26 MMOL/L (ref 22–29)
CREAT SERPL-MCNC: 0.63 MG/DL (ref 0.57–1)
EGFRCR SERPLBLD CKD-EPI 2021: 101.1 ML/MIN/1.73
GLUCOSE SERPL-MCNC: 75 MG/DL (ref 65–99)
POTASSIUM SERPL-SCNC: 5 MMOL/L (ref 3.5–5.2)
SODIUM SERPL-SCNC: 140 MMOL/L (ref 136–145)

## 2024-02-07 PROCEDURE — 97116 GAIT TRAINING THERAPY: CPT

## 2024-02-07 PROCEDURE — 80048 BASIC METABOLIC PNL TOTAL CA: CPT | Performed by: INTERNAL MEDICINE

## 2024-02-07 PROCEDURE — G0378 HOSPITAL OBSERVATION PER HR: HCPCS

## 2024-02-07 PROCEDURE — 99232 SBSQ HOSP IP/OBS MODERATE 35: CPT | Performed by: INTERNAL MEDICINE

## 2024-02-07 PROCEDURE — 97110 THERAPEUTIC EXERCISES: CPT

## 2024-02-07 PROCEDURE — 94799 UNLISTED PULMONARY SVC/PX: CPT

## 2024-02-07 PROCEDURE — 87493 C DIFF AMPLIFIED PROBE: CPT | Performed by: INTERNAL MEDICINE

## 2024-02-07 RX ORDER — LOPERAMIDE HYDROCHLORIDE 2 MG/1
2 CAPSULE ORAL 4 TIMES DAILY PRN
Status: DISCONTINUED | OUTPATIENT
Start: 2024-02-07 | End: 2024-02-15 | Stop reason: HOSPADM

## 2024-02-07 RX ADMIN — THIAMINE HCL TAB 100 MG 100 MG: 100 TAB at 08:20

## 2024-02-07 RX ADMIN — Medication 250 MG: at 22:01

## 2024-02-07 RX ADMIN — PANTOPRAZOLE SODIUM 40 MG: 40 TABLET, DELAYED RELEASE ORAL at 17:39

## 2024-02-07 RX ADMIN — CLONIDINE HYDROCHLORIDE 0.1 MG: 0.1 TABLET ORAL at 21:55

## 2024-02-07 RX ADMIN — Medication 250 MG: at 08:20

## 2024-02-07 RX ADMIN — BUDESONIDE AND FORMOTEROL FUMARATE DIHYDRATE 1 PUFF: 160; 4.5 AEROSOL RESPIRATORY (INHALATION) at 19:48

## 2024-02-07 RX ADMIN — LATANOPROST 1 DROP: 50 SOLUTION OPHTHALMIC at 21:56

## 2024-02-07 RX ADMIN — ACETAMINOPHEN 325 MG: 325 TABLET ORAL at 01:44

## 2024-02-07 RX ADMIN — ROSUVASTATIN CALCIUM 5 MG: 10 TABLET, FILM COATED ORAL at 21:53

## 2024-02-07 RX ADMIN — PANTOPRAZOLE SODIUM 40 MG: 40 TABLET, DELAYED RELEASE ORAL at 06:51

## 2024-02-07 RX ADMIN — HYDROCODONE BITARTRATE AND ACETAMINOPHEN 1 TABLET: 7.5; 325 TABLET ORAL at 06:50

## 2024-02-07 RX ADMIN — SENNOSIDES AND DOCUSATE SODIUM 2 TABLET: 8.6; 5 TABLET ORAL at 21:55

## 2024-02-07 RX ADMIN — HYDROCODONE BITARTRATE AND ACETAMINOPHEN 1 TABLET: 7.5; 325 TABLET ORAL at 15:27

## 2024-02-07 RX ADMIN — DESVENLAFAXINE 50 MG: 50 TABLET, FILM COATED, EXTENDED RELEASE ORAL at 08:20

## 2024-02-07 RX ADMIN — HYDROCODONE BITARTRATE AND ACETAMINOPHEN 1 TABLET: 7.5; 325 TABLET ORAL at 01:44

## 2024-02-07 RX ADMIN — CARVEDILOL 3.12 MG: 3.12 TABLET, FILM COATED ORAL at 17:39

## 2024-02-07 RX ADMIN — URSODIOL 300 MG: 300 CAPSULE ORAL at 08:20

## 2024-02-07 RX ADMIN — FOLIC ACID 1 MG: 1 TABLET ORAL at 08:20

## 2024-02-07 RX ADMIN — CARVEDILOL 3.12 MG: 3.12 TABLET, FILM COATED ORAL at 08:20

## 2024-02-07 NOTE — PROGRESS NOTES
[facility] Medicine Services  PROGRESS NOTE    Patient Name: Philomena Davis  : 1960  MRN: [id number]    Date of Admission: 2024  Primary Care Physician: Unique Brandt DO    Subjective   Subjective     CC:  weakness    HPI:  Diarrhea is a little better, C diff negative. Overall doing ok      Objective   Objective     Vital Signs:   Temp:  [97.8 °F (36.6 °C)-98.4 °F (36.9 °C)] 98.2 °F (36.8 °C)  Heart Rate:  [69-76] 76  Resp:  [16-18] 18  BP: ()/(66-88) 144/85  Flow (L/min):  [2] 2     Physical Exam:  Constitutional: No acute distress, awake, alert  HENT: NCAT, mucous membranes moist  Respiratory: Respiratory effort normal   Cardiovascular: RRR, no murmurs, rubs, or gallops  Gastrointestinal: Soft, nontender, nondistended  Musculoskeletal: No bilateral ankle edema  Psychiatric: Appropriate affect, cooperative  Neurologic: Oriented x 3, speech clear  Skin: No rashes      Results Reviewed:  LAB RESULTS:      Lab 24  0544 24  1608   WBC 4.55 4.64 5.54  --    HEMOGLOBIN 10.2* 10.6*  10.7* 10.8*  --    HEMATOCRIT 31.2* 32.2*  32.2* 32.2*  --    PLATELETS 252 293 264  --    NEUTROS ABS 2.86  --  2.79  --    IMMATURE GRANS (ABS) 0.04  --  0.17*  --    LYMPHS ABS 0.88  --  1.37  --    MONOS ABS 0.58  --  1.02*  --    EOS ABS 0.12  --  0.13  --    MCV 96.0 96.1 96.7  --    PROTIME  --   --   --  14.3         Lab 24  0549 24  0330 24  0450 24  0544   SODIUM 140  --  138 134* 139   POTASSIUM 5.0 3.6 3.1* 4.4 5.0   CHLORIDE 106  --  101 97* 104   CO2 26.0  --  28.0 27.0 26.0   ANION GAP 8.0  --  9.0 10.0 9.0   BUN 5*  --  5* 7* 13   CREATININE 0.63  --  0.59 0.74 0.80   EGFR 101.1  --  102.7 92.2 83.9   GLUCOSE 75  --  94 116* 118*   CALCIUM 8.9  --  8.8 8.8 8.3*   PHOSPHORUS  --   --  4.3  --   --          Lab 24  0330 24  0450 24  0544   TOTAL PROTEIN  --  5.8* 5.7*   ALBUMIN  3.0* 3.3* 3.1*   GLOBULIN  --  2.5 2.6   ALT (SGPT)  --  20 28   AST (SGOT)  --  63* 106*   BILIRUBIN  --  0.8 0.9   ALK PHOS  --  146* 165*         Lab 01/31/24  1608   PROTIME 14.3   INR 1.10                 Brief Urine Lab Results  (Last result in the past 365 days)        Color   Clarity   Blood   Leuk Est   Nitrite   Protein   CREAT   Urine HCG        05/19/23 0834 [place]  Comment: on azo   Cloudy   3+   Large (3+)   Positive   3+                   Microbiology Results Abnormal       Procedure Component Value - Date/Time    Clostridioides difficile Toxin - Stool, Per Rectum [[id number]]  (Normal) Collected: 02/07/24 0715    Lab Status: Final result Specimen: Stool from Per Rectum Updated: 02/07/24 0825    Narrative:      The following orders were created for panel order Clostridioides difficile Toxin - Stool, Per Rectum.  Procedure                               Abnormality         Status                     ---------                               -----------         ------                     Clostridioides difficile...[[id number]]  Normal              Final result                 Please view results for these tests on the individual orders.    Clostridioides difficile Toxin, PCR - Stool, Per Rectum [[id number]]  (Normal) Collected: 02/07/24 0715    Lab Status: Final result Specimen: Stool from Per Rectum Updated: 02/07/24 0825     Toxigenic C. difficile by PCR Not Detected    Narrative:      The result indicates the absence of toxigenic C. difficile from stool specimen.             No radiology results from the last 24 hrs        Current medications:  Scheduled Meds:budesonide-formoterol, 1 puff, Inhalation, BID - RT  carvedilol, 3.125 mg, Oral, BID With Meals  cloNIDine, 0.1 mg, Oral, Nightly  desvenlafaxine, 50 mg, Oral, Daily  folic acid, 1 mg, Oral, Daily  latanoprost, 1 drop, Both Eyes, Nightly  pantoprazole, 40 mg, Oral, BID AC  rosuvastatin, 5 mg, Oral, Nightly  saccharomyces boulardii, 250 mg, Oral,  BID  senna-docusate sodium, 2 tablet, Oral, BID  sodium chloride, 10 mL, Intravenous, Q12H  sodium chloride, 10 mL, Intravenous, Q12H  thiamine, 100 mg, Oral, Daily  tiotropium bromide monohydrate, 2 puff, Inhalation, Daily - RT  ursodiol, 300 mg, Oral, Daily      Continuous Infusions:lactated ringers, 9 mL/hr      PRN Meds:.  acetaminophen    albuterol    senna-docusate sodium **AND** polyethylene glycol **AND** bisacodyl **AND** bisacodyl    HYDROcodone-acetaminophen    loperamide    Magnesium Standard Dose Replacement - Follow Nurse / BPA Driven Protocol    nitroglycerin    ondansetron ODT **OR** ondansetron    Phosphorus Replacement - Follow Nurse / BPA Driven Protocol    Potassium Replacement - Follow Nurse / BPA Driven Protocol    sodium chloride    sodium chloride    sodium chloride    sodium chloride    sodium chloride    Assessment & Plan   Assessment & Plan     Active Hospital Problems    Diagnosis  POA    Closed fracture of left hand [S62.92XA]  Unknown    Hypokalemia [E87.6]  Yes    Hematemesis without nausea [K92.0]  Unknown    Melena [K92.1]  Unknown    Blood loss anemia [D50.0]  Unknown    Alcohol use disorder [F10.90]  Yes    GERD without esophagitis [K21.9]  Yes    Alcoholic cirrhosis of liver without ascites [K70.30]  Yes    Essential hypertension [I10]  Yes    Generalized anxiety disorder [F41.1]  Yes    Hyperlipidemia [E78.5]  Yes      Resolved Hospital Problems   No resolved problems to display.        Brief Hospital Course to date:  Philomena Davis is a 63 y.o. female with ETOH cirrhosis, HTN, anxiety who presented with significant weakness after reported hematemesis. EGD revealed severe esophagitis. Also found to have left hand 5th metacarpal fracture which inhibits ability to complete daily casts. Stay c/b persistent debility, and difficulty with ADLs and need for placement    Likely atypical PNA, suspect aspiration  -CXR 2/3 c/w atypical PNA v atelectasis v aspiration  -completed course of  rocephin and azithromax  -Encourage IS    Diarrhea  -continue florastor  -C. difficile negative  -Add Imodium    Severe reflux esophagitis c/b ulceration + gastric ulcer  -BID PPI x 3 months, then daily  -repeat EGD in 3 months recommended to assess healing  -completed IV ceftriaxone given UGI bleed and concomitant cirrhosis     ETOH cirrhosis w h/o ETOH use  -continue coreg with hold parameters     LEFT hand 5th metacarpal fracture - f/u Naomi Duncan PA-C 1 week, NWB LUE  -  HTN   - on clonidine OP for insomnia without clear improvement in pt symptoms.   -Changed to nightly due to low bps noted        Anxiety   - Continue home desvenlafaxine.   - Has been offered hydroxyzine and buspirone but patient reports poor response to both in past, refused when partner had offered them     Chronic insomnia      Severe hypokalemia 2/2 vomiting, resolved  - replace as needed     Significant debility/weakness- needing assistance before return home alone, pending insurance appeal      Expected Discharge Location and Transportation: Rehab?  Expected Discharge   Expected Discharge Date: 2/9/2024; Expected Discharge Time:      DVT prophylaxis:  Mechanical DVT prophylaxis orders are present.         AM-PAC 6 Clicks Score (PT): 20 (02/07/24 0906)    CODE STATUS:   Code Status and Medical Interventions:   Ordered at: 01/30/24 8599     Level Of Support Discussed With:    Patient     Code Status (Patient has no pulse and is not breathing):    CPR (Attempt to Resuscitate)     Medical Interventions (Patient has pulse or is breathing):    Full Support       Stephany Rolon, DO  02/07/24

## 2024-02-07 NOTE — PLAN OF CARE
Goal Outcome Evaluation:  Plan of Care Reviewed With: patient        Progress: improving  Outcome Evaluation: Patient ambulated 60' with CGA/Min assist x1 and STC for support, initially patient needing max reassurance and encouragement with STC for mobility, progressed to CGA with increased time and effort. Further mobility limited by weakness and fatigue, no c/o pain or dizziness this date.

## 2024-02-07 NOTE — PLAN OF CARE
Goal Outcome Evaluation:   Patient up in chair per PT this morning. Norco given for pain. VSS. Non-tele. On 2L of oxygen via nasal cannula.

## 2024-02-07 NOTE — PROGRESS NOTES
Continued Stay Note  [facility]     Patient Name: Philomena Davis  MRN: [id number]  Today's Date: 2/7/2024    Admit Date: 1/30/2024    Plan: rehab   Discharge Plan       Row Name 02/07/24 2929       Plan    Plan Comments Social work called [employer] and verified that the Appeal was submitted and [facility] required the appeal in writing and the appeal is pending Medicaid Appeal ID number [id number]. The appeal should have a decision by Thursday or Friday.      Row Name 02/07/24 1860       Plan    Plan Comments The [employer] appeal ID is [id number] for [facility] rehab and the date that the decision should be made is February 8.                   Discharge Codes    No documentation.                 Expected Discharge Date and Time       Expected Discharge Date Expected Discharge Time    Feb 9, 2024               SILKE Quiles

## 2024-02-07 NOTE — THERAPY TREATMENT NOTE
Patient Name: Philomena Davis  : 1960    MRN: [id number]                              Today's Date: 2024       Admit Date: 2024    Visit Dx:     ICD-10-CM ICD-9-CM   1. Hematemesis without nausea  K92.0 578.0   2. Melena  K92.1 578.1   3. Blood loss anemia  D50.0 280.0   4. Alcoholic cirrhosis of liver without ascites  K70.30 571.2     Patient Active Problem List   Diagnosis    Essential hypertension    Generalized anxiety disorder    Hyperlipidemia    Glaucoma    Alcoholic cirrhosis of liver without ascites    Severe malnutrition    GERD without esophagitis    Sleep disturbance    Annual physical exam    Idiopathic peripheral neuropathy    Severe anxiety with panic    Alcohol use disorder    Cigarette nicotine dependence in remission    Chronic obstructive pulmonary disease    Hypokalemia    Closed fracture of left hand    Hematemesis without nausea    Melena    Blood loss anemia     Past Medical History:   Diagnosis Date    Alcohol withdrawal 05/10/2019    Annual physical exam 2023    Anxiety and depression     Cirrhosis of liver     Closed displaced fracture of lateral malleolus of left fibula     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    Closed trimalleolar fracture of right ankle 2023    Digital mucous cyst of toe of left foot 2017    Ganglion cyst of left foot     Glaucoma     Hypertension     Wears glasses      Past Surgical History:   Procedure Laterality Date    ANKLE OPEN REDUCTION INTERNAL FIXATION Left 2017    Procedure: LEFT ANKLE OPEN REDUCTION INTERNAL FIXATION WITH ILIAC CREST BONE GRAFT , EXCISION CYST 4TH/5TH INNERSPACE LEFT FOOT;  Surgeon: Frank Larson MD;  Location:  [facility];  Service:     ANKLE OPEN REDUCTION INTERNAL FIXATION Right 2023    Procedure: ANKLE OPEN REDUCTION INTERNAL FIXATION;  Surgeon: Deandre Reynoso MD;  Location:  [facility];  Service: Orthopedics;  Laterality: Right;    AUGMENTATION  MAMMAPLASTY Bilateral 1999    BREAST AUGMENTATION      BREAST EXCISIONAL BIOPSY Right 2014    DILATION AND CURETTAGE, DIAGNOSTIC / THERAPEUTIC      ENDOSCOPY N/A 11/30/2022    Procedure: ESOPHAGOGASTRODUODENOSCOPY;  Surgeon: Arelis Solano MD;  Location:  [facility];  Service: Gastroenterology;  Laterality: N/A;    ENDOSCOPY N/A 2/1/2024    Procedure: ESOPHAGOGASTRODUODENOSCOPY;  Surgeon: Brunner, Mark I, MD;  Location:  [facility];  Service: Gastroenterology;  Laterality: N/A;    GA RPR NON/MAL FEMUR DSTL H/N W/ILIAC/AUTOG BONE Left 03/24/2017    Procedure: ILIAC CREST BONE GRAFT;  Surgeon: Frank Larson MD;  Location:  [facility];  Service: Orthopedics    WRIST SURGERY        General Information       Row Name 02/07/24 0857          Physical Therapy Time and Intention    Document Type therapy note (daily note)  -AS     Mode of Treatment physical therapy  -AS       Row Name 02/07/24 0857          General Information    Patient Profile Reviewed yes  -AS     Existing Precautions/Restrictions fall;orthostatic hypotension;other (see comments)   L 4TH & 5TH METACARPAL FX'S W/ULNAR GUTTER SPLINT, NWB L UE PER MD ANGELES, ANXIETY WITH MOBILITY  -AS     Barriers to Rehab medically complex;previous functional deficit;ineffective coping  -AS       Row Name 02/07/24 0857          Cognition    Orientation Status (Cognition) oriented x 4  -AS       Row Name 02/07/24 0857          Safety Issues, Functional Mobility    Safety Issues Affecting Function (Mobility) awareness of need for assistance;insight into deficits/self-awareness;safety precaution awareness;safety precautions follow-through/compliance;sequencing abilities  -AS     Impairments Affecting Function (Mobility) balance;endurance/activity tolerance;motor planning;postural/trunk control;shortness of breath;strength;sensation/sensory awareness  -AS     Comment, Safety Issues/Impairments (Mobility) anxiety with mobility but improved with reassurance and  increased time and education with STC with mobility  -AS               User Key  (r) = Recorded By, (t) = Taken By, (c) = Cosigned By      Initials Name Provider Type    AS Eileen Moncada PTA Physical Therapist Assistant                   Mobility       Row Name 02/07/24 0900          Bed Mobility    Supine-Sit Ruso (Bed Mobility) standby assist  -AS     Assistive Device (Bed Mobility) head of bed elevated  -AS     Comment, (Bed Mobility) cues to maintain NWB L UE, increased time and effort to reach EOB  -AS       Row Name 02/07/24 0900          Transfers    Comment, (Transfers) cues for hand placement  -AS       Row Name 02/07/24 0900          Bed-Chair Transfer    Bed-Chair Ruso (Transfers) verbal cues;minimum assist (75% patient effort);1 person assist  -AS     Assistive Device (Bed-Chair Transfers) cane, straight  -AS       [facility] Name 02/07/24 0900          Sit-Stand Transfer    Sit-Stand Ruso (Transfers) verbal cues;contact guard;1 person assist  -AS     Assistive Device (Sit-Stand Transfers) cane, straight  -AS       Row Name 02/07/24 0900          Gait/Stairs (Locomotion)    Ruso Level (Gait) verbal cues;minimum assist (75% patient effort);1 person assist;contact guard  -AS     Assistive Device (Gait) cane, straight  -AS     Distance in Feet (Gait) 60  -AS     Deviations/Abnormal Patterns (Gait) bilateral deviations;gait speed decreased;stride length decreased  -AS     Bilateral Gait Deviations forward flexed posture;heel strike decreased  -AS     Comment, (Gait/Stairs) Patient ambulated 60' with CGA/Min assist x1 and STC for support, initially patient needing max reassurance and encouragement with STC for mobility, progressed to CGA with increased time and effort. Further mobility limited by weakness and fatigue, no c/o pain or dizziness this date.  -AS       Row Name 02/07/24 0900          Mobility    Extremity Weight-bearing Status left upper extremity  -AS     Left  Upper Extremity (Weight-bearing Status) non weight-bearing (NWB)  -AS               User Key  (r) = Recorded By, (t) = Taken By, (c) = Cosigned By      Initials Name Provider Type    AS Eileen Moncada PTA Physical Therapist Assistant                   Obj/Interventions       [facility] Name 02/07/24 0905          Motor Skills    Therapeutic Exercise knee;ankle  -AS       Row Name 02/07/24 0905          Knee (Therapeutic Exercise)    Knee (Therapeutic Exercise) strengthening exercise  -AS     Knee Strengthening (Therapeutic Exercise) bilateral;marching while seated;LAQ (long arc quad);sitting;10 repetitions  -AS       Row Name 02/07/24 0905          Ankle (Therapeutic Exercise)    Ankle (Therapeutic Exercise) AROM (active range of motion)  -AS     Ankle AROM (Therapeutic Exercise) bilateral;dorsiflexion;plantarflexion;sitting;10 repetitions  -AS       Row Name 02/07/24 0905          Balance    Dynamic Standing Balance verbal cues;contact guard;minimal assist;1-person assist  -AS     Position/Device Used, Standing Balance supported;cane, straight  -AS     Comment, Balance mild unsteadiness intitially but progressed to CGA  -AS               User Key  (r) = Recorded By, (t) = Taken By, (c) = Cosigned By      Initials Name Provider Type    AS Eileen Moncada PTA Physical Therapist Assistant                   Goals/Plan    No documentation.                  Clinical Impression       Row Name 02/07/24 0905          Pain    Pretreatment Pain Rating 5/10  -AS     Posttreatment Pain Rating 0/10 - no pain  -AS     Pain Location - Side/Orientation Left  -AS     Pain Location - hand  -AS     Pain Intervention(s) Repositioned;Ambulation/increased activity  -AS       Row Name 02/07/24 0905          Plan of Care Review    Plan of Care Reviewed With patient  -AS     Progress improving  -AS     Outcome Evaluation Patient ambulated 60' with CGA/Min assist x1 and STC for support, initially patient needing max reassurance and  encouragement with STC for mobility, progressed to CGA with increased time and effort. Further mobility limited by weakness and fatigue, no c/o pain or dizziness this date.  -AS       Row Name 02/07/24 0905          Positioning and Restraints    Pre-Treatment Position in bed  -AS     Post Treatment Position chair  -AS     In Chair reclined;call light within reach;encouraged to call for assist;with other staff;legs elevated  -AS               User Key  (r) = Recorded By, (t) = Taken By, (c) = Cosigned By      Initials Name Provider Type    AS Eileen Moncada PTA Physical Therapist Assistant                   Outcome Measures       Row Name 02/07/24 0906          How much help from another person do you currently need...    Turning from your back to your side while in flat bed without using bedrails? 4  -AS     Moving from lying on back to sitting on the side of a flat bed without bedrails? 4  -AS     Moving to and from a bed to a chair (including a wheelchair)? 3  -AS     Standing up from a chair using your arms (e.g., wheelchair, bedside chair)? 3  -AS     Climbing 3-5 steps with a railing? 3  -AS     To walk in hospital room? 3  -AS     AM-PAC 6 Clicks Score (PT) 20  -AS     Highest Level of Mobility Goal 6 --> Walk 10 steps or more  -AS       Row Name 02/07/24 0906          Functional Assessment    Outcome Measure Options AM-PAC 6 Clicks Basic Mobility (PT)  -AS               User Key  (r) = Recorded By, (t) = Taken By, (c) = Cosigned By      Initials Name Provider Type    AS Eileen Moncada PTA Physical Therapist Assistant                                 Physical Therapy Education       Title: PT OT SLP Therapies (In Progress)       Topic: Physical Therapy (In Progress)       Point: Mobility training (In Progress)       Learning Progress Summary             Patient Acceptance, E, NR by AS at 2/7/2024 0906    Acceptance, E, VU by AE at 2/5/2024 1323    Acceptance, E, VU by MELODY at 2/5/2024 4221     Acceptance, E, NR by KE at 2/2/2024 1121    Acceptance, E, NR by KE at 1/31/2024 0833                         Point: Home exercise program (In Progress)       Learning Progress Summary             Patient Acceptance, E, NR by AS at 2/7/2024 0906    Acceptance, E, VU by AE at 2/5/2024 1323    Acceptance, E, VU by AG at 2/5/2024 0346    Acceptance, E, NR by KE at 1/31/2024 0833                         Point: Body mechanics (In Progress)       Learning Progress Summary             Patient Acceptance, E, NR by AS at 2/7/2024 0906    Acceptance, E, VU by AE at 2/5/2024 1323    Acceptance, E, VU by AG at 2/5/2024 0346    Acceptance, E, NR by KE at 2/2/2024 1121    Acceptance, E, NR by KE at 1/31/2024 0833                         Point: Precautions (In Progress)       Learning Progress Summary             Patient Acceptance, E, NR by AS at 2/7/2024 0906    Acceptance, E, VU by AE at 2/5/2024 1323    Acceptance, E, VU by AG at 2/5/2024 0346    Acceptance, E, NR by KE at 2/2/2024 1121    Acceptance, E, NR by KE at 1/31/2024 0833                                         User Key       Initials Effective Dates Name Provider Type Discipline    AS 04/28/23 -  Eileen Moncada, PTA Physical Therapist Assistant PT    AE 09/21/21 -  Robert Gillespie, PT Physical Therapist PT    AG 11/01/23 -  Cb Dobson, RN Registered Nurse Nurse    KE 11/16/23 -  Corry Maldonado, PT Physical Therapist PT                  PT Recommendation and Plan     Plan of Care Reviewed With: patient  Progress: improving  Outcome Evaluation: Patient ambulated 60' with CGA/Min assist x1 and STC for support, initially patient needing max reassurance and encouragement with STC for mobility, progressed to CGA with increased time and effort. Further mobility limited by weakness and fatigue, no c/o pain or dizziness this date.     Time Calculation:         PT Charges       Row Name 02/07/24 0906             Time Calculation    Start Time 0838  -AS      PT  Received On 02/07/24  -AS      PT Goal Re-Cert Due Date 02/10/24  -AS         Timed Charges    67660 - PT Therapeutic Exercise Minutes 9  -AS      98300 - Gait Training Minutes  14  -AS         Total Minutes    Timed Charges Total Minutes 23  -AS       Total Minutes 23  -AS                User Key  (r) = Recorded By, (t) = Taken By, (c) = Cosigned By      Initials Name Provider Type    AS Eileen Moncada PTA Physical Therapist Assistant                  Therapy Charges for Today       Code Description Service Date Service Provider Modifiers Qty    09452985896 HC PT THER PROC EA 15 MIN 2/7/2024 Eileen Moncada, BLAYNE GP 1    09872351275 HC GAIT TRAINING EA 15 MIN 2/7/2024 Eileen Moncada, BLAYNE GP 1            PT G-Codes  Outcome Measure Options: AM-PAC 6 Clicks Basic Mobility (PT)  AM-PAC 6 Clicks Score (PT): 20  AM-PAC 6 Clicks Score (OT): 15       Eileen Moncada PTA  2/7/2024

## 2024-02-08 PROCEDURE — G0378 HOSPITAL OBSERVATION PER HR: HCPCS

## 2024-02-08 PROCEDURE — 99232 SBSQ HOSP IP/OBS MODERATE 35: CPT | Performed by: INTERNAL MEDICINE

## 2024-02-08 PROCEDURE — 97535 SELF CARE MNGMENT TRAINING: CPT

## 2024-02-08 PROCEDURE — 97110 THERAPEUTIC EXERCISES: CPT

## 2024-02-08 PROCEDURE — 94799 UNLISTED PULMONARY SVC/PX: CPT

## 2024-02-08 RX ADMIN — LATANOPROST 1 DROP: 50 SOLUTION OPHTHALMIC at 20:49

## 2024-02-08 RX ADMIN — URSODIOL 300 MG: 300 CAPSULE ORAL at 08:56

## 2024-02-08 RX ADMIN — HYDROCODONE BITARTRATE AND ACETAMINOPHEN 1 TABLET: 7.5; 325 TABLET ORAL at 15:40

## 2024-02-08 RX ADMIN — HYDROCODONE BITARTRATE AND ACETAMINOPHEN 1 TABLET: 7.5; 325 TABLET ORAL at 09:06

## 2024-02-08 RX ADMIN — PANTOPRAZOLE SODIUM 40 MG: 40 TABLET, DELAYED RELEASE ORAL at 17:44

## 2024-02-08 RX ADMIN — Medication 250 MG: at 08:56

## 2024-02-08 RX ADMIN — BUDESONIDE AND FORMOTEROL FUMARATE DIHYDRATE 1 PUFF: 160; 4.5 AEROSOL RESPIRATORY (INHALATION) at 12:28

## 2024-02-08 RX ADMIN — CARVEDILOL 3.12 MG: 3.12 TABLET, FILM COATED ORAL at 08:56

## 2024-02-08 RX ADMIN — TIOTROPIUM BROMIDE INHALATION SPRAY 2 PUFF: 3.12 SPRAY, METERED RESPIRATORY (INHALATION) at 12:28

## 2024-02-08 RX ADMIN — LOPERAMIDE HYDROCHLORIDE 2 MG: 2 CAPSULE ORAL at 03:38

## 2024-02-08 RX ADMIN — PANTOPRAZOLE SODIUM 40 MG: 40 TABLET, DELAYED RELEASE ORAL at 08:56

## 2024-02-08 RX ADMIN — Medication 250 MG: at 20:49

## 2024-02-08 RX ADMIN — LOPERAMIDE HYDROCHLORIDE 2 MG: 2 CAPSULE ORAL at 09:06

## 2024-02-08 RX ADMIN — ROSUVASTATIN CALCIUM 5 MG: 10 TABLET, FILM COATED ORAL at 20:49

## 2024-02-08 RX ADMIN — CARVEDILOL 3.12 MG: 3.12 TABLET, FILM COATED ORAL at 17:44

## 2024-02-08 RX ADMIN — DESVENLAFAXINE 50 MG: 50 TABLET, FILM COATED, EXTENDED RELEASE ORAL at 08:56

## 2024-02-08 RX ADMIN — CLONIDINE HYDROCHLORIDE 0.1 MG: 0.1 TABLET ORAL at 22:19

## 2024-02-08 RX ADMIN — THIAMINE HCL TAB 100 MG 100 MG: 100 TAB at 08:56

## 2024-02-08 RX ADMIN — FOLIC ACID 1 MG: 1 TABLET ORAL at 08:56

## 2024-02-08 NOTE — PLAN OF CARE
Goal Outcome Evaluation:  Plan of Care Reviewed With: patient        Progress: improving  Outcome Evaluation: OT promoted adl retraining with pt demo improved lbd d/t increase freedom to use thumb and index finger to LUE - setup for donning sock. Pt completed grooming with setup, sts with cga and fxl mob in room with cga and cane. She completed te focused on improving sts and ub strength to RUE. Continue to recommend IRF.

## 2024-02-08 NOTE — PROGRESS NOTES
Clinical Nutrition   Nutrition Support Assessment  Reason for Visit: Malnutrition Severity Assessment, LOS      Patient Name: Philomena Davis  YOB: 1960  MRN: [id number]  Date of Encounter: 02/07/24 23:34 EST  Admission date: 1/30/2024    Comments: Pt meets criteria for moderate acute malnutrition based on intake hx w wasting See MSA note.    Nutrition Assessment   Admission Diagnosis:  Hypokalemia [E87.6]      Problem List:    Essential hypertension    Generalized anxiety disorder    Hyperlipidemia    Alcoholic cirrhosis of liver without ascites    GERD without esophagitis    Alcohol use disorder    Hypokalemia    Closed fracture of left hand    Hematemesis without nausea    Melena    Blood loss anemia    Diarrhea    Reflux esophagitis and gastric ulcer    Likely PNA      PMH:   She  has a past medical history of Alcohol withdrawal (05/10/2019), Annual physical exam (09/12/2023), Anxiety and depression, Cirrhosis of liver, Closed displaced fracture of lateral malleolus of left fibula, 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 (03/24/2017), Closed trimalleolar fracture of right ankle (05/24/2023), Digital mucous cyst of toe of left foot (03/24/2017), Ganglion cyst of left foot, Glaucoma, Hypertension, and Wears glasses.    PSH:  She  has a past surgical history that includes Breast excisional biopsy (Right, 2014); Dilation and curettage, diagnostic / therapeutic; Breast Augmentation; Wrist surgery; pr rpr non/mal femur dstl h/n w/iliac/autog bone (Left, 03/24/2017); Ankle fracture surgery (Left, 03/24/2017); Augmentation mammaplasty (Bilateral, 1999); Esophagogastroduodenoscopy (N/A, 11/30/2022); Ankle fracture surgery (Right, 05/25/2023); and Esophagogastroduodenoscopy (N/A, 2/1/2024).    Applicable Nutrition Concerns:   Skin:  Oral:  GI:    Applicable Interval History:         Reported/Observed/Food/Nutrition Related History:     2/7  Pt endorses period of 2  "weeks PTA w intake under 1/2 usual and p.o not currently established.  Does not want soft diet. Does not want suppl.      Anthropometrics     Flowsheet Rows      Flowsheet Row First Filed Value   Admission Height 162.6 cm (64\") Documented at 01/30/2024 1216   Admission Weight 67.1 kg (148 lb) Documented at 01/30/2024 1216     Height: Height: 162.6 cm (64\")  Last Filed Weight: Weight: 67.1 kg (148 lb) (02/01/24 0746)  Method: Weight Method: Stated  BMI: BMI (Calculated): 25.4  BMI classification: Overweight: 25.0-29.9kg/m2     UBW:  Per EMR wt of 176 lbs on 10/25 23 However note wt of 148 lbs standing scale in 2022.  Weight change:  Unable to confirm current wt at this time.     Nutrition Focused Physical Exam     Date:    2/7     Patient meets criteria for moderate acute malnutrition diagnosis, see MSA note.    Current Nutrition Prescription   PO: Diet: Regular/House Diet; Texture: Regular Texture (IDDSI 7); Fluid Consistency: Thin (IDDSI 0)  Oral Nutrition Supplement:   Intake:  7 DAYS pt w 75% x 5 meals recorded on clr liq diet over 2 days. 0% x 1 meal on solid food recorded since.      Nutrition Diagnosis   Date:  2/7            Updated:    Problem Malnutrition moderate acute    Etiology Periods of decr intake   Signs/Symptoms Intake hx w wasting   Status:     Goal:   General: Nutrition to support treatment  PO: Increase intake on solid food  EN/PN: N/A    Nutrition Intervention      Follow treatment progress, Care plan reviewed, Advise alternate selection, Menu provided, Supplement offered/refused        Monitoring/Evaluation:   Per protocol, I&O, PO intake, Pertinent labs, Weight, GI status, Symptoms      Tiny Sahu RD  Time Spent: 30 min  "

## 2024-02-08 NOTE — PLAN OF CARE
Goal Outcome Evaluation:   Dr. Rolon rewrapped patient's splint at bedside. Norco given for pain. Imodium given for loose stools. Pending insurance appeal for placement at a rehab facility. VSS. On 1L of oxygen via nasal cannula. Non-tele.

## 2024-02-08 NOTE — PROGRESS NOTES
[facility] Medicine Services  PROGRESS NOTE    Patient Name: Philomena Davis  : 1960  MRN: [id number]    Date of Admission: 2024  Primary Care Physician: Unique Brandt DO    Subjective   Subjective     CC:  Weakness    HPI:  Waiting on insurance appeal, rewrapped her finger splint at bedside. Diarrhea improving      Objective   Objective     Vital Signs:   Temp:  [97.9 °F (36.6 °C)-98.7 °F (37.1 °C)] 98.3 °F (36.8 °C)  Heart Rate:  [] 75  Resp:  [16-18] 18  BP: ()/(69-91) 115/69  Flow (L/min):  [1-2] 1     Physical Exam:  Constitutional: No acute distress, awake, alert  HENT: NCAT, mucous membranes moist  Respiratory: Respiratory effort normal   Cardiovascular: RRR, no murmurs, rubs, or gallops  Gastrointestinal: Soft, nontender, nondistended  Musculoskeletal: L hand in splint  Psychiatric: Appropriate affect, cooperative  Neurologic: Oriented x 3, speech clear  Skin: No rashes     Results Reviewed:  LAB RESULTS:      Lab 24   WBC 4.55 4.64   HEMOGLOBIN 10.2* 10.6*  10.7*   HEMATOCRIT 31.2* 32.2*  32.2*   PLATELETS 252 293   NEUTROS ABS 2.86  --    IMMATURE GRANS (ABS) 0.04  --    LYMPHS ABS 0.88  --    MONOS ABS 0.58  --    EOS ABS 0.12  --    MCV 96.0 96.1         Lab 24  0549 24  03324  0450   SODIUM 140  --  138 134*   POTASSIUM 5.0 3.6 3.1* 4.4   CHLORIDE 106  --  101 97*   CO2 26.0  --  28.0 27.0   ANION GAP 8.0  --  9.0 10.0   BUN 5*  --  5* 7*   CREATININE 0.63  --  0.59 0.74   EGFR 101.1  --  102.7 92.2   GLUCOSE 75  --  94 116*   CALCIUM 8.9  --  8.8 8.8   PHOSPHORUS  --   --  4.3  --          Lab 24  0330 24  0450   TOTAL PROTEIN  --  5.8*   ALBUMIN 3.0* 3.3*   GLOBULIN  --  2.5   ALT (SGPT)  --  20   AST (SGOT)  --  63*   BILIRUBIN  --  0.8   ALK PHOS  --  146*                     Brief Urine Lab Results  (Last result in the past 365 days)        Color   Clarity   Blood    Leuk Est   Nitrite   Protein   CREAT   Urine HCG        05/19/23 0834 [place]  Comment: on azo   Cloudy   3+   Large (3+)   Positive   3+                   Microbiology Results Abnormal       Procedure Component Value - Date/Time    Clostridioides difficile Toxin - Stool, Per Rectum [[id number]]  (Normal) Collected: 02/07/24 0715    Lab Status: Final result Specimen: Stool from Per Rectum Updated: 02/07/24 0825    Narrative:      The following orders were created for panel order Clostridioides difficile Toxin - Stool, Per Rectum.  Procedure                               Abnormality         Status                     ---------                               -----------         ------                     Clostridioides difficile...[[id number]]  Normal              Final result                 Please view results for these tests on the individual orders.    Clostridioides difficile Toxin, PCR - Stool, Per Rectum [[id number]]  (Normal) Collected: 02/07/24 0715    Lab Status: Final result Specimen: Stool from Per Rectum Updated: 02/07/24 0825     Toxigenic C. difficile by PCR Not Detected    Narrative:      The result indicates the absence of toxigenic C. difficile from stool specimen.             No radiology results from the last 24 hrs        Current medications:  Scheduled Meds:budesonide-formoterol, 1 puff, Inhalation, BID - RT  carvedilol, 3.125 mg, Oral, BID With Meals  cloNIDine, 0.1 mg, Oral, Nightly  desvenlafaxine, 50 mg, Oral, Daily  folic acid, 1 mg, Oral, Daily  latanoprost, 1 drop, Both Eyes, Nightly  pantoprazole, 40 mg, Oral, BID AC  rosuvastatin, 5 mg, Oral, Nightly  saccharomyces boulardii, 250 mg, Oral, BID  senna-docusate sodium, 2 tablet, Oral, BID  sodium chloride, 10 mL, Intravenous, Q12H  sodium chloride, 10 mL, Intravenous, Q12H  thiamine, 100 mg, Oral, Daily  tiotropium bromide monohydrate, 2 puff, Inhalation, Daily - RT  ursodiol, 300 mg, Oral, Daily      Continuous Infusions:lactated ringers, 9  mL/hr      PRN Meds:.  acetaminophen    albuterol    senna-docusate sodium **AND** polyethylene glycol **AND** bisacodyl **AND** bisacodyl    HYDROcodone-acetaminophen    loperamide    Magnesium Standard Dose Replacement - Follow Nurse / BPA Driven Protocol    nitroglycerin    ondansetron ODT **OR** ondansetron    Phosphorus Replacement - Follow Nurse / BPA Driven Protocol    Potassium Replacement - Follow Nurse / BPA Driven Protocol    sodium chloride    sodium chloride    sodium chloride    sodium chloride    sodium chloride    Assessment & Plan   Assessment & Plan     Active Hospital Problems    Diagnosis  POA    Closed fracture of left hand [S62.92XA]  Unknown    Hypokalemia [E87.6]  Yes    Hematemesis without nausea [K92.0]  Unknown    Melena [K92.1]  Unknown    Blood loss anemia [D50.0]  Unknown    Alcohol use disorder [F10.90]  Yes    GERD without esophagitis [K21.9]  Yes    Alcoholic cirrhosis of liver without ascites [K70.30]  Yes    Essential hypertension [I10]  Yes    Generalized anxiety disorder [F41.1]  Yes    Hyperlipidemia [E78.5]  Yes      Resolved Hospital Problems   No resolved problems to display.        Brief Hospital Course to date:  Philomena Davis is a 63 y.o. female with ETOH cirrhosis, HTN, anxiety who presented with significant weakness after reported hematemesis. EGD revealed severe esophagitis. Also found to have left hand 5th metacarpal fracture which inhibits ability to complete daily casts. Stay c/b persistent debility, and difficulty with ADLs and need for placement     Likely atypical PNA, suspect aspiration  -CXR 2/3 c/w atypical PNA v atelectasis v aspiration  -completed course of rocephin and azithromax  -Encourage IS     Diarrhea  -continue florastor  -C. difficile negative  -Added Imodium     Severe reflux esophagitis c/b ulceration + gastric ulcer  -BID PPI x 3 months, then daily  -Needs repeat EGD in 3 months recommended to assess healing  -completed IV ceftriaxone given UGI  bleed and concomitant cirrhosis     ETOH cirrhosis w h/o ETOH use  -continue coreg with hold parameters     LEFT hand 5th metacarpal fracture - f/u Naomi Duncan PA-C 1 week, [facility] NELSON  -attempted to call office twice  -rewrapped splint at bedside, continue to change ace wrap as needed    HTN   - on clonidine OP for insomnia   -Changed to nightly due to low bp readings        Anxiety   - Continue home desvenlafaxine.      Chronic insomnia   Severe hypokalemia 2/2 vomiting, resolved  - replace as needed     Significant debility/weakness  - needing assistance before return home alone, pending insurance appeal  -Difficulty using walker without able to bear weight on her left and      Expected Discharge Location and Transportation:?  Rehab  Expected Discharge   Expected Discharge Date: 2/9/2024; Expected Discharge Time:      DVT prophylaxis:  Mechanical DVT prophylaxis orders are present.         AM-PAC 6 Clicks Score (PT): 20 (02/08/24 0900)    CODE STATUS:   Code Status and Medical Interventions:   Ordered at: 01/30/24 0917     Level Of Support Discussed With:    Patient     Code Status (Patient has no pulse and is not breathing):    CPR (Attempt to Resuscitate)     Medical Interventions (Patient has pulse or is breathing):    Full Support       Stephany Rolon, DO  02/08/24

## 2024-02-08 NOTE — THERAPY TREATMENT NOTE
Patient Name: Philomena Davis  : 1960    MRN: [id number]                              Today's Date: 2024       Admit Date: 2024    Visit Dx:     ICD-10-CM ICD-9-CM   1. Hematemesis without nausea  K92.0 578.0   2. Melena  K92.1 578.1   3. Blood loss anemia  D50.0 280.0   4. Alcoholic cirrhosis of liver without ascites  K70.30 571.2     Patient Active Problem List   Diagnosis    Essential hypertension    Generalized anxiety disorder    Hyperlipidemia    Glaucoma    Alcoholic cirrhosis of liver without ascites    Severe malnutrition    GERD without esophagitis    Sleep disturbance    Annual physical exam    Idiopathic peripheral neuropathy    Severe anxiety with panic    Alcohol use disorder    Cigarette nicotine dependence in remission    Chronic obstructive pulmonary disease    Hypokalemia    Closed fracture of left hand    Hematemesis without nausea    Melena    Blood loss anemia     Past Medical History:   Diagnosis Date    Alcohol withdrawal 05/10/2019    Annual physical exam 2023    Anxiety and depression     Cirrhosis of liver     Closed displaced fracture of lateral malleolus of left fibula     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    Closed trimalleolar fracture of right ankle 2023    Digital mucous cyst of toe of left foot 2017    Ganglion cyst of left foot     Glaucoma     Hypertension     Wears glasses      Past Surgical History:   Procedure Laterality Date    ANKLE OPEN REDUCTION INTERNAL FIXATION Left 2017    Procedure: LEFT ANKLE OPEN REDUCTION INTERNAL FIXATION WITH ILIAC CREST BONE GRAFT , EXCISION CYST 4TH/5TH INNERSPACE LEFT FOOT;  Surgeon: Frank Larson MD;  Location:  [facility];  Service:     ANKLE OPEN REDUCTION INTERNAL FIXATION Right 2023    Procedure: ANKLE OPEN REDUCTION INTERNAL FIXATION;  Surgeon: Deandre Reynoso MD;  Location:  [facility];  Service: Orthopedics;  Laterality: Right;    AUGMENTATION  MAMMAPLASTY Bilateral 1999    BREAST AUGMENTATION      BREAST EXCISIONAL BIOPSY Right 2014    DILATION AND CURETTAGE, DIAGNOSTIC / THERAPEUTIC      ENDOSCOPY N/A 11/30/2022    Procedure: ESOPHAGOGASTRODUODENOSCOPY;  Surgeon: Arelis Solano MD;  Location:  [facility];  Service: Gastroenterology;  Laterality: N/A;    ENDOSCOPY N/A 2/1/2024    Procedure: ESOPHAGOGASTRODUODENOSCOPY;  Surgeon: Brunner, Mark I, MD;  Location:  [facility];  Service: Gastroenterology;  Laterality: N/A;    NE RPR NON/MAL FEMUR DSTL H/N W/ILIAC/AUTOG BONE Left 03/24/2017    Procedure: ILIAC CREST BONE GRAFT;  Surgeon: Frank Larson MD;  Location:  [facility];  Service: Orthopedics    WRIST SURGERY        General Information       Row Name 02/08/24 [facility]5          OT Time and Intention    Document Type therapy note (daily note)  -     Mode of Treatment occupational therapy  -       Row Name 02/08/24 1345          General Information    Patient Profile Reviewed yes  -     Existing Precautions/Restrictions fall;oxygen therapy device and L/min;other (see comments)  L 4th and 5th metacarpal fx's with ulnar gutter splint  -       Row Name 02/08/24 1345          Cognition    Orientation Status (Cognition) oriented x 4  -       Row Name 02/08/24 1348          Safety Issues, Functional Mobility    Impairments Affecting Function (Mobility) balance;endurance/activity tolerance;motor planning;postural/trunk control;shortness of breath;strength;sensation/sensory awareness  -               User Key  (r) = Recorded By, (t) = Taken By, (c) = Cosigned By      Initials Name Provider Type     Coni Rapp OT Occupational Therapist                     Mobility/ADL's       Row Name 02/08/24 1345          Bed Mobility    Bed Mobility supine-sit-supine  -SW     Supine-Sit-Supine [place] (Bed Mobility) standby assist  -     Assistive Device (Bed Mobility) bed rails;head of bed elevated  -       Row Name 02/08/24 5701           Transfers    Transfers sit-stand transfer  -       Row Name 02/08/24 1345          Sit-Stand Transfer    Sit-Stand [place] (Transfers) verbal cues;contact guard  -     Assistive Device (Sit-Stand Transfers) cane, straight  -[facility] Name 02/08/24 1345          Functional Mobility    Functional Mobility- Ind. Level contact guard assist;verbal cues required  -     Functional Mobility- Device cane, straight  -     Functional Mobility-Distance (Feet) 50  -[facility] Name 02/08/24 1345          Activities of Daily Living    BADL Assessment/Intervention lower body dressing;grooming  -[facility] Name 02/08/24 1345          Mobility    Extremity Weight-bearing Status left upper extremity  -     Left Upper Extremity (Weight-bearing Status) non weight-bearing (NWB)  -[facility] Name 02/08/24 1345          Lower Body Dressing Assessment/Training    [place] Level (Lower Body Dressing) lower body dressing skills;socks;set up  -SW     Position (Lower Body Dressing) sitting up in bed  -[facility] Name 02/08/24 1345          Grooming Assessment/Training    [place] Level (Grooming) grooming skills;hair care, combing/brushing;oral care regimen;wash face, hands;set up  -SW     Position (Grooming) sitting up in bed  -               User Key  (r) = Recorded By, (t) = Taken By, (c) = Cosigned By      Initials Name Provider Type    Coni Worthington OT Occupational Therapist                   Obj/Interventions       [facility] Name 02/08/24 1457          Shoulder (Therapeutic Exercise)    Shoulder (Therapeutic Exercise) strengthening exercise  -     Shoulder Strengthening (Therapeutic Exercise) right;flexion;extension;sitting;standing;5 repetitions;2 sets  -[facility] Name 02/08/24 1457          Motor Skills    Motor Skills functional endurance  -     Functional Endurance Stood for 1 minute statically before requiring a rest break.  -     Therapeutic Exercise shoulder  -       Row Name 02/08/24 1453           Balance    Balance Assessment sitting static balance;sitting dynamic balance;sit to stand dynamic balance;standing static balance;standing dynamic balance  -SW     Static Sitting Balance standby assist  -SW     Dynamic Sitting Balance standby assist  -SW     Position, Sitting Balance unsupported  -SW     Sit to Stand Dynamic Balance contact guard  -SW     Static Standing Balance standby assist  -SW     Dynamic Standing Balance contact guard  -SW     Position/Device Used, Standing Balance supported  -SW     Balance Interventions sitting;standing;sit to stand;supported;static;dynamic;minimal challenge;occupation based/functional task  -SW               User Key  (r) = Recorded By, (t) = Taken By, (c) = Cosigned By      Initials Name Provider Type    Coni Worthington OT Occupational Therapist                   Goals/Plan    No documentation.                  Clinical Impression       Row Name 02/08/24 1345          Pain Assessment    Pretreatment Pain Rating 0/10 - no pain  -SW     Posttreatment Pain Rating 0/10 - no pain  -SW       Row Name 02/08/24 1345          Plan of Care Review    Plan of Care Reviewed With patient  -SW     Progress improving  -SW     Outcome Evaluation OT promoted adl retraining with pt demo improved lbd d/t increase freedom to use thumb and index finger to LUE - setup for donning sock. Pt completed grooming with setup, sts with cga and fxl mob in room with cga and cane. She completed te focused on improving sts and ub strength to RUE. Continue to recommend IRF.  -SW       Row Name 02/08/24 1345          Vital Signs    Pre Systolic BP Rehab 148  -SW     Pre Treatment Diastolic BP 85  -SW     Pretreatment Heart Rate (beats/min) 88  -SW     Pre SpO2 (%) 90  -SW     O2 Delivery Pre Treatment supplemental O2  -SW     O2 Delivery Intra Treatment supplemental O2  -SW     O2 Delivery Post Treatment supplemental O2  -SW     Pre Patient Position Supine  -SW     Intra Patient Position Standing   -SW     Post Patient Position Supine  -SW       Row Name 02/08/24 1345          Positioning and Restraints    Pre-Treatment Position in bed  -SW     Post Treatment Position bed  -SW     In Bed notified nsg;supine;fowlers;call light within reach;encouraged to call for assist;exit alarm on;side rails up x2  -SW               User Key  (r) = Recorded By, (t) = Taken By, (c) = Cosigned By      Initials Name Provider Type    Coni Worthington OT Occupational Therapist                   Outcome Measures       Row Name 02/08/24 1502          How much help from another is currently needed...    Putting on and taking off regular lower body clothing? 3  -SW     Bathing (including washing, rinsing, and drying) 2  -SW     Toileting (which includes using toilet bed pan or urinal) 2  -SW     Putting on and taking off regular upper body clothing 3  -SW     Taking care of personal grooming (such as brushing teeth) 3  -SW     Eating meals 3  -SW     AM-PAC 6 Clicks Score (OT) 16  -SW       Row Name 02/08/24 0900          How much help from another person do you currently need...    Turning from your back to your side while in flat bed without using bedrails? 4  -AP     Moving from lying on back to sitting on the side of a flat bed without bedrails? 4  -AP     Moving to and from a bed to a chair (including a wheelchair)? 3  -AP     Standing up from a chair using your arms (e.g., wheelchair, bedside chair)? 3  -AP     Climbing 3-5 steps with a railing? 3  -AP     To walk in hospital room? 3  -AP     AM-PAC 6 Clicks Score (PT) 20  -AP     Highest Level of Mobility Goal 6 --> Walk 10 steps or more  -AP       Row Name 02/08/24 1502          Functional Assessment    Outcome Measure Options AM-PAC 6 Clicks Daily Activity (OT)  -SW               User Key  (r) = Recorded By, (t) = Taken By, (c) = Cosigned By      Initials Name Provider Type    Coni Worthington OT Occupational Therapist    Catie Floyd, RN Registered Nurse                     Occupational Therapy Education       Title: PT OT SLP Therapies (In Progress)       Topic: Occupational Therapy (Done)       Point: ADL training (Done)       Description:   Instruct learner(s) on proper safety adaptation and remediation techniques during self care or transfers.   Instruct in proper use of assistive devices.                  Learning Progress Summary             Patient Acceptance, E, VU by  at 2/8/2024 1502    Acceptance, E, VU by MELODY at 2/5/2024 0346    Acceptance, E, VU by DEANGELO at 2/2/2024 0949    Comment: OT POC; incentive spirometer use/goal setting; one-handed technique; deep breathing for relaxation                         Point: Home exercise program (Done)       Description:   Instruct learner(s) on appropriate technique for monitoring, assisting and/or progressing therapeutic exercises/activities.                  Learning Progress Summary             Patient Acceptance, E, VU by  at 2/8/2024 1502                         Point: Precautions (Done)       Description:   Instruct learner(s) on prescribed precautions during self-care and functional transfers.                  Learning Progress Summary             Patient Acceptance, E, VU by  at 2/8/2024 1502    Acceptance, E, VU by MELODY at 2/5/2024 0346    Acceptance, E, VU by DEANGELO at 2/2/2024 0949    Comment: OT POC; incentive spirometer use/goal setting; one-handed technique; deep breathing for relaxation                         Point: Body mechanics (Done)       Description:   Instruct learner(s) on proper positioning and spine alignment during self-care, functional mobility activities and/or exercises.                  Learning Progress Summary             Patient Acceptance, E, VU by MELODY at 2/5/2024 0346                                         User Key       Initials Effective Dates Name Provider Type Discipline    DANNA 06/16/21 -  Coni Rapp OT Occupational Therapist OT    DEANGELO 06/16/21 -  Vanessa Panchal OT Occupational Therapist OT     AG 11/01/23 -  Cb Dobson, RN Registered Nurse Nurse                  OT Recommendation and Plan     Plan of Care Review  Plan of Care Reviewed With: patient  Progress: improving  Outcome Evaluation: OT promoted adl retraining with pt demo improved lbd d/t increase freedom to use thumb and index finger to LUE - setup for donning sock. Pt completed grooming with setup, sts with cga and fxl mob in room with cga and cane. She completed te focused on improving sts and ub strength to RUE. Continue to recommend IRF.     Time Calculation:         Time Calculation- OT       Row Name 02/08/24 1345             Time Calculation- OT    OT Start Time 1345  -SW      OT Received On 02/08/24  -SW         Timed Charges    47464 - OT Therapeutic Exercise Minutes 15  -SW      28567 - OT Self Care/Mgmt Minutes 23  -SW         Total Minutes    Timed Charges Total Minutes 38  -SW       Total Minutes 38  -SW                User Key  (r) = Recorded By, (t) = Taken By, (c) = Cosigned By      Initials Name Provider Type    SW Coni Rapp OT Occupational Therapist                  Therapy Charges for Today       Code Description Service Date Service Provider Modifiers Qty    99612558626  OT THER PROC EA 15 MIN 2/8/2024 Coni Rapp OT GO 1    96978202345 HC OT SELF CARE/MGMT/TRAIN EA 15 MIN 2/8/2024 Coni Rapp OT GO 2                 Coni Rapp OT  2/8/2024

## 2024-02-08 NOTE — PROGRESS NOTES
Continued Stay Note  [facility]     Patient Name: Philomena Davis  MRN: [id number]  Today's Date: 2/8/2024    Admit Date: 1/30/2024    Plan: rehab   Discharge Plan       Row Name 02/08/24 1418       Plan    Plan Comments Called [employer] and was told that the decision for the an appeal case number [id number] will be completed on Friday.      Row Name 02/08/24 3197       Plan    Plan Comments Case management called the [employer] and was told to submit additional clinical to the insurance company to try to get the insurance company to expedite the appeal because they told  [employer] they would not expedite the appeal and case management called the insurance and had submited the appeal as expedited.                   Discharge Codes    No documentation.                 Expected Discharge Date and Time       Expected Discharge Date Expected Discharge Time    Feb 9, 2024               SILKE Quiles

## 2024-02-09 PROBLEM — E44.0 MODERATE MALNUTRITION: Status: ACTIVE | Noted: 2024-02-09

## 2024-02-09 PROCEDURE — 97530 THERAPEUTIC ACTIVITIES: CPT

## 2024-02-09 PROCEDURE — 99232 SBSQ HOSP IP/OBS MODERATE 35: CPT | Performed by: FAMILY MEDICINE

## 2024-02-09 PROCEDURE — G0378 HOSPITAL OBSERVATION PER HR: HCPCS

## 2024-02-09 PROCEDURE — 94664 DEMO&/EVAL PT USE INHALER: CPT

## 2024-02-09 PROCEDURE — 94799 UNLISTED PULMONARY SVC/PX: CPT

## 2024-02-09 RX ADMIN — BUDESONIDE AND FORMOTEROL FUMARATE DIHYDRATE 1 PUFF: 160; 4.5 AEROSOL RESPIRATORY (INHALATION) at 19:39

## 2024-02-09 RX ADMIN — HYDROCODONE BITARTRATE AND ACETAMINOPHEN 1 TABLET: 7.5; 325 TABLET ORAL at 14:13

## 2024-02-09 RX ADMIN — PANTOPRAZOLE SODIUM 40 MG: 40 TABLET, DELAYED RELEASE ORAL at 18:02

## 2024-02-09 RX ADMIN — ROSUVASTATIN CALCIUM 5 MG: 10 TABLET, FILM COATED ORAL at 20:57

## 2024-02-09 RX ADMIN — TIOTROPIUM BROMIDE INHALATION SPRAY 2 PUFF: 3.12 SPRAY, METERED RESPIRATORY (INHALATION) at 07:52

## 2024-02-09 RX ADMIN — PANTOPRAZOLE SODIUM 40 MG: 40 TABLET, DELAYED RELEASE ORAL at 09:18

## 2024-02-09 RX ADMIN — CLONIDINE HYDROCHLORIDE 0.1 MG: 0.1 TABLET ORAL at 20:57

## 2024-02-09 RX ADMIN — HYDROCODONE BITARTRATE AND ACETAMINOPHEN 1 TABLET: 7.5; 325 TABLET ORAL at 06:16

## 2024-02-09 RX ADMIN — DESVENLAFAXINE 50 MG: 50 TABLET, FILM COATED, EXTENDED RELEASE ORAL at 09:19

## 2024-02-09 RX ADMIN — CARVEDILOL 3.12 MG: 3.12 TABLET, FILM COATED ORAL at 18:03

## 2024-02-09 RX ADMIN — LATANOPROST 1 DROP: 50 SOLUTION OPHTHALMIC at 20:57

## 2024-02-09 RX ADMIN — Medication 250 MG: at 09:18

## 2024-02-09 RX ADMIN — THIAMINE HCL TAB 100 MG 100 MG: 100 TAB at 09:18

## 2024-02-09 RX ADMIN — BUDESONIDE AND FORMOTEROL FUMARATE DIHYDRATE 1 PUFF: 160; 4.5 AEROSOL RESPIRATORY (INHALATION) at 07:52

## 2024-02-09 RX ADMIN — URSODIOL 300 MG: 300 CAPSULE ORAL at 09:18

## 2024-02-09 RX ADMIN — CARVEDILOL 3.12 MG: 3.12 TABLET, FILM COATED ORAL at 09:18

## 2024-02-09 RX ADMIN — Medication 250 MG: at 20:57

## 2024-02-09 RX ADMIN — FOLIC ACID 1 MG: 1 TABLET ORAL at 09:18

## 2024-02-09 NOTE — PLAN OF CARE
Goal Outcome Evaluation:    Pt has no new nursing issues at this time. Pt is a/ox4, 2L NC, vital signs stable. Pt has no complaints at this time.       Problem: Hypertension Comorbidity  Goal: Blood Pressure in Desired Range  Outcome: Ongoing, Progressing  Intervention: Maintain Blood Pressure Management  Recent Flowsheet Documentation  Taken 2/8/2024 2005 by Cb Dobson RN  Medication Review/Management: medications reviewed     Problem: Electrolyte Imbalance  Goal: Electrolyte Balance  Outcome: Ongoing, Progressing     Problem: Fall Injury Risk  Goal: Absence of Fall and Fall-Related Injury  Outcome: Ongoing, Progressing  Intervention: Identify and Manage Contributors  Recent Flowsheet Documentation  Taken 2/9/2024 0200 by Cb Dobson RN  Self-Care Promotion: independence encouraged  Taken 2/8/2024 2200 by Cb Dobson RN  Self-Care Promotion: independence encouraged  Taken 2/8/2024 2005 by Cb Dobson, RN  Medication Review/Management: medications reviewed  Intervention: Promote Injury-Free Environment  Recent Flowsheet Documentation  Taken 2/9/2024 0200 by Cb Dobson RN  Safety Promotion/Fall Prevention:   activity supervised   assistive device/personal items within reach   safety round/check completed   clutter free environment maintained   nonskid shoes/slippers when out of bed  Taken 2/9/2024 0000 by Cb Dobson RN  Safety Promotion/Fall Prevention:   safety round/check completed   activity supervised   assistive device/personal items within reach   clutter free environment maintained   fall prevention program maintained  Taken 2/8/2024 2200 by Cb Dobson, RN  Safety Promotion/Fall Prevention:   activity supervised   assistive device/personal items within reach   safety round/check completed   clutter free environment maintained   nonskid shoes/slippers when out of bed  Taken 2/8/2024 2005 by Cb Dobson, RN  Safety Promotion/Fall Prevention:    safety round/check completed   activity supervised   assistive device/personal items within reach   clutter free environment maintained   fall prevention program maintained     Problem: Adult Inpatient Plan of Care  Goal: Plan of Care Review  Outcome: Ongoing, Progressing  Goal: Patient-Specific Goal (Individualized)  Outcome: Ongoing, Progressing  Goal: Absence of Hospital-Acquired Illness or Injury  Outcome: Ongoing, Progressing  Intervention: Identify and Manage Fall Risk  Recent Flowsheet Documentation  Taken 2/9/2024 0200 by Cb Dobson RN  Safety Promotion/Fall Prevention:   activity supervised   assistive device/personal items within reach   safety round/check completed   clutter free environment maintained   nonskid shoes/slippers when out of bed  Taken 2/9/2024 0000 by Cb Dobson RN  Safety Promotion/Fall Prevention:   safety round/check completed   activity supervised   assistive device/personal items within reach   clutter free environment maintained   fall prevention program maintained  Taken 2/8/2024 2200 by Cb Dobson RN  Safety Promotion/Fall Prevention:   activity supervised   assistive device/personal items within reach   safety round/check completed   clutter free environment maintained   nonskid shoes/slippers when out of bed  Taken 2/8/2024 2005 by Cb Dobson RN  Safety Promotion/Fall Prevention:   safety round/check completed   activity supervised   assistive device/personal items within reach   clutter free environment maintained   fall prevention program maintained  Intervention: Prevent Skin Injury  Recent Flowsheet Documentation  Taken 2/9/2024 0200 by Cb Dobson RN  Body Position: position changed independently  Taken 2/9/2024 0000 by Cb Dobson RN  Body Position: position changed independently  Skin Protection:   adhesive use limited   incontinence pads utilized   skin-to-device areas padded   tubing/devices free from skin contact  Taken  2/8/2024 2200 by Cb Dobson RN  Body Position: position changed independently  Taken 2/8/2024 2005 by Cb Dobson RN  Body Position: position changed independently  Skin Protection:   adhesive use limited   incontinence pads utilized   skin-to-device areas padded   tubing/devices free from skin contact  Intervention: Prevent and Manage VTE (Venous Thromboembolism) Risk  Recent Flowsheet Documentation  Taken 2/9/2024 0200 by Cb Dobson RN  Activity Management: activity minimized  Taken 2/9/2024 0000 by Cb Dobson RN  Activity Management: activity minimized  Taken 2/8/2024 2200 by Cb Dobson RN  Activity Management: activity minimized  Taken 2/8/2024 2005 by Cb Dobson RN  Activity Management: activity encouraged  Range of Motion: active ROM (range of motion) encouraged  Intervention: Prevent Infection  Recent Flowsheet Documentation  Taken 2/9/2024 0200 by Cb Dobson RN  Infection Prevention:   environmental surveillance performed   rest/sleep promoted  Taken 2/9/2024 0000 by Cb Dobson RN  Infection Prevention:   hand hygiene promoted   rest/sleep promoted  Taken 2/8/2024 2200 by Cb Dobson RN  Infection Prevention:   environmental surveillance performed   rest/sleep promoted  Taken 2/8/2024 2005 by Cb Dobson RN  Infection Prevention:   cohorting utilized   rest/sleep promoted   single patient room provided  Goal: Optimal Comfort and Wellbeing  Outcome: Ongoing, Progressing  Intervention: Provide Person-Centered Care  Recent Flowsheet Documentation  Taken 2/8/2024 2005 by Cb Dobson RN  Trust Relationship/Rapport:   care explained   questions answered   reassurance provided   thoughts/feelings acknowledged  Goal: Readiness for Transition of Care  Outcome: Ongoing, Progressing     Problem: Skin Injury Risk Increased  Goal: Skin Health and Integrity  Outcome: Ongoing, Progressing  Intervention: Optimize Skin  Protection  Recent Flowsheet Documentation  Taken 2/9/2024 0200 by Cb Dobson RN  Head of Bed ([facility]) Positioning: HOB elevated  Taken 2/9/2024 0000 by Cb Dobson RN  Pressure Reduction Techniques:   frequent weight shift encouraged   weight shift assistance provided  Head of Bed ([facility]) Positioning: [facility] elevated  Pressure Reduction Devices:   positioning supports utilized   pressure-redistributing mattress utilized  Skin Protection:   adhesive use limited   incontinence pads utilized   skin-to-device areas padded   tubing/devices free from skin contact  Taken 2/8/2024 2200 by Cb Dobson RN  Head of Bed ([facility]) Positioning: HOB elevated  Taken 2/8/2024 2005 by Cb Dobson RN  Pressure Reduction Techniques:   frequent weight shift encouraged   weight shift assistance provided  Head of Bed ([facility]) Positioning: [facility] elevated  Pressure Reduction Devices: positioning supports utilized  Skin Protection:   adhesive use limited   incontinence pads utilized   skin-to-device areas padded   tubing/devices free from skin contact

## 2024-02-09 NOTE — THERAPY TREATMENT NOTE
Patient Name: Philomena Davis  : 1960    MRN: [id number]                              Today's Date: 2024       Admit Date: 2024    Visit Dx:     ICD-10-CM ICD-9-CM   1. Hematemesis without nausea  K92.0 578.0   2. Melena  K92.1 578.1   3. Blood loss anemia  D50.0 280.0   4. Alcoholic cirrhosis of liver without ascites  K70.30 571.2     Patient Active Problem List   Diagnosis    Essential hypertension    Generalized anxiety disorder    Hyperlipidemia    Glaucoma    Alcoholic cirrhosis of liver without ascites    Severe malnutrition    GERD without esophagitis    Sleep disturbance    Annual physical exam    Idiopathic peripheral neuropathy    Severe anxiety with panic    Alcohol use disorder    Cigarette nicotine dependence in remission    Chronic obstructive pulmonary disease    Hypokalemia    Closed fracture of left hand    Hematemesis without nausea    Melena    Blood loss anemia     Past Medical History:   Diagnosis Date    Alcohol withdrawal 05/10/2019    Annual physical exam 2023    Anxiety and depression     Cirrhosis of liver     Closed displaced fracture of lateral malleolus of left fibula     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    Closed trimalleolar fracture of right ankle 2023    Digital mucous cyst of toe of left foot 2017    Ganglion cyst of left foot     Glaucoma     Hypertension     Wears glasses      Past Surgical History:   Procedure Laterality Date    ANKLE OPEN REDUCTION INTERNAL FIXATION Left 2017    Procedure: LEFT ANKLE OPEN REDUCTION INTERNAL FIXATION WITH ILIAC CREST BONE GRAFT , EXCISION CYST 4TH/5TH INNERSPACE LEFT FOOT;  Surgeon: Frank Larson MD;  Location:  [facility];  Service:     ANKLE OPEN REDUCTION INTERNAL FIXATION Right 2023    Procedure: ANKLE OPEN REDUCTION INTERNAL FIXATION;  Surgeon: Deandre Reynoso MD;  Location:  [facility];  Service: Orthopedics;  Laterality: Right;    AUGMENTATION  MAMMAPLASTY Bilateral 1999    BREAST AUGMENTATION      BREAST EXCISIONAL BIOPSY Right 2014    DILATION AND CURETTAGE, DIAGNOSTIC / THERAPEUTIC      ENDOSCOPY N/A 11/30/2022    Procedure: ESOPHAGOGASTRODUODENOSCOPY;  Surgeon: Arelis Solano MD;  Location:  [facility];  Service: Gastroenterology;  Laterality: N/A;    ENDOSCOPY N/A 2/1/2024    Procedure: ESOPHAGOGASTRODUODENOSCOPY;  Surgeon: Brunner, Mark I, MD;  Location:  [facility];  Service: Gastroenterology;  Laterality: N/A;    ND RPR NON/MAL FEMUR DSTL H/N W/ILIAC/AUTOG BONE Left 03/24/2017    Procedure: ILIAC CREST BONE GRAFT;  Surgeon: Frank Larson MD;  Location:  [facility];  Service: Orthopedics    WRIST SURGERY        General Information       Row Name 02/09/24 1153          Physical Therapy Time and Intention    Document Type therapy note (daily note)  -KE     Mode of Treatment physical therapy  -KE       Row Name 02/09/24 1153          General Information    Patient Profile Reviewed yes  -KE     Existing Precautions/Restrictions fall;oxygen therapy device and L/min;other (see comments);non-weight bearing;left;orthostatic hypotension  NWB L UE; L 4th and 5th metacarpal fx's with ulnar gutter splint  -KE     Barriers to Rehab medically complex;ineffective coping  -KE       Row Name 02/09/24 1153          Cognition    Orientation Status (Cognition) oriented x 4  -KE       Row Name 02/09/24 1153          Safety Issues, Functional Mobility    Safety Issues Affecting Function (Mobility) awareness of need for assistance;insight into deficits/self-awareness;safety precaution awareness;safety precautions follow-through/compliance  -KE     Impairments Affecting Function (Mobility) balance;endurance/activity tolerance;strength;sensation/sensory awareness;pain  -KE     Comment, Safety Issues/Impairments (Mobility) anxious w/ mobility, self limiting at times  -KE               User Key  (r) = Recorded By, (t) = Taken By, (c) = Cosigned By       Initials Name Provider Type    Corry Duke, KEILA Physical Therapist                   Mobility       Row Name 02/09/24 1157          Bed Mobility    Bed Mobility supine-sit-supine  -KE     Supine-Sit-Supine Clare (Bed Mobility) standby assist;1 person assist;verbal cues  -EFRAÍN     Assistive Device (Bed Mobility) head of bed elevated;bed rails  -EFRAÍN     Comment, (Bed Mobility) VCs for maintaining NWB L UE, Req increased time and effort  -EFRAÍN       Row Name 02/09/24 1157          Transfers    Comment, (Transfers) x2 STS from EOB  -EFRAÍN       Row Name 02/09/24 1157          Sit-Stand Transfer    Sit-Stand Clare (Transfers) verbal cues;contact guard  -EFRAÍN     Assistive Device (Sit-Stand Transfers) cane, straight  -KE     Comment, (Sit-Stand Transfer) Vcs for UE placement  -       Row Name 02/09/24 1157          Gait/Stairs (Locomotion)    Clare Level (Gait) contact guard;1 person assist;verbal cues  -EFRAÍN     Assistive Device (Gait) cane, straight  -KE     Distance in Feet (Gait) 70'  -KE     Deviations/Abnormal Patterns (Gait) bilateral deviations;gait speed decreased;stride length decreased  -EFRAÍN     Bilateral Gait Deviations heel strike decreased  -EFRAÍN     Comment, (Gait/Stairs) Pt amb 70' w/ SPC, CGAx1. Pt demo step through gait pattern with slow gait speed, decr heel strike, and increased mediolateral sway. Further distances limited by weakness and fatigue.  -EFRAÍN       Row Name 02/09/24 1157          Mobility    Extremity Weight-bearing Status left upper extremity  -KE     Left Upper Extremity (Weight-bearing Status) non weight-bearing (NWB)   -EFRAÍN               User Key  (r) = Recorded By, (t) = Taken By, (c) = Cosigned By      Initials Name Provider Type    Corry Duke, KEILA Physical Therapist                   Obj/Interventions       Row Name 02/09/24 1314          Motor Skills    Therapeutic Exercise hip  -EFRAÍN       Row Name 02/09/24 1314          Hip (Therapeutic Exercise)    Hip  (Therapeutic Exercise) strengthening exercise  -KE     Hip Strengthening (Therapeutic Exercise) marching while standing;10 repetitions;bilateral  UE support through elbow on L UE+use of SPC for stability  -KE       Row Name 02/09/24 1314          Balance    Balance Assessment sitting static balance;sitting dynamic balance;sit to stand dynamic balance;standing static balance;standing dynamic balance  -KE     Static Sitting Balance independent  -KE     Dynamic Sitting Balance standby assist  -KE     Position, Sitting Balance unsupported;sitting edge of bed  -KE     Sit to Stand Dynamic Balance contact guard  -KE     Static Standing Balance standby assist;1-person assist;verbal cues  -KE     Dynamic Standing Balance contact guard;1-person assist;verbal cues  -KE     Position/Device Used, Standing Balance supported;cane, straight  -KE     Balance Interventions sitting;standing;sit to stand;supported;static;dynamic  -KE     Comment, Balance mild unsteadiness but no overt LOB noted  -KE               User Key  (r) = Recorded By, (t) = Taken By, (c) = Cosigned By      Initials Name Provider Type    Corry Duke, PT Physical Therapist                   Goals/Plan    No documentation.                  Clinical Impression       Row Name 02/09/24 1317          Pain    Pretreatment Pain Rating 0/10 - no pain  -KE     Posttreatment Pain Rating 0/10 - no pain  -KE     Pain Intervention(s) Ambulation/increased activity;Repositioned  -       Row Name 02/09/24 1317          Plan of Care Review    Plan of Care Reviewed With patient  -KE     Progress improving  -KE     Outcome Evaluation Pt demo improvements requiring less assist for functional mobility, CGA to amb 70' w/ SPC. Pt req additional encouragement for continued mobility. Pt w/ good awareness and carryover of NWB L UE. Pt continues to present with balance deficits and decreased functional endurance below baseline. Plan to continue progressing current PT POC as  tolerated.  -KE       Row Name 02/09/24 1317          Vital Signs    Pre Systolic BP Rehab 109  -KE     Pre Treatment Diastolic BP 65  -KE     Pre SpO2 (%) 91  -KE     O2 Delivery Pre Treatment supplemental O2  -KE     O2 Delivery Intra Treatment supplemental O2  -KE     Post SpO2 (%) 92  -KE     O2 Delivery Post Treatment supplemental O2  -KE     Pre Patient Position Supine  -KE     Intra Patient Position Standing  -KE     Post Patient Position Supine  -KE       Row Name 02/09/24 1317          Positioning and Restraints    Pre-Treatment Position in bed  -KE     Post Treatment Position bed  -KE     In Bed notified nsg;supine;side rails up x2;exit alarm on;encouraged to call for assist;call light within reach  -KE               User Key  (r) = Recorded By, (t) = Taken By, (c) = Cosigned By      Initials Name Provider Type    Corry Duke, KEILA Physical Therapist                   Outcome Measures       Row Name 02/09/24 1320 02/09/24 0800       How much help from another person do you currently need...    Turning from your back to your side while in flat bed without using bedrails? 4  -KE 4  -EH    Moving from lying on back to sitting on the side of a flat bed without bedrails? 4  -KE 4  -EH    Moving to and from a bed to a chair (including a wheelchair)? 3  -KE 3  -EH    Standing up from a chair using your arms (e.g., wheelchair, bedside chair)? 3  -KE 3  -EH    Climbing 3-5 steps with a railing? 3  -KE 2  -EH    To walk in hospital room? 3  -KE 3  -EH    AM-PAC 6 Clicks Score (PT) 20  -KE 19  -EH    Highest Level of Mobility Goal 6 --> Walk 10 steps or more  -KE 6 --> Walk 10 steps or more  -EH      Row Name 02/09/24 1320          Functional Assessment    Outcome Measure Options AM-PAC 6 Clicks Basic Mobility (PT)  -KE               User Key  (r) = Recorded By, (t) = Taken By, (c) = Cosigned By      Initials Name Provider Type     Kia Bloom, RN Registered Nurse    Corry Duke PT Physical Therapist                                  Physical Therapy Education       Title: PT OT SLP Therapies (Done)       Topic: Physical Therapy (Done)       Point: Mobility training (Done)       Learning Progress Summary             Patient Acceptance, E, VU by KE at 2/9/2024 1126    Acceptance, E, VU by AG at 2/9/2024 0258    Acceptance, E, NR by AS at 2/7/2024 0906    Acceptance, E, VU by AE at 2/5/2024 1323    Acceptance, E, VU by AG at 2/5/2024 0346    Acceptance, E, NR by KE at 2/2/2024 1121    Acceptance, E, NR by KE at 1/31/2024 0833                         Point: Home exercise program (Done)       Learning Progress Summary             Patient Acceptance, E, VU by AG at 2/9/2024 0258    Acceptance, E, NR by AS at 2/7/2024 0906    Acceptance, E, VU by AE at 2/5/2024 1323    Acceptance, E, VU by AG at 2/5/2024 0346    Acceptance, E, NR by KE at 1/31/2024 0833                         Point: Body mechanics (Done)       Learning Progress Summary             Patient Acceptance, E, VU by KE at 2/9/2024 1126    Acceptance, E, VU by AG at 2/9/2024 0258    Acceptance, E, NR by AS at 2/7/2024 0906    Acceptance, E, VU by AE at 2/5/2024 1323    Acceptance, E, VU by AG at 2/5/2024 0346    Acceptance, E, NR by KE at 2/2/2024 1121    Acceptance, E, NR by KE at 1/31/2024 0833                         Point: Precautions (Done)       Learning Progress Summary             Patient Acceptance, E, VU by KE at 2/9/2024 1126    Acceptance, E, VU by AG at 2/9/2024 0258    Acceptance, E, NR by AS at 2/7/2024 0906    Acceptance, E, VU by AE at 2/5/2024 1323    Acceptance, E, VU by AG at 2/5/2024 0346    Acceptance, E, NR by KE at 2/2/2024 1121    Acceptance, E, NR by KE at 1/31/2024 0833                                         User Key       Initials Effective Dates Name Provider Type Discipline    AS 04/28/23 -  Eileen Moncada, PTA Physical Therapist Assistant PT    AE 09/21/21 -  Robert Gillespie, KEILA Physical Therapist PT    AG 11/01/23 -   Cb Dobson, RN Registered Nurse Nurse    EFRAÍN 11/16/23 -  Corry Maldonado, PT Physical Therapist PT                  PT Recommendation and Plan  Planned Therapy Interventions (PT): balance training, bed mobility training, gait training, home exercise program, patient/family education, neuromuscular re-education, postural re-education, ROM (range of motion), stair training, strengthening, stretching, transfer training  Plan of Care Reviewed With: patient  Progress: improving  Outcome Evaluation: Pt demo improvements requiring less assist for functional mobility, CGA to amb 70' w/ SPC. Pt req additional encouragement for continued mobility. Pt w/ good awareness and carryover of NWB L UE. Pt continues to present with balance deficits and decreased functional endurance below baseline. Plan to continue progressing current PT POC as tolerated.     Time Calculation:   PT Evaluation Complexity  History, PT Evaluation Complexity: 1-2 personal factors and/or comorbidities  Examination of Body Systems (PT Eval Complexity): total of 3 or more elements  Clinical Presentation (PT Evaluation Complexity): stable  Clinical Decision Making (PT Evaluation Complexity): low complexity  Overall Complexity (PT Evaluation Complexity): low complexity     PT Charges       Row Name 02/09/24 1322             Time Calculation    Start Time 1126  -KE      PT Received On 02/09/24  -KE      PT Goal Re-Cert Due Date 02/10/24  -KE         Timed Charges    58181 - PT Therapeutic Activity Minutes 25  -KE         Total Minutes    Timed Charges Total Minutes 25  -KE       Total Minutes 25  -KE                User Key  (r) = Recorded By, (t) = Taken By, (c) = Cosigned By      Initials Name Provider Type    Corry Duke PT Physical Therapist                  Therapy Charges for Today       Code Description Service Date Service Provider Modifiers Qty    [id number]  PT THERAPEUTIC ACT EA 15 MIN 2/9/2024 Corry Maldonado, PT GP 2             PT G-Codes  Outcome Measure Options: AM-PAC 6 Clicks Basic Mobility (PT)  AM-PAC 6 Clicks Score (PT): 20  AM-PAC 6 Clicks Score (OT): 16  PT Discharge Summary  Anticipated Discharge Disposition (PT): inpatient rehabilitation facility    Corry Maldonado, KEILA  2/9/2024

## 2024-02-09 NOTE — PROGRESS NOTES
[facility] Medicine Services  PROGRESS NOTE    Patient Name: Philomena Davis  : 1960  MRN: [id number]    Date of Admission: 2024  Primary Care Physician: Unique Brandt DO    Subjective   Subjective     CC:  Weakness    HPI:  Patient is a 64 yo F seen and examined by me this AM, she is awaiting possible insurance appeal for rehab at this time, she denies any new acute complaints this AM, continue to follow with CM at this time.       Objective   Objective     Vital Signs:   Temp:  [97.8 °F (36.6 °C)-98.3 °F (36.8 °C)] 97.8 °F (36.6 °C)  Heart Rate:  [70-90] 81  Resp:  [16-18] 18  BP: ()/(56-76) 109/65  Flow (L/min):  [1-2] 2     Physical Exam:  Constitutional: No acute distress, awake, alert, currently on 2L NC   HENT: NCAT, nares patent, mucous membranes moist  Respiratory: Respiratory effort normal   Cardiovascular: RRR, no murmurs, rubs, or gallops  Gastrointestinal: Soft, nontender, nondistended  Musculoskeletal: L hand in splint  Psychiatric: Appropriate affect, cooperative  Neurologic: Oriented x 3, speech clear  Skin: No rashes     Results Reviewed:  LAB RESULTS:      Lab 24   WBC 4.55 4.64   HEMOGLOBIN 10.2* 10.6*  10.7*   HEMATOCRIT 31.2* 32.2*  32.2*   PLATELETS 252 293   NEUTROS ABS 2.86  --    IMMATURE GRANS (ABS) 0.04  --    LYMPHS ABS 0.88  --    MONOS ABS 0.58  --    EOS ABS 0.12  --    MCV 96.0 96.1         Lab 24  0549 24  2020 24   SODIUM 140  --  138   POTASSIUM 5.0 3.6 3.1*   CHLORIDE 106  --  101   CO2 26.0  --  28.0   ANION GAP 8.0  --  9.0   BUN 5*  --  5*   CREATININE 0.63  --  0.59   EGFR 101.1  --  102.7   GLUCOSE 75  --  94   CALCIUM 8.9  --  8.8   PHOSPHORUS  --   --  4.3         Lab 24  0330   ALBUMIN 3.0*                     Brief Urine Lab Results  (Last result in the past 365 days)        Color   Clarity   Blood   Leuk Est   Nitrite   Protein   CREAT   Urine HCG        23  0834 Orange  Comment: on azo   Cloudy   3+   Large (3+)   Positive   3+                   Microbiology Results Abnormal       Procedure Component Value - Date/Time    Clostridioides difficile Toxin - Stool, Per Rectum [[id number]]  (Normal) Collected: 02/07/24 0715    Lab Status: Final result Specimen: Stool from Per Rectum Updated: 02/07/24 0825    Narrative:      The following orders were created for panel order Clostridioides difficile Toxin - Stool, Per Rectum.  Procedure                               Abnormality         Status                     ---------                               -----------         ------                     Clostridioides difficile...[[id number]]  Normal              Final result                 Please view results for these tests on the individual orders.    Clostridioides difficile Toxin, PCR - Stool, Per Rectum [[id number]]  (Normal) Collected: 02/07/24 0715    Lab Status: Final result Specimen: Stool from Per Rectum Updated: 02/07/24 0825     Toxigenic C. difficile by PCR Not Detected    Narrative:      The result indicates the absence of toxigenic C. difficile from stool specimen.             No radiology results from the last 24 hrs        Current medications:  Scheduled Meds:budesonide-formoterol, 1 puff, Inhalation, BID - RT  carvedilol, 3.125 mg, Oral, BID With Meals  cloNIDine, 0.1 mg, Oral, Nightly  desvenlafaxine, 50 mg, Oral, Daily  folic acid, 1 mg, Oral, Daily  latanoprost, 1 drop, Both Eyes, Nightly  pantoprazole, 40 mg, Oral, BID AC  rosuvastatin, 5 mg, Oral, Nightly  saccharomyces boulardii, 250 mg, Oral, BID  senna-docusate sodium, 2 tablet, Oral, BID  thiamine, 100 mg, Oral, Daily  tiotropium bromide monohydrate, 2 puff, Inhalation, Daily - RT  ursodiol, 300 mg, Oral, Daily      Continuous Infusions:     PRN Meds:.  acetaminophen    albuterol    senna-docusate sodium **AND** polyethylene glycol **AND** bisacodyl **AND** bisacodyl    HYDROcodone-acetaminophen     loperamide    Magnesium Standard Dose Replacement - Follow Nurse / BPA Driven Protocol    nitroglycerin    ondansetron ODT **OR** ondansetron    Phosphorus Replacement - Follow Nurse / BPA Driven Protocol    Potassium Replacement - Follow Nurse / BPA Driven Protocol    Assessment & Plan   Assessment & Plan     Active Hospital Problems    Diagnosis  POA    Closed fracture of left hand [S62.92XA]  Unknown    Hypokalemia [E87.6]  Yes    Hematemesis without nausea [K92.0]  Unknown    Melena [K92.1]  Unknown    Blood loss anemia [D50.0]  Unknown    Alcohol use disorder [F10.90]  Yes    GERD without esophagitis [K21.9]  Yes    Alcoholic cirrhosis of liver without ascites [K70.30]  Yes    Essential hypertension [I10]  Yes    Generalized anxiety disorder [F41.1]  Yes    Hyperlipidemia [E78.5]  Yes      Resolved Hospital Problems   No resolved problems to display.        Brief Hospital Course to date:  Philomena Davis is a 63 y.o. female with ETOH cirrhosis, HTN, anxiety who presented with significant weakness after reported hematemesis. EGD revealed severe esophagitis. Also found to have left hand 5th metacarpal fracture which inhibits ability to complete daily casts. Stay c/b persistent debility, and difficulty with ADLs and need for placement. Patient new to my services on the AM of 2/9, discussed with patient and CM this AM, currently awaiting insurance appeal at this time for possible rehab placement, continue to follow. Patient refusing other placement options at this time, if denied may have to be discharged home with home health. Continue to follow at this time.      Likely atypical PNA, suspect aspiration  -CXR 2/3 c/w atypical PNA v atelectasis v aspiration  -completed course of rocephin and azithromax  -Encourage IS     Diarrhea  -continue florastor  -C. difficile negative  -Added Imodium  - Much improved per patient.      Severe reflux esophagitis c/b ulceration + gastric ulcer  -BID PPI x 3 months, then  daily  -Needs repeat EGD in 3 months recommended to assess healing  -completed IV ceftriaxone given UGI bleed and concomitant cirrhosis     ETOH cirrhosis w h/o ETOH use  -continue coreg with hold parameters     LEFT hand 5th metacarpal fracture - f/u Naomi Duncan PA-C 1 week, [facility]  -attempted to call office twice  -rewrapped splint at bedside, continue to change ace wrap as needed  - Likely outpatient follow up in 1 week following discharge.     HTN   - on clonidine OP for insomnia   -Changed to nightly due to low bp readings        Anxiety   - Continue home desvenlafaxine.      Chronic insomnia   Severe hypokalemia 2/2 vomiting, resolved  - replace as needed     Significant debility/weakness  - needing assistance before return home alone, pending insurance appeal  -Difficulty using walker without able to bear weight on her left and      Expected Discharge Location and Transportation:?  Rehab  Expected Discharge   Expected Discharge Date: 2/9/2024; Expected Discharge Time:      DVT prophylaxis:  Mechanical DVT prophylaxis orders are present.         AM-PAC 6 Clicks Score (PT): 20 (02/09/24 1320)    CODE STATUS:   Code Status and Medical Interventions:   Ordered at: 01/30/24 0869     Level Of Support Discussed With:    Patient     Code Status (Patient has no pulse and is not breathing):    CPR (Attempt to Resuscitate)     Medical Interventions (Patient has pulse or is breathing):    Full Support       JASE Kruger, DO  02/09/24

## 2024-02-09 NOTE — PLAN OF CARE
Goal Outcome Evaluation:  Plan of Care Reviewed With: patient        Progress: improving  Outcome Evaluation: Pt demo improvements requiring less assist for functional mobility, CGA to amb 70' w/ SPC. Pt req additional encouragement for continued mobility. Pt w/ good awareness and carryover of NWB L UE. Pt continues to present with balance deficits and decreased functional endurance below baseline. Plan to continue progressing current PT POC as tolerated.      Anticipated Discharge Disposition (PT): inpatient rehabilitation facility

## 2024-02-09 NOTE — PROGRESS NOTES
Continued Stay Note  [facility]     Patient Name: Philomena Davis  MRN: [id number]  Today's Date: 2/9/2024    Admit Date: 1/30/2024    Plan: rehab   Discharge Plan       Row Name 02/09/24 1533       Plan    Plan Comments Met with the patient and provided the appeal decision paperwork for the denial for  [facility]. The patient is not agreeable to go to a skilled facility and stay for 30 days for rehab. The patient requested the option of a external appeal review that was faxed to [phone] and [email or web address] and explained to the patient that the patients appeal was denied and a external review may take longer and the patient would be able to consider home health physical therapy. She is concerned about falling at home and she wanted to try to go to [facility] to be able to get rehab first before returning home and she is not wanting to go to a skilled nursing home for inpatient rehab.    Final Discharge Disposition Code 06 - home with home health care                   Discharge Codes    No documentation.                 Expected Discharge Date and Time       Expected Discharge Date Expected Discharge Time    Feb 12, 2024               SILKE Quiles

## 2024-02-10 LAB
ALBUMIN SERPL-MCNC: 3.1 G/DL (ref 3.5–5.2)
ALBUMIN/GLOB SERPL: 1.2 G/DL
ALP SERPL-CCNC: 112 U/L (ref 39–117)
ALT SERPL W P-5'-P-CCNC: 12 U/L (ref 1–33)
ANION GAP SERPL CALCULATED.3IONS-SCNC: 8 MMOL/L (ref 5–15)
AST SERPL-CCNC: 32 U/L (ref 1–32)
BASOPHILS # BLD AUTO: 0.06 10*3/MM3 (ref 0–0.2)
BASOPHILS NFR BLD AUTO: 1.7 % (ref 0–1.5)
BILIRUB SERPL-MCNC: 0.7 MG/DL (ref 0–1.2)
BUN SERPL-MCNC: 5 MG/DL (ref 8–23)
BUN/CREAT SERPL: 8.1 (ref 7–25)
CALCIUM SPEC-SCNC: 8.9 MG/DL (ref 8.6–10.5)
CHLORIDE SERPL-SCNC: 102 MMOL/L (ref 98–107)
CO2 SERPL-SCNC: 30 MMOL/L (ref 22–29)
CREAT SERPL-MCNC: 0.62 MG/DL (ref 0.57–1)
DEPRECATED RDW RBC AUTO: 48.1 FL (ref 37–54)
EGFRCR SERPLBLD CKD-EPI 2021: 101.5 ML/MIN/1.73
EOSINOPHIL # BLD AUTO: 0.09 10*3/MM3 (ref 0–0.4)
EOSINOPHIL NFR BLD AUTO: 2.6 % (ref 0.3–6.2)
ERYTHROCYTE [DISTWIDTH] IN BLOOD BY AUTOMATED COUNT: 13.3 % (ref 12.3–15.4)
GLOBULIN UR ELPH-MCNC: 2.5 GM/DL
GLUCOSE SERPL-MCNC: 88 MG/DL (ref 65–99)
HCT VFR BLD AUTO: 31.8 % (ref 34–46.6)
HGB BLD-MCNC: 10.3 G/DL (ref 12–15.9)
IMM GRANULOCYTES # BLD AUTO: 0.01 10*3/MM3 (ref 0–0.05)
IMM GRANULOCYTES NFR BLD AUTO: 0.3 % (ref 0–0.5)
LYMPHOCYTES # BLD AUTO: 1.1 10*3/MM3 (ref 0.7–3.1)
LYMPHOCYTES NFR BLD AUTO: 31.4 % (ref 19.6–45.3)
MAGNESIUM SERPL-MCNC: 1.8 MG/DL (ref 1.6–2.4)
MCH RBC QN AUTO: 31.5 PG (ref 26.6–33)
MCHC RBC AUTO-ENTMCNC: 32.4 G/DL (ref 31.5–35.7)
MCV RBC AUTO: 97.2 FL (ref 79–97)
MONOCYTES # BLD AUTO: 0.53 10*3/MM3 (ref 0.1–0.9)
MONOCYTES NFR BLD AUTO: 15.1 % (ref 5–12)
NEUTROPHILS NFR BLD AUTO: 1.71 10*3/MM3 (ref 1.7–7)
NEUTROPHILS NFR BLD AUTO: 48.9 % (ref 42.7–76)
NRBC BLD AUTO-RTO: 0 /100 WBC (ref 0–0.2)
PLATELET # BLD AUTO: 207 10*3/MM3 (ref 140–450)
PMV BLD AUTO: 10.1 FL (ref 6–12)
POTASSIUM SERPL-SCNC: 4.1 MMOL/L (ref 3.5–5.2)
PROT SERPL-MCNC: 5.6 G/DL (ref 6–8.5)
RBC # BLD AUTO: 3.27 10*6/MM3 (ref 3.77–5.28)
SODIUM SERPL-SCNC: 140 MMOL/L (ref 136–145)
WBC NRBC COR # BLD AUTO: 3.5 10*3/MM3 (ref 3.4–10.8)

## 2024-02-10 PROCEDURE — 94799 UNLISTED PULMONARY SVC/PX: CPT

## 2024-02-10 PROCEDURE — 85025 COMPLETE CBC W/AUTO DIFF WBC: CPT | Performed by: FAMILY MEDICINE

## 2024-02-10 PROCEDURE — 80053 COMPREHEN METABOLIC PANEL: CPT | Performed by: FAMILY MEDICINE

## 2024-02-10 PROCEDURE — 99232 SBSQ HOSP IP/OBS MODERATE 35: CPT | Performed by: FAMILY MEDICINE

## 2024-02-10 PROCEDURE — 83735 ASSAY OF MAGNESIUM: CPT | Performed by: FAMILY MEDICINE

## 2024-02-10 PROCEDURE — 94664 DEMO&/EVAL PT USE INHALER: CPT

## 2024-02-10 PROCEDURE — 94761 N-INVAS EAR/PLS OXIMETRY MLT: CPT

## 2024-02-10 PROCEDURE — G0378 HOSPITAL OBSERVATION PER HR: HCPCS

## 2024-02-10 RX ADMIN — URSODIOL 300 MG: 300 CAPSULE ORAL at 09:21

## 2024-02-10 RX ADMIN — LATANOPROST 1 DROP: 50 SOLUTION OPHTHALMIC at 23:59

## 2024-02-10 RX ADMIN — HYDROCODONE BITARTRATE AND ACETAMINOPHEN 1 TABLET: 7.5; 325 TABLET ORAL at 21:21

## 2024-02-10 RX ADMIN — HYDROCODONE BITARTRATE AND ACETAMINOPHEN 1 TABLET: 7.5; 325 TABLET ORAL at 15:19

## 2024-02-10 RX ADMIN — Medication 250 MG: at 20:02

## 2024-02-10 RX ADMIN — ROSUVASTATIN CALCIUM 5 MG: 10 TABLET, FILM COATED ORAL at 20:03

## 2024-02-10 RX ADMIN — BUDESONIDE AND FORMOTEROL FUMARATE DIHYDRATE 1 PUFF: 160; 4.5 AEROSOL RESPIRATORY (INHALATION) at 21:45

## 2024-02-10 RX ADMIN — DESVENLAFAXINE 50 MG: 50 TABLET, FILM COATED, EXTENDED RELEASE ORAL at 09:21

## 2024-02-10 RX ADMIN — TIOTROPIUM BROMIDE INHALATION SPRAY 2 PUFF: 3.12 SPRAY, METERED RESPIRATORY (INHALATION) at 12:02

## 2024-02-10 RX ADMIN — BUDESONIDE AND FORMOTEROL FUMARATE DIHYDRATE 1 PUFF: 160; 4.5 AEROSOL RESPIRATORY (INHALATION) at 12:02

## 2024-02-10 RX ADMIN — THIAMINE HCL TAB 100 MG 100 MG: 100 TAB at 09:21

## 2024-02-10 RX ADMIN — FOLIC ACID 1 MG: 1 TABLET ORAL at 09:22

## 2024-02-10 RX ADMIN — PANTOPRAZOLE SODIUM 40 MG: 40 TABLET, DELAYED RELEASE ORAL at 18:10

## 2024-02-10 RX ADMIN — CLONIDINE HYDROCHLORIDE 0.1 MG: 0.1 TABLET ORAL at 23:59

## 2024-02-10 RX ADMIN — HYDROCODONE BITARTRATE AND ACETAMINOPHEN 1 TABLET: 7.5; 325 TABLET ORAL at 09:21

## 2024-02-10 RX ADMIN — PANTOPRAZOLE SODIUM 40 MG: 40 TABLET, DELAYED RELEASE ORAL at 09:21

## 2024-02-10 RX ADMIN — Medication 250 MG: at 09:22

## 2024-02-10 RX ADMIN — CARVEDILOL 3.12 MG: 3.12 TABLET, FILM COATED ORAL at 18:11

## 2024-02-10 NOTE — PROGRESS NOTES
[facility] Medicine Services  PROGRESS NOTE    Patient Name: Philomena Davsi  : 1960  MRN: [id number]    Date of Admission: 2024  Primary Care Physician: Unique Brandt DO    Subjective   Subjective     CC:  Weakness    HPI:  Patient is a 64 yo F seen and examined by this AM, resting in bed this AM, no new acute complaints this AM, awaiting possible appeal for rehab at this time, follow up with CM.       Objective   Objective     Vital Signs:   Temp:  [98.1 °F (36.7 °C)-98.6 °F (37 °C)] 98.3 °F (36.8 °C)  Heart Rate:  [75-98] 75  Resp:  [16-17] 17  BP: ()/(63-81) 126/81  Flow (L/min):  [2-3] 2.5     Physical Exam:  Constitutional: No acute distress, awake, alert, currently on 2L NC   HENT: NCAT, nares patent, mucous membranes moist  Respiratory: Respiratory effort normal, no rhonchi or wheezing   Cardiovascular: RRR, no murmurs, rubs, or gallops  Gastrointestinal: Soft, nontender, nondistended  Musculoskeletal: L hand in splint  Psychiatric: Appropriate affect, cooperative  Neurologic: Oriented x 3, speech clear  Skin: No rashes     Results Reviewed:  LAB RESULTS:      Lab 02/10/24  0709 249   WBC 3.50 4.55 4.64   HEMOGLOBIN 10.3* 10.2* 10.6*  10.7*   HEMATOCRIT 31.8* 31.2* 32.2*  32.2*   PLATELETS 207 252 293   NEUTROS ABS 1.71 2.86  --    IMMATURE GRANS (ABS) 0.01 0.04  --    LYMPHS ABS 1.10 0.88  --    MONOS ABS 0.53 0.58  --    EOS ABS 0.09 0.12  --    MCV 97.2* 96.0 96.1         Lab 02/10/24  0709 24  0549 24  0330   SODIUM 140 140  --  138   POTASSIUM 4.1 5.0 3.6 3.1*   CHLORIDE 102 106  --  101   CO2 30.0* 26.0  --  28.0   ANION GAP 8.0 8.0  --  9.0   BUN 5* 5*  --  5*   CREATININE 0.62 0.63  --  0.59   EGFR 101.5 101.1  --  102.7   GLUCOSE 88 75  --  94   CALCIUM 8.9 8.9  --  8.8   MAGNESIUM 1.8  --   --   --    PHOSPHORUS  --   --   --  4.3         Lab 02/10/24  0709 24  0330   TOTAL PROTEIN 5.6*   --    ALBUMIN 3.1* 3.0*   GLOBULIN 2.5  --    ALT (SGPT) 12  --    AST (SGOT) 32  --    BILIRUBIN 0.7  --    ALK PHOS 112  --                      Brief Urine Lab Results  (Last result in the past 365 days)        Color   Clarity   Blood   Leuk Est   Nitrite   Protein   CREAT   Urine HCG        05/19/23 0834 [place]  Comment: on azo   Cloudy   3+   Large (3+)   Positive   3+                   Microbiology Results Abnormal       Procedure Component Value - Date/Time    Clostridioides difficile Toxin - Stool, Per Rectum [[id number]]  (Normal) Collected: 02/07/24 0715    Lab Status: Final result Specimen: Stool from Per Rectum Updated: 02/07/24 0825    Narrative:      The following orders were created for panel order Clostridioides difficile Toxin - Stool, Per Rectum.  Procedure                               Abnormality         Status                     ---------                               -----------         ------                     Clostridioides difficile...[[id number]]  Normal              Final result                 Please view results for these tests on the individual orders.    Clostridioides difficile Toxin, PCR - Stool, Per Rectum [[id number]]  (Normal) Collected: 02/07/24 0715    Lab Status: Final result Specimen: Stool from Per Rectum Updated: 02/07/24 0825     Toxigenic C. difficile by PCR Not Detected    Narrative:      The result indicates the absence of toxigenic C. difficile from stool specimen.             No radiology results from the last 24 hrs        Current medications:  Scheduled Meds:budesonide-formoterol, 1 puff, Inhalation, BID - RT  carvedilol, 3.125 mg, Oral, BID With Meals  cloNIDine, 0.1 mg, Oral, Nightly  desvenlafaxine, 50 mg, Oral, Daily  folic acid, 1 mg, Oral, Daily  latanoprost, 1 drop, Both Eyes, Nightly  pantoprazole, 40 mg, Oral, BID AC  rosuvastatin, 5 mg, Oral, Nightly  saccharomyces boulardii, 250 mg, Oral, BID  senna-docusate sodium, 2 tablet, Oral, BID  thiamine, 100 mg,  Oral, Daily  tiotropium bromide monohydrate, 2 puff, Inhalation, Daily - RT  ursodiol, 300 mg, Oral, Daily      Continuous Infusions:     PRN Meds:.  acetaminophen    albuterol    senna-docusate sodium **AND** polyethylene glycol **AND** bisacodyl **AND** bisacodyl    HYDROcodone-acetaminophen    loperamide    Magnesium Standard Dose Replacement - Follow Nurse / BPA Driven Protocol    nitroglycerin    ondansetron ODT **OR** ondansetron    Phosphorus Replacement - Follow Nurse / BPA Driven Protocol    Potassium Replacement - Follow Nurse / BPA Driven Protocol    Assessment & Plan   Assessment & Plan     Active Hospital Problems    Diagnosis  POA    Moderate malnutrition [E44.0]  Yes    Closed fracture of left hand [S62.92XA]  Unknown    Hypokalemia [E87.6]  Yes    Hematemesis without nausea [K92.0]  Unknown    Melena [K92.1]  Unknown    Blood loss anemia [D50.0]  Unknown    Alcohol use disorder [F10.90]  Yes    GERD without esophagitis [K21.9]  Yes    Alcoholic cirrhosis of liver without ascites [K70.30]  Yes    Essential hypertension [I10]  Yes    Generalized anxiety disorder [F41.1]  Yes    Hyperlipidemia [E78.5]  Yes      Resolved Hospital Problems   No resolved problems to display.        Brief Hospital Course to date:  Philomena Davis is a 63 y.o. female with ETOH cirrhosis, HTN, anxiety who presented with significant weakness after reported hematemesis. EGD revealed severe esophagitis. Also found to have left hand 5th metacarpal fracture which inhibits ability to complete daily casts. Stay c/b persistent debility, and difficulty with ADLs and need for placement. Patient new to my services on the AM of 2/9, discussed with patient and CM this AM, currently awaiting insurance appeal at this time for possible rehab placement, continue to follow. Patient refusing other placement options at this time, if denied may have to be discharged home with home health. Continue to follow at this time. Continuing to await  possible appeal about rehab at this time, discussed with patient, not interested in other rehab options or long term placement secondary to her pet, no one to care for and currently boarding at this time. Follow up with CM early next week, if appeal denied may need possible d/c home with home health.      Likely atypical PNA, suspect aspiration  -CXR 2/3 c/w atypical PNA v atelectasis v aspiration  -completed course of rocephin and azithromax  -Encourage IS     Diarrhea  -continue florastor  -C. difficile negative  -Added Imodium  - Much improved per patient.      Severe reflux esophagitis c/b ulceration + gastric ulcer  -BID PPI x 3 months, then daily  -Needs repeat EGD in 3 months recommended to assess healing  -completed IV ceftriaxone given UGI bleed and concomitant cirrhosis     ETOH cirrhosis w h/o ETOH use  -continue coreg with hold parameters     LEFT hand 5th metacarpal fracture - f/u Naomi Duncan PA-C 1 week, NWB LUE  -rewrapped splint at bedside on 2/8, continue to change ace wrap as needed  - Likely outpatient follow up in 1 week following discharge.     HTN   - on clonidine OP for insomnia   -Changed to nightly due to low bp readings        Anxiety   - Continue home desvenlafaxine.      Chronic insomnia   Severe hypokalemia 2/2 vomiting, resolved  - replace as needed     Significant debility/weakness  - needing assistance before return home alone, pending insurance appeal  -Difficulty using walker without able to bear weight on her left and      Expected Discharge Location and Transportation:?  Rehab vs home with home health   Expected Discharge   Expected Discharge Date: 2/12/2024; Expected Discharge Time:      DVT prophylaxis:  Mechanical DVT prophylaxis orders are present.         AM-PAC 6 Clicks Score (PT): 19 (02/09/24 2020)    CODE STATUS:   Code Status and Medical Interventions:   Ordered at: 01/30/24 1289     Level Of Support Discussed With:    Patient     Code Status (Patient has no pulse  and is not breathing):    CPR (Attempt to Resuscitate)     Medical Interventions (Patient has pulse or is breathing):    Full Support       JASE Kruger,   02/10/24

## 2024-02-10 NOTE — PLAN OF CARE
Goal Outcome Evaluation:      Pt has no new nursing issues at this time. Pt is a/ox4, no tele, 2L NC, vital signs stable. Pt has no complaints at this time.      Problem: Hypertension Comorbidity  Goal: Blood Pressure in Desired Range  Outcome: Ongoing, Progressing  Intervention: Maintain Blood Pressure Management  Recent Flowsheet Documentation  Taken 2/9/2024 2020 by Cb Dobson RN  Medication Review/Management: medications reviewed     Problem: Electrolyte Imbalance  Goal: Electrolyte Balance  Outcome: Ongoing, Progressing     Problem: Fall Injury Risk  Goal: Absence of Fall and Fall-Related Injury  Outcome: Ongoing, Progressing  Intervention: Identify and Manage Contributors  Recent Flowsheet Documentation  Taken 2/10/2024 0200 by Cb Dobson RN  Self-Care Promotion: independence encouraged  Taken 2/9/2024 2200 by Cb Dobson RN  Self-Care Promotion: independence encouraged  Taken 2/9/2024 2020 by Cb Dobson RN  Medication Review/Management: medications reviewed  Intervention: Promote Injury-Free Environment  Recent Flowsheet Documentation  Taken 2/10/2024 0200 by Cb Dobson RN  Safety Promotion/Fall Prevention:   activity supervised   assistive device/personal items within reach   safety round/check completed   clutter free environment maintained   nonskid shoes/slippers when out of bed  Taken 2/10/2024 0000 by Cb Dobson RN  Safety Promotion/Fall Prevention:   safety round/check completed   activity supervised   assistive device/personal items within reach   clutter free environment maintained   fall prevention program maintained  Taken 2/9/2024 2200 by Cb Dobson, RN  Safety Promotion/Fall Prevention:   activity supervised   assistive device/personal items within reach   safety round/check completed   clutter free environment maintained   nonskid shoes/slippers when out of bed  Taken 2/9/2024 2020 by Cb Dobson, RN  Safety Promotion/Fall  Prevention:   safety round/check completed   activity supervised   assistive device/personal items within reach   clutter free environment maintained   fall prevention program maintained     Problem: Adult Inpatient Plan of Care  Goal: Plan of Care Review  Outcome: Ongoing, Progressing  Goal: Patient-Specific Goal (Individualized)  Outcome: Ongoing, Progressing  Goal: Absence of Hospital-Acquired Illness or Injury  Outcome: Ongoing, Progressing  Intervention: Identify and Manage Fall Risk  Recent Flowsheet Documentation  Taken 2/10/2024 0200 by Cb Dobson RN  Safety Promotion/Fall Prevention:   activity supervised   assistive device/personal items within reach   safety round/check completed   clutter free environment maintained   nonskid shoes/slippers when out of bed  Taken 2/10/2024 0000 by Cb Dobson RN  Safety Promotion/Fall Prevention:   safety round/check completed   activity supervised   assistive device/personal items within reach   clutter free environment maintained   fall prevention program maintained  Taken 2/9/2024 2200 by Cb Dobson RN  Safety Promotion/Fall Prevention:   activity supervised   assistive device/personal items within reach   safety round/check completed   clutter free environment maintained   nonskid shoes/slippers when out of bed  Taken 2/9/2024 2020 by Cb Dobson RN  Safety Promotion/Fall Prevention:   safety round/check completed   activity supervised   assistive device/personal items within reach   clutter free environment maintained   fall prevention program maintained  Intervention: Prevent Skin Injury  Recent Flowsheet Documentation  Taken 2/10/2024 0200 by Cb Dobson RN  Body Position: position changed independently  Skin Protection:   adhesive use limited   incontinence pads utilized   skin-to-device areas padded   tubing/devices free from skin contact  Taken 2/10/2024 0000 by Cb Dobson RN  Body Position: position changed  independently  Skin Protection:   adhesive use limited   incontinence pads utilized   skin-to-device areas padded   tubing/devices free from skin contact  Taken 2/9/2024 2200 by Cb Dobson RN  Body Position: position changed independently  Taken 2/9/2024 2020 by Cb Dobson RN  Body Position: position changed independently  Skin Protection:   adhesive use limited   incontinence pads utilized   skin-to-device areas padded   tubing/devices free from skin contact  Intervention: Prevent and Manage VTE (Venous Thromboembolism) Risk  Recent Flowsheet Documentation  Taken 2/10/2024 0200 by Cb Dobson RN  Activity Management: activity minimized  Taken 2/10/2024 0000 by Cb Dobson RN  Activity Management: activity minimized  Taken 2/9/2024 2200 by Cb Dobson RN  Activity Management: activity minimized  Taken 2/9/2024 2020 by Cb Dobson RN  Activity Management: activity minimized  Range of Motion: active ROM (range of motion) encouraged  Intervention: Prevent Infection  Recent Flowsheet Documentation  Taken 2/10/2024 0200 by Cb Dobson RN  Infection Prevention:   environmental surveillance performed   rest/sleep promoted  Taken 2/10/2024 0000 by Cb Dobson RN  Infection Prevention:   hand hygiene promoted   rest/sleep promoted  Taken 2/9/2024 2200 by Cb Dobson RN  Infection Prevention:   environmental surveillance performed   rest/sleep promoted  Taken 2/9/2024 2020 by Cb Dobson RN  Infection Prevention:   cohorting utilized   environmental surveillance performed   hand hygiene promoted  Goal: Optimal Comfort and Wellbeing  Outcome: Ongoing, Progressing  Intervention: Provide Person-Centered Care  Recent Flowsheet Documentation  Taken 2/9/2024 2020 by Cb Dobson RN  Trust Relationship/Rapport:   care explained   questions answered   questions encouraged   thoughts/feelings acknowledged  Goal: Readiness for Transition of  Care  Outcome: Ongoing, Progressing     Problem: Skin Injury Risk Increased  Goal: Skin Health and Integrity  Outcome: Ongoing, Progressing  Intervention: Optimize Skin Protection  Recent Flowsheet Documentation  Taken 2/10/2024 0200 by Cb Dobson RN  Pressure Reduction Techniques:   frequent weight shift encouraged   weight shift assistance provided  Head of Bed ([facility]) Positioning: [facility] elevated  Pressure Reduction Devices:   positioning supports utilized   pressure-redistributing mattress utilized  Skin Protection:   adhesive use limited   incontinence pads utilized   skin-to-device areas padded   tubing/devices free from skin contact  Taken 2/10/2024 0000 by Cb Dobson RN  Pressure Reduction Techniques:   frequent weight shift encouraged   weight shift assistance provided  Head of Bed ([facility]) Positioning: [facility] elevated  Pressure Reduction Devices:   positioning supports utilized   pressure-redistributing mattress utilized  Skin Protection:   adhesive use limited   incontinence pads utilized   skin-to-device areas padded   tubing/devices free from skin contact  Taken 2/9/2024 2200 by Cb Dobson RN  Head of Bed ([facility]) Positioning: HOB elevated  Taken 2/9/2024 2020 by Cb Dobson RN  Pressure Reduction Techniques:   frequent weight shift encouraged   weight shift assistance provided  Head of Bed ([facility]) Positioning: [facility] elevated  Pressure Reduction Devices: positioning supports utilized  Skin Protection:   adhesive use limited   incontinence pads utilized   skin-to-device areas padded   tubing/devices free from skin contact

## 2024-02-11 LAB
ALBUMIN SERPL-MCNC: 3.2 G/DL (ref 3.5–5.2)
ALBUMIN/GLOB SERPL: 1.3 G/DL
ALP SERPL-CCNC: 122 U/L (ref 39–117)
ALT SERPL W P-5'-P-CCNC: 13 U/L (ref 1–33)
ANION GAP SERPL CALCULATED.3IONS-SCNC: 7 MMOL/L (ref 5–15)
AST SERPL-CCNC: 33 U/L (ref 1–32)
BASOPHILS # BLD AUTO: 0.06 10*3/MM3 (ref 0–0.2)
BASOPHILS NFR BLD AUTO: 1.5 % (ref 0–1.5)
BILIRUB SERPL-MCNC: 0.7 MG/DL (ref 0–1.2)
BUN SERPL-MCNC: 8 MG/DL (ref 8–23)
BUN/CREAT SERPL: 11.9 (ref 7–25)
CALCIUM SPEC-SCNC: 8.8 MG/DL (ref 8.6–10.5)
CHLORIDE SERPL-SCNC: 105 MMOL/L (ref 98–107)
CO2 SERPL-SCNC: 29 MMOL/L (ref 22–29)
CREAT SERPL-MCNC: 0.67 MG/DL (ref 0.57–1)
DEPRECATED RDW RBC AUTO: 49.1 FL (ref 37–54)
EGFRCR SERPLBLD CKD-EPI 2021: 99.6 ML/MIN/1.73
EOSINOPHIL # BLD AUTO: 0.1 10*3/MM3 (ref 0–0.4)
EOSINOPHIL NFR BLD AUTO: 2.5 % (ref 0.3–6.2)
ERYTHROCYTE [DISTWIDTH] IN BLOOD BY AUTOMATED COUNT: 13.3 % (ref 12.3–15.4)
GLOBULIN UR ELPH-MCNC: 2.4 GM/DL
GLUCOSE SERPL-MCNC: 92 MG/DL (ref 65–99)
HCT VFR BLD AUTO: 33.3 % (ref 34–46.6)
HGB BLD-MCNC: 10.5 G/DL (ref 12–15.9)
IMM GRANULOCYTES # BLD AUTO: 0.03 10*3/MM3 (ref 0–0.05)
IMM GRANULOCYTES NFR BLD AUTO: 0.8 % (ref 0–0.5)
LYMPHOCYTES # BLD AUTO: 1.17 10*3/MM3 (ref 0.7–3.1)
LYMPHOCYTES NFR BLD AUTO: 29.8 % (ref 19.6–45.3)
MAGNESIUM SERPL-MCNC: 1.8 MG/DL (ref 1.6–2.4)
MCH RBC QN AUTO: 31.4 PG (ref 26.6–33)
MCHC RBC AUTO-ENTMCNC: 31.5 G/DL (ref 31.5–35.7)
MCV RBC AUTO: 99.7 FL (ref 79–97)
MONOCYTES # BLD AUTO: 0.54 10*3/MM3 (ref 0.1–0.9)
MONOCYTES NFR BLD AUTO: 13.7 % (ref 5–12)
NEUTROPHILS NFR BLD AUTO: 2.03 10*3/MM3 (ref 1.7–7)
NEUTROPHILS NFR BLD AUTO: 51.7 % (ref 42.7–76)
NRBC BLD AUTO-RTO: 0 /100 WBC (ref 0–0.2)
PLATELET # BLD AUTO: 213 10*3/MM3 (ref 140–450)
PMV BLD AUTO: 10.7 FL (ref 6–12)
POTASSIUM SERPL-SCNC: 4.2 MMOL/L (ref 3.5–5.2)
PROT SERPL-MCNC: 5.6 G/DL (ref 6–8.5)
RBC # BLD AUTO: 3.34 10*6/MM3 (ref 3.77–5.28)
SODIUM SERPL-SCNC: 141 MMOL/L (ref 136–145)
WBC NRBC COR # BLD AUTO: 3.93 10*3/MM3 (ref 3.4–10.8)

## 2024-02-11 PROCEDURE — 80053 COMPREHEN METABOLIC PANEL: CPT | Performed by: FAMILY MEDICINE

## 2024-02-11 PROCEDURE — 94664 DEMO&/EVAL PT USE INHALER: CPT

## 2024-02-11 PROCEDURE — 99232 SBSQ HOSP IP/OBS MODERATE 35: CPT | Performed by: FAMILY MEDICINE

## 2024-02-11 PROCEDURE — G0378 HOSPITAL OBSERVATION PER HR: HCPCS

## 2024-02-11 PROCEDURE — 85025 COMPLETE CBC W/AUTO DIFF WBC: CPT | Performed by: FAMILY MEDICINE

## 2024-02-11 PROCEDURE — 83735 ASSAY OF MAGNESIUM: CPT | Performed by: FAMILY MEDICINE

## 2024-02-11 PROCEDURE — 94799 UNLISTED PULMONARY SVC/PX: CPT

## 2024-02-11 RX ORDER — HYDROCODONE BITARTRATE AND ACETAMINOPHEN 7.5; 325 MG/1; MG/1
1 TABLET ORAL EVERY 6 HOURS PRN
Status: DISCONTINUED | OUTPATIENT
Start: 2024-02-11 | End: 2024-02-15 | Stop reason: HOSPADM

## 2024-02-11 RX ADMIN — CLONIDINE HYDROCHLORIDE 0.1 MG: 0.1 TABLET ORAL at 21:38

## 2024-02-11 RX ADMIN — ROSUVASTATIN CALCIUM 5 MG: 10 TABLET, FILM COATED ORAL at 21:37

## 2024-02-11 RX ADMIN — HYDROCODONE BITARTRATE AND ACETAMINOPHEN 1 TABLET: 7.5; 325 TABLET ORAL at 06:18

## 2024-02-11 RX ADMIN — Medication 250 MG: at 09:23

## 2024-02-11 RX ADMIN — FOLIC ACID 1 MG: 1 TABLET ORAL at 09:22

## 2024-02-11 RX ADMIN — LATANOPROST 1 DROP: 50 SOLUTION OPHTHALMIC at 21:38

## 2024-02-11 RX ADMIN — PANTOPRAZOLE SODIUM 40 MG: 40 TABLET, DELAYED RELEASE ORAL at 07:51

## 2024-02-11 RX ADMIN — TIOTROPIUM BROMIDE INHALATION SPRAY 2 PUFF: 3.12 SPRAY, METERED RESPIRATORY (INHALATION) at 09:32

## 2024-02-11 RX ADMIN — Medication 250 MG: at 21:39

## 2024-02-11 RX ADMIN — HYDROCODONE BITARTRATE AND ACETAMINOPHEN 1 TABLET: 7.5; 325 TABLET ORAL at 13:37

## 2024-02-11 RX ADMIN — PANTOPRAZOLE SODIUM 40 MG: 40 TABLET, DELAYED RELEASE ORAL at 17:23

## 2024-02-11 RX ADMIN — THIAMINE HCL TAB 100 MG 100 MG: 100 TAB at 09:21

## 2024-02-11 RX ADMIN — URSODIOL 300 MG: 300 CAPSULE ORAL at 09:24

## 2024-02-11 RX ADMIN — BUDESONIDE AND FORMOTEROL FUMARATE DIHYDRATE 1 PUFF: 160; 4.5 AEROSOL RESPIRATORY (INHALATION) at 21:22

## 2024-02-11 RX ADMIN — BUDESONIDE AND FORMOTEROL FUMARATE DIHYDRATE 1 PUFF: 160; 4.5 AEROSOL RESPIRATORY (INHALATION) at 09:32

## 2024-02-11 RX ADMIN — DESVENLAFAXINE 50 MG: 50 TABLET, FILM COATED, EXTENDED RELEASE ORAL at 09:25

## 2024-02-11 NOTE — PROGRESS NOTES
[facility] Medicine Services  PROGRESS NOTE    Patient Name: Philomena Davis  : 1960  MRN: [id number]    Date of Admission: 2024  Primary Care Physician: Unique Brandt DO    Subjective   Subjective     CC:  Weakness    HPI:  Patient is a 62 yo F seen and examined by me this AM, no new acute complaints or problems, resting comfortably in bed this AM, continuing to await appeal for rehab at this time.       Objective   Objective     Vital Signs:   Temp:  [97.3 °F (36.3 °C)-98.5 °F (36.9 °C)] 98.2 °F (36.8 °C)  Heart Rate:  [67-86] 75  Resp:  [16-18] 17  BP: ()/(62-80) 125/80  Flow (L/min):  [2-2.5] 2     Physical Exam:  Constitutional: No acute distress, awake, alert, currently on 1L NC   HENT: NCAT, nares patent, mucous membranes moist  Respiratory: Respiratory effort normal, no rhonchi or wheezing   Cardiovascular: RRR, no murmurs, rubs, or gallops  Gastrointestinal: Soft, nontender, nondistended  Musculoskeletal: L hand in splint  Psychiatric: Appropriate affect, cooperative  Neurologic: Oriented x 3, speech clear  Skin: No rashes     Results Reviewed:  LAB RESULTS:      Lab 24  0532 02/10/24  0709 24  0330   WBC 3.93 3.50 4.55   HEMOGLOBIN 10.5* 10.3* 10.2*   HEMATOCRIT 33.3* 31.8* 31.2*   PLATELETS 213 207 252   NEUTROS ABS 2.03 1.71 2.86   IMMATURE GRANS (ABS) 0.03 0.01 0.04   LYMPHS ABS 1.17 1.10 0.88   MONOS ABS 0.54 0.53 0.58   EOS ABS 0.10 0.09 0.12   MCV 99.7* 97.2* 96.0         Lab 24  0532 02/10/24  0709 24  0549 24  0330   SODIUM 141 140 140  --  138   POTASSIUM 4.2 4.1 5.0 3.6 3.1*   CHLORIDE 105 102 106  --  101   CO2 29.0 30.0* 26.0  --  28.0   ANION GAP 7.0 8.0 8.0  --  9.0   BUN 8 5* 5*  --  5*   CREATININE 0.67 0.62 0.63  --  0.59   EGFR 99.6 101.5 101.1  --  102.7   GLUCOSE 92 88 75  --  94   CALCIUM 8.8 8.9 8.9  --  8.8   MAGNESIUM 1.8 1.8  --   --   --    PHOSPHORUS  --   --   --   --  4.3         Lab  02/11/24  0532 02/10/24  0709 02/06/24  0330   TOTAL PROTEIN 5.6* 5.6*  --    ALBUMIN 3.2* 3.1* 3.0*   GLOBULIN 2.4 2.5  --    ALT (SGPT) 13 12  --    AST (SGOT) 33* 32  --    BILIRUBIN 0.7 0.7  --    ALK PHOS 122* 112  --                      Brief Urine Lab Results  (Last result in the past 365 days)        Color   Clarity   Blood   Leuk Est   Nitrite   Protein   CREAT   Urine HCG        05/19/23 0834 [place]  Comment: on azo   Cloudy   3+   Large (3+)   Positive   3+                   Microbiology Results Abnormal       Procedure Component Value - Date/Time    Clostridioides difficile Toxin - Stool, Per Rectum [[id number]]  (Normal) Collected: 02/07/24 0715    Lab Status: Final result Specimen: Stool from Per Rectum Updated: 02/07/24 0825    Narrative:      The following orders were created for panel order Clostridioides difficile Toxin - Stool, Per Rectum.  Procedure                               Abnormality         Status                     ---------                               -----------         ------                     Clostridioides difficile...[[id number]]  Normal              Final result                 Please view results for these tests on the individual orders.    Clostridioides difficile Toxin, PCR - Stool, Per Rectum [[id number]]  (Normal) Collected: 02/07/24 0715    Lab Status: Final result Specimen: Stool from Per Rectum Updated: 02/07/24 0825     Toxigenic C. difficile by PCR Not Detected    Narrative:      The result indicates the absence of toxigenic C. difficile from stool specimen.             No radiology results from the last 24 hrs        Current medications:  Scheduled Meds:budesonide-formoterol, 1 puff, Inhalation, BID - RT  carvedilol, 3.125 mg, Oral, BID With Meals  cloNIDine, 0.1 mg, Oral, Nightly  desvenlafaxine, 50 mg, Oral, Daily  folic acid, 1 mg, Oral, Daily  latanoprost, 1 drop, Both Eyes, Nightly  pantoprazole, 40 mg, Oral, BID AC  rosuvastatin, 5 mg, Oral,  Nightly  saccharomyces boulardii, 250 mg, Oral, BID  senna-docusate sodium, 2 tablet, Oral, BID  thiamine, 100 mg, Oral, Daily  tiotropium bromide monohydrate, 2 puff, Inhalation, Daily - RT  ursodiol, 300 mg, Oral, Daily      Continuous Infusions:     PRN Meds:.  acetaminophen    albuterol    senna-docusate sodium **AND** polyethylene glycol **AND** bisacodyl **AND** bisacodyl    HYDROcodone-acetaminophen    loperamide    Magnesium Standard Dose Replacement - Follow Nurse / BPA Driven Protocol    nitroglycerin    ondansetron ODT **OR** ondansetron    Phosphorus Replacement - Follow Nurse / BPA Driven Protocol    Potassium Replacement - Follow Nurse / BPA Driven Protocol    Assessment & Plan   Assessment & Plan     Active Hospital Problems    Diagnosis  POA    Moderate malnutrition [E44.0]  Yes    Closed fracture of left hand [S62.92XA]  Unknown    Hypokalemia [E87.6]  Yes    Hematemesis without nausea [K92.0]  Unknown    Melena [K92.1]  Unknown    Blood loss anemia [D50.0]  Unknown    Alcohol use disorder [F10.90]  Yes    GERD without esophagitis [K21.9]  Yes    Alcoholic cirrhosis of liver without ascites [K70.30]  Yes    Essential hypertension [I10]  Yes    Generalized anxiety disorder [F41.1]  Yes    Hyperlipidemia [E78.5]  Yes      Resolved Hospital Problems   No resolved problems to display.        Brief Hospital Course to date:  Philomena Davis is a 63 y.o. female with ETOH cirrhosis, HTN, anxiety who presented with significant weakness after reported hematemesis. EGD revealed severe esophagitis. Also found to have left hand 5th metacarpal fracture which inhibits ability to complete daily casts. Stay c/b persistent debility, and difficulty with ADLs and need for placement. Patient new to my services on the AM of 2/9, discussed with patient and CM this AM, currently awaiting insurance appeal at this time for possible rehab placement, continue to follow. Patient refusing other placement options at this time,  if denied may have to be discharged home with home health. Continue to follow at this time. Continuing to await possible appeal about rehab at this time, discussed with patient, not interested in other rehab options or long term placement secondary to her pet, no one to care for and currently boarding at this time. Follow up with CM early next week, if appeal denied may need possible d/c home with home health. Continue to follow, no acute changes into 2/11, follow up with CM in the AM concerning possible rehab appeal      Likely atypical PNA, suspect aspiration  -CXR 2/3 c/w atypical PNA v atelectasis v aspiration  -completed course of rocephin and azithromax  -Encourage IS  - Continue weaning oxygen as tolerated, on 1L NC on the AM of 2/11      Diarrhea  -continue florastor  -C. difficile negative  -Added Imodium  - Much improved per patient.      Severe reflux esophagitis c/b ulceration + gastric ulcer  -BID PPI x 3 months, then daily  -Needs repeat EGD in 3 months recommended to assess healing  -completed IV ceftriaxone given UGI bleed and concomitant cirrhosis     ETOH cirrhosis w h/o ETOH use  -continue coreg with hold parameters     LEFT hand 5th metacarpal fracture - f/u Naomi Duncan PA-C 1 week, NWB LUE  -rewrapped splint at bedside on 2/8, continue to change ace wrap as needed  - Likely outpatient follow up in 1 week following discharge.     HTN   - on clonidine OP for insomnia   -Changed to nightly due to low bp readings        Anxiety   - Continue home desvenlafaxine.      Chronic insomnia   Severe hypokalemia 2/2 vomiting, resolved  - replace as needed     Significant debility/weakness  - needing assistance before return home alone, pending insurance appeal  -Difficulty using walker without able to bear weight on her left and      Expected Discharge Location and Transportation:?  Rehab vs home with home health   Expected Discharge   Expected Discharge Date: 2/12/2024; Expected Discharge Time:       DVT prophylaxis:  Mechanical DVT prophylaxis orders are present.         AM-PAC 6 Clicks Score (PT): 20 (02/11/24 3324)    CODE STATUS:   Code Status and Medical Interventions:   Ordered at: 01/30/24 5102     Level Of Support Discussed With:    Patient     Code Status (Patient has no pulse and is not breathing):    CPR (Attempt to Resuscitate)     Medical Interventions (Patient has pulse or is breathing):    Full Support       JASE Kruger, DO  02/11/24

## 2024-02-11 NOTE — PLAN OF CARE
Goal Outcome Evaluation:   Pt slept well.vss.no tele No iv the patient is waiting for appeal of rehab facility. Probable dc on Monday either home or rehab.pt remains on 2.5 liters/nc

## 2024-02-12 ENCOUNTER — HOME HEALTH ADMISSION (OUTPATIENT)
Dept: HOME HEALTH SERVICES | Facility: HOME HEALTHCARE | Age: 64
End: 2024-02-12
Payer: COMMERCIAL

## 2024-02-12 LAB
ALBUMIN SERPL-MCNC: 3.1 G/DL (ref 3.5–5.2)
ALBUMIN/GLOB SERPL: 1.1 G/DL
ALP SERPL-CCNC: 124 U/L (ref 39–117)
ALT SERPL W P-5'-P-CCNC: 13 U/L (ref 1–33)
ANION GAP SERPL CALCULATED.3IONS-SCNC: 8 MMOL/L (ref 5–15)
AST SERPL-CCNC: 34 U/L (ref 1–32)
BASOPHILS # BLD AUTO: 0.05 10*3/MM3 (ref 0–0.2)
BASOPHILS NFR BLD AUTO: 1.2 % (ref 0–1.5)
BILIRUB SERPL-MCNC: 0.6 MG/DL (ref 0–1.2)
BUN SERPL-MCNC: 6 MG/DL (ref 8–23)
BUN/CREAT SERPL: 10.5 (ref 7–25)
CALCIUM SPEC-SCNC: 8.9 MG/DL (ref 8.6–10.5)
CHLORIDE SERPL-SCNC: 103 MMOL/L (ref 98–107)
CO2 SERPL-SCNC: 29 MMOL/L (ref 22–29)
CREAT SERPL-MCNC: 0.57 MG/DL (ref 0.57–1)
DEPRECATED RDW RBC AUTO: 47.8 FL (ref 37–54)
EGFRCR SERPLBLD CKD-EPI 2021: 103.5 ML/MIN/1.73
EOSINOPHIL # BLD AUTO: 0.15 10*3/MM3 (ref 0–0.4)
EOSINOPHIL NFR BLD AUTO: 3.6 % (ref 0.3–6.2)
ERYTHROCYTE [DISTWIDTH] IN BLOOD BY AUTOMATED COUNT: 13.2 % (ref 12.3–15.4)
GLOBULIN UR ELPH-MCNC: 2.9 GM/DL
GLUCOSE SERPL-MCNC: 93 MG/DL (ref 65–99)
HCT VFR BLD AUTO: 31.7 % (ref 34–46.6)
HGB BLD-MCNC: 10.3 G/DL (ref 12–15.9)
IMM GRANULOCYTES # BLD AUTO: 0.02 10*3/MM3 (ref 0–0.05)
IMM GRANULOCYTES NFR BLD AUTO: 0.5 % (ref 0–0.5)
LYMPHOCYTES # BLD AUTO: 1.11 10*3/MM3 (ref 0.7–3.1)
LYMPHOCYTES NFR BLD AUTO: 26.3 % (ref 19.6–45.3)
MAGNESIUM SERPL-MCNC: 1.8 MG/DL (ref 1.6–2.4)
MCH RBC QN AUTO: 32.2 PG (ref 26.6–33)
MCHC RBC AUTO-ENTMCNC: 32.5 G/DL (ref 31.5–35.7)
MCV RBC AUTO: 99.1 FL (ref 79–97)
MONOCYTES # BLD AUTO: 0.53 10*3/MM3 (ref 0.1–0.9)
MONOCYTES NFR BLD AUTO: 12.6 % (ref 5–12)
NEUTROPHILS NFR BLD AUTO: 2.36 10*3/MM3 (ref 1.7–7)
NEUTROPHILS NFR BLD AUTO: 55.8 % (ref 42.7–76)
NRBC BLD AUTO-RTO: 0 /100 WBC (ref 0–0.2)
PLATELET # BLD AUTO: 205 10*3/MM3 (ref 140–450)
PMV BLD AUTO: 10.7 FL (ref 6–12)
POTASSIUM SERPL-SCNC: 3.7 MMOL/L (ref 3.5–5.2)
PROT SERPL-MCNC: 6 G/DL (ref 6–8.5)
RBC # BLD AUTO: 3.2 10*6/MM3 (ref 3.77–5.28)
SODIUM SERPL-SCNC: 140 MMOL/L (ref 136–145)
WBC NRBC COR # BLD AUTO: 4.22 10*3/MM3 (ref 3.4–10.8)

## 2024-02-12 PROCEDURE — 85025 COMPLETE CBC W/AUTO DIFF WBC: CPT | Performed by: FAMILY MEDICINE

## 2024-02-12 PROCEDURE — G0378 HOSPITAL OBSERVATION PER HR: HCPCS

## 2024-02-12 PROCEDURE — 97116 GAIT TRAINING THERAPY: CPT

## 2024-02-12 PROCEDURE — 99232 SBSQ HOSP IP/OBS MODERATE 35: CPT | Performed by: FAMILY MEDICINE

## 2024-02-12 PROCEDURE — 80053 COMPREHEN METABOLIC PANEL: CPT | Performed by: FAMILY MEDICINE

## 2024-02-12 PROCEDURE — 97535 SELF CARE MNGMENT TRAINING: CPT

## 2024-02-12 PROCEDURE — 94799 UNLISTED PULMONARY SVC/PX: CPT

## 2024-02-12 PROCEDURE — 97110 THERAPEUTIC EXERCISES: CPT

## 2024-02-12 PROCEDURE — 83735 ASSAY OF MAGNESIUM: CPT | Performed by: FAMILY MEDICINE

## 2024-02-12 RX ADMIN — BUDESONIDE AND FORMOTEROL FUMARATE DIHYDRATE 1 PUFF: 160; 4.5 AEROSOL RESPIRATORY (INHALATION) at 21:01

## 2024-02-12 RX ADMIN — CLONIDINE HYDROCHLORIDE 0.1 MG: 0.1 TABLET ORAL at 21:31

## 2024-02-12 RX ADMIN — HYDROCODONE BITARTRATE AND ACETAMINOPHEN 1 TABLET: 7.5; 325 TABLET ORAL at 21:31

## 2024-02-12 RX ADMIN — ROSUVASTATIN CALCIUM 5 MG: 10 TABLET, FILM COATED ORAL at 21:31

## 2024-02-12 RX ADMIN — Medication 250 MG: at 10:17

## 2024-02-12 RX ADMIN — BUDESONIDE AND FORMOTEROL FUMARATE DIHYDRATE 1 PUFF: 160; 4.5 AEROSOL RESPIRATORY (INHALATION) at 08:22

## 2024-02-12 RX ADMIN — URSODIOL 300 MG: 300 CAPSULE ORAL at 11:01

## 2024-02-12 RX ADMIN — DESVENLAFAXINE 50 MG: 50 TABLET, FILM COATED, EXTENDED RELEASE ORAL at 11:01

## 2024-02-12 RX ADMIN — THIAMINE HCL TAB 100 MG 100 MG: 100 TAB at 10:18

## 2024-02-12 RX ADMIN — HYDROCODONE BITARTRATE AND ACETAMINOPHEN 1 TABLET: 7.5; 325 TABLET ORAL at 08:01

## 2024-02-12 RX ADMIN — PANTOPRAZOLE SODIUM 40 MG: 40 TABLET, DELAYED RELEASE ORAL at 17:27

## 2024-02-12 RX ADMIN — FOLIC ACID 1 MG: 1 TABLET ORAL at 10:18

## 2024-02-12 RX ADMIN — CARVEDILOL 3.12 MG: 3.12 TABLET, FILM COATED ORAL at 08:03

## 2024-02-12 RX ADMIN — CARVEDILOL 3.12 MG: 3.12 TABLET, FILM COATED ORAL at 17:27

## 2024-02-12 RX ADMIN — SENNOSIDES AND DOCUSATE SODIUM 2 TABLET: 8.6; 5 TABLET ORAL at 10:18

## 2024-02-12 RX ADMIN — HYDROCODONE BITARTRATE AND ACETAMINOPHEN 1 TABLET: 7.5; 325 TABLET ORAL at 13:36

## 2024-02-12 RX ADMIN — PANTOPRAZOLE SODIUM 40 MG: 40 TABLET, DELAYED RELEASE ORAL at 08:02

## 2024-02-12 RX ADMIN — Medication 250 MG: at 21:31

## 2024-02-12 RX ADMIN — SENNOSIDES AND DOCUSATE SODIUM 2 TABLET: 8.6; 5 TABLET ORAL at 21:31

## 2024-02-12 NOTE — PLAN OF CARE
Goal Outcome Evaluation:  Plan of Care Reviewed With: patient        Progress: improving  Outcome Evaluation: Pt with good participation and progress toward goals during OT session today. The pt performed in room ambulation to/from the bathroom using SPC with CGA. The pt performed sink side grooming ADLs with CGA. Toileting also completed, though Raven required due to LOB during post toileting hygiene. The pt's SpO2 dropped to 85% on RA during mobility, though steve within 30 seconds with PLB and rest. The pt remains below her functional baseline with generalized weakness, decreased activity tolerance, and balance deficits. Pt will benefit from continued IP OT services to address deficits listed. If deemed medically appropriate, continue to recommend a d/c to IRF for best outcome.      Anticipated Discharge Disposition (OT): inpatient rehabilitation facility

## 2024-02-12 NOTE — PLAN OF CARE
Goal Outcome Evaluation:  Plan of Care Reviewed With: patient        Progress: improving  Outcome Evaluation: PT recert completed. Patient demonstrates improving independence w/ mobility, but continues to be limited by weakness, impaired balance and coordination, mildly unsteady gait, and remains below baseline. She progressed forward ambulation to 125ft w/ CGA and straight cane w/ mild instability, but no overt LOB. PT continues to recommend IP rehab at D/C.      Anticipated Discharge Disposition (PT): inpatient rehabilitation facility

## 2024-02-12 NOTE — THERAPY PROGRESS REPORT/RE-CERT
Patient Name: Philomena Davis  : 1960    MRN: [id number]                              Today's Date: 2024       Admit Date: 2024    Visit Dx:     ICD-10-CM ICD-9-CM   1. Hematemesis without nausea  K92.0 578.0   2. Melena  K92.1 578.1   3. Blood loss anemia  D50.0 280.0   4. Alcoholic cirrhosis of liver without ascites  K70.30 571.2     Patient Active Problem List   Diagnosis    Essential hypertension    Generalized anxiety disorder    Hyperlipidemia    Glaucoma    Alcoholic cirrhosis of liver without ascites    Severe malnutrition    GERD without esophagitis    Sleep disturbance    Annual physical exam    Idiopathic peripheral neuropathy    Severe anxiety with panic    Alcohol use disorder    Cigarette nicotine dependence in remission    Chronic obstructive pulmonary disease    Hypokalemia    Closed fracture of left hand    Hematemesis without nausea    Melena    Blood loss anemia    Moderate malnutrition     Past Medical History:   Diagnosis Date    Alcohol withdrawal 05/10/2019    Annual physical exam 2023    Anxiety and depression     Cirrhosis of liver     Closed displaced fracture of lateral malleolus of left fibula     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    Closed trimalleolar fracture of right ankle 2023    Digital mucous cyst of toe of left foot 2017    Ganglion cyst of left foot     Glaucoma     Hypertension     Wears glasses      Past Surgical History:   Procedure Laterality Date    ANKLE OPEN REDUCTION INTERNAL FIXATION Left 2017    Procedure: LEFT ANKLE OPEN REDUCTION INTERNAL FIXATION WITH ILIAC CREST BONE GRAFT , EXCISION CYST 4TH/5TH INNERSPACE LEFT FOOT;  Surgeon: Frank Larson MD;  Location:  [facility];  Service:     ANKLE OPEN REDUCTION INTERNAL FIXATION Right 2023    Procedure: ANKLE OPEN REDUCTION INTERNAL FIXATION;  Surgeon: Deandre Reynoso MD;  Location:  [facility];  Service: Orthopedics;   Laterality: Right;    AUGMENTATION MAMMAPLASTY Bilateral 1999    BREAST AUGMENTATION      BREAST EXCISIONAL BIOPSY Right 2014    DILATION AND CURETTAGE, DIAGNOSTIC / THERAPEUTIC      ENDOSCOPY N/A 11/30/2022    Procedure: ESOPHAGOGASTRODUODENOSCOPY;  Surgeon: Arelis Solano MD;  Location:  [facility];  Service: Gastroenterology;  Laterality: N/A;    ENDOSCOPY N/A 2/1/2024    Procedure: ESOPHAGOGASTRODUODENOSCOPY;  Surgeon: Brunner, Mark I, MD;  Location:  [facility];  Service: Gastroenterology;  Laterality: N/A;    HI RPR NON/MAL FEMUR DSTL H/N W/ILIAC/AUTOG BONE Left 03/24/2017    Procedure: ILIAC CREST BONE GRAFT;  Surgeon: Frank Larson MD;  Location:  [facility];  Service: Orthopedics    WRIST SURGERY        General Information       Row Name 02/12/24 1113          Physical Therapy Time and Intention    Document Type progress note/recertification;therapy note (daily note)  -MB     Mode of Treatment physical therapy  -MB       Row Name 02/12/24 1113          General Information    Patient Profile Reviewed yes  -MB     Existing Precautions/Restrictions fall;other (see comments)  NWB L UE, L 4th and 5th metacarpal fx's with ulnar gutter splint, monitor O2  -MB     Barriers to Rehab medically complex;previous functional deficit  -MB       Row Name 02/12/24 1113          Cognition    Orientation Status (Cognition) oriented x 3  -MB       Row Name 02/12/24 1113          Safety Issues, Functional Mobility    Safety Issues Affecting Function (Mobility) awareness of need for assistance;safety precaution awareness;safety precautions follow-through/compliance  -MB     Impairments Affecting Function (Mobility) balance;endurance/activity tolerance;strength;sensation/sensory awareness;pain  -MB               User Key  (r) = Recorded By, (t) = Taken By, (c) = Cosigned By      Initials Name Provider Type    Omayra Thompson, PT Physical Therapist                   Mobility       Row Name 02/12/24 9992           Bed Mobility    Supine-Sit [place] (Bed Mobility) standby assist;verbal cues  -MB     Sit-Supine [place] (Bed Mobility) standby assist  -MB     Assistive Device (Bed Mobility) head of bed elevated;bed rails  -MB     Comment, (Bed Mobility) VCs to maintain NWB LUE.  -MB       Row Name 02/12/24 1418          Transfers    Comment, (Transfers) VCs for safe hand placement and sequencing w/ SPC.  -MB       Row Name 02/12/24 1418          Sit-Stand Transfer    Sit-Stand [place] (Transfers) contact guard;verbal cues  -MB     Assistive Device (Sit-Stand Transfers) cane, straight  -MB       Row Name 02/12/24 1418          Gait/Stairs (Locomotion)    [place] Level (Gait) contact guard;verbal cues  -MB     Assistive Device (Gait) cane, straight  -MB     Distance in Feet (Gait) 125  -MB     Deviations/Abnormal Patterns (Gait) gait speed decreased;stride length decreased  -MB     Bilateral Gait Deviations heel strike decreased;forward flexed posture  -MB     Comment, (Gait/Stairs) Pt. ambulated w/ step through gait pattern w/ slow pace, decreased B heelstrike, and mild unsteadiness, but no overt LOB. VCs for forward gaze, increased B heelstrike, and step sequence w/ SPC.  -MB       Row Name 02/12/24 1418          Mobility    Extremity Weight-bearing Status left upper extremity  -MB     Left Upper Extremity (Weight-bearing Status) non weight-bearing (NWB)  -MB               User Key  (r) = Recorded By, (t) = Taken By, (c) = Cosigned By      Initials Name Provider Type    Omayra Thompson, PT Physical Therapist                   Obj/Interventions       Row Name 02/12/24 1423          Motor Skills    Therapeutic Exercise shoulder;hip;knee;ankle  -MB       Row Name 02/12/24 1423          Shoulder (Therapeutic Exercise)    Shoulder (Therapeutic Exercise) AROM (active range of motion)  -MB     Shoulder AROM (Therapeutic Exercise) bilateral;flexion;aBduction;aDduction;10 repetitions  -MB       Row Name  02/12/24 1423          Hip (Therapeutic Exercise)    Hip (Therapeutic Exercise) strengthening exercise  -MB     Hip Strengthening (Therapeutic Exercise) bilateral;marching while seated;aBduction;aDduction;10 repetitions  -MB       Row Name 02/12/24 1423          Knee (Therapeutic Exercise)    Knee Strengthening (Therapeutic Exercise) bilateral;LAQ (long arc quad);10 repetitions  -MB       Row Name 02/12/24 1423          Ankle (Therapeutic Exercise)    Ankle AROM (Therapeutic Exercise) bilateral;dorsiflexion;plantarflexion;10 repetitions  -MB       Row Name 02/12/24 1423          Balance    Balance Assessment sitting static balance;standing static balance;standing dynamic balance  -MB     Dynamic Sitting Balance standby assist  -MB     Position, Sitting Balance unsupported;sitting edge of bed  -MB     Static Standing Balance contact guard  -MB     Dynamic Standing Balance contact guard  -MB     Position/Device Used, Standing Balance supported;cane, straight  -MB     Balance Interventions standing;sit to stand;weight shifting activity;dynamic reaching  -MB               User Key  (r) = Recorded By, (t) = Taken By, (c) = Cosigned By      Initials Name Provider Type    Omayra Thompson, PT Physical Therapist                   Goals/Plan       Row Name 02/12/24 1425          Bed Mobility Goal 1 (PT)    Activity/Assistive Device (Bed Mobility Goal 1, PT) sit to supine/supine to sit  -MB     [place] Level/Cues Needed (Bed Mobility Goal 1, PT) standby assist  -MB     Time Frame (Bed Mobility Goal 1, PT) long term goal (LTG);10 days  -MB     Progress/Outcomes (Bed Mobility Goal 1, PT) goal met  -MB       Row Name 02/12/24 1425          Transfer Goal 1 (PT)    Activity/Assistive Device (Transfer Goal 1, PT) sit-to-stand/stand-to-sit;bed-to-chair/chair-to-bed;cane, straight  -MB     [place] Level/Cues Needed (Transfer Goal 1, PT) standby assist  -MB     Time Frame (Transfer Goal 1, PT) long term goal (LTG);10  days  -MB     Progress/Outcome (Transfer Goal 1, PT) goal ongoing  -MB       Row Name 02/12/24 1425          Gait Training Goal 1 (PT)    Activity/Assistive Device (Gait Training Goal 1, PT) gait (walking locomotion)  -MB     [place] Level (Gait Training Goal 1, PT) contact guard required  -MB     Distance (Gait Training Goal 1, PT) 150'  -MB     Time Frame (Gait Training Goal 1, PT) long term goal (LTG);10 days  -MB     Progress/Outcome (Gait Training Goal 1, PT) goal ongoing  -MB       Row Name 02/12/24 1425          Stairs Goal 1 (PT)    Activity/Assistive Device (Stairs Goal 1, PT) stairs, all skills  -MB     [place] Level/Cues Needed (Stairs Goal 1, PT) contact guard required  -MB     Number of Stairs (Stairs Goal 1, PT) 3  -MB     Time Frame (Stairs Goal 1, PT) long term goal (LTG);10 days  -MB     Progress/Outcome (Stairs Goal 1, PT) goal ongoing  -MB       Row Name 02/12/24 1425          Therapy Assessment/Plan (PT)    Planned Therapy Interventions (PT) balance training;bed mobility training;gait training;home exercise program;patient/family education;postural re-education;neuromuscular re-education;stair training;strengthening;transfer training  -MB               User Key  (r) = Recorded By, (t) = Taken By, (c) = Cosigned By      Initials Name Provider Type    Omayra Thompson, PT Physical Therapist                   Clinical Impression       Row Name 02/12/24 1424          Pain    Pretreatment Pain Rating 3/10  -MB     Posttreatment Pain Rating 3/10  -MB     Pain Location - Side/Orientation Left  -MB     Pain Location - hand  -MB     Pain Intervention(s) Ambulation/increased activity;Repositioned  -MB       Row Name 02/12/24 1420          Plan of Care Review    Plan of Care Reviewed With patient  -MB     Progress improving  -MB     Outcome Evaluation PT recert completed. Patient demonstrates improving independence w/ mobility, but continues to be limited by weakness, impaired balance and  coordination, mildly unsteady gait, and remains below baseline. She progressed forward ambulation to 125ft w/ CGA and straight cane w/ mild instability, but no overt LOB. PT continues to recommend IP rehab at D/C.  -MB       Row Name 02/12/24 1424          Therapy Assessment/Plan (PT)    Rehab Potential (PT) good, to achieve stated therapy goals  -MB     Criteria for Skilled Interventions Met (PT) yes;meets criteria;skilled treatment is necessary  -MB     Therapy Frequency (PT) daily  -MB       Row Name 02/12/24 1424          Vital Signs    Pre SpO2 (%) 95  -MB     O2 Delivery Pre Treatment room air  -MB     Intra SpO2 (%) 91  -MB     O2 Delivery Intra Treatment room air  -MB     Post SpO2 (%) 94  -MB     O2 Delivery Post Treatment room air  -MB     Pre Patient Position Supine  -MB     Intra Patient Position Standing  -MB     Post Patient Position Supine  -MB       Row Name 02/12/24 1424          Positioning and Restraints    Pre-Treatment Position in bed  -MB     Post Treatment Position bed  Pt. declined sitting UIC.  -MB     In Bed notified nsg;supine;call light within reach;encouraged to call for assist;exit alarm on;LUE elevated  -MB               User Key  (r) = Recorded By, (t) = Taken By, (c) = Cosigned By      Initials Name Provider Type    Omayra Thompson, PT Physical Therapist                   Outcome Measures       Row Name 02/12/24 1429 02/12/24 0801       How much help from another person do you currently need...    Turning from your back to your side while in flat bed without using bedrails? 4  -MB 4  -PA    Moving from lying on back to sitting on the side of a flat bed without bedrails? 4  -MB 4  -PA    Moving to and from a bed to a chair (including a wheelchair)? 3  -MB 2  -PA    Standing up from a chair using your arms (e.g., wheelchair, bedside chair)? 3  -MB 2  -PA    Climbing 3-5 steps with a railing? 3  -MB 2  -PA    To walk in hospital room? 3  -MB 2  -PA    AM-PAC 6 Clicks Score (PT)  20  -MB 16  -CO    Highest Level of Mobility Goal 6 --> Walk 10 steps or more  -MB 5 --> Static standing  -CO      Row Name 02/12/24 1429 02/12/24 1415       Functional Assessment    Outcome Measure Options AM-PAC 6 Clicks Basic Mobility (PT)  -MB AM-PAC 6 Clicks Daily Activity (OT)  -KF              User Key  (r) = Recorded By, (t) = Taken By, (c) = Cosigned By      Initials Name Provider Type    CO Nuria Rachel, RN Registered Nurse    Omayra Thompson, PT Physical Therapist    Radha Lu, OT Occupational Therapist                                 Physical Therapy Education       Title: PT OT SLP Therapies (Done)       Topic: Physical Therapy (Done)       Point: Mobility training (Done)       Learning Progress Summary             Patient Acceptance, E,TB, VU by RANDELL at 2/12/2024 0350    Acceptance, E, VU by KE at 2/9/2024 1126    Acceptance, E, VU by AG at 2/9/2024 0258    Acceptance, E, NR by AS at 2/7/2024 0906    Acceptance, E, VU by AE at 2/5/2024 1323    Acceptance, E, VU by AG at 2/5/2024 0346    Acceptance, E, NR by KE at 2/2/2024 1121    Acceptance, E, NR by KE at 1/31/2024 0833                         Point: Home exercise program (Done)       Learning Progress Summary             Patient Acceptance, E,TB, VU by RANDELL at 2/12/2024 0350    Acceptance, E, VU by AG at 2/9/2024 0258    Acceptance, E, NR by AS at 2/7/2024 0906    Acceptance, E, VU by AE at 2/5/2024 1323    Acceptance, E, VU by AG at 2/5/2024 0346    Acceptance, E, NR by KE at 1/31/2024 0833                         Point: Body mechanics (Done)       Learning Progress Summary             Patient Acceptance, E,TB, VU by RANDELL at 2/12/2024 0350    Acceptance, E, VU by KE at 2/9/2024 1126    Acceptance, E, VU by AG at 2/9/2024 0258    Acceptance, E, NR by AS at 2/7/2024 0906    Acceptance, E, VU by AE at 2/5/2024 1323    Acceptance, E, VU by AG at 2/5/2024 0346    Acceptance, E, NR by EFRAÍN at 2/2/2024 1121    Acceptance, E, NR by EFRAÍN at 1/31/2024  0833                         Point: Precautions (Done)       Learning Progress Summary             Patient Acceptance, E,TB, VU by JS at 2/12/2024 0350    Acceptance, E, VU by KE at 2/9/2024 1126    Acceptance, E, VU by AG at 2/9/2024 0258    Acceptance, E, NR by AS at 2/7/2024 0906    Acceptance, E, VU by AE at 2/5/2024 1323    Acceptance, E, VU by AG at 2/5/2024 0346    Acceptance, E, NR by KE at 2/2/2024 1121    Acceptance, E, NR by KE at 1/31/2024 0833                                         User Key       Initials Effective Dates Name Provider Type Discipline    AS 04/28/23 -  Eileen Moncada, PTA Physical Therapist Assistant PT    JS 06/16/21 -  Vanessa Morley, RN Registered Nurse Nurse    AE 09/21/21 -  Robert Gillespie, PT Physical Therapist PT    AG 11/01/23 -  Cb Dobson, RN Registered Nurse Nurse    EFRAÍN 11/16/23 -  Corry Maldonado, PT Physical Therapist PT                  PT Recommendation and Plan  Planned Therapy Interventions (PT): balance training, bed mobility training, gait training, home exercise program, patient/family education, postural re-education, neuromuscular re-education, stair training, strengthening, transfer training  Plan of Care Reviewed With: patient  Progress: improving  Outcome Evaluation: PT recert completed. Patient demonstrates improving independence w/ mobility, but continues to be limited by weakness, impaired balance and coordination, mildly unsteady gait, and remains below baseline. She progressed forward ambulation to 125ft w/ CGA and straight cane w/ mild instability, but no overt LOB. PT continues to recommend IP rehab at D/C.     Time Calculation:         PT Charges       Row Name 02/12/24 1429             Time Calculation    Start Time 1113  -MB      PT Received On 02/12/24  -MB      PT Goal Re-Cert Due Date 02/22/24  -MB         Timed Charges    15914 - PT Therapeutic Exercise Minutes 23  -MB      93072 - Gait Training Minutes  15  -MB         Total  Minutes    Timed Charges Total Minutes 38  -MB       Total Minutes 38  -MB                User Key  (r) = Recorded By, (t) = Taken By, (c) = Cosigned By      Initials Name Provider Type    Omayra Thompson, PT Physical Therapist                  Therapy Charges for Today       Code Description Service Date Service Provider Modifiers Qty    [id number]  PT THER PROC EA 15 MIN 2/12/2024 Omayra Godinez, PT GP 2    [id number] HC GAIT TRAINING EA 15 MIN 2/12/2024 Omayra Godinez, PT GP 1            PT G-Codes  Outcome Measure Options: AM-PAC 6 Clicks Basic Mobility (PT)  AM-PAC 6 Clicks Score (PT): 20  AM-PAC 6 Clicks Score (OT): 18  PT Discharge Summary  Anticipated Discharge Disposition (PT): inpatient rehabilitation facility    Omayra Godinez, KEILA  2/12/2024

## 2024-02-12 NOTE — PLAN OF CARE
Goal Outcome Evaluation:  Plan of Care Reviewed With: patient           Outcome Evaluation: VSS, Patient on room air, sats 95, no distress, tolerates activities.  Worked with PT & OT today still some weakness but progressing.  PRN pain meds for hand pain.  Patient tolerating diet without complaints.  Waiting discharge with home health for rehab.  Patient states large BM today.

## 2024-02-12 NOTE — NURSING NOTE
Client remained stable throughout this shift with no complaints noted, blood pressure readings noted low and medications was not administered due to the low readings,no increase in temp noted..

## 2024-02-12 NOTE — CASE MANAGEMENT/SOCIAL WORK
Continued Stay Note  [facility]     Patient Name: Philomena Davis  MRN: [id number]  Today's Date: 2/12/2024    Admit Date: 1/30/2024    Plan: Home with    Discharge Plan       Row Name 02/12/24 1459       Plan    Plan Home with     Plan Comments Met with pt and discussed the external appeal to her insurance that was made on Friday. Pt understands this appeal could take 30 days for a decision. States she is agreeable to d/c home with  PT. Pt choose to use [facility]. Notified MD of pt's decision. Called [facility] at [facility] with referral.    Final Discharge Disposition Code 06 - home with home health care                   Discharge Codes    No documentation.                 Expected Discharge Date and Time       Expected Discharge Date Expected Discharge Time    Feb 12, 2024               SILKE Pinedo

## 2024-02-12 NOTE — CASE MANAGEMENT/SOCIAL WORK
Continued Stay Note  [facility]     Patient Name: Philomena Davis  MRN: [id number]  Today's Date: 2/12/2024    Admit Date: 1/30/2024    Plan: Home with    Discharge Plan       Row Name 02/12/24 7211       Plan    Plan Home with     Plan Comments Spoke with [facility]rington at [facility]- unable to accept referral due to staffing. Will send referral to another       Row Name 02/12/24 9414       Plan    Plan Home with     Plan Comments Met with pt and discussed the external appeal to her insurance that was made on Friday. Pt understands this appeal could take 30 days for a decision. States she is agreeable to d/c home with  PT. Pt choose to use [facility]. Notified MD of pt's decision. Called Barrington at [facility] with referral.    Final Discharge Disposition Code 06 - home with home health care                   Discharge Codes    No documentation.                 Expected Discharge Date and Time       Expected Discharge Date Expected Discharge Time    Feb 12, 2024               SILKE Pinedo

## 2024-02-12 NOTE — THERAPY PROGRESS REPORT/RE-CERT
Patient Name: Philomena Davis  : 1960    MRN: [id number]                              Today's Date: 2024       Admit Date: 2024    Visit Dx:     ICD-10-CM ICD-9-CM   1. Hematemesis without nausea  K92.0 578.0   2. Melena  K92.1 578.1   3. Blood loss anemia  D50.0 280.0   4. Alcoholic cirrhosis of liver without ascites  K70.30 571.2     Patient Active Problem List   Diagnosis    Essential hypertension    Generalized anxiety disorder    Hyperlipidemia    Glaucoma    Alcoholic cirrhosis of liver without ascites    Severe malnutrition    GERD without esophagitis    Sleep disturbance    Annual physical exam    Idiopathic peripheral neuropathy    Severe anxiety with panic    Alcohol use disorder    Cigarette nicotine dependence in remission    Chronic obstructive pulmonary disease    Hypokalemia    Closed fracture of left hand    Hematemesis without nausea    Melena    Blood loss anemia    Moderate malnutrition     Past Medical History:   Diagnosis Date    Alcohol withdrawal 05/10/2019    Annual physical exam 2023    Anxiety and depression     Cirrhosis of liver     Closed displaced fracture of lateral malleolus of left fibula     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    Closed trimalleolar fracture of right ankle 2023    Digital mucous cyst of toe of left foot 2017    Ganglion cyst of left foot     Glaucoma     Hypertension     Wears glasses      Past Surgical History:   Procedure Laterality Date    ANKLE OPEN REDUCTION INTERNAL FIXATION Left 2017    Procedure: LEFT ANKLE OPEN REDUCTION INTERNAL FIXATION WITH ILIAC CREST BONE GRAFT , EXCISION CYST 4TH/5TH INNERSPACE LEFT FOOT;  Surgeon: Frank Larson MD;  Location:  [facility];  Service:     ANKLE OPEN REDUCTION INTERNAL FIXATION Right 2023    Procedure: ANKLE OPEN REDUCTION INTERNAL FIXATION;  Surgeon: Deandre Reynoso MD;  Location:  [facility];  Service: Orthopedics;   Laterality: Right;    AUGMENTATION MAMMAPLASTY Bilateral 1999    BREAST AUGMENTATION      BREAST EXCISIONAL BIOPSY Right 2014    DILATION AND CURETTAGE, DIAGNOSTIC / THERAPEUTIC      ENDOSCOPY N/A 11/30/2022    Procedure: ESOPHAGOGASTRODUODENOSCOPY;  Surgeon: Arelis Solano MD;  Location:  [facility];  Service: Gastroenterology;  Laterality: N/A;    ENDOSCOPY N/A 2/1/2024    Procedure: ESOPHAGOGASTRODUODENOSCOPY;  Surgeon: Brunner, Mark I, MD;  Location:  [facility];  Service: Gastroenterology;  Laterality: N/A;    MI RPR NON/MAL FEMUR DSTL H/N W/ILIAC/AUTOG BONE Left 03/24/2017    Procedure: ILIAC CREST BONE GRAFT;  Surgeon: Frank Larson MD;  Location:  [facility];  Service: Orthopedics    WRIST SURGERY        General Information       Row Name 02/12/24 1407          OT Time and Intention    Document Type progress note/recertification  -KF     Mode of Treatment occupational therapy;individual therapy  -KF       Row Name 02/12/24 1407          General Information    Patient Profile Reviewed yes  -KF     Existing Precautions/Restrictions fall;other (see comments)  NWB L UE; L 4th and 5th metacarpal fx's with ulnar gutter splint; Monitor O2  -KF     Barriers to Rehab medically complex;previous functional deficit  -KF       Row Name 02/12/24 1407          Cognition    Orientation Status (Cognition) oriented x 3  -KF       Row Name 02/12/24 1407          Safety Issues, Functional Mobility    Safety Issues Affecting Function (Mobility) awareness of need for assistance;safety precaution awareness;safety precautions follow-through/compliance;problem-solving  -KF     Impairments Affecting Function (Mobility) balance;endurance/activity tolerance;strength;sensation/sensory awareness;pain;shortness of breath  -KF               User Key  (r) = Recorded By, (t) = Taken By, (c) = Cosigned By      Initials Name Provider Type    KF Radha Cabrera OT Occupational Therapist                     Mobility/ADL's        Row Name 02/12/24 1408          Bed Mobility    Bed Mobility supine-sit;sit-supine  -KF     Supine-Sit [place] (Bed Mobility) standby assist  -KF     Sit-Supine [place] (Bed Mobility) standby assist  -KF     Assistive Device (Bed Mobility) head of bed elevated;bed rails  -       Row Name 02/12/24 1408          Transfers    Transfers sit-stand transfer;stand-sit transfer;toilet transfer  -KF     Comment, (Transfers) Pt performed STS from EOB using a SPC with CGA and from commode using BSC frame with CGA. Pt declined transfer to chair this date.  -       Row Name 02/12/24 1408          Sit-Stand Transfer    Sit-Stand [place] (Transfers) contact guard  -KF     Assistive Device (Sit-Stand Transfers) cane, straight  -KF       Row Name 02/12/24 1408          Stand-Sit Transfer    Stand-Sit [place] (Transfers) contact guard  -KF     Assistive Device (Stand-Sit Transfers) cane, straight  -       Row Name 02/12/24 1408          Toilet Transfer    Type (Toilet Transfer) sit-stand;stand-sit  -KF     [place] Level (Toilet Transfer) contact guard  -KF     Assistive Device (Toilet Transfer) commode;grab bars/safety frame;commode, bedside without drop arms  -     Comment, (Toilet Transfer) BSC frame placed over top of commode to increase height  -       Row Name 02/12/24 1408          Functional Mobility    Functional Mobility- Ind. Level contact guard assist  -KF     Functional Mobility- Device cane, straight  -KF     Functional Mobility- Comment Pt ambulated to/from the bathroom using SPC with CGA. Pt's SpO2 dropped to 85% on RA, though recovered quickly with rest and PLB.  -       Row Name 02/12/24 1408          Activities of Daily Living    BADL Assessment/Intervention upper body dressing;lower body dressing;grooming;toileting  -       Row Name 02/12/24 1408          Mobility    Extremity Weight-bearing Status left upper extremity  -KF     Left Upper Extremity (Weight-bearing Status) non  weight-bearing (NWB)  -KF       Row Name 02/12/24 1408          Lower Body Dressing Assessment/Training    [place] Level (Lower Body Dressing) doff;don;socks;standby assist  -KF     Position (Lower Body Dressing) edge of bed sitting  -KF       Row Name 02/12/24 1408          Grooming Assessment/Training    [place] Level (Grooming) wash face, hands;oral care regimen;set up;contact guard assist  -KF     Position (Grooming) sink side;supported standing  -KF       Row Name 02/12/24 1408          Toileting Assessment/Training    [place] Level (Toileting) adjust/manage clothing;contact guard assist;perform perineal hygiene;minimum assist (75% patient effort)  -KF     Assistive Devices (Toileting) commode;grab bar/safety frame  -KF     Position (Toileting) supported standing  -KF     Comment, (Toileting) Pt with one LOB during standing maddie care which required Raven from therapist to correct.  -KF       Row Name 02/12/24 1408          Upper Body Dressing Assessment/Training    [place] Level (Upper Body Dressing) doff;don;pajama/robe;set up  -KF     Position (Upper Body Dressing) edge of bed sitting  -KF               User Key  (r) = Recorded By, (t) = Taken By, (c) = Cosigned By      Initials Name Provider Type    Radha Lu OT Occupational Therapist                   Obj/Interventions       Row Name 02/12/24 1411          Balance    Balance Assessment sitting static balance;sitting dynamic balance;sit to stand dynamic balance;standing static balance;standing dynamic balance  -KF     Static Sitting Balance supervision  -KF     Dynamic Sitting Balance standby assist  -KF     Position, Sitting Balance unsupported;sitting edge of bed;other (see comments)  commode  -KF     Sit to Stand Dynamic Balance contact guard  -KF     Static Standing Balance contact guard  -KF     Dynamic Standing Balance contact guard  -KF     Position/Device Used, Standing Balance supported;cane, straight  -KF     Balance  Interventions sitting;standing;sit to stand;supported;static;dynamic;occupation based/functional task  -KF               User Key  (r) = Recorded By, (t) = Taken By, (c) = Cosigned By      Initials Name Provider Type    Radha Lu OT Occupational Therapist                   Goals/Plan       Row Name 02/12/24 1414          Transfer Goal 1 (OT)    Activity/Assistive Device (Transfer Goal 1, OT) sit-to-stand/stand-to-sit;toilet  -KF     [place] Level/Cues Needed (Transfer Goal 1, OT) supervision required  -KF     Time Frame (Transfer Goal 1, OT) long term goal (LTG);10 days  -KF     Progress/Outcome (Transfer Goal 1, OT) goal revised this date  -KF       Row Name 02/12/24 1414          Dressing Goal 1 (OT)    Activity/Device (Dressing Goal 1, OT) lower body dressing  -KF     [place]/Cues Needed (Dressing Goal 1, OT) modified independence  -KF     Time Frame (Dressing Goal 1, OT) long term goal (LTG);10 days  -KF     Progress/Outcome (Dressing Goal 1, OT) goal revised this date  -KF       Row Name 02/12/24 1414          Toileting Goal 1 (OT)    Activity/Device (Toileting Goal 1, OT) adjust/manage clothing;perform perineal hygiene;commode;grab bar/safety frame  -KF     [place] Level/Cues Needed (Toileting Goal 1, OT) supervision required  -KF     Time Frame (Toileting Goal 1, OT) long term goal (LTG);10 days  -KF     Progress/Outcome (Toileting Goal 1, OT) goal revised this date  -KF               User Key  (r) = Recorded By, (t) = Taken By, (c) = Cosigned By      Initials Name Provider Type    Radha Lu OT Occupational Therapist                   Clinical Impression       Row Name 02/12/24 1412          Pain Assessment    Pretreatment Pain Rating 3/10  -KF     Posttreatment Pain Rating 3/10  -KF     Pain Location - Side/Orientation Left  -KF     Pain Location generalized  -KF     Pain Location - hand  -KF     Pain Intervention(s) Repositioned;Ambulation/increased activity;Nursing  Notified  -       Row Name 02/12/24 1412          Plan of Care Review    Plan of Care Reviewed With patient  -     Progress improving  -     Outcome Evaluation Pt with good participation and progress toward goals during OT session today. The pt performed in room ambulation to/from the bathroom using SPC with CGA. The pt performed sink side grooming ADLs with CGA. Toileting also completed, though [place] required due to LOB during post toileting hygiene. The pt's SpO2 dropped to 85% on RA during mobility, though steve within 30 seconds with PLB and rest. The pt remains below her functional baseline with generalized weakness, decreased activity tolerance, and balance deficits. Pt will benefit from continued IP OT services to address deficits listed. If deemed medically appropriate, continue to recommend a d/c to IRF for best outcome.  -       Row Name 02/12/24 1412          Therapy Assessment/Plan (OT)    Rehab Potential (OT) good, to achieve stated therapy goals  -     Criteria for Skilled Therapeutic Interventions Met (OT) yes;meets criteria;skilled treatment is necessary  -     Therapy Frequency (OT) daily  -       Row Name 02/12/24 1412          Therapy Plan Review/Discharge Plan (OT)    Anticipated Discharge Disposition (OT) inpatient rehabilitation facility  -       Row Name 02/12/24 1412          Vital Signs    Pre SpO2 (%) 93  -KF     O2 Delivery Pre Treatment room air  -KF     Intra SpO2 (%) 85   -KF     O2 Delivery Intra Treatment room air  -KF     Post SpO2 (%) 93  -KF     O2 Delivery Post Treatment room air  -KF     Pre Patient Position Supine  -KF     Intra Patient Position Standing  -KF     Post Patient Position Supine  -       Row Name 02/12/24 1412          Positioning and Restraints    Pre-Treatment Position in bed  -KF     Post Treatment Position bed  -KF     In Bed notified nsg;supine;call light within reach;encouraged to call for assist;exit alarm on  -KF               User Key  (r) =  Recorded By, (t) = Taken By, (c) = Cosigned By      Initials Name Provider Type    Radha Lu OT Occupational Therapist                   Outcome Measures       Row Name 02/12/24 1415          How much help from another is currently needed...    Putting on and taking off regular lower body clothing? 3  -KF     Bathing (including washing, rinsing, and drying) 3  -KF     Toileting (which includes using toilet bed pan or urinal) 3  -KF     Putting on and taking off regular upper body clothing 3  -KF     Taking care of personal grooming (such as brushing teeth) 3  -KF     Eating meals 3  -KF     AM-PAC 6 Clicks Score (OT) 18  -KF       Row Name 02/12/24 0801          How much help from another person do you currently need...    Turning from your back to your side while in flat bed without using bedrails? 4  -TN     Moving from lying on back to sitting on the side of a flat bed without bedrails? 4  -TN     Moving to and from a bed to a chair (including a wheelchair)? 2  -TN     Standing up from a chair using your arms (e.g., wheelchair, bedside chair)? 2  -TN     Climbing 3-5 steps with a railing? 2  -TN     To walk in hospital room? 2  -TN     AM-PAC 6 Clicks Score (PT) 16  -TN     Highest Level of Mobility Goal 5 --> Static standing  -TN       Row Name 02/12/24 1415          Functional Assessment    Outcome Measure Options AM-PAC 6 Clicks Daily Activity (OT)  -KF               User Key  (r) = Recorded By, (t) = Taken By, (c) = Cosigned By      Initials Name Provider Type    Nuria Monsivais RN Registered Nurse    Radha Lu OT Occupational Therapist                    Occupational Therapy Education       Title: PT OT SLP Therapies (Done)       Topic: Occupational Therapy (Done)       Point: ADL training (Done)       Description:   Instruct learner(s) on proper safety adaptation and remediation techniques during self care or transfers.   Instruct in proper use of assistive devices.                   Learning Progress Summary             Patient Acceptance, E,TB, VU,DU by KF at 2/12/2024 1331    Acceptance, E,TB, VU by RANDELL at 2/12/2024 0350    Acceptance, E, VU by DANNA at 2/8/2024 1502    Acceptance, E, VU by AG at 2/5/2024 0346    Acceptance, E, VU by DEANGELO at 2/2/2024 0949    Comment: OT POC; incentive spirometer use/goal setting; one-handed technique; deep breathing for relaxation                         Point: Home exercise program (Done)       Description:   Instruct learner(s) on appropriate technique for monitoring, assisting and/or progressing therapeutic exercises/activities.                  Learning Progress Summary             Patient Acceptance, E,TB, VU by RANDELL at 2/12/2024 0350    Acceptance, E, VU by DANNA at 2/8/2024 1502                         Point: Precautions (Done)       Description:   Instruct learner(s) on prescribed precautions during self-care and functional transfers.                  Learning Progress Summary             Patient Acceptance, E,TB, VU,DU by KF at 2/12/2024 1331    Acceptance, E,TB, VU by RANDELL at 2/12/2024 0350    Acceptance, E, VU by DANNA at 2/8/2024 1502    Acceptance, E, VU by AG at 2/5/2024 0346    Acceptance, E, VU by DEANGELO at 2/2/2024 0949    Comment: OT POC; incentive spirometer use/goal setting; one-handed technique; deep breathing for relaxation                         Point: Body mechanics (Done)       Description:   Instruct learner(s) on proper positioning and spine alignment during self-care, functional mobility activities and/or exercises.                  Learning Progress Summary             Patient Acceptance, E,TB, VU,DU by KF at 2/12/2024 1331    Acceptance, E,TB, VU by RANDELL at 2/12/2024 0350    Acceptance, E, VU by AG at 2/5/2024 0346                                         User Key       Initials Effective Dates Name Provider Type Discipline    RANDELL 06/16/21 -  Vanessa Morley RN Registered Nurse Nurse    DANNA 06/16/21 -  Coni Rapp OT Occupational Therapist OT    DEANGELO  06/16/21 -  Vanessa Panchal OT Occupational Therapist OT    AG 11/01/23 -  Cb Dobson, RN Registered Nurse Nurse     08/09/23 -  Radha Cabrera OT Occupational Therapist OT                  OT Recommendation and Plan  Therapy Frequency (OT): daily  Plan of Care Review  Plan of Care Reviewed With: patient  Progress: improving  Outcome Evaluation: Pt with good participation and progress toward goals during OT session today. The pt performed in room ambulation to/from the bathroom using SPC with CGA. The pt performed sink side grooming ADLs with CGA. Toileting also completed, though [place] required due to LOB during post toileting hygiene. The pt's SpO2 dropped to 85% on RA during mobility, though steve within 30 seconds with PLB and rest. The pt remains below her functional baseline with generalized weakness, decreased activity tolerance, and balance deficits. Pt will benefit from continued IP OT services to address deficits listed. If deemed medically appropriate, continue to recommend a d/c to IRF for best outcome.     Time Calculation:         Time Calculation- OT       Row Name 02/12/24 1415             Time Calculation- OT    OT Start Time 1331  -KF      OT Received On 02/12/24  -KF      OT Goal Re-Cert Due Date 02/22/24  -KF         Timed Charges    25177 - OT Therapeutic Activity Minutes 9  -KF      44689 - OT Self Care/Mgmt Minutes 25  -KF         Total Minutes    Timed Charges Total Minutes 34  -KF       Total Minutes 34  -KF                User Key  (r) = Recorded By, (t) = Taken By, (c) = Cosigned By      Initials Name Provider Type    KF Radha Cabrera OT Occupational Therapist                  Therapy Charges for Today       Code Description Service Date Service Provider Modifiers Qty    [id number] HC OT SELF CARE/MGMT/TRAIN EA 15 MIN 2/12/2024 Radha Carbera OT GO 2                 Radha Cabrera OT  2/12/2024

## 2024-02-12 NOTE — PLAN OF CARE
Goal Outcome Evaluation:  Plan of Care Reviewed With: patient           Outcome Evaluation: VSS, on 1.5LNC. Transfer from . A&OX4. UOP-WNL. no c/o pain. Pt resting well. Awaiting placement for rehab. Will continue to monitor.

## 2024-02-12 NOTE — PROGRESS NOTES
[facility] Medicine Services  PROGRESS NOTE    Patient Name: Philomena Davis  : 1960  MRN: [id number]    Date of Admission: 2024  Primary Care Physician: Unique Brandt DO    Subjective   Subjective     CC:  Weakness    HPI:  Patient is a 64 yo F seen and examined by me this AM, no new acute complaints or problems, resting comfortably in bed this AM, continuing to await appeal for rehab at this time. Patient transferred to  overnight. Continue to follow with CM, likely however patient may need to go home with home health and she has been advised this again this morning, continue to follow with CM. No other acute changes at this time.       Objective   Objective     Vital Signs:   Temp:  [97.6 °F (36.4 °C)-98.8 °F (37.1 °C)] 98.3 °F (36.8 °C)  Heart Rate:  [71-88] 84  Resp:  [16-18] 18  BP: ()/(53-79) 111/75  Flow (L/min):  [1-2] 1.5     Physical Exam:  Constitutional: No acute distress, awake, alert, currently on 1L NC   HENT: NCAT, nares patent, mucous membranes moist  Respiratory: Respiratory effort normal, no rhonchi or wheezing   Cardiovascular: RRR, no murmurs, rubs, or gallops  Gastrointestinal: Soft, nontender, nondistended  Musculoskeletal: L hand in splint  Psychiatric: Appropriate affect, cooperative  Neurologic: Oriented x 3, speech clear  Skin: No rashes     Results Reviewed:  LAB RESULTS:      Lab 24  0508 24  0532 02/10/24  0709 24  0330   WBC 4.22 3.93 3.50 4.55   HEMOGLOBIN 10.3* 10.5* 10.3* 10.2*   HEMATOCRIT 31.7* 33.3* 31.8* 31.2*   PLATELETS 205 213 207 252   NEUTROS ABS 2.36 2.03 1.71 2.86   IMMATURE GRANS (ABS) 0.02 0.03 0.01 0.04   LYMPHS ABS 1.11 1.17 1.10 0.88   MONOS ABS 0.53 0.54 0.53 0.58   EOS ABS 0.15 0.10 0.09 0.12   MCV 99.1* 99.7* 97.2* 96.0         Lab 24  0508 24  0532 02/10/24  0709 24  0549 24  0330   SODIUM 140 141 140 140  --  138   POTASSIUM 3.7 4.2 4.1 5.0 3.6 3.1*    CHLORIDE 103 105 102 106  --  101   CO2 29.0 29.0 30.0* 26.0  --  28.0   ANION GAP 8.0 7.0 8.0 8.0  --  9.0   BUN 6* 8 5* 5*  --  5*   CREATININE 0.57 0.67 0.62 0.63  --  0.59   EGFR 103.5 99.6 101.5 101.1  --  102.7   GLUCOSE 93 92 88 75  --  94   CALCIUM 8.9 8.8 8.9 8.9  --  8.8   MAGNESIUM 1.8 1.8 1.8  --   --   --    PHOSPHORUS  --   --   --   --   --  4.3         Lab 02/12/24  0508 02/11/24  0532 02/10/24  0709 02/06/24  0330   TOTAL PROTEIN 6.0 5.6* 5.6*  --    ALBUMIN 3.1* 3.2* 3.1* 3.0*   GLOBULIN 2.9 2.4 2.5  --    ALT (SGPT) 13 13 12  --    AST (SGOT) 34* 33* 32  --    BILIRUBIN 0.6 0.7 0.7  --    ALK PHOS 124* 122* 112  --                      Brief Urine Lab Results  (Last result in the past 365 days)        Color   Clarity   Blood   Leuk Est   Nitrite   Protein   CREAT   Urine HCG        05/19/23 0834 Orange  Comment: on azo   Cloudy   3+   Large (3+)   Positive   3+                   Microbiology Results Abnormal       Procedure Component Value - Date/Time    Clostridioides difficile Toxin - Stool, Per Rectum [[id number]]  (Normal) Collected: 02/07/24 0715    Lab Status: Final result Specimen: Stool from Per Rectum Updated: 02/07/24 0825    Narrative:      The following orders were created for panel order Clostridioides difficile Toxin - Stool, Per Rectum.  Procedure                               Abnormality         Status                     ---------                               -----------         ------                     Clostridioides difficile...[[id number]]  Normal              Final result                 Please view results for these tests on the individual orders.    Clostridioides difficile Toxin, PCR - Stool, Per Rectum [[id number]]  (Normal) Collected: 02/07/24 0715    Lab Status: Final result Specimen: Stool from Per Rectum Updated: 02/07/24 0825     Toxigenic C. difficile by PCR Not Detected    Narrative:      The result indicates the absence of toxigenic C. difficile from stool  specimen.             No radiology results from the last 24 hrs        Current medications:  Scheduled Meds:budesonide-formoterol, 1 puff, Inhalation, BID - RT  carvedilol, 3.125 mg, Oral, BID With Meals  cloNIDine, 0.1 mg, Oral, Nightly  desvenlafaxine, 50 mg, Oral, Daily  folic acid, 1 mg, Oral, Daily  latanoprost, 1 drop, Both Eyes, Nightly  pantoprazole, 40 mg, Oral, BID AC  rosuvastatin, 5 mg, Oral, Nightly  saccharomyces boulardii, 250 mg, Oral, BID  senna-docusate sodium, 2 tablet, Oral, BID  thiamine, 100 mg, Oral, Daily  tiotropium bromide monohydrate, 2 puff, Inhalation, Daily - RT  ursodiol, 300 mg, Oral, Daily      Continuous Infusions:     PRN Meds:.  acetaminophen    albuterol    senna-docusate sodium **AND** polyethylene glycol **AND** bisacodyl **AND** bisacodyl    HYDROcodone-acetaminophen    loperamide    Magnesium Standard Dose Replacement - Follow Nurse / BPA Driven Protocol    nitroglycerin    ondansetron ODT **OR** ondansetron    Phosphorus Replacement - Follow Nurse / BPA Driven Protocol    Potassium Replacement - Follow Nurse / BPA Driven Protocol    Assessment & Plan   Assessment & Plan     Active Hospital Problems    Diagnosis  POA    Moderate malnutrition [E44.0]  Yes    Closed fracture of left hand [S62.92XA]  Unknown    Hypokalemia [E87.6]  Yes    Hematemesis without nausea [K92.0]  Unknown    Melena [K92.1]  Unknown    Blood loss anemia [D50.0]  Unknown    Alcohol use disorder [F10.90]  Yes    GERD without esophagitis [K21.9]  Yes    Alcoholic cirrhosis of liver without ascites [K70.30]  Yes    Essential hypertension [I10]  Yes    Generalized anxiety disorder [F41.1]  Yes    Hyperlipidemia [E78.5]  Yes      Resolved Hospital Problems   No resolved problems to display.        Brief Hospital Course to date:  Philomena Davis is a 63 y.o. female with ETOH cirrhosis, HTN, anxiety who presented with significant weakness after reported hematemesis. EGD revealed severe esophagitis. Also found  to have left hand 5th metacarpal fracture which inhibits ability to complete daily casts. Stay c/b persistent debility, and difficulty with ADLs and need for placement. Patient new to my services on the AM of 2/9, discussed with patient and CM this AM, currently awaiting insurance appeal at this time for possible rehab placement, continue to follow. Patient refusing other placement options at this time, if denied may have to be discharged home with home health. Continue to follow at this time. Continuing to await possible appeal about rehab at this time, discussed with patient, not interested in other rehab options or long term placement secondary to her pet, no one to care for and currently boarding at this time. Follow up with CM early next week, if appeal denied may need possible d/c home with home health. Continue to follow, no acute changes into 2/11, follow up with CM in the AM concerning possible rehab appeal, continue to follow, awaiting further decision on appeal. Patient has been advised she will likely need to go home with home health, CM working on arranging, continue to follow.      Likely atypical PNA, suspect aspiration  -CXR 2/3 c/w atypical PNA v atelectasis v aspiration  -completed course of rocephin and azithromax  -Encourage IS  - Continue weaning oxygen as tolerated, weaned to 1L NC, continue to follow, continue to wean, may need possible home oxygen.        Severe reflux esophagitis c/b ulceration + gastric ulcer  -BID PPI x 3 months, then daily  -Needs repeat EGD in 3 months recommended to assess healing  -completed IV ceftriaxone given UGI bleed and concomitant cirrhosis     ETOH cirrhosis w h/o ETOH use  -continue coreg with hold parameters     LEFT hand 5th metacarpal fracture - f/u Naomi Duncan PA-C 1 week, NWB LUE  -rewrapped splint at bedside on 2/8, continue to change ace wrap as needed  - Likely outpatient follow up in 1 week following discharge.     HTN   - on clonidine OP for  insomnia   -Changed to nightly due to low bp readings      Anxiety   - Continue home desvenlafaxine.      Chronic insomnia   Severe hypokalemia 2/2 vomiting, resolved  - replace as needed    Diarrhea- resolved   -continue florastor  -C. difficile negative  -Added Imodium  - Much improved per patient.      Significant debility/weakness  - needing assistance before return home alone, pending insurance appeal but have advised patient may have to go home with home health, continue to follow with CM at this time.   -Difficulty using walker without able to bear weight on her left and      Expected Discharge Location and Transportation:?  Rehab vs home with home health   Expected Discharge   Expected Discharge Date: 2/12/2024; Expected Discharge Time:      DVT prophylaxis:  Mechanical DVT prophylaxis orders are present.         AM-PAC 6 Clicks Score (PT): 20 (02/12/24 8367)    CODE STATUS:   Code Status and Medical Interventions:   Ordered at: 01/30/24 9174     Level Of Support Discussed With:    Patient     Code Status (Patient has no pulse and is not breathing):    CPR (Attempt to Resuscitate)     Medical Interventions (Patient has pulse or is breathing):    Full Support       JASE Kruger, DO  02/12/24

## 2024-02-13 ENCOUNTER — DOCUMENTATION (OUTPATIENT)
Dept: HOME HEALTH SERVICES | Facility: HOME HEALTHCARE | Age: 64
End: 2024-02-13

## 2024-02-13 LAB
ALBUMIN SERPL-MCNC: 3.4 G/DL (ref 3.5–5.2)
ALBUMIN/GLOB SERPL: 1.5 G/DL
ALP SERPL-CCNC: 137 U/L (ref 39–117)
ALT SERPL W P-5'-P-CCNC: 13 U/L (ref 1–33)
ANION GAP SERPL CALCULATED.3IONS-SCNC: 8 MMOL/L (ref 5–15)
AST SERPL-CCNC: 35 U/L (ref 1–32)
BASOPHILS # BLD AUTO: 0.05 10*3/MM3 (ref 0–0.2)
BASOPHILS NFR BLD AUTO: 1.3 % (ref 0–1.5)
BILIRUB SERPL-MCNC: 0.5 MG/DL (ref 0–1.2)
BUN SERPL-MCNC: 8 MG/DL (ref 8–23)
BUN/CREAT SERPL: 10.7 (ref 7–25)
CALCIUM SPEC-SCNC: 9.1 MG/DL (ref 8.6–10.5)
CHLORIDE SERPL-SCNC: 103 MMOL/L (ref 98–107)
CO2 SERPL-SCNC: 29 MMOL/L (ref 22–29)
CREAT SERPL-MCNC: 0.75 MG/DL (ref 0.57–1)
DEPRECATED RDW RBC AUTO: 49 FL (ref 37–54)
EGFRCR SERPLBLD CKD-EPI 2021: 90.7 ML/MIN/1.73
EOSINOPHIL # BLD AUTO: 0.19 10*3/MM3 (ref 0–0.4)
EOSINOPHIL NFR BLD AUTO: 4.8 % (ref 0.3–6.2)
ERYTHROCYTE [DISTWIDTH] IN BLOOD BY AUTOMATED COUNT: 13.2 % (ref 12.3–15.4)
GLOBULIN UR ELPH-MCNC: 2.3 GM/DL
GLUCOSE SERPL-MCNC: 93 MG/DL (ref 65–99)
HCT VFR BLD AUTO: 31.9 % (ref 34–46.6)
HGB BLD-MCNC: 10.4 G/DL (ref 12–15.9)
IMM GRANULOCYTES # BLD AUTO: 0.02 10*3/MM3 (ref 0–0.05)
IMM GRANULOCYTES NFR BLD AUTO: 0.5 % (ref 0–0.5)
LYMPHOCYTES # BLD AUTO: 1.16 10*3/MM3 (ref 0.7–3.1)
LYMPHOCYTES NFR BLD AUTO: 29.3 % (ref 19.6–45.3)
MCH RBC QN AUTO: 32.4 PG (ref 26.6–33)
MCHC RBC AUTO-ENTMCNC: 32.6 G/DL (ref 31.5–35.7)
MCV RBC AUTO: 99.4 FL (ref 79–97)
MONOCYTES # BLD AUTO: 0.54 10*3/MM3 (ref 0.1–0.9)
MONOCYTES NFR BLD AUTO: 13.6 % (ref 5–12)
NEUTROPHILS NFR BLD AUTO: 2 10*3/MM3 (ref 1.7–7)
NEUTROPHILS NFR BLD AUTO: 50.5 % (ref 42.7–76)
NRBC BLD AUTO-RTO: 0 /100 WBC (ref 0–0.2)
PLATELET # BLD AUTO: 201 10*3/MM3 (ref 140–450)
PMV BLD AUTO: 10.3 FL (ref 6–12)
POTASSIUM SERPL-SCNC: 4 MMOL/L (ref 3.5–5.2)
PROT SERPL-MCNC: 5.7 G/DL (ref 6–8.5)
RBC # BLD AUTO: 3.21 10*6/MM3 (ref 3.77–5.28)
SODIUM SERPL-SCNC: 140 MMOL/L (ref 136–145)
WBC NRBC COR # BLD AUTO: 3.96 10*3/MM3 (ref 3.4–10.8)

## 2024-02-13 PROCEDURE — 99232 SBSQ HOSP IP/OBS MODERATE 35: CPT | Performed by: PEDIATRICS

## 2024-02-13 PROCEDURE — 80053 COMPREHEN METABOLIC PANEL: CPT | Performed by: FAMILY MEDICINE

## 2024-02-13 PROCEDURE — G0378 HOSPITAL OBSERVATION PER HR: HCPCS

## 2024-02-13 PROCEDURE — 94799 UNLISTED PULMONARY SVC/PX: CPT

## 2024-02-13 PROCEDURE — 85025 COMPLETE CBC W/AUTO DIFF WBC: CPT | Performed by: FAMILY MEDICINE

## 2024-02-13 RX ORDER — BENZOCAINE/MENTHOL 6 MG-10 MG
1 LOZENGE MUCOUS MEMBRANE EVERY 12 HOURS SCHEDULED
Status: DISCONTINUED | OUTPATIENT
Start: 2024-02-13 | End: 2024-02-15 | Stop reason: HOSPADM

## 2024-02-13 RX ADMIN — HYDROCODONE BITARTRATE AND ACETAMINOPHEN 1 TABLET: 7.5; 325 TABLET ORAL at 20:28

## 2024-02-13 RX ADMIN — TIOTROPIUM BROMIDE INHALATION SPRAY 2 PUFF: 3.12 SPRAY, METERED RESPIRATORY (INHALATION) at 11:40

## 2024-02-13 RX ADMIN — BUDESONIDE AND FORMOTEROL FUMARATE DIHYDRATE 1 PUFF: 160; 4.5 AEROSOL RESPIRATORY (INHALATION) at 10:43

## 2024-02-13 RX ADMIN — HYDROCORTISONE 1 APPLICATION: 1 CREAM TOPICAL at 20:17

## 2024-02-13 RX ADMIN — THIAMINE HCL TAB 100 MG 100 MG: 100 TAB at 08:03

## 2024-02-13 RX ADMIN — Medication 250 MG: at 20:17

## 2024-02-13 RX ADMIN — CARVEDILOL 3.12 MG: 3.12 TABLET, FILM COATED ORAL at 08:03

## 2024-02-13 RX ADMIN — PANTOPRAZOLE SODIUM 40 MG: 40 TABLET, DELAYED RELEASE ORAL at 17:13

## 2024-02-13 RX ADMIN — PANTOPRAZOLE SODIUM 40 MG: 40 TABLET, DELAYED RELEASE ORAL at 08:03

## 2024-02-13 RX ADMIN — ROSUVASTATIN CALCIUM 5 MG: 10 TABLET, FILM COATED ORAL at 20:16

## 2024-02-13 RX ADMIN — DESVENLAFAXINE 50 MG: 50 TABLET, FILM COATED, EXTENDED RELEASE ORAL at 08:03

## 2024-02-13 RX ADMIN — CLONIDINE HYDROCHLORIDE 0.1 MG: 0.1 TABLET ORAL at 20:16

## 2024-02-13 RX ADMIN — URSODIOL 300 MG: 300 CAPSULE ORAL at 08:03

## 2024-02-13 RX ADMIN — CARVEDILOL 3.12 MG: 3.12 TABLET, FILM COATED ORAL at 17:13

## 2024-02-13 RX ADMIN — FOLIC ACID 1 MG: 1 TABLET ORAL at 08:03

## 2024-02-13 RX ADMIN — Medication 250 MG: at 08:03

## 2024-02-13 RX ADMIN — BUDESONIDE AND FORMOTEROL FUMARATE DIHYDRATE 1 PUFF: 160; 4.5 AEROSOL RESPIRATORY (INHALATION) at 18:55

## 2024-02-13 RX ADMIN — HYDROCODONE BITARTRATE AND ACETAMINOPHEN 1 TABLET: 7.5; 325 TABLET ORAL at 14:21

## 2024-02-13 RX ADMIN — HYDROCODONE BITARTRATE AND ACETAMINOPHEN 1 TABLET: 7.5; 325 TABLET ORAL at 08:06

## 2024-02-13 RX ADMIN — LATANOPROST 1 DROP: 50 SOLUTION OPHTHALMIC at 20:17

## 2024-02-13 NOTE — PROGRESS NOTES
[facility] Medicine Services  PROGRESS NOTE    Patient Name: Philomena Davis  : 1960  MRN: [id number]    Date of Admission: 2024  Primary Care Physician: Unique Brandt DO    Subjective   Subjective     CC:  Weakness    HPI:  Pt continues to complain of weakness.  States that going home is going to be difficult and is worried about not being able to make it to outpatient appointments.  Cousin will have to be the one who takes her home.      Objective   Objective     Vital Signs:   Temp:  [97.2 °F (36.2 °C)-98.3 °F (36.8 °C)] 98 °F (36.7 °C)  Heart Rate:  [70-90] 82  Resp:  [16-18] 18  BP: ()/(60-71) 104/61     Physical Exam:  Constitutional: No acute distress, awake, alert, currently on 1L NC   Respiratory: Respiratory effort normal, no rhonchi or wheezing   Cardiovascular: RRR, no murmurs, rubs, or gallops  Gastrointestinal: Soft, nontender, nondistended  Musculoskeletal: L hand in splint  Psychiatric: Appropriate affect, cooperative  Neurologic: Oriented x 3, speech clear  Skin: No rashes     Results Reviewed:  LAB RESULTS:      Lab 24  0536 24  0508 24  0532 02/10/24  0709   WBC 3.96 4.22 3.93 3.50   HEMOGLOBIN 10.4* 10.3* 10.5* 10.3*   HEMATOCRIT 31.9* 31.7* 33.3* 31.8*   PLATELETS 201 205 213 207   NEUTROS ABS 2.00 2.36 2.03 1.71   IMMATURE GRANS (ABS) 0.02 0.02 0.03 0.01   LYMPHS ABS 1.16 1.11 1.17 1.10   MONOS ABS 0.54 0.53 0.54 0.53   EOS ABS 0.19 0.15 0.10 0.09   MCV 99.4* 99.1* 99.7* 97.2*         Lab 24  0536 24  0508 24  0532 02/10/24  0709 24  0549   SODIUM 140 140 141 140 140   POTASSIUM 4.0 3.7 4.2 4.1 5.0   CHLORIDE 103 103 105 102 106   CO2 29.0 29.0 29.0 30.0* 26.0   ANION GAP 8.0 8.0 7.0 8.0 8.0   BUN 8 6* 8 5* 5*   CREATININE 0.75 0.57 0.67 0.62 0.63   EGFR 90.7 103.5 99.6 101.5 101.1   GLUCOSE 93 93 92 88 75   CALCIUM 9.1 8.9 8.8 8.9 8.9   MAGNESIUM  --  1.8 1.8 1.8  --          Lab 24  0536  02/12/24  0508 02/11/24  0532 02/10/24  0709   TOTAL PROTEIN 5.7* 6.0 5.6* 5.6*   ALBUMIN 3.4* 3.1* 3.2* 3.1*   GLOBULIN 2.3 2.9 2.4 2.5   ALT (SGPT) 13 13 13 12   AST (SGOT) 35* 34* 33* 32   BILIRUBIN 0.5 0.6 0.7 0.7   ALK PHOS 137* 124* 122* 112                     Brief Urine Lab Results  (Last result in the past 365 days)        Color   Clarity   Blood   Leuk Est   Nitrite   Protein   CREAT   Urine HCG        05/19/23 0834 Orange  Comment: on azo   Cloudy   3+   Large (3+)   Positive   3+                   Microbiology Results Abnormal       Procedure Component Value - Date/Time    Clostridioides difficile Toxin - Stool, Per Rectum [[id number]]  (Normal) Collected: 02/07/24 0715    Lab Status: Final result Specimen: Stool from Per Rectum Updated: 02/07/24 0825    Narrative:      The following orders were created for panel order Clostridioides difficile Toxin - Stool, Per Rectum.  Procedure                               Abnormality         Status                     ---------                               -----------         ------                     Clostridioides difficile...[[id number]]  Normal              Final result                 Please view results for these tests on the individual orders.    Clostridioides difficile Toxin, PCR - Stool, Per Rectum [[id number]]  (Normal) Collected: 02/07/24 0715    Lab Status: Final result Specimen: Stool from Per Rectum Updated: 02/07/24 0825     Toxigenic C. difficile by PCR Not Detected    Narrative:      The result indicates the absence of toxigenic C. difficile from stool specimen.             No radiology results from the last 24 hrs        Current medications:  Scheduled Meds:budesonide-formoterol, 1 puff, Inhalation, BID - RT  carvedilol, 3.125 mg, Oral, BID With Meals  cloNIDine, 0.1 mg, Oral, Nightly  desvenlafaxine, 50 mg, Oral, Daily  folic acid, 1 mg, Oral, Daily  latanoprost, 1 drop, Both Eyes, Nightly  pantoprazole, 40 mg, Oral, BID AC  rosuvastatin, 5 mg,  Oral, Nightly  saccharomyces boulardii, 250 mg, Oral, BID  senna-docusate sodium, 2 tablet, Oral, BID  thiamine, 100 mg, Oral, Daily  tiotropium bromide monohydrate, 2 puff, Inhalation, Daily - RT  ursodiol, 300 mg, Oral, Daily      Continuous Infusions:     PRN Meds:.  acetaminophen    albuterol    senna-docusate sodium **AND** polyethylene glycol **AND** bisacodyl **AND** bisacodyl    HYDROcodone-acetaminophen    loperamide    Magnesium Standard Dose Replacement - Follow Nurse / BPA Driven Protocol    nitroglycerin    ondansetron ODT **OR** ondansetron    Phosphorus Replacement - Follow Nurse / BPA Driven Protocol    Potassium Replacement - Follow Nurse / BPA Driven Protocol    Assessment & Plan   Assessment & Plan     Active Hospital Problems    Diagnosis  POA    Moderate malnutrition [E44.0]  Yes    Closed fracture of left hand [S62.92XA]  Unknown    Hypokalemia [E87.6]  Yes    Hematemesis without nausea [K92.0]  Unknown    Melena [K92.1]  Unknown    Blood loss anemia [D50.0]  Unknown    Alcohol use disorder [F10.90]  Yes    GERD without esophagitis [K21.9]  Yes    Alcoholic cirrhosis of liver without ascites [K70.30]  Yes    Essential hypertension [I10]  Yes    Generalized anxiety disorder [F41.1]  Yes    Hyperlipidemia [E78.5]  Yes      Resolved Hospital Problems   No resolved problems to display.        Brief Hospital Course to date:  Philomena Davis is a 63 y.o. female with ETOH cirrhosis, HTN, anxiety who presented with significant weakness after reported hematemesis. EGD revealed severe esophagitis. Found to have left hand 5th metacarpal fracture which inhibits ability to complete daily casts. Stay c/b persistent debility, and difficulty with ADLs and need for placement.     Currently awaiting personal insurance appeal at this time for possible rehab placement, Patient refusing other placement options at this time, if denied may patient has been advised she will likely need to go home with home health, CM  working on arranging, continue to follow.      Likely atypical PNA, suspect aspiration  -CXR 2/3 c/w atypical PNA v atelectasis v aspiration  -completed course of rocephin and azithromax  -Encourage IS  - Continue weaning oxygen as tolerated, weaned to 1L NC, continue to follow, continue to wean, may need possible home oxygen.        Severe reflux esophagitis c/b ulceration + gastric ulcer  -BID PPI x 3 months, then daily  -Needs repeat EGD in 3 months recommended to assess healing  -completed IV ceftriaxone given UGI bleed and concomitant cirrhosis     ETOH cirrhosis w h/o ETOH use  -continue coreg with hold parameters     LEFT hand 5th metacarpal fracture - f/u Naomi Duncan PA-C 1 week, NWB LUE  -rewrapped splint at bedside on 2/8, continue to change ace wrap as needed  - Likely outpatient follow up in 1 week following discharge.  Discussed importance of outpatient follow up with patient today and pt expressed concerns about being able to get transport to make it to her appointments.  This was discussed with CM today.    HTN   - on clonidine OP for insomnia   -Changed to nightly due to low bp readings      Anxiety   - Continue home desvenlafaxine.      Chronic insomnia   Severe hypokalemia 2/2 vomiting, resolved  - replace as needed    Diarrhea- resolved   -continue florastor  -C. difficile negative  - Much improved per patient.      Significant debility/weakness  - needing assistance before return home alone, pending insurance appeal but have advised patient may have to go home with home health, continue to follow with CM at this time.   -Difficulty using walker without able to bear weight on her left  -PT/OT still following.      Expected Discharge Location and Transportation:?  Rehab vs home with home health   Expected Discharge   Expected Discharge Date: 2/14/2024; Expected Discharge Time:      DVT prophylaxis:  Mechanical DVT prophylaxis orders are present.         AM-PAC 6 Clicks Score (PT): 20  (02/13/24 0735)    CODE STATUS:   Code Status and Medical Interventions:   Ordered at: 01/30/24 1737     Level Of Support Discussed With:    Patient     Code Status (Patient has no pulse and is not breathing):    CPR (Attempt to Resuscitate)     Medical Interventions (Patient has pulse or is breathing):    Full Support       Anisha New MD  02/13/24

## 2024-02-13 NOTE — CASE MANAGEMENT/SOCIAL WORK
Continued Stay Note  [facility]     Patient Name: Philomena Davis  MRN: [id number]  Today's Date: 2/13/2024    Admit Date: 1/30/2024    Plan: Home with services   Discharge Plan       Row Name 02/13/24 1559       Plan    Plan Home with services    Patient/Family in Agreement with Plan yes  Patient    Plan Comments Met with patient at bedside to discuss her discharge plan.  Pleasant and engaged in conversation, updated [facility] will be able to accept for physical therapy and nursing-patient reports her plan is to go home, currently working on transportation.  Discussed hospital could arrange Lyft or inquire if she has Medicaid transportation coverage, however neither option would assist her with getting into the house.  Reports she thinks she may have a friend that could transport after work tomorrow, aware we will discuss again in the morning.  Will contact Medicaid tomorrow regarding transportation benefit for future appointments.  PT has been working with patient using a cane, reports she has a cane at home. Discussed possibly obtaining a BSC and patient agreeable, will arrange.  I reached out to PT/OT regarding if any additional DME needs/recommendations, they also agree BSC would be helpful.  Anticipate home tomorrow as long as everything in place, will f/u with Ms. Davis in am.  Discussed in unit multidisciplinary rounds this morning.  Marge Gama, Ext. [phone]    Final Discharge Disposition Code 06 - home with home health care                   Discharge Codes    No documentation.                 Expected Discharge Date and Time       Expected Discharge Date Expected Discharge Time    Feb 14, 2024               SILKE Pardo

## 2024-02-13 NOTE — PLAN OF CARE
Goal Outcome Evaluation:  Plan of Care Reviewed With: patient        Progress: improving  Outcome Evaluation: VSS, on RA. and tolerating well. Pain controlled with oral meds. Pt slept well. POC is home with home health. Will continue to monitor.

## 2024-02-13 NOTE — CASE MANAGEMENT/SOCIAL WORK
Continued Stay Note  [facility]     Patient Name: Philomena Davis  MRN: [id number]  Today's Date: 2/13/2024    Admit Date: 1/30/2024    Plan: Home with services   Discharge Plan       Row Name 02/13/24 1897       Plan    Plan Comments I spoke with patient by telephone, reports her friend will be able to transport her home on Thursday, which will be safest plan so she will have assistance.  Will change discharge home to Thursday with friend transporting.  Will update staff in rounds tomorrow morning.  Marge Gama, Ext. [phone]    Final Discharge Disposition Code 06 - home with home health care      Row Name 02/13/24 2863       Plan    Plan Home with services    Patient/Family in Agreement with Plan yes  Patient    Plan Comments Met with patient at bedside to discuss her discharge plan.  Pleasant and engaged in conversation, updated [facility] will be able to accept for physical therapy and nursing-patient reports her plan is to go home, currently working on transportation.  Discussed hospital could arrange Lyft or inquire if she has Medicaid transportation coverage, however neither option would assist her with getting into the house.  Reports she thinks she may have a friend that could transport after work tomorrow, aware we will discuss again in the morning.  Will contact Medicaid tomorrow regarding transportation benefit for future appointments.  PT has been working with patient using a cane, reports she has a cane at home. Discussed possibly obtaining a BSC and patient agreeable, will arrange.  I reached out to PT/OT to let me know if any additional DME needs/recommendations, they also agree BSC would be helpful.  Anticipate home tomorrow as long as everything in place, will f/u with Ms. Davis in am.  Discussed in unit multidisciplinary rounds this morning.    Final Discharge Disposition Code 06 - home with home health care                   Discharge Codes    No documentation.                 Expected Discharge  Date and Time       Expected Discharge Date Expected Discharge Time    Feb 14, 2024               SILKE Pardo

## 2024-02-14 LAB
ALBUMIN SERPL-MCNC: 3.3 G/DL (ref 3.5–5.2)
ALBUMIN/GLOB SERPL: 1.3 G/DL
ALP SERPL-CCNC: 136 U/L (ref 39–117)
ALT SERPL W P-5'-P-CCNC: 15 U/L (ref 1–33)
ANION GAP SERPL CALCULATED.3IONS-SCNC: 8 MMOL/L (ref 5–15)
AST SERPL-CCNC: 34 U/L (ref 1–32)
BASOPHILS # BLD AUTO: 0.05 10*3/MM3 (ref 0–0.2)
BASOPHILS NFR BLD AUTO: 1.2 % (ref 0–1.5)
BILIRUB SERPL-MCNC: 0.5 MG/DL (ref 0–1.2)
BUN SERPL-MCNC: 5 MG/DL (ref 8–23)
BUN/CREAT SERPL: 7 (ref 7–25)
CALCIUM SPEC-SCNC: 9 MG/DL (ref 8.6–10.5)
CHLORIDE SERPL-SCNC: 103 MMOL/L (ref 98–107)
CO2 SERPL-SCNC: 29 MMOL/L (ref 22–29)
CREAT SERPL-MCNC: 0.71 MG/DL (ref 0.57–1)
DEPRECATED RDW RBC AUTO: 50.3 FL (ref 37–54)
EGFRCR SERPLBLD CKD-EPI 2021: 96.9 ML/MIN/1.73
EOSINOPHIL # BLD AUTO: 0.17 10*3/MM3 (ref 0–0.4)
EOSINOPHIL NFR BLD AUTO: 4 % (ref 0.3–6.2)
ERYTHROCYTE [DISTWIDTH] IN BLOOD BY AUTOMATED COUNT: 13.5 % (ref 12.3–15.4)
GLOBULIN UR ELPH-MCNC: 2.5 GM/DL
GLUCOSE SERPL-MCNC: 86 MG/DL (ref 65–99)
HCT VFR BLD AUTO: 31.8 % (ref 34–46.6)
HGB BLD-MCNC: 10.4 G/DL (ref 12–15.9)
IMM GRANULOCYTES # BLD AUTO: 0.02 10*3/MM3 (ref 0–0.05)
IMM GRANULOCYTES NFR BLD AUTO: 0.5 % (ref 0–0.5)
LYMPHOCYTES # BLD AUTO: 1.62 10*3/MM3 (ref 0.7–3.1)
LYMPHOCYTES NFR BLD AUTO: 37.8 % (ref 19.6–45.3)
MCH RBC QN AUTO: 32.7 PG (ref 26.6–33)
MCHC RBC AUTO-ENTMCNC: 32.7 G/DL (ref 31.5–35.7)
MCV RBC AUTO: 100 FL (ref 79–97)
MONOCYTES # BLD AUTO: 0.5 10*3/MM3 (ref 0.1–0.9)
MONOCYTES NFR BLD AUTO: 11.7 % (ref 5–12)
NEUTROPHILS NFR BLD AUTO: 1.93 10*3/MM3 (ref 1.7–7)
NEUTROPHILS NFR BLD AUTO: 44.8 % (ref 42.7–76)
NRBC BLD AUTO-RTO: 0 /100 WBC (ref 0–0.2)
PLATELET # BLD AUTO: 204 10*3/MM3 (ref 140–450)
PMV BLD AUTO: 10.7 FL (ref 6–12)
POTASSIUM SERPL-SCNC: 4 MMOL/L (ref 3.5–5.2)
PROT SERPL-MCNC: 5.8 G/DL (ref 6–8.5)
RBC # BLD AUTO: 3.18 10*6/MM3 (ref 3.77–5.28)
SODIUM SERPL-SCNC: 140 MMOL/L (ref 136–145)
WBC NRBC COR # BLD AUTO: 4.29 10*3/MM3 (ref 3.4–10.8)

## 2024-02-14 PROCEDURE — 97110 THERAPEUTIC EXERCISES: CPT

## 2024-02-14 PROCEDURE — 85025 COMPLETE CBC W/AUTO DIFF WBC: CPT | Performed by: FAMILY MEDICINE

## 2024-02-14 PROCEDURE — 94761 N-INVAS EAR/PLS OXIMETRY MLT: CPT

## 2024-02-14 PROCEDURE — 97116 GAIT TRAINING THERAPY: CPT

## 2024-02-14 PROCEDURE — 94664 DEMO&/EVAL PT USE INHALER: CPT

## 2024-02-14 PROCEDURE — 99232 SBSQ HOSP IP/OBS MODERATE 35: CPT | Performed by: PEDIATRICS

## 2024-02-14 PROCEDURE — 94799 UNLISTED PULMONARY SVC/PX: CPT

## 2024-02-14 PROCEDURE — 97535 SELF CARE MNGMENT TRAINING: CPT

## 2024-02-14 PROCEDURE — 97530 THERAPEUTIC ACTIVITIES: CPT

## 2024-02-14 PROCEDURE — G0378 HOSPITAL OBSERVATION PER HR: HCPCS

## 2024-02-14 PROCEDURE — 80053 COMPREHEN METABOLIC PANEL: CPT | Performed by: FAMILY MEDICINE

## 2024-02-14 RX ADMIN — CARVEDILOL 3.12 MG: 3.12 TABLET, FILM COATED ORAL at 08:25

## 2024-02-14 RX ADMIN — URSODIOL 300 MG: 300 CAPSULE ORAL at 08:26

## 2024-02-14 RX ADMIN — HYDROCODONE BITARTRATE AND ACETAMINOPHEN 1 TABLET: 7.5; 325 TABLET ORAL at 03:29

## 2024-02-14 RX ADMIN — FOLIC ACID 1 MG: 1 TABLET ORAL at 08:25

## 2024-02-14 RX ADMIN — HYDROCORTISONE 1 APPLICATION: 1 CREAM TOPICAL at 08:27

## 2024-02-14 RX ADMIN — DESVENLAFAXINE 50 MG: 50 TABLET, FILM COATED, EXTENDED RELEASE ORAL at 08:26

## 2024-02-14 RX ADMIN — THIAMINE HCL TAB 100 MG 100 MG: 100 TAB at 08:25

## 2024-02-14 RX ADMIN — ROSUVASTATIN CALCIUM 5 MG: 10 TABLET, FILM COATED ORAL at 21:02

## 2024-02-14 RX ADMIN — TIOTROPIUM BROMIDE INHALATION SPRAY 2 PUFF: 3.12 SPRAY, METERED RESPIRATORY (INHALATION) at 07:22

## 2024-02-14 RX ADMIN — HYDROCODONE BITARTRATE AND ACETAMINOPHEN 1 TABLET: 7.5; 325 TABLET ORAL at 09:42

## 2024-02-14 RX ADMIN — LATANOPROST 1 DROP: 50 SOLUTION OPHTHALMIC at 21:03

## 2024-02-14 RX ADMIN — CARVEDILOL 3.12 MG: 3.12 TABLET, FILM COATED ORAL at 17:27

## 2024-02-14 RX ADMIN — PANTOPRAZOLE SODIUM 40 MG: 40 TABLET, DELAYED RELEASE ORAL at 08:25

## 2024-02-14 RX ADMIN — Medication 250 MG: at 08:25

## 2024-02-14 RX ADMIN — HYDROCORTISONE 1 APPLICATION: 1 CREAM TOPICAL at 21:03

## 2024-02-14 RX ADMIN — PANTOPRAZOLE SODIUM 40 MG: 40 TABLET, DELAYED RELEASE ORAL at 17:27

## 2024-02-14 RX ADMIN — CLONIDINE HYDROCHLORIDE 0.1 MG: 0.1 TABLET ORAL at 21:02

## 2024-02-14 RX ADMIN — BUDESONIDE AND FORMOTEROL FUMARATE DIHYDRATE 1 PUFF: 160; 4.5 AEROSOL RESPIRATORY (INHALATION) at 20:20

## 2024-02-14 RX ADMIN — BUDESONIDE AND FORMOTEROL FUMARATE DIHYDRATE 1 PUFF: 160; 4.5 AEROSOL RESPIRATORY (INHALATION) at 07:22

## 2024-02-14 RX ADMIN — Medication 250 MG: at 21:03

## 2024-02-14 RX ADMIN — HYDROCODONE BITARTRATE AND ACETAMINOPHEN 1 TABLET: 7.5; 325 TABLET ORAL at 16:30

## 2024-02-14 RX ADMIN — SENNOSIDES AND DOCUSATE SODIUM 2 TABLET: 8.6; 5 TABLET ORAL at 08:25

## 2024-02-14 NOTE — THERAPY TREATMENT NOTE
Patient Name: Phiolmena Davis  : 1960    MRN: [id number]                              Today's Date: 2024       Admit Date: 2024    Visit Dx:     ICD-10-CM ICD-9-CM   1. Hematemesis without nausea  K92.0 578.0   2. Melena  K92.1 578.1   3. Blood loss anemia  D50.0 280.0   4. Alcoholic cirrhosis of liver without ascites  K70.30 571.2   5. Closed fracture of left hand, initial encounter  S62.92XA 815.00   6. Hematemesis, unspecified whether nausea present  K92.0 578.0   7. Hepatic cirrhosis, unspecified hepatic cirrhosis type, unspecified whether ascites present  K74.60 571.5   8. Fall, initial encounter  W19.XXXA E888.9   9. Generalized weakness  R53.1 780.79     Patient Active Problem List   Diagnosis    Essential hypertension    Generalized anxiety disorder    Hyperlipidemia    Glaucoma    Alcoholic cirrhosis of liver without ascites    Severe malnutrition    GERD without esophagitis    Sleep disturbance    Annual physical exam    Idiopathic peripheral neuropathy    Severe anxiety with panic    Alcohol use disorder    Cigarette nicotine dependence in remission    Chronic obstructive pulmonary disease    Hypokalemia    Closed fracture of left hand    Hematemesis without nausea    Melena    Blood loss anemia    Moderate malnutrition     Past Medical History:   Diagnosis Date    Alcohol withdrawal 05/10/2019    Annual physical exam 2023    Anxiety and depression     Cirrhosis of liver     Closed displaced fracture of lateral malleolus of left fibula     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    Closed trimalleolar fracture of right ankle 2023    Digital mucous cyst of toe of left foot 2017    Ganglion cyst of left foot     Glaucoma     Hypertension     Wears glasses      Past Surgical History:   Procedure Laterality Date    ANKLE OPEN REDUCTION INTERNAL FIXATION Left 2017    Procedure: LEFT ANKLE OPEN REDUCTION INTERNAL FIXATION WITH ILIAC  CREST BONE GRAFT , EXCISION CYST 4TH/5TH INNERSPACE LEFT FOOT;  Surgeon: Frank Larson MD;  Location:  [facility];  Service:     ANKLE OPEN REDUCTION INTERNAL FIXATION Right 05/25/2023    Procedure: ANKLE OPEN REDUCTION INTERNAL FIXATION;  Surgeon: Deandre Reynoso MD;  Location:  [facility];  Service: Orthopedics;  Laterality: Right;    AUGMENTATION MAMMAPLASTY Bilateral 1999    BREAST AUGMENTATION      BREAST EXCISIONAL BIOPSY Right 2014    DILATION AND CURETTAGE, DIAGNOSTIC / THERAPEUTIC      ENDOSCOPY N/A 11/30/2022    Procedure: ESOPHAGOGASTRODUODENOSCOPY;  Surgeon: Arelis Solano MD;  Location:  [facility];  Service: Gastroenterology;  Laterality: N/A;    ENDOSCOPY N/A 2/1/2024    Procedure: ESOPHAGOGASTRODUODENOSCOPY;  Surgeon: Brunner, Mark I, MD;  Location:  [facility];  Service: Gastroenterology;  Laterality: N/A;    NH RPR NON/MAL FEMUR DSTL H/N W/ILIAC/AUTOG BONE Left 03/24/2017    Procedure: ILIAC CREST BONE GRAFT;  Surgeon: Frank Larson MD;  Location:  [facility];  Service: Orthopedics    WRIST SURGERY        General Information       Row Name 02/14/24 0819          OT Time and Intention    Document Type therapy note (daily note)  -DEANGELO     Mode of Treatment individual therapy;occupational therapy  -DEANGELO       Row Name 02/14/24 0819          General Information    Patient Profile Reviewed yes  -DEANGELO     Existing Precautions/Restrictions fall;other (see comments)  NWB L UE; L 4th and 5th metacarpal fx's with ulnar gutter splint; Monitor O2  -DEANGELO     Barriers to Rehab medically complex;previous functional deficit  -DEANGELO       Row Name 02/14/24 0819          Cognition    Orientation Status (Cognition) oriented x 3  -DEANGELO       Row Name 02/14/24 0819          Safety Issues, Functional Mobility    Impairments Affecting Function (Mobility) balance;endurance/activity tolerance;strength;sensation/sensory awareness  -DEANGELO               User Key  (r) = Recorded By, (t) = Taken By, (c) = Cosigned By       Initials Name Provider Type    DEANGELO Jenny Saini, OT Occupational Therapist                     Mobility/ADL's       Row Name 02/14/24 0820          Bed Mobility    Supine-Sit [place] (Bed Mobility) standby assist;verbal cues  -DEANGELO     Sit-Supine [place] (Bed Mobility) standby assist;verbal cues  -DEANGELO     Comment, (Bed Mobility) bed flat with rails down, cues to use elbow for WB in and out of bed and not hand/wrist  -       Row Name 02/14/24 0820          Transfers    Transfers sit-stand transfer;stand-sit transfer;toilet transfer  -       Row Name 02/14/24 0820          Sit-Stand Transfer    Sit-Stand [place] (Transfers) standby assist  -     Assistive Device (Sit-Stand Transfers) cane, straight  -       Row Name 02/14/24 0820          Stand-Sit Transfer    Stand-Sit [place] (Transfers) standby assist  -     Assistive Device (Stand-Sit Transfers) cane, straight  -       Row Name 02/14/24 0820          Toilet Transfer    Type (Toilet Transfer) sit-stand;stand-sit  -     [place] Level (Toilet Transfer) standby assist;verbal cues  -     Assistive Device (Toilet Transfer) commode, bedside with drop arms;cane, straight  -     Comment, (Toilet Transfer) cues to use elbow and not hand LUE to push up from BSC  -       Row Name 02/14/24 0820          Functional Mobility    Functional Mobility- Ind. Level contact guard assist  progressing to SBA  -     Functional Mobility- Device cane, straight  -     Functional Mobility-Distance (Feet) --  10 + 14 + 10  -     Functional Mobility- Safety Issues step length decreased  -     Functional Mobility- Comment no 02 use the date, no SOA demonstrated or verbalized  -       Row Name 02/14/24 0820          Activities of Daily Living    BADL Assessment/Intervention upper body dressing;lower body dressing;grooming;toileting  -       Row Name 02/14/24 0820          Mobility    Extremity Weight-bearing Status left upper extremity   -DEANGELO     Left Upper Extremity (Weight-bearing Status) non weight-bearing (NWB)  wrist/hand  -DEANGELO       Row Name 02/14/24 0820          Lower Body Dressing Assessment/Training    [place] Level (Lower Body Dressing) doff;don;pants/bottoms;standby assist  -DEANGELO     Position (Lower Body Dressing) edge of bed sitting;supported standing  -DEANGELO     Comment, (Lower Body Dressing) pt. leaning on bed intermittently to balance herself  -DEANGELO       Row Name 02/14/24 0820          Grooming Assessment/Training    [place] Level (Grooming) wash face, hands;oral care regimen;standby assist;hair care, combing/brushing;minimum assist (75% patient effort)  -DEANGELO     Position (Grooming) sink side;supported standing;unsupported standing  -DEANGELO     Comment, (Grooming) pt. used sink intermittently to balance self with grooming tasks, pt. able to brush hair, but assist to put in a ponytail, educated pt. on alternate technique may do one handed to secure hair from face  -DEANGELO       Row Name 02/14/24 0820          Toileting Assessment/Training    [place] Level (Toileting) adjust/manage clothing;perform perineal hygiene;supervision  -DEANGELO     Assistive Devices (Toileting) commode, bedside without drop arms  -DEANGELO     Position (Toileting) unsupported sitting  -DEANGELO     Comment, (Toileting) front gown on only  -DEANGELO       Row Name 02/14/24 0820          Upper Body Dressing Assessment/Training    [place] Level (Upper Body Dressing) doff;don;front opening garment;set up  -DEANGELO     Position (Upper Body Dressing) edge of bed sitting  -DEANGELO               User Key  (r) = Recorded By, (t) = Taken By, (c) = Cosigned By      Initials Name Provider Type    Jenny Zamora OT Occupational Therapist                   Obj/Interventions       Row Name 02/14/24 0826          Shoulder (Therapeutic Exercise)    Shoulder AROM (Therapeutic Exercise) bilateral;flexion;extension;aBduction;aDduction;horizontal aBduction/aDduction;10 repetitions;supine  had pt.  demonstrated a few reps back end of session to demonstrate understanding, encouraged pt. to complete throughout the day  -       Row Name 02/14/24 0826          Elbow/Forearm (Therapeutic Exercise)    Elbow/Forearm (Therapeutic Exercise) isometric exercise  -     Elbow/Forearm Isometrics (Therapeutic Exercise) bilateral;flexion;extension;3 second hold;10 repetitions  -       Row Name 02/14/24 0826          Motor Skills    Therapeutic Exercise shoulder;elbow/forearm  pt. also completed 10 reps gluteal squeeze with knee press  -       Row Name 02/14/24 0826          Balance    Static Sitting Balance independent  -     Dynamic Sitting Balance supervision  -DEANGELO     Position, Sitting Balance unsupported;sitting edge of bed  -     Static Standing Balance standby assist  -     Dynamic Standing Balance contact guard  -     Position/Device Used, Standing Balance unsupported;supported;cane, straight  -     Balance Interventions sit to stand;occupation based/functional task  -     Comment, Balance LBD, toileting, grooming/pt used bed and sink for intermittent support  -               User Key  (r) = Recorded By, (t) = Taken By, (c) = Cosigned By      Initials Name Provider Type    Jenny Zamora OT Occupational Therapist                   Goals/Plan       Row Name 02/14/24 0833          Transfer Goal 1 (OT)    Progress/Outcome (Transfer Goal 1, OT) good progress toward goal;goal ongoing  -       Row Name 02/14/24 0833          Dressing Goal 1 (OT)    Progress/Outcome (Dressing Goal 1, OT) good progress toward goal  -       Row Name 02/14/24 0833          Toileting Goal 1 (OT)    Progress/Outcome (Toileting Goal 1, OT) goal partially met;goal ongoing  -               User Key  (r) = Recorded By, (t) = Taken By, (c) = Cosigned By      Initials Name Provider Type    Jenny Zamora OT Occupational Therapist                   Clinical Impression       Row Name 02/14/24 0828          Pain  Assessment    Pretreatment Pain Rating 0/10 - no pain  -DEANGELO     Posttreatment Pain Rating 0/10 - no pain  -DEANGELO     Pre/Posttreatment Pain Comment per pt. premedicated  -DEANGELO       Row Name 02/14/24 0828          Plan of Care Review    Plan of Care Reviewed With patient  -DEANGELO     Progress improving  -     Outcome Evaluation Pt. demonstrated improved balance and activity tolerance resulting in improved ADL independence this session.  Pt. used bed and sink for balance support with LBD and grooming.  Two reminders to use only elbow on LUE for support.  Good progress to all goals.  Recommend UE ROM handout issued next session.  Pt. remains below baseline with ADL independence warranting continued skilled OT services for balance and endurance building and education on safety and compensatory techniques.  -DEANGELO       Row Name 02/14/24 0828          Therapy Assessment/Plan (OT)    Therapy Frequency (OT) daily  -DEANGELO       Row Name 02/14/24 0828          Therapy Plan Review/Discharge Plan (OT)    Anticipated Discharge Disposition (OT) home with home health  -DEANGELO       Row Name 02/14/24 0828          Vital Signs    Pre Systolic BP Rehab --  nurse cleared for treatment  -DEANGELO     O2 Delivery Pre Treatment room air  -DEANGELO     O2 Delivery Intra Treatment room air  -DEANGELO     O2 Delivery Post Treatment room air  -DEANGELO     Pre Patient Position Supine  -DEANGELO     Intra Patient Position Standing  -DEANGELO     Post Patient Position Supine  pt. declined sitting in recliner stating it was uncomfortable  -DEANGELO       Row Name 02/14/24 0828          Positioning and Restraints    Pre-Treatment Position in bed  -DEANGELO     Post Treatment Position bed  -DEANGELO     In Bed supine;call light within reach;encouraged to call for assist  no alarm on arrival  -               User Key  (r) = Recorded By, (t) = Taken By, (c) = Cosigned By      Initials Name Provider Type    Jenny Zamora, OT Occupational Therapist                   Outcome Measures       Row Name 02/14/24 0833           How much help from another is currently needed...    Putting on and taking off regular lower body clothing? 3  SBA  -DEANGELO     Bathing (including washing, rinsing, and drying) 3  -DEANGELO     Toileting (which includes using toilet bed pan or urinal) 3  -DEANGELO     Putting on and taking off regular upper body clothing 3  -DEANGELO     Taking care of personal grooming (such as brushing teeth) 3  -DEANGELO     Eating meals 3  -DEANGELO     AM-PAC 6 Clicks Score (OT) 18  -DEANGELO               User Key  (r) = Recorded By, (t) = Taken By, (c) = Cosigned By      Initials Name Provider Type    Jenny Zamora OT Occupational Therapist                    Occupational Therapy Education       Title: PT OT SLP Therapies (Done)       Topic: Occupational Therapy (Done)       Point: ADL training (Done)       Description:   Instruct learner(s) on proper safety adaptation and remediation techniques during self care or transfers.   Instruct in proper use of assistive devices.                  Learning Progress Summary             Patient Acceptance, E,D, VU,NR by DEANGELO at 2/14/2024 0834    Comment: UE TE, benefit of activity and effects of remaining in bed for long periods, UE precautions    Acceptance, E,TB, VU,DU by KF at 2/12/2024 1331    Acceptance, E,TB, VU by JS at 2/12/2024 0350    Acceptance, E, VU by SW at 2/8/2024 1502    Acceptance, E, VU by AG at 2/5/2024 0346    Acceptance, E, VU by JB1 at 2/2/2024 0949    Comment: OT POC; incentive spirometer use/goal setting; one-handed technique; deep breathing for relaxation                         Point: Home exercise program (Done)       Description:   Instruct learner(s) on appropriate technique for monitoring, assisting and/or progressing therapeutic exercises/activities.                  Learning Progress Summary             Patient Acceptance, E,D, VU,NR by DEANGELO at 2/14/2024 0834    Comment: UE TE, benefit of activity and effects of remaining in bed for long periods, UE precautions    Acceptance, E,TB,  VU by JS at 2/12/2024 0350    Acceptance, E, VU by DANNA at 2/8/2024 1502                         Point: Precautions (Done)       Description:   Instruct learner(s) on prescribed precautions during self-care and functional transfers.                  Learning Progress Summary             Patient Acceptance, E,D, VU,NR by DEANGELO at 2/14/2024 0834    Comment: UE TE, benefit of activity and effects of remaining in bed for long periods, UE precautions    Acceptance, E,TB, VU,DU by KF at 2/12/2024 1331    Acceptance, E,TB, VU by JS at 2/12/2024 0350    Acceptance, E, VU by DANNA at 2/8/2024 1502    Acceptance, E, VU by AG at 2/5/2024 0346    Acceptance, E, VU by JB at 2/2/2024 0949    Comment: OT POC; incentive spirometer use/goal setting; one-handed technique; deep breathing for relaxation                         Point: Body mechanics (Done)       Description:   Instruct learner(s) on proper positioning and spine alignment during self-care, functional mobility activities and/or exercises.                  Learning Progress Summary             Patient Acceptance, E,TB, VU,DU by KF at 2/12/2024 1331    Acceptance, E,TB, VU by RANDELL at 2/12/2024 0350    Acceptance, E, VU by  at 2/5/2024 0346                                         User Key       Initials Effective Dates Name Provider Type Discipline     07/11/23 -  Jenny Saini OT Occupational Therapist OT    JS 06/16/21 -  Vanessa Morley, RN Registered Nurse Nurse     06/16/21 -  Coni Rapp, OT Occupational Therapist OT    JB1 06/16/21 -  Vanessa Panchal OT Occupational Therapist OT     11/01/23 -  Cb Dobson, RN Registered Nurse Nurse     08/09/23 -  Radha Cabrera OT Occupational Therapist OT                  OT Recommendation and Plan  Therapy Frequency (OT): daily  Plan of Care Review  Plan of Care Reviewed With: patient  Progress: improving  Outcome Evaluation: Pt. demonstrated improved balance and activity tolerance resulting in improved ADL  independence this session.  Pt. used bed and sink for balance support with LBD and grooming.  Two reminders to use only elbow on LUE for support.  Good progress to all goals.  Recommend UE ROM handout issued next session.  Pt. remains below baseline with ADL independence warranting continued skilled OT services for balance and endurance building and education on safety and compensatory techniques.     Time Calculation:         Time Calculation- OT       Row Name 02/14/24 0834             Time Calculation- OT    OT Start Time 0748  -DEANGELO      OT Received On 02/14/24  -DEANGELO      OT Goal Re-Cert Due Date 02/22/24  -DEANGELO         Timed Charges    69508 - OT Therapeutic Exercise Minutes 8  -DEANGELO      31082 - OT Therapeutic Activity Minutes 4  -DEANGELO      61780 - OT Self Care/Mgmt Minutes 17  -DEANGELO         Total Minutes    Timed Charges Total Minutes 29  -DEANGELO       Total Minutes 29  -DEANGELO                User Key  (r) = Recorded By, (t) = Taken By, (c) = Cosigned By      Initials Name Provider Type    Jenny Zamora, OT Occupational Therapist                  Therapy Charges for Today       Code Description Service Date Service Provider Modifiers Qty    [id number]  OT THER PROC EA 15 MIN 2/14/2024 Jenny Saini OT GO 1    [id number]  OT SELF CARE/MGMT/TRAIN EA 15 MIN 2/14/2024 Jenny Saini OT GO 1                 Jenny Saini OT  2/14/2024

## 2024-02-14 NOTE — PROGRESS NOTES
[facility] Medicine Services  PROGRESS NOTE    Patient Name: Philomena Davis  : 1960  MRN: [id number]    Date of Admission: 2024  Primary Care Physician: Unique Brandt DO    Subjective   Subjective     CC:  Weakness    HPI:  Patient is doing well today.  Is nervous but also excited about going home.  We have tentative discharge plans for tomorrow.  Tolerating her diet.  Working with therapy.      Objective   Objective     Vital Signs:   Temp:  [97.2 °F (36.2 °C)-98.2 °F (36.8 °C)] 97.7 °F (36.5 °C)  Heart Rate:  [71-90] 90  Resp:  [16-18] 18  BP: (101-131)/(60-81) 131/81     Physical Exam:  Constitutional: No acute distress, awake, alert, currently on RA  Respiratory: Respiratory effort normal, no rhonchi or wheezing   Cardiovascular: RRR, no murmurs, rubs, or gallops  Gastrointestinal: Soft, nontender, nondistended  Musculoskeletal: L hand in splint  Psychiatric: Appropriate affect, cooperative  Neurologic: Oriented x 3, speech clear  Skin: No rashes     Results Reviewed:  LAB RESULTS:      Lab 24  05024  0532 02/10/24  0709   WBC 4.29 3.96 4.22 3.93 3.50   HEMOGLOBIN 10.4* 10.4* 10.3* 10.5* 10.3*   HEMATOCRIT 31.8* 31.9* 31.7* 33.3* 31.8*   PLATELETS 204 201 205 213 207   NEUTROS ABS 1.93 2.00 2.36 2.03 1.71   IMMATURE GRANS (ABS) 0.02 0.02 0.02 0.03 0.01   LYMPHS ABS 1.62 1.16 1.11 1.17 1.10   MONOS ABS 0.50 0.54 0.53 0.54 0.53   EOS ABS 0.17 0.19 0.15 0.10 0.09   .0* 99.4* 99.1* 99.7* 97.2*         Lab 2436 24  0508 24  0532 02/10/24  0709   SODIUM 140 140 140 141 140   POTASSIUM 4.0 4.0 3.7 4.2 4.1   CHLORIDE 103 103 103 105 102   CO2 29.0 29.0 29.0 29.0 30.0*   ANION GAP 8.0 8.0 8.0 7.0 8.0   BUN 5* 8 6* 8 5*   CREATININE 0.71 0.75 0.57 0.67 0.62   EGFR 96.9 90.7 103.5 99.6 101.5   GLUCOSE 86 93 93 92 88   CALCIUM 9.0 9.1 8.9 8.8 8.9   MAGNESIUM  --   --  1.8 1.8 1.8         Lab    HPI - Ravi Stanley is a  62 year old year-old patient with a complicated past ocular history who is a patient of Dr Venegas and Dr Roche.    Interval history: Patient notices worsening pain, irritation, blurry vision, sensitivity to light. More tearing. Patient feels very sensitive to blue light. Very difficult to open the eye OS in the morning.    PAST OCULAR SURGERY  Previous PKP OS (old)  Trabeculectomy OD (Old)  Ahmed tube OS 10/2012 with removal 2013  DSEK OS x 2 (5/17/16 & old)  Diode CPC OS 3-15-16 & 11/2014  CE/IOL (complex) OS 3/2016  Scleral patch removal 11-8-16   PKP OS/ Baerveldt tube shunt OS 3/21/17  Pars plana vitrectomy (PPV) OS 12/14/17      GTTS:    - Prednisolone QID left eye  - vanco and tobra Q2H  - Brimonidine twice a day right eye, three times a day left eye   - travatan QHS both eyes  - PFATs Q1hr left eye    ASSESSMENT & PLAN    1. Image shift with self-alternate cover and XT  - He does not have chelsea diplopia with both eyes open, but will do orthoptist exam today to see if prism could be of any benefit. Irregular astigmatism/distortion likely contributing.     2.  PKP OS    - epithelial defect with infiltrate, no significant interval change     -Cultures positive for fungal (filamentous) - left eye.    -continue fortified amphotercin B 0.15% q2 OS.    -continue fortified vanco and tobra twice a day left eye    -conjunctival foreign body supranasal - removed at slit lamp - pathology sent    -decrease pred TID OS    -continue AT every hour    - return to clinic 2 days     3. VELVET OS   -Cultured H. Flu in past   -significant PEE 2/2 medicamentosa   -continue PFATs hourly   -Bilateral lower lid punctal plugs placed 04/02/18     4. s/p Pars plana vitrectomy (PPV) OS 12/14/17   - with Dr. Venegas for floater   - vitreous strand to GHJ improved, pupil more regular   - Retina flat, dull macular reflex    - Visual deficit can also be secondary to atrophic optic nerve     - OCT mac April  02/14/24  0420 02/13/24  0536 02/12/24  0508 02/11/24  0532 02/10/24  0709   TOTAL PROTEIN 5.8* 5.7* 6.0 5.6* 5.6*   ALBUMIN 3.3* 3.4* 3.1* 3.2* 3.1*   GLOBULIN 2.5 2.3 2.9 2.4 2.5   ALT (SGPT) 15 13 13 13 12   AST (SGOT) 34* 35* 34* 33* 32   BILIRUBIN 0.5 0.5 0.6 0.7 0.7   ALK PHOS 136* 137* 124* 122* 112                     Brief Urine Lab Results  (Last result in the past 365 days)        Color   Clarity   Blood   Leuk Est   Nitrite   Protein   CREAT   Urine HCG        05/19/23 0834 Orange  Comment: on azo   Cloudy   3+   Large (3+)   Positive   3+                   Microbiology Results Abnormal       Procedure Component Value - Date/Time    Clostridioides difficile Toxin - Stool, Per Rectum [[id number]]  (Normal) Collected: 02/07/24 0715    Lab Status: Final result Specimen: Stool from Per Rectum Updated: 02/07/24 0825    Narrative:      The following orders were created for panel order Clostridioides difficile Toxin - Stool, Per Rectum.  Procedure                               Abnormality         Status                     ---------                               -----------         ------                     Clostridioides difficile...[[id number]]  Normal              Final result                 Please view results for these tests on the individual orders.    Clostridioides difficile Toxin, PCR - Stool, Per Rectum [[id number]]  (Normal) Collected: 02/07/24 0715    Lab Status: Final result Specimen: Stool from Per Rectum Updated: 02/07/24 0825     Toxigenic C. difficile by PCR Not Detected    Narrative:      The result indicates the absence of toxigenic C. difficile from stool specimen.             No radiology results from the last 24 hrs        Current medications:  Scheduled Meds:budesonide-formoterol, 1 puff, Inhalation, BID - RT  carvedilol, 3.125 mg, Oral, BID With Meals  cloNIDine, 0.1 mg, Oral, Nightly  desvenlafaxine, 50 mg, Oral, Daily  folic acid, 1 mg, Oral, Daily  hydrocortisone, 1 Application,  30th: no edema or Epiretinal membrane both eyes   - optic nerve atrophy may be contributing    - OCT retinal nerve fiber layer 4/30/18:    OD: severe thinning, avg 26 (from 97 in 12/16): poor scan today    OS: IT thinning and borderline T thinning, avg 83, mostly stable   - monitor     5. Advanced Glaucoma OU    - Continue Brimonidine and Travatan both eyes   - IOP 8/10 today, monitor     6. Trichiasis OS   - none today   - monitor     7. Meibomian gland dysfunction both eyes   - continue warm compress BID    8. PCO OS   - s/p YAG cap    - patient notices improvement, but still feels like he sees a mobile cloud that obscures the vision intermittently    Tucker Culp MD  Ophthalmology Resident, PGY-3  [facility]      Attending Physician Attestation:  Complete documentation of historical and exam elements from today's encounter can be found in the full encounter summary report (not reduplicated in this progress note).  I personally obtained the chief complaint(s) and history of present illness.  I confirmed and edited as necessary the review of systems, past medical/surgical history, family history, social history, and examination findings as documented by others; and I examined the patient myself.  I personally reviewed the relevant tests, images, and reports as documented above.  I formulated and edited as necessary the assessment and plan and discussed the findings and management plan with the patient and family. I personally reviewed the ophthalmic test(s) associated with this encounter, agree with the interpretation(s) as documented by the resident/fellow, and have edited the corresponding report(s) as necessary. I was present for the key portions of the procedure and immediately available for the remainder. - Domingo Roche MD       Topical, Q12H  latanoprost, 1 drop, Both Eyes, Nightly  pantoprazole, 40 mg, Oral, BID AC  rosuvastatin, 5 mg, Oral, Nightly  saccharomyces boulardii, 250 mg, Oral, BID  senna-docusate sodium, 2 tablet, Oral, BID  thiamine, 100 mg, Oral, Daily  tiotropium bromide monohydrate, 2 puff, Inhalation, Daily - RT  ursodiol, 300 mg, Oral, Daily      Continuous Infusions:     PRN Meds:.  acetaminophen    albuterol    senna-docusate sodium **AND** polyethylene glycol **AND** bisacodyl **AND** bisacodyl    HYDROcodone-acetaminophen    loperamide    Magnesium Standard Dose Replacement - Follow Nurse / BPA Driven Protocol    nitroglycerin    ondansetron ODT **OR** ondansetron    Phosphorus Replacement - Follow Nurse / BPA Driven Protocol    Potassium Replacement - Follow Nurse / BPA Driven Protocol    Assessment & Plan   Assessment & Plan     Active Hospital Problems    Diagnosis  POA    Moderate malnutrition [E44.0]  Yes    Closed fracture of left hand [S62.92XA]  Unknown    Hypokalemia [E87.6]  Yes    Hematemesis without nausea [K92.0]  Unknown    Melena [K92.1]  Unknown    Blood loss anemia [D50.0]  Unknown    Alcohol use disorder [F10.90]  Yes    GERD without esophagitis [K21.9]  Yes    Alcoholic cirrhosis of liver without ascites [K70.30]  Yes    Essential hypertension [I10]  Yes    Generalized anxiety disorder [F41.1]  Yes    Hyperlipidemia [E78.5]  Yes      Resolved Hospital Problems   No resolved problems to display.        Brief Hospital Course to date:  Philomena Davis is a 63 y.o. female with ETOH cirrhosis, HTN, anxiety who presented with significant weakness after reported hematemesis. EGD revealed severe esophagitis. Found to have left hand 5th metacarpal fracture which inhibits ability to complete daily casts. Stay c/b persistent debility, and difficulty with ADLs and need for placement.     Currently awaiting personal insurance appeal at this time for possible rehab placement, Patient refusing other placement  options at this time.  Therefore, current plan is for her to be d/c with home health.  D/c set up for tomorrow.      Atypical PNA, suspect aspiration  -CXR 2/3 c/w atypical PNA v atelectasis v aspiration  -completed course of rocephin and azithromax    Severe reflux esophagitis c/b ulceration + gastric ulcer  -BID PPI x 3 months, then daily  -Needs repeat EGD in 3 months recommended to assess healing  -completed IV ceftriaxone given UGI bleed and concomitant cirrhosis     ETOH cirrhosis w h/o ETOH use  -continue coreg with hold parameters  -Discussed with patient importance of ETOH abstinence     LEFT hand 5th metacarpal fracture - f/u Naomi Duncan PA-C 1 week, NWB LUE  -rewrapped splint at bedside on 2/8, continue to change ace wrap as needed  -Reached out to Dr. Hathaway today to see if she can see someone while she is still inpatient to assist in simplifying her home discharge plan due to transportation issues.      HTN   - on clonidine OP for insomnia   -Changed to nightly due to low bp readings      Anxiety   - Continue home desvenlafaxine.      Chronic insomnia   Severe hypokalemia 2/2 vomiting, resolved  - replace as needed    Diarrhea- resolved   -continue florastor       Significant debility/weakness  - needing assistance before return home alone, pending insurance appeal --plan for home with home health, continue to follow with CM at this time.   -Difficulty using walker without able to bear weight on her left  -PT/OT still following.      Expected Discharge Location and Transportation:  Home with   Expected Discharge   Expected Discharge Date: 2/15/2024; Expected Discharge Time:      DVT prophylaxis:  Mechanical DVT prophylaxis orders are present.     Copied text in this note has been reviewed and is accurate as of 02/14/24.       AM-PAC 6 Clicks Score (PT): 22 (02/13/24 2000)    CODE STATUS:   Code Status and Medical Interventions:   Ordered at: 01/30/24 1844     Level Of Support Discussed With:     Patient     Code Status (Patient has no pulse and is not breathing):    CPR (Attempt to Resuscitate)     Medical Interventions (Patient has pulse or is breathing):    Full Support       Anisha New MD  02/14/24

## 2024-02-14 NOTE — PLAN OF CARE
Goal Outcome Evaluation:              Outcome Evaluation: VSS on RA. PRN norco given x2. Gets up to bedside commode well w/ standby. No family at bedside. Pt sleeping well in between care.

## 2024-02-14 NOTE — PLAN OF CARE
Goal Outcome Evaluation:  Plan of Care Reviewed With: patient        Progress: improving  Outcome Evaluation: Pt. demonstrated improved balance and activity tolerance resulting in improved ADL independence this session.  Pt. used bed and sink for balance support with LBD and grooming.  Two reminders to use only elbow on LUE for support.  Good progress to all goals.  Recommend UE ROM handout issued next session.  Pt. remains below baseline with ADL independence warranting continued skilled OT services for balance and endurance building and education on safety and compensatory techniques.      Anticipated Discharge Disposition (OT): home with home health

## 2024-02-14 NOTE — CASE MANAGEMENT/SOCIAL WORK
Continued Stay Note  [facility]     Patient Name: Philomena Davis  MRN: [id number]  Today's Date: 2/14/2024    Admit Date: 1/30/2024    Plan: Home with services   Discharge Plan       Row Name 02/14/24 1825       Plan    Plan Home with services    Patient/Family in Agreement with Plan yes  Patient    Plan Comments Anticipate discharge home tomorrow, patient's friend will transport her home after work at 5:00pm.  [facility] will be following for PT and nursing, updated liaison with d/c plan today.  Will arrange for BSC to be delivered to patients home tomorrow afternoon. --F/U appointment made with Dr. Ravi Hathaway for cast, scheduled for 2/16 @ 11:30am at Dr. Hathaway's office-[address] [place] [address].  Will make arrangements for [employer] van/wheelchair transport to  at home on 2/16 at 10:30am for appointment, will then transport home.  [facility] will see Friday afternoon if home from appointment or on Saturday.  Will update patient with final arrangements/plan, discussed with Dr. New and nursing.  Marge Gama, Ext. [phone]    Final Discharge Disposition Code 06 - home with home health care                   Discharge Codes    No documentation.                 Expected Discharge Date and Time       Expected Discharge Date Expected Discharge Time    Feb 15, 2024               SILKE Pardo

## 2024-02-14 NOTE — THERAPY TREATMENT NOTE
Patient Name: Philomena Davis  : 1960    MRN: [id number]                              Today's Date: 2024       Admit Date: 2024    Visit Dx:     ICD-10-CM ICD-9-CM   1. Hematemesis without nausea  K92.0 578.0   2. Melena  K92.1 578.1   3. Blood loss anemia  D50.0 280.0   4. Alcoholic cirrhosis of liver without ascites  K70.30 571.2   5. Closed fracture of left hand, initial encounter  S62.92XA 815.00   6. Hematemesis, unspecified whether nausea present  K92.0 578.0   7. Hepatic cirrhosis, unspecified hepatic cirrhosis type, unspecified whether ascites present  K74.60 571.5   8. Fall, initial encounter  W19.XXXA E888.9   9. Generalized weakness  R53.1 780.79     Patient Active Problem List   Diagnosis    Essential hypertension    Generalized anxiety disorder    Hyperlipidemia    Glaucoma    Alcoholic cirrhosis of liver without ascites    Severe malnutrition    GERD without esophagitis    Sleep disturbance    Annual physical exam    Idiopathic peripheral neuropathy    Severe anxiety with panic    Alcohol use disorder    Cigarette nicotine dependence in remission    Chronic obstructive pulmonary disease    Hypokalemia    Closed fracture of left hand    Hematemesis without nausea    Melena    Blood loss anemia    Moderate malnutrition     Past Medical History:   Diagnosis Date    Alcohol withdrawal 05/10/2019    Annual physical exam 2023    Anxiety and depression     Cirrhosis of liver     Closed displaced fracture of lateral malleolus of left fibula     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    Closed trimalleolar fracture of right ankle 2023    Digital mucous cyst of toe of left foot 2017    Ganglion cyst of left foot     Glaucoma     Hypertension     Wears glasses      Past Surgical History:   Procedure Laterality Date    ANKLE OPEN REDUCTION INTERNAL FIXATION Left 2017    Procedure: LEFT ANKLE OPEN REDUCTION INTERNAL FIXATION WITH ILIAC  CREST BONE GRAFT , EXCISION CYST 4TH/5TH INNERSPACE LEFT FOOT;  Surgeon: Frank Larson MD;  Location:  [facility];  Service:     ANKLE OPEN REDUCTION INTERNAL FIXATION Right 05/25/2023    Procedure: ANKLE OPEN REDUCTION INTERNAL FIXATION;  Surgeon: Deandre Reynoso MD;  Location:  [facility];  Service: Orthopedics;  Laterality: Right;    AUGMENTATION MAMMAPLASTY Bilateral 1999    BREAST AUGMENTATION      BREAST EXCISIONAL BIOPSY Right 2014    DILATION AND CURETTAGE, DIAGNOSTIC / THERAPEUTIC      ENDOSCOPY N/A 11/30/2022    Procedure: ESOPHAGOGASTRODUODENOSCOPY;  Surgeon: Arelis Solano MD;  Location:  [facility];  Service: Gastroenterology;  Laterality: N/A;    ENDOSCOPY N/A 2/1/2024    Procedure: ESOPHAGOGASTRODUODENOSCOPY;  Surgeon: Brunner, Mark I, MD;  Location:  [facility];  Service: Gastroenterology;  Laterality: N/A;    KY RPR NON/MAL FEMUR DSTL H/N W/ILIAC/AUTOG BONE Left 03/24/2017    Procedure: ILIAC CREST BONE GRAFT;  Surgeon: Frank Larson MD;  Location:  [facility];  Service: Orthopedics    WRIST SURGERY        General Information       Row Name 02/14/24 1503          Physical Therapy Time and Intention    Document Type therapy note (daily note)  -ES     Mode of Treatment physical therapy  -ES       Row Name 02/14/24 1503          General Information    Patient Profile Reviewed yes  -ES     Existing Precautions/Restrictions fall;other (see comments)  NWB L UE; L 4th and 5th metacarpal fx's with ulnar gutter splint; Monitor O2  -ES     Barriers to Rehab medically complex;previous functional deficit  -ES       Row Name 02/14/24 1503          Cognition    Orientation Status (Cognition) oriented x 3  -ES       Row Name 02/14/24 1503          Safety Issues, Functional Mobility    Safety Issues Affecting Function (Mobility) awareness of need for assistance;insight into deficits/self-awareness;safety precaution awareness;safety precautions follow-through/compliance;sequencing abilities   -ES     Impairments Affecting Function (Mobility) balance;endurance/activity tolerance;strength;sensation/sensory awareness  -ES     Comment, Safety Issues/Impairments (Mobility) v/c for adherence to L hand NWB  -ES               User Key  (r) = Recorded By, (t) = Taken By, (c) = Cosigned By      Initials Name Provider Type    ES Mirtha Marrero, KEILA Physical Therapist                   Mobility       Row Name 02/14/24 1504          Bed Mobility    Bed Mobility supine-sit;sit-supine  -ES     Supine-Sit [place] (Bed Mobility) standby assist;verbal cues  -ES     Sit-Supine [place] (Bed Mobility) standby assist;verbal cues  -ES     Assistive Device (Bed Mobility) head of bed elevated;bed rails  -ES     Comment, (Bed Mobility) v/c for adherence to L hand/wrist NWB  -ES       Row Name 02/14/24 1504          Bed-Chair Transfer    Comment, (Bed-Chair Transfer) Pt declined sitting UIC  -ES       Row Name 02/14/24 1504          Sit-Stand Transfer    Sit-Stand [place] (Transfers) standby assist  -ES     Assistive Device (Sit-Stand Transfers) cane, straight  -ES     Comment, (Sit-Stand Transfer) STS x2: from EOB, from commode. v/c for sequencing.  -ES       Row Name 02/14/24 1504          Gait/Stairs (Locomotion)    [place] Level (Gait) standby assist  -ES     Assistive Device (Gait) cane, straight  -ES     Patient was able to Ambulate yes  -ES     Distance in Feet (Gait) 20+100  -ES     Deviations/Abnormal Patterns (Gait) gait speed decreased;stride length decreased  -ES     Bilateral Gait Deviations heel strike decreased;forward flexed posture  -ES     Comment, (Gait/Stairs) Pt ambulated 100' within room with SBA and SPC. Demo'd step-through gait pattern with decreased stride length and gait speed. V/c for sequencing with SPC and adherence to LUE NWB.  -ES       Row Name 02/14/24 1507          Mobility    Extremity Weight-bearing Status left upper extremity  -ES     Left Upper Extremity (Weight-bearing  Status) non weight-bearing (NWB)  wrist/hand  -ES               User Key  (r) = Recorded By, (t) = Taken By, (c) = Cosigned By      Initials Name Provider Type    Mirtha Hood PT Physical Therapist                   Obj/Interventions       Row Name 02/14/24 1508          Balance    Balance Assessment sitting static balance;sitting dynamic balance;sit to stand dynamic balance;standing static balance;standing dynamic balance  -ES     Static Sitting Balance independent  -ES     Dynamic Sitting Balance supervision  -ES     Position, Sitting Balance unsupported;sitting edge of bed;other (see comments)  commode  -ES     Sit to Stand Dynamic Balance standby assist  -ES     Static Standing Balance standby assist  -ES     Dynamic Standing Balance standby assist  -ES     Position/Device Used, Standing Balance supported;cane, straight  -ES     Balance Interventions sitting;standing;sit to stand;supported;static;dynamic;occupation based/functional task  -ES               User Key  (r) = Recorded By, (t) = Taken By, (c) = Cosigned By      Initials Name Provider Type    Mirtha Hood PT Physical Therapist                   Goals/Plan    No documentation.                  Clinical Impression       Row Name 02/14/24 1509          Pain    Pretreatment Pain Rating 0/10 - no pain  -ES     Posttreatment Pain Rating 0/10 - no pain  -ES     Pre/Posttreatment Pain Comment tolerated  -ES     Pain Intervention(s) Repositioned;Ambulation/increased activity  -ES       Row Name 02/14/24 1509          Plan of Care Review    Plan of Care Reviewed With patient  -ES     Progress improving  -ES     Outcome Evaluation Pt continues to demonstrate good effort with therapy, although requires cueing throughout session for adherence to L wrist/hand NWB precautions. Pt demonstrated good understanding and carryover re: sequencing with SPC. Will continue to progress as able to promote return to PLOF. PT rec home with assist and HHPT at d/c.   -ES       Row Name 02/14/24 1509          Therapy Assessment/Plan (PT)    Rehab Potential (PT) good, to achieve stated therapy goals  -ES     Criteria for Skilled Interventions Met (PT) yes;meets criteria;skilled treatment is necessary  -ES     Therapy Frequency (PT) daily  -ES       Row Name 02/14/24 1509          Vital Signs    O2 Delivery Pre Treatment room air  -ES     O2 Delivery Intra Treatment room air  -ES     O2 Delivery Post Treatment room air  -ES     Pre Patient Position Supine  -ES     Intra Patient Position Standing  -ES     Post Patient Position Supine  -ES       Row Name 02/14/24 1509          Positioning and Restraints    Pre-Treatment Position in bed  -ES     Post Treatment Position bed  -ES     In Bed fowlers;call light within reach;encouraged to call for assist;side rails up x2  exit alarm status unchanged  -ES               User Key  (r) = Recorded By, (t) = Taken By, (c) = Cosigned By      Initials Name Provider Type    ES Mirtha Marrero, PT Physical Therapist                   Outcome Measures       Row Name 02/14/24 1512 02/14/24 1000       How much help from another person do you currently need...    Turning from your back to your side while in flat bed without using bedrails? 4  -ES 4  -CJ    Moving from lying on back to sitting on the side of a flat bed without bedrails? 4  -ES 4  -CJ    Moving to and from a bed to a chair (including a wheelchair)? 4  -ES 4  -CJ    Standing up from a chair using your arms (e.g., wheelchair, bedside chair)? 3  -ES 4  -CJ    Climbing 3-5 steps with a railing? 3  -ES 3  -CJ    To walk in hospital room? 3  -ES 3  -CJ    AM-PAC 6 Clicks Score (PT) 21  -ES 22  -CJ    Highest Level of Mobility Goal 6 --> Walk 10 steps or more  -ES 7 --> Walk 25 feet or more  -CJ      Row Name 02/14/24 1512          Functional Assessment    Outcome Measure Options AM-PAC 6 Clicks Basic Mobility (PT)  -ES               User Key  (r) = Recorded By, (t) = Taken By, (c) = Cosigned  By      Initials Name Provider Type    Mirtha Hood, PT Physical Therapist    Arvin Martin RN Registered Nurse                                 Physical Therapy Education       Title: PT OT SLP Therapies (Done)       Topic: Physical Therapy (Done)       Point: Mobility training (Done)       Learning Progress Summary             Patient Acceptance, E,TB, VU by JS at 2/12/2024 0350    Acceptance, E, VU by KE at 2/9/2024 1126    Acceptance, E, VU by AG at 2/9/2024 0258    Acceptance, E, NR by AS at 2/7/2024 0906    Acceptance, E, VU by AE at 2/5/2024 1323    Acceptance, E, VU by AG at 2/5/2024 0346    Acceptance, E, NR by KE at 2/2/2024 1121    Acceptance, E, NR by KE at 1/31/2024 0833                         Point: Home exercise program (Done)       Learning Progress Summary             Patient Acceptance, E,TB, VU by JS at 2/12/2024 0350    Acceptance, E, VU by AG at 2/9/2024 0258    Acceptance, E, NR by AS at 2/7/2024 0906    Acceptance, E, VU by AE at 2/5/2024 1323    Acceptance, E, VU by AG at 2/5/2024 0346    Acceptance, E, NR by KE at 1/31/2024 0833                         Point: Body mechanics (Done)       Learning Progress Summary             Patient Acceptance, E,TB, VU by JS at 2/12/2024 0350    Acceptance, E, VU by KE at 2/9/2024 1126    Acceptance, E, VU by AG at 2/9/2024 0258    Acceptance, E, NR by AS at 2/7/2024 0906    Acceptance, E, VU by AE at 2/5/2024 1323    Acceptance, E, VU by AG at 2/5/2024 0346    Acceptance, E, NR by KE at 2/2/2024 1121    Acceptance, E, NR by KE at 1/31/2024 0833                         Point: Precautions (Done)       Learning Progress Summary             Patient Acceptance, E,TB, VU by JS at 2/12/2024 0350    Acceptance, E, VU by KE at 2/9/2024 1126    Acceptance, E, VU by AG at 2/9/2024 0258    Acceptance, E, NR by AS at 2/7/2024 0906    Acceptance, E, VU by AE at 2/5/2024 1323    Acceptance, E, VU by AG at 2/5/2024 0346    Acceptance, E, NR by KE at 2/2/2024 1121     Acceptance, E, NR by EFRAÍN at 1/31/2024 0833                                         User Key       Initials Effective Dates Name Provider Type Discipline    AS 04/28/23 -  Eileen Moncada, PTA Physical Therapist Assistant PT    JS 06/16/21 -  Vanessa Morley, RN Registered Nurse Nurse    AE 09/21/21 -  Robert Gillespie, PT Physical Therapist PT    AG 11/01/23 -  Cb Dobson, RN Registered Nurse Nurse    EFRAÍN 11/16/23 -  Corry Maldonado, PT Physical Therapist PT                  PT Recommendation and Plan     Plan of Care Reviewed With: patient  Progress: improving  Outcome Evaluation: Pt continues to demonstrate good effort with therapy, although requires cueing throughout session for adherence to L wrist/hand NWB precautions. Pt demonstrated good understanding and carryover re: sequencing with SPC. Will continue to progress as able to promote return to PLOF. PT rec home with assist and HHPT at d/c.     Time Calculation:         PT Charges       Row Name 02/14/24 1512             Time Calculation    Start Time 1430  -ES      PT Received On 02/14/24  -ES      PT Goal Re-Cert Due Date 02/22/24  -ES         Time Calculation- PT    Total Timed Code Minutes- PT 25 minute(s)  -ES         Timed Charges    83682 - Gait Training Minutes  15  -ES      02214 - PT Therapeutic Activity Minutes 10  -ES         Total Minutes    Timed Charges Total Minutes 25  -ES       Total Minutes 25  -ES                User Key  (r) = Recorded By, (t) = Taken By, (c) = Cosigned By      Initials Name Provider Type    ES Mirtha Marrero, PT Physical Therapist                  Therapy Charges for Today       Code Description Service Date Service Provider Modifiers Qty    31718182299 HC GAIT TRAINING EA 15 MIN 2/14/2024 Mirtha Marrero, PT GP 1    13084240057 HC PT THERAPEUTIC ACT EA 15 MIN 2/14/2024 Mirtha Marrero, PT GP 1            PT G-Codes  Outcome Measure Options: AM-PAC 6 Clicks Basic Mobility (PT)  AM-PAC 6 Clicks Score  (PT): 21  AM-PAC 6 Clicks Score (OT): 18  PT Discharge Summary  Anticipated Discharge Disposition (PT): home with assist, home with home health    Mirtha Marrero, PT  2/14/2024

## 2024-02-14 NOTE — PLAN OF CARE
Goal Outcome Evaluation:  Plan of Care Reviewed With: patient        Progress: improving  Outcome Evaluation: Pt continues to demonstrate good effort with therapy, although requires cueing throughout session for adherence to L wrist/hand NWB precautions. Pt demonstrated good understanding and carryover re: sequencing with SPC. Will continue to progress as able to promote return to PLOF. PT rec home with assist and HHPT at d/c.      Anticipated Discharge Disposition (PT): home with assist, home with home health

## 2024-02-15 ENCOUNTER — HOME HEALTH ADMISSION (OUTPATIENT)
Dept: HOME HEALTH SERVICES | Facility: HOME HEALTHCARE | Age: 64
End: 2024-02-15
Payer: COMMERCIAL

## 2024-02-15 ENCOUNTER — READMISSION MANAGEMENT (OUTPATIENT)
Dept: CALL CENTER | Facility: HOSPITAL | Age: 64
End: 2024-02-15
Payer: COMMERCIAL

## 2024-02-15 VITALS
HEIGHT: 64 IN | TEMPERATURE: 97 F | DIASTOLIC BLOOD PRESSURE: 77 MMHG | RESPIRATION RATE: 16 BRPM | HEART RATE: 91 BPM | WEIGHT: 148 LBS | SYSTOLIC BLOOD PRESSURE: 114 MMHG | BODY MASS INDEX: 25.27 KG/M2 | OXYGEN SATURATION: 94 %

## 2024-02-15 PROBLEM — K92.1 MELENA: Status: RESOLVED | Noted: 2024-01-30 | Resolved: 2024-02-15

## 2024-02-15 PROBLEM — K92.0 HEMATEMESIS WITHOUT NAUSEA: Status: RESOLVED | Noted: 2024-01-30 | Resolved: 2024-02-15

## 2024-02-15 PROBLEM — E87.6 HYPOKALEMIA: Status: RESOLVED | Noted: 2024-01-30 | Resolved: 2024-02-15

## 2024-02-15 PROBLEM — D50.0 BLOOD LOSS ANEMIA: Status: RESOLVED | Noted: 2024-01-30 | Resolved: 2024-02-15

## 2024-02-15 PROCEDURE — 94799 UNLISTED PULMONARY SVC/PX: CPT

## 2024-02-15 PROCEDURE — 94664 DEMO&/EVAL PT USE INHALER: CPT

## 2024-02-15 PROCEDURE — 99239 HOSP IP/OBS DSCHRG MGMT >30: CPT | Performed by: PEDIATRICS

## 2024-02-15 PROCEDURE — G0378 HOSPITAL OBSERVATION PER HR: HCPCS

## 2024-02-15 RX ORDER — FOLIC ACID 1 MG/1
1 TABLET ORAL DAILY
Qty: 30 TABLET | Refills: 0 | Status: SHIPPED | OUTPATIENT
Start: 2024-02-16

## 2024-02-15 RX ORDER — HYDROCODONE BITARTRATE AND ACETAMINOPHEN 7.5; 325 MG/1; MG/1
1 TABLET ORAL EVERY 6 HOURS PRN
Qty: 10 TABLET | Refills: 0 | Status: CANCELLED | OUTPATIENT
Start: 2024-02-15 | End: 2024-02-16

## 2024-02-15 RX ORDER — CARVEDILOL 3.12 MG/1
3.12 TABLET ORAL 2 TIMES DAILY WITH MEALS
Qty: 60 TABLET | Refills: 0 | Status: SHIPPED | OUTPATIENT
Start: 2024-02-15

## 2024-02-15 RX ORDER — LANOLIN ALCOHOL/MO/W.PET/CERES
100 CREAM (GRAM) TOPICAL DAILY
Qty: 30 TABLET | Refills: 0 | Status: CANCELLED | OUTPATIENT
Start: 2024-02-16

## 2024-02-15 RX ORDER — PANTOPRAZOLE SODIUM 40 MG/1
40 TABLET, DELAYED RELEASE ORAL
Qty: 60 TABLET | Refills: 2 | Status: CANCELLED | OUTPATIENT
Start: 2024-02-15

## 2024-02-15 RX ORDER — HYDROCODONE BITARTRATE AND ACETAMINOPHEN 7.5; 325 MG/1; MG/1
1 TABLET ORAL EVERY 6 HOURS PRN
Qty: 10 TABLET | Refills: 0 | Status: SHIPPED | OUTPATIENT
Start: 2024-02-15 | End: 2024-02-26

## 2024-02-15 RX ORDER — LANOLIN ALCOHOL/MO/W.PET/CERES
100 CREAM (GRAM) TOPICAL DAILY
Qty: 30 TABLET | Refills: 0 | Status: SHIPPED | OUTPATIENT
Start: 2024-02-16

## 2024-02-15 RX ORDER — PANTOPRAZOLE SODIUM 40 MG/1
40 TABLET, DELAYED RELEASE ORAL
Qty: 60 TABLET | Refills: 2 | Status: SHIPPED | OUTPATIENT
Start: 2024-02-15

## 2024-02-15 RX ORDER — FOLIC ACID 1 MG/1
1 TABLET ORAL DAILY
Qty: 30 TABLET | Refills: 0 | Status: CANCELLED | OUTPATIENT
Start: 2024-02-16

## 2024-02-15 RX ORDER — CARVEDILOL 3.12 MG/1
3.12 TABLET ORAL 2 TIMES DAILY WITH MEALS
Qty: 60 TABLET | Refills: 0 | Status: CANCELLED | OUTPATIENT
Start: 2024-02-15

## 2024-02-15 RX ADMIN — TIOTROPIUM BROMIDE INHALATION SPRAY 2 PUFF: 3.12 SPRAY, METERED RESPIRATORY (INHALATION) at 08:32

## 2024-02-15 RX ADMIN — BUDESONIDE AND FORMOTEROL FUMARATE DIHYDRATE 1 PUFF: 160; 4.5 AEROSOL RESPIRATORY (INHALATION) at 08:31

## 2024-02-15 RX ADMIN — CARVEDILOL 3.12 MG: 3.12 TABLET, FILM COATED ORAL at 08:20

## 2024-02-15 RX ADMIN — HYDROCODONE BITARTRATE AND ACETAMINOPHEN 1 TABLET: 7.5; 325 TABLET ORAL at 11:54

## 2024-02-15 RX ADMIN — FOLIC ACID 1 MG: 1 TABLET ORAL at 08:20

## 2024-02-15 RX ADMIN — THIAMINE HCL TAB 100 MG 100 MG: 100 TAB at 08:20

## 2024-02-15 RX ADMIN — HYDROCODONE BITARTRATE AND ACETAMINOPHEN 1 TABLET: 7.5; 325 TABLET ORAL at 06:21

## 2024-02-15 RX ADMIN — URSODIOL 300 MG: 300 CAPSULE ORAL at 08:20

## 2024-02-15 RX ADMIN — Medication 250 MG: at 08:20

## 2024-02-15 RX ADMIN — LOPERAMIDE HYDROCHLORIDE 2 MG: 2 CAPSULE ORAL at 06:21

## 2024-02-15 RX ADMIN — HYDROCORTISONE 1 APPLICATION: 1 CREAM TOPICAL at 08:20

## 2024-02-15 RX ADMIN — DESVENLAFAXINE 50 MG: 50 TABLET, FILM COATED, EXTENDED RELEASE ORAL at 08:20

## 2024-02-15 RX ADMIN — PANTOPRAZOLE SODIUM 40 MG: 40 TABLET, DELAYED RELEASE ORAL at 08:20

## 2024-02-15 NOTE — DISCHARGE SUMMARY
[facility] Medicine Services  DISCHARGE SUMMARY    Patient Name: Philomena Davis  : 1960  MRN: [id number]    Date of Admission: 2024 12:11 PM  Date of Discharge:  2/15/2024  Primary Care Physician: Unique Brandt DO    Consults       Date and Time Order Name Status Description    2024  5:47 PM Inpatient Orthopedic Surgery Consult Completed     2024  5:37 PM Inpatient Gastroenterology Consult Completed             Hospital Course     Presenting Problem: weakness    Active Hospital Problems    Diagnosis  POA    Moderate malnutrition [E44.0]  Yes    Closed fracture of left hand [S62.92XA]  Unknown    Alcohol use disorder [F10.90]  Yes    GERD without esophagitis [K21.9]  Yes    Alcoholic cirrhosis of liver without ascites [K70.30]  Yes    Essential hypertension [I10]  Yes    Generalized anxiety disorder [F41.1]  Yes    Hyperlipidemia [E78.5]  Yes      Resolved Hospital Problems    Diagnosis Date Resolved POA    Hypokalemia [E87.6] 02/15/2024 Yes    Hematemesis without nausea [K92.0] 02/15/2024 Unknown    Melena [K92.1] 02/15/2024 Unknown    Blood loss anemia [D50.0] 02/15/2024 Unknown          Hospital Course:  Philomena Davis is a 63 y.o. female with ETOH cirrhosis, HTN, anxiety who presented with significant weakness after reported hematemesis. EGD revealed severe esophagitis. Found to have left hand 5th metacarpal fracture which inhibits ability to complete daily casts. Attempted rehab placement, but she was denied.  Pt was set up with  instead.        Atypical PNA, suspect aspiration  -CXR 2/3 c/w atypical PNA v atelectasis v aspiration  -completed course of rocephin and azithromax     Severe reflux esophagitis c/b ulceration + gastric ulcer  -BID PPI x 3 months, then daily  -Needs repeat EGD in 3 months recommended to assess healing  -completed IV ceftriaxone given UGI bleed and concomitant cirrhosis     ETOH cirrhosis w h/o ETOH use  -Discussed with patient  importance of ETOH abstinence     LEFT hand 5th metacarpal fracture   -Plan for appointment tomorrow for cast placement.  CM has set up medical van transportation to appointment and back.      Anxiety   - Continue home desvenlafaxine.     Debiltity  --The patient doesn't have the ability to make it to her toilet in the home, needing something closer to use.     Discharge Follow Up Recommendations for outpatient labs/diagnostics:   Keep ortho appt tomorrow.  Follow up with PCP in 1 week    Day of Discharge     HPI:   Still feeling weak, but overall better.        Vital Signs:   Temp:  [97.1 °F (36.2 °C)-98.5 °F (36.9 °C)] 97.1 °F (36.2 °C)  Heart Rate:  [79-92] 88  Resp:  [18] 18  BP: ()/(58-83) 114/68      Physical Exam:  Constitutional: No acute distress, awake, alert, currently on RA  Respiratory: Respiratory effort normal, no rhonchi or wheezing   Cardiovascular: RRR, no murmurs, rubs, or gallops  Gastrointestinal: Soft, nontender, nondistended  Musculoskeletal: L hand in splint  Psychiatric: Appropriate affect, cooperative  Neurologic: Oriented x 3, speech clear  Skin: No rashes    Pertinent  and/or Most Recent Results     LAB RESULTS:      Lab 02/14/24 0420 02/13/24  0536 02/12/24  0508 02/11/24  0532 02/10/24  0709   WBC 4.29 3.96 4.22 3.93 3.50   HEMOGLOBIN 10.4* 10.4* 10.3* 10.5* 10.3*   HEMATOCRIT 31.8* 31.9* 31.7* 33.3* 31.8*   PLATELETS 204 201 205 213 207   NEUTROS ABS 1.93 2.00 2.36 2.03 1.71   IMMATURE GRANS (ABS) 0.02 0.02 0.02 0.03 0.01   LYMPHS ABS 1.62 1.16 1.11 1.17 1.10   MONOS ABS 0.50 0.54 0.53 0.54 0.53   EOS ABS 0.17 0.19 0.15 0.10 0.09   .0* 99.4* 99.1* 99.7* 97.2*         Lab 02/14/24 0420 02/13/24  0536 02/12/24  0508 02/11/24  0532 02/10/24  0709   SODIUM 140 140 140 141 140   POTASSIUM 4.0 4.0 3.7 4.2 4.1   CHLORIDE 103 103 103 105 102   CO2 29.0 29.0 29.0 29.0 30.0*   ANION GAP 8.0 8.0 8.0 7.0 8.0   BUN 5* 8 6* 8 5*   CREATININE 0.71 0.75 0.57 0.67 0.62   EGFR 96.9 90.7  103.5 99.6 101.5   GLUCOSE 86 93 93 92 88   CALCIUM 9.0 9.1 8.9 8.8 8.9   MAGNESIUM  --   --  1.8 1.8 1.8         Lab 02/14/24  0420 02/13/24  0536 02/12/24  0508 02/11/24  0532 02/10/24  0709   TOTAL PROTEIN 5.8* 5.7* 6.0 5.6* 5.6*   ALBUMIN 3.3* 3.4* 3.1* 3.2* 3.1*   GLOBULIN 2.5 2.3 2.9 2.4 2.5   ALT (SGPT) 15 13 13 13 12   AST (SGOT) 34* 35* 34* 33* 32   BILIRUBIN 0.5 0.5 0.6 0.7 0.7   ALK PHOS 136* 137* 124* 122* 112                     Brief Urine Lab Results  (Last result in the past 365 days)        Color   Clarity   Blood   Leuk Est   Nitrite   Protein   CREAT   Urine HCG        05/19/23 0834 Orange  Comment: on azo   Cloudy   3+   Large (3+)   Positive   3+                 Microbiology Results (last 10 days)       Procedure Component Value - Date/Time    Clostridioides difficile Toxin - Stool, Per Rectum [[id number]]  (Normal) Collected: 02/07/24 0715    Lab Status: Final result Specimen: Stool from Per Rectum Updated: 02/07/24 0825    Narrative:      The following orders were created for panel order Clostridioides difficile Toxin - Stool, Per Rectum.  Procedure                               Abnormality         Status                     ---------                               -----------         ------                     Clostridioides difficile...[[id number]]  Normal              Final result                 Please view results for these tests on the individual orders.    Clostridioides difficile Toxin, PCR - Stool, Per Rectum [[id number]]  (Normal) Collected: 02/07/24 0715    Lab Status: Final result Specimen: Stool from Per Rectum Updated: 02/07/24 0825     Toxigenic C. difficile by PCR Not Detected    Narrative:      The result indicates the absence of toxigenic C. difficile from stool specimen.             No radiology results for the last 10 days                Plan for Follow-up of Pending Labs/Results:     Discharge Details        Discharge Medications        ASK your doctor about these medications         Instructions Start Date   albuterol sulfate  (90 Base) MCG/ACT inhaler  Commonly known as: PROVENTIL HFA;VENTOLIN HFA;PROAIR HFA   2 puffs, Inhalation, Every 4 Hours PRN      cholecalciferol 25 MCG (1000 UT) tablet  Commonly known as: VITAMIN D3   1,000 Units, Oral, Daily      cloNIDine 0.2 MG tablet  Commonly known as: Catapres   0.2 mg, Oral, 2 Times Daily      desvenlafaxine 50 MG 24 hr tablet  Commonly known as: PRISTIQ   50 mg, Oral, Daily      Fluticasone-Salmeterol 100-50 MCG/ACT DISKUS  Commonly known as: ADVAIR/WIXELA   Inhalation, 2 Times Daily - RT      hydroCHLOROthiazide 12.5 MG tablet   No dose, route, or frequency recorded.      latanoprost 0.005 % ophthalmic solution  Commonly known as: XALATAN   1 drop, Both Eyes, Nightly      pantoprazole 40 MG EC tablet  Commonly known as: PROTONIX   1 tablet, Oral, Daily      rosuvastatin 5 MG tablet  Commonly known as: CRESTOR   5 mg, Oral, Daily      tiotropium 18 MCG per inhalation capsule  Commonly known as: Spiriva HandiHaler   1 capsule, Inhalation, Daily - RT      ursodiol 300 MG capsule  Commonly known as: ACTIGALL   No dose, route, or frequency recorded.               Allergies   Allergen Reactions    Buspirone Anxiety    Codeine Nausea Only    Mirtazapine Anxiety         Discharge Disposition:      Diet:  Hospital:  Diet Order   Procedures    Diet: Regular/House Diet; Texture: Regular Texture (IDDSI 7); Fluid Consistency: Thin (IDDSI 0)            Activity:      Restrictions or Other Recommendations:  As tolerated, Home PT/OT       CODE STATUS:    Code Status and Medical Interventions:   Ordered at: 01/30/24 0563     Level Of Support Discussed With:    Patient     Code Status (Patient has no pulse and is not breathing):    CPR (Attempt to Resuscitate)     Medical Interventions (Patient has pulse or is breathing):    Full Support       Future Appointments   Date Time Provider Department Center   2/29/2024  8:45 AM Marko Eldridge APRN  [facility]       Additional Instructions for the Follow-ups that You Need to Schedule       Ambulatory Referral to Home Health   As directed      Face to Face Visit Date: 2/14/2024   Follow-up provider for Plan of Care?: I will be treating the patient on an ongoing basis.  Please send me the Plan of Care for signature.   Follow-up provider: CARLEY MERA [[id number]]   Reason/Clinical Findings: HAnd Fx   Describe mobility limitations that make leaving home difficult: Impaired mobility   Nursing/Therapeutic Services Requested: Skilled Nursing Physical Therapy   Skilled nursing orders: Medication education Pain management Cardiopulmonary assessments   PT orders: Therapeutic exercise Gait Training Strengthening   Weight Bearing Status: As Tolerated   Frequency: 1 Week 1                      Anisha New MD  02/15/24      Time Spent on Discharge:  I spent  31  minutes on this discharge activity which included: face-to-face encounter with the patient, reviewing the data in the system, coordination of the care with the nursing staff as well as consultants, documentation, and entering orders.

## 2024-02-15 NOTE — PLAN OF CARE
Goal Outcome Evaluation:  Plan of Care Reviewed With: patient        Progress: no change  Outcome Evaluation: VSS on RA. No PRNs given so far this shift, pt denies pain at this time. Gets up to bedside commode well w/ standby. No family at bedside. Pt sleeping well in between care.

## 2024-02-15 NOTE — PLAN OF CARE
Goal Outcome Evaluation:              Outcome Evaluation: all d/c goals met.d/c instructions given, pt verbalized understanding. all questions answered, no concerns noted.

## 2024-02-16 ENCOUNTER — TRANSITIONAL CARE MANAGEMENT TELEPHONE ENCOUNTER (OUTPATIENT)
Dept: CALL CENTER | Facility: HOSPITAL | Age: 64
End: 2024-02-16
Payer: COMMERCIAL

## 2024-02-16 ENCOUNTER — TELEPHONE (OUTPATIENT)
Dept: FAMILY MEDICINE CLINIC | Facility: CLINIC | Age: 64
End: 2024-02-16
Payer: COMMERCIAL

## 2024-02-17 ENCOUNTER — TRANSITIONAL CARE MANAGEMENT TELEPHONE ENCOUNTER (OUTPATIENT)
Dept: CALL CENTER | Facility: HOSPITAL | Age: 64
End: 2024-02-17
Payer: COMMERCIAL

## 2024-02-18 ENCOUNTER — HOME CARE VISIT (OUTPATIENT)
Dept: HOME HEALTH SERVICES | Facility: HOME HEALTHCARE | Age: 64
End: 2024-02-18
Payer: COMMERCIAL

## 2024-02-18 VITALS
SYSTOLIC BLOOD PRESSURE: 108 MMHG | HEART RATE: 78 BPM | OXYGEN SATURATION: 91 % | RESPIRATION RATE: 16 BRPM | DIASTOLIC BLOOD PRESSURE: 62 MMHG

## 2024-02-18 PROCEDURE — G0299 HHS/HOSPICE OF RN EA 15 MIN: HCPCS

## 2024-02-19 ENCOUNTER — TELEPHONE (OUTPATIENT)
Dept: FAMILY MEDICINE CLINIC | Facility: CLINIC | Age: 64
End: 2024-02-19
Payer: COMMERCIAL

## 2024-02-19 NOTE — TELEPHONE ENCOUNTER
Caller: DANILO    Relationship: [facility], later [facility]    Best call back number: [phone]    What orders are you requesting (i.e. lab or imaging): VERBAL ORDERS FOR OCCUPATIONAL THERAPY FOR BROKEN HAND    Where will you receive your lab/imaging services: [facility]

## 2024-02-22 ENCOUNTER — HOME CARE VISIT (OUTPATIENT)
Dept: HOME HEALTH SERVICES | Facility: HOME HEALTHCARE | Age: 64
End: 2024-02-22
Payer: COMMERCIAL

## 2024-02-22 VITALS
RESPIRATION RATE: 16 BRPM | DIASTOLIC BLOOD PRESSURE: 76 MMHG | HEART RATE: 70 BPM | TEMPERATURE: 97.5 F | OXYGEN SATURATION: 98 % | SYSTOLIC BLOOD PRESSURE: 134 MMHG

## 2024-02-22 VITALS
DIASTOLIC BLOOD PRESSURE: 70 MMHG | OXYGEN SATURATION: 92 % | TEMPERATURE: 97.6 F | SYSTOLIC BLOOD PRESSURE: 118 MMHG | HEART RATE: 77 BPM

## 2024-02-22 PROCEDURE — G0151 HHCP-SERV OF PT,EA 15 MIN: HCPCS

## 2024-02-22 PROCEDURE — G0152 HHCP-SERV OF OT,EA 15 MIN: HCPCS

## 2024-02-23 NOTE — HOME HEALTH
"Eval Note:    Patient lives alone in a multi story home with her dog.  She uses only the main floor.  She states she laid in bed for 9 days due to loss of apetite secondary to GI bleed.  She fell and broke her L hand \"pinky finger\" and fifth metacarpal head when attempting to ambulate down the hallway.  She wears glasses. She does have a RW and a st cane which she uses when out of her home, but does not use any type of AD regularly. Patient very motivated to participate with therapy.     Patient's goal(s): \"I want to do therapy and get stronger\".    Services required to achieve goals: PT to focus on standing dynamic balance, bilateral LE strengthening, gait technique    Problems identified: decreased standing balance, bilateral LE strengthening, ambulation distance/technique.     Describe the Functional status and safety:    Describe any environmental issues: Large friendly dog in her home"

## 2024-02-26 ENCOUNTER — TELEMEDICINE (OUTPATIENT)
Dept: FAMILY MEDICINE CLINIC | Facility: CLINIC | Age: 64
End: 2024-02-26
Payer: COMMERCIAL

## 2024-02-26 ENCOUNTER — HOME CARE VISIT (OUTPATIENT)
Dept: HOME HEALTH SERVICES | Facility: HOME HEALTHCARE | Age: 64
End: 2024-02-26
Payer: COMMERCIAL

## 2024-02-26 DIAGNOSIS — K70.30 ALCOHOLIC CIRRHOSIS OF LIVER WITHOUT ASCITES: Primary | ICD-10-CM

## 2024-02-26 DIAGNOSIS — S62.92XD CLOSED FRACTURE OF LEFT HAND WITH ROUTINE HEALING, SUBSEQUENT ENCOUNTER: ICD-10-CM

## 2024-02-26 DIAGNOSIS — F10.90 ALCOHOL USE DISORDER: ICD-10-CM

## 2024-02-26 DIAGNOSIS — G47.9 SLEEP DISTURBANCE: ICD-10-CM

## 2024-02-26 DIAGNOSIS — Z59.9 FINANCIAL DIFFICULTIES: ICD-10-CM

## 2024-02-26 DIAGNOSIS — Z74.8 ASSISTANCE NEEDED WITH TRANSPORTATION: ICD-10-CM

## 2024-02-26 RX ORDER — TRAZODONE HYDROCHLORIDE 50 MG/1
TABLET ORAL
Qty: 30 TABLET | Refills: 0 | Status: SHIPPED | OUTPATIENT
Start: 2024-02-26

## 2024-02-26 SDOH — ECONOMIC STABILITY - INCOME SECURITY: PROBLEM RELATED TO HOUSING AND ECONOMIC CIRCUMSTANCES, UNSPECIFIED: Z59.9

## 2024-02-26 NOTE — TELEPHONE ENCOUNTER
Unique Brandt, DO  You10 hours ago (10:45 PM)       OK to give verbal   LVM for Alysia     Please relay.

## 2024-02-26 NOTE — PROGRESS NOTES
No chief complaint on file.      HPI:  Philomena Davis is a 63 y.o. female who presents today for ***        PE:  There were no vitals filed for this visit.   There is no height or weight on file to calculate BMI.    Gen Appearance: NAD  HEENT: Normocephalic, PERRL, no thyromegaly, trachea midline  Heart: RRR, normal S1 and S2, no murmur  Lungs: CTA b/l, no wheezing, no crackles  MSK: Moves all extremities well, normal gait, no peripheral edema  Pulses: Palpable and equal b/l  Neuro: No focal deficits    Current Outpatient Medications   Medication Sig Dispense Refill    albuterol sulfate  (90 Base) MCG/ACT inhaler Inhale 2 puffs Every 4 (Four) Hours As Needed for Wheezing. 6.7 g 2    carvedilol (COREG) 3.125 MG tablet Take 1 tablet by mouth 2 (Two) Times a Day With Meals. 60 tablet 0    cloNIDine (Catapres) 0.2 MG tablet Take 1 tablet by mouth 2 (Two) Times a Day. 60 tablet 1    desvenlafaxine (PRISTIQ) 50 MG 24 hr tablet Take 1 tablet by mouth Daily. Indications: Major Depressive Disorder      Fluticasone-Salmeterol (ADVAIR/WIXELA) 100-50 MCG/ACT DISKUS Inhale 2 (Two) Times a Day. Indications: COPD      folic acid (FOLVITE) 1 MG tablet Take 1 tablet by mouth Daily. 30 tablet 0    latanoprost (XALATAN) 0.005 % ophthalmic solution Administer 1 drop to both eyes Every Night. Indications: Wide-Angle Glaucoma      pantoprazole (PROTONIX) 40 MG EC tablet Take 1 tablet by mouth 2 (Two) Times a Day Before Meals. 60 tablet 2    rosuvastatin (CRESTOR) 5 MG tablet TAKE 1 TABLET BY MOUTH DAILY 90 tablet 0    thiamine (VITAMIN B1) 100 MG tablet Take 1 tablet by mouth Daily. 30 tablet 0    tiotropium (Spiriva HandiHaler) 18 MCG per inhalation capsule Place 1 capsule into inhaler and inhale Daily. 30 capsule 2    ursodiol (ACTIGALL) 300 MG capsule Take 300 mg by mouth 2 (Two) Times a Day. Take two capsules by mouth every morning and take one capsule by mouth every evening  Indications: Liver Disease       No current  facility-administered medications for this visit.        A/P:  There are no diagnoses linked to this encounter.     No follow-ups on file.     Dictated Utilizing Dragon Dictation    Please note that portions of this note were completed with a voice recognition program.    Part of this note may be an electronic transcription/translation of spoken language to printed text using the Dragon Dictation System.

## 2024-02-26 NOTE — PROGRESS NOTES
No chief complaint on file.      HPI:  Philomena Davis is a 63 y.o. female who presents today for ***      PE:  There were no vitals filed for this visit.   There is no height or weight on file to calculate BMI.    Gen Appearance: NAD  HEENT: Normocephalic, PERRL, no thyromegaly, trachea midline  Heart: RRR, normal S1 and S2, no murmur  Lungs: CTA b/l, no wheezing, no crackles  MSK: Moves all extremities well, normal gait, no peripheral edema  Pulses: Palpable and equal b/l  Neuro: No focal deficits    Current Outpatient Medications   Medication Sig Dispense Refill    albuterol sulfate  (90 Base) MCG/ACT inhaler Inhale 2 puffs Every 4 (Four) Hours As Needed for Wheezing. 6.7 g 2    carvedilol (COREG) 3.125 MG tablet Take 1 tablet by mouth 2 (Two) Times a Day With Meals. 60 tablet 0    cloNIDine (Catapres) 0.2 MG tablet Take 1 tablet by mouth 2 (Two) Times a Day. 60 tablet 1    desvenlafaxine (PRISTIQ) 50 MG 24 hr tablet Take 1 tablet by mouth Daily. Indications: Major Depressive Disorder      Fluticasone-Salmeterol (ADVAIR/WIXELA) 100-50 MCG/ACT DISKUS Inhale 2 (Two) Times a Day. Indications: COPD      folic acid (FOLVITE) 1 MG tablet Take 1 tablet by mouth Daily. 30 tablet 0    latanoprost (XALATAN) 0.005 % ophthalmic solution Administer 1 drop to both eyes Every Night. Indications: Wide-Angle Glaucoma      pantoprazole (PROTONIX) 40 MG EC tablet Take 1 tablet by mouth 2 (Two) Times a Day Before Meals. 60 tablet 2    rosuvastatin (CRESTOR) 5 MG tablet TAKE 1 TABLET BY MOUTH DAILY 90 tablet 0    thiamine (VITAMIN B1) 100 MG tablet Take 1 tablet by mouth Daily. 30 tablet 0    tiotropium (Spiriva HandiHaler) 18 MCG per inhalation capsule Place 1 capsule into inhaler and inhale Daily. 30 capsule 2    traZODone (DESYREL) 50 MG tablet Take 1/2 to 1 tablet at bedtime as needed. 30 tablet 0    ursodiol (ACTIGALL) 300 MG capsule Take 300 mg by mouth 2 (Two) Times a Day. Take two capsules by mouth every morning and  take one capsule by mouth every evening  Indications: Liver Disease       No current facility-administered medications for this visit.        A/P:  Diagnoses and all orders for this visit:    1. Sleep disturbance (Primary)  -     traZODone (DESYREL) 50 MG tablet; Take 1/2 to 1 tablet at bedtime as needed.  Dispense: 30 tablet; Refill: 0         No follow-ups on file.     Dictated Utilizing Dragon Dictation    Please note that portions of this note were completed with a voice recognition program.    Part of this note may be an electronic transcription/translation of spoken language to printed text using the Dragon Dictation System.

## 2024-02-26 NOTE — PROGRESS NOTES
Sleep Clinic Video Visit Consult Note    The patient is located at their home address in [place]. The patient presents today for telehealth service.  This service was conducted via audio/video technology through a secure [employer] video visit connection through Epic.  This provider is located in [place].  Patient stated they are in a secure environment for the session.  Patient's condition being diagnosed/treated is appropriate for telemedicine.  The provider identified himself as well as his credentials.  The patient, and/or patient's guardian, consent to be seen remotely, and when consent is given they understanding that the consent allows for patient identifiable information to be sent to a third-party as needed.  They may refuse to be seen remotely at any time.  The electronic data is encrypted and password protected, and the patient and/or guardian has been advised of the potential risk to privacy not withstanding such measures.  Patient identifiers used: Name and date of birth.     You have chosen to receive care through a telehealth visit.  Do you consent to use a video/audio connection for your medical care today? Yes     Chief Complaint  Sleep problem    Subjective     History of Present Illness:  Philomena Davis is a 63 y.o. female with a history of hypertension, liver cirrhosis, glaucoma, alcohol withdrawal, anxiety, and depression. She is referred by Dr. Brandt. She has difficulty sleeping. She has been trailed on clonidine and had decreased BP. She then started trazodone but she wakes sedated and hung over. She has been sober for several months.     Further details are as follows:    [place] Scale is (out of 24): Total score: 2     Estimated average amount of sleep per night: 4-5 hours  Number of times she wakes up at night: 3 times  Difficulty falling back asleep: yes  It usually takes minutes to go to sleep.  She feels sleepy upon waking up: yes  Rotating or night shift work:  "no    Drowsiness/Sleepiness:  She exhibits the following:  excessive daytime sleepiness  excessive daytime fatigue    Snoring/Breathing:  She exhibits the following:  none    Head Injury:  She exhibits the following:  No    Reflux:  She describes the following:  takes medication for reflux    Narcolepsy:  She exhibits the following:  none    RLS/PLMs:  She describes the following:  none    Insomnia:  She describes the following:  frequent awakenings  Broken hand recently-pain    Parasomnia:  She exhibits the following:  none    Weight:  Weight change in the last year:  gain: 0 lbs    The patient's relevant past medical, surgical, family, and social history reviewed and updated in Epic as appropriate.    Review of Systems    Objective   Vital Signs:  Ht 162.6 cm (64\")   Wt 67.1 kg (148 lb)   BMI 25.40 kg/m²              Physical Exam  Constitutional:       General: She is not in acute distress.     Appearance: Normal appearance.   Neurological:      General: No focal deficit present.      Mental Status: She is alert and oriented to person, place, and time.   Psychiatric:         Mood and Affect: Mood normal.         Behavior: Behavior normal.         Thought Content: Thought content normal.         Judgment: Judgment normal.           Result Review :              Assessment and Plan  Philomena Davis is a 63 y.o. female with a past medical history of  hypertension, liver cirrhosis, glaucoma, alcohol withdrawal, anxiety, and depression who presents for further evaluation of excessive daytime sleepiness and fatigue, nonrestorative sleep, and psychophysiologic insomnia.  The patient denies symptoms concerning for or specific to sleep apnea.  She has had insomnia for quite some time and in the past had been treated quite well with Ambien, and Shara SANDERS.  Certainly, the patient's medical history including cirrhosis, and menopause as well as anxiety and depression are significant factors relating to insomnia.  We have " discussed standard of care for insomnia which is cognitive behavioral therapy for insomnia.  I have given the patient instructions to try the [facility] online CBT-I program.  In the meantime, we will try Seroquel to see if this is helpful.  Thus far she has tried trazodone, and clonidine as well as melatonin with little effect.  We will see her back in approximately 3 months.      Diagnoses and all orders for this visit:    1. Psychophysiological insomnia (Primary)  -     QUEtiapine (SEROquel) 100 MG tablet; Take 1 tablet by mouth Every Night for 30 days.  Dispense: 30 tablet; Refill: 0    2. Overweight (BMI 25.0-29.9)                 I discussed the consequences of uncontrolled sleep apnea including hypertension, heart disease, diabetes, stroke, and dementia. I further discussed sleep apnea therapeutic options including CPAP, Weight loss, Oral dental appliance, and surgery.         Follow Up  Return in about 3 months (around 5/29/2024) for Follow up after study, Video visit.  Patient was given instructions and counseling regarding her condition or for health maintenance advice. Please see specific information pulled into the AVS if appropriate.     CANDY Vuong, ACNP-BC  Pulmonary, Critical Care Medicine, and Sleep Medicine

## 2024-02-27 ENCOUNTER — HOME CARE VISIT (OUTPATIENT)
Dept: HOME HEALTH SERVICES | Facility: HOME HEALTHCARE | Age: 64
End: 2024-02-27
Payer: COMMERCIAL

## 2024-02-27 ENCOUNTER — REFERRAL TRIAGE (OUTPATIENT)
Age: 64
End: 2024-02-27
Payer: COMMERCIAL

## 2024-02-27 VITALS
RESPIRATION RATE: 18 BRPM | SYSTOLIC BLOOD PRESSURE: 90 MMHG | DIASTOLIC BLOOD PRESSURE: 59 MMHG | TEMPERATURE: 97.7 F | OXYGEN SATURATION: 94 % | HEART RATE: 62 BPM

## 2024-02-27 PROCEDURE — G0299 HHS/HOSPICE OF RN EA 15 MIN: HCPCS

## 2024-02-27 NOTE — CASE COMMUNICATION
Philomena Davis had a skilled nursing visit today 2/27/24. Her BP was 97/59 during her visit. She is also still having diarreha every time she eats. she states this started when she was hospitalized and they ruled out c-diff. It is causing her to not want to eat. Do you want to try something to help? maybe a probiotic?     Thanks,  DANDY Lopez

## 2024-02-27 NOTE — ASSESSMENT & PLAN NOTE
Reviewed notes/documentation from recent hospitalization for GI bleed  Following w GI, needs repeat EGD and colonoscopy in 3 months (~May 2024)  Discussed importance of complete abstinence from alcohol  Cont BB, Protonix

## 2024-02-27 NOTE — ASSESSMENT & PLAN NOTE
Uncontrolled  Unfortunately options are very limited given her hepatic impairment  Trial low dose Trazodone (has had over sedation before at higher doses)  Proceed w sleep medicine consult later this week

## 2024-02-27 NOTE — HOME HEALTH
Routine Visit Note:    Skill/education provided: medication education, CP assessment    Patient/caregiver response: tolerated well    Plan for next visit: CP assessment    Other pertinent info: hypotensive at todays visit, sent message to MD. ALso still complaining of diarrhea after eating.      Helped patient schedule transportation with [employer] for appt at [facility] on 3/8/24. Will pick her up at 930am

## 2024-02-28 ENCOUNTER — PRIOR AUTHORIZATION (OUTPATIENT)
Dept: FAMILY MEDICINE CLINIC | Facility: CLINIC | Age: 64
End: 2024-02-28
Payer: COMMERCIAL

## 2024-02-28 NOTE — TELEPHONE ENCOUNTER
(Key: [id number])  Bacid capsules  Status: Question Response - N/A  Created: February 27th, 2024 [phone]    Additional Information Required  This drug/product is not covered under the pharmacy benefit. Prior Authorization is not available.

## 2024-02-29 ENCOUNTER — TELEMEDICINE (OUTPATIENT)
Dept: SLEEP MEDICINE | Facility: HOSPITAL | Age: 64
End: 2024-02-29
Payer: COMMERCIAL

## 2024-02-29 ENCOUNTER — HOME CARE VISIT (OUTPATIENT)
Dept: HOME HEALTH SERVICES | Facility: HOME HEALTHCARE | Age: 64
End: 2024-02-29
Payer: COMMERCIAL

## 2024-02-29 ENCOUNTER — PATIENT OUTREACH (OUTPATIENT)
Age: 64
End: 2024-02-29
Payer: COMMERCIAL

## 2024-02-29 VITALS — BODY MASS INDEX: 25.27 KG/M2 | HEIGHT: 64 IN | WEIGHT: 148 LBS

## 2024-02-29 DIAGNOSIS — F51.04 PSYCHOPHYSIOLOGICAL INSOMNIA: Primary | ICD-10-CM

## 2024-02-29 DIAGNOSIS — E66.3 OVERWEIGHT (BMI 25.0-29.9): ICD-10-CM

## 2024-02-29 PROCEDURE — G0155 HHCP-SVS OF CSW,EA 15 MIN: HCPCS

## 2024-02-29 PROCEDURE — G0152 HHCP-SERV OF OT,EA 15 MIN: HCPCS

## 2024-02-29 RX ORDER — QUETIAPINE FUMARATE 100 MG/1
100 TABLET, FILM COATED ORAL NIGHTLY
Qty: 30 TABLET | Refills: 0 | Status: SHIPPED | OUTPATIENT
Start: 2024-02-29 | End: 2024-03-30

## 2024-02-29 NOTE — HOME HEALTH
I met with Ms. Davis and provided her with resource information including transportation, [employer] resources to help with bills and housing, and rental property.  She said she sold her house in January because she didn't have income and her [employer] quit paying her monthly bills.  She said since that time she has gotten SSI.  She has to move out of her home by 4/1/24.  She said she has a realtor friend who is helping too.  I also provided an apartment/low cost list but she doesn't want to go to an apartment.   She plans to return to driving soon.  I did a lot of the resource gathering before meeting with her.  My visit was not long with her because she said she forgot that OT was coming and OT showed up soon after I started my visit.  I was able to provide the needed information to her and provided my phone number to her in case further assistance is needed.  She has a dog that she will have to accomodate in the move as well.  She is using [employer] transportation for appointments as needed.  She has a telehealth appointment this morning with her sleep study MD at .  She denies further needs or concerns at this time.

## 2024-02-29 NOTE — OUTREACH NOTE
SW reviewed chart. Pt met with home health  and provided resources for all concerns listed in the referral. SW will close referral.    Luciana CAT -   Ambulatory Case Management    2/29/2024, 14:10 EST

## 2024-02-29 NOTE — CASE COMMUNICATION
Patient missed a PTA visit from [facility] on 2/26/24.    Reason: Patient texted to cancel visit this date and reschedule at a later date.      For your records only.   Per CMS Guidance, MD must be notified of missed/cancelled visits; therefore the prescribed frequency was not met.

## 2024-03-01 ENCOUNTER — HOME CARE VISIT (OUTPATIENT)
Dept: HOME HEALTH SERVICES | Facility: HOME HEALTHCARE | Age: 64
End: 2024-03-01
Payer: COMMERCIAL

## 2024-03-01 VITALS
OXYGEN SATURATION: 96 % | TEMPERATURE: 97.9 F | RESPIRATION RATE: 16 BRPM | DIASTOLIC BLOOD PRESSURE: 63 MMHG | SYSTOLIC BLOOD PRESSURE: 95 MMHG | HEART RATE: 78 BPM

## 2024-03-01 VITALS
OXYGEN SATURATION: 95 % | HEART RATE: 74 BPM | DIASTOLIC BLOOD PRESSURE: 72 MMHG | RESPIRATION RATE: 16 BRPM | SYSTOLIC BLOOD PRESSURE: 110 MMHG | TEMPERATURE: 97.4 F

## 2024-03-01 PROCEDURE — G0157 HHC PT ASSISTANT EA 15: HCPCS

## 2024-03-01 PROCEDURE — G0156 HHCP-SVS OF AIDE,EA 15 MIN: HCPCS

## 2024-03-04 VITALS
TEMPERATURE: 97.6 F | SYSTOLIC BLOOD PRESSURE: 106 MMHG | OXYGEN SATURATION: 94 % | HEART RATE: 82 BPM | DIASTOLIC BLOOD PRESSURE: 68 MMHG

## 2024-03-05 ENCOUNTER — HOME CARE VISIT (OUTPATIENT)
Dept: HOME HEALTH SERVICES | Facility: HOME HEALTHCARE | Age: 64
End: 2024-03-05
Payer: COMMERCIAL

## 2024-03-05 VITALS
RESPIRATION RATE: 18 BRPM | TEMPERATURE: 97.8 F | DIASTOLIC BLOOD PRESSURE: 60 MMHG | HEART RATE: 78 BPM | SYSTOLIC BLOOD PRESSURE: 109 MMHG

## 2024-03-05 PROCEDURE — G0300 HHS/HOSPICE OF LPN EA 15 MIN: HCPCS

## 2024-03-05 NOTE — HOME HEALTH
Routine Visit Note: LPN    Skill/education provided: pain assessment medication review and mediplanner set up. no acute distress reported, pt reports her hand cast coming detached at the finger cld dr office pt is trina to come in tomorrow for new casting.v/s wdl. no changes in medications, reviewed meds purpose and adverse reactions pt verbalized understanding.

## 2024-03-05 NOTE — CASE COMMUNICATION
Patient missed a HHAIDE visit from [facility] on 3-5-24.     Reason: Pt called office and canceled HHA today.       For your records only.   Per CMS Guidance, MD must be notified of missed/cancelled visits; therefore the prescribed frequency was not met.

## 2024-03-07 ENCOUNTER — HOME CARE VISIT (OUTPATIENT)
Dept: HOME HEALTH SERVICES | Facility: HOME HEALTHCARE | Age: 64
End: 2024-03-07
Payer: COMMERCIAL

## 2024-03-07 NOTE — CASE COMMUNICATION
Patient missed a HHAIDE visit from [facility] on 3-7-24.     Reason: Pt called a canceled HHA visit.       For your records only.   Per CMS Guidance, MD must be notified of missed/cancelled visits; therefore the prescribed frequency was not met.

## 2024-03-08 ENCOUNTER — HOME CARE VISIT (OUTPATIENT)
Dept: HOME HEALTH SERVICES | Facility: HOME HEALTHCARE | Age: 64
End: 2024-03-08
Payer: COMMERCIAL

## 2024-03-12 ENCOUNTER — HOME CARE VISIT (OUTPATIENT)
Dept: HOME HEALTH SERVICES | Facility: HOME HEALTHCARE | Age: 64
End: 2024-03-12
Payer: COMMERCIAL

## 2024-03-12 PROCEDURE — G0152 HHCP-SERV OF OT,EA 15 MIN: HCPCS

## 2024-03-12 PROCEDURE — G0299 HHS/HOSPICE OF RN EA 15 MIN: HCPCS

## 2024-03-12 NOTE — CASE COMMUNICATION
Patient missed a PTA visit from [facility] on 3/5/24.    Reason: Patient called to cancel visits this week.      For your records only.   Per CMS Guidance, MD must be notified of missed/cancelled visits; therefore the prescribed frequency was not met.

## 2024-03-12 NOTE — CASE COMMUNICATION
Patient missed a PTA visit from [facility] on 3/12/24.    Reason: Patient called our office to cancel visit, only wanting visit on Thursday this week.      For your records only.   Per CMS Guidance, MD must be notified of missed/cancelled visits; therefore the prescribed frequency was not met.

## 2024-03-12 NOTE — CASE COMMUNICATION
Patient missed a HHAIDE visit from [facility] on 3-12-24.     Reason: Pt text and canceled HHA today.       For your records only.   Per CMS Guidance, MD must be notified of missed/cancelled visits; therefore the prescribed frequency was not met.

## 2024-03-12 NOTE — CASE COMMUNICATION
Patient missed a PTA visit from [facility] on 3/8/24.    Reason: Patient called to cancel visits this week.      For your records only.   Per CMS Guidance, MD must be notified of missed/cancelled visits; therefore the prescribed frequency was not met.

## 2024-03-13 VITALS
DIASTOLIC BLOOD PRESSURE: 70 MMHG | SYSTOLIC BLOOD PRESSURE: 122 MMHG | OXYGEN SATURATION: 94 % | HEART RATE: 75 BPM | TEMPERATURE: 97.5 F

## 2024-03-13 VITALS
DIASTOLIC BLOOD PRESSURE: 73 MMHG | SYSTOLIC BLOOD PRESSURE: 111 MMHG | TEMPERATURE: 98 F | RESPIRATION RATE: 18 BRPM | OXYGEN SATURATION: 96 % | HEART RATE: 73 BPM

## 2024-03-13 NOTE — HOME HEALTH
Routine Visit Note:    Skill/education provided: medication edu and review, cp assessment    Patient/caregiver response: tolerated well    Plan for next visit: medication review, cp assessment    Other pertinent info: na

## 2024-03-14 ENCOUNTER — HOME CARE VISIT (OUTPATIENT)
Dept: HOME HEALTH SERVICES | Facility: HOME HEALTHCARE | Age: 64
End: 2024-03-14
Payer: COMMERCIAL

## 2024-03-14 DIAGNOSIS — G47.9 SLEEP DISTURBANCE: ICD-10-CM

## 2024-03-14 PROCEDURE — G0157 HHC PT ASSISTANT EA 15: HCPCS

## 2024-03-14 RX ORDER — CLONIDINE HYDROCHLORIDE 0.2 MG/1
0.2 TABLET ORAL 2 TIMES DAILY
Qty: 180 TABLET | Refills: 1 | Status: SHIPPED | OUTPATIENT
Start: 2024-03-14

## 2024-03-15 ENCOUNTER — HOME CARE VISIT (OUTPATIENT)
Dept: HOME HEALTH SERVICES | Facility: HOME HEALTHCARE | Age: 64
End: 2024-03-15
Payer: COMMERCIAL

## 2024-03-15 VITALS
DIASTOLIC BLOOD PRESSURE: 74 MMHG | RESPIRATION RATE: 16 BRPM | OXYGEN SATURATION: 98 % | HEART RATE: 112 BPM | SYSTOLIC BLOOD PRESSURE: 121 MMHG | TEMPERATURE: 97.9 F

## 2024-03-15 NOTE — CASE COMMUNICATION
Patient complained of dizziness when standing today. BP was WNL. PTA instructed pt to perform HEP, take in adequate calories, and hydrate to decrease dizziness. She voiced understanding and intent to comply.

## 2024-03-15 NOTE — CASE COMMUNICATION
Patient missed a HHAIDE visit from [facility] on 3-15-24.     Reason: Pt canceled HHA visit.       For your records only.   Per CMS Guidance, MD must be notified of missed/cancelled visits; therefore the prescribed frequency was not met.

## 2024-03-19 ENCOUNTER — HOME CARE VISIT (OUTPATIENT)
Dept: HOME HEALTH SERVICES | Facility: HOME HEALTHCARE | Age: 64
End: 2024-03-19
Payer: COMMERCIAL

## 2024-03-19 PROCEDURE — G0299 HHS/HOSPICE OF RN EA 15 MIN: HCPCS

## 2024-03-20 VITALS
RESPIRATION RATE: 18 BRPM | OXYGEN SATURATION: 97 % | TEMPERATURE: 98 F | DIASTOLIC BLOOD PRESSURE: 75 MMHG | SYSTOLIC BLOOD PRESSURE: 111 MMHG | HEART RATE: 84 BPM

## 2024-03-20 DIAGNOSIS — G47.9 SLEEP DISTURBANCE: ICD-10-CM

## 2024-03-20 DIAGNOSIS — J44.9 CHRONIC OBSTRUCTIVE PULMONARY DISEASE, UNSPECIFIED COPD TYPE: ICD-10-CM

## 2024-03-20 RX ORDER — PREDNISONE 20 MG/1
40 TABLET ORAL DAILY
Qty: 10 TABLET | Refills: 0 | OUTPATIENT
Start: 2024-03-20 | End: 2024-03-25

## 2024-03-20 RX ORDER — CLONIDINE HYDROCHLORIDE 0.1 MG/1
0.2 TABLET ORAL NIGHTLY
Qty: 60 TABLET | Refills: 3 | OUTPATIENT
Start: 2024-03-20

## 2024-03-20 RX ORDER — CLONIDINE HYDROCHLORIDE 0.2 MG/1
0.2 TABLET ORAL 2 TIMES DAILY
Qty: 60 TABLET | Refills: 5 | Status: SHIPPED | OUTPATIENT
Start: 2024-03-20 | End: 2024-03-25 | Stop reason: SDUPTHER

## 2024-03-20 NOTE — TELEPHONE ENCOUNTER
Rx Refill Note  Requested Prescriptions     Pending Prescriptions Disp Refills    predniSONE (DELTASONE) 20 MG tablet [Pharmacy Med Name: predniSONE 20 MG TABLET] 10 tablet 0     Sig: TAKE 2 TABLETS BY MOUTH DAILY FOR 5 DAYS     Refused Prescriptions Disp Refills    cloNIDine (CATAPRES) 0.1 MG tablet [Pharmacy Med Name: cloNIDine HCL 0.1MG TABLET] 60 tablet 3     Sig: TAKE TWO TABLETS BY MOUTH ONCE NIGHTLY     Refused By: MONIQUE CASILLAS     Reason for Refusal: Refill not appropriate      Last office visit with prescribing clinician: 10/25/2023   Last telemedicine visit with prescribing clinician: 2/26/2024   Next office visit with prescribing clinician: Visit date not found                         Would you like a call back once the refill request has been completed: [] Yes [] No    If the office needs to give you a call back, can they leave a voicemail: [] Yes [] No    Monique Casillas MA  03/20/24, 08:06 EDT

## 2024-03-20 NOTE — TELEPHONE ENCOUNTER
Prednisone is meant to be a one time prescription. Will need appt for that if refill is desired.      Refill for clonidine was sent.

## 2024-03-21 ENCOUNTER — TELEPHONE (OUTPATIENT)
Dept: SLEEP MEDICINE | Facility: HOSPITAL | Age: 64
End: 2024-03-21
Payer: COMMERCIAL

## 2024-03-21 ENCOUNTER — HOME CARE VISIT (OUTPATIENT)
Dept: HOME HEALTH SERVICES | Facility: HOME HEALTHCARE | Age: 64
End: 2024-03-21
Payer: COMMERCIAL

## 2024-03-21 NOTE — CASE COMMUNICATION
Patient missed a PTA visit from [facility] on 3/20/24.    Reason: Unable to reach patient to schedule visit.      For your records only.   Per CMS Guidance, MD must be notified of missed/cancelled visits; therefore the prescribed frequency was not met.

## 2024-03-21 NOTE — TELEPHONE ENCOUNTER
PT CALLED AND SAID THAT SHE TOOK SEROQUEL FOR THE FIRST TIME LAST NIGHT AND SHE SAYS IT IS TOO STRONG AND IT MADE HER FALL IN THE MIDDLE OF THE NIGHT. SHE WOULD LIKE TO BE PRESCRIBED AN ALTERNATIVE. SHE WAS THINKING CLONIDINE 0.2MG DOSE

## 2024-03-21 NOTE — HOME HEALTH
Routine Visit Note:    Skill/education provided: MEDICATION EDUCATION    Patient/caregiver response: VERBALIZED UNDERSTANDING    Plan for next visit: MEDICATION REVIEW AND EDUCATION, ATTEMPT TO FILL MEDICATION BOX INDEPENDENTLY    Other pertinent info: PATIENT HAS NOT TAKEN ANY OF HER MEDICATIONS SINCE LAST VISIT. PATIENT ADVISED THAT SHE NEEDS TO COMPLY WITH MEDICATION SCHEDULE AND ATTEMPT TO PREPARE MEDICATION BOX ON HER OWN. MEDICATIONS WERE REVIEWED AND BRANDYN VERBALIZED UNDERSTANDING OF NEED FOR COMPLIANCE.

## 2024-03-22 ENCOUNTER — HOME CARE VISIT (OUTPATIENT)
Dept: HOME HEALTH SERVICES | Facility: HOME HEALTHCARE | Age: 64
End: 2024-03-22
Payer: COMMERCIAL

## 2024-03-24 DIAGNOSIS — G47.9 SLEEP DISTURBANCE: ICD-10-CM

## 2024-03-25 DIAGNOSIS — G47.9 SLEEP DISTURBANCE: ICD-10-CM

## 2024-03-25 RX ORDER — TRAZODONE HYDROCHLORIDE 50 MG/1
TABLET ORAL
Qty: 90 TABLET | Refills: 0 | Status: SHIPPED | OUTPATIENT
Start: 2024-03-25

## 2024-03-25 RX ORDER — CLONIDINE HYDROCHLORIDE 0.2 MG/1
0.2 TABLET ORAL 2 TIMES DAILY
Qty: 60 TABLET | Refills: 2 | Status: SHIPPED | OUTPATIENT
Start: 2024-03-25 | End: 2024-06-23

## 2024-03-26 ENCOUNTER — HOME CARE VISIT (OUTPATIENT)
Dept: HOME HEALTH SERVICES | Facility: HOME HEALTHCARE | Age: 64
End: 2024-03-26
Payer: COMMERCIAL

## 2024-03-26 VITALS
OXYGEN SATURATION: 98 % | TEMPERATURE: 98.1 F | RESPIRATION RATE: 18 BRPM | HEART RATE: 83 BPM | DIASTOLIC BLOOD PRESSURE: 65 MMHG | SYSTOLIC BLOOD PRESSURE: 97 MMHG

## 2024-03-26 PROCEDURE — G0299 HHS/HOSPICE OF RN EA 15 MIN: HCPCS

## 2024-03-26 NOTE — CASE COMMUNICATION
Patient had a skilled nursing visit today. She has increased edema in BLE, non pitting. BP is on the low side 96/64 this morning but is requesting lasix for the edema. She doesn't have any other signs of fluid overload. Lung sounds are clear, oxygen sats are normal. What do you recommend?    DANDY Lopez

## 2024-03-26 NOTE — HOME HEALTH
Routine Visit Note:    Skill/education provided: medication review and education, patient was able to set up mediplanner with little assistance    Patient/caregiver response: tolerated well    Plan for next visit: dc    Other pertinent info: NA

## 2024-03-27 ENCOUNTER — HOME CARE VISIT (OUTPATIENT)
Dept: HOME HEALTH SERVICES | Facility: HOME HEALTHCARE | Age: 64
End: 2024-03-27
Payer: COMMERCIAL

## 2024-03-27 VITALS — HEART RATE: 74 BPM | SYSTOLIC BLOOD PRESSURE: 126 MMHG | DIASTOLIC BLOOD PRESSURE: 70 MMHG | OXYGEN SATURATION: 96 %

## 2024-03-27 PROCEDURE — G0152 HHCP-SERV OF OT,EA 15 MIN: HCPCS

## 2024-03-27 NOTE — CASE COMMUNICATION
Patient discharged from home care PT services non visit as two drive by have been performed, no return calls, no answer at the door upon arrival.  Voicemail left with therapist contact information should any future need arise.

## 2024-03-27 NOTE — Clinical Note
Pt was seen today for an OT reassessment. Pt demo. good progress and has met some goals. Pt continues to demo. decreased I with ADL/IADLs, decreased I with functional mobility/balance/transfers, and decreased UE strength/endurance/FM coord. Pt would benefit from continued skilled OT services 1W2 to address remaining OT problems/interventions/goals per POC. Pt in agreement with plan.

## 2024-04-02 ENCOUNTER — HOME CARE VISIT (OUTPATIENT)
Dept: HOME HEALTH SERVICES | Facility: HOME HEALTHCARE | Age: 64
End: 2024-04-02
Payer: COMMERCIAL

## 2024-04-02 NOTE — CASE COMMUNICATION
Philomena Davis    Plan to add more visits through end of cert period due to patient recently moving and still struggling with medication compliance/mediplanner refill. She is very anxious and could use the extra visits.

## 2024-04-05 ENCOUNTER — HOME CARE VISIT (OUTPATIENT)
Dept: HOME HEALTH SERVICES | Facility: HOME HEALTHCARE | Age: 64
End: 2024-04-05
Payer: COMMERCIAL

## 2024-04-05 VITALS — OXYGEN SATURATION: 96 % | SYSTOLIC BLOOD PRESSURE: 128 MMHG | DIASTOLIC BLOOD PRESSURE: 82 MMHG | HEART RATE: 82 BPM

## 2024-04-05 DIAGNOSIS — J44.9 CHRONIC OBSTRUCTIVE PULMONARY DISEASE, UNSPECIFIED COPD TYPE: ICD-10-CM

## 2024-04-05 DIAGNOSIS — G47.9 SLEEP DISTURBANCE: ICD-10-CM

## 2024-04-05 PROCEDURE — G0299 HHS/HOSPICE OF RN EA 15 MIN: HCPCS

## 2024-04-05 RX ORDER — TIOTROPIUM BROMIDE 18 UG/1
1 CAPSULE ORAL; RESPIRATORY (INHALATION)
Qty: 30 CAPSULE | Refills: 5 | Status: SHIPPED | OUTPATIENT
Start: 2024-04-05

## 2024-04-05 RX ORDER — CARVEDILOL 3.12 MG/1
3.12 TABLET ORAL 2 TIMES DAILY WITH MEALS
Qty: 60 TABLET | Refills: 5 | Status: SHIPPED | OUTPATIENT
Start: 2024-04-05

## 2024-04-05 RX ORDER — URSODIOL 300 MG/1
300 CAPSULE ORAL 2 TIMES DAILY
Qty: 60 CAPSULE | Refills: 5 | Status: SHIPPED | OUTPATIENT
Start: 2024-04-05

## 2024-04-05 RX ORDER — FOLIC ACID 1 MG/1
1 TABLET ORAL DAILY
Qty: 30 TABLET | Refills: 5 | Status: SHIPPED | OUTPATIENT
Start: 2024-04-05

## 2024-04-05 RX ORDER — PANTOPRAZOLE SODIUM 40 MG/1
40 TABLET, DELAYED RELEASE ORAL
Qty: 60 TABLET | Refills: 5 | Status: SHIPPED | OUTPATIENT
Start: 2024-04-05

## 2024-04-05 RX ORDER — DESVENLAFAXINE SUCCINATE 50 MG/1
50 TABLET, EXTENDED RELEASE ORAL DAILY
Qty: 30 TABLET | Refills: 5 | Status: SHIPPED | OUTPATIENT
Start: 2024-04-05

## 2024-04-05 RX ORDER — LANOLIN ALCOHOL/MO/W.PET/CERES
100 CREAM (GRAM) TOPICAL DAILY
Qty: 30 TABLET | Refills: 5 | Status: SHIPPED | OUTPATIENT
Start: 2024-04-05

## 2024-04-05 RX ORDER — FLUTICASONE PROPIONATE AND SALMETEROL 100; 50 UG/1; UG/1
1 POWDER RESPIRATORY (INHALATION)
Qty: 60 EACH | Refills: 3 | Status: SHIPPED | OUTPATIENT
Start: 2024-04-05

## 2024-04-05 RX ORDER — ALBUTEROL SULFATE 90 UG/1
2 AEROSOL, METERED RESPIRATORY (INHALATION) EVERY 4 HOURS PRN
Qty: 6.7 G | Refills: 2 | Status: SHIPPED | OUTPATIENT
Start: 2024-04-05

## 2024-04-05 NOTE — HOME HEALTH
Routine Visit Note:    Skill/education provided: medication education    Patient/caregiver response: verbalized understanding    Plan for next visit: medication education     Other pertinent info: patient has misplaced all medications in the move. thinks movers put them in storage. RN called pharmacy and PCP to get refills, most meds had 0 refills so requesting more. she has enough AM meds for the weekend, has a whole week of PM meds

## 2024-04-05 NOTE — CASE COMMUNICATION
Patient recently moved and movers lost all of her medications. [facility] is sending refill requests for most but one medication they do not have an order for is her carvedilol 3.125mg twice daily. Edema has improved. Please send scripts to [facility].    Thank you!!  DANDY Lopez

## 2024-04-07 DIAGNOSIS — F51.04 PSYCHOPHYSIOLOGICAL INSOMNIA: ICD-10-CM

## 2024-04-08 RX ORDER — QUETIAPINE FUMARATE 100 MG/1
100 TABLET, FILM COATED ORAL NIGHTLY
Qty: 30 TABLET | Refills: 0 | Status: SHIPPED | OUTPATIENT
Start: 2024-04-08 | End: 2024-04-11

## 2024-04-09 ENCOUNTER — HOME CARE VISIT (OUTPATIENT)
Dept: HOME HEALTH SERVICES | Facility: HOME HEALTHCARE | Age: 64
End: 2024-04-09
Payer: COMMERCIAL

## 2024-04-09 VITALS
RESPIRATION RATE: 16 BRPM | SYSTOLIC BLOOD PRESSURE: 114 MMHG | HEART RATE: 88 BPM | DIASTOLIC BLOOD PRESSURE: 83 MMHG | OXYGEN SATURATION: 98 %

## 2024-04-09 PROCEDURE — G0151 HHCP-SERV OF PT,EA 15 MIN: HCPCS

## 2024-04-09 NOTE — HOME HEALTH
"Pt was recently discharged without a visit due to scheduling conflicts.   Primarily using cane for household mobility. Recently moved to new home but does have multiple steps to enter/exit home and to get to laundry room.   Pt did have fall prior to most recent hospitalization. She explains that she remains weaker than her baseline and wishes to have HH PT. Pt's cert period ends next week and will require recertification for continued skilled PT.  Pt goals: \"get back to normal; get around without fear of falling\"    Goals/interventions will need updated in new cert period."

## 2024-04-10 ENCOUNTER — HOME CARE VISIT (OUTPATIENT)
Dept: HOME HEALTH SERVICES | Facility: HOME HEALTHCARE | Age: 64
End: 2024-04-10
Payer: COMMERCIAL

## 2024-04-10 PROCEDURE — G0299 HHS/HOSPICE OF RN EA 15 MIN: HCPCS

## 2024-04-10 PROCEDURE — G0152 HHCP-SERV OF OT,EA 15 MIN: HCPCS

## 2024-04-11 ENCOUNTER — TELEMEDICINE (OUTPATIENT)
Dept: FAMILY MEDICINE CLINIC | Facility: CLINIC | Age: 64
End: 2024-04-11
Payer: COMMERCIAL

## 2024-04-11 VITALS
OXYGEN SATURATION: 98 % | DIASTOLIC BLOOD PRESSURE: 75 MMHG | RESPIRATION RATE: 18 BRPM | SYSTOLIC BLOOD PRESSURE: 110 MMHG | HEART RATE: 72 BPM | TEMPERATURE: 97.7 F

## 2024-04-11 VITALS
TEMPERATURE: 97.8 F | SYSTOLIC BLOOD PRESSURE: 122 MMHG | OXYGEN SATURATION: 97 % | HEART RATE: 87 BPM | DIASTOLIC BLOOD PRESSURE: 80 MMHG

## 2024-04-11 DIAGNOSIS — G47.9 SLEEP DISTURBANCE: Primary | ICD-10-CM

## 2024-04-11 DIAGNOSIS — R19.7 DIARRHEA, UNSPECIFIED TYPE: ICD-10-CM

## 2024-04-11 PROCEDURE — 99214 OFFICE O/P EST MOD 30 MIN: CPT | Performed by: STUDENT IN AN ORGANIZED HEALTH CARE EDUCATION/TRAINING PROGRAM

## 2024-04-11 RX ORDER — QUETIAPINE FUMARATE 25 MG/1
TABLET, FILM COATED ORAL
Qty: 30 TABLET | Refills: 0 | Status: SHIPPED | OUTPATIENT
Start: 2024-04-11

## 2024-04-11 RX ORDER — LATANOPROST 50 UG/ML
1 SOLUTION/ DROPS OPHTHALMIC NIGHTLY
Qty: 2.5 ML | Refills: 5 | Status: SHIPPED | OUTPATIENT
Start: 2024-04-11

## 2024-04-11 NOTE — HOME HEALTH
Routine Visit Note:    Skill/education provided: medication review and education    Patient/caregiver response: verbalized understanding    Plan for next visit: discharge    Other pertinent info: patient has not [facility] her [facility] due to no [facility]. RN assisting patietn with getting delivery meds, may need to switch pharmacies. RN plans to DC next week. Therapy to continue

## 2024-04-11 NOTE — PROGRESS NOTES
Chief Complaint   Patient presents with    Insomnia     Clonidine not helping    Diarrhea     Mode of Visit: My Chart Twilio Video  Location of patient: Home  Location of Provider: [facility]  Patient has chosen to receive care through a telehealth visit.  Does the patient consent to use a video/audio device for their medical care today: Yes  The visit included audio and video interaction: Yes      HPI:  Philomena Davis is a 63 y.o. female with alcoholic cirrhosis, chronic alcohol use disorder, peripheral neuropathy, anxiety and depression who presents today via MY Chart video visit for above chief complaint.     Still struggling with sleep disturbance. Taking Clonidine 0.2mg BID for sleep but only sleeping 2 hours or less at a time. Tried Seroquel 100mg (had confusion, fall with this) and Trazodone which left her feeling hungover the next day. Has previously tried melatonin, ramelteon, Amitriptyline w/o relief.     Having persistent diarrhea since her hospitazliation in February. Happens almost after every meal. She has been ordering [employer] for most meals after her recent move. She has continued to drink alcohol. Denies any abdominal pain or fever.     PE:  There were no vitals filed for this visit.   There is no height or weight on file to calculate BMI.    Gen Appearance: NAD, alert  HEENT: Normocephalic, EOMI    Current Outpatient Medications   Medication Sig Dispense Refill    albuterol sulfate  (90 Base) MCG/ACT inhaler Inhale 2 puffs Every 4 (Four) Hours As Needed for Wheezing. Indications: Chronic Obstructive Lung Disease 6.7 g 2    carvedilol (COREG) 3.125 MG tablet Take 1 tablet by mouth 2 (Two) Times a Day With Meals. Indications: High Blood Pressure Disorder 60 tablet 5    cloNIDine (CATAPRES) 0.2 MG tablet Take 1 tablet by mouth 2 (Two) Times a Day for 90 days. 60 tablet 2    desvenlafaxine (PRISTIQ) 50 MG 24 hr tablet Take 1 tablet by mouth Daily. Indications: Major Depressive Disorder 30  tablet 5    Fluticasone-Salmeterol (ADVAIR/WIXELA) 100-50 MCG/ACT DISKUS Inhale 1 puff 2 (Two) Times a Day. Indications: COPD 60 each 3    folic acid (FOLVITE) 1 MG tablet Take 1 tablet by mouth Daily. Indications: Anemia From Inadequate Folic Acid 30 tablet 5    lactobacillus (BACID) tablet caplet Take 1 tablet by mouth 2 (Two) Times a Day. 60 each 2    latanoprost (XALATAN) 0.005 % ophthalmic solution Administer 1 drop to both eyes Every Night. Indications: Wide-Angle Glaucoma      pantoprazole (PROTONIX) 40 MG EC tablet Take 1 tablet by mouth 2 (Two) Times a Day Before Meals. 60 tablet 5    rosuvastatin (CRESTOR) 5 MG tablet TAKE 1 TABLET BY MOUTH DAILY 90 tablet 0    thiamine (VITAMIN B1) 100 MG tablet Take 1 tablet by mouth Daily. Indications: Deficiency of Vitamin B1 30 tablet 5    tiotropium (Spiriva HandiHaler) 18 MCG per inhalation capsule Place 1 capsule into inhaler and inhale Daily. Indications: Chronic Obstructive Lung Disease 30 capsule 5    ursodiol (ACTIGALL) 300 MG capsule Take 1 capsule by mouth 2 (Two) Times a Day. Take two capsules by mouth every morning and take one capsule by mouth every evening  Indications: Liver Disease 60 capsule 5     No current facility-administered medications for this visit.        A/P:  Diagnoses and all orders for this visit:    1. Sleep disturbance (Primary)  Uncontrolled, likely chronic insomnia 2/2 alcohol use disorder and liver disease  Has previously tried melatonin, ramelteon, Amitriptyline, high dose Seroquel, Trazodone and Clonidine  Will trial very low dose Seroquel 12.5mg-25mg nightly  FU in 1-2 weeks     2. Diarrhea, unspecified type  Suspect likely related to poor diet and continued alcohol use. No reported s/s concerning for infection   Advised to eat bland diet, elinminate fried/fatty foods. Avoid alcohol.   FU if sx's do not improve           Dictated Utilizing Dragon Dictation    Please note that portions of this note were completed with a voice  recognition program.    Part of this note may be an electronic transcription/translation of spoken language to printed text using the Dragon Dictation System.

## 2024-04-11 NOTE — TELEPHONE ENCOUNTER
Caller: Philomena Davis    Relationship: Self    Best call back number: [phone]     Requested Prescriptions:   Requested Prescriptions     Pending Prescriptions Disp Refills    latanoprost (XALATAN) 0.005 % ophthalmic solution       Sig: Administer 1 drop to both eyes Every Night. Indications: Wide-Angle Glaucoma        Pharmacy where request should be sent: [facility] [id number] [place] [id number] AT [facility] [address] - [phone]  - [phone] FX     Last office visit with prescribing clinician: 10/25/2023   Last telemedicine visit with prescribing clinician: 4/11/2024   Next office visit with prescribing clinician: Visit date not found     Additional details provided by patient: PLEASE REFILL FORGOT TO ASK FOR THIS DURING APPOINTMENT     Does the patient have less than a 3 day supply:  [] Yes  [x] No    Would you like a call back once the refill request has been completed: [] Yes [x] No    If the office needs to give you a call back, can they leave a voicemail: [] Yes [x] No    Milton Patten   04/11/24 16:00 EDT

## 2024-04-12 ENCOUNTER — TELEPHONE (OUTPATIENT)
Dept: FAMILY MEDICINE CLINIC | Facility: CLINIC | Age: 64
End: 2024-04-12
Payer: COMMERCIAL

## 2024-04-12 NOTE — HOME HEALTH
Pt was seen today for a skilled OT session with focus on ADL/IADL retraining, functional mobility/balance/transfer training, and UE strength/endurance/coord. training. Pt demo. good motivation/participation and made good progress with all goals. OT installed suction grab bar in pt's tub/shower and pt demo. I with use. Pt was able to transfer in/out of tub/shower using shower chair and suction grab bar with SBA. Continue OT POC.

## 2024-04-12 NOTE — TELEPHONE ENCOUNTER
Caller: Philomena Davis    Relationship: Self    Best call back number: [phone]     [employer] IS MAILING FORMS TO DR MERA TO FILL OUT AND SIGN.     SHE IS REQUESTING FOR THEM TO GET HER TRASH RECEPTACLES FROM HER HOME AND TAKE THEM TO THE TRUCK FOR HER SINCE SHE IS ON A WALKER WITH CANE.    JUST AN FYI

## 2024-04-15 ENCOUNTER — HOME CARE VISIT (OUTPATIENT)
Dept: HOME HEALTH SERVICES | Facility: HOME HEALTHCARE | Age: 64
End: 2024-04-15
Payer: COMMERCIAL

## 2024-04-15 VITALS
DIASTOLIC BLOOD PRESSURE: 70 MMHG | TEMPERATURE: 98 F | HEART RATE: 76 BPM | RESPIRATION RATE: 18 BRPM | OXYGEN SATURATION: 97 % | SYSTOLIC BLOOD PRESSURE: 104 MMHG

## 2024-04-15 PROCEDURE — G0299 HHS/HOSPICE OF RN EA 15 MIN: HCPCS

## 2024-04-15 RX ORDER — PANTOPRAZOLE SODIUM 40 MG/1
40 TABLET, DELAYED RELEASE ORAL
Qty: 60 TABLET | Refills: 5 | OUTPATIENT
Start: 2024-04-15

## 2024-04-15 RX ORDER — DESVENLAFAXINE SUCCINATE 50 MG/1
50 TABLET, EXTENDED RELEASE ORAL DAILY
Qty: 30 TABLET | Refills: 5 | OUTPATIENT
Start: 2024-04-15

## 2024-04-15 NOTE — HOME HEALTH
Discharge Summary/Summary of Care Provided: YES  Patient received home health for diagnosis: ESOPHAGITIS  Current level of functional ability: INDEPENDENT WITH WALKER  Living arrangements: LIVES ALONE  Progress towards goals and/or Were all goals met? MET  If not all goals met, barriers that prevented patient from meeting goals: NA  SDOH concerns (i.e. Caregiver availability, social isolation, environment, income, transportation access, food insecurity etc.) NA  Follow-up appointment plans and community resources provided: NA  Other imporant information. NA    THE FOLLOWING INSTRUCTIONS WERE REVIEWED UPON DISCHARGE:    Keep your appointment with DR. TARIQ ON 5/31.    Call your doctor if you develop a new or worsening symptom, especially if you develop INCREASED WEAKNESS, INCREASED SHORTNESS OF BREATH, CHEST PAIN, FEVER, CHILLS, BODY ACHES, EPIGASTRIC PAIN.    Continue to take the medications prescribed by your doctor. A medication list is attached. Be sure to keep them informed of all medications you take including over the counter herbal, vitamins/minerals and the ones you take only when needed.    Follow the REGULAR diet as ordered by your doctor.     Continue with home program as instructed by therapist (handouts given).    Continue wound care as ordered. NA    Community resource referrals (list provided). NA    Other notes/instructions: NA    Instructions given to Patient    Instructions provided per Visit

## 2024-04-15 NOTE — TELEPHONE ENCOUNTER
Caller: Philomena Davis    Relationship: Self    Best call back number: [phone]    Requested Prescriptions:   Requested Prescriptions     Pending Prescriptions Disp Refills    pantoprazole (PROTONIX) 40 MG EC tablet 60 tablet 5     Sig: Take 1 tablet by mouth 2 (Two) Times a Day Before Meals.    desvenlafaxine (PRISTIQ) 50 MG 24 hr tablet 30 tablet 5     Sig: Take 1 tablet by mouth Daily. Indications: Major Depressive Disorder    ALSO: NEEDS HCTZ REFILLED - 12.5 MG - AS NEEDED    Pharmacy where request should be sent: [facility] [id number] [place] [address] [address] AT [facility] [address] - [phone]  - [phone] FX     Last office visit with prescribing clinician: 10/25/2023   Last telemedicine visit with prescribing clinician: Visit date not found   Next office visit with prescribing clinician: Visit date not found     Additional details provided by patient:      Does the patient have less than a 3 day supply:  [x] Yes  [] No    Would you like a call back once the refill request has been completed: [x] Yes [] No    If the office needs to give you a call back, can they leave a voicemail: [x] Yes [] No    Shira Correa MA   04/15/24 12:10 EDT

## 2024-04-16 RX ORDER — PANTOPRAZOLE SODIUM 40 MG/1
40 TABLET, DELAYED RELEASE ORAL
Qty: 60 TABLET | Refills: 5 | Status: SHIPPED | OUTPATIENT
Start: 2024-04-16

## 2024-04-16 RX ORDER — DESVENLAFAXINE SUCCINATE 50 MG/1
50 TABLET, EXTENDED RELEASE ORAL DAILY
Qty: 30 TABLET | Refills: 5 | Status: SHIPPED | OUTPATIENT
Start: 2024-04-16

## 2024-04-16 NOTE — TELEPHONE ENCOUNTER
The patient called because her medications were lost during a recent move. They must have been put in a box and most of her boxes are in storage. There are still (3) prescriptions that she still needs. The other prescriptions that were lost have since been resent to [facility].     What she is still needing:    Pantaprazol (Protonix) 40 mg  Desvenlafaxine (Pristiq) 50 mg  Lasix 12.5 mg, take (1) tablet daily as needed for ankle & feet swelling    I could not find the Lasix on her list of medications in her chart.    She would like this to be done quickly because she said she can't go another day without her depression medication - Pristiq    Please call patient once these have been sent in.

## 2024-04-17 ENCOUNTER — HOME CARE VISIT (OUTPATIENT)
Dept: HOME HEALTH SERVICES | Facility: HOME HEALTHCARE | Age: 64
End: 2024-04-17
Payer: COMMERCIAL

## 2024-04-17 PROCEDURE — G0151 HHCP-SERV OF PT,EA 15 MIN: HCPCS

## 2024-04-17 PROCEDURE — G0152 HHCP-SERV OF OT,EA 15 MIN: HCPCS

## 2024-04-17 NOTE — Clinical Note
60 Day Summary:    Primary diagnoses/co-morbidities/recent procedures in past 60 days that impact current episode: Debility related to recent hospitalization for weakness and hematemesis, EGD revealed esophagitis, falls and left 5th metacarpal fracture. Pt received SN, PT and OT services.      Past Medical Hx:  Essential hypertension  Generalized anxiety disorder   Hyperlipidemia   Glaucoma    Alcoholic cirrhosis of liver without ascites   Severe to moderate malnutrition   GERD without esophagitis   Sleep disturbance   Idiopathic peripheral neuropathy   Severe anxiety with panic   Alcohol use disorder   Cigarette nicotine dependence in remission   Chronic obstructive pulmonary disease   Closed fracture of left hand     Current level of functional ability: Min assist to CGA for ambulation on stairs.  Homebound status and living arrangements: Lives alone. Paid CG assists 2 days per wk. Pt moved to this house several months ago and states she is hoping to move to a 1 level home ASAP.    Skin integrity/wound status: CDI  Estimated date when home care services will end 5/18/24  SDOH changes/barriers (i.e. Caregiver availability, social isolation, environment, income, transportation access, food insecurity etc.): none at this time  Need for MSW referral? N  Physical Therapy Initial Evaluation:    Weight Bearing Status and Activity Precautions: FWB, fall precautions, ETOH use/abuse  Code Status: CPR  Fall Risk: Moderate    PT Plan of care for next 30 days:  Planned PT Interventions: therapeutic activities, gait training, therapeutic exercise, neuromuscular re-education, pt/CG education (HEP, symptom management, fall risk reduction), assist with DME as needed for safe mobility.  PT Frequency and Duration: 1w4    Plan of Care Reviewed with: HH care team and Unique Brandt DO

## 2024-04-17 NOTE — Clinical Note
Based on CDI recommendations the Evaluating physical therapist has redefined and rewritten our focus of care as follows:    Physical Therapy JOSSELIN Hunter, 60 Day Summary:    FOCUS OF CARE FOR THE NEXT 60 DAYS: alcoholic cirrhosis, chronic alcohol use disorder, peripheral neuropathy, DMT2, anxiety and depression, insomnia, diarrhea based on documentation from Telehealth visit with Unique Brandt DO on 4/11/2024. Pt also has HTN, abnormal gait and recent history of fall with injury.    Physical Therapy treatment is needed to address Ms. Mireille Cordoba Debility and High fall risk related to alcoholic cirrhosis, chronic alcohol use disorder, peripheral neuropathy, DMT2, anxiety and depression, insomnia, diarrhea based on documentation from Telehealth visit with Unique Brandt DO on 4/11/2024. Pt also has HTN, abnormal gait and recent history of fall with injury. Pt recently moved to a new rental property which has laundry facilies in Basement and stairs at all entryways. Pt lives alons and has a paid caregiver who assists her with bathing and other ADLs. PT will be working on helping pt improve her safe ability to negotiate stairs. SN and OT have discharged patient.

## 2024-04-18 ENCOUNTER — HOME CARE VISIT (OUTPATIENT)
Dept: HOME HEALTH SERVICES | Facility: HOME HEALTHCARE | Age: 64
End: 2024-04-18
Payer: COMMERCIAL

## 2024-04-20 VITALS
TEMPERATURE: 97.9 F | DIASTOLIC BLOOD PRESSURE: 70 MMHG | HEART RATE: 69 BPM | OXYGEN SATURATION: 97 % | SYSTOLIC BLOOD PRESSURE: 118 MMHG

## 2024-04-22 VITALS
SYSTOLIC BLOOD PRESSURE: 110 MMHG | OXYGEN SATURATION: 97 % | RESPIRATION RATE: 18 BRPM | DIASTOLIC BLOOD PRESSURE: 72 MMHG | TEMPERATURE: 97.7 F | HEART RATE: 80 BPM

## 2024-04-24 ENCOUNTER — TELEPHONE (OUTPATIENT)
Dept: FAMILY MEDICINE CLINIC | Facility: CLINIC | Age: 64
End: 2024-04-24
Payer: COMMERCIAL

## 2024-04-24 RX ORDER — URSODIOL 300 MG/1
300 CAPSULE ORAL 2 TIMES DAILY
Qty: 60 CAPSULE | Refills: 5 | Status: SHIPPED | OUTPATIENT
Start: 2024-04-24 | End: 2024-04-24

## 2024-04-24 RX ORDER — URSODIOL 300 MG/1
300 CAPSULE ORAL 2 TIMES DAILY
Qty: 60 CAPSULE | Refills: 5 | Status: SHIPPED | OUTPATIENT
Start: 2024-04-24

## 2024-04-24 NOTE — TELEPHONE ENCOUNTER
Caller: [facility] [id number] - [place], [place] - [address] AT [facility] [address] - [phone]  - [phone] FX    Relationship: Pharmacy    Best call back number: [phone]    Which medication are you concerned about: URSODIOL      Who prescribed you this medication: CARLEY MERA    When did you start taking this medication:     What are your concerns: PHARMACY IS NEEDING CLARIFICATION OR DIRECTIONS AND THE QUANTITY OF MEDICATION     How long have you had these concerns:

## 2024-04-24 NOTE — TELEPHONE ENCOUNTER
Rx Refill Note  Requested Prescriptions      No prescriptions requested or ordered in this encounter      Last office visit with prescribing clinician: 10/25/2023     Next office visit with prescribing clinician: Visit date not found       Shanell Munguia MA  04/24/24, 09:01 EDT

## 2024-04-26 ENCOUNTER — HOME CARE VISIT (OUTPATIENT)
Dept: HOME HEALTH SERVICES | Facility: HOME HEALTHCARE | Age: 64
End: 2024-04-26
Payer: COMMERCIAL

## 2024-04-26 VITALS
SYSTOLIC BLOOD PRESSURE: 118 MMHG | OXYGEN SATURATION: 98 % | RESPIRATION RATE: 16 BRPM | HEART RATE: 73 BPM | TEMPERATURE: 97.6 F | DIASTOLIC BLOOD PRESSURE: 78 MMHG

## 2024-04-26 PROCEDURE — G0151 HHCP-SERV OF PT,EA 15 MIN: HCPCS

## 2024-04-26 NOTE — Clinical Note
Saw pt for a physical therapy home visit today. Pt reports not taking her medication for the last two weeks. I assisted pt in sorting her meds according to the most recent med list; however she reports fear in taking Seroquel and prefers to continue taking Clonidine. She indicated she was going to contact you about that preference, but I wanted to insure the message got to you.

## 2024-04-26 NOTE — Clinical Note
CHER  ----- Message -----  From: Unique Brandt DO  Sent: 4/26/2024  10:35 AM EDT  To: Dalia Goins, PT      Thanks for letting me know! I'll send a refill in of her clonidine for her.  ----- Message -----  From: Dalia Goins, KEILA  Sent: 4/26/2024  10:26 AM EDT  To: Pratima Carrasco RN; Unique Brandt DO    Saw pt for a physical therapy home visit today. Pt reports not taking her medication for the last two weeks. I assisted pt in sorting her meds according to the most recent med list; however she reports fear in taking Seroquel and prefers to continue taking Clonidine. She indicated she was going to contact you about that preference, but I wanted to insure the message got to you.

## 2024-04-26 NOTE — HOME HEALTH
Routine Visit Note:   Skill/education provided: Med education, standing ther ex, gt training, stair training and education, HEP education.   Patient/caregiver response: Receptive and somewhat fearful of stairs. Pt reports mod fatigue with stair training.   Plan for next visit: Progress stair training and strength/gt training.   Other pertinent info: Notified PCP about not taking meds over the last 2 weeks and pts fear of Seroquel.

## 2024-04-30 ENCOUNTER — HOME CARE VISIT (OUTPATIENT)
Dept: HOME HEALTH SERVICES | Facility: HOME HEALTHCARE | Age: 64
End: 2024-04-30
Payer: COMMERCIAL

## 2024-04-30 VITALS
OXYGEN SATURATION: 95 % | RESPIRATION RATE: 16 BRPM | HEART RATE: 68 BPM | TEMPERATURE: 98 F | DIASTOLIC BLOOD PRESSURE: 90 MMHG | SYSTOLIC BLOOD PRESSURE: 122 MMHG

## 2024-04-30 PROCEDURE — G0151 HHCP-SERV OF PT,EA 15 MIN: HCPCS

## 2024-04-30 NOTE — HOME HEALTH
Routine Visit Note:    Skill/education provided: Pt performed ther ex, gt training, step training and education on alchohol.    Patient/caregiver response: Pt is improving with strength. STS much better today.     Plan for next visit: Cont to progress HEP.    Other pertinent info: Check medication use. Discuss alchohol as appropriate at time of visit.

## 2024-05-08 ENCOUNTER — TELEPHONE (OUTPATIENT)
Dept: FAMILY MEDICINE CLINIC | Facility: CLINIC | Age: 64
End: 2024-05-08
Payer: COMMERCIAL

## 2024-05-08 NOTE — TELEPHONE ENCOUNTER
Unable to reach Vanessa with [facility]   DENEEN for her to return my call regarding patient and plan of care forms.    Dr. Brandt is out of the office until Next week. Is this okay to wait for her return to sign?    Please relay message and document.

## 2024-05-08 NOTE — TELEPHONE ENCOUNTER
Vanessa from [facility] called. She said she sent Dr. Wilder an In-Basket Plan of Care request that needs to be signed and dated by Dr. Wilder.   I also saw this in the Media section of patient's chart dated 05/03/24

## 2024-05-09 ENCOUNTER — HOME CARE VISIT (OUTPATIENT)
Dept: HOME HEALTH SERVICES | Facility: HOME HEALTHCARE | Age: 64
End: 2024-05-09
Payer: COMMERCIAL

## 2024-05-09 VITALS
SYSTOLIC BLOOD PRESSURE: 130 MMHG | RESPIRATION RATE: 16 BRPM | DIASTOLIC BLOOD PRESSURE: 90 MMHG | HEART RATE: 77 BPM | TEMPERATURE: 97.7 F | OXYGEN SATURATION: 95 %

## 2024-05-09 PROCEDURE — G0151 HHCP-SERV OF PT,EA 15 MIN: HCPCS

## 2024-05-17 ENCOUNTER — HOME CARE VISIT (OUTPATIENT)
Dept: HOME HEALTH SERVICES | Facility: HOME HEALTHCARE | Age: 64
End: 2024-05-17
Payer: COMMERCIAL

## 2024-05-20 ENCOUNTER — HOME CARE VISIT (OUTPATIENT)
Dept: HOME HEALTH SERVICES | Facility: HOME HEALTHCARE | Age: 64
End: 2024-05-20
Payer: COMMERCIAL

## 2024-05-20 VITALS
SYSTOLIC BLOOD PRESSURE: 142 MMHG | TEMPERATURE: 97.5 F | HEART RATE: 93 BPM | OXYGEN SATURATION: 95 % | RESPIRATION RATE: 16 BRPM | DIASTOLIC BLOOD PRESSURE: 90 MMHG

## 2024-05-20 PROCEDURE — G0151 HHCP-SERV OF PT,EA 15 MIN: HCPCS

## 2024-05-28 DIAGNOSIS — E78.5 HYPERLIPIDEMIA, UNSPECIFIED HYPERLIPIDEMIA TYPE: ICD-10-CM

## 2024-05-28 DIAGNOSIS — G47.9 SLEEP DISTURBANCE: ICD-10-CM

## 2024-05-28 RX ORDER — ROSUVASTATIN CALCIUM 5 MG/1
5 TABLET, COATED ORAL DAILY
Qty: 90 TABLET | Refills: 0 | Status: SHIPPED | OUTPATIENT
Start: 2024-05-28

## 2024-05-28 NOTE — PROGRESS NOTES
Sleep Clinic Video Visit Follow Up Note    The patient is located at their home address in [place], [place]. The patient presents today for telehealth service.  This service was conducted via audio/video technology through a secure IP Street video visit connection through Epic.  This provider is located in [facility].  Patient stated they are in a secure environment for the session.  Patient's condition being diagnosed/treated is appropriate for telemedicine.  The provider identified himself as well as his credentials.  The patient, and/or patient's guardian, consent to be seen remotely, and when consent is given they understanding that the consent allows for patient identifiable information to be sent to a third-party as needed.  They may refuse to be seen remotely at any time.  The electronic data is encrypted and password protected, and the patient and/or guardian has been advised of the potential risk to privacy not withstanding such measures.  Patient identifiers used: Name and date of birth.     You have chosen to receive care through a telehealth visit.  Do you consent to use a video/audio connection for your medical care today? Yes      Chief Complaint  Follow-up insomnia    Subjective     History of Present Illness (from previous encounter on 2/28/2024):  Philomena Davis is a 63 y.o. female with a history of hypertension, liver cirrhosis, glaucoma, alcohol withdrawal, anxiety, and depression. She is referred by Dr. Brandt. She has difficulty sleeping. She has been trailed on clonidine and had decreased BP. She then started trazodone but she wakes sedated and hung over. She has been sober for several months.     Philomena Davis is a 63 y.o. female with a past medical history of  hypertension, liver cirrhosis, glaucoma, alcohol withdrawal, anxiety, and depression who presents for further evaluation of excessive daytime sleepiness and fatigue, nonrestorative sleep, and psychophysiologic insomnia.   The patient denies symptoms concerning for or specific to sleep apnea.  She has had insomnia for quite some time and in the past had been treated quite well with Ambien, and Shara SANDERS.  Certainly, the patient's medical history including cirrhosis, and menopause as well as anxiety and depression are significant factors relating to insomnia.  We have discussed standard of care for insomnia which is cognitive behavioral therapy for insomnia.  I have given the patient instructions to try the [facility] online CBT-I program.  In the meantime, we will try Seroquel to see if this is helpful.  Thus far she has tried trazodone, and clonidine as well as melatonin with little effect.  We will see her back in approximately 3 months. (End copied text)    Interval History:  Philomena Davis is a 63 y.o. female who presents for follow-up after initiation of Seroquel. Primary care has changed to 1/2 to 1 tab. She believes the clonidine helps better. She did have grogginess with the original dose of Seroquel. 4-5 hours.     Further details are as follows:    [place] Scale is: 2/24    Weight:    Current Weight: 148 lbs    Weight change in the last year:  gain: 0 lbs    The patient's relevant past medical, surgical, family, and social history reviewed and updated in Epic as appropriate.    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 03/24/2017    Closed trimalleolar fracture of right ankle 05/24/2023    Digital mucous cyst of toe of left foot 03/24/2017    Ganglion cyst of left foot     Glaucoma     Hypertension     Wears glasses      Past Surgical History:   Procedure Laterality Date    ANKLE OPEN REDUCTION INTERNAL FIXATION Left 03/24/2017    Procedure: LEFT ANKLE OPEN  REDUCTION INTERNAL FIXATION WITH ILIAC CREST BONE GRAFT , EXCISION CYST 4TH/5TH INNERSPACE LEFT FOOT;  Surgeon: Frank Larson MD;  Location:  [facility];  Service:     ANKLE OPEN REDUCTION INTERNAL FIXATION Right 2023    Procedure: ANKLE OPEN REDUCTION INTERNAL FIXATION;  Surgeon: Deandre Reynoso MD;  Location:  [facility];  Service: Orthopedics;  Laterality: Right;    AUGMENTATION MAMMAPLASTY Bilateral     BREAST AUGMENTATION      BREAST EXCISIONAL BIOPSY Right 2014    DILATION AND CURETTAGE, DIAGNOSTIC / THERAPEUTIC      ENDOSCOPY N/A 2022    Procedure: ESOPHAGOGASTRODUODENOSCOPY;  Surgeon: Arelis Solano MD;  Location:  [facility];  Service: Gastroenterology;  Laterality: N/A;    ENDOSCOPY N/A 2024    Procedure: ESOPHAGOGASTRODUODENOSCOPY;  Surgeon: Brunner, Mark I, MD;  Location:  [facility];  Service: Gastroenterology;  Laterality: N/A;    OH RPR NON/MAL FEMUR DSTL H/N W/ILIAC/AUTOG BONE Left 2017    Procedure: ILIAC CREST BONE GRAFT;  Surgeon: Frank Larson MD;  Location:  [facility];  Service: Orthopedics    WRIST SURGERY       OB History               Para        Term   0            AB        Living             SAB        IAB        Ectopic        Molar        Multiple        Live Births                  Allergies   Allergen Reactions    Buspirone Anxiety    Codeine Nausea Only    Mirtazapine Anxiety       MEDS:  Prior to Admission medications    Medication Sig Start Date End Date Taking? Authorizing Provider   albuterol sulfate  (90 Base) MCG/ACT inhaler Inhale 2 puffs Every 4 (Four) Hours As Needed for Wheezing. Indications: Chronic Obstructive Lung Disease 24   Unique Brandt DO   carvedilol (COREG) 3.125 MG tablet Take 1 tablet by mouth 2 (Two) Times a Day With Meals. Indications: High Blood Pressure Disorder 24   Unique Brandt DO   cloNIDine (CATAPRES) 0.2 MG tablet Take 1 tablet by mouth 2 (Two) Times a Day for 90  days. 3/25/24 6/23/24  Marko Eldridge APRN   desvenlafaxine (PRISTIQ) 50 MG 24 hr tablet Take 1 tablet by mouth Daily. Indications: Major Depressive Disorder 4/16/24   Unique Brandt DO   Fluticasone-Salmeterol (ADVAIR/WIXELA) 100-50 MCG/ACT DISKUS Inhale 1 puff 2 (Two) Times a Day. Indications: COPD 4/5/24   Unique Brandt DO   folic acid (FOLVITE) 1 MG tablet Take 1 tablet by mouth Daily. Indications: Anemia From Inadequate Folic Acid 4/5/24   Unique Brandt DO   lactobacillus (BACID) tablet caplet Take 1 tablet by mouth 2 (Two) Times a Day. 2/27/24   Unique Brandt DO   latanoprost (XALATAN) 0.005 % ophthalmic solution Administer 1 drop to both eyes Every Night. Indications: Wide-Angle Glaucoma 4/11/24   Unique Brandt DO   pantoprazole (PROTONIX) 40 MG EC tablet Take 1 tablet by mouth 2 (Two) Times a Day Before Meals. 4/16/24   Unique Brandt DO   rosuvastatin (CRESTOR) 5 MG tablet TAKE 1 TABLET BY MOUTH DAILY 2/15/24   Unique Brandt DO   thiamine (VITAMIN B1) 100 MG tablet Take 1 tablet by mouth Daily. Indications: Deficiency of Vitamin B1 4/5/24   Unique Brandt DO   tiotropium (Spiriva HandiHaler) 18 MCG per inhalation capsule Place 1 capsule into inhaler and inhale Daily. Indications: Chronic Obstructive Lung Disease 4/5/24   Unique Brandt DO   ursodiol (ACTIGALL) 300 MG capsule Take 1 capsule by mouth 2 (Two) Times a Day. Indications: Liver Disease 4/24/24   Unique Brandt DO         FH:  Family History   Problem Relation Age of Onset    Cancer Mother     No Known Problems Father     No Known Problems Sister     No Known Problems Brother     No Known Problems Daughter     No Known Problems Son     No Known Problems Maternal Grandmother     Breast cancer Paternal Grandmother 68        met to brain    No Known Problems Maternal Aunt     No Known Problems Paternal Aunt     Rheum arthritis Other     Heart disease Other     Cancer Other     Stroke Other      "Hypertension Other     BRCA 1/2 Neg Hx     Colon cancer Neg Hx     Endometrial cancer Neg Hx     Ovarian cancer Neg Hx        Objective   Vital Signs:  Ht 162.6 cm (64.02\")   Wt 67.1 kg (148 lb)   BMI 25.39 kg/m²                Physical Exam  Constitutional:       General: She is not in acute distress.     Appearance: Normal appearance.   Neurological:      General: No focal deficit present.      Mental Status: She is alert and oriented to person, place, and time.   Psychiatric:         Mood and Affect: Mood normal.         Behavior: Behavior normal.         Thought Content: Thought content normal.         Judgment: Judgment normal.             Result Review :              Assessment and Plan  Philomena Davis is a 63 y.o. female returns for follow-up of psychophysiologic insomnia.  Previously the patient was prescribed Seroquel 100 mg.  She was also tried with clonidine.  Patient reports that the 100 mg was too much for her and she had an episode of falling.  Primary care has asked her to decrease this to 1/2 to 1/4 tablet.  I have decreased the dosage to 50 mg of Seroquel nightly should she needed.  Patient actually feels like clonidine is more helpful and I have refilled this today as well.  We will have her return for follow-up in approximately 8 to 12 months for recheck.    Diagnoses and all orders for this visit:    1. Psychophysiological insomnia (Primary)  -     QUEtiapine (SEROquel) 50 MG tablet; Take 1 tablet by mouth Every Night for 90 days.  Dispense: 30 tablet; Refill: 2    2. Overweight (BMI 25.0-29.9)    3. Sleep disturbance  -     cloNIDine (CATAPRES) 0.2 MG tablet; Take 1 tablet by mouth 2 (Two) Times a Day for 90 days.  Dispense: 60 tablet; Refill: 2                Follow Up  Return in about 10 months (around 3/31/2025) for Medication Check.  Patient was given instructions and counseling regarding her condition or for health maintenance advice. Please see specific information pulled into the AVS " if appropriate.       CANDY Vuong, ACNP-BC  Pulmonology, Critical Care, and Sleep Medicine

## 2024-05-28 NOTE — TELEPHONE ENCOUNTER
Rx Refill Note  Requested Prescriptions     Pending Prescriptions Disp Refills    cloNIDine (CATAPRES) 0.1 MG tablet [Pharmacy Med Name: cloNIDine HCL 0.1MG TABLET] 60 tablet 3     Sig: TAKE TWO TABLETS BY MOUTH ONCE NIGHTLY    rosuvastatin (CRESTOR) 5 MG tablet [Pharmacy Med Name: ROSUVASTATIN CALCIUM 5 MG TAB] 90 tablet 0     Sig: TAKE 1 TABLET BY MOUTH DAILY      Last office visit with prescribing clinician: 4/11/24  Next office visit with prescribing clinician:     Urszula Borden MA  05/28/24, 10:08 EDT

## 2024-05-29 RX ORDER — CLONIDINE HYDROCHLORIDE 0.1 MG/1
0.2 TABLET ORAL NIGHTLY
Qty: 60 TABLET | Refills: 3 | OUTPATIENT
Start: 2024-05-29

## 2024-05-31 ENCOUNTER — TELEMEDICINE (OUTPATIENT)
Dept: SLEEP MEDICINE | Age: 64
End: 2024-05-31
Payer: COMMERCIAL

## 2024-05-31 VITALS — BODY MASS INDEX: 25.27 KG/M2 | HEIGHT: 64 IN | WEIGHT: 148 LBS

## 2024-05-31 DIAGNOSIS — G47.9 SLEEP DISTURBANCE: ICD-10-CM

## 2024-05-31 DIAGNOSIS — E66.3 OVERWEIGHT (BMI 25.0-29.9): ICD-10-CM

## 2024-05-31 DIAGNOSIS — F51.04 PSYCHOPHYSIOLOGICAL INSOMNIA: Primary | ICD-10-CM

## 2024-05-31 RX ORDER — CLONIDINE HYDROCHLORIDE 0.2 MG/1
0.2 TABLET ORAL 2 TIMES DAILY
Qty: 60 TABLET | Refills: 2 | Status: SHIPPED | OUTPATIENT
Start: 2024-05-31 | End: 2024-08-29

## 2024-05-31 RX ORDER — QUETIAPINE FUMARATE 50 MG/1
50 TABLET, FILM COATED ORAL NIGHTLY
Qty: 30 TABLET | Refills: 2 | Status: SHIPPED | OUTPATIENT
Start: 2024-05-31 | End: 2024-08-29

## 2024-07-08 ENCOUNTER — TELEPHONE (OUTPATIENT)
Dept: PULMONOLOGY | Facility: CLINIC | Age: 64
End: 2024-07-08

## 2024-07-08 DIAGNOSIS — F51.04 PSYCHOPHYSIOLOGICAL INSOMNIA: Primary | ICD-10-CM

## 2024-07-08 RX ORDER — CLONIDINE HYDROCHLORIDE 0.3 MG/1
0.3 TABLET ORAL 2 TIMES DAILY
Qty: 60 TABLET | Refills: 0 | Status: SHIPPED | OUTPATIENT
Start: 2024-07-08 | End: 2024-08-07

## 2024-07-08 NOTE — TELEPHONE ENCOUNTER
Hub staff attempted to follow warm transfer process and was unsuccessful     Caller: Philomena Davis    Relationship to patient: Self    Best call back number: [phone]     Patient is needin.2 MG OF CLONIDINE IS NOT WORKING, CAN DOSAGE BE INCREASED. PLEASE CALL BACK TO ADVISE.

## 2024-07-11 ENCOUNTER — TELEPHONE (OUTPATIENT)
Dept: FAMILY MEDICINE CLINIC | Facility: CLINIC | Age: 64
End: 2024-07-11
Payer: COMMERCIAL

## 2024-07-11 NOTE — TELEPHONE ENCOUNTER
Provider: CARLEY MERA    Caller: SIMONA JIMENEZ        Phone Number: [phone]     Reason for Call: PATIENT WANTED TO KNOW IF SHE WAS DUE TO BE SEEN IN THE NEAT FUTURE. LET HER KNOW ID SHE NEEDS TO SET UP AN APPOINTMENT .

## 2024-07-11 NOTE — TELEPHONE ENCOUNTER
Caller: Philomena Davis    Relationship: Self    Best call back number: [phone]     What medication are you requesting: MEDICATION FOR RASH ABOVE PATIENT ANKLE.     What are your current symptoms: 4 DAYS    How long have you been experiencing symptoms: ITCHING AND RED    Have you had these symptoms before:    [] Yes  [x] No    Have you been treated for these symptoms before:   [] Yes  [x] No    If a prescription is needed, what is your preferred pharmacy and phone number:  [facility]     Additional notes: PATIENT WOULD LIKE TO SEE IF SOMETHING COULD BE CALLED IN TO HELP WITH RASH.

## 2024-07-12 ENCOUNTER — TELEMEDICINE (OUTPATIENT)
Dept: FAMILY MEDICINE CLINIC | Facility: CLINIC | Age: 64
End: 2024-07-12
Payer: COMMERCIAL

## 2024-07-12 DIAGNOSIS — K70.30 ALCOHOLIC CIRRHOSIS OF LIVER WITHOUT ASCITES: ICD-10-CM

## 2024-07-12 DIAGNOSIS — G60.9 IDIOPATHIC PERIPHERAL NEUROPATHY: ICD-10-CM

## 2024-07-12 DIAGNOSIS — E44.0 MODERATE MALNUTRITION: ICD-10-CM

## 2024-07-12 DIAGNOSIS — I10 ESSENTIAL HYPERTENSION: ICD-10-CM

## 2024-07-12 DIAGNOSIS — S90.569A INSECT BITE OF ANKLE, UNSPECIFIED LATERALITY, INITIAL ENCOUNTER: Primary | ICD-10-CM
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
## 2024-07-12 PROCEDURE — 99214 OFFICE O/P EST MOD 30 MIN: CPT | Performed by: STUDENT IN AN ORGANIZED HEALTH CARE EDUCATION/TRAINING PROGRAM

## 2024-07-12 RX ORDER — GABAPENTIN 300 MG/1
300 CAPSULE ORAL NIGHTLY
Qty: 30 CAPSULE | Refills: 0 | Status: SHIPPED | OUTPATIENT
Start: 2024-07-12

## 2024-07-12 RX ORDER — BACITRACIN ZINC AND POLYMYXIN B SULFATE 500; 1000 [USP'U]/G; [USP'U]/G
1 OINTMENT TOPICAL 2 TIMES DAILY
Qty: 30 G | Refills: 0 | Status: SHIPPED | OUTPATIENT
Start: 2024-07-12

## 2024-07-12 NOTE — TELEPHONE ENCOUNTER
Unable to reach Philomena Davis by phone or leave message.    Hub may relay message and document.    Unique Brandt, DO  You7 minutes ago (9:33 AM)       Can she come in for an appointment so we can examine the rash?

## 2024-07-12 NOTE — TELEPHONE ENCOUNTER
Name: Philomena Davis ANH      Relationship: Self      Best Callback Number: [phone]       HUB PROVIDED THE RELAY MESSAGE FROM THE OFFICE      PATIENT: SCHEDULED PER NOTE    ADDITIONAL INFORMATION:

## 2024-07-12 NOTE — PROGRESS NOTES
Chief Complaint   Patient presents with    Insect Bite     Blistering lesion on ankle     Mode of Visit: My Chart Twilio Video  Location of patient: Home  Location of Provider: [facility]  Patient has chosen to receive care through a telehealth visit.  Does the patient consent to use a video/audio device for their medical care today: Yes  The visit included audio and video interaction: Yes    HPI:  Philomena Davis is a 63 y.o. female who presents today for rash, other concerns below.     Noticed a lesion on ankle 3 days ago. The area was red and pruritic and then developed a blister in the center which burst. When the blister opened she had clear drainage from the area. The redness has not spread. No other lesions elsewhere. No fever or chills.     Pt recently moved into a new home. She had been getting in home PT and home health form February through May after she was hospitalized for hematemesis (in setting of alcoholic cirrhosis) and left hand fracture. She completed PT sessions in May.   Her new home has stairs which she is having difficulty ambulating. Also having difficulty unpacking her moving boxes. Neuropathy pains in hands/feet are worse and she is fearful of driving. She continues to feel weak and deconditioned. Interested in restarting PT.       PE:  There were no vitals filed for this visit.   There is no height or weight on file to calculate BMI.    Gen Appearance: NAD  HEENT: Normocephalic, EOMI  Psych: Mood appropriate, coherent thoughts and speech  Skin: Approx 1 cm blistering lesion on erythematous background above ankle on lower leg. NO surrounding cellulitis, clear drainage.    Current Outpatient Medications   Medication Sig Dispense Refill    albuterol sulfate  (90 Base) MCG/ACT inhaler Inhale 2 puffs Every 4 (Four) Hours As Needed for Wheezing. Indications: Chronic Obstructive Lung Disease 6.7 g 2    bacitracin-polymyxin b (POLYSPORIN) 500-29857 UNIT/GM ointment Apply 1 Application  topically to the appropriate area as directed 2 (Two) Times a Day. 30 g 0    carvedilol (COREG) 3.125 MG tablet Take 1 tablet by mouth 2 (Two) Times a Day With Meals. Indications: High Blood Pressure Disorder 60 tablet 5    cloNIDine (Catapres) 0.3 MG tablet Take 1 tablet by mouth 2 (Two) Times a Day for 30 days. 60 tablet 0    desvenlafaxine (PRISTIQ) 50 MG 24 hr tablet Take 1 tablet by mouth Daily. Indications: Major Depressive Disorder 30 tablet 5    Fluticasone-Salmeterol (ADVAIR/WIXELA) 100-50 MCG/ACT DISKUS Inhale 1 puff 2 (Two) Times a Day. Indications: COPD 60 each 3    folic acid (FOLVITE) 1 MG tablet Take 1 tablet by mouth Daily. Indications: Anemia From Inadequate Folic Acid 30 tablet 5    gabapentin (NEURONTIN) 300 MG capsule Take 1 capsule by mouth Every Night. 30 capsule 0    lactobacillus (BACID) tablet caplet Take 1 tablet by mouth 2 (Two) Times a Day. 60 each 2    latanoprost (XALATAN) 0.005 % ophthalmic solution Administer 1 drop to both eyes Every Night. Indications: Wide-Angle Glaucoma 2.5 mL 5    pantoprazole (PROTONIX) 40 MG EC tablet Take 1 tablet by mouth 2 (Two) Times a Day Before Meals. 60 tablet 5    QUEtiapine (SEROquel) 50 MG tablet Take 1 tablet by mouth Every Night for 90 days. 30 tablet 2    rosuvastatin (CRESTOR) 5 MG tablet TAKE 1 TABLET BY MOUTH DAILY 90 tablet 0    thiamine (VITAMIN B1) 100 MG tablet Take 1 tablet by mouth Daily. Indications: Deficiency of Vitamin B1 30 tablet 5    ursodiol (ACTIGALL) 300 MG capsule Take 1 capsule by mouth 2 (Two) Times a Day. Indications: Liver Disease 60 capsule 5     No current facility-administered medications for this visit.        A/P:  Diagnoses and all orders for this visit:    1. Insect bite of ankle, unspecified laterality, initial encounter (Primary)  -     bacitracin-polymyxin b (POLYSPORIN) [id number] UNIT/GM ointment; Apply 1 Application topically to the appropriate area as directed 2 (Two) Times a Day.  Dispense: 30 g; Refill:  0    2. Idiopathic peripheral neuropathy  -     gabapentin (NEURONTIN) 300 MG capsule; Take 1 capsule by mouth Every Night.  Dispense: 30 capsule; Refill: 0  -     Hemoglobin A1c; Future  -     Vitamin B12; Future  -     Folate; Future  -     Ambulatory Referral to Home Health    3. Essential hypertension  -     Comprehensive Metabolic Panel; Future    4. Alcoholic cirrhosis of liver without ascites  -     Comprehensive Metabolic Panel; Future  -     CBC & Differential; Future  -     Vitamin B12; Future  -     Folate; Future  -     Ambulatory Referral to Home Health    5. Moderate malnutrition  -     Hemoglobin A1c; Future  -     Vitamin B12; Future  -     Folate; Future  -     Ambulatory Referral to Home Health       Insect bite- low concern for infection or superimposed cellulitis. Trial topical bacitracin. Return precautions reviewed.    Peripheral Neuropathy- symptoms uncontrolled. Will trial nightly Gabapentin. Pt needs to FU in next few weeks to sign CSA and give UDS. Reviewed possible adverse effects.    Needs chronic care follow up and blood work. Pt has difficulty leaving her home due to her peripheral neuropathy. She is also chronically malnourished (alcohol use disorder) and has generalized weakness. Because of these things she is considered high fall risk. Will place new referral for in home PT to help improve indepence and ambulatory status as well as neuropathy.     FU next month FACE TO FACE visit, labs ordered    Dictated Utilizing Dragon Dictation    Please note that portions of this note were completed with a voice recognition program.    Part of this note may be an electronic transcription/translation of spoken language to printed text using the Dragon Dictation System.

## 2024-07-13 ENCOUNTER — HOME HEALTH ADMISSION (OUTPATIENT)
Dept: HOME HEALTH SERVICES | Facility: HOME HEALTHCARE | Age: 64
End: 2024-07-13
Payer: COMMERCIAL

## 2024-07-16 ENCOUNTER — HOME CARE VISIT (OUTPATIENT)
Dept: HOME HEALTH SERVICES | Facility: HOME HEALTHCARE | Age: 64
End: 2024-07-16
Payer: COMMERCIAL

## 2024-07-16 PROCEDURE — G0151 HHCP-SERV OF PT,EA 15 MIN: HCPCS

## 2024-07-17 ENCOUNTER — TELEPHONE (OUTPATIENT)
Dept: FAMILY MEDICINE CLINIC | Facility: CLINIC | Age: 64
End: 2024-07-17
Payer: COMMERCIAL

## 2024-07-17 NOTE — HOME HEALTH
"SOC Note:    Patient states she saw her MD for a general checkup, and requested home care therapy.  Her bedroom is on the second story where she recently moved in on July 1 and her main shower is upstairs, so she has been sleeping on the couch.  Her goal is to be able to negotiate a flight of steps to get to her bathroom.  She has had home care services in the past with [facility]. She lives alone but does have a large friendly dog.  She is using nearby walls and furniture to ambulate to/from her couch to the front door as she states she is unable to use any type of AD in her home due to limited room/cluttered environment.     Home Health ordered for: disciplines PT, OT, MSW, SN    Reason for Hosp/Primary Dx/Co-morbidities: Patient demonstrates deficits with decreased strength, ambulation, standing balance and safety awareness related to Hereditary and idiopathic neuropathy, unspecified     Focus of Care:  PT to focus on functional deficits related to Hereditary and idiopathic neuropathy, unspecified     Patient's goal(s): \"I want to be able to get upstairs to shower.\"    Current Functional status/mobility/DME: st cane, RW, shower chair    HB status/Living Arrangements: Patient recently moved into a two [facility] apartment.    Skin Integrity/wound status: WFL per patient report     Code Status: Full Code    Fall Risk/Safety concerns: Ongoing falls risk/history    Educated on Emergency Plan, steps to take prior to going to the ER and when to Call [facility]: patient aware      Medication issues/Concerns: yes patient requesting a SN evaluation for assistance and education     SDOH Barriers (i.e. caregiver concerns, social isolation, transportation, food insecurity, environment, income etc.)/Need for MSW: order obtained for MSW per patient request for meal providing services."

## 2024-07-17 NOTE — TELEPHONE ENCOUNTER
Unique Brandt, DO  You7 minutes ago (10:16 AM)       OK to give verbal   Left detailed voicemail on Alysia voicemail with verbal approval of orders.

## 2024-07-17 NOTE — TELEPHONE ENCOUNTER
Caller: [facility]    Relationship:     Best call back number: [phone]     What orders are you requesting (i.e. lab or imaging): VERBAL ORDERS FOR SOCIAL WORK, OT, AND SKILLED NURSING EVALUATION.    In what timeframe would the patient need to come in: ASAP    Where will you receive your lab/imaging services: HOME    Additional notes: OKAY TO LEAVE VERBAL ORDERS ON SECURE LINE.

## 2024-07-18 ENCOUNTER — HOME CARE VISIT (OUTPATIENT)
Dept: HOME HEALTH SERVICES | Facility: HOME HEALTHCARE | Age: 64
End: 2024-07-18
Payer: COMMERCIAL

## 2024-07-18 ENCOUNTER — TELEPHONE (OUTPATIENT)
Dept: FAMILY MEDICINE CLINIC | Facility: CLINIC | Age: 64
End: 2024-07-18
Payer: COMMERCIAL

## 2024-07-18 VITALS
RESPIRATION RATE: 16 BRPM | DIASTOLIC BLOOD PRESSURE: 94 MMHG | SYSTOLIC BLOOD PRESSURE: 135 MMHG | OXYGEN SATURATION: 97 % | TEMPERATURE: 98.5 F | HEART RATE: 77 BPM

## 2024-07-18 VITALS
RESPIRATION RATE: 18 BRPM | OXYGEN SATURATION: 96 % | TEMPERATURE: 98.1 F | DIASTOLIC BLOOD PRESSURE: 88 MMHG | HEART RATE: 81 BPM | SYSTOLIC BLOOD PRESSURE: 128 MMHG

## 2024-07-18 PROCEDURE — G0299 HHS/HOSPICE OF RN EA 15 MIN: HCPCS

## 2024-07-18 PROCEDURE — G0152 HHCP-SERV OF OT,EA 15 MIN: HCPCS

## 2024-07-18 RX ORDER — URSODIOL 300 MG/1
300 CAPSULE ORAL 2 TIMES DAILY
Qty: 60 CAPSULE | Refills: 5 | Status: SHIPPED | OUTPATIENT
Start: 2024-07-18 | End: 2024-07-18 | Stop reason: SDUPTHER

## 2024-07-18 RX ORDER — URSODIOL 300 MG/1
300 CAPSULE ORAL 2 TIMES DAILY
Qty: 60 CAPSULE | Refills: 5 | Status: SHIPPED | OUTPATIENT
Start: 2024-07-18

## 2024-07-18 RX ORDER — CLONIDINE HYDROCHLORIDE 0.3 MG/1
0.3 TABLET ORAL 2 TIMES DAILY
Qty: 60 TABLET | Refills: 0 | Status: SHIPPED | OUTPATIENT
Start: 2024-07-18 | End: 2024-08-17

## 2024-07-18 RX ORDER — PANTOPRAZOLE SODIUM 40 MG/1
40 TABLET, DELAYED RELEASE ORAL DAILY
Qty: 60 TABLET | Refills: 5 | Status: SHIPPED | OUTPATIENT
Start: 2024-07-18

## 2024-07-18 NOTE — TELEPHONE ENCOUNTER
Pharmacy Name: [facility] [id number] - [place], [place] - [address] AT [facility] [address] - [phone]  - [phone] FX     Pharmacy representative name: MANDA    Pharmacy representative phone number: [phone]    What medication are you calling in regards to: URSODIOL    What question does the pharmacy have: THERE ARE TWO DIFFERENT INSTRUCTIONS ON THE PRESCRIPTION. PLEASE CALL TO CLARIFY    Who is the provider that prescribed the medication: DR CARLEY MERA    Additional notes: WHAT WAS SENT OVER ON THE PRESCRIPTION:  Sig: Take 1 capsule by mouth 2 (Two) Times a Day. 2 capsule  every morning and 1 cap in the evening

## 2024-07-21 VITALS
TEMPERATURE: 97.9 F | DIASTOLIC BLOOD PRESSURE: 78 MMHG | RESPIRATION RATE: 17 BRPM | HEART RATE: 71 BPM | SYSTOLIC BLOOD PRESSURE: 146 MMHG | OXYGEN SATURATION: 98 %

## 2024-07-21 LAB
QT INTERVAL: 390 MS
QTC INTERVAL: 458 MS

## 2024-07-23 ENCOUNTER — HOME CARE VISIT (OUTPATIENT)
Dept: HOME HEALTH SERVICES | Facility: HOME HEALTHCARE | Age: 64
End: 2024-07-23
Payer: COMMERCIAL

## 2024-07-23 NOTE — HOME HEALTH
Called Ms. Davis who I know from previous home health visits.  She said she moved to her current home on July 1.  She said she is feeling better.  She said she is already getting meals from [employer] which are provided through her insurance, they are frozen and she gets 10 per month.  I discussed the free hot meals at lunch Monday through Friday through [employer] and she is agreeable to a referral for this through [facility].  Referral completed.  She already gets waiver services for in home assistance has has a  for this and she has their contact information.  She states she is doing much better than when we met before and denies further needs or concerns at this time.  She denies need of in person social work visit at this time.  She has my contact number to call if further assistance is needed.

## 2024-07-24 ENCOUNTER — HOME CARE VISIT (OUTPATIENT)
Dept: HOME HEALTH SERVICES | Facility: HOME HEALTHCARE | Age: 64
End: 2024-07-24
Payer: COMMERCIAL

## 2024-07-24 NOTE — CASE COMMUNICATION
[facility]    Reason: multiple md appts pt cld and cancelled      For your records only.   Per CMS Guidance, MD must be notified of missed/cancelled visits; therefore the prescribed frequency was not met.

## 2024-07-25 ENCOUNTER — HOME CARE VISIT (OUTPATIENT)
Dept: HOME HEALTH SERVICES | Facility: HOME HEALTHCARE | Age: 64
End: 2024-07-25
Payer: COMMERCIAL

## 2024-07-25 VITALS
TEMPERATURE: 97.8 F | HEART RATE: 89 BPM | SYSTOLIC BLOOD PRESSURE: 107 MMHG | RESPIRATION RATE: 16 BRPM | DIASTOLIC BLOOD PRESSURE: 70 MMHG | OXYGEN SATURATION: 95 %

## 2024-07-25 PROCEDURE — G0152 HHCP-SERV OF OT,EA 15 MIN: HCPCS

## 2024-07-26 ENCOUNTER — HOME CARE VISIT (OUTPATIENT)
Dept: HOME HEALTH SERVICES | Facility: HOME HEALTHCARE | Age: 64
End: 2024-07-26
Payer: COMMERCIAL

## 2024-07-26 VITALS
RESPIRATION RATE: 16 BRPM | SYSTOLIC BLOOD PRESSURE: 112 MMHG | OXYGEN SATURATION: 96 % | TEMPERATURE: 97.4 F | HEART RATE: 71 BPM | DIASTOLIC BLOOD PRESSURE: 74 MMHG

## 2024-07-26 PROCEDURE — G0157 HHC PT ASSISTANT EA 15: HCPCS

## 2024-07-31 ENCOUNTER — HOME CARE VISIT (OUTPATIENT)
Dept: HOME HEALTH SERVICES | Facility: HOME HEALTHCARE | Age: 64
End: 2024-07-31
Payer: COMMERCIAL

## 2024-07-31 VITALS
OXYGEN SATURATION: 96 % | HEART RATE: 78 BPM | DIASTOLIC BLOOD PRESSURE: 78 MMHG | RESPIRATION RATE: 16 BRPM | TEMPERATURE: 97.6 F | SYSTOLIC BLOOD PRESSURE: 128 MMHG

## 2024-07-31 PROCEDURE — G0151 HHCP-SERV OF PT,EA 15 MIN: HCPCS

## 2024-08-01 ENCOUNTER — HOME CARE VISIT (OUTPATIENT)
Dept: HOME HEALTH SERVICES | Facility: HOME HEALTHCARE | Age: 64
End: 2024-08-01
Payer: COMMERCIAL

## 2024-08-01 NOTE — CASE COMMUNICATION
Patient missed a HH Visit Type: SN visit from [facility] on DATE:8/1/24.     Reason: HH Missed Visit Reason: Patient called the office this morning and cancelled her visit.       For your records only.   Per CMS Guidance, MD must be notified of missed/cancelled visits; therefore the prescribed frequency was not met.

## 2024-08-05 ENCOUNTER — HOME CARE VISIT (OUTPATIENT)
Dept: HOME HEALTH SERVICES | Facility: HOME HEALTHCARE | Age: 64
End: 2024-08-05
Payer: COMMERCIAL

## 2024-08-05 VITALS
SYSTOLIC BLOOD PRESSURE: 141 MMHG | HEART RATE: 74 BPM | DIASTOLIC BLOOD PRESSURE: 92 MMHG | RESPIRATION RATE: 16 BRPM | OXYGEN SATURATION: 97 % | TEMPERATURE: 97.4 F

## 2024-08-05 PROCEDURE — G0299 HHS/HOSPICE OF RN EA 15 MIN: HCPCS

## 2024-08-05 PROCEDURE — G0152 HHCP-SERV OF OT,EA 15 MIN: HCPCS

## 2024-08-05 RX ORDER — CLONIDINE HYDROCHLORIDE 0.3 MG/1
0.3 TABLET ORAL 2 TIMES DAILY
Qty: 60 TABLET | Refills: 0 | Status: SHIPPED | OUTPATIENT
Start: 2024-08-05

## 2024-08-06 VITALS
TEMPERATURE: 98.1 F | HEART RATE: 85 BPM | DIASTOLIC BLOOD PRESSURE: 86 MMHG | OXYGEN SATURATION: 97 % | SYSTOLIC BLOOD PRESSURE: 138 MMHG | RESPIRATION RATE: 18 BRPM

## 2024-08-06 NOTE — HOME HEALTH
Discharge Summary/Summary of Care Provided: Patient admitted to home care with SN for education r/t to hypertension  Patient received home health for diagnosis: hypertension education  Current level of functional ability: ambulates with a walker and SBA  Living arrangements: lives alone with dog  Progress towards goals and/or Were all goals met? all goals met  If not all goals met, barriers that prevented patient from meeting goals: NA  SDOH concerns (i.e. Caregiver availability, social isolation, environment, income, transportation access, food insecurity etc.) NA  Follow-up appointment plans and community resources provided: NA  Other imporant information. NA

## 2024-08-06 NOTE — CASE COMMUNICATION
skilled nursing discipline dc completed 8/5/24  Discharge Summary/Summary of Care Provided: Patient admitted to home care with SN for education r/t to hypertension  Patient received home health for diagnosis: hypertension education  Current level of functional ability: ambulates with a walker and SBA  Living arrangements: lives alone with dog  Progress towards goals and/or Were all goals met? all goals met  If not all goals met, barrier s that prevented patient from meeting goals: NA  SDOH concerns (i.e. Caregiver availability, social isolation, environment, income, transportation access, food insecurity etc.) NA  Follow-up appointment plans and community resources provided: NA  Other imporant information. NA

## 2024-08-07 ENCOUNTER — HOME CARE VISIT (OUTPATIENT)
Dept: HOME HEALTH SERVICES | Facility: HOME HEALTHCARE | Age: 64
End: 2024-08-07
Payer: COMMERCIAL

## 2024-08-07 VITALS
RESPIRATION RATE: 16 BRPM | TEMPERATURE: 97.7 F | HEART RATE: 79 BPM | SYSTOLIC BLOOD PRESSURE: 110 MMHG | OXYGEN SATURATION: 94 % | DIASTOLIC BLOOD PRESSURE: 78 MMHG

## 2024-08-07 PROCEDURE — G0151 HHCP-SERV OF PT,EA 15 MIN: HCPCS

## 2024-08-14 ENCOUNTER — HOME CARE VISIT (OUTPATIENT)
Dept: HOME HEALTH SERVICES | Facility: HOME HEALTHCARE | Age: 64
End: 2024-08-14
Payer: COMMERCIAL

## 2024-08-14 VITALS
RESPIRATION RATE: 16 BRPM | OXYGEN SATURATION: 95 % | HEART RATE: 79 BPM | SYSTOLIC BLOOD PRESSURE: 130 MMHG | DIASTOLIC BLOOD PRESSURE: 90 MMHG | TEMPERATURE: 98 F

## 2024-08-14 PROCEDURE — G0151 HHCP-SERV OF PT,EA 15 MIN: HCPCS

## 2024-08-14 NOTE — HOME HEALTH
Physical Therapy Reassessment:     Summary of Care: Pt has been seen for 5  PT visits.  Pt has been seen for ther ex, strengthening, balance training, Gt training and fall prevention education.    Progress thus far:     Patient/CG Participation: participating in HEP regularly.     Remaining Problems:     Plan of Care Changes: None     Equipment Needs: None     PT Frequency and Duration:     Plan of Care Reviewed with:  Care team and

## 2024-08-15 ENCOUNTER — HOME CARE VISIT (OUTPATIENT)
Dept: HOME HEALTH SERVICES | Facility: HOME HEALTHCARE | Age: 64
End: 2024-08-15
Payer: COMMERCIAL

## 2024-08-15 VITALS
SYSTOLIC BLOOD PRESSURE: 116 MMHG | HEART RATE: 84 BPM | TEMPERATURE: 97.8 F | OXYGEN SATURATION: 96 % | DIASTOLIC BLOOD PRESSURE: 85 MMHG | RESPIRATION RATE: 16 BRPM

## 2024-08-15 PROCEDURE — G0152 HHCP-SERV OF OT,EA 15 MIN: HCPCS

## 2024-08-19 ENCOUNTER — TELEPHONE (OUTPATIENT)
Dept: FAMILY MEDICINE CLINIC | Facility: CLINIC | Age: 64
End: 2024-08-19
Payer: COMMERCIAL

## 2024-08-19 ENCOUNTER — HOME CARE VISIT (OUTPATIENT)
Dept: HOME HEALTH SERVICES | Facility: HOME HEALTHCARE | Age: 64
End: 2024-08-19
Payer: COMMERCIAL

## 2024-08-19 VITALS
SYSTOLIC BLOOD PRESSURE: 122 MMHG | RESPIRATION RATE: 16 BRPM | TEMPERATURE: 97.8 F | DIASTOLIC BLOOD PRESSURE: 84 MMHG | HEART RATE: 84 BPM | OXYGEN SATURATION: 96 %

## 2024-08-19 PROCEDURE — G0151 HHCP-SERV OF PT,EA 15 MIN: HCPCS

## 2024-08-19 NOTE — TELEPHONE ENCOUNTER
Patient states she no longer wants to see Dr Eldridge as he is difficult to get ahold of. She requests a referral for a new provider. I did explain that she has appointments in the future scheduled with him and if she was going to d/c seeing him and she confirmed she rather see someone else.   She asked in the meantime what should she do about her medicine until she establishes with a new physician.

## 2024-08-19 NOTE — TELEPHONE ENCOUNTER
Unique Brandt, DO  You8 minutes ago (3:35 PM)       She should contact her sleep medicine provider as they have been managing this

## 2024-08-19 NOTE — TELEPHONE ENCOUNTER
Caller: Philomena Davis    Relationship: Self    Best call back number: [phone]    Which medication are you concerned about: cloNIDine (CATAPRES) 0.3 MG tablet     Who prescribed you this medication: EDE        What are your concerns: PATIENT STATED THE ABOVE MEDICATION IS NOT WORKING AT ALL. NEED A CALLBACK TO DISCUSS AN ALTERNATIVE MEDICATION. MAY GET 3-4 HOURS A SLEEP A NIGHT

## 2024-08-20 NOTE — TELEPHONE ENCOUNTER
She will need an appointment to discuss any new medications or referrals. Please let pt know that it has been taking around 3 months or longer to get in with Sleep Medicine if she wishes to transfer practices.

## 2024-08-21 ENCOUNTER — HOME CARE VISIT (OUTPATIENT)
Dept: HOME HEALTH SERVICES | Facility: HOME HEALTHCARE | Age: 64
End: 2024-08-21
Payer: COMMERCIAL

## 2024-08-21 VITALS
TEMPERATURE: 98 F | DIASTOLIC BLOOD PRESSURE: 98 MMHG | RESPIRATION RATE: 16 BRPM | SYSTOLIC BLOOD PRESSURE: 138 MMHG | HEART RATE: 64 BPM | OXYGEN SATURATION: 98 %

## 2024-08-21 PROCEDURE — G0151 HHCP-SERV OF PT,EA 15 MIN: HCPCS

## 2024-08-21 NOTE — TELEPHONE ENCOUNTER
Unable to reach Philomena Davis by phone or leave message.    Hub may relay message and document.     Unique Brandt, DO  You19 hours ago (3:45 PM)       She will need an appointment to discuss any new medications or referrals. Please let pt know that it has been taking around 3 months or longer to get in with Sleep Medicine if she wishes to transfer practices.

## 2024-08-23 ENCOUNTER — PATIENT MESSAGE (OUTPATIENT)
Dept: FAMILY MEDICINE CLINIC | Facility: CLINIC | Age: 64
End: 2024-08-23
Payer: COMMERCIAL

## 2024-08-23 ENCOUNTER — TELEPHONE (OUTPATIENT)
Dept: FAMILY MEDICINE CLINIC | Facility: CLINIC | Age: 64
End: 2024-08-23
Payer: COMMERCIAL

## 2024-08-23 NOTE — TELEPHONE ENCOUNTER
Caller: Philomena Davis    Relationship: Self    Best call back number: [phone]    What is the best time to reach you: AS SOON AS    Who are you requesting to speak with (clinical staff, provider,  specific staff member): PROVIDER    What was the call regarding: SINCE OUR PATIENT IS IMMUNOCOMPROMISED, SHE IS WONDERING IF DR MERA WOULD BE ABLE TO GET THE RSV, FLU, COVID AND SHINGLES SHOTS AND GIVE THEM TO HER IN OFFICE WHEN THEY BECOME AVAILABLE?

## 2024-08-24 NOTE — TELEPHONE ENCOUNTER
From: Dayton CROUCH  To: Philomena Davis  Sent: 8/23/2024 8:22 AM EDT  Subject: Clonidine    Good morning,     Following our conversation from the other day regarding refills and sleep physician search, Dr Brandt recommended the below:    She will need an appointment to discuss any new medications or referrals. Please let pt know that it has been taking around 3 months or longer to get in with Sleep Medicine if she wishes to transfer practices.

## 2024-08-26 ENCOUNTER — HOME CARE VISIT (OUTPATIENT)
Dept: HOME HEALTH SERVICES | Facility: HOME HEALTHCARE | Age: 64
End: 2024-08-26
Payer: COMMERCIAL

## 2024-08-26 VITALS
DIASTOLIC BLOOD PRESSURE: 90 MMHG | SYSTOLIC BLOOD PRESSURE: 138 MMHG | OXYGEN SATURATION: 97 % | HEART RATE: 81 BPM | RESPIRATION RATE: 16 BRPM | TEMPERATURE: 97.7 F

## 2024-08-26 PROCEDURE — G0151 HHCP-SERV OF PT,EA 15 MIN: HCPCS

## 2024-08-26 NOTE — TELEPHONE ENCOUNTER
Unable to reach Philomenasa ANH Davis by phone or leave message.    Hub may relay message and document.    We can give her the flu and covid vaccines in the office. However, the RSV, and shingles can only be obtained from her local pharmacy. We do not offer these in the office. She can however get all of them at the pharmacy if she'd like to get them all done from the same place.     left upper arm

## 2024-08-29 ENCOUNTER — HOME CARE VISIT (OUTPATIENT)
Dept: HOME HEALTH SERVICES | Facility: HOME HEALTHCARE | Age: 64
End: 2024-08-29
Payer: COMMERCIAL

## 2024-08-29 VITALS
RESPIRATION RATE: 16 BRPM | OXYGEN SATURATION: 98 % | HEART RATE: 75 BPM | TEMPERATURE: 97.9 F | SYSTOLIC BLOOD PRESSURE: 126 MMHG | DIASTOLIC BLOOD PRESSURE: 80 MMHG

## 2024-08-29 DIAGNOSIS — F51.04 PSYCHOPHYSIOLOGICAL INSOMNIA: ICD-10-CM

## 2024-08-29 PROCEDURE — G0151 HHCP-SERV OF PT,EA 15 MIN: HCPCS

## 2024-08-29 RX ORDER — CLONIDINE HYDROCHLORIDE 0.3 MG/1
0.3 TABLET ORAL 2 TIMES DAILY
Qty: 60 TABLET | Refills: 0 | Status: SHIPPED | OUTPATIENT
Start: 2024-08-29

## 2024-08-29 RX ORDER — QUETIAPINE FUMARATE 50 MG/1
50 TABLET, FILM COATED ORAL NIGHTLY
Qty: 30 TABLET | Refills: 2 | Status: SHIPPED | OUTPATIENT
Start: 2024-08-29

## 2024-08-29 NOTE — TELEPHONE ENCOUNTER
Rx Refill Note  Requested Prescriptions     Pending Prescriptions Disp Refills    cloNIDine (CATAPRES) 0.3 MG tablet [Pharmacy Med Name: cloNIDine HCL 0.3 MG TABLET] 60 tablet 0     Sig: TAKE 1 TABLET BY MOUTH 2 TIMES A DAY      Last office visit with prescribing clinician: 10/25/2023   Last telemedicine visit with prescribing clinician: 7/12/2024   Next office visit with prescribing clinician: Visit date not found                         Would you like a call back once the refill request has been completed: [] Yes [] No    If the office needs to give you a call back, can they leave a voicemail: [] Yes [] No    Monique Ayala MA  08/29/24, 12:26 EDT

## 2024-08-29 NOTE — HOME HEALTH
DC to home/self care. Patient called and advised patient of below. Patient verbalizes understanding. No further questions.

## 2024-09-10 DIAGNOSIS — F51.04 PSYCHOPHYSIOLOGICAL INSOMNIA: Primary | ICD-10-CM

## 2024-09-10 RX ORDER — TRAZODONE HYDROCHLORIDE 100 MG/1
100 TABLET ORAL NIGHTLY
Qty: 30 TABLET | Refills: 0 | Status: SHIPPED | OUTPATIENT
Start: 2024-09-10 | End: 2024-10-10

## 2024-09-23 ENCOUNTER — TELEPHONE (OUTPATIENT)
Dept: SLEEP MEDICINE | Age: 64
End: 2024-09-23
Payer: COMMERCIAL

## 2024-09-23 ENCOUNTER — TELEPHONE (OUTPATIENT)
Dept: FAMILY MEDICINE CLINIC | Facility: CLINIC | Age: 64
End: 2024-09-23
Payer: COMMERCIAL

## 2024-09-23 ENCOUNTER — TELEPHONE (OUTPATIENT)
Dept: PULMONOLOGY | Facility: CLINIC | Age: 64
End: 2024-09-23
Payer: COMMERCIAL

## 2024-09-23 DIAGNOSIS — F51.04 PSYCHOPHYSIOLOGICAL INSOMNIA: Primary | ICD-10-CM

## 2024-09-23 RX ORDER — TEMAZEPAM 7.5 MG/1
7.5 CAPSULE ORAL NIGHTLY PRN
Qty: 30 CAPSULE | Refills: 0 | Status: SHIPPED | OUTPATIENT
Start: 2024-09-23 | End: 2024-09-23 | Stop reason: ALTCHOICE

## 2024-09-23 RX ORDER — RAMELTEON 8 MG/1
8 TABLET ORAL NIGHTLY
Qty: 30 TABLET | Refills: 0 | Status: SHIPPED | OUTPATIENT
Start: 2024-09-23 | End: 2024-10-23

## 2024-09-24 ENCOUNTER — TELEPHONE (OUTPATIENT)
Dept: FAMILY MEDICINE CLINIC | Facility: CLINIC | Age: 64
End: 2024-09-24

## 2024-09-24 ENCOUNTER — TELEMEDICINE (OUTPATIENT)
Dept: FAMILY MEDICINE CLINIC | Facility: CLINIC | Age: 64
End: 2024-09-24
Payer: COMMERCIAL

## 2024-09-24 DIAGNOSIS — B37.89 CANDIDIASIS OF BREAST: Primary | ICD-10-CM

## 2024-09-24 PROCEDURE — 99213 OFFICE O/P EST LOW 20 MIN: CPT | Performed by: STUDENT IN AN ORGANIZED HEALTH CARE EDUCATION/TRAINING PROGRAM

## 2024-09-24 RX ORDER — NYSTATIN 100000 U/G
1 CREAM TOPICAL 2 TIMES DAILY
Qty: 30 G | Refills: 0 | Status: SHIPPED | OUTPATIENT
Start: 2024-09-24 | End: 2024-10-08

## 2024-09-25 RX ORDER — PRENATAL VIT 91/IRON/FOLIC/DHA 28-975-200
1 COMBINATION PACKAGE (EA) ORAL 2 TIMES DAILY
Qty: 42 G | Refills: 0 | Status: SHIPPED | OUTPATIENT
Start: 2024-09-25

## 2024-09-30 ENCOUNTER — TELEPHONE (OUTPATIENT)
Dept: FAMILY MEDICINE CLINIC | Facility: CLINIC | Age: 64
End: 2024-09-30
Payer: COMMERCIAL

## 2024-09-30 DIAGNOSIS — L60.8 DEFORMITY OF TOENAIL: Primary | ICD-10-CM

## 2024-09-30 NOTE — TELEPHONE ENCOUNTER
Caller: Philomena Davis    Relationship: Self    Best call back number: [phone]         What specialty or service is being requested: PODIATRIST      What is the office location: IN THE [facility] ZIP CODE AREA      Any additional details:

## 2024-10-02 ENCOUNTER — TELEPHONE (OUTPATIENT)
Dept: PULMONOLOGY | Facility: CLINIC | Age: 64
End: 2024-10-02
Payer: COMMERCIAL

## 2024-10-02 NOTE — TELEPHONE ENCOUNTER
Hub staff attempted to follow warm transfer process and was unsuccessful     Caller: Philomena Davis    Relationship to patient: Self    Best call back number: [phone] (home)       Patient is needing: TO SPEAK TO SOMEONE ABOUT DOING TESTING FOR NEW SLEEP MEDICATION

## 2024-10-03 DIAGNOSIS — Z79.899 HIGH RISK MEDICATION USE: Primary | ICD-10-CM

## 2024-10-03 NOTE — TELEPHONE ENCOUNTER
Spoke with patient regarding sleep meds are not helping, she would like to be on something different. I advised her that she will need to complete a UDS and a medication contract. She stated she would need her UDS completed by [facility] that can come to her house. I informed her that it is [facility] policy that she come in person to sign the contract. We talked about her coming to the [facility] location since it would be easier for her. She will to to [facility] office on October 11 to sign contact. Patient was agreeable to this. Marko sent UDS over to [facility].

## 2024-10-07 RX ORDER — CLONIDINE HYDROCHLORIDE 0.3 MG/1
0.3 TABLET ORAL 2 TIMES DAILY
Qty: 180 TABLET | Refills: 0 | Status: SHIPPED | OUTPATIENT
Start: 2024-10-07

## 2024-10-09 ENCOUNTER — TELEPHONE (OUTPATIENT)
Dept: SLEEP MEDICINE | Age: 64
End: 2024-10-09
Payer: COMMERCIAL

## 2024-10-09 NOTE — TELEPHONE ENCOUNTER
Caller: Philomena Davis    Relationship to patient: Self    Best call back number:     [phone] (Mobile)       Patient is needing: PT IS COMING IN FRIDAY MORNING TO [facility] TO SIGN PAPERWORK AND DO A URINE ANALYSIS AROUND 8:30 AM

## 2024-10-11 ENCOUNTER — LAB (OUTPATIENT)
Dept: LAB | Facility: HOSPITAL | Age: 64
End: 2024-10-11
Payer: COMMERCIAL

## 2024-10-11 DIAGNOSIS — Z79.899 HIGH RISK MEDICATION USE: ICD-10-CM

## 2024-10-11 LAB
AMPHET+METHAMPHET UR QL: NEGATIVE
AMPHETAMINES UR QL: NEGATIVE
BARBITURATES UR QL SCN: NEGATIVE
BENZODIAZ UR QL SCN: NEGATIVE
BUPRENORPHINE SERPL-MCNC: NEGATIVE NG/ML
CANNABINOIDS SERPL QL: NEGATIVE
COCAINE UR QL: NEGATIVE
FENTANYL UR-MCNC: NEGATIVE NG/ML
METHADONE UR QL SCN: NEGATIVE
OPIATES UR QL: NEGATIVE
OXYCODONE UR QL SCN: NEGATIVE
PCP UR QL SCN: NEGATIVE
TRICYCLICS UR QL SCN: NEGATIVE

## 2024-10-11 PROCEDURE — 80307 DRUG TEST PRSMV CHEM ANLYZR: CPT

## 2024-10-14 ENCOUNTER — TELEPHONE (OUTPATIENT)
Dept: PULMONOLOGY | Facility: CLINIC | Age: 64
End: 2024-10-14
Payer: COMMERCIAL

## 2024-10-14 NOTE — TELEPHONE ENCOUNTER
Hub staff attempted to follow warm transfer process and was unsuccessful     Caller: Philomena Davis    Relationship to patient: Self    Best call back number: [phone]     Patient is needing: NEW SLEEP MEDICATION  PATIENT WOULD LIKE A CALL BACK ASAP

## 2024-10-16 NOTE — TELEPHONE ENCOUNTER
Returned patient called regarding her medication.  She wanted us to know that she has completed her contract for her medication.

## 2024-10-17 ENCOUNTER — TELEPHONE (OUTPATIENT)
Dept: SLEEP MEDICINE | Age: 64
End: 2024-10-17
Payer: COMMERCIAL

## 2024-10-17 DIAGNOSIS — F51.04 PSYCHOPHYSIOLOGICAL INSOMNIA: Primary | ICD-10-CM

## 2024-10-17 RX ORDER — ESZOPICLONE 1 MG/1
1 TABLET, FILM COATED ORAL NIGHTLY
Qty: 30 TABLET | Refills: 2 | Status: SHIPPED | OUTPATIENT
Start: 2024-10-17 | End: 2025-01-15

## 2024-10-17 NOTE — TELEPHONE ENCOUNTER
Spoke with patient regarding her medication letting her know that it has been sent in to her pharmacy.

## 2024-10-31 NOTE — PROGRESS NOTES
Sleep Clinic Video Visit Follow Up Note    The patient is located at their home address in [place], [place]. The patient presents today for telehealth service.  This service was conducted via audio/video technology through a secure [facility] video visit connection through Epic.  This provider is located in [facility].  Patient stated they are in a secure environment for the session.  Patient's condition being diagnosed/treated is appropriate for telemedicine.  The provider identified himself as well as his credentials.  The patient, and/or patient's guardian, consent to be seen remotely, and when consent is given they understanding that the consent allows for patient identifiable information to be sent to a third-party as needed.  They may refuse to be seen remotely at any time.  The electronic data is encrypted and password protected, and the patient and/or guardian has been advised of the potential risk to privacy not withstanding such measures.  Patient identifiers used: Name and date of birth.     You have chosen to receive care through a telehealth visit.  Do you consent to use a video/audio connection for your medical care today? Yes      Chief Complaint  Follow up and medication check    Subjective     History of Present Illness (from previous encounter on 5/31/2024):  Philomena Davis is a 63 y.o. female returns for follow-up of psychophysiologic insomnia.  Previously the patient was prescribed Seroquel 100 mg.  She was also tried with clonidine.  Patient reports that the 100 mg was too much for her and she had an episode of falling.  Primary care has asked her to decrease this to 1/2 to 1/4 tablet.  I have decreased the dosage to 50 mg of Seroquel nightly should she needed.  Patient actually feels like clonidine is more helpful and I have refilled this today as well.  We will have her return for follow-up in approximately 8 to 12 months for recheck. (End copied text)    Interval History:  Philomena KAMARA  Susan is a 64 y.o. female who presents for follow-up and medication check.  Multiple medications for insomnia have been prescribed.  Patient is presently prescribed Lunesta, and clonidine. She has been doing well with her medication. She is sleeping about 5-6 hours or so.       Further details are as follows:    [place] Scale is: 2/24    Weight:    Current Weight: 148 lb lbs    Weight change in the last year:  gain: 0 lbs    The patient's relevant past medical, surgical, family, and social history reviewed and updated in Epic as appropriate.    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 03/24/2017    Closed trimalleolar fracture of right ankle 05/24/2023    Digital mucous cyst of toe of left foot 03/24/2017    Ganglion cyst of left foot     Glaucoma     Hypertension     Wears glasses      Past Surgical History:   Procedure Laterality Date    ANKLE OPEN REDUCTION INTERNAL FIXATION Left 03/24/2017    Procedure: LEFT ANKLE OPEN REDUCTION INTERNAL FIXATION WITH ILIAC CREST BONE GRAFT , EXCISION CYST 4TH/5TH INNERSPACE LEFT FOOT;  Surgeon: Frank Larson MD;  Location:  [facility] OR;  Service:     ANKLE OPEN REDUCTION INTERNAL FIXATION Right 05/25/2023    Procedure: ANKLE OPEN REDUCTION INTERNAL FIXATION;  Surgeon: Deandre Reynoso MD;  Location:  [facility] OR;  Service: Orthopedics;  Laterality: Right;    AUGMENTATION MAMMAPLASTY Bilateral 1999    BREAST AUGMENTATION      BREAST EXCISIONAL BIOPSY Right 2014    DILATION AND CURETTAGE, DIAGNOSTIC / THERAPEUTIC      ENDOSCOPY N/A 11/30/2022    Procedure: ESOPHAGOGASTRODUODENOSCOPY;  Surgeon: Arelis Solano MD;  Location:  [facility];  Service: Gastroenterology;  Laterality: N/A;    ENDOSCOPY N/A 2/1/2024     Procedure: ESOPHAGOGASTRODUODENOSCOPY;  Surgeon: Brunner, Mark I, MD;  Location:  [facility];  Service: Gastroenterology;  Laterality: N/A;    AZ RPR NON/MAL FEMUR DSTL H/N W/ILIAC/AUTOG BONE Left 2017    Procedure: ILIAC CREST BONE GRAFT;  Surgeon: Frank Larson MD;  Location:  [facility] OR;  Service: Orthopedics    WRIST SURGERY       OB History               Para        Term   0            AB        Living             SAB        IAB        Ectopic        Molar        Multiple        Live Births                  Allergies   Allergen Reactions    Buspirone Anxiety    Codeine Nausea Only    Mirtazapine Anxiety       MEDS:  Prior to Admission medications    Medication Sig Start Date End Date Taking? Authorizing Provider   albuterol sulfate  (90 Base) MCG/ACT inhaler Inhale 2 puffs Every 4 (Four) Hours As Needed for Wheezing. Indications: Chronic Obstructive Lung Disease 24   Unique Brandt DO   bacitracin-polymyxin b (POLYSPORIN) 500-18306 UNIT/GM ointment Apply 1 Application topically to the appropriate area as directed 2 (Two) Times a Day. 24   Unique Brandt DO   carvedilol (COREG) 3.125 MG tablet Take 1 tablet by mouth 2 (Two) Times a Day With Meals. Indications: High Blood Pressure Disorder 24   Unique Brandt DO   cloNIDine (CATAPRES) 0.3 MG tablet TAKE 1 TABLET BY MOUTH 2 TIMES A DAY 10/7/24   Unique Brandt DO   desvenlafaxine (PRISTIQ) 50 MG 24 hr tablet Take 1 tablet by mouth Daily. Indications: Major Depressive Disorder 24   Unique Brandt DO   eszopiclone (Lunesta) 1 MG tablet Take 1 tablet by mouth Every Night for 90 days. Take immediately before bedtime 10/17/24 1/15/25  Marko Eldridge APRN   Fluticasone-Salmeterol (ADVAIR/WIXELA) 100-50 MCG/ACT DISKUS Inhale 1 puff 2 (Two) Times a Day. Indications: COPD 24   Unique Brandt DO   folic acid (FOLVITE) 1 MG tablet Take 1 tablet by mouth Daily. Indications: Anemia  From Inadequate Folic Acid 4/5/24   Unique Brandt DO   gabapentin (NEURONTIN) 300 MG capsule Take 1 capsule by mouth Every Night. 7/12/24   Unique Brandt DO   lactobacillus (BACID) tablet caplet Take 1 tablet by mouth 2 (Two) Times a Day. 2/27/24   Unique Brandt DO   latanoprost (XALATAN) 0.005 % ophthalmic solution Administer 1 drop to both eyes Every Night. Indications: Wide-Angle Glaucoma 4/11/24   Unique Brandt DO   pantoprazole (PROTONIX) 40 MG EC tablet Take 1 tablet by mouth Daily. Indications: Heartburn 7/18/24   Unique Brandt DO   rosuvastatin (CRESTOR) 5 MG tablet TAKE 1 TABLET BY MOUTH DAILY 5/28/24   Johnny Cardenas DO   terbinafine (lamISIL) 1 % cream Apply 1 Application topically to the appropriate area as directed 2 (Two) Times a Day. 9/25/24   Unique Brandt DO   thiamine (VITAMIN B1) 100 MG tablet Take 1 tablet by mouth Daily. Indications: Deficiency of Vitamin B1 4/5/24   Unique Brandt DO   ursodiol (ACTIGALL) 300 MG capsule Take 1 capsule by mouth 2 (Two) Times a Day. Indications: Liver Disease 7/18/24   Unique Brandt DO         FH:  Family History   Problem Relation Age of Onset    Cancer Mother     No Known Problems Father     No Known Problems Sister     No Known Problems Brother     No Known Problems Daughter     No Known Problems Son     No Known Problems Maternal Grandmother     Breast cancer Paternal Grandmother 68        met to brain    No Known Problems Maternal Aunt     No Known Problems Paternal Aunt     Rheum arthritis Other     Heart disease Other     Cancer Other     Stroke Other     Hypertension Other     BRCA 1/2 Neg Hx     Colon cancer Neg Hx     Endometrial cancer Neg Hx     Ovarian cancer Neg Hx        Objective   Vital Signs:  Wt 67.1 kg (148 lb)   BMI 25.39 kg/m²     Patient's (Body mass index is 25.39 kg/m².) indicates that they are overweight (BMI 25-29.9)         Physical Exam  Constitutional:       General: She is not in acute  distress.     Appearance: Normal appearance.   Neurological:      General: No focal deficit present.      Mental Status: She is alert and oriented to person, place, and time.   Psychiatric:         Mood and Affect: Mood normal.         Behavior: Behavior normal.         Thought Content: Thought content normal.         Judgment: Judgment normal.             Result Review :              Assessment and Plan  Philomena Davis is a 64 y.o. female returns for follow-up and medication check.  Patient is prescribed Lunesta 1 mg nightly.  This has been helping, however she is hopeful that a slight increase may help more.  She denies side effects.  Urine drug screen is up-to-date as of 10/11/2024.  Medication contract is also up-to-date as of 10/17/2024.  I have asked the patient to try 1-1/2 mg of Lunesta to see if this is helpful.  I have asked her to contact me in about a week and let me know.  If need be I can increase to 2 mg.  I have asked to return for follow-up in approximately 4-6 months for medication check.    Diagnoses and all orders for this visit:    1. Psychophysiological insomnia (Primary)    2. High risk medication use    3. Overweight (BMI 25.0-29.9)                Follow Up  Return in about 5 months (around 4/5/2025) for Medication Check.  Patient was given instructions and counseling regarding her condition or for health maintenance advice. Please see specific information pulled into the AVS if appropriate.       CANDY Vuong, ACNP-BC  Pulmonology, Critical Care, and Sleep Medicine

## 2024-11-05 ENCOUNTER — TELEMEDICINE (OUTPATIENT)
Dept: SLEEP MEDICINE | Age: 64
End: 2024-11-05
Payer: COMMERCIAL

## 2024-11-05 VITALS — WEIGHT: 148 LBS | BODY MASS INDEX: 25.39 KG/M2

## 2024-11-05 DIAGNOSIS — Z79.899 HIGH RISK MEDICATION USE: ICD-10-CM

## 2024-11-05 DIAGNOSIS — E66.3 OVERWEIGHT (BMI 25.0-29.9): ICD-10-CM

## 2024-11-05 DIAGNOSIS — F51.04 PSYCHOPHYSIOLOGICAL INSOMNIA: Primary | ICD-10-CM

## 2024-11-10 ENCOUNTER — HOSPITAL ENCOUNTER (INPATIENT)
Facility: HOSPITAL | Age: 64
LOS: 10 days | Discharge: NURSING, REHAB OR LONG-TERM CARE | End: 2024-11-23
Attending: EMERGENCY MEDICINE | Admitting: INTERNAL MEDICINE
Payer: COMMERCIAL

## 2024-11-10 ENCOUNTER — APPOINTMENT (OUTPATIENT)
Dept: CT IMAGING | Facility: HOSPITAL | Age: 64
End: 2024-11-10
Payer: COMMERCIAL

## 2024-11-10 ENCOUNTER — APPOINTMENT (OUTPATIENT)
Dept: MRI IMAGING | Facility: HOSPITAL | Age: 64
End: 2024-11-10
Payer: COMMERCIAL

## 2024-11-10 ENCOUNTER — APPOINTMENT (OUTPATIENT)
Dept: GENERAL RADIOLOGY | Facility: HOSPITAL | Age: 64
End: 2024-11-10
Payer: COMMERCIAL

## 2024-11-10 DIAGNOSIS — R20.2 PARESTHESIA: ICD-10-CM

## 2024-11-10 DIAGNOSIS — K70.30 ALCOHOLIC CIRRHOSIS OF LIVER WITHOUT ASCITES: ICD-10-CM

## 2024-11-10 DIAGNOSIS — E44.0 MODERATE MALNUTRITION: ICD-10-CM

## 2024-11-10 DIAGNOSIS — R29.898 WEAKNESS OF BOTH LOWER EXTREMITIES: Primary | ICD-10-CM

## 2024-11-10 DIAGNOSIS — D72.829 LEUKOCYTOSIS, UNSPECIFIED TYPE: ICD-10-CM

## 2024-11-10 DIAGNOSIS — G62.9 PERIPHERAL POLYNEUROPATHY: ICD-10-CM

## 2024-11-10 DIAGNOSIS — R53.1 WEAKNESS: ICD-10-CM

## 2024-11-10 DIAGNOSIS — G47.09 OTHER INSOMNIA: ICD-10-CM

## 2024-11-10 DIAGNOSIS — R00.0 TACHYCARDIA: ICD-10-CM

## 2024-11-10 DIAGNOSIS — I95.1 ORTHOSTASIS: ICD-10-CM

## 2024-11-10 DIAGNOSIS — I77.6 VASCULITIS: ICD-10-CM

## 2024-11-10 LAB
ALBUMIN SERPL-MCNC: 2.8 G/DL (ref 3.5–5.2)
ALBUMIN/GLOB SERPL: 0.7 G/DL
ALP SERPL-CCNC: 165 U/L (ref 39–117)
ALT SERPL W P-5'-P-CCNC: 27 U/L (ref 1–33)
AMPHET+METHAMPHET UR QL: NEGATIVE
AMPHETAMINES UR QL: NEGATIVE
ANION GAP SERPL CALCULATED.3IONS-SCNC: 11 MMOL/L (ref 5–15)
APTT PPP: 37.1 SECONDS (ref 60–90)
AST SERPL-CCNC: 72 U/L (ref 1–32)
BARBITURATES UR QL SCN: NEGATIVE
BASOPHILS # BLD AUTO: 0.09 10*3/MM3 (ref 0–0.2)
BASOPHILS NFR BLD AUTO: 0.6 % (ref 0–1.5)
BENZODIAZ UR QL SCN: NEGATIVE
BILIRUB SERPL-MCNC: 1.7 MG/DL (ref 0–1.2)
BILIRUB UR QL STRIP: NEGATIVE
BUN SERPL-MCNC: 6 MG/DL (ref 8–23)
BUN/CREAT SERPL: 10.9 (ref 7–25)
BUPRENORPHINE SERPL-MCNC: NEGATIVE NG/ML
CALCIUM SPEC-SCNC: 8.8 MG/DL (ref 8.6–10.5)
CANNABINOIDS SERPL QL: NEGATIVE
CHLORIDE SERPL-SCNC: 100 MMOL/L (ref 98–107)
CK SERPL-CCNC: 237 U/L (ref 20–180)
CLARITY UR: CLEAR
CO2 SERPL-SCNC: 27 MMOL/L (ref 22–29)
COCAINE UR QL: NEGATIVE
COLOR UR: YELLOW
CREAT SERPL-MCNC: 0.55 MG/DL (ref 0.57–1)
CRP SERPL-MCNC: 3.88 MG/DL (ref 0–0.5)
D-LACTATE SERPL-SCNC: 1.2 MMOL/L (ref 0.5–2)
D-LACTATE SERPL-SCNC: 2.2 MMOL/L (ref 0.5–2)
D-LACTATE SERPL-SCNC: 2.6 MMOL/L (ref 0.5–2)
DEPRECATED RDW RBC AUTO: 55.6 FL (ref 37–54)
EGFRCR SERPLBLD CKD-EPI 2021: 103.8 ML/MIN/1.73
EOSINOPHIL # BLD AUTO: 0.2 10*3/MM3 (ref 0–0.4)
EOSINOPHIL NFR BLD AUTO: 1.3 % (ref 0.3–6.2)
ERYTHROCYTE [DISTWIDTH] IN BLOOD BY AUTOMATED COUNT: 14.1 % (ref 12.3–15.4)
ERYTHROCYTE [SEDIMENTATION RATE] IN BLOOD: 105 MM/HR (ref 0–30)
ETHANOL BLD-MCNC: <10 MG/DL (ref 0–10)
FENTANYL UR-MCNC: NEGATIVE NG/ML
FOLATE SERPL-MCNC: 4.83 NG/ML (ref 4.78–24.2)
GLOBULIN UR ELPH-MCNC: 4.2 GM/DL
GLUCOSE SERPL-MCNC: 100 MG/DL (ref 65–99)
GLUCOSE UR STRIP-MCNC: NEGATIVE MG/DL
HCT VFR BLD AUTO: 37 % (ref 34–46.6)
HGB BLD-MCNC: 12.4 G/DL (ref 12–15.9)
HGB UR QL STRIP.AUTO: NEGATIVE
HOLD SPECIMEN: NORMAL
IMM GRANULOCYTES # BLD AUTO: 0.07 10*3/MM3 (ref 0–0.05)
IMM GRANULOCYTES NFR BLD AUTO: 0.4 % (ref 0–0.5)
INR PPP: 1.18 (ref 0.89–1.12)
KETONES UR QL STRIP: NEGATIVE
LEUKOCYTE ESTERASE UR QL STRIP.AUTO: NEGATIVE
LYMPHOCYTES # BLD AUTO: 2.06 10*3/MM3 (ref 0.7–3.1)
LYMPHOCYTES NFR BLD AUTO: 13.2 % (ref 19.6–45.3)
MAGNESIUM SERPL-MCNC: 1.7 MG/DL (ref 1.6–2.4)
MCH RBC QN AUTO: 35.8 PG (ref 26.6–33)
MCHC RBC AUTO-ENTMCNC: 33.5 G/DL (ref 31.5–35.7)
MCV RBC AUTO: 106.9 FL (ref 79–97)
METHADONE UR QL SCN: NEGATIVE
MONOCYTES # BLD AUTO: 1.34 10*3/MM3 (ref 0.1–0.9)
MONOCYTES NFR BLD AUTO: 8.6 % (ref 5–12)
NEUTROPHILS NFR BLD AUTO: 11.83 10*3/MM3 (ref 1.7–7)
NEUTROPHILS NFR BLD AUTO: 75.9 % (ref 42.7–76)
NITRITE UR QL STRIP: NEGATIVE
NRBC BLD AUTO-RTO: 0 /100 WBC (ref 0–0.2)
OPIATES UR QL: NEGATIVE
OXYCODONE UR QL SCN: NEGATIVE
PCP UR QL SCN: NEGATIVE
PH UR STRIP.AUTO: 8 [PH] (ref 5–8)
PLATELET # BLD AUTO: 314 10*3/MM3 (ref 140–450)
PMV BLD AUTO: 10.3 FL (ref 6–12)
POTASSIUM SERPL-SCNC: 3.3 MMOL/L (ref 3.5–5.2)
PROCALCITONIN SERPL-MCNC: 0.15 NG/ML (ref 0–0.25)
PROT SERPL-MCNC: 7 G/DL (ref 6–8.5)
PROT UR QL STRIP: NEGATIVE
PROTHROMBIN TIME: 15.2 SECONDS (ref 12.2–14.5)
QT INTERVAL: 314 MS
RBC # BLD AUTO: 3.46 10*6/MM3 (ref 3.77–5.28)
SODIUM SERPL-SCNC: 138 MMOL/L (ref 136–145)
SP GR UR STRIP: 1.01 (ref 1–1.03)
T4 FREE SERPL-MCNC: 1.16 NG/DL (ref 0.92–1.68)
TRICYCLICS UR QL SCN: NEGATIVE
TROPONIN T SERPL HS-MCNC: 14 NG/L
TSH SERPL DL<=0.05 MIU/L-ACNC: 9.67 UIU/ML (ref 0.27–4.2)
UROBILINOGEN UR QL STRIP: NORMAL
VIT B12 BLD-MCNC: 716 PG/ML (ref 211–946)
WBC NRBC COR # BLD AUTO: 15.59 10*3/MM3 (ref 3.4–10.8)
WHOLE BLOOD HOLD COAG: NORMAL
WHOLE BLOOD HOLD SPECIMEN: NORMAL

## 2024-11-10 PROCEDURE — 93010 ELECTROCARDIOGRAM REPORT: CPT | Performed by: INTERNAL MEDICINE

## 2024-11-10 PROCEDURE — 85730 THROMBOPLASTIN TIME PARTIAL: CPT | Performed by: NURSE PRACTITIONER

## 2024-11-10 PROCEDURE — 80050 GENERAL HEALTH PANEL: CPT | Performed by: EMERGENCY MEDICINE

## 2024-11-10 PROCEDURE — 80307 DRUG TEST PRSMV CHEM ANLYZR: CPT | Performed by: NURSE PRACTITIONER

## 2024-11-10 PROCEDURE — 84484 ASSAY OF TROPONIN QUANT: CPT | Performed by: EMERGENCY MEDICINE

## 2024-11-10 PROCEDURE — 74177 CT ABD & PELVIS W/CONTRAST: CPT

## 2024-11-10 PROCEDURE — 84439 ASSAY OF FREE THYROXINE: CPT | Performed by: NURSE PRACTITIONER

## 2024-11-10 PROCEDURE — G0378 HOSPITAL OBSERVATION PER HR: HCPCS

## 2024-11-10 PROCEDURE — 99223 1ST HOSP IP/OBS HIGH 75: CPT

## 2024-11-10 PROCEDURE — 82550 ASSAY OF CK (CPK): CPT | Performed by: EMERGENCY MEDICINE

## 2024-11-10 PROCEDURE — 87086 URINE CULTURE/COLONY COUNT: CPT | Performed by: NURSE PRACTITIONER

## 2024-11-10 PROCEDURE — 82077 ASSAY SPEC XCP UR&BREATH IA: CPT | Performed by: NURSE PRACTITIONER

## 2024-11-10 PROCEDURE — 82746 ASSAY OF FOLIC ACID SERUM: CPT

## 2024-11-10 PROCEDURE — 93005 ELECTROCARDIOGRAM TRACING: CPT | Performed by: INTERNAL MEDICINE

## 2024-11-10 PROCEDURE — 83735 ASSAY OF MAGNESIUM: CPT | Performed by: EMERGENCY MEDICINE

## 2024-11-10 PROCEDURE — P9612 CATHETERIZE FOR URINE SPEC: HCPCS

## 2024-11-10 PROCEDURE — 85610 PROTHROMBIN TIME: CPT | Performed by: NURSE PRACTITIONER

## 2024-11-10 PROCEDURE — 99222 1ST HOSP IP/OBS MODERATE 55: CPT | Performed by: INTERNAL MEDICINE

## 2024-11-10 PROCEDURE — 72148 MRI LUMBAR SPINE W/O DYE: CPT

## 2024-11-10 PROCEDURE — 84145 PROCALCITONIN (PCT): CPT | Performed by: NURSE PRACTITIONER

## 2024-11-10 PROCEDURE — 99285 EMERGENCY DEPT VISIT HI MDM: CPT

## 2024-11-10 PROCEDURE — 71045 X-RAY EXAM CHEST 1 VIEW: CPT

## 2024-11-10 PROCEDURE — 25810000003 SODIUM CHLORIDE 0.9 % SOLUTION: Performed by: NURSE PRACTITIONER

## 2024-11-10 PROCEDURE — 81003 URINALYSIS AUTO W/O SCOPE: CPT | Performed by: EMERGENCY MEDICINE

## 2024-11-10 PROCEDURE — 25510000001 IOPAMIDOL 61 % SOLUTION: Performed by: EMERGENCY MEDICINE

## 2024-11-10 PROCEDURE — 85652 RBC SED RATE AUTOMATED: CPT | Performed by: EMERGENCY MEDICINE

## 2024-11-10 PROCEDURE — 87040 BLOOD CULTURE FOR BACTERIA: CPT | Performed by: NURSE PRACTITIONER

## 2024-11-10 PROCEDURE — 70450 CT HEAD/BRAIN W/O DYE: CPT

## 2024-11-10 PROCEDURE — 86140 C-REACTIVE PROTEIN: CPT | Performed by: NURSE PRACTITIONER

## 2024-11-10 PROCEDURE — 82607 VITAMIN B-12: CPT

## 2024-11-10 PROCEDURE — 70551 MRI BRAIN STEM W/O DYE: CPT

## 2024-11-10 PROCEDURE — 36415 COLL VENOUS BLD VENIPUNCTURE: CPT

## 2024-11-10 PROCEDURE — 83605 ASSAY OF LACTIC ACID: CPT | Performed by: NURSE PRACTITIONER

## 2024-11-10 PROCEDURE — 93005 ELECTROCARDIOGRAM TRACING: CPT | Performed by: EMERGENCY MEDICINE

## 2024-11-10 PROCEDURE — 25010000002 THIAMINE HCL 200 MG/2ML SOLUTION: Performed by: EMERGENCY MEDICINE

## 2024-11-10 RX ORDER — SODIUM CHLORIDE 9 MG/ML
40 INJECTION, SOLUTION INTRAVENOUS AS NEEDED
Status: DISCONTINUED | OUTPATIENT
Start: 2024-11-10 | End: 2024-11-21

## 2024-11-10 RX ORDER — POTASSIUM CHLORIDE 1500 MG/1
40 TABLET, EXTENDED RELEASE ORAL EVERY 4 HOURS
Status: COMPLETED | OUTPATIENT
Start: 2024-11-10 | End: 2024-11-10

## 2024-11-10 RX ORDER — SODIUM CHLORIDE 0.9 % (FLUSH) 0.9 %
10 SYRINGE (ML) INJECTION AS NEEDED
Status: DISCONTINUED | OUTPATIENT
Start: 2024-11-10 | End: 2024-11-21

## 2024-11-10 RX ORDER — BISACODYL 10 MG
10 SUPPOSITORY, RECTAL RECTAL DAILY PRN
Status: DISCONTINUED | OUTPATIENT
Start: 2024-11-10 | End: 2024-11-23 | Stop reason: HOSPADM

## 2024-11-10 RX ORDER — CARVEDILOL 3.12 MG/1
3.12 TABLET ORAL 2 TIMES DAILY WITH MEALS
Status: DISCONTINUED | OUTPATIENT
Start: 2024-11-10 | End: 2024-11-10

## 2024-11-10 RX ORDER — SODIUM CHLORIDE 0.9 % (FLUSH) 0.9 %
10 SYRINGE (ML) INJECTION EVERY 12 HOURS SCHEDULED
Status: DISCONTINUED | OUTPATIENT
Start: 2024-11-10 | End: 2024-11-21

## 2024-11-10 RX ORDER — BUDESONIDE AND FORMOTEROL FUMARATE DIHYDRATE 160; 4.5 UG/1; UG/1
2 AEROSOL RESPIRATORY (INHALATION)
Status: DISCONTINUED | OUTPATIENT
Start: 2024-11-10 | End: 2024-11-10

## 2024-11-10 RX ORDER — LORAZEPAM 0.5 MG/1
0.5 TABLET ORAL EVERY 8 HOURS PRN
Status: DISPENSED | OUTPATIENT
Start: 2024-11-10 | End: 2024-11-15

## 2024-11-10 RX ORDER — NITROGLYCERIN 0.4 MG/1
0.4 TABLET SUBLINGUAL
Status: DISCONTINUED | OUTPATIENT
Start: 2024-11-10 | End: 2024-11-23 | Stop reason: HOSPADM

## 2024-11-10 RX ORDER — LATANOPROST 50 UG/ML
1 SOLUTION/ DROPS OPHTHALMIC NIGHTLY
Status: DISCONTINUED | OUTPATIENT
Start: 2024-11-10 | End: 2024-11-23 | Stop reason: HOSPADM

## 2024-11-10 RX ORDER — ROSUVASTATIN CALCIUM 10 MG/1
5 TABLET, COATED ORAL DAILY
Status: DISCONTINUED | OUTPATIENT
Start: 2024-11-10 | End: 2024-11-23 | Stop reason: HOSPADM

## 2024-11-10 RX ORDER — METOPROLOL TARTRATE 1 MG/ML
5 INJECTION, SOLUTION INTRAVENOUS EVERY 6 HOURS SCHEDULED
Status: DISCONTINUED | OUTPATIENT
Start: 2024-11-10 | End: 2024-11-10

## 2024-11-10 RX ORDER — GABAPENTIN 300 MG/1
300 CAPSULE ORAL NIGHTLY
Status: DISCONTINUED | OUTPATIENT
Start: 2024-11-10 | End: 2024-11-10

## 2024-11-10 RX ORDER — FOLIC ACID 1 MG/1
1 TABLET ORAL DAILY
Status: DISCONTINUED | OUTPATIENT
Start: 2024-11-11 | End: 2024-11-23 | Stop reason: HOSPADM

## 2024-11-10 RX ORDER — ZOLPIDEM TARTRATE 5 MG/1
5 TABLET ORAL NIGHTLY
Status: DISCONTINUED | OUTPATIENT
Start: 2024-11-10 | End: 2024-11-23 | Stop reason: HOSPADM

## 2024-11-10 RX ORDER — IPRATROPIUM BROMIDE AND ALBUTEROL SULFATE 2.5; .5 MG/3ML; MG/3ML
3 SOLUTION RESPIRATORY (INHALATION) EVERY 6 HOURS PRN
Status: DISCONTINUED | OUTPATIENT
Start: 2024-11-10 | End: 2024-11-23 | Stop reason: HOSPADM

## 2024-11-10 RX ORDER — BISACODYL 5 MG/1
5 TABLET, DELAYED RELEASE ORAL DAILY PRN
Status: DISCONTINUED | OUTPATIENT
Start: 2024-11-10 | End: 2024-11-23 | Stop reason: HOSPADM

## 2024-11-10 RX ORDER — ACETAMINOPHEN 160 MG/5ML
650 SOLUTION ORAL EVERY 4 HOURS PRN
Status: DISCONTINUED | OUTPATIENT
Start: 2024-11-10 | End: 2024-11-23 | Stop reason: HOSPADM

## 2024-11-10 RX ORDER — THIAMINE HYDROCHLORIDE 100 MG/ML
100 INJECTION, SOLUTION INTRAMUSCULAR; INTRAVENOUS ONCE
Status: COMPLETED | OUTPATIENT
Start: 2024-11-10 | End: 2024-11-10

## 2024-11-10 RX ORDER — AMOXICILLIN 250 MG
2 CAPSULE ORAL 2 TIMES DAILY PRN
Status: DISCONTINUED | OUTPATIENT
Start: 2024-11-10 | End: 2024-11-23 | Stop reason: HOSPADM

## 2024-11-10 RX ORDER — URSODIOL 300 MG/1
300 CAPSULE ORAL 2 TIMES DAILY
Status: DISCONTINUED | OUTPATIENT
Start: 2024-11-10 | End: 2024-11-23 | Stop reason: HOSPADM

## 2024-11-10 RX ORDER — CARVEDILOL 12.5 MG/1
12.5 TABLET ORAL 2 TIMES DAILY WITH MEALS
Status: DISCONTINUED | OUTPATIENT
Start: 2024-11-10 | End: 2024-11-12

## 2024-11-10 RX ORDER — CALCIUM CARBONATE 500 MG/1
2 TABLET, CHEWABLE ORAL 2 TIMES DAILY PRN
Status: DISCONTINUED | OUTPATIENT
Start: 2024-11-10 | End: 2024-11-23 | Stop reason: HOSPADM

## 2024-11-10 RX ORDER — ONDANSETRON 2 MG/ML
4 INJECTION INTRAMUSCULAR; INTRAVENOUS EVERY 6 HOURS PRN
Status: DISCONTINUED | OUTPATIENT
Start: 2024-11-10 | End: 2024-11-23 | Stop reason: HOSPADM

## 2024-11-10 RX ORDER — CLONIDINE HYDROCHLORIDE 0.1 MG/1
0.3 TABLET ORAL 2 TIMES DAILY
Status: DISCONTINUED | OUTPATIENT
Start: 2024-11-10 | End: 2024-11-10

## 2024-11-10 RX ORDER — PANTOPRAZOLE SODIUM 40 MG/1
40 TABLET, DELAYED RELEASE ORAL DAILY
Status: DISCONTINUED | OUTPATIENT
Start: 2024-11-11 | End: 2024-11-23 | Stop reason: HOSPADM

## 2024-11-10 RX ORDER — POLYETHYLENE GLYCOL 3350 17 G/17G
17 POWDER, FOR SOLUTION ORAL DAILY PRN
Status: DISCONTINUED | OUTPATIENT
Start: 2024-11-10 | End: 2024-11-23 | Stop reason: HOSPADM

## 2024-11-10 RX ORDER — IOPAMIDOL 612 MG/ML
85 INJECTION, SOLUTION INTRAVASCULAR
Status: COMPLETED | OUTPATIENT
Start: 2024-11-10 | End: 2024-11-10

## 2024-11-10 RX ORDER — ACETAMINOPHEN 325 MG/1
650 TABLET ORAL EVERY 4 HOURS PRN
Status: DISCONTINUED | OUTPATIENT
Start: 2024-11-10 | End: 2024-11-23 | Stop reason: HOSPADM

## 2024-11-10 RX ORDER — DESVENLAFAXINE 50 MG/1
50 TABLET, FILM COATED, EXTENDED RELEASE ORAL DAILY
Status: DISCONTINUED | OUTPATIENT
Start: 2024-11-11 | End: 2024-11-23 | Stop reason: HOSPADM

## 2024-11-10 RX ADMIN — Medication 10 ML: at 20:03

## 2024-11-10 RX ADMIN — SODIUM CHLORIDE 1000 ML: 9 INJECTION, SOLUTION INTRAVENOUS at 10:31

## 2024-11-10 RX ADMIN — POTASSIUM CHLORIDE 40 MEQ: 1500 TABLET, EXTENDED RELEASE ORAL at 17:44

## 2024-11-10 RX ADMIN — ROSUVASTATIN 5 MG: 10 TABLET, FILM COATED ORAL at 20:01

## 2024-11-10 RX ADMIN — ZOLPIDEM TARTRATE 5 MG: 5 TABLET ORAL at 20:03

## 2024-11-10 RX ADMIN — SODIUM CHLORIDE, SODIUM LACTATE, POTASSIUM CHLORIDE, CALCIUM CHLORIDE 2013 ML: 20; 30; 600; 310 INJECTION, SOLUTION INTRAVENOUS at 10:36

## 2024-11-10 RX ADMIN — LATANOPROST 1 DROP: 50 SOLUTION OPHTHALMIC at 20:02

## 2024-11-10 RX ADMIN — CARVEDILOL 12.5 MG: 12.5 TABLET, FILM COATED ORAL at 17:44

## 2024-11-10 RX ADMIN — POTASSIUM CHLORIDE 40 MEQ: 1500 TABLET, EXTENDED RELEASE ORAL at 20:01

## 2024-11-10 RX ADMIN — Medication 5 MG: at 20:01

## 2024-11-10 RX ADMIN — THIAMINE HYDROCHLORIDE 100 MG: 100 INJECTION, SOLUTION INTRAMUSCULAR; INTRAVENOUS at 10:36

## 2024-11-10 RX ADMIN — METOPROLOL TARTRATE 5 MG: 5 INJECTION INTRAVENOUS at 14:53

## 2024-11-10 RX ADMIN — IOPAMIDOL 85 ML: 612 INJECTION, SOLUTION INTRAVENOUS at 11:55

## 2024-11-10 NOTE — H&P
[facility] Medicine Services  HISTORY AND PHYSICAL    Patient Name: Philomena Davis  : 1960  MRN: [id number]  Primary Care Physician: Unique Brandt DO  Date of admission: 11/10/2024      Subjective   Subjective     Chief Complaint:  Lower extremity pain and weakness    HPI:  Philomena Davis is a 64 y.o. female with past medical history of alcohol dependence, alcoholic liver cirrhosis, essential hypertension, peripheral neuropathy, anxiety and depression, who presented to the hospital today with worsening bilateral lower extremity weakness, right and left facial paresthesia x 1 week    Patient reported that she has been experiencing worsening weakness associated with pain of both lower extremities for the last 1 week which gradually has been getting worse.  At baseline she was able to ambulate independently.  Over the past week, she has been using a walker and today she was able to take only few steps with a walker and felt that she is going to fall.  She describes the pain in her lower extremities as electric shock pain that comes and goes.  Most of the time she will feel that her legs are extra sensitive to any tactile stimuli. additionally, she is complaining of concomitant worsening facial paresthesia over the last 3 days (right more than left).    Patient reports drinking alcohol very socially now.  Last drink was yesterday.  Only 1 shot of vodka every now and then.  No withdrawal symptoms.  She denies any fever or chills, denied urinary symptoms, denied cough, denied chest pain etc.    In ER, blood workup was remarkable for potassium 3.3, bilirubin of 1.7, AST 72, TSH of 9.6, free T41.1, CRP of 3.88, sed rate of 105, lactate of 2.2, procalcitonin of 0.15, INR of 1.1, white cell count of 15.5, MCV of 106.  She was seen by stroke team and MRI brain and lumbar spine ordered.      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 03/24/2017   • Closed trimalleolar fracture of right ankle 05/24/2023   • Digital mucous cyst of toe of left foot 03/24/2017   • Ganglion cyst of left foot    • Glaucoma    • Hypertension    • Wears glasses            Past Surgical History:   Procedure Laterality Date   • ANKLE OPEN REDUCTION INTERNAL FIXATION Left 03/24/2017    Procedure: LEFT ANKLE OPEN REDUCTION INTERNAL FIXATION WITH ILIAC CREST BONE GRAFT , EXCISION CYST 4TH/5TH INNERSPACE LEFT FOOT;  Surgeon: Frank Larson MD;  Location:  [facility];  Service:    • ANKLE OPEN REDUCTION INTERNAL FIXATION Right 05/25/2023    Procedure: ANKLE OPEN REDUCTION INTERNAL FIXATION;  Surgeon: Deandre Reynoso MD;  Location:  [facility];  Service: Orthopedics;  Laterality: Right;   • AUGMENTATION MAMMAPLASTY Bilateral 1999   • BREAST AUGMENTATION     • BREAST EXCISIONAL BIOPSY Right 2014   • DILATION AND CURETTAGE, DIAGNOSTIC / THERAPEUTIC     • ENDOSCOPY N/A 11/30/2022    Procedure: ESOPHAGOGASTRODUODENOSCOPY;  Surgeon: Arelis Solano MD;  Location:  [facility];  Service: Gastroenterology;  Laterality: N/A;   • ENDOSCOPY N/A 2/1/2024    Procedure: ESOPHAGOGASTRODUODENOSCOPY;  Surgeon: Brunner, Mark I, MD;  Location:  [facility];  Service: Gastroenterology;  Laterality: N/A;   • IN RPR NON/MAL FEMUR DSTL H/N W/ILIAC/AUTOG BONE Left 03/24/2017    Procedure: ILIAC CREST BONE GRAFT;  Surgeon: Frank Larson MD;  Location:  [facility];  Service: Orthopedics   • WRIST SURGERY         Family History: family history includes Breast cancer (age of onset: 68) in her paternal grandmother; Cancer in her mother and another family member; Heart disease in an other family member; Hypertension in an other family member; No Known Problems in her  brother, daughter, father, maternal aunt, maternal grandmother, paternal aunt, sister, and son; Rheum arthritis in an other family member; Stroke in an other family member.     Social History:  reports that she quit smoking about 15 years ago. Her smoking use included cigarettes. She started smoking about 35 years ago. She has a 30 pack-year smoking history. She has never used smokeless tobacco. She reports current alcohol use of about 7.0 standard drinks of alcohol per week. She reports that she does not use drugs.  Social History     Social History Narrative   • Not on file       Medications:  Available home medication information reviewed.  Fluticasone-Salmeterol, albuterol sulfate HFA, bacitracin-polymyxin b, carvedilol, cloNIDine, desvenlafaxine, eszopiclone, folic acid, gabapentin, lactobacillus, latanoprost, pantoprazole, rosuvastatin, terbinafine, thiamine, and ursodiol    Allergies   Allergen Reactions   • Buspirone Anxiety   • Codeine Nausea Only   • Mirtazapine Anxiety       Objective   Objective     Vital Signs:   Temp:  [98 °F (36.7 °C)-98.1 °F (36.7 °C)] 98.1 °F (36.7 °C)  Heart Rate:  [] 92  Resp:  [8-18] 8  BP: (137-165)/() 145/95  Total (NIH Stroke Scale): 1    Physical Exam   General: Comfortable, not in distress, conversant and cooperative  Head: Atraumatic and normocephalic  Eyes: No Icterus. No pallor  Ears:  Ears appear intact with no abnormalities noted  Throat: No oral lesions, no thrush  Neck: Supple, trachea midline  Lungs: Clear to auscultation bilaterally, equal air entry, no wheezing or crackles  Heart:  Normal S1 and S2, no murmur, no gallop, No JVD, no lower extremity swelling  Abdomen:  Soft, no tenderness, no organomegaly, normal bowel sounds, no organomegaly  Extremities: pulses equal bilaterally  Skin: No bleeding, bruising or rash, normal skin turgor and elasticity  Neurologic: Cranial nerves appear intact with no evidence of facial asymmetry, weakness both lower  extremities 4/5.  Sensory level up to T3-T4.    Psych: Alert and oriented x 3, normal mood    Result Review:  I have personally reviewed the results from the time of this admission to 11/10/2024 15:51 EST and agree with these findings:  [x]  Laboratory list / accordion  [x]  Microbiology  [x]  Radiology  [x]  EKG/Telemetry   [x]  Cardiology/Vascular   [x]  Pathology  [x]  Old records      LAB RESULTS:      Lab 11/10/24  1235 11/10/24  1007 11/10/24  0946   WBC  --   --  15.59*   HEMOGLOBIN  --   --  12.4   HEMATOCRIT  --   --  37.0   PLATELETS  --   --  314   NEUTROS ABS  --   --  11.83*   IMMATURE GRANS (ABS)  --   --  0.07*   LYMPHS ABS  --   --  2.06   MONOS ABS  --   --  1.34*   EOS ABS  --   --  0.20   MCV  --   --  106.9*   SED RATE  --   --  105*   CRP  --  3.88*  --    PROCALCITONIN  --  0.15  --    LACTATE 2.2*  --  2.6*   PROTIME  --   --  15.2*   INR  --   --  1.18*   APTT  --   --  37.1*         Lab 11/10/24  1007   SODIUM 138   POTASSIUM 3.3*   CHLORIDE 100   CO2 27.0   ANION GAP 11.0   BUN 6*   CREATININE 0.55*   EGFR 103.8   GLUCOSE 100*   CALCIUM 8.8   MAGNESIUM 1.7   TSH 9.670*         Lab 11/10/24  1007   TOTAL PROTEIN 7.0   ALBUMIN 2.8*   GLOBULIN 4.2   ALT (SGPT) 27   AST (SGOT) 72*   BILIRUBIN 1.7*   ALK PHOS 165*         Lab 11/10/24  1007   HSTROP T 14*                 UA          11/10/2024    09:58   Urinalysis   Specific Gravity, UA 1.007    Ketones, UA Negative    Blood, UA Negative    Leukocytes, UA Negative    Nitrite, UA Negative        Microbiology Results (last 10 days)       ** No results found for the last 240 hours. **            MRI Lumbar Spine Without Contrast    Result Date: 11/10/2024  MRI LUMBAR SPINE WO CONTRAST Date of Exam: 11/10/2024 1:40 PM EST Indication: Lower extremity radiculopathy.  Comparison: None available. Technique:  Routine multiplanar/multisequence sequence images of the lumbar spine were obtained without contrast administration.  Findings: Hyperintense  benign osseous hemangioma noted at L3. T1 marrow signal is otherwise preserved, without evidence of fracture or suspicious marrow replacing lesion. Alignment is anatomic, without evidence of significant listhesis or subluxation. The conus medullaris and cauda equina nerve roots are satisfactory in appearance. The paraspinal soft tissues demonstrate no acute or suspicious findings. Multilevel spondylosis is present, with areas of involvement including L1-2, no significant spinal canal or neuroforaminal impingement. L2-3, no significant spinal canal or neuroforaminal impingement. L3-4, small disc bulge and bilateral facet arthropathy. There is no evidence of significant spinal canal or neuroforaminal narrowing. L4-5, minimal small disc bulge and mild facet arthropathy. The spinal canal remains patent. There is minimal bilateral neuroforaminal narrowing. L5-S1, small circumferential disc bulge with component of right-sided disc protrusion. There is some minimal narrowing of the right aspect of the spinal canal and the small disc protrusion encroaches upon the right subarticular recess, potentially in some proximity to the traversing right S1 nerve root. The neural foramina remain patent.     Impression: Impression: Generally mild lumbar spondylosis is evident, including a small disc protrusion eccentric to the right at L5-S1, in the region of the right subarticular recess and potentially in some proximity to the traversing right S1 nerve root. There is otherwise no  evidence of significant spinal canal or neuroforaminal narrowing. Electronically Signed: Adriano Kesselr MD  11/10/2024 2:27 PM EST  Workstation ID: [id number]    MRI Brain Without Contrast    Result Date: 11/10/2024  MRI BRAIN WO CONTRAST Date of Exam: 11/10/2024 1:27 PM EST Indication: BLE weakness, facial parasthesia R>L, difficulty ambulating.  Comparison: None available. Technique:  Routine multiplanar/multisequence sequence images of the brain were  obtained without contrast administration. Findings: No acute infarct is present on diffusion weighted sequences. Midline structures appear normal and the craniocervical junction is satisfactory. Gray-white differentiation is maintained and there is no evidence of intracranial hemorrhage, mass or mass effect. The ventricles are normal in size and configuration. The orbits are normal. The paranasal sinuses are clear. Intracranial arterial flow voids appear maintained.     Impression: Impression: Normal noncontrast MRI of the brain. Electronically Signed: Adriano Kessler MD  11/10/2024 2:14 PM EST  Workstation ID: [id number]    CT Abdomen Pelvis With Contrast    Result Date: 11/10/2024  CT ABDOMEN PELVIS W CONTRAST Date of Exam: 11/10/2024 11:35 AM EST Indication: abdominal pain. Comparison: CT abdomen and pelvis 1/30/2024 Technique: Axial CT images were obtained of the abdomen and pelvis following the uneventful intravenous administration of 85 mL Isovue-300. Reconstructed coronal and sagittal images were also obtained. Automated exposure control and iterative construction methods were used. Findings: LUNG BASES:  Unremarkable without mass or infiltrate. LIVER:  Unremarkable parenchyma without focal lesion. BILIARY/GALLBLADDER:  Unremarkable SPLEEN:  Unremarkable PANCREAS:  Unremarkable ADRENAL:  Unremarkable KIDNEYS: Bilateral renal cysts are noted. No evidence for obstructive uropathy. No calculus identified. GASTROINTESTINAL/MESENTERY:  No evidence of obstruction nor inflammation.  The appendix is visualized normal in caliber. MESENTERIC VESSELS: The mesenteric vessels are patent. There is some faint soft tissue density along the celiac artery. There is some similar findings on prior study however this appears slightly more conspicuous on the current exam. Mild vasculitis not excluded. AORTA/IVC:  Normal caliber. RETROPERITONEUM/LYMPH NODES:  Unremarkable REPRODUCTIVE: The uterus is visualized. BLADDER:  Unremarkable  OSSEUS STRUCTURES:  Typical for age with no acute process identified.     Impression: Impression: 1. Mild soft tissue stranding along the celiac artery which could represent mild vasculitis. 2. No acute findings otherwise noted in the abdomen or pelvis. Electronically Signed: Jeimy Tomas MD  11/10/2024 12:05 PM EST  Workstation ID: [id number]    CT Head Without Contrast    Result Date: 11/10/2024  CT HEAD WO CONTRAST Date of Exam: 11/10/2024 11:22 AM EST Indication: facial numbness x 1 week difficulty walking. Comparison: None available. Technique: Axial CT images were obtained of the head without contrast administration.  Automated exposure control and iterative construction methods were used. FINDINGS: Gray-white differentiation is maintained and there is no evidence of intracranial hemorrhage, mass or mass effect. Mild age-related changes of the brain are present including volume loss and typical periventricular sequela of chronic small vessel ischemia. There is otherwise no evidence of intracranial hemorrhage, mass or mass effect. The ventricles are normal in size and configuration accounting for surrounding volume loss. The orbits are normal and the paranasal sinuses are grossly clear.     Impression: Age-related changes of the brain as above, otherwise without evidence of acute intracranial abnormality. Electronically Signed: Adriano Kessler MD  11/10/2024 11:30 AM EST  Workstation ID: [id number]    XR Chest 1 View    Result Date: 11/10/2024  XR CHEST 1 VW Date of Exam: 11/10/2024 10:11 AM EST Indication: Weak/Dizzy/AMS triage protocol Comparison: February 3, 2024 Findings: The lungs are clear. The heart and mediastinal contours appear normal. There is no pleural effusion. The pulmonary vasculature appears normal. The osseous structures appear intact.     Impression: Impression: No acute cardiopulmonary process. Electronically Signed: Jhoan Madrid MD  11/10/2024 10:47 AM EST  Workstation ID: [id number]          Assessment & Plan   Assessment & Plan       Lower extremity weakness    Summary:  Philomena Davis is a 64 y.o. female with past medical history of alcohol dependence, alcoholic liver cirrhosis, essential hypertension, peripheral neuropathy, anxiety and depression, who presented to the hospital today with worsening bilateral lower extremity weakness, right and left facial paresthesia x 1 week    Bilateral lower extremity weakness for differential diagnosis  Rule out cervical/thoracic radiculopathy  Rule out demyelinating disease  Patient presented with worsening lower extremity weakness over the last 7 days.  Now hardly able to walk few steps with a walker after she was walking independently without any walking aid.  Additionally she has neuropathic pain and sensory level up to  T3-T4  MRI brain with no acute brain pathology  MRI lumbar spine showing small disc protrusion at L5-S1 without significant spinal canal or neuroforaminal stenosis  MRI cervical and thoracic spine pending  Given her elevated CRP and sed rate, will order LP by IR and will send workup for MS and demyelinating disease  Might benefit from nerve conduction study and EMG.  Will defer to neurology team  Follow B12 and folate levels  Seen by stroke team in ER and signed off after MRI brain showed no acute stroke.  Will consult general neurology in the a.m.  PT and OT consultation    Possible vasculitis  Elevated inflammatory markers  CT abdomen with questionable celiac artery vasculitis  Sed rate markedly evaded at 105  CRP elevated at 3.8  No infectious etiology identified.  Monitor off antibiotics  Check WILMA, RF, ANCA panel    Hypokalemia  Replace per protocol    Atrial tachycardia, resolved  Presented with atrial tachycardia, with heart rate fixed in the 150s  With 5 mg of IV metoprolol, she converted to sinus rhythm with heart rate of 90 with P wave morphology changing from her initial EKG with heart rate of 150 indicative of previous  rhythm being atrial tachycardia: SVT versus atypical atrial flutter.  Will order echo  Increase Coreg to 12.5 mg twice daily  If she continues to have rhythm changes, consider cardiology consultation    Alcohol dependence  Patient reported that she hardly drink any alcohol now.  No withdrawal symptoms.  Monitor closely    Alcoholic liver cirrhosis  Elevated liver enzymes  Hepatocellular jaundice  Compensated with normal INR  AST mildly elevated at 72 and bilirubin elevated 1.7.  Likely secondary to alcohol use  Continue to monitor    Elevated lactic acid, of no clinical significance  Mild elevated at 2.2.  Likely secondary to liver disease    Subclinical hypothyroidism  TSH elevated at 9.6 and free T4 is normal at 1.1  No treatment indicated at this time.  Needs repeat thyroid panel as outpatient in 6 weeks      VTE Prophylaxis:  Mechanical VTE prophylaxis orders are present.      CODE STATUS:    Code Status and Medical Interventions: CPR (Attempt to Resuscitate); Full Support   Ordered at: 11/10/24 1537     Level Of Support Discussed With:    Patient    Next of Kin (If No Surrogate)     Code Status (Patient has no pulse and is not breathing):    CPR (Attempt to Resuscitate)     Medical Interventions (Patient has pulse or is breathing):    Full Support       Expected Discharge   Expected Discharge Date: 11/13/2024; Expected Discharge Time:      Sophie Gandhi MD  11/10/24

## 2024-11-10 NOTE — ED PROVIDER NOTES
EMERGENCY DEPARTMENT ENCOUNTER    Pt Name: Philomena Davis  MRN: [id number]  Pt :   1960  Room Number:  1818  Date of encounter:  11/10/2024  PCP: Unique Brandt DO  ED Provider: CANDY Martel    Historian: patient, EMS      HPI:  Chief Complaint: weakness        Context: Philomena Davis is a 64 y.o. female who presents to the ED c/o generalized weakness since 1 week ago.  Reports decreased p.o., generalized malaise, weakness, paresthesia, shooting pain to bilateral lower extremities.  History of neuropathy, states pain is different from her neuropathic pain.  Denies injury to the legs or spine.  States feels numb to the lower abdomen and groin area.  She had to use walker for the past 2-3 days due to worsening of weakness.  Head several episodes of vomiting 2 weeks ago that resolved.  Reports history of COPD, neuropathy, alcoholic cirrhosis.  She told me stopped drinking 2 months ago, had 1 drink with a friend over weekend 2 weeks ago.    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    Closed trimalleolar fracture of right ankle 2023    Digital mucous cyst of toe of left foot 2017    Ganglion cyst of left foot     Glaucoma     Hypertension     Wears glasses          PAST SURGICAL HISTORY  Past Surgical History:   Procedure Laterality Date    ANKLE OPEN REDUCTION INTERNAL FIXATION Left 2017    Procedure: LEFT ANKLE OPEN REDUCTION INTERNAL FIXATION WITH ILIAC CREST BONE GRAFT , EXCISION CYST 4TH/5TH INNERSPACE LEFT FOOT;  Surgeon: Frank Larson MD;  Location: [facility];  Service:     ANKLE OPEN REDUCTION INTERNAL FIXATION Right 2023    Procedure: ANKLE OPEN REDUCTION INTERNAL FIXATION;   Surgeon: Deandre Reynoso MD;  Location:  [facility];  Service: Orthopedics;  Laterality: Right;    AUGMENTATION MAMMAPLASTY Bilateral 1999    BREAST AUGMENTATION      BREAST EXCISIONAL BIOPSY Right 2014    DILATION AND CURETTAGE, DIAGNOSTIC / THERAPEUTIC      ENDOSCOPY N/A 11/30/2022    Procedure: ESOPHAGOGASTRODUODENOSCOPY;  Surgeon: Arelis Solano MD;  Location:  [facility];  Service: Gastroenterology;  Laterality: N/A;    ENDOSCOPY N/A 2/1/2024    Procedure: ESOPHAGOGASTRODUODENOSCOPY;  Surgeon: Brunner, Mark I, MD;  Location:  [facility];  Service: Gastroenterology;  Laterality: N/A;    VA RPR NON/MAL FEMUR DSTL H/N W/ILIAC/AUTOG BONE Left 03/24/2017    Procedure: ILIAC CREST BONE GRAFT;  Surgeon: Frank Larson MD;  Location:  [facility];  Service: Orthopedics    WRIST SURGERY           FAMILY HISTORY  Family History   Problem Relation Age of Onset    Cancer Mother     No Known Problems Father     No Known Problems Sister     No Known Problems Brother     No Known Problems Daughter     No Known Problems Son     No Known Problems Maternal Grandmother     Breast cancer Paternal Grandmother 68        met to brain    No Known Problems Maternal Aunt     No Known Problems Paternal Aunt     Rheum arthritis Other     Heart disease Other     Cancer Other     Stroke Other     Hypertension Other     BRCA 1/2 Neg Hx     Colon cancer Neg Hx     Endometrial cancer Neg Hx     Ovarian cancer Neg Hx          SOCIAL HISTORY  Social History     Socioeconomic History    Marital status:    Tobacco Use    Smoking status: Former     Current packs/day: 0.00     Average packs/day: 1.5 packs/day for 20.0 years (30.0 ttl pk-yrs)     Types: Cigarettes     Start date: 3/22/1989     Quit date: 3/22/2009     Years since quitting: 15.6    Smokeless tobacco: Never   Vaping Use    Vaping status: Never Used   Substance and Sexual Activity    Alcohol use: Yes     Alcohol/week: 7.0 standard drinks of alcohol     Types: 7  Glasses of wine per week     Comment: 1/5 VODKA EVERY TWO DAYS    Drug use: No    Sexual activity: Defer         ALLERGIES  Buspirone, Codeine, and Mirtazapine        REVIEW OF SYSTEMS  Review of Systems       All systems reviewed and negative except for those discussed in HPI.       PHYSICAL EXAM    I have reviewed the triage vital signs and nursing notes.    ED Triage Vitals [11/10/24 0943]   Temp Heart Rate Resp BP SpO2   98 °F (36.7 °C) (!) 158 18 144/100 95 %      Temp src Heart Rate Source Patient Position BP Location FiO2 (%)   Oral Monitor Sitting Left arm --       Physical Exam  GENERAL:   Appears in no acute distress.   HENT: Nares patent.  EYES: No scleral icterus.  CV: Regular rhythm, tachycardia  RESPIRATORY: Normal effort.  No audible wheezes, rales or rhonchi.  ABDOMEN: Soft, protuberant, nontender  MUSCULOSKELETAL: No deformities.  Mild generalized tenderness on palpation lower lumbar spine  NEURO: Alert, moves all extremities, follows commands.  Able to move upper and lower extremities, mildly decreased strength of the right lower extremity. Decreased sensation with palpation over lower abdomen and groin  SKIN: Warm, dry, no rash visualized.      LAB RESULTS  Recent Results (from the past 24 hours)   Lavender Top    Collection Time: 11/10/24  9:46 AM   Result Value Ref Range    Extra Tube hold for add-on    Gray Top    Collection Time: 11/10/24  9:46 AM   Result Value Ref Range    Extra Tube Hold for add-ons.    Light Blue Top    Collection Time: 11/10/24  9:46 AM   Result Value Ref Range    Extra Tube Hold for add-ons.    CBC Auto Differential    Collection Time: 11/10/24  9:46 AM    Specimen: Blood   Result Value Ref Range    WBC 15.59 (H) 3.40 - 10.80 10*3/mm3    RBC 3.46 (L) 3.77 - 5.28 10*6/mm3    Hemoglobin 12.4 12.0 - 15.9 g/dL    Hematocrit 37.0 34.0 - 46.6 %    .9 (H) 79.0 - 97.0 fL    MCH 35.8 (H) 26.6 - 33.0 pg    MCHC 33.5 31.5 - 35.7 g/dL    RDW 14.1 12.3 - 15.4 %    RDW-SD 55.6  (H) 37.0 - 54.0 fl    MPV 10.3 6.0 - 12.0 fL    Platelets 314 140 - 450 10*3/mm3    Neutrophil % 75.9 42.7 - 76.0 %    Lymphocyte % 13.2 (L) 19.6 - 45.3 %    Monocyte % 8.6 5.0 - 12.0 %    Eosinophil % 1.3 0.3 - 6.2 %    Basophil % 0.6 0.0 - 1.5 %    Immature Grans % 0.4 0.0 - 0.5 %    Neutrophils, Absolute 11.83 (H) 1.70 - 7.00 10*3/mm3    Lymphocytes, Absolute 2.06 0.70 - 3.10 10*3/mm3    Monocytes, Absolute 1.34 (H) 0.10 - 0.90 10*3/mm3    Eosinophils, Absolute 0.20 0.00 - 0.40 10*3/mm3    Basophils, Absolute 0.09 0.00 - 0.20 10*3/mm3    Immature Grans, Absolute 0.07 (H) 0.00 - 0.05 10*3/mm3    nRBC 0.0 0.0 - 0.2 /100 WBC   Protime-INR    Collection Time: 11/10/24  9:46 AM    Specimen: Blood   Result Value Ref Range    Protime 15.2 (H) 12.2 - 14.5 Seconds    INR 1.18 (H) 0.89 - 1.12   aPTT    Collection Time: 11/10/24  9:46 AM    Specimen: Blood   Result Value Ref Range    PTT 37.1 (L) 60.0 - 90.0 seconds   Lactic Acid, Plasma    Collection Time: 11/10/24  9:46 AM    Specimen: Blood   Result Value Ref Range    Lactate 2.6 (C) 0.5 - 2.0 mmol/L   Sedimentation Rate    Collection Time: 11/10/24  9:46 AM    Specimen: Blood   Result Value Ref Range    Sed Rate 105 (H) 0 - 30 mm/hr   ECG 12 Lead ED Triage Standing Order; Weak / Dizzy / AMS    Collection Time: 11/10/24  9:49 AM   Result Value Ref Range    QT Interval 314 ms   Urinalysis With Microscopic If Indicated (No Culture) - Straight Cath    Collection Time: 11/10/24  9:58 AM    Specimen: Straight Cath; Urine   Result Value Ref Range    Color, UA Yellow Yellow, Straw    Appearance, UA Clear Clear    pH, UA 8.0 5.0 - 8.0    Specific Gravity, UA 1.007 1.001 - 1.030    Glucose, UA Negative Negative    Ketones, UA Negative Negative    Bilirubin, UA Negative Negative    Blood, UA Negative Negative    Protein, UA Negative Negative    Leuk Esterase, UA Negative Negative    Nitrite, UA Negative Negative    Urobilinogen, UA 1.0 E.U./dL 0.2 - 1.0 E.U./dL   Urine Drug Screen  - Straight Cath    Collection Time: 11/10/24  9:58 AM    Specimen: Straight Cath; Urine   Result Value Ref Range    THC, Screen, Urine Negative Negative    Phencyclidine (PCP), Urine Negative Negative    Cocaine Screen, Urine Negative Negative    Methamphetamine, Ur Negative Negative    Opiate Screen Negative Negative    Amphetamine Screen, Urine Negative Negative    Benzodiazepine Screen, Urine Negative Negative    Tricyclic Antidepressants Screen Negative Negative    Methadone Screen, Urine Negative Negative    Barbiturates Screen, Urine Negative Negative    Oxycodone Screen, Urine Negative Negative    Buprenorphine, Screen, Urine Negative Negative   Fentanyl, Urine - Straight Cath    Collection Time: 11/10/24  9:58 AM    Specimen: Straight Cath; Urine   Result Value Ref Range    Fentanyl, Urine Negative Negative   Comprehensive Metabolic Panel    Collection Time: 11/10/24 10:07 AM    Specimen: Blood   Result Value Ref Range    Glucose 100 (H) 65 - 99 mg/dL    BUN 6 (L) 8 - 23 mg/dL    Creatinine 0.55 (L) 0.57 - 1.00 mg/dL    Sodium 138 136 - 145 mmol/L    Potassium 3.3 (L) 3.5 - 5.2 mmol/L    Chloride 100 98 - 107 mmol/L    CO2 27.0 22.0 - 29.0 mmol/L    Calcium 8.8 8.6 - 10.5 mg/dL    Total Protein 7.0 6.0 - 8.5 g/dL    Albumin 2.8 (L) 3.5 - 5.2 g/dL    ALT (SGPT) 27 1 - 33 U/L    AST (SGOT) 72 (H) 1 - 32 U/L    Alkaline Phosphatase 165 (H) 39 - 117 U/L    Total Bilirubin 1.7 (H) 0.0 - 1.2 mg/dL    Globulin 4.2 gm/dL    A/G Ratio 0.7 g/dL    BUN/Creatinine Ratio 10.9 7.0 - 25.0    Anion Gap 11.0 5.0 - 15.0 mmol/L    eGFR 103.8 >60.0 mL/min/1.73   Single High Sensitivity Troponin T    Collection Time: 11/10/24 10:07 AM    Specimen: Blood   Result Value Ref Range    HS Troponin T 14 (H) <14 ng/L   Magnesium    Collection Time: 11/10/24 10:07 AM    Specimen: Blood   Result Value Ref Range    Magnesium 1.7 1.6 - 2.4 mg/dL   Green Top (Gel)    Collection Time: 11/10/24 10:07 AM   Result Value Ref Range    Extra Tube  Hold for add-ons.    Gold Top - SST    Collection Time: 11/10/24 10:07 AM   Result Value Ref Range    Extra Tube Hold for add-ons.    C-reactive Protein    Collection Time: 11/10/24 10:07 AM    Specimen: Blood   Result Value Ref Range    C-Reactive Protein 3.88 (H) 0.00 - 0.50 mg/dL   Procalcitonin    Collection Time: 11/10/24 10:07 AM    Specimen: Blood   Result Value Ref Range    Procalcitonin 0.15 0.00 - 0.25 ng/mL   Ethanol    Collection Time: 11/10/24 10:07 AM    Specimen: Blood   Result Value Ref Range    Ethanol <10 0 - 10 mg/dL   TSH Rfx On Abnormal To Free T4    Collection Time: 11/10/24 10:07 AM    Specimen: Blood   Result Value Ref Range    TSH 9.670 (H) 0.270 - 4.200 uIU/mL   T4, Free    Collection Time: 11/10/24 10:07 AM    Specimen: Blood   Result Value Ref Range    Free T4 1.16 0.92 - 1.68 ng/dL       If labs were ordered, I independently reviewed the results and considered them in treating the patient.        RADIOLOGY  CT Abdomen Pelvis With Contrast    Result Date: 11/10/2024  CT ABDOMEN PELVIS W CONTRAST Date of Exam: 11/10/2024 11:35 AM EST Indication: abdominal pain. Comparison: CT abdomen and pelvis 1/30/2024 Technique: Axial CT images were obtained of the abdomen and pelvis following the uneventful intravenous administration of 85 mL Isovue-300. Reconstructed coronal and sagittal images were also obtained. Automated exposure control and iterative construction methods were used. Findings: LUNG BASES:  Unremarkable without mass or infiltrate. LIVER:  Unremarkable parenchyma without focal lesion. BILIARY/GALLBLADDER:  Unremarkable SPLEEN:  Unremarkable PANCREAS:  Unremarkable ADRENAL:  Unremarkable KIDNEYS: Bilateral renal cysts are noted. No evidence for obstructive uropathy. No calculus identified. GASTROINTESTINAL/MESENTERY:  No evidence of obstruction nor inflammation.  The appendix is visualized normal in caliber. MESENTERIC VESSELS: The mesenteric vessels are patent. There is some faint  soft tissue density along the celiac artery. There is some similar findings on prior study however this appears slightly more conspicuous on the current exam. Mild vasculitis not excluded. AORTA/IVC:  Normal caliber. RETROPERITONEUM/LYMPH NODES:  Unremarkable REPRODUCTIVE: The uterus is visualized. BLADDER:  Unremarkable OSSEUS STRUCTURES:  Typical for age with no acute process identified.     Impression: 1. Mild soft tissue stranding along the celiac artery which could represent mild vasculitis. 2. No acute findings otherwise noted in the abdomen or pelvis. Electronically Signed: Jeimy Tomas MD  11/10/2024 12:05 PM EST  Workstation ID: [id number]    CT Head Without Contrast    Result Date: 11/10/2024  CT HEAD WO CONTRAST Date of Exam: 11/10/2024 11:22 AM EST Indication: facial numbness x 1 week difficulty walking. Comparison: None available. Technique: Axial CT images were obtained of the head without contrast administration.  Automated exposure control and iterative construction methods were used. FINDINGS: Gray-white differentiation is maintained and there is no evidence of intracranial hemorrhage, mass or mass effect. Mild age-related changes of the brain are present including volume loss and typical periventricular sequela of chronic small vessel ischemia. There is otherwise no evidence of intracranial hemorrhage, mass or mass effect. The ventricles are normal in size and configuration accounting for surrounding volume loss. The orbits are normal and the paranasal sinuses are grossly clear.     Age-related changes of the brain as above, otherwise without evidence of acute intracranial abnormality. Electronically Signed: Adriano Kessler MD  11/10/2024 11:30 AM EST  Workstation ID: [id number]    XR Chest 1 View    Result Date: 11/10/2024  XR CHEST 1 VW Date of Exam: 11/10/2024 10:11 AM EST Indication: Weak/Dizzy/AMS triage protocol Comparison: February 3, 2024 Findings: The lungs are clear. The heart and  mediastinal contours appear normal. There is no pleural effusion. The pulmonary vasculature appears normal. The osseous structures appear intact.     Impression: No acute cardiopulmonary process. Electronically Signed: Jhoan Madrid MD  11/10/2024 10:47 AM EST  Workstation ID: [id number]     I ordered and independently reviewed the above noted radiographic studies.      I      PROCEDURES    Procedures    ECG 12 Lead ED Triage Standing Order; Weak / Dizzy / AMS   Final Result   Test Reason : ED Triage Standing Order~   Blood Pressure :   */*   mmHG   Vent. Rate : 158 BPM     Atrial Rate : 158 BPM      P-R Int : 112 ms          QRS Dur :  78 ms       QT Int : 314 ms       P-R-T Axes :   *  81   4 degrees     QTcB Int : 509 ms      Sinus tachycardia   Otherwise normal ECG   When compared with ECG of 01-Feb-2024 07:52,   Vent. rate has increased by  75 bpm   Confirmed by THERESA QUEEN MD ([id number]) on 11/10/2024 9:58:02 AM   Also confirmed by THERESA QUEEN MD ([id number])  on 11/10/2024 11:06:11 AM      Referred By: ED MD           Confirmed By: THERESA QUEEN MD          MEDICATIONS GIVEN IN ER    Medications   sodium chloride 0.9 % flush 10 mL (has no administration in time range)   sodium chloride 0.9 % bolus 1,000 mL ( Intravenous Currently Infusing 11/10/24 1234)   sepsis fluid LR bolus 2,013 mL (2,013 mL Intravenous New Bag 11/10/24 1036)   thiamine (B-1) injection 100 mg (100 mg Intravenous Given 11/10/24 1036)   iopamidol (ISOVUE-300) 61 % injection 85 mL (85 mL Intravenous Given 11/10/24 1155)         MEDICAL DECISION MAKING, PROGRESS, and CONSULTS    All labs, if obtained, have been independently reviewed by me.  All radiology studies, if obtained, have been reviewed by me and the radiologist dictating the report.  All EKG's, if obtained, have been independently viewed and interpreted by me/my attending physician.      Discussion below represents my analysis of pertinent findings related to patient's condition,  differential diagnosis, treatment plan and final disposition.  Patient is 64-year-old female who presents for evaluation of generalized weakness, difficulty walking, paresthesia to the lower extremities.  On physical exam she was nontoxic-appearing, tachycardic with initial rate 156.  Abdomen was soft, patient reported decreased sensation to the lower abdomen and groin, she was mildly tender on palpation over lower lumbar region.  She was able to move her legs and arms, right leg was somewhat weaker.  Lab work was obtained significant for leukocytosis 15.59, stable hemoglobin hematocrit, elevated lactate, elevated sed rate 105, CRP 3.88, TSH 9.670, troponin 14, negative EtOH negative drug screen.  CT brain showed age-related changes of the brain, CT abdomen tissue stranding along the celiac artery that could represent mild vasculitis.  Patient was assessed by Dr. Bruner.  Patient admitted into the hospital for further evaluation                       Differential diagnosis:    Electrolyte disbalance, alcohol withdrawal, vitamin deficiency, ascites, medication reaction      Additional sources:    - Discussed/ obtained information from independent historians:      - External (non-ED) record review: 11/5/2024, telemedicine visit for psychophysiological insomnia, patient was prescribed Seroquel, clonidine.  She was prescribed 100 mg of Seroquel that caused her to fall.  Dose was decreased to 50 mg of Seroquel nightly.    - Chronic or social conditions impacting care: Cirrhosis    - Shared decision making: Patient      Orders placed during this visit:  Orders Placed This Encounter   Procedures    Blood Culture - Blood,    Blood Culture - Blood,    Urine Culture - Urine,    XR Chest 1 View    CT Abdomen Pelvis With Contrast    CT Head Without Contrast    MRI Brain Without Contrast    [facility] Draw    Comprehensive Metabolic Panel    Single High Sensitivity Troponin T    Magnesium    Urinalysis With Microscopic If  Indicated (No Culture) - Urine, Clean Catch    CBC Auto Differential    Protime-INR    aPTT    Lactic Acid, Plasma    C-reactive Protein    Procalcitonin    Ethanol    Urine Drug Screen - Urine, Clean Catch    TSH Rfx On Abnormal To Free T4    STAT Lactic Acid, Reflex    Fentanyl, Urine - Urine, Clean Catch    T4, Free    Vitamin B12    Folate    Sedimentation Rate    CK    NPO Diet NPO Type: Strict NPO    Undress & Gown    Continuous Pulse Oximetry    Vital Signs    Clinical [place] Withdrawal Assessment    Oxygen Therapy- Nasal Cannula; Titrate 1-6 LPM Per SpO2; 90 - 95%    ECG 12 Lead ED Triage Standing Order; Weak / Dizzy / AMS    Insert Peripheral IV    Initiate Observation Status    ED Bed Request    Fall Precautions    CBC & Differential    Green Top (Gel)    Lavender Top    Gold Top - SST    Gray Top    Light Blue Top         Additional orders considered but not ordered:      ED Course:    Consultants:      ED Course as of 11/10/24 1300   Sun Nov 10, 2024   1015 Discussed patient with Dr. Bruner [IR]   1015 Lactate(!!): 2.6 [IR]   1015 WBC(!): 15.59 [IR]      ED Course User Index  [IR] Jasmyne Almaguer APRN              Shared Decision Making:  After my consideration of clinical presentation and any laboratory/radiology studies obtained, I discussed the findings with the patient/patient representative who is in agreement with the treatment plan and the final disposition.   Risks and benefits of discharge and/or observation/admission were discussed.       AS OF 13:00 EST VITALS:    BP - (!) 137/109  HR - 116  TEMP - 98 °F (36.7 °C) (Oral)  O2 SATS - 95%                  DIAGNOSIS  Final diagnoses:   Paresthesia   Weakness   Leukocytosis, unspecified type         DISPOSITION  admit      Please note that portions of this document were completed with voice recognition software.     CANDY Martel   11/10/24   13:00 EST        Jasmyne Almaguer APRN  11/10/24 1300

## 2024-11-10 NOTE — ED NOTES
Patient stood and pivoted onto bedside commode with assistance from Tech, back in bed with purewick and call light

## 2024-11-10 NOTE — ED PROVIDER NOTES
I have seen and examined the patient.  Please see Sheba gar's note.    Is a very pleasant 64-year-old female is a long history of drinking and previously did rehab many years ago.  She now drinks alcohol a few times a week though she has been cautioned in the past this is not good for her.  She does have a history of cirrhosis and so far been compensated she also has a peripheral neuropathy at her baseline.    She presents to the emergency room today with increased leg pain out of proportion to her baseline neuropathy with increased difficulty with ambulation.  Normally up to a few weeks ago she did go out to the store to get up and down the [place] with minimal difficulty but insidiously over the past few weeks she has had increased pain in her legs decreased ability to ambulate and really could not get off the toilet over the past day or so.  Most unusual for the patient.  No known precipitating event for this and she is never had anything like it in the past.    She was scheduled for NCV's and EMGs at [facility] but apparently could not make that appointment.  This was a week or 2 ago.    She has had paresthesias of her face as well that been present for a few weeks right more than left no focal weakness just moderate generalized weakness worse in her legs and her upper extremities.  She has had no fevers or chills.  Her appetites not been great.  On initial presentation her heart rate was 158 but it is since come down to about 110.  She has had no rashes.  No tick bites or mosquito bites.  She generally lives independently.    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  She does have just mild generalized back pain but no incontinence of bladder or bowel.    On my exam she is alert and oriented GCS of 15.  No distress.  Vital signs are noted.    MANFRED POPE  nasopharynx and oropharynx unremarkable.  Neck supple without adenopathy.  Lungs clear to auscultation.  Heart regular but rapid about 110 115 beats a minute without murmurs.  Abdomen mildly distended positive bowel sounds soft and mild tenderness without organomegaly masses guarding or caput medusa.    Upper extremities equal pulses without edema synovitis or rash.  Lower extremity she is got some trace of edema at the ankles.  Seems to be well-perfused 3/4 pulses in her feet no venous cords.    Neurologic exam face symmetric, voice soft, tongue midline.  Vision, hearing, and speech preserved.  I have extremities with good strength.  Lower extremities mildly weak she has problems standing and walking requires a couple people to assist her.  Her knee jerks are depressed probably 1+ bilaterally when distracted but a difficult exam but I think she got 1+ ankle jerks bilaterally as well.  No clonus.  She has has paresthesias in both her lower extremities to touch seems to make it worse some causalgia.  Again Devins infection or lymphangitic streaking.  Recent Results (from the past 24 hours)   Lavender Top    Collection Time: 11/10/24  9:46 AM   Result Value Ref Range    Extra Tube hold for add-on    Gray Top    Collection Time: 11/10/24  9:46 AM   Result Value Ref Range    Extra Tube Hold for add-ons.    Light Blue Top    Collection Time: 11/10/24  9:46 AM   Result Value Ref Range    Extra Tube Hold for add-ons.    CBC Auto Differential    Collection Time: 11/10/24  9:46 AM    Specimen: Blood   Result Value Ref Range    WBC 15.59 (H) 3.40 - 10.80 10*3/mm3    RBC 3.46 (L) 3.77 - 5.28 10*6/mm3    Hemoglobin 12.4 12.0 - 15.9 g/dL    Hematocrit 37.0 34.0 - 46.6 %    .9 (H) 79.0 - 97.0 fL    MCH 35.8 (H) 26.6 - 33.0 pg    MCHC 33.5 31.5 - 35.7 g/dL    RDW 14.1 12.3 - 15.4 %    RDW-SD 55.6 (H) 37.0 - 54.0 fl    MPV 10.3 6.0 - 12.0 fL    Platelets 314 140 - 450 10*3/mm3    Neutrophil % 75.9 42.7 - 76.0 %    Lymphocyte  % 13.2 (L) 19.6 - 45.3 %    Monocyte % 8.6 5.0 - 12.0 %    Eosinophil % 1.3 0.3 - 6.2 %    Basophil % 0.6 0.0 - 1.5 %    Immature Grans % 0.4 0.0 - 0.5 %    Neutrophils, Absolute 11.83 (H) 1.70 - 7.00 10*3/mm3    Lymphocytes, Absolute 2.06 0.70 - 3.10 10*3/mm3    Monocytes, Absolute 1.34 (H) 0.10 - 0.90 10*3/mm3    Eosinophils, Absolute 0.20 0.00 - 0.40 10*3/mm3    Basophils, Absolute 0.09 0.00 - 0.20 10*3/mm3    Immature Grans, Absolute 0.07 (H) 0.00 - 0.05 10*3/mm3    nRBC 0.0 0.0 - 0.2 /100 WBC   Protime-INR    Collection Time: 11/10/24  9:46 AM    Specimen: Blood   Result Value Ref Range    Protime 15.2 (H) 12.2 - 14.5 Seconds    INR 1.18 (H) 0.89 - 1.12   aPTT    Collection Time: 11/10/24  9:46 AM    Specimen: Blood   Result Value Ref Range    PTT 37.1 (L) 60.0 - 90.0 seconds   Lactic Acid, Plasma    Collection Time: 11/10/24  9:46 AM    Specimen: Blood   Result Value Ref Range    Lactate 2.6 (C) 0.5 - 2.0 mmol/L   Sedimentation Rate    Collection Time: 11/10/24  9:46 AM    Specimen: Blood   Result Value Ref Range    Sed Rate 105 (H) 0 - 30 mm/hr   ECG 12 Lead ED Triage Standing Order; Weak / Dizzy / AMS    Collection Time: 11/10/24  9:49 AM   Result Value Ref Range    QT Interval 314 ms   Urinalysis With Microscopic If Indicated (No Culture) - Straight Cath    Collection Time: 11/10/24  9:58 AM    Specimen: Straight Cath; Urine   Result Value Ref Range    Color, UA Yellow Yellow, Straw    Appearance, UA Clear Clear    pH, UA 8.0 5.0 - 8.0    Specific Gravity, UA 1.007 1.001 - 1.030    Glucose, UA Negative Negative    Ketones, UA Negative Negative    Bilirubin, UA Negative Negative    Blood, UA Negative Negative    Protein, UA Negative Negative    Leuk Esterase, UA Negative Negative    Nitrite, UA Negative Negative    Urobilinogen, UA 1.0 E.U./dL 0.2 - 1.0 E.U./dL   Urine Drug Screen - Straight Cath    Collection Time: 11/10/24  9:58 AM    Specimen: Straight Cath; Urine   Result Value Ref Range    THC, Screen,  Urine Negative Negative    Phencyclidine (PCP), Urine Negative Negative    Cocaine Screen, Urine Negative Negative    Methamphetamine, Ur Negative Negative    Opiate Screen Negative Negative    Amphetamine Screen, Urine Negative Negative    Benzodiazepine Screen, Urine Negative Negative    Tricyclic Antidepressants Screen Negative Negative    Methadone Screen, Urine Negative Negative    Barbiturates Screen, Urine Negative Negative    Oxycodone Screen, Urine Negative Negative    Buprenorphine, Screen, Urine Negative Negative   Fentanyl, Urine - Straight Cath    Collection Time: 11/10/24  9:58 AM    Specimen: Straight Cath; Urine   Result Value Ref Range    Fentanyl, Urine Negative Negative   Comprehensive Metabolic Panel    Collection Time: 11/10/24 10:07 AM    Specimen: Blood   Result Value Ref Range    Glucose 100 (H) 65 - 99 mg/dL    BUN 6 (L) 8 - 23 mg/dL    Creatinine 0.55 (L) 0.57 - 1.00 mg/dL    Sodium 138 136 - 145 mmol/L    Potassium 3.3 (L) 3.5 - 5.2 mmol/L    Chloride 100 98 - 107 mmol/L    CO2 27.0 22.0 - 29.0 mmol/L    Calcium 8.8 8.6 - 10.5 mg/dL    Total Protein 7.0 6.0 - 8.5 g/dL    Albumin 2.8 (L) 3.5 - 5.2 g/dL    ALT (SGPT) 27 1 - 33 U/L    AST (SGOT) 72 (H) 1 - 32 U/L    Alkaline Phosphatase 165 (H) 39 - 117 U/L    Total Bilirubin 1.7 (H) 0.0 - 1.2 mg/dL    Globulin 4.2 gm/dL    A/G Ratio 0.7 g/dL    BUN/Creatinine Ratio 10.9 7.0 - 25.0    Anion Gap 11.0 5.0 - 15.0 mmol/L    eGFR 103.8 >60.0 mL/min/1.73   Single High Sensitivity Troponin T    Collection Time: 11/10/24 10:07 AM    Specimen: Blood   Result Value Ref Range    HS Troponin T 14 (H) <14 ng/L   Magnesium    Collection Time: 11/10/24 10:07 AM    Specimen: Blood   Result Value Ref Range    Magnesium 1.7 1.6 - 2.4 mg/dL   Green Top (Gel)    Collection Time: 11/10/24 10:07 AM   Result Value Ref Range    Extra Tube Hold for add-ons.    Gold Top - SST    Collection Time: 11/10/24 10:07 AM   Result Value Ref Range    Extra Tube Hold for  add-ons.    C-reactive Protein    Collection Time: 11/10/24 10:07 AM    Specimen: Blood   Result Value Ref Range    C-Reactive Protein 3.88 (H) 0.00 - 0.50 mg/dL   Procalcitonin    Collection Time: 11/10/24 10:07 AM    Specimen: Blood   Result Value Ref Range    Procalcitonin 0.15 0.00 - 0.25 ng/mL   Ethanol    Collection Time: 11/10/24 10:07 AM    Specimen: Blood   Result Value Ref Range    Ethanol <10 0 - 10 mg/dL   TSH Rfx On Abnormal To Free T4    Collection Time: 11/10/24 10:07 AM    Specimen: Blood   Result Value Ref Range    TSH 9.670 (H) 0.270 - 4.200 uIU/mL   T4, Free    Collection Time: 11/10/24 10:07 AM    Specimen: Blood   Result Value Ref Range    Free T4 1.16 0.92 - 1.68 ng/dL   CK    Collection Time: 11/10/24 10:07 AM    Specimen: Blood   Result Value Ref Range    Creatine Kinase 237 (H) 20 - 180 U/L   STAT Lactic Acid, Reflex    Collection Time: 11/10/24 12:35 PM    Specimen: Blood   Result Value Ref Range    Lactate 2.2 (C) 0.5 - 2.0 mmol/L     Note: In addition to lab results from this visit, the labs listed above may include labs taken at another facility or during a different encounter within the last 24 hours. Please correlate lab times with ED admission and discharge times for further clarification of the services performed during this visit.    CT Abdomen Pelvis With Contrast   Final Result   Impression:      1. Mild soft tissue stranding along the celiac artery which could represent mild vasculitis.      2. No acute findings otherwise noted in the abdomen or pelvis.            Electronically Signed: Jeimy Tomas MD     11/10/2024 12:05 PM EST     Workstation ID: [id number]      CT Head Without Contrast   Final Result   Age-related changes of the brain as above, otherwise without evidence of acute intracranial abnormality.            Electronically Signed: Adriano Kessler MD     11/10/2024 11:30 AM EST     Workstation ID: [id number]      XR Chest 1 View   Final Result   Impression:   No acute  cardiopulmonary process.         Electronically Signed: Jhoan Madrid MD     11/10/2024 10:47 AM EST     Workstation ID: [id number]      MRI Brain Without Contrast    (Results Pending)   MRI Lumbar Spine Without Contrast    (Results Pending)     Vitals:    11/10/24 1200 11/10/24 1230 11/10/24 1300 11/10/24 1308   BP: 157/96 (!) 137/109 165/96    BP Location:       Patient Position:       Pulse: 107 116 110 109   Resp:       Temp:       TempSrc:       SpO2: 95% 95% 98% 91%   Weight:       Height:         Medications   sodium chloride 0.9 % flush 10 mL (has no administration in time range)   sodium chloride 0.9 % bolus 1,000 mL (0 mL Intravenous Stopped 11/10/24 1318)   sepsis fluid LR bolus 2,013 mL (2,013 mL Intravenous New Bag 11/10/24 1036)   thiamine (B-1) injection 100 mg (100 mg Intravenous Given 11/10/24 1036)   iopamidol (ISOVUE-300) 61 % injection 85 mL (85 mL Intravenous Given 11/10/24 1155)     ECG/EMG Results (last 24 hours)       Procedure Component Value Units Date/Time    ECG 12 Lead ED Triage Standing Order; Weak / Dizzy / AMS [[id number]] Collected: 11/10/24 0949     Updated: 11/10/24 1106     QT Interval 314 ms     Narrative:      Test Reason : ED Triage Standing Order~  Blood Pressure :   */*   mmHG  Vent. Rate : 158 BPM     Atrial Rate : 158 BPM     P-R Int : 112 ms          QRS Dur :  78 ms      QT Int : 314 ms       P-R-T Axes :   *  81   4 degrees    QTcB Int : 509 ms    Sinus tachycardia  Otherwise normal ECG  When compared with ECG of 01-Feb-2024 07:52,  Vent. rate has increased by  75 bpm  Confirmed by THERESA QUEEN MD ([id number]) on 11/10/2024 9:58:02 AM  Also confirmed by THERESA QUEEN MD ([id number])  on 11/10/2024 11:06:11 AM    Referred By: ED MD           Confirmed By: THERESA QUEEN MD          ECG 12 Lead ED Triage Standing Order; Weak / Dizzy / AMS   Final Result   Test Reason : ED Triage Standing Order~   Blood Pressure :   */*   mmHG   Vent. Rate : 158 BPM     Atrial Rate : 158 BPM      P-R  Int : 112 ms          QRS Dur :  78 ms       QT Int : 314 ms       P-R-T Axes :   *  81   4 degrees     QTcB Int : 509 ms      Sinus tachycardia   Otherwise normal ECG   When compared with ECG of 01-Feb-2024 07:52,   Vent. 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  This is subtle.  I actually favor her gallbladder looking little bit inflamed.  Nothing else high-grade that I can see.  CT of the head done for the facial paresthesias show nothing acute.    MCV is definitely elevated.  Albumin is low.  Her MELD score is 12.    The exact etiology of this patient's difficulty walking is unclear to me.  Certainly a wide differential including nutritional deficiencies, inflammatory myopathies/neuropathies.  Her sedimentation rate is elevated her TSH is also elevated of unclear clinical significance.    I think she needs to be admitted to the hospital for ongoing evaluation and diagnostic studies.  The patient's been seen by her stroke navigator as well she has had weeks of symptoms deafly not a large vessel occlusion but in a reassuring head CT but a she will get an MRI of her brain and she may need imaging of entire neuro axis and perhaps NCV's and EMGs as well.  Is mild elevation of her CK.  Again uncertain sick clinical significance    Regardless she needs admission to the hospital.  I will contact Dr. Al, on-call hospital medicine her and her colleagues will admit the patient.    All are agreeable to plan           Zac Bruner MD  11/10/24 4094

## 2024-11-10 NOTE — ED NOTES
Philomena Davis    Nursing Report ED to Floor:  Mental status: a&ox4  Ambulatory status: x1 assist   Oxygen Therapy:  ra  Cardiac Rhythm: sinus tachy  Admitted from: home  Safety Concerns:  fall risk   Social Issues: n/a  ED Room #:  18    ED Nurse Phone Extension - [phone] or may call [phone].      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 03/24/2017    Closed trimalleolar fracture of right ankle 05/24/2023    Digital mucous cyst of toe of left foot 03/24/2017    Ganglion cyst of left foot     Glaucoma     Hypertension     Wears glasses         Past Surgical History:  Past Surgical History:   Procedure Laterality Date    ANKLE OPEN REDUCTION INTERNAL FIXATION Left 03/24/2017    Procedure: LEFT ANKLE OPEN REDUCTION INTERNAL FIXATION WITH ILIAC CREST BONE GRAFT , EXCISION CYST 4TH/5TH INNERSPACE LEFT FOOT;  Surgeon: Frank Larson MD;  Location:  [facility];  Service:     ANKLE OPEN REDUCTION INTERNAL FIXATION Right 05/25/2023    Procedure: ANKLE OPEN REDUCTION INTERNAL FIXATION;  Surgeon: Deandre Reynoso MD;  Location:  [facility];  Service: Orthopedics;  Laterality: Right;    AUGMENTATION MAMMAPLASTY Bilateral 1999    BREAST AUGMENTATION      BREAST EXCISIONAL BIOPSY Right 2014    DILATION AND CURETTAGE, DIAGNOSTIC / THERAPEUTIC      ENDOSCOPY N/A 11/30/2022    Procedure: ESOPHAGOGASTRODUODENOSCOPY;  Surgeon: Arelis Solano MD;  Location:  [facility];  Service: Gastroenterology;  Laterality: N/A;    ENDOSCOPY N/A 2/1/2024    Procedure: ESOPHAGOGASTRODUODENOSCOPY;  Surgeon: Brunner, Mark I, MD;  Location:  [facility];  Service: Gastroenterology;  Laterality: N/A;    MS RPR  NON/MAL FEMUR DSTL H/N W/ILIAC/AUTOG BONE Left 03/24/2017    Procedure: ILIAC CREST BONE GRAFT;  Surgeon: Frank Larson MD;  Location: [facility];  Service: Orthopedics    WRIST SURGERY          Admitting Doctor:   Sophie Gandhi MD    Consulting Provider(s):  Consults       No orders found from 10/12/2024 to 11/11/2024.             Admitting Diagnosis:   The primary encounter diagnosis was Paresthesia. Diagnoses of Weakness and Leukocytosis, unspecified type were also pertinent to this visit.    Most Recent Vitals:   Vitals:    11/10/24 1129 11/10/24 1200 11/10/24 1230 11/10/24 1300   BP: 153/93 157/96 (!) 137/109 165/96   BP Location:       Patient Position:       Pulse: 106 107 116 110   Resp:       Temp:       TempSrc:       SpO2: 94% 95% 95% 98%   Weight:       Height:           Active LDAs/IV Access:   Lines, Drains & Airways       Active LDAs       Name Placement date Placement time Site Days    Peripheral IV 11/10/24 0946 Left Antecubital 11/10/24  0946  Antecubital  less than 1    Peripheral IV 11/10/24 1006 Posterior;Right Hand 11/10/24  1006  Hand  less than 1                    Labs (abnormal labs have a star):   Labs Reviewed   COMPREHENSIVE METABOLIC PANEL - Abnormal; Notable for the following components:       Result Value    Glucose 100 (*)     BUN 6 (*)     Creatinine 0.55 (*)     Potassium 3.3 (*)     Albumin 2.8 (*)     AST (SGOT) 72 (*)     Alkaline Phosphatase 165 (*)     Total Bilirubin 1.7 (*)     All other components within normal limits    Narrative:     GFR Normal >60  Chronic Kidney Disease <60  Kidney Failure <15     SINGLE HS TROPONIN T - Abnormal; Notable for the following components:    HS Troponin T 14 (*)     All other components within normal limits    Narrative:     High Sensitive Troponin T Reference Range:  <14.0 ng/L- Negative Female for AMI  <22.0 ng/L- Negative Male for AMI  >=14 - Abnormal Female indicating possible myocardial injury.  >=22 - Abnormal Male  indicating possible myocardial injury.   Clinicians would have to utilize clinical acumen, EKG, Troponin, and serial changes to determine if it is an Acute Myocardial Infarction or myocardial injury due to an underlying chronic condition.        CBC WITH AUTO DIFFERENTIAL - Abnormal; Notable for the following components:    WBC 15.59 (*)     RBC 3.46 (*)     .9 (*)     MCH 35.8 (*)     RDW-SD 55.6 (*)     Lymphocyte % 13.2 (*)     Neutrophils, Absolute 11.83 (*)     Monocytes, Absolute 1.34 (*)     Immature Grans, Absolute 0.07 (*)     All other components within normal limits   PROTIME-INR - Abnormal; Notable for the following components:    Protime 15.2 (*)     INR 1.18 (*)     All other components within normal limits   APTT - Abnormal; Notable for the following components:    PTT 37.1 (*)     All other components within normal limits    Narrative:     PTT = The equivalent PTT values for the therapeutic range of heparin levels at 0.3 to 0.5 U/ml are 60 to 70 seconds.   LACTIC ACID, PLASMA - Abnormal; Notable for the following components:    Lactate 2.6 (*)     All other components within normal limits   C-REACTIVE PROTEIN - Abnormal; Notable for the following components:    C-Reactive Protein 3.88 (*)     All other components within normal limits   TSH RFX ON ABNORMAL TO FREE T4 - Abnormal; Notable for the following components:    TSH 9.670 (*)     All other components within normal limits   LACTIC ACID, REFLEX - Abnormal; Notable for the following components:    Lactate 2.2 (*)     All other components within normal limits   SEDIMENTATION RATE - Abnormal; Notable for the following components:    Sed Rate 105 (*)     All other components within normal limits   CK - Abnormal; Notable for the following components:    Creatine Kinase 237 (*)     All other components within normal limits   MAGNESIUM - Normal   URINALYSIS W/ MICROSCOPIC IF INDICATED (NO CULTURE) - Normal    Narrative:     Urine microscopic not  "indicated.   PROCALCITONIN - Normal    Narrative:     As a Marker for Sepsis (Non-Neonates):    1. <0.5 ng/mL represents a low risk of severe sepsis and/or septic shock.  2. >2 ng/mL represents a high risk of severe sepsis and/or septic shock.    As a Marker for Lower Respiratory Tract Infections that require antibiotic therapy:    PCT on Admission    Antibiotic Therapy       6-12 Hrs later    >0.5                Strongly Recommended  >0.25 - <0.5        Recommended   0.1 - 0.25          Discouraged              Remeasure/reassess PCT  <0.1                Strongly Discouraged     Remeasure/reassess PCT    As 28 day mortality risk marker: \"Change in Procalcitonin Result\" (>80% or <=80%) if Day 0 (or Day 1) and Day 4 values are available. Refer to http://www.[employer]-pct-calculator.com    Change in PCT <=80%  A decrease of PCT levels below or equal to 80% defines a positive change in PCT test result representing a higher risk for 28-day all-cause mortality of patients diagnosed with severe sepsis for septic shock.    Change in PCT >80%  A decrease of PCT levels of more than 80% defines a negative change in PCT result representing a lower risk for 28-day all-cause mortality of patients diagnosed with severe sepsis or septic shock.      ETHANOL - Normal    Narrative:     Elevated lactic acid concentration and lactate dehydrogenase(LD) activity may falsely elevate enzymatically determined ethanol levels. Not for legal purposes.    URINE DRUG SCREEN - Normal    Narrative:     Cutoff For Drugs Screened:    Amphetamines               500 ng/ml  Barbiturates               200 ng/ml  Benzodiazepines            150 ng/ml  Cocaine                    150 ng/ml  Methadone                  200 ng/ml  Opiates                    100 ng/ml  Phencyclidine               25 ng/ml  THC                         50 ng/ml  Methamphetamine            500 ng/ml  Tricyclic Antidepressants  300 ng/ml  Oxycodone                  100 " ng/ml  Buprenorphine               10 ng/ml    The normal value for all drugs tested is negative. This report includes unconfirmed screening results, with the cutoff values listed, to be used for medical treatment purposes only.  Unconfirmed results must not be used for non-medical purposes such as employment or legal testing.  Clinical consideration should be applied to any drug of abuse test, particularly when unconfirmed results are used.     FENTANYL, URINE - Normal    Narrative:     Negative Threshold:      Fentanyl 5 ng/mL     The normal value for the drug tested is negative. This report includes final unconfirmed screening results to be used for medical treatment purposes only. Unconfirmed results must not be used for non-medical purposes such as employment or legal testing. Clinical consideration should be applied to any drug of abuse test, particularly when unconfirmed results are used.          T4, FREE - Normal   BLOOD CULTURE   BLOOD CULTURE   URINE CULTURE   RAINBOW DRAW    Narrative:     The following orders were created for panel order [place] Draw.  Procedure                               Abnormality         Status                     ---------                               -----------         ------                     Green Top (Gel)[[id number]]                                  Final result               Lavender Top[[id number]]                                     Final result               Gold Top - SST[[id number]]                                   Final result               Lo Top[[id number]]                                         Final result               Light Blue Top[[id number]]                                   Final result                 Please view results for these tests on the individual orders.   VITAMIN B12   FOLATE   LACTIC ACID, REFLEX   CBC AND DIFFERENTIAL    Narrative:     The following orders were created for panel order CBC & Differential.  Procedure                                Abnormality         Status                     ---------                               -----------         ------                     CBC Auto Differential[[id number]]        Abnormal            Final result               Scan Slide[[id number]]                                                                    Please view results for these tests on the individual orders.   GREEN TOP   LAVENDER TOP   GOLD TOP - SST   GRAY TOP   LIGHT BLUE TOP       Meds Given in ED:   Medications   sodium chloride 0.9 % flush 10 mL (has no administration in time range)   sodium chloride 0.9 % bolus 1,000 mL (0 mL Intravenous Stopped 11/10/24 1318)   sepsis fluid LR bolus 2,013 mL (2,013 mL Intravenous New Bag 11/10/24 1036)   thiamine (B-1) injection 100 mg (100 mg Intravenous Given 11/10/24 1036)   iopamidol (ISOVUE-300) 61 % injection 85 mL (85 mL Intravenous Given 11/10/24 1155)           Last NIH score:  Interval: baseline  1a. Level of Consciousness: 0-->Alert, keenly responsive  1b. LOC Questions: 0-->Answers both questions correctly  1c. LOC Commands: 0-->Performs both tasks correctly  2. Best Gaze: 0-->Normal  3. Visual: 0-->No visual loss  4. Facial Palsy: 0-->Normal symmetrical movements  5a. Motor Arm, Left: 0-->No drift, limb holds 90 (or 45) degrees for full 10 secs  5b. Motor Arm, Right: 0-->No drift, limb holds 90 (or 45) degrees for full 10 secs  6a. Motor Leg, Left: 0-->No drift, leg holds 30 degree position for full 5 secs  6b. Motor Leg, Right: 0-->No drift, leg holds 30 degree position for full 5 secs  7. Limb Ataxia: 0-->Absent  8. Sensory: 1-->Mild-to-moderate sensory loss, patient feels pinprick is less sharp or is dull on the affected side, or there is a loss of superficial pain with pinprick, but patient is aware of being touched  9. Best Language: 0-->No aphasia, normal  10. Dysarthria: 0-->Normal  11. Extinction and Inattention (formerly Neglect): 0-->No abnormality    Total (NIH Stroke Scale): 1      Dysphagia screening results:        [place] Coma Scale:  No data recorded     CIWA:        Restraint Type:            Isolation Status:  No active isolations

## 2024-11-10 NOTE — Clinical Note
Level of Care: Telemetry [5]   Diagnosis: Lower extremity weakness [528383]   Bed Request Comments: neuro floor

## 2024-11-10 NOTE — CONSULTS
Stroke Consult Note    Patient Name: Philomena Davis   MRN: [id number]  Age: 64 y.o.  Sex: female  : 1960    Primary Care Physician: Unique Brandt DO  Referring Physician:  Dr. Zac Bruner    TIME STROKE TEAM CALLED: 1030 EST     TIME PATIENT SEEN: 1033 EST    Handedness: Right  Race:     Chief Complaint/Reason for Consultation: Bilateral lower extremity weakness with difficulty ambulating x 1 week and right>left facial paresthesia    HPI: Mrs. Davis is a 64-year-old female with known medical diagnoses of essential hypertension, cirrhosis of the liver 2/2 remote EtOH abuse, questionable history of paroxysmal atrial fibrillation (data deficient, not on OAC), peripheral neuropathy, anxiety, depression, and remote tobacco abuse who presents to the emergency department for further evaluation of bilateral lower extremity weakness and difficulty walking x 1 week.  She also reported facial paresthesias, right > left, which she noted began approximately 2 to 3 days ago therefore the stroke team was consulted for further evaluation and recommendations.  She does tell me that she was ill approximately 2 weeks ago with nausea and vomiting and poor oral intake.    She currently does not take any antiplatelet or anticoagulation medications.  She is a former smoker, occasionally consumes EtOH, and denies illicit drug use.    Last Known Normal Date/Time: Approximately 1 week ago EST     Review of Systems   Constitutional:  Positive for activity change, chills, fatigue and fever.   HENT:  Negative for trouble swallowing.    Eyes: Negative.  Negative for photophobia and visual disturbance.   Respiratory: Negative.     Cardiovascular:  Positive for palpitations (In the past).   Gastrointestinal:  Positive for nausea and vomiting. Negative for diarrhea.   Genitourinary: Negative.  Negative for hematuria.   Musculoskeletal:  Positive for gait problem.   Neurological:  Positive for weakness (BLE) and numbness.  Negative for dizziness, speech difficulty and headaches.      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 03/24/2017    Closed trimalleolar fracture of right ankle 05/24/2023    Digital mucous cyst of toe of left foot 03/24/2017    Ganglion cyst of left foot     Glaucoma     Hypertension     Wears glasses      Past Surgical History:   Procedure Laterality Date    ANKLE OPEN REDUCTION INTERNAL FIXATION Left 03/24/2017    Procedure: LEFT ANKLE OPEN REDUCTION INTERNAL FIXATION WITH ILIAC CREST BONE GRAFT , EXCISION CYST 4TH/5TH INNERSPACE LEFT FOOT;  Surgeon: Frank Larson MD;  Location:  [facility];  Service:     ANKLE OPEN REDUCTION INTERNAL FIXATION Right 05/25/2023    Procedure: ANKLE OPEN REDUCTION INTERNAL FIXATION;  Surgeon: Deandre Reynoso MD;  Location:  [facility];  Service: Orthopedics;  Laterality: Right;    AUGMENTATION MAMMAPLASTY Bilateral 1999    BREAST AUGMENTATION      BREAST EXCISIONAL BIOPSY Right 2014    DILATION AND CURETTAGE, DIAGNOSTIC / THERAPEUTIC      ENDOSCOPY N/A 11/30/2022    Procedure: ESOPHAGOGASTRODUODENOSCOPY;  Surgeon: Arelis Solano MD;  Location:  [facility];  Service: Gastroenterology;  Laterality: N/A;    ENDOSCOPY N/A 2/1/2024    Procedure: ESOPHAGOGASTRODUODENOSCOPY;  Surgeon: Brunner, Mark I, MD;  Location:  [facility];  Service: Gastroenterology;  Laterality: N/A;    NC RPR NON/MAL FEMUR DSTL H/N W/ILIAC/AUTOG BONE Left 03/24/2017    Procedure: ILIAC CREST BONE GRAFT;  Surgeon: Frank Larson MD;  Location:  [facility];  Service: Orthopedics    WRIST SURGERY       Family History   Problem Relation Age of Onset    Cancer Mother     No Known Problems Father     No Known Problems Sister     No Known Problems  Brother     No Known Problems Daughter     No Known Problems Son     No Known Problems Maternal Grandmother     Breast cancer Paternal Grandmother 68        met to brain    No Known Problems Maternal Aunt     No Known Problems Paternal Aunt     Rheum arthritis Other     Heart disease Other     Cancer Other     Stroke Other     Hypertension Other     BRCA 1/2 Neg Hx     Colon cancer Neg Hx     Endometrial cancer Neg Hx     Ovarian cancer Neg Hx      Social History     Socioeconomic History    Marital status:    Tobacco Use    Smoking status: Former     Current packs/day: 0.00     Average packs/day: 1.5 packs/day for 20.0 years (30.0 ttl pk-yrs)     Types: Cigarettes     Start date: 3/22/1989     Quit date: 3/22/2009     Years since quitting: 15.6    Smokeless tobacco: Never   Vaping Use    Vaping status: Never Used   Substance and Sexual Activity    Alcohol use: Yes     Alcohol/week: 7.0 standard drinks of alcohol     Types: 7 Glasses of wine per week     Comment: 1/5 VODKA EVERY TWO DAYS    Drug use: No    Sexual activity: Defer     Allergies   Allergen Reactions    Buspirone Anxiety    Codeine Nausea Only    Mirtazapine Anxiety     Prior to Admission medications    Medication Sig Start Date End Date Taking? Authorizing Provider   albuterol sulfate  (90 Base) MCG/ACT inhaler Inhale 2 puffs Every 4 (Four) Hours As Needed for Wheezing. Indications: Chronic Obstructive Lung Disease 4/5/24   Unique Brandt DO   bacitracin-polymyxin b (POLYSPORIN) 500-26688 UNIT/GM ointment Apply 1 Application topically to the appropriate area as directed 2 (Two) Times a Day. 7/12/24   Unique Brandt DO   carvedilol (COREG) 3.125 MG tablet Take 1 tablet by mouth 2 (Two) Times a Day With Meals. Indications: High Blood Pressure Disorder 4/5/24   Unique Brandt DO   cloNIDine (CATAPRES) 0.3 MG tablet TAKE 1 TABLET BY MOUTH 2 TIMES A DAY 10/7/24   Unique Brandt DO   desvenlafaxine (PRISTIQ) 50 MG 24 hr tablet  Take 1 tablet by mouth Daily. Indications: Major Depressive Disorder 4/16/24   Unique Brandt DO   eszopiclone (Lunesta) 1 MG tablet Take 1 tablet by mouth Every Night for 90 days. Take immediately before bedtime 10/17/24 1/15/25  Marko Eldridge APRN   Fluticasone-Salmeterol (ADVAIR/WIXELA) 100-50 MCG/ACT DISKUS Inhale 1 puff 2 (Two) Times a Day. Indications: COPD 4/5/24   Unique Brandt DO   folic acid (FOLVITE) 1 MG tablet Take 1 tablet by mouth Daily. Indications: Anemia From Inadequate Folic Acid 4/5/24   Unique Brandt DO   gabapentin (NEURONTIN) 300 MG capsule Take 1 capsule by mouth Every Night. 7/12/24   Unique Brandt DO   lactobacillus (BACID) tablet caplet Take 1 tablet by mouth 2 (Two) Times a Day. 2/27/24   Unique Brandt DO   latanoprost (XALATAN) 0.005 % ophthalmic solution Administer 1 drop to both eyes Every Night. Indications: Wide-Angle Glaucoma 4/11/24   Unique Brandt DO   pantoprazole (PROTONIX) 40 MG EC tablet Take 1 tablet by mouth Daily. Indications: Heartburn 7/18/24   Unique Brandt DO   rosuvastatin (CRESTOR) 5 MG tablet TAKE 1 TABLET BY MOUTH DAILY 5/28/24   Johnny Cardenas DO   terbinafine (lamISIL) 1 % cream Apply 1 Application topically to the appropriate area as directed 2 (Two) Times a Day. 9/25/24   Unique Brandt DO   thiamine (VITAMIN B1) 100 MG tablet Take 1 tablet by mouth Daily. Indications: Deficiency of Vitamin B1 4/5/24   Unique Brandt DO   ursodiol (ACTIGALL) 300 MG capsule Take 1 capsule by mouth 2 (Two) Times a Day. Indications: Liver Disease 7/18/24   Unique Brandt DO         Temp:  [98 °F (36.7 °C)] 98 °F (36.7 °C)  Heart Rate:  [158] 158  Resp:  [18] 18  BP: (144)/(100) 144/100  Neurological Exam  Mental Status  Alert. Oriented to person, place, time and situation. Oriented to person, place, and time. Speech is normal. Language is fluent with no aphasia. Attention and concentration are normal.    Cranial Nerves  CN II:  Visual fields full to confrontation.  CN III, IV, VI: Extraocular movements intact bilaterally. Pupils equal round and reactive to light bilaterally.  CN V:  Right: Diminished sensation of the entire right side of the face.  Left: Diminished sensation of the entire left side of the face. Right > left.  CN VII: Full and symmetric facial movement.  CN VIII: Hearing appears to be intact bilaterally.  CN XII: Tongue midline without atrophy or fasciculations.    Motor  Decreased muscle bulk throughout. Normal muscle tone. Strength is 5/5 throughout all four extremities.  Bilateral upper extremities with no drift, 5/5 strength  Bilateral lower extremities with no drift, 4+/5 strength.    Sensory  Light touch abnormality: Bilateral lower extremities 2/2 peripheral neuropathy  Bilateral face; right > left.     Coordination  Right: Finger-to-nose normal.Left: Finger-to-nose normal.    Gait    Not observed.      Physical Exam  Vitals and nursing note reviewed.   Constitutional:       General: She is not in acute distress.     Appearance: Normal appearance. She is not ill-appearing.   HENT:      Head: Normocephalic.      Mouth/Throat:      Mouth: Mucous membranes are moist.   Eyes:      Extraocular Movements: Extraocular movements intact.      Pupils: Pupils are equal, round, and reactive to light.   Cardiovascular:      Rate and Rhythm: Normal rate and regular rhythm.   Pulmonary:      Effort: Pulmonary effort is normal. No respiratory distress.      Comments: On room air  Musculoskeletal:      Right lower leg: Edema present.      Left lower leg: Edema present.      Comments: Right > left   Skin:     General: Skin is warm and dry.      Findings: Bruising present.   Neurological:      Mental Status: She is alert and oriented to person, place, and time.      Cranial Nerves: No cranial nerve deficit.      Sensory: Sensory deficit present.      Motor: Motor strength is normal.Weakness (BLE) present.      Coordination:  Coordination normal.   Psychiatric:         Mood and Affect: Mood normal.         Speech: Speech normal.         Behavior: Behavior normal.         Acute Stroke Data    Thrombolytic Inclusion / Exclusion Criteria    Time: 10:46 EST  Person Administering Scale: CANDY Nova    Inclusion Criteria  [x]   18 years of age or greater   []   Onset of symptoms < 4.5 hours before beginning treatment (stroke onset = time patient was last seen well or without symptoms).   []   Diagnosis of acute ischemic stroke causing measurable disabling deficit (Complete Hemianopia, Any Aphasia, Visual or Sensory Extinction, Any weakness limiting sustained effort against gravity)   []   Any remaining deficit considered potentially disabling in view of patient and practitioner   Exclusion criteria (Do not proceed with Alteplase if any are checked under exclusion criteria)  [x]   Onset unknown or GREATER than 4.5 hours   []   ICH on CT/MRI   []   CT demonstrates hypodensity representing acute or subacute infarct   []   Significant head trauma or prior stroke in the previous 3 months   []   Symptoms suggestive of subarachnoid hemorrhage   []   History of un-ruptured intracranial aneurysm GREATER than 10 mm   []   Recent intracranial or intraspinal surgery within the last 3 months   []   Arterial puncture at a non-compressible site in the previous 7 days   []   Active internal bleeding   []   Acute bleeding tendency   []   Platelet count LESS than 100,000 for known hematological diseases such as leukemia, thrombocytopenia or chronic cirrhosis   []   Current use of anticoagulant with INR GREATER than 1.7 or PT GREATER than 15 seconds, aPTT GREATER than 40 seconds   []   Heparin received within 48 hours, resulting in abnormally elevated aPTT GREATER than upper limit of normal   []   Current use of direct thrombin inhibitors or direct factor Xa inhibitors in the past 48 hours   []   Elevated blood pressure refractory to treatment  (systolic GREATER than 185 mm/Hg or diastolic  GREATER than 110 mm/Hg   []   Suspected infective endocarditis and aortic arch dissection   []   Current use of therapeutic treatment dose of low-molecular-weight heparin (LMWH) within the previous 24 hours   []   Structural GI malignancy or bleed   Relative exclusion for all patients  []   Only minor non-disabling symptoms   []   Pregnancy   []   Seizure at onset with postictal residual neurological impairments   []   Major surgery or previous trauma within past 14 days   []   History of previous spontaneous ICH, intracranial neoplasm, or AV malformation   []   Postpartum (within previous 14 days)   []   Recent GI or urinary tract hemorrhage (within previous 21 days)   []   Recent acute MI (within previous 3 months)   []   History of un-ruptured intracranial aneurysm LESS than 10 mm   []   History of ruptured intracranial aneurysm   []   Blood glucose LESS than 50 mg/dL (2.7 mmol/L)   []   Dural puncture within the last 7 days   []   Known GREATER than 10 cerebral microbleeds   Additional exclusions for patients with symptoms onset between 3 and 4.5 hours.  []   Age > 80.   []   On any anticoagulants regardless of INR  >>> Warfarin (Coumadin), Heparin, Enoxaparin (Lovenox), fondaparinux (Arixtra), bivalirudin (Angiomax), Argatroban, dabigatran (Pradaxa), rivaroxaban (Xarelto), or apixaban (Eliquis)   []   Severe stroke (NIHSS > 25).   []   History of BOTH diabetes and previous ischemic stroke.   []   The risks and benefits have been discussed with the patient or family related to the administration of IV thrombolytic therapy for stroke symptoms.   []   I have discussed and reviewed the patient's case and imaging with the attending prior to IV thrombolytic therapy.   N/A Time IV thrombolytic administered       Hospital Meds:  Scheduled- sepsis fluid LR, 30 mL/kg, Intravenous, Once  sodium chloride, 1,000 mL, Intravenous, Once      Infusions-     PRNs-   sodium  chloride    Functional Status Prior to Current Stroke/Cotter Score: 0    NIH Stroke Scale  Time: 10:46 EST  Person Administering Scale: CANDY Nova    Interval: baseline  1a. Level of Consciousness: 0-->Alert, keenly responsive  1b. LOC Questions: 0-->Answers both questions correctly  1c. LOC Commands: 0-->Performs both tasks correctly  2. Best Gaze: 0-->Normal  3. Visual: 0-->No visual loss  4. Facial Palsy: 0-->Normal symmetrical movements  5a. Motor Arm, Left: 0-->No drift, limb holds 90 (or 45) degrees for full 10 secs  5b. Motor Arm, Right: 0-->No drift, limb holds 90 (or 45) degrees for full 10 secs  6a. Motor Leg, Left: 0-->No drift, leg holds 30 degree position for full 5 secs  6b. Motor Leg, Right: 0-->No drift, leg holds 30 degree position for full 5 secs  7. Limb Ataxia: 0-->Absent  8. Sensory: 1-->Mild-to-moderate sensory loss, patient feels pinprick is less sharp or is dull on the affected side, or there is a loss of superficial pain with pinprick, but patient is aware of being touched  9. Best Language: 0-->No aphasia, normal  10. Dysarthria: 0-->Normal  11. Extinction and Inattention (formerly Neglect): 0-->No abnormality    Total (NIH Stroke Scale): 1    Results Reviewed:  I have personally reviewed current lab, radiology, and data and agree with results.    CT Head Without Contrast    Result Date: 11/10/2024  Age-related changes of the brain as above, otherwise without evidence of acute intracranial abnormality. Electronically Signed: Adriano Kessler MD  11/10/2024 11:30 AM EST  Workstation ID: [id number]    XR Chest 1 View    Result Date: 11/10/2024  Impression: No acute cardiopulmonary process. Electronically Signed: Jhoan Madrid MD  11/10/2024 10:47 AM EST  Workstation ID: [id number]       WBC   Date Value Ref Range Status   11/10/2024 15.59 (H) 3.40 - 10.80 10*3/mm3 Final     Hemoglobin   Date Value Ref Range Status   11/10/2024 12.4 12.0 - 15.9 g/dL Final     Hematocrit   Date  Value Ref Range Status   11/10/2024 37.0 34.0 - 46.6 % Final     Platelets   Date Value Ref Range Status   11/10/2024 314 140 - 450 10*3/mm3 Final       Lab Results   Component Value Date    GLUCOSE 100 (H) 11/10/2024    BUN 6 (L) 11/10/2024    CREATININE 0.55 (L) 11/10/2024     11/10/2024    K 3.3 (L) 11/10/2024     11/10/2024    CALCIUM 8.8 11/10/2024    PROTEINTOT 7.0 11/10/2024    ALBUMIN 2.8 (L) 11/10/2024    ALT 27 11/10/2024    AST 72 (H) 11/10/2024    ALKPHOS 165 (H) 11/10/2024    BILITOT 1.7 (H) 11/10/2024    GLOB 4.2 11/10/2024    AGRATIO 0.7 11/10/2024    BCR 10.9 11/10/2024    ANIONGAP 11.0 11/10/2024    EGFR 103.8 11/10/2024     Lactic acid 2.6    Assessment/Plan:    This is a 64-year-old female with known medical diagnoses of essential hypertension, cirrhosis of the liver 2/2 remote EtOH abuse, questionable history of paroxysmal atrial fibrillation (data deficient, not on OAC), peripheral neuropathy, anxiety, depression, and remote tobacco abuse who presents to the emergency department for further evaluation of bilateral lower extremity weakness and difficulty walking x 1 week as well as facial paresthesias (right > left) which have been present for approximately 2 to 3 days.  The stroke team was contacted for further evaluation and recommendations.    Antiplatelet PTA: None  Anticoagulant PTA: None        Bilateral lower extremity weakness, gait abnormality, facial paresthesia (right > left)  Differential diagnoses include small lacunar stroke versus peripheral cause from malnutrition 2/2 recent illness  -N.p.o. until bedside nursing dysphagia screen completed  -MRI brain without contrast to further evaluate for stroke  -Check vitamin B12 and folate    Plan of care discussed with the patient and Dr. Bruner (emergency department physician).  Stroke neurology will follow-up on results of MRI and give further recommendations.  Please call with any questions or concerns.  Thank you for this  consult.      Carla Archer, APRN  November 10, 2024  10:46 EST    Addendum 1411: MRI brain without contrast is negative for acute stroke.  No further stroke workup needed.  Stroke neurology will sign off for now.  Please call with any questions or concerns.

## 2024-11-11 ENCOUNTER — APPOINTMENT (OUTPATIENT)
Dept: MRI IMAGING | Facility: HOSPITAL | Age: 64
End: 2024-11-11
Payer: COMMERCIAL

## 2024-11-11 ENCOUNTER — APPOINTMENT (OUTPATIENT)
Dept: CARDIOLOGY | Facility: HOSPITAL | Age: 64
End: 2024-11-11
Payer: COMMERCIAL

## 2024-11-11 LAB
BACTERIA SPEC AEROBE CULT: NO GROWTH
BH CV ECHO MEAS - AO MAX PG: 7.7 MMHG
BH CV ECHO MEAS - AO MEAN PG: 4 MMHG
BH CV ECHO MEAS - AO ROOT DIAM: 3.1 CM
BH CV ECHO MEAS - AO V2 MAX: 139 CM/SEC
BH CV ECHO MEAS - AO V2 VTI: 28.9 CM
BH CV ECHO MEAS - AVA(I,D): 2.8 CM2
BH CV ECHO MEAS - EDV(CUBED): 97.3 ML
BH CV ECHO MEAS - EDV(MOD-SP2): 54.5 ML
BH CV ECHO MEAS - EDV(MOD-SP4): 86.2 ML
BH CV ECHO MEAS - EF(MOD-BP): 63.5 %
BH CV ECHO MEAS - EF(MOD-SP2): 65.1 %
BH CV ECHO MEAS - EF(MOD-SP4): 62.5 %
BH CV ECHO MEAS - ESV(CUBED): 24.4 ML
BH CV ECHO MEAS - ESV(MOD-SP2): 19 ML
BH CV ECHO MEAS - ESV(MOD-SP4): 32.3 ML
BH CV ECHO MEAS - FS: 37 %
BH CV ECHO MEAS - IVS/LVPW: 1 CM
BH CV ECHO MEAS - IVSD: 1.1 CM
BH CV ECHO MEAS - LA DIMENSION: 3.9 CM
BH CV ECHO MEAS - LAT PEAK E' VEL: 15.1 CM/SEC
BH CV ECHO MEAS - LV MASS(C)D: 181.2 GRAMS
BH CV ECHO MEAS - LV MAX PG: 3.9 MMHG
BH CV ECHO MEAS - LV MEAN PG: 2 MMHG
BH CV ECHO MEAS - LV V1 MAX: 98.9 CM/SEC
BH CV ECHO MEAS - LV V1 VTI: 25.5 CM
BH CV ECHO MEAS - LVIDD: 4.6 CM
BH CV ECHO MEAS - LVIDS: 2.9 CM
BH CV ECHO MEAS - LVOT AREA: 3.1 CM2
BH CV ECHO MEAS - LVOT DIAM: 2 CM
BH CV ECHO MEAS - LVPWD: 1.1 CM
BH CV ECHO MEAS - MED PEAK E' VEL: 15.2 CM/SEC
BH CV ECHO MEAS - MV A MAX VEL: 120 CM/SEC
BH CV ECHO MEAS - MV DEC SLOPE: 376 CM/SEC2
BH CV ECHO MEAS - MV DEC TIME: 0.18 SEC
BH CV ECHO MEAS - MV E MAX VEL: 116 CM/SEC
BH CV ECHO MEAS - MV E/A: 0.97
BH CV ECHO MEAS - MV MAX PG: 4.9 MMHG
BH CV ECHO MEAS - MV MEAN PG: 3 MMHG
BH CV ECHO MEAS - MV P1/2T: 91.1 MSEC
BH CV ECHO MEAS - MV V2 VTI: 20.3 CM
BH CV ECHO MEAS - MVA(P1/2T): 2.41 CM2
BH CV ECHO MEAS - MVA(VTI): 3.9 CM2
BH CV ECHO MEAS - PA ACC TIME: 0.15 SEC
BH CV ECHO MEAS - RAP SYSTOLE: 3 MMHG
BH CV ECHO MEAS - SV(LVOT): 80.1 ML
BH CV ECHO MEAS - SV(MOD-SP2): 35.5 ML
BH CV ECHO MEAS - SV(MOD-SP4): 53.9 ML
BH CV ECHO MEAS - TAPSE (>1.6): 1.7 CM
BH CV ECHO MEASUREMENTS AVERAGE E/E' RATIO: 7.66
BH CV VAS BP LEFT ARM: NORMAL MMHG
BH CV XLRA - RV BASE: 4 CM
BH CV XLRA - RV LENGTH: 8 CM
BH CV XLRA - RV MID: 3.2 CM
BH CV XLRA - TDI S': 23.4 CM/SEC
CHROMATIN AB SERPL-ACNC: <10 IU/ML (ref 0–14)
IVRT: 92 MS
LEFT ATRIUM VOLUME INDEX: 31.1 ML/M2
LEFT ATRIUM VOLUME: 53.5 ML
LV EF 2D ECHO EST: 65 %
MAGNESIUM SERPL-MCNC: 1.7 MG/DL (ref 1.6–2.4)
PHOSPHATE SERPL-MCNC: 3.4 MG/DL (ref 2.5–4.5)
POTASSIUM SERPL-SCNC: 4.6 MMOL/L (ref 3.5–5.2)
QT INTERVAL: 312 MS
QT INTERVAL: 386 MS

## 2024-11-11 PROCEDURE — 97162 PT EVAL MOD COMPLEX 30 MIN: CPT

## 2024-11-11 PROCEDURE — G0378 HOSPITAL OBSERVATION PER HR: HCPCS

## 2024-11-11 PROCEDURE — 83516 IMMUNOASSAY NONANTIBODY: CPT | Performed by: HOSPITALIST

## 2024-11-11 PROCEDURE — 86037 ANCA TITER EACH ANTIBODY: CPT | Performed by: HOSPITALIST

## 2024-11-11 PROCEDURE — 84100 ASSAY OF PHOSPHORUS: CPT | Performed by: INTERNAL MEDICINE

## 2024-11-11 PROCEDURE — 97530 THERAPEUTIC ACTIVITIES: CPT

## 2024-11-11 PROCEDURE — 86235 NUCLEAR ANTIGEN ANTIBODY: CPT | Performed by: HOSPITALIST

## 2024-11-11 PROCEDURE — 93306 TTE W/DOPPLER COMPLETE: CPT

## 2024-11-11 PROCEDURE — 84132 ASSAY OF SERUM POTASSIUM: CPT | Performed by: INTERNAL MEDICINE

## 2024-11-11 PROCEDURE — 83735 ASSAY OF MAGNESIUM: CPT | Performed by: INTERNAL MEDICINE

## 2024-11-11 PROCEDURE — 97166 OT EVAL MOD COMPLEX 45 MIN: CPT

## 2024-11-11 PROCEDURE — 86431 RHEUMATOID FACTOR QUANT: CPT | Performed by: HOSPITALIST

## 2024-11-11 PROCEDURE — 99233 SBSQ HOSP IP/OBS HIGH 50: CPT | Performed by: HOSPITALIST

## 2024-11-11 PROCEDURE — 72141 MRI NECK SPINE W/O DYE: CPT

## 2024-11-11 PROCEDURE — 93306 TTE W/DOPPLER COMPLETE: CPT | Performed by: INTERNAL MEDICINE

## 2024-11-11 PROCEDURE — 72146 MRI CHEST SPINE W/O DYE: CPT

## 2024-11-11 PROCEDURE — 99233 SBSQ HOSP IP/OBS HIGH 50: CPT

## 2024-11-11 PROCEDURE — 97535 SELF CARE MNGMENT TRAINING: CPT

## 2024-11-11 PROCEDURE — 86225 DNA ANTIBODY NATIVE: CPT | Performed by: HOSPITALIST

## 2024-11-11 RX ADMIN — ROSUVASTATIN 5 MG: 10 TABLET, FILM COATED ORAL at 20:58

## 2024-11-11 RX ADMIN — URSODIOL 300 MG: 300 CAPSULE ORAL at 20:58

## 2024-11-11 RX ADMIN — CARVEDILOL 12.5 MG: 12.5 TABLET, FILM COATED ORAL at 08:36

## 2024-11-11 RX ADMIN — THIAMINE HCL TAB 100 MG 100 MG: 100 TAB at 08:36

## 2024-11-11 RX ADMIN — LATANOPROST 1 DROP: 50 SOLUTION OPHTHALMIC at 20:59

## 2024-11-11 RX ADMIN — ZOLPIDEM TARTRATE 5 MG: 5 TABLET ORAL at 20:58

## 2024-11-11 RX ADMIN — Medication 10 ML: at 08:37

## 2024-11-11 RX ADMIN — Medication 5 MG: at 20:58

## 2024-11-11 RX ADMIN — FOLIC ACID 1 MG: 1 TABLET ORAL at 08:36

## 2024-11-11 RX ADMIN — DESVENLAFAXINE 50 MG: 50 TABLET, FILM COATED, EXTENDED RELEASE ORAL at 08:36

## 2024-11-11 RX ADMIN — URSODIOL 300 MG: 300 CAPSULE ORAL at 08:37

## 2024-11-11 RX ADMIN — PANTOPRAZOLE SODIUM 40 MG: 40 TABLET, DELAYED RELEASE ORAL at 08:37

## 2024-11-11 NOTE — THERAPY EVALUATION
Patient Name: Philomena Davis  : 1960    MRN: [id number]                              Today's Date: 2024       Admit Date: 11/10/2024    Visit Dx:     ICD-10-CM ICD-9-CM   1. Weakness of both lower extremities  R29.898 729.89   2. Paresthesia  R20.2 782.0   3. Weakness  R53.1 780.79   4. Leukocytosis, unspecified type  D72.829 288.60   5. Alcoholic cirrhosis of liver without ascites  K70.30 571.2     Patient Active Problem List   Diagnosis    Essential hypertension    Generalized anxiety disorder    Hyperlipidemia    Glaucoma    Alcoholic cirrhosis of liver without ascites    Severe malnutrition    GERD without esophagitis    Sleep disturbance    Annual physical exam    Idiopathic peripheral neuropathy    Severe anxiety with panic    Alcohol use disorder    Cigarette nicotine dependence in remission    Chronic obstructive pulmonary disease    Closed fracture of left hand    Moderate malnutrition    Lower extremity weakness     Past Medical History:   Diagnosis Date    Alcohol withdrawal 05/10/2019    Annual physical exam 2023    Anxiety and depression     Cirrhosis of liver     Closed displaced fracture of lateral malleolus of left fibula     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    Closed trimalleolar fracture of right ankle 2023    Digital mucous cyst of toe of left foot 2017    Ganglion cyst of left foot     Glaucoma     Hypertension     Wears glasses      Past Surgical History:   Procedure Laterality Date    ANKLE OPEN REDUCTION INTERNAL FIXATION Left 2017    Procedure: LEFT ANKLE OPEN REDUCTION INTERNAL FIXATION WITH ILIAC CREST BONE GRAFT , EXCISION CYST 4TH/5TH INNERSPACE LEFT FOOT;  Surgeon: Frank Larson MD;  Location:  [facility];  Service:     ANKLE OPEN REDUCTION INTERNAL FIXATION Right 2023    Procedure: ANKLE OPEN REDUCTION INTERNAL FIXATION;  Surgeon: Deandre Reynoso MD;  Location:  [facility];  Service:  Orthopedics;  Laterality: Right;    AUGMENTATION MAMMAPLASTY Bilateral 1999    BREAST AUGMENTATION      BREAST EXCISIONAL BIOPSY Right 2014    DILATION AND CURETTAGE, DIAGNOSTIC / THERAPEUTIC      ENDOSCOPY N/A 11/30/2022    Procedure: ESOPHAGOGASTRODUODENOSCOPY;  Surgeon: Arelis Solano MD;  Location:  [facility];  Service: Gastroenterology;  Laterality: N/A;    ENDOSCOPY N/A 2/1/2024    Procedure: ESOPHAGOGASTRODUODENOSCOPY;  Surgeon: Brunner, Mark I, MD;  Location:  [facility];  Service: Gastroenterology;  Laterality: N/A;    AZ RPR NON/MAL FEMUR DSTL H/N W/ILIAC/AUTOG BONE Left 03/24/2017    Procedure: ILIAC CREST BONE GRAFT;  Surgeon: Frank Larson MD;  Location:  [facility];  Service: Orthopedics    WRIST SURGERY        General Information       Row Name 11/11/24 1045          OT Time and Intention    Document Type evaluation  -JY     Mode of Treatment occupational therapy  -JY       Row Name 11/11/24 1048          General Information    Patient Profile Reviewed yes  -JY     Prior Level of Function independent:;all household mobility;community mobility;gait;transfer;bed mobility;ADL's;feeding;grooming;dressing;bathing;mod assist:;max assist:;home management;cooking;cleaning  pt I in ADLs, related t/fs & mob w/o use of AD until most recently d/t increased pain, diff amb; pt receives meal A w/ [employer] & only does simple meal prep otherwise, receives A w/ home mgmt via A program, only driving short distance infrequently  -JY     Existing Precautions/Restrictions fall;other (see comments)  significant pain at L foot (toenail at little toe)w/ hypersensitivity with LT, peripheral neuropathy, report of numbness proximally at abdomen  -JY     Barriers to Rehab medically complex;previous functional deficit  -JY       Row Name 11/11/24 1046          Occupational Profile    Environmental Supports and Barriers (Occupational Profile) will not ascend steps within home d/t fear of falling thus sleeps on  couch and only uses 1/2 bath as both bedroom and full bath on upper level, reports completing sponge bath at recent baseline; DME: has FWW of which pt only used most recently d/t pain and difficulty with mobility  -JY     Patient Goals (Occupational Profile) to return to PLOF  -JY       Row Name 11/11/24 1045          Living Environment    People in Home alone  -JY       Row Name 11/11/24 1045          Home Main Entrance    Number of Stairs, Main Entrance two  -JY     Stair Railings, Main Entrance none  -JY       Row Name 11/11/24 1045          Stairs Within Home, Primary    Stairs, Within Home, Primary pt reports living on main level of home d/t fear of falling and as result is sleeping on couch and only using 1/2 bath as bedroom and full bath are upstairs  -JY     Number of Stairs, Within Home, Primary other (see comments)  flight to upstairs  -JY       Row Name 11/11/24 1045          Cognition    Orientation Status (Cognition) oriented x 4  -       Row Name 11/11/24 1045          Safety Issues/Impairments Affecting Functional Mobility    Safety Issues Affecting Function (Mobility) insight into deficits/self-awareness;awareness of need for assistance;impulsivity;judgment;positioning of assistive device;problem-solving;safety precaution awareness;safety precautions follow-through/compliance;sequencing abilities  -JY     Impairments Affecting Function (Mobility) balance;coordination;endurance/activity tolerance;strength;pain;sensation/sensory awareness  -JY     Comment, Safety Issues/Impairments (Mobility) pt alert and able to follow commands; presented with abrupt change in presentation when standing and t/fing and return to steadiness quickly upon impulsive sitting  -JY               User Key  (r) = Recorded By, (t) = Taken By, (c) = Cosigned By      Initials Name Provider Type    Vanessa Hussein OT Occupational Therapist                     Mobility/ADL's       Row Name 11/11/24 1056          Bed Mobility     Bed Mobility supine-sit;scooting/bridging  -JY     Scooting/Bridging [place] (Bed Mobility) modified independence  -JY     Supine-Sit [place] (Bed Mobility) modified independence  -JY     Assistive Device (Bed Mobility) head of bed elevated;bed rails  -JY     Comment, (Bed Mobility) pt did not require any physical A for bed mobility, did require increased time and effort to advance LEs to EOB and scoot to EOB for improved hip symmetry after sitting up in bed, utilized HOB elevated and bed rails for support  -JY       Row Name 11/11/24 1056          Transfers    Transfers sit-stand transfer;stand-sit transfer;bed-chair transfer  -JY     Comment, (Transfers) skilled cues for optimal hand placement for controlled ascend, descend specifically to push up from seated surface with at least 1 UE when using FWW vs pulling at FWW with BUE support to decrease fall risk, transitioned to BUE support to allow for more close proximity support at L & R sides after pt demonstrated report of severe pain in standing and abrupt BLE shaking and was returned to sitting; pt presented with similar presentation when standing at recliner after assisted pviot frombed to recliner  -JY       Row Name 11/11/24 1056          Bed-Chair Transfer    Bed-Chair [place] (Transfers) minimum assist (75% patient effort);2 person assist;verbal cues  -JY     Assistive Device (Bed-Chair Transfers) other (see comments)  BUE support  -JY     Comment, (Bed-Chair Transfer) pt able to complete lateral stepping with min A x 2 for BUE support in order to advance position sitting up in recliner; pt able to complete fxl stepping without any knee buckling or unsteadiness, in fact very steady; upon reaching recliner and preparing to sit pt again demonstrated abrupt change to BLE shaking and severe pain reported and impulsively sat in recliner  -JY       Row Name 11/11/24 1056          Sit-Stand Transfer    Sit-Stand [place] (Transfers) minimum  "assist (75% patient effort);2 person assist  -J     Assistive Device (Sit-Stand Transfers) walker, front-wheeled  -     Comment, (Sit-Stand Transfer) initial use of FWW for support and transitioned to BUE support for more close proximity to pt and blocked feet and knees; pt reported increase in pain while standing to \"30/10\"  -       Row Name 11/11/24 1056          Stand-Sit Transfer    Stand-Sit [place] (Transfers) minimum assist (75% patient effort);2 person assist;verbal cues  -J     Assistive Device (Stand-Sit Transfers) walker, front-wheeled;other (see comments)  BUE support  -       Row Name 11/11/24 1056          Functional Mobility    Functional Mobility- Comment defer to PT for specifics  -J     Patient was able to Ambulate no, other medical factors prevent ambulation  -     Reason Patient was unable to Ambulate Uncontrolled Pain  -       Row Name 11/11/24 1056          Activities of Daily Living    BADL Assessment/Intervention upper body dressing;lower body dressing;grooming;toileting  -       Row Name 11/11/24 1056          Upper Body Dressing Assessment/Training    [place] Level (Upper Body Dressing) other (see comments);pajama/robe  mgmt of gown  -     Position (Upper Body Dressing) edge of bed sitting  -     Comment, (Upper Body Dressing) min A for proximal and posterior mgmt of gown  -Y       Row Name 11/11/24 1056          Lower Body Dressing Assessment/Training    [place] Level (Lower Body Dressing) doff;don;socks;minimum assist (75% patient effort);dependent (less than 25% patient effort)  -     Position (Lower Body Dressing) edge of bed sitting  -     Comment, (Lower Body Dressing) pt able to elevate RLE up more proximally and manage w/ min A for initial threading A yet at LLE pt limited by pain w/ LT, hypersensitivity at little toe w/ toenail concern, req'd dep A for d/d this date; wound care to see pt after OT  -Y       Row Name 11/11/24 1056          " Grooming Assessment/Training    [place] Level (Grooming) wash face, hands;set up  -J     Position (Grooming) supported sitting  -[facility] Name 11/11/24 1056          Toileting Assessment/Training    [place] Level (Toileting) manage urinary drainage device;dependent (less than 25% patient effort)  -J     Comment, (Toileting) currently using external catheter  -J               User Key  (r) = Recorded By, (t) = Taken By, (c) = Cosigned By      Initials Name Provider Type    Vanessa Hussein OT Occupational Therapist                   Obj/Interventions       [facility] Name 11/11/24 1109          Sensory Assessment (Somatosensory)    Sensory Assessment (Somatosensory) bilateral UE;sensation intact  -     Bilateral UE Sensory Assessment general sensation;light touch awareness;intact;impaired  -     Sensory Assessment pt reports awareness of LT at BUEs, symmetrical between L & R however diminished at more distal UEs at wrist and hands; reports numbness at all fingertips of which pt reports at baseline d/t peripheral neuropathy  -JY       Row Name 11/11/24 1109          Vision Assessment/Intervention    Visual Impairment/Limitations corrective lenses full-time  -     Vision Assessment Comment denies any acute changes to vision  -[facility] Name 11/11/24 1109          Range of Motion Comprehensive    General Range of Motion bilateral upper extremity ROM WFL  -[facility] Name 11/11/24 1109          Strength Comprehensive (MMT)    General Manual Muscle Testing (MMT) Assessment upper extremity strength deficits identified  -     Comment, General Manual Muscle Testing (MMT) Assessment BUE MMS grossly 4/5, shoulders 4-/5 with report of increased pain w/ manual input at shoulders  -[facility] Name 11/11/24 1109          Motor Skills    Motor Skills functional endurance  -     Functional Endurance decreased activity tolerance toward more dynamic demands; presents with abrupt increase in pain and  presented unsteadiness in more dynamic positioning and pt often impulsively sits which impacts endurance  -JY       Row Name 11/11/24 1109          Balance    Balance Assessment sitting static balance;sitting dynamic balance;standing static balance;standing dynamic balance  -JY     Static Sitting Balance supervision  -JY     Dynamic Sitting Balance standby assist  -JY     Position, Sitting Balance sitting edge of bed;unsupported  -JY     Static Standing Balance minimal assist  -JY     Dynamic Standing Balance minimal assist;2-person assist;verbal cues  -JY     Position/Device Used, Standing Balance supported;walker, front-wheeled;other (see comments)  BUE support  -JY     Balance Interventions sitting;standing;static;dynamic;sit to stand;supported;occupation based/functional task  -JY     Comment, Balance pt did not require significant A and demonstrated good stability with lateral stepping however presented with abrupt shaking of LEs in standing and impulsive return to sitting  -JY               User Key  (r) = Recorded By, (t) = Taken By, (c) = Cosigned By      Initials Name Provider Type    Vanessa Hussein OT Occupational Therapist                   Goals/Plan       Row Name 11/11/24 1221          Transfer Goal 1 (OT)    Activity/Assistive Device (Transfer Goal 1, OT) sit-to-stand/stand-to-sit;bed-to-chair/chair-to-bed;commode, bedside without drop arms;toilet;other (see comments)  AD recs per PT  -JY     [place] Level/Cues Needed (Transfer Goal 1, OT) minimum assist (75% or more patient effort);verbal cues required  -JY     Time Frame (Transfer Goal 1, OT) long term goal (LTG);1 week  -JY     Progress/Outcome (Transfer Goal 1, OT) new goal  -JY       Row Name 11/11/24 1221          Dressing Goal 1 (OT)    Activity/Device (Dressing Goal 1, OT) lower body dressing;other (see comments)  d/d LB garments with LH AE  -JY     [place]/Cues Needed (Dressing Goal 1, OT) minimum assist (75% or more patient  effort);verbal cues required  -JY     Time Frame (Dressing Goal 1, OT) long term goal (LTG);1 week  -JY     Progress/Outcome (Dressing Goal 1, OT) new goal  -JY       Row Name 11/11/24 1221          Toileting Goal 1 (OT)    Activity/Device (Toileting Goal 1, OT) adjust/manage clothing;perform perineal hygiene;commode;commode, bedside without drop arms;grab bar/safety frame;raised toilet seat  -JY     [place] Level/Cues Needed (Toileting Goal 1, OT) minimum assist (75% or more patient effort);verbal cues required  -JY     Time Frame (Toileting Goal 1, OT) long term goal (LTG);1 week  -JY     Progress/Outcome (Toileting Goal 1, OT) new goal  -JY       Row Name 11/11/24 1221          Strength Goal 1 (OT)    Strength Goal 1 (OT) PT to complete seated HEP encompassing BUEs targeting strength and endurance w/ progressive sets/reps/resistance in order to improve integration in ADLs, related t/fs and mobility  -JY     Time Frame (Strength Goal 1, OT) short term goal (STG);5 days  -JY     Progress/Outcome (Strength Goal 1, OT) new goal  -       Row Name 11/11/24 1221          Therapy Assessment/Plan (OT)    Planned Therapy Interventions (OT) activity tolerance training;adaptive equipment training;BADL retraining;functional balance retraining;neuromuscular control/coordination retraining;occupation/activity based interventions;patient/caregiver education/training;ROM/therapeutic exercise;strengthening exercise;transfer/mobility retraining  -JY               User Key  (r) = Recorded By, (t) = Taken By, (c) = Cosigned By      Initials Name Provider Type    Vanessa Hussein OT Occupational Therapist                   Clinical Impression       Row Name 11/11/24 1209          Pain Assessment    Pretreatment Pain Rating 5/10  -JY     Posttreatment Pain Rating 6/10  -JY     Pain Location abdomen;extremity  -JY     Pain Side/Orientation upper;lower  -JY     Pain Management Interventions exercise or physical activity utilized  " -JY     Response to Pain Interventions activity participation with increased pain  -JY     Pre/Posttreatment Pain Comment pt reported \"30/10\" pain when standing with B feet on floor. describes pain as \"glass and bees\"; initially reported severe pain with re adjustment of external catheter and upward advancement of gait belt when using for support in standing and then immediately presented w/ calmness, apparent decrease in pain  -JY       Row Name 11/11/24 1209          Plan of Care Review    Plan of Care Reviewed With patient  -JY     Progress no change  OT IE  -JY     Outcome Evaluation OT evaluation completed. Pt presents with decreased I in ADLs, related t/fs and mobility compared to PLOF provided limited by pain of which reports/presents as severe, rating upward of \"30/10\" with standing at EOB and following t/f to recliner, decreased activity tolerance which again may be impacted by pain, balance deficits, muscle weakness at BUEs, decreased distal reach toward LEs and report of increased and diminished sensitivity throughout body w/ baseline neuropathy. Pt demonstrated MI for bed mobility with HOB elevated and bed rails used and min A x 2 for STS t/f and fxl t/f to recliner with BUE support. Initially trialled FWW and req'd more close proximity for support given abrupt BLE shaking and impulsive return to sitting. Pt demonstrated similar presentation after safe lateral stepping w/ no overt LOB or knee buckling between bed> recliner. Pt req'd variable A for ADLs observed with min A for UBD and R scok mgmt, pt performance in d/d L sock impacted by pain, hypersensitivity at little toe. RN and wound care aware. Pt would benefit from IPOT POC and IRF at d/c when medically ready.  -JY       Row Name 11/11/24 1206          Therapy Assessment/Plan (OT)    Patient/Family Therapy Goal Statement (OT) to return to PLOF  -JY     Rehab Potential (OT) good  -JY     Criteria for Skilled Therapeutic Interventions Met (OT) " yes;skilled treatment is necessary  -JY     Therapy Frequency (OT) daily  -JY     Predicted Duration of Therapy Intervention (OT) 5 days  -JY       Row Name 11/11/24 1209          Therapy Plan Review/Discharge Plan (OT)    Anticipated Discharge Disposition (OT) inpatient rehabilitation facility  -JY       Row Name 11/11/24 1209          Vital Signs    Pre Systolic BP Rehab 144  -JY     Pre Treatment Diastolic BP 86  -JY     Post Systolic BP Rehab 127  -JY     Post Treatment Diastolic BP 79  -JY     Pretreatment Heart Rate (beats/min) 95  -JY     Posttreatment Heart Rate (beats/min) 98  -JY     Pre SpO2 (%) 96  -JY     O2 Delivery Pre Treatment room air  -JY     O2 Delivery Intra Treatment room air  -JY     Post SpO2 (%) 98  -JY     O2 Delivery Post Treatment room air  -JY     Pre Patient Position Supine  -JY     Intra Patient Position Standing  -JY     Post Patient Position Sitting  -JY       Row Name 11/11/24 1209          Positioning and Restraints    Pre-Treatment Position in bed  -JY     Post Treatment Position chair  -JY     In Chair notified nsg;reclined;call light within reach;encouraged to call for assist;exit alarm on;waffle cushion;on mechanical lift sling;legs elevated  -JY               User Key  (r) = Recorded By, (t) = Taken By, (c) = Cosigned By      Initials Name Provider Type    Vanessa Hussein, OT Occupational Therapist                   Outcome Measures       Row Name 11/11/24 1225          How much help from another is currently needed...    Putting on and taking off regular lower body clothing? 2  -JY     Bathing (including washing, rinsing, and drying) 2  -JY     Toileting (which includes using toilet bed pan or urinal) 1  -JY     Putting on and taking off regular upper body clothing 3  -JY     Taking care of personal grooming (such as brushing teeth) 3  -JY     Eating meals 4  -JY     AM-PAC 6 Clicks Score (OT) 15  -JY       Row Name 11/11/24 1012          How much help from another person  do you currently need...    Turning from your back to your side while in flat bed without using bedrails? 4  -AC     Moving from lying on back to sitting on the side of a flat bed without bedrails? 3  -AC     Moving to and from a bed to a chair (including a wheelchair)? 3  -AC     Standing up from a chair using your arms (e.g., wheelchair, bedside chair)? 3  -AC     Climbing 3-5 steps with a railing? 2  -AC     To walk in hospital room? 2  -AC     AM-PAC 6 Clicks Score (PT) 17  -AC     Highest Level of Mobility Goal 5 --> Static standing  -AC       Row Name 11/11/24 1225 11/11/24 1012       Functional Assessment    Outcome Measure Options AM-PAC 6 Clicks Daily Activity (OT)  -JY AM-PAC 6 Clicks Basic Mobility (PT)  -AC              User Key  (r) = Recorded By, (t) = Taken By, (c) = Cosigned By      Initials Name Provider Type    Vanessa Hussein, OT Occupational Therapist    AC Latoya Judge, PT Physical Therapist                    Occupational Therapy Education       Title: PT OT SLP Therapies (In Progress)       Topic: Occupational Therapy (In Progress)       Point: ADL training (In Progress)       Description:   Instruct learner(s) on proper safety adaptation and remediation techniques during self care or transfers.   Instruct in proper use of assistive devices.                  Learning Progress Summary            Patient Acceptance, E,D, NR by ELEUTERIO at 11/11/2024 0854                      Point: Home exercise program (Not Started)       Description:   Instruct learner(s) on appropriate technique for monitoring, assisting and/or progressing therapeutic exercises/activities.                  Learner Progress:  Not documented in this visit.              Point: Precautions (In Progress)       Description:   Instruct learner(s) on prescribed precautions during self-care and functional transfers.                  Learning Progress Summary            Patient Acceptance, E,D, NR by ELEUTERIO at 11/11/2024 0872                  "     Point: Body mechanics (In Progress)       Description:   Instruct learner(s) on proper positioning and spine alignment during self-care, functional mobility activities and/or exercises.                  Learning Progress Summary            Patient Acceptance, E,D, NR by ELEUTERIO at 11/11/2024 0854                                      User Key       Initials Effective Dates Name Provider Type Discipline    ADRADHIKA 06/16/21 -  Vanessa Noonan, [facility] Occupational Therapist OT                  OT Recommendation and Plan  Planned Therapy Interventions (OT): activity tolerance training, adaptive equipment training, BADL retraining, functional balance retraining, neuromuscular control/coordination retraining, occupation/activity based interventions, patient/caregiver education/training, ROM/therapeutic exercise, strengthening exercise, transfer/mobility retraining  Therapy Frequency (OT): daily  Plan of Care Review  Plan of Care Reviewed With: patient  Progress: no change (OT IE)  Outcome Evaluation: OT evaluation completed. Pt presents with decreased I in ADLs, related t/fs and mobility compared to PLOF provided limited by pain of which reports/presents as severe, rating upward of \"30/10\" with standing at EOB and following t/f to recliner, decreased activity tolerance which again may be impacted by pain, balance deficits, muscle weakness at BUEs, decreased distal reach toward LEs and report of increased and diminished sensitivity throughout body w/ baseline neuropathy. Pt demonstrated MI for bed mobility with HOB elevated and bed rails used and min A x 2 for STS t/f and fxl t/f to recliner with BUE support. Initially trialled FWW and req'd more close proximity for support given abrupt BLE shaking and impulsive return to sitting. Pt demonstrated similar presentation after safe lateral stepping w/ no overt LOB or knee buckling between bed> recliner. Pt req'd variable A for ADLs observed with min A for UBD and R scok mgmt, pt " performance in d/d L sock impacted by pain, hypersensitivity at little toe. RN and wound care aware. Pt would benefit from IPOT POC and IRF at d/c when medically ready.     Time Calculation:   Evaluation Complexity (OT)  Review Occupational Profile/Medical/Therapy History Complexity: expanded/moderate complexity  Assessment, Occupational Performance/Identification of Deficit Complexity: 3-5 performance deficits  Clinical Decision Making Complexity (OT): detailed assessment/moderate complexity  Overall Complexity of Evaluation (OT): moderate complexity     Time Calculation- OT       Row Name 11/11/24 1226             Time Calculation- OT    OT Start Time 0854  -JY      OT Received On 11/11/24  -JY      OT Goal Re-Cert Due Date 11/21/24  -JY         Timed Charges    40147 - OT Self Care/Mgmt Minutes 12  -JY         Untimed Charges    OT Eval/Re-eval Minutes 50  -JY         Total Minutes    Timed Charges Total Minutes 12  -JY      Untimed Charges Total Minutes 50  -JY       Total Minutes 62  -JY                User Key  (r) = Recorded By, (t) = Taken By, (c) = Cosigned By      Initials Name Provider Type    Vanessa Hussein OT Occupational Therapist                  Therapy Charges for Today       Code Description Service Date Service Provider Modifiers Qty    96937256279 HC OT SELF CARE/MGMT/TRAIN EA 15 MIN 11/11/2024 Vanessa Noonan OT GO 1    51510266384 HC OT EVAL MOD COMPLEXITY 4 11/11/2024 Vanessa Noonan OT GO 1                 Vanessa Noonan OT  11/11/2024

## 2024-11-11 NOTE — CASE MANAGEMENT/SOCIAL WORK
Discharge Planning Assessment  [facility]     Patient Name: Philomena Davis  MRN: [id number]  Today's Date: 11/11/2024    Admit Date: 11/10/2024    Plan: Rehab   Discharge Needs Assessment       Row Name 11/11/24 1155       Living Environment    People in Home alone  Independent Assistance assists with errands and homemaking    Current Living Arrangements home    Primary Care Provided by self  Independent Assistance    Provides Primary Care For no one    Able to Return to Prior Arrangements yes       Resource/Environmental Concerns    Transportation Concerns rides, unreliable from others       Transition Planning    Patient/Family Anticipates Transition to home    Transportation Anticipated family or friend will provide       Discharge Needs Assessment    Readmission Within the Last 30 Days no previous admission in last 30 days    Equipment Currently Used at Home walker, standard                   Discharge Plan       Row Name 11/11/24 4537       Plan    Plan Rehab    Patient/Family in Agreement with Plan yes    Plan Comments Spoke with Ms. Davis at the bedside. She lives alone in [facility]. She needs assistance with ADL's and uses Independent assistance to help with errands and homemaking. She has a walker. She does not use oxygen. She has used home health before through [facility]. She has advance directives. Her PCP is Unique Brandt and she has [employer] insurance. Patient is requesting home and financial resources. CM notified 3F MSW to speak with patient and provide resources. Therapy is recommending rehab. She is agreeable to [facility]. CM will submit referral once patient is medically ready. CM will continue to follow up.    Final Discharge Disposition Code 62 - inpatient rehab facility                  Continued Care and Services - Admitted Since 11/10/2024    No active coordination exists for this encounter.       Expected Discharge Date and Time       Expected Discharge Date Expected  Discharge Time    Nov 13, 2024            Demographic Summary       Row Name 11/11/24 1154       General Information    Arrived From home    Referral Source admission list    Reason for Consult discharge planning    Preferred Language English       Contact Information    Permission Granted to Share Info With                    Functional Status       Row Name 11/11/24 1155       Mental Status    General Appearance WDL WDL                   Psychosocial    No documentation.                  Abuse/Neglect    No documentation.                  Legal    No documentation.                  Substance Abuse    No documentation.                  Patient Forms    No documentation.                     Reena Hodges RN

## 2024-11-11 NOTE — NURSING NOTE
WOC consult for left pinky toenail about the fall off.    Patient states that she recently visited her podiatrist for nail trimming.  At that time there is no issue with her left fifth toe nail, but during this admission somehow it had inadvertently been pulled up.    The nail remains secured to the cuticle area.  It is lifting and painful to the patient when touched.  Unfortunately I was unable to remove the nail due to [facility] policy.  A Band-Aid was applied over the area to help prevent any further pain from the toenail pulling on her sock or other dressings.  Instructed patient to follow-up with her podiatrist which she states is scheduled for the 18th of this month.    Pressure injury prevention protocol.    WOC will sign off.    Mahamed Spencer RN, BSN, CCRN, CWOCN  Wound, Ostomy and Continence (WOC) Department  [facility]

## 2024-11-11 NOTE — PLAN OF CARE
"Goal Outcome Evaluation:  Plan of Care Reviewed With: patient        Progress: no change (OT IE)  Outcome Evaluation: OT evaluation completed. Pt presents with decreased I in ADLs, related t/fs and mobility compared to PLOF provided limited by pain of which reports/presents as severe, rating upward of \"30/10\" with standing at EOB and following t/f to recliner, decreased activity tolerance which again may be impacted by pain, balance deficits, muscle weakness at BUEs, decreased distal reach toward LEs and report of increased and diminished sensitivity throughout body w/ baseline neuropathy. Pt demonstrated MI for bed mobility with HOB elevated and bed rails used and min A x 2 for STS t/f and fxl t/f to recliner with BUE support. Initially trialled FWW and req'd more close proximity for support given abrupt BLE shaking and impulsive return to sitting. Pt demonstrated similar presentation after safe lateral stepping w/ no overt LOB or knee buckling between bed> recliner. Pt req'd variable A for ADLs observed with min A for UBD and R scok mgmt, pt performance in d/d L sock impacted by pain, hypersensitivity at little toe. RN and wound care aware. Pt would benefit from IPOT POC and IRF at d/c when medically ready.    Anticipated Discharge Disposition (OT): inpatient rehabilitation facility                        "

## 2024-11-11 NOTE — PROGRESS NOTES
"    [facility] Medicine Services  PROGRESS NOTE    Patient Name: Philomena Davis  : 1960  MRN: [id number]    Date of Admission: 11/10/2024  Primary Care Physician: Unique Brandt DO    Subjective   Subjective     CC: Numbness    HPI: Continues with numbness. Notes \"shocks\" in lower extremities. No f/c. No n/v. No dyspnea .      Objective   Objective     Vital Signs:   Temp:  [98 °F (36.7 °C)-98.6 °F (37 °C)] 98.6 °F (37 °C)  Heart Rate:  [] 95  Resp:  [8-18] 16  BP: (110-165)/() 135/84  Flow (L/min) (Oxygen Therapy):  [2] 2     Physical Exam:  NAD, alert and oriented  OP clear, dry MM  Neck supple  No LAD  RRR  CTAB  +BS, soft  MASSEY  Normal affect  B LE 4+/5, notes numbness in hands/legs/abdomen/face    Results Reviewed:  LAB RESULTS:      Lab 11/10/24  1628 11/10/24  1235 11/10/24  1007 11/10/24  0946   WBC  --   --   --  15.59*   HEMOGLOBIN  --   --   --  12.4   HEMATOCRIT  --   --   --  37.0   PLATELETS  --   --   --  314   NEUTROS ABS  --   --   --  11.83*   IMMATURE GRANS (ABS)  --   --   --  0.07*   LYMPHS ABS  --   --   --  2.06   MONOS ABS  --   --   --  1.34*   EOS ABS  --   --   --  0.20   MCV  --   --   --  106.9*   SED RATE  --   --   --  105*   CRP  --   --  3.88*  --    PROCALCITONIN  --   --  0.15  --    LACTATE 1.2 2.2*  --  2.6*   PROTIME  --   --   --  15.2*   APTT  --   --   --  37.1*   HSTROP T  --   --  14*  --          Lab 24  0017 11/10/24  1007   SODIUM  --  138   POTASSIUM 4.6 3.3*   CHLORIDE  --  100   CO2  --  27.0   ANION GAP  --  11.0   BUN  --  6*   CREATININE  --  0.55*   EGFR  --  103.8   GLUCOSE  --  100*   CALCIUM  --  8.8   MAGNESIUM  --  1.7   TSH  --  9.670*         Lab 11/10/24  1007   TOTAL PROTEIN 7.0   ALBUMIN 2.8*   GLOBULIN 4.2   ALT (SGPT) 27   AST (SGOT) 72*   BILIRUBIN 1.7*   ALK PHOS 165*         Lab 11/10/24  1007 11/10/24  0946   HSTROP T 14*  --    PROTIME  --  15.2*   INR  --  1.18*             Lab 11/10/24  1235 "   FOLATE 4.83   VITAMIN B 12 716         Brief Urine Lab Results  (Last result in the past 365 days)        Color   Clarity   Blood   Leuk Est   Nitrite   Protein   CREAT   Urine HCG        11/10/24 0958 Yellow   Clear   Negative   Negative   Negative   Negative                   Microbiology Results Abnormal       None            MRI Lumbar Spine Without Contrast    Result Date: 11/10/2024  MRI LUMBAR SPINE WO CONTRAST Date of Exam: 11/10/2024 1:40 PM EST Indication: Lower extremity radiculopathy.  Comparison: None available. Technique:  Routine multiplanar/multisequence sequence images of the lumbar spine were obtained without contrast administration.  Findings: Hyperintense benign osseous hemangioma noted at L3. T1 marrow signal is otherwise preserved, without evidence of fracture or suspicious marrow replacing lesion. Alignment is anatomic, without evidence of significant listhesis or subluxation. The conus medullaris and cauda equina nerve roots are satisfactory in appearance. The paraspinal soft tissues demonstrate no acute or suspicious findings. Multilevel spondylosis is present, with areas of involvement including L1-2, no significant spinal canal or neuroforaminal impingement. L2-3, no significant spinal canal or neuroforaminal impingement. L3-4, small disc bulge and bilateral facet arthropathy. There is no evidence of significant spinal canal or neuroforaminal narrowing. L4-5, minimal small disc bulge and mild facet arthropathy. The spinal canal remains patent. There is minimal bilateral neuroforaminal narrowing. L5-S1, small circumferential disc bulge with component of right-sided disc protrusion. There is some minimal narrowing of the right aspect of the spinal canal and the small disc protrusion encroaches upon the right subarticular recess, potentially in some proximity to the traversing right S1 nerve root. The neural foramina remain patent.     Impression: Impression: Generally mild lumbar  spondylosis is evident, including a small disc protrusion eccentric to the right at L5-S1, in the region of the right subarticular recess and potentially in some proximity to the traversing right S1 nerve root. There is otherwise no  evidence of significant spinal canal or neuroforaminal narrowing. Electronically Signed: Adriano Kessler MD  11/10/2024 2:27 PM EST  Workstation ID: [id number]    MRI Brain Without Contrast    Result Date: 11/10/2024  MRI BRAIN WO CONTRAST Date of Exam: 11/10/2024 1:27 PM EST Indication: BLE weakness, facial parasthesia R>L, difficulty ambulating.  Comparison: None available. Technique:  Routine multiplanar/multisequence sequence images of the brain were obtained without contrast administration. Findings: No acute infarct is present on diffusion weighted sequences. Midline structures appear normal and the craniocervical junction is satisfactory. Gray-white differentiation is maintained and there is no evidence of intracranial hemorrhage, mass or mass effect. The ventricles are normal in size and configuration. The orbits are normal. The paranasal sinuses are clear. Intracranial arterial flow voids appear maintained.     Impression: Impression: Normal noncontrast MRI of the brain. Electronically Signed: Adriano Kessler MD  11/10/2024 2:14 PM EST  Workstation ID: [id number]    CT Abdomen Pelvis With Contrast    Result Date: 11/10/2024  CT ABDOMEN PELVIS W CONTRAST Date of Exam: 11/10/2024 11:35 AM EST Indication: abdominal pain. Comparison: CT abdomen and pelvis 1/30/2024 Technique: Axial CT images were obtained of the abdomen and pelvis following the uneventful intravenous administration of 85 mL Isovue-300. Reconstructed coronal and sagittal images were also obtained. Automated exposure control and iterative construction methods were used. Findings: LUNG BASES:  Unremarkable without mass or infiltrate. LIVER:  Unremarkable parenchyma without focal lesion. BILIARY/GALLBLADDER:  Unremarkable  SPLEEN:  Unremarkable PANCREAS:  Unremarkable ADRENAL:  Unremarkable KIDNEYS: Bilateral renal cysts are noted. No evidence for obstructive uropathy. No calculus identified. GASTROINTESTINAL/MESENTERY:  No evidence of obstruction nor inflammation.  The appendix is visualized normal in caliber. MESENTERIC VESSELS: The mesenteric vessels are patent. There is some faint soft tissue density along the celiac artery. There is some similar findings on prior study however this appears slightly more conspicuous on the current exam. Mild vasculitis not excluded. AORTA/IVC:  Normal caliber. RETROPERITONEUM/LYMPH NODES:  Unremarkable REPRODUCTIVE: The uterus is visualized. BLADDER:  Unremarkable OSSEUS STRUCTURES:  Typical for age with no acute process identified.     Impression: Impression: 1. Mild soft tissue stranding along the celiac artery which could represent mild vasculitis. 2. No acute findings otherwise noted in the abdomen or pelvis. Electronically Signed: Jeimy Tomas MD  11/10/2024 12:05 PM EST  Workstation ID: [id number]    CT Head Without Contrast    Result Date: 11/10/2024  CT HEAD WO CONTRAST Date of Exam: 11/10/2024 11:22 AM EST Indication: facial numbness x 1 week difficulty walking. Comparison: None available. Technique: Axial CT images were obtained of the head without contrast administration.  Automated exposure control and iterative construction methods were used. FINDINGS: Gray-white differentiation is maintained and there is no evidence of intracranial hemorrhage, mass or mass effect. Mild age-related changes of the brain are present including volume loss and typical periventricular sequela of chronic small vessel ischemia. There is otherwise no evidence of intracranial hemorrhage, mass or mass effect. The ventricles are normal in size and configuration accounting for surrounding volume loss. The orbits are normal and the paranasal sinuses are grossly clear.     Impression: Age-related changes of the  brain as above, otherwise without evidence of acute intracranial abnormality. Electronically Signed: Adriano Kessler MD  11/10/2024 11:30 AM EST  Workstation ID: [id number]    XR Chest 1 View    Result Date: 11/10/2024  XR CHEST 1 VW Date of Exam: 11/10/2024 10:11 AM EST Indication: Weak/Dizzy/AMS triage protocol Comparison: February 3, 2024 Findings: The lungs are clear. The heart and mediastinal contours appear normal. There is no pleural effusion. The pulmonary vasculature appears normal. The osseous structures appear intact.     Impression: Impression: No acute cardiopulmonary process. Electronically Signed: Jhoan Madrid MD  11/10/2024 10:47 AM EST  Workstation ID: [id number]         Current medications:  Scheduled Meds:carvedilol, 12.5 mg, Oral, BID With Meals  desvenlafaxine, 50 mg, Oral, Daily  folic acid, 1 mg, Oral, Daily  latanoprost, 1 drop, Both Eyes, Nightly  melatonin, 5 mg, Oral, Nightly  pantoprazole, 40 mg, Oral, Daily  rosuvastatin, 5 mg, Oral, Daily  sodium chloride, 10 mL, Intravenous, Q12H  thiamine, 100 mg, Oral, Daily  ursodiol, 300 mg, Oral, BID  zolpidem, 5 mg, Oral, Nightly      Continuous Infusions:   PRN Meds:.•  acetaminophen **OR** acetaminophen **OR** acetaminophen  •  senna-docusate sodium **AND** polyethylene glycol **AND** bisacodyl **AND** bisacodyl  •  calcium carbonate  •  Calcium Replacement - Follow Nurse / BPA Driven Protocol  •  ipratropium-albuterol  •  LORazepam  •  Magnesium Standard Dose Replacement - Follow Nurse / BPA Driven Protocol  •  nitroglycerin  •  ondansetron  •  Phosphorus Replacement - Follow Nurse / BPA Driven Protocol  •  Potassium Replacement - Follow Nurse / BPA Driven Protocol  •  sodium chloride  •  sodium chloride  •  sodium chloride    Assessment & Plan   Assessment & Plan     Active Hospital Problems    Diagnosis  POA   • **Lower extremity weakness [R29.898]  Yes      Resolved Hospital Problems   No resolved problems to display.        Brief Hospital  Course to date:  Philomena Davis is a 64 y.o. female with history of alcohol dependence, cirrhosis, HTN, PN here with B UE/LE paraesthesias    B weakness  B paraesthesias/facial numbness  -MRI without acute pathology  -MRI lumbar spine with L5-S1 disc protrusion  -MRI C/T spine pending  -LP pending  -Neurology consulted    Possible vasculitis  CT with celiac artery stranding  -markedly elevated CRP/ESR  -WILMA/RF/ANCA panel    Atrial tachycardia, resolved  -presented with tachycardia, HR to 150  -on BB  -ECHO pending  -SVT versus atypical atrial flutter  -may need cardiology follow up    Alcohol dependence  -monitor    Cirrhosis  Hepatocellular jaundice  -monitor    Mild LA  -due to liver disease    Subclinical hypothyroidism  -needs follow up in 6 weeks    Expected Discharge Location and Transportation: Home  Expected Discharge   Expected Discharge Date: 11/13/2024; Expected Discharge Time:      VTE Prophylaxis:  Mechanical VTE prophylaxis orders are present.         AM-PAC 6 Clicks Score (PT): 19 (11/10/24 1945)    CODE STATUS:   Code Status and Medical Interventions: CPR (Attempt to Resuscitate); Full Support   Ordered at: 11/10/24 1537     Level Of Support Discussed With:    Patient    Next of Kin (If No Surrogate)     Code Status (Patient has no pulse and is not breathing):    CPR (Attempt to Resuscitate)     Medical Interventions (Patient has pulse or is breathing):    Full Support       Jose J Hutchins MD  11/11/24

## 2024-11-11 NOTE — THERAPY EVALUATION
Patient Name: Philomena Davis  : 1960    MRN: [id number]                              Today's Date: 2024       Admit Date: 11/10/2024    Visit Dx:     ICD-10-CM ICD-9-CM   1. Weakness of both lower extremities  R29.898 729.89   2. Paresthesia  R20.2 782.0   3. Weakness  R53.1 780.79   4. Leukocytosis, unspecified type  D72.829 288.60   5. Alcoholic cirrhosis of liver without ascites  K70.30 571.2     Patient Active Problem List   Diagnosis    Essential hypertension    Generalized anxiety disorder    Hyperlipidemia    Glaucoma    Alcoholic cirrhosis of liver without ascites    Severe malnutrition    GERD without esophagitis    Sleep disturbance    Annual physical exam    Idiopathic peripheral neuropathy    Severe anxiety with panic    Alcohol use disorder    Cigarette nicotine dependence in remission    Chronic obstructive pulmonary disease    Closed fracture of left hand    Moderate malnutrition    Lower extremity weakness     Past Medical History:   Diagnosis Date    Alcohol withdrawal 05/10/2019    Annual physical exam 2023    Anxiety and depression     Cirrhosis of liver     Closed displaced fracture of lateral malleolus of left fibula     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    Closed trimalleolar fracture of right ankle 2023    Digital mucous cyst of toe of left foot 2017    Ganglion cyst of left foot     Glaucoma     Hypertension     Wears glasses      Past Surgical History:   Procedure Laterality Date    ANKLE OPEN REDUCTION INTERNAL FIXATION Left 2017    Procedure: LEFT ANKLE OPEN REDUCTION INTERNAL FIXATION WITH ILIAC CREST BONE GRAFT , EXCISION CYST 4TH/5TH INNERSPACE LEFT FOOT;  Surgeon: Frank Larson MD;  Location:  [facility];  Service:     ANKLE OPEN REDUCTION INTERNAL FIXATION Right 2023    Procedure: ANKLE OPEN REDUCTION INTERNAL FIXATION;  Surgeon: Deandre Reynoso MD;  Location:  [facility];  Service:  Orthopedics;  Laterality: Right;    AUGMENTATION MAMMAPLASTY Bilateral 1999    BREAST AUGMENTATION      BREAST EXCISIONAL BIOPSY Right 2014    DILATION AND CURETTAGE, DIAGNOSTIC / THERAPEUTIC      ENDOSCOPY N/A 11/30/2022    Procedure: ESOPHAGOGASTRODUODENOSCOPY;  Surgeon: Arelis Solano MD;  Location:  [facility];  Service: Gastroenterology;  Laterality: N/A;    ENDOSCOPY N/A 2/1/2024    Procedure: ESOPHAGOGASTRODUODENOSCOPY;  Surgeon: Brunner, Mark I, MD;  Location:  [facility];  Service: Gastroenterology;  Laterality: N/A;    NH RPR NON/MAL FEMUR DSTL H/N W/ILIAC/AUTOG BONE Left 03/24/2017    Procedure: ILIAC CREST BONE GRAFT;  Surgeon: Frank Larson MD;  Location:  [facility];  Service: Orthopedics    WRIST SURGERY        General Information       Row Name 11/11/24 0956          Physical Therapy Time and Intention    Document Type evaluation  -     Mode of Treatment physical therapy  -       Row Name 11/11/24 0956          General Information    Patient Profile Reviewed yes  -     Prior Level of Function independent:;community mobility;gait;transfer;bed mobility;ADL's  Pt reports using FWW recently due to recent weakness/pain  -     Existing Precautions/Restrictions fall;other (see comments)  Significant pain in L foot w/ LT due to toenail  -     Barriers to Rehab medically complex  -       Row Name 11/11/24 0956          Living Environment    People in Home alone  -       Row Name 11/11/24 0956          Home Main Entrance    Number of Stairs, Main Entrance two  -AC     Stair Railings, Main Entrance railings on both sides of stairs  -       Row Name 11/11/24 0956          Stairs Within Home, Primary    Stairs, Within Home, Primary pt reports living on main level and sleeping on the couch due to fear of falling/pain.  -       Row Name 11/11/24 0956          Cognition    Orientation Status (Cognition) oriented x 4  -       Row Name 11/11/24 0956          Safety  Issues/Impairments Affecting Functional Mobility    Safety Issues Affecting Function (Mobility) insight into deficits/self-awareness;safety precautions follow-through/compliance;awareness of need for assistance;safety precaution awareness;impulsivity;judgment;sequencing abilities;problem-solving  -     Impairments Affecting Function (Mobility) balance;coordination;endurance/activity tolerance;strength;pain;sensation/sensory awareness  -               User Key  (r) = Recorded By, (t) = Taken By, (c) = Cosigned By      Initials Name Provider Type     Latoya Judge, PT Physical Therapist                   Mobility       Row Name 11/11/24 0959          Bed Mobility    Bed Mobility supine-sit  -AC     Supine-Sit [place] (Bed Mobility) modified independence  -     Assistive Device (Bed Mobility) head of bed elevated;bed rails  -     Comment, (Bed Mobility) Pt required extended time however was able to transition to sitting EOB w/o any physical assistance. In addition pt was able to maintain seated balance w/o support and did not c/o any increased pain while sitting EOB.  -       Row Name 11/11/24 0959          Bed-Chair Transfer    Bed-Chair [place] (Transfers) minimum assist (75% patient effort);2 person assist  -     Assistive Device (Bed-Chair Transfers) other (see comments)  BUE support  -     Comment, (Bed-Chair Transfer) Pt completed 3 lateral steps w/ minAx2 and BUE support in order to transition to sitting in bedside chair. Pt did not demonstrate any buckling or shakiness upon initial standing w/ BUE support however when instructed to reach back to chair pt demonstrated full body shaky movements resulting in her quickly sitting in bedside chair and yelling out due to pain from gait belt.  -       Row Name 11/11/24 0959          Sit-Stand Transfer    Sit-Stand [place] (Transfers) minimum assist (75% patient effort);2 person assist  -     Assistive Device (Sit-Stand Transfers)  walker, front-wheeled  -     Comment, (Sit-Stand Transfer) Upon 1st STS pt was provided FWW and minAX2. Pt required cues for hand placement however required extended time to demonstrate upright posture and demonstrated BLLE shaking as well as significant increased pain in BLE feet (30/10 per pt) resulting in pt returning to sitting EOB  -       Row Name 11/11/24 0958          Gait/Stairs (Locomotion)    [place] Level (Gait) unable to assess  -     Patient was able to Ambulate no, other medical factors prevent ambulation  -     Reason Patient was unable to Ambulate Uncontrolled Pain  -               User Key  (r) = Recorded By, (t) = Taken By, (c) = Cosigned By      Initials Name Provider Type    AC Latoya Judge, PT Physical Therapist                   Obj/Interventions       Row Name 11/11/24 1005          Range of Motion Comprehensive    General Range of Motion bilateral lower extremity ROM WNL  -       Row Name 11/11/24 1005          Strength Comprehensive (MMT)    General Manual Muscle Testing (MMT) Assessment lower extremity strength deficits identified  -     Comment, General Manual Muscle Testing (MMT) Assessment BLE's grossly WFL  -       Row Name 11/11/24 1005          Motor Skills    Motor Skills functional endurance  -     Functional Endurance below baseline  -       Row Name 11/11/24 1005          Balance    Balance Assessment sitting static balance;sitting dynamic balance;standing static balance;standing dynamic balance  -     Static Sitting Balance supervision  -     Dynamic Sitting Balance standby assist  -     Position, Sitting Balance sitting edge of bed;unsupported  -     Static Standing Balance minimal assist  -     Dynamic Standing Balance minimal assist;2-person assist  -     Position/Device Used, Standing Balance supported  -     Balance Interventions sitting;standing;sit to stand;supported;static;dynamic  -       Row Name 11/11/24 1005          Sensory  Assessment (Somatosensory)    Sensory Assessment (Somatosensory) other (see comments)  pt reports diminished LT sensation in BLE's at baseline however reports they are equally diminished  -AC               User Key  (r) = Recorded By, (t) = Taken By, (c) = Cosigned By      Initials Name Provider Type    AC Latoya Judge, PT Physical Therapist                   Goals/Plan       Row Name 11/11/24 1012          Transfer Goal 1 (PT)    Activity/Assistive Device (Transfer Goal 1, PT) sit-to-stand/stand-to-sit  -AC     [place] Level/Cues Needed (Transfer Goal 1, PT) standby assist  -AC     Time Frame (Transfer Goal 1, PT) short term goal (STG);5 days  -AC       Row Name 11/11/24 1012          Gait Training Goal 1 (PT)    Activity/Assistive Device (Gait Training Goal 1, PT) gait (walking locomotion);improve balance and speed;increase endurance/gait distance;increase energy conservation;decrease fall risk  -AC     [place] Level (Gait Training Goal 1, PT) standby assist  -AC     Distance (Gait Training Goal 1, PT) 100  -AC     Time Frame (Gait Training Goal 1, PT) long term goal (LTG);10 days  -AC       Row Name 11/11/24 1012          Therapy Assessment/Plan (PT)    Planned Therapy Interventions (PT) balance training;bed mobility training;gait training;patient/family education;strengthening;transfer training  -AC               User Key  (r) = Recorded By, (t) = Taken By, (c) = Cosigned By      Initials Name Provider Type    AC Latoya Judge, PT Physical Therapist                   Clinical Impression       Row Name 11/11/24 1006          Pain    Pretreatment Pain Rating 5/10  -AC     Posttreatment Pain Rating 6/10  -AC     Pain Side/Orientation generalized  -AC     Pain Management Interventions exercise or physical activity utilized  -AC     Response to Pain Interventions activity participation with increased pain  -AC     Pre/Posttreatment Pain Comment Pt reports 30/10 pain in bilateral feet in standing. Pt describes  "pain as \"standing in a pile of bees and glass\"  -       Row Name 11/11/24 1006          Plan of Care Review    Plan of Care Reviewed With patient  -AC     Progress no change  -     Outcome Evaluation PT initial evaluation complete. Pt is primarily limited by pain this date resulting in significantly decreased activity tolerance. Pt was able to transition to sitting EOB w/o any physical assitance or increased pain however upon 1st STS transfer pt yelled out in pain and demonstrated significant BLE shaking resulting in her to quickly return to sitting. Pt then completed a bed-chair transfer w/ minAx2 and BUE support demonstrating ability to take 3 lateral steps however pt impulsivly demonstrated full body shaking movements upon reaching bedside chair and began yelling out in pain, which quickly subsided after impulsivly sitting in the chair w/ poor safety awareness. Pt would benefit from continued skilled therapy while hosptialized to progress to PLOF. D/c rec is IRF for best outcome this date.  -       Row Name 11/11/24 1006          Therapy Assessment/Plan (PT)    Rehab Potential (PT) good  -     Criteria for Skilled Interventions Met (PT) yes  -AC     Therapy Frequency (PT) daily  -     Predicted Duration of Therapy Intervention (PT) 10 days  -       Row Name 11/11/24 1006          Vital Signs    Pre Systolic BP Rehab 144  -AC     Pre Treatment Diastolic BP 86  -AC     Post Systolic BP Rehab 127  -AC     Post Treatment Diastolic BP 79  -AC     Pretreatment Heart Rate (beats/min) 95  -AC     Posttreatment Heart Rate (beats/min) 99  -AC     Pre SpO2 (%) 94  -AC     O2 Delivery Pre Treatment room air  -AC     O2 Delivery Intra Treatment room air  -AC     Post SpO2 (%) 95  -AC     O2 Delivery Post Treatment room air  -AC     Pre Patient Position Supine  -AC     Intra Patient Position Standing  -AC     Post Patient Position Sitting  -       Row Name 11/11/24 1006          Positioning and Restraints    " Pre-Treatment Position in bed  -AC     Post Treatment Position chair  -AC     In Chair notified nsg;reclined;sitting;call light within reach;encouraged to call for assist;exit alarm on;waffle cushion;legs elevated;on mechanical lift sling  -               User Key  (r) = Recorded By, (t) = Taken By, (c) = Cosigned By      Initials Name Provider Type    AC Latoya Judge, PT Physical Therapist                   Outcome Measures       Row Name 11/11/24 1012          How much help from another person do you currently need...    Turning from your back to your side while in flat bed without using bedrails? 4  -AC     Moving from lying on back to sitting on the side of a flat bed without bedrails? 3  -AC     Moving to and from a bed to a chair (including a wheelchair)? 3  -AC     Standing up from a chair using your arms (e.g., wheelchair, bedside chair)? 3  -AC     Climbing 3-5 steps with a railing? 2  -AC     To walk in hospital room? 2  -AC     AM-PAC 6 Clicks Score (PT) 17  -AC     Highest Level of Mobility Goal 5 --> Static standing  -AC       Row Name 11/11/24 1012          Functional Assessment    Outcome Measure Options AM-PAC 6 Clicks Basic Mobility (PT)  -               User Key  (r) = Recorded By, (t) = Taken By, (c) = Cosigned By      Initials Name Provider Type    AC Latoya Judge, PT Physical Therapist                                 Physical Therapy Education       Title: PT OT SLP Therapies (In Progress)       Topic: Physical Therapy (In Progress)       Point: Mobility training (In Progress)       Learning Progress Summary            Patient Acceptance, E, NR by  at 11/11/2024 1013                      Point: Body mechanics (In Progress)       Learning Progress Summary            Patient Acceptance, E, NR by  at 11/11/2024 1013                      Point: Precautions (In Progress)       Learning Progress Summary            Patient Acceptance, E, NR by  at 11/11/2024 1013                                       User Key       Initials Effective Dates Name Provider Type Discipline     07/11/24 -  Latoya Judge, PT Physical Therapist PT                  PT Recommendation and Plan  Planned Therapy Interventions (PT): balance training, bed mobility training, gait training, patient/family education, strengthening, transfer training  Progress: no change  Outcome Evaluation: PT initial evaluation complete. Pt is primarily limited by pain this date resulting in significantly decreased activity tolerance. Pt was able to transition to sitting EOB w/o any physical assitance or increased pain however upon 1st STS transfer pt yelled out in pain and demonstrated significant BLE shaking resulting in her to quickly return to sitting. Pt then completed a bed-chair transfer w/ minAx2 and BUE support demonstrating ability to take 3 lateral steps however pt impulsivly demonstrated full body shaking movements upon reaching bedside chair and began yelling out in pain, which quickly subsided after impulsivly sitting in the chair w/ poor safety awareness. Pt would benefit from continued skilled therapy while hosptialized to progress to PLOF. D/c rec is IRF for best outcome this date.     Time Calculation:   PT Evaluation Complexity  History, PT Evaluation Complexity: 1-2 personal factors and/or comorbidities  Examination of Body Systems (PT Eval Complexity): total of 3 or more elements  Clinical Presentation (PT Evaluation Complexity): evolving  Clinical Decision Making (PT Evaluation Complexity): moderate complexity  Overall Complexity (PT Evaluation Complexity): moderate complexity     PT Charges       Row Name 11/11/24 1013             Time Calculation    Start Time 0905  -AC      PT Received On 11/11/24  -      PT Goal Re-Cert Due Date 11/21/24  -         Time Calculation- PT    Total Timed Code Minutes- PT 13 minute(s)  -AC         Timed Charges    90962 - PT Therapeutic Activity Minutes 13  -AC         Untimed Charges    PT  Eval/Re-eval Minutes 55  -AC         Total Minutes    Timed Charges Total Minutes 13  -AC      Untimed Charges Total Minutes 55  -AC       Total Minutes 68  -AC                User Key  (r) = Recorded By, (t) = Taken By, (c) = Cosigned By      Initials Name Provider Type    AC Latoya Judge, PT Physical Therapist                  Therapy Charges for Today       Code Description Service Date Service Provider Modifiers Qty    03991248930 HC PT THERAPEUTIC ACT EA 15 MIN 11/11/2024 Latoya Judge, PT GP 1    [id number]  PT EVAL MOD COMPLEXITY 4 11/11/2024 Latoya Judge, PT GP 1            PT G-Codes  Outcome Measure Options: AM-PAC 6 Clicks Basic Mobility (PT)  AM-PAC 6 Clicks Score (PT): 17  PT Discharge Summary  Anticipated Discharge Disposition (PT): inpatient rehabilitation facility    Latoya Judge PT  11/11/2024

## 2024-11-11 NOTE — PLAN OF CARE
Problem: Adult Inpatient Plan of Care  Goal: Plan of Care Review  Outcome: Progressing  Flowsheets (Taken 11/11/2024 0454)  Outcome Evaluation: Patient will remain free from injury during hospitalization by calling for assistance at all times when needing out of bed.  Plan of Care Reviewed With: patient  Goal: Patient-Specific Goal (Individualized)  Outcome: Progressing  Goal: Absence of Hospital-Acquired Illness or Injury  Outcome: Progressing  Intervention: Identify and Manage Fall Risk  Description: Perform standard risk assessment on admission using a validated tool or comprehensive approach appropriate to the patient; reassess fall risk frequently, with change in status or transfer to another level of care.  Communicate risk to interprofessional healthcare team; ensure fall risk visible cue.  Determine need for increased observation, equipment and environmental modification, as well as use of supportive, nonskid footwear.  Adjust safety measures to individual needs and identified risk factors.  Reinforce the importance of active participation with fall risk prevention, safety, and physical activity with the patient and family.  Perform regular intentional rounding to assess need for position change, pain assessment and personal needs, including assistance with toileting.  Recent Flowsheet Documentation  Taken 11/11/2024 0232 by Kavitha Austin, RN  Safety Promotion/Fall Prevention:   nonskid shoes/slippers when out of bed   safety round/check completed   room organization consistent   lighting adjusted   activity supervised   assistive device/personal items within reach   clutter free environment maintained  Taken 11/11/2024 0030 by Kavitha Austin, RN  Safety Promotion/Fall Prevention:   nonskid shoes/slippers when out of bed   safety round/check completed   room organization consistent   lighting adjusted   activity supervised   assistive device/personal items within reach   clutter free environment  maintained  Taken 11/10/2024 2210 by Kavitha Austin RN  Safety Promotion/Fall Prevention:   nonskid shoes/slippers when out of bed   safety round/check completed   room organization consistent   lighting adjusted   activity supervised   assistive device/personal items within reach   clutter free environment maintained  Taken 11/10/2024 2030 by Kavitha Austin RN  Safety Promotion/Fall Prevention:   nonskid shoes/slippers when out of bed   safety round/check completed   room organization consistent   lighting adjusted   activity supervised   assistive device/personal items within reach   clutter free environment maintained  Taken 11/10/2024 1945 by Kavitha Austin RN  Safety Promotion/Fall Prevention:   nonskid shoes/slippers when out of bed   safety round/check completed   room organization consistent   lighting adjusted   activity supervised   assistive device/personal items within reach   clutter free environment maintained  Intervention: Prevent Skin Injury  Description: Perform a screening for skin injury risk, such as pressure or moisture-associated skin damage on admission and at regular intervals throughout hospital stay.  Keep all areas of skin (especially folds) clean and dry.  Maintain adequate skin hydration.  Relieve and redistribute pressure and protect bony prominences and skin at risk for injury; implement measures based on patient-specific risk factors.  Match turning and repositioning schedule to clinical condition.  Encourage weight shift frequently; assist with reposition if unable to complete independently.  Float heels off bed; avoid pressure on the Achilles tendon.  Keep skin free from extended contact with medical devices.  Optimize nutrition and hydration.  Encourage functional activity and mobility, as early as tolerated.  Use aids (e.g., slide boards, mechanical lift) during transfer.  Recent Flowsheet Documentation  Taken 11/11/2024 0232 by Kavitha Austin RN  Body  Position: position changed independently  Taken 11/11/2024 0030 by Kavitha Austin RN  Body Position: position changed independently  Taken 11/10/2024 2210 by Kavitha Austin RN  Body Position: position changed independently  Taken 11/10/2024 2030 by Kavitha Austin RN  Body Position: position changed independently  Taken 11/10/2024 1945 by Kavitha Austin RN  Body Position: position changed independently  Skin Protection: incontinence pads utilized  Intervention: Prevent and Manage VTE (Venous Thromboembolism) Risk  Description: Assess for VTE (venous thromboembolism) risk.  Promote early mobilization; encourage both active and passive leg exercises, if unable to ambulate.  Initiate and maintain compression or other therapy, as indicated, based on identified risk in accordance with organizational protocol and provider order.  Recognize the patient's individual risk for bleeding before initiating pharmacologic thromboprophylaxis.  Recent Flowsheet Documentation  Taken 11/10/2024 1945 by Kavitha Austin RN  VTE Prevention/Management:   bilateral   SCDs (sequential compression devices) on  Intervention: Prevent Infection  Description: Maintain skin and mucous membrane integrity; promote hand, oral and pulmonary hygiene.  Optimize fluid balance, nutrition, sleep and glycemic control to maximize infection resistance.  Identify potential sources of infection early to prevent or mitigate progression of infection (e.g., wound, lines, devices).  Evaluate ongoing need for invasive devices; remove promptly when no longer indicated.  Review vaccination status.  Recent Flowsheet Documentation  Taken 11/11/2024 0232 by Kavitha Austin RN  Infection Prevention:   environmental surveillance performed   rest/sleep promoted   hand hygiene promoted  Taken 11/11/2024 0030 by Kavitha Austin RN  Infection Prevention:   environmental surveillance performed   rest/sleep promoted   hand hygiene promoted  Taken  11/10/2024 2210 by Kavitha Austin RN  Infection Prevention:   environmental surveillance performed   rest/sleep promoted   hand hygiene promoted  Taken 11/10/2024 2030 by Kavitha Austin RN  Infection Prevention:   environmental surveillance performed   rest/sleep promoted   hand hygiene promoted  Taken 11/10/2024 1945 by Kavitha Austin RN  Infection Prevention:   environmental surveillance performed   rest/sleep promoted   hand hygiene promoted  Goal: Optimal Comfort and Wellbeing  Outcome: Progressing  Intervention: Provide Person-Centered Care  Description: Use a family-focused approach to care; encourage support system presence and participation.  Develop trust and rapport by proactively providing information, encouraging questions, addressing concerns and offering reassurance.  Acknowledge emotional response to hospitalization.  Recognize and utilize personal coping strategies and strengths; develop goals via shared decision-making.  Honor spiritual and cultural preferences.  Recent Flowsheet Documentation  Taken 11/10/2024 1945 by Kavitha Austin RN  Trust Relationship/Rapport:   care explained   choices provided   emotional support provided   empathic listening provided   questions answered   questions encouraged   reassurance provided   thoughts/feelings acknowledged  Goal: Readiness for Transition of Care  Outcome: Progressing     Problem: Skin Injury Risk Increased  Goal: Skin Health and Integrity  Outcome: Progressing  Intervention: Optimize Skin Protection  Description: Perform a full pressure injury risk assessment, as indicated by screening, upon admission to care unit.  Reassess skin (full inspection and injury risk, including skin temperature, consistency and color) frequently (e.g., scheduled interval, with change in condition) to provide optimal early detection and prevention.  Maintain adequate tissue perfusion (e.g., encourage fluid balance; avoid crossing legs, constrictive  clothing or devices) to promote tissue oxygenation.  Maintain head of bed at lowest degree of elevation tolerated, considering medical condition and other restrictions. Use positioning supports to prevent sliding and friction. Consider low friction textiles.  Avoid positioning onto an area that remains reddened or on bony prominences.  Minimize incontinence and moisture (e.g., toileting schedule; moisture-wicking pad, diaper or incontinence collection device; skin moisture barrier).  Cleanse skin promptly and gently, when soiled, utilizing a pH-balanced cleanser.  Relieve and redistribute pressure (e.g., scheduled position changes, weight shifts, use of support surface, medical device repositioning, protective dressing application, use of positioning device, microclimate control, use of pressure-injury-monitor  Encourage increased activity, such as sitting in a chair at the bedside or early mobilization, when able to tolerate. Avoid prolonged sitting.  Recent Flowsheet Documentation  Taken 11/11/2024 0232 by Kavitha Austin RN  Head of Bed (HOB) Positioning: HOB elevated  Taken 11/11/2024 0030 by Kavitha Austin RN  Head of Bed (HOB) Positioning: HOB elevated  Taken 11/10/2024 2210 by Kavitha Austin RN  Head of Bed (HOB) Positioning: HOB elevated  Taken 11/10/2024 2030 by Kavitha Austin RN  Head of Bed (HOB) Positioning: HOB elevated  Taken 11/10/2024 1945 by Kavitha Austin RN  Pressure Reduction Techniques:   frequent weight shift encouraged   weight shift assistance provided  Head of Bed (HOB) Positioning: HOB elevated  Pressure Reduction Devices: pressure-redistributing mattress utilized  Skin Protection: incontinence pads utilized     Problem: Fall Injury Risk  Goal: Absence of Fall and Fall-Related Injury  Outcome: Progressing  Intervention: Promote Injury-Free Environment  Description: Provide a safe, barrier-free environment that encourages independent activity.  Keep care area  uncluttered and well-lighted.  Determine need for increased observation or monitoring.  Avoid use of devices that minimize mobility, such as restraints or indwelling urinary catheter.  Recent Flowsheet Documentation  Taken 11/11/2024 0232 by Kavitha Austin RN  Safety Promotion/Fall Prevention:   nonskid shoes/slippers when out of bed   safety round/check completed   room organization consistent   lighting adjusted   activity supervised   assistive device/personal items within reach   clutter free environment maintained  Taken 11/11/2024 0030 by Kavitha Austin RN  Safety Promotion/Fall Prevention:   nonskid shoes/slippers when out of bed   safety round/check completed   room organization consistent   lighting adjusted   activity supervised   assistive device/personal items within reach   clutter free environment maintained  Taken 11/10/2024 2210 by Kavitha Austin RN  Safety Promotion/Fall Prevention:   nonskid shoes/slippers when out of bed   safety round/check completed   room organization consistent   lighting adjusted   activity supervised   assistive device/personal items within reach   clutter free environment maintained  Taken 11/10/2024 2030 by Kavitha Austin RN  Safety Promotion/Fall Prevention:   nonskid shoes/slippers when out of bed   safety round/check completed   room organization consistent   lighting adjusted   activity supervised   assistive device/personal items within reach   clutter free environment maintained  Taken 11/10/2024 1945 by Kavitha Austin RN  Safety Promotion/Fall Prevention:   nonskid shoes/slippers when out of bed   safety round/check completed   room organization consistent   lighting adjusted   activity supervised   assistive device/personal items within reach   clutter free environment maintained   Goal Outcome Evaluation:  Plan of Care Reviewed With: patient           Outcome Evaluation: Patient will remain free from injury during hospitalization by  calling for assistance at all times when needing out of bed.

## 2024-11-11 NOTE — PLAN OF CARE
Goal Outcome Evaluation:  Plan of Care Reviewed With: patient        Progress: no change  Outcome Evaluation: PT initial evaluation complete. Pt is primarily limited by pain this date resulting in significantly decreased activity tolerance. Pt was able to transition to sitting EOB w/o any physical assitance or increased pain however upon 1st STS transfer pt yelled out in pain and demonstrated significant BLE shaking resulting in her to quickly return to sitting. Pt then completed a bed-chair transfer w/ minAx2 and BUE support demonstrating ability to take 3 lateral steps however pt impulsivly demonstrated full body shaking movements upon reaching bedside chair and began yelling out in pain, which quickly subsided after impulsivly sitting in the chair w/ poor safety awareness. Pt would benefit from continued skilled therapy while hosptialized to progress to PLOF. D/c rec is IRF for best outcome this date.    Anticipated Discharge Disposition (PT): inpatient rehabilitation facility

## 2024-11-11 NOTE — PROGRESS NOTES
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  Mainly presented in right arm and face initially was tingling and over the years questionable began to affect her feet and left upper extremity.      Patient states that approximately 2 weeks ago however patient did have an episode she became very sick with occlusion and was unable to eat or drink for quite some time.  It was after this that she began to feel weak all over but mostly in her lower extremities.  Patient required a walker over the past week and felt she was going to fall.  She also has complaint of a \"electric shock \"pain in her lower extremities.  She states prior to this she was able to ambulate independently.    Patient denies any shortness of breath.  Patient states she can feel fullness and complete emptiness of bowels and bladder.  However she does endorse groin and inner thigh numbness.    Patient states that she has a rare autoimmune disease, primary biliary cholangitis.     Patient denies any illicit drug use, reports that she quit smoking over 20 years ago and does drink occasionally approximately once a month.  Her last drink was 2 days ago.  She does have a history as she of heavy drinking in the past.      Review of Systems   General: Negative for fever, nausea, or vomiting.   Neurological: Positive for weakness, gait disturbances, pain and numbness    Objective     Physical Exam  Vitals and nursing note reviewed.   Constitutional:       General: She is not in acute distress.     Appearance: She is not ill-appearing.   Eyes:      Extraocular Movements: Extraocular movements intact.      Pupils: Pupils are equal, " round, and reactive to light.      Comments: No nystagmus or deviated gaze noted   Neurological:      Mental Status: She is alert and oriented to person, place, and time.      Cranial Nerves: Cranial nerves 2-12 are intact.      Sensory: Sensory deficit present.      Motor: Weakness present. No tremor or seizure activity.      Coordination: Coordination is intact. Finger-Nose-Finger Test normal.      Deep Tendon Reflexes: Reflexes are normal and symmetric. Babinski sign absent on the right side. Babinski sign present on the left side.      Reflex Scores:       Bicep reflexes are 2+ on the right side and 2+ on the left side.       Patellar reflexes are 0 on the right side and 0 on the left side.       Achilles reflexes are 0 on the right side and 0 on the left side.     Comments:       Cranial Nerves   CN II: Pupils are equal, round, and reactive to light. Normal visual acuity and visual fields.    CN III IV VI: Extraocular movements are full without nystagmus.  CN V: Normal facial sensation and strength of muscles of mastication.  CN VII: Facial movements are symmetric. No weakness.  CN VIII:  Auditory acuity is normal.  CN IX & X:  Symmetric palatal movement.  CN XI: Sternocleidomastoid and trapezius are normal.  No weakness.  CN XII: The tongue is midline.  No atrophy or fasciculations.    Neck extension strength 5/5   Neck Flexion 5/5  Eyelid strength 5/5  Sternocleidomastoid 5/5  Bilateral trapezius strength +4/5   Bilateral tricep strength +4/5  Bilateral bicep strength +4/5   Bilateral finger extension 4/5  Bilateral hip extension 4/5  Bilateral hip flexion +4/5  Bilateral knee extension +4/5  Bilateral knee flexion  +4/5  Bilateral foot extension 5/5  Bilateral foot flexion 5/5    Sensation deficit to pinprick noted posteriorly at T2 and anteriorly at T6.  Decrease sensation to vibration in distal bilateral lower extremities             Vitals:   Temp:  [98 °F (36.7 °C)-98.6 °F (37 °C)] 98.6 °F (37 °C)  Heart  Rate:  [] 95  Resp:  [8-18] 16  BP: (110-165)/() 144/86  Flow (L/min) (Oxygen Therapy):  [2] 2    Current Medications    Current Facility-Administered Medications:   •  acetaminophen (TYLENOL) tablet 650 mg, 650 mg, Oral, Q4H PRN **OR** acetaminophen (TYLENOL) 160 MG/5ML oral solution 650 mg, 650 mg, Oral, Q4H PRN **OR** acetaminophen (TYLENOL) suppository 500 mg, 500 mg, Rectal, Q4H PRN, Sophie Gandhi MD  •  sennosides-docusate (PERICOLACE) 8.6-50 MG per tablet 2 tablet, 2 tablet, Oral, BID PRN **AND** polyethylene glycol (MIRALAX) packet 17 g, 17 g, Oral, Daily PRN **AND** bisacodyl (DULCOLAX) EC tablet 5 mg, 5 mg, Oral, Daily PRN **AND** bisacodyl (DULCOLAX) suppository 10 mg, 10 mg, Rectal, Daily PRN, Sophie Gandhi MD  •  calcium carbonate (TUMS) chewable tablet 500 mg (200 mg elemental), 2 tablet, Oral, BID PRN, Sophie Gandhi MD  •  Calcium Replacement - Follow Nurse / BPA Driven Protocol, , Does not apply, PRN, Sophie Gandhi MD  •  carvedilol (COREG) tablet 12.5 mg, 12.5 mg, Oral, BID With Meals, Sophie Gandhi MD, 12.5 mg at 11/11/24 0836  •  desvenlafaxine (PRISTIQ) 24 hr tablet 50 mg, 50 mg, Oral, Daily, Sophie Gandhi MD, 50 mg at 11/11/24 0836  •  folic acid (FOLVITE) tablet 1 mg, 1 mg, Oral, Daily, Sophie Gandhi MD, 1 mg at 11/11/24 0836  •  ipratropium-albuterol (DUO-NEB) nebulizer solution 3 mL, 3 mL, Nebulization, Q6H PRN, Sophie Gandhi MD  •  latanoprost (XALATAN) 0.005 % ophthalmic solution 1 drop, 1 drop, Both Eyes, Nightly, Sophie Gandhi MD, 1 drop at 11/10/24 2002  •  LORazepam (ATIVAN) tablet 0.5 mg, 0.5 mg, Oral, Q8H PRN, Sophie Gandhi MD  •  Magnesium Standard Dose Replacement - Follow Nurse / BPA Driven Protocol, , Does not apply, PRN, Sophie Gandhi MD  •  melatonin tablet 5 mg, 5 mg, Oral, Nightly, Sophie Gandhi MD, 5 mg at 11/10/24 2001  •  nitroglycerin (NITROSTAT) SL  tablet 0.4 mg, 0.4 mg, Sublingual, Q5 Min PRN, Sophie Gandhi MD  •  ondansetron (ZOFRAN) injection 4 mg, 4 mg, Intravenous, Q6H PRN, Sophie Gandhi MD  •  pantoprazole (PROTONIX) EC tablet 40 mg, 40 mg, Oral, Daily, Sophie Gandhi MD, 40 mg at 11/11/24 0837  •  Phosphorus Replacement - Follow Nurse / BPA Driven Protocol, , Does not apply, PRN, Sophie Gandhi MD  •  Potassium Replacement - Follow Nurse / BPA Driven Protocol, , Does not apply, PRN, Sophie Gandhi MD  •  rosuvastatin (CRESTOR) tablet 5 mg, 5 mg, Oral, Daily, Sophie Gandhi MD, 5 mg at 11/10/24 2001  •  sodium chloride 0.9 % flush 10 mL, 10 mL, Intravenous, PRN, Zac Bruner MD  •  sodium chloride 0.9 % flush 10 mL, 10 mL, Intravenous, Q12H, Sophie Gandhi MD, 10 mL at 11/11/24 0837  •  sodium chloride 0.9 % flush 10 mL, 10 mL, Intravenous, PRN, Sophie Gandhi MD  •  sodium chloride 0.9 % infusion 40 mL, 40 mL, Intravenous, PRN, Sophie Gandhi MD  •  thiamine (VITAMIN B-1) tablet 100 mg, 100 mg, Oral, Daily, Sophie Gandhi MD, 100 mg at 11/11/24 0836  •  ursodiol (ACTIGALL) capsule 300 mg, 300 mg, Oral, BID, Sophie Gandhi MD, 300 mg at 11/11/24 0837  •  zolpidem (AMBIEN) tablet 5 mg, 5 mg, Oral, Nightly, Sophie Gandhi MD, 5 mg at 11/10/24 2003    Laboratory Results:   Lab Results   Component Value Date    GLUCOSE 100 (H) 11/10/2024    CALCIUM 8.8 11/10/2024     11/10/2024    K 4.6 11/11/2024    CO2 27.0 11/10/2024     11/10/2024    BUN 6 (L) 11/10/2024    CREATININE 0.55 (L) 11/10/2024    EGFRIFNONA 114 05/15/2019    BCR 10.9 11/10/2024    ANIONGAP 11.0 11/10/2024     Lab Results   Component Value Date    WBC 15.59 (H) 11/10/2024    HGB 12.4 11/10/2024    HCT 37.0 11/10/2024    .9 (H) 11/10/2024     11/10/2024     Lab Results   Component Value Date    CHOL 376 (H) 10/25/2023    CHOL 430 (H) 09/12/2023    CHOL 214 (H)  05/14/2019     Lab Results   Component Value Date     (H) 10/25/2023     (H) 09/12/2023    HDL 62 (H) 05/14/2019     Lab Results   Component Value Date     (H) 10/25/2023     (H) 09/12/2023     (H) 05/14/2019     Lab Results   Component Value Date    TRIG 128 10/25/2023    TRIG 159 (H) 09/12/2023    TRIG 172 (H) 05/14/2019     Lab Results   Component Value Date    HGBA1C 5.60 09/12/2023     Lab Results   Component Value Date    INR 1.18 (H) 11/10/2024    INR 1.10 01/31/2024    INR 1.0 08/09/2023    PROTIME 15.2 (H) 11/10/2024    PROTIME 14.3 01/31/2024    PROTIME 14.9 (H) 11/30/2022     Lab Results   Component Value Date    FOLATE 4.83 11/10/2024     Lab Results   Component Value Date    [id number] 716 11/10/2024       MRI Brain Without Contrast    Result Date: 11/10/2024  MRI BRAIN WO CONTRAST Date of Exam: 11/10/2024 1:27 PM EST Indication: BLE weakness, facial parasthesia R>L, difficulty ambulating.  Comparison: None available. Technique:  Routine multiplanar/multisequence sequence images of the brain were obtained without contrast administration. Findings: No acute infarct is present on diffusion weighted sequences. Midline structures appear normal and the craniocervical junction is satisfactory. Gray-white differentiation is maintained and there is no evidence of intracranial hemorrhage, mass or mass effect. The ventricles are normal in size and configuration. The orbits are normal. The paranasal sinuses are clear. Intracranial arterial flow voids appear maintained.     Impression: Impression: Normal noncontrast MRI of the brain. Electronically Signed: Adriano Kessler MD  11/10/2024 2:14 PM EST  Workstation ID: [id number]        Assessment / Plan     Active Hospital Problems:    Lower extremity weakness     Brief Patient Summary:  Philomena Davis is a 64 y.o. female who has a past medical history of EtOH use, alcoholic liver cirrhosis, HTN, peripheral neuropathy, anxiety and  depression who presented to [facility] with complaint of worsening bilateral lower extremity weakness, right and left facial paresthesias for the past week.  Patient was originally seen by our stroke navigators with a negative workup.    Plan:   Bilateral lower extremity weakness  EtOH use  Differentials include process of spinal cord, process myositis, or possible progression of neuropathy.  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  MRI brain with no acute findings  MRI lumbar showed small disc protrusion at the L5-S1 with no significant spinal canal or neuroforaminal stenosis.  MRI of cervical and thoracic spine pending  Will hold off on LP at this time, pending other results  EMG/nerve conduction study in a.m.  Vitamin B12 716  Folate 4.83, low normal.  Folic acid 1 mg daily   Continue thiamine 100 mg daily  General Neurology will continue to follow      I have discussed the above with the patient, bedside RN, Dr. Sykes and Dr. Hutchins  Time spent with patient: 80 minutes in face-to-face evaluation and management of the patient.      CANDY Ocampo

## 2024-11-12 ENCOUNTER — APPOINTMENT (OUTPATIENT)
Dept: NEUROLOGY | Facility: HOSPITAL | Age: 64
End: 2024-11-12
Payer: COMMERCIAL

## 2024-11-12 LAB
QT INTERVAL: 312 MS
QTC INTERVAL: 446 MS

## 2024-11-12 PROCEDURE — 95886 MUSC TEST DONE W/N TEST COMP: CPT | Performed by: PSYCHIATRY & NEUROLOGY

## 2024-11-12 PROCEDURE — 25010000002 ALBUMIN HUMAN 25% PER 50 ML: Performed by: HOSPITALIST

## 2024-11-12 PROCEDURE — 93005 ELECTROCARDIOGRAM TRACING: CPT | Performed by: HOSPITALIST

## 2024-11-12 PROCEDURE — G0378 HOSPITAL OBSERVATION PER HR: HCPCS

## 2024-11-12 PROCEDURE — 99203 OFFICE O/P NEW LOW 30 MIN: CPT | Performed by: INTERNAL MEDICINE

## 2024-11-12 PROCEDURE — 95886 MUSC TEST DONE W/N TEST COMP: CPT

## 2024-11-12 PROCEDURE — 99232 SBSQ HOSP IP/OBS MODERATE 35: CPT | Performed by: HOSPITALIST

## 2024-11-12 PROCEDURE — 25810000003 SODIUM CHLORIDE 0.9 % SOLUTION: Performed by: HOSPITALIST

## 2024-11-12 PROCEDURE — 97530 THERAPEUTIC ACTIVITIES: CPT

## 2024-11-12 PROCEDURE — 99233 SBSQ HOSP IP/OBS HIGH 50: CPT

## 2024-11-12 PROCEDURE — P9047 ALBUMIN (HUMAN), 25%, 50ML: HCPCS | Performed by: HOSPITALIST

## 2024-11-12 PROCEDURE — 95911 NRV CNDJ TEST 9-10 STUDIES: CPT | Performed by: PSYCHIATRY & NEUROLOGY

## 2024-11-12 PROCEDURE — 95911 NRV CNDJ TEST 9-10 STUDIES: CPT

## 2024-11-12 PROCEDURE — 93010 ELECTROCARDIOGRAM REPORT: CPT | Performed by: INTERNAL MEDICINE

## 2024-11-12 RX ORDER — ALBUMIN (HUMAN) 12.5 G/50ML
25 SOLUTION INTRAVENOUS ONCE
Status: COMPLETED | OUTPATIENT
Start: 2024-11-12 | End: 2024-11-12

## 2024-11-12 RX ORDER — GABAPENTIN 100 MG/1
100 CAPSULE ORAL 3 TIMES DAILY
Status: DISCONTINUED | OUTPATIENT
Start: 2024-11-12 | End: 2024-11-14

## 2024-11-12 RX ORDER — METOPROLOL TARTRATE 1 MG/ML
5 INJECTION, SOLUTION INTRAVENOUS EVERY 6 HOURS PRN
Status: DISCONTINUED | OUTPATIENT
Start: 2024-11-12 | End: 2024-11-13

## 2024-11-12 RX ORDER — CARVEDILOL 12.5 MG/1
25 TABLET ORAL 2 TIMES DAILY WITH MEALS
Status: DISCONTINUED | OUTPATIENT
Start: 2024-11-12 | End: 2024-11-13

## 2024-11-12 RX ADMIN — DESVENLAFAXINE 50 MG: 50 TABLET, FILM COATED, EXTENDED RELEASE ORAL at 08:36

## 2024-11-12 RX ADMIN — GABAPENTIN 100 MG: 100 CAPSULE ORAL at 17:07

## 2024-11-12 RX ADMIN — PANTOPRAZOLE SODIUM 40 MG: 40 TABLET, DELAYED RELEASE ORAL at 06:39

## 2024-11-12 RX ADMIN — CARVEDILOL 12.5 MG: 12.5 TABLET, FILM COATED ORAL at 08:36

## 2024-11-12 RX ADMIN — LATANOPROST 1 DROP: 50 SOLUTION OPHTHALMIC at 20:54

## 2024-11-12 RX ADMIN — THIAMINE HCL TAB 100 MG 100 MG: 100 TAB at 08:36

## 2024-11-12 RX ADMIN — URSODIOL 300 MG: 300 CAPSULE ORAL at 08:36

## 2024-11-12 RX ADMIN — ALBUMIN (HUMAN) 25 G: 0.25 INJECTION, SOLUTION INTRAVENOUS at 16:19

## 2024-11-12 RX ADMIN — ZOLPIDEM TARTRATE 5 MG: 5 TABLET ORAL at 20:53

## 2024-11-12 RX ADMIN — SODIUM CHLORIDE 1000 ML: 9 INJECTION, SOLUTION INTRAVENOUS at 15:07

## 2024-11-12 RX ADMIN — Medication 10 ML: at 08:37

## 2024-11-12 RX ADMIN — FOLIC ACID 1 MG: 1 TABLET ORAL at 08:36

## 2024-11-12 RX ADMIN — GABAPENTIN 100 MG: 100 CAPSULE ORAL at 22:36

## 2024-11-12 RX ADMIN — ROSUVASTATIN 5 MG: 10 TABLET, FILM COATED ORAL at 20:54

## 2024-11-12 RX ADMIN — Medication 5 MG: at 20:54

## 2024-11-12 RX ADMIN — URSODIOL 300 MG: 300 CAPSULE ORAL at 20:53

## 2024-11-12 NOTE — PROGRESS NOTES
[facility] Medicine Services  PROGRESS NOTE    Patient Name: Philomena Davis  : 1960  MRN: [id number]    Date of Admission: 11/10/2024  Primary Care Physician: Unique Brandt DO    Subjective   Subjective     CC: Numbness    HPI: Continues with numbness. Could only stand and pivot yesterday. NO f/c. Does note symptomatic palpitations.       Objective   Objective     Vital Signs:   Temp:  [97.1 °F (36.2 °C)-99.8 °F (37.7 °C)] 98.6 °F (37 °C)  Heart Rate:  [] 101  Resp:  [16-18] 16  BP: ()/(60-75) 111/68  Flow (L/min) (Oxygen Therapy):  [2] 2     Physical Exam:  NAD, alert and oriented  OP clear, dry MM  Neck supple  No LAD  Tachy  CTAB  +BS, soft  MASSEY  Normal affect  B LE 4+/5, notes numbness in hands/legs, no change from  otherwise    Results Reviewed:  LAB RESULTS:      Lab 11/10/24  1628 11/10/24  1235 11/10/24  1007 11/10/24  0946   WBC  --   --   --  15.59*   HEMOGLOBIN  --   --   --  12.4   HEMATOCRIT  --   --   --  37.0   PLATELETS  --   --   --  314   NEUTROS ABS  --   --   --  11.83*   IMMATURE GRANS (ABS)  --   --   --  0.07*   LYMPHS ABS  --   --   --  2.06   MONOS ABS  --   --   --  1.34*   EOS ABS  --   --   --  0.20   MCV  --   --   --  106.9*   SED RATE  --   --   --  105*   CRP  --   --  3.88*  --    PROCALCITONIN  --   --  0.15  --    LACTATE 1.2 2.2*  --  2.6*   PROTIME  --   --   --  15.2*   APTT  --   --   --  37.1*   HSTROP T  --   --  14*  --          Lab 24  1255 24  0017 11/10/24  1007   SODIUM  --   --  138   POTASSIUM  --  4.6 3.3*   CHLORIDE  --   --  100   CO2  --   --  27.0   ANION GAP  --   --  11.0   BUN  --   --  6*   CREATININE  --   --  0.55*   EGFR  --   --  103.8   GLUCOSE  --   --  100*   CALCIUM  --   --  8.8   MAGNESIUM 1.7  --  1.7   PHOSPHORUS 3.4  --   --    TSH  --   --  9.670*         Lab 11/10/24  1007   TOTAL PROTEIN 7.0   ALBUMIN 2.8*   GLOBULIN 4.2   ALT (SGPT) 27   AST (SGOT) 72*   BILIRUBIN 1.7*   ALK  PHOS 165*         Lab 11/10/24  1007 11/10/24  0946   HSTROP T 14*  --    PROTIME  --  15.2*   INR  --  1.18*             Lab 11/10/24  1235   FOLATE 4.83   VITAMIN B 12 716         Brief Urine Lab Results  (Last result in the past 365 days)        Color   Clarity   Blood   Leuk Est   Nitrite   Protein   CREAT   Urine HCG        11/10/24 0958 Yellow   Clear   Negative   Negative   Negative   Negative                   Microbiology Results Abnormal       None            MRI Lumbar Spine Without Contrast    Result Date: 11/10/2024  MRI LUMBAR SPINE WO CONTRAST Date of Exam: 11/10/2024 1:40 PM EST Indication: Lower extremity radiculopathy.  Comparison: None available. Technique:  Routine multiplanar/multisequence sequence images of the lumbar spine were obtained without contrast administration.  Findings: Hyperintense benign osseous hemangioma noted at L3. T1 marrow signal is otherwise preserved, without evidence of fracture or suspicious marrow replacing lesion. Alignment is anatomic, without evidence of significant listhesis or subluxation. The conus medullaris and cauda equina nerve roots are satisfactory in appearance. The paraspinal soft tissues demonstrate no acute or suspicious findings. Multilevel spondylosis is present, with areas of involvement including L1-2, no significant spinal canal or neuroforaminal impingement. L2-3, no significant spinal canal or neuroforaminal impingement. L3-4, small disc bulge and bilateral facet arthropathy. There is no evidence of significant spinal canal or neuroforaminal narrowing. L4-5, minimal small disc bulge and mild facet arthropathy. The spinal canal remains patent. There is minimal bilateral neuroforaminal narrowing. L5-S1, small circumferential disc bulge with component of right-sided disc protrusion. There is some minimal narrowing of the right aspect of the spinal canal and the small disc protrusion encroaches upon the right subarticular recess, potentially in some  proximity to the traversing right S1 nerve root. The neural foramina remain patent.     Impression: Impression: Generally mild lumbar spondylosis is evident, including a small disc protrusion eccentric to the right at L5-S1, in the region of the right subarticular recess and potentially in some proximity to the traversing right S1 nerve root. There is otherwise no  evidence of significant spinal canal or neuroforaminal narrowing. Electronically Signed: Adriano Kessler MD  11/10/2024 2:27 PM EST  Workstation ID: [id number]    MRI Brain Without Contrast    Result Date: 11/10/2024  MRI BRAIN WO CONTRAST Date of Exam: 11/10/2024 1:27 PM EST Indication: BLE weakness, facial parasthesia R>L, difficulty ambulating.  Comparison: None available. Technique:  Routine multiplanar/multisequence sequence images of the brain were obtained without contrast administration. Findings: No acute infarct is present on diffusion weighted sequences. Midline structures appear normal and the craniocervical junction is satisfactory. Gray-white differentiation is maintained and there is no evidence of intracranial hemorrhage, mass or mass effect. The ventricles are normal in size and configuration. The orbits are normal. The paranasal sinuses are clear. Intracranial arterial flow voids appear maintained.     Impression: Impression: Normal noncontrast MRI of the brain. Electronically Signed: Adriano Kessler MD  11/10/2024 2:14 PM EST  Workstation ID: [id number]    CT Abdomen Pelvis With Contrast    Result Date: 11/10/2024  CT ABDOMEN PELVIS W CONTRAST Date of Exam: 11/10/2024 11:35 AM EST Indication: abdominal pain. Comparison: CT abdomen and pelvis 1/30/2024 Technique: Axial CT images were obtained of the abdomen and pelvis following the uneventful intravenous administration of 85 mL Isovue-300. Reconstructed coronal and sagittal images were also obtained. Automated exposure control and iterative construction methods were used. Findings: LUNG BASES:   Unremarkable without mass or infiltrate. LIVER:  Unremarkable parenchyma without focal lesion. BILIARY/GALLBLADDER:  Unremarkable SPLEEN:  Unremarkable PANCREAS:  Unremarkable ADRENAL:  Unremarkable KIDNEYS: Bilateral renal cysts are noted. No evidence for obstructive uropathy. No calculus identified. GASTROINTESTINAL/MESENTERY:  No evidence of obstruction nor inflammation.  The appendix is visualized normal in caliber. MESENTERIC VESSELS: The mesenteric vessels are patent. There is some faint soft tissue density along the celiac artery. There is some similar findings on prior study however this appears slightly more conspicuous on the current exam. Mild vasculitis not excluded. AORTA/IVC:  Normal caliber. RETROPERITONEUM/LYMPH NODES:  Unremarkable REPRODUCTIVE: The uterus is visualized. BLADDER:  Unremarkable OSSEUS STRUCTURES:  Typical for age with no acute process identified.     Impression: Impression: 1. Mild soft tissue stranding along the celiac artery which could represent mild vasculitis. 2. No acute findings otherwise noted in the abdomen or pelvis. Electronically Signed: Jeimy Tomas MD  11/10/2024 12:05 PM EST  Workstation ID: [id number]    CT Head Without Contrast    Result Date: 11/10/2024  CT HEAD WO CONTRAST Date of Exam: 11/10/2024 11:22 AM EST Indication: facial numbness x 1 week difficulty walking. Comparison: None available. Technique: Axial CT images were obtained of the head without contrast administration.  Automated exposure control and iterative construction methods were used. FINDINGS: Gray-white differentiation is maintained and there is no evidence of intracranial hemorrhage, mass or mass effect. Mild age-related changes of the brain are present including volume loss and typical periventricular sequela of chronic small vessel ischemia. There is otherwise no evidence of intracranial hemorrhage, mass or mass effect. The ventricles are normal in size and configuration accounting for  surrounding volume loss. The orbits are normal and the paranasal sinuses are grossly clear.     Impression: Age-related changes of the brain as above, otherwise without evidence of acute intracranial abnormality. Electronically Signed: Adriano Kessler MD  11/10/2024 11:30 AM EST  Workstation ID: [id number]    XR Chest 1 View    Result Date: 11/10/2024  XR CHEST 1 VW Date of Exam: 11/10/2024 10:11 AM EST Indication: Weak/Dizzy/AMS triage protocol Comparison: February 3, 2024 Findings: The lungs are clear. The heart and mediastinal contours appear normal. There is no pleural effusion. The pulmonary vasculature appears normal. The osseous structures appear intact.     Impression: Impression: No acute cardiopulmonary process. Electronically Signed: Jhoan Madrid MD  11/10/2024 10:47 AM EST  Workstation ID: [id number]     Results for orders placed during the hospital encounter of 11/10/24    Adult Transthoracic Echo Complete W/ Cont if Necessary Per Protocol    Interpretation Summary  •  Left ventricular systolic function is normal. Estimated left ventricular EF = 65%  •  The cardiac valves are anatomically and functionally normal.      Current medications:  Scheduled Meds:carvedilol, 25 mg, Oral, BID With Meals  desvenlafaxine, 50 mg, Oral, Daily  folic acid, 1 mg, Oral, Daily  latanoprost, 1 drop, Both Eyes, Nightly  melatonin, 5 mg, Oral, Nightly  pantoprazole, 40 mg, Oral, Daily  rosuvastatin, 5 mg, Oral, Daily  sodium chloride, 10 mL, Intravenous, Q12H  thiamine, 100 mg, Oral, Daily  ursodiol, 300 mg, Oral, BID  zolpidem, 5 mg, Oral, Nightly      Continuous Infusions:   PRN Meds:.•  acetaminophen **OR** acetaminophen **OR** acetaminophen  •  senna-docusate sodium **AND** polyethylene glycol **AND** bisacodyl **AND** bisacodyl  •  calcium carbonate  •  Calcium Replacement - Follow Nurse / BPA Driven Protocol  •  ipratropium-albuterol  •  LORazepam  •  Magnesium Standard Dose Replacement - Follow Nurse / BPA Driven  Protocol  •  nitroglycerin  •  ondansetron  •  Phosphorus Replacement - Follow Nurse / BPA Driven Protocol  •  Potassium Replacement - Follow Nurse / BPA Driven Protocol  •  sodium chloride  •  sodium chloride  •  sodium chloride    Assessment & Plan   Assessment & Plan     Active Hospital Problems    Diagnosis  POA   • **Lower extremity weakness [R29.898]  Yes      Resolved Hospital Problems   No resolved problems to display.        Brief Hospital Course to date:  Philomena Davis is a 64 y.o. female with history of alcohol dependence, cirrhosis, HTN, PN here with B UE/LE paraesthesias    B weakness  B paraesthesias/facial numbness  -MRI brain unremarkable  -MRI lumbar spine with L5-S1 disc protrusion  -MRI C/T spine pending  -EMG/NCV pending  -Continue thiamine, neurology following  -PT/OT    Possible vasculitis  CT with celiac artery stranding  -markedly elevated CRP/ESR  -WILMA/RF/ANCA panel, sent, pending    Atrial tachycardia, resolved  -presented with tachycardia, HR to 150  -on BB  -ECHO unremarkable, EF normal  -SVT versus atypical atrial flutter  -continues with bursts of tachycardia, increase BB, ask for cardiology evaluatoin    Alcohol dependence  -monitor    Cirrhosis  Hepatocellular jaundice  -monitor    Mild LA  -due to liver disease    Subclinical hypothyroidism  -needs follow up in 6 weeks    Expected Discharge Location and Transportation: Home  Expected Discharge   Expected Discharge Date: 11/13/2024; Expected Discharge Time:      VTE Prophylaxis:  Mechanical VTE prophylaxis orders are present.         AM-PAC 6 Clicks Score (PT): 17 (11/11/24 1012)    CODE STATUS:   Code Status and Medical Interventions: CPR (Attempt to Resuscitate); Full Support   Ordered at: 11/10/24 6178     Level Of Support Discussed With:    Patient    Next of Kin (If No Surrogate)     Code Status (Patient has no pulse and is not breathing):    CPR (Attempt to Resuscitate)     Medical Interventions (Patient has pulse or is  breathing):    Full Support       Jose J Hutchins MD  11/12/24

## 2024-11-12 NOTE — THERAPY TREATMENT NOTE
Patient Name: Philomena Davis  : 1960    MRN: [id number]                              Today's Date: 2024       Admit Date: 11/10/2024    Visit Dx:     ICD-10-CM ICD-9-CM   1. Weakness of both lower extremities  R29.898 729.89   2. Paresthesia  R20.2 782.0   3. Weakness  R53.1 780.79   4. Leukocytosis, unspecified type  D72.829 288.60   5. Alcoholic cirrhosis of liver without ascites  K70.30 571.2     Patient Active Problem List   Diagnosis    Essential hypertension    Generalized anxiety disorder    Hyperlipidemia    Glaucoma    Alcoholic cirrhosis of liver without ascites    Severe malnutrition    GERD without esophagitis    Sleep disturbance    Annual physical exam    Idiopathic peripheral neuropathy    Severe anxiety with panic    Alcohol use disorder    Cigarette nicotine dependence in remission    Chronic obstructive pulmonary disease    Closed fracture of left hand    Moderate malnutrition    Lower extremity weakness     Past Medical History:   Diagnosis Date    Alcohol withdrawal 05/10/2019    Annual physical exam 2023    Anxiety and depression     Cirrhosis of liver     Closed displaced fracture of lateral malleolus of left fibula     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    Closed trimalleolar fracture of right ankle 2023    Digital mucous cyst of toe of left foot 2017    Ganglion cyst of left foot     Glaucoma     Hypertension     Wears glasses      Past Surgical History:   Procedure Laterality Date    ANKLE OPEN REDUCTION INTERNAL FIXATION Left 2017    Procedure: LEFT ANKLE OPEN REDUCTION INTERNAL FIXATION WITH ILIAC CREST BONE GRAFT , EXCISION CYST 4TH/5TH INNERSPACE LEFT FOOT;  Surgeon: Frank Larson MD;  Location:  [facility];  Service:     ANKLE OPEN REDUCTION INTERNAL FIXATION Right 2023    Procedure: ANKLE OPEN REDUCTION INTERNAL FIXATION;  Surgeon: Deandre Reynoso MD;  Location:  [facility];  Service:  Orthopedics;  Laterality: Right;    AUGMENTATION MAMMAPLASTY Bilateral 1999    BREAST AUGMENTATION      BREAST EXCISIONAL BIOPSY Right 2014    DILATION AND CURETTAGE, DIAGNOSTIC / THERAPEUTIC      ENDOSCOPY N/A 11/30/2022    Procedure: ESOPHAGOGASTRODUODENOSCOPY;  Surgeon: Arelis Solano MD;  Location: [facility];  Service: Gastroenterology;  Laterality: N/A;    ENDOSCOPY N/A 2/1/2024    Procedure: ESOPHAGOGASTRODUODENOSCOPY;  Surgeon: Brunner, Mark I, MD;  Location:  [facility];  Service: Gastroenterology;  Laterality: N/A;    IN RPR NON/MAL FEMUR DSTL H/N W/ILIAC/AUTOG BONE Left 03/24/2017    Procedure: ILIAC CREST BONE GRAFT;  Surgeon: Frank Larson MD;  Location:  [facility];  Service: Orthopedics    WRIST SURGERY        General Information       Row Name 11/12/24 1349          OT Time and Intention    Document Type therapy note (daily note)  -CS     Mode of Treatment occupational therapy  -CS     Patient Effort good  -CS     Symptoms Noted During/After Treatment dizziness;significant change in vital signs  -CS     Comment orthostatic this date (66/40) this date  -CS       Row Name 11/12/24 1349          General Information    Existing Precautions/Restrictions fall;other (see comments);orthostatic hypotension   painful neuropathy in B feet, paresthesias present in abdominal area - prefers gait belt placement lower below breast, MONITOR BP  -CS     Barriers to Rehab medically complex;previous functional deficit;cognitive status  -CS       Row Name 11/12/24 5328          Cognition    Orientation Status (Cognition) oriented x 4  -CS       Row Name 11/12/24 4583          Safety Issues/Impairments Affecting Functional Mobility    Safety Issues Affecting Function (Mobility) insight into deficits/self-awareness;safety precaution awareness;safety precautions follow-through/compliance;awareness of need for assistance  -CS     Impairments Affecting Function (Mobility)  balance;coordination;endurance/activity tolerance;strength;pain;sensation/sensory awareness  -CS     Comment, Safety Issues/Impairments (Mobility) significant drop in BP this date, educated nursing on need for supervision and BSC during toileting transfers until resolved  -CS               User Key  (r) = Recorded By, (t) = Taken By, (c) = Cosigned By      Initials Name Provider Type    CS Ion Roberts OT Occupational Therapist                     Mobility/ADL's       Row Name 11/12/24 8610          Bed Mobility    Bed Mobility supine-sit;scooting/bridging  -CS     Scooting/Bridging [place] (Bed Mobility) minimum assist (75% patient effort);1 person assist;nonverbal cues (demo/gesture);verbal cues  -CS     Supine-Sit [place] (Bed Mobility) minimum assist (75% patient effort);1 person assist;verbal cues;nonverbal cues (demo/gesture)  -CS     Assistive Device (Bed Mobility) bed rails;repositioning sheet  -CS     Comment, (Bed Mobility) appropriate sequencing intact, dizziness in sitting, 9pt drop systolic  -CS       Row Name 11/12/24 5420          Transfers    Transfers sit-stand transfer;stand-sit transfer  -CS     Comment, (Transfers) significant drop with associated dizziness in standing (66/40), returned to reclined position in bed with rebound to 104/61  -CS       Row Name 11/12/24 6960          Bed-Chair Transfer    Comment, (Bed-Chair Transfer) cues for improved sequencing w/ RW use, trunk instability with cues for glute and abdominal engagement  -CS       Row Name 11/12/24 1350          Sit-Stand Transfer    Sit-Stand [place] (Transfers) minimum assist (75% patient effort);2 person assist  -CS     Assistive Device (Sit-Stand Transfers) walker, front-wheeled  -CS       Row Name 11/12/24 6395          Functional Mobility    Functional Mobility- Comment unable to progress this date due to unsafe BP  -CS     Patient was able to Ambulate yes  -CS     Reason Patient was unable to Ambulate  Hypotension  -CS       Row Name 11/12/24 1350          Activities of Daily Living    BADL Assessment/Intervention lower body dressing  -CS       Row Name 11/12/24 1350          Lower Body Dressing Assessment/Training    [place] Level (Lower Body Dressing) lower body dressing skills;moderate assist (50% patient effort)  -CS     Comment, (Lower Body Dressing) remains unstable sitting EOB due to orthos and associated dizziness, reach to distal BLEs intact in long sitting  -CS       Row Name 11/12/24 1350          Grooming Assessment/Training    [place] Level (Grooming) grooming skills;set up  -CS     Position (Grooming) sitting up in bed  -CS               User Key  (r) = Recorded By, (t) = Taken By, (c) = Cosigned By      Initials Name Provider Type    Ion Salcido OT Occupational Therapist                   Obj/Interventions       Row Name 11/12/24 1358          Sensory Assessment (Somatosensory)    Sensory Assessment (Somatosensory) other (see comments)  -     Sensory Assessment continues to report hypersensitive distal BLEs and hands, with a band of paresthesias across mid abdomen  -       Row Name 11/12/24 1352          Balance    Balance Assessment sitting static balance;sitting dynamic balance;standing static balance;standing dynamic balance  -CS     Static Sitting Balance contact guard  -CS     Dynamic Sitting Balance minimal assist  -CS     Position, Sitting Balance unsupported;sitting edge of bed  -CS     Static Standing Balance minimal assist;2-person assist  -CS     Dynamic Standing Balance moderate assist;2-person assist  -CS     Balance Interventions sitting;standing;sit to stand  -CS     Comment, Balance trunk and hip instability in standing  -CS               User Key  (r) = Recorded By, (t) = Taken By, (c) = Cosigned By      Initials Name Provider Type    Ion Salcido OT Occupational Therapist                   Goals/Plan    No documentation.                  Clinical  Impression       Row Name 11/12/24 [facility]          Pain Assessment    Additional Documentation Pain Scale: FACES Pre/Post-Treatment (Group)  -       Row Name 11/12/24 1359          Pain Scale: FACES Pre/Post-Treatment    Pain: FACES Scale, Pretreatment 2-->hurts little bit  -CS     Posttreatment Pain Rating 2-->hurts little bit  -CS       Row Name 11/12/24 [facility]          Plan of Care Review    Plan of Care Reviewed With patient  -CS     Progress no change  -CS     Outcome Evaluation OT session performed with neuro team present. Pt reports mild dizziness sitting EOB with significant trunk/hip instability in standing and symptomatic othostatic hypotension (66/40). Continues to report pain/tingling in distal BLEs and a band of paresthesias mid-abdomen inferior to nipple line. Returned to supine w/ BP rebound to 104/61, retested gross motor coordination and bimanual skills which remain intact. Will cont IPOT per POC as tolerated, Rec d/c to IRF - unsafe to return home alone.  -       Row Name 11/12/24 [facility]          Therapy Plan Review/Discharge Plan (OT)    Anticipated Discharge Disposition (OT) inpatient rehabilitation facility  -       Row Name 11/12/24 [facility]          Vital Signs    Pre Systolic BP Rehab 99  RN cleared for tx  -CS     Pre Treatment Diastolic BP 59  -CS     Intra Systolic BP Rehab 90  -CS     Intra Treatment Diastolic BP 53  -CS     Post Systolic BP Rehab 66  -CS     Post Treatment Diastolic BP 40  standing - returned to 104/61  -CS     O2 Delivery Pre Treatment room air  -CS     O2 Delivery Intra Treatment room air  -CS     O2 Delivery Post Treatment room air  -CS     Pre Patient Position Supine  -CS     Intra Patient Position Standing  -CS     Post Patient Position Supine  -CS       Row Name 11/12/24 1781          Positioning and Restraints    Pre-Treatment Position in bed  -CS     Post Treatment Position bed  -CS     In Bed notified nsg;supine;call light within reach;encouraged to call for  assist;exit alarm on  -CS               User Key  (r) = Recorded By, (t) = Taken By, (c) = Cosigned By      Initials Name Provider Type    Ion Salcido OT Occupational Therapist                   Outcome Measures       Row Name 11/12/24 1400          How much help from another is currently needed...    Putting on and taking off regular lower body clothing? 2  -CS     Bathing (including washing, rinsing, and drying) 2  -CS     Toileting (which includes using toilet bed pan or urinal) 2  -CS     Putting on and taking off regular upper body clothing 3  -CS     Taking care of personal grooming (such as brushing teeth) 3  -CS     Eating meals 4  -CS     AM-PAC 6 Clicks Score (OT) 16  -CS       Row Name 11/12/24 1400          Functional Assessment    Outcome Measure Options AM-PAC 6 Clicks Daily Activity (OT)  -CS               User Key  (r) = Recorded By, (t) = Taken By, (c) = Cosigned By      Initials Name Provider Type    Ion Salcido OT Occupational Therapist                    Occupational Therapy Education       Title: PT OT SLP Therapies (In Progress)       Topic: Occupational Therapy (In Progress)       Point: ADL training (Done)       Description:   Instruct learner(s) on proper safety adaptation and remediation techniques during self care or transfers.   Instruct in proper use of assistive devices.                  Learning Progress Summary            Patient Acceptance, D,E, VU,DU by  at 11/12/2024 1400    Acceptance, E,D, NR by JY at 11/11/2024 0854                      Point: Home exercise program (Not Started)       Description:   Instruct learner(s) on appropriate technique for monitoring, assisting and/or progressing therapeutic exercises/activities.                  Learner Progress:  Not documented in this visit.              Point: Precautions (Done)       Description:   Instruct learner(s) on prescribed precautions during self-care and functional transfers.                  Learning  Progress Summary            Patient Acceptance, D,E, VU,DU by  at 11/12/2024 1400    Acceptance, E,D, NR by ELEUTERIO at 11/11/2024 0854                      Point: Body mechanics (Done)       Description:   Instruct learner(s) on proper positioning and spine alignment during self-care, functional mobility activities and/or exercises.                  Learning Progress Summary            Patient Acceptance, D,E, VU,DU by  at 11/12/2024 1400    Acceptance, E,D, NR by ELEUTERIO at 11/11/2024 0854                                      User Key       Initials Effective Dates Name Provider Type Discipline     06/16/21 -  Vanessa Noonan OT Occupational Therapist OT     06/16/21 -  Ion Roberts OT Occupational Therapist OT                  OT Recommendation and Plan     Plan of Care Review  Plan of Care Reviewed With: patient  Progress: no change  Outcome Evaluation: OT session performed with neuro team present. Pt reports mild dizziness sitting EOB with significant trunk/hip instability in standing and symptomatic othostatic hypotension (66/40). Continues to report pain/tingling in distal BLEs and a band of paresthesias mid-abdomen inferior to nipple line. Returned to supine w/ BP rebound to 104/61, retested gross motor coordination and bimanual skills which remain intact. Will cont IPOT per POC as tolerated, Rec d/c to IRF - unsafe to return home alone.     Time Calculation:         Time Calculation- OT       Row Name 11/12/24 1400             Time Calculation- OT    OT Start Time 1303  -CS      OT Received On 11/12/24  -CS      OT Goal Re-Cert Due Date 11/21/24  -CS         Timed Charges    33920 - OT Therapeutic Activity Minutes 15  -CS         Total Minutes    Timed Charges Total Minutes 15  -CS       Total Minutes 15  -CS                User Key  (r) = Recorded By, (t) = Taken By, (c) = Cosigned By      Initials Name Provider Type    CS Ion Roberts, SAGE Occupational Therapist                  Therapy Charges for  Today       Code Description Service Date Service Provider Modifiers Qty    65715184554  OT THERAPEUTIC ACT EA 15 MIN 11/12/2024 Ion Roberts OT GO 1    [id number]  OT THER SUPP EA 15 MIN 11/12/2024 Ion Roberts OT GO 1                 Ion Roberts OT  11/12/2024

## 2024-11-12 NOTE — PLAN OF CARE
Problem: Adult Inpatient Plan of Care  Goal: Plan of Care Review  Outcome: Progressing  Flowsheets (Taken 11/12/2024 0512)  Progress: improving  Plan of Care Reviewed With: patient  Goal: Patient-Specific Goal (Individualized)  Outcome: Progressing  Goal: Absence of Hospital-Acquired Illness or Injury  Outcome: Progressing  Goal: Optimal Comfort and Wellbeing  Outcome: Progressing  Goal: Readiness for Transition of Care  Outcome: Progressing     Problem: Skin Injury Risk Increased  Goal: Skin Health and Integrity  Outcome: Progressing     Problem: Fall Injury Risk  Goal: Absence of Fall and Fall-Related Injury  Outcome: Progressing   Goal Outcome Evaluation:  Plan of Care Reviewed With: patient        Progress: improving  Outcome Evaluation: Patient will remain free from injury during hospitalization by calling for assistance at all times when needing out of bed.                              Called and spoke to pt.  Pt says she did not completely understand her results and asked to give a call to either Hari or Serafin with the living facility she resides at.  Hari- [phone] Serafin- [phone]  She asked to always call her first and get the ok to call the facility after.  Pt appreciated the call.  Called both, no answer.  Hari's phone- no option to leave message.  Serafin-  for call back.

## 2024-11-12 NOTE — PLAN OF CARE
Goal Outcome Evaluation:  Plan of Care Reviewed With: patient        Progress: no change  Outcome Evaluation: OT session performed with neuro team present. Pt reports mild dizziness sitting EOB with significant trunk/hip instability in standing and symptomatic othostatic hypotension (66/40). Continues to report pain/tingling in distal BLEs and a band of paresthesias mid-abdomen inferior to nipple line. Returned to supine w/ BP rebound to 104/61, retested gross motor coordination and bimanual skills which remain intact. Will cont IPOT per POC as tolerated, Rec d/c to IRF - unsafe to return home alone.    Anticipated Discharge Disposition (OT): inpatient rehabilitation facility

## 2024-11-12 NOTE — CASE MANAGEMENT/SOCIAL WORK
Continued Stay Note   [facility]     Patient Name: Philomena Davis  MRN: [id number]  Today's Date: 11/12/2024    Admit Date: 11/10/2024    Plan: IRF   Discharge Plan       Row Name 11/12/24 1130       Plan    Plan IRF    Patient/Family in Agreement with Plan yes    Plan Comments Therapy is recommending inpatient rehab. A referral was called to Venkata, Liaison with [facility]. CM will continue to follow for medical readiness and will assist with any discharge needs as indicated.    Final Discharge Disposition Code 62 - inpatient rehab facility                   Discharge Codes    No documentation.                 Expected Discharge Date and Time       Expected Discharge Date Expected Discharge Time    Nov 13, 2024               Maricarmen Klein RN

## 2024-11-12 NOTE — CONSULTS
[facility] Cardiology  Consultation H&P    Patient: Philomena Davis  1960  [unfilled]  [unfilled]  [address] [address]    PCP:  Unique Brandt DO   Treatment Team:   Attending Provider: Jose J Hutchins MD  Consulting Physician: Fallon Guido APRN  Consulting Physician: Cosmo Hess MD  Admitting Provider: Jose J Hutchins MD   11/10/2024      DATE OF CONSULTATION: 11/12/2024 12:14 EST     IDENTIFICATION: A 64 y.o.  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 03/24/2017    Closed trimalleolar fracture of right ankle 05/24/2023    Digital mucous cyst of toe of left foot 03/24/2017    Ganglion cyst of left foot     Glaucoma     Hypertension     Wears glasses      Past Surgical History:   Procedure Laterality Date    ANKLE OPEN REDUCTION INTERNAL FIXATION Left 03/24/2017    Procedure: LEFT ANKLE OPEN REDUCTION INTERNAL FIXATION WITH ILIAC CREST BONE GRAFT , EXCISION CYST 4TH/5TH INNERSPACE LEFT FOOT;  Surgeon: Frank Larson MD;  Location: [facility] OR;  Service:     ANKLE OPEN REDUCTION INTERNAL FIXATION Right 05/25/2023    Procedure: ANKLE OPEN REDUCTION INTERNAL FIXATION;  Surgeon: Deandre Reynoso MD;  Location: [facility] OR;  Service: Orthopedics;  Laterality: Right;    AUGMENTATION MAMMAPLASTY Bilateral 1999    BREAST AUGMENTATION      BREAST EXCISIONAL BIOPSY Right 2014    DILATION AND CURETTAGE, DIAGNOSTIC / THERAPEUTIC      ENDOSCOPY N/A 11/30/2022    Procedure: ESOPHAGOGASTRODUODENOSCOPY;  Surgeon: Arelis Solano MD;  Location: [facility]  ENDOSCOPY;  Service: Gastroenterology;  Laterality: N/A;    ENDOSCOPY N/A 2/1/2024    Procedure: ESOPHAGOGASTRODUODENOSCOPY;  Surgeon: Brunner, Mark I, MD;  Location: [facility] ENDOSCOPY;  Service: Gastroenterology;  Laterality: N/A;    KS RPR NON/MAL FEMUR DSTL H/N W/ILIAC/AUTOG BONE Left 03/24/2017    Procedure: ILIAC CREST BONE GRAFT;  Surgeon: Frank Larson MD;  Location: [facility] OR;  Service: Orthopedics    WRIST SURGERY         Allergies  Allergies   Allergen Reactions    Buspirone Anxiety    Codeine Nausea Only    Mirtazapine Anxiety       Current Medications    Current Facility-Administered Medications:     acetaminophen (TYLENOL) tablet 650 mg, 650 mg, Oral, Q4H PRN **OR** acetaminophen (TYLENOL) 160 MG/5ML oral solution 650 mg, 650 mg, Oral, Q4H PRN **OR** acetaminophen (TYLENOL) suppository 500 mg, 500 mg, Rectal, Q4H PRN, Sophie Gandhi MD    sennosides-docusate (PERICOLACE) 8.6-50 MG per tablet 2 tablet, 2 tablet, Oral, BID PRN **AND** polyethylene glycol (MIRALAX) packet 17 g, 17 g, Oral, Daily PRN **AND** bisacodyl (DULCOLAX) EC tablet 5 mg, 5 mg, Oral, Daily PRN **AND** bisacodyl (DULCOLAX) suppository 10 mg, 10 mg, Rectal, Daily PRN, Sophie Gandhi MD    calcium carbonate (TUMS) chewable tablet 500 mg (200 mg elemental), 2 tablet, Oral, BID PRN, Sophie Gandhi MD    Calcium Replacement - Follow Nurse / BPA Driven Protocol, , Not Applicable, PRN, Sophie Gandhi MD    carvedilol (COREG) tablet 25 mg, 25 mg, Oral, BID With Meals, Jose J Hutchins MD    desvenlafaxine (PRISTIQ) 24 hr tablet 50 mg, 50 mg, Oral, Daily, Sophie Gandhi MD, 50 mg at 11/12/24 0836    folic acid (FOLVITE) tablet 1 mg, 1 mg, Oral, Daily, Sophie Gandhi MD, 1 mg at 11/12/24 0836    ipratropium-albuterol (DUO-NEB) nebulizer solution 3 mL, 3 mL, Nebulization, Q6H PRN, Sophie Gandhi MD    latanoprost (XALATAN) 0.005 % ophthalmic solution 1 drop, 1 drop, Both Eyes,  Nightly, Sophie Gandhi MD, 1 drop at 11/11/24 2059    LORazepam (ATIVAN) tablet 0.5 mg, 0.5 mg, Oral, Q8H PRN, Sophie Gandhi MD    Magnesium Standard Dose Replacement - Follow Nurse / BPA Driven Protocol, , Not Applicable, PRN, Sophie Gandhi MD    melatonin tablet 5 mg, 5 mg, Oral, Nightly, Sophie Gandhi MD, 5 mg at 11/11/24 2058    nitroglycerin (NITROSTAT) SL tablet 0.4 mg, 0.4 mg, Sublingual, Q5 Min PRN, Sophie Gandhi MD    ondansetron (ZOFRAN) injection 4 mg, 4 mg, Intravenous, Q6H PRN, Sophie Gandhi MD    pantoprazole (PROTONIX) EC tablet 40 mg, 40 mg, Oral, Daily, Sophie Gandhi MD, 40 mg at 11/12/24 0639    Phosphorus Replacement - Follow Nurse / BPA Driven Protocol, , Not Applicable, PRN, Sophie Gandhi MD    Potassium Replacement - Follow Nurse / BPA Driven Protocol, , Not Applicable, PRN, Sophie Gandhi MD    rosuvastatin (CRESTOR) tablet 5 mg, 5 mg, Oral, Daily, Sophie Gandhi MD, 5 mg at 11/11/24 2058    sodium chloride 0.9 % flush 10 mL, 10 mL, Intravenous, PRN, Zac Bruner MD    sodium chloride 0.9 % flush 10 mL, 10 mL, Intravenous, Q12H, Sophie Gandhi MD, 10 mL at 11/12/24 0837    sodium chloride 0.9 % flush 10 mL, 10 mL, Intravenous, PRN, Sophie Gandhi MD    sodium chloride 0.9 % infusion 40 mL, 40 mL, Intravenous, PRN, Sophie Gandhi MD    thiamine (VITAMIN B-1) tablet 100 mg, 100 mg, Oral, Daily, Sophie Gandhi MD, 100 mg at 11/12/24 0836    ursodiol (ACTIGALL) capsule 300 mg, 300 mg, Oral, BID, Sophie Gandhi MD, 300 mg at 11/12/24 0836    zolpidem (AMBIEN) tablet 5 mg, 5 mg, Oral, Nightly, Younis, Sophie Guo MD, 5 mg at 11/11/24 2058       History of Present Illness   Philomena Davis is a 64 y.o. year old female with a past medical history significant for alcoholic cirrhosis and varices presented to [facility] with equivocal neurologic change.  Pt noted with  progressive LE weakness and L facial numbness. During hospitalization noted with intermittent atrial tachycardia and left atrial flutter.  This was converted with IV lopressor.  Pt is unaware of any previous cardiac conditions.      ROS  Review of Systems   Neurological:  Positive for focal weakness, numbness and weakness.   All other systems reviewed and are negative.      SOCIAL HX  Social History     Socioeconomic History    Marital status:    Tobacco Use    Smoking status: Former     Current packs/day: 0.00     Average packs/day: 1.5 packs/day for 20.0 years (30.0 ttl pk-yrs)     Types: Cigarettes     Start date: 3/22/1989     Quit date: 3/22/2009     Years since quitting: 15.6    Smokeless tobacco: Never   Vaping Use    Vaping status: Never Used   Substance and Sexual Activity    Alcohol use: Yes     Alcohol/week: 7.0 standard drinks of alcohol     Types: 7 Glasses of wine per week     Comment: 1/5 VODKA EVERY TWO DAYS    Drug use: No    Sexual activity: Defer       FAMILY HX  Family History   Problem Relation Age of Onset    Cancer Mother     No Known Problems Father     No Known Problems Sister     No Known Problems Brother     No Known Problems Daughter     No Known Problems Son     No Known Problems Maternal Grandmother     Breast cancer Paternal Grandmother 68        met to brain    No Known Problems Maternal Aunt     No Known Problems Paternal Aunt     Rheum arthritis Other     Heart disease Other     Cancer Other     Stroke Other     Hypertension Other     BRCA 1/2 Neg Hx     Colon cancer Neg Hx     Endometrial cancer Neg Hx     Ovarian cancer Neg Hx        OBJECTIVE:  Vitals:    11/12/24 0720 11/12/24 0730 11/12/24 0741 11/12/24 0836   BP:   127/75 111/68   BP Location:   Left arm    Patient Position:   Lying    Pulse: (!) 131 (!) 133  101   Resp:   16    Temp:       TempSrc:       SpO2: 95% 95% 96%    Weight:       Height:         I/O last 3 completed shifts:  In: -   Out: 700 [Urine:700]  No  intake/output data recorded.  Intake & Output (last 3 days)         11/09 0701  11/10 0700 11/10 0701 11/11 0700 11/11 0701 11/12 0700 11/12 0701 11/13 0700    IV Piggyback  1000      Total Intake(mL/kg)  1000 (14.9)      Urine (mL/kg/hr)  1750 400 (0.2)     Total Output  1750 400     Net  -750 -400             Urine Unmeasured Occurrence  1 x               PHYSICAL EXAMINATION:  Constitutional:       Appearance: Healthy appearance. Not in distress.   Neck:      Vascular: No JVR. JVD normal.   Pulmonary:      Effort: Pulmonary effort is normal.      Breath sounds: Normal breath sounds. No wheezing. No rhonchi. No rales.   Chest:      Chest wall: Not tender to palpatation.   Cardiovascular:      PMI at left midclavicular line. Normal rate. Regular rhythm. Normal S1. Normal S2.       Murmurs: There is no murmur.      No gallop.  No click. No rub.   Pulses:     Intact distal pulses.   Edema:     Peripheral edema present.  Abdominal:      General: Bowel sounds are normal.      Palpations: Abdomen is soft.      Tenderness: There is no abdominal tenderness.   Musculoskeletal: Normal range of motion.         General: No tenderness. Skin:     General: Skin is warm and dry.   Neurological:      General: No focal deficit present.      Mental Status: Alert and oriented to person, place and time.         Diagnostic Data:  Lab Results (last 24 hours)       Procedure Component Value Units Date/Time    Blood Culture - Blood, Hand, Left [[id number]]  (Normal) Collected: 11/10/24 1040    Specimen: Blood from Hand, Left Updated: 11/12/24 1100     Blood Culture No growth at 2 days    Narrative:      Less than seven (7) mL's of blood was collected.  Insufficient quantity may yield false negative results.    Blood Culture - Blood, Hand, Right [[id number]]  (Normal) Collected: 11/10/24 1039    Specimen: Blood from Hand, Right Updated: 11/12/24 1100     Blood Culture No growth at 2 days    Narrative:      Less than seven (7) mL's of  blood was collected.  Insufficient quantity may yield false negative results.    Rheumatoid Factor [[id number]]  (Normal) Collected: 11/11/24 1611    Specimen: Blood Updated: 11/11/24 2324     Rheumatoid Factor Quantitative <10.0 IU/mL     ANCA Panel [[id number]] Collected: 11/11/24 1611    Specimen: Blood Updated: 11/11/24 1653    WILMA Comprehensive Panel [[id number]] Collected: 11/11/24 1611    Specimen: Blood Updated: 11/11/24 1652    Phosphorus [[id number]]  (Normal) Collected: 11/11/24 1255    Specimen: Blood Updated: 11/11/24 1409     Phosphorus 3.4 mg/dL     Magnesium [[id number]]  (Normal) Collected: 11/11/24 1255    Specimen: Blood Updated: 11/11/24 1409     Magnesium 1.7 mg/dL     Urine Culture - Urine, Urine, Catheter [[id number]]  (Normal) Collected: 11/10/24 1054    Specimen: Urine, Catheter Updated: 11/11/24 1338     Urine Culture No growth          ECG/EMG Results (last 24 hours)       Procedure Component Value Units Date/Time    Adult Transthoracic Echo Complete W/ Cont if Necessary Per Protocol [[id number]] Resulted: 11/11/24 1702     Updated: 11/11/24 1702     EF(MOD-bp) 63.5 %      LVIDd 4.6 cm      LVIDs 2.9 cm      IVSd 1.10 cm      LVPWd 1.10 cm      FS 37.0 %      IVS/LVPW 1.00 cm      ESV(cubed) 24.4 ml      EDV(cubed) 97.3 ml      LV mass(C)d 181.2 grams      LVOT area 3.1 cm2      LVOT diam 2.00 cm      EDV(MOD-sp2) 54.5 ml      EDV(MOD-sp4) 86.2 ml      ESV(MOD-sp2) 19.0 ml      ESV(MOD-sp4) 32.3 ml      SV(MOD-sp2) 35.5 ml      SV(MOD-sp4) 53.9 ml      EF(MOD-sp2) 65.1 %      EF(MOD-sp4) 62.5 %      MV E max roosevelt 116.0 cm/sec      MV A max roosevelt 120.0 cm/sec      MV dec time 0.18 sec      MV E/A 0.97     LA ESV Index (BP) 31.1 ml/m2      Med Peak E' Roosevelt 15.2 cm/sec      Lat Peak E' Roosevelt 15.1 cm/sec      Avg E/e' ratio 7.66     SV(LVOT) 80.1 ml      RV Base 4.0 cm      RV Mid 3.2 cm      RV Length 8.0 cm      TAPSE (>1.6) 1.70 cm      RV S' 23.4 cm/sec      LA dimension (2D)  3.9 cm      LV V1 max  98.9 cm/sec      LV V1 max PG 3.9 mmHg      LV V1 mean PG 2.00 mmHg      LV V1 VTI 25.5 cm      Ao pk ernie 139.0 cm/sec      Ao max PG 7.7 mmHg      Ao mean PG 4.0 mmHg      Ao V2 VTI 28.9 cm      JA(I,D) 2.8 cm2      MV max PG 4.9 mmHg      MV mean PG 3.0 mmHg      MV V2 VTI 20.3 cm      MV P1/2t 91.1 msec      MVA(P1/2t) 2.41 cm2      MVA(VTI) 3.9 cm2      MV dec slope 376.0 cm/sec2      PA acc time 0.15 sec      Ao root diam 3.1 cm      IVRT 92.0 ms      LA ESV (BP) 53.5 ml      RAP systole 3 mmHg      BH CV VAS BP LEFT /70 mmHg      Echo EF Estimated 65.0 %     Narrative:        Left ventricular systolic function is normal. Estimated left   ventricular EF = 65%    The cardiac valves are anatomically and functionally normal.                   Lower extremity weakness        ASSESSMENT/PLAN:  Atrial tachycardia/ left atrial flutter- resolved.  Echo benign. Continue carvedilol.  Not candidate for systemic anticoagulation with concomitant cirrhosis/ varices.  Give IV prn lopressor for break through issues.         Cosmo Hess MD   12:14 EST 11/12/2024

## 2024-11-12 NOTE — PROGRESS NOTES
[facility]    Progress Note    Patient Name: Philomena Davis  : 1960  MRN: [id number]  Primary Care Physician:  Unique Brandt DO  Date of admission: 11/10/2024    Subjective     Chief Complaint: Bilateral leg weakness    History of Present Illness   Patient seen working with OT.  Laying , sitting SBP 99, standing SBP 66.  Patient was placed back in bed and blood pressure normalized.     During this standing event patient felt unsteady, lightheaded and dizzy.  She endorses a burning sensation in her legs.    Review of Systems   General: Negative for fever, nausea, or vomiting.   Neurological: Positive for burning sensation, lightheadedness and dizziness    Objective     Physical Exam  Vitals and nursing note reviewed.   Constitutional:       General: She is not in acute distress.     Appearance: She is not ill-appearing.   Eyes:      Extraocular Movements: Extraocular movements intact.      Pupils: Pupils are equal, round, and reactive to light.      Comments: No nystagmus or deviated gaze noted   Neurological:      Mental Status: She is alert and oriented to person, place, and time.      Cranial Nerves: Cranial nerves 2-12 are intact.      Sensory: Sensory deficit present.      Motor: Weakness present. No tremor or seizure activity.      Coordination: Finger-Nose-Finger Test normal.      Gait: Gait abnormal.      Comments:     Cranial Nerves   CN II: Pupils are equal, round, and reactive to light. Normal visual acuity and visual fields.    CN III IV VI: Extraocular movements are full without nystagmus.  CN V: Normal facial sensation and strength of muscles of mastication.  CN VII: Facial movements are symmetric. No weakness.  CN VIII:  Auditory acuity is normal.  CN IX & X:  Symmetric palatal movement.  CN XI: Sternocleidomastoid and trapezius are normal.  No weakness.  CN XII: The tongue is midline.  No atrophy or fasciculations.    Motor: No weakness noticeable as such in the  upper and lower extremities.    Reflexes: 2+ in the upper and lower extremities. Plantar responses are flexor.    Sensation: Normal to light touch, pinprick, vibration, temperature, and proprioception in arms and legs. .    Station and Gait:  Fait intact Romberg negative for weakness    Coordination: Finger to nose test shows no dysmetria.             Vitals:   Temp:  [97.1 °F (36.2 °C)-99.8 °F (37.7 °C)] 98.6 °F (37 °C)  Heart Rate:  [] 133  Resp:  [16-18] 16  BP: ()/(60-86) 127/75  Flow (L/min) (Oxygen Therapy):  [2] 2    Current Medications    Current Facility-Administered Medications:   •  acetaminophen (TYLENOL) tablet 650 mg, 650 mg, Oral, Q4H PRN **OR** acetaminophen (TYLENOL) 160 MG/5ML oral solution 650 mg, 650 mg, Oral, Q4H PRN **OR** acetaminophen (TYLENOL) suppository 500 mg, 500 mg, Rectal, Q4H PRN, Sophie Gandhi MD  •  sennosides-docusate (PERICOLACE) 8.6-50 MG per tablet 2 tablet, 2 tablet, Oral, BID PRN **AND** polyethylene glycol (MIRALAX) packet 17 g, 17 g, Oral, Daily PRN **AND** bisacodyl (DULCOLAX) EC tablet 5 mg, 5 mg, Oral, Daily PRN **AND** bisacodyl (DULCOLAX) suppository 10 mg, 10 mg, Rectal, Daily PRN, Sophie Gandhi MD  •  calcium carbonate (TUMS) chewable tablet 500 mg (200 mg elemental), 2 tablet, Oral, BID PRN, Sophie Gandhi MD  •  Calcium Replacement - Follow Nurse / BPA Driven Protocol, , Does not apply, PRN, Sophie Gandhi MD  •  carvedilol (COREG) tablet 12.5 mg, 12.5 mg, Oral, BID With Meals, Sophie Gandhi MD, 12.5 mg at 11/11/24 0836  •  desvenlafaxine (PRISTIQ) 24 hr tablet 50 mg, 50 mg, Oral, Daily, Sophie Gandhi MD, 50 mg at 11/11/24 0836  •  folic acid (FOLVITE) tablet 1 mg, 1 mg, Oral, Daily, Sophie Gandhi MD, 1 mg at 11/11/24 0836  •  ipratropium-albuterol (DUO-NEB) nebulizer solution 3 mL, 3 mL, Nebulization, Q6H PRN, Sophie Gandhi MD  •  latanoprost (XALATAN) 0.005 % ophthalmic solution 1  drop, 1 drop, Both Eyes, Nightly, Sophie Gandhi MD, 1 drop at 11/11/24 2059  •  LORazepam (ATIVAN) tablet 0.5 mg, 0.5 mg, Oral, Q8H PRN, Sophie Gandhi MD  •  Magnesium Standard Dose Replacement - Follow Nurse / BPA Driven Protocol, , Does not apply, PRN, Sophie Gandhi MD  •  melatonin tablet 5 mg, 5 mg, Oral, Nightly, Sophie Gandhi MD, 5 mg at 11/11/24 2058  •  nitroglycerin (NITROSTAT) SL tablet 0.4 mg, 0.4 mg, Sublingual, Q5 Min PRN, Sophie Gandhi MD  •  ondansetron (ZOFRAN) injection 4 mg, 4 mg, Intravenous, Q6H PRN, Sophie Gandhi MD  •  pantoprazole (PROTONIX) EC tablet 40 mg, 40 mg, Oral, Daily, Sophie Gandhi MD, 40 mg at 11/12/24 0639  •  Phosphorus Replacement - Follow Nurse / BPA Driven Protocol, , Does not apply, PRN, Sophie Gandhi MD  •  Potassium Replacement - Follow Nurse / BPA Driven Protocol, , Does not apply, PRN, Sophie Gandhi MD  •  rosuvastatin (CRESTOR) tablet 5 mg, 5 mg, Oral, Daily, Sophie Gandhi MD, 5 mg at 11/11/24 2058  •  sodium chloride 0.9 % flush 10 mL, 10 mL, Intravenous, PRN, Zac Bruner MD  •  sodium chloride 0.9 % flush 10 mL, 10 mL, Intravenous, Q12H, Sophie Gandhi MD, 10 mL at 11/11/24 0837  •  sodium chloride 0.9 % flush 10 mL, 10 mL, Intravenous, PRN, Sophie Gandhi MD  •  sodium chloride 0.9 % infusion 40 mL, 40 mL, Intravenous, PRN, Sophie Gandhi MD  •  thiamine (VITAMIN B-1) tablet 100 mg, 100 mg, Oral, Daily, Sophie Gandhi MD, 100 mg at 11/11/24 0836  •  ursodiol (ACTIGALL) capsule 300 mg, 300 mg, Oral, BID, Sophie Gandhi MD, 300 mg at 11/11/24 2058  •  zolpidem (AMBIEN) tablet 5 mg, 5 mg, Oral, Nightly, Sophie Gandhi MD, 5 mg at 11/11/24 2058    Laboratory Results:   Lab Results   Component Value Date    GLUCOSE 100 (H) 11/10/2024    CALCIUM 8.8 11/10/2024     11/10/2024    K 4.6 11/11/2024    CO2 27.0 11/10/2024      11/10/2024    BUN 6 (L) 11/10/2024    CREATININE 0.55 (L) 11/10/2024    EGFRIFNONA 114 05/15/2019    BCR 10.9 11/10/2024    ANIONGAP 11.0 11/10/2024     Lab Results   Component Value Date    WBC 15.59 (H) 11/10/2024    HGB 12.4 11/10/2024    HCT 37.0 11/10/2024    .9 (H) 11/10/2024     11/10/2024     Lab Results   Component Value Date    CHOL 376 (H) 10/25/2023    CHOL 430 (H) 09/12/2023    CHOL 214 (H) 05/14/2019     Lab Results   Component Value Date     (H) 10/25/2023     (H) 09/12/2023    HDL 62 (H) 05/14/2019     Lab Results   Component Value Date     (H) 10/25/2023     (H) 09/12/2023     (H) 05/14/2019     Lab Results   Component Value Date    TRIG 128 10/25/2023    TRIG 159 (H) 09/12/2023    TRIG 172 (H) 05/14/2019     Lab Results   Component Value Date    HGBA1C 5.60 09/12/2023     Lab Results   Component Value Date    INR 1.18 (H) 11/10/2024    INR 1.10 01/31/2024    INR 1.0 08/09/2023    PROTIME 15.2 (H) 11/10/2024    PROTIME 14.3 01/31/2024    PROTIME 14.9 (H) 11/30/2022     Lab Results   Component Value Date    FOLATE 4.83 11/10/2024     Lab Results   Component Value Date    [id number] 716 11/10/2024       MRI Brain Without Contrast    Result Date: 11/10/2024  MRI BRAIN WO CONTRAST Date of Exam: 11/10/2024 1:27 PM EST Indication: BLE weakness, facial parasthesia R>L, difficulty ambulating.  Comparison: None available. Technique:  Routine multiplanar/multisequence sequence images of the brain were obtained without contrast administration. Findings: No acute infarct is present on diffusion weighted sequences. Midline structures appear normal and the craniocervical junction is satisfactory. Gray-white differentiation is maintained and there is no evidence of intracranial hemorrhage, mass or mass effect. The ventricles are normal in size and configuration. The orbits are normal. The paranasal sinuses are clear. Intracranial arterial flow voids appear  maintained.     Impression: Impression: Normal noncontrast MRI of the brain. Electronically Signed: Adriano Kessler MD  11/10/2024 2:14 PM EST  Workstation ID: [id number]      MRI CERVICAL SPINE  ALIGNMENT: There is 1-2 mm of anterior listhesis of C4 on C5. Alignment is otherwise normal.  DISK SPACE: Mild to moderate intervertebral disc space narrowing and endplate osteophyte formation in the lower cervical spine.  VERTEBRA: No fracture or destructive process. There is active right-sided facet arthropathy at C4/5 with edema in the facets. No marrow edema elsewhere. There is a benign hemangioma within the C5 vertebral body. Overall, there is relatively low signal   intensity throughout the marrow suggestive of fatty replacement. Correlate with CBC. Red marrow reconversion related to anemia would be most common etiology although other marrow replacement processes are considerations.  CORD: Normal anatomy and signal intensity.  CRANIOVETEBRAL JUNCTION: Normal  SOFT TISSUES: No mass nor lymphadenopathy.  LEVELS:     C2-C3: The posterior disc is unremarkable.  No significant facet arthropathy. No significant neural foraminal or spinal canal stenosis.     C3-C4: The posterior disc is unremarkable. The uncovertebral joints appear within normal limits. No significant facet arthropathy. No significant neural foraminal or spinal canal stenosis.     C4-C5: Broad-based posterior disc osteophyte complex. AP diameter of the central canal measures 10 mm. Mild uncovertebral hypertrophy. There is moderate right-sided active facet arthropathy. There is mild to moderate neuroforaminal stenosis, right   greater than left. Inflammatory changes related to facet arthropathy may influence the exiting right C5 nerve. Correlate with symptoms.     C5-C6: Broad-based posterior disc osteophyte complex. AP diameter of the central canal measures 12 mm. There is mild to moderate left uncovertebral hypertrophy. There is mild to moderate left  neuroforaminal stenosis.     C6-C7: Broad-based posterior disc osteophyte complex. AP diameter of the central canal measures 12 mm. There is mild uncovertebral hypertrophy and neuroforaminal stenosis.     C7-T1: The posterior disc is unremarkable. The uncovertebral joints appear within normal limits. No significant facet arthropathy. No significant neural foraminal or spinal canal stenosis.     IMPRESSION:  Impression:  1.Moderate active right-sided facet arthropathy at C4/5 which may influence the exiting right C5 nerve. Correlate with symptoms.  2.Relatively mild multifactorial degenerative change of the mid to lower cervical spine otherwise.  3.Diffuse low signal intensity marrow may represent marrow replacement or infiltrative process. Correlate with CBC.        MR THORACIC SPINE  ALIGNMENT: Normal  DISK SPACE: Mild intervertebral disc space narrowing and endplate osteophyte formation of the midthoracic spine most pronounced at T6/7.  VERTEBRA: No fracture or destructive process. There is benign hemangioma within the T5 vertebral body. No marrow edema.  No significant active nor fatty endplate change. There is diffuse low signal intensity throughout the marrow suggesting marrow   replacement or infiltrative process. Correlate with CBC.  CORD: Normal anatomy and signal intensity.  SOFT TISSUES: No mass nor lymphadenopathy.  LEVELS:  Level by level evaluation demonstrates no focal disc protrusion or extrusion. There is no significant neural foraminal or spinal canal narrowing.     Impression:  4.Minimal discogenic change of the midthoracic spine. No acute process identified.  5.Low signal intensity marrow throughout suggestive of marrow replacement or infiltrative process. Correlate with CBC.    Assessment / Plan     Active Hospital Problems:    Lower extremity weakness       Brief Patient Summary:  Philomena Davis is a 64 y.o. female who has a past medical history of EtOH use, alcoholic liver cirrhosis, HTN,  peripheral neuropathy, anxiety and depression who presented to [facility] with complaint of worsening bilateral lower extremity weakness, right and left facial paresthesias for the past week.  Patient was originally seen by our stroke navigators with a negative workup.     Plan:   Bilateral lower extremity weakness  EtOH use  Peripheral neuropathy R/T EtOH use  Dysautonomia  MRI brain with no acute findings  MRI lumbar showed small disc protrusion at the L5-S1 with no significant spinal canal or neuroforaminal stenosis.  MRI of cervical cervical spine showed moderate active right sided facet arthropathy at C4/5.   MRI thoracic spine benign hemangioma at T5.  No acute process noted.   Will hold off on LP at this time.   EMG/nerve conduction study showed a mild sensory neuropathy.  No evidence of demyelinating neuropathy seen.  Vitamin B12 716  Folate 4.83, low normal.  Folic acid 1 mg daily   Continue thiamine 100 mg daily  Trial of gabapentin 100 mg 3 times daily  Spoke with patient about nonmedical management of dysautonomia including increased salt intake within cardiology parameters, increase water intake, 6 small's meal daily, slow position changes, REX hose or stockings, regular blood pressure checks.  Orthostatic blood pressures daily  Cardiology now following for atrial tachycardia  General Neurology will continue to follow     I have discussed the above with the patient, bedside RN, Dr. Hutchins and Dr. Sykes  Time spent with patient: 50 minutes in face-to-face evaluation and management of the patient.    Copied text in this note has been reviewed and is accurate as of 11/12/24.     CANDY Ocampo

## 2024-11-13 PROBLEM — I95.1 ORTHOSTASIS: Status: ACTIVE | Noted: 2024-11-13

## 2024-11-13 LAB
ALBUMIN SERPL-MCNC: 2.9 G/DL (ref 3.5–5.2)
ALBUMIN/GLOB SERPL: 0.9 G/DL
ALP SERPL-CCNC: 129 U/L (ref 39–117)
ALT SERPL W P-5'-P-CCNC: 19 U/L (ref 1–33)
ANION GAP SERPL CALCULATED.3IONS-SCNC: 8 MMOL/L (ref 5–15)
AST SERPL-CCNC: 80 U/L (ref 1–32)
BILIRUB SERPL-MCNC: 1.3 MG/DL (ref 0–1.2)
BUN SERPL-MCNC: 7 MG/DL (ref 8–23)
BUN/CREAT SERPL: 11.3 (ref 7–25)
C-ANCA TITR SER IF: NORMAL TITER
CALCIUM SPEC-SCNC: 8.4 MG/DL (ref 8.6–10.5)
CENTROMERE B AB SER-ACNC: <0.2 AI (ref 0–0.9)
CHLORIDE SERPL-SCNC: 104 MMOL/L (ref 98–107)
CHROMATIN AB SERPL-ACNC: <0.2 AI (ref 0–0.9)
CO2 SERPL-SCNC: 26 MMOL/L (ref 22–29)
CREAT SERPL-MCNC: 0.62 MG/DL (ref 0.57–1)
DEPRECATED RDW RBC AUTO: 54 FL (ref 37–54)
DSDNA AB SER-ACNC: <1 IU/ML (ref 0–9)
EGFRCR SERPLBLD CKD-EPI 2021: 100.8 ML/MIN/1.73
ENA JO1 AB SER-ACNC: <0.2 AI (ref 0–0.9)
ENA RNP AB SER-ACNC: <0.2 AI (ref 0–0.9)
ENA SCL70 AB SER-ACNC: <0.2 AI (ref 0–0.9)
ENA SM AB SER-ACNC: <0.2 AI (ref 0–0.9)
ENA SS-A AB SER-ACNC: <0.2 AI (ref 0–0.9)
ENA SS-B AB SER-ACNC: <0.2 AI (ref 0–0.9)
ERYTHROCYTE [DISTWIDTH] IN BLOOD BY AUTOMATED COUNT: 13.6 % (ref 12.3–15.4)
GLOBULIN UR ELPH-MCNC: 3.2 GM/DL
GLUCOSE SERPL-MCNC: 85 MG/DL (ref 65–99)
HCT VFR BLD AUTO: 31.8 % (ref 34–46.6)
HGB BLD-MCNC: 10.5 G/DL (ref 12–15.9)
Lab: NORMAL
MCH RBC QN AUTO: 36.2 PG (ref 26.6–33)
MCHC RBC AUTO-ENTMCNC: 33 G/DL (ref 31.5–35.7)
MCV RBC AUTO: 109.7 FL (ref 79–97)
MYELOPEROXIDASE AB SER IA-ACNC: <0.2 UNITS (ref 0–0.9)
P-ANCA ATYPICAL TITR SER IF: NORMAL TITER
P-ANCA TITR SER IF: NORMAL TITER
PLATELET # BLD AUTO: 219 10*3/MM3 (ref 140–450)
PMV BLD AUTO: 9.7 FL (ref 6–12)
POTASSIUM SERPL-SCNC: 4 MMOL/L (ref 3.5–5.2)
PROT SERPL-MCNC: 6.1 G/DL (ref 6–8.5)
PROTEINASE3 AB SER IA-ACNC: <0.2 UNITS (ref 0–0.9)
RBC # BLD AUTO: 2.9 10*6/MM3 (ref 3.77–5.28)
SODIUM SERPL-SCNC: 138 MMOL/L (ref 136–145)
WBC NRBC COR # BLD AUTO: 11.04 10*3/MM3 (ref 3.4–10.8)

## 2024-11-13 PROCEDURE — 80053 COMPREHEN METABOLIC PANEL: CPT | Performed by: HOSPITALIST

## 2024-11-13 PROCEDURE — 97110 THERAPEUTIC EXERCISES: CPT

## 2024-11-13 PROCEDURE — 99232 SBSQ HOSP IP/OBS MODERATE 35: CPT | Performed by: NURSE PRACTITIONER

## 2024-11-13 PROCEDURE — 85027 COMPLETE CBC AUTOMATED: CPT | Performed by: HOSPITALIST

## 2024-11-13 PROCEDURE — 99233 SBSQ HOSP IP/OBS HIGH 50: CPT

## 2024-11-13 PROCEDURE — 99232 SBSQ HOSP IP/OBS MODERATE 35: CPT | Performed by: HOSPITALIST

## 2024-11-13 PROCEDURE — 97530 THERAPEUTIC ACTIVITIES: CPT

## 2024-11-13 RX ORDER — CARVEDILOL 3.12 MG/1
3.12 TABLET ORAL 2 TIMES DAILY WITH MEALS
Status: DISCONTINUED | OUTPATIENT
Start: 2024-11-13 | End: 2024-11-13

## 2024-11-13 RX ORDER — CARVEDILOL 12.5 MG/1
12.5 TABLET ORAL 2 TIMES DAILY WITH MEALS
Status: DISCONTINUED | OUTPATIENT
Start: 2024-11-13 | End: 2024-11-13

## 2024-11-13 RX ORDER — METOPROLOL TARTRATE 1 MG/ML
5 INJECTION, SOLUTION INTRAVENOUS EVERY 6 HOURS PRN
Status: DISCONTINUED | OUTPATIENT
Start: 2024-11-13 | End: 2024-11-23 | Stop reason: HOSPADM

## 2024-11-13 RX ORDER — CARVEDILOL 12.5 MG/1
12.5 TABLET ORAL 2 TIMES DAILY WITH MEALS
Status: DISCONTINUED | OUTPATIENT
Start: 2024-11-13 | End: 2024-11-22

## 2024-11-13 RX ORDER — NYSTATIN 100000 U/G
1 CREAM TOPICAL EVERY 12 HOURS SCHEDULED
Status: DISCONTINUED | OUTPATIENT
Start: 2024-11-13 | End: 2024-11-23 | Stop reason: HOSPADM

## 2024-11-13 RX ORDER — MIDODRINE HYDROCHLORIDE 10 MG/1
10 TABLET ORAL
Status: DISCONTINUED | OUTPATIENT
Start: 2024-11-13 | End: 2024-11-15

## 2024-11-13 RX ORDER — FLUDROCORTISONE ACETATE 0.1 MG/1
100 TABLET ORAL DAILY
Status: DISCONTINUED | OUTPATIENT
Start: 2024-11-13 | End: 2024-11-13

## 2024-11-13 RX ADMIN — CARVEDILOL 12.5 MG: 12.5 TABLET, FILM COATED ORAL at 17:11

## 2024-11-13 RX ADMIN — GABAPENTIN 100 MG: 100 CAPSULE ORAL at 09:20

## 2024-11-13 RX ADMIN — GABAPENTIN 100 MG: 100 CAPSULE ORAL at 17:11

## 2024-11-13 RX ADMIN — DESVENLAFAXINE 50 MG: 50 TABLET, FILM COATED, EXTENDED RELEASE ORAL at 09:21

## 2024-11-13 RX ADMIN — URSODIOL 300 MG: 300 CAPSULE ORAL at 09:20

## 2024-11-13 RX ADMIN — ROSUVASTATIN 5 MG: 10 TABLET, FILM COATED ORAL at 20:25

## 2024-11-13 RX ADMIN — THIAMINE HCL TAB 100 MG 100 MG: 100 TAB at 09:20

## 2024-11-13 RX ADMIN — ZOLPIDEM TARTRATE 5 MG: 5 TABLET ORAL at 20:26

## 2024-11-13 RX ADMIN — URSODIOL 300 MG: 300 CAPSULE ORAL at 20:26

## 2024-11-13 RX ADMIN — Medication 10 ML: at 20:26

## 2024-11-13 RX ADMIN — NYSTATIN 1 APPLICATION: 100000 CREAM TOPICAL at 14:37

## 2024-11-13 RX ADMIN — FOLIC ACID 1 MG: 1 TABLET ORAL at 09:20

## 2024-11-13 RX ADMIN — MIDODRINE HYDROCHLORIDE 10 MG: 10 TABLET ORAL at 17:10

## 2024-11-13 RX ADMIN — Medication 10 ML: at 09:18

## 2024-11-13 RX ADMIN — NYSTATIN 1 APPLICATION: 100000 CREAM TOPICAL at 20:26

## 2024-11-13 RX ADMIN — SENNOSIDES AND DOCUSATE SODIUM 2 TABLET: 50; 8.6 TABLET ORAL at 12:04

## 2024-11-13 RX ADMIN — PANTOPRAZOLE SODIUM 40 MG: 40 TABLET, DELAYED RELEASE ORAL at 06:57

## 2024-11-13 RX ADMIN — Medication 5 MG: at 20:25

## 2024-11-13 RX ADMIN — LATANOPROST 1 DROP: 50 SOLUTION OPHTHALMIC at 20:27

## 2024-11-13 RX ADMIN — MIDODRINE HYDROCHLORIDE 10 MG: 10 TABLET ORAL at 12:04

## 2024-11-13 NOTE — PROGRESS NOTES
"  [facility]  PROGRESS NOTE    Date of Admission: 11/10/2024  Date of Service: 11/13/24    Primary Care Physician: Unique Brandt DO    Chief Complaint: Atrial arrhythmia/atrial flutter  Problem List:   Lower extremity weakness      Subjective      HPI: Patient is lying in bed breathing easy on O2 at 2 liters via NC  She denies any chest pain.  She is currently in normal sinus rhythm.      Objective   Vitals: /65 (BP Location: Left arm, Patient Position: Lying)   Pulse 94   Temp 98.1 °F (36.7 °C) (Oral)   Resp 16   Ht 162.6 cm (64.02\")   Wt 67.1 kg (147 lb 14.9 oz)   LMP 01/22/2016 (Approximate)   SpO2 93%   BMI 25.38 kg/m²     Physical Exam:  GENERAL: Alert, cooperative, in no acute distress.   HEENT: Normocephalic, no jugular venous distention  HEART: Regular rhythm, normal rate, and no murmurs, gallops, or rubs.   LUNGS: Clear to auscultation bilaterally. No wheezing, rales or rhonchi.  ABDOMEN: Soft, bowel sounds present, nontender   NEUROLOGIC: No focal abnormalities involving strength or sensation are noted.   EXTREMITIES: No clubbing, cyanosis, or edema noted.     Results:  Results from last 7 days   Lab Units 11/10/24  0946   WBC 10*3/mm3 15.59*   HEMOGLOBIN g/dL 12.4   HEMATOCRIT % 37.0   PLATELETS 10*3/mm3 314     Results from last 7 days   Lab Units 11/11/24  0017 11/10/24  1007   SODIUM mmol/L  --  138   POTASSIUM mmol/L 4.6 3.3*   CHLORIDE mmol/L  --  100   CO2 mmol/L  --  27.0   BUN mg/dL  --  6*   CREATININE mg/dL  --  0.55*   GLUCOSE mg/dL  --  100*      Lab Results   Component Value Date    CHOL 376 (H) 10/25/2023    TRIG 128 10/25/2023     (H) 10/25/2023     (H) 10/25/2023    AST 72 (H) 11/10/2024    ALT 27 11/10/2024             Results from last 7 days   Lab Units 11/10/24  1007   TSH uIU/mL 9.670*   FREE T4 ng/dL 1.16         Results from last 7 days   Lab Units 11/10/24  0946   PROTIME Seconds 15.2*   INR  1.18*   APTT " seconds 37.1*     Results from last 7 days   Lab Units 11/10/24  1007   CK TOTAL U/L 237*   HSTROP T ng/L 14*             Intake/Output Summary (Last 24 hours) at 11/13/2024 0815  Last data filed at 11/12/2024 1303  Gross per 24 hour   Intake --   Output 900 ml   Net -900 ml       I personally reviewed the patient's EKG/Telemetry data    Radiology Data: All radiology data reviewed    Current Medications:  carvedilol, 25 mg, Oral, BID With Meals  desvenlafaxine, 50 mg, Oral, Daily  folic acid, 1 mg, Oral, Daily  gabapentin, 100 mg, Oral, TID  latanoprost, 1 drop, Both Eyes, Nightly  melatonin, 5 mg, Oral, Nightly  pantoprazole, 40 mg, Oral, Daily  rosuvastatin, 5 mg, Oral, Daily  sodium chloride, 10 mL, Intravenous, Q12H  thiamine, 100 mg, Oral, Daily  ursodiol, 300 mg, Oral, BID  zolpidem, 5 mg, Oral, Nightly           Assessment:   Atrial tachycardia/atrial flutter  Occurred 11/10/2024 in the setting of lower extremity weakness and TIA symptoms  Converted to NSR after receiving IV metoprolol  Echo 11/11/2024 LVEF 65% no valvular abnormality  Throughout admission intermittent bursts of atrial tachycardia  Carvedilol 25 mg twice daily  Hypertension  Current /65  Orthostatic hypotension  Orthostatics positive and blood pressure decreased to 77/62 on standing  Hyperlipidemia  Crestor 5 mg daily  TIA symptoms/lower extremity weakness  MRI of lumbar spine L5-S1 disc protrusion  Neurology following  Alcoholic liver cirrhosis    Plan:   Would defer anticoagulation due to liver cirrhosis and no sustained atrial fibrillation or flutter  Decrease carvedilol to 12.5 mg twice daily due to orthostatic hypotension  IV metoprolol every 6 hours for sustained heart rates greater than 120 bpm  Would recommend 2-week monitor at discharge        CANDY Weaver

## 2024-11-13 NOTE — PLAN OF CARE
Problem: Adult Inpatient Plan of Care  Goal: Plan of Care Review  Outcome: Progressing  Flowsheets (Taken 11/13/2024 0552)  Progress: improving  Plan of Care Reviewed With: patient  Goal: Patient-Specific Goal (Individualized)  Outcome: Progressing  Goal: Absence of Hospital-Acquired Illness or Injury  Outcome: Progressing  Goal: Optimal Comfort and Wellbeing  Outcome: Progressing  Goal: Readiness for Transition of Care  Outcome: Progressing     Problem: Skin Injury Risk Increased  Goal: Skin Health and Integrity  Outcome: Progressing     Problem: Fall Injury Risk  Goal: Absence of Fall and Fall-Related Injury  Outcome: Progressing   Goal Outcome Evaluation:  Plan of Care Reviewed With: patient        Progress: improving  Outcome Evaluation: Patient will remain free from injury during hospitalization by calling for assistance at all times when needing out of bed.

## 2024-11-13 NOTE — PAYOR COMM NOTE
"Philomena Davis (64 y.o. Female)       Date of Birth   1960    Social Security Number       Address   [address] [place] [id number]    Home Phone   [phone]    MRN   [id number]       [facility]    Marital Status                               Admission Date   11/10/24    Admission Type   Emergency    Admitting Provider   Jose J Hutchins MD    Attending Provider   Jose J Hutchins MD    Department, Room/Bed   [facility] 3F, S301/1       Discharge Date       Discharge Disposition       Discharge Destination                                 Attending Provider: Jose J Hutchins MD    Allergies: Buspirone, Codeine, Mirtazapine    Isolation: None   Infection: None   Code Status: CPR    Ht: 162.6 cm (64.02\")   Wt: 67.1 kg (147 lb 14.9 oz)    Admission Cmt: None   Principal Problem: Lower extremity weakness [R29.898]                   Active Insurance as of 11/10/2024       Primary Coverage       Payor Plan Insurance Group Employer/Plan Group    [employer] [place] [employer] [place]ICA[place]        Payor Plan Address Payor Plan Phone Number Payor Plan Fax Number Effective Dates    [address] [address] [phone]  2018 - None Entered    [facility] [id number]         Subscriber Name Subscriber Birth Date Member ID       PHILOMNEA DAVIS 1960 [id number]                     Emergency Contacts        (Rel.) Home Phone Work Phone Mobile Phone    Madelin Null (Relative) [phone] -- [phone]    MARCELLO ZAIDI (Friend) -- -- [phone]           [facility]  [address] [address]  Phone:  [phone]  Fax:  [phone]        Patient:     Philomena Davis MRN:  [id number]   [address]  [facility] [address] :  1960  SSN:    Phone: [phone] Sex:  F      INSURANCE PAYOR PLAN GROUP # SUBSCRIBER ID   Primary:    [facility] [id number]   [id number]   Admitting Diagnosis: Lower extremity " weakness [R29.898]  Order Date:  2024        Inpatient Admission       (Order ID: [id number])     Diagnosis:         Priority:  Routine Expected Date:   Expiration Date:        Interval:   Count:    Level of Care: Telemetry [5]  Diagnosis: Orthostasis [961915]  Certification: I Certify That Inpatient Hospital Services Are Medically Necessary For Greater Than 2 Midnights     Specimen Type:   Specimen Source:   Specimen Taken Date:   Specimen Taken Time:                  Verbal Order Mode: Verbal with readback   Authorizing Provider: Jose J Hutchins MD  Authorizing Provider's NPI: [id number]     Order Entered By: Jordy Turner RN 2024  5:25 PM     Electronically signed by:              History & Physical        Sophie Gandhi MD at 11/10/24 [address] [place] [address] Medicine Services  HISTORY AND PHYSICAL    Patient Name: Philomena Davis  : 1960  MRN: [id number]  Primary Care Physician: Unique Brandt DO  Date of admission: 11/10/2024      Subjective  Subjective     Chief Complaint:  Lower extremity pain and weakness    HPI:  Philomena Davis is a 64 y.o. female with past medical history of alcohol dependence, alcoholic liver cirrhosis, essential hypertension, peripheral neuropathy, anxiety and depression, who presented to the hospital today with worsening bilateral lower extremity weakness, right and left facial paresthesia x 1 week    Patient reported that she has been experiencing worsening weakness associated with pain of both lower extremities for the last 1 week which gradually has been getting worse.  At baseline she was able to ambulate independently.  Over the past week, she has been using a walker and today she was able to take only few steps with a walker and felt that she is going to fall.  She describes the pain in her lower extremities as electric shock pain that comes and goes.  Most of the time she will feel that her legs are extra  sensitive to any tactile stimuli. additionally, she is complaining of concomitant worsening facial paresthesia over the last 3 days (right more than left).    Patient reports drinking alcohol very socially now.  Last drink was yesterday.  Only 1 shot of vodka every now and then.  No withdrawal symptoms.  She denies any fever or chills, denied urinary symptoms, denied cough, denied chest pain etc.    In ER, blood workup was remarkable for potassium 3.3, bilirubin of 1.7, AST 72, TSH of 9.6, free T41.1, CRP of 3.88, sed rate of 105, lactate of 2.2, procalcitonin of 0.15, INR of 1.1, white cell count of 15.5, MCV of 106.  She was seen by stroke team and MRI brain and lumbar spine ordered.      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 03/24/2017    Closed trimalleolar fracture of right ankle 05/24/2023    Digital mucous cyst of toe of left foot 03/24/2017    Ganglion cyst of left foot     Glaucoma     Hypertension     Wears glasses            Past Surgical History:   Procedure Laterality Date    ANKLE OPEN REDUCTION INTERNAL FIXATION Left 03/24/2017    Procedure: LEFT ANKLE OPEN REDUCTION INTERNAL FIXATION WITH ILIAC CREST BONE GRAFT , EXCISION CYST 4TH/5TH INNERSPACE LEFT FOOT;  Surgeon: Frank Larson MD;  Location: [facility] OR;  Service:     ANKLE OPEN REDUCTION INTERNAL FIXATION Right 05/25/2023    Procedure: ANKLE OPEN REDUCTION INTERNAL FIXATION;  Surgeon: Deandre Reynoso MD;  Location: [facility] OR;  Service: Orthopedics;  Laterality: Right;    AUGMENTATION MAMMAPLASTY Bilateral 1999    BREAST AUGMENTATION      BREAST EXCISIONAL BIOPSY Right 2014    DILATION AND CURETTAGE, DIAGNOSTIC / THERAPEUTIC      ENDOSCOPY N/A 11/30/2022    Procedure:  ESOPHAGOGASTRODUODENOSCOPY;  Surgeon: Arelis Solano MD;  Location:  [facility];  Service: Gastroenterology;  Laterality: N/A;    ENDOSCOPY N/A 2/1/2024    Procedure: ESOPHAGOGASTRODUODENOSCOPY;  Surgeon: Brunner, Mark I, MD;  Location:  [facility];  Service: Gastroenterology;  Laterality: N/A;    NE RPR NON/MAL FEMUR DSTL H/N W/ILIAC/AUTOG BONE Left 03/24/2017    Procedure: ILIAC CREST BONE GRAFT;  Surgeon: Frank Larson MD;  Location:  [facility];  Service: Orthopedics    WRIST SURGERY         Family History: family history includes Breast cancer (age of onset: 68) in her paternal grandmother; Cancer in her mother and another family member; Heart disease in an other family member; Hypertension in an other family member; No Known Problems in her brother, daughter, father, maternal aunt, maternal grandmother, paternal aunt, sister, and son; Rheum arthritis in an other family member; Stroke in an other family member.     Social History:  reports that she quit smoking about 15 years ago. Her smoking use included cigarettes. She started smoking about 35 years ago. She has a 30 pack-year smoking history. She has never used smokeless tobacco. She reports current alcohol use of about 7.0 standard drinks of alcohol per week. She reports that she does not use drugs.  Social History     Social History Narrative    Not on file       Medications:  Available home medication information reviewed.  Fluticasone-Salmeterol, albuterol sulfate HFA, bacitracin-polymyxin b, carvedilol, cloNIDine, desvenlafaxine, eszopiclone, folic acid, gabapentin, lactobacillus, latanoprost, pantoprazole, rosuvastatin, terbinafine, thiamine, and ursodiol    Allergies   Allergen Reactions    Buspirone Anxiety    Codeine Nausea Only    Mirtazapine Anxiety       Objective  Objective     Vital Signs:   Temp:  [98 °F (36.7 °C)-98.1 °F (36.7 °C)] 98.1 °F (36.7 °C)  Heart Rate:  [] 92  Resp:  [8-18] 8  BP: (137-165)/()  145/95  Total (NIH Stroke Scale): 1    Physical Exam   General: Comfortable, not in distress, conversant and cooperative  Head: Atraumatic and normocephalic  Eyes: No Icterus. No pallor  Ears:  Ears appear intact with no abnormalities noted  Throat: No oral lesions, no thrush  Neck: Supple, trachea midline  Lungs: Clear to auscultation bilaterally, equal air entry, no wheezing or crackles  Heart:  Normal S1 and S2, no murmur, no gallop, No JVD, no lower extremity swelling  Abdomen:  Soft, no tenderness, no organomegaly, normal bowel sounds, no organomegaly  Extremities: pulses equal bilaterally  Skin: No bleeding, bruising or rash, normal skin turgor and elasticity  Neurologic: Cranial nerves appear intact with no evidence of facial asymmetry, weakness both lower extremities 4/5.  Sensory level up to T3-T4.    Psych: Alert and oriented x 3, normal mood    Result Review:  I have personally reviewed the results from the time of this admission to 11/10/2024 15:51 EST and agree with these findings:  [x]  Laboratory list / accordion  [x]  Microbiology  [x]  Radiology  [x]  EKG/Telemetry   [x]  Cardiology/Vascular   [x]  Pathology  [x]  Old records      LAB RESULTS:      Lab 11/10/24  1235 11/10/24  1007 11/10/24  0946   WBC  --   --  15.59*   HEMOGLOBIN  --   --  12.4   HEMATOCRIT  --   --  37.0   PLATELETS  --   --  314   NEUTROS ABS  --   --  11.83*   IMMATURE GRANS (ABS)  --   --  0.07*   LYMPHS ABS  --   --  2.06   MONOS ABS  --   --  1.34*   EOS ABS  --   --  0.20   MCV  --   --  106.9*   SED RATE  --   --  105*   CRP  --  3.88*  --    PROCALCITONIN  --  0.15  --    LACTATE 2.2*  --  2.6*   PROTIME  --   --  15.2*   INR  --   --  1.18*   APTT  --   --  37.1*         Lab 11/10/24  1007   SODIUM 138   POTASSIUM 3.3*   CHLORIDE 100   CO2 27.0   ANION GAP 11.0   BUN 6*   CREATININE 0.55*   EGFR 103.8   GLUCOSE 100*   CALCIUM 8.8   MAGNESIUM 1.7   TSH 9.670*         Lab 11/10/24  1007   TOTAL PROTEIN 7.0   ALBUMIN 2.8*    GLOBULIN 4.2   ALT (SGPT) 27   AST (SGOT) 72*   BILIRUBIN 1.7*   ALK PHOS 165*         Lab 11/10/24  1007   HSTROP T 14*                 UA          11/10/2024    09:58   Urinalysis   Specific Gravity, UA 1.007    Ketones, UA Negative    Blood, UA Negative    Leukocytes, UA Negative    Nitrite, UA Negative        Microbiology Results (last 10 days)       ** No results found for the last 240 hours. **            MRI Lumbar Spine Without Contrast    Result Date: 11/10/2024  MRI LUMBAR SPINE WO CONTRAST Date of Exam: 11/10/2024 1:40 PM EST Indication: Lower extremity radiculopathy.  Comparison: None available. Technique:  Routine multiplanar/multisequence sequence images of the lumbar spine were obtained without contrast administration.  Findings: Hyperintense benign osseous hemangioma noted at L3. T1 marrow signal is otherwise preserved, without evidence of fracture or suspicious marrow replacing lesion. Alignment is anatomic, without evidence of significant listhesis or subluxation. The conus medullaris and cauda equina nerve roots are satisfactory in appearance. The paraspinal soft tissues demonstrate no acute or suspicious findings. Multilevel spondylosis is present, with areas of involvement including L1-2, no significant spinal canal or neuroforaminal impingement. L2-3, no significant spinal canal or neuroforaminal impingement. L3-4, small disc bulge and bilateral facet arthropathy. There is no evidence of significant spinal canal or neuroforaminal narrowing. L4-5, minimal small disc bulge and mild facet arthropathy. The spinal canal remains patent. There is minimal bilateral neuroforaminal narrowing. L5-S1, small circumferential disc bulge with component of right-sided disc protrusion. There is some minimal narrowing of the right aspect of the spinal canal and the small disc protrusion encroaches upon the right subarticular recess, potentially in some proximity to the traversing right S1 nerve root. The neural  foramina remain patent.     Impression: Impression: Generally mild lumbar spondylosis is evident, including a small disc protrusion eccentric to the right at L5-S1, in the region of the right subarticular recess and potentially in some proximity to the traversing right S1 nerve root. There is otherwise no  evidence of significant spinal canal or neuroforaminal narrowing. Electronically Signed: Adriano Kessler MD  11/10/2024 2:27 PM EST  Workstation ID: [id number]    MRI Brain Without Contrast    Result Date: 11/10/2024  MRI BRAIN WO CONTRAST Date of Exam: 11/10/2024 1:27 PM EST Indication: BLE weakness, facial parasthesia R>L, difficulty ambulating.  Comparison: None available. Technique:  Routine multiplanar/multisequence sequence images of the brain were obtained without contrast administration. Findings: No acute infarct is present on diffusion weighted sequences. Midline structures appear normal and the craniocervical junction is satisfactory. Gray-white differentiation is maintained and there is no evidence of intracranial hemorrhage, mass or mass effect. The ventricles are normal in size and configuration. The orbits are normal. The paranasal sinuses are clear. Intracranial arterial flow voids appear maintained.     Impression: Impression: Normal noncontrast MRI of the brain. Electronically Signed: Adriano Kessler MD  11/10/2024 2:14 PM EST  Workstation ID: [id number]    CT Abdomen Pelvis With Contrast    Result Date: 11/10/2024  CT ABDOMEN PELVIS W CONTRAST Date of Exam: 11/10/2024 11:35 AM EST Indication: abdominal pain. Comparison: CT abdomen and pelvis 1/30/2024 Technique: Axial CT images were obtained of the abdomen and pelvis following the uneventful intravenous administration of 85 mL Isovue-300. Reconstructed coronal and sagittal images were also obtained. Automated exposure control and iterative construction methods were used. Findings: LUNG BASES:  Unremarkable without mass or infiltrate. LIVER:   Unremarkable parenchyma without focal lesion. BILIARY/GALLBLADDER:  Unremarkable SPLEEN:  Unremarkable PANCREAS:  Unremarkable ADRENAL:  Unremarkable KIDNEYS: Bilateral renal cysts are noted. No evidence for obstructive uropathy. No calculus identified. GASTROINTESTINAL/MESENTERY:  No evidence of obstruction nor inflammation.  The appendix is visualized normal in caliber. MESENTERIC VESSELS: The mesenteric vessels are patent. There is some faint soft tissue density along the celiac artery. There is some similar findings on prior study however this appears slightly more conspicuous on the current exam. Mild vasculitis not excluded. AORTA/IVC:  Normal caliber. RETROPERITONEUM/LYMPH NODES:  Unremarkable REPRODUCTIVE: The uterus is visualized. BLADDER:  Unremarkable OSSEUS STRUCTURES:  Typical for age with no acute process identified.     Impression: Impression: 1. Mild soft tissue stranding along the celiac artery which could represent mild vasculitis. 2. No acute findings otherwise noted in the abdomen or pelvis. Electronically Signed: Jeimy Tomas MD  11/10/2024 12:05 PM EST  Workstation ID: [id number]    CT Head Without Contrast    Result Date: 11/10/2024  CT HEAD WO CONTRAST Date of Exam: 11/10/2024 11:22 AM EST Indication: facial numbness x 1 week difficulty walking. Comparison: None available. Technique: Axial CT images were obtained of the head without contrast administration.  Automated exposure control and iterative construction methods were used. FINDINGS: Gray-white differentiation is maintained and there is no evidence of intracranial hemorrhage, mass or mass effect. Mild age-related changes of the brain are present including volume loss and typical periventricular sequela of chronic small vessel ischemia. There is otherwise no evidence of intracranial hemorrhage, mass or mass effect. The ventricles are normal in size and configuration accounting for surrounding volume loss. The orbits are normal and the  paranasal sinuses are grossly clear.     Impression: Age-related changes of the brain as above, otherwise without evidence of acute intracranial abnormality. Electronically Signed: Adriano Kessler MD  11/10/2024 11:30 AM EST  Workstation ID: [id number]    XR Chest 1 View    Result Date: 11/10/2024  XR CHEST 1 VW Date of Exam: 11/10/2024 10:11 AM EST Indication: Weak/Dizzy/AMS triage protocol Comparison: February 3, 2024 Findings: The lungs are clear. The heart and mediastinal contours appear normal. There is no pleural effusion. The pulmonary vasculature appears normal. The osseous structures appear intact.     Impression: Impression: No acute cardiopulmonary process. Electronically Signed: Jhoan Madrid MD  11/10/2024 10:47 AM EST  Workstation ID: [id number]         Assessment & Plan  Assessment & Plan       Lower extremity weakness    Summary:  Philomena Davis is a 64 y.o. female with past medical history of alcohol dependence, alcoholic liver cirrhosis, essential hypertension, peripheral neuropathy, anxiety and depression, who presented to the hospital today with worsening bilateral lower extremity weakness, right and left facial paresthesia x 1 week    Bilateral lower extremity weakness for differential diagnosis  Rule out cervical/thoracic radiculopathy  Rule out demyelinating disease  Patient presented with worsening lower extremity weakness over the last 7 days.  Now hardly able to walk few steps with a walker after she was walking independently without any walking aid.  Additionally she has neuropathic pain and sensory level up to  T3-T4  MRI brain with no acute brain pathology  MRI lumbar spine showing small disc protrusion at L5-S1 without significant spinal canal or neuroforaminal stenosis  MRI cervical and thoracic spine pending  Given her elevated CRP and sed rate, will order LP by IR and will send workup for MS and demyelinating disease  Might benefit from nerve conduction study and EMG.  Will defer to  neurology team  Follow B12 and folate levels  Seen by stroke team in ER and signed off after MRI brain showed no acute stroke.  Will consult general neurology in the a.m.  PT and OT consultation    Possible vasculitis  Elevated inflammatory markers  CT abdomen with questionable celiac artery vasculitis  Sed rate markedly evaded at 105  CRP elevated at 3.8  No infectious etiology identified.  Monitor off antibiotics  Check WILMA, RF, ANCA panel    Hypokalemia  Replace per protocol    Atrial tachycardia, resolved  Presented with atrial tachycardia, with heart rate fixed in the 150s  With 5 mg of IV metoprolol, she converted to sinus rhythm with heart rate of 90 with P wave morphology changing from her initial EKG with heart rate of 150 indicative of previous rhythm being atrial tachycardia: SVT versus atypical atrial flutter.  Will order echo  Increase Coreg to 12.5 mg twice daily  If she continues to have rhythm changes, consider cardiology consultation    Alcohol dependence  Patient reported that she hardly drink any alcohol now.  No withdrawal symptoms.  Monitor closely    Alcoholic liver cirrhosis  Elevated liver enzymes  Hepatocellular jaundice  Compensated with normal INR  AST mildly elevated at 72 and bilirubin elevated 1.7.  Likely secondary to alcohol use  Continue to monitor    Elevated lactic acid, of no clinical significance  Mild elevated at 2.2.  Likely secondary to liver disease    Subclinical hypothyroidism  TSH elevated at 9.6 and free T4 is normal at 1.1  No treatment indicated at this time.  Needs repeat thyroid panel as outpatient in 6 weeks      VTE Prophylaxis:  Mechanical VTE prophylaxis orders are present.      CODE STATUS:    Code Status and Medical Interventions: CPR (Attempt to Resuscitate); Full Support   Ordered at: 11/10/24 1043     Level Of Support Discussed With:    Patient    Next of Kin (If No Surrogate)     Code Status (Patient has no pulse and is not breathing):    CPR (Attempt to  Resuscitate)     Medical Interventions (Patient has pulse or is breathing):    Full Support       Expected Discharge   Expected Discharge Date: 2024; Expected Discharge Time:      Sophie Gandhi MD  11/10/24     Electronically signed by Sophie Gandhi MD at 11/10/24 1556          Physician Progress Notes (all)        Jose J Hutchins MD at 24 0950              [facility] Medicine Services  PROGRESS NOTE    Patient Name: Philomena Davis  : 1960  MRN: [id number]    Date of Admission: 11/10/2024  Primary Care Physician: Unique Brandt DO    Subjective   Subjective     CC: Numbness    HPI: Continues with numbness. B LE weakness when up. Marked orthostasis noted.      Objective   Objective     Vital Signs:   Temp:  [97.6 °F (36.4 °C)-98.1 °F (36.7 °C)] 98.1 °F (36.7 °C)  Heart Rate:  [] 99  Resp:  [16-18] 16  BP: ()/(54-70) 77/62  Flow (L/min) (Oxygen Therapy):  [2] 2     Physical Exam:  NAD, alert and oriented x 3  OP clear, dry MM  Neck supple  No LAD  Tachy  CTAB  +BS, soft  MASSEY  Normal affect  B LE 4+/5, notes numbness in hands/legs, no change from  otherwise    Results Reviewed:  LAB RESULTS:      Lab 24  0757 11/10/24  1628 11/10/24  1235 11/10/24  1007 11/10/24  0946   WBC 11.04*  --   --   --  15.59*   HEMOGLOBIN 10.5*  --   --   --  12.4   HEMATOCRIT 31.8*  --   --   --  37.0   PLATELETS 219  --   --   --  314   NEUTROS ABS  --   --   --   --  11.83*   IMMATURE GRANS (ABS)  --   --   --   --  0.07*   LYMPHS ABS  --   --   --   --  2.06   MONOS ABS  --   --   --   --  1.34*   EOS ABS  --   --   --   --  0.20   .7*  --   --   --  106.9*   SED RATE  --   --   --   --  105*   CRP  --   --   --  3.88*  --    PROCALCITONIN  --   --   --  0.15  --    LACTATE  --  1.2 2.2*  --  2.6*   PROTIME  --   --   --   --  15.2*   APTT  --   --   --   --  37.1*   HSTROP T  --   --   --  14*  --          Lab 24  0757 24  2845  11/11/24  0017 11/10/24  1007   SODIUM 138  --   --  138   POTASSIUM 4.0  --  4.6 3.3*   CHLORIDE 104  --   --  100   CO2 26.0  --   --  27.0   ANION GAP 8.0  --   --  11.0   BUN 7*  --   --  6*   CREATININE 0.62  --   --  0.55*   EGFR 100.8  --   --  103.8   GLUCOSE 85  --   --  100*   CALCIUM 8.4*  --   --  8.8   MAGNESIUM  --  1.7  --  1.7   PHOSPHORUS  --  3.4  --   --    TSH  --   --   --  9.670*         Lab 11/13/24  0757 11/10/24  1007   TOTAL PROTEIN 6.1 7.0   ALBUMIN 2.9* 2.8*   GLOBULIN 3.2 4.2   ALT (SGPT) 19 27   AST (SGOT) 80* 72*   BILIRUBIN 1.3* 1.7*   ALK PHOS 129* 165*         Lab 11/10/24  1007 11/10/24  0946   HSTROP T 14*  --    PROTIME  --  15.2*   INR  --  1.18*             Lab 11/10/24  1235   FOLATE 4.83   VITAMIN B 12 716         Brief Urine Lab Results  (Last result in the past 365 days)        Color   Clarity   Blood   Leuk Est   Nitrite   Protein   CREAT   Urine HCG        11/10/24 0958 Yellow   Clear   Negative   Negative   Negative   Negative                   Microbiology Results Abnormal       None            EMG & Nerve Conduction Test    Result Date: 11/12/2024  Table formatting from the original result was not included. Images from the original result were not included. Indication: Generalized weakness, numbness, peripheral neuropathy, Guillain-Barré syndrome Clinical: 64 y.o.female with a previous diagnosis of peripheral neuropathy.  She has had paresthesias affecting her arms and feet.  Roughly 2 weeks ago she had an illness, and sometime following this noted that she was feeling weak in her arms and legs.  She required a walker over the past week.  On examination her reflexes are absent at the knees and ankles and 1+ at the biceps bilaterally.  She has mildly diminished vibratory sensation bilaterally.  She has mild proximal weakness in both legs with some difficulty elevating each leg off the bed.  Her arm strength appears good.  Her speech is normal.  There is no ptosis.   Peripheral neuropathy and Guillain-Barré are clinical considerations.      NCS/EMG TECHNICAL DATA: All studies are performed at a skin temperature of 34°C or greater. Distal sensory latencies are calculated to waveform peak.  Sensory amplitudes are measured peak to peak Distal motor latencies are calculated to waveform onset.  Motor amplitudes are calculated baseline to peak Nerve Conduction Studies Anti Sensory Summary Table  Stim Site NR Peak (ms) Norm Peak (ms) P-T Amp (µV) Norm P-T Amp Site1 Site2 Delta-P (ms) Dist (cm) Roosevelt (m/s) Norm Roosevelt (m/s) Left Sural Anti Sensory (Lat Mall) Calf    4.5 <4.0 14.4 >5.0 Calf Lat Mall 4.5 14.0 31 >40 Right Sural Anti Sensory (Lat Mall) Calf    6.3 <4.0 22.4 >5.0 Calf Lat Mall 6.3 14.0 22 >40 Right Ulnar Anti Sensory (5th Digit) Wrist    1.9 <3.7 25.5 >15.0 Wrist 5th Digit 1.9 0.0   B Elbow    5.0  22.2  B Elbow Wrist 3.1 24.0 77 >53 A Elbow    6.1  20.2  A Elbow B Elbow 1.1 10.0 91 >53 Ortho Sensory Summary Table  Stim Site NR Peak (ms) Norm Peak (ms) P-T Amp (µV) Norm P-T Amp Site1 Site2 Delta-P (ms) Dist (cm) Roosevelt (m/s) Norm Roosevelt (m/s) Right Median Ortho Sensory (Wrist) 2nd Digit    1.5  20.7  2nd Digit Wrist 1.5 8.0 53  Motor Summary Table  Stim Site NR Onset (ms) Norm Onset (ms) O-P Amp (mV) Norm O-P Amp Site1 Site2 Delta-0 (ms) Dist (cm) Roosevelt (m/s) Norm Roosevelt (m/s) Right Fibular Motor (Ext Dig Brev) Ankle    2.8 <6.1 2.3 >2.5 B Fib Ankle 6.8 34.0 50 >40 B Fib    9.6  1.9  Poplt B Fib 1.5 10.0 67 >40 Poplt    11.1  2.0        Right Median Motor (Abd Poll Brev) Wrist    3.2 <4.2 6.2 >5 Elbow Wrist 4.0 24.0 60 >50 Elbow    7.2  4.7        Right Tibial Motor (Abd Faye Brev) Ankle    4.7 <6.1 9.2 >3.0 Knee Ankle 8.0 43.0 54 >40 Knee    12.7  6.2        Right Ulnar Motor (Abd Dig Minimi) Wrist    2.0 <4.2 6.1 >3 B Elbow Wrist 3.2 24.0 75 >53 B Elbow    5.2  5.5  A Elbow B Elbow 1.1 10.0 91 >53 A Elbow    6.3  5.0        F Wave Studies  NR F-Lat (ms) Lat Norm (ms) L-R F-Lat (ms) L-R  Lat Norm M-Lat (ms) FLat-MLat (ms) Right Fibular (Mrkrs) (EDB)    47.56 <60  <5.1 2.67 44.89 Right Median (Mrkrs) (Abd Poll Brev)    26.24 <33  <2.2 2.83 23.41 Right Tibial (Mrkrs) (Abd Hallucis)    53.40 <61  <5.7 4.00 49.40 Right Ulnar (Mrkrs) (Abd Dig Min)    25.88 <36  <2.5 2.00 23.88 H Reflex Studies  NR H-Lat (ms) L-R H-Lat (ms) L-R Lat Norm M-Lat (ms) HLat-MLat (ms) Left Tibial (Mrkrs) (Gastroc)    35.99  <2.0 4.00 31.99 Right Tibial (Mrkrs) (Gastroc) NR   <2.0   EMG  Side Muscle Nerve Root Ins Act Fibs Psw Amp Dur Poly Recrt Int Pat Comment Right BrachioRad Radial C5-6 Nml Nml Nml Nml Nml 0 Nml Nml  Right Ext Digitorum Radial (Post Int) C7-8 Nml Nml Nml Nml Nml 0 Nml Nml  Right Biceps Musculocut C5-6 Nml Nml Nml Nml Nml 0 Nml Nml  Right Triceps Radial C6-7-8 Nml Nml Nml Nml Nml 0 Nml Nml  Right Deltoid Axillary C5-6 Nml Nml Nml Nml Nml 0 Nml Nml  Right AntTibialis Dp Br Fibular L4-5 Nml Nml Nml Nml Nml 0 Nml Nml  Right Fibularis Long Sup Br Fibular L5-S1 Nml Nml Nml Nml Nml 0 Nml Nml  Right PostTibialis Tibial L5, S1 Nml Nml Nml Nml Nml 0 Nml Nml  Right Gastroc Tibial S1-2 Nml Nml Nml Nml Nml 0 Nml Nml  Right VastusLat Femoral L2-4 Nml Nml Nml Nml Nml 0 Nml Nml  Right C5 Parasp Rami C5 Nml Nml Nml Nml Nml 0 Nml Nml  Right C6 Parasp Rami C6 Nml Nml Nml Nml Nml 0 Nml Nml  Right C7 Parasp Rami C7 Nml Nml Nml Nml Nml 0 Nml Nml  Right L4 Parasp Rami L4 Nml Nml Nml Nml Nml 0 Nml Nml  Right L5 Parasp Rami L5 Nml Nml Nml Nml Nml 0 Nml Nml  Right S1 Parasp Rami S1 Nml Nml Nml Nml Nml 0 Nml Nml  Waveforms:                        FINDINGS: Nerve Conduction Studies: Sural sensory latencies on the right and left are prolonged at 6.3 ms and 4.5 ms respectively Study of the right peroneal motor nerve and right posterior tibial motor nerve are normal.  Waveform morphology is normal and no temporal dispersion is seen Motor F wave latencies of the right peroneal motor and right posterior tibial motor nerve are normal  H-reflexes are normal on the left and not clearly seen on the right (likely technical) Median sensory latency on the right is normal at 1.5 ms Study the right median motor nerve is normal Right ulnar antidromic sensory study is normal Right ulnar motor study is normal Right ulnar and median motor F wave latencies are normal Electromyogram: Needle examination of multiple muscles in the right leg and lumbar paraspinous muscles are normal Needle examination of the right arm and cervical paraspinous muscles are normal     Impression: Sensory neuropathy, mild No electrophysiologic evidence for demyelinating neuropathy is seen This report is transcribed using the Dragon dictation system.     MRI Thoracic Spine Without Contrast    Result Date: 11/12/2024  MRI CERVICAL SPINE WO CONTRAST, MRI THORACIC SPINE WO CONTRAST Date of Exam: 11/11/2024 10:34 PM EST Indication: Radiculopathy.  Comparison: None available. Technique:  Routine multiplanar/multisequence sequence images of the cervical and thoracic spine were obtained without contrast administration.  Findings: MRI CERVICAL SPINE ALIGNMENT: There is 1-2 mm of anterior listhesis of C4 on C5. Alignment is otherwise normal. DISK SPACE: Mild to moderate intervertebral disc space narrowing and endplate osteophyte formation in the lower cervical spine. VERTEBRA: No fracture or destructive process. There is active right-sided facet arthropathy at C4/5 with edema in the facets. No marrow edema elsewhere. There is a benign hemangioma within the C5 vertebral body. Overall, there is relatively low signal intensity throughout the marrow suggestive of fatty replacement. Correlate with CBC. Red marrow reconversion related to anemia would be most common etiology although other marrow replacement processes are considerations. CORD: Normal anatomy and signal intensity. CRANIOVETEBRAL JUNCTION: Normal SOFT TISSUES: No mass nor lymphadenopathy. LEVELS: C2-C3: The posterior disc is  unremarkable.  No significant facet arthropathy. No significant neural foraminal or spinal canal stenosis. C3-C4: The posterior disc is unremarkable. The uncovertebral joints appear within normal limits. No significant facet arthropathy. No significant neural foraminal or spinal canal stenosis. C4-C5: Broad-based posterior disc osteophyte complex. AP diameter of the central canal measures 10 mm. Mild uncovertebral hypertrophy. There is moderate right-sided active facet arthropathy. There is mild to moderate neuroforaminal stenosis, right greater than left. Inflammatory changes related to facet arthropathy may influence the exiting right C5 nerve. Correlate with symptoms. C5-C6: Broad-based posterior disc osteophyte complex. AP diameter of the central canal measures 12 mm. There is mild to moderate left uncovertebral hypertrophy. There is mild to moderate left neuroforaminal stenosis. C6-C7: Broad-based posterior disc osteophyte complex. AP diameter of the central canal measures 12 mm. There is mild uncovertebral hypertrophy and neuroforaminal stenosis. C7-T1: The posterior disc is unremarkable. The uncovertebral joints appear within normal limits. No significant facet arthropathy. No significant neural foraminal or spinal canal stenosis.     Impression: Impression: 1.Moderate active right-sided facet arthropathy at C4/5 which may influence the exiting right C5 nerve. Correlate with symptoms. 2.Relatively mild multifactorial degenerative change of the mid to lower cervical spine otherwise. 3.Diffuse low signal intensity marrow may represent marrow replacement or infiltrative process. Correlate with CBC. MR THORACIC SPINE ALIGNMENT: Normal DISK SPACE: Mild intervertebral disc space narrowing and endplate osteophyte formation of the midthoracic spine most pronounced at T6/7. VERTEBRA: No fracture or destructive process. There is benign hemangioma within the T5 vertebral body. No marrow edema.  No significant active nor  fatty endplate change. There is diffuse low signal intensity throughout the marrow suggesting marrow replacement or infiltrative process. Correlate with CBC. CORD: Normal anatomy and signal intensity. SOFT TISSUES: No mass nor lymphadenopathy. LEVELS:  Level by level evaluation demonstrates no focal disc protrusion or extrusion. There is no significant neural foraminal or spinal canal narrowing. Impression: 4.Minimal discogenic change of the midthoracic spine. No acute process identified. 5.Low signal intensity marrow throughout suggestive of marrow replacement or infiltrative process. Correlate with CBC. Electronically Signed: Rj Tomas MD  11/12/2024 9:39 AM EST  Workstation ID: [id number]    MRI Cervical Spine Without Contrast    Result Date: 11/12/2024  MRI CERVICAL SPINE WO CONTRAST, MRI THORACIC SPINE WO CONTRAST Date of Exam: 11/11/2024 10:34 PM EST Indication: Radiculopathy.  Comparison: None available. Technique:  Routine multiplanar/multisequence sequence images of the cervical and thoracic spine were obtained without contrast administration.  Findings: MRI CERVICAL SPINE ALIGNMENT: There is 1-2 mm of anterior listhesis of C4 on C5. Alignment is otherwise normal. DISK SPACE: Mild to moderate intervertebral disc space narrowing and endplate osteophyte formation in the lower cervical spine. VERTEBRA: No fracture or destructive process. There is active right-sided facet arthropathy at C4/5 with edema in the facets. No marrow edema elsewhere. There is a benign hemangioma within the C5 vertebral body. Overall, there is relatively low signal intensity throughout the marrow suggestive of fatty replacement. Correlate with CBC. Red marrow reconversion related to anemia would be most common etiology although other marrow replacement processes are considerations. CORD: Normal anatomy and signal intensity. CRANIOVETEBRAL JUNCTION: Normal SOFT TISSUES: No mass nor lymphadenopathy. LEVELS: C2-C3: The posterior disc is  fatty endplate change. There is diffuse low signal intensity throughout the marrow suggesting marrow replacement or infiltrative process. Correlate with CBC. CORD: Normal anatomy and signal intensity. SOFT TISSUES: No mass nor lymphadenopathy. LEVELS:  Level by level evaluation demonstrates no focal disc protrusion or extrusion. There is no significant neural foraminal or spinal canal narrowing. Impression: 4.Minimal discogenic change of the midthoracic spine. No acute process identified. 5.Low signal intensity marrow throughout suggestive of marrow replacement or infiltrative process. Correlate with CBC. Electronically Signed: Rj Tomas MD  11/12/2024 9:39 AM EST  Workstation ID: [id number]     Results for orders placed during the hospital encounter of 11/10/24    Adult Transthoracic Echo Complete W/ Cont if Necessary Per Protocol    Interpretation Summary    Left ventricular systolic function is normal. Estimated left ventricular EF = 65%    The cardiac valves are anatomically and functionally normal.      Current medications:  Scheduled Meds:carvedilol, 12.5 mg, Oral, BID With Meals  desvenlafaxine, 50 mg, Oral, Daily  folic acid, 1 mg, Oral, Daily  gabapentin, 100 mg, Oral, TID  latanoprost, 1 drop, Both Eyes, Nightly  melatonin, 5 mg, Oral, Nightly  midodrine, 10 mg, Oral, TID AC  pantoprazole, 40 mg, Oral, Daily  rosuvastatin, 5 mg, Oral, Daily  sodium chloride, 10 mL, Intravenous, Q12H  thiamine, 100 mg, Oral, Daily  ursodiol, 300 mg, Oral, BID  zolpidem, 5 mg, Oral, Nightly      Continuous Infusions:   PRN Meds:.  acetaminophen **OR** acetaminophen **OR** acetaminophen    senna-docusate sodium **AND** polyethylene glycol **AND** bisacodyl **AND** bisacodyl    calcium carbonate    Calcium Replacement - Follow Nurse / BPA Driven Protocol    ipratropium-albuterol    LORazepam    Magnesium Standard Dose Replacement - Follow Nurse / BPA Driven Protocol    metoprolol tartrate    nitroglycerin    ondansetron     Phosphorus Replacement - Follow Nurse / BPA Driven Protocol    Potassium Replacement - Follow Nurse / BPA Driven Protocol    sodium chloride    sodium chloride    sodium chloride    Assessment & Plan   Assessment & Plan     Active Hospital Problems    Diagnosis  POA    **Lower extremity weakness [R29.898]  Yes      Resolved Hospital Problems   No resolved problems to display.        Brief Hospital Course to date:  Philomena Davis is a 64 y.o. female with history of alcohol dependence, cirrhosis, HTN, PN here with B UE/LE paraesthesias    B weakness  B paraesthesias/facial numbness  -MRI brain unremarkable  -MRI lumbar spine with L5-S1 disc protrusion  -MRI C/T spine reviewed, with C4/5 facet arthropathy  -EMG/NCV mild sensory neuropathy  -Continue thiamine, neurology following  -PT/OT    Marked orthostasis  Dysautonomia  -add midodrine  -mobilize/PT/OT  -AM cortisol    Possible vasculitis  CT with celiac artery stranding  -markedly elevated CRP/ESR  -WILMA/ANCA panel, sent, pending  -RF negative    Atrial tachycardia, atrial flutter  -presented with tachycardia, HR to 150  -on BB  -ECHO unremarkable, EF normal  -bursts of tachycardia here, evaluated by cardiology  -continue carvedilol, dosing may be limited by hypotension/orthostasis  -cardiology deferring AC due to cirrhosis for now    Alcohol dependence  -monitor    Cirrhosis  Hepatocellular jaundice  -monitor    Mild LA  -due to liver disease    Subclinical hypothyroidism  -needs follow up in 6 weeks    Expected Discharge Location and Transportation: Home  Expected Discharge   Expected Discharge Date: 11/15/2024; Expected Discharge Time:      VTE Prophylaxis:  Mechanical VTE prophylaxis orders are present.         AM-PAC 6 Clicks Score (PT): 17 (11/11/24 1012)    CODE STATUS:   Code Status and Medical Interventions: CPR (Attempt to Resuscitate); Full Support   Ordered at: 11/10/24 1537     Level Of Support Discussed With:    Patient    Next of Kin (If No  "Surrogate)     Code Status (Patient has no pulse and is not breathing):    CPR (Attempt to Resuscitate)     Medical Interventions (Patient has pulse or is breathing):    Full Support       Jose J Hutchins MD  11/13/24        Electronically signed by Jose J Hutchins MD at 11/13/24 0957       Kavitha Mejia APRN at 11/13/24 0815            [facility]  PROGRESS NOTE    Date of Admission: 11/10/2024  Date of Service: 11/13/24    Primary Care Physician: Unique Brandt DO    Chief Complaint: Atrial arrhythmia/atrial flutter  Problem List:   Lower extremity weakness      Subjective      HPI: Patient is lying in bed breathing easy on O2 at 2 liters via NC  She denies any chest pain.  She is currently in normal sinus rhythm.      Objective   Vitals: /65 (BP Location: Left arm, Patient Position: Lying)   Pulse 94   Temp 98.1 °F (36.7 °C) (Oral)   Resp 16   Ht 162.6 cm (64.02\")   Wt 67.1 kg (147 lb 14.9 oz)   LMP 01/22/2016 (Approximate)   SpO2 93%   BMI 25.38 kg/m²     Physical Exam:  GENERAL: Alert, cooperative, in no acute distress.   HEENT: Normocephalic, no jugular venous distention  HEART: Regular rhythm, normal rate, and no murmurs, gallops, or rubs.   LUNGS: Clear to auscultation bilaterally. No wheezing, rales or rhonchi.  ABDOMEN: Soft, bowel sounds present, nontender   NEUROLOGIC: No focal abnormalities involving strength or sensation are noted.   EXTREMITIES: No clubbing, cyanosis, or edema noted.     Results:  Results from last 7 days   Lab Units 11/10/24  0946   WBC 10*3/mm3 15.59*   HEMOGLOBIN g/dL 12.4   HEMATOCRIT % 37.0   PLATELETS 10*3/mm3 314     Results from last 7 days   Lab Units 11/11/24  0017 11/10/24  1007   SODIUM mmol/L  --  138   POTASSIUM mmol/L 4.6 3.3*   CHLORIDE mmol/L  --  100   CO2 mmol/L  --  27.0   BUN mg/dL  --  6*   CREATININE mg/dL  --  0.55*   GLUCOSE mg/dL  --  100*      Lab Results   Component Value Date    CHOL 376 (H) " 10/25/2023    TRIG 128 10/25/2023     (H) 10/25/2023     (H) 10/25/2023    AST 72 (H) 11/10/2024    ALT 27 11/10/2024             Results from last 7 days   Lab Units 11/10/24  1007   TSH uIU/mL 9.670*   FREE T4 ng/dL 1.16         Results from last 7 days   Lab Units 11/10/24  0946   PROTIME Seconds 15.2*   INR  1.18*   APTT seconds 37.1*     Results from last 7 days   Lab Units 11/10/24  1007   CK TOTAL U/L 237*   HSTROP T ng/L 14*             Intake/Output Summary (Last 24 hours) at 11/13/2024 0815  Last data filed at 11/12/2024 1303  Gross per 24 hour   Intake --   Output 900 ml   Net -900 ml       I personally reviewed the patient's EKG/Telemetry data    Radiology Data: All radiology data reviewed    Current Medications:  carvedilol, 25 mg, Oral, BID With Meals  desvenlafaxine, 50 mg, Oral, Daily  folic acid, 1 mg, Oral, Daily  gabapentin, 100 mg, Oral, TID  latanoprost, 1 drop, Both Eyes, Nightly  melatonin, 5 mg, Oral, Nightly  pantoprazole, 40 mg, Oral, Daily  rosuvastatin, 5 mg, Oral, Daily  sodium chloride, 10 mL, Intravenous, Q12H  thiamine, 100 mg, Oral, Daily  ursodiol, 300 mg, Oral, BID  zolpidem, 5 mg, Oral, Nightly           Assessment:   Atrial tachycardia/atrial flutter  Occurred 11/10/2024 in the setting of lower extremity weakness and TIA symptoms  Converted to NSR after receiving IV metoprolol  Echo 11/11/2024 LVEF 65% no valvular abnormality  Throughout admission intermittent bursts of atrial tachycardia  Carvedilol 25 mg twice daily  Hypertension  Current /65  Orthostatic hypotension  Orthostatics positive and blood pressure decreased to 77/62 on standing  Hyperlipidemia  Crestor 5 mg daily  TIA symptoms/lower extremity weakness  MRI of lumbar spine L5-S1 disc protrusion  Neurology following  Alcoholic liver cirrhosis    Plan:   Would defer anticoagulation due to liver cirrhosis and no sustained atrial fibrillation or flutter  Decrease carvedilol to 12.5 mg twice daily due  to orthostatic hypotension  IV metoprolol every 6 hours for sustained heart rates greater than 120 bpm  Would recommend 2-week monitor at discharge        CANDY Weaver    Electronically signed by Kavitha Mejia APRN at 24 0952       Fallon Guido APRN at 24 0808           [facility]    Progress Note    Patient Name: Philomena Davis  : 1960  MRN: [id number]  Primary Care Physician:  Unique Brandt DO  Date of admission: 11/10/2024    Subjective     Chief Complaint: Bilateral leg weakness    History of Present Illness   Patient seen resting comfortably in bed.  Did state that after the initiation of gabapentin that her burning sensation in her legs is better.  She does states she feels slightly lethargic but at this moment is able to tolerate it.  She continues with hypotension upon standing.    Review of Systems   General: Negative for fever, nausea, or vomiting.   Neurological: Negative for headache, pain, or weakness.     Objective     Physical Exam  Vitals and nursing note reviewed.   Constitutional:       General: She is not in acute distress.     Appearance: She is not ill-appearing.   Eyes:      Extraocular Movements: Extraocular movements intact.      Pupils: Pupils are equal, round, and reactive to light.      Comments: No nystagmus or deviated gaze noted   Neurological:      Mental Status: She is alert and oriented to person, place, and time.      Cranial Nerves: Cranial nerves 2-12 are intact.      Sensory: Sensory deficit present.      Motor: Weakness present. No tremor or seizure activity.      Deep Tendon Reflexes:      Reflex Scores:       Bicep reflexes are 1+ on the right side and 1+ on the left side.       Patellar reflexes are 0 on the right side and 0 on the left side.     Comments:     Cranial Nerves   CN II: Pupils are equal, round, and reactive to light. Normal visual acuity and visual fields.    CN III IV VI: Extraocular movements are full  without nystagmus.  CN V: Normal facial sensation and strength of muscles of mastication.  CN VII: Facial movements are symmetric. No weakness.  CN VIII:  Auditory acuity is normal.  CN IX & X:  Symmetric palatal movement.  CN XI: Sternocleidomastoid and trapezius are normal.  No weakness.  CN XII: The tongue is midline.  No atrophy or fasciculations.                Vitals:   Temp:  [97.6 °F (36.4 °C)-98.1 °F (36.7 °C)] 98.1 °F (36.7 °C)  Heart Rate:  [] 94  Resp:  [16-18] 16  BP: ()/(54-70) 112/65  Flow (L/min) (Oxygen Therapy):  [2] 2    Current Medications    Current Facility-Administered Medications:     acetaminophen (TYLENOL) tablet 650 mg, 650 mg, Oral, Q4H PRN **OR** acetaminophen (TYLENOL) 160 MG/5ML oral solution 650 mg, 650 mg, Oral, Q4H PRN **OR** acetaminophen (TYLENOL) suppository 500 mg, 500 mg, Rectal, Q4H PRN, Sophie Gandhi MD    sennosides-docusate (PERICOLACE) 8.6-50 MG per tablet 2 tablet, 2 tablet, Oral, BID PRN **AND** polyethylene glycol (MIRALAX) packet 17 g, 17 g, Oral, Daily PRN **AND** bisacodyl (DULCOLAX) EC tablet 5 mg, 5 mg, Oral, Daily PRN **AND** bisacodyl (DULCOLAX) suppository 10 mg, 10 mg, Rectal, Daily PRN, Sophie Gandhi MD    calcium carbonate (TUMS) chewable tablet 500 mg (200 mg elemental), 2 tablet, Oral, BID PRN, Sophie Gandhi MD    Calcium Replacement - Follow Nurse / BPA Driven Protocol, , Not Applicable, PRN, Sophie Gandhi MD    carvedilol (COREG) tablet 25 mg, 25 mg, Oral, BID With Meals, Jose J Hutchins MD    desvenlafaxine (PRISTIQ) 24 hr tablet 50 mg, 50 mg, Oral, Daily, Sophie Gandhi MD, 50 mg at 11/12/24 0836    folic acid (FOLVITE) tablet 1 mg, 1 mg, Oral, Daily, Sophie Gandhi MD, 1 mg at 11/12/24 0836    gabapentin (NEURONTIN) capsule 100 mg, 100 mg, Oral, TID, Uday Sykes DO, 100 mg at 11/12/24 2236    ipratropium-albuterol (DUO-NEB) nebulizer solution 3 mL, 3 mL, Nebulization, Q6H PRN, Hiram  Sophie Guo MD    latanoprost (XALATAN) 0.005 % ophthalmic solution 1 drop, 1 drop, Both Eyes, Nightly, Sophie Gandhi MD, 1 drop at 11/12/24 2054    LORazepam (ATIVAN) tablet 0.5 mg, 0.5 mg, Oral, Q8H PRN, Sophie Gandhi MD    Magnesium Standard Dose Replacement - Follow Nurse / BPA Driven Protocol, , Not Applicable, PRN, Sophie Gandhi MD    melatonin tablet 5 mg, 5 mg, Oral, Nightly, Sophie Gandhi MD, 5 mg at 11/12/24 2054    metoprolol tartrate (LOPRESSOR) injection 5 mg, 5 mg, Intravenous, Q6H PRN, Cosmo Hess MD    nitroglycerin (NITROSTAT) SL tablet 0.4 mg, 0.4 mg, Sublingual, Q5 Min PRN, Sophie Gandhi MD    ondansetron (ZOFRAN) injection 4 mg, 4 mg, Intravenous, Q6H PRN, Sophie Gandhi MD    pantoprazole (PROTONIX) EC tablet 40 mg, 40 mg, Oral, Daily, Sophie Gandhi MD, 40 mg at 11/13/24 0657    Phosphorus Replacement - Follow Nurse / BPA Driven Protocol, , Not Applicable, PRN, Sophie Gandhi MD    Potassium Replacement - Follow Nurse / BPA Driven Protocol, , Not Applicable, PRAGUSTINA, Sophie Gandhi MD    rosuvastatin (CRESTOR) tablet 5 mg, 5 mg, Oral, Daily, Sophie Gandhi MD, 5 mg at 11/12/24 2054    sodium chloride 0.9 % flush 10 mL, 10 mL, Intravenous, PRN, Zac Bruner MD    sodium chloride 0.9 % flush 10 mL, 10 mL, Intravenous, Q12H, Sophie Gandhi MD, 10 mL at 11/12/24 0837    sodium chloride 0.9 % flush 10 mL, 10 mL, Intravenous, PRN, Sophie Gandhi MD    sodium chloride 0.9 % infusion 40 mL, 40 mL, Intravenous, PRN, Sophie Gandhi MD    thiamine (VITAMIN B-1) tablet 100 mg, 100 mg, Oral, Daily, Sophie Gandhi MD, 100 mg at 11/12/24 0836    ursodiol (ACTIGALL) capsule 300 mg, 300 mg, Oral, BID, Sophie Gandhi MD, 300 mg at 11/12/24 2053    zolpidem (AMBIEN) tablet 5 mg, 5 mg, Oral, Nightly, Sophie Gandhi MD, 5 mg at 11/12/24 2053    Laboratory Results:   Lab  Results   Component Value Date    GLUCOSE 100 (H) 11/10/2024    CALCIUM 8.8 11/10/2024     11/10/2024    K 4.6 11/11/2024    CO2 27.0 11/10/2024     11/10/2024    BUN 6 (L) 11/10/2024    CREATININE 0.55 (L) 11/10/2024    EGFRIFNONA 114 05/15/2019    BCR 10.9 11/10/2024    ANIONGAP 11.0 11/10/2024     Lab Results   Component Value Date    WBC 15.59 (H) 11/10/2024    HGB 12.4 11/10/2024    HCT 37.0 11/10/2024    .9 (H) 11/10/2024     11/10/2024     Lab Results   Component Value Date    CHOL 376 (H) 10/25/2023    CHOL 430 (H) 09/12/2023    CHOL 214 (H) 05/14/2019     Lab Results   Component Value Date     (H) 10/25/2023     (H) 09/12/2023    HDL 62 (H) 05/14/2019     Lab Results   Component Value Date     (H) 10/25/2023     (H) 09/12/2023     (H) 05/14/2019     Lab Results   Component Value Date    TRIG 128 10/25/2023    TRIG 159 (H) 09/12/2023    TRIG 172 (H) 05/14/2019     Lab Results   Component Value Date    HGBA1C 5.60 09/12/2023     Lab Results   Component Value Date    INR 1.18 (H) 11/10/2024    INR 1.10 01/31/2024    INR 1.0 08/09/2023    PROTIME 15.2 (H) 11/10/2024    PROTIME 14.3 01/31/2024    PROTIME 14.9 (H) 11/30/2022     Lab Results   Component Value Date    FOLATE 4.83 11/10/2024     Lab Results   Component Value Date    [id number] 716 11/10/2024       MRI Brain Without Contrast    Result Date: 11/10/2024  MRI BRAIN WO CONTRAST Date of Exam: 11/10/2024 1:27 PM EST Indication: BLE weakness, facial parasthesia R>L, difficulty ambulating.  Comparison: None available. Technique:  Routine multiplanar/multisequence sequence images of the brain were obtained without contrast administration. Findings: No acute infarct is present on diffusion weighted sequences. Midline structures appear normal and the craniocervical junction is satisfactory. Gray-white differentiation is maintained and there is no evidence of intracranial hemorrhage, mass or mass  effect. The ventricles are normal in size and configuration. The orbits are normal. The paranasal sinuses are clear. Intracranial arterial flow voids appear maintained.     Impression: Impression: Normal noncontrast MRI of the brain. Electronically Signed: Adriano Kessler MD  11/10/2024 2:14 PM EST  Workstation ID: [id number]        Assessment / Plan     Active Hospital Problems:    Lower extremity weakness       Brief Patient Summary:  Philomena Davis is a 64 y.o. female who has a past medical history of EtOH use, alcoholic liver cirrhosis, HTN, peripheral neuropathy, anxiety and depression who presented to [facility] with complaint of worsening bilateral lower extremity weakness, right and left facial paresthesias for the past week.  Patient was originally seen by our stroke navigators with a negative workup.     Plan:   Bilateral lower extremity weakness  EtOH use  Peripheral neuropathy R/T EtOH use  Dysautonomia  MRI brain with no acute findings  MRI lumbar showed small disc protrusion at the L5-S1 with no significant spinal canal or neuroforaminal stenosis.  MRI of cervical cervical spine showed moderate active right sided facet arthropathy at C4/5.   MRI thoracic spine benign hemangioma at T5.  No acute process noted.   EMG/nerve conduction study showed a mild sensory neuropathy.  No evidence of demyelinating neuropathy seen.  Vitamin B12 716  Folate 4.83, low normal.  Folic acid 1 mg daily   Continue thiamine 100 mg daily  Continue gabapentin 100 mg 3 times daily    Spoke with patient about nonmedical management of dysautonomia including increased salt intake within cardiology parameters, increase water intake, 6 small's meal daily, slow position changes, REX hose or stockings, regular blood pressure checks.  Orthostatic blood pressures daily  Cardiology now following for atrial tachycardia  General Neurology will continue to follow    I have discussed the above with the patient, bedside RN and Dr. Hutchins  Time spent  with patient: 50 minutes in face-to-face evaluation and management of the patient.    Copied text in this note has been reviewed and is accurate as of 24.     CANDY Ocampo              Electronically signed by Fallon Guido APRN at 24 1156       Jose J Hutchins MD at 24 0923              [facility] Medicine Services  PROGRESS NOTE    Patient Name: Philomena Davis  : 1960  MRN: [id number]    Date of Admission: 11/10/2024  Primary Care Physician: Unique Brandt DO    Subjective   Subjective     CC: Numbness    HPI: Continues with numbness. Could only stand and pivot yesterday. NO f/c. Does note symptomatic palpitations.       Objective   Objective     Vital Signs:   Temp:  [97.1 °F (36.2 °C)-99.8 °F (37.7 °C)] 98.6 °F (37 °C)  Heart Rate:  [] 101  Resp:  [16-18] 16  BP: ()/(60-75) 111/68  Flow (L/min) (Oxygen Therapy):  [2] 2     Physical Exam:  NAD, alert and oriented  OP clear, dry MM  Neck supple  No LAD  Tachy  CTAB  +BS, soft  MASSEY  Normal affect  B LE 4+/5, notes numbness in hands/legs, no change from  otherwise    Results Reviewed:  LAB RESULTS:      Lab 11/10/24  1628 11/10/24  1235 11/10/24  1007 11/10/24  0946   WBC  --   --   --  15.59*   HEMOGLOBIN  --   --   --  12.4   HEMATOCRIT  --   --   --  37.0   PLATELETS  --   --   --  314   NEUTROS ABS  --   --   --  11.83*   IMMATURE GRANS (ABS)  --   --   --  0.07*   LYMPHS ABS  --   --   --  2.06   MONOS ABS  --   --   --  1.34*   EOS ABS  --   --   --  0.20   MCV  --   --   --  106.9*   SED RATE  --   --   --  105*   CRP  --   --  3.88*  --    PROCALCITONIN  --   --  0.15  --    LACTATE 1.2 2.2*  --  2.6*   PROTIME  --   --   --  15.2*   APTT  --   --   --  37.1*   HSTROP T  --   --  14*  --          Lab 24  1255 24  0017 11/10/24  1007   SODIUM  --   --  138   POTASSIUM  --  4.6 3.3*   CHLORIDE  --   --  100   CO2  --   --  27.0   ANION GAP  --   --  11.0   BUN  --    --  6*   CREATININE  --   --  0.55*   EGFR  --   --  103.8   GLUCOSE  --   --  100*   CALCIUM  --   --  8.8   MAGNESIUM 1.7  --  1.7   PHOSPHORUS 3.4  --   --    TSH  --   --  9.670*         Lab 11/10/24  1007   TOTAL PROTEIN 7.0   ALBUMIN 2.8*   GLOBULIN 4.2   ALT (SGPT) 27   AST (SGOT) 72*   BILIRUBIN 1.7*   ALK PHOS 165*         Lab 11/10/24  1007 11/10/24  0946   HSTROP T 14*  --    PROTIME  --  15.2*   INR  --  1.18*             Lab 11/10/24  1235   FOLATE 4.83   VITAMIN B 12 716         Brief Urine Lab Results  (Last result in the past 365 days)        Color   Clarity   Blood   Leuk Est   Nitrite   Protein   CREAT   Urine HCG        11/10/24 0958 Yellow   Clear   Negative   Negative   Negative   Negative                   Microbiology Results Abnormal       None            MRI Lumbar Spine Without Contrast    Result Date: 11/10/2024  MRI LUMBAR SPINE WO CONTRAST Date of Exam: 11/10/2024 1:40 PM EST Indication: Lower extremity radiculopathy.  Comparison: None available. Technique:  Routine multiplanar/multisequence sequence images of the lumbar spine were obtained without contrast administration.  Findings: Hyperintense benign osseous hemangioma noted at L3. T1 marrow signal is otherwise preserved, without evidence of fracture or suspicious marrow replacing lesion. Alignment is anatomic, without evidence of significant listhesis or subluxation. The conus medullaris and cauda equina nerve roots are satisfactory in appearance. The paraspinal soft tissues demonstrate no acute or suspicious findings. Multilevel spondylosis is present, with areas of involvement including L1-2, no significant spinal canal or neuroforaminal impingement. L2-3, no significant spinal canal or neuroforaminal impingement. L3-4, small disc bulge and bilateral facet arthropathy. There is no evidence of significant spinal canal or neuroforaminal narrowing. L4-5, minimal small disc bulge and mild facet arthropathy. The spinal canal remains  patent. There is minimal bilateral neuroforaminal narrowing. L5-S1, small circumferential disc bulge with component of right-sided disc protrusion. There is some minimal narrowing of the right aspect of the spinal canal and the small disc protrusion encroaches upon the right subarticular recess, potentially in some proximity to the traversing right S1 nerve root. The neural foramina remain patent.     Impression: Impression: Generally mild lumbar spondylosis is evident, including a small disc protrusion eccentric to the right at L5-S1, in the region of the right subarticular recess and potentially in some proximity to the traversing right S1 nerve root. There is otherwise no  evidence of significant spinal canal or neuroforaminal narrowing. Electronically Signed: Adriano Kessler MD  11/10/2024 2:27 PM EST  Workstation ID: [id number]    MRI Brain Without Contrast    Result Date: 11/10/2024  MRI BRAIN WO CONTRAST Date of Exam: 11/10/2024 1:27 PM EST Indication: BLE weakness, facial parasthesia R>L, difficulty ambulating.  Comparison: None available. Technique:  Routine multiplanar/multisequence sequence images of the brain were obtained without contrast administration. Findings: No acute infarct is present on diffusion weighted sequences. Midline structures appear normal and the craniocervical junction is satisfactory. Gray-white differentiation is maintained and there is no evidence of intracranial hemorrhage, mass or mass effect. The ventricles are normal in size and configuration. The orbits are normal. The paranasal sinuses are clear. Intracranial arterial flow voids appear maintained.     Impression: Impression: Normal noncontrast MRI of the brain. Electronically Signed: Adriano Kessler MD  11/10/2024 2:14 PM EST  Workstation ID: [id number]    CT Abdomen Pelvis With Contrast    Result Date: 11/10/2024  CT ABDOMEN PELVIS W CONTRAST Date of Exam: 11/10/2024 11:35 AM EST Indication: abdominal pain. Comparison: CT abdomen  and pelvis 1/30/2024 Technique: Axial CT images were obtained of the abdomen and pelvis following the uneventful intravenous administration of 85 mL Isovue-300. Reconstructed coronal and sagittal images were also obtained. Automated exposure control and iterative construction methods were used. Findings: LUNG BASES:  Unremarkable without mass or infiltrate. LIVER:  Unremarkable parenchyma without focal lesion. BILIARY/GALLBLADDER:  Unremarkable SPLEEN:  Unremarkable PANCREAS:  Unremarkable ADRENAL:  Unremarkable KIDNEYS: Bilateral renal cysts are noted. No evidence for obstructive uropathy. No calculus identified. GASTROINTESTINAL/MESENTERY:  No evidence of obstruction nor inflammation.  The appendix is visualized normal in caliber. MESENTERIC VESSELS: The mesenteric vessels are patent. There is some faint soft tissue density along the celiac artery. There is some similar findings on prior study however this appears slightly more conspicuous on the current exam. Mild vasculitis not excluded. AORTA/IVC:  Normal caliber. RETROPERITONEUM/LYMPH NODES:  Unremarkable REPRODUCTIVE: The uterus is visualized. BLADDER:  Unremarkable OSSEUS STRUCTURES:  Typical for age with no acute process identified.     Impression: Impression: 1. Mild soft tissue stranding along the celiac artery which could represent mild vasculitis. 2. No acute findings otherwise noted in the abdomen or pelvis. Electronically Signed: Jeimy Tomas MD  11/10/2024 12:05 PM EST  Workstation ID: [id number]    CT Head Without Contrast    Result Date: 11/10/2024  CT HEAD WO CONTRAST Date of Exam: 11/10/2024 11:22 AM EST Indication: facial numbness x 1 week difficulty walking. Comparison: None available. Technique: Axial CT images were obtained of the head without contrast administration.  Automated exposure control and iterative construction methods were used. FINDINGS: Gray-white differentiation is maintained and there is no evidence of intracranial  hemorrhage, mass or mass effect. Mild age-related changes of the brain are present including volume loss and typical periventricular sequela of chronic small vessel ischemia. There is otherwise no evidence of intracranial hemorrhage, mass or mass effect. The ventricles are normal in size and configuration accounting for surrounding volume loss. The orbits are normal and the paranasal sinuses are grossly clear.     Impression: Age-related changes of the brain as above, otherwise without evidence of acute intracranial abnormality. Electronically Signed: Adriano Kessler MD  11/10/2024 11:30 AM EST  Workstation ID: [id number]    XR Chest 1 View    Result Date: 11/10/2024  XR CHEST 1 VW Date of Exam: 11/10/2024 10:11 AM EST Indication: Weak/Dizzy/AMS triage protocol Comparison: February 3, 2024 Findings: The lungs are clear. The heart and mediastinal contours appear normal. There is no pleural effusion. The pulmonary vasculature appears normal. The osseous structures appear intact.     Impression: Impression: No acute cardiopulmonary process. Electronically Signed: Jhoan Madrid MD  11/10/2024 10:47 AM EST  Workstation ID: [id number]     Results for orders placed during the hospital encounter of 11/10/24    Adult Transthoracic Echo Complete W/ Cont if Necessary Per Protocol    Interpretation Summary    Left ventricular systolic function is normal. Estimated left ventricular EF = 65%    The cardiac valves are anatomically and functionally normal.      Current medications:  Scheduled Meds:carvedilol, 25 mg, Oral, BID With Meals  desvenlafaxine, 50 mg, Oral, Daily  folic acid, 1 mg, Oral, Daily  latanoprost, 1 drop, Both Eyes, Nightly  melatonin, 5 mg, Oral, Nightly  pantoprazole, 40 mg, Oral, Daily  rosuvastatin, 5 mg, Oral, Daily  sodium chloride, 10 mL, Intravenous, Q12H  thiamine, 100 mg, Oral, Daily  ursodiol, 300 mg, Oral, BID  zolpidem, 5 mg, Oral, Nightly      Continuous Infusions:   PRN Meds:.  acetaminophen **OR**  acetaminophen **OR** acetaminophen    senna-docusate sodium **AND** polyethylene glycol **AND** bisacodyl **AND** bisacodyl    calcium carbonate    Calcium Replacement - Follow Nurse / BPA Driven Protocol    ipratropium-albuterol    LORazepam    Magnesium Standard Dose Replacement - Follow Nurse / BPA Driven Protocol    nitroglycerin    ondansetron    Phosphorus Replacement - Follow Nurse / BPA Driven Protocol    Potassium Replacement - Follow Nurse / BPA Driven Protocol    sodium chloride    sodium chloride    sodium chloride    Assessment & Plan   Assessment & Plan     Active Hospital Problems    Diagnosis  POA    **Lower extremity weakness [R29.898]  Yes      Resolved Hospital Problems   No resolved problems to display.        Brief Hospital Course to date:  Philomena Davis is a 64 y.o. female with history of alcohol dependence, cirrhosis, HTN, PN here with B UE/LE paraesthesias    B weakness  B paraesthesias/facial numbness  -MRI brain unremarkable  -MRI lumbar spine with L5-S1 disc protrusion  -MRI C/T spine pending  -EMG/NCV pending  -Continue thiamine, neurology following  -PT/OT    Possible vasculitis  CT with celiac artery stranding  -markedly elevated CRP/ESR  -WILMA/RF/ANCA panel, sent, pending    Atrial tachycardia, resolved  -presented with tachycardia, HR to 150  -on BB  -ECHO unremarkable, EF normal  -SVT versus atypical atrial flutter  -continues with bursts of tachycardia, increase BB, ask for cardiology evaluatoin    Alcohol dependence  -monitor    Cirrhosis  Hepatocellular jaundice  -monitor    Mild LA  -due to liver disease    Subclinical hypothyroidism  -needs follow up in 6 weeks    Expected Discharge Location and Transportation: Home  Expected Discharge   Expected Discharge Date: 11/13/2024; Expected Discharge Time:      VTE Prophylaxis:  Mechanical VTE prophylaxis orders are present.         AM-PAC 6 Clicks Score (PT): 17 (11/11/24 1012)    CODE STATUS:   Code Status and Medical Interventions:  CPR (Attempt to Resuscitate); Full Support   Ordered at: 11/10/24 1537     Level Of Support Discussed With:    Patient    Next of Kin (If No Surrogate)     Code Status (Patient has no pulse and is not breathing):    CPR (Attempt to Resuscitate)     Medical Interventions (Patient has pulse or is breathing):    Full Support       Jose J Hutchins MD  24        Electronically signed by Jose J Hutchins MD at 24 0933       Fallon Guido APRN at 24 0821           [facility] Neurology    Progress Note    Patient Name: Philomena Davis  : 1960  MRN: [id number]  Primary Care Physician:  Unique Brandt DO  Date of admission: 11/10/2024    Subjective     Chief Complaint: Bilateral leg weakness    History of Present Illness   Patient seen working with OT.  Laying , sitting SBP 99, standing SBP 66.  Patient was placed back in bed and blood pressure normalized.     During this standing event patient felt unsteady, lightheaded and dizzy.  She endorses a burning sensation in her legs.    Review of Systems   General: Negative for fever, nausea, or vomiting.   Neurological: Positive for burning sensation, lightheadedness and dizziness    Objective     Physical Exam  Vitals and nursing note reviewed.   Constitutional:       General: She is not in acute distress.     Appearance: She is not ill-appearing.   Eyes:      Extraocular Movements: Extraocular movements intact.      Pupils: Pupils are equal, round, and reactive to light.      Comments: No nystagmus or deviated gaze noted   Neurological:      Mental Status: She is alert and oriented to person, place, and time.      Cranial Nerves: Cranial nerves 2-12 are intact.      Sensory: Sensory deficit present.      Motor: Weakness present. No tremor or seizure activity.      Coordination: Finger-Nose-Finger Test normal.      Gait: Gait abnormal.      Comments:     Cranial Nerves   CN II: Pupils are equal, round, and reactive to light. Normal  visual acuity and visual fields.    CN III IV VI: Extraocular movements are full without nystagmus.  CN V: Normal facial sensation and strength of muscles of mastication.  CN VII: Facial movements are symmetric. No weakness.  CN VIII:  Auditory acuity is normal.  CN IX & X:  Symmetric palatal movement.  CN XI: Sternocleidomastoid and trapezius are normal.  No weakness.  CN XII: The tongue is midline.  No atrophy or fasciculations.    Motor: No weakness noticeable as such in the upper and lower extremities.    Reflexes: 2+ in the upper and lower extremities. Plantar responses are flexor.    Sensation: Normal to light touch, pinprick, vibration, temperature, and proprioception in arms and legs. .    Station and Gait:  Fait intact Romberg negative for weakness    Coordination: Finger to nose test shows no dysmetria.             Vitals:   Temp:  [97.1 °F (36.2 °C)-99.8 °F (37.7 °C)] 98.6 °F (37 °C)  Heart Rate:  [] 133  Resp:  [16-18] 16  BP: ()/(60-86) 127/75  Flow (L/min) (Oxygen Therapy):  [2] 2    Current Medications    Current Facility-Administered Medications:     acetaminophen (TYLENOL) tablet 650 mg, 650 mg, Oral, Q4H PRN **OR** acetaminophen (TYLENOL) 160 MG/5ML oral solution 650 mg, 650 mg, Oral, Q4H PRN **OR** acetaminophen (TYLENOL) suppository 500 mg, 500 mg, Rectal, Q4H PRN, Sophie Gandhi MD    sennosides-docusate (PERICOLACE) 8.6-50 MG per tablet 2 tablet, 2 tablet, Oral, BID PRN **AND** polyethylene glycol (MIRALAX) packet 17 g, 17 g, Oral, Daily PRN **AND** bisacodyl (DULCOLAX) EC tablet 5 mg, 5 mg, Oral, Daily PRN **AND** bisacodyl (DULCOLAX) suppository 10 mg, 10 mg, Rectal, Daily PRN, Sophie Gandhi MD    calcium carbonate (TUMS) chewable tablet 500 mg (200 mg elemental), 2 tablet, Oral, BID PRN, Smitha Gandhiamed Ahmed, MD    Calcium Replacement - Follow Nurse / BPA Driven Protocol, , Does not apply, Hiram SCHAFER Mohamed Ahmed, MD    carvedilol (COREG) tablet 12.5 mg, 12.5  mg, Oral, BID With Meals, Sophie Gandhi MD, 12.5 mg at 11/11/24 0836    desvenlafaxine (PRISTIQ) 24 hr tablet 50 mg, 50 mg, Oral, Daily, Sophie Gandhi MD, 50 mg at 11/11/24 0836    folic acid (FOLVITE) tablet 1 mg, 1 mg, Oral, Daily, Spohie Gandhi MD, 1 mg at 11/11/24 0836    ipratropium-albuterol (DUO-NEB) nebulizer solution 3 mL, 3 mL, Nebulization, Q6H PRN, Sophie Gandhi MD    latanoprost (XALATAN) 0.005 % ophthalmic solution 1 drop, 1 drop, Both Eyes, Nightly, Sophie Gandhi MD, 1 drop at 11/11/24 2059    LORazepam (ATIVAN) tablet 0.5 mg, 0.5 mg, Oral, Q8H PRN, Sophie Gandhi MD    Magnesium Standard Dose Replacement - Follow Nurse / BPA Driven Protocol, , Does not apply, PRN, Sophie Gandhi MD    melatonin tablet 5 mg, 5 mg, Oral, Nightly, Sophie Gandhi MD, 5 mg at 11/11/24 2058    nitroglycerin (NITROSTAT) SL tablet 0.4 mg, 0.4 mg, Sublingual, Q5 Min PRN, Sophie Gandhi MD    ondansetron (ZOFRAN) injection 4 mg, 4 mg, Intravenous, Q6H PRN, Sophie Gandhi MD    pantoprazole (PROTONIX) EC tablet 40 mg, 40 mg, Oral, Daily, Sophie Gandhi MD, 40 mg at 11/12/24 0639    Phosphorus Replacement - Follow Nurse / BPA Driven Protocol, , Does not apply, PRN, Sophie Gandhi MD    Potassium Replacement - Follow Nurse / BPA Driven Protocol, , Does not apply, PRN, Sophie Gandhi MD    rosuvastatin (CRESTOR) tablet 5 mg, 5 mg, Oral, Daily, Sophie Gandhi MD, 5 mg at 11/11/24 2058    sodium chloride 0.9 % flush 10 mL, 10 mL, Intravenous, PRN, Zac Bruner MD    sodium chloride 0.9 % flush 10 mL, 10 mL, Intravenous, Q12H, Sophie Gandhi MD, 10 mL at 11/11/24 0837    sodium chloride 0.9 % flush 10 mL, 10 mL, Intravenous, PRN, Sophie Gandhi MD    sodium chloride 0.9 % infusion 40 mL, 40 mL, Intravenous, PRN, Sophie Gandhi MD    thiamine (VITAMIN B-1) tablet 100 mg, 100 mg, Oral, Daily,  Sophie Gandhi MD, 100 mg at 11/11/24 0836    ursodiol (ACTIGALL) capsule 300 mg, 300 mg, Oral, BID, Sophie Gandhi MD, 300 mg at 11/11/24 2058    zolpidem (AMBIEN) tablet 5 mg, 5 mg, Oral, Nightly, Sophie Gandhi MD, 5 mg at 11/11/24 2058    Laboratory Results:   Lab Results   Component Value Date    GLUCOSE 100 (H) 11/10/2024    CALCIUM 8.8 11/10/2024     11/10/2024    K 4.6 11/11/2024    CO2 27.0 11/10/2024     11/10/2024    BUN 6 (L) 11/10/2024    CREATININE 0.55 (L) 11/10/2024    EGFRIFNONA 114 05/15/2019    BCR 10.9 11/10/2024    ANIONGAP 11.0 11/10/2024     Lab Results   Component Value Date    WBC 15.59 (H) 11/10/2024    HGB 12.4 11/10/2024    HCT 37.0 11/10/2024    .9 (H) 11/10/2024     11/10/2024     Lab Results   Component Value Date    CHOL 376 (H) 10/25/2023    CHOL 430 (H) 09/12/2023    CHOL 214 (H) 05/14/2019     Lab Results   Component Value Date     (H) 10/25/2023     (H) 09/12/2023    HDL 62 (H) 05/14/2019     Lab Results   Component Value Date     (H) 10/25/2023     (H) 09/12/2023     (H) 05/14/2019     Lab Results   Component Value Date    TRIG 128 10/25/2023    TRIG 159 (H) 09/12/2023    TRIG 172 (H) 05/14/2019     Lab Results   Component Value Date    HGBA1C 5.60 09/12/2023     Lab Results   Component Value Date    INR 1.18 (H) 11/10/2024    INR 1.10 01/31/2024    INR 1.0 08/09/2023    PROTIME 15.2 (H) 11/10/2024    PROTIME 14.3 01/31/2024    PROTIME 14.9 (H) 11/30/2022     Lab Results   Component Value Date    FOLATE 4.83 11/10/2024     Lab Results   Component Value Date    [id number] 716 11/10/2024       MRI Brain Without Contrast    Result Date: 11/10/2024  MRI BRAIN WO CONTRAST Date of Exam: 11/10/2024 1:27 PM EST Indication: BLE weakness, facial parasthesia R>L, difficulty ambulating.  Comparison: None available. Technique:  Routine multiplanar/multisequence sequence images of the brain were obtained  without contrast administration. Findings: No acute infarct is present on diffusion weighted sequences. Midline structures appear normal and the craniocervical junction is satisfactory. Gray-white differentiation is maintained and there is no evidence of intracranial hemorrhage, mass or mass effect. The ventricles are normal in size and configuration. The orbits are normal. The paranasal sinuses are clear. Intracranial arterial flow voids appear maintained.     Impression: Impression: Normal noncontrast MRI of the brain. Electronically Signed: Adriano Kessler MD  11/10/2024 2:14 PM EST  Workstation ID: [id number]      MRI CERVICAL SPINE  ALIGNMENT: There is 1-2 mm of anterior listhesis of C4 on C5. Alignment is otherwise normal.  DISK SPACE: Mild to moderate intervertebral disc space narrowing and endplate osteophyte formation in the lower cervical spine.  VERTEBRA: No fracture or destructive process. There is active right-sided facet arthropathy at C4/5 with edema in the facets. No marrow edema elsewhere. There is a benign hemangioma within the C5 vertebral body. Overall, there is relatively low signal   intensity throughout the marrow suggestive of fatty replacement. Correlate with CBC. Red marrow reconversion related to anemia would be most common etiology although other marrow replacement processes are considerations.  CORD: Normal anatomy and signal intensity.  CRANIOVETEBRAL JUNCTION: Normal  SOFT TISSUES: No mass nor lymphadenopathy.  LEVELS:     C2-C3: The posterior disc is unremarkable.  No significant facet arthropathy. No significant neural foraminal or spinal canal stenosis.     C3-C4: The posterior disc is unremarkable. The uncovertebral joints appear within normal limits. No significant facet arthropathy. No significant neural foraminal or spinal canal stenosis.     C4-C5: Broad-based posterior disc osteophyte complex. AP diameter of the central canal measures 10 mm. Mild uncovertebral hypertrophy. There  is moderate right-sided active facet arthropathy. There is mild to moderate neuroforaminal stenosis, right   greater than left. Inflammatory changes related to facet arthropathy may influence the exiting right C5 nerve. Correlate with symptoms.     C5-C6: Broad-based posterior disc osteophyte complex. AP diameter of the central canal measures 12 mm. There is mild to moderate left uncovertebral hypertrophy. There is mild to moderate left neuroforaminal stenosis.     C6-C7: Broad-based posterior disc osteophyte complex. AP diameter of the central canal measures 12 mm. There is mild uncovertebral hypertrophy and neuroforaminal stenosis.     C7-T1: The posterior disc is unremarkable. The uncovertebral joints appear within normal limits. No significant facet arthropathy. No significant neural foraminal or spinal canal stenosis.     IMPRESSION:  Impression:  1.Moderate active right-sided facet arthropathy at C4/5 which may influence the exiting right C5 nerve. Correlate with symptoms.  2.Relatively mild multifactorial degenerative change of the mid to lower cervical spine otherwise.  3.Diffuse low signal intensity marrow may represent marrow replacement or infiltrative process. Correlate with CBC.        MR THORACIC SPINE  ALIGNMENT: Normal  DISK SPACE: Mild intervertebral disc space narrowing and endplate osteophyte formation of the midthoracic spine most pronounced at T6/7.  VERTEBRA: No fracture or destructive process. There is benign hemangioma within the T5 vertebral body. No marrow edema.  No significant active nor fatty endplate change. There is diffuse low signal intensity throughout the marrow suggesting marrow   replacement or infiltrative process. Correlate with CBC.  CORD: Normal anatomy and signal intensity.  SOFT TISSUES: No mass nor lymphadenopathy.  LEVELS:  Level by level evaluation demonstrates no focal disc protrusion or extrusion. There is no significant neural foraminal or spinal canal narrowing.      Impression:  4.Minimal discogenic change of the midthoracic spine. No acute process identified.  5.Low signal intensity marrow throughout suggestive of marrow replacement or infiltrative process. Correlate with CBC.    Assessment / Plan     Active Hospital Problems:    Lower extremity weakness       Brief Patient Summary:  Philomena Davis is a 64 y.o. female who has a past medical history of EtOH use, alcoholic liver cirrhosis, HTN, peripheral neuropathy, anxiety and depression who presented to [facility] with complaint of worsening bilateral lower extremity weakness, right and left facial paresthesias for the past week.  Patient was originally seen by our stroke navigators with a negative workup.     Plan:   Bilateral lower extremity weakness  EtOH use  Peripheral neuropathy R/T EtOH use  Dysautonomia  MRI brain with no acute findings  MRI lumbar showed small disc protrusion at the L5-S1 with no significant spinal canal or neuroforaminal stenosis.  MRI of cervical cervical spine showed moderate active right sided facet arthropathy at C4/5.   MRI thoracic spine benign hemangioma at T5.  No acute process noted.   Will hold off on LP at this time.   EMG/nerve conduction study showed a mild sensory neuropathy.  No evidence of demyelinating neuropathy seen.  Vitamin B12 716  Folate 4.83, low normal.  Folic acid 1 mg daily   Continue thiamine 100 mg daily  Trial of gabapentin 100 mg 3 times daily  Spoke with patient about nonmedical management of dysautonomia including increased salt intake within cardiology parameters, increase water intake, 6 small's meal daily, slow position changes, REX hose or stockings, regular blood pressure checks.  Orthostatic blood pressures daily  Cardiology now following for atrial tachycardia  General Neurology will continue to follow     I have discussed the above with the patient, bedside RN, Dr. Hutchins and Dr. Sykes  Time spent with patient: 50 minutes in face-to-face evaluation and  "management of the patient.    Copied text in this note has been reviewed and is accurate as of 24.     CANDY Ocampo              Electronically signed by Fallon Guido APRN at 24 [id number]       Fallon Guido APRN at 24 [phone]       Attestation signed by Uday Sykes DO at 24 1418    I have reviewed this documentation and agree.                   [facility]    Progress Note    Patient Name: Philomena Davis  : 1960  MRN: [id number]  Primary Care Physician:  Unique Brandt DO  Date of admission: 11/10/2024    Subjective     Chief Complaint: Lower extremity weakness    History of Present Illness   Patient states that she was diagnosed approximately 4 years ago with neuropathy related to EtOH use.  Mainly presented in right arm and face initially was tingling and over the years questionable began to affect her feet and left upper extremity.      Patient states that approximately 2 weeks ago however patient did have an episode she became very sick with occlusion and was unable to eat or drink for quite some time.  It was after this that she began to feel weak all over but mostly in her lower extremities.  Patient required a walker over the past week and felt she was going to fall.  She also has complaint of a \"electric shock \"pain in her lower extremities.  She states prior to this she was able to ambulate independently.    Patient denies any shortness of breath.  Patient states she can feel fullness and complete emptiness of bowels and bladder.  However she does endorse groin and inner thigh numbness.    Patient states that she has a rare autoimmune disease, primary biliary cholangitis.     Patient denies any illicit drug use, reports that she quit smoking over 20 years ago and does drink occasionally approximately once a month.  Her last drink was 2 days ago.  She does have a history as she of heavy drinking in the past.      Review of Systems   General: " Negative for fever, nausea, or vomiting.   Neurological: Positive for weakness, gait disturbances, pain and numbness    Objective     Physical Exam  Vitals and nursing note reviewed.   Constitutional:       General: She is not in acute distress.     Appearance: She is not ill-appearing.   Eyes:      Extraocular Movements: Extraocular movements intact.      Pupils: Pupils are equal, round, and reactive to light.      Comments: No nystagmus or deviated gaze noted   Neurological:      Mental Status: She is alert and oriented to person, place, and time.      Cranial Nerves: Cranial nerves 2-12 are intact.      Sensory: Sensory deficit present.      Motor: Weakness present. No tremor or seizure activity.      Coordination: Coordination is intact. Finger-Nose-Finger Test normal.      Deep Tendon Reflexes: Reflexes are normal and symmetric. Babinski sign absent on the right side. Babinski sign present on the left side.      Reflex Scores:       Bicep reflexes are 2+ on the right side and 2+ on the left side.       Patellar reflexes are 0 on the right side and 0 on the left side.       Achilles reflexes are 0 on the right side and 0 on the left side.     Comments:       Cranial Nerves   CN II: Pupils are equal, round, and reactive to light. Normal visual acuity and visual fields.    CN III IV VI: Extraocular movements are full without nystagmus.  CN V: Normal facial sensation and strength of muscles of mastication.  CN VII: Facial movements are symmetric. No weakness.  CN VIII:  Auditory acuity is normal.  CN IX & X:  Symmetric palatal movement.  CN XI: Sternocleidomastoid and trapezius are normal.  No weakness.  CN XII: The tongue is midline.  No atrophy or fasciculations.    Neck extension strength 5/5   Neck Flexion 5/5  Eyelid strength 5/5  Sternocleidomastoid 5/5  Bilateral trapezius strength +4/5   Bilateral tricep strength +4/5  Bilateral bicep strength +4/5   Bilateral finger extension 4/5  Bilateral hip extension  4/5  Bilateral hip flexion +4/5  Bilateral knee extension +4/5  Bilateral knee flexion  +4/5  Bilateral foot extension 5/5  Bilateral foot flexion 5/5    Sensation deficit to pinprick noted posteriorly at T2 and anteriorly at T6.  Decrease sensation to vibration in distal bilateral lower extremities             Vitals:   Temp:  [98 °F (36.7 °C)-98.6 °F (37 °C)] 98.6 °F (37 °C)  Heart Rate:  [] 95  Resp:  [8-18] 16  BP: (110-165)/() 144/86  Flow (L/min) (Oxygen Therapy):  [2] 2    Current Medications    Current Facility-Administered Medications:     acetaminophen (TYLENOL) tablet 650 mg, 650 mg, Oral, Q4H PRN **OR** acetaminophen (TYLENOL) 160 MG/5ML oral solution 650 mg, 650 mg, Oral, Q4H PRN **OR** acetaminophen (TYLENOL) suppository 500 mg, 500 mg, Rectal, Q4H PRN, Sophie Gandhi MD    sennosides-docusate (PERICOLACE) 8.6-50 MG per tablet 2 tablet, 2 tablet, Oral, BID PRN **AND** polyethylene glycol (MIRALAX) packet 17 g, 17 g, Oral, Daily PRN **AND** bisacodyl (DULCOLAX) EC tablet 5 mg, 5 mg, Oral, Daily PRN **AND** bisacodyl (DULCOLAX) suppository 10 mg, 10 mg, Rectal, Daily PRN, Sophie Gandhi MD    calcium carbonate (TUMS) chewable tablet 500 mg (200 mg elemental), 2 tablet, Oral, BID PRN, Sophie Gandhi MD    Calcium Replacement - Follow Nurse / BPA Driven Protocol, , Does not apply, PRN, Sophie Gandhi MD    carvedilol (COREG) tablet 12.5 mg, 12.5 mg, Oral, BID With Meals, Sophie Gandhi MD, 12.5 mg at 11/11/24 0836    desvenlafaxine (PRISTIQ) 24 hr tablet 50 mg, 50 mg, Oral, Daily, Sophie Gandhi MD, 50 mg at 11/11/24 0836    folic acid (FOLVITE) tablet 1 mg, 1 mg, Oral, Daily, Sophie Gandhi MD, 1 mg at 11/11/24 0836    ipratropium-albuterol (DUO-NEB) nebulizer solution 3 mL, 3 mL, Nebulization, Q6H PRN, Sophie Gandhi MD    latanoprost (XALATAN) 0.005 % ophthalmic solution 1 drop, 1 drop, Both Eyes, Nightly, Sophie Gandhi,  MD, 1 drop at 11/10/24 2002    LORazepam (ATIVAN) tablet 0.5 mg, 0.5 mg, Oral, Q8H PRN, Sophie Gandhi MD    Magnesium Standard Dose Replacement - Follow Nurse / BPA Driven Protocol, , Does not apply, Hiram SCHAFER Mohamed Ahmed, MD    melatonin tablet 5 mg, 5 mg, Oral, Nightly, Sophie Gandhi MD, 5 mg at 11/10/24 2001    nitroglycerin (NITROSTAT) SL tablet 0.4 mg, 0.4 mg, Sublingual, Q5 Min PRN, Sophie Gandhi MD    ondansetron (ZOFRAN) injection 4 mg, 4 mg, Intravenous, Q6H PRN, Sophie Gandhi MD    pantoprazole (PROTONIX) EC tablet 40 mg, 40 mg, Oral, Daily, Sophie Gandhi MD, 40 mg at 11/11/24 0837    Phosphorus Replacement - Follow Nurse / BPA Driven Protocol, , Does not apply, PRN, Sophie Gandhi MD    Potassium Replacement - Follow Nurse / BPA Driven Protocol, , Does not apply, GILMAR, Sophie Gandhi MD    rosuvastatin (CRESTOR) tablet 5 mg, 5 mg, Oral, Daily, Sophie Gandhi MD, 5 mg at 11/10/24 2001    sodium chloride 0.9 % flush 10 mL, 10 mL, Intravenous, PRN, Zac Bruner MD    sodium chloride 0.9 % flush 10 mL, 10 mL, Intravenous, Q12H, Sophie Gandhi MD, 10 mL at 11/11/24 0837    sodium chloride 0.9 % flush 10 mL, 10 mL, Intravenous, PRN, Sophie Gandhi MD    sodium chloride 0.9 % infusion 40 mL, 40 mL, Intravenous, PRN, Sophie Gandhi MD    thiamine (VITAMIN B-1) tablet 100 mg, 100 mg, Oral, Daily, Sophie Gandhi MD, 100 mg at 11/11/24 0836    ursodiol (ACTIGALL) capsule 300 mg, 300 mg, Oral, BID, Sophie Gandhi MD, 300 mg at 11/11/24 0837    zolpidem (AMBIEN) tablet 5 mg, 5 mg, Oral, Nightly, Sophie Gandhi MD, 5 mg at 11/10/24 2003    Laboratory Results:   Lab Results   Component Value Date    GLUCOSE 100 (H) 11/10/2024    CALCIUM 8.8 11/10/2024     11/10/2024    K 4.6 11/11/2024    CO2 27.0 11/10/2024     11/10/2024    BUN 6 (L) 11/10/2024    CREATININE 0.55 (L) 11/10/2024     EGFRIFNONA 114 05/15/2019    BCR 10.9 11/10/2024    ANIONGAP 11.0 11/10/2024     Lab Results   Component Value Date    WBC 15.59 (H) 11/10/2024    HGB 12.4 11/10/2024    HCT 37.0 11/10/2024    .9 (H) 11/10/2024     11/10/2024     Lab Results   Component Value Date    CHOL 376 (H) 10/25/2023    CHOL 430 (H) 09/12/2023    CHOL 214 (H) 05/14/2019     Lab Results   Component Value Date     (H) 10/25/2023     (H) 09/12/2023    HDL 62 (H) 05/14/2019     Lab Results   Component Value Date     (H) 10/25/2023     (H) 09/12/2023     (H) 05/14/2019     Lab Results   Component Value Date    TRIG 128 10/25/2023    TRIG 159 (H) 09/12/2023    TRIG 172 (H) 05/14/2019     Lab Results   Component Value Date    HGBA1C 5.60 09/12/2023     Lab Results   Component Value Date    INR 1.18 (H) 11/10/2024    INR 1.10 01/31/2024    INR 1.0 08/09/2023    PROTIME 15.2 (H) 11/10/2024    PROTIME 14.3 01/31/2024    PROTIME 14.9 (H) 11/30/2022     Lab Results   Component Value Date    FOLATE 4.83 11/10/2024     Lab Results   Component Value Date    JVOZIJWO27 716 11/10/2024       MRI Brain Without Contrast    Result Date: 11/10/2024  MRI BRAIN WO CONTRAST Date of Exam: 11/10/2024 1:27 PM EST Indication: BLE weakness, facial parasthesia R>L, difficulty ambulating.  Comparison: None available. Technique:  Routine multiplanar/multisequence sequence images of the brain were obtained without contrast administration. Findings: No acute infarct is present on diffusion weighted sequences. Midline structures appear normal and the craniocervical junction is satisfactory. Gray-white differentiation is maintained and there is no evidence of intracranial hemorrhage, mass or mass effect. The ventricles are normal in size and configuration. The orbits are normal. The paranasal sinuses are clear. Intracranial arterial flow voids appear maintained.     Impression: Impression: Normal noncontrast MRI of the brain.  "Electronically Signed: Adriano Kessler MD  11/10/2024 2:14 PM EST  Workstation ID: [id number]        Assessment / Plan     Active Hospital Problems:    Lower extremity weakness     Brief Patient Summary:  Philomena Davis is a 64 y.o. female who has a past medical history of EtOH use, alcoholic liver cirrhosis, HTN, peripheral neuropathy, anxiety and depression who presented to [facility] with complaint of worsening bilateral lower extremity weakness, right and left facial paresthesias for the past week.  Patient was originally seen by our stroke navigators with a negative workup.    Plan:   Bilateral lower extremity weakness  EtOH use  Differentials include process of spinal cord, process myositis, or possible progression of neuropathy.  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  MRI brain with no acute findings  MRI lumbar showed small disc protrusion at the L5-S1 with no significant spinal canal or neuroforaminal stenosis.  MRI of cervical and thoracic spine pending  Will hold off on LP at this time, pending other results  EMG/nerve conduction study in a.m.  Vitamin B12 716  Folate 4.83, low normal.  Folic acid 1 mg daily   Continue thiamine 100 mg daily  General Neurology will continue to follow      I have discussed the above with the patient, bedside RN, Dr. Sykes and Dr. Hutchins  Time spent with patient: 80 minutes in face-to-face evaluation and management of the patient.      CANDY Ocampo              Electronically signed by Uday Sykes DO at 24 1418       Jose J Hutchins MD at 24 0833              [facility] Services  PROGRESS NOTE    Patient Name: Philomena Davis  : 1960  MRN: [id number]    Date of Admission: 11/10/2024  Primary Care Physician: Unique Brandt DO    Subjective   Subjective     CC: Numbness    HPI: Continues with numbness. Notes \"shocks\" in lower extremities. No f/c. No n/v. No dyspnea .      Objective   Objective     Vital " Signs:   Temp:  [98 °F (36.7 °C)-98.6 °F (37 °C)] 98.6 °F (37 °C)  Heart Rate:  [] 95  Resp:  [8-18] 16  BP: (110-165)/() 135/84  Flow (L/min) (Oxygen Therapy):  [2] 2     Physical Exam:  NAD, alert and oriented  OP clear, dry MM  Neck supple  No LAD  RRR  CTAB  +BS, soft  MASSEY  Normal affect  B LE 4+/5, notes numbness in hands/legs/abdomen/face    Results Reviewed:  LAB RESULTS:      Lab 11/10/24  1628 11/10/24  1235 11/10/24  1007 11/10/24  0946   WBC  --   --   --  15.59*   HEMOGLOBIN  --   --   --  12.4   HEMATOCRIT  --   --   --  37.0   PLATELETS  --   --   --  314   NEUTROS ABS  --   --   --  11.83*   IMMATURE GRANS (ABS)  --   --   --  0.07*   LYMPHS ABS  --   --   --  2.06   MONOS ABS  --   --   --  1.34*   EOS ABS  --   --   --  0.20   MCV  --   --   --  106.9*   SED RATE  --   --   --  105*   CRP  --   --  3.88*  --    PROCALCITONIN  --   --  0.15  --    LACTATE 1.2 2.2*  --  2.6*   PROTIME  --   --   --  15.2*   APTT  --   --   --  37.1*   HSTROP T  --   --  14*  --          Lab 11/11/24  0017 11/10/24  1007   SODIUM  --  138   POTASSIUM 4.6 3.3*   CHLORIDE  --  100   CO2  --  27.0   ANION GAP  --  11.0   BUN  --  6*   CREATININE  --  0.55*   EGFR  --  103.8   GLUCOSE  --  100*   CALCIUM  --  8.8   MAGNESIUM  --  1.7   TSH  --  9.670*         Lab 11/10/24  1007   TOTAL PROTEIN 7.0   ALBUMIN 2.8*   GLOBULIN 4.2   ALT (SGPT) 27   AST (SGOT) 72*   BILIRUBIN 1.7*   ALK PHOS 165*         Lab 11/10/24  1007 11/10/24  0946   HSTROP T 14*  --    PROTIME  --  15.2*   INR  --  1.18*             Lab 11/10/24  1235   FOLATE 4.83   VITAMIN B 12 716         Brief Urine Lab Results  (Last result in the past 365 days)        Color   Clarity   Blood   Leuk Est   Nitrite   Protein   CREAT   Urine HCG        11/10/24 0958 Yellow   Clear   Negative   Negative   Negative   Negative                   Microbiology Results Abnormal       None            MRI Lumbar Spine Without Contrast    Result Date: 11/10/2024  MRI  LUMBAR SPINE WO CONTRAST Date of Exam: 11/10/2024 1:40 PM EST Indication: Lower extremity radiculopathy.  Comparison: None available. Technique:  Routine multiplanar/multisequence sequence images of the lumbar spine were obtained without contrast administration.  Findings: Hyperintense benign osseous hemangioma noted at L3. T1 marrow signal is otherwise preserved, without evidence of fracture or suspicious marrow replacing lesion. Alignment is anatomic, without evidence of significant listhesis or subluxation. The conus medullaris and cauda equina nerve roots are satisfactory in appearance. The paraspinal soft tissues demonstrate no acute or suspicious findings. Multilevel spondylosis is present, with areas of involvement including L1-2, no significant spinal canal or neuroforaminal impingement. L2-3, no significant spinal canal or neuroforaminal impingement. L3-4, small disc bulge and bilateral facet arthropathy. There is no evidence of significant spinal canal or neuroforaminal narrowing. L4-5, minimal small disc bulge and mild facet arthropathy. The spinal canal remains patent. There is minimal bilateral neuroforaminal narrowing. L5-S1, small circumferential disc bulge with component of right-sided disc protrusion. There is some minimal narrowing of the right aspect of the spinal canal and the small disc protrusion encroaches upon the right subarticular recess, potentially in some proximity to the traversing right S1 nerve root. The neural foramina remain patent.     Impression: Impression: Generally mild lumbar spondylosis is evident, including a small disc protrusion eccentric to the right at L5-S1, in the region of the right subarticular recess and potentially in some proximity to the traversing right S1 nerve root. There is otherwise no  evidence of significant spinal canal or neuroforaminal narrowing. Electronically Signed: Adriano Kessler MD  11/10/2024 2:27 PM EST  Workstation ID: [id number]    MRI Brain  Without Contrast    Result Date: 11/10/2024  MRI BRAIN WO CONTRAST Date of Exam: 11/10/2024 1:27 PM EST Indication: BLE weakness, facial parasthesia R>L, difficulty ambulating.  Comparison: None available. Technique:  Routine multiplanar/multisequence sequence images of the brain were obtained without contrast administration. Findings: No acute infarct is present on diffusion weighted sequences. Midline structures appear normal and the craniocervical junction is satisfactory. Gray-white differentiation is maintained and there is no evidence of intracranial hemorrhage, mass or mass effect. The ventricles are normal in size and configuration. The orbits are normal. The paranasal sinuses are clear. Intracranial arterial flow voids appear maintained.     Impression: Impression: Normal noncontrast MRI of the brain. Electronically Signed: Adriano Kessler MD  11/10/2024 2:14 PM EST  Workstation ID: [id number]    CT Abdomen Pelvis With Contrast    Result Date: 11/10/2024  CT ABDOMEN PELVIS W CONTRAST Date of Exam: 11/10/2024 11:35 AM EST Indication: abdominal pain. Comparison: CT abdomen and pelvis 1/30/2024 Technique: Axial CT images were obtained of the abdomen and pelvis following the uneventful intravenous administration of 85 mL Isovue-300. Reconstructed coronal and sagittal images were also obtained. Automated exposure control and iterative construction methods were used. Findings: LUNG BASES:  Unremarkable without mass or infiltrate. LIVER:  Unremarkable parenchyma without focal lesion. BILIARY/GALLBLADDER:  Unremarkable SPLEEN:  Unremarkable PANCREAS:  Unremarkable ADRENAL:  Unremarkable KIDNEYS: Bilateral renal cysts are noted. No evidence for obstructive uropathy. No calculus identified. GASTROINTESTINAL/MESENTERY:  No evidence of obstruction nor inflammation.  The appendix is visualized normal in caliber. MESENTERIC VESSELS: The mesenteric vessels are patent. There is some faint soft tissue density along the celiac  artery. There is some similar findings on prior study however this appears slightly more conspicuous on the current exam. Mild vasculitis not excluded. AORTA/IVC:  Normal caliber. RETROPERITONEUM/LYMPH NODES:  Unremarkable REPRODUCTIVE: The uterus is visualized. BLADDER:  Unremarkable OSSEUS STRUCTURES:  Typical for age with no acute process identified.     Impression: Impression: 1. Mild soft tissue stranding along the celiac artery which could represent mild vasculitis. 2. No acute findings otherwise noted in the abdomen or pelvis. Electronically Signed: Jeimy Tomas MD  11/10/2024 12:05 PM EST  Workstation ID: [id number]    CT Head Without Contrast    Result Date: 11/10/2024  CT HEAD WO CONTRAST Date of Exam: 11/10/2024 11:22 AM EST Indication: facial numbness x 1 week difficulty walking. Comparison: None available. Technique: Axial CT images were obtained of the head without contrast administration.  Automated exposure control and iterative construction methods were used. FINDINGS: Gray-white differentiation is maintained and there is no evidence of intracranial hemorrhage, mass or mass effect. Mild age-related changes of the brain are present including volume loss and typical periventricular sequela of chronic small vessel ischemia. There is otherwise no evidence of intracranial hemorrhage, mass or mass effect. The ventricles are normal in size and configuration accounting for surrounding volume loss. The orbits are normal and the paranasal sinuses are grossly clear.     Impression: Age-related changes of the brain as above, otherwise without evidence of acute intracranial abnormality. Electronically Signed: Adriano Kessler MD  11/10/2024 11:30 AM EST  Workstation ID: [id number]    XR Chest 1 View    Result Date: 11/10/2024  XR CHEST 1 VW Date of Exam: 11/10/2024 10:11 AM EST Indication: Weak/Dizzy/AMS triage protocol Comparison: February 3, 2024 Findings: The lungs are clear. The heart and mediastinal contours  appear normal. There is no pleural effusion. The pulmonary vasculature appears normal. The osseous structures appear intact.     Impression: Impression: No acute cardiopulmonary process. Electronically Signed: Jhoan Madrid MD  11/10/2024 10:47 AM EST  Workstation ID: [id number]         Current medications:  Scheduled Meds:carvedilol, 12.5 mg, Oral, BID With Meals  desvenlafaxine, 50 mg, Oral, Daily  folic acid, 1 mg, Oral, Daily  latanoprost, 1 drop, Both Eyes, Nightly  melatonin, 5 mg, Oral, Nightly  pantoprazole, 40 mg, Oral, Daily  rosuvastatin, 5 mg, Oral, Daily  sodium chloride, 10 mL, Intravenous, Q12H  thiamine, 100 mg, Oral, Daily  ursodiol, 300 mg, Oral, BID  zolpidem, 5 mg, Oral, Nightly      Continuous Infusions:   PRN Meds:.  acetaminophen **OR** acetaminophen **OR** acetaminophen    senna-docusate sodium **AND** polyethylene glycol **AND** bisacodyl **AND** bisacodyl    calcium carbonate    Calcium Replacement - Follow Nurse / BPA Driven Protocol    ipratropium-albuterol    LORazepam    Magnesium Standard Dose Replacement - Follow Nurse / BPA Driven Protocol    nitroglycerin    ondansetron    Phosphorus Replacement - Follow Nurse / BPA Driven Protocol    Potassium Replacement - Follow Nurse / BPA Driven Protocol    sodium chloride    sodium chloride    sodium chloride    Assessment & Plan   Assessment & Plan     Active Hospital Problems    Diagnosis  POA    **Lower extremity weakness [R29.898]  Yes      Resolved Hospital Problems   No resolved problems to display.        Brief Hospital Course to date:  Philomena Davis is a 64 y.o. female with history of alcohol dependence, cirrhosis, HTN, PN here with B UE/LE paraesthesias    B weakness  B paraesthesias/facial numbness  -MRI without acute pathology  -MRI lumbar spine with L5-S1 disc protrusion  -MRI C/T spine pending  -LP pending  -Neurology consulted    Possible vasculitis  CT with celiac artery stranding  -markedly elevated CRP/ESR  -WILMA/RF/ANCA  panel    Atrial tachycardia, resolved  -presented with tachycardia, HR to 150  -on BB  -ECHO pending  -SVT versus atypical atrial flutter  -may need cardiology follow up    Alcohol dependence  -monitor    Cirrhosis  Hepatocellular jaundice  -monitor    Mild LA  -due to liver disease    Subclinical hypothyroidism  -needs follow up in 6 weeks    Expected Discharge Location and Transportation: Home  Expected Discharge   Expected Discharge Date: 11/13/2024; Expected Discharge Time:      VTE Prophylaxis:  Mechanical VTE prophylaxis orders are present.         AM-PAC 6 Clicks Score (PT): 19 (11/10/24 1945)    CODE STATUS:   Code Status and Medical Interventions: CPR (Attempt to Resuscitate); Full Support   Ordered at: 11/10/24 1531     Level Of Support Discussed With:    Patient    Next of Kin (If No Surrogate)     Code Status (Patient has no pulse and is not breathing):    CPR (Attempt to Resuscitate)     Medical Interventions (Patient has pulse or is breathing):    Full Support       Jose J Hutchins MD  11/11/24        Electronically signed by Jose J Hutchins MD at 11/11/24 0840          Consult Notes (all)        Cosmo Hess MD at 11/12/24 1214        Consult Orders    1. Inpatient Cardiology Consult [[id number]] ordered by Jose J Hutchins MD at 11/12/24 0920                 [facility] Cardiology  Consultation H&P    Patient: Philomena Davis  1960  [unfilled]  [unfilled]  [address] [address]    PCP:  Unique Brandt DO   Treatment Team:   Attending Provider: Jose J Hutchins MD  Consulting Physician: Fallon Guido APRN  Consulting Physician: Cosmo Hess MD  Admitting Provider: Jose J Hutchins MD   11/10/2024      DATE OF CONSULTATION: 11/12/2024 12:14 EST     IDENTIFICATION: A 64 y.o.  female from [place] , [employer]    REASON FOR CONSULTATION: atrial arrhythmia     PROBLEM LIST:    Lower extremity  weakness      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 03/24/2017    Closed trimalleolar fracture of right ankle 05/24/2023    Digital mucous cyst of toe of left foot 03/24/2017    Ganglion cyst of left foot     Glaucoma     Hypertension     Wears glasses      Past Surgical History:   Procedure Laterality Date    ANKLE OPEN REDUCTION INTERNAL FIXATION Left 03/24/2017    Procedure: LEFT ANKLE OPEN REDUCTION INTERNAL FIXATION WITH ILIAC CREST BONE GRAFT , EXCISION CYST 4TH/5TH INNERSPACE LEFT FOOT;  Surgeon: Frank Larson MD;  Location:  [facility];  Service:     ANKLE OPEN REDUCTION INTERNAL FIXATION Right 05/25/2023    Procedure: ANKLE OPEN REDUCTION INTERNAL FIXATION;  Surgeon: Deandre Reynoso MD;  Location:  [facility];  Service: Orthopedics;  Laterality: Right;    AUGMENTATION MAMMAPLASTY Bilateral 1999    BREAST AUGMENTATION      BREAST EXCISIONAL BIOPSY Right 2014    DILATION AND CURETTAGE, DIAGNOSTIC / THERAPEUTIC      ENDOSCOPY N/A 11/30/2022    Procedure: ESOPHAGOGASTRODUODENOSCOPY;  Surgeon: Arelis Solano MD;  Location:  [facility];  Service: Gastroenterology;  Laterality: N/A;    ENDOSCOPY N/A 2/1/2024    Procedure: ESOPHAGOGASTRODUODENOSCOPY;  Surgeon: Brunner, Mark I, MD;  Location:  [facility];  Service: Gastroenterology;  Laterality: N/A;    AK RPR NON/MAL FEMUR DSTL H/N W/ILIAC/AUTOG BONE Left 03/24/2017    Procedure: ILIAC CREST BONE GRAFT;  Surgeon: Frank Larson MD;  Location:  [facility];  Service: Orthopedics    WRIST SURGERY         Allergies  Allergies   Allergen Reactions    Buspirone Anxiety    Codeine Nausea Only    Mirtazapine Anxiety       Current Medications    Current Facility-Administered Medications:      acetaminophen (TYLENOL) tablet 650 mg, 650 mg, Oral, Q4H PRN **OR** acetaminophen (TYLENOL) 160 MG/5ML oral solution 650 mg, 650 mg, Oral, Q4H PRN **OR** acetaminophen (TYLENOL) suppository 500 mg, 500 mg, Rectal, Q4H PRN, Sophie Gandhi MD    sennosides-docusate (PERICOLACE) 8.6-50 MG per tablet 2 tablet, 2 tablet, Oral, BID PRN **AND** polyethylene glycol (MIRALAX) packet 17 g, 17 g, Oral, Daily PRN **AND** bisacodyl (DULCOLAX) EC tablet 5 mg, 5 mg, Oral, Daily PRN **AND** bisacodyl (DULCOLAX) suppository 10 mg, 10 mg, Rectal, Daily PRN, Sophie Gandhi MD    calcium carbonate (TUMS) chewable tablet 500 mg (200 mg elemental), 2 tablet, Oral, BID PRN, Sophie Gandhi MD    Calcium Replacement - Follow Nurse / BPA Driven Protocol, , Not Applicable, PRN, Sophie Gandhi MD    carvedilol (COREG) tablet 25 mg, 25 mg, Oral, BID With Meals, Jose J Hutchins MD    desvenlafaxine (PRISTIQ) 24 hr tablet 50 mg, 50 mg, Oral, Daily, Sophie Gandhi MD, 50 mg at 11/12/24 0836    folic acid (FOLVITE) tablet 1 mg, 1 mg, Oral, Daily, Sophie Gandhi MD, 1 mg at 11/12/24 0836    ipratropium-albuterol (DUO-NEB) nebulizer solution 3 mL, 3 mL, Nebulization, Q6H PRN, Sophie Gandhi MD    latanoprost (XALATAN) 0.005 % ophthalmic solution 1 drop, 1 drop, Both Eyes, Nightly, Sophie Gandhi MD, 1 drop at 11/11/24 2059    LORazepam (ATIVAN) tablet 0.5 mg, 0.5 mg, Oral, Q8H PRN, Sophie Gandhi MD    Magnesium Standard Dose Replacement - Follow Nurse / BPA Driven Protocol, , Not Applicable, PRN, Sophie Gandhi MD    melatonin tablet 5 mg, 5 mg, Oral, Nightly, Sophie Gandhi MD, 5 mg at 11/11/24 2058    nitroglycerin (NITROSTAT) SL tablet 0.4 mg, 0.4 mg, Sublingual, Q5 Min PRN, Sophie Gandhi MD    ondansetron (ZOFRAN) injection 4 mg, 4 mg, Intravenous, Q6H PRN, Sophie Gandhi MD    pantoprazole (PROTONIX) EC tablet 40 mg, 40 mg, Oral, Daily,  Sophie Gandhi MD, 40 mg at 11/12/24 0639    Phosphorus Replacement - Follow Nurse / BPA Driven Protocol, , Not Applicable, PRAGUSTINA, Sophie Gandhi MD    Potassium Replacement - Follow Nurse / BPA Driven Protocol, , Not Applicable, Hiram SCHAFER Mohamed Ahmed, MD    rosuvastatin (CRESTOR) tablet 5 mg, 5 mg, Oral, Daily, Sophie Gandhi MD, 5 mg at 11/11/24 2058    sodium chloride 0.9 % flush 10 mL, 10 mL, Intravenous, PRN, Zac Bruner MD    sodium chloride 0.9 % flush 10 mL, 10 mL, Intravenous, Q12H, Sophie Gandhi MD, 10 mL at 11/12/24 0837    sodium chloride 0.9 % flush 10 mL, 10 mL, Intravenous, PRN, Sophie Gandhi MD    sodium chloride 0.9 % infusion 40 mL, 40 mL, Intravenous, PRN, Sophie Gandhi MD    thiamine (VITAMIN B-1) tablet 100 mg, 100 mg, Oral, Daily, Sophie Gandhi MD, 100 mg at 11/12/24 0836    ursodiol (ACTIGALL) capsule 300 mg, 300 mg, Oral, BID, Sophie Gandhi MD, 300 mg at 11/12/24 0836    zolpidem (AMBIEN) tablet 5 mg, 5 mg, Oral, Nightly, Sophie Gandhi MD, 5 mg at 11/11/24 2058       History of Present Illness   Philomena Davis is a 64 y.o. year old female with a past medical history significant for alcoholic cirrhosis and varices presented to [facility] with equivocal neurologic change.  Pt noted with progressive LE weakness and L facial numbness. During hospitalization noted with intermittent atrial tachycardia and left atrial flutter.  This was converted with IV lopressor.  Pt is unaware of any previous cardiac conditions.      ROS  Review of Systems   Neurological:  Positive for focal weakness, numbness and weakness.   All other systems reviewed and are negative.      SOCIAL HX  Social History     Socioeconomic History    Marital status:    Tobacco Use    Smoking status: Former     Current packs/day: 0.00     Average packs/day: 1.5 packs/day for 20.0 years (30.0 ttl pk-yrs)     Types: Cigarettes     Start date:  3/22/1989     Quit date: 3/22/2009     Years since quitting: 15.6    Smokeless tobacco: Never   Vaping Use    Vaping status: Never Used   Substance and Sexual Activity    Alcohol use: Yes     Alcohol/week: 7.0 standard drinks of alcohol     Types: 7 Glasses of wine per week     Comment: 1/5 VODKA EVERY TWO DAYS    Drug use: No    Sexual activity: Defer       FAMILY HX  Family History   Problem Relation Age of Onset    Cancer Mother     No Known Problems Father     No Known Problems Sister     No Known Problems Brother     No Known Problems Daughter     No Known Problems Son     No Known Problems Maternal Grandmother     Breast cancer Paternal Grandmother 68        met to brain    No Known Problems Maternal Aunt     No Known Problems Paternal Aunt     Rheum arthritis Other     Heart disease Other     Cancer Other     Stroke Other     Hypertension Other     BRCA 1/2 Neg Hx     Colon cancer Neg Hx     Endometrial cancer Neg Hx     Ovarian cancer Neg Hx        OBJECTIVE:  Vitals:    11/12/24 0720 11/12/24 0730 11/12/24 0741 11/12/24 0836   BP:   127/75 111/68   BP Location:   Left arm    Patient Position:   Lying    Pulse: (!) 131 (!) 133  101   Resp:   16    Temp:       TempSrc:       SpO2: 95% 95% 96%    Weight:       Height:         I/O last 3 completed shifts:  In: -   Out: 700 [Urine:700]  No intake/output data recorded.  Intake & Output (last 3 days)         11/09 0701  11/10 0700 11/10 0701 11/11 0700 11/11 0701 11/12 0700 11/12 0701 11/13 0700    IV Piggyback  1000      Total Intake(mL/kg)  1000 (14.9)      Urine (mL/kg/hr)  1750 400 (0.2)     Total Output  1750 400     Net  -750 -400             Urine Unmeasured Occurrence  1 x               PHYSICAL EXAMINATION:  Constitutional:       Appearance: Healthy appearance. Not in distress.   Neck:      Vascular: No JVR. JVD normal.   Pulmonary:      Effort: Pulmonary effort is normal.      Breath sounds: Normal breath sounds. No wheezing. No rhonchi. No rales.    Chest:      Chest wall: Not tender to palpatation.   Cardiovascular:      PMI at left midclavicular line. Normal rate. Regular rhythm. Normal S1. Normal S2.       Murmurs: There is no murmur.      No gallop.  No click. No rub.   Pulses:     Intact distal pulses.   Edema:     Peripheral edema present.  Abdominal:      General: Bowel sounds are normal.      Palpations: Abdomen is soft.      Tenderness: There is no abdominal tenderness.   Musculoskeletal: Normal range of motion.         General: No tenderness. Skin:     General: Skin is warm and dry.   Neurological:      General: No focal deficit present.      Mental Status: Alert and oriented to person, place and time.         Diagnostic Data:  Lab Results (last 24 hours)       Procedure Component Value Units Date/Time    Blood Culture - Blood, Hand, Left [[id number]]  (Normal) Collected: 11/10/24 1040    Specimen: Blood from Hand, Left Updated: 11/12/24 1100     Blood Culture No growth at 2 days    Narrative:      Less than seven (7) mL's of blood was collected.  Insufficient quantity may yield false negative results.    Blood Culture - Blood, Hand, Right [[id number]]  (Normal) Collected: 11/10/24 1039    Specimen: Blood from Hand, Right Updated: 11/12/24 1100     Blood Culture No growth at 2 days    Narrative:      Less than seven (7) mL's of blood was collected.  Insufficient quantity may yield false negative results.    Rheumatoid Factor [[id number]]  (Normal) Collected: 11/11/24 1611    Specimen: Blood Updated: 11/11/24 2324     Rheumatoid Factor Quantitative <10.0 IU/mL     ANCA Panel [[id number]] Collected: 11/11/24 1611    Specimen: Blood Updated: 11/11/24 1653    WILMA Comprehensive Panel [[id number]] Collected: 11/11/24 1611    Specimen: Blood Updated: 11/11/24 1652    Phosphorus [[id number]]  (Normal) Collected: 11/11/24 1255    Specimen: Blood Updated: 11/11/24 1409     Phosphorus 3.4 mg/dL     Magnesium [[id number]]  (Normal) Collected: 11/11/24 1255     Specimen: Blood Updated: 11/11/24 1409     Magnesium 1.7 mg/dL     Urine Culture - Urine, Urine, Catheter [[id number]]  (Normal) Collected: 11/10/24 1054    Specimen: Urine, Catheter Updated: 11/11/24 1338     Urine Culture No growth          ECG/EMG Results (last 24 hours)       Procedure Component Value Units Date/Time    Adult Transthoracic Echo Complete W/ Cont if Necessary Per Protocol [[id number]] Resulted: 11/11/24 1702     Updated: 11/11/24 1702     EF(MOD-bp) 63.5 %      LVIDd 4.6 cm      LVIDs 2.9 cm      IVSd 1.10 cm      LVPWd 1.10 cm      FS 37.0 %      IVS/LVPW 1.00 cm      ESV(cubed) 24.4 ml      EDV(cubed) 97.3 ml      LV mass(C)d 181.2 grams      LVOT area 3.1 cm2      LVOT diam 2.00 cm      EDV(MOD-sp2) 54.5 ml      EDV(MOD-sp4) 86.2 ml      ESV(MOD-sp2) 19.0 ml      ESV(MOD-sp4) 32.3 ml      SV(MOD-sp2) 35.5 ml      SV(MOD-sp4) 53.9 ml      EF(MOD-sp2) 65.1 %      EF(MOD-sp4) 62.5 %      MV E max roosevelt 116.0 cm/sec      MV A max roosevelt 120.0 cm/sec      MV dec time 0.18 sec      MV E/A 0.97     LA ESV Index (BP) 31.1 ml/m2      Med Peak E' Roosevelt 15.2 cm/sec      Lat Peak E' Roosevelt 15.1 cm/sec      Avg E/e' ratio 7.66     SV(LVOT) 80.1 ml      RV Base 4.0 cm      RV Mid 3.2 cm      RV Length 8.0 cm      TAPSE (>1.6) 1.70 cm      RV S' 23.4 cm/sec      LA dimension (2D)  3.9 cm      LV V1 max 98.9 cm/sec      LV V1 max PG 3.9 mmHg      LV V1 mean PG 2.00 mmHg      LV V1 VTI 25.5 cm      Ao pk roosevelt 139.0 cm/sec      Ao max PG 7.7 mmHg      Ao mean PG 4.0 mmHg      Ao V2 VTI 28.9 cm      JA(I,D) 2.8 cm2      MV max PG 4.9 mmHg      MV mean PG 3.0 mmHg      MV V2 VTI 20.3 cm      MV P1/2t 91.1 msec      MVA(P1/2t) 2.41 cm2      MVA(VTI) 3.9 cm2      MV dec slope 376.0 cm/sec2      PA acc time 0.15 sec      Ao root diam 3.1 cm      IVRT 92.0 ms      LA ESV (BP) 53.5 ml      RAP systole 3 mmHg      BH CV VAS BP LEFT /70 mmHg      Echo EF Estimated 65.0 %     Narrative:        Left ventricular systolic  function is normal. Estimated left   ventricular EF = 65%    The cardiac valves are anatomically and functionally normal.                   Lower extremity weakness        ASSESSMENT/PLAN:  Atrial tachycardia/ left atrial flutter- resolved.  Echo benign. Continue carvedilol.  Not candidate for systemic anticoagulation with concomitant cirrhosis/ varices.  Give IV prn lopressor for break through issues.         Cosmo Hess MD   12:14 EST 2024               Electronically signed by Cosmo Hess MD at 24 1220       Carla Archer APRN at 11/10/24 1046          Stroke Consult Note    Patient Name: Philomena Davis   MRN: [id number]  Age: 64 y.o.  Sex: female  : 1960    Primary Care Physician: Unique Brandt DO  Referring Physician:  Dr. Zac Bruner    TIME STROKE TEAM CALLED: 1030 EST     TIME PATIENT SEEN: 1033 EST    Handedness: Right  Race:     Chief Complaint/Reason for Consultation: Bilateral lower extremity weakness with difficulty ambulating x 1 week and right>left facial paresthesia    HPI: Mrs. Davis is a 64-year-old female with known medical diagnoses of essential hypertension, cirrhosis of the liver 2/2 remote EtOH abuse, questionable history of paroxysmal atrial fibrillation (data deficient, not on OAC), peripheral neuropathy, anxiety, depression, and remote tobacco abuse who presents to the emergency department for further evaluation of bilateral lower extremity weakness and difficulty walking x 1 week.  She also reported facial paresthesias, right > left, which she noted began approximately 2 to 3 days ago therefore the stroke team was consulted for further evaluation and recommendations.  She does tell me that she was ill approximately 2 weeks ago with nausea and vomiting and poor oral intake.    She currently does not take any antiplatelet or anticoagulation medications.  She is a former smoker, occasionally consumes EtOH, and denies illicit drug  use.    Last Known Normal Date/Time: Approximately 1 week ago EST     Review of Systems   Constitutional:  Positive for activity change, chills, fatigue and fever.   HENT:  Negative for trouble swallowing.    Eyes: Negative.  Negative for photophobia and visual disturbance.   Respiratory: Negative.     Cardiovascular:  Positive for palpitations (In the past).   Gastrointestinal:  Positive for nausea and vomiting. Negative for diarrhea.   Genitourinary: Negative.  Negative for hematuria.   Musculoskeletal:  Positive for gait problem.   Neurological:  Positive for weakness (BLE) and numbness. Negative for dizziness, speech difficulty and headaches.      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 03/24/2017    Closed trimalleolar fracture of right ankle 05/24/2023    Digital mucous cyst of toe of left foot 03/24/2017    Ganglion cyst of left foot     Glaucoma     Hypertension     Wears glasses      Past Surgical History:   Procedure Laterality Date    ANKLE OPEN REDUCTION INTERNAL FIXATION Left 03/24/2017    Procedure: LEFT ANKLE OPEN REDUCTION INTERNAL FIXATION WITH ILIAC CREST BONE GRAFT , EXCISION CYST 4TH/5TH INNERSPACE LEFT FOOT;  Surgeon: Frank Larson MD;  Location: [facility];  Service:     ANKLE OPEN REDUCTION INTERNAL FIXATION Right 05/25/2023    Procedure: ANKLE OPEN REDUCTION INTERNAL FIXATION;  Surgeon: Deandre Reynoso MD;  Location: [facility];  Service: Orthopedics;  Laterality: Right;    AUGMENTATION MAMMAPLASTY Bilateral 1999    BREAST AUGMENTATION      BREAST EXCISIONAL BIOPSY Right 2014    DILATION AND CURETTAGE, DIAGNOSTIC / THERAPEUTIC      ENDOSCOPY N/A 11/30/2022    Procedure: ESOPHAGOGASTRODUODENOSCOPY;  Surgeon: Arelis Solano MD;  Location:   [facility];  Service: Gastroenterology;  Laterality: N/A;    ENDOSCOPY N/A 2/1/2024    Procedure: ESOPHAGOGASTRODUODENOSCOPY;  Surgeon: Brunner, Mark I, MD;  Location:  [facility];  Service: Gastroenterology;  Laterality: N/A;    DC RPR NON/MAL FEMUR DSTL H/N W/ILIAC/AUTOG BONE Left 03/24/2017    Procedure: ILIAC CREST BONE GRAFT;  Surgeon: Frank Larson MD;  Location:  [facility];  Service: Orthopedics    WRIST SURGERY       Family History   Problem Relation Age of Onset    Cancer Mother     No Known Problems Father     No Known Problems Sister     No Known Problems Brother     No Known Problems Daughter     No Known Problems Son     No Known Problems Maternal Grandmother     Breast cancer Paternal Grandmother 68        met to brain    No Known Problems Maternal Aunt     No Known Problems Paternal Aunt     Rheum arthritis Other     Heart disease Other     Cancer Other     Stroke Other     Hypertension Other     BRCA 1/2 Neg Hx     Colon cancer Neg Hx     Endometrial cancer Neg Hx     Ovarian cancer Neg Hx      Social History     Socioeconomic History    Marital status:    Tobacco Use    Smoking status: Former     Current packs/day: 0.00     Average packs/day: 1.5 packs/day for 20.0 years (30.0 ttl pk-yrs)     Types: Cigarettes     Start date: 3/22/1989     Quit date: 3/22/2009     Years since quitting: 15.6    Smokeless tobacco: Never   Vaping Use    Vaping status: Never Used   Substance and Sexual Activity    Alcohol use: Yes     Alcohol/week: 7.0 standard drinks of alcohol     Types: 7 Glasses of wine per week     Comment: 1/5 VODKA EVERY TWO DAYS    Drug use: No    Sexual activity: Defer     Allergies   Allergen Reactions    Buspirone Anxiety    Codeine Nausea Only    Mirtazapine Anxiety     Prior to Admission medications    Medication Sig Start Date End Date Taking? Authorizing Provider   albuterol sulfate  (90 Base) MCG/ACT inhaler Inhale 2 puffs Every 4 (Four) Hours As Needed  for Wheezing. Indications: Chronic Obstructive Lung Disease 4/5/24   Unique Brandt DO   bacitracin-polymyxin b (POLYSPORIN) 500-18382 UNIT/GM ointment Apply 1 Application topically to the appropriate area as directed 2 (Two) Times a Day. 7/12/24   Unique Brandt DO   carvedilol (COREG) 3.125 MG tablet Take 1 tablet by mouth 2 (Two) Times a Day With Meals. Indications: High Blood Pressure Disorder 4/5/24   Unique Brandt DO   cloNIDine (CATAPRES) 0.3 MG tablet TAKE 1 TABLET BY MOUTH 2 TIMES A DAY 10/7/24   Unique Brandt DO   desvenlafaxine (PRISTIQ) 50 MG 24 hr tablet Take 1 tablet by mouth Daily. Indications: Major Depressive Disorder 4/16/24   Unique Brandt DO   eszopiclone (Lunesta) 1 MG tablet Take 1 tablet by mouth Every Night for 90 days. Take immediately before bedtime 10/17/24 1/15/25  Marko Eldridge APRN   Fluticasone-Salmeterol (ADVAIR/WIXELA) 100-50 MCG/ACT DISKUS Inhale 1 puff 2 (Two) Times a Day. Indications: COPD 4/5/24   Unique Brandt DO   folic acid (FOLVITE) 1 MG tablet Take 1 tablet by mouth Daily. Indications: Anemia From Inadequate Folic Acid 4/5/24   Unique Brandt DO   gabapentin (NEURONTIN) 300 MG capsule Take 1 capsule by mouth Every Night. 7/12/24   Unique Brandt DO   lactobacillus (BACID) tablet caplet Take 1 tablet by mouth 2 (Two) Times a Day. 2/27/24   Unique Brandt DO   latanoprost (XALATAN) 0.005 % ophthalmic solution Administer 1 drop to both eyes Every Night. Indications: Wide-Angle Glaucoma 4/11/24   Unique Brandt DO   pantoprazole (PROTONIX) 40 MG EC tablet Take 1 tablet by mouth Daily. Indications: Heartburn 7/18/24   Unique Brandt DO   rosuvastatin (CRESTOR) 5 MG tablet TAKE 1 TABLET BY MOUTH DAILY 5/28/24   Johnny Cardenas, DO   terbinafine (lamISIL) 1 % cream Apply 1 Application topically to the appropriate area as directed 2 (Two) Times a Day. 9/25/24   Unique Brandt, DO   thiamine (VITAMIN B1) 100 MG tablet Take 1  tablet by mouth Daily. Indications: Deficiency of Vitamin B1 4/5/24   Unique Brandt DO   ursodiol (ACTIGALL) 300 MG capsule Take 1 capsule by mouth 2 (Two) Times a Day. Indications: Liver Disease 7/18/24   Unique Brandt DO         Temp:  [98 °F (36.7 °C)] 98 °F (36.7 °C)  Heart Rate:  [158] 158  Resp:  [18] 18  BP: (144)/(100) 144/100  Neurological Exam  Mental Status  Alert. Oriented to person, place, time and situation. Oriented to person, place, and time. Speech is normal. Language is fluent with no aphasia. Attention and concentration are normal.    Cranial Nerves  CN II: Visual fields full to confrontation.  CN III, IV, VI: Extraocular movements intact bilaterally. Pupils equal round and reactive to light bilaterally.  CN V:  Right: Diminished sensation of the entire right side of the face.  Left: Diminished sensation of the entire left side of the face. Right > left.  CN VII: Full and symmetric facial movement.  CN VIII: Hearing appears to be intact bilaterally.  CN XII: Tongue midline without atrophy or fasciculations.    Motor  Decreased muscle bulk throughout. Normal muscle tone. Strength is 5/5 throughout all four extremities.  Bilateral upper extremities with no drift, 5/5 strength  Bilateral lower extremities with no drift, 4+/5 strength.    Sensory  Light touch abnormality: Bilateral lower extremities 2/2 peripheral neuropathy  Bilateral face; right > left.     Coordination  Right: Finger-to-nose normal.Left: Finger-to-nose normal.    Gait    Not observed.      Physical Exam  Vitals and nursing note reviewed.   Constitutional:       General: She is not in acute distress.     Appearance: Normal appearance. She is not ill-appearing.   HENT:      Head: Normocephalic.      Mouth/Throat:      Mouth: Mucous membranes are moist.   Eyes:      Extraocular Movements: Extraocular movements intact.      Pupils: Pupils are equal, round, and reactive to light.   Cardiovascular:      Rate and Rhythm: Normal  rate and regular rhythm.   Pulmonary:      Effort: Pulmonary effort is normal. No respiratory distress.      Comments: On room air  Musculoskeletal:      Right lower leg: Edema present.      Left lower leg: Edema present.      Comments: Right > left   Skin:     General: Skin is warm and dry.      Findings: Bruising present.   Neurological:      Mental Status: She is alert and oriented to person, place, and time.      Cranial Nerves: No cranial nerve deficit.      Sensory: Sensory deficit present.      Motor: Motor strength is normal.Weakness (BLE) present.      Coordination: Coordination normal.   Psychiatric:         Mood and Affect: Mood normal.         Speech: Speech normal.         Behavior: Behavior normal.         Acute Stroke Data    Thrombolytic Inclusion / Exclusion Criteria    Time: 10:46 EST  Person Administering Scale: CANDY Nova    Inclusion Criteria  [x]   18 years of age or greater   []   Onset of symptoms < 4.5 hours before beginning treatment (stroke onset = time patient was last seen well or without symptoms).   []   Diagnosis of acute ischemic stroke causing measurable disabling deficit (Complete Hemianopia, Any Aphasia, Visual or Sensory Extinction, Any weakness limiting sustained effort against gravity)   []   Any remaining deficit considered potentially disabling in view of patient and practitioner   Exclusion criteria (Do not proceed with Alteplase if any are checked under exclusion criteria)  [x]   Onset unknown or GREATER than 4.5 hours   []   ICH on CT/MRI   []   CT demonstrates hypodensity representing acute or subacute infarct   []   Significant head trauma or prior stroke in the previous 3 months   []   Symptoms suggestive of subarachnoid hemorrhage   []   History of un-ruptured intracranial aneurysm GREATER than 10 mm   []   Recent intracranial or intraspinal surgery within the last 3 months   []   Arterial puncture at a non-compressible site in the previous 7 days    []   Active internal bleeding   []   Acute bleeding tendency   []   Platelet count LESS than 100,000 for known hematological diseases such as leukemia, thrombocytopenia or chronic cirrhosis   []   Current use of anticoagulant with INR GREATER than 1.7 or PT GREATER than 15 seconds, aPTT GREATER than 40 seconds   []   Heparin received within 48 hours, resulting in abnormally elevated aPTT GREATER than upper limit of normal   []   Current use of direct thrombin inhibitors or direct factor Xa inhibitors in the past 48 hours   []   Elevated blood pressure refractory to treatment (systolic GREATER than 185 mm/Hg or diastolic  GREATER than 110 mm/Hg   []   Suspected infective endocarditis and aortic arch dissection   []   Current use of therapeutic treatment dose of low-molecular-weight heparin (LMWH) within the previous 24 hours   []   Structural GI malignancy or bleed   Relative exclusion for all patients  []   Only minor non-disabling symptoms   []   Pregnancy   []   Seizure at onset with postictal residual neurological impairments   []   Major surgery or previous trauma within past 14 days   []   History of previous spontaneous ICH, intracranial neoplasm, or AV malformation   []   Postpartum (within previous 14 days)   []   Recent GI or urinary tract hemorrhage (within previous 21 days)   []   Recent acute MI (within previous 3 months)   []   History of un-ruptured intracranial aneurysm LESS than 10 mm   []   History of ruptured intracranial aneurysm   []   Blood glucose LESS than 50 mg/dL (2.7 mmol/L)   []   Dural puncture within the last 7 days   []   Known GREATER than 10 cerebral microbleeds   Additional exclusions for patients with symptoms onset between 3 and 4.5 hours.  []   Age > 80.   []   On any anticoagulants regardless of INR  >>> Warfarin (Coumadin), Heparin, Enoxaparin (Lovenox), fondaparinux (Arixtra), bivalirudin (Angiomax), Argatroban, dabigatran (Pradaxa), rivaroxaban (Xarelto), or apixaban  (Eliquis)   []   Severe stroke (NIHSS > 25).   []   History of BOTH diabetes and previous ischemic stroke.   []   The risks and benefits have been discussed with the patient or family related to the administration of IV thrombolytic therapy for stroke symptoms.   []   I have discussed and reviewed the patient's case and imaging with the attending prior to IV thrombolytic therapy.   N/A Time IV thrombolytic administered       Hospital Meds:  Scheduled- sepsis fluid LR, 30 mL/kg, Intravenous, Once  sodium chloride, 1,000 mL, Intravenous, Once      Infusions-     PRNs-   sodium chloride    Functional Status Prior to Current Stroke/[place] Score: 0    NIH Stroke Scale  Time: 10:46 EST  Person Administering Scale: CANDY Nova    Interval: baseline  1a. Level of Consciousness: 0-->Alert, keenly responsive  1b. LOC Questions: 0-->Answers both questions correctly  1c. LOC Commands: 0-->Performs both tasks correctly  2. Best Gaze: 0-->Normal  3. Visual: 0-->No visual loss  4. Facial Palsy: 0-->Normal symmetrical movements  5a. Motor Arm, Left: 0-->No drift, limb holds 90 (or 45) degrees for full 10 secs  5b. Motor Arm, Right: 0-->No drift, limb holds 90 (or 45) degrees for full 10 secs  6a. Motor Leg, Left: 0-->No drift, leg holds 30 degree position for full 5 secs  6b. Motor Leg, Right: 0-->No drift, leg holds 30 degree position for full 5 secs  7. Limb Ataxia: 0-->Absent  8. Sensory: 1-->Mild-to-moderate sensory loss, patient feels pinprick is less sharp or is dull on the affected side, or there is a loss of superficial pain with pinprick, but patient is aware of being touched  9. Best Language: 0-->No aphasia, normal  10. Dysarthria: 0-->Normal  11. Extinction and Inattention (formerly Neglect): 0-->No abnormality    Total (NIH Stroke Scale): 1    Results Reviewed:  I have personally reviewed current lab, radiology, and data and agree with results.    CT Head Without Contrast    Result Date:  11/10/2024  Age-related changes of the brain as above, otherwise without evidence of acute intracranial abnormality. Electronically Signed: Adriano Kessler MD  11/10/2024 11:30 AM EST  Workstation ID: [id number]    XR Chest 1 View    Result Date: 11/10/2024  Impression: No acute cardiopulmonary process. Electronically Signed: Jhoan Madrid MD  11/10/2024 10:47 AM EST  Workstation ID: [id number]       WBC   Date Value Ref Range Status   11/10/2024 15.59 (H) 3.40 - 10.80 10*3/mm3 Final     Hemoglobin   Date Value Ref Range Status   11/10/2024 12.4 12.0 - 15.9 g/dL Final     Hematocrit   Date Value Ref Range Status   11/10/2024 37.0 34.0 - 46.6 % Final     Platelets   Date Value Ref Range Status   11/10/2024 314 140 - 450 10*3/mm3 Final       Lab Results   Component Value Date    GLUCOSE 100 (H) 11/10/2024    BUN 6 (L) 11/10/2024    CREATININE 0.55 (L) 11/10/2024     11/10/2024    K 3.3 (L) 11/10/2024     11/10/2024    CALCIUM 8.8 11/10/2024    PROTEINTOT 7.0 11/10/2024    ALBUMIN 2.8 (L) 11/10/2024    ALT 27 11/10/2024    AST 72 (H) 11/10/2024    ALKPHOS 165 (H) 11/10/2024    BILITOT 1.7 (H) 11/10/2024    GLOB 4.2 11/10/2024    AGRATIO 0.7 11/10/2024    BCR 10.9 11/10/2024    ANIONGAP 11.0 11/10/2024    EGFR 103.8 11/10/2024     Lactic acid 2.6    Assessment/Plan:    This is a 64-year-old female with known medical diagnoses of essential hypertension, cirrhosis of the liver 2/2 remote EtOH abuse, questionable history of paroxysmal atrial fibrillation (data deficient, not on OAC), peripheral neuropathy, anxiety, depression, and remote tobacco abuse who presents to the emergency department for further evaluation of bilateral lower extremity weakness and difficulty walking x 1 week as well as facial paresthesias (right > left) which have been present for approximately 2 to 3 days.  The stroke team was contacted for further evaluation and recommendations.    Antiplatelet PTA: None  Anticoagulant PTA: None         Bilateral lower extremity weakness, gait abnormality, facial paresthesia (right > left)  Differential diagnoses include small lacunar stroke versus peripheral cause from malnutrition 2/2 recent illness  -N.p.o. until bedside nursing dysphagia screen completed  -MRI brain without contrast to further evaluate for stroke  -Check vitamin B12 and folate    Plan of care discussed with the patient and Dr. Bruner (emergency department physician).  Stroke neurology will follow-up on results of MRI and give further recommendations.  Please call with any questions or concerns.  Thank you for this consult.      CANDY Nova  November 10, 2024  10:46 EST    Addendum 1411: MRI brain without contrast is negative for acute stroke.  No further stroke workup needed.  Stroke neurology will sign off for now.  Please call with any questions or concerns.    Electronically signed by Carla Archer APRN at 11/10/24 1411

## 2024-11-13 NOTE — PROGRESS NOTES
[facility] Medicine Services  PROGRESS NOTE    Patient Name: Philomena Davis  : 1960  MRN: [id number]    Date of Admission: 11/10/2024  Primary Care Physician: Unique Brandt DO    Subjective   Subjective     CC: Numbness    HPI: Continues with numbness. B LE weakness when up. Marked orthostasis noted.      Objective   Objective     Vital Signs:   Temp:  [97.6 °F (36.4 °C)-98.1 °F (36.7 °C)] 98.1 °F (36.7 °C)  Heart Rate:  [] 99  Resp:  [16-18] 16  BP: ()/(54-70) 77/62  Flow (L/min) (Oxygen Therapy):  [2] 2     Physical Exam:  NAD, alert and oriented x 3  OP clear, dry MM  Neck supple  No LAD  Tachy  CTAB  +BS, soft  MASSEY  Normal affect  B LE 4+/5, notes numbness in hands/legs, no change from  otherwise    Results Reviewed:  LAB RESULTS:      Lab 24  0757 11/10/24  1628 11/10/24  1235 11/10/24  1007 11/10/24  0946   WBC 11.04*  --   --   --  15.59*   HEMOGLOBIN 10.5*  --   --   --  12.4   HEMATOCRIT 31.8*  --   --   --  37.0   PLATELETS 219  --   --   --  314   NEUTROS ABS  --   --   --   --  11.83*   IMMATURE GRANS (ABS)  --   --   --   --  0.07*   LYMPHS ABS  --   --   --   --  2.06   MONOS ABS  --   --   --   --  1.34*   EOS ABS  --   --   --   --  0.20   .7*  --   --   --  106.9*   SED RATE  --   --   --   --  105*   CRP  --   --   --  3.88*  --    PROCALCITONIN  --   --   --  0.15  --    LACTATE  --  1.2 2.2*  --  2.6*   PROTIME  --   --   --   --  15.2*   APTT  --   --   --   --  37.1*   HSTROP T  --   --   --  14*  --          Lab 24  0757 24  1255 24  0017 11/10/24  1007   SODIUM 138  --   --  138   POTASSIUM 4.0  --  4.6 3.3*   CHLORIDE 104  --   --  100   CO2 26.0  --   --  27.0   ANION GAP 8.0  --   --  11.0   BUN 7*  --   --  6*   CREATININE 0.62  --   --  0.55*   EGFR 100.8  --   --  103.8   GLUCOSE 85  --   --  100*   CALCIUM 8.4*  --   --  8.8   MAGNESIUM  --  1.7  --  1.7   PHOSPHORUS  --  3.4  --   --    TSH  --    --   --  9.670*         Lab 11/13/24  0757 11/10/24  1007   TOTAL PROTEIN 6.1 7.0   ALBUMIN 2.9* 2.8*   GLOBULIN 3.2 4.2   ALT (SGPT) 19 27   AST (SGOT) 80* 72*   BILIRUBIN 1.3* 1.7*   ALK PHOS 129* 165*         Lab 11/10/24  1007 11/10/24  0946   HSTROP T 14*  --    PROTIME  --  15.2*   INR  --  1.18*             Lab 11/10/24  1235   FOLATE 4.83   VITAMIN B 12 716         Brief Urine Lab Results  (Last result in the past 365 days)        Color   Clarity   Blood   Leuk Est   Nitrite   Protein   CREAT   Urine HCG        11/10/24 0958 Yellow   Clear   Negative   Negative   Negative   Negative                   Microbiology Results Abnormal       None            EMG & Nerve Conduction Test    Result Date: 11/12/2024  Table formatting from the original result was not included. Images from the original result were not included. Indication: Generalized weakness, numbness, peripheral neuropathy, Guillain-Barré syndrome Clinical: 64 y.o.female with a previous diagnosis of peripheral neuropathy.  She has had paresthesias affecting her arms and feet.  Roughly 2 weeks ago she had an illness, and sometime following this noted that she was feeling weak in her arms and legs.  She required a walker over the past week.  On examination her reflexes are absent at the knees and ankles and 1+ at the biceps bilaterally.  She has mildly diminished vibratory sensation bilaterally.  She has mild proximal weakness in both legs with some difficulty elevating each leg off the bed.  Her arm strength appears good.  Her speech is normal.  There is no ptosis.  Peripheral neuropathy and Guillain-Barré are clinical considerations.      NCS/EMG TECHNICAL DATA: All studies are performed at a skin temperature of 34°C or greater. Distal sensory latencies are calculated to waveform peak.  Sensory amplitudes are measured peak to peak Distal motor latencies are calculated to waveform onset.  Motor amplitudes are calculated baseline to peak Nerve  Conduction Studies Anti Sensory Summary Table  Stim Site NR Peak (ms) Norm Peak (ms) P-T Amp (µV) Norm P-T Amp Site1 Site2 Delta-P (ms) Dist (cm) Roosevelt (m/s) Norm Roosevelt (m/s) Left Sural Anti Sensory (Lat Mall) Calf    4.5 <4.0 14.4 >5.0 Calf Lat Mall 4.5 14.0 31 >40 Right Sural Anti Sensory (Lat Mall) Calf    6.3 <4.0 22.4 >5.0 Calf Lat Mall 6.3 14.0 22 >40 Right Ulnar Anti Sensory (5th Digit) Wrist    1.9 <3.7 25.5 >15.0 Wrist 5th Digit 1.9 0.0   B Elbow    5.0  22.2  B Elbow Wrist 3.1 24.0 77 >53 A Elbow    6.1  20.2  A Elbow B Elbow 1.1 10.0 91 >53 Ortho Sensory Summary Table  Stim Site NR Peak (ms) Norm Peak (ms) P-T Amp (µV) Norm P-T Amp Site1 Site2 Delta-P (ms) Dist (cm) Roosevelt (m/s) Norm Roosevelt (m/s) Right Median Ortho Sensory (Wrist) 2nd Digit    1.5  20.7  2nd Digit Wrist 1.5 8.0 53  Motor Summary Table  Stim Site NR Onset (ms) Norm Onset (ms) O-P Amp (mV) Norm O-P Amp Site1 Site2 Delta-0 (ms) Dist (cm) Roosevelt (m/s) Norm Roosevelt (m/s) Right Fibular Motor (Ext Dig Brev) Ankle    2.8 <6.1 2.3 >2.5 B Fib Ankle 6.8 34.0 50 >40 B Fib    9.6  1.9  Poplt B Fib 1.5 10.0 67 >40 Poplt    11.1  2.0        Right Median Motor (Abd Poll Brev) Wrist    3.2 <4.2 6.2 >5 Elbow Wrist 4.0 24.0 60 >50 Elbow    7.2  4.7        Right Tibial Motor (Abd Faye Brev) Ankle    4.7 <6.1 9.2 >3.0 Knee Ankle 8.0 43.0 54 >40 Knee    12.7  6.2        Right Ulnar Motor (Abd Dig Minimi) Wrist    2.0 <4.2 6.1 >3 B Elbow Wrist 3.2 24.0 75 >53 B Elbow    5.2  5.5  A Elbow B Elbow 1.1 10.0 91 >53 A Elbow    6.3  5.0        F Wave Studies  NR F-Lat (ms) Lat Norm (ms) L-R F-Lat (ms) L-R Lat Norm M-Lat (ms) FLat-MLat (ms) Right Fibular (Mrkrs) (EDB)    47.56 <60  <5.1 2.67 44.89 Right Median (Mrkrs) (Abd Poll Brev)    26.24 <33  <2.2 2.83 23.41 Right Tibial (Mrkrs) (Abd Hallucis)    53.40 <61  <5.7 4.00 49.40 Right Ulnar (Mrkrs) (Abd Dig Min)    25.88 <36  <2.5 2.00 23.88 H Reflex Studies  NR H-Lat (ms) L-R H-Lat (ms) L-R Lat Norm M-Lat (ms) HLat-MLat (ms) Left  Tibial (Mrkrs) (Gastroc)    35.99  <2.0 4.00 31.99 Right Tibial (Mrkrs) (Gastroc) NR   <2.0   EMG  Side Muscle Nerve Root Ins Act Fibs Psw Amp Dur Poly Recrt Int Pat Comment Right BrachioRad Radial C5-6 Nml Nml Nml Nml Nml 0 Nml Nml  Right Ext Digitorum Radial (Post Int) C7-8 Nml Nml Nml Nml Nml 0 Nml Nml  Right Biceps Musculocut C5-6 Nml Nml Nml Nml Nml 0 Nml Nml  Right Triceps Radial C6-7-8 Nml Nml Nml Nml Nml 0 Nml Nml  Right Deltoid Axillary C5-6 Nml Nml Nml Nml Nml 0 Nml Nml  Right AntTibialis Dp Br Fibular L4-5 Nml Nml Nml Nml Nml 0 Nml Nml  Right Fibularis Long Sup Br Fibular L5-S1 Nml Nml Nml Nml Nml 0 Nml Nml  Right PostTibialis Tibial L5, S1 Nml Nml Nml Nml Nml 0 Nml Nml  Right Gastroc Tibial S1-2 Nml Nml Nml Nml Nml 0 Nml Nml  Right VastusLat Femoral L2-4 Nml Nml Nml Nml Nml 0 Nml Nml  Right C5 Parasp Rami C5 Nml Nml Nml Nml Nml 0 Nml Nml  Right C6 Parasp Rami C6 Nml Nml Nml Nml Nml 0 Nml Nml  Right C7 Parasp Rami C7 Nml Nml Nml Nml Nml 0 Nml Nml  Right L4 Parasp Rami L4 Nml Nml Nml Nml Nml 0 Nml Nml  Right L5 Parasp Rami L5 Nml Nml Nml Nml Nml 0 Nml Nml  Right S1 Parasp Rami S1 Nml Nml Nml Nml Nml 0 Nml Nml  Waveforms:                        FINDINGS: Nerve Conduction Studies: Sural sensory latencies on the right and left are prolonged at 6.3 ms and 4.5 ms respectively Study of the right peroneal motor nerve and right posterior tibial motor nerve are normal.  Waveform morphology is normal and no temporal dispersion is seen Motor F wave latencies of the right peroneal motor and right posterior tibial motor nerve are normal H-reflexes are normal on the left and not clearly seen on the right (likely technical) Median sensory latency on the right is normal at 1.5 ms Study the right median motor nerve is normal Right ulnar antidromic sensory study is normal Right ulnar motor study is normal Right ulnar and median motor F wave latencies are normal Electromyogram: Needle examination of multiple muscles in the  right leg and lumbar paraspinous muscles are normal Needle examination of the right arm and cervical paraspinous muscles are normal     Impression: Sensory neuropathy, mild No electrophysiologic evidence for demyelinating neuropathy is seen This report is transcribed using the Dragon dictation system.     MRI Thoracic Spine Without Contrast    Result Date: 11/12/2024  MRI CERVICAL SPINE WO CONTRAST, MRI THORACIC SPINE WO CONTRAST Date of Exam: 11/11/2024 10:34 PM EST Indication: Radiculopathy.  Comparison: None available. Technique:  Routine multiplanar/multisequence sequence images of the cervical and thoracic spine were obtained without contrast administration.  Findings: MRI CERVICAL SPINE ALIGNMENT: There is 1-2 mm of anterior listhesis of C4 on C5. Alignment is otherwise normal. DISK SPACE: Mild to moderate intervertebral disc space narrowing and endplate osteophyte formation in the lower cervical spine. VERTEBRA: No fracture or destructive process. There is active right-sided facet arthropathy at C4/5 with edema in the facets. No marrow edema elsewhere. There is a benign hemangioma within the C5 vertebral body. Overall, there is relatively low signal intensity throughout the marrow suggestive of fatty replacement. Correlate with CBC. Red marrow reconversion related to anemia would be most common etiology although other marrow replacement processes are considerations. CORD: Normal anatomy and signal intensity. CRANIOVETEBRAL JUNCTION: Normal SOFT TISSUES: No mass nor lymphadenopathy. LEVELS: C2-C3: The posterior disc is unremarkable.  No significant facet arthropathy. No significant neural foraminal or spinal canal stenosis. C3-C4: The posterior disc is unremarkable. The uncovertebral joints appear within normal limits. No significant facet arthropathy. No significant neural foraminal or spinal canal stenosis. C4-C5: Broad-based posterior disc osteophyte complex. AP diameter of the central canal measures 10 mm.  Mild uncovertebral hypertrophy. There is moderate right-sided active facet arthropathy. There is mild to moderate neuroforaminal stenosis, right greater than left. Inflammatory changes related to facet arthropathy may influence the exiting right C5 nerve. Correlate with symptoms. C5-C6: Broad-based posterior disc osteophyte complex. AP diameter of the central canal measures 12 mm. There is mild to moderate left uncovertebral hypertrophy. There is mild to moderate left neuroforaminal stenosis. C6-C7: Broad-based posterior disc osteophyte complex. AP diameter of the central canal measures 12 mm. There is mild uncovertebral hypertrophy and neuroforaminal stenosis. C7-T1: The posterior disc is unremarkable. The uncovertebral joints appear within normal limits. No significant facet arthropathy. No significant neural foraminal or spinal canal stenosis.     Impression: Impression: 1.Moderate active right-sided facet arthropathy at C4/5 which may influence the exiting right C5 nerve. Correlate with symptoms. 2.Relatively mild multifactorial degenerative change of the mid to lower cervical spine otherwise. 3.Diffuse low signal intensity marrow may represent marrow replacement or infiltrative process. Correlate with CBC. MR THORACIC SPINE ALIGNMENT: Normal DISK SPACE: Mild intervertebral disc space narrowing and endplate osteophyte formation of the midthoracic spine most pronounced at T6/7. VERTEBRA: No fracture or destructive process. There is benign hemangioma within the T5 vertebral body. No marrow edema.  No significant active nor fatty endplate change. There is diffuse low signal intensity throughout the marrow suggesting marrow replacement or infiltrative process. Correlate with CBC. CORD: Normal anatomy and signal intensity. SOFT TISSUES: No mass nor lymphadenopathy. LEVELS:  Level by level evaluation demonstrates no focal disc protrusion or extrusion. There is no significant neural foraminal or spinal canal narrowing.  Impression: 4.Minimal discogenic change of the midthoracic spine. No acute process identified. 5.Low signal intensity marrow throughout suggestive of marrow replacement or infiltrative process. Correlate with CBC. Electronically Signed: Rj Tomas MD  11/12/2024 9:39 AM EST  Workstation ID: [id number]    MRI Cervical Spine Without Contrast    Result Date: 11/12/2024  MRI CERVICAL SPINE WO CONTRAST, MRI THORACIC SPINE WO CONTRAST Date of Exam: 11/11/2024 10:34 PM EST Indication: Radiculopathy.  Comparison: None available. Technique:  Routine multiplanar/multisequence sequence images of the cervical and thoracic spine were obtained without contrast administration.  Findings: MRI CERVICAL SPINE ALIGNMENT: There is 1-2 mm of anterior listhesis of C4 on C5. Alignment is otherwise normal. DISK SPACE: Mild to moderate intervertebral disc space narrowing and endplate osteophyte formation in the lower cervical spine. VERTEBRA: No fracture or destructive process. There is active right-sided facet arthropathy at C4/5 with edema in the facets. No marrow edema elsewhere. There is a benign hemangioma within the C5 vertebral body. Overall, there is relatively low signal intensity throughout the marrow suggestive of fatty replacement. Correlate with CBC. Red marrow reconversion related to anemia would be most common etiology although other marrow replacement processes are considerations. CORD: Normal anatomy and signal intensity. CRANIOVETEBRAL JUNCTION: Normal SOFT TISSUES: No mass nor lymphadenopathy. LEVELS: C2-C3: The posterior disc is unremarkable.  No significant facet arthropathy. No significant neural foraminal or spinal canal stenosis. C3-C4: The posterior disc is unremarkable. The uncovertebral joints appear within normal limits. No significant facet arthropathy. No significant neural foraminal or spinal canal stenosis. C4-C5: Broad-based posterior disc osteophyte complex. AP diameter of the central canal measures 10 mm.  Impression: 4.Minimal discogenic change of the midthoracic spine. No acute process identified. 5.Low signal intensity marrow throughout suggestive of marrow replacement or infiltrative process. Correlate with CBC. Electronically Signed: Rj Tomas MD  11/12/2024 9:39 AM EST  Workstation ID: [id number]     Results for orders placed during the hospital encounter of 11/10/24    Adult Transthoracic Echo Complete W/ Cont if Necessary Per Protocol    Interpretation Summary  •  Left ventricular systolic function is normal. Estimated left ventricular EF = 65%  •  The cardiac valves are anatomically and functionally normal.      Current medications:  Scheduled Meds:carvedilol, 12.5 mg, Oral, BID With Meals  desvenlafaxine, 50 mg, Oral, Daily  folic acid, 1 mg, Oral, Daily  gabapentin, 100 mg, Oral, TID  latanoprost, 1 drop, Both Eyes, Nightly  melatonin, 5 mg, Oral, Nightly  midodrine, 10 mg, Oral, TID AC  pantoprazole, 40 mg, Oral, Daily  rosuvastatin, 5 mg, Oral, Daily  sodium chloride, 10 mL, Intravenous, Q12H  thiamine, 100 mg, Oral, Daily  ursodiol, 300 mg, Oral, BID  zolpidem, 5 mg, Oral, Nightly      Continuous Infusions:   PRN Meds:.•  acetaminophen **OR** acetaminophen **OR** acetaminophen  •  senna-docusate sodium **AND** polyethylene glycol **AND** bisacodyl **AND** bisacodyl  •  calcium carbonate  •  Calcium Replacement - Follow Nurse / BPA Driven Protocol  •  ipratropium-albuterol  •  LORazepam  •  Magnesium Standard Dose Replacement - Follow Nurse / BPA Driven Protocol  •  metoprolol tartrate  •  nitroglycerin  •  ondansetron  •  Phosphorus Replacement - Follow Nurse / BPA Driven Protocol  •  Potassium Replacement - Follow Nurse / BPA Driven Protocol  •  sodium chloride  •  sodium chloride  •  sodium chloride    Assessment & Plan   Assessment & Plan     Active Hospital Problems    Diagnosis  POA   • **Lower extremity weakness [R29.898]  Yes      Resolved Hospital Problems   No resolved problems to display.         Brief Hospital Course to date:  Philomena Davis is a 64 y.o. female with history of alcohol dependence, cirrhosis, HTN, PN here with B UE/LE paraesthesias    B weakness  B paraesthesias/facial numbness  -MRI brain unremarkable  -MRI lumbar spine with L5-S1 disc protrusion  -MRI C/T spine reviewed, with C4/5 facet arthropathy  -EMG/NCV mild sensory neuropathy  -Continue thiamine, neurology following  -PT/OT    Marked orthostasis  Dysautonomia  -add midodrine  -mobilize/PT/OT  -AM cortisol    Possible vasculitis  CT with celiac artery stranding  -markedly elevated CRP/ESR  -WILMA/ANCA panel, sent, pending  -RF negative    Atrial tachycardia, atrial flutter  -presented with tachycardia, HR to 150  -on BB  -ECHO unremarkable, EF normal  -bursts of tachycardia here, evaluated by cardiology  -continue carvedilol, dosing may be limited by hypotension/orthostasis  -cardiology deferring AC due to cirrhosis for now    Alcohol dependence  -monitor    Cirrhosis  Hepatocellular jaundice  -monitor    Mild LA  -due to liver disease    Subclinical hypothyroidism  -needs follow up in 6 weeks    Expected Discharge Location and Transportation: Home  Expected Discharge   Expected Discharge Date: 11/15/2024; Expected Discharge Time:      VTE Prophylaxis:  Mechanical VTE prophylaxis orders are present.         AM-PAC 6 Clicks Score (PT): 17 (11/11/24 1012)    CODE STATUS:   Code Status and Medical Interventions: CPR (Attempt to Resuscitate); Full Support   Ordered at: 11/10/24 1537     Level Of Support Discussed With:    Patient    Next of Kin (If No Surrogate)     Code Status (Patient has no pulse and is not breathing):    CPR (Attempt to Resuscitate)     Medical Interventions (Patient has pulse or is breathing):    Full Support       Jose J Hutchins MD  11/13/24

## 2024-11-13 NOTE — PROGRESS NOTES
[facility]    Progress Note    Patient Name: Philomena Davis  : 1960  MRN: [id number]  Primary Care Physician:  Unique Brandt DO  Date of admission: 11/10/2024    Subjective     Chief Complaint: Bilateral leg weakness    History of Present Illness   Patient seen resting comfortably in bed.  Did state that after the initiation of gabapentin that her burning sensation in her legs is better.  She does states she feels slightly lethargic but at this moment is able to tolerate it.  She continues with hypotension upon standing.    Review of Systems   General: Negative for fever, nausea, or vomiting.   Neurological: Negative for headache, pain, or weakness.     Objective     Physical Exam  Vitals and nursing note reviewed.   Constitutional:       General: She is not in acute distress.     Appearance: She is not ill-appearing.   Eyes:      Extraocular Movements: Extraocular movements intact.      Pupils: Pupils are equal, round, and reactive to light.      Comments: No nystagmus or deviated gaze noted   Neurological:      Mental Status: She is alert and oriented to person, place, and time.      Cranial Nerves: Cranial nerves 2-12 are intact.      Sensory: Sensory deficit present.      Motor: Weakness present. No tremor or seizure activity.      Deep Tendon Reflexes:      Reflex Scores:       Bicep reflexes are 1+ on the right side and 1+ on the left side.       Patellar reflexes are 0 on the right side and 0 on the left side.     Comments:     Cranial Nerves   CN II: Pupils are equal, round, and reactive to light. Normal visual acuity and visual fields.    CN III IV VI: Extraocular movements are full without nystagmus.  CN V: Normal facial sensation and strength of muscles of mastication.  CN VII: Facial movements are symmetric. No weakness.  CN VIII:  Auditory acuity is normal.  CN IX & X:  Symmetric palatal movement.  CN XI: Sternocleidomastoid and trapezius are normal.  No weakness.  CN XII:  The tongue is midline.  No atrophy or fasciculations.                Vitals:   Temp:  [97.6 °F (36.4 °C)-98.1 °F (36.7 °C)] 98.1 °F (36.7 °C)  Heart Rate:  [] 94  Resp:  [16-18] 16  BP: ()/(54-70) 112/65  Flow (L/min) (Oxygen Therapy):  [2] 2    Current Medications    Current Facility-Administered Medications:   •  acetaminophen (TYLENOL) tablet 650 mg, 650 mg, Oral, Q4H PRN **OR** acetaminophen (TYLENOL) 160 MG/5ML oral solution 650 mg, 650 mg, Oral, Q4H PRN **OR** acetaminophen (TYLENOL) suppository 500 mg, 500 mg, Rectal, Q4H PRN, Sophie Gandhi MD  •  sennosides-docusate (PERICOLACE) 8.6-50 MG per tablet 2 tablet, 2 tablet, Oral, BID PRN **AND** polyethylene glycol (MIRALAX) packet 17 g, 17 g, Oral, Daily PRN **AND** bisacodyl (DULCOLAX) EC tablet 5 mg, 5 mg, Oral, Daily PRN **AND** bisacodyl (DULCOLAX) suppository 10 mg, 10 mg, Rectal, Daily PRN, Sophie Gandhi MD  •  calcium carbonate (TUMS) chewable tablet 500 mg (200 mg elemental), 2 tablet, Oral, BID PRN, Sophie Gandhi MD  •  Calcium Replacement - Follow Nurse / BPA Driven Protocol, , Not Applicable, PRN, Sophie Gnadhi MD  •  carvedilol (COREG) tablet 25 mg, 25 mg, Oral, BID With Meals, Jose J Hutchins MD  •  desvenlafaxine (PRISTIQ) 24 hr tablet 50 mg, 50 mg, Oral, Daily, Sophie Gandhi MD, 50 mg at 11/12/24 0836  •  folic acid (FOLVITE) tablet 1 mg, 1 mg, Oral, Daily, Sophie Gandhi MD, 1 mg at 11/12/24 0836  •  gabapentin (NEURONTIN) capsule 100 mg, 100 mg, Oral, TID, Uday Sykes DO, 100 mg at 11/12/24 2236  •  ipratropium-albuterol (DUO-NEB) nebulizer solution 3 mL, 3 mL, Nebulization, Q6H PRN, Sophie Gandhi MD  •  latanoprost (XALATAN) 0.005 % ophthalmic solution 1 drop, 1 drop, Both Eyes, Nightly, Sophie Gandhi MD, 1 drop at 11/12/24 2054  •  LORazepam (ATIVAN) tablet 0.5 mg, 0.5 mg, Oral, Q8H PRN, Sophie Gandhi MD  •  Magnesium Standard Dose Replacement -  Follow Nurse / BPA Driven Protocol, , Not Applicable, PRN, Sophie Gandhi MD  •  melatonin tablet 5 mg, 5 mg, Oral, Nightly, Sophie Gandhi MD, 5 mg at 11/12/24 2054  •  metoprolol tartrate (LOPRESSOR) injection 5 mg, 5 mg, Intravenous, Q6H PRN, Cosmo Hess MD  •  nitroglycerin (NITROSTAT) SL tablet 0.4 mg, 0.4 mg, Sublingual, Q5 Min PRN, Sophie Gandhi MD  •  ondansetron (ZOFRAN) injection 4 mg, 4 mg, Intravenous, Q6H PRN, Sophie Gandhi MD  •  pantoprazole (PROTONIX) EC tablet 40 mg, 40 mg, Oral, Daily, Sophie Gandhi MD, 40 mg at 11/13/24 0657  •  Phosphorus Replacement - Follow Nurse / BPA Driven Protocol, , Not Applicable, PRN, Sophie Gandhi MD  •  Potassium Replacement - Follow Nurse / BPA Driven Protocol, , Not Applicable, PRN, Sophie Gandhi MD  •  rosuvastatin (CRESTOR) tablet 5 mg, 5 mg, Oral, Daily, Sophie Gandhi MD, 5 mg at 11/12/24 2054  •  sodium chloride 0.9 % flush 10 mL, 10 mL, Intravenous, PRN, Zac Bruner MD  •  sodium chloride 0.9 % flush 10 mL, 10 mL, Intravenous, Q12H, Sophie Gandhi MD, 10 mL at 11/12/24 0837  •  sodium chloride 0.9 % flush 10 mL, 10 mL, Intravenous, PRN, Sophie Gandhi MD  •  sodium chloride 0.9 % infusion 40 mL, 40 mL, Intravenous, PRN, Sophie Gandhi MD  •  thiamine (VITAMIN B-1) tablet 100 mg, 100 mg, Oral, Daily, Sophie Gandhi MD, 100 mg at 11/12/24 0836  •  ursodiol (ACTIGALL) capsule 300 mg, 300 mg, Oral, BID, Sophie Gandhi MD, 300 mg at 11/12/24 2053  •  zolpidem (AMBIEN) tablet 5 mg, 5 mg, Oral, Nightly, Sophie Gandhi MD, 5 mg at 11/12/24 2053    Laboratory Results:   Lab Results   Component Value Date    GLUCOSE 100 (H) 11/10/2024    CALCIUM 8.8 11/10/2024     11/10/2024    K 4.6 11/11/2024    CO2 27.0 11/10/2024     11/10/2024    BUN 6 (L) 11/10/2024    CREATININE 0.55 (L) 11/10/2024    EGFRIFNONA 114 05/15/2019    BCR 10.9  11/10/2024    ANIONGAP 11.0 11/10/2024     Lab Results   Component Value Date    WBC 15.59 (H) 11/10/2024    HGB 12.4 11/10/2024    HCT 37.0 11/10/2024    .9 (H) 11/10/2024     11/10/2024     Lab Results   Component Value Date    CHOL 376 (H) 10/25/2023    CHOL 430 (H) 09/12/2023    CHOL 214 (H) 05/14/2019     Lab Results   Component Value Date     (H) 10/25/2023     (H) 09/12/2023    HDL 62 (H) 05/14/2019     Lab Results   Component Value Date     (H) 10/25/2023     (H) 09/12/2023     (H) 05/14/2019     Lab Results   Component Value Date    TRIG 128 10/25/2023    TRIG 159 (H) 09/12/2023    TRIG 172 (H) 05/14/2019     Lab Results   Component Value Date    HGBA1C 5.60 09/12/2023     Lab Results   Component Value Date    INR 1.18 (H) 11/10/2024    INR 1.10 01/31/2024    INR 1.0 08/09/2023    PROTIME 15.2 (H) 11/10/2024    PROTIME 14.3 01/31/2024    PROTIME 14.9 (H) 11/30/2022     Lab Results   Component Value Date    FOLATE 4.83 11/10/2024     Lab Results   Component Value Date    [id number] 716 11/10/2024       MRI Brain Without Contrast    Result Date: 11/10/2024  MRI BRAIN WO CONTRAST Date of Exam: 11/10/2024 1:27 PM EST Indication: BLE weakness, facial parasthesia R>L, difficulty ambulating.  Comparison: None available. Technique:  Routine multiplanar/multisequence sequence images of the brain were obtained without contrast administration. Findings: No acute infarct is present on diffusion weighted sequences. Midline structures appear normal and the craniocervical junction is satisfactory. Gray-white differentiation is maintained and there is no evidence of intracranial hemorrhage, mass or mass effect. The ventricles are normal in size and configuration. The orbits are normal. The paranasal sinuses are clear. Intracranial arterial flow voids appear maintained.     Impression: Impression: Normal noncontrast MRI of the brain. Electronically Signed: Adriano Kessler MD   11/10/2024 2:14 PM [facility]  Workstation ID: [id number]        Assessment / Plan     Active Hospital Problems:    Lower extremity weakness       Brief Patient Summary:  Philomena Davis is a 64 y.o. female who has a past medical history of EtOH use, alcoholic liver cirrhosis, HTN, peripheral neuropathy, anxiety and depression who presented to [facility] with complaint of worsening bilateral lower extremity weakness, right and left facial paresthesias for the past week.  Patient was originally seen by our stroke navigators with a negative workup.     Plan:   Bilateral lower extremity weakness  EtOH use  Peripheral neuropathy R/T EtOH use  Dysautonomia  MRI brain with no acute findings  MRI lumbar showed small disc protrusion at the L5-S1 with no significant spinal canal or neuroforaminal stenosis.  MRI of cervical cervical spine showed moderate active right sided facet arthropathy at C4/5.   MRI thoracic spine benign hemangioma at T5.  No acute process noted.   EMG/nerve conduction study showed a mild sensory neuropathy.  No evidence of demyelinating neuropathy seen.  Vitamin B12 716  Folate 4.83, low normal.  Folic acid 1 mg daily   Continue thiamine 100 mg daily  Continue gabapentin 100 mg 3 times daily    Spoke with patient about nonmedical management of dysautonomia including increased salt intake within cardiology parameters, increase water intake, 6 small's meal daily, slow position changes, REX hose or stockings, regular blood pressure checks.  Orthostatic blood pressures daily  Cardiology now following for atrial tachycardia  General Neurology will continue to follow    I have discussed the above with the patient, bedside RN and Dr. Hutchins  Time spent with patient: 50 minutes in face-to-face evaluation and management of the patient.    Copied text in this note has been reviewed and is accurate as of 11/13/24.     CANDY Ocampo

## 2024-11-13 NOTE — PLAN OF CARE
Goal Outcome Evaluation:  Plan of Care Reviewed With: patient        Progress: improving  Outcome Evaluation: Pt demonstrated increased activity tolerance this date however continues to fatigue quickly resulting in trunk/hip instability. Pt continues to demonstrate slight hypotension (94/66) in standing however BP increased to 109/61 w/ minimal activity. Pt would benefit from continued skilled therapy while hospitalized to maximize functional independence. D/c rec remains IRF for best outcome.    Anticipated Discharge Disposition (PT): inpatient rehabilitation facility

## 2024-11-14 LAB
ALBUMIN SERPL-MCNC: 2.8 G/DL (ref 3.5–5.2)
ALBUMIN/GLOB SERPL: 0.8 G/DL
ALP SERPL-CCNC: 126 U/L (ref 39–117)
ALT SERPL W P-5'-P-CCNC: 17 U/L (ref 1–33)
ANION GAP SERPL CALCULATED.3IONS-SCNC: 10 MMOL/L (ref 5–15)
AST SERPL-CCNC: 66 U/L (ref 1–32)
BILIRUB SERPL-MCNC: 1.2 MG/DL (ref 0–1.2)
BUN SERPL-MCNC: 6 MG/DL (ref 8–23)
BUN/CREAT SERPL: 10.3 (ref 7–25)
CALCIUM SPEC-SCNC: 8.4 MG/DL (ref 8.6–10.5)
CHLORIDE SERPL-SCNC: 103 MMOL/L (ref 98–107)
CO2 SERPL-SCNC: 24 MMOL/L (ref 22–29)
CORTIS SERPL-MCNC: 4.65 MCG/DL
CREAT SERPL-MCNC: 0.58 MG/DL (ref 0.57–1)
DEPRECATED RDW RBC AUTO: 55.7 FL (ref 37–54)
EGFRCR SERPLBLD CKD-EPI 2021: 102.5 ML/MIN/1.73
ERYTHROCYTE [DISTWIDTH] IN BLOOD BY AUTOMATED COUNT: 13.6 % (ref 12.3–15.4)
GLOBULIN UR ELPH-MCNC: 3.7 GM/DL
GLUCOSE SERPL-MCNC: 84 MG/DL (ref 65–99)
HCT VFR BLD AUTO: 31.8 % (ref 34–46.6)
HGB BLD-MCNC: 10.6 G/DL (ref 12–15.9)
MCH RBC QN AUTO: 36.6 PG (ref 26.6–33)
MCHC RBC AUTO-ENTMCNC: 33.3 G/DL (ref 31.5–35.7)
MCV RBC AUTO: 109.7 FL (ref 79–97)
PLATELET # BLD AUTO: 224 10*3/MM3 (ref 140–450)
PMV BLD AUTO: 9.9 FL (ref 6–12)
POTASSIUM SERPL-SCNC: 3.6 MMOL/L (ref 3.5–5.2)
POTASSIUM SERPL-SCNC: 4.9 MMOL/L (ref 3.5–5.2)
PROT SERPL-MCNC: 6.5 G/DL (ref 6–8.5)
RBC # BLD AUTO: 2.9 10*6/MM3 (ref 3.77–5.28)
SODIUM SERPL-SCNC: 137 MMOL/L (ref 136–145)
WBC NRBC COR # BLD AUTO: 9.99 10*3/MM3 (ref 3.4–10.8)

## 2024-11-14 PROCEDURE — 99232 SBSQ HOSP IP/OBS MODERATE 35: CPT | Performed by: HOSPITALIST

## 2024-11-14 PROCEDURE — 94664 DEMO&/EVAL PT USE INHALER: CPT

## 2024-11-14 PROCEDURE — 84132 ASSAY OF SERUM POTASSIUM: CPT | Performed by: HOSPITALIST

## 2024-11-14 PROCEDURE — 97530 THERAPEUTIC ACTIVITIES: CPT

## 2024-11-14 PROCEDURE — 82533 TOTAL CORTISOL: CPT | Performed by: HOSPITALIST

## 2024-11-14 PROCEDURE — 94799 UNLISTED PULMONARY SVC/PX: CPT

## 2024-11-14 PROCEDURE — 85027 COMPLETE CBC AUTOMATED: CPT | Performed by: HOSPITALIST

## 2024-11-14 PROCEDURE — 80053 COMPREHEN METABOLIC PANEL: CPT | Performed by: HOSPITALIST

## 2024-11-14 PROCEDURE — 99232 SBSQ HOSP IP/OBS MODERATE 35: CPT

## 2024-11-14 PROCEDURE — 94640 AIRWAY INHALATION TREATMENT: CPT

## 2024-11-14 PROCEDURE — 99233 SBSQ HOSP IP/OBS HIGH 50: CPT

## 2024-11-14 RX ORDER — POTASSIUM CHLORIDE 1500 MG/1
40 TABLET, EXTENDED RELEASE ORAL EVERY 4 HOURS
Status: COMPLETED | OUTPATIENT
Start: 2024-11-14 | End: 2024-11-14

## 2024-11-14 RX ORDER — COSYNTROPIN 0.25 MG/ML
0.25 INJECTION, POWDER, FOR SOLUTION INTRAMUSCULAR; INTRAVENOUS ONCE
Status: COMPLETED | OUTPATIENT
Start: 2024-11-15 | End: 2024-11-15

## 2024-11-14 RX ORDER — BUDESONIDE AND FORMOTEROL FUMARATE DIHYDRATE 160; 4.5 UG/1; UG/1
2 AEROSOL RESPIRATORY (INHALATION)
Status: DISCONTINUED | OUTPATIENT
Start: 2024-11-14 | End: 2024-11-23 | Stop reason: HOSPADM

## 2024-11-14 RX ORDER — GABAPENTIN 100 MG/1
200 CAPSULE ORAL EVERY 12 HOURS SCHEDULED
Status: DISCONTINUED | OUTPATIENT
Start: 2024-11-14 | End: 2024-11-23 | Stop reason: HOSPADM

## 2024-11-14 RX ORDER — GABAPENTIN 100 MG/1
200 CAPSULE ORAL 3 TIMES DAILY
Status: DISCONTINUED | OUTPATIENT
Start: 2024-11-14 | End: 2024-11-14

## 2024-11-14 RX ADMIN — URSODIOL 300 MG: 300 CAPSULE ORAL at 10:08

## 2024-11-14 RX ADMIN — MIDODRINE HYDROCHLORIDE 10 MG: 10 TABLET ORAL at 10:08

## 2024-11-14 RX ADMIN — THIAMINE HCL TAB 100 MG 100 MG: 100 TAB at 10:08

## 2024-11-14 RX ADMIN — DESVENLAFAXINE 50 MG: 50 TABLET, FILM COATED, EXTENDED RELEASE ORAL at 10:08

## 2024-11-14 RX ADMIN — Medication 10 ML: at 09:58

## 2024-11-14 RX ADMIN — NYSTATIN 1 APPLICATION: 100000 CREAM TOPICAL at 10:03

## 2024-11-14 RX ADMIN — GABAPENTIN 100 MG: 100 CAPSULE ORAL at 10:08

## 2024-11-14 RX ADMIN — URSODIOL 300 MG: 300 CAPSULE ORAL at 21:00

## 2024-11-14 RX ADMIN — LATANOPROST 1 DROP: 50 SOLUTION OPHTHALMIC at 21:00

## 2024-11-14 RX ADMIN — FOLIC ACID 1 MG: 1 TABLET ORAL at 10:08

## 2024-11-14 RX ADMIN — Medication 5 MG: at 21:00

## 2024-11-14 RX ADMIN — MIDODRINE HYDROCHLORIDE 10 MG: 10 TABLET ORAL at 12:53

## 2024-11-14 RX ADMIN — NYSTATIN 1 APPLICATION: 100000 CREAM TOPICAL at 21:00

## 2024-11-14 RX ADMIN — BUDESONIDE AND FORMOTEROL FUMARATE DIHYDRATE 2 PUFF: 160; 4.5 AEROSOL RESPIRATORY (INHALATION) at 19:36

## 2024-11-14 RX ADMIN — POTASSIUM CHLORIDE 40 MEQ: 1500 TABLET, EXTENDED RELEASE ORAL at 10:07

## 2024-11-14 RX ADMIN — PANTOPRAZOLE SODIUM 40 MG: 40 TABLET, DELAYED RELEASE ORAL at 06:59

## 2024-11-14 RX ADMIN — ZOLPIDEM TARTRATE 5 MG: 5 TABLET ORAL at 21:00

## 2024-11-14 RX ADMIN — POTASSIUM CHLORIDE 40 MEQ: 1500 TABLET, EXTENDED RELEASE ORAL at 14:20

## 2024-11-14 RX ADMIN — ROSUVASTATIN 5 MG: 10 TABLET, FILM COATED ORAL at 21:00

## 2024-11-14 RX ADMIN — CARVEDILOL 12.5 MG: 12.5 TABLET, FILM COATED ORAL at 18:19

## 2024-11-14 RX ADMIN — IPRATROPIUM BROMIDE AND ALBUTEROL SULFATE 3 ML: 2.5; .5 SOLUTION RESPIRATORY (INHALATION) at 10:20

## 2024-11-14 RX ADMIN — Medication 10 ML: at 21:01

## 2024-11-14 RX ADMIN — BUDESONIDE AND FORMOTEROL FUMARATE DIHYDRATE 2 PUFF: 160; 4.5 AEROSOL RESPIRATORY (INHALATION) at 15:55

## 2024-11-14 RX ADMIN — MIDODRINE HYDROCHLORIDE 10 MG: 10 TABLET ORAL at 16:46

## 2024-11-14 RX ADMIN — GABAPENTIN 200 MG: 100 CAPSULE ORAL at 16:46

## 2024-11-14 RX ADMIN — CARVEDILOL 12.5 MG: 12.5 TABLET, FILM COATED ORAL at 10:08

## 2024-11-14 NOTE — THERAPY EVALUATION
Patient Name: Philomena Davis  : 1960    MRN: [id number]                              Today's Date: 2024       Admit Date: 11/10/2024    Visit Dx:     ICD-10-CM ICD-9-CM   1. Weakness of both lower extremities  R29.898 729.89   2. Paresthesia  R20.2 782.0   3. Weakness  R53.1 780.79   4. Leukocytosis, unspecified type  D72.829 288.60   5. Alcoholic cirrhosis of liver without ascites  K70.30 571.2   6. Orthostasis  I95.1 458.0   7. Tachycardia  R00.0 785.0     Patient Active Problem List   Diagnosis    Essential hypertension    Generalized anxiety disorder    Hyperlipidemia    Glaucoma    Alcoholic cirrhosis of liver without ascites    Severe malnutrition    GERD without esophagitis    Sleep disturbance    Annual physical exam    Idiopathic peripheral neuropathy    Severe anxiety with panic    Alcohol use disorder    Cigarette nicotine dependence in remission    Chronic obstructive pulmonary disease    Closed fracture of left hand    Moderate malnutrition    Lower extremity weakness    Orthostasis     Past Medical History:   Diagnosis Date    Alcohol withdrawal 05/10/2019    Annual physical exam 2023    Anxiety and depression     Cirrhosis of liver     Closed displaced fracture of lateral malleolus of left fibula     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    Closed trimalleolar fracture of right ankle 2023    Digital mucous cyst of toe of left foot 2017    Ganglion cyst of left foot     Glaucoma     Hypertension     Wears glasses      Past Surgical History:   Procedure Laterality Date    ANKLE OPEN REDUCTION INTERNAL FIXATION Left 2017    Procedure: LEFT ANKLE OPEN REDUCTION INTERNAL FIXATION WITH ILIAC CREST BONE GRAFT , EXCISION CYST 4TH/5TH INNERSPACE LEFT FOOT;  Surgeon: Frank Larson MD;  Location: [facility];  Service:     ANKLE OPEN REDUCTION INTERNAL FIXATION Right 2023    Procedure: ANKLE OPEN REDUCTION INTERNAL  FIXATION;  Surgeon: Deandre Reynoso MD;  Location:  [facility];  Service: Orthopedics;  Laterality: Right;    AUGMENTATION MAMMAPLASTY Bilateral 1999    BREAST AUGMENTATION      BREAST EXCISIONAL BIOPSY Right 2014    DILATION AND CURETTAGE, DIAGNOSTIC / THERAPEUTIC      ENDOSCOPY N/A 11/30/2022    Procedure: ESOPHAGOGASTRODUODENOSCOPY;  Surgeon: Arelis Solano MD;  Location:  [facility];  Service: Gastroenterology;  Laterality: N/A;    ENDOSCOPY N/A 2/1/2024    Procedure: ESOPHAGOGASTRODUODENOSCOPY;  Surgeon: Brunner, Mark I, MD;  Location:  [facility];  Service: Gastroenterology;  Laterality: N/A;    OH RPR NON/MAL FEMUR DSTL H/N W/ILIAC/AUTOG BONE Left 03/24/2017    Procedure: ILIAC CREST BONE GRAFT;  Surgeon: Frank Larson MD;  Location:  [facility];  Service: Orthopedics    WRIST SURGERY        General Information       Row Name 11/14/24 0933          OT Time and Intention    Document Type therapy note (daily note)  -CS     Mode of Treatment occupational therapy  -CS     Patient Effort good  -CS     Symptoms Noted During/After Treatment significant change in vital signs  -CS     Comment remains orthostatic  -CS       Row Name 11/14/24 0933          General Information    Patient Profile Reviewed yes  -CS     Existing Precautions/Restrictions fall;other (see comments);orthostatic hypotension  painful neuropathy in B feet, paresthesias present in abdominal area - prefers gait belt placement lower below breast, MONITOR BP  -CS     Barriers to Rehab medically complex;previous functional deficit;cognitive status  -CS       Row Name 11/14/24 0933          Cognition    Orientation Status (Cognition) oriented x 4  -CS       Row Name 11/14/24 0933          Safety Issues/Impairments Affecting Functional Mobility    Safety Issues Affecting Function (Mobility) insight into deficits/self-awareness;safety precaution awareness;safety precautions follow-through/compliance  -CS     Impairments Affecting Function  (Mobility) balance;coordination;endurance/activity tolerance;strength;pain;sensation/sensory awareness;postural/trunk control  -CS               User Key  (r) = Recorded By, (t) = Taken By, (c) = Cosigned By      Initials Name Provider Type    CS Ion Roberts OT Occupational Therapist                     Mobility/ADL's       Row Name 11/14/24 0934          Bed Mobility    Bed Mobility sit-supine  -CS     Scooting/Bridging [place] (Bed Mobility) minimum assist (75% patient effort);nonverbal cues (demo/gesture);verbal cues  -CS     Supine-Sit [place] (Bed Mobility) minimum assist (75% patient effort);1 person assist;verbal cues;nonverbal cues (demo/gesture)  -CS     Assistive Device (Bed Mobility) bed rails;repositioning sheet  -CS     Comment, (Bed Mobility) assist for BLEs  -CS       Row Name 11/14/24 0934          Transfers    Transfers sit-stand transfer;stand-sit transfer  -CS     Comment, (Transfers) tolerated multiple STS w/ initial BP of 83/53 and folflowing sustained standing and MIP activities was 74/57  -       Row Name 11/14/24 0934          Sit-Stand Transfer    Sit-Stand [place] (Transfers) minimum assist (75% patient effort);2 person assist  -CS     Assistive Device (Sit-Stand Transfers) other (see comments)  -CS     Comment, (Sit-Stand Transfer) BUE support, truncal instability  -CS       Row Name 11/14/24 0934          Stand-Sit Transfer    Stand-Sit [place] (Transfers) minimum assist (75% patient effort);2 person assist;verbal cues  -CS       Row Name 11/14/24 0934          Functional Mobility    Patient was able to Ambulate no, other medical factors prevent ambulation  -CS     Reason Patient was unable to Ambulate Hypotension  -CS       Row Name 11/14/24 0934          Activities of Daily Living    BADL Assessment/Intervention lower body dressing;feeding;grooming  -CS       Row Name 11/14/24 0934          Upper Body Dressing Assessment/Training    [place] Level (Upper  Body Dressing) doff;front opening garment;supervision  -CS     Position (Upper Body Dressing) edge of bed sitting  -CS       Row Name 11/14/24 0934          Lower Body Dressing Assessment/Training    [place] Level (Lower Body Dressing) doff;socks;minimum assist (75% patient effort)  -CS     Position (Lower Body Dressing) edge of bed sitting  -CS       Row Name 11/14/24 0934          Grooming Assessment/Training    [place] Level (Grooming) hair care, combing/brushing;set up  -CS     Position (Grooming) edge of bed sitting  -CS       Row Name 11/14/24 0934          Self-Feeding Assessment/Training    [place] Level (Feeding) feeding skills;independent  -CS     Position (Feeding) supported sitting  -CS               User Key  (r) = Recorded By, (t) = Taken By, (c) = Cosigned By      Initials Name Provider Type    Ion Salcido OT Occupational Therapist                   Obj/Interventions       Row Name 11/14/24 0944          Motor Skills    Therapeutic Exercise shoulder;elbow/forearm;other (see comments)  -CS               User Key  (r) = Recorded By, (t) = Taken By, (c) = Cosigned By      Initials Name Provider Type    Ion Salcido OT Occupational Therapist                   Goals/Plan    No documentation.                  Clinical Impression       [facility] Name 11/14/24 1100          Pain Scale: FACES Pre/Post-Treatment    Pain: FACES Scale, Pretreatment 0-->no hurt  -CS     Posttreatment Pain Rating 0-->no hurt  -CS       Row Name 11/14/24 1100          Plan of Care Review    Plan of Care Reviewed With patient  -CS     Progress no change  -CS     Outcome Evaluation Baseline ADL completion remains limited by decreased activity tolerance, generalized weakness, and poor standing balance. Truncal instability and BP drop in standing persists. Rec d/c to IRF.  -CS       Row Name 11/14/24 1100          Therapy Plan Review/Discharge Plan (OT)    Anticipated Discharge Disposition (OT) inpatient  rehabilitation facility  -       Row Name 11/14/24 1100          Vital Signs    Pre Systolic BP Rehab 108  RN cleared for tx  -CS     Pre Treatment Diastolic BP 65  -CS     Intra Treatment Diastolic BP 83  -CS     Post Systolic BP Rehab 53  -CS     Post Treatment Diastolic BP 74  -CS     Pretreatment Heart Rate (beats/min) 57  -CS     O2 Delivery Pre Treatment room air  -CS     O2 Delivery Intra Treatment room air  -CS     O2 Delivery Post Treatment room air  -CS     Pre Patient Position Supine  -CS     Intra Patient Position Standing  -CS     Post Patient Position Sitting  -CS       Row Name 11/14/24 1100          Positioning and Restraints    Pre-Treatment Position in bed  -CS     Post Treatment Position chair  -CS     In Bed notified nsg;supine;call light within reach;encouraged to call for assist;exit alarm on  -CS     In Chair waffle cushion;LLE elevated  -CS               User Key  (r) = Recorded By, (t) = Taken By, (c) = Cosigned By      Initials Name Provider Type    CS Ion Roberts, OT Occupational Therapist                   Outcome Measures       Row Name 11/14/24 1103          How much help from another is currently needed...    Putting on and taking off regular lower body clothing? 3  -CS     Bathing (including washing, rinsing, and drying) 3  -CS     Toileting (which includes using toilet bed pan or urinal) 3  -CS     Putting on and taking off regular upper body clothing 3  -CS     Taking care of personal grooming (such as brushing teeth) 3  -CS     Eating meals 3  -CS     AM-PAC 6 Clicks Score (OT) 18  -CS       Row Name 11/14/24 1008          How much help from another person do you currently need...    Turning from your back to your side while in flat bed without using bedrails? 4  -DARREN     Moving from lying on back to sitting on the side of a flat bed without bedrails? 3  -DARREN     Moving to and from a bed to a chair (including a wheelchair)? 3  -DARREN     Standing up from a chair using your arms  (e.g., wheelchair, bedside chair)? 3  -DARREN     Climbing 3-5 steps with a railing? 2  -DARREN     To walk in hospital room? 2  -DARREN     AM-PAC 6 Clicks Score (PT) 17  -DARREN     Highest Level of Mobility Goal 5 --> Static standing  -DARREN       Row Name 11/14/24 1103          Functional Assessment    Outcome Measure Options AM-PAC 6 Clicks Daily Activity (OT)  -CS               User Key  (r) = Recorded By, (t) = Taken By, (c) = Cosigned By      Initials Name Provider Type    Kimberly Johnson, RN Registered Nurse    Ion Salcido OT Occupational Therapist                    Occupational Therapy Education       Title: PT OT SLP Therapies (In Progress)       Topic: Occupational Therapy (Done)       Point: ADL training (Done)       Description:   Instruct learner(s) on proper safety adaptation and remediation techniques during self care or transfers.   Instruct in proper use of assistive devices.                  Learning Progress Summary            Patient Acceptance, E,D, VU,DU by ROSARIO at 11/14/2024 1103    Acceptance, D,E, VU,DU by ROSARIO at 11/12/2024 1400    Acceptance, E,D, NR by ELEUTERIO at 11/11/2024 0854                      Point: Home exercise program (Done)       Description:   Instruct learner(s) on appropriate technique for monitoring, assisting and/or progressing therapeutic exercises/activities.                  Learning Progress Summary            Patient Acceptance, E,D, VU,DU by ROSARIO at 11/14/2024 1103                      Point: Precautions (Done)       Description:   Instruct learner(s) on prescribed precautions during self-care and functional transfers.                  Learning Progress Summary            Patient Acceptance, E,D, VU,DU by ROSARIO at 11/14/2024 1103    Acceptance, D,E, VU,DU by ROSARIO at 11/12/2024 1400    Acceptance, E,D, NR by ELEUTERIO at 11/11/2024 0854                      Point: Body mechanics (Done)       Description:   Instruct learner(s) on proper positioning and spine alignment during self-care,  functional mobility activities and/or exercises.                  Learning Progress Summary            Patient Acceptance, E,D, VU,DU by  at 11/14/2024 1103    Acceptance, D,E, VU,DU by  at 11/12/2024 1400    Acceptance, E,D, NR by  at 11/11/2024 0854                                      User Key       Initials Effective Dates Name Provider Type Discipline     06/16/21 -  Vanessa Noonan OT Occupational Therapist OT     06/16/21 -  Ion Roberts OT Occupational Therapist OT                  OT Recommendation and Plan     Plan of Care Review  Plan of Care Reviewed With: patient  Progress: no change  Outcome Evaluation: Baseline ADL completion remains limited by decreased activity tolerance, generalized weakness, and poor standing balance. Truncal instability and BP drop in standing persists. Rec d/c to IRF.     Time Calculation:         Time Calculation- OT       Row Name 11/14/24 1104             Time Calculation- OT    OT Start Time 0934  -CS      OT Received On 11/14/24  -CS      OT Goal Re-Cert Due Date 11/21/24  -CS         Timed Charges    60574 - OT Therapeutic Activity Minutes 25  -CS         Total Minutes    Timed Charges Total Minutes 25  -CS       Total Minutes 25  -CS                User Key  (r) = Recorded By, (t) = Taken By, (c) = Cosigned By      Initials Name Provider Type    CS Ion Roberts OT Occupational Therapist                  Therapy Charges for Today       Code Description Service Date Service Provider Modifiers Qty    35193047902 HC OT THERAPEUTIC ACT EA 15 MIN 11/14/2024 Ion Roberts, OT GO 2    74028629495 HC OT THER SUPP EA 15 MIN 11/14/2024 Ion Roberts, OT GO 1    27275503203 HC OT THER SUPP EA 15 MIN 11/14/2024 Ion Roberts, OT GO 1                 Ion JONATHON Roberts, OT  11/14/2024

## 2024-11-14 NOTE — PROGRESS NOTES
[facility] Neurology    Progress Note    Patient Name: Philomena Davis  : 1960  MRN: [id number]  Primary Care Physician:  Unique Brandt DO  Date of admission: 11/10/2024    Subjective     Chief Complaint: Weakness    History of Present Illness   Patient seen resting comfortably in bed.  Continued to have over 30 point drop with orthostatic blood pressures.  She denies any new or worsening of her numbness or tingling.  States that she is still having burning sensations in her bilateral lower extremities.     Review of Systems   General: Negative for fever, nausea, or vomiting.   Neurological: Positive for burning sensation    Objective     Physical Exam  Vitals and nursing note reviewed.   Constitutional:       General: She is not in acute distress.     Appearance: She is not ill-appearing.   Eyes:      Extraocular Movements: Extraocular movements intact.      Pupils: Pupils are equal, round, and reactive to light.      Comments: No nystagmus or deviated gaze noted   Neurological:      Mental Status: She is alert and oriented to person, place, and time.      Sensory: No sensory deficit.      Motor: Weakness present. No tremor or seizure activity.      Deep Tendon Reflexes:      Reflex Scores:       Bicep reflexes are 1+ on the right side and 1+ on the left side.       Patellar reflexes are 0 on the right side and 0 on the left side.     Comments:     Cranial Nerves   CN II: Pupils are equal, round, and reactive to light. Normal visual acuity and visual fields.    CN III IV VI: Extraocular movements are full without nystagmus.  CN V: Normal facial sensation and strength of muscles of mastication.  CN VII: Facial movements are symmetric. No weakness.  CN VIII:  Auditory acuity is normal.  CN IX & X:  Symmetric palatal movement.  CN XI: Sternocleidomastoid and trapezius are normal.  No weakness.  CN XII: The tongue is midline.  No atrophy or fasciculations.    Motor: Generalized weakness,     sensation  intact to light touch and pinprick in bilateral upper extremities, abdomen and bilateral upper extremities    Reflexes: Unequivocal Babinski's          Vitals:   Temp:  [97.6 °F (36.4 °C)-99 °F (37.2 °C)] 98.2 °F (36.8 °C)  Heart Rate:  [76-96] 85  Resp:  [14-18] 18  BP: ()/(48-77) 124/63  Flow (L/min) (Oxygen Therapy):  [2] 2    Current Medications    Current Facility-Administered Medications:   •  acetaminophen (TYLENOL) tablet 650 mg, 650 mg, Oral, Q4H PRN **OR** acetaminophen (TYLENOL) 160 MG/5ML oral solution 650 mg, 650 mg, Oral, Q4H PRN **OR** acetaminophen (TYLENOL) suppository 500 mg, 500 mg, Rectal, Q4H PRN, Sophie Gandhi MD  •  sennosides-docusate (PERICOLACE) 8.6-50 MG per tablet 2 tablet, 2 tablet, Oral, BID PRN, 2 tablet at 11/13/24 1204 **AND** polyethylene glycol (MIRALAX) packet 17 g, 17 g, Oral, Daily PRN **AND** bisacodyl (DULCOLAX) EC tablet 5 mg, 5 mg, Oral, Daily PRN **AND** bisacodyl (DULCOLAX) suppository 10 mg, 10 mg, Rectal, Daily PRN, Sophie Gandhi MD  •  calcium carbonate (TUMS) chewable tablet 500 mg (200 mg elemental), 2 tablet, Oral, BID PRN, Sophie Gandhi MD  •  Calcium Replacement - Follow Nurse / BPA Driven Protocol, , Not Applicable, PRN, Sophie Gandhi MD  •  carvedilol (COREG) tablet 12.5 mg, 12.5 mg, Oral, BID With Meals, Jose J Hutchins MD, 12.5 mg at 11/14/24 1008  •  [START ON 11/15/2024] Cortisol, , , Q30 Min **AND** [START ON 11/15/2024] cosyntropin (CORTROSYN) injection 0.25 mg, 0.25 mg, Intravenous, Once, Jose J Hutchins MD  •  desvenlafaxine (PRISTIQ) 24 hr tablet 50 mg, 50 mg, Oral, Daily, Sophie Gandhi MD, 50 mg at 11/14/24 1008  •  folic acid (FOLVITE) tablet 1 mg, 1 mg, Oral, Daily, Sophie Gandhi MD, 1 mg at 11/14/24 1008  •  gabapentin (NEURONTIN) capsule 100 mg, 100 mg, Oral, TID, Uday Sykes DO, 100 mg at 11/14/24 1008  •  ipratropium-albuterol (DUO-NEB) nebulizer solution 3 mL, 3 mL, Nebulization,  Q6H PRN, Sophie Gandhi MD, 3 mL at 11/14/24 1020  •  latanoprost (XALATAN) 0.005 % ophthalmic solution 1 drop, 1 drop, Both Eyes, Nightly, Sophie Gandhi MD, 1 drop at 11/13/24 2027  •  LORazepam (ATIVAN) tablet 0.5 mg, 0.5 mg, Oral, Q8H PRN, Sophie Gandhi MD  •  Magnesium Standard Dose Replacement - Follow Nurse / BPA Driven Protocol, , Not Applicable, PRN, Sophie Gandhi MD  •  melatonin tablet 5 mg, 5 mg, Oral, Nightly, Sophie Gandhi MD, 5 mg at 11/13/24 2025  •  metoprolol tartrate (LOPRESSOR) injection 5 mg, 5 mg, Intravenous, Q6H PRN, Kavitha Mejia APRN  •  midodrine (PROAMATINE) tablet 10 mg, 10 mg, Oral, TID AC, Jose J Hutchins MD, 10 mg at 11/14/24 1008  •  nitroglycerin (NITROSTAT) SL tablet 0.4 mg, 0.4 mg, Sublingual, Q5 Min PRN, Sophie Gandhi MD  •  nystatin (MYCOSTATIN) 353222 UNIT/GM cream 1 Application, 1 Application, Topical, Q12H, Jose J Hutchins MD, 1 Application at 11/14/24 1003  •  ondansetron (ZOFRAN) injection 4 mg, 4 mg, Intravenous, Q6H PRN, Sophie Gandhi MD  •  pantoprazole (PROTONIX) EC tablet 40 mg, 40 mg, Oral, Daily, Sophie Gandhi MD, 40 mg at 11/14/24 0659  •  Phosphorus Replacement - Follow Nurse / BPA Driven Protocol, , Not Applicable, PRN, Sophie Gandhi MD  •  potassium chloride (KLOR-CON M20) CR tablet 40 mEq, 40 mEq, Oral, Q4H, Jose J Hutchins MD, 40 mEq at 11/14/24 1007  •  Potassium Replacement - Follow Nurse / BPA Driven Protocol, , Not Applicable, PRN, Sophie Gandhi MD  •  rosuvastatin (CRESTOR) tablet 5 mg, 5 mg, Oral, Daily, Sophie Gandhi MD, 5 mg at 11/13/24 2025  •  sodium chloride 0.9 % flush 10 mL, 10 mL, Intravenous, PRN, Zac Bruner MD  •  sodium chloride 0.9 % flush 10 mL, 10 mL, Intravenous, Q12H, Sophie Gandhi MD, 10 mL at 11/14/24 0958  •  sodium chloride 0.9 % flush 10 mL, 10 mL, Intravenous, PRN, Sophie Gandhi MD  •  sodium chloride 0.9  % infusion 40 mL, 40 mL, Intravenous, PRN, Sophie Gandhi MD  •  thiamine (VITAMIN B-1) tablet 100 mg, 100 mg, Oral, Daily, Sophie Gandhi MD, 100 mg at 11/14/24 1008  •  ursodiol (ACTIGALL) capsule 300 mg, 300 mg, Oral, BID, Sophie Gandhi MD, 300 mg at 11/14/24 1008  •  zolpidem (AMBIEN) tablet 5 mg, 5 mg, Oral, Nightly, Sophie Gandhi MD, 5 mg at 11/13/24 2026    Laboratory Results:   Lab Results   Component Value Date    GLUCOSE 84 11/14/2024    CALCIUM 8.4 (L) 11/14/2024     11/14/2024    K 3.6 11/14/2024    CO2 24.0 11/14/2024     11/14/2024    BUN 6 (L) 11/14/2024    CREATININE 0.58 11/14/2024    EGFRIFNONA 114 05/15/2019    BCR 10.3 11/14/2024    ANIONGAP 10.0 11/14/2024     Lab Results   Component Value Date    WBC 9.99 11/14/2024    HGB 10.6 (L) 11/14/2024    HCT 31.8 (L) 11/14/2024    .7 (H) 11/14/2024     11/14/2024     Lab Results   Component Value Date    CHOL 376 (H) 10/25/2023    CHOL 430 (H) 09/12/2023    CHOL 214 (H) 05/14/2019     Lab Results   Component Value Date     (H) 10/25/2023     (H) 09/12/2023    HDL 62 (H) 05/14/2019     Lab Results   Component Value Date     (H) 10/25/2023     (H) 09/12/2023     (H) 05/14/2019     Lab Results   Component Value Date    TRIG 128 10/25/2023    TRIG 159 (H) 09/12/2023    TRIG 172 (H) 05/14/2019     Lab Results   Component Value Date    HGBA1C 5.60 09/12/2023     Lab Results   Component Value Date    INR 1.18 (H) 11/10/2024    INR 1.10 01/31/2024    INR 1.0 08/09/2023    PROTIME 15.2 (H) 11/10/2024    PROTIME 14.3 01/31/2024    PROTIME 14.9 (H) 11/30/2022     Lab Results   Component Value Date    FOLATE 4.83 11/10/2024     Lab Results   Component Value Date    [id number] 716 11/10/2024       MRI Brain Without Contrast    Result Date: 11/10/2024  MRI BRAIN WO CONTRAST Date of Exam: 11/10/2024 1:27 PM EST Indication: BLE weakness, facial parasthesia R>L, difficulty  ambulating.  Comparison: None available. Technique:  Routine multiplanar/multisequence sequence images of the brain were obtained without contrast administration. Findings: No acute infarct is present on diffusion weighted sequences. Midline structures appear normal and the craniocervical junction is satisfactory. Gray-white differentiation is maintained and there is no evidence of intracranial hemorrhage, mass or mass effect. The ventricles are normal in size and configuration. The orbits are normal. The paranasal sinuses are clear. Intracranial arterial flow voids appear maintained.     Impression: Impression: Normal noncontrast MRI of the brain. Electronically Signed: Adriano Kessler MD  11/10/2024 2:14 PM EST  Workstation ID: [id number]        Assessment / Plan     Active Hospital Problems:    Lower extremity weakness    Orthostasis       Brief Patient Summary:  Philomena Davis is a 64 y.o. female who has a past medical history of EtOH use, alcoholic liver cirrhosis, HTN, peripheral neuropathy, anxiety and depression who presented to [facility] with complaint of worsening bilateral lower extremity weakness, right and left facial paresthesias for the past week.  Patient was originally seen by our stroke navigators with a negative workup.     Plan:   Bilateral lower extremity weakness  EtOH use  Peripheral neuropathy R/T EtOH use  Dysautonomia  AM cortisol low, Cosyntropin testing 11/15 per hospitalist team  MRI brain with no acute findings  MRI lumbar showed small disc protrusion at the L5-S1 with no significant spinal canal or neuroforaminal stenosis.  MRI of cervical cervical spine showed moderate active right sided facet arthropathy at C4/5.   MRI thoracic spine benign hemangioma at T5.  No acute process noted.   EMG/nerve conduction study showed a mild sensory neuropathy.  No evidence of demyelinating neuropathy seen.  Patient initiated on midodrine yesterday.  Appears to be tolerating at this time.   Folic acid 1 mg  daily   Continue thiamine 100 mg daily  Increase gabapentin 200 mg 2 times daily   Spoke with patient about nonmedical management of dysautonomia including increased salt intake within cardiology parameters, increase water intake, 6 small's meal daily, slow position changes, REX hose or stockings, regular blood pressure checks.  Orthostatic blood pressures daily  Cardiology now following for atrial tachycardia  General Neurology will continue to follow    I have discussed the above with the patient, bedside RN Dr. Hutchins    Time spent with patient: 35 minutes in face-to-face evaluation and management of the patient.    Copied text in this note has been reviewed and is accurate as of 11/14/24.     CANDY Ocampo

## 2024-11-14 NOTE — PROGRESS NOTES
[facility] Medicine Services  PROGRESS NOTE    Patient Name: Philomena Davis  : 1960  MRN: [id number]    Date of Admission: 11/10/2024  Primary Care Physician: Unique Brandt DO    Subjective   Subjective     CC: Numbness    HPI: Numbness continues. Labile blood pressures persist. No n/v. Tolerating PO .      Objective   Objective     Vital Signs:   Temp:  [97.6 °F (36.4 °C)-99 °F (37.2 °C)] 98.5 °F (36.9 °C)  Heart Rate:  [76-99] 84  Resp:  [14-18] 18  BP: ()/(48-77) 123/77  Flow (L/min) (Oxygen Therapy):  [2] 2     Physical Exam:  NAD, alert and oriented x 3  OP clear, dry MM  Neck supple  No LAD  RRR  CTAB  +BS, soft  MASSEY  Normal affect  B LE 4+/5, notes numbness in hands/legs, no change from  otherwise    Results Reviewed:  LAB RESULTS:      Lab 24  0600 24  0757 11/10/24  1628 11/10/24  1235 11/10/24  1007 11/10/24  0946   WBC 9.99 11.04*  --   --   --  15.59*   HEMOGLOBIN 10.6* 10.5*  --   --   --  12.4   HEMATOCRIT 31.8* 31.8*  --   --   --  37.0   PLATELETS 224 219  --   --   --  314   NEUTROS ABS  --   --   --   --   --  11.83*   IMMATURE GRANS (ABS)  --   --   --   --   --  0.07*   LYMPHS ABS  --   --   --   --   --  2.06   MONOS ABS  --   --   --   --   --  1.34*   EOS ABS  --   --   --   --   --  0.20   .7* 109.7*  --   --   --  106.9*   SED RATE  --   --   --   --   --  105*   CRP  --   --   --   --  3.88*  --    PROCALCITONIN  --   --   --   --  0.15  --    LACTATE  --   --  1.2 2.2*  --  2.6*   PROTIME  --   --   --   --   --  15.2*   APTT  --   --   --   --   --  37.1*   HSTROP T  --   --   --   --  14*  --          Lab 24  0600 24  0757 24  1255 24  0017 11/10/24  1007   SODIUM 137 138  --   --  138   POTASSIUM 3.6 4.0  --  4.6 3.3*   CHLORIDE 103 104  --   --  100   CO2 24.0 26.0  --   --  27.0   ANION GAP 10.0 8.0  --   --  11.0   BUN 6* 7*  --   --  6*   CREATININE 0.58 0.62  --   --  0.55*   EGFR 102.5  100.8  --   --  103.8   GLUCOSE 84 85  --   --  100*   CALCIUM 8.4* 8.4*  --   --  8.8   MAGNESIUM  --   --  1.7  --  1.7   PHOSPHORUS  --   --  3.4  --   --    TSH  --   --   --   --  9.670*         Lab 11/14/24  0600 11/13/24  0757 11/10/24  1007   TOTAL PROTEIN 6.5 6.1 7.0   ALBUMIN 2.8* 2.9* 2.8*   GLOBULIN 3.7 3.2 4.2   ALT (SGPT) 17 19 27   AST (SGOT) 66* 80* 72*   BILIRUBIN 1.2 1.3* 1.7*   ALK PHOS 126* 129* 165*         Lab 11/10/24  1007 11/10/24  0946   HSTROP T 14*  --    PROTIME  --  15.2*   INR  --  1.18*             Lab 11/10/24  1235   FOLATE 4.83   VITAMIN B 12 716         Brief Urine Lab Results  (Last result in the past 365 days)        Color   Clarity   Blood   Leuk Est   Nitrite   Protein   CREAT   Urine HCG        11/10/24 0958 Yellow   Clear   Negative   Negative   Negative   Negative                   Microbiology Results Abnormal       None            No radiology results from the last 24 hrs    Results for orders placed during the hospital encounter of 11/10/24    Adult Transthoracic Echo Complete W/ Cont if Necessary Per Protocol    Interpretation Summary  •  Left ventricular systolic function is normal. Estimated left ventricular EF = 65%  •  The cardiac valves are anatomically and functionally normal.      Current medications:  Scheduled Meds:carvedilol, 12.5 mg, Oral, BID With Meals  [START ON 11/15/2024] cosyntropin, 0.25 mg, Intravenous, Once  desvenlafaxine, 50 mg, Oral, Daily  folic acid, 1 mg, Oral, Daily  gabapentin, 100 mg, Oral, TID  latanoprost, 1 drop, Both Eyes, Nightly  melatonin, 5 mg, Oral, Nightly  midodrine, 10 mg, Oral, TID AC  nystatin, 1 Application, Topical, Q12H  pantoprazole, 40 mg, Oral, Daily  potassium chloride ER, 40 mEq, Oral, Q4H  rosuvastatin, 5 mg, Oral, Daily  sodium chloride, 10 mL, Intravenous, Q12H  thiamine, 100 mg, Oral, Daily  ursodiol, 300 mg, Oral, BID  zolpidem, 5 mg, Oral, Nightly      Continuous Infusions:   PRN Meds:.•  acetaminophen **OR**  acetaminophen **OR** acetaminophen  •  senna-docusate sodium **AND** polyethylene glycol **AND** bisacodyl **AND** bisacodyl  •  calcium carbonate  •  Calcium Replacement - Follow Nurse / BPA Driven Protocol  •  ipratropium-albuterol  •  LORazepam  •  Magnesium Standard Dose Replacement - Follow Nurse / BPA Driven Protocol  •  metoprolol tartrate  •  nitroglycerin  •  ondansetron  •  Phosphorus Replacement - Follow Nurse / BPA Driven Protocol  •  Potassium Replacement - Follow Nurse / BPA Driven Protocol  •  sodium chloride  •  sodium chloride  •  sodium chloride    Assessment & Plan   Assessment & Plan     Active Hospital Problems    Diagnosis  POA   • **Lower extremity weakness [R29.898]  Yes   • Orthostasis [I95.1]  Yes      Resolved Hospital Problems   No resolved problems to display.        Brief Hospital Course to date:  Philomena Davis is a 64 y.o. female with history of alcohol dependence, cirrhosis, HTN, PN here with B UE/LE paraesthesias    B weakness  B paraesthesias/facial numbness  -MRI brain unremarkable  -MRI lumbar spine with L5-S1 disc protrusion  -MRI C/T spine reviewed, with C4/5 facet arthropathy  -EMG/NCV mild sensory neuropathy  -Continue thiamine, neurology following  -PT/OT    Marked orthostasis  Dysautonomia  -added midodrine  -mobilize/PT/OT  -AM cortisol low, cosyntropin testing 11/15    Possible vasculitis  CT with celiac artery stranding  -markedly elevated CRP/ESR  -ANCA panel negative  -WILMA panel negative as well  -RF negative    Atrial tachycardia, atrial flutter  -presented with tachycardia, HR to 150  -on BB  -ECHO unremarkable, EF normal  -bursts of tachycardia here, evaluated by cardiology  -continue carvedilol, dosing may be limited by hypotension/orthostasis  -cardiology deferring AC due to cirrhosis for now    Alcohol dependence  -monitor    Cirrhosis  Hepatocellular jaundice  -monitor    Mild LA  -due to liver disease    Subclinical hypothyroidism  -needs follow up in 6  weeks    Expected Discharge Location and Transportation: Home  Expected Discharge   Expected Discharge Date: 11/15/2024; Expected Discharge Time:      VTE Prophylaxis:  Mechanical VTE prophylaxis orders are present.         AM-PAC 6 Clicks Score (PT): 17 (11/13/24 2000)    CODE STATUS:   Code Status and Medical Interventions: CPR (Attempt to Resuscitate); Full Support   Ordered at: 11/10/24 6843     Level Of Support Discussed With:    Patient    Next of Kin (If No Surrogate)     Code Status (Patient has no pulse and is not breathing):    CPR (Attempt to Resuscitate)     Medical Interventions (Patient has pulse or is breathing):    Full Support       Jose J Hutchins MD  11/14/24

## 2024-11-14 NOTE — PROGRESS NOTES
"[facility] Cardiology  Hospital Progress Note     LOS: 1 day   Patient Care Team:  Unique Brandt DO as PCP - General (Family Medicine)  Leana Garcia PA as Physician Assistant (Gastroenterology)  PCP:  Unique Brandt DO    Chief Complaint: atrial arrhyhtmia on admission    SUBJECTIVE:  Resting in bed, on room air. No chest pain, tachy-palpitations.       OBJECTIVE:         Vital Sign Min/Max for last 24 hours  Temp  Min: 97.6 °F (36.4 °C)  Max: 99 °F (37.2 °C)   BP  Min: 74/57  Max: 126/64   Pulse  Min: 76  Max: 96   Resp  Min: 14  Max: 18   SpO2  Min: 88 %  Max: 97 %   No data recorded   No data recorded     Flowsheet Rows      Flowsheet Row First Filed Value   Admission Height 162.6 cm (64\") Documented at 11/10/2024 0943   Admission Weight 67.1 kg (148 lb) Documented at 11/10/2024 0943            Telemetry: SR      Intake/Output Summary (Last 24 hours) at 11/14/2024 1028  Last data filed at 11/14/2024 1008  Gross per 24 hour   Intake 1045 ml   Output 2100 ml   Net -1055 ml     Intake & Output (last 3 days)         11/11 0701  11/12 0700 11/12 0701  11/13 0700 11/13 0701  11/14 0700 11/14 0701  11/15 0700    P.O.   645 400    IV Piggyback        Total Intake(mL/kg)   645 (9.6) 400 (6)    Urine (mL/kg/hr) 400 (0.2) 900 (0.6) 2100 (1.3)     Stool   0     Total Output      Net -400 -900 -1455 +400            Stool Unmeasured Occurrence   3 x              Physical Exam:  Vitals reviewed.   Constitutional:       General: Not in acute distress.  Pulmonary:      Effort: Pulmonary effort is normal.      Breath sounds: Normal breath sounds.   Cardiovascular:      Normal rate. Regular rhythm. Normal S1. Normal S2.       Murmurs: There is no murmur.   Pulses:     Intact distal pulses.   Edema:     Peripheral edema absent.   Skin:     General: Skin is warm and dry.   Neurological:      General: No focal deficit present.      Mental Status: Alert.   Psychiatric:         Behavior: Behavior " is [facility].          LABS/DIAGNOSTIC DATA:  Results from last 7 days   Lab Units 11/14/24  0600 11/13/24  0757 11/10/24  0946   WBC 10*3/mm3 9.99 11.04* 15.59*   HEMOGLOBIN g/dL 10.6* 10.5* 12.4   HEMATOCRIT % 31.8* 31.8* 37.0   PLATELETS 10*3/mm3 224 219 314     Lab Results   Lab Value Date/Time    TROPONINT 14 (H) 11/10/2024 1007    TROPONINT <0.010 12/08/2022 2113    TROPONINT <0.010 12/07/2022 1235    TROPONINT 0.023 11/28/2022 1129    TROPONINT <0.010 09/26/2022 0604     Results from last 7 days   Lab Units 11/10/24  0946   INR  1.18*   APTT seconds 37.1*     Results from last 7 days   Lab Units 11/14/24  0600 11/13/24  0757 11/11/24  0017 11/10/24  1007   SODIUM mmol/L 137 138  --  138   POTASSIUM mmol/L 3.6 4.0 4.6 3.3*   CHLORIDE mmol/L 103 104  --  100   CO2 mmol/L 24.0 26.0  --  27.0   BUN mg/dL 6* 7*  --  6*   CREATININE mg/dL 0.58 0.62  --  0.55*   CALCIUM mg/dL 8.4* 8.4*  --  8.8   BILIRUBIN mg/dL 1.2 1.3*  --  1.7*   ALK PHOS U/L 126* 129*  --  165*   ALT (SGPT) U/L 17 19  --  27   AST (SGOT) U/L 66* 80*  --  72*   GLUCOSE mg/dL 84 85  --  100*             Results from last 7 days   Lab Units 11/10/24  1007   TSH uIU/mL 9.670*   FREE T4 ng/dL 1.16           Medication Review:   carvedilol, 12.5 mg, Oral, BID With Meals  [START ON 11/15/2024] cosyntropin, 0.25 mg, Intravenous, Once  desvenlafaxine, 50 mg, Oral, Daily  folic acid, 1 mg, Oral, Daily  gabapentin, 100 mg, Oral, TID  latanoprost, 1 drop, Both Eyes, Nightly  melatonin, 5 mg, Oral, Nightly  midodrine, 10 mg, Oral, TID AC  nystatin, 1 Application, Topical, Q12H  pantoprazole, 40 mg, Oral, Daily  potassium chloride ER, 40 mEq, Oral, Q4H  rosuvastatin, 5 mg, Oral, Daily  sodium chloride, 10 mL, Intravenous, Q12H  thiamine, 100 mg, Oral, Daily  ursodiol, 300 mg, Oral, BID  zolpidem, 5 mg, Oral, Nightly             ASSESSMENT:    Lower extremity weakness    Orthostasis    Atrial tachycardia/atrial flutter  Occurred 11/10/2024 in the setting of  lower extremity weakness and TIA symptoms  Converted to NSR after receiving IV metoprolol  Echo 11/11/2024 LVEF 65% no valvular abnormality  Throughout admission intermittent bursts of atrial tachycardia  Carvedilol titrated, home dose 3.125 BID - maintaining SR, rate 80s  Hypertension  Current /65  Would avoid use of clonidine (home med)  Orthostatic hypotension  Orthostatics positive and blood pressure decreased to 77/62 on standing  On midodrine  Hyperlipidemia  Crestor 5 mg daily  TIA symptoms/lower extremity weakness  MRI of lumbar spine L5-S1 disc protrusion  Neurology following  Alcoholic liver cirrhosis     Plan:   Would defer anticoagulation due to liver cirrhosis and no sustained atrial fibrillation or flutter  Continue Carvedilol 12.5 mg twice daily due to orthostatic hypotension  Orthostatic precautions reviewed.  IV metoprolol every 6 hours for sustained heart rates greater than 120 bpm  Would recommend 1-week monitor (e-patch) at discharge and one month follow up appointment with Dr. Hess. (Orders placed)  Cardiology will sign off and be available if needed.       Jeimy Holliday, APRN   11/14/24  10:28 EST

## 2024-11-14 NOTE — CASE MANAGEMENT/SOCIAL WORK
Continued Stay Note   [place]     Patient Name: Philomena Davis  MRN: [id number]  Today's Date: 11/14/2024    Admit Date: 11/10/2024    Plan: Rehab   Discharge Plan       Row Name 11/14/24 1140       Plan    Plan Rehab    Patient/Family in Agreement with Plan yes    Plan Comments Discussed Ms. Davis in MDR. [facility] is reviewing rehab referral. She will need insurance approval from her [facility] Medicaid for the rehab when she is medically ready for discharge. CM will continue to follow up.    Final Discharge Disposition Code 62 - inpatient rehab facility                   Discharge Codes    No documentation.                 Expected Discharge Date and Time       Expected Discharge Date Expected Discharge Time    Nov 15, 2024               Reena Hodges RN

## 2024-11-14 NOTE — PLAN OF CARE
Goal Outcome Evaluation:  Plan of Care Reviewed With: patient        Progress: no change  Outcome Evaluation: Baseline ADL completion remains limited by decreased activity tolerance, generalized weakness, and poor standing balance. Truncal instability and BP drop in standing persists. Rec d/c to IRF.    Anticipated Discharge Disposition (OT): inpatient rehabilitation facility

## 2024-11-14 NOTE — PLAN OF CARE
Goal Outcome Evaluation:   Patient alert and oriented x4. Normal sinus rhythm. Room air to 2L nasal cannula. Positive orthostatic BP's, MD notified. BP down to 80's/50's around 1800 - MD notified and aware. No reports of pain. Al skin interventions in place, patient repositioning self. Pulmonary hygiene encouraged.

## 2024-11-14 NOTE — PLAN OF CARE
Goal Outcome Evaluation:   Patient alert and oriented x4. Normal sinus rhythm. 2L nasal cannula to room air as tolerated  - o2 dipping to 87% intermittently. Pulmonary hygiene encouraged. No reports of pain. Blood pressure slightly improved this shift. Remains positive for orthostatic hypotension. Skin interventions in place, turning encouraged. Ongoing numbness/tingling. Patient sleeping intermittently throughout day.

## 2024-11-15 LAB
BACTERIA SPEC AEROBE CULT: NORMAL
BACTERIA SPEC AEROBE CULT: NORMAL
CORTIS SERPL-MCNC: 16.31 MCG/DL
CORTIS SERPL-MCNC: 20.7 MCG/DL
CORTIS SERPL-MCNC: 3.37 MCG/DL

## 2024-11-15 PROCEDURE — 99232 SBSQ HOSP IP/OBS MODERATE 35: CPT

## 2024-11-15 PROCEDURE — 25010000002 COSYNTROPIN PER 0.25 MG: Performed by: HOSPITALIST

## 2024-11-15 PROCEDURE — 99232 SBSQ HOSP IP/OBS MODERATE 35: CPT | Performed by: HOSPITALIST

## 2024-11-15 PROCEDURE — 94799 UNLISTED PULMONARY SVC/PX: CPT

## 2024-11-15 PROCEDURE — 82533 TOTAL CORTISOL: CPT | Performed by: HOSPITALIST

## 2024-11-15 RX ADMIN — NYSTATIN 1 APPLICATION: 100000 CREAM TOPICAL at 09:50

## 2024-11-15 RX ADMIN — THIAMINE HCL TAB 100 MG 100 MG: 100 TAB at 09:48

## 2024-11-15 RX ADMIN — CARVEDILOL 12.5 MG: 12.5 TABLET, FILM COATED ORAL at 18:02

## 2024-11-15 RX ADMIN — URSODIOL 300 MG: 300 CAPSULE ORAL at 09:48

## 2024-11-15 RX ADMIN — NYSTATIN 1 APPLICATION: 100000 CREAM TOPICAL at 21:26

## 2024-11-15 RX ADMIN — COSYNTROPIN 0.25 MG: 0.25 INJECTION, POWDER, LYOPHILIZED, FOR SOLUTION INTRAMUSCULAR; INTRAVENOUS at 06:05

## 2024-11-15 RX ADMIN — MIDODRINE HYDROCHLORIDE 15 MG: 10 TABLET ORAL at 16:55

## 2024-11-15 RX ADMIN — LATANOPROST 1 DROP: 50 SOLUTION OPHTHALMIC at 21:26

## 2024-11-15 RX ADMIN — DESVENLAFAXINE 50 MG: 50 TABLET, FILM COATED, EXTENDED RELEASE ORAL at 09:48

## 2024-11-15 RX ADMIN — FOLIC ACID 1 MG: 1 TABLET ORAL at 09:48

## 2024-11-15 RX ADMIN — BUDESONIDE AND FORMOTEROL FUMARATE DIHYDRATE 2 PUFF: 160; 4.5 AEROSOL RESPIRATORY (INHALATION) at 06:54

## 2024-11-15 RX ADMIN — Medication 5 MG: at 21:25

## 2024-11-15 RX ADMIN — BUDESONIDE AND FORMOTEROL FUMARATE DIHYDRATE 2 PUFF: 160; 4.5 AEROSOL RESPIRATORY (INHALATION) at 20:24

## 2024-11-15 RX ADMIN — MIDODRINE HYDROCHLORIDE 10 MG: 10 TABLET ORAL at 09:48

## 2024-11-15 RX ADMIN — PANTOPRAZOLE SODIUM 40 MG: 40 TABLET, DELAYED RELEASE ORAL at 06:41

## 2024-11-15 RX ADMIN — Medication 10 ML: at 21:47

## 2024-11-15 RX ADMIN — CARVEDILOL 12.5 MG: 12.5 TABLET, FILM COATED ORAL at 09:48

## 2024-11-15 RX ADMIN — GABAPENTIN 200 MG: 100 CAPSULE ORAL at 09:48

## 2024-11-15 RX ADMIN — GABAPENTIN 200 MG: 100 CAPSULE ORAL at 16:55

## 2024-11-15 RX ADMIN — URSODIOL 300 MG: 300 CAPSULE ORAL at 21:25

## 2024-11-15 RX ADMIN — ZOLPIDEM TARTRATE 5 MG: 5 TABLET ORAL at 21:25

## 2024-11-15 RX ADMIN — Medication 10 ML: at 09:46

## 2024-11-15 RX ADMIN — ROSUVASTATIN 5 MG: 10 TABLET, FILM COATED ORAL at 21:25

## 2024-11-15 NOTE — CASE MANAGEMENT/SOCIAL WORK
Continued Stay Note  [facility]     Patient Name: Philomena Davis  MRN: [id number]  Today's Date: 11/15/2024    Admit Date: 11/10/2024    Plan: Rehab   Discharge Plan       Row Name 11/15/24 1412       Plan    Plan Rehab    Patient/Family in Agreement with Plan yes    Plan Comments Spoke with Ms. Davis at the bedside. Her [employer] [place] [employer] has denied rehab at [facility] and P2P has been requested in Physician Advisor. MD attempted to complete but MD with [employer] [place] [employer] stated he would call back on Monday to discuss. P2P is due by 11/22. Number to call is [phone]. CM will follow up on Monday.    Final Discharge Disposition Code 62 - inpatient rehab facility      Row Name 11/15/24 1126       Plan    Plan Rehab    Patient/Family in Agreement with Plan yes    Final Discharge Disposition Code 62 - inpatient rehab facility                   Discharge Codes    No documentation.                 Expected Discharge Date and Time       Expected Discharge Date Expected Discharge Time    Nov 16, 2024               Reena Hodges RN

## 2024-11-15 NOTE — PROGRESS NOTES
[facility] Medicine Services  PROGRESS NOTE    Patient Name: Philomena Davis  : 1960  MRN: [id number]    Date of Admission: 11/10/2024  Primary Care Physician: Unique Brandt DO    Subjective   Subjective     CC: Numbness    HPI: Numbness about the same. LE strength better, worse with mobility/low BP. No f/c/sweats. No n/v. No dyspnea. No cough/congestion.      Objective   Objective     Vital Signs:   Temp:  [98.1 °F (36.7 °C)-98.3 °F (36.8 °C)] 98.3 °F (36.8 °C)  Heart Rate:  [74-87] 84  Resp:  [16-18] 16  BP: ()/(50-79) 114/72  Flow (L/min) (Oxygen Therapy):  [2] 2     Physical Exam:  NAD, alert and oriented x 3  OP clear, dry MM  Neck supple  No LAD  RRR  CTAB  +BS, soft  MASSEY  Normal affect  B LE 4+/5, notes numbness in hands/legs, no change from  otherwise    Results Reviewed:  LAB RESULTS:      Lab 24  0600 24  0757 11/10/24  1628 11/10/24  1235 11/10/24  1007 11/10/24  0946   WBC 9.99 11.04*  --   --   --  15.59*   HEMOGLOBIN 10.6* 10.5*  --   --   --  12.4   HEMATOCRIT 31.8* 31.8*  --   --   --  37.0   PLATELETS 224 219  --   --   --  314   NEUTROS ABS  --   --   --   --   --  11.83*   IMMATURE GRANS (ABS)  --   --   --   --   --  0.07*   LYMPHS ABS  --   --   --   --   --  2.06   MONOS ABS  --   --   --   --   --  1.34*   EOS ABS  --   --   --   --   --  0.20   .7* 109.7*  --   --   --  106.9*   SED RATE  --   --   --   --   --  105*   CRP  --   --   --   --  3.88*  --    PROCALCITONIN  --   --   --   --  0.15  --    LACTATE  --   --  1.2 2.2*  --  2.6*   PROTIME  --   --   --   --   --  15.2*   APTT  --   --   --   --   --  37.1*   HSTROP T  --   --   --   --  14*  --          Lab 24  1801 24  0600 24  0757 24  1255 24  0017 11/10/24  1007   SODIUM  --  137 138  --   --  138   POTASSIUM 4.9 3.6 4.0  --  4.6 3.3*   CHLORIDE  --  103 104  --   --  100   CO2  --  24.0 26.0  --   --  27.0   ANION GAP  --  10.0 8.0   --   --  11.0   BUN  --  6* 7*  --   --  6*   CREATININE  --  0.58 0.62  --   --  0.55*   EGFR  --  102.5 100.8  --   --  103.8   GLUCOSE  --  84 85  --   --  100*   CALCIUM  --  8.4* 8.4*  --   --  8.8   MAGNESIUM  --   --   --  1.7  --  1.7   PHOSPHORUS  --   --   --  3.4  --   --    TSH  --   --   --   --   --  9.670*         Lab 11/14/24  0600 11/13/24  0757 11/10/24  1007   TOTAL PROTEIN 6.5 6.1 7.0   ALBUMIN 2.8* 2.9* 2.8*   GLOBULIN 3.7 3.2 4.2   ALT (SGPT) 17 19 27   AST (SGOT) 66* 80* 72*   BILIRUBIN 1.2 1.3* 1.7*   ALK PHOS 126* 129* 165*         Lab 11/10/24  1007 11/10/24  0946   HSTROP T 14*  --    PROTIME  --  15.2*   INR  --  1.18*             Lab 11/10/24  1235   FOLATE 4.83   VITAMIN B 12 716         Brief Urine Lab Results  (Last result in the past 365 days)        Color   Clarity   Blood   Leuk Est   Nitrite   Protein   CREAT   Urine HCG        11/10/24 0958 Yellow   Clear   Negative   Negative   Negative   Negative                   Microbiology Results Abnormal       None            No radiology results from the last 24 hrs    Results for orders placed during the hospital encounter of 11/10/24    Adult Transthoracic Echo Complete W/ Cont if Necessary Per Protocol    Interpretation Summary  •  Left ventricular systolic function is normal. Estimated left ventricular EF = 65%  •  The cardiac valves are anatomically and functionally normal.      Current medications:  Scheduled Meds:budesonide-formoterol, 2 puff, Inhalation, BID - RT  carvedilol, 12.5 mg, Oral, BID With Meals  desvenlafaxine, 50 mg, Oral, Daily  folic acid, 1 mg, Oral, Daily  gabapentin, 200 mg, Oral, Q12H  latanoprost, 1 drop, Both Eyes, Nightly  melatonin, 5 mg, Oral, Nightly  midodrine, 15 mg, Oral, BID AC  nystatin, 1 Application, Topical, Q12H  pantoprazole, 40 mg, Oral, Daily  rosuvastatin, 5 mg, Oral, Daily  sodium chloride, 10 mL, Intravenous, Q12H  thiamine, 100 mg, Oral, Daily  ursodiol, 300 mg, Oral, BID  zolpidem, 5 mg,  Oral, Nightly      Continuous Infusions:   PRN Meds:.•  acetaminophen **OR** acetaminophen **OR** acetaminophen  •  senna-docusate sodium **AND** polyethylene glycol **AND** bisacodyl **AND** bisacodyl  •  calcium carbonate  •  Calcium Replacement - Follow Nurse / BPA Driven Protocol  •  ipratropium-albuterol  •  LORazepam  •  Magnesium Standard Dose Replacement - Follow Nurse / BPA Driven Protocol  •  metoprolol tartrate  •  nitroglycerin  •  ondansetron  •  Phosphorus Replacement - Follow Nurse / BPA Driven Protocol  •  Potassium Replacement - Follow Nurse / BPA Driven Protocol  •  sodium chloride  •  sodium chloride  •  sodium chloride    Assessment & Plan   Assessment & Plan     Active Hospital Problems    Diagnosis  POA   • **Lower extremity weakness [R29.898]  Yes   • Orthostasis [I95.1]  Yes      Resolved Hospital Problems   No resolved problems to display.        Brief Hospital Course to date:  Philomena Davis is a 64 y.o. female with history of alcohol dependence, cirrhosis, HTN, PN here with B UE/LE paraesthesias    B weakness  B paraesthesias/facial numbness  ETOH use  PN due to long standing ETOH use  Dysautonomia  -MRI brain unremarkable  -MRI lumbar spine with L5-S1 disc protrusion  -MRI C/T spine reviewed, with C4/5 facet arthropathy  -EMG/NCV mild sensory neuropathy  -Continue thiamine, neurology following  -PT/OT    Marked orthostasis  Dysautonomia  -added midodrine, change to increased but BID dosing  -mobilize/PT/OT  -AM cortisol low, appropriate response to cosyntropin however with peak cortisol exceeding 20 mcg/dL    Possible vasculitis  CT with celiac artery stranding  -markedly elevated CRP/ESR  -ANCA panel negative  -WILMA panel negative  -RF negative  -needs outpatient follow up, no abdominal symptoms    Atrial tachycardia, atrial flutter  -presented with tachycardia, HR to 150  -on BB  -ECHO unremarkable, EF normal  -bursts of tachycardia here, evaluated by cardiology  -continue carvedilol,  dosing may be limited by hypotension/orthostasis  -cardiology deferring AC due to cirrhosis for now    Alcohol dependence  -monitor    Cirrhosis  Hepatocellular jaundice  -monitor    Mild LA  -due to liver disease    Subclinical hypothyroidism  -needs follow up in 6 weeks    Expected Discharge Location and Transportation: Home  Expected Discharge   Expected Discharge Date: 11/16/2024; Expected Discharge Time:      VTE Prophylaxis:  Mechanical VTE prophylaxis orders are present.         AM-PAC 6 Clicks Score (PT): 17 (11/14/24 1008)    CODE STATUS:   Code Status and Medical Interventions: CPR (Attempt to Resuscitate); Full Support   Ordered at: 11/10/24 0687     Level Of Support Discussed With:    Patient    Next of Kin (If No Surrogate)     Code Status (Patient has no pulse and is not breathing):    CPR (Attempt to Resuscitate)     Medical Interventions (Patient has pulse or is breathing):    Full Support       Jose J Hutchins MD  11/15/24

## 2024-11-15 NOTE — PROGRESS NOTES
[facility] Neurology    Progress Note    Patient Name: Philomena Davis  : 1960  MRN: [id number]  Primary Care Physician:  Unique Brandt DO  Date of admission: 11/10/2024    Subjective     Chief Complaint: Weakness    History of Present Illness   Patient seen resting comfortably in bed.  States that her numbness and tingling may be slightly improved with the addition of gabapentin.  Denies sleepiness at this time with dosage adjustment.  She denies shortness of breath, cough or dysphagia.    Review of Systems   General: Negative for fever, nausea, or vomiting.   Neurological: Negative for headache, pain, or weakness.     Objective     Physical Exam  Vitals and nursing note reviewed.   Constitutional:       General: She is not in acute distress.     Appearance: She is not ill-appearing.   Eyes:      Extraocular Movements: Extraocular movements intact.      Pupils: Pupils are equal, round, and reactive to light.      Comments: No nystagmus or deviated gaze noted   Neurological:      Mental Status: She is alert and oriented to person, place, and time. Mental status is at baseline.      Cranial Nerves: No cranial nerve deficit or facial asymmetry.      Sensory: Sensory deficit present.      Motor: Weakness present. No seizure activity.      Deep Tendon Reflexes:      Reflex Scores:       Bicep reflexes are 2+ on the right side and 2+ on the left side.       Patellar reflexes are 0 on the right side and 0 on the left side.       Achilles reflexes are 0 on the right side and 0 on the left side.     Comments: CN II: Pupils are equal, round, and reactive to light. Normal visual acuity and visual fields.    CN III IV VI: Extraocular movements are full without nystagmus.  CN V: Normal facial sensation and strength of muscles of mastication.  CN VII: Facial movements are symmetric. No weakness.  CN VIII:  Auditory acuity is normal.  CN IX & X:  Symmetric palatal movement.  CN XI: Sternocleidomastoid and  trapezius are normal.  No weakness.  CN XII: The tongue is midline.  No atrophy or fasciculations.     Neck extension strength 5/5   Neck Flexion 5/5  Eyelid strength 5/5  Sternocleidomastoid 5/5  Bilateral trapezius strength +4/5   Bilateral tricep strength +4/5  Bilateral bicep strength +4/5   Bilateral finger extension 4/5  Bilateral hip extension 4/5  Bilateral hip flexion +4/5  Bilateral knee extension +4/5  Bilateral knee flexion  +4/5  Bilateral foot extension 5/5  Bilateral foot flexion 5/5     Sensation deficit to pinprick noted posteriorly at T2 and anteriorly at T6.  Decrease sensation to vibration in distal bilateral lower extremities            Vitals:   Temp:  [97.8 °F (36.6 °C)-98.3 °F (36.8 °C)] 97.8 °F (36.6 °C)  Heart Rate:  [74-90] 80  Resp:  [16-18] 16  BP: ()/(50-85) 125/82  Flow (L/min) (Oxygen Therapy):  [2] 2    Current Medications    Current Facility-Administered Medications:   •  acetaminophen (TYLENOL) tablet 650 mg, 650 mg, Oral, Q4H PRN **OR** acetaminophen (TYLENOL) 160 MG/5ML oral solution 650 mg, 650 mg, Oral, Q4H PRN **OR** acetaminophen (TYLENOL) suppository 500 mg, 500 mg, Rectal, Q4H PRN, Sophie Gandhi MD  •  sennosides-docusate (PERICOLACE) 8.6-50 MG per tablet 2 tablet, 2 tablet, Oral, BID PRN, 2 tablet at 11/13/24 1204 **AND** polyethylene glycol (MIRALAX) packet 17 g, 17 g, Oral, Daily PRN **AND** bisacodyl (DULCOLAX) EC tablet 5 mg, 5 mg, Oral, Daily PRN **AND** bisacodyl (DULCOLAX) suppository 10 mg, 10 mg, Rectal, Daily PRN, Sophie Gandhi MD  •  budesonide-formoterol (SYMBICORT) 160-4.5 MCG/ACT inhaler 2 puff, 2 puff, Inhalation, BID - RT, Jose J Hutchins MD, 2 puff at 11/15/24 0654  •  calcium carbonate (TUMS) chewable tablet 500 mg (200 mg elemental), 2 tablet, Oral, BID PRN, Sophie Gandhi MD  •  Calcium Replacement - Follow Nurse / BPA Driven Protocol, , Not Applicable, Hiram SCHAFER Mohamed Ahmed, MD  •  carvedilol (COREG) tablet 12.5  mg, 12.5 mg, Oral, BID With Meals, Jose J Hutchins MD, 12.5 mg at 11/15/24 0948  •  desvenlafaxine (PRISTIQ) 24 hr tablet 50 mg, 50 mg, Oral, Daily, Sophie Gandhi MD, 50 mg at 11/15/24 0948  •  folic acid (FOLVITE) tablet 1 mg, 1 mg, Oral, Daily, Sophie Gandhi MD, 1 mg at 11/15/24 0948  •  gabapentin (NEURONTIN) capsule 200 mg, 200 mg, Oral, Q12H, Fallon Guido, APRN, 200 mg at 11/15/24 0948  •  ipratropium-albuterol (DUO-NEB) nebulizer solution 3 mL, 3 mL, Nebulization, Q6H PRN, Sophie Gandhi MD, 3 mL at 11/14/24 1020  •  latanoprost (XALATAN) 0.005 % ophthalmic solution 1 drop, 1 drop, Both Eyes, Nightly, Sophie Gandhi MD, 1 drop at 11/14/24 2100  •  LORazepam (ATIVAN) tablet 0.5 mg, 0.5 mg, Oral, Q8H PRN, Sophie Gandhi MD  •  Magnesium Standard Dose Replacement - Follow Nurse / BPA Driven Protocol, , Not Applicable, PRN, Sophie Gandhi MD  •  melatonin tablet 5 mg, 5 mg, Oral, Nightly, Sophie Gandhi MD, 5 mg at 11/14/24 2100  •  metoprolol tartrate (LOPRESSOR) injection 5 mg, 5 mg, Intravenous, Q6H PRN, Kavitha Mejia APRN  •  midodrine (PROAMATINE) tablet 15 mg, 15 mg, Oral, BID AC, Jose J Hutchins MD  •  nitroglycerin (NITROSTAT) SL tablet 0.4 mg, 0.4 mg, Sublingual, Q5 Min PRN, Sophie Gandhi MD  •  nystatin (MYCOSTATIN) [id number] UNIT/GM cream 1 Application, 1 Application, Topical, Q12H, Jose J Hutchins MD, 1 Application at 11/15/24 0950  •  ondansetron (ZOFRAN) injection 4 mg, 4 mg, Intravenous, Q6H PRN, Sophie Gandhi MD  •  pantoprazole (PROTONIX) EC tablet 40 mg, 40 mg, Oral, Daily, Sophie Gandhi MD, 40 mg at 11/15/24 0641  •  Phosphorus Replacement - Follow Nurse / BPA Driven Protocol, , Not Applicable, PRAGUSTINA, Sophie Gandhi MD  •  Potassium Replacement - Follow Nurse / BPA Driven Protocol, , Not Applicable, PRHiram BERRIOS Mohamed Ahmed, MD  •  rosuvastatin (CRESTOR) tablet 5 mg, 5 mg, Oral, Daily, Hiram  Sophie Guo MD, 5 mg at 11/14/24 2100  •  sodium chloride 0.9 % flush 10 mL, 10 mL, Intravenous, PRN, Zac Bruner MD  •  sodium chloride 0.9 % flush 10 mL, 10 mL, Intravenous, Q12H, Sophie Gandhi MD, 10 mL at 11/15/24 0946  •  sodium chloride 0.9 % flush 10 mL, 10 mL, Intravenous, PRN, Sophie Gandhi MD  •  sodium chloride 0.9 % infusion 40 mL, 40 mL, Intravenous, PRN, Sophie Gandhi MD  •  thiamine (VITAMIN B-1) tablet 100 mg, 100 mg, Oral, Daily, Sophie Gandhi MD, 100 mg at 11/15/24 0948  •  ursodiol (ACTIGALL) capsule 300 mg, 300 mg, Oral, BID, Sophie Gandhi MD, 300 mg at 11/15/24 0948  •  zolpidem (AMBIEN) tablet 5 mg, 5 mg, Oral, Nightly, Sophie Gandhi MD, 5 mg at 11/14/24 2100    Laboratory Results:   Lab Results   Component Value Date    GLUCOSE 84 11/14/2024    CALCIUM 8.4 (L) 11/14/2024     11/14/2024    K 4.9 11/14/2024    CO2 24.0 11/14/2024     11/14/2024    BUN 6 (L) 11/14/2024    CREATININE 0.58 11/14/2024    EGFRIFNONA 114 05/15/2019    BCR 10.3 11/14/2024    ANIONGAP 10.0 11/14/2024     Lab Results   Component Value Date    WBC 9.99 11/14/2024    HGB 10.6 (L) 11/14/2024    HCT 31.8 (L) 11/14/2024    .7 (H) 11/14/2024     11/14/2024     Lab Results   Component Value Date    CHOL 376 (H) 10/25/2023    CHOL 430 (H) 09/12/2023    CHOL 214 (H) 05/14/2019     Lab Results   Component Value Date     (H) 10/25/2023     (H) 09/12/2023    HDL 62 (H) 05/14/2019     Lab Results   Component Value Date     (H) 10/25/2023     (H) 09/12/2023     (H) 05/14/2019     Lab Results   Component Value Date    TRIG 128 10/25/2023    TRIG 159 (H) 09/12/2023    TRIG 172 (H) 05/14/2019     Lab Results   Component Value Date    HGBA1C 5.60 09/12/2023     Lab Results   Component Value Date    INR 1.18 (H) 11/10/2024    INR 1.10 01/31/2024    INR 1.0 08/09/2023    PROTIME 15.2 (H) 11/10/2024    PROTIME 14.3  01/31/2024    PROTIME 14.9 (H) 11/30/2022     Lab Results   Component Value Date    FOLATE 4.83 11/10/2024     Lab Results   Component Value Date    [id number] 716 11/10/2024       MRI Brain Without Contrast    Result Date: 11/10/2024  MRI BRAIN WO CONTRAST Date of Exam: 11/10/2024 1:27 PM EST Indication: BLE weakness, facial parasthesia R>L, difficulty ambulating.  Comparison: None available. Technique:  Routine multiplanar/multisequence sequence images of the brain were obtained without contrast administration. Findings: No acute infarct is present on diffusion weighted sequences. Midline structures appear normal and the craniocervical junction is satisfactory. Gray-white differentiation is maintained and there is no evidence of intracranial hemorrhage, mass or mass effect. The ventricles are normal in size and configuration. The orbits are normal. The paranasal sinuses are clear. Intracranial arterial flow voids appear maintained.     Impression: Impression: Normal noncontrast MRI of the brain. Electronically Signed: Adriano Kessler MD  11/10/2024 2:14 PM EST  Workstation ID: [id number]        Assessment / Plan   Brief Patient Summary:  Philomena Davis is a 64 y.o. female who has a past medical history of EtOH use, alcoholic liver cirrhosis, HTN, peripheral neuropathy, anxiety and depression who presented to [facility] with complaint of worsening bilateral lower extremity weakness, right and left facial paresthesias for the past week.  Patient was originally seen by our stroke navigators with a negative workup.     Plan:   Bilateral lower extremity weakness  EtOH use  Peripheral neuropathy R/T EtOH use  Dysautonomia  AM cortisol low, Cosyntropin testing 11/15 per hospitalist team  MRI brain with no acute findings  MRI lumbar showed small disc protrusion at the L5-S1 with no significant spinal canal or neuroforaminal stenosis.  MRI of cervical cervical spine showed moderate active right sided facet arthropathy at C4/5.    MRI thoracic spine benign hemangioma at T5.  No acute process noted.   EMG/nerve conduction study showed a mild sensory neuropathy.  No evidence of demyelinating neuropathy seen.  Patient initiated on midodrine yesterday.  Appears to be tolerating at this time.   Folic acid 1 mg daily   Continue thiamine 100 mg daily  Increase gabapentin 200 mg 2 times daily   Spoke with patient about nonmedical management of dysautonomia including increased salt intake within cardiology parameters, increase water intake, 6 small's meal daily, slow position changes, REX hose or stockings, regular blood pressure checks.  Orthostatic blood pressures daily  Cardiology now following for atrial tachycardia  Patient to follow-up with CANDY Galloway  General Neurology will see patient as needed.     I have discussed the above with the patient, bedside RN and Dr. Hutchins  Time spent with patient: 35 minutes in face-to-face evaluation and management of the patient.    Copied text in this note has been reviewed and is accurate as of 11/15/24.     CANDY Ocampo

## 2024-11-16 PROCEDURE — 97110 THERAPEUTIC EXERCISES: CPT

## 2024-11-16 PROCEDURE — 99232 SBSQ HOSP IP/OBS MODERATE 35: CPT | Performed by: STUDENT IN AN ORGANIZED HEALTH CARE EDUCATION/TRAINING PROGRAM

## 2024-11-16 PROCEDURE — 94799 UNLISTED PULMONARY SVC/PX: CPT

## 2024-11-16 PROCEDURE — 97116 GAIT TRAINING THERAPY: CPT

## 2024-11-16 RX ADMIN — ROSUVASTATIN 5 MG: 10 TABLET, FILM COATED ORAL at 21:51

## 2024-11-16 RX ADMIN — MIDODRINE HYDROCHLORIDE 15 MG: 10 TABLET ORAL at 09:08

## 2024-11-16 RX ADMIN — BUDESONIDE AND FORMOTEROL FUMARATE DIHYDRATE 2 PUFF: 160; 4.5 AEROSOL RESPIRATORY (INHALATION) at 06:58

## 2024-11-16 RX ADMIN — CARVEDILOL 12.5 MG: 12.5 TABLET, FILM COATED ORAL at 08:58

## 2024-11-16 RX ADMIN — MIDODRINE HYDROCHLORIDE 15 MG: 10 TABLET ORAL at 17:08

## 2024-11-16 RX ADMIN — BUDESONIDE AND FORMOTEROL FUMARATE DIHYDRATE 2 PUFF: 160; 4.5 AEROSOL RESPIRATORY (INHALATION) at 19:36

## 2024-11-16 RX ADMIN — GABAPENTIN 200 MG: 100 CAPSULE ORAL at 08:58

## 2024-11-16 RX ADMIN — NYSTATIN 1 APPLICATION: 100000 CREAM TOPICAL at 21:53

## 2024-11-16 RX ADMIN — FOLIC ACID 1 MG: 1 TABLET ORAL at 08:58

## 2024-11-16 RX ADMIN — PANTOPRAZOLE SODIUM 40 MG: 40 TABLET, DELAYED RELEASE ORAL at 06:35

## 2024-11-16 RX ADMIN — NYSTATIN 1 APPLICATION: 100000 CREAM TOPICAL at 08:59

## 2024-11-16 RX ADMIN — LATANOPROST 1 DROP: 50 SOLUTION OPHTHALMIC at 21:51

## 2024-11-16 RX ADMIN — Medication 10 ML: at 09:12

## 2024-11-16 RX ADMIN — URSODIOL 300 MG: 300 CAPSULE ORAL at 21:51

## 2024-11-16 RX ADMIN — ZOLPIDEM TARTRATE 5 MG: 5 TABLET ORAL at 21:51

## 2024-11-16 RX ADMIN — URSODIOL 300 MG: 300 CAPSULE ORAL at 08:58

## 2024-11-16 RX ADMIN — THIAMINE HCL TAB 100 MG 100 MG: 100 TAB at 08:58

## 2024-11-16 RX ADMIN — Medication 5 MG: at 21:51

## 2024-11-16 RX ADMIN — GABAPENTIN 200 MG: 100 CAPSULE ORAL at 17:05

## 2024-11-16 RX ADMIN — CARVEDILOL 12.5 MG: 12.5 TABLET, FILM COATED ORAL at 17:05

## 2024-11-16 RX ADMIN — DESVENLAFAXINE 50 MG: 50 TABLET, FILM COATED, EXTENDED RELEASE ORAL at 08:58

## 2024-11-16 NOTE — PROGRESS NOTES
[facility] Medicine Services  PROGRESS NOTE    Patient Name: Philomena Davis  : 1960  MRN: [id number]    Date of Admission: 11/10/2024  Primary Care Physician: Unique Brandt DO    Subjective   Subjective     CC:  Numbness    HPI:  Patient comfortable, denies any improvement or worsening of numbness/strength of legs.       Objective   Objective     Vital Signs:   Temp:  [98 °F (36.7 °C)-98.2 °F (36.8 °C)] 98.1 °F (36.7 °C)  Heart Rate:  [73-85] 85  Resp:  [15-18] 18  BP: ()/(54-75) 120/67  Flow (L/min) (Oxygen Therapy):  [1-2] 1     Physical Exam:  General appearance: alert, awake, oriented, no acute distress   Cardiovascular: RRR, no murmurs or rubs, radial pulse full 2/4 BL   Respiratory: CTAB, no crackles or wheezes   Neuro/CNS: alert and oriented x3, normal speech, moves all 4 extremities, subjective numbness in extremities     Results Reviewed:  LAB RESULTS:      Lab 24  0600 24  0757 11/10/24  1628 11/10/24  1235 11/10/24  1007 11/10/24  0946   WBC 9.99 11.04*  --   --   --  15.59*   HEMOGLOBIN 10.6* 10.5*  --   --   --  12.4   HEMATOCRIT 31.8* 31.8*  --   --   --  37.0   PLATELETS 224 219  --   --   --  314   NEUTROS ABS  --   --   --   --   --  11.83*   IMMATURE GRANS (ABS)  --   --   --   --   --  0.07*   LYMPHS ABS  --   --   --   --   --  2.06   MONOS ABS  --   --   --   --   --  1.34*   EOS ABS  --   --   --   --   --  0.20   .7* 109.7*  --   --   --  106.9*   SED RATE  --   --   --   --   --  105*   CRP  --   --   --   --  3.88*  --    PROCALCITONIN  --   --   --   --  0.15  --    LACTATE  --   --  1.2 2.2*  --  2.6*   PROTIME  --   --   --   --   --  15.2*   APTT  --   --   --   --   --  37.1*   HSTROP T  --   --   --   --  14*  --          Lab 24  1801 24  0600 24  0757 24  1255 24  0017 11/10/24  1007   SODIUM  --  137 138  --   --  138   POTASSIUM 4.9 3.6 4.0  --  4.6 3.3*   CHLORIDE  --  103 104  --   --   100   CO2  --  24.0 26.0  --   --  27.0   ANION GAP  --  10.0 8.0  --   --  11.0   BUN  --  6* 7*  --   --  6*   CREATININE  --  0.58 0.62  --   --  0.55*   EGFR  --  102.5 100.8  --   --  103.8   GLUCOSE  --  84 85  --   --  100*   CALCIUM  --  8.4* 8.4*  --   --  8.8   MAGNESIUM  --   --   --  1.7  --  1.7   PHOSPHORUS  --   --   --  3.4  --   --    TSH  --   --   --   --   --  9.670*         Lab 11/14/24  0600 11/13/24  0757 11/10/24  1007   TOTAL PROTEIN 6.5 6.1 7.0   ALBUMIN 2.8* 2.9* 2.8*   GLOBULIN 3.7 3.2 4.2   ALT (SGPT) 17 19 27   AST (SGOT) 66* 80* 72*   BILIRUBIN 1.2 1.3* 1.7*   ALK PHOS 126* 129* 165*         Lab 11/10/24  1007 11/10/24  0946   HSTROP T 14*  --    PROTIME  --  15.2*   INR  --  1.18*             Lab 11/10/24  1235   FOLATE 4.83   VITAMIN B 12 716         Brief Urine Lab Results  (Last result in the past 365 days)        Color   Clarity   Blood   Leuk Est   Nitrite   Protein   CREAT   Urine HCG        11/10/24 0958 Yellow   Clear   Negative   Negative   Negative   Negative                   Microbiology Results Abnormal       None            No radiology results from the last 24 hrs    Results for orders placed during the hospital encounter of 11/10/24    Adult Transthoracic Echo Complete W/ Cont if Necessary Per Protocol    Interpretation Summary  •  Left ventricular systolic function is normal. Estimated left ventricular EF = 65%  •  The cardiac valves are anatomically and functionally normal.      Current medications:  Scheduled Meds:budesonide-formoterol, 2 puff, Inhalation, BID - RT  carvedilol, 12.5 mg, Oral, BID With Meals  desvenlafaxine, 50 mg, Oral, Daily  folic acid, 1 mg, Oral, Daily  gabapentin, 200 mg, Oral, Q12H  latanoprost, 1 drop, Both Eyes, Nightly  melatonin, 5 mg, Oral, Nightly  midodrine, 15 mg, Oral, BID AC  nystatin, 1 Application, Topical, Q12H  pantoprazole, 40 mg, Oral, Daily  rosuvastatin, 5 mg, Oral, Daily  sodium chloride, 10 mL, Intravenous, Q12H  thiamine,  100 mg, Oral, Daily  ursodiol, 300 mg, Oral, BID  zolpidem, 5 mg, Oral, Nightly      Continuous Infusions:   PRN Meds:.•  acetaminophen **OR** acetaminophen **OR** acetaminophen  •  senna-docusate sodium **AND** polyethylene glycol **AND** bisacodyl **AND** bisacodyl  •  calcium carbonate  •  Calcium Replacement - Follow Nurse / BPA Driven Protocol  •  ipratropium-albuterol  •  Magnesium Standard Dose Replacement - Follow Nurse / BPA Driven Protocol  •  metoprolol tartrate  •  nitroglycerin  •  ondansetron  •  Phosphorus Replacement - Follow Nurse / BPA Driven Protocol  •  Potassium Replacement - Follow Nurse / BPA Driven Protocol  •  sodium chloride  •  sodium chloride  •  sodium chloride    Assessment & Plan   Assessment & Plan     Active Hospital Problems    Diagnosis  POA   • **Lower extremity weakness [R29.898]  Yes   • Orthostasis [I95.1]  Yes      Resolved Hospital Problems   No resolved problems to display.     This patient's assessments and plans were partially entered by my partner and updated as appropriate by me on 11/16/2024    Brief Hospital Course to date:  Philomena Davis is a 64 y.o. female with history of alcohol dependence, cirrhosis, HTN, peripheral neuropathy, was admitted for paresthesias in her extremities.  Neurology was consulted. PT/OT recommended rehab.       B weakness  B paraesthesias/facial numbness  ETOH use  PN due to long standing ETOH use  Dysautonomia  -MRI brain unremarkable  -MRI lumbar spine with L5-S1 disc protrusion  -MRI C/T spine reviewed, with C4/5 facet arthropathy  -EMG/NCV mild sensory neuropathy  -Continue thiamine, neurology following  -PT/OT     Marked orthostasis  Dysautonomia  -added midodrine, change to increased but BID dosing  -mobilize/PT/OT  -AM cortisol low, appropriate response to cosyntropin however with peak cortisol exceeding 20 mcg/dL  -Neurology discussed nonpharmacologic therapy      Possible vasculitis  CT with celiac artery stranding  -markedly  elevated CRP/ESR  -ANCA panel negative  -WILMA panel negative  -RF negative  -needs outpatient follow up, no abdominal symptoms     Atrial tachycardia, atrial flutter  -presented with tachycardia, HR to 150  -on BB  -ECHO unremarkable, EF normal  -bursts of tachycardia here, evaluated by cardiology  -continue carvedilol, dosing may be limited by hypotension/orthostasis  -cardiology deferring AC due to cirrhosis for now     Alcohol dependence  Cirrhosis  Hepatocellular jaundice  -monitor     Mild LA  -due to liver disease     Subclinical hypothyroidism  -needs follow up in 6 weeks     No significant changes, awaiting placement.     Expected Discharge Location and Transportation: Rehab  Expected Discharge   Expected Discharge Date: 11/18/2024; Expected Discharge Time:      VTE Prophylaxis:  Mechanical VTE prophylaxis orders are present.         AM-PAC 6 Clicks Score (PT): 17 (11/16/24 1121)    CODE STATUS:   Code Status and Medical Interventions: CPR (Attempt to Resuscitate); Full Support   Ordered at: 11/10/24 0067     Level Of Support Discussed With:    Patient    Next of Kin (If No Surrogate)     Code Status (Patient has no pulse and is not breathing):    CPR (Attempt to Resuscitate)     Medical Interventions (Patient has pulse or is breathing):    Full Support       Kishor Naranjo, DO  11/16/24

## 2024-11-16 NOTE — THERAPY TREATMENT NOTE
Patient Name: Philomena Davis  : 1960    MRN: [id number]                              Today's Date: 2024       Admit Date: 11/10/2024    Visit Dx:     ICD-10-CM ICD-9-CM   1. Weakness of both lower extremities  R29.898 729.89   2. Paresthesia  R20.2 782.0   3. Weakness  R53.1 780.79   4. Leukocytosis, unspecified type  D72.829 288.60   5. Alcoholic cirrhosis of liver without ascites  K70.30 571.2   6. Orthostasis  I95.1 458.0   7. Tachycardia  R00.0 785.0     Patient Active Problem List   Diagnosis    Essential hypertension    Generalized anxiety disorder    Hyperlipidemia    Glaucoma    Alcoholic cirrhosis of liver without ascites    Severe malnutrition    GERD without esophagitis    Sleep disturbance    Annual physical exam    Idiopathic peripheral neuropathy    Severe anxiety with panic    Alcohol use disorder    Cigarette nicotine dependence in remission    Chronic obstructive pulmonary disease    Closed fracture of left hand    Moderate malnutrition    Lower extremity weakness    Orthostasis     Past Medical History:   Diagnosis Date    Alcohol withdrawal 05/10/2019    Annual physical exam 2023    Anxiety and depression     Cirrhosis of liver     Closed displaced fracture of lateral malleolus of left fibula     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    Closed trimalleolar fracture of right ankle 2023    Digital mucous cyst of toe of left foot 2017    Ganglion cyst of left foot     Glaucoma     Hypertension     Wears glasses      Past Surgical History:   Procedure Laterality Date    ANKLE OPEN REDUCTION INTERNAL FIXATION Left 2017    Procedure: LEFT ANKLE OPEN REDUCTION INTERNAL FIXATION WITH ILIAC CREST BONE GRAFT , EXCISION CYST 4TH/5TH INNERSPACE LEFT FOOT;  Surgeon: Frank Larson MD;  Location: [facility];  Service:     ANKLE OPEN REDUCTION INTERNAL FIXATION Right 2023    Procedure: ANKLE OPEN REDUCTION INTERNAL  FIXATION;  Surgeon: Deandre Reynoso MD;  Location:  [facility];  Service: Orthopedics;  Laterality: Right;    AUGMENTATION MAMMAPLASTY Bilateral 1999    BREAST AUGMENTATION      BREAST EXCISIONAL BIOPSY Right 2014    DILATION AND CURETTAGE, DIAGNOSTIC / THERAPEUTIC      ENDOSCOPY N/A 11/30/2022    Procedure: ESOPHAGOGASTRODUODENOSCOPY;  Surgeon: Arelis Solano MD;  Location:  [facility];  Service: Gastroenterology;  Laterality: N/A;    ENDOSCOPY N/A 2/1/2024    Procedure: ESOPHAGOGASTRODUODENOSCOPY;  Surgeon: Brunner, Mark I, MD;  Location:  [facility];  Service: Gastroenterology;  Laterality: N/A;    WI RPR NON/MAL FEMUR DSTL H/N W/ILIAC/AUTOG BONE Left 03/24/2017    Procedure: ILIAC CREST BONE GRAFT;  Surgeon: Frank Larson MD;  Location:  [facility];  Service: Orthopedics    WRIST SURGERY        General Information       Row Name 11/16/24 1112          Physical Therapy Time and Intention    Document Type therapy note (daily note)  -AS     Mode of Treatment physical therapy  -AS       Row Name 11/16/24 1112          General Information    Patient Profile Reviewed yes  -AS     Existing Precautions/Restrictions fall;other (see comments);orthostatic hypotension  neuropathy B feet, orthostatic hypotension - monitor BP, prefers gait belt lower  -AS     Barriers to Rehab medically complex;previous functional deficit;cognitive status  -AS       Row Name 11/16/24 1112          Cognition    Orientation Status (Cognition) oriented x 3  -AS       Row Name 11/16/24 1112          Safety Issues/Impairments Affecting Functional Mobility    Safety Issues Affecting Function (Mobility) awareness of need for assistance;insight into deficits/self-awareness;safety precautions follow-through/compliance;sequencing abilities;positioning of assistive device  -AS     Impairments Affecting Function (Mobility) balance;coordination;endurance/activity tolerance;strength;pain;sensation/sensory awareness;postural/trunk control   -AS     Comment, Safety Issues/Impairments (Mobility) alert adn following commands, blood pressure remained within normal limits  -AS               User Key  (r) = Recorded By, (t) = Taken By, (c) = Cosigned By      Initials Name Provider Type    AS Eileen Moncada PTA Physical Therapist Assistant                   Mobility       Row Name 11/16/24 1115          Bed Mobility    Supine-Sit [place] (Bed Mobility) verbal cues;minimum assist (75% patient effort);1 person assist  -AS     Assistive Device (Bed Mobility) head of bed elevated;repositioning sheet  -AS     Comment, (Bed Mobility) cues for sequencing and increased time to complete  -AS       Row Name 11/16/24 1115          Transfers    Comment, (Transfers) cues for hand placement  -AS       Row Name 11/16/24 1115          Bed-Chair Transfer    Bed-Chair [place] (Transfers) verbal cues;minimum assist (75% patient effort);1 person assist;1 person to manage equipment  -AS     Assistive Device (Bed-Chair Transfers) walker, front-wheeled  -AS       Row Name 11/16/24 1115          Sit-Stand Transfer    Sit-Stand [place] (Transfers) verbal cues;1 person assist  -AS     Assistive Device (Sit-Stand Transfers) walker, front-wheeled  -AS       Row Name 11/16/24 1115          Gait/Stairs (Locomotion)    [place] Level (Gait) verbal cues;minimum assist (75% patient effort);1 person assist  -AS     Assistive Device (Gait) walker, front-wheeled  -AS     Distance in Feet (Gait) 5  + 10  -AS     Deviations/Abnormal Patterns (Gait) obdulio decreased;festinating/shuffling;gait speed decreased;base of support, narrow;stride length decreased  -AS     Bilateral Gait Deviations heel strike decreased;forward flexed posture;weight shift ability decreased  -AS     Comment, (Gait/Stairs) patient ambulated  bed>recliner 5', took seated rest break then ambulated 10' with Min assist x1, rolling walker for support and close follow with chair for safety. Verbal cues  for upright posture and increased step length bilaterally. Further distance limited by weakness and fatigue.  -AS               User Key  (r) = Recorded By, (t) = Taken By, (c) = Cosigned By      Initials Name Provider Type    AS Eileen Moncada PTA Physical Therapist Assistant                   Obj/Interventions       Row Name 11/16/24 1119          Motor Skills    Therapeutic Exercise knee;ankle  -AS       Row Name 11/16/24 [facility]9          Knee (Therapeutic Exercise)    Knee (Therapeutic Exercise) strengthening exercise  -AS     Knee Strengthening (Therapeutic Exercise) bilateral;marching while seated;LAQ (long arc quad);sitting;10 repetitions  -AS       Row Name 11/16/24 [facility]9          Ankle (Therapeutic Exercise)    Ankle (Therapeutic Exercise) AROM (active range of motion)  -AS     Ankle AROM (Therapeutic Exercise) bilateral;plantarflexion;dorsiflexion;sitting;10 repetitions  -AS       Row Name 11/16/24 1119          Balance    Dynamic Standing Balance verbal cues;minimal assist;1-person assist;1 person to manage equipment  -AS     Position/Device Used, Standing Balance supported;walker, front-wheeled  -AS     Comment, Balance mild unsteadiness but no LOB noticed  -AS               User Key  (r) = Recorded By, (t) = Taken By, (c) = Cosigned By      Initials Name Provider Type    AS Eileen Moncada PTA Physical Therapist Assistant                   Goals/Plan    No documentation.                  Clinical Impression       Row Name 11/16/24 1119          Pain    Pretreatment Pain Rating 4/10  -AS     Posttreatment Pain Rating 5/10  -AS     Pain Location abdomen;extremity  -AS     Pain Side/Orientation bilateral  -AS     Pain Management Interventions exercise or physical activity utilized;nursing notified  -AS     Response to Pain Interventions activity participation with tolerable pain  -AS       Row Name 11/16/24 1119          Plan of Care Review    Plan of Care Reviewed With patient  -AS     Progress  improving  -AS     Outcome Evaluation patient ambulated bed>recliner 5', took seated rest break then ambulated 10' with Min assist x1, rolling walker for support and close follow with chair for safety. Verbal cues for upright posture and increased step length bilaterally. Further distance limited by weakness and fatigue. B LE exercises completed in seated position. Recommend IRF at D/C for best functional outcome.  -AS       Row Name 11/16/24 1119          Positioning and Restraints    Pre-Treatment Position in bed  -AS     Post Treatment Position chair  -AS     In Chair reclined;notified nsg;call light within reach;encouraged to call for assist;exit alarm on;waffle cushion;legs elevated  -AS               User Key  (r) = Recorded By, (t) = Taken By, (c) = Cosigned By      Initials Name Provider Type    AS Eileen Moncada PTA Physical Therapist Assistant                   Outcome Measures       Row Name 11/16/24 1121          How much help from another person do you currently need...    Turning from your back to your side while in flat bed without using bedrails? 4  -AS     Moving from lying on back to sitting on the side of a flat bed without bedrails? 3  -AS     Moving to and from a bed to a chair (including a wheelchair)? 3  -AS     Standing up from a chair using your arms (e.g., wheelchair, bedside chair)? 3  -AS     Climbing 3-5 steps with a railing? 2  -AS     To walk in hospital room? 2  -AS     AM-PAC 6 Clicks Score (PT) 17  -AS     Highest Level of Mobility Goal 5 --> Static standing  -AS       Row Name 11/16/24 1121          Functional Assessment    Outcome Measure Options AM-PAC 6 Clicks Basic Mobility (PT)  -AS               User Key  (r) = Recorded By, (t) = Taken By, (c) = Cosigned By      Initials Name Provider Type    AS Eileen Moncada PTA Physical Therapist Assistant                                 Physical Therapy Education       Title: PT OT SLP Therapies (In Progress)       Topic:  Physical Therapy (In Progress)       Point: Mobility training (In Progress)       Learning Progress Summary            Patient Acceptance, E, NR by AS at 11/16/2024 1121    Acceptance, E, NR by AC at 11/13/2024 1557    Acceptance, E, NR by AC at 11/11/2024 1013                      Point: Body mechanics (In Progress)       Learning Progress Summary            Patient Acceptance, E, NR by AS at 11/16/2024 1121    Acceptance, E, NR by AC at 11/13/2024 1557    Acceptance, E, NR by AC at 11/11/2024 1013                      Point: Precautions (In Progress)       Learning Progress Summary            Patient Acceptance, E, NR by AS at 11/16/2024 1121    Acceptance, E, NR by AC at 11/13/2024 1557    Acceptance, E, NR by AC at 11/11/2024 1013                                      User Key       Initials Effective Dates Name Provider Type Discipline    AS 04/28/23 -  Eileen Moncada, PTA Physical Therapist Assistant PT    AC 07/11/24 -  Latoya Judge, PT Physical Therapist PT                  PT Recommendation and Plan     Progress: improving  Outcome Evaluation: patient ambulated bed>recliner 5', took seated rest break then ambulated 10' with Min assist x1, rolling walker for support and close follow with chair for safety. Verbal cues for upright posture and increased step length bilaterally. Further distance limited by weakness and fatigue. B LE exercises completed in seated position. Recommend IRF at D/C for best functional outcome.     Time Calculation:         PT Charges       Row Name 11/16/24 1122             Time Calculation    Start Time 1020  -AS      PT Received On 11/16/24  -AS      PT Goal Re-Cert Due Date 11/21/24  -AS         Timed Charges    49373 - PT Therapeutic Exercise Minutes 10  -AS      56867 - Gait Training Minutes  13  -AS         Total Minutes    Timed Charges Total Minutes 23  -AS       Total Minutes 23  -AS                User Key  (r) = Recorded By, (t) = Taken By, (c) = Cosigned By       Initials Name Provider Type    AS Eileen Moncada PTA Physical Therapist Assistant                  Therapy Charges for Today       Code Description Service Date Service Provider Modifiers Qty    95364868087 HC GAIT TRAINING EA 15 MIN 11/16/2024 Eileen Moncada, BLAYNE GP 1    35604672302 HC PT THER PROC EA 15 MIN 11/16/2024 Eileen Moncada, BLAYNE GP 1    70376616629 HC PT THER SUPP EA 15 MIN 11/16/2024 Eileen Moncada, BLAYNE GP 2            PT G-Codes  Outcome Measure Options: AM-PAC 6 Clicks Basic Mobility (PT)  AM-PAC 6 Clicks Score (PT): 17  AM-PAC 6 Clicks Score (OT): 18       Eileen Moncada PTA  11/16/2024

## 2024-11-16 NOTE — PLAN OF CARE
Goal Outcome Evaluation:   Patient alert and oriented x4. Normal sinus rhythm. 2L nasal cannula. Patient sleeping intermittently throughout shift. No reports of pain. Ongoing numbness and tingling to all extremities and abdomen. Orthostatics remain positive but improved. Patient shifts weight independently in bed. Pulmonary hygiene encouraged.

## 2024-11-16 NOTE — PLAN OF CARE
Goal Outcome Evaluation:  Plan of Care Reviewed With: patient        Progress: improving  Outcome Evaluation: patient ambulated bed>recliner 5', took seated rest break then ambulated 10' with Min assist x1, rolling walker for support and close follow with chair for safety. Verbal cues for upright posture and increased step length bilaterally. Further distance limited by weakness and fatigue. B LE exercises completed in seated position. Recommend IRF at D/C for best functional outcome.

## 2024-11-17 PROCEDURE — 94799 UNLISTED PULMONARY SVC/PX: CPT

## 2024-11-17 PROCEDURE — 99232 SBSQ HOSP IP/OBS MODERATE 35: CPT | Performed by: STUDENT IN AN ORGANIZED HEALTH CARE EDUCATION/TRAINING PROGRAM

## 2024-11-17 PROCEDURE — 94664 DEMO&/EVAL PT USE INHALER: CPT

## 2024-11-17 RX ADMIN — MIDODRINE HYDROCHLORIDE 15 MG: 10 TABLET ORAL at 18:22

## 2024-11-17 RX ADMIN — URSODIOL 300 MG: 300 CAPSULE ORAL at 21:55

## 2024-11-17 RX ADMIN — GABAPENTIN 200 MG: 100 CAPSULE ORAL at 18:22

## 2024-11-17 RX ADMIN — CARVEDILOL 12.5 MG: 12.5 TABLET, FILM COATED ORAL at 18:23

## 2024-11-17 RX ADMIN — URSODIOL 300 MG: 300 CAPSULE ORAL at 08:33

## 2024-11-17 RX ADMIN — Medication 5 MG: at 21:55

## 2024-11-17 RX ADMIN — BUDESONIDE AND FORMOTEROL FUMARATE DIHYDRATE 2 PUFF: 160; 4.5 AEROSOL RESPIRATORY (INHALATION) at 19:11

## 2024-11-17 RX ADMIN — CARVEDILOL 12.5 MG: 12.5 TABLET, FILM COATED ORAL at 08:33

## 2024-11-17 RX ADMIN — NYSTATIN 1 APPLICATION: 100000 CREAM TOPICAL at 21:56

## 2024-11-17 RX ADMIN — BUDESONIDE AND FORMOTEROL FUMARATE DIHYDRATE 2 PUFF: 160; 4.5 AEROSOL RESPIRATORY (INHALATION) at 08:21

## 2024-11-17 RX ADMIN — Medication 10 ML: at 08:33

## 2024-11-17 RX ADMIN — NYSTATIN 1 APPLICATION: 100000 CREAM TOPICAL at 08:33

## 2024-11-17 RX ADMIN — LATANOPROST 1 DROP: 50 SOLUTION OPHTHALMIC at 21:55

## 2024-11-17 RX ADMIN — DESVENLAFAXINE 50 MG: 50 TABLET, FILM COATED, EXTENDED RELEASE ORAL at 08:33

## 2024-11-17 RX ADMIN — ZOLPIDEM TARTRATE 5 MG: 5 TABLET ORAL at 21:55

## 2024-11-17 RX ADMIN — MIDODRINE HYDROCHLORIDE 15 MG: 10 TABLET ORAL at 06:52

## 2024-11-17 RX ADMIN — THIAMINE HCL TAB 100 MG 100 MG: 100 TAB at 08:33

## 2024-11-17 RX ADMIN — GABAPENTIN 200 MG: 100 CAPSULE ORAL at 08:33

## 2024-11-17 RX ADMIN — PANTOPRAZOLE SODIUM 40 MG: 40 TABLET, DELAYED RELEASE ORAL at 06:49

## 2024-11-17 RX ADMIN — FOLIC ACID 1 MG: 1 TABLET ORAL at 08:33

## 2024-11-17 RX ADMIN — ROSUVASTATIN 5 MG: 10 TABLET, FILM COATED ORAL at 21:55

## 2024-11-17 NOTE — PROGRESS NOTES
[facility] Medicine Services  PROGRESS NOTE    Patient Name: Philomena Davis  : 1960  MRN: [id number]    Date of Admission: 11/10/2024  Primary Care Physician: Unique Brandt DO    Subjective   Subjective     CC:  Numbness     HPI:  She reports improvement in lower extremity numbness and strength this morning though still not where she would like to be.  Otherwise has no complaints.      Objective   Objective     Vital Signs:   Temp:  [98.3 °F (36.8 °C)-98.5 °F (36.9 °C)] 98.3 °F (36.8 °C)  Heart Rate:  [70-84] 79  Resp:  [16-18] 18  BP: ()/(54-66) 110/66  Flow (L/min) (Oxygen Therapy):  [1-2] 1     Physical Exam:  General appearance: alert, awake, oriented, no acute distress   Cardiovascular: RRR, no murmurs or rubs, radial pulse full 2/4 BL   Respiratory: CTAB, no crackles or wheezes   Neuro/CNS: alert and oriented x3, normal speech, moves all 4 extremities, sensation grossly intact    Results Reviewed:  LAB RESULTS:      Lab 24  0600 24  0757 11/10/24  1628 11/10/24  1235   WBC 9.99 11.04*  --   --    HEMOGLOBIN 10.6* 10.5*  --   --    HEMATOCRIT 31.8* 31.8*  --   --    PLATELETS 224 219  --   --    .7* 109.7*  --   --    LACTATE  --   --  1.2 2.2*         Lab 24  1801 24  0600 24  0757 24  1255 24  0017   SODIUM  --  137 138  --   --    POTASSIUM 4.9 3.6 4.0  --  4.6   CHLORIDE  --  103 104  --   --    CO2  --  24.0 26.0  --   --    ANION GAP  --  10.0 8.0  --   --    BUN  --  6* 7*  --   --    CREATININE  --  0.58 0.62  --   --    EGFR  --  102.5 100.8  --   --    GLUCOSE  --  84 85  --   --    CALCIUM  --  8.4* 8.4*  --   --    MAGNESIUM  --   --   --  1.7  --    PHOSPHORUS  --   --   --  3.4  --          Lab 24  0600 24  0757   TOTAL PROTEIN 6.5 6.1   ALBUMIN 2.8* 2.9*   GLOBULIN 3.7 3.2   ALT (SGPT) 17 19   AST (SGOT) 66* 80*   BILIRUBIN 1.2 1.3*   ALK PHOS 126* 129*                 Lab 11/10/24  8114    FOLATE 4.83   VITAMIN B 12 716         Brief Urine Lab Results  (Last result in the past 365 days)        Color   Clarity   Blood   Leuk Est   Nitrite   Protein   CREAT   Urine HCG        11/10/24 0958 Yellow   Clear   Negative   Negative   Negative   Negative                   Microbiology Results Abnormal       None            No radiology results from the last 24 hrs    Results for orders placed during the hospital encounter of 11/10/24    Adult Transthoracic Echo Complete W/ Cont if Necessary Per Protocol    Interpretation Summary  •  Left ventricular systolic function is normal. Estimated left ventricular EF = 65%  •  The cardiac valves are anatomically and functionally normal.      Current medications:  Scheduled Meds:budesonide-formoterol, 2 puff, Inhalation, BID - RT  carvedilol, 12.5 mg, Oral, BID With Meals  desvenlafaxine, 50 mg, Oral, Daily  folic acid, 1 mg, Oral, Daily  gabapentin, 200 mg, Oral, Q12H  latanoprost, 1 drop, Both Eyes, Nightly  melatonin, 5 mg, Oral, Nightly  midodrine, 15 mg, Oral, BID AC  nystatin, 1 Application, Topical, Q12H  pantoprazole, 40 mg, Oral, Daily  rosuvastatin, 5 mg, Oral, Daily  sodium chloride, 10 mL, Intravenous, Q12H  thiamine, 100 mg, Oral, Daily  ursodiol, 300 mg, Oral, BID  zolpidem, 5 mg, Oral, Nightly      Continuous Infusions:   PRN Meds:.•  acetaminophen **OR** acetaminophen **OR** acetaminophen  •  senna-docusate sodium **AND** polyethylene glycol **AND** bisacodyl **AND** bisacodyl  •  calcium carbonate  •  Calcium Replacement - Follow Nurse / BPA Driven Protocol  •  ipratropium-albuterol  •  Magnesium Standard Dose Replacement - Follow Nurse / BPA Driven Protocol  •  metoprolol tartrate  •  nitroglycerin  •  ondansetron  •  Phosphorus Replacement - Follow Nurse / BPA Driven Protocol  •  Potassium Replacement - Follow Nurse / BPA Driven Protocol  •  sodium chloride  •  sodium chloride  •  sodium chloride    Assessment & Plan   Assessment & Plan     Active  Hospital Problems    Diagnosis  POA   • **Lower extremity weakness [R29.898]  Yes   • Orthostasis [I95.1]  Yes      Resolved Hospital Problems   No resolved problems to display.        This patient's assessments and plans were partially entered by my partner and updated as appropriate by me on 11/17/2024    Brief Hospital Course to date:  Philomena Davis is a 64 y.o. female with history of alcohol dependence, cirrhosis, HTN, peripheral neuropathy, was admitted for paresthesias in her extremities.  Neurology was consulted. PT/OT recommended rehab.      B weakness  B paraesthesias/facial numbness  ETOH use  PN due to long standing ETOH use  Dysautonomia  -MRI brain unremarkable  -MRI lumbar spine with L5-S1 disc protrusion  -MRI C/T spine reviewed, with C4/5 facet arthropathy  -EMG/NCV mild sensory neuropathy  -Continue thiamine, neurology following  -Symptoms improving   -PT/OT; awaiting rehab      Marked orthostasis  Dysautonomia  -added midodrine, increased dose but BID dosing  -mobilize/PT/OT  -AM cortisol low, s/p cosyntropin with expected response, not suggestive of adrenal insufficiency   -Neurology discussed nonpharmacologic therapy      Possible vasculitis  CT with celiac artery stranding  -markedly elevated CRP/ESR  -ANCA panel negative  -WILMA panel negative  -RF negative  -needs outpatient follow up, no abdominal symptoms     Atrial tachycardia, atrial flutter  -presented with tachycardia, HR to 150  -on BB  -ECHO unremarkable, EF normal  -bursts of tachycardia here, evaluated by cardiology  -continue carvedilol, dosing may be limited by hypotension/orthostasis  -cardiology deferring AC due to cirrhosis for now     Alcohol dependence  Cirrhosis  Hepatocellular jaundice  -monitor     Mild LA  -due to liver disease     Subclinical hypothyroidism  -needs follow up in 6 weeks     No significant changes, awaiting placement.     Expected Discharge Location and Transportation: Rehab  Expected Discharge   Expected  Discharge Date: 11/18/2024; Expected Discharge Time:      VTE Prophylaxis:  Mechanical VTE prophylaxis orders are present.         AM-PAC 6 Clicks Score (PT): 17 (11/17/24 0811)    CODE STATUS:   Code Status and Medical Interventions: CPR (Attempt to Resuscitate); Full Support   Ordered at: 11/10/24 6391     Level Of Support Discussed With:    Patient    Next of Kin (If No Surrogate)     Code Status (Patient has no pulse and is not breathing):    CPR (Attempt to Resuscitate)     Medical Interventions (Patient has pulse or is breathing):    Full Support       Kishor Naranjo, DO  11/17/24

## 2024-11-18 PROCEDURE — 97530 THERAPEUTIC ACTIVITIES: CPT

## 2024-11-18 PROCEDURE — 94799 UNLISTED PULMONARY SVC/PX: CPT

## 2024-11-18 PROCEDURE — 99231 SBSQ HOSP IP/OBS SF/LOW 25: CPT | Performed by: STUDENT IN AN ORGANIZED HEALTH CARE EDUCATION/TRAINING PROGRAM

## 2024-11-18 PROCEDURE — 97110 THERAPEUTIC EXERCISES: CPT

## 2024-11-18 PROCEDURE — 94664 DEMO&/EVAL PT USE INHALER: CPT

## 2024-11-18 RX ADMIN — Medication 5 MG: at 21:41

## 2024-11-18 RX ADMIN — ZOLPIDEM TARTRATE 5 MG: 5 TABLET ORAL at 21:41

## 2024-11-18 RX ADMIN — MIDODRINE HYDROCHLORIDE 15 MG: 10 TABLET ORAL at 17:34

## 2024-11-18 RX ADMIN — GABAPENTIN 200 MG: 100 CAPSULE ORAL at 08:18

## 2024-11-18 RX ADMIN — PANTOPRAZOLE SODIUM 40 MG: 40 TABLET, DELAYED RELEASE ORAL at 06:29

## 2024-11-18 RX ADMIN — CARVEDILOL 12.5 MG: 12.5 TABLET, FILM COATED ORAL at 08:17

## 2024-11-18 RX ADMIN — THIAMINE HCL TAB 100 MG 100 MG: 100 TAB at 08:15

## 2024-11-18 RX ADMIN — LATANOPROST 1 DROP: 50 SOLUTION OPHTHALMIC at 21:41

## 2024-11-18 RX ADMIN — BUDESONIDE AND FORMOTEROL FUMARATE DIHYDRATE 2 PUFF: 160; 4.5 AEROSOL RESPIRATORY (INHALATION) at 07:25

## 2024-11-18 RX ADMIN — ROSUVASTATIN 5 MG: 10 TABLET, FILM COATED ORAL at 21:41

## 2024-11-18 RX ADMIN — DESVENLAFAXINE 50 MG: 50 TABLET, FILM COATED, EXTENDED RELEASE ORAL at 08:17

## 2024-11-18 RX ADMIN — CARVEDILOL 12.5 MG: 12.5 TABLET, FILM COATED ORAL at 17:38

## 2024-11-18 RX ADMIN — BUDESONIDE AND FORMOTEROL FUMARATE DIHYDRATE 2 PUFF: 160; 4.5 AEROSOL RESPIRATORY (INHALATION) at 19:48

## 2024-11-18 RX ADMIN — MIDODRINE HYDROCHLORIDE 15 MG: 10 TABLET ORAL at 08:15

## 2024-11-18 RX ADMIN — FOLIC ACID 1 MG: 1 TABLET ORAL at 08:17

## 2024-11-18 RX ADMIN — NYSTATIN 1 APPLICATION: 100000 CREAM TOPICAL at 21:41

## 2024-11-18 RX ADMIN — URSODIOL 300 MG: 300 CAPSULE ORAL at 21:41

## 2024-11-18 RX ADMIN — NYSTATIN 1 APPLICATION: 100000 CREAM TOPICAL at 08:20

## 2024-11-18 RX ADMIN — URSODIOL 300 MG: 300 CAPSULE ORAL at 08:15

## 2024-11-18 RX ADMIN — GABAPENTIN 200 MG: 100 CAPSULE ORAL at 17:39

## 2024-11-18 NOTE — PLAN OF CARE
Problem: Adult Inpatient Plan of Care  Goal: Plan of Care Review  Outcome: Progressing  Flowsheets  Taken 11/18/2024 0531 by Kavitha Austin RN  Progress: improving  Taken 11/16/2024 1119 by Eileen Moncada PTA  Plan of Care Reviewed With: patient  Goal: Patient-Specific Goal (Individualized)  Outcome: Progressing  Goal: Absence of Hospital-Acquired Illness or Injury  Outcome: Progressing  Intervention: Identify and Manage Fall Risk  Description: Perform standard risk assessment on admission using a validated tool or comprehensive approach appropriate to the patient; reassess fall risk frequently, with change in status or transfer to another level of care.  Communicate risk to interprofessional healthcare team; ensure fall risk visible cue.  Determine need for increased observation, equipment and environmental modification, as well as use of supportive, nonskid footwear.  Adjust safety measures to individual needs and identified risk factors.  Reinforce the importance of active participation with fall risk prevention, safety, and physical activity with the patient and family.  Perform regular intentional rounding to assess need for position change, pain assessment and personal needs, including assistance with toileting.  Recent Flowsheet Documentation  Taken 11/18/2024 0431 by Kavitha Austin RN  Safety Promotion/Fall Prevention:   nonskid shoes/slippers when out of bed   safety round/check completed   room organization consistent   lighting adjusted   activity supervised   assistive device/personal items within reach   clutter free environment maintained  Taken 11/18/2024 0230 by Kavitha Austin RN  Safety Promotion/Fall Prevention:   nonskid shoes/slippers when out of bed   safety round/check completed   room organization consistent   lighting adjusted   activity supervised   assistive device/personal items within reach   clutter free environment maintained  Taken 11/18/2024 0015 by Norman  Kavitha MALIK RN  Safety Promotion/Fall Prevention:   nonskid shoes/slippers when out of bed   safety round/check completed   room organization consistent   lighting adjusted   activity supervised   assistive device/personal items within reach   clutter free environment maintained  Taken 11/17/2024 2230 by Kavitha Austin RN  Safety Promotion/Fall Prevention:   nonskid shoes/slippers when out of bed   safety round/check completed   room organization consistent   lighting adjusted   activity supervised   assistive device/personal items within reach   clutter free environment maintained  Taken 11/17/2024 2010 by Kavitha Austin RN  Safety Promotion/Fall Prevention:   nonskid shoes/slippers when out of bed   safety round/check completed   room organization consistent   lighting adjusted   activity supervised   assistive device/personal items within reach   clutter free environment maintained  Intervention: Prevent Skin Injury  Description: Perform a screening for skin injury risk, such as pressure or moisture-associated skin damage on admission and at regular intervals throughout hospital stay.  Keep all areas of skin (especially folds) clean and dry.  Maintain adequate skin hydration.  Relieve and redistribute pressure and protect bony prominences and skin at risk for injury; implement measures based on patient-specific risk factors.  Match turning and repositioning schedule to clinical condition.  Encourage weight shift frequently; assist with reposition if unable to complete independently.  Float heels off bed; avoid pressure on the Achilles tendon.  Keep skin free from extended contact with medical devices.  Optimize nutrition and hydration.  Encourage functional activity and mobility, as early as tolerated.  Use aids (e.g., slide boards, mechanical lift) during transfer.  Recent Flowsheet Documentation  Taken 11/18/2024 0431 by Kavitha Austin RN  Body Position: position changed independently  Taken  11/18/2024 0230 by Kavitha Austin RN  Body Position: position changed independently  Taken 11/18/2024 0015 by Kavitha Austin RN  Body Position: position changed independently  Taken 11/17/2024 2230 by Kavitha Austin RN  Body Position: position changed independently  Taken 11/17/2024 2010 by Kavitha Austin RN  Body Position: position changed independently  Intervention: Prevent Infection  Description: Maintain skin and mucous membrane integrity; promote hand, oral and pulmonary hygiene.  Optimize fluid balance, nutrition, sleep and glycemic control to maximize infection resistance.  Identify potential sources of infection early to prevent or mitigate progression of infection (e.g., wound, lines, devices).  Evaluate ongoing need for invasive devices; remove promptly when no longer indicated.  Review vaccination status.  Recent Flowsheet Documentation  Taken 11/18/2024 0431 by Kavitha Austin RN  Infection Prevention:   environmental surveillance performed   rest/sleep promoted   hand hygiene promoted  Taken 11/18/2024 0230 by Kavitha Austin RN  Infection Prevention:   environmental surveillance performed   rest/sleep promoted   hand hygiene promoted  Taken 11/18/2024 0015 by Kavitha Austin RN  Infection Prevention:   environmental surveillance performed   rest/sleep promoted   hand hygiene promoted  Taken 11/17/2024 2230 by Kavitha Austin RN  Infection Prevention:   environmental surveillance performed   rest/sleep promoted   hand hygiene promoted  Taken 11/17/2024 2010 by Kavitha Austin RN  Infection Prevention:   environmental surveillance performed   rest/sleep promoted   hand hygiene promoted  Goal: Optimal Comfort and Wellbeing  Outcome: Progressing  Intervention: Provide Person-Centered Care  Description: Use a family-focused approach to care; encourage support system presence and participation.  Develop trust and rapport by proactively providing information,  encouraging questions, addressing concerns and offering reassurance.  Acknowledge emotional response to hospitalization.  Recognize and utilize personal coping strategies and strengths; develop goals via shared decision-making.  Honor spiritual and cultural preferences.  Recent Flowsheet Documentation  Taken 11/17/2024 2010 by Kavitha Austin RN  Trust Relationship/Rapport:   emotional support provided   care explained   choices provided   empathic listening provided   questions answered   questions encouraged   reassurance provided   thoughts/feelings acknowledged  Goal: Readiness for Transition of Care  Outcome: Progressing     Problem: Skin Injury Risk Increased  Goal: Skin Health and Integrity  Outcome: Progressing  Intervention: Optimize Skin Protection  Description: Perform a full pressure injury risk assessment, as indicated by screening, upon admission to care unit.  Reassess skin (full inspection and injury risk, including skin temperature, consistency and color) frequently (e.g., scheduled interval, with change in condition) to provide optimal early detection and prevention.  Maintain adequate tissue perfusion (e.g., encourage fluid balance; avoid crossing legs, constrictive clothing or devices) to promote tissue oxygenation.  Maintain head of bed at lowest degree of elevation tolerated, considering medical condition and other restrictions. Use positioning supports to prevent sliding and friction. Consider low friction textiles.  Avoid positioning onto an area that remains reddened or on bony prominences.  Minimize incontinence and moisture (e.g., toileting schedule; moisture-wicking pad, diaper or incontinence collection device; skin moisture barrier).  Cleanse skin promptly and gently, when soiled, utilizing a pH-balanced cleanser.  Relieve and redistribute pressure (e.g., scheduled position changes, weight shifts, use of support surface, medical device repositioning, protective dressing application,  use of positioning device, microclimate control, use of pressure-injury-monitor  Encourage increased activity, such as sitting in a chair at the bedside or early mobilization, when able to tolerate. Avoid prolonged sitting.  Recent Flowsheet Documentation  Taken 11/18/2024 0431 by Kavitha Austin RN  Head of Bed (HOB) Positioning: HOB elevated  Taken 11/18/2024 0230 by Kavitha Austin RN  Head of Bed (HOB) Positioning: HOB elevated  Taken 11/18/2024 0015 by Kavitha Austin RN  Head of Bed (HOB) Positioning: HOB elevated  Taken 11/17/2024 2230 by Kavitha Austin RN  Head of Bed (HOB) Positioning: HOB elevated  Taken 11/17/2024 2010 by Kavitha Austin RN  Head of Bed (HOB) Positioning: HOB elevated     Problem: Fall Injury Risk  Goal: Absence of Fall and Fall-Related Injury  Outcome: Progressing  Intervention: Promote Injury-Free Environment  Description: Provide a safe, barrier-free environment that encourages independent activity.  Keep care area uncluttered and well-lighted.  Determine need for increased observation or monitoring.  Avoid use of devices that minimize mobility, such as restraints or indwelling urinary catheter.  Recent Flowsheet Documentation  Taken 11/18/2024 0431 by Kavitha Austin RN  Safety Promotion/Fall Prevention:   nonskid shoes/slippers when out of bed   safety round/check completed   room organization consistent   lighting adjusted   activity supervised   assistive device/personal items within reach   clutter free environment maintained  Taken 11/18/2024 0230 by Kavitha Austin RN  Safety Promotion/Fall Prevention:   nonskid shoes/slippers when out of bed   safety round/check completed   room organization consistent   lighting adjusted   activity supervised   assistive device/personal items within reach   clutter free environment maintained  Taken 11/18/2024 0015 by Kavitha Austin RN  Safety Promotion/Fall Prevention:   nonskid shoes/slippers when out of  bed   safety round/check completed   room organization consistent   lighting adjusted   activity supervised   assistive device/personal items within reach   clutter free environment maintained  Taken 11/17/2024 2230 by Kavitha Austin RN  Safety Promotion/Fall Prevention:   nonskid shoes/slippers when out of bed   safety round/check completed   room organization consistent   lighting adjusted   activity supervised   assistive device/personal items within reach   clutter free environment maintained  Taken 11/17/2024 2010 by Kavitha Austin RN  Safety Promotion/Fall Prevention:   nonskid shoes/slippers when out of bed   safety round/check completed   room organization consistent   lighting adjusted   activity supervised   assistive device/personal items within reach   clutter free environment maintained     Problem: Comorbidity Management  Goal: Blood Pressure in Desired Range  Outcome: Progressing   Goal Outcome Evaluation:  Plan of Care Reviewed With: patient        Progress: improving  Outcome Evaluation: Patient will remain free from injury during hospitalization by calling for assistance at all times when needing out of bed.

## 2024-11-18 NOTE — PLAN OF CARE
Goal Outcome Evaluation:  Plan of Care Reviewed With: patient        Progress: no change  Outcome Evaluation: Pt continues to require Min A for functional transfers and mobility using the RW with notable trunk instability and BLE weakness. Sit to stands and UE ther-ex performed for strength building and activity tolerance building. Pt required prolonged rest breaks throughout due to fatigue. Cont POC.    Anticipated Discharge Disposition (OT): inpatient rehabilitation facility

## 2024-11-18 NOTE — THERAPY TREATMENT NOTE
Patient Name: Philomena Davis  : 1960    MRN: [id number]                              Today's Date: 2024       Admit Date: 11/10/2024    Visit Dx:     ICD-10-CM ICD-9-CM   1. Weakness of both lower extremities  R29.898 729.89   2. Paresthesia  R20.2 782.0   3. Weakness  R53.1 780.79   4. Leukocytosis, unspecified type  D72.829 288.60   5. Alcoholic cirrhosis of liver without ascites  K70.30 571.2   6. Orthostasis  I95.1 458.0   7. Tachycardia  R00.0 785.0     Patient Active Problem List   Diagnosis    Essential hypertension    Generalized anxiety disorder    Hyperlipidemia    Glaucoma    Alcoholic cirrhosis of liver without ascites    Severe malnutrition    GERD without esophagitis    Sleep disturbance    Annual physical exam    Idiopathic peripheral neuropathy    Severe anxiety with panic    Alcohol use disorder    Cigarette nicotine dependence in remission    Chronic obstructive pulmonary disease    Closed fracture of left hand    Moderate malnutrition    Lower extremity weakness    Orthostasis     Past Medical History:   Diagnosis Date    Alcohol withdrawal 05/10/2019    Annual physical exam 2023    Anxiety and depression     Cirrhosis of liver     Closed displaced fracture of lateral malleolus of left fibula     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    Closed trimalleolar fracture of right ankle 2023    Digital mucous cyst of toe of left foot 2017    Ganglion cyst of left foot     Glaucoma     Hypertension     Wears glasses      Past Surgical History:   Procedure Laterality Date    ANKLE OPEN REDUCTION INTERNAL FIXATION Left 2017    Procedure: LEFT ANKLE OPEN REDUCTION INTERNAL FIXATION WITH ILIAC CREST BONE GRAFT , EXCISION CYST 4TH/5TH INNERSPACE LEFT FOOT;  Surgeon: Frank Larson MD;  Location: [facility];  Service:     ANKLE OPEN REDUCTION INTERNAL FIXATION Right 2023    Procedure: ANKLE OPEN REDUCTION INTERNAL  FIXATION;  Surgeon: Deandre Reynoso MD;  Location:  [facility];  Service: Orthopedics;  Laterality: Right;    AUGMENTATION MAMMAPLASTY Bilateral 1999    BREAST AUGMENTATION      BREAST EXCISIONAL BIOPSY Right 2014    DILATION AND CURETTAGE, DIAGNOSTIC / THERAPEUTIC      ENDOSCOPY N/A 11/30/2022    Procedure: ESOPHAGOGASTRODUODENOSCOPY;  Surgeon: Arelis Solano MD;  Location:  [facility];  Service: Gastroenterology;  Laterality: N/A;    ENDOSCOPY N/A 2/1/2024    Procedure: ESOPHAGOGASTRODUODENOSCOPY;  Surgeon: Brunner, Mark I, MD;  Location:  [facility];  Service: Gastroenterology;  Laterality: N/A;    [place] RPR NON/MAL FEMUR DSTL H/N W/ILIAC/AUTOG BONE Left 03/24/2017    Procedure: ILIAC CREST BONE GRAFT;  Surgeon: Frank aLrson MD;  Location:  [facility];  Service: Orthopedics    WRIST SURGERY        General Information       Row Name 11/18/24 [id number]          OT Time and Intention    Document Type therapy note (daily note)  -AN     Mode of Treatment occupational therapy  -AN       Row Name 11/18/24 [id number]          General Information    Patient Profile Reviewed yes  -AN     Existing Precautions/Restrictions fall;other (see comments);orthostatic hypotension  neuropathy B feet, orthostatic hypotension - monitor BP, prefers gait belt lower  -AN     Barriers to Rehab medically complex;previous functional deficit;cognitive status  -AN       Row Name 11/18/24 [id number]          Cognition    Orientation Status (Cognition) oriented x 3  -AN       Row Name 11/18/24 [facility]          Safety Issues/Impairments Affecting Functional Mobility    Safety Issues Affecting Function (Mobility) safety precaution awareness;safety precautions follow-through/compliance;judgment;problem-solving;insight into deficits/self-awareness;sequencing abilities  -AN     Impairments Affecting Function (Mobility) balance;coordination;endurance/activity tolerance;strength;pain;sensation/sensory awareness;postural/trunk control  -AN               User  Key  (r) = Recorded By, (t) = Taken By, (c) = Cosigned By      Initials Name Provider Type    Umu Guadalupe OT Occupational Therapist                     Mobility/ADL's       Row Name 11/18/24 1403          Bed Mobility    Bed Mobility sit-supine  -AN     Sit-Supine Ritchie (Bed Mobility) verbal cues;minimum assist (75% patient effort);1 person assist  -AN     Bed Mobility, Safety Issues decreased use of arms for pushing/pulling;impaired trunk control for bed mobility;decreased use of legs for bridging/pushing  -AN     Comment, (Bed Mobility) Assist provided at BLE to lift up to the bed  -AN       Row Name 11/18/24 1403          Transfers    Transfers sit-stand transfer;stand-sit transfer;bed-chair transfer  -AN     Comment, (Transfers) Cues for hand placement and transfer technique using the RW; Additional STS performed x 8 reps for strength and endurance training with prolonged seated rest inbetween due to fatigue.  -AN       Row Name 11/18/24 1403          Bed-Chair Transfer    Bed-Chair Ritchie (Transfers) verbal cues;minimum assist (75% patient effort);1 person assist  -AN     Assistive Device (Bed-Chair Transfers) walker, front-wheeled  -AN     Comment, (Bed-Chair Transfer) trunk instability and BLE weakness noted with transfer to the bed  -AN       Row Name 11/18/24 1403          Sit-Stand Transfer    Sit-Stand Ritchie (Transfers) verbal cues;minimum assist (75% patient effort);1 person assist  -AN     Assistive Device (Sit-Stand Transfers) walker, front-wheeled  -AN       Row Name 11/18/24 1403          Stand-Sit Transfer    Stand-Sit Ritchie (Transfers) verbal cues;minimum assist (75% patient effort);1 person assist  -AN     Assistive Device (Stand-Sit Transfers) walker, front-wheeled  -AN       Row Name 11/18/24 1403          Activities of Daily Living    BADL Assessment/Intervention upper body dressing;lower body dressing  -AN       Row Name 11/18/24 1403          Upper Body  Dressing Assessment/Training    [place] Level (Upper Body Dressing) don;pajama/robe;set up  -AN     Position (Upper Body Dressing) unsupported sitting  -AN       Row Name 11/18/24 1403          Lower Body Dressing Assessment/Training    [place] Level (Lower Body Dressing) don;socks;minimum assist (75% patient effort)  -AN     Position (Lower Body Dressing) unsupported sitting  -AN               User Key  (r) = Recorded By, (t) = Taken By, (c) = Cosigned By      Initials Name Provider Type    AN Umu Sherman OT Occupational Therapist                   Obj/Interventions       Row Name 11/18/24 1406          Shoulder (Therapeutic Exercise)    Shoulder (Therapeutic Exercise) AROM (active range of motion)  -AN     Shoulder AROM (Therapeutic Exercise) bilateral;flexion;extension;aBduction;aDduction;sitting;20 repititions;scapular retraction  -AN       Row Name 11/18/24 1406          Elbow/Forearm (Therapeutic Exercise)    Elbow/Forearm (Therapeutic Exercise) AROM (active range of motion)  -AN     Elbow/Forearm AROM (Therapeutic Exercise) bilateral;flexion;extension;sitting;20 repititions  -AN       Row Name 11/18/24 1406          Motor Skills    Therapeutic Exercise shoulder;elbow/forearm  -AN       Row Name 11/18/24 1406          Balance    Balance Assessment sitting static balance;sitting dynamic balance;sit to stand dynamic balance;standing dynamic balance;standing static balance  -AN     Static Sitting Balance standby assist  -AN     Dynamic Sitting Balance standby assist  -AN     Position, Sitting Balance sitting edge of bed  -AN     Sit to Stand Dynamic Balance verbal cues;minimal assist;1-person assist  -AN     Static Standing Balance verbal cues;minimal assist;1-person assist  -AN     Dynamic Standing Balance verbal cues;minimal assist;1-person assist  -AN     Position/Device Used, Standing Balance supported;walker, rolling  -AN     Balance Interventions standing;sit to  stand;supported;static;dynamic;minimal challenge;occupation based/functional task  -AN               User Key  (r) = Recorded By, (t) = Taken By, (c) = Cosigned By      Initials Name Provider Type    Umu Guadalupe OT Occupational Therapist                   Goals/Plan    No documentation.                  Clinical Impression       Row Name 11/18/24 1408          Pain Assessment    Pain Location extremity  -AN     Pain Side/Orientation bilateral  -AN     Response to Pain Interventions activity participation with tolerable pain  -AN     Additional Documentation Pain Scale: FACES Pre/Post-Treatment (Group)  -AN       Row Name 11/18/24 1408          Pain Scale: FACES Pre/Post-Treatment    Pain: FACES Scale, Pretreatment 2-->hurts little bit  -AN     Posttreatment Pain Rating 2-->hurts little bit  -AN       Row Name 11/18/24 1408          Plan of Care Review    Plan of Care Reviewed With patient  -AN     Progress no change  -AN     Outcome Evaluation Pt continues to require Min A for functional transfers and mobility using the RW with notable trunk instability and BLE weakness. Sit to stands and UE ther-ex performed for strength building and activity tolerance building. Pt required prolonged rest breaks throughout due to fatigue. Cont POC.  -AN       Row Name 11/18/24 1408          Therapy Plan Review/Discharge Plan (OT)    Anticipated Discharge Disposition (OT) inpatient rehabilitation facility  -AN       Row Name 11/18/24 1408          Vital Signs    Pre SpO2 (%) 95  -AN     O2 Delivery Pre Treatment room air  -AN     Intra SpO2 (%) 92  -AN     O2 Delivery Intra Treatment room air  -AN     Post SpO2 (%) 94  -AN     O2 Delivery Post Treatment room air  -AN     Pre Patient Position Sitting  -AN     Intra Patient Position Standing  -AN     Post Patient Position Supine  -AN       Row Name 11/18/24 1408          Positioning and Restraints    Pre-Treatment Position sitting in chair/recliner  -AN     Post Treatment  Position bed  -AN     In Bed notified nsg;supine;call light within reach;exit alarm on;encouraged to call for assist;side rails up x2  -AN               User Key  (r) = Recorded By, (t) = Taken By, (c) = Cosigned By      Initials Name Provider Type    Umu Guadalupe OT Occupational Therapist                   Outcome Measures       Row Name 11/18/24 1410          How much help from another is currently needed...    Putting on and taking off regular lower body clothing? 3  -AN     Bathing (including washing, rinsing, and drying) 3  -AN     Toileting (which includes using toilet bed pan or urinal) 3  -AN     Putting on and taking off regular upper body clothing 3  -AN     Taking care of personal grooming (such as brushing teeth) 3  -AN     Eating meals 3  -AN     AM-PAC 6 Clicks Score (OT) 18  -AN       Row Name 11/18/24 0820          How much help from another person do you currently need...    Turning from your back to your side while in flat bed without using bedrails? 4  -AL (r) SP (t) AL (c)     Moving from lying on back to sitting on the side of a flat bed without bedrails? 3  -AL (r) SP (t) AL (c)     Moving to and from a bed to a chair (including a wheelchair)? 3  -AL (r) SP (t) AL (c)     Standing up from a chair using your arms (e.g., wheelchair, bedside chair)? 3  -AL (r) SP (t) AL (c)     Climbing 3-5 steps with a railing? 3  -AL (r) SP (t) AL (c)     To walk in hospital room? 3  -AL (r) SP (t) AL (c)     AM-PAC 6 Clicks Score (PT) 19  -AL (r) SP (t)     Highest Level of Mobility Goal 6 --> Walk 10 steps or more  -AL (r) SP (t)       Row Name 11/18/24 1410          Functional Assessment    Outcome Measure Options AM-PAC 6 Clicks Daily Activity (OT)  -AN               User Key  (r) = Recorded By, (t) = Taken By, (c) = Cosigned By      Initials Name Provider Type    Umu Guadalupe OT Occupational Therapist    Yahaira Manuel, RN Registered Nurse    Olga Lidia Heath, RN Extern Registered  Nurse                    Occupational Therapy Education       Title: PT OT SLP Therapies (In Progress)       Topic: Occupational Therapy (Done)       Point: ADL training (Done)       Description:   Instruct learner(s) on proper safety adaptation and remediation techniques during self care or transfers.   Instruct in proper use of assistive devices.                  Learning Progress Summary            Patient Acceptance, E, VU by AN at 11/18/2024 1411    Acceptance, E,D, VU,DU by  at 11/14/2024 1103    Acceptance, D,E, VU,DU by ROSARIO at 11/12/2024 1400    Acceptance, E,D, NR by ELEUTERIO at 11/11/2024 0854                      Point: Home exercise program (Done)       Description:   Instruct learner(s) on appropriate technique for monitoring, assisting and/or progressing therapeutic exercises/activities.                  Learning Progress Summary            Patient Acceptance, E, VU by AN at 11/18/2024 1411    Acceptance, E,D, VU,DU by  at 11/14/2024 1103                      Point: Precautions (Done)       Description:   Instruct learner(s) on prescribed precautions during self-care and functional transfers.                  Learning Progress Summary            Patient Acceptance, E, VU by AN at 11/18/2024 1411    Acceptance, E,D, VU,DU by  at 11/14/2024 1103    Acceptance, D,E, VU,DU by  at 11/12/2024 1400    Acceptance, E,D, NR by ELEUTERIO at 11/11/2024 0854                      Point: Body mechanics (Done)       Description:   Instruct learner(s) on proper positioning and spine alignment during self-care, functional mobility activities and/or exercises.                  Learning Progress Summary            Patient Acceptance, E, VU by AN at 11/18/2024 1411    Acceptance, E,D, VU,DU by  at 11/14/2024 1103    Acceptance, D,E, VU,DU by  at 11/12/2024 1400    Acceptance, E,D, NR by ELEUTERIO at 11/11/2024 0854                                      User Key       Initials Effective Dates Name Provider Type Discipline    ELEUTERIO  06/16/21 -  Vanessa Noonan OT Occupational Therapist OT     06/16/21 -  Ion Roberts OT Occupational Therapist OT    AN 09/21/21 -  Umu Sherman OT Occupational Therapist OT                  OT Recommendation and Plan     Plan of Care Review  Plan of Care Reviewed With: patient  Progress: no change  Outcome Evaluation: Pt continues to require Min A for functional transfers and mobility using the RW with notable trunk instability and BLE weakness. Sit to stands and UE ther-ex performed for strength building and activity tolerance building. Pt required prolonged rest breaks throughout due to fatigue. Cont POC.     Time Calculation:         Time Calculation- OT       Row Name 11/18/24 1411             Time Calculation- OT    OT Start Time 1330  -AN      OT Received On 11/18/24  -AN         Timed Charges    91866 - OT Therapeutic Exercise Minutes 15  -AN      03943 - OT Therapeutic Activity Minutes 10  -AN         Total Minutes    Timed Charges Total Minutes 25  -AN       Total Minutes 25  -AN                User Key  (r) = Recorded By, (t) = Taken By, (c) = Cosigned By      Initials Name Provider Type    AN Umu Sherman OT Occupational Therapist                  Therapy Charges for Today       Code Description Service Date Service Provider Modifiers Qty    61920456100 HC OT THER PROC EA 15 MIN 11/18/2024 Umu Sherman OT GO 1    61356673716 HC OT THERAPEUTIC ACT EA 15 MIN 11/18/2024 Umu Sherman OT GO 1                 Umu Sherman OT  11/18/2024

## 2024-11-18 NOTE — PROGRESS NOTES
[facility] Medicine Services  PROGRESS NOTE    Patient Name: Philomena Davis  : 1960  MRN: [id number]    Date of Admission: 11/10/2024  Primary Care Physician: Unique Brandt DO    Subjective   Subjective     CC:  Weakness/numbness in legs     HPI:  Patient reports slight improvement in lower extremity strength and numbness, is hopeful for rehab soon.      Objective   Objective     Vital Signs:   Temp:  [98.1 °F (36.7 °C)-99.5 °F (37.5 °C)] 98.2 °F (36.8 °C)  Heart Rate:  [69-79] 77  Resp:  [16-18] 17  BP: ()/(59-72) 127/72  Flow (L/min) (Oxygen Therapy):  [1-2] 2     Physical Exam:  General appearance: alert, awake, oriented, no acute distress   Cardiovascular: RRR, no murmurs or rubs, radial pulse full 2/4 BL   Respiratory: CTAB, no crackles or wheezes   Neuro/CNS: alert and oriented x3, normal speech, moves all 4 extremities, sensation grossly intact    Results Reviewed:  LAB RESULTS:      Lab 24  0600 24  0757   WBC 9.99 11.04*   HEMOGLOBIN 10.6* 10.5*   HEMATOCRIT 31.8* 31.8*   PLATELETS 224 219   .7* 109.7*         Lab 24  1801 24  0600 24  0757 24  1255   SODIUM  --  137 138  --    POTASSIUM 4.9 3.6 4.0  --    CHLORIDE  --  103 104  --    CO2  --  24.0 26.0  --    ANION GAP  --  10.0 8.0  --    BUN  --  6* 7*  --    CREATININE  --  0.58 0.62  --    EGFR  --  102.5 100.8  --    GLUCOSE  --  84 85  --    CALCIUM  --  8.4* 8.4*  --    MAGNESIUM  --   --   --  1.7   PHOSPHORUS  --   --   --  3.4         Lab 24  0600 24  0757   TOTAL PROTEIN 6.5 6.1   ALBUMIN 2.8* 2.9*   GLOBULIN 3.7 3.2   ALT (SGPT) 17 19   AST (SGOT) 66* 80*   BILIRUBIN 1.2 1.3*   ALK PHOS 126* 129*                     Brief Urine Lab Results  (Last result in the past 365 days)        Color   Clarity   Blood   Leuk Est   Nitrite   Protein   CREAT   Urine HCG        11/10/24 0958 Yellow   Clear   Negative   Negative   Negative   Negative                    Microbiology Results Abnormal       None            No radiology results from the last 24 hrs    Results for orders placed during the hospital encounter of 11/10/24    Adult Transthoracic Echo Complete W/ Cont if Necessary Per Protocol    Interpretation Summary  •  Left ventricular systolic function is normal. Estimated left ventricular EF = 65%  •  The cardiac valves are anatomically and functionally normal.      Current medications:  Scheduled Meds:budesonide-formoterol, 2 puff, Inhalation, BID - RT  carvedilol, 12.5 mg, Oral, BID With Meals  desvenlafaxine, 50 mg, Oral, Daily  folic acid, 1 mg, Oral, Daily  gabapentin, 200 mg, Oral, Q12H  latanoprost, 1 drop, Both Eyes, Nightly  melatonin, 5 mg, Oral, Nightly  midodrine, 15 mg, Oral, BID AC  nystatin, 1 Application, Topical, Q12H  pantoprazole, 40 mg, Oral, Daily  rosuvastatin, 5 mg, Oral, Daily  sodium chloride, 10 mL, Intravenous, Q12H  thiamine, 100 mg, Oral, Daily  ursodiol, 300 mg, Oral, BID  zolpidem, 5 mg, Oral, Nightly      Continuous Infusions:   PRN Meds:.•  acetaminophen **OR** acetaminophen **OR** acetaminophen  •  senna-docusate sodium **AND** polyethylene glycol **AND** bisacodyl **AND** bisacodyl  •  calcium carbonate  •  Calcium Replacement - Follow Nurse / BPA Driven Protocol  •  ipratropium-albuterol  •  Magnesium Standard Dose Replacement - Follow Nurse / BPA Driven Protocol  •  metoprolol tartrate  •  nitroglycerin  •  ondansetron  •  Phosphorus Replacement - Follow Nurse / BPA Driven Protocol  •  Potassium Replacement - Follow Nurse / BPA Driven Protocol  •  sodium chloride  •  sodium chloride  •  sodium chloride    Assessment & Plan   Assessment & Plan     Active Hospital Problems    Diagnosis  POA   • **Lower extremity weakness [R29.898]  Yes   • Orthostasis [I95.1]  Yes      Resolved Hospital Problems   No resolved problems to display.        This patient's assessments and plans were partially entered by my partner and updated  as appropriate by me on 11/18/2024     Brief Hospital Course to date:  Philomena Davis is a 64 y.o. female with history of alcohol dependence, cirrhosis, HTN, peripheral neuropathy, was admitted for paresthesias in her extremities.  Neurology was consulted. PT/OT recommended rehab.      BL LE weakness  BL paraesthesias/facial numbness, resolved   ETOH use  Peripheral neuropathy due to long standing ETOH use  Dysautonomia  -MRI brain unremarkable  -MRI lumbar spine with L5-S1 disc protrusion  -MRI C/T spine reviewed, with C4/5 facet arthropathy  -EMG/NCV mild sensory neuropathy  -Continue thiamine, neurology following  -Symptoms improving   -PT/OT; awaiting rehab      Marked orthostasis  Dysautonomia  -added midodrine, increased dose but BID dosing  -mobilize/PT/OT  -AM cortisol low, s/p cosyntropin with expected response, not suggestive of adrenal insufficiency   -Neurology discussed nonpharmacologic therapy      Possible vasculitis  CT with celiac artery stranding  -markedly elevated CRP/ESR  -ANCA panel negative  -WILMA panel negative  -RF negative  -needs outpatient follow up, no abdominal symptoms     Atrial tachycardia, atrial flutter  -presented with tachycardia, HR to 150  -on BB  -ECHO unremarkable, EF normal  -bursts of tachycardia here, evaluated by cardiology  -continue carvedilol, dosing may be limited by hypotension/orthostasis  -cardiology deferring AC due to cirrhosis for now     Alcohol dependence  Cirrhosis  Hepatocellular jaundice  -monitor     Mild LA  -due to liver disease     Subclinical hypothyroidism  -needs follow up in 6 weeks     Stable for discharge pending placement.     Expected Discharge Location and Transportation: Rehab   Expected Discharge   Expected Discharge Date: 11/18/2024; Expected Discharge Time:      VTE Prophylaxis:  Mechanical VTE prophylaxis orders are present.         AM-PAC 6 Clicks Score (PT): 19 (11/18/24 8920)    CODE STATUS:   Code Status and Medical Interventions: CPR  (Attempt to Resuscitate); Full Support   Ordered at: 11/10/24 153     Level Of Support Discussed With:    Patient    Next of Kin (If No Surrogate)     Code Status (Patient has no pulse and is not breathing):    CPR (Attempt to Resuscitate)     Medical Interventions (Patient has pulse or is breathing):    Full Support       Kishor Naranjo, DO  11/18/24

## 2024-11-18 NOTE — CASE MANAGEMENT/SOCIAL WORK
Continued Stay Note  [facility]     Patient Name: Philomena Davis  MRN: [id number]  Today's Date: 11/18/2024    Admit Date: 11/10/2024    Plan: Rehab   Discharge Plan       Row Name 11/18/24 1527       Plan    Plan Rehab    Patient/Family in Agreement with Plan yes    Plan Comments Spoke with Ms. Davis at the bedside. P2P was upheld for rehab at [facility]. Discussed with patient and she would like to do a family appeal. CM provided information to patient to complete family appeal. CM will fax clinical notes to [employer] once she completes the appeal. CM will continue to follow up.    Final Discharge Disposition Code 62 - inpatient rehab facility                   Discharge Codes    No documentation.                 Expected Discharge Date and Time       Expected Discharge Date Expected Discharge Time    Nov 18, 2024               Reena Hodges RN

## 2024-11-19 LAB
ANION GAP SERPL CALCULATED.3IONS-SCNC: 10 MMOL/L (ref 5–15)
BUN SERPL-MCNC: 10 MG/DL (ref 8–23)
BUN/CREAT SERPL: 14.5 (ref 7–25)
CALCIUM SPEC-SCNC: 9.1 MG/DL (ref 8.6–10.5)
CHLORIDE SERPL-SCNC: 103 MMOL/L (ref 98–107)
CO2 SERPL-SCNC: 27 MMOL/L (ref 22–29)
CREAT SERPL-MCNC: 0.69 MG/DL (ref 0.57–1)
DEPRECATED RDW RBC AUTO: 54.3 FL (ref 37–54)
EGFRCR SERPLBLD CKD-EPI 2021: 98.3 ML/MIN/1.73
ERYTHROCYTE [DISTWIDTH] IN BLOOD BY AUTOMATED COUNT: 13.2 % (ref 12.3–15.4)
GLUCOSE SERPL-MCNC: 106 MG/DL (ref 65–99)
HCT VFR BLD AUTO: 32.6 % (ref 34–46.6)
HGB BLD-MCNC: 10.6 G/DL (ref 12–15.9)
MCH RBC QN AUTO: 36.1 PG (ref 26.6–33)
MCHC RBC AUTO-ENTMCNC: 32.5 G/DL (ref 31.5–35.7)
MCV RBC AUTO: 110.9 FL (ref 79–97)
PLATELET # BLD AUTO: 266 10*3/MM3 (ref 140–450)
PMV BLD AUTO: 9.9 FL (ref 6–12)
POTASSIUM SERPL-SCNC: 3.8 MMOL/L (ref 3.5–5.2)
PROCALCITONIN SERPL-MCNC: 0.19 NG/ML (ref 0–0.25)
RBC # BLD AUTO: 2.94 10*6/MM3 (ref 3.77–5.28)
SODIUM SERPL-SCNC: 140 MMOL/L (ref 136–145)
WBC NRBC COR # BLD AUTO: 13.25 10*3/MM3 (ref 3.4–10.8)

## 2024-11-19 PROCEDURE — 97110 THERAPEUTIC EXERCISES: CPT

## 2024-11-19 PROCEDURE — 99232 SBSQ HOSP IP/OBS MODERATE 35: CPT | Performed by: NURSE PRACTITIONER

## 2024-11-19 PROCEDURE — 94799 UNLISTED PULMONARY SVC/PX: CPT

## 2024-11-19 PROCEDURE — 84145 PROCALCITONIN (PCT): CPT | Performed by: NURSE PRACTITIONER

## 2024-11-19 PROCEDURE — 80048 BASIC METABOLIC PNL TOTAL CA: CPT | Performed by: NURSE PRACTITIONER

## 2024-11-19 PROCEDURE — 85027 COMPLETE CBC AUTOMATED: CPT | Performed by: NURSE PRACTITIONER

## 2024-11-19 RX ADMIN — NYSTATIN 1 APPLICATION: 100000 CREAM TOPICAL at 09:11

## 2024-11-19 RX ADMIN — FOLIC ACID 1 MG: 1 TABLET ORAL at 09:11

## 2024-11-19 RX ADMIN — URSODIOL 300 MG: 300 CAPSULE ORAL at 21:23

## 2024-11-19 RX ADMIN — URSODIOL 300 MG: 300 CAPSULE ORAL at 09:11

## 2024-11-19 RX ADMIN — ZOLPIDEM TARTRATE 5 MG: 5 TABLET ORAL at 21:24

## 2024-11-19 RX ADMIN — PANTOPRAZOLE SODIUM 40 MG: 40 TABLET, DELAYED RELEASE ORAL at 05:40

## 2024-11-19 RX ADMIN — LATANOPROST 1 DROP: 50 SOLUTION OPHTHALMIC at 21:25

## 2024-11-19 RX ADMIN — CARVEDILOL 12.5 MG: 12.5 TABLET, FILM COATED ORAL at 16:57

## 2024-11-19 RX ADMIN — BUDESONIDE AND FORMOTEROL FUMARATE DIHYDRATE 2 PUFF: 160; 4.5 AEROSOL RESPIRATORY (INHALATION) at 20:24

## 2024-11-19 RX ADMIN — GABAPENTIN 200 MG: 100 CAPSULE ORAL at 09:11

## 2024-11-19 RX ADMIN — THIAMINE HCL TAB 100 MG 100 MG: 100 TAB at 09:11

## 2024-11-19 RX ADMIN — BUDESONIDE AND FORMOTEROL FUMARATE DIHYDRATE 2 PUFF: 160; 4.5 AEROSOL RESPIRATORY (INHALATION) at 07:15

## 2024-11-19 RX ADMIN — Medication 5 MG: at 21:23

## 2024-11-19 RX ADMIN — NYSTATIN 1 APPLICATION: 100000 CREAM TOPICAL at 21:23

## 2024-11-19 RX ADMIN — GABAPENTIN 200 MG: 100 CAPSULE ORAL at 16:56

## 2024-11-19 RX ADMIN — MIDODRINE HYDROCHLORIDE 15 MG: 10 TABLET ORAL at 09:10

## 2024-11-19 RX ADMIN — MIDODRINE HYDROCHLORIDE 15 MG: 10 TABLET ORAL at 16:56

## 2024-11-19 RX ADMIN — DESVENLAFAXINE 50 MG: 50 TABLET, FILM COATED, EXTENDED RELEASE ORAL at 09:10

## 2024-11-19 RX ADMIN — ROSUVASTATIN 5 MG: 10 TABLET, FILM COATED ORAL at 21:23

## 2024-11-19 NOTE — THERAPY TREATMENT NOTE
Patient Name: Philomena Davis  : 1960    MRN: [id number]                              Today's Date: 2024       Admit Date: 11/10/2024    Visit Dx:     ICD-10-CM ICD-9-CM   1. Weakness of both lower extremities  R29.898 729.89   2. Paresthesia  R20.2 782.0   3. Weakness  R53.1 780.79   4. Leukocytosis, unspecified type  D72.829 288.60   5. Alcoholic cirrhosis of liver without ascites  K70.30 571.2   6. Orthostasis  I95.1 458.0   7. Tachycardia  R00.0 785.0     Patient Active Problem List   Diagnosis    Essential hypertension    Generalized anxiety disorder    Hyperlipidemia    Glaucoma    Alcoholic cirrhosis of liver without ascites    Severe malnutrition    GERD without esophagitis    Sleep disturbance    Annual physical exam    Idiopathic peripheral neuropathy    Severe anxiety with panic    Alcohol use disorder    Cigarette nicotine dependence in remission    Chronic obstructive pulmonary disease    Closed fracture of left hand    Moderate malnutrition    Lower extremity weakness    Orthostasis     Past Medical History:   Diagnosis Date    Alcohol withdrawal 05/10/2019    Annual physical exam 2023    Anxiety and depression     Cirrhosis of liver     Closed displaced fracture of lateral malleolus of left fibula     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    Closed trimalleolar fracture of right ankle 2023    Digital mucous cyst of toe of left foot 2017    Ganglion cyst of left foot     Glaucoma     Hypertension     Wears glasses      Past Surgical History:   Procedure Laterality Date    ANKLE OPEN REDUCTION INTERNAL FIXATION Left 2017    Procedure: LEFT ANKLE OPEN REDUCTION INTERNAL FIXATION WITH ILIAC CREST BONE GRAFT , EXCISION CYST 4TH/5TH INNERSPACE LEFT FOOT;  Surgeon: Frank Larson MD;  Location: [facility];  Service:     ANKLE OPEN REDUCTION INTERNAL FIXATION Right 2023    Procedure: ANKLE OPEN REDUCTION INTERNAL  FIXATION;  Surgeon: Deandre Reynoso MD;  Location:  [facility];  Service: Orthopedics;  Laterality: Right;    AUGMENTATION MAMMAPLASTY Bilateral 1999    BREAST AUGMENTATION      BREAST EXCISIONAL BIOPSY Right 2014    DILATION AND CURETTAGE, DIAGNOSTIC / THERAPEUTIC      ENDOSCOPY N/A 11/30/2022    Procedure: ESOPHAGOGASTRODUODENOSCOPY;  Surgeon: Arelis Solano MD;  Location:  [facility];  Service: Gastroenterology;  Laterality: N/A;    ENDOSCOPY N/A 2/1/2024    Procedure: ESOPHAGOGASTRODUODENOSCOPY;  Surgeon: Brunner, Mark I, MD;  Location:  [facility];  Service: Gastroenterology;  Laterality: N/A;    IA RPR NON/MAL FEMUR DSTL H/N W/ILIAC/AUTOG BONE Left 03/24/2017    Procedure: ILIAC CREST BONE GRAFT;  Surgeon: Frank Larson MD;  Location:  [facility];  Service: Orthopedics    WRIST SURGERY        General Information       Row Name 11/19/24 1420          Physical Therapy Time and Intention    Document Type therapy note (daily note)  -SC     Mode of Treatment physical therapy  -SC       Row Name 11/19/24 1420          General Information    Patient Profile Reviewed yes  -SC     Existing Precautions/Restrictions fall;other (see comments);orthostatic hypotension  periferal neuropathy  -SC       Row Name 11/19/24 1420          Cognition    Orientation Status (Cognition) oriented x 3  -SC       Row Name 11/19/24 1420          Safety Issues/Impairments Affecting Functional Mobility    Impairments Affecting Function (Mobility) balance;coordination;endurance/activity tolerance;strength;pain;sensation/sensory awareness;postural/trunk control  -SC     Comment, Safety Issues/Impairments (Mobility) alert,following commands  -SC               User Key  (r) = Recorded By, (t) = Taken By, (c) = Cosigned By      Initials Name Provider Type    SC Ean Curiel, PT Physical Therapist                   Mobility       Row Name 11/19/24 1420          Bed Mobility    Bed Mobility sit-supine;scooting/bridging  -SC      Scooting/Bridging [place] (Bed Mobility) modified independence;verbal cues  -SC     Supine-Sit [place] (Bed Mobility) verbal cues;modified independence  -SC     Assistive Device (Bed Mobility) head of bed elevated;bed rails  -SC     Comment, (Bed Mobility) extra time needed to get to edge of bed  -[facility] Name 11/19/24 1420          Transfers    Comment, (Transfers) cues for hand placement when transitioning. Needs reminders  -[facility] Name 11/19/24 1420          Sit-Stand Transfer    Sit-Stand [place] (Transfers) 1 person to manage equipment;1 person assist;contact guard;minimum assist (75% patient effort)  -SC     Assistive Device (Sit-Stand Transfers) walker, front-wheeled  -SC     Comment, (Sit-Stand Transfer) time taken is standing to improve standing balance  -[facility] Name 11/19/24 1420          Gait/Stairs (Locomotion)    [place] Level (Gait) verbal cues;1 person assist;minimum assist (75% patient effort)  -SC     Assistive Device (Gait) walker, front-wheeled  -SC     Distance in Feet (Gait) 30  -SC     Deviations/Abnormal Patterns (Gait) base of support, wide;stride length decreased;weight shifting decreased;gait speed decreased  -SC     Bilateral Gait Deviations forward flexed posture  -SC     Comment, (Gait/Stairs) encouraged patient to stay closer to walker.  Demonstrated unsteady gait . Needed x2 standing rest break  -SC               User Key  (r) = Recorded By, (t) = Taken By, (c) = Cosigned By      Initials Name Provider Type    SC Ean Curiel PT Physical Therapist                   Obj/Interventions       [facility] Name 11/19/24 1423          Motor Skills    Therapeutic Exercise hip;knee;ankle  -SC       Row Name 11/19/24 1423          Hip (Therapeutic Exercise)    Hip (Therapeutic Exercise) AROM (active range of motion);isometric exercises  -SC     Hip AROM (Therapeutic Exercise) bilateral;external rotation;internal rotation;10 repetitions  -SC     Hip Isometrics  (Therapeutic Exercise) bilateral;flexion;extension;sitting;10 repetitions  -[facility] Name 11/19/24 1423          Knee (Therapeutic Exercise)    Knee (Therapeutic Exercise) AROM (active range of motion);isometric exercises  -SC     Knee Isometrics (Therapeutic Exercise) bilateral;quad sets;10 repetitions  -SC     Knee Strengthening (Therapeutic Exercise) bilateral;flexion;extension;LAQ (long arc quad);10 repetitions  -SC       Row Name 11/19/24 1423          Ankle (Therapeutic Exercise)    Ankle (Therapeutic Exercise) AROM (active range of motion)  -SC     Ankle AROM (Therapeutic Exercise) bilateral;dorsiflexion;plantarflexion;10 repetitions  -[facility] Name 11/19/24 1423          Balance    Dynamic Standing Balance 1-person assist;1 person to manage equipment;verbal cues;minimal assist  -SC     Position/Device Used, Standing Balance supported  -SC     Comment, Balance unsteady  -SC               User Key  (r) = Recorded By, (t) = Taken By, (c) = Cosigned By      Initials Name Provider Type    SC Ean Curiel, PT Physical Therapist                   Goals/Plan    No documentation.                  Clinical Impression       Row Name 11/19/24 1425          Pain    Pretreatment Pain Rating 0/10 - no pain  -SC     Posttreatment Pain Rating 0/10 - no pain  -SC       Row Name 11/19/24 1425          Plan of Care Review    Plan of Care Reviewed With patient  -SC     Progress improving  -SC     Outcome Evaluation Patient continues to have a drop in BP on standing. SHe was however, able to ambulate more in her room. Good effort. Recommend rehaba at discharge.  -SC       Row Name 11/19/24 1425          Therapy Assessment/Plan (PT)    Rehab Potential (PT) good  -SC     Criteria for Skilled Interventions Met (PT) yes  -SC     Therapy Frequency (PT) daily  -[facility] Name 11/19/24 142          Vital Signs    Pre Systolic BP Rehab 115  -SC     Pre Treatment Diastolic BP 64  -SC     Intra Systolic BP Rehab 107  -SC      Intra Treatment Diastolic BP 63  -SC     Post Systolic BP Rehab 86  -SC     Post Treatment Diastolic BP 62  -SC       Row Name 11/19/24 1425          Positioning and Restraints    Pre-Treatment Position in bed  -SC     Post Treatment Position chair  -SC     In Chair notified nsg;reclined;sitting;call light within reach;encouraged to call for assist;exit alarm on;with family/caregiver  -SC               User Key  (r) = Recorded By, (t) = Taken By, (c) = Cosigned By      Initials Name Provider Type    SC Ean Curiel PT Physical Therapist                   Outcome Measures       Row Name 11/19/24 1427 11/19/24 0915       How much help from another person do you currently need...    Turning from your back to your side while in flat bed without using bedrails? 4  -SC 4  -AL    Moving from lying on back to sitting on the side of a flat bed without bedrails? 3  -SC 4  -AL    Moving to and from a bed to a chair (including a wheelchair)? 3  -SC 3  -AL    Standing up from a chair using your arms (e.g., wheelchair, bedside chair)? 3  -SC 3  -AL    Climbing 3-5 steps with a railing? 2  -SC 2  -AL    To walk in hospital room? 3  -SC 3  -AL    AM-PAC 6 Clicks Score (PT) 18  -SC 19  -AL    Highest Level of Mobility Goal 6 --> Walk 10 steps or more  -SC 6 --> Walk 10 steps or more  -AL      Row Name 11/19/24 1427          Functional Assessment    Outcome Measure Options AM-PAC 6 Clicks Basic Mobility (PT)  -SC               User Key  (r) = Recorded By, (t) = Taken By, (c) = Cosigned By      Initials Name Provider Type    SC Ean Curiel PT Physical Therapist    Yahaira Manuel, RN Registered Nurse                                 Physical Therapy Education       Title: PT OT SLP Therapies (Done)       Topic: Physical Therapy (Done)       Point: Mobility training (Done)       Learning Progress Summary            Patient Eager, E, VU by SC at 11/19/2024 1427    Comment: reviewed HEP    Acceptance, E, NR by AS at 11/16/2024  1121    Acceptance, E, NR by  at 11/13/2024 1557    Acceptance, E, NR by  at 11/11/2024 1013                      Point: Body mechanics (Done)       Learning Progress Summary            Patient Eager, E, VU by SC at 11/19/2024 1427    Comment: reviewed HEP    Acceptance, E, NR by AS at 11/16/2024 1121    Acceptance, E, NR by  at 11/13/2024 1557    Acceptance, E, NR by  at 11/11/2024 1013                      Point: Precautions (Done)       Learning Progress Summary            Patient Eager, E, VU by SC at 11/19/2024 1427    Comment: reviewed HEP    Acceptance, E, NR by AS at 11/16/2024 1121    Acceptance, E, NR by  at 11/13/2024 1557    Acceptance, E, NR by  at 11/11/2024 1013                                      User Key       Initials Effective Dates Name Provider Type Discipline    SC 02/03/23 -  Ean Curiel, PT Physical Therapist PT    AS 04/28/23 -  Eileen Moncada, BLAYNE Physical Therapist Assistant PT     07/11/24 -  Latoya Judge, KEILA Physical Therapist PT                  PT Recommendation and Plan     Progress: improving  Outcome Evaluation: Patient continues to have a drop in BP on standing. SHe was however, able to ambulate more in her room. Good effort. Recommend rehaba at discharge.     Time Calculation:         PT Charges       Row Name 11/19/24 1357             Time Calculation    Start Time 1357  -SC      PT Received On 11/19/24  -SC      PT Goal Re-Cert Due Date 11/21/24  -SC         Timed Charges    54056 - PT Therapeutic Exercise Minutes 8  -SC      43409 - Gait Training Minutes  8  -SC         Total Minutes    Timed Charges Total Minutes 16  -SC       Total Minutes 16  -SC                User Key  (r) = Recorded By, (t) = Taken By, (c) = Cosigned By      Initials Name Provider Type    SC Ean Curiel, PT Physical Therapist                  Therapy Charges for Today       Code Description Service Date Service Provider Modifiers Qty    74536890724 HC PT THER PROC EA 15 MIN  11/19/2024 Ean Curiel PT GP 1    30549222828 HC PT THER SUPP EA 15 MIN 11/19/2024 Ean Curiel PT GP 2            PT G-Codes  Outcome Measure Options: AM-PAC 6 Clicks Basic Mobility (PT)  AM-PAC 6 Clicks Score (PT): 18  AM-PAC 6 Clicks Score (OT): 18  PT Discharge Summary  Anticipated Discharge Disposition (PT): inpatient rehabilitation facility    Ean Curiel, PT  11/19/2024

## 2024-11-19 NOTE — PROGRESS NOTES
[facility] Medicine Services  PROGRESS NOTE    Patient Name: Philomena Davis  : 1960  MRN: [id number]    Date of Admission: 11/10/2024  Primary Care Physician: Unique Brandt DO    Subjective   Subjective     CC:  Weakness,numbness in legs    HPI:  Patient is resting in bed in NAD> she slept well. Tolerating diet.       Objective   Objective     Vital Signs:   Temp:  [98 °F (36.7 °C)-98.6 °F (37 °C)] 98.6 °F (37 °C)  Heart Rate:  [76-87] 82  Resp:  [15-20] 16  BP: ()/(42-69) 110/69  Flow (L/min) (Oxygen Therapy):  [1] 1     Physical Exam:  Constitutional: No acute distress, awake, alert  HENT: NCAT, mucous membranes moist  Respiratory: Clear to auscultation bilaterally, respiratory effort normal 1L 98%  Cardiovascular: RRR, no murmurs, rubs, or gallops  Gastrointestinal: Positive bowel sounds, soft, nontender, nondistended  Musculoskeletal: No bilateral ankle edema  Psychiatric: Appropriate affect, cooperative  Neurologic: Oriented x 3, moves all 4 ext, speech clear  Skin: No rashes      Results Reviewed:  LAB RESULTS:      Lab 24  0600 24  0757   WBC 9.99 11.04*   HEMOGLOBIN 10.6* 10.5*   HEMATOCRIT 31.8* 31.8*   PLATELETS 224 219   .7* 109.7*         Lab 24  1801 24  0600 24  0757   SODIUM  --  137 138   POTASSIUM 4.9 3.6 4.0   CHLORIDE  --  103 104   CO2  --  24.0 26.0   ANION GAP  --  10.0 8.0   BUN  --  6* 7*   CREATININE  --  0.58 0.62   EGFR  --  102.5 100.8   GLUCOSE  --  84 85   CALCIUM  --  8.4* 8.4*         Lab 24  0600 24  0757   TOTAL PROTEIN 6.5 6.1   ALBUMIN 2.8* 2.9*   GLOBULIN 3.7 3.2   ALT (SGPT) 17 19   AST (SGOT) 66* 80*   BILIRUBIN 1.2 1.3*   ALK PHOS 126* 129*                     Brief Urine Lab Results  (Last result in the past 365 days)        Color   Clarity   Blood   Leuk Est   Nitrite   Protein   CREAT   Urine HCG        11/10/24 0958 Yellow   Clear   Negative   Negative   Negative   Negative                    Microbiology Results Abnormal       None            No radiology results from the last 24 hrs    Results for orders placed during the hospital encounter of 11/10/24    Adult Transthoracic Echo Complete W/ Cont if Necessary Per Protocol    Interpretation Summary  •  Left ventricular systolic function is normal. Estimated left ventricular EF = 65%  •  The cardiac valves are anatomically and functionally normal.      Current medications:  Scheduled Meds:budesonide-formoterol, 2 puff, Inhalation, BID - RT  carvedilol, 12.5 mg, Oral, BID With Meals  desvenlafaxine, 50 mg, Oral, Daily  folic acid, 1 mg, Oral, Daily  gabapentin, 200 mg, Oral, Q12H  latanoprost, 1 drop, Both Eyes, Nightly  melatonin, 5 mg, Oral, Nightly  midodrine, 15 mg, Oral, BID AC  nystatin, 1 Application, Topical, Q12H  pantoprazole, 40 mg, Oral, Daily  rosuvastatin, 5 mg, Oral, Daily  sodium chloride, 10 mL, Intravenous, Q12H  thiamine, 100 mg, Oral, Daily  ursodiol, 300 mg, Oral, BID  zolpidem, 5 mg, Oral, Nightly      Continuous Infusions:   PRN Meds:.•  acetaminophen **OR** acetaminophen **OR** acetaminophen  •  senna-docusate sodium **AND** polyethylene glycol **AND** bisacodyl **AND** bisacodyl  •  calcium carbonate  •  Calcium Replacement - Follow Nurse / BPA Driven Protocol  •  ipratropium-albuterol  •  Magnesium Standard Dose Replacement - Follow Nurse / BPA Driven Protocol  •  metoprolol tartrate  •  nitroglycerin  •  ondansetron  •  Phosphorus Replacement - Follow Nurse / BPA Driven Protocol  •  Potassium Replacement - Follow Nurse / BPA Driven Protocol  •  sodium chloride  •  sodium chloride  •  sodium chloride    Assessment & Plan   Assessment & Plan     Active Hospital Problems    Diagnosis  POA   • **Lower extremity weakness [R29.898]  Yes   • Orthostasis [I95.1]  Yes      Resolved Hospital Problems   No resolved problems to display.        Brief Hospital Course to date:  Philomena Davis is a 64 y.o. female with history of  alcohol dependence, cirrhosis, HTN, peripheral neuropathy, was admitted for paresthesias in her extremities.  Neurology was consulted. PT/OT recommended rehab.     Plan was partially entered by my partner and I have reviewed and updated as appropriate on 11/19/24     BL LE weakness  BL paraesthesias/facial numbness, resolved   ETOH use  Peripheral neuropathy due to long standing ETOH use  Dysautonomia  -MRI brain unremarkable  -MRI lumbar spine with L5-S1 disc protrusion  -MRI C/T spine reviewed, with C4/5 facet arthropathy  -EMG/NCV mild sensory neuropathy  -Continue thiamine, neurology following  -Symptoms improving   -PT/OT; awaiting rehab, denied and now family appealing      Marked orthostasis  Dysautonomia  -added midodrine, increased dose but BID dosing  -mobilize/PT/OT  -AM cortisol low, s/p cosyntropin with expected response, not suggestive of adrenal insufficiency   -Neurology discussed nonpharmacologic therapy      Possible vasculitis  CT with celiac artery stranding  -markedly elevated CRP/ESR  -ANCA panel negative  -WILMA panel negative  -RF negative  -needs outpatient follow up, no abdominal symptoms     Atrial tachycardia, atrial flutter  -presented with tachycardia, HR to 150  -on BB  -ECHO unremarkable, EF normal  -bursts of tachycardia here, evaluated by cardiology  -continue carvedilol, dosing may be limited by hypotension/orthostasis  -cardiology deferring AC due to cirrhosis for now     Alcohol dependence  Cirrhosis  Hepatocellular jaundice  -monitor     Mild LA  -due to liver disease     Subclinical hypothyroidism  -needs follow up in 6 weeks     Stable for discharge pending placement.     Expected Discharge Location and Transportation: rehab vs home if appeal is denied   Expected Discharge   Expected Discharge Date: 11/18/2024; Expected Discharge Time:      VTE Prophylaxis:  Mechanical VTE prophylaxis orders are present.         AM-PAC 6 Clicks Score (PT): 19 (11/19/24 0799)    CODE STATUS:    Code Status and Medical Interventions: CPR (Attempt to Resuscitate); Full Support   Ordered at: 11/10/24 9257     Level Of Support Discussed With:    Patient    Next of Kin (If No Surrogate)     Code Status (Patient has no pulse and is not breathing):    CPR (Attempt to Resuscitate)     Medical Interventions (Patient has pulse or is breathing):    Full Support       Sherie Archer, APRN  11/19/24

## 2024-11-19 NOTE — PLAN OF CARE
Problem: Adult Inpatient Plan of Care  Goal: Plan of Care Review  Outcome: Progressing  Flowsheets (Taken 11/19/2024 0521)  Progress: improving  Plan of Care Reviewed With: patient  Goal: Patient-Specific Goal (Individualized)  Outcome: Progressing  Goal: Absence of Hospital-Acquired Illness or Injury  Outcome: Progressing  Intervention: Identify and Manage Fall Risk  Description: Perform standard risk assessment on admission using a validated tool or comprehensive approach appropriate to the patient; reassess fall risk frequently, with change in status or transfer to another level of care.  Communicate risk to interprofessional healthcare team; ensure fall risk visible cue.  Determine need for increased observation, equipment and environmental modification, as well as use of supportive, nonskid footwear.  Adjust safety measures to individual needs and identified risk factors.  Reinforce the importance of active participation with fall risk prevention, safety, and physical activity with the patient and family.  Perform regular intentional rounding to assess need for position change, pain assessment and personal needs, including assistance with toileting.  Recent Flowsheet Documentation  Taken 11/19/2024 0400 by Kavitha Austin RN  Safety Promotion/Fall Prevention:   nonskid shoes/slippers when out of bed   safety round/check completed   room organization consistent   lighting adjusted   activity supervised   assistive device/personal items within reach   clutter free environment maintained  Taken 11/19/2024 0230 by Kavitha Austin, RN  Safety Promotion/Fall Prevention:   nonskid shoes/slippers when out of bed   safety round/check completed   room organization consistent   lighting adjusted   activity supervised   assistive device/personal items within reach   clutter free environment maintained  Taken 11/19/2024 0015 by Kavitha Austin RN  Safety Promotion/Fall Prevention:   nonskid shoes/slippers when  out of bed   safety round/check completed   room organization consistent   lighting adjusted   activity supervised   assistive device/personal items within reach   clutter free environment maintained  Taken 11/18/2024 2210 by Kavitha Austin RN  Safety Promotion/Fall Prevention:   nonskid shoes/slippers when out of bed   safety round/check completed   room organization consistent   lighting adjusted   activity supervised   assistive device/personal items within reach   clutter free environment maintained  Taken 11/18/2024 2012 by Kavitha Austin RN  Safety Promotion/Fall Prevention:   nonskid shoes/slippers when out of bed   safety round/check completed   room organization consistent   lighting adjusted   activity supervised   assistive device/personal items within reach   clutter free environment maintained  Intervention: Prevent Skin Injury  Description: Perform a screening for skin injury risk, such as pressure or moisture-associated skin damage on admission and at regular intervals throughout hospital stay.  Keep all areas of skin (especially folds) clean and dry.  Maintain adequate skin hydration.  Relieve and redistribute pressure and protect bony prominences and skin at risk for injury; implement measures based on patient-specific risk factors.  Match turning and repositioning schedule to clinical condition.  Encourage weight shift frequently; assist with reposition if unable to complete independently.  Float heels off bed; avoid pressure on the Achilles tendon.  Keep skin free from extended contact with medical devices.  Optimize nutrition and hydration.  Encourage functional activity and mobility, as early as tolerated.  Use aids (e.g., slide boards, mechanical lift) during transfer.  Recent Flowsheet Documentation  Taken 11/19/2024 0400 by Kavitha Austin RN  Body Position: position changed independently  Taken 11/19/2024 0230 by Kavitha Austin RN  Body Position: position changed  independently  Taken 11/19/2024 0015 by Kavitha Austin RN  Body Position: position changed independently  Taken 11/18/2024 2210 by Kavitha Austin RN  Body Position: position changed independently  Taken 11/18/2024 2012 by Kavitha Austin RN  Body Position: position changed independently  Intervention: Prevent Infection  Description: Maintain skin and mucous membrane integrity; promote hand, oral and pulmonary hygiene.  Optimize fluid balance, nutrition, sleep and glycemic control to maximize infection resistance.  Identify potential sources of infection early to prevent or mitigate progression of infection (e.g., wound, lines, devices).  Evaluate ongoing need for invasive devices; remove promptly when no longer indicated.  Review vaccination status.  Recent Flowsheet Documentation  Taken 11/19/2024 0400 by Kavitha Austin RN  Infection Prevention:   environmental surveillance performed   rest/sleep promoted   hand hygiene promoted  Taken 11/19/2024 0230 by Kavitha Austin RN  Infection Prevention:   environmental surveillance performed   rest/sleep promoted   hand hygiene promoted  Taken 11/19/2024 0015 by Kavitha Austin RN  Infection Prevention:   environmental surveillance performed   rest/sleep promoted   hand hygiene promoted  Taken 11/18/2024 2210 by Kavitha Austin RN  Infection Prevention:   environmental surveillance performed   rest/sleep promoted   hand hygiene promoted  Taken 11/18/2024 2012 by Kavitha Austin RN  Infection Prevention:   environmental surveillance performed   rest/sleep promoted   hand hygiene promoted  Goal: Optimal Comfort and Wellbeing  Outcome: Progressing  Intervention: Provide Person-Centered Care  Description: Use a family-focused approach to care; encourage support system presence and participation.  Develop trust and rapport by proactively providing information, encouraging questions, addressing concerns and offering reassurance.  Acknowledge  emotional response to hospitalization.  Recognize and utilize personal coping strategies and strengths; develop goals via shared decision-making.  Honor spiritual and cultural preferences.  Recent Flowsheet Documentation  Taken 11/18/2024 2012 by Kavitha Austin RN  Trust Relationship/Rapport:   care explained   choices provided   emotional support provided   empathic listening provided   questions answered   questions encouraged   reassurance provided   thoughts/feelings acknowledged  Goal: Readiness for Transition of Care  Outcome: Progressing     Problem: Skin Injury Risk Increased  Goal: Skin Health and Integrity  Outcome: Progressing  Intervention: Optimize Skin Protection  Description: Perform a full pressure injury risk assessment, as indicated by screening, upon admission to care unit.  Reassess skin (full inspection and injury risk, including skin temperature, consistency and color) frequently (e.g., scheduled interval, with change in condition) to provide optimal early detection and prevention.  Maintain adequate tissue perfusion (e.g., encourage fluid balance; avoid crossing legs, constrictive clothing or devices) to promote tissue oxygenation.  Maintain head of bed at lowest degree of elevation tolerated, considering medical condition and other restrictions. Use positioning supports to prevent sliding and friction. Consider low friction textiles.  Avoid positioning onto an area that remains reddened or on bony prominences.  Minimize incontinence and moisture (e.g., toileting schedule; moisture-wicking pad, diaper or incontinence collection device; skin moisture barrier).  Cleanse skin promptly and gently, when soiled, utilizing a pH-balanced cleanser.  Relieve and redistribute pressure (e.g., scheduled position changes, weight shifts, use of support surface, medical device repositioning, protective dressing application, use of positioning device, microclimate control, use of  pressure-injury-monitor  Encourage increased activity, such as sitting in a chair at the bedside or early mobilization, when able to tolerate. Avoid prolonged sitting.  Recent Flowsheet Documentation  Taken 11/19/2024 0400 by Kavitha Austin RN  Head of Bed (HOB) Positioning: HOB elevated  Taken 11/19/2024 0230 by Kavitha Austin RN  Head of Bed (HOB) Positioning: HOB elevated  Taken 11/19/2024 0015 by Kavitha Asutin RN  Head of Bed (HOB) Positioning: HOB elevated  Taken 11/18/2024 2210 by Kavitha Austin RN  Head of Bed (HOB) Positioning: HOB elevated  Taken 11/18/2024 2012 by Kavitha Austin RN  Head of Bed (HOB) Positioning: HOB elevated     Problem: Fall Injury Risk  Goal: Absence of Fall and Fall-Related Injury  Outcome: Progressing  Intervention: Promote Injury-Free Environment  Description: Provide a safe, barrier-free environment that encourages independent activity.  Keep care area uncluttered and well-lighted.  Determine need for increased observation or monitoring.  Avoid use of devices that minimize mobility, such as restraints or indwelling urinary catheter.  Recent Flowsheet Documentation  Taken 11/19/2024 0400 by Kavitha Austin RN  Safety Promotion/Fall Prevention:   nonskid shoes/slippers when out of bed   safety round/check completed   room organization consistent   lighting adjusted   activity supervised   assistive device/personal items within reach   clutter free environment maintained  Taken 11/19/2024 0230 by Kavitha Austin RN  Safety Promotion/Fall Prevention:   nonskid shoes/slippers when out of bed   safety round/check completed   room organization consistent   lighting adjusted   activity supervised   assistive device/personal items within reach   clutter free environment maintained  Taken 11/19/2024 0015 by Kavitha Austin RN  Safety Promotion/Fall Prevention:   nonskid shoes/slippers when out of bed   safety round/check completed   room organization  consistent   lighting adjusted   activity supervised   assistive device/personal items within reach   clutter free environment maintained  Taken 11/18/2024 2210 by Kavitha Austin RN  Safety Promotion/Fall Prevention:   nonskid shoes/slippers when out of bed   safety round/check completed   room organization consistent   lighting adjusted   activity supervised   assistive device/personal items within reach   clutter free environment maintained  Taken 11/18/2024 2012 by Kavitha Austin RN  Safety Promotion/Fall Prevention:   nonskid shoes/slippers when out of bed   safety round/check completed   room organization consistent   lighting adjusted   activity supervised   assistive device/personal items within reach   clutter free environment maintained     Problem: Comorbidity Management  Goal: Blood Pressure in Desired Range  Outcome: Progressing   Goal Outcome Evaluation:  Plan of Care Reviewed With: patient        Progress: improving  Outcome Evaluation: Patient will remain free from injury during hospitalization by calling for assistance at all times when needing out of bed.

## 2024-11-19 NOTE — PLAN OF CARE
Goal Outcome Evaluation:  Plan of Care Reviewed With: patient        Progress: improving  Outcome Evaluation: Patient continues to have a drop in BP on standing. SHe was however, able to ambulate more in her room. Good effort. Recommend rehaba at discharge.    Anticipated Discharge Disposition (PT): inpatient rehabilitation facility

## 2024-11-19 NOTE — CASE MANAGEMENT/SOCIAL WORK
Continued Stay Note  [facility]     Patient Name: Philomena Davis  MRN: [id number]  Today's Date: 11/19/2024    Admit Date: 11/10/2024    Plan: Rehab   Discharge Plan       Row Name 11/19/24 1013       Plan    Plan Rehab    Patient/Family in Agreement with Plan yes    Plan Comments Spoke with Ms. Davis at the bedside. She completed Family expedited appeal for rehab at [facility]. CM faxed clinical notes to [facility] at [phone]. Reference number is [id number]. Reference number of phone call is [phone]. [facility] has 72 hours to make a decision. CM will continue to follow up.    Final Discharge Disposition Code 62 - inpatient rehab facility                   Discharge Codes    No documentation.                 Expected Discharge Date and Time       Expected Discharge Date Expected Discharge Time    Nov 18, 2024               Reena Hodges RN

## 2024-11-19 NOTE — DISCHARGE PLACEMENT REQUEST
"Philomena Davis (64 y.o. Female)     CM ELI Rizzo RN       Family Expedited Appeal       [phone]  Reference: [id number]         Reference for Phone call   [id number]        Date of Birth   1960    Social Security Number       Address   [address] [address]    Home Phone   [phone]    MRN   [id number]       Jew   Hindu    Marital Status                               Admission Date   11/10/24    Admission Type   Emergency    Admitting Provider   Kishor Naranjo DO    Attending Provider   Kishor Naranjo DO    Department, Room/Bed   [facility] 3F, S301/1       Discharge Date       Discharge Disposition       Discharge Destination                                 Attending Provider: Kishor Naranjo DO    Allergies: Buspirone, Codeine, Mirtazapine    Isolation: None   Infection: None   Code Status: CPR    Ht: 162.6 cm (64.02\")   Wt: 67.1 kg (147 lb 14.9 oz)    Admission Cmt: None   Principal Problem: Lower extremity weakness [R29.898]                   Active Insurance as of 11/10/2024       Primary Coverage       Payor Plan Insurance Group Employer/Plan Group    [employer] [place] [employer] [place]ICA[place]        Payor Plan Address Payor Plan Phone Number Payor Plan Fax Number Effective Dates    [address] [address] [phone]  2018 - None Entered    [facility] [id number]         Subscriber Name Subscriber Birth Date Member ID       PHILOMENA DAVIS 1960 [id number]                     Emergency Contacts        (Rel.) Home Phone Work Phone Mobile Phone    Madelin Null (Relative) [phone] -- [phone]    MARCELLO ZAIDI (Friend) -- -- [phone]                 History & Physical        Sophie Gandhi MD at 11/10/24 1318              [facility] Medicine Services  HISTORY AND PHYSICAL    Patient Name: Philomena Davis  : 1960  MRN: [id number]  Primary Care Physician: Unique Brandt, " DO  Date of admission: 11/10/2024      Subjective  Subjective     Chief Complaint:  Lower extremity pain and weakness    HPI:  Philomena Davis is a 64 y.o. female with past medical history of alcohol dependence, alcoholic liver cirrhosis, essential hypertension, peripheral neuropathy, anxiety and depression, who presented to the hospital today with worsening bilateral lower extremity weakness, right and left facial paresthesia x 1 week    Patient reported that she has been experiencing worsening weakness associated with pain of both lower extremities for the last 1 week which gradually has been getting worse.  At baseline she was able to ambulate independently.  Over the past week, she has been using a walker and today she was able to take only few steps with a walker and felt that she is going to fall.  She describes the pain in her lower extremities as electric shock pain that comes and goes.  Most of the time she will feel that her legs are extra sensitive to any tactile stimuli. additionally, she is complaining of concomitant worsening facial paresthesia over the last 3 days (right more than left).    Patient reports drinking alcohol very socially now.  Last drink was yesterday.  Only 1 shot of vodka every now and then.  No withdrawal symptoms.  She denies any fever or chills, denied urinary symptoms, denied cough, denied chest pain etc.    In ER, blood workup was remarkable for potassium 3.3, bilirubin of 1.7, AST 72, TSH of 9.6, free T41.1, CRP of 3.88, sed rate of 105, lactate of 2.2, procalcitonin of 0.15, INR of 1.1, white cell count of 15.5, MCV of 106.  She was seen by stroke team and MRI brain and lumbar spine ordered.      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 ankle with nonunion 03/24/2017    Closed trimalleolar fracture of right ankle 05/24/2023    Digital mucous cyst of toe of left foot 03/24/2017    Ganglion cyst of left foot     Glaucoma     Hypertension     Wears glasses            Past Surgical History:   Procedure Laterality Date    ANKLE OPEN REDUCTION INTERNAL FIXATION Left 03/24/2017    Procedure: LEFT ANKLE OPEN REDUCTION INTERNAL FIXATION WITH ILIAC CREST BONE GRAFT , EXCISION CYST 4TH/5TH INNERSPACE LEFT FOOT;  Surgeon: Frank Larson MD;  Location:  [facility];  Service:     ANKLE OPEN REDUCTION INTERNAL FIXATION Right 05/25/2023    Procedure: ANKLE OPEN REDUCTION INTERNAL FIXATION;  Surgeon: Deandre Reynoso MD;  Location:  [facility];  Service: Orthopedics;  Laterality: Right;    AUGMENTATION MAMMAPLASTY Bilateral 1999    BREAST AUGMENTATION      BREAST EXCISIONAL BIOPSY Right 2014    DILATION AND CURETTAGE, DIAGNOSTIC / THERAPEUTIC      ENDOSCOPY N/A 11/30/2022    Procedure: ESOPHAGOGASTRODUODENOSCOPY;  Surgeon: Arelis Solano MD;  Location:  [facility];  Service: Gastroenterology;  Laterality: N/A;    ENDOSCOPY N/A 2/1/2024    Procedure: ESOPHAGOGASTRODUODENOSCOPY;  Surgeon: Brunner, Mark I, MD;  Location:  [facility];  Service: Gastroenterology;  Laterality: N/A;    OH RPR NON/MAL FEMUR DSTL H/N W/ILIAC/AUTOG BONE Left 03/24/2017    Procedure: ILIAC CREST BONE GRAFT;  Surgeon: Frank Larson MD;  Location:  [facility];  Service: Orthopedics    WRIST SURGERY         Family History: family history includes Breast cancer (age of onset: 68) in her paternal grandmother; Cancer in her mother and another family member; Heart disease in an other family member; Hypertension in an other family member; No Known Problems in her brother, daughter, father, maternal aunt, maternal grandmother, paternal aunt, sister, and son; Rheum arthritis in an other family member; Stroke in an other family member.     Social History:  reports that she  quit smoking about 15 years ago. Her smoking use included cigarettes. She started smoking about 35 years ago. She has a 30 pack-year smoking history. She has never used smokeless tobacco. She reports current alcohol use of about 7.0 standard drinks of alcohol per week. She reports that she does not use drugs.  Social History     Social History Narrative    Not on file       Medications:  Available home medication information reviewed.  Fluticasone-Salmeterol, albuterol sulfate HFA, bacitracin-polymyxin b, carvedilol, cloNIDine, desvenlafaxine, eszopiclone, folic acid, gabapentin, lactobacillus, latanoprost, pantoprazole, rosuvastatin, terbinafine, thiamine, and ursodiol    Allergies   Allergen Reactions    Buspirone Anxiety    Codeine Nausea Only    Mirtazapine Anxiety       Objective  Objective     Vital Signs:   Temp:  [98 °F (36.7 °C)-98.1 °F (36.7 °C)] 98.1 °F (36.7 °C)  Heart Rate:  [] 92  Resp:  [8-18] 8  BP: (137-165)/() 145/95  Total (NIH Stroke Scale): 1    Physical Exam   General: Comfortable, not in distress, conversant and cooperative  Head: Atraumatic and normocephalic  Eyes: No Icterus. No pallor  Ears:  Ears appear intact with no abnormalities noted  Throat: No oral lesions, no thrush  Neck: Supple, trachea midline  Lungs: Clear to auscultation bilaterally, equal air entry, no wheezing or crackles  Heart:  Normal S1 and S2, no murmur, no gallop, No JVD, no lower extremity swelling  Abdomen:  Soft, no tenderness, no organomegaly, normal bowel sounds, no organomegaly  Extremities: pulses equal bilaterally  Skin: No bleeding, bruising or rash, normal skin turgor and elasticity  Neurologic: Cranial nerves appear intact with no evidence of facial asymmetry, weakness both lower extremities 4/5.  Sensory level up to T3-T4.    Psych: Alert and oriented x 3, normal mood    Result Review:  I have personally reviewed the results from the time of this admission to 11/10/2024 15:51 EST and agree with  these findings:  [x]  Laboratory list / accordion  [x]  Microbiology  [x]  Radiology  [x]  EKG/Telemetry   [x]  Cardiology/Vascular   [x]  Pathology  [x]  Old records      LAB RESULTS:      Lab 11/10/24  1235 11/10/24  1007 11/10/24  0946   WBC  --   --  15.59*   HEMOGLOBIN  --   --  12.4   HEMATOCRIT  --   --  37.0   PLATELETS  --   --  314   NEUTROS ABS  --   --  11.83*   IMMATURE GRANS (ABS)  --   --  0.07*   LYMPHS ABS  --   --  2.06   MONOS ABS  --   --  1.34*   EOS ABS  --   --  0.20   MCV  --   --  106.9*   SED RATE  --   --  105*   CRP  --  3.88*  --    PROCALCITONIN  --  0.15  --    LACTATE 2.2*  --  2.6*   PROTIME  --   --  15.2*   INR  --   --  1.18*   APTT  --   --  37.1*         Lab 11/10/24  1007   SODIUM 138   POTASSIUM 3.3*   CHLORIDE 100   CO2 27.0   ANION GAP 11.0   BUN 6*   CREATININE 0.55*   EGFR 103.8   GLUCOSE 100*   CALCIUM 8.8   MAGNESIUM 1.7   TSH 9.670*         Lab 11/10/24  1007   TOTAL PROTEIN 7.0   ALBUMIN 2.8*   GLOBULIN 4.2   ALT (SGPT) 27   AST (SGOT) 72*   BILIRUBIN 1.7*   ALK PHOS 165*         Lab 11/10/24  1007   HSTROP T 14*                 UA          11/10/2024    09:58   Urinalysis   Specific Gravity, UA 1.007    Ketones, UA Negative    Blood, UA Negative    Leukocytes, UA Negative    Nitrite, UA Negative        Microbiology Results (last 10 days)       ** No results found for the last 240 hours. **            MRI Lumbar Spine Without Contrast    Result Date: 11/10/2024  MRI LUMBAR SPINE WO CONTRAST Date of Exam: 11/10/2024 1:40 PM EST Indication: Lower extremity radiculopathy.  Comparison: None available. Technique:  Routine multiplanar/multisequence sequence images of the lumbar spine were obtained without contrast administration.  Findings: Hyperintense benign osseous hemangioma noted at L3. T1 marrow signal is otherwise preserved, without evidence of fracture or suspicious marrow replacing lesion. Alignment is anatomic, without evidence of significant listhesis or  subluxation. The conus medullaris and cauda equina nerve roots are satisfactory in appearance. The paraspinal soft tissues demonstrate no acute or suspicious findings. Multilevel spondylosis is present, with areas of involvement including L1-2, no significant spinal canal or neuroforaminal impingement. L2-3, no significant spinal canal or neuroforaminal impingement. L3-4, small disc bulge and bilateral facet arthropathy. There is no evidence of significant spinal canal or neuroforaminal narrowing. L4-5, minimal small disc bulge and mild facet arthropathy. The spinal canal remains patent. There is minimal bilateral neuroforaminal narrowing. L5-S1, small circumferential disc bulge with component of right-sided disc protrusion. There is some minimal narrowing of the right aspect of the spinal canal and the small disc protrusion encroaches upon the right subarticular recess, potentially in some proximity to the traversing right S1 nerve root. The neural foramina remain patent.     Impression: Impression: Generally mild lumbar spondylosis is evident, including a small disc protrusion eccentric to the right at L5-S1, in the region of the right subarticular recess and potentially in some proximity to the traversing right S1 nerve root. There is otherwise no  evidence of significant spinal canal or neuroforaminal narrowing. Electronically Signed: Adriano Kessler MD  11/10/2024 2:27 PM EST  Workstation ID: [id number]    MRI Brain Without Contrast    Result Date: 11/10/2024  MRI BRAIN WO CONTRAST Date of Exam: 11/10/2024 1:27 PM EST Indication: BLE weakness, facial parasthesia R>L, difficulty ambulating.  Comparison: None available. Technique:  Routine multiplanar/multisequence sequence images of the brain were obtained without contrast administration. Findings: No acute infarct is present on diffusion weighted sequences. Midline structures appear normal and the craniocervical junction is satisfactory. Gray-white differentiation  is maintained and there is no evidence of intracranial hemorrhage, mass or mass effect. The ventricles are normal in size and configuration. The orbits are normal. The paranasal sinuses are clear. Intracranial arterial flow voids appear maintained.     Impression: Impression: Normal noncontrast MRI of the brain. Electronically Signed: Adriano Kessler MD  11/10/2024 2:14 PM EST  Workstation ID: [id number]    CT Abdomen Pelvis With Contrast    Result Date: 11/10/2024  CT ABDOMEN PELVIS W CONTRAST Date of Exam: 11/10/2024 11:35 AM EST Indication: abdominal pain. Comparison: CT abdomen and pelvis 1/30/2024 Technique: Axial CT images were obtained of the abdomen and pelvis following the uneventful intravenous administration of 85 mL Isovue-300. Reconstructed coronal and sagittal images were also obtained. Automated exposure control and iterative construction methods were used. Findings: LUNG BASES:  Unremarkable without mass or infiltrate. LIVER:  Unremarkable parenchyma without focal lesion. BILIARY/GALLBLADDER:  Unremarkable SPLEEN:  Unremarkable PANCREAS:  Unremarkable ADRENAL:  Unremarkable KIDNEYS: Bilateral renal cysts are noted. No evidence for obstructive uropathy. No calculus identified. GASTROINTESTINAL/MESENTERY:  No evidence of obstruction nor inflammation.  The appendix is visualized normal in caliber. MESENTERIC VESSELS: The mesenteric vessels are patent. There is some faint soft tissue density along the celiac artery. There is some similar findings on prior study however this appears slightly more conspicuous on the current exam. Mild vasculitis not excluded. AORTA/IVC:  Normal caliber. RETROPERITONEUM/LYMPH NODES:  Unremarkable REPRODUCTIVE: The uterus is visualized. BLADDER:  Unremarkable OSSEUS STRUCTURES:  Typical for age with no acute process identified.     Impression: Impression: 1. Mild soft tissue stranding along the celiac artery which could represent mild vasculitis. 2. No acute findings otherwise  noted in the abdomen or pelvis. Electronically Signed: Jeimy Tomas MD  11/10/2024 12:05 PM EST  Workstation ID: [id number]    CT Head Without Contrast    Result Date: 11/10/2024  CT HEAD WO CONTRAST Date of Exam: 11/10/2024 11:22 AM EST Indication: facial numbness x 1 week difficulty walking. Comparison: None available. Technique: Axial CT images were obtained of the head without contrast administration.  Automated exposure control and iterative construction methods were used. FINDINGS: Gray-white differentiation is maintained and there is no evidence of intracranial hemorrhage, mass or mass effect. Mild age-related changes of the brain are present including volume loss and typical periventricular sequela of chronic small vessel ischemia. There is otherwise no evidence of intracranial hemorrhage, mass or mass effect. The ventricles are normal in size and configuration accounting for surrounding volume loss. The orbits are normal and the paranasal sinuses are grossly clear.     Impression: Age-related changes of the brain as above, otherwise without evidence of acute intracranial abnormality. Electronically Signed: Adriano Kessler MD  11/10/2024 11:30 AM EST  Workstation ID: [id number]    XR Chest 1 View    Result Date: 11/10/2024  XR CHEST 1 VW Date of Exam: 11/10/2024 10:11 AM EST Indication: Weak/Dizzy/AMS triage protocol Comparison: February 3, 2024 Findings: The lungs are clear. The heart and mediastinal contours appear normal. There is no pleural effusion. The pulmonary vasculature appears normal. The osseous structures appear intact.     Impression: Impression: No acute cardiopulmonary process. Electronically Signed: Jhoan Madrid MD  11/10/2024 10:47 AM EST  Workstation ID: [id number]         Assessment & Plan  Assessment & Plan       Lower extremity weakness    Summary:  Philomena Davis is a 64 y.o. female with past medical history of alcohol dependence, alcoholic liver cirrhosis, essential hypertension,  peripheral neuropathy, anxiety and depression, who presented to the hospital today with worsening bilateral lower extremity weakness, right and left facial paresthesia x 1 week    Bilateral lower extremity weakness for differential diagnosis  Rule out cervical/thoracic radiculopathy  Rule out demyelinating disease  Patient presented with worsening lower extremity weakness over the last 7 days.  Now hardly able to walk few steps with a walker after she was walking independently without any walking aid.  Additionally she has neuropathic pain and sensory level up to  T3-T4  MRI brain with no acute brain pathology  MRI lumbar spine showing small disc protrusion at L5-S1 without significant spinal canal or neuroforaminal stenosis  MRI cervical and thoracic spine pending  Given her elevated CRP and sed rate, will order LP by IR and will send workup for MS and demyelinating disease  Might benefit from nerve conduction study and EMG.  Will defer to neurology team  Follow B12 and folate levels  Seen by stroke team in ER and signed off after MRI brain showed no acute stroke.  Will consult general neurology in the a.m.  PT and OT consultation    Possible vasculitis  Elevated inflammatory markers  CT abdomen with questionable celiac artery vasculitis  Sed rate markedly evaded at 105  CRP elevated at 3.8  No infectious etiology identified.  Monitor off antibiotics  Check WILMA, RF, ANCA panel    Hypokalemia  Replace per protocol    Atrial tachycardia, resolved  Presented with atrial tachycardia, with heart rate fixed in the 150s  With 5 mg of IV metoprolol, she converted to sinus rhythm with heart rate of 90 with P wave morphology changing from her initial EKG with heart rate of 150 indicative of previous rhythm being atrial tachycardia: SVT versus atypical atrial flutter.  Will order echo  Increase Coreg to 12.5 mg twice daily  If she continues to have rhythm changes, consider cardiology consultation    Alcohol  dependence  Patient reported that she hardly drink any alcohol now.  No withdrawal symptoms.  Monitor closely    Alcoholic liver cirrhosis  Elevated liver enzymes  Hepatocellular jaundice  Compensated with normal INR  AST mildly elevated at 72 and bilirubin elevated 1.7.  Likely secondary to alcohol use  Continue to monitor    Elevated lactic acid, of no clinical significance  Mild elevated at 2.2.  Likely secondary to liver disease    Subclinical hypothyroidism  TSH elevated at 9.6 and free T4 is normal at 1.1  No treatment indicated at this time.  Needs repeat thyroid panel as outpatient in 6 weeks      VTE Prophylaxis:  Mechanical VTE prophylaxis orders are present.      CODE STATUS:    Code Status and Medical Interventions: CPR (Attempt to Resuscitate); Full Support   Ordered at: 11/10/24 1537     Level Of Support Discussed With:    Patient    Next of Kin (If No Surrogate)     Code Status (Patient has no pulse and is not breathing):    CPR (Attempt to Resuscitate)     Medical Interventions (Patient has pulse or is breathing):    Full Support       Expected Discharge   Expected Discharge Date: 2024; Expected Discharge Time:      Sophie Gandhi MD  11/10/24     Electronically signed by Sophie Gandhi MD at 11/10/24 1556          Physician Progress Notes (most recent note)        Kishor Naranjo DO at 24 1242              [facility] Medicine Services  PROGRESS NOTE    Patient Name: Philomena Davis  : 1960  MRN: [id number]    Date of Admission: 11/10/2024  Primary Care Physician: Unique Brandt DO    Subjective   Subjective     CC:  Weakness/numbness in legs     HPI:  Patient reports slight improvement in lower extremity strength and numbness, is hopeful for rehab soon.      Objective   Objective     Vital Signs:   Temp:  [98.1 °F (36.7 °C)-99.5 °F (37.5 °C)] 98.2 °F (36.8 °C)  Heart Rate:  [69-79] 77  Resp:  [16-18] 17  BP: ()/(59-72)  127/72  Flow (L/min) (Oxygen Therapy):  [1-2] 2     Physical Exam:  General appearance: alert, awake, oriented, no acute distress   Cardiovascular: RRR, no murmurs or rubs, radial pulse full 2/4 BL   Respiratory: CTAB, no crackles or wheezes   Neuro/CNS: alert and oriented x3, normal speech, moves all 4 extremities, sensation grossly intact    Results Reviewed:  LAB RESULTS:      Lab 11/14/24  0600 11/13/24  0757   WBC 9.99 11.04*   HEMOGLOBIN 10.6* 10.5*   HEMATOCRIT 31.8* 31.8*   PLATELETS 224 219   .7* 109.7*         Lab 11/14/24  1801 11/14/24  0600 11/13/24  0757 11/11/24  1255   SODIUM  --  137 138  --    POTASSIUM 4.9 3.6 4.0  --    CHLORIDE  --  103 104  --    CO2  --  24.0 26.0  --    ANION GAP  --  10.0 8.0  --    BUN  --  6* 7*  --    CREATININE  --  0.58 0.62  --    EGFR  --  102.5 100.8  --    GLUCOSE  --  84 85  --    CALCIUM  --  8.4* 8.4*  --    MAGNESIUM  --   --   --  1.7   PHOSPHORUS  --   --   --  3.4         Lab 11/14/24  0600 11/13/24  0757   TOTAL PROTEIN 6.5 6.1   ALBUMIN 2.8* 2.9*   GLOBULIN 3.7 3.2   ALT (SGPT) 17 19   AST (SGOT) 66* 80*   BILIRUBIN 1.2 1.3*   ALK PHOS 126* 129*                     Brief Urine Lab Results  (Last result in the past 365 days)        Color   Clarity   Blood   Leuk Est   Nitrite   Protein   CREAT   Urine HCG        11/10/24 0958 Yellow   Clear   Negative   Negative   Negative   Negative                   Microbiology Results Abnormal       None            No radiology results from the last 24 hrs    Results for orders placed during the hospital encounter of 11/10/24    Adult Transthoracic Echo Complete W/ Cont if Necessary Per Protocol    Interpretation Summary    Left ventricular systolic function is normal. Estimated left ventricular EF = 65%    The cardiac valves are anatomically and functionally normal.      Current medications:  Scheduled Meds:budesonide-formoterol, 2 puff, Inhalation, BID - RT  carvedilol, 12.5 mg, Oral, BID With  Meals  desvenlafaxine, 50 mg, Oral, Daily  folic acid, 1 mg, Oral, Daily  gabapentin, 200 mg, Oral, Q12H  latanoprost, 1 drop, Both Eyes, Nightly  melatonin, 5 mg, Oral, Nightly  midodrine, 15 mg, Oral, BID AC  nystatin, 1 Application, Topical, Q12H  pantoprazole, 40 mg, Oral, Daily  rosuvastatin, 5 mg, Oral, Daily  sodium chloride, 10 mL, Intravenous, Q12H  thiamine, 100 mg, Oral, Daily  ursodiol, 300 mg, Oral, BID  zolpidem, 5 mg, Oral, Nightly      Continuous Infusions:   PRN Meds:.  acetaminophen **OR** acetaminophen **OR** acetaminophen    senna-docusate sodium **AND** polyethylene glycol **AND** bisacodyl **AND** bisacodyl    calcium carbonate    Calcium Replacement - Follow Nurse / BPA Driven Protocol    ipratropium-albuterol    Magnesium Standard Dose Replacement - Follow Nurse / BPA Driven Protocol    metoprolol tartrate    nitroglycerin    ondansetron    Phosphorus Replacement - Follow Nurse / BPA Driven Protocol    Potassium Replacement - Follow Nurse / BPA Driven Protocol    sodium chloride    sodium chloride    sodium chloride    Assessment & Plan   Assessment & Plan     Active Hospital Problems    Diagnosis  POA    **Lower extremity weakness [R29.898]  Yes    Orthostasis [I95.1]  Yes      Resolved Hospital Problems   No resolved problems to display.        This patient's assessments and plans were partially entered by my partner and updated as appropriate by me on 11/18/2024     Brief Hospital Course to date:  Philomena Davis is a 64 y.o. female with history of alcohol dependence, cirrhosis, HTN, peripheral neuropathy, was admitted for paresthesias in her extremities.  Neurology was consulted. PT/OT recommended rehab.      BL LE weakness  BL paraesthesias/facial numbness, resolved   ETOH use  Peripheral neuropathy due to long standing ETOH use  Dysautonomia  -MRI brain unremarkable  -MRI lumbar spine with L5-S1 disc protrusion  -MRI C/T spine reviewed, with C4/5 facet arthropathy  -EMG/NCV mild  sensory neuropathy  -Continue thiamine, neurology following  -Symptoms improving   -PT/OT; awaiting rehab      Marked orthostasis  Dysautonomia  -added midodrine, increased dose but BID dosing  -mobilize/PT/OT  -AM cortisol low, s/p cosyntropin with expected response, not suggestive of adrenal insufficiency   -Neurology discussed nonpharmacologic therapy      Possible vasculitis  CT with celiac artery stranding  -markedly elevated CRP/ESR  -ANCA panel negative  -WILMA panel negative  -RF negative  -needs outpatient follow up, no abdominal symptoms     Atrial tachycardia, atrial flutter  -presented with tachycardia, HR to 150  -on BB  -ECHO unremarkable, EF normal  -bursts of tachycardia here, evaluated by cardiology  -continue carvedilol, dosing may be limited by hypotension/orthostasis  -cardiology deferring AC due to cirrhosis for now     Alcohol dependence  Cirrhosis  Hepatocellular jaundice  -monitor     Mild LA  -due to liver disease     Subclinical hypothyroidism  -needs follow up in 6 weeks     Stable for discharge pending placement.     Expected Discharge Location and Transportation: Rehab   Expected Discharge   Expected Discharge Date: 11/18/2024; Expected Discharge Time:      VTE Prophylaxis:  Mechanical VTE prophylaxis orders are present.         AM-PAC 6 Clicks Score (PT): 19 (11/18/24 0820)    CODE STATUS:   Code Status and Medical Interventions: CPR (Attempt to Resuscitate); Full Support   Ordered at: 11/10/24 1537     Level Of Support Discussed With:    Patient    Next of Kin (If No Surrogate)     Code Status (Patient has no pulse and is not breathing):    CPR (Attempt to Resuscitate)     Medical Interventions (Patient has pulse or is breathing):    Full Support       Kishor Naranjo DO  11/18/24        Electronically signed by Kishor Naranjo DO at 11/18/24 1250          Physical Therapy Notes (most recent note)        Eileen Moncada, PTA at 11/16/24 1020  Version 1 of 1         Patient Name:  Philomena Davis  : 1960    MRN: [id number]                              Today's Date: 2024       Admit Date: 11/10/2024    Visit Dx:     ICD-10-CM ICD-9-CM   1. Weakness of both lower extremities  R29.898 729.89   2. Paresthesia  R20.2 782.0   3. Weakness  R53.1 780.79   4. Leukocytosis, unspecified type  D72.829 288.60   5. Alcoholic cirrhosis of liver without ascites  K70.30 571.2   6. Orthostasis  I95.1 458.0   7. Tachycardia  R00.0 785.0     Patient Active Problem List   Diagnosis    Essential hypertension    Generalized anxiety disorder    Hyperlipidemia    Glaucoma    Alcoholic cirrhosis of liver without ascites    Severe malnutrition    GERD without esophagitis    Sleep disturbance    Annual physical exam    Idiopathic peripheral neuropathy    Severe anxiety with panic    Alcohol use disorder    Cigarette nicotine dependence in remission    Chronic obstructive pulmonary disease    Closed fracture of left hand    Moderate malnutrition    Lower extremity weakness    Orthostasis     Past Medical History:   Diagnosis Date    Alcohol withdrawal 05/10/2019    Annual physical exam 2023    Anxiety and depression     Cirrhosis of liver     Closed displaced fracture of lateral malleolus of left fibula     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    Closed trimalleolar fracture of right ankle 2023    Digital mucous cyst of toe of left foot 2017    Ganglion cyst of left foot     Glaucoma     Hypertension     Wears glasses      Past Surgical History:   Procedure Laterality Date    ANKLE OPEN REDUCTION INTERNAL FIXATION Left 2017    Procedure: LEFT ANKLE OPEN REDUCTION INTERNAL FIXATION WITH ILIAC CREST BONE GRAFT , EXCISION CYST 4TH/5TH INNERSPACE LEFT FOOT;  Surgeon: Frank Larson MD;  Location: [facility];  Service:     ANKLE OPEN REDUCTION INTERNAL FIXATION Right 2023    Procedure: ANKLE OPEN REDUCTION INTERNAL FIXATION;  Surgeon:  Deandre Reynoso MD;  Location:  [facility];  Service: Orthopedics;  Laterality: Right;    AUGMENTATION MAMMAPLASTY Bilateral 1999    BREAST AUGMENTATION      BREAST EXCISIONAL BIOPSY Right 2014    DILATION AND CURETTAGE, DIAGNOSTIC / THERAPEUTIC      ENDOSCOPY N/A 11/30/2022    Procedure: ESOPHAGOGASTRODUODENOSCOPY;  Surgeon: Arelis Solano MD;  Location:  [facility];  Service: Gastroenterology;  Laterality: N/A;    ENDOSCOPY N/A 2/1/2024    Procedure: ESOPHAGOGASTRODUODENOSCOPY;  Surgeon: Brunner, Mark I, MD;  Location:  [facility];  Service: Gastroenterology;  Laterality: N/A;    TN RPR NON/MAL FEMUR DSTL H/N W/ILIAC/AUTOG BONE Left 03/24/2017    Procedure: ILIAC CREST BONE GRAFT;  Surgeon: Frank Larson MD;  Location:  [facility];  Service: Orthopedics    WRIST SURGERY        General Information       Row Name 11/16/24 1112          Physical Therapy Time and Intention    Document Type therapy note (daily note)  -AS     Mode of Treatment physical therapy  -AS       Row Name 11/16/24 1112          General Information    Patient Profile Reviewed yes  -AS     Existing Precautions/Restrictions fall;other (see comments);orthostatic hypotension  neuropathy B feet, orthostatic hypotension - monitor BP, prefers gait belt lower  -AS     Barriers to Rehab medically complex;previous functional deficit;cognitive status  -AS       Row Name 11/16/24 1112          Cognition    Orientation Status (Cognition) oriented x 3  -AS       Row Name 11/16/24 1112          Safety Issues/Impairments Affecting Functional Mobility    Safety Issues Affecting Function (Mobility) awareness of need for assistance;insight into deficits/self-awareness;safety precautions follow-through/compliance;sequencing abilities;positioning of assistive device  -AS     Impairments Affecting Function (Mobility) balance;coordination;endurance/activity tolerance;strength;pain;sensation/sensory awareness;postural/trunk control  -AS     Comment,  Safety Issues/Impairments (Mobility) alert adn following commands, blood pressure remained within normal limits  -AS               User Key  (r) = Recorded By, (t) = Taken By, (c) = Cosigned By      Initials Name Provider Type    AS Eileen Moncada PTA Physical Therapist Assistant                   Mobility       Row Name 11/16/24 1115          Bed Mobility    Supine-Sit [place] (Bed Mobility) verbal cues;minimum assist (75% patient effort);1 person assist  -AS     Assistive Device (Bed Mobility) head of bed elevated;repositioning sheet  -AS     Comment, (Bed Mobility) cues for sequencing and increased time to complete  -AS       Row Name 11/16/24 1115          Transfers    Comment, (Transfers) cues for hand placement  -AS       Row Name 11/16/24 1115          Bed-Chair Transfer    Bed-Chair [place] (Transfers) verbal cues;minimum assist (75% patient effort);1 person assist;1 person to manage equipment  -AS     Assistive Device (Bed-Chair Transfers) walker, front-wheeled  -AS       Row Name 11/16/24 1115          Sit-Stand Transfer    Sit-Stand [place] (Transfers) verbal cues;1 person assist  -AS     Assistive Device (Sit-Stand Transfers) walker, front-wheeled  -AS       Row Name 11/16/24 1115          Gait/Stairs (Locomotion)    [place] Level (Gait) verbal cues;minimum assist (75% patient effort);1 person assist  -AS     Assistive Device (Gait) walker, front-wheeled  -AS     Distance in Feet (Gait) 5  + 10  -AS     Deviations/Abnormal Patterns (Gait) obdulio decreased;festinating/shuffling;gait speed decreased;base of support, narrow;stride length decreased  -AS     Bilateral Gait Deviations heel strike decreased;forward flexed posture;weight shift ability decreased  -AS     Comment, (Gait/Stairs) patient ambulated  bed>recliner 5', took seated rest break then ambulated 10' with Min assist x1, rolling walker for support and close follow with chair for safety. Verbal cues for upright posture  and increased step length bilaterally. Further distance limited by weakness and fatigue.  -AS               User Key  (r) = Recorded By, (t) = Taken By, (c) = Cosigned By      Initials Name Provider Type    AS Eileen Moncada PTA Physical Therapist Assistant                   Obj/Interventions       Row Name 11/16/24 1119          Motor Skills    Therapeutic Exercise knee;ankle  -AS       Row Name 11/16/24 [facility]9          Knee (Therapeutic Exercise)    Knee (Therapeutic Exercise) strengthening exercise  -AS     Knee Strengthening (Therapeutic Exercise) bilateral;marching while seated;LAQ (long arc quad);sitting;10 repetitions  -AS       Row Name 11/16/24 [facility]9          Ankle (Therapeutic Exercise)    Ankle (Therapeutic Exercise) AROM (active range of motion)  -AS     Ankle AROM (Therapeutic Exercise) bilateral;plantarflexion;dorsiflexion;sitting;10 repetitions  -AS       Row Name 11/16/24 1119          Balance    Dynamic Standing Balance verbal cues;minimal assist;1-person assist;1 person to manage equipment  -AS     Position/Device Used, Standing Balance supported;walker, front-wheeled  -AS     Comment, Balance mild unsteadiness but no LOB noticed  -AS               User Key  (r) = Recorded By, (t) = Taken By, (c) = Cosigned By      Initials Name Provider Type    AS Eileen Moncada PTA Physical Therapist Assistant                   Goals/Plan    No documentation.                  Clinical Impression       Row Name 11/16/24 1119          Pain    Pretreatment Pain Rating 4/10  -AS     Posttreatment Pain Rating 5/10  -AS     Pain Location abdomen;extremity  -AS     Pain Side/Orientation bilateral  -AS     Pain Management Interventions exercise or physical activity utilized;nursing notified  -AS     Response to Pain Interventions activity participation with tolerable pain  -AS       Row Name 11/16/24 1119          Plan of Care Review    Plan of Care Reviewed With patient  -AS     Progress improving  -AS      Outcome Evaluation patient ambulated bed>recliner 5', took seated rest break then ambulated 10' with Min assist x1, rolling walker for support and close follow with chair for safety. Verbal cues for upright posture and increased step length bilaterally. Further distance limited by weakness and fatigue. B LE exercises completed in seated position. Recommend IRF at D/C for best functional outcome.  -AS       Row Name 11/16/24 1119          Positioning and Restraints    Pre-Treatment Position in bed  -AS     Post Treatment Position chair  -AS     In Chair reclined;notified nsg;call light within reach;encouraged to call for assist;exit alarm on;waffle cushion;legs elevated  -AS               User Key  (r) = Recorded By, (t) = Taken By, (c) = Cosigned By      Initials Name Provider Type    AS Eileen Moncada PTA Physical Therapist Assistant                   Outcome Measures       Row Name 11/16/24 1121          How much help from another person do you currently need...    Turning from your back to your side while in flat bed without using bedrails? 4  -AS     Moving from lying on back to sitting on the side of a flat bed without bedrails? 3  -AS     Moving to and from a bed to a chair (including a wheelchair)? 3  -AS     Standing up from a chair using your arms (e.g., wheelchair, bedside chair)? 3  -AS     Climbing 3-5 steps with a railing? 2  -AS     To walk in hospital room? 2  -AS     AM-PAC 6 Clicks Score (PT) 17  -AS     Highest Level of Mobility Goal 5 --> Static standing  -AS       Row Name 11/16/24 1121          Functional Assessment    Outcome Measure Options AM-PAC 6 Clicks Basic Mobility (PT)  -AS               User Key  (r) = Recorded By, (t) = Taken By, (c) = Cosigned By      Initials Name Provider Type    AS Eileen Moncada PTA Physical Therapist Assistant                                 Physical Therapy Education       Title: PT OT SLP Therapies (In Progress)       Topic: Physical Therapy (In  Progress)       Point: Mobility training (In Progress)       Learning Progress Summary            Patient Acceptance, E, NR by AS at 11/16/2024 1121    Acceptance, E, NR by AC at 11/13/2024 1557    Acceptance, E, NR by AC at 11/11/2024 1013                      Point: Body mechanics (In Progress)       Learning Progress Summary            Patient Acceptance, E, NR by AS at 11/16/2024 1121    Acceptance, E, NR by AC at 11/13/2024 1557    Acceptance, E, NR by AC at 11/11/2024 1013                      Point: Precautions (In Progress)       Learning Progress Summary            Patient Acceptance, E, NR by AS at 11/16/2024 1121    Acceptance, E, NR by AC at 11/13/2024 1557    Acceptance, E, NR by AC at 11/11/2024 1013                                      User Key       Initials Effective Dates Name Provider Type Discipline    AS 04/28/23 -  Eileen Moncada, PTA Physical Therapist Assistant PT    AC 07/11/24 -  Latoya Judge, PT Physical Therapist PT                  PT Recommendation and Plan     Progress: improving  Outcome Evaluation: patient ambulated bed>recliner 5', took seated rest break then ambulated 10' with Min assist x1, rolling walker for support and close follow with chair for safety. Verbal cues for upright posture and increased step length bilaterally. Further distance limited by weakness and fatigue. B LE exercises completed in seated position. Recommend IRF at D/C for best functional outcome.     Time Calculation:         PT Charges       Row Name 11/16/24 1122             Time Calculation    Start Time 1020  -AS      PT Received On 11/16/24  -AS      PT Goal Re-Cert Due Date 11/21/24  -AS         Timed Charges    33653 - PT Therapeutic Exercise Minutes 10  -AS      26019 - Gait Training Minutes  13  -AS         Total Minutes    Timed Charges Total Minutes 23  -AS       Total Minutes 23  -AS                User Key  (r) = Recorded By, (t) = Taken By, (c) = Cosigned By      Initials Name Provider Type     AS Eileen Moncada PTA Physical Therapist Assistant                  Therapy Charges for Today       Code Description Service Date Service Provider Modifiers Qty    73827214833 HC GAIT TRAINING EA 15 MIN 2024 Eileen Moncada, BLAYNE GP 1    09788520864 HC PT THER PROC EA 15 MIN 2024 Eileen Moncada, BLAYNE GP 1    78872776834 HC PT THER SUPP EA 15 MIN 2024 Eileen Moncada PTA GP 2            PT G-Codes  Outcome Measure Options: AM-PAC 6 Clicks Basic Mobility (PT)  AM-PAC 6 Clicks Score (PT): 17  AM-PAC 6 Clicks Score (OT): 18       Eileen Moncada PTA  2024      Electronically signed by Eileen Moncada PTA at 24 1122          Occupational Therapy Notes (most recent note)        Umu Sherman, OT at 24 1330          Patient Name: Philomena Davis  : 1960    MRN: [id number]                              Today's Date: 2024       Admit Date: 11/10/2024    Visit Dx:     ICD-10-CM ICD-9-CM   1. Weakness of both lower extremities  R29.898 729.89   2. Paresthesia  R20.2 782.0   3. Weakness  R53.1 780.79   4. Leukocytosis, unspecified type  D72.829 288.60   5. Alcoholic cirrhosis of liver without ascites  K70.30 571.2   6. Orthostasis  I95.1 458.0   7. Tachycardia  R00.0 785.0     Patient Active Problem List   Diagnosis    Essential hypertension    Generalized anxiety disorder    Hyperlipidemia    Glaucoma    Alcoholic cirrhosis of liver without ascites    Severe malnutrition    GERD without esophagitis    Sleep disturbance    Annual physical exam    Idiopathic peripheral neuropathy    Severe anxiety with panic    Alcohol use disorder    Cigarette nicotine dependence in remission    Chronic obstructive pulmonary disease    Closed fracture of left hand    Moderate malnutrition    Lower extremity weakness    Orthostasis     Past Medical History:   Diagnosis Date    Alcohol withdrawal 05/10/2019    Annual physical exam 2023    Anxiety and  depression     Cirrhosis of liver     Closed displaced fracture of lateral malleolus of left fibula     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 03/24/2017    Closed trimalleolar fracture of right ankle 05/24/2023    Digital mucous cyst of toe of left foot 03/24/2017    Ganglion cyst of left foot     Glaucoma     Hypertension     Wears glasses      Past Surgical History:   Procedure Laterality Date    ANKLE OPEN REDUCTION INTERNAL FIXATION Left 03/24/2017    Procedure: LEFT ANKLE OPEN REDUCTION INTERNAL FIXATION WITH ILIAC CREST BONE GRAFT , EXCISION CYST 4TH/5TH INNERSPACE LEFT FOOT;  Surgeon: Frank Larson MD;  Location:  [facility];  Service:     ANKLE OPEN REDUCTION INTERNAL FIXATION Right 05/25/2023    Procedure: ANKLE OPEN REDUCTION INTERNAL FIXATION;  Surgeon: Deandre Reynoso MD;  Location:  [facility];  Service: Orthopedics;  Laterality: Right;    AUGMENTATION MAMMAPLASTY Bilateral 1999    BREAST AUGMENTATION      BREAST EXCISIONAL BIOPSY Right 2014    DILATION AND CURETTAGE, DIAGNOSTIC / THERAPEUTIC      ENDOSCOPY N/A 11/30/2022    Procedure: ESOPHAGOGASTRODUODENOSCOPY;  Surgeon: Arelis Solano MD;  Location:  [facility];  Service: Gastroenterology;  Laterality: N/A;    ENDOSCOPY N/A 2/1/2024    Procedure: ESOPHAGOGASTRODUODENOSCOPY;  Surgeon: Brunner, Mark I, MD;  Location:  [facility];  Service: Gastroenterology;  Laterality: N/A;    IN RPR NON/MAL FEMUR DSTL H/N W/ILIAC/AUTOG BONE Left 03/24/2017    Procedure: ILIAC CREST BONE GRAFT;  Surgeon: Frank Larson MD;  Location:  [facility];  Service: Orthopedics    WRIST SURGERY        General Information       Row Name 11/18/24 7433          OT Time and Intention    Document Type therapy note (daily note)  -AN     Mode of Treatment occupational therapy  -AN       Row Name 11/18/24 4571          General Information    Patient Profile Reviewed yes  -AN     Existing Precautions/Restrictions fall;other (see  comments);orthostatic hypotension  neuropathy B feet, orthostatic hypotension - monitor BP, prefers gait belt lower  -AN     Barriers to Rehab medically complex;previous functional deficit;cognitive status  -AN       Row Name 11/18/24 1357          Cognition    Orientation Status (Cognition) oriented x 3  -AN       Row Name 11/18/24 1357          Safety Issues/Impairments Affecting Functional Mobility    Safety Issues Affecting Function (Mobility) safety precaution awareness;safety precautions follow-through/compliance;judgment;problem-solving;insight into deficits/self-awareness;sequencing abilities  -AN     Impairments Affecting Function (Mobility) balance;coordination;endurance/activity tolerance;strength;pain;sensation/sensory awareness;postural/trunk control  -AN               User Key  (r) = Recorded By, (t) = Taken By, (c) = Cosigned By      Initials Name Provider Type    Umu Guadalupe OT Occupational Therapist                     Mobility/ADL's       Row Name 11/18/24 1403          Bed Mobility    Bed Mobility sit-supine  -AN     Sit-Supine [place] (Bed Mobility) verbal cues;minimum assist (75% patient effort);1 person assist  -AN     Bed Mobility, Safety Issues decreased use of arms for pushing/pulling;impaired trunk control for bed mobility;decreased use of legs for bridging/pushing  -AN     Comment, (Bed Mobility) Assist provided at [facility] to lift up to the bed  -AN       Row Name 11/18/24 5562          Transfers    Transfers sit-stand transfer;stand-sit transfer;bed-chair transfer  -AN     Comment, (Transfers) Cues for hand placement and transfer technique using the RW; Additional STS performed x 8 reps for strength and endurance training with prolonged seated rest inbetween due to fatigue.  -AN       Row Name 11/18/24 1403          Bed-Chair Transfer    Bed-Chair [place] (Transfers) verbal cues;minimum assist (75% patient effort);1 person assist  -AN     Assistive Device (Bed-Chair  Transfers) walker, front-wheeled  -AN     Comment, (Bed-Chair Transfer) trunk instability and BLE weakness noted with transfer to the bed  -AN       Row Name 11/18/24 1403          Sit-Stand Transfer    Sit-Stand [place] (Transfers) verbal cues;minimum assist (75% patient effort);1 person assist  -AN     Assistive Device (Sit-Stand Transfers) walker, front-wheeled  -AN       Row Name 11/18/24 1403          Stand-Sit Transfer    Stand-Sit [place] (Transfers) verbal cues;minimum assist (75% patient effort);1 person assist  -AN     Assistive Device (Stand-Sit Transfers) walker, front-wheeled  -AN       Row Name 11/18/24 1403          Activities of Daily Living    BADL Assessment/Intervention upper body dressing;lower body dressing  -AN       Row Name 11/18/24 1403          Upper Body Dressing Assessment/Training    [place] Level (Upper Body Dressing) don;pajama/robe;set up  -AN     Position (Upper Body Dressing) unsupported sitting  -AN       Row Name 11/18/24 1403          Lower Body Dressing Assessment/Training    [place] Level (Lower Body Dressing) don;socks;minimum assist (75% patient effort)  -AN     Position (Lower Body Dressing) unsupported sitting  -AN               User Key  (r) = Recorded By, (t) = Taken By, (c) = Cosigned By      Initials Name Provider Type    Umu Guadalupe OT Occupational Therapist                   Obj/Interventions       Row Name 11/18/24 1406          Shoulder (Therapeutic Exercise)    Shoulder (Therapeutic Exercise) AROM (active range of motion)  -AN     Shoulder AROM (Therapeutic Exercise) bilateral;flexion;extension;aBduction;aDduction;sitting;20 repititions;scapular retraction  -AN       Row Name 11/18/24 1406          Elbow/Forearm (Therapeutic Exercise)    Elbow/Forearm (Therapeutic Exercise) AROM (active range of motion)  -AN     Elbow/Forearm AROM (Therapeutic Exercise) bilateral;flexion;extension;sitting;20 repititions  -AN       Row Name 11/18/24  1406          Motor Skills    Therapeutic Exercise shoulder;elbow/forearm  -AN       Row Name 11/18/24 1406          Balance    Balance Assessment sitting static balance;sitting dynamic balance;sit to stand dynamic balance;standing dynamic balance;standing static balance  -AN     Static Sitting Balance standby assist  -AN     Dynamic Sitting Balance standby assist  -AN     Position, Sitting Balance sitting edge of bed  -AN     Sit to Stand Dynamic Balance verbal cues;minimal assist;1-person assist  -AN     Static Standing Balance verbal cues;minimal assist;1-person assist  -AN     Dynamic Standing Balance verbal cues;minimal assist;1-person assist  -AN     Position/Device Used, Standing Balance supported;walker, rolling  -AN     Balance Interventions standing;sit to stand;supported;static;dynamic;minimal challenge;occupation based/functional task  -AN               User Key  (r) = Recorded By, (t) = Taken By, (c) = Cosigned By      Initials Name Provider Type    AN Umu Sherman OT Occupational Therapist                   Goals/Plan    No documentation.                  Clinical Impression       Row Name 11/18/24 1408          Pain Assessment    Pain Location extremity  -AN     Pain Side/Orientation bilateral  -AN     Response to Pain Interventions activity participation with tolerable pain  -AN     Additional Documentation Pain Scale: FACES Pre/Post-Treatment (Group)  -AN       Row Name 11/18/24 1408          Pain Scale: FACES Pre/Post-Treatment    Pain: FACES Scale, Pretreatment 2-->hurts little bit  -AN     Posttreatment Pain Rating 2-->hurts little bit  -AN       Row Name 11/18/24 1408          Plan of Care Review    Plan of Care Reviewed With patient  -AN     Progress no change  -AN     Outcome Evaluation Pt continues to require Min A for functional transfers and mobility using the RW with notable trunk instability and BLE weakness. Sit to stands and UE ther-ex performed for strength building and activity  tolerance building. Pt required prolonged rest breaks throughout due to fatigue. Cont POC.  -AN       Row Name 11/18/24 1408          Therapy Plan Review/Discharge Plan (OT)    Anticipated Discharge Disposition (OT) inpatient rehabilitation facility  -AN       Row Name 11/18/24 1408          Vital Signs    Pre SpO2 (%) 95  -AN     O2 Delivery Pre Treatment room air  -AN     Intra SpO2 (%) 92  -AN     O2 Delivery Intra Treatment room air  -AN     Post SpO2 (%) 94  -AN     O2 Delivery Post Treatment room air  -AN     Pre Patient Position Sitting  -AN     Intra Patient Position Standing  -AN     Post Patient Position Supine  -AN       [facility] Name 11/18/24 1408          Positioning and Restraints    Pre-Treatment Position sitting in chair/recliner  -AN     Post Treatment Position bed  -AN     In Bed notified nsg;supine;call light within reach;exit alarm on;encouraged to call for assist;side rails up x2  -AN               User Key  (r) = Recorded By, (t) = Taken By, (c) = Cosigned By      Initials Name Provider Type    Umu Guadalupe, SAGE Occupational Therapist                   Outcome Measures       Row Name 11/18/24 1410          How much help from another is currently needed...    Putting on and taking off regular lower body clothing? 3  -AN     Bathing (including washing, rinsing, and drying) 3  -AN     Toileting (which includes using toilet bed pan or urinal) 3  -AN     Putting on and taking off regular upper body clothing 3  -AN     Taking care of personal grooming (such as brushing teeth) 3  -AN     Eating meals 3  -AN     AM-PAC 6 Clicks Score (OT) 18  -AN       [facility] Name 11/18/24 0820          How much help from another person do you currently need...    Turning from your back to your side while in flat bed without using bedrails? 4  -AL (r) SP (t) AL (c)     Moving from lying on back to sitting on the side of a flat bed without bedrails? 3  -AL (r) SP (t) AL (c)     Moving to and from a bed to a chair  (including a wheelchair)? 3  -AL (r) SP (t) AL (c)     Standing up from a chair using your arms (e.g., wheelchair, bedside chair)? 3  -AL (r) SP (t) AL (c)     Climbing 3-5 steps with a railing? 3  -AL (r) SP (t) AL (c)     To walk in hospital room? 3  -AL (r) SP (t) AL (c)     AM-PAC 6 Clicks Score (PT) 19  -AL (r) SP (t)     Highest Level of Mobility Goal 6 --> Walk 10 steps or more  -AL (r) SP (t)       Row Name 11/18/24 1410          Functional Assessment    Outcome Measure Options AM-PAC 6 Clicks Daily Activity (OT)  -AN               User Key  (r) = Recorded By, (t) = Taken By, (c) = Cosigned By      Initials Name Provider Type    Umu Guadalupe OT Occupational Therapist    Yahaira Manuel, RN Registered Nurse    Olga Lidia Heath RN Extern Registered Nurse                    Occupational Therapy Education       Title: PT OT SLP Therapies (In Progress)       Topic: Occupational Therapy (Done)       Point: ADL training (Done)       Description:   Instruct learner(s) on proper safety adaptation and remediation techniques during self care or transfers.   Instruct in proper use of assistive devices.                  Learning Progress Summary            Patient Acceptance, E, VU by AN at 11/18/2024 1411    Acceptance, E,D, VU,DU by ROSARIO at 11/14/2024 1103    Acceptance, D,E, VU,DU by ROSARIO at 11/12/2024 1400    Acceptance, E,D, NR by ELEUTERIO at 11/11/2024 0854                      Point: Home exercise program (Done)       Description:   Instruct learner(s) on appropriate technique for monitoring, assisting and/or progressing therapeutic exercises/activities.                  Learning Progress Summary            Patient Acceptance, E, VU by AN at 11/18/2024 1411    Acceptance, E,D, VU,DU by ROSARIO at 11/14/2024 1103                      Point: Precautions (Done)       Description:   Instruct learner(s) on prescribed precautions during self-care and functional transfers.                  Learning Progress Summary             Patient Acceptance, E, VU by AN at 11/18/2024 1411    Acceptance, E,D, VU,DU by  at 11/14/2024 1103    Acceptance, D,E, VU,DU by  at 11/12/2024 1400    Acceptance, E,D, NR by ELEUTERIO at 11/11/2024 0854                      Point: Body mechanics (Done)       Description:   Instruct learner(s) on proper positioning and spine alignment during self-care, functional mobility activities and/or exercises.                  Learning Progress Summary            Patient Acceptance, E, VU by AN at 11/18/2024 1411    Acceptance, E,D, VU,DU by  at 11/14/2024 1103    Acceptance, D,E, VU,DU by  at 11/12/2024 1400    Acceptance, E,D, NR by ELEUTERIO at 11/11/2024 0854                                      User Key       Initials Effective Dates Name Provider Type Discipline     06/16/21 -  Vanessa Noonan OT Occupational Therapist OT     06/16/21 -  Ion Roberts OT Occupational Therapist OT     09/21/21 -  Umu Sherman OT Occupational Therapist OT                  OT Recommendation and Plan     Plan of Care Review  Plan of Care Reviewed With: patient  Progress: no change  Outcome Evaluation: Pt continues to require Min A for functional transfers and mobility using the RW with notable trunk instability and BLE weakness. Sit to stands and UE ther-ex performed for strength building and activity tolerance building. Pt required prolonged rest breaks throughout due to fatigue. Cont POC.     Time Calculation:         Time Calculation- OT       Row Name 11/18/24 1411             Time Calculation- OT    OT Start Time 1330  -AN      OT Received On 11/18/24  -AN         Timed Charges    64630 - OT Therapeutic Exercise Minutes 15  -AN      84400 - OT Therapeutic Activity Minutes 10  -AN         Total Minutes    Timed Charges Total Minutes 25  -AN       Total Minutes 25  -AN                User Key  (r) = Recorded By, (t) = Taken By, (c) = Cosigned By      Initials Name Provider Type    Umu Guadalupe, SAGE Occupational  Therapist                  Therapy Charges for Today       Code Description Service Date Service Provider Modifiers Qty    31435133190  OT THER PROC EA 15 MIN 11/18/2024 Umu Sherman OT GO 1    [id number]  OT THERAPEUTIC ACT EA 15 MIN 11/18/2024 Umu Sherman OT GO 1                 Umu Sherman OT  11/18/2024    Electronically signed by Umu Sherman OT at 11/18/24 1412       Speech Language Pathology Notes (most recent note)    No notes exist for this encounter.

## 2024-11-20 PROCEDURE — 99232 SBSQ HOSP IP/OBS MODERATE 35: CPT | Performed by: NURSE PRACTITIONER

## 2024-11-20 PROCEDURE — 97535 SELF CARE MNGMENT TRAINING: CPT | Performed by: OCCUPATIONAL THERAPIST

## 2024-11-20 PROCEDURE — 97530 THERAPEUTIC ACTIVITIES: CPT | Performed by: OCCUPATIONAL THERAPIST

## 2024-11-20 PROCEDURE — 94664 DEMO&/EVAL PT USE INHALER: CPT

## 2024-11-20 PROCEDURE — 94799 UNLISTED PULMONARY SVC/PX: CPT

## 2024-11-20 RX ADMIN — FOLIC ACID 1 MG: 1 TABLET ORAL at 09:20

## 2024-11-20 RX ADMIN — GABAPENTIN 200 MG: 100 CAPSULE ORAL at 09:20

## 2024-11-20 RX ADMIN — BUDESONIDE AND FORMOTEROL FUMARATE DIHYDRATE 2 PUFF: 160; 4.5 AEROSOL RESPIRATORY (INHALATION) at 20:46

## 2024-11-20 RX ADMIN — LATANOPROST 1 DROP: 50 SOLUTION OPHTHALMIC at 21:21

## 2024-11-20 RX ADMIN — THIAMINE HCL TAB 100 MG 100 MG: 100 TAB at 09:19

## 2024-11-20 RX ADMIN — URSODIOL 300 MG: 300 CAPSULE ORAL at 21:19

## 2024-11-20 RX ADMIN — MIDODRINE HYDROCHLORIDE 15 MG: 10 TABLET ORAL at 09:19

## 2024-11-20 RX ADMIN — GABAPENTIN 200 MG: 100 CAPSULE ORAL at 15:49

## 2024-11-20 RX ADMIN — DESVENLAFAXINE 50 MG: 50 TABLET, FILM COATED, EXTENDED RELEASE ORAL at 09:20

## 2024-11-20 RX ADMIN — CARVEDILOL 12.5 MG: 12.5 TABLET, FILM COATED ORAL at 09:20

## 2024-11-20 RX ADMIN — URSODIOL 300 MG: 300 CAPSULE ORAL at 09:20

## 2024-11-20 RX ADMIN — Medication 5 MG: at 21:19

## 2024-11-20 RX ADMIN — PANTOPRAZOLE SODIUM 40 MG: 40 TABLET, DELAYED RELEASE ORAL at 05:45

## 2024-11-20 RX ADMIN — MIDODRINE HYDROCHLORIDE 15 MG: 10 TABLET ORAL at 17:28

## 2024-11-20 RX ADMIN — ROSUVASTATIN 5 MG: 10 TABLET, FILM COATED ORAL at 21:19

## 2024-11-20 RX ADMIN — NYSTATIN 1 APPLICATION: 100000 CREAM TOPICAL at 09:19

## 2024-11-20 RX ADMIN — BUDESONIDE AND FORMOTEROL FUMARATE DIHYDRATE 2 PUFF: 160; 4.5 AEROSOL RESPIRATORY (INHALATION) at 07:26

## 2024-11-20 RX ADMIN — ZOLPIDEM TARTRATE 5 MG: 5 TABLET ORAL at 21:19

## 2024-11-20 RX ADMIN — CARVEDILOL 12.5 MG: 12.5 TABLET, FILM COATED ORAL at 17:28

## 2024-11-20 NOTE — PAYOR COMM NOTE
"Philomena Davis (64 y.o. Female)       Date of Birth   1960    Social Security Number       Address   [address] [id number]    Home Phone   [phone]    MRN   [id number]       [place]   Yazdanism    Marital Status                               Admission Date   11/10/24    Admission Type   Emergency    Admitting Provider   Kishor Naranjo DO    Attending Provider   Kishor Naranjo DO    Department, Room/Bed   [facility] 3F, S301/1       Discharge Date       Discharge Disposition       Discharge Destination                                 Attending Provider: Kishor Naranjo DO    Allergies: Buspirone, Codeine, Mirtazapine    Isolation: None   Infection: None   Code Status: CPR    Ht: 162.6 cm (64.02\")   Wt: 67.1 kg (147 lb 14.9 oz)    Admission Cmt: None   Principal Problem: Lower extremity weakness [R29.898]                   Active Insurance as of 11/10/2024       Primary Coverage       Payor Plan Insurance Group Employer/Plan Group    [employer] [place] [employer] [place]ICA[place]        Payor Plan Address Payor Plan Phone Number Payor Plan Fax Number Effective Dates    [address] [address] [phone]  11/8/2018 - None Entered    [facility] [id number]         Subscriber Name Subscriber Birth Date Member ID       TONYPHILOMENA K [id number]                     Emergency Contacts        (Rel.) Home Phone Work Phone Mobile Phone    Madelin Null (Relative) [phone] -- [phone]    MARCELLO ZAIDI (Friend) -- -- [phone]              [employer]anco: [facility] [id number] Tax ID [id number]  Insurance Information                  [facility] MEDICAID Phone: [phone]    Subscriber: Philomena Davis Subscriber#: [id number]    Group#: -- Precert#: --    Authorization#: -- Effective Date: --          Current Facility-Administered Medications   Medication Dose Route Frequency Provider Last Rate Last Admin    acetaminophen (TYLENOL) tablet " 650 mg  650 mg Oral Q4H PRN Sophie Gandhi MD        Or    acetaminophen (TYLENOL) 160 MG/5ML oral solution 650 mg  650 mg Oral Q4H PRN Sophie Gandhi MD        Or    acetaminophen (TYLENOL) suppository 500 mg  500 mg Rectal Q4H PRN Sophie Gandhi MD        sennosides-docusate (PERICOLACE) 8.6-50 MG per tablet 2 tablet  2 tablet Oral BID PRN Sophie Gandhi MD   2 tablet at 11/13/24 1204    And    polyethylene glycol (MIRALAX) packet 17 g  17 g Oral Daily PRN Sophie Gandhi MD        And    bisacodyl (DULCOLAX) EC tablet 5 mg  5 mg Oral Daily PRN Sophie Gandhi MD        And    bisacodyl (DULCOLAX) suppository 10 mg  10 mg Rectal Daily PRN Sophie Gandhi MD        budesonide-formoterol (SYMBICORT) 160-4.5 MCG/ACT inhaler 2 puff  2 puff Inhalation BID - RT Jsoe J Hutchins MD   2 puff at 11/20/24 0726    calcium carbonate (TUMS) chewable tablet 500 mg (200 mg elemental)  2 tablet Oral BID PRN Sophie Gandhi MD        Calcium Replacement - Follow Nurse / BPA Driven Protocol   Not Applicable PRSophie Wilder MD        carvedilol (COREG) tablet 12.5 mg  12.5 mg Oral BID With Meals Jose J Hutchins MD   12.5 mg at 11/19/24 1657    desvenlafaxine (PRISTIQ) 24 hr tablet 50 mg  50 mg Oral Daily Sophie Gandhi MD   50 mg at 11/19/24 0910    folic acid (FOLVITE) tablet 1 mg  1 mg Oral Daily Sophie Gandhi MD   1 mg at 11/19/24 0911    gabapentin (NEURONTIN) capsule 200 mg  200 mg Oral Q12H Fallon Guido APRN   200 mg at 11/19/24 1656    ipratropium-albuterol (DUO-NEB) nebulizer solution 3 mL  3 mL Nebulization Q6H PRN Sophie Gandhi MD   3 mL at 11/14/24 1020    latanoprost (XALATAN) 0.005 % ophthalmic solution 1 drop  1 drop Both Eyes Nightly Sophie Gandhi MD   1 drop at 11/19/24 2128    Magnesium Standard Dose Replacement - Follow Nurse / BPA Driven Protocol   Not Applicable PRN Sophie Gandhi MD         melatonin tablet 5 mg  5 mg Oral Nightly Sophie Gandhi MD   5 mg at 11/19/24 2123    metoprolol tartrate (LOPRESSOR) injection 5 mg  5 mg Intravenous Q6H PRN Kavitha Mejia APRN        midodrine (PROAMATINE) tablet 15 mg  15 mg Oral BID AC Jose J Hutchins MD   15 mg at 11/19/24 1656    nitroglycerin (NITROSTAT) SL tablet 0.4 mg  0.4 mg Sublingual Q5 Min PRN Sophie Gandhi MD        nystatin (MYCOSTATIN) 013719 UNIT/GM cream 1 Application  1 Application Topical Q12H Jose J Hutchins MD   1 Application at 11/19/24 2123    ondansetron (ZOFRAN) injection 4 mg  4 mg Intravenous Q6H PRN Sophie Gandhi MD        pantoprazole (PROTONIX) EC tablet 40 mg  40 mg Oral Daily Sophie Gandhi MD   40 mg at 11/20/24 0545    Phosphorus Replacement - Follow Nurse / BPA Driven Protocol   Not Applicable PRN Sophie Gandhi MD        Potassium Replacement - Follow Nurse / BPA Driven Protocol   Not Applicable PRN Sophie Gandhi MD        rosuvastatin (CRESTOR) tablet 5 mg  5 mg Oral Daily Sophie Gandhi MD   5 mg at 11/19/24 2123    sodium chloride 0.9 % flush 10 mL  10 mL Intravenous PRN Zac Bruner MD        sodium chloride 0.9 % flush 10 mL  10 mL Intravenous Q12H Sophie Gandhi MD   10 mL at 11/17/24 0833    sodium chloride 0.9 % flush 10 mL  10 mL Intravenous PRN Sophie Gandhi MD        sodium chloride 0.9 % infusion 40 mL  40 mL Intravenous PRN Sophie Gandhi MD        thiamine (VITAMIN B-1) tablet 100 mg  100 mg Oral Daily Sophie Gandhi MD   100 mg at 11/19/24 0911    ursodiol (ACTIGALL) capsule 300 mg  300 mg Oral BID Sophie Gandhi MD   300 mg at 11/19/24 2123    zolpidem (AMBIEN) tablet 5 mg  5 mg Oral Nightly Sophie Gandhi MD   5 mg at 11/19/24 2124     Lab Results (last 24 hours)       Procedure Component Value Units Date/Time    Procalcitonin [[id number]]  (Normal) Collected: 11/19/24 1210    Specimen: Blood  "Updated: 11/19/24 1344     Procalcitonin 0.19 ng/mL     Narrative:      As a Marker for Sepsis (Non-Neonates):    1. <0.5 ng/mL represents a low risk of severe sepsis and/or septic shock.  2. >2 ng/mL represents a high risk of severe sepsis and/or septic shock.    As a Marker for Lower Respiratory Tract Infections that require antibiotic therapy:    PCT on Admission    Antibiotic Therapy       6-12 Hrs later    >0.5                Strongly Recommended  >0.25 - <0.5        Recommended   0.1 - 0.25          Discouraged              Remeasure/reassess PCT  <0.1                Strongly Discouraged     Remeasure/reassess PCT    As 28 day mortality risk marker: \"Change in Procalcitonin Result\" (>80% or <=80%) if Day 0 (or Day 1) and Day 4 values are available. Refer to http://www.[facility]-pct-calculator.com    Change in PCT <=80%  A decrease of PCT levels below or equal to 80% defines a positive change in PCT test result representing a higher risk for 28-day all-cause mortality of patients diagnosed with severe sepsis for septic shock.    Change in PCT >80%  A decrease of PCT levels of more than 80% defines a negative change in PCT result representing a lower risk for 28-day all-cause mortality of patients diagnosed with severe sepsis or septic shock.       Basic Metabolic Panel [[id number]]  (Abnormal) Collected: 11/19/24 1210    Specimen: Blood Updated: 11/19/24 1250     Glucose 106 mg/dL      BUN 10 mg/dL      Creatinine 0.69 mg/dL      Sodium 140 mmol/L      Potassium 3.8 mmol/L      Chloride 103 mmol/L      CO2 27.0 mmol/L      Calcium 9.1 mg/dL      BUN/Creatinine Ratio 14.5     Anion Gap 10.0 mmol/L      eGFR 98.3 mL/min/1.73     Narrative:      GFR Normal >60  Chronic Kidney Disease <60  Kidney Failure <15      CBC (No Diff) [[id number]]  (Abnormal) Collected: 11/19/24 1210    Specimen: Blood Updated: 11/19/24 1237     WBC 13.25 10*3/mm3      RBC 2.94 10*6/mm3      Hemoglobin 10.6 g/dL      Hematocrit 32.6 %      MCV " 110.9 fL      MCH 36.1 pg      MCHC 32.5 g/dL      RDW 13.2 %      RDW-SD 54.3 fl      MPV 9.9 fL      Platelets 266 10*3/mm3           Imaging Results (Last 24 Hours)       ** No results found for the last 24 hours. **          Orders (last 24 hrs)        Start     Ordered    11/21/24 0600  CBC (No Diff)  Morning Draw         11/20/24 0806    11/19/24 1250  Procalcitonin  STAT         11/19/24 1249    11/19/24 1148  Basic Metabolic Panel  Once         11/19/24 1147    11/19/24 1148  CBC (No Diff)  Once         11/19/24 1147    11/19/24 1145  Discontinue Telemetry Monitoring  Once         11/19/24 1144    11/15/24 1730  midodrine (PROAMATINE) tablet 15 mg  2 Times Daily Before Meals         11/15/24 1020    11/14/24 1600  gabapentin (NEURONTIN) capsule 200 mg  Every 12 Hours Scheduled         11/14/24 1306    11/14/24 1445  budesonide-formoterol (SYMBICORT) 160-4.5 MCG/ACT inhaler 2 puff  2 Times Daily - RT         11/14/24 1351    11/13/24 1800  carvedilol (COREG) tablet 12.5 mg  2 Times Daily With Meals         11/13/24 0947    11/13/24 1130  nystatin (MYCOSTATIN) 275051 UNIT/GM cream 1 Application  Every 12 Hours Scheduled         11/13/24 1043    11/13/24 0949  metoprolol tartrate (LOPRESSOR) injection 5 mg  Every 6 Hours PRN        Note to Pharmacy: Give for sustained heart rates greater than 120 bpm    11/13/24 0949    11/11/24 0900  folic acid (FOLVITE) tablet 1 mg  Daily         11/10/24 1537    11/11/24 0900  thiamine (VITAMIN B-1) tablet 100 mg  Daily         11/10/24 1537    11/11/24 0900  desvenlafaxine (PRISTIQ) 24 hr tablet 50 mg  Daily         11/10/24 1537    11/11/24 0600  pantoprazole (PROTONIX) EC tablet 40 mg  Daily         11/10/24 1537    11/10/24 2100  latanoprost (XALATAN) 0.005 % ophthalmic solution 1 drop  Nightly         11/10/24 1537    11/10/24 2100  rosuvastatin (CRESTOR) tablet 5 mg  Daily         11/10/24 1537    11/10/24 2100  ursodiol (ACTIGALL) capsule 300 mg  2 Times Daily          "11/10/24 1537    11/10/24 2100  zolpidem (AMBIEN) tablet 5 mg  Nightly         11/10/24 1537    11/10/24 2100  sodium chloride 0.9 % flush 10 mL  Every 12 Hours Scheduled         11/10/24 1537    11/10/24 2100  melatonin tablet 5 mg  Nightly         11/10/24 1537    11/10/24 1800  Oral Care  2 Times Daily       11/10/24 1537    11/10/24 1600  Vital Signs  Every 4 Hours       11/10/24 1537    11/10/24 1535  ondansetron (ZOFRAN) injection 4 mg  Every 6 Hours PRN         11/10/24 1537    11/10/24 1535  Intake & Output  Every Shift       11/10/24 1537    11/10/24 1534  sodium chloride 0.9 % flush 10 mL  As Needed         11/10/24 1537    11/10/24 1534  sodium chloride 0.9 % infusion 40 mL  As Needed         11/10/24 1537    11/10/24 1534  acetaminophen (TYLENOL) tablet 650 mg  Every 4 Hours PRN        Placed in \"Or\" Linked Group    11/10/24 1537    11/10/24 1534  acetaminophen (TYLENOL) 160 MG/5ML oral solution 650 mg  Every 4 Hours PRN        Placed in \"Or\" Linked Group    11/10/24 1537    11/10/24 1534  acetaminophen (TYLENOL) suppository 500 mg  Every 4 Hours PRN        Placed in \"Or\" Linked Group    11/10/24 1537    11/10/24 1534  calcium carbonate (TUMS) chewable tablet 500 mg (200 mg elemental)  2 Times Daily PRN         11/10/24 1537    11/10/24 1534  sennosides-docusate (PERICOLACE) 8.6-50 MG per tablet 2 tablet  2 Times Daily PRN        Placed in \"And\" Linked Group    11/10/24 1537    11/10/24 1534  polyethylene glycol (MIRALAX) packet 17 g  Daily PRN        Placed in \"And\" Linked Group    11/10/24 1537    11/10/24 1534  bisacodyl (DULCOLAX) EC tablet 5 mg  Daily PRN        Placed in \"And\" Linked Group    11/10/24 1537    11/10/24 1534  bisacodyl (DULCOLAX) suppository 10 mg  Daily PRN        Placed in \"And\" Linked Group    11/10/24 1537    11/10/24 1534  Potassium Replacement - Follow Nurse / BPA Driven Protocol  As Needed         11/10/24 1537    11/10/24 1534  Magnesium Standard Dose Replacement - Follow " Nurse / BPA Driven Protocol  As Needed         11/10/24 1537    11/10/24 1534  Phosphorus Replacement - Follow Nurse / BPA Driven Protocol  As Needed         11/10/24 1537    11/10/24 1534  Calcium Replacement - Follow Nurse / BPA Driven Protocol  As Needed         11/10/24 1537    11/10/24 1534  nitroglycerin (NITROSTAT) SL tablet 0.4 mg  Every 5 Minutes PRN         11/10/24 1537    11/10/24 1533  ipratropium-albuterol (DUO-NEB) nebulizer solution 3 mL  Every 6 Hours PRN         11/10/24 1537    11/10/24 0944  sodium chloride 0.9 % flush 10 mL  As Needed         11/10/24 0944    Unscheduled  Oxygen Therapy- Nasal Cannula; Titrate 1-6 LPM Per SpO2; 90 - 95%  Continuous PRN       11/10/24 0944                  Physician Progress Notes (last 24 hours)  Notes from 11/19/24 0920 through 11/20/24 0920   No notes of this type exist for this encounter.       Consult Notes (last 24 hours)  Notes from 11/19/24 0920 through 11/20/24 0920   No notes of this type exist for this encounter.

## 2024-11-20 NOTE — PLAN OF CARE
Problem: Adult Inpatient Plan of Care  Goal: Plan of Care Review  Recent Flowsheet Documentation  Taken 11/20/2024 1136 by Maritza Petersen OT  Progress: improving  Outcome Evaluation: Pt. with improving occupational endurance & strength to support ADL routine and functional mobility. Pt. continues to require min A with ambulating household distances using RWx. OT skilled services continue to be warranted due to pt. below functional baseline. Recommend IPRF upon d/c.   Goal Outcome Evaluation:  Plan of Care Reviewed With: patient        Progress: improving  Outcome Evaluation: Pt. with improving occupational endurance & strength to support ADL routine and functional mobility. Pt. continues to require min A with ambulating household distances using RWx. OT skilled services continue to be warranted due to pt. below functional baseline. Recommend IPRF upon d/c.

## 2024-11-20 NOTE — THERAPY TREATMENT NOTE
Patient Name: Philomena Davis  : 1960    MRN: [id number]                              Today's Date: 2024       Admit Date: 11/10/2024    Visit Dx:     ICD-10-CM ICD-9-CM   1. Weakness of both lower extremities  R29.898 729.89   2. Paresthesia  R20.2 782.0   3. Weakness  R53.1 780.79   4. Leukocytosis, unspecified type  D72.829 288.60   5. Alcoholic cirrhosis of liver without ascites  K70.30 571.2   6. Orthostasis  I95.1 458.0   7. Tachycardia  R00.0 785.0     Patient Active Problem List   Diagnosis    Essential hypertension    Generalized anxiety disorder    Hyperlipidemia    Glaucoma    Alcoholic cirrhosis of liver without ascites    Severe malnutrition    GERD without esophagitis    Sleep disturbance    Annual physical exam    Idiopathic peripheral neuropathy    Severe anxiety with panic    Alcohol use disorder    Cigarette nicotine dependence in remission    Chronic obstructive pulmonary disease    Closed fracture of left hand    Moderate malnutrition    Lower extremity weakness    Orthostasis     Past Medical History:   Diagnosis Date    Alcohol withdrawal 05/10/2019    Annual physical exam 2023    Anxiety and depression     Cirrhosis of liver     Closed displaced fracture of lateral malleolus of left fibula     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    Closed trimalleolar fracture of right ankle 2023    Digital mucous cyst of toe of left foot 2017    Ganglion cyst of left foot     Glaucoma     Hypertension     Wears glasses      Past Surgical History:   Procedure Laterality Date    ANKLE OPEN REDUCTION INTERNAL FIXATION Left 2017    Procedure: LEFT ANKLE OPEN REDUCTION INTERNAL FIXATION WITH ILIAC CREST BONE GRAFT , EXCISION CYST 4TH/5TH INNERSPACE LEFT FOOT;  Surgeon: Frank Larson MD;  Location: [facility];  Service:     ANKLE OPEN REDUCTION INTERNAL FIXATION Right 2023    Procedure: ANKLE OPEN REDUCTION INTERNAL  FIXATION;  Surgeon: Deandre Reynoso MD;  Location:  [facility];  Service: Orthopedics;  Laterality: Right;    AUGMENTATION MAMMAPLASTY Bilateral 1999    BREAST AUGMENTATION      BREAST EXCISIONAL BIOPSY Right 2014    DILATION AND CURETTAGE, DIAGNOSTIC / THERAPEUTIC      ENDOSCOPY N/A 11/30/2022    Procedure: ESOPHAGOGASTRODUODENOSCOPY;  Surgeon: Arelis Solano MD;  Location:  [facility];  Service: Gastroenterology;  Laterality: N/A;    ENDOSCOPY N/A 2/1/2024    Procedure: ESOPHAGOGASTRODUODENOSCOPY;  Surgeon: Brunner, Mark I, MD;  Location:  [facility];  Service: Gastroenterology;  Laterality: N/A;    NC RPR NON/MAL FEMUR DSTL H/N W/ILIAC/AUTOG BONE Left 03/24/2017    Procedure: ILIAC CREST BONE GRAFT;  Surgeon: Frank Larson MD;  Location:  [facility];  Service: Orthopedics    WRIST SURGERY        General Information       Row Name 11/20/24 1042          OT Time and Intention    Document Type therapy note (daily note)  -SD     Mode of Treatment occupational therapy  -SD     Patient Effort good  -SD       Row Name 11/20/24 1042          General Information    Patient Profile Reviewed yes  -SD     Existing Precautions/Restrictions fall;orthostatic hypotension;other (see comments)  peripheral neuropathy  -SD     Barriers to Rehab medically complex;previous functional deficit;cognitive status  -SD       Row Name 11/20/24 1042          Cognition    Orientation Status (Cognition) oriented x 3  -SD       Row Name 11/20/24 1042          Safety Issues/Impairments Affecting Functional Mobility    Safety Issues Affecting Function (Mobility) insight into deficits/self-awareness;safety precaution awareness;safety precautions follow-through/compliance  -SD     Impairments Affecting Function (Mobility) balance;coordination;endurance/activity tolerance;strength;postural/trunk control  -SD               User Key  (r) = Recorded By, (t) = Taken By, (c) = Cosigned By      Initials Name Provider Type    Maritza Menendez  SAGE Mendez Occupational Therapist                     Mobility/ADL's       Row Name 11/20/24 1132          Bed Mobility    Bed Mobility rolling left;scooting/bridging;supine-sit  -SD     Rolling Left [place] (Bed Mobility) supervision  -SD     Scooting/Bridging [place] (Bed Mobility) standby assist  -SD     Supine-Sit [place] (Bed Mobility) standby assist  -SD     Bed Mobility, Safety Issues impaired trunk control for bed mobility;decreased use of legs for bridging/pushing  -SD     Assistive Device (Bed Mobility) bed rails;head of bed elevated  -SD       Row Name 11/20/24 1132          Transfers    Transfers toilet transfer;bed-chair transfer;sit-stand transfer;stand-sit transfer  -SD       Row Name 11/20/24 1132          Bed-Chair Transfer    Bed-Chair [place] (Transfers) contact guard;1 person assist;verbal cues  -SD     Assistive Device (Bed-Chair Transfers) walker, front-wheeled  -SD       Row Name 11/20/24 1132          Sit-Stand Transfer    Sit-Stand [place] (Transfers) minimum assist (75% patient effort);1 person assist;verbal cues  -SD     Assistive Device (Sit-Stand Transfers) walker, front-wheeled  -SD       Row Name 11/20/24 1132          Stand-Sit Transfer    Stand-Sit [place] (Transfers) minimum assist (75% patient effort);1 person assist;verbal cues  -SD     Assistive Device (Stand-Sit Transfers) walker, front-wheeled  -SD       Row Name 11/20/24 1132          Toilet Transfer    Type (Toilet Transfer) sit-stand;stand-sit  -SD     [place] Level (Toilet Transfer) minimum assist (75% patient effort);1 person assist;verbal cues  -SD     Assistive Device (Toilet Transfer) raised toilet seat;grab bars/safety frame;walker, front-wheeled  -SD       Row Name 11/20/24 1132          Functional Mobility    Functional Mobility- Ind. Level contact guard assist;1 person;verbal cues required  -SD     Functional Mobility- Device walker, front-wheeled  -SD     Functional  Mobility-Distance (Feet) 30  -SD     Functional Mobility- Safety Issues balance decreased during turns  -SD     Patient was able to Ambulate yes  -SD       Row Name 11/20/24 1132          Activities of Daily Living    BADL Assessment/Intervention upper body dressing;grooming;toileting  -SD       Row Name 11/20/24 1132          Upper Body Dressing Assessment/Training    [place] Level (Upper Body Dressing) pajama/robe;contact guard assist  -SD     Position (Upper Body Dressing) supported standing  -SD       Row Name 11/20/24 1132          Grooming Assessment/Training    [place] Level (Grooming) hair care, combing/brushing;oral care regimen;wash face, hands;supervision;set up  -SD     Position (Grooming) supported standing;supported sitting;sink side  -SD     Comment, (Grooming) washed hair with shampoo cap  -SD       Row Name 11/20/24 1132          Toileting Assessment/Training    [place] Level (Toileting) adjust/manage clothing;perform perineal hygiene;supervision;set up  -SD     Assistive Devices (Toileting) raised toilet seat;grab bar/safety frame  -SD     Position (Toileting) supported sitting  -SD               User Key  (r) = Recorded By, (t) = Taken By, (c) = Cosigned By      Initials Name Provider Type    SD Maritza Petersen OT Occupational Therapist                   Obj/Interventions       Row Name 11/20/24 1135          Balance    Static Sitting Balance standby assist  -SD     Dynamic Sitting Balance supervision  -SD     Position, Sitting Balance unsupported;sitting edge of bed  -SD     Sit to Stand Dynamic Balance minimal assist;1-person assist  -SD     Static Standing Balance contact guard;1-person assist  -SD     Dynamic Standing Balance minimal assist;1-person assist  -SD     Position/Device Used, Standing Balance supported;walker, front-wheeled  -SD     Balance Interventions sitting;standing;dynamic reaching;occupation based/functional task;UE activity with balance activity  -SD                User Key  (r) = Recorded By, (t) = Taken By, (c) = Cosigned By      Initials Name Provider Type    Maritza Menendez, OT Occupational Therapist                   Goals/Plan    No documentation.                  Clinical Impression       Row Name 11/20/24 1136          Pain Assessment    Pretreatment Pain Rating 0/10 - no pain  -SD     Posttreatment Pain Rating 0/10 - no pain  -SD       Row Name 11/20/24 1136          Plan of Care Review    Plan of Care Reviewed With patient  -SD     Progress improving  -SD     Outcome Evaluation Pt. with improving occupational endurance & strength to support ADL routine and functional mobility. Pt. continues to require min A with ambulating household distances using RWx. OT skilled services continue to be warranted due to pt. below functional baseline. Recommend IPRF upon d/c.  -SD       Row Name 11/20/24 1136          Therapy Assessment/Plan (OT)    Rehab Potential (OT) good  -SD     Criteria for Skilled Therapeutic Interventions Met (OT) yes;skilled treatment is necessary  -SD     Therapy Frequency (OT) daily  -SD       Row Name 11/20/24 1136          Therapy Plan Review/Discharge Plan (OT)    Equipment Needs Upon Discharge (OT) commode chair;walker, rolling;other (see comments)  needs rollator  -SD       Row Name 11/20/24 1136          Vital Signs    Pre Systolic BP Rehab 103  -SD     Pre Treatment Diastolic BP 57  -SD     Post Systolic BP Rehab 116  -SD     Post Treatment Diastolic BP 71  -SD     O2 Delivery Pre Treatment room air  -SD     O2 Delivery Intra Treatment room air  -SD     Post SpO2 (%) 94  -SD     O2 Delivery Post Treatment room air  -SD     Pre Patient Position Supine  -SD     Intra Patient Position Standing  -SD     Post Patient Position Sitting  -SD       Row Name 11/20/24 1136          Positioning and Restraints    Pre-Treatment Position in bed  -SD     Post Treatment Position chair  -SD     In Chair notified nsg;reclined;call light within  reach;encouraged to call for assist;waffle cushion;legs elevated  -SD               User Key  (r) = Recorded By, (t) = Taken By, (c) = Cosigned By      Initials Name Provider Type    Maritza Menendez OT Occupational Therapist                   Outcome Measures       Row Name 11/20/24 1042          How much help from another is currently needed...    Putting on and taking off regular lower body clothing? 3  -SD     Bathing (including washing, rinsing, and drying) 3  -SD     Toileting (which includes using toilet bed pan or urinal) 3  -SD     Putting on and taking off regular upper body clothing 3  -SD     Taking care of personal grooming (such as brushing teeth) 3  -SD     Eating meals 4  -SD     AM-PAC 6 Clicks Score (OT) 19  -SD       Row Name 11/20/24 1042          Functional Assessment    Outcome Measure Options AM-PAC 6 Clicks Daily Activity (OT)  -SD               User Key  (r) = Recorded By, (t) = Taken By, (c) = Cosigned By      Initials Name Provider Type    Maritza Menendez OT Occupational Therapist                    Occupational Therapy Education       Title: PT OT SLP Therapies (Done)       Topic: Occupational Therapy (Done)       Point: ADL training (Done)       Description:   Instruct learner(s) on proper safety adaptation and remediation techniques during self care or transfers.   Instruct in proper use of assistive devices.                  Learning Progress Summary            Patient EagerSHERRIE VU by SC at 11/19/2024 1427    Comment: reviewed HEP    Acceptance, E, VU by AN at 11/18/2024 1411    Acceptance, E,D, VU,DU by  at 11/14/2024 1103    Acceptance, D,E, VU,DU by CS at 11/12/2024 1400    Acceptance, E,D, NR by ELEUTERIO at 11/11/2024 0854                      Point: Home exercise program (Done)       Description:   Instruct learner(s) on appropriate technique for monitoring, assisting and/or progressing therapeutic exercises/activities.                  Learning Progress Summary             Patient Eager, E, VU by SC at 11/19/2024 1427    Comment: reviewed HEP    Acceptance, E, VU by  at 11/18/2024 1411    Acceptance, E,D, VU,DU by  at 11/14/2024 1103                      Point: Precautions (Done)       Description:   Instruct learner(s) on prescribed precautions during self-care and functional transfers.                  Learning Progress Summary            Patient Eager, E, VU by SC at 11/19/2024 1427    Comment: reviewed HEP    Acceptance, E, VU by  at 11/18/2024 1411    Acceptance, E,D, VU,DU by  at 11/14/2024 1103    Acceptance, D,E, VU,DU by  at 11/12/2024 1400    Acceptance, E,D, NR by AD at 11/11/2024 0854                      Point: Body mechanics (Done)       Description:   Instruct learner(s) on proper positioning and spine alignment during self-care, functional mobility activities and/or exercises.                  Learning Progress Summary            Patient Eager, E, VU by SC at 11/19/2024 1427    Comment: reviewed HEP    Acceptance, E, VU by AN at 11/18/2024 1411    Acceptance, E,D, VU,DU by  at 11/14/2024 1103    Acceptance, D,E, VU,DU by  at 11/12/2024 1400    Acceptance, E,D, NR by AD at 11/11/2024 0854                                      User Key       Initials Effective Dates Name Provider Type Discipline    SC 02/03/23 -  Ean Curiel, PT Physical Therapist PT    JY 06/16/21 -  Vanessa Noonan, OT Occupational Therapist OT     06/16/21 -  Ion Roberts OT Occupational Therapist OT     09/21/21 -  Umu Sherman, OT Occupational Therapist OT                  OT Recommendation and Plan  Therapy Frequency (OT): daily  Plan of Care Review  Plan of Care Reviewed With: patient  Progress: improving  Outcome Evaluation: Pt. with improving occupational endurance & strength to support ADL routine and functional mobility. Pt. continues to require min A with ambulating household distances using RWx. OT skilled services continue to be warranted due to pt.  below functional baseline. Recommend IPRF upon d/c.     Time Calculation:         Time Calculation- OT       Row Name 11/20/24 1140             Time Calculation- OT    OT Received On 11/20/24  -SD      OT Goal Re-Cert Due Date 11/21/24  -SD         Timed Charges    12975 - OT Therapeutic Activity Minutes 15  -SD      22756 - OT Self Care/Mgmt Minutes 30  -SD         Total Minutes    Timed Charges Total Minutes 45  -SD       Total Minutes 45  -SD                User Key  (r) = Recorded By, (t) = Taken By, (c) = Cosigned By      Initials Name Provider Type    Maritza Menendez OT Occupational Therapist                  Therapy Charges for Today       Code Description Service Date Service Provider Modifiers Qty    20185058642 HC OT THERAPEUTIC ACT EA 15 MIN 11/20/2024 Maritza Petersen OT GO 1    65718679206 HC OT SELF CARE/MGMT/TRAIN EA 15 MIN 11/20/2024 Maritza Petersen OT GO 2                 Maritza Petersen OT  11/20/2024

## 2024-11-20 NOTE — CASE MANAGEMENT/SOCIAL WORK
Continued Stay Note  [facility]     Patient Name: Philomena Davis  MRN: [id number]  Today's Date: 11/20/2024    Admit Date: 11/10/2024    Plan: Rehab Vs home with home health   Discharge Plan       Row Name 11/20/24 1146       Plan    Plan Rehab Vs home with home health    Patient/Family in Agreement with Plan yes    Plan Comments Spoke with Ms. Davis at the bedside. Family expedited appeal still pending from her [facility] Medicaid for rehab at [facility]. CM will continue to follow up.    Final Discharge Disposition Code 62 - inpatient rehab facility                   Discharge Codes    No documentation.                 Expected Discharge Date and Time       Expected Discharge Date Expected Discharge Time    Nov 21, 2024               Reena Hodges RN

## 2024-11-21 LAB
DEPRECATED RDW RBC AUTO: 52.2 FL (ref 37–54)
ERYTHROCYTE [DISTWIDTH] IN BLOOD BY AUTOMATED COUNT: 13.1 % (ref 12.3–15.4)
HCT VFR BLD AUTO: 33.4 % (ref 34–46.6)
HGB BLD-MCNC: 11 G/DL (ref 12–15.9)
MCH RBC QN AUTO: 35.7 PG (ref 26.6–33)
MCHC RBC AUTO-ENTMCNC: 32.9 G/DL (ref 31.5–35.7)
MCV RBC AUTO: 108.4 FL (ref 79–97)
PLATELET # BLD AUTO: 258 10*3/MM3 (ref 140–450)
PMV BLD AUTO: 10 FL (ref 6–12)
RBC # BLD AUTO: 3.08 10*6/MM3 (ref 3.77–5.28)
WBC NRBC COR # BLD AUTO: 10.35 10*3/MM3 (ref 3.4–10.8)

## 2024-11-21 PROCEDURE — 94799 UNLISTED PULMONARY SVC/PX: CPT

## 2024-11-21 PROCEDURE — 94761 N-INVAS EAR/PLS OXIMETRY MLT: CPT

## 2024-11-21 PROCEDURE — 94664 DEMO&/EVAL PT USE INHALER: CPT

## 2024-11-21 PROCEDURE — 85027 COMPLETE CBC AUTOMATED: CPT | Performed by: NURSE PRACTITIONER

## 2024-11-21 PROCEDURE — 99232 SBSQ HOSP IP/OBS MODERATE 35: CPT | Performed by: STUDENT IN AN ORGANIZED HEALTH CARE EDUCATION/TRAINING PROGRAM

## 2024-11-21 RX ADMIN — DESVENLAFAXINE 50 MG: 50 TABLET, FILM COATED, EXTENDED RELEASE ORAL at 08:41

## 2024-11-21 RX ADMIN — ROSUVASTATIN 5 MG: 10 TABLET, FILM COATED ORAL at 21:43

## 2024-11-21 RX ADMIN — URSODIOL 300 MG: 300 CAPSULE ORAL at 21:43

## 2024-11-21 RX ADMIN — MIDODRINE HYDROCHLORIDE 15 MG: 10 TABLET ORAL at 08:40

## 2024-11-21 RX ADMIN — NYSTATIN 1 APPLICATION: 100000 CREAM TOPICAL at 08:41

## 2024-11-21 RX ADMIN — LATANOPROST 1 DROP: 50 SOLUTION OPHTHALMIC at 21:46

## 2024-11-21 RX ADMIN — URSODIOL 300 MG: 300 CAPSULE ORAL at 08:41

## 2024-11-21 RX ADMIN — GABAPENTIN 200 MG: 100 CAPSULE ORAL at 16:48

## 2024-11-21 RX ADMIN — THIAMINE HCL TAB 100 MG 100 MG: 100 TAB at 08:41

## 2024-11-21 RX ADMIN — PANTOPRAZOLE SODIUM 40 MG: 40 TABLET, DELAYED RELEASE ORAL at 06:05

## 2024-11-21 RX ADMIN — ZOLPIDEM TARTRATE 5 MG: 5 TABLET ORAL at 21:43

## 2024-11-21 RX ADMIN — Medication 5 MG: at 21:43

## 2024-11-21 RX ADMIN — GABAPENTIN 200 MG: 100 CAPSULE ORAL at 08:41

## 2024-11-21 RX ADMIN — FOLIC ACID 1 MG: 1 TABLET ORAL at 08:41

## 2024-11-21 RX ADMIN — MIDODRINE HYDROCHLORIDE 15 MG: 10 TABLET ORAL at 16:52

## 2024-11-21 RX ADMIN — CARVEDILOL 12.5 MG: 12.5 TABLET, FILM COATED ORAL at 08:41

## 2024-11-21 RX ADMIN — BUDESONIDE AND FORMOTEROL FUMARATE DIHYDRATE 2 PUFF: 160; 4.5 AEROSOL RESPIRATORY (INHALATION) at 20:32

## 2024-11-21 NOTE — PROGRESS NOTES
Nutrition Services    Patient Name:  Philomena Davis  YOB: 1960  MRN: [id number]  Admit Date:  11/10/2024    Pt identified 2/2 LOS. Good intake documentation.  No significant weight loss.  No nutrition risk noted at this time. Please consult for intake avg <50% or significant weight change.        Electronically signed by:  Rosey Rivero RD  11/21/24 08:39 EST

## 2024-11-21 NOTE — PROGRESS NOTES
[facility] Medicine Services  PROGRESS NOTE    Patient Name: Philomena Davis  : 1960  MRN: [id number]    Date of Admission: 11/10/2024  Primary Care Physician: Unique Brandt DO    Subjective   Subjective     CC:  Weakness/numbness in legs       HPI:  Patient states that the strength in her lower extremities continues to improve each day.  She is awaiting her appeal to go to rehab.      Objective   Objective     Vital Signs:   Temp:  [98 °F (36.7 °C)-98.4 °F (36.9 °C)] 98.3 °F (36.8 °C)  Heart Rate:  [69-80] 80  Resp:  [16-18] 16  BP: (104-128)/(60-81) 125/70     Physical Exam:  General appearance: alert, awake, oriented, no acute distress   Cardiovascular: RRR, no murmurs or rubs, radial pulse full 2/4 BL   Respiratory: CTAB, no crackles or wheezes   Neuro/CNS: alert and oriented x3, normal speech, moves all 4 extremities, strength 4/5 BL LE, sensation grossly intact    Results Reviewed:  LAB RESULTS:      Lab 24  0801 24  1210   WBC 10.35 13.25*   HEMOGLOBIN 11.0* 10.6*   HEMATOCRIT 33.4* 32.6*   PLATELETS 258 266   .4* 110.9*   PROCALCITONIN  --  0.19         Lab 24  1210 24  1801   SODIUM 140  --    POTASSIUM 3.8 4.9   CHLORIDE 103  --    CO2 27.0  --    ANION GAP 10.0  --    BUN 10  --    CREATININE 0.69  --    EGFR 98.3  --    GLUCOSE 106*  --    CALCIUM 9.1  --                          Brief Urine Lab Results  (Last result in the past 365 days)        Color   Clarity   Blood   Leuk Est   Nitrite   Protein   CREAT   Urine HCG        11/10/24 0958 Yellow   Clear   Negative   Negative   Negative   Negative                   Microbiology Results Abnormal       None            No radiology results from the last 24 hrs    Results for orders placed during the hospital encounter of 11/10/24    Adult Transthoracic Echo Complete W/ Cont if Necessary Per Protocol    Interpretation Summary  •  Left ventricular systolic function is normal. Estimated  left ventricular EF = 65%  •  The cardiac valves are anatomically and functionally normal.      Current medications:  Scheduled Meds:budesonide-formoterol, 2 puff, Inhalation, BID - RT  carvedilol, 12.5 mg, Oral, BID With Meals  desvenlafaxine, 50 mg, Oral, Daily  folic acid, 1 mg, Oral, Daily  gabapentin, 200 mg, Oral, Q12H  latanoprost, 1 drop, Both Eyes, Nightly  melatonin, 5 mg, Oral, Nightly  midodrine, 15 mg, Oral, BID AC  nystatin, 1 Application, Topical, Q12H  pantoprazole, 40 mg, Oral, Daily  rosuvastatin, 5 mg, Oral, Daily  thiamine, 100 mg, Oral, Daily  ursodiol, 300 mg, Oral, BID  zolpidem, 5 mg, Oral, Nightly      Continuous Infusions:   PRN Meds:.•  acetaminophen **OR** acetaminophen **OR** acetaminophen  •  senna-docusate sodium **AND** polyethylene glycol **AND** bisacodyl **AND** bisacodyl  •  calcium carbonate  •  Calcium Replacement - Follow Nurse / BPA Driven Protocol  •  ipratropium-albuterol  •  Magnesium Standard Dose Replacement - Follow Nurse / BPA Driven Protocol  •  metoprolol tartrate  •  nitroglycerin  •  ondansetron  •  Phosphorus Replacement - Follow Nurse / BPA Driven Protocol  •  Potassium Replacement - Follow Nurse / BPA Driven Protocol    Assessment & Plan   Assessment & Plan     Active Hospital Problems    Diagnosis  POA   • **Lower extremity weakness [R29.898]  Yes   • Orthostasis [I95.1]  Yes      Resolved Hospital Problems   No resolved problems to display.     This patient's assessments and plans were partially entered by my partner and updated as appropriate by me on 11/21/2024    Brief Hospital Course to date:  Philomena Davis is a 64 y.o. female with history of alcohol dependence, cirrhosis, HTN, peripheral neuropathy, was admitted for paresthesias in her extremities.  Neurology was consulted. PT/OT recommended rehab.      BL LE weakness  BL paraesthesias/facial numbness, resolved   ETOH use  Peripheral neuropathy due to long standing ETOH use  Dysautonomia  -MRI brain  unremarkable  -MRI lumbar spine with L5-S1 disc protrusion  -MRI C/T spine reviewed, with C4/5 facet arthropathy  -EMG/NCV mild sensory neuropathy  -Continue thiamine, neurology following  -Symptoms improving   -PT/OT; awaiting rehab, insurance denied but family appeal pending      Marked orthostasis  Dysautonomia  -added midodrine, increased dose but BID dosing  -mobilize/PT/OT  -AM cortisol low, s/p cosyntropin with expected response, not suggestive of adrenal insufficiency   -Neurology discussed nonpharmacologic therapy, including increased salt and water intake, 6 small meals daily, slow position changes, REX hose, BP monitoring  -Outpatient follow-up with Neurology, CANDY Galloway     Possible vasculitis  CT with celiac artery stranding  -markedly elevated CRP/ESR  -ANCA panel negative  -WILMA panel negative  -RF negative  -needs outpatient follow up with referral to rheumatology on DC, no abdominal symptoms     Atrial tachycardia, atrial flutter  -presented with tachycardia, HR to 150  -on BB  -ECHO unremarkable, EF normal  -bursts of tachycardia during hospitalization, evaluated by cardiology  -continue carvedilol, dosing may be limited by hypotension/orthostasis  -cardiology deferring AC due to cirrhosis for now  -heart monitor x 2 weeks at DC (e-patch)  -follow up with Dr. Hess in 1 month     Alcohol dependence  Cirrhosis  Hepatocellular jaundice  -monitor     Mild LA  -due to liver disease     Subclinical hypothyroidism  -needs follow up in 6 weeks    Expected Discharge Location and Transportation: Rehab vs home w home PT   Expected Discharge   Expected Discharge Date: 11/22/2024; Expected Discharge Time:      VTE Prophylaxis:  Mechanical VTE prophylaxis orders are present.         AM-PAC 6 Clicks Score (PT): 20 (11/21/24 0800)    CODE STATUS:   Code Status and Medical Interventions: CPR (Attempt to Resuscitate); Full Support   Ordered at: 11/10/24 0407     Level Of Support Discussed With:    Patient     Next of Kin (If No Surrogate)     Code Status (Patient has no pulse and is not breathing):    CPR (Attempt to Resuscitate)     Medical Interventions (Patient has pulse or is breathing):    Full Support       Kishor Naranjo, DO  11/21/24

## 2024-11-21 NOTE — CASE MANAGEMENT/SOCIAL WORK
Continued Stay Note  [facility]     Patient Name: Philomena Davis  MRN: [id number]  Today's Date: 11/21/2024    Admit Date: 11/10/2024    Plan: Rehab vs. Home with home health   Discharge Plan       Row Name 11/21/24 1117       Plan    Plan Rehab vs. Home with home health    Patient/Family in Agreement with Plan yes    Plan Comments Discussed in MDR. Family expedited appeal still pending from her [facility] Medicaid for rehab at [facility]. CM will continue to follow for medical readiness and will assist with any discharge needs as indicated.    Final Discharge Disposition Code 62 - inpatient rehab facility                   Discharge Codes    No documentation.                 Expected Discharge Date and Time       Expected Discharge Date Expected Discharge Time    Nov 22, 2024               Maricarmen Klein RN

## 2024-11-21 NOTE — NURSING NOTE
"Patient has been lethargic today and stated she feels \"off\" and \"not quite herself\".    BP this evening was 86/53  Provider notified that midodrine was administered and an update would follow.    Updated BP is 107/67, provider aware.    Patient was agreeable to using BSC for this evening due to drop in blood pressure to be safe.    No other issues today. Patient is alert and oriented x4, pleasant, on room air, moves independently and has no new skin issues.   "

## 2024-11-22 ENCOUNTER — TELEPHONE (OUTPATIENT)
Dept: FAMILY MEDICINE CLINIC | Facility: CLINIC | Age: 64
End: 2024-11-22
Payer: COMMERCIAL

## 2024-11-22 ENCOUNTER — DOCUMENTATION (OUTPATIENT)
Dept: HOME HEALTH SERVICES | Facility: HOME HEALTHCARE | Age: 64
End: 2024-11-22
Payer: COMMERCIAL

## 2024-11-22 PROCEDURE — 97116 GAIT TRAINING THERAPY: CPT

## 2024-11-22 PROCEDURE — 97110 THERAPEUTIC EXERCISES: CPT

## 2024-11-22 PROCEDURE — 94799 UNLISTED PULMONARY SVC/PX: CPT

## 2024-11-22 PROCEDURE — 99233 SBSQ HOSP IP/OBS HIGH 50: CPT | Performed by: NURSE PRACTITIONER

## 2024-11-22 PROCEDURE — 94664 DEMO&/EVAL PT USE INHALER: CPT

## 2024-11-22 RX ORDER — CARVEDILOL 6.25 MG/1
6.25 TABLET ORAL 2 TIMES DAILY WITH MEALS
Status: DISCONTINUED | OUTPATIENT
Start: 2024-11-22 | End: 2024-11-23 | Stop reason: HOSPADM

## 2024-11-22 RX ORDER — CARVEDILOL 6.25 MG/1
6.25 TABLET ORAL 2 TIMES DAILY WITH MEALS
Status: DISCONTINUED | OUTPATIENT
Start: 2024-11-22 | End: 2024-11-22

## 2024-11-22 RX ORDER — MIDODRINE HYDROCHLORIDE 5 MG/1
5 TABLET ORAL DAILY
Status: DISCONTINUED | OUTPATIENT
Start: 2024-11-22 | End: 2024-11-23 | Stop reason: HOSPADM

## 2024-11-22 RX ADMIN — ROSUVASTATIN 5 MG: 10 TABLET, FILM COATED ORAL at 21:13

## 2024-11-22 RX ADMIN — MIDODRINE HYDROCHLORIDE 15 MG: 10 TABLET ORAL at 09:12

## 2024-11-22 RX ADMIN — URSODIOL 300 MG: 300 CAPSULE ORAL at 21:15

## 2024-11-22 RX ADMIN — Medication 5 MG: at 21:15

## 2024-11-22 RX ADMIN — BUDESONIDE AND FORMOTEROL FUMARATE DIHYDRATE 2 PUFF: 160; 4.5 AEROSOL RESPIRATORY (INHALATION) at 07:07

## 2024-11-22 RX ADMIN — ZOLPIDEM TARTRATE 5 MG: 5 TABLET ORAL at 21:19

## 2024-11-22 RX ADMIN — FOLIC ACID 1 MG: 1 TABLET ORAL at 09:13

## 2024-11-22 RX ADMIN — BUDESONIDE AND FORMOTEROL FUMARATE DIHYDRATE 2 PUFF: 160; 4.5 AEROSOL RESPIRATORY (INHALATION) at 20:48

## 2024-11-22 RX ADMIN — CARVEDILOL 6.25 MG: 6.25 TABLET, FILM COATED ORAL at 17:40

## 2024-11-22 RX ADMIN — DESVENLAFAXINE 50 MG: 50 TABLET, FILM COATED, EXTENDED RELEASE ORAL at 09:13

## 2024-11-22 RX ADMIN — MIDODRINE HYDROCHLORIDE 15 MG: 10 TABLET ORAL at 17:40

## 2024-11-22 RX ADMIN — PANTOPRAZOLE SODIUM 40 MG: 40 TABLET, DELAYED RELEASE ORAL at 06:38

## 2024-11-22 RX ADMIN — MIDODRINE HYDROCHLORIDE 5 MG: 5 TABLET ORAL at 12:54

## 2024-11-22 RX ADMIN — GABAPENTIN 200 MG: 100 CAPSULE ORAL at 09:13

## 2024-11-22 RX ADMIN — NYSTATIN 1 APPLICATION: 100000 CREAM TOPICAL at 09:13

## 2024-11-22 RX ADMIN — URSODIOL 300 MG: 300 CAPSULE ORAL at 09:13

## 2024-11-22 RX ADMIN — CARVEDILOL 6.25 MG: 6.25 TABLET, FILM COATED ORAL at 09:25

## 2024-11-22 RX ADMIN — LATANOPROST 1 DROP: 50 SOLUTION OPHTHALMIC at 21:20

## 2024-11-22 RX ADMIN — THIAMINE HCL TAB 100 MG 100 MG: 100 TAB at 09:13

## 2024-11-22 RX ADMIN — GABAPENTIN 200 MG: 100 CAPSULE ORAL at 15:20

## 2024-11-22 NOTE — THERAPY PROGRESS REPORT/RE-CERT
Patient Name: Philomena Davis  : 1960    MRN: [id number]                              Today's Date: 2024       Admit Date: 11/10/2024    Visit Dx:     ICD-10-CM ICD-9-CM   1. Weakness of both lower extremities  R29.898 729.89   2. Paresthesia  R20.2 782.0   3. Weakness  R53.1 780.79   4. Leukocytosis, unspecified type  D72.829 288.60   5. Alcoholic cirrhosis of liver without ascites  K70.30 571.2   6. Orthostasis  I95.1 458.0   7. Tachycardia  R00.0 785.0   8. Moderate malnutrition  E44.0 263.0     Patient Active Problem List   Diagnosis    Essential hypertension    Generalized anxiety disorder    Hyperlipidemia    Glaucoma    Alcoholic cirrhosis of liver without ascites    Severe malnutrition    GERD without esophagitis    Sleep disturbance    Annual physical exam    Idiopathic peripheral neuropathy    Severe anxiety with panic    Alcohol use disorder    Cigarette nicotine dependence in remission    Chronic obstructive pulmonary disease    Closed fracture of left hand    Moderate malnutrition    Lower extremity weakness    Orthostasis     Past Medical History:   Diagnosis Date    Alcohol withdrawal 05/10/2019    Annual physical exam 2023    Anxiety and depression     Cirrhosis of liver     Closed displaced fracture of lateral malleolus of left fibula     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    Closed trimalleolar fracture of right ankle 2023    Digital mucous cyst of toe of left foot 2017    Ganglion cyst of left foot     Glaucoma     Hypertension     Wears glasses      Past Surgical History:   Procedure Laterality Date    ANKLE OPEN REDUCTION INTERNAL FIXATION Left 2017    Procedure: LEFT ANKLE OPEN REDUCTION INTERNAL FIXATION WITH ILIAC CREST BONE GRAFT , EXCISION CYST 4TH/5TH INNERSPACE LEFT FOOT;  Surgeon: Frank Larson MD;  Location: [facility];  Service:     ANKLE OPEN REDUCTION INTERNAL FIXATION Right 2023     Procedure: ANKLE OPEN REDUCTION INTERNAL FIXATION;  Surgeon: Deandre Reynoso MD;  Location:  [facility];  Service: Orthopedics;  Laterality: Right;    AUGMENTATION MAMMAPLASTY Bilateral 1999    BREAST AUGMENTATION      BREAST EXCISIONAL BIOPSY Right 2014    DILATION AND CURETTAGE, DIAGNOSTIC / THERAPEUTIC      ENDOSCOPY N/A 11/30/2022    Procedure: ESOPHAGOGASTRODUODENOSCOPY;  Surgeon: Arelis Solano MD;  Location:  [facility];  Service: Gastroenterology;  Laterality: N/A;    ENDOSCOPY N/A 2/1/2024    Procedure: ESOPHAGOGASTRODUODENOSCOPY;  Surgeon: Brunner, Mark I, MD;  Location:  [facility];  Service: Gastroenterology;  Laterality: N/A;    VT RPR NON/MAL FEMUR DSTL H/N W/ILIAC/AUTOG BONE Left 03/24/2017    Procedure: ILIAC CREST BONE GRAFT;  Surgeon: Frank Larson MD;  Location:  [facility];  Service: Orthopedics    WRIST SURGERY        General Information       Row Name 11/22/24 1603          Physical Therapy Time and Intention    Document Type progress note/recertification  -     Mode of Treatment physical therapy  -AC       Row Name 11/22/24 1603          General Information    Patient Profile Reviewed yes  -AC     Existing Precautions/Restrictions fall;orthostatic hypotension;other (see comments)  peripheral neuropathy, monitor BP  -AC     Barriers to Rehab medically complex;previous functional deficit;cognitive status  -AC       Row Name 11/22/24 1603          Cognition    Orientation Status (Cognition) oriented x 3  -AC       Row Name 11/22/24 1603          Safety Issues/Impairments Affecting Functional Mobility    Safety Issues Affecting Function (Mobility) insight into deficits/self-awareness;safety precautions follow-through/compliance;awareness of need for assistance;safety precaution awareness  -AC     Impairments Affecting Function (Mobility) balance;coordination;endurance/activity tolerance;strength;postural/trunk control  -               User Key  (r) = Recorded By, (t) = Taken  By, (c) = Cosigned By      Initials Name Provider Type    AC Latoya Judge PT Physical Therapist                   Mobility       Row Name 11/22/24 1603          Bed Mobility    Supine-Sit [place] (Bed Mobility) standby assist  -AC     Assistive Device (Bed Mobility) bed rails;head of bed elevated  -AC     Comment, (Bed Mobility) extended time to get to EOB. no dizziness reported. /72  -AC       Row Name 11/22/24 1603          Sit-Stand Transfer    Sit-Stand [place] (Transfers) minimum assist (75% patient effort);1 person assist;verbal cues  -AC     Assistive Device (Sit-Stand Transfers) walker, front-wheeled  -AC     Comment, (Sit-Stand Transfer) extended time to reach upright position. BP 97/52 upon initial standing however no orthostatic sx  -AC       Row Name 11/22/24 1603          Gait/Stairs (Locomotion)    [place] Level (Gait) verbal cues;1 person assist;minimum assist (75% patient effort)  -AC     Assistive Device (Gait) walker, front-wheeled  -AC     Distance in Feet (Gait) 25  +35  -AC     Deviations/Abnormal Patterns (Gait) base of support, wide;stride length decreased;weight shifting decreased;gait speed decreased  -AC     Bilateral Gait Deviations forward flexed posture  -AC     Comment, (Gait/Stairs) Pt ambulated throughout room w/ short shuffling steps and minAx1. After a short rest break pt was able to increase her ambulation distance to 35ft w/ minAx1 and a close chair follow however quickly fatigued  -AC               User Key  (r) = Recorded By, (t) = Taken By, (c) = Cosigned By      Initials Name Provider Type    AC Latoya Judge PT Physical Therapist                   Obj/Interventions       Row Name 11/22/24 1605          Motor Skills    Therapeutic Exercise hip;knee  -AC       Row Name 11/22/24 1605          Hip (Therapeutic Exercise)    Hip (Therapeutic Exercise) isometric exercises;strengthening exercise  -AC     Hip Isometrics (Therapeutic Exercise)  bilateral;aDduction;gluteal sets;10 repetitions  -     Hip Strengthening (Therapeutic Exercise) marching while seated;10 repetitions  -       Row Name 11/22/24 1605          Knee (Therapeutic Exercise)    Knee AROM (Therapeutic Exercise) bilateral;LAQ (long arc quad);10 repetitions  -       Row Name 11/22/24 1605          Balance    Balance Assessment sitting static balance;sitting dynamic balance;standing static balance;standing dynamic balance  -AC     Static Sitting Balance standby assist  -AC     Dynamic Sitting Balance standby assist  -AC     Position, Sitting Balance unsupported;sitting edge of bed  -AC     Static Standing Balance contact guard  -AC     Dynamic Standing Balance minimal assist  -AC     Position/Device Used, Standing Balance supported;walker, front-wheeled  -AC     Balance Interventions sitting;standing;sit to stand;supported;static;dynamic  -AC               User Key  (r) = Recorded By, (t) = Taken By, (c) = Cosigned By      Initials Name Provider Type    AC Latoya Judge, PT Physical Therapist                   Goals/Plan       Row Name 11/22/24 1608          Transfer Goal 1 (PT)    Activity/Assistive Device (Transfer Goal 1, PT) sit-to-stand/stand-to-sit  -AC     [place] Level/Cues Needed (Transfer Goal 1, PT) standby assist  -AC     Time Frame (Transfer Goal 1, PT) short term goal (STG);5 days  -AC     Progress/Outcome (Transfer Goal 1, PT) goal ongoing  -       Row Name 11/22/24 1608          Gait Training Goal 1 (PT)    Activity/Assistive Device (Gait Training Goal 1, PT) gait (walking locomotion);improve balance and speed;increase endurance/gait distance;increase energy conservation;decrease fall risk  -AC     [place] Level (Gait Training Goal 1, PT) standby assist  -AC     Distance (Gait Training Goal 1, PT) 100  -AC     Time Frame (Gait Training Goal 1, PT) long term goal (LTG);10 days  -AC     Progress/Outcome (Gait Training Goal 1, PT) goal ongoing  -       Row Name  11/22/24 1608          Therapy Assessment/Plan (PT)    Planned Therapy Interventions (PT) balance training;bed mobility training;gait training;patient/family education;strengthening;transfer training  -AC               User Key  (r) = Recorded By, (t) = Taken By, (c) = Cosigned By      Initials Name Provider Type    AC Latoya Judge, PT Physical Therapist                   Clinical Impression       Row Name 11/22/24 1607          Pain    Pretreatment Pain Rating 0/10 - no pain  -AC     Posttreatment Pain Rating 0/10 - no pain  -AC       Row Name 11/22/24 1607          Plan of Care Review    Plan of Care Reviewed With patient  -AC     Progress improving  -AC     Outcome Evaluation PT recertification complete and goals adjusted. Pt continues to demonstrate good effort in therapy sessions. Pt was able to increase her ambulation distance to 35ft after a seated rest break however pt continues to be limited by fatigue. D/c rec remains IRF for best outcome.  -       Row Name 11/22/24 1607          Therapy Assessment/Plan (PT)    Rehab Potential (PT) good  -AC     Criteria for Skilled Interventions Met (PT) yes  -AC     Therapy Frequency (PT) daily  -AC     Predicted Duration of Therapy Intervention (PT) 10 days  -AC       Row Name 11/22/24 1607          Vital Signs    Pre Systolic BP Rehab 124  -AC     Pre Treatment Diastolic BP 72  -AC     Intra Systolic BP Rehab 97  -AC     Intra Treatment Diastolic BP 52  -AC     Post Systolic BP Rehab 109  -AC     Post Treatment Diastolic BP 68  -AC     O2 Delivery Pre Treatment room air  -AC     O2 Delivery Intra Treatment room air  -AC     O2 Delivery Post Treatment room air  -AC     Pre Patient Position Supine  -AC     Intra Patient Position Standing  -AC     Post Patient Position Sitting  -AC       Row Name 11/22/24 1607          Positioning and Restraints    Pre-Treatment Position in bed  -AC     Post Treatment Position chair  -AC     In Chair notified nsg;reclined;sitting;call  light within reach;encouraged to call for assist;exit alarm on;waffle cushion;legs elevated;on mechanical lift sling  -               User Key  (r) = Recorded By, (t) = Taken By, (c) = Cosigned By      Initials Name Provider Type    AC Latoya Judge, PT Physical Therapist                   Outcome Measures       Row Name 11/22/24 1609          How much help from another person do you currently need...    Turning from your back to your side while in flat bed without using bedrails? 4  -AC     Moving from lying on back to sitting on the side of a flat bed without bedrails? 3  -AC     Moving to and from a bed to a chair (including a wheelchair)? 3  -AC     Standing up from a chair using your arms (e.g., wheelchair, bedside chair)? 3  -AC     Climbing 3-5 steps with a railing? 2  -AC     To walk in hospital room? 3  -AC     AM-PAC 6 Clicks Score (PT) 18  -AC     Highest Level of Mobility Goal 6 --> Walk 10 steps or more  -       Row Name 11/22/24 1609          Functional Assessment    Outcome Measure Options AM-PAC 6 Clicks Basic Mobility (PT)  -               User Key  (r) = Recorded By, (t) = Taken By, (c) = Cosigned By      Initials Name Provider Type    Latoya Atkinson, KEILA Physical Therapist                                 Physical Therapy Education       Title: PT OT SLP Therapies (Done)       Topic: Physical Therapy (Done)       Point: Mobility training (Done)       Learning Progress Summary            Patient Acceptance, E, VU,NR by  at 11/22/2024 1609    Eager, E, VU by SC at 11/19/2024 1427    Comment: reviewed HEP    Acceptance, E, NR by AS at 11/16/2024 1121    Acceptance, E, NR by  at 11/13/2024 1557    Acceptance, E, NR by  at 11/11/2024 1013                      Point: Body mechanics (Done)       Learning Progress Summary            Patient Acceptance, E, VU,NR by  at 11/22/2024 1609    Eager, E, VU by SC at 11/19/2024 1427    Comment: reviewed HEP    Acceptance, E, NR by AS at 11/16/2024 1121     Acceptance, E, NR by  at 11/13/2024 1557    Acceptance, E, NR by  at 11/11/2024 1013                      Point: Precautions (Done)       Learning Progress Summary            Patient Acceptance, E, VU,NR by  at 11/22/2024 1609    Eager, E, VU by SC at 11/19/2024 1427    Comment: reviewed HEP    Acceptance, E, NR by AS at 11/16/2024 1121    Acceptance, E, NR by  at 11/13/2024 1557    Acceptance, E, NR by  at 11/11/2024 1013                                      User Key       Initials Effective Dates Name Provider Type Discipline    SC 02/03/23 -  Ean Curiel, PT Physical Therapist PT    AS 04/28/23 -  Eileen Moncada PTA Physical Therapist Assistant PT     07/11/24 -  Latoya Judge, KEILA Physical Therapist PT                  PT Recommendation and Plan  Planned Therapy Interventions (PT): balance training, bed mobility training, gait training, patient/family education, strengthening, transfer training  Progress: improving  Outcome Evaluation: PT recertification complete and goals adjusted. Pt continues to demonstrate good effort in therapy sessions. Pt was able to increase her ambulation distance to 35ft after a seated rest break however pt continues to be limited by fatigue. D/c rec remains IRF for best outcome.     Time Calculation:   PT Evaluation Complexity  History, PT Evaluation Complexity: 1-2 personal factors and/or comorbidities  Examination of Body Systems (PT Eval Complexity): total of 3 or more elements  Clinical Presentation (PT Evaluation Complexity): evolving  Clinical Decision Making (PT Evaluation Complexity): moderate complexity  Overall Complexity (PT Evaluation Complexity): moderate complexity     PT Charges       Row Name 11/22/24 1609             Time Calculation    Start Time 1445  -      PT Received On 11/22/24  -      PT Goal Re-Cert Due Date 12/02/24  -         Time Calculation- PT    Total Timed Code Minutes- PT 30 minute(s)  -         Timed Charges    27906 - PT  Therapeutic Exercise Minutes 15  -AC      21310 - Gait Training Minutes  15  -AC         Total Minutes    Timed Charges Total Minutes 30  -AC       Total Minutes 30  -AC                User Key  (r) = Recorded By, (t) = Taken By, (c) = Cosigned By      Initials Name Provider Type    AC Latoya Judge, PT Physical Therapist                  Therapy Charges for Today       Code Description Service Date Service Provider Modifiers Qty    12628606695 HC PT THER PROC EA 15 MIN 11/22/2024 Latoya Judge, PT GP 1    [id number] HC GAIT TRAINING EA 15 MIN 11/22/2024 Latoya Judge, PT GP 1            PT G-Codes  Outcome Measure Options: AM-PAC 6 Clicks Basic Mobility (PT)  AM-PAC 6 Clicks Score (PT): 18  AM-PAC 6 Clicks Score (OT): 19  PT Discharge Summary  Anticipated Discharge Disposition (PT): inpatient rehabilitation facility    Latoya Judge PT  11/22/2024

## 2024-11-22 NOTE — PROGRESS NOTES
[facility] Medicine Services  PROGRESS NOTE    Patient Name: Philomena Davis  : 1960  MRN: [id number]    Date of Admission: 11/10/2024  Primary Care Physician: Unique Brandt DO    Subjective   Subjective     CC:  Weakness/numbness in legs       HPI:  Feeling much better. Intake is better. No overnight issues  Orthostatic BP reported 121/69=>86/59 with dizziness this morning      Objective   Objective     Vital Signs:   Temp:  [97.8 °F (36.6 °C)-98.4 °F (36.9 °C)] 98.4 °F (36.9 °C)  Heart Rate:  [74-89] 77  Resp:  [16-18] 18  BP: ()/(51-73) 113/73     Physical Exam:  Constitutional: No acute distress, awake, alert  HENT: NCAT, mucous membranes moist  Respiratory: Clear to auscultation bilaterally, respiratory effort normal   Cardiovascular: RRR  Gastrointestinal: Positive bowel sounds, soft, nontender, nondistended  Musculoskeletal: No bilateral ankle edema  Psychiatric: Appropriate affect, cooperative  Neurologic: Oriented x 3, MASSEY, speech clear  Skin: No rashes      Results Reviewed:  LAB RESULTS:      Lab 24  0801 24  1210   WBC 10.35 13.25*   HEMOGLOBIN 11.0* 10.6*   HEMATOCRIT 33.4* 32.6*   PLATELETS 258 266   .4* 110.9*   PROCALCITONIN  --  0.19         Lab 24  1210   SODIUM 140   POTASSIUM 3.8   CHLORIDE 103   CO2 27.0   ANION GAP 10.0   BUN 10   CREATININE 0.69   EGFR 98.3   GLUCOSE 106*   CALCIUM 9.1                         Brief Urine Lab Results  (Last result in the past 365 days)        Color   Clarity   Blood   Leuk Est   Nitrite   Protein   CREAT   Urine HCG        11/10/24 0958 Yellow   Clear   Negative   Negative   Negative   Negative                   Microbiology Results Abnormal       None            No radiology results from the last 24 hrs    Results for orders placed during the hospital encounter of 11/10/24    Adult Transthoracic Echo Complete W/ Cont if Necessary Per Protocol    Interpretation Summary  •  Left ventricular  systolic function is normal. Estimated left ventricular EF = 65%  •  The cardiac valves are anatomically and functionally normal.      Current medications:  Scheduled Meds:budesonide-formoterol, 2 puff, Inhalation, BID - RT  carvedilol, 6.25 mg, Oral, BID With Meals  desvenlafaxine, 50 mg, Oral, Daily  folic acid, 1 mg, Oral, Daily  gabapentin, 200 mg, Oral, Q12H  latanoprost, 1 drop, Both Eyes, Nightly  melatonin, 5 mg, Oral, Nightly  midodrine, 15 mg, Oral, BID AC  midodrine, 5 mg, Oral, Daily  nystatin, 1 Application, Topical, Q12H  pantoprazole, 40 mg, Oral, Daily  rosuvastatin, 5 mg, Oral, Daily  thiamine, 100 mg, Oral, Daily  ursodiol, 300 mg, Oral, BID  zolpidem, 5 mg, Oral, Nightly      Continuous Infusions:   PRN Meds:.•  acetaminophen **OR** acetaminophen **OR** acetaminophen  •  senna-docusate sodium **AND** polyethylene glycol **AND** bisacodyl **AND** bisacodyl  •  calcium carbonate  •  Calcium Replacement - Follow Nurse / BPA Driven Protocol  •  ipratropium-albuterol  •  Magnesium Standard Dose Replacement - Follow Nurse / BPA Driven Protocol  •  metoprolol tartrate  •  nitroglycerin  •  ondansetron  •  Phosphorus Replacement - Follow Nurse / BPA Driven Protocol  •  Potassium Replacement - Follow Nurse / BPA Driven Protocol    Assessment & Plan   Assessment & Plan     Active Hospital Problems    Diagnosis  POA   • **Lower extremity weakness [R29.898]  Yes   • Orthostasis [I95.1]  Yes      Resolved Hospital Problems   No resolved problems to display.     Brief Hospital Course to date:  Philomena Davis is a 64 y.o. female with history of alcohol dependence, cirrhosis, HTN, peripheral neuropathy, was admitted for paresthesias in her extremities.  Neurology was consulted. PT/OT recommended rehab.     This patient's problems and plans were partially entered by my partner and updated as appropriate by me 11/22/24.       BL LE weakness  BL paraesthesias/facial numbness, resolved   ETOH use  Peripheral  neuropathy due to long standing ETOH use  Dysautonomia  -MRI brain unremarkable  -MRI lumbar spine with L5-S1 disc protrusion  -MRI C/T spine reviewed, with C4/5 facet arthropathy  -EMG/NCV mild sensory neuropathy  -Continue thiamine, folate replacement. neurology followed  -Symptoms improving   -PT/OT; family appeal started, but not expedited. Patient now wants to go home with Nelli  to attempt to find agency for patient  -follow up with CANDY Galloway, neurology in 1-2 mos     Marked orthostasis  Dysautonomia  -added midodrine, increased dose to 15mg bid with some improvement. Bp still soft at rest and today drop [id number]=>86. Add midodrine 5mg at lunch. Decrease coreg to 6.25mg bid  -mobilize/PT/OT  -AM cortisol low, s/p cosyntropin with expected response, not suggestive of adrenal insufficiency   -Neurology discussed nonpharmacologic therapy, including increased salt and water intake, 6 small meals daily, slow position changes, REX hose, BP monitoring  -Outpatient follow-up with Neurology     Possible vasculitis  CT with celiac artery stranding  -markedly elevated CRP/ESR  -ANCA panel negative  -WLIMA panel negative  -RF negative  -needs outpatient follow up with referral to rheumatology on DC, no abdominal symptoms     Atrial tachycardia, atrial flutter  -presented with tachycardia, HR to 150  -started on carvedilol per cardiology  -ECHO unremarkable, EF normal  -bursts of tachycardia during hospitalization, evaluated by cardiology  -continue carvedilol, dosing limited by hypotension/orthostasis:- decrease today 6.25mg bid due to severe orthostasis.  -cardiology deferring AC due to cirrhosis for now  -heart monitor x 2 weeks at DC (e-patch)  -follow up with Dr. Hess in 1 month     Alcohol dependence  Cirrhosis  Hepatocellular jaundice  -monitor     Mild LA  -due to liver disease     Subclinical hypothyroidism  -needs follow up in 6 weeks    Expected Discharge Location and Transportation: likely home w home  PT   Expected Discharge   Expected Discharge Date: 11/23/2024; Expected Discharge Time:      VTE Prophylaxis:  Mechanical VTE prophylaxis orders are present.         AM-PAC 6 Clicks Score (PT): 20 (11/21/24 2000)    CODE STATUS:   Code Status and Medical Interventions: CPR (Attempt to Resuscitate); Full Support   Ordered at: 11/10/24 3155     Level Of Support Discussed With:    Patient    Next of Kin (If No Surrogate)     Code Status (Patient has no pulse and is not breathing):    CPR (Attempt to Resuscitate)     Medical Interventions (Patient has pulse or is breathing):    Full Support       Luciana Mayfield, APRN  11/22/24

## 2024-11-22 NOTE — PROGRESS NOTES
Spoke with patient she is agreeable to [facility] services. Verified address, contact number and PCP. Message sent to follow for home care. Spoke with Jose. Lona DORMAN, [facility]-Liaison.

## 2024-11-22 NOTE — TELEPHONE ENCOUNTER
Unique Brandt, DO  [facility] now (2:35 PM)       Yes OK to follow   LVM for Lona to return our call.    Please relay.

## 2024-11-22 NOTE — PLAN OF CARE
Goal Outcome Evaluation:  Plan of Care Reviewed With: patient        Progress: improving  Outcome Evaluation: PT recertification complete and goals adjusted. Pt continues to demonstrate good effort in therapy sessions. Pt was able to increase her ambulation distance to 35ft after a seated rest break however pt continues to be limited by fatigue. D/c rec remains IRF for best outcome.    Anticipated Discharge Disposition (PT): inpatient rehabilitation facility

## 2024-11-22 NOTE — TELEPHONE ENCOUNTER
Provider: DR. MERA    Caller: BERNIE CHRISTIANSON [facility]    Relationship to Patient: PROVIDER    Phone Number: [phone]    Reason for Call: PLEASE CALL PROVIDER TO VERIFY IF DR. MERA WILL BE PROVIDING HOME HEALTH ORDERS FOR PATIENT.

## 2024-11-22 NOTE — CASE MANAGEMENT/SOCIAL WORK
Discharge Planning Assessment  [facility]     Patient Name: Philomena Davis  MRN: [id number]  Today's Date: 11/22/2024    Admit Date: 11/10/2024    Plan: Plan  is home now   Discharge Needs Assessment    No documentation.                  Discharge Plan       Row Name 11/22/24 1333       Plan    Plan Plan  is home now    Patient/Family in Agreement with Plan yes    Plan Comments I called and talked with patient's [employer] Insurance to f/u on Family appeal. I was told that the appeal was changed to a standard appeal from expedited appeal. Insurance has 30 days to make decision. I talked with patient  at  the  and she is going home now hopefully 11-23. Patient may need transportation home but told me she will try and find someone to take her home. [facility], [facility] in [facility]. I called S[facility] with [facility] to order a  rollator and was told that insurance purchased a RW two years ago for patient. I called patient to let her know about rollator and didn't want to purchase it for $150.    Final Discharge Disposition Code 06 - home with home health care                  Continued Care and Services - Admitted Since 11/10/2024       Home Medical Care       Service Provider Request Status Services Address Phone Fax Patient Preferred    [facility]  Selected Home Health Services [address] [facility] [id number] [phone] --                  Expected Discharge Date and Time       Expected Discharge Date Expected Discharge Time    Nov 23, 2024            Demographic Summary    No documentation.                  Functional Status    No documentation.                  Psychosocial    No documentation.                  Abuse/Neglect    No documentation.                  Legal    No documentation.                  Substance Abuse    No documentation.                  Patient Forms    No documentation.                      Jackie George, RN

## 2024-11-23 VITALS
SYSTOLIC BLOOD PRESSURE: 123 MMHG | HEART RATE: 77 BPM | HEIGHT: 64 IN | TEMPERATURE: 98.1 F | RESPIRATION RATE: 16 BRPM | WEIGHT: 147.93 LBS | BODY MASS INDEX: 25.25 KG/M2 | OXYGEN SATURATION: 95 % | DIASTOLIC BLOOD PRESSURE: 67 MMHG

## 2024-11-23 PROBLEM — I48.92 ATRIAL FLUTTER: Status: ACTIVE | Noted: 2024-11-23

## 2024-11-23 PROBLEM — G62.9 PERIPHERAL NEUROPATHY: Status: ACTIVE | Noted: 2024-11-23

## 2024-11-23 PROBLEM — E03.8 SUBCLINICAL HYPOTHYROIDISM: Status: ACTIVE | Noted: 2024-11-23

## 2024-11-23 PROCEDURE — 99239 HOSP IP/OBS DSCHRG MGMT >30: CPT | Performed by: PHYSICIAN ASSISTANT

## 2024-11-23 PROCEDURE — 94799 UNLISTED PULMONARY SVC/PX: CPT

## 2024-11-23 PROCEDURE — 94664 DEMO&/EVAL PT USE INHALER: CPT

## 2024-11-23 RX ORDER — NYSTATIN 100000 U/G
1 CREAM TOPICAL EVERY 12 HOURS SCHEDULED
Start: 2024-11-23

## 2024-11-23 RX ORDER — MIDODRINE HYDROCHLORIDE 5 MG/1
15 TABLET ORAL
Start: 2024-11-23

## 2024-11-23 RX ORDER — GABAPENTIN 100 MG/1
200 CAPSULE ORAL EVERY 12 HOURS SCHEDULED
Start: 2024-11-23

## 2024-11-23 RX ORDER — CARVEDILOL 6.25 MG/1
6.25 TABLET ORAL 2 TIMES DAILY WITH MEALS
Start: 2024-11-23

## 2024-11-23 RX ORDER — DESVENLAFAXINE 50 MG/1
50 TABLET, FILM COATED, EXTENDED RELEASE ORAL DAILY
Start: 2024-11-24

## 2024-11-23 RX ORDER — MIDODRINE HYDROCHLORIDE 5 MG/1
5 TABLET ORAL DAILY
Start: 2024-11-23

## 2024-11-23 RX ORDER — ZOLPIDEM TARTRATE 5 MG/1
5 TABLET ORAL NIGHTLY
Start: 2024-11-23

## 2024-11-23 RX ADMIN — PANTOPRAZOLE SODIUM 40 MG: 40 TABLET, DELAYED RELEASE ORAL at 06:36

## 2024-11-23 RX ADMIN — MIDODRINE HYDROCHLORIDE 15 MG: 10 TABLET ORAL at 08:44

## 2024-11-23 RX ADMIN — DESVENLAFAXINE 50 MG: 50 TABLET, FILM COATED, EXTENDED RELEASE ORAL at 08:44

## 2024-11-23 RX ADMIN — FOLIC ACID 1 MG: 1 TABLET ORAL at 08:44

## 2024-11-23 RX ADMIN — BUDESONIDE AND FORMOTEROL FUMARATE DIHYDRATE 2 PUFF: 160; 4.5 AEROSOL RESPIRATORY (INHALATION) at 09:48

## 2024-11-23 RX ADMIN — GABAPENTIN 200 MG: 100 CAPSULE ORAL at 08:44

## 2024-11-23 RX ADMIN — URSODIOL 300 MG: 300 CAPSULE ORAL at 08:44

## 2024-11-23 RX ADMIN — CARVEDILOL 6.25 MG: 6.25 TABLET, FILM COATED ORAL at 08:44

## 2024-11-23 RX ADMIN — THIAMINE HCL TAB 100 MG 100 MG: 100 TAB at 08:44

## 2024-11-23 NOTE — PLAN OF CARE
Problem: Adult Inpatient Plan of Care  Goal: Plan of Care Review  Outcome: Progressing  Flowsheets (Taken 11/23/2024 0605)  Progress: improving  Plan of Care Reviewed With: patient  Goal: Patient-Specific Goal (Individualized)  Outcome: Progressing  Goal: Absence of Hospital-Acquired Illness or Injury  Outcome: Progressing  Intervention: Identify and Manage Fall Risk  Description: Perform standard risk assessment on admission using a validated tool or comprehensive approach appropriate to the patient; reassess fall risk frequently, with change in status or transfer to another level of care.  Communicate risk to interprofessional healthcare team; ensure fall risk visible cue.  Determine need for increased observation, equipment and environmental modification, as well as use of supportive, nonskid footwear.  Adjust safety measures to individual needs and identified risk factors.  Reinforce the importance of active participation with fall risk prevention, safety, and physical activity with the patient and family.  Perform regular intentional rounding to assess need for position change, pain assessment and personal needs, including assistance with toileting.  Recent Flowsheet Documentation  Taken 11/22/2024 2215 by Kavitha Austin RN  Safety Promotion/Fall Prevention:   nonskid shoes/slippers when out of bed   safety round/check completed   room organization consistent   lighting adjusted   activity supervised   assistive device/personal items within reach   clutter free environment maintained  Taken 11/22/2024 2030 by Kavitha Austin, RN  Safety Promotion/Fall Prevention:   nonskid shoes/slippers when out of bed   safety round/check completed   room organization consistent   lighting adjusted   activity supervised   assistive device/personal items within reach   clutter free environment maintained  Taken 11/22/2024 1915 by Kavitha Austin RN  Safety Promotion/Fall Prevention:   nonskid shoes/slippers when  out of bed   safety round/check completed   room organization consistent   lighting adjusted   activity supervised   assistive device/personal items within reach   clutter free environment maintained  Intervention: Prevent Skin Injury  Description: Perform a screening for skin injury risk, such as pressure or moisture-associated skin damage on admission and at regular intervals throughout hospital stay.  Keep all areas of skin (especially folds) clean and dry.  Maintain adequate skin hydration.  Relieve and redistribute pressure and protect bony prominences and skin at risk for injury; implement measures based on patient-specific risk factors.  Match turning and repositioning schedule to clinical condition.  Encourage weight shift frequently; assist with reposition if unable to complete independently.  Float heels off bed; avoid pressure on the Achilles tendon.  Keep skin free from extended contact with medical devices.  Optimize nutrition and hydration.  Encourage functional activity and mobility, as early as tolerated.  Use aids (e.g., slide boards, mechanical lift) during transfer.  Recent Flowsheet Documentation  Taken 11/22/2024 2215 by Kavitha Austin RN  Body Position: position changed independently  Taken 11/22/2024 2030 by Kavitha Austin RN  Body Position: position changed independently  Taken 11/22/2024 1915 by Kavitha Austin RN  Body Position: position changed independently  Intervention: Prevent Infection  Description: Maintain skin and mucous membrane integrity; promote hand, oral and pulmonary hygiene.  Optimize fluid balance, nutrition, sleep and glycemic control to maximize infection resistance.  Identify potential sources of infection early to prevent or mitigate progression of infection (e.g., wound, lines, devices).  Evaluate ongoing need for invasive devices; remove promptly when no longer indicated.  Review vaccination status.  Recent Flowsheet Documentation  Taken 11/22/2024 2215  by Kavitha Austin RN  Infection Prevention:   environmental surveillance performed   rest/sleep promoted   hand hygiene promoted  Taken 11/22/2024 2030 by Kavitha Austin RN  Infection Prevention:   environmental surveillance performed   rest/sleep promoted   hand hygiene promoted  Taken 11/22/2024 1915 by Kavitha Austin RN  Infection Prevention:   environmental surveillance performed   rest/sleep promoted   hand hygiene promoted  Goal: Optimal Comfort and Wellbeing  Outcome: Progressing  Intervention: Provide Person-Centered Care  Description: Use a family-focused approach to care; encourage support system presence and participation.  Develop trust and rapport by proactively providing information, encouraging questions, addressing concerns and offering reassurance.  Acknowledge emotional response to hospitalization.  Recognize and utilize personal coping strategies and strengths; develop goals via shared decision-making.  Honor spiritual and cultural preferences.  Recent Flowsheet Documentation  Taken 11/22/2024 1915 by Kavitha Austin RN  Trust Relationship/Rapport:   care explained   choices provided   emotional support provided   empathic listening provided   questions answered   questions encouraged   reassurance provided   thoughts/feelings acknowledged  Goal: Readiness for Transition of Care  Outcome: Progressing     Problem: Skin Injury Risk Increased  Goal: Skin Health and Integrity  Outcome: Progressing  Intervention: Optimize Skin Protection  Description: Perform a full pressure injury risk assessment, as indicated by screening, upon admission to care unit.  Reassess skin (full inspection and injury risk, including skin temperature, consistency and color) frequently (e.g., scheduled interval, with change in condition) to provide optimal early detection and prevention.  Maintain adequate tissue perfusion (e.g., encourage fluid balance; avoid crossing legs, constrictive clothing or  devices) to promote tissue oxygenation.  Maintain head of bed at lowest degree of elevation tolerated, considering medical condition and other restrictions. Use positioning supports to prevent sliding and friction. Consider low friction textiles.  Avoid positioning onto an area that remains reddened or on bony prominences.  Minimize incontinence and moisture (e.g., toileting schedule; moisture-wicking pad, diaper or incontinence collection device; skin moisture barrier).  Cleanse skin promptly and gently, when soiled, utilizing a pH-balanced cleanser.  Relieve and redistribute pressure (e.g., scheduled position changes, weight shifts, use of support surface, medical device repositioning, protective dressing application, use of positioning device, microclimate control, use of pressure-injury-monitor  Encourage increased activity, such as sitting in a chair at the bedside or early mobilization, when able to tolerate. Avoid prolonged sitting.  Recent Flowsheet Documentation  Taken 11/22/2024 2215 by Kavitha Austin RN  Head of Bed (HOB) Positioning: HOB elevated  Taken 11/22/2024 2030 by Kavitha Austin RN  Head of Bed (HOB) Positioning: HOB elevated  Taken 11/22/2024 1915 by Kavitha Austin RN  Head of Bed (HOB) Positioning: HOB elevated     Problem: Fall Injury Risk  Goal: Absence of Fall and Fall-Related Injury  Outcome: Progressing  Intervention: Promote Injury-Free Environment  Description: Provide a safe, barrier-free environment that encourages independent activity.  Keep care area uncluttered and well-lighted.  Determine need for increased observation or monitoring.  Avoid use of devices that minimize mobility, such as restraints or indwelling urinary catheter.  Recent Flowsheet Documentation  Taken 11/22/2024 2215 by Kavitha Austin RN  Safety Promotion/Fall Prevention:   nonskid shoes/slippers when out of bed   safety round/check completed   room organization consistent   lighting adjusted    activity supervised   assistive device/personal items within reach   clutter free environment maintained  Taken 11/22/2024 2030 by Kavitha Austin RN  Safety Promotion/Fall Prevention:   nonskid shoes/slippers when out of bed   safety round/check completed   room organization consistent   lighting adjusted   activity supervised   assistive device/personal items within reach   clutter free environment maintained  Taken 11/22/2024 1915 by Kavitha Austin RN  Safety Promotion/Fall Prevention:   nonskid shoes/slippers when out of bed   safety round/check completed   room organization consistent   lighting adjusted   activity supervised   assistive device/personal items within reach   clutter free environment maintained     Problem: Comorbidity Management  Goal: Blood Pressure in Desired Range  Outcome: Progressing   Goal Outcome Evaluation:  Plan of Care Reviewed With: patient        Progress: improving  Outcome Evaluation: Patient will remain free from injury during hospitalization by calling for assistance at all times when needing out of bed.

## 2024-11-23 NOTE — PLAN OF CARE
Problem: Adult Inpatient Plan of Care  Goal: Plan of Care Review  Outcome: Adequate for Care Transition  Goal: Patient-Specific Goal (Individualized)  Outcome: Adequate for Care Transition  Goal: Absence of Hospital-Acquired Illness or Injury  Outcome: Adequate for Care Transition  Intervention: Identify and Manage Fall Risk  Description: Perform standard risk assessment on admission using a validated tool or comprehensive approach appropriate to the patient; reassess fall risk frequently, with change in status or transfer to another level of care.  Communicate risk to interprofessional healthcare team; ensure fall risk visible cue.  Determine need for increased observation, equipment and environmental modification, as well as use of supportive, nonskid footwear.  Adjust safety measures to individual needs and identified risk factors.  Reinforce the importance of active participation with fall risk prevention, safety, and physical activity with the patient and family.  Perform regular intentional rounding to assess need for position change, pain assessment and personal needs, including assistance with toileting.  Recent Flowsheet Documentation  Taken 11/23/2024 1330 by Elva Lamb RN  Safety Promotion/Fall Prevention: ([facility])   patient off unit   other (see comments)  Taken 11/23/2024 1130 by Elva Lamb RN  Safety Promotion/Fall Prevention: safety round/check completed  Taken 11/23/2024 1037 by Elva Lamb RN  Safety Promotion/Fall Prevention: safety round/check completed  Taken 11/23/2024 0849 by Elva Lamb RN  Safety Promotion/Fall Prevention: safety round/check completed  Intervention: Prevent Skin Injury  Description: Perform a screening for skin injury risk, such as pressure or moisture-associated skin damage on admission and at regular intervals throughout hospital stay.  Keep all areas of skin (especially folds) clean and dry.  Maintain adequate skin hydration.  Relieve and  redistribute pressure and protect bony prominences and skin at risk for injury; implement measures based on patient-specific risk factors.  Match turning and repositioning schedule to clinical condition.  Encourage weight shift frequently; assist with reposition if unable to complete independently.  Float heels off bed; avoid pressure on the Achilles tendon.  Keep skin free from extended contact with medical devices.  Optimize nutrition and hydration.  Encourage functional activity and mobility, as early as tolerated.  Use aids (e.g., slide boards, mechanical lift) during transfer.  Recent Flowsheet Documentation  Taken 11/23/2024 1130 by Elva Lamb RN  Body Position: position changed independently  Skin Protection: incontinence pads utilized  Taken 11/23/2024 1037 by Elva Lamb RN  Body Position: position changed independently  Skin Protection: incontinence pads utilized  Taken 11/23/2024 0849 by Elva Lamb RN  Body Position: supine  Skin Protection: incontinence pads utilized  Intervention: Prevent and Manage VTE (Venous Thromboembolism) Risk  Description: Assess for VTE (venous thromboembolism) risk.  Promote early mobilization; encourage both active and passive leg exercises, if unable to ambulate.  Initiate and maintain compression or other therapy, as indicated, based on identified risk in accordance with organizational protocol and provider order.  Recognize the patient's individual risk for bleeding before initiating pharmacologic thromboprophylaxis.  Recent Flowsheet Documentation  Taken 11/23/2024 0849 by Elva Lamb RN  VTE Prevention/Management:   SCDs (sequential compression devices) off   patient refused intervention  Intervention: Prevent Infection  Description: Maintain skin and mucous membrane integrity; promote hand, oral and pulmonary hygiene.  Optimize fluid balance, nutrition, sleep and glycemic control to maximize infection resistance.  Identify potential sources of  infection early to prevent or mitigate progression of infection (e.g., wound, lines, devices).  Evaluate ongoing need for invasive devices; remove promptly when no longer indicated.  Review vaccination status.  Recent Flowsheet Documentation  Taken 11/23/2024 1130 by Elva Lamb RN  Infection Prevention: environmental surveillance performed  Taken 11/23/2024 1037 by Elva Lamb RN  Infection Prevention: environmental surveillance performed  Taken 11/23/2024 0849 by Elva Lamb RN  Infection Prevention: environmental surveillance performed  Goal: Optimal Comfort and Wellbeing  Outcome: Adequate for Care Transition  Intervention: Provide Person-Centered Care  Description: Use a family-focused approach to care; encourage support system presence and participation.  Develop trust and rapport by proactively providing information, encouraging questions, addressing concerns and offering reassurance.  Acknowledge emotional response to hospitalization.  Recognize and utilize personal coping strategies and strengths; develop goals via shared decision-making.  Honor spiritual and cultural preferences.  Recent Flowsheet Documentation  Taken 11/23/2024 0849 by Elva Lamb RN  Trust Relationship/Rapport:   care explained   choices provided   questions answered   thoughts/feelings acknowledged  Goal: Readiness for Transition of Care  Outcome: Adequate for Care Transition   Goal Outcome Evaluation:      Pt A&Ox4, RA, tele box not present and d/c'd, no c/o pain. Discharge teaching complete, pt in agreement with plan. IV not present this shift. Report called to Farhad at [facility]. Transport called, all personal items removed from room except pt paperwork (now at nurses' station). [facility] van to transport to facility.

## 2024-11-23 NOTE — DISCHARGE SUMMARY
[facility] Medicine Services  DISCHARGE SUMMARY    Patient Name: Philomena Davis  : 1960  MRN: [id number]    Date of Admission: 11/10/2024  9:37 AM  Date of Discharge: 24  Primary Care Physician: Unique Brandt DO    Consults       Date and Time Order Name Status Description    2024  9:20 AM Inpatient Cardiology Consult Completed     11/10/2024  4:02 PM Inpatient Neurology Consult General Completed             Hospital Course     Presenting Problem: Paresthesia    Active Hospital Problems    Diagnosis  POA   • **Lower extremity weakness [R29.898]  Yes   • Subclinical hypothyroidism [E03.8]  Unknown   • Atrial flutter [I48.92]  Unknown   • Peripheral neuropathy [G62.9]  Unknown   • Orthostasis [I95.1]  Yes   • Alcohol use disorder [F10.90]  Yes   • Alcoholic cirrhosis of liver without ascites [K70.30]  Yes      Resolved Hospital Problems   No resolved problems to display.          Hospital Course:  Philomena Davis is a 64 y.o. female history of alcohol dependence, cirrhosis, HTN, peripheral neuropathy, was admitted 11/10/24 for paresthesias in her extremities. Neurology was consulted. PT/OT recommended inpatient rehab.Per CM today, insurance has approved and [facility] has a bed, will arrange for transportation today.       VS are stable, medically stable and appropriate for hospital discharge with close follow up as below outpatient:     Rec follow up follow up with PCP after DC from Rehab. To see provider at rehab within 24h of admit. Rec monitor CMP/CBC    Rec follow up with [facility], established with CLAUDE Garcia for monitoring of  ETOH Cirrhosis, appears lost to follow up.     Pt with subclinical hypothyroidism, recommend pt have follow labs with PCP in about 4wk    Atrial tachycardia, a flutter, on Carvedilol noted dosing may be limited by hypotension/orthostasis currently on 12.5 mg twice daily  , seen by cardiology, AC was deferred due to liver cirrhosis and no  sustained atrial fibrillation or flutter , plan E patch heart monitor x 2 wk at [place] and follow up with Dr. Hess in about 1 month (ordered by cardiology during LOS)      Orthostasis and Dysautonomia, On midodrine BID,gabapentin 200 mg 2 times daily along with thiamine 100mg and folate 1mg/d evaluated by Neurology. Follow up w/  [facility] in about 2-3 months      Rheumatology referral at  where [facility] for further evaluation of noted possible vasculitis on CT with celiac artery stranding with  elevated CRP/ESR during hospital course.       Hospital course as follows:   This patient's problems and plans were partially entered by my partner and updated as appropriate by me 11/23/24.  BL LE weakness  BL paraesthesias/facial numbness, resolved   ETOH use  Peripheral neuropathy due to long standing ETOH use    -MRI brain unremarkable  -MRI lumbar spine with L5-S1 disc protrusion  -MRI C/T spine reviewed, with C4/5 facet arthropathy  -EMG/NCV mild sensory neuropathy- given gabapentin 200 mg 2 times daily   -Continue thiamine, folic acid neurology following  -Symptoms improving   -PT/OT; awaiting rehab, insurance denied but family appeal pending    -bcx negative x 2    Marked orthostasis  Dysautonomia  -added midodrine, increased dose getting TID  -mobilize/PT/OT  -AM cortisol low, s/p cosyntropin with expected response, not suggestive of adrenal insufficiency   -Neurology discussed nonpharmacologic therapy, including increased salt and water intake, 6 small meals daily, slow position changes, REX hose, BP monitoring  -Outpatient follow-up with Neurology, [facility] Gallow[facility]     Possible vasculitis  CT with celiac artery stranding  -elevated CRP3.88/  -ANCA panel negative  -WILMA panel negative  -RF negative  -needs outpatient follow up with referral to rheumatology on DC, no abdominal symptoms     Atrial tachycardia, atrial flutter  -presented with  tachycardia, HR to 150  -on BB  -ECHO unremarkable, EF normal  -bursts of tachycardia during hospitalization, evaluated by cardiology  -continue carvedilol, dosing may be limited by hypotension/orthostasis- continue to monitor BP and HR at rehab, currently stable  -cardiology deferring AC due to cirrhosis for now  -heart monitor x 2 weeks at [place] (e-patch)  -follow up with Dr. Hess in 1 month     Alcohol dependence  Cirrhosis    -monitor LFT's, stable     Mild LA  -due to liver disease, resolved     Subclinical hypothyroidism  -needs follow up in 6 weeks         Discharge Follow Up Recommendations for outpatient labs/diagnostics:  TSH, Free T4, CMP, CBC    Day of Discharge     HPI:   Patient sitting up in bed, eager to go to [facility] to begin inpatient rehab.  Reports no shortness of breath, nausea vomiting diarrhea .  Understands will need to keep several outpatient follow-ups given complex medical history.  Patient reports being 2 months sober, has discussed with social work in the past sobriety support groups, not sure if wants to pursue AA.  Notes will further discuss with  at [facility] if needs alternatives    Review of Systems    CV- No chest pain, palpitations  Resp- No cough, dyspnea  GI- No N/V/D, abd pain      Vital Signs:   Temp:  [98 °F (36.7 °C)-98.4 °F (36.9 °C)] 98 °F (36.7 °C)  Heart Rate:  [75-87] 77  Resp:  [16-18] 18  BP: ()/(59-73) 120/70      Physical Exam:  Constitutional: Awake, alert, resting in bed appears comfortable  Eyes: PERRLA, sclerae anicteric, no conjunctival injection  HENT: NCAT, mucous membranes moist  Neck: Supple,  trachea midline  Respiratory: Clear to auscultation bilaterally, nonlabored respirations   Cardiovascular: RRR, no murmurs, rubs, or gallops, palpable pedal pulses bilaterally, no ankle edema  Gastrointestinal: Positive bowel sounds, soft, round, nontender, nondistended  Musculoskeletal: No bilateral ankle edema, no clubbing or cyanosis to  extremities  Psychiatric: Appropriate affect, cooperative  Neurologic: Oriented x 3, strength symmetric in all extremities, Cranial Nerves grossly intact to confrontation, speech clear  Skin: No rashes exposed areas or jaundice      Pertinent  and/or Most Recent Results     LAB RESULTS:      Lab 11/21/24  0801 11/19/24  1210   WBC 10.35 13.25*   HEMOGLOBIN 11.0* 10.6*   HEMATOCRIT 33.4* 32.6*   PLATELETS 258 266   .4* 110.9*   PROCALCITONIN  --  0.19         Lab 11/19/24  1210   SODIUM 140   POTASSIUM 3.8   CHLORIDE 103   CO2 27.0   ANION GAP 10.0   BUN 10   CREATININE 0.69   EGFR 98.3   GLUCOSE 106*   CALCIUM 9.1                         Brief Urine Lab Results  (Last result in the past 365 days)        Color   Clarity   Blood   Leuk Est   Nitrite   Protein   CREAT   Urine HCG        11/10/24 0958 Yellow   Clear   Negative   Negative   Negative   Negative                 Microbiology Results (last 10 days)       ** No results found for the last 240 hours. **            No radiology results for the last 10 days            Results for orders placed during the hospital encounter of 11/10/24    Adult Transthoracic Echo Complete W/ Cont if Necessary Per Protocol    Interpretation Summary  •  Left ventricular systolic function is normal. Estimated left ventricular EF = 65%  •  The cardiac valves are anatomically and functionally normal.      Plan for Follow-up of Pending Labs/Results:   Pending Labs       Order Current Status    Oligoclonal Banding (COLLECT RED TUBE) In process          Discharge Details        Discharge Medications        New Medications        Instructions Start Date   melatonin 5 MG tablet tablet   5 mg, Oral, Nightly      midodrine 5 MG tablet  Commonly known as: PROAMATINE   15 mg, Oral, 2 Times Daily Before Meals      midodrine 5 MG tablet  Commonly known as: PROAMATINE   5 mg, Oral, Daily      nystatin [id number] UNIT/GM cream  Commonly known as: MYCOSTATIN   1 Application, Topical, Every 12  Hours Scheduled      zolpidem 5 MG tablet  Commonly known as: AMBIEN  Replaces: eszopiclone 1 MG tablet   5 mg, Oral, Nightly             Changes to Medications        Instructions Start Date   carvedilol 6.25 MG tablet  Commonly known as: COREG  What changed:   medication strength  how much to take   6.25 mg, Oral, 2 Times Daily With Meals      gabapentin 100 MG capsule  Commonly known as: NEURONTIN  What changed:   medication strength  how much to take  when to take this   200 mg, Oral, Every 12 Hours Scheduled             Continue These Medications        Instructions Start Date   albuterol sulfate  (90 Base) MCG/ACT inhaler  Commonly known as: PROVENTIL HFA;VENTOLIN HFA;PROAIR HFA   2 puffs, Inhalation, Every 4 Hours PRN      desvenlafaxine 50 MG 24 hr tablet  Commonly known as: PRISTIQ   50 mg, Oral, Daily   Start Date: November 24, 2024     Fluticasone-Salmeterol 100-50 MCG/ACT DISKUS  Commonly known as: ADVAIR/WIXELA   1 puff, Inhalation, 2 Times Daily - RT      folic acid 1 MG tablet  Commonly known as: FOLVITE   1 mg, Oral, Daily      latanoprost 0.005 % ophthalmic solution  Commonly known as: XALATAN   1 drop, Both Eyes, Nightly      pantoprazole 40 MG EC tablet  Commonly known as: PROTONIX   40 mg, Oral, Daily      rosuvastatin 5 MG tablet  Commonly known as: CRESTOR   5 mg, Oral, Daily      thiamine 100 MG tablet  Commonly known as: VITAMIN B1   100 mg, Oral, Daily      ursodiol 300 MG capsule  Commonly known as: ACTIGALL   300 mg, Oral, 2 Times Daily             Stop These Medications      bacitracin-polymyxin b [id number] UNIT/GM ointment  Commonly known as: POLYSPORIN     cloNIDine 0.3 MG tablet  Commonly known as: CATAPRES     eszopiclone 1 MG tablet  Commonly known as: Lunesta  Replaced by: zolpidem 5 MG tablet     lactobacillus tablet caplet     terbinafine 1 % cream  Commonly known as: lamISIL              Allergies   Allergen Reactions   • Buspirone Anxiety   • Codeine Nausea Only   •  Mirtazapine Anxiety         Discharge Disposition:  Rehab Facility or Unit (DC - External)    Diet:  Hospital:  Diet Order   Procedures   • Diet: Regular/House; Texture: Regular (IDDSI 7); Fluid Consistency: Thin (IDDSI 0)       Diet Instructions       Diet: Cardiac Diets; Healthy Heart (2-3 Na+); Regular (IDDSI 7); Thin (IDDSI 0)      Discharge Diet: Cardiac Diets    Cardiac Diet: Healthy Heart (2-3 Na+)    Texture: Regular (IDDSI 7)    Fluid Consistency: Thin (IDDSI 0)             Activity:  Activity Instructions       Up WIth Assist                     CODE STATUS:    Code Status and Medical Interventions: CPR (Attempt to Resuscitate); Full Support   Ordered at: 11/10/24 0397     Level Of Support Discussed With:    Patient    Next of Kin (If No Surrogate)     Code Status (Patient has no pulse and is not breathing):    CPR (Attempt to Resuscitate)     Medical Interventions (Patient has pulse or is breathing):    Full Support       Future Appointments   Date Time Provider Department Center   4/7/2025  9:00 AM Marko Eldridge APRN [facility]       Additional Instructions for the Follow-ups that You Need to Schedule       WILMA Comprehensive Panel    Nov 15, 2024 (Approximate)       Test Includes:  Anticentromere B antibodies; anti-dsDNA; antichromatin antibodies; anti-Shireen-1; RNP antibodies; antiscleroderma 70 antibodies; [place] antibodies; Sjögren anti-SS-A; Sjögren anti-SS-B           Release to patient: Routine Release        ANCA Panel    Nov 15, 2024 (Approximate)      Release to patient: Routine Release        RHEUMATOID FACTOR    Nov 15, 2024 (Approximate)      Release to patient: Routine Release        Discharge Follow-up with PCP   As directed       Currently Documented PCP:    Unique Brandt DO    PCP Phone Number:    [phone]     Follow Up Details: within 1 wk of discharge from rehab        Discharge Follow-up with Specified Provider: Dr. Hess , Cardiology; 1 Month   As directed      To:   [facility]   Follow Up: 1 Month        Discharge Follow-up with Specified Provider: CLAUDE [facility]  Hepatology; 3 Months   As directed      To: CLAUDE Cox  Hepatology   Follow Up: 3 Months        Discharge Follow-up with Specified Provider: Kamilla Gonzalez APRN Neurology in about 2-3 months; 2 Months   As directed      To: NeurologyKamilla APRN Neurology in about 2-3 months   Follow Up: 2 Months                      Casie M Mayne, PA-C  11/23/24      Time Spent on Discharge:  I spent  60 minutes on this discharge activity which included: face-to-face encounter with the patient, reviewing the data in the system, coordination of the care with the nursing staff as well as consultants, documentation, and entering orders.

## 2024-11-23 NOTE — CASE MANAGEMENT/SOCIAL WORK
Case Management Discharge Note      Final Note: Patient going to [facility], insurance approved, today 11-23. Patient has a bed in the stroke unit, call report to [phone] and fax discharge summary to [phone]. Transportation per [facility] van and  is [address] Maternity entrance, [address]. Please have the patient at the entrance by 1315.         Selected Continued Care - Admitted Since 11/10/2024       Destination Coordination complete.      Service Provider Services Address Phone Fax Patient Preferred    [facility] [address] [address] [phone] --              Durable Medical Equipment    No services have been selected for the patient.                Dialysis/Infusion    No services have been selected for the patient.                Home Medical Care       Service Provider Services Address Phone Fax Patient Preferred    [facility] [address] [address] [phone] --              Therapy    No services have been selected for the patient.                Community Resources    No services have been selected for the patient.                Community & DME    No services have been selected for the patient.                    Transportation Services  Ambulance: [facility]    Final Discharge Disposition Code: 62 - inpatient rehab facility

## 2024-11-25 ENCOUNTER — TELEPHONE (OUTPATIENT)
Dept: CARDIOLOGY | Facility: CLINIC | Age: 64
End: 2024-11-25
Payer: COMMERCIAL

## 2024-11-25 NOTE — TELEPHONE ENCOUNTER
Monitor Question - Jeimy     Patient discharged on Saturday to [facility]. I spoke with her this morning and she is asking if the monitor is necessary since she is receiving cardiac care at [facility]?

## 2024-11-25 NOTE — TELEPHONE ENCOUNTER
I spoke with the her and advised a monitor was still recommended. She stated she didn't have anyone that could bring the monitor to her. She asked if we could mail it to her at [facility]. After discussing with Shae we decided that wasn't a good option. The patient will contact us upon her discharge from [facility] and we will mail the monitor to her.

## 2024-12-03 ENCOUNTER — TELEPHONE (OUTPATIENT)
Dept: NEUROLOGY | Facility: CLINIC | Age: 64
End: 2024-12-03
Payer: COMMERCIAL

## 2024-12-03 NOTE — TELEPHONE ENCOUNTER
Caller: CARDINAL VENTURA    Best call back number: [phone]*    New or established patient?  [x] New  [] Established    Date of discharge: 11/23/24    Facility discharged from: [facility]    Diagnosis/Symptoms: PERIPHERAL NEUROPATHY    Length of stay (If applicable): 13 DAYS    Specialty Only: Did you see a [facility] health provider?    [x] Yes  [] No  If so, who? AYLA MEJÍA    Additional Details: PT WAS TOLD TO FOLLOW UP IN 2 MONTHS AND FIRST AVAILABLE WAS FOR MARCH. PT IS SCHEDULED AND ON WAIT LIST

## 2024-12-04 ENCOUNTER — TRANSCRIBE ORDERS (OUTPATIENT)
Dept: HOME HEALTH SERVICES | Facility: HOME HEALTHCARE | Age: 64
End: 2024-12-04
Payer: COMMERCIAL

## 2024-12-04 ENCOUNTER — HOME HEALTH ADMISSION (OUTPATIENT)
Dept: HOME HEALTH SERVICES | Facility: HOME HEALTHCARE | Age: 64
End: 2024-12-04
Payer: COMMERCIAL

## 2024-12-04 DIAGNOSIS — G62.9 PERIPHERAL POLYNEUROPATHY: Primary | ICD-10-CM

## 2024-12-04 DIAGNOSIS — I95.1 ORTHOSTATIC HYPOTENSION: ICD-10-CM

## 2024-12-04 DIAGNOSIS — G90.9 DISORDER OF AUTONOMIC NERVOUS SYSTEM: ICD-10-CM

## 2024-12-05 ENCOUNTER — HOME CARE VISIT (OUTPATIENT)
Dept: HOME HEALTH SERVICES | Facility: HOME HEALTHCARE | Age: 64
End: 2024-12-05
Payer: COMMERCIAL

## 2024-12-05 PROCEDURE — G0151 HHCP-SERV OF PT,EA 15 MIN: HCPCS

## 2024-12-06 ENCOUNTER — APPOINTMENT (OUTPATIENT)
Dept: GENERAL RADIOLOGY | Facility: HOSPITAL | Age: 64
End: 2024-12-06
Payer: COMMERCIAL

## 2024-12-06 ENCOUNTER — HOME CARE VISIT (OUTPATIENT)
Dept: HOME HEALTH SERVICES | Facility: HOME HEALTHCARE | Age: 64
End: 2024-12-06
Payer: COMMERCIAL

## 2024-12-06 ENCOUNTER — APPOINTMENT (OUTPATIENT)
Dept: CT IMAGING | Facility: HOSPITAL | Age: 64
End: 2024-12-06
Payer: COMMERCIAL

## 2024-12-06 ENCOUNTER — TELEPHONE (OUTPATIENT)
Dept: FAMILY MEDICINE CLINIC | Facility: CLINIC | Age: 64
End: 2024-12-06
Payer: COMMERCIAL

## 2024-12-06 ENCOUNTER — HOSPITAL ENCOUNTER (EMERGENCY)
Facility: HOSPITAL | Age: 64
Discharge: HOME | End: 2024-12-06
Attending: EMERGENCY MEDICINE
Payer: COMMERCIAL

## 2024-12-06 VITALS
RESPIRATION RATE: 16 BRPM | DIASTOLIC BLOOD PRESSURE: 70 MMHG | OXYGEN SATURATION: 97 % | SYSTOLIC BLOOD PRESSURE: 136 MMHG | TEMPERATURE: 97.7 F | HEART RATE: 70 BPM

## 2024-12-06 VITALS
SYSTOLIC BLOOD PRESSURE: 117 MMHG | HEART RATE: 100 BPM | OXYGEN SATURATION: 97 % | TEMPERATURE: 98.4 F | DIASTOLIC BLOOD PRESSURE: 76 MMHG | RESPIRATION RATE: 16 BRPM

## 2024-12-06 VITALS
TEMPERATURE: 98.2 F | OXYGEN SATURATION: 94 % | HEIGHT: 64 IN | BODY MASS INDEX: 26.98 KG/M2 | DIASTOLIC BLOOD PRESSURE: 69 MMHG | RESPIRATION RATE: 18 BRPM | WEIGHT: 158 LBS | SYSTOLIC BLOOD PRESSURE: 145 MMHG | HEART RATE: 104 BPM

## 2024-12-06 DIAGNOSIS — R53.1 GENERALIZED WEAKNESS: Primary | ICD-10-CM

## 2024-12-06 LAB
ALBUMIN SERPL-MCNC: 4 G/DL (ref 3.5–5.2)
ALBUMIN/GLOB SERPL: 0.9 G/DL
ALP SERPL-CCNC: 68 U/L (ref 39–117)
ALT SERPL W P-5'-P-CCNC: 30 U/L (ref 1–33)
AMPHET+METHAMPHET UR QL: NEGATIVE
AMPHETAMINES UR QL: NEGATIVE
ANION GAP SERPL CALCULATED.3IONS-SCNC: 14 MMOL/L (ref 5–15)
APAP SERPL-MCNC: <5 MCG/ML (ref 0–30)
AST SERPL-CCNC: 184 U/L (ref 1–32)
BARBITURATES UR QL SCN: NEGATIVE
BASOPHILS # BLD AUTO: 0.05 10*3/MM3 (ref 0–0.2)
BASOPHILS NFR BLD AUTO: 0.5 % (ref 0–1.5)
BENZODIAZ UR QL SCN: NEGATIVE
BILIRUB SERPL-MCNC: 1.7 MG/DL (ref 0–1.2)
BILIRUB UR QL STRIP: NEGATIVE
BUN SERPL-MCNC: 6 MG/DL (ref 8–23)
BUN/CREAT SERPL: 8.6 (ref 7–25)
BUPRENORPHINE SERPL-MCNC: NEGATIVE NG/ML
CALCIUM SPEC-SCNC: 10.4 MG/DL (ref 8.6–10.5)
CANNABINOIDS SERPL QL: NEGATIVE
CHLORIDE SERPL-SCNC: 100 MMOL/L (ref 98–107)
CK SERPL-CCNC: 3582 U/L (ref 20–180)
CLARITY UR: CLEAR
CO2 SERPL-SCNC: 24 MMOL/L (ref 22–29)
COCAINE UR QL: NEGATIVE
COLOR UR: YELLOW
CREAT SERPL-MCNC: 0.7 MG/DL (ref 0.57–1)
DEPRECATED RDW RBC AUTO: 49.1 FL (ref 37–54)
EGFRCR SERPLBLD CKD-EPI 2021: 97.9 ML/MIN/1.73
EOSINOPHIL # BLD AUTO: 0.06 10*3/MM3 (ref 0–0.4)
EOSINOPHIL NFR BLD AUTO: 0.6 % (ref 0.3–6.2)
ERYTHROCYTE [DISTWIDTH] IN BLOOD BY AUTOMATED COUNT: 12.5 % (ref 12.3–15.4)
ETHANOL BLD-MCNC: <10 MG/DL (ref 0–10)
FENTANYL UR-MCNC: NEGATIVE NG/ML
GLOBULIN UR ELPH-MCNC: 4.5 GM/DL
GLUCOSE SERPL-MCNC: 102 MG/DL (ref 65–99)
GLUCOSE UR STRIP-MCNC: NEGATIVE MG/DL
HCT VFR BLD AUTO: 38.1 % (ref 34–46.6)
HGB BLD-MCNC: 12.6 G/DL (ref 12–15.9)
HGB UR QL STRIP.AUTO: NEGATIVE
IMM GRANULOCYTES # BLD AUTO: 0.02 10*3/MM3 (ref 0–0.05)
IMM GRANULOCYTES NFR BLD AUTO: 0.2 % (ref 0–0.5)
KETONES UR QL STRIP: NEGATIVE
LEUKOCYTE ESTERASE UR QL STRIP.AUTO: NEGATIVE
LYMPHOCYTES # BLD AUTO: 1.47 10*3/MM3 (ref 0.7–3.1)
LYMPHOCYTES NFR BLD AUTO: 14.7 % (ref 19.6–45.3)
MAGNESIUM SERPL-MCNC: 1.6 MG/DL (ref 1.6–2.4)
MCH RBC QN AUTO: 35.1 PG (ref 26.6–33)
MCHC RBC AUTO-ENTMCNC: 33.1 G/DL (ref 31.5–35.7)
MCV RBC AUTO: 106.1 FL (ref 79–97)
METHADONE UR QL SCN: NEGATIVE
MONOCYTES # BLD AUTO: 0.96 10*3/MM3 (ref 0.1–0.9)
MONOCYTES NFR BLD AUTO: 9.6 % (ref 5–12)
NEUTROPHILS NFR BLD AUTO: 7.46 10*3/MM3 (ref 1.7–7)
NEUTROPHILS NFR BLD AUTO: 74.4 % (ref 42.7–76)
NITRITE UR QL STRIP: NEGATIVE
NRBC BLD AUTO-RTO: 0 /100 WBC (ref 0–0.2)
OPIATES UR QL: NEGATIVE
OXYCODONE UR QL SCN: NEGATIVE
PCP UR QL SCN: NEGATIVE
PH UR STRIP.AUTO: 7.5 [PH] (ref 5–8)
PLATELET # BLD AUTO: 212 10*3/MM3 (ref 140–450)
PMV BLD AUTO: 10.3 FL (ref 6–12)
POTASSIUM SERPL-SCNC: 3.6 MMOL/L (ref 3.5–5.2)
PROT SERPL-MCNC: 8.5 G/DL (ref 6–8.5)
PROT UR QL STRIP: NEGATIVE
RBC # BLD AUTO: 3.59 10*6/MM3 (ref 3.77–5.28)
SALICYLATES SERPL-MCNC: <0.3 MG/DL
SODIUM SERPL-SCNC: 138 MMOL/L (ref 136–145)
SP GR UR STRIP: 1.01 (ref 1–1.03)
T4 FREE SERPL-MCNC: 1.19 NG/DL (ref 0.92–1.68)
TRICYCLICS UR QL SCN: NEGATIVE
TSH SERPL DL<=0.05 MIU/L-ACNC: 2.6 UIU/ML (ref 0.27–4.2)
UROBILINOGEN UR QL STRIP: NORMAL
WBC NRBC COR # BLD AUTO: 10.02 10*3/MM3 (ref 3.4–10.8)

## 2024-12-06 PROCEDURE — 83735 ASSAY OF MAGNESIUM: CPT | Performed by: EMERGENCY MEDICINE

## 2024-12-06 PROCEDURE — 73630 X-RAY EXAM OF FOOT: CPT

## 2024-12-06 PROCEDURE — 81003 URINALYSIS AUTO W/O SCOPE: CPT | Performed by: EMERGENCY MEDICINE

## 2024-12-06 PROCEDURE — 25810000003 SODIUM CHLORIDE 0.9 % SOLUTION: Performed by: EMERGENCY MEDICINE

## 2024-12-06 PROCEDURE — 71045 X-RAY EXAM CHEST 1 VIEW: CPT

## 2024-12-06 PROCEDURE — 80050 GENERAL HEALTH PANEL: CPT | Performed by: EMERGENCY MEDICINE

## 2024-12-06 PROCEDURE — G0152 HHCP-SERV OF OT,EA 15 MIN: HCPCS

## 2024-12-06 PROCEDURE — G0299 HHS/HOSPICE OF RN EA 15 MIN: HCPCS

## 2024-12-06 PROCEDURE — 82077 ASSAY SPEC XCP UR&BREATH IA: CPT | Performed by: EMERGENCY MEDICINE

## 2024-12-06 PROCEDURE — 82550 ASSAY OF CK (CPK): CPT | Performed by: EMERGENCY MEDICINE

## 2024-12-06 PROCEDURE — 73610 X-RAY EXAM OF ANKLE: CPT

## 2024-12-06 PROCEDURE — P9612 CATHETERIZE FOR URINE SPEC: HCPCS

## 2024-12-06 PROCEDURE — 84439 ASSAY OF FREE THYROXINE: CPT | Performed by: EMERGENCY MEDICINE

## 2024-12-06 PROCEDURE — 99284 EMERGENCY DEPT VISIT MOD MDM: CPT

## 2024-12-06 PROCEDURE — 80143 DRUG ASSAY ACETAMINOPHEN: CPT | Performed by: EMERGENCY MEDICINE

## 2024-12-06 PROCEDURE — 80307 DRUG TEST PRSMV CHEM ANLYZR: CPT | Performed by: EMERGENCY MEDICINE

## 2024-12-06 PROCEDURE — 70450 CT HEAD/BRAIN W/O DYE: CPT

## 2024-12-06 PROCEDURE — 80179 DRUG ASSAY SALICYLATE: CPT | Performed by: EMERGENCY MEDICINE

## 2024-12-06 PROCEDURE — 93005 ELECTROCARDIOGRAM TRACING: CPT | Performed by: EMERGENCY MEDICINE

## 2024-12-06 RX ORDER — SODIUM CHLORIDE 0.9 % (FLUSH) 0.9 %
10 SYRINGE (ML) INJECTION AS NEEDED
Status: DISCONTINUED | OUTPATIENT
Start: 2024-12-06 | End: 2024-12-06 | Stop reason: HOSPADM

## 2024-12-06 RX ADMIN — SODIUM CHLORIDE 500 ML: 9 INJECTION, SOLUTION INTRAVENOUS at 15:46

## 2024-12-06 NOTE — TELEPHONE ENCOUNTER
JEM OCCUPATIONAL THERAPIST FOR HOME HEALTH CALLED AND STATED SHE WENT TO SEE PATIENT FOR INITIAL EXAM, PATIENT STATED SHE FELL LAST NIGHT AND WAS UNABLE TO MOVE FROM THE COUCH. JEM STATED SHE HELPED PATIENT TO BATHROOM THEN BROUGHT HER HER MEDICATIONS AND PATIENT STATED SHE DID NOT KNOW WHAT SHE WAS TAKING AND DID NOT TAKE MEDICATION WHILE JEM WAS THERE. JEM GAVE PATIENT GATORADES AND PATIENT SAID SHE HAD FRIENDS THAT WOULD BE COMING BY TO CHECK ON HER AND REFUSED AMBULANCE TO BE CALLED. WALLY WAS TOLD TO REACH OUT TO PCP TO LET THEM KNOW PATIENTS CURRENT CONDITION.

## 2024-12-07 ENCOUNTER — HOME CARE VISIT (OUTPATIENT)
Dept: HOME HEALTH SERVICES | Facility: HOME HEALTHCARE | Age: 64
End: 2024-12-07
Payer: COMMERCIAL

## 2024-12-07 PROCEDURE — G0495 RN CARE TRAIN/EDU IN HH: HCPCS

## 2024-12-07 NOTE — ED PROVIDER NOTES
"Subjective   History of Present Illness  64-year-old female presents for evaluation of generalized weakness.  Of note, before seeing the patient I did a thorough review of her records.  She was admitted to our facility from November 10 through November 23 and was discharged to [facility] for rehab.  She tells me that she did well at rehab and was discharged from [facility] yesterday to home.  Last night at around 8:30 PM the patient had a fall in her kitchen.  She tells me that the fall was purely mechanical in nature.  She was able to crawl to a couch and notes generalized weakness since that time to the point that she \"cannot walk.\"  She was very scared about being alone at home so she called EMS this evening and was brought to our facility to be evaluated.  When she fell last night, she did not lose consciousness.  She is not anticoagulated.      Review of Systems   Neurological:  Positive for weakness.   All other systems reviewed and are negative.      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 03/24/2017    Closed trimalleolar fracture of right ankle 05/24/2023    Digital mucous cyst of toe of left foot 03/24/2017    Ganglion cyst of left foot     Glaucoma     Hypertension     Wears glasses        Allergies   Allergen Reactions    Buspirone Anxiety    Codeine Nausea Only    Mirtazapine Anxiety       Past Surgical History:   Procedure Laterality Date    ANKLE OPEN REDUCTION INTERNAL FIXATION Left 03/24/2017    Procedure: LEFT ANKLE OPEN REDUCTION INTERNAL FIXATION WITH ILIAC CREST BONE GRAFT , EXCISION CYST 4TH/5TH INNERSPACE LEFT FOOT;  Surgeon: Frank Larson MD;  Location: [facility];  Service:     ANKLE OPEN REDUCTION INTERNAL FIXATION Right " 05/25/2023    Procedure: ANKLE OPEN REDUCTION INTERNAL FIXATION;  Surgeon: Deandre Reynoso MD;  Location:  [facility];  Service: Orthopedics;  Laterality: Right;    AUGMENTATION MAMMAPLASTY Bilateral 1999    BREAST AUGMENTATION      BREAST EXCISIONAL BIOPSY Right 2014    DILATION AND CURETTAGE, DIAGNOSTIC / THERAPEUTIC      ENDOSCOPY N/A 11/30/2022    Procedure: ESOPHAGOGASTRODUODENOSCOPY;  Surgeon: Arelis Solano MD;  Location:  [facility];  Service: Gastroenterology;  Laterality: N/A;    ENDOSCOPY N/A 2/1/2024    Procedure: ESOPHAGOGASTRODUODENOSCOPY;  Surgeon: Brunner, Mark I, MD;  Location:  [facility];  Service: Gastroenterology;  Laterality: N/A;    NM RPR NON/MAL FEMUR DSTL H/N W/ILIAC/AUTOG BONE Left 03/24/2017    Procedure: ILIAC CREST BONE GRAFT;  Surgeon: Frank Larson MD;  Location:  [facility];  Service: Orthopedics    WRIST SURGERY         Family History   Problem Relation Age of Onset    Cancer Mother     No Known Problems Father     No Known Problems Sister     No Known Problems Brother     No Known Problems Daughter     No Known Problems Son     No Known Problems Maternal Grandmother     Breast cancer Paternal Grandmother 68        met to brain    No Known Problems Maternal Aunt     No Known Problems Paternal Aunt     Rheum arthritis Other     Heart disease Other     Cancer Other     Stroke Other     Hypertension Other     BRCA 1/2 Neg Hx     Colon cancer Neg Hx     Endometrial cancer Neg Hx     Ovarian cancer Neg Hx        Social History     Socioeconomic History    Marital status:    Tobacco Use    Smoking status: Former     Current packs/day: 0.00     Average packs/day: 1.5 packs/day for 20.0 years (30.0 ttl pk-yrs)     Types: Cigarettes     Start date: 3/22/1989     Quit date: 3/22/2009     Years since quitting: 15.7    Smokeless tobacco: Never   Vaping Use    Vaping status: Never Used   Substance and Sexual Activity    Alcohol use: Yes     Alcohol/week: 7.0 standard  drinks of alcohol     Types: 7 Glasses of wine per week     Comment: 1/5 VODKA EVERY TWO DAYS    Drug use: No    Sexual activity: Defer           Objective   Physical Exam  Vitals and nursing note reviewed.   Constitutional:       General: She is not in acute distress.     Appearance: Normal appearance. She is well-developed. She is not diaphoretic.      Comments: Nontoxic-appearing female   HENT:      Head: Normocephalic and atraumatic.   Eyes:      Pupils: Pupils are equal, round, and reactive to light.   Neck:      Comments: No midline cervical spine tenderness noted, no step-off or deformity present  Cardiovascular:      Rate and Rhythm: Normal rate and regular rhythm.      Heart sounds: Normal heart sounds. No murmur heard.     No friction rub. No gallop.   Pulmonary:      Effort: Pulmonary effort is normal. No respiratory distress.      Breath sounds: Normal breath sounds. No wheezing or rales.   Abdominal:      General: Bowel sounds are normal. There is no distension.      Palpations: Abdomen is soft. There is no mass.      Tenderness: There is no abdominal tenderness. There is no guarding or rebound.      Comments: No focal abdominal tenderness, no peritoneal signs, no pain on proportion to exam   Musculoskeletal:         General: Normal range of motion.      Comments: Mild pain elicited with palpation of lateral aspect of right ankle   Skin:     General: Skin is warm and dry.      Findings: No erythema or rash.      Comments: Palpable hematoma noted to left chest wall, no palpable crepitus   Neurological:      Mental Status: She is alert and oriented to person, place, and time.      Comments: Awake, alert, and oriented x3, clear and fluent speech, no dysmetria noted, neurovascularly intact distally in all fours with bounding distal pulses and normal sensation, 4 out of 5 strength noted to both lower extremities with strength testing, 5 out of 5 strength noted to both upper extremities with strength testing,  "no cranial nerve deficits noted with cranial nerves II through XII grossly intact   Psychiatric:         Mood and Affect: Mood normal.         Thought Content: Thought content normal.         Judgment: Judgment normal.         Procedures           ED Course  ED Course as of 12/06/24 2151   Fri Dec 06, 2024   1435 64-year-old female presents for evaluation of generalized weakness.  Of note, I did a thorough review of the patient's records.  She was admitted to our facility from November 10 through November 23 and was discharged to [facility] for rehab.  She was discharged from [facility] yesterday to home.  Last night at around 8:30 PM she had a fall in her kitchen.  She was able to crawl to a couch and notes generalized weakness since that time to the point that she \"cannot walk.\"  As a result, EMS was called today and she was brought to our facility to be evaluated.  On arrival, the patient is nontoxic-appearing. [DD]   1436 She has 4/5 strength to both lower extremities.  Neurovascularly intact distally in both lower extremities with bounding distal pulses and normal sensation noted.  She is normoreflexic in both lower extremities with patellar reflex testing.  NEXUS negative.  Broad differential diagnosis.  We will obtain labs and imaging, and we will reassess following initial interventions. [DD]   1618 Labs remarkable for elevated CK of 3582.  Renal function is normal. [DD]   1743 I personally and independently viewed the patient's x-ray images myself, and I am in agreement with the radiologist's reading for final interpretation, particularly there is no acute fracture to right foot or ankle. [DD]   1750 Upon reevaluation, the patient looks and feels well.  I personally walked her in her room and down the hallway with a walker.  She was ambulatory without any issue.  I feel that she can be safely discharged and managed on an outpatient basis at this point.  She will follow-up with her primary care " physician within the next week.  Agreeable with plan and given appropriate strict return precautions. [DD]      ED Course User Index  [DD] Jhoan Abdul MD                                             Recent Results (from the past 24 hours)   Comprehensive Metabolic Panel    Collection Time: 12/06/24  2:39 PM    Specimen: Blood   Result Value Ref Range    Glucose 102 (H) 65 - 99 mg/dL    BUN 6 (L) 8 - 23 mg/dL    Creatinine 0.70 0.57 - 1.00 mg/dL    Sodium 138 136 - 145 mmol/L    Potassium 3.6 3.5 - 5.2 mmol/L    Chloride 100 98 - 107 mmol/L    CO2 24.0 22.0 - 29.0 mmol/L    Calcium 10.4 8.6 - 10.5 mg/dL    Total Protein 8.5 6.0 - 8.5 g/dL    Albumin 4.0 3.5 - 5.2 g/dL    ALT (SGPT) 30 1 - 33 U/L    AST (SGOT) 184 (H) 1 - 32 U/L    Alkaline Phosphatase 68 39 - 117 U/L    Total Bilirubin 1.7 (H) 0.0 - 1.2 mg/dL    Globulin 4.5 gm/dL    A/G Ratio 0.9 g/dL    BUN/Creatinine Ratio 8.6 7.0 - 25.0    Anion Gap 14.0 5.0 - 15.0 mmol/L    eGFR 97.9 >60.0 mL/min/1.73   T4, Free    Collection Time: 12/06/24  2:39 PM    Specimen: Blood   Result Value Ref Range    Free T4 1.19 0.92 - 1.68 ng/dL   TSH    Collection Time: 12/06/24  2:39 PM    Specimen: Blood   Result Value Ref Range    TSH 2.600 0.270 - 4.200 uIU/mL   Magnesium    Collection Time: 12/06/24  2:39 PM    Specimen: Blood   Result Value Ref Range    Magnesium 1.6 1.6 - 2.4 mg/dL   CK    Collection Time: 12/06/24  2:39 PM    Specimen: Blood   Result Value Ref Range    Creatine Kinase 3,582 (H) 20 - 180 U/L   CBC Auto Differential    Collection Time: 12/06/24  2:39 PM    Specimen: Blood   Result Value Ref Range    WBC 10.02 3.40 - 10.80 10*3/mm3    RBC 3.59 (L) 3.77 - 5.28 10*6/mm3    Hemoglobin 12.6 12.0 - 15.9 g/dL    Hematocrit 38.1 34.0 - 46.6 %    .1 (H) 79.0 - 97.0 fL    MCH 35.1 (H) 26.6 - 33.0 pg    MCHC 33.1 31.5 - 35.7 g/dL    RDW 12.5 12.3 - 15.4 %    RDW-SD 49.1 37.0 - 54.0 fl    MPV 10.3 6.0 - 12.0 fL    Platelets 212 140 - 450 10*3/mm3     Neutrophil % 74.4 42.7 - 76.0 %    Lymphocyte % 14.7 (L) 19.6 - 45.3 %    Monocyte % 9.6 5.0 - 12.0 %    Eosinophil % 0.6 0.3 - 6.2 %    Basophil % 0.5 0.0 - 1.5 %    Immature Grans % 0.2 0.0 - 0.5 %    Neutrophils, Absolute 7.46 (H) 1.70 - 7.00 10*3/mm3    Lymphocytes, Absolute 1.47 0.70 - 3.10 10*3/mm3    Monocytes, Absolute 0.96 (H) 0.10 - 0.90 10*3/mm3    Eosinophils, Absolute 0.06 0.00 - 0.40 10*3/mm3    Basophils, Absolute 0.05 0.00 - 0.20 10*3/mm3    Immature Grans, Absolute 0.02 0.00 - 0.05 10*3/mm3    nRBC 0.0 0.0 - 0.2 /100 WBC   Acetaminophen Level    Collection Time: 12/06/24  2:39 PM    Specimen: Blood   Result Value Ref Range    Acetaminophen <5.0 0.0 - 30.0 mcg/mL   Ethanol    Collection Time: 12/06/24  2:39 PM    Specimen: Blood   Result Value Ref Range    Ethanol <10 0 - 10 mg/dL   Salicylate Level    Collection Time: 12/06/24  2:39 PM    Specimen: Blood   Result Value Ref Range    Salicylate <0.3 <=30.0 mg/dL   Urinalysis With Culture If Indicated - Straight Cath    Collection Time: 12/06/24  3:45 PM    Specimen: Straight Cath; Urine   Result Value Ref Range    Color, UA Yellow Yellow, Straw    Appearance, UA Clear Clear    pH, UA 7.5 5.0 - 8.0    Specific Gravity, UA 1.009 1.001 - 1.030    Glucose, UA Negative Negative    Ketones, UA Negative Negative    Bilirubin, UA Negative Negative    Blood, UA Negative Negative    Protein, UA Negative Negative    Leuk Esterase, UA Negative Negative    Nitrite, UA Negative Negative    Urobilinogen, UA 1.0 E.U./dL 0.2 - 1.0 E.U./dL   Urine Drug Screen - Straight Cath    Collection Time: 12/06/24  3:46 PM    Specimen: Straight Cath; Urine   Result Value Ref Range    THC, Screen, Urine Negative Negative    Phencyclidine (PCP), Urine Negative Negative    Cocaine Screen, Urine Negative Negative    Methamphetamine, Ur Negative Negative    Opiate Screen Negative Negative    Amphetamine Screen, Urine Negative Negative    Benzodiazepine Screen, Urine Negative Negative     Tricyclic Antidepressants Screen Negative Negative    Methadone Screen, Urine Negative Negative    Barbiturates Screen, Urine Negative Negative    Oxycodone Screen, Urine Negative Negative    Buprenorphine, Screen, Urine Negative Negative   Fentanyl, Urine - Straight Cath    Collection Time: 12/06/24  3:46 PM    Specimen: Straight Cath; Urine   Result Value Ref Range    Fentanyl, Urine Negative Negative   ECG 12 Lead Other; gen weakness    Collection Time: 12/06/24  3:50 PM   Result Value Ref Range    QT Interval 354 ms    QTC Interval 463 ms     Note: In addition to lab results from this visit, the labs listed above may include labs taken at another facility or during a different encounter within the last 24 hours. Please correlate lab times with ED admission and discharge times for further clarification of the services performed during this visit.    XR Ankle 3+ View Right   Final Result   Impression:   1.Stable appearance of the ORIF of the right ankle. No evidence of acute bony ankle trauma. Soft tissue swelling and ankle joint effusion are noted.   2.Moderate first MTP joint DJD.   3.No evidence of acute or healing trauma of the right foot. Consider follow-up imaging if the patient's symptoms persist or worsen.            Electronically Signed: Jose J Ramírez MD     12/6/2024 5:33 PM EST     Workstation ID: [id number]      XR Foot 3+ View Right   Final Result   Impression:   1.Stable appearance of the ORIF of the right ankle. No evidence of acute bony ankle trauma. Soft tissue swelling and ankle joint effusion are noted.   2.Moderate first MTP joint DJD.   3.No evidence of acute or healing trauma of the right foot. Consider follow-up imaging if the patient's symptoms persist or worsen.            Electronically Signed: Jose J Ramírez MD     12/6/2024 5:33 PM EST     Workstation ID: [id number]      XR Chest 1 View   Final Result   Impression:   No acute cardiopulmonary findings.            Electronically Signed: Dusty  MD Leelee     12/6/2024 3:34 PM EST     Workstation ID: [id number]      CT Head Without Contrast   Final Result   Age-related changes of the brain as above, otherwise without evidence of acute intracranial abnormality.            Electronically Signed: Adriano Kessler MD     12/6/2024 3:23 PM EST     Workstation ID: [id number]        Vitals:    12/06/24 1554 12/06/24 1600 12/06/24 1630 12/06/24 1700   BP:  112/83 134/87 145/69   BP Location:       Patient Position:       Pulse: 101 101 99 104   Resp:       Temp:       TempSrc:       SpO2: 97% 96% 93% 94%   Weight:       Height:         Medications   sodium chloride 0.9 % bolus 500 mL (0 mL Intravenous Stopped 12/6/24 1832)     ECG/EMG Results (last 24 hours)       Procedure Component Value Units Date/Time    ECG 12 Lead Other; gen weakness [[id number]] Collected: 12/06/24 1550     Updated: 12/06/24 1552     QT Interval 354 ms      QTC Interval 463 ms     Narrative:      Test Reason : Other~  Blood Pressure :   */*   mmHG  Vent. Rate : 103 BPM     Atrial Rate : 103 BPM     P-R Int : 140 ms          QRS Dur :  78 ms      QT Int : 354 ms       P-R-T Axes :  13  37  20 degrees    QTcB Int : 463 ms    Sinus tachycardia  Cannot rule out Anterior infarct , age undetermined  Abnormal ECG  When compared with ECG of 12-Nov-2024 13:00,  No significant change was found    Referred By: EDMD           Confirmed By:           ECG 12 Lead Other; gen weakness   Preliminary Result   Test Reason : Other~   Blood Pressure :   */*   mmHG   Vent. Rate : 103 BPM     Atrial Rate : 103 BPM      P-R Int : 140 ms          QRS Dur :  78 ms       QT Int : 354 ms       P-R-T Axes :  13  37  20 degrees     QTcB Int : 463 ms      Sinus tachycardia   Cannot rule out Anterior infarct , age undetermined   Abnormal ECG   When compared with ECG of 12-Nov-2024 13:00,   No significant change was found      Referred By: EDMD           Confirmed By:                     Medical Decision Making      Final  diagnoses:   Generalized weakness       ED Disposition  ED Disposition       ED Disposition   Discharge    Condition   Stable    Comment   --               [facility],   [address] [address]  [phone]    In 1 week           Medication List      No changes were made to your prescriptions during this visit.            Jhoan Abdul MD  12/06/24 8736

## 2024-12-07 NOTE — CASE COMMUNICATION
"Patient was seen for a skilled nursing initial eval on 12/6/24 for medication education and management. Upon arrival, patient was inconsolable, stating she didn't want to go back to the hospital. She states she fell in the kitchen the night before while trying to feed her dog and had to crawl from the kitchen to the living room couch which is where she was found when RN arrived at 1pm. Patient states she saw OT earlier this day but didn 't do much other than being assisted to the BSC right beside the couch. Patient was incontinent of urine and her couch and clothing was saturated. Patient did not have any clean clothes within reach and what was clean was upstairs in the bedroom and she stated none of them fit anyway. RN assisted patient to the bedside commode, placed all of her dirty clothes in the washer upstairs and was able to find a pair of shorts and tshirt for he r to wear in preparation for going to the ED as patient was unable to care for herself, had not taken her medications in 2 days, was completely unaware of what medications to take, and could not get up off the couch by herself. While patient was trying to find care for her dog, RN was able to do complete medication reconciliation and got rid of old meds, combined duplicate meds and put everything together in a bag. After further convers ation, patient repeatedly said she \"had no one\" and the people who normally helped take her dog for walks was out of state for an unforeseeable amount of time. She was agreeable to going to the ED for a higher level of care. RN called EMS and stayed until patient left. RN also called report to ED prior to patient arrival.     "

## 2024-12-07 NOTE — CASE COMMUNICATION
patient called the on-call nurse line stating she could not stand up to get her medications in the kitchen. Patient was seen and treated in the ED yesterday for generalized weakness and sent back home. Does not have caregivers in the home.She says she has her bedside commode close to the couch that she sleeps on and has her walker for transfers. RN that is working today is going to go by and re-attempt a SN inital eval visit and help kenny donahue set up mediplanner. Patient is agreeable. She says she has her bedside commode close to the couch that she sleeps on and has her walker for transfers.

## 2024-12-08 VITALS
HEART RATE: 104 BPM | RESPIRATION RATE: 16 BRPM | SYSTOLIC BLOOD PRESSURE: 140 MMHG | OXYGEN SATURATION: 98 % | DIASTOLIC BLOOD PRESSURE: 82 MMHG | TEMPERATURE: 98 F

## 2024-12-08 NOTE — HOME HEALTH
"SOC Note:     Patient lives alone with her large dog in a two story condo with the shower and bedroom upstairs. so she primarily resides on the couch on the main level as she is extremely fearful of negotiation the stairs and adamantly declines attempts. Self limiting behaviors noted with marked fear of any type of stair negotiatons.     Home Health ordered for: disciplines PT, OT, MSW, SN    Reason for Hosp/Primary Dx/Co-morbidities: Patient s/p return home following acute hospitalization secondary to acute episodes of orthostatic hypotension, falls and generalized weakness.    Focus of Care: PT to focus on dynamic standing balance, gait technique, decreased falls risk/frequency.    Patient's goal(s):\"I want to be able to get all the way up the stairs to get to my shower and bedroom.\"    Current Functional status/mobility/DME: RW    HB status/Living Arrangements: Patient lives alone in a two story condo with her dog.     Skin Integrity/wound status: WFL per patient report    Code Status: Full Code    Fall Risk/Safety concerns: Ongoing falls risk/history    Educated on Emergency Plan, steps to take prior to going to the ER and when to Call Home Health First: patient education completed.      Medication issues/Concerns: patient reports confusion over medication management, therefore, SN order obtained.       SDOH Barriers (i.e. caregiver concerns, social isolation, transportation, food insecurity, environment, income etc.)/Need for MSW: patient would benefit from an MSW evaluation focused on qualification for aide services in her home."

## 2024-12-09 ENCOUNTER — HOME CARE VISIT (OUTPATIENT)
Dept: HOME HEALTH SERVICES | Facility: HOME HEALTHCARE | Age: 64
End: 2024-12-09
Payer: COMMERCIAL

## 2024-12-09 VITALS
TEMPERATURE: 97.5 F | SYSTOLIC BLOOD PRESSURE: 114 MMHG | DIASTOLIC BLOOD PRESSURE: 78 MMHG | HEART RATE: 89 BPM | OXYGEN SATURATION: 97 % | RESPIRATION RATE: 16 BRPM

## 2024-12-09 VITALS
HEART RATE: 89 BPM | OXYGEN SATURATION: 97 % | TEMPERATURE: 97.5 F | RESPIRATION RATE: 16 BRPM | SYSTOLIC BLOOD PRESSURE: 114 MMHG | DIASTOLIC BLOOD PRESSURE: 78 MMHG

## 2024-12-09 LAB
QT INTERVAL: 354 MS
QTC INTERVAL: 463 MS

## 2024-12-09 PROCEDURE — G0151 HHCP-SERV OF PT,EA 15 MIN: HCPCS

## 2024-12-09 PROCEDURE — G0495 RN CARE TRAIN/EDU IN HH: HCPCS

## 2024-12-10 ENCOUNTER — HOME CARE VISIT (OUTPATIENT)
Dept: HOME HEALTH SERVICES | Facility: HOME HEALTHCARE | Age: 64
End: 2024-12-10
Payer: COMMERCIAL

## 2024-12-10 DIAGNOSIS — F41.1 GENERALIZED ANXIETY DISORDER: Primary | ICD-10-CM

## 2024-12-10 PROCEDURE — G0155 HHCP-SVS OF CSW,EA 15 MIN: HCPCS

## 2024-12-10 NOTE — HOME HEALTH
Met with Ms. Dvais who I know from previous visits.  She is now in a new [facility] that she says she likes much better.  However, her full bathroom and bedroom are upstairs so she is currently only able to be on the first floor.  She is sleeping on the couch.  There is a half bath down there and she is taking sponge baths.  She has medicaid waiver services in place and the caregiver was there today.  She has them for 6 hours per week and they clean, run errands, fix meals, and whatever else she needs during that time.  She said she can also use them for transportation in addition to this as long as she gives them a week's notice.  She is eligible to use [employer] [facility] transportation as well but she prefers not to because she said she has had to wait too long for them to pick her up in the past.  She said she also has friends who will take her to appointments if needed.  She said she is taking her medications as prescribed.  She said she is feeling better but is still weak.  She said she knows she has to be patient and she is doing the exercises that PT has given her when they aren't there.  She was able to go to the bathroom and walk around the apartment with her walker while I was there.  She denies any recent falls.  The HH  is organizing her mediplanner but she is hoping to be able to do this herself before HH discharges.  The waiver caregiver said she can help with this if needed by reminding her of what to take and what to fill for each day.  Ms. Davis is agreeable to a referral to the senior  program.  Referral completed.  She would also like a telehealth appointment for behavioral health to help with some depression.  Message sent to PCP to request the order.  The waiver services are provided through [employer].  She said she has a living will but cannot find it currently because she hasn't totally unpacked everything from moving.  Discussed that HH can help with completing a new  one if needed.  She said she will let me know.  She said she may need to update it anyway because the healthcare surrogates she appointed are her aunt and uncle who live in [place] and they are in their 90's.  She has a brother in [place] but they haven't talked in 4 years and she doesn't plan to contact him.  She has my number to call if needed.  She also needs some paperwork to be faxed to the [employer] office but couldn't find it today.  She said she will let me know and I will follow up on this as needed.  Provided her with resources.  Discussed home delivered meals and she will follow up on this and also [employer] alert and she said she thinks she needs this and will ask her insurance if they help with covering the cost.  If not, she has the list of options I provided and she said she will follow up on this.  She denies further needs or concerns at this time.

## 2024-12-11 ENCOUNTER — HOME CARE VISIT (OUTPATIENT)
Dept: HOME HEALTH SERVICES | Facility: HOME HEALTHCARE | Age: 64
End: 2024-12-11
Payer: COMMERCIAL

## 2024-12-11 VITALS
TEMPERATURE: 97.9 F | RESPIRATION RATE: 16 BRPM | DIASTOLIC BLOOD PRESSURE: 85 MMHG | SYSTOLIC BLOOD PRESSURE: 121 MMHG | OXYGEN SATURATION: 95 % | HEART RATE: 82 BPM

## 2024-12-11 PROCEDURE — G0152 HHCP-SERV OF OT,EA 15 MIN: HCPCS

## 2024-12-12 ENCOUNTER — HOME CARE VISIT (OUTPATIENT)
Dept: HOME HEALTH SERVICES | Facility: HOME HEALTHCARE | Age: 64
End: 2024-12-12
Payer: COMMERCIAL

## 2024-12-12 VITALS
RESPIRATION RATE: 16 BRPM | HEART RATE: 91 BPM | TEMPERATURE: 97.8 F | DIASTOLIC BLOOD PRESSURE: 60 MMHG | OXYGEN SATURATION: 96 % | SYSTOLIC BLOOD PRESSURE: 98 MMHG

## 2024-12-12 PROCEDURE — G0151 HHCP-SERV OF PT,EA 15 MIN: HCPCS

## 2024-12-13 ENCOUNTER — HOME CARE VISIT (OUTPATIENT)
Dept: HOME HEALTH SERVICES | Facility: HOME HEALTHCARE | Age: 64
End: 2024-12-13
Payer: COMMERCIAL

## 2024-12-13 PROCEDURE — G0299 HHS/HOSPICE OF RN EA 15 MIN: HCPCS

## 2024-12-14 VITALS
SYSTOLIC BLOOD PRESSURE: 130 MMHG | DIASTOLIC BLOOD PRESSURE: 76 MMHG | RESPIRATION RATE: 18 BRPM | HEART RATE: 78 BPM | OXYGEN SATURATION: 98 % | TEMPERATURE: 98.1 F

## 2024-12-14 NOTE — CASE COMMUNICATION
Patient was prescribed midodrine at [facility] for orthostasis, the instructions are unclear and patient does not have a BP cuff in the home. instructions states (3) 5mg tablets in the morning and at night but then also states to take (1) 5mg tablet in the morning. What do you recommend she do? She is not taking it at this time due to the conflicting instructions. Her BP has been stable during our visits, but unsure of what she runs while we a re not here.    Thanks,  DANDY Lopez

## 2024-12-16 ENCOUNTER — TELEPHONE (OUTPATIENT)
Dept: PULMONOLOGY | Facility: CLINIC | Age: 64
End: 2024-12-16

## 2024-12-16 DIAGNOSIS — F51.04 PSYCHOPHYSIOLOGICAL INSOMNIA: Primary | ICD-10-CM

## 2024-12-16 RX ORDER — ESZOPICLONE 1 MG/1
2 TABLET, FILM COATED ORAL NIGHTLY
Qty: 30 TABLET | Refills: 1 | Status: SHIPPED | OUTPATIENT
Start: 2024-12-16

## 2024-12-16 RX ORDER — MIDODRINE HYDROCHLORIDE 5 MG/1
5 TABLET ORAL
Status: SHIPPED
Start: 2024-12-16

## 2024-12-16 NOTE — TELEPHONE ENCOUNTER
Caller: Philomena Davis    Relationship to patient: Self    Best call back number: [phone]     Patient is needing: PT IS WANTING TO INCREASE HER MEDS YOU GAVE HER. SHE SAID YOU TOLD HER TO CALL BACK IF THE DOSE SHE IS ON IS NOT ENOUGH    ESZOPIZLONE 1 MG        [id number] - [place], [place] - [address] AT [facility] [address] - [phone] PH - [phone] FX  [address] [place] [address]  Phone: [phone]  Fax: [phone]

## 2024-12-17 ENCOUNTER — HOME CARE VISIT (OUTPATIENT)
Dept: HOME HEALTH SERVICES | Facility: HOME HEALTHCARE | Age: 64
End: 2024-12-17
Payer: COMMERCIAL

## 2024-12-17 ENCOUNTER — TELEPHONE (OUTPATIENT)
Dept: SLEEP MEDICINE | Age: 64
End: 2024-12-17
Payer: COMMERCIAL

## 2024-12-17 VITALS
SYSTOLIC BLOOD PRESSURE: 122 MMHG | HEART RATE: 91 BPM | TEMPERATURE: 97.4 F | DIASTOLIC BLOOD PRESSURE: 66 MMHG | OXYGEN SATURATION: 97 % | RESPIRATION RATE: 16 BRPM

## 2024-12-17 PROCEDURE — G0151 HHCP-SERV OF PT,EA 15 MIN: HCPCS

## 2024-12-17 NOTE — TELEPHONE ENCOUNTER
PATIENT CALLED REGARDING HER LUNESTA, SHE SAID THAT ANNE INCREASED THE MEDICATION TO 1MG 2X A DAY, BUT SHE DOES NOT HAVE ENOUGH TO TAKE 2 A DAY AND HER INSURANCE WILL NOT REFILL IT UNTIL THE END OF THE MONTH.

## 2024-12-18 ENCOUNTER — HOME CARE VISIT (OUTPATIENT)
Dept: HOME HEALTH SERVICES | Facility: HOME HEALTHCARE | Age: 64
End: 2024-12-18
Payer: COMMERCIAL

## 2024-12-18 VITALS
HEART RATE: 77 BPM | SYSTOLIC BLOOD PRESSURE: 113 MMHG | RESPIRATION RATE: 16 BRPM | DIASTOLIC BLOOD PRESSURE: 77 MMHG | TEMPERATURE: 98 F | OXYGEN SATURATION: 96 %

## 2024-12-18 PROCEDURE — G0152 HHCP-SERV OF OT,EA 15 MIN: HCPCS

## 2024-12-19 ENCOUNTER — HOME CARE VISIT (OUTPATIENT)
Dept: HOME HEALTH SERVICES | Facility: HOME HEALTHCARE | Age: 64
End: 2024-12-19
Payer: COMMERCIAL

## 2024-12-19 VITALS
OXYGEN SATURATION: 96 % | DIASTOLIC BLOOD PRESSURE: 70 MMHG | RESPIRATION RATE: 16 BRPM | HEART RATE: 81 BPM | TEMPERATURE: 97.1 F | SYSTOLIC BLOOD PRESSURE: 116 MMHG

## 2024-12-19 PROCEDURE — G0151 HHCP-SERV OF PT,EA 15 MIN: HCPCS

## 2024-12-20 ENCOUNTER — HOME CARE VISIT (OUTPATIENT)
Dept: HOME HEALTH SERVICES | Facility: HOME HEALTHCARE | Age: 64
End: 2024-12-20
Payer: COMMERCIAL

## 2024-12-20 PROCEDURE — G0299 HHS/HOSPICE OF RN EA 15 MIN: HCPCS

## 2024-12-21 VITALS
SYSTOLIC BLOOD PRESSURE: 132 MMHG | DIASTOLIC BLOOD PRESSURE: 75 MMHG | OXYGEN SATURATION: 96 % | HEART RATE: 75 BPM | RESPIRATION RATE: 18 BRPM | TEMPERATURE: 97.8 F

## 2024-12-26 ENCOUNTER — HOME CARE VISIT (OUTPATIENT)
Dept: HOME HEALTH SERVICES | Facility: HOME HEALTHCARE | Age: 64
End: 2024-12-26
Payer: COMMERCIAL

## 2024-12-27 ENCOUNTER — HOME CARE VISIT (OUTPATIENT)
Dept: HOME HEALTH SERVICES | Facility: HOME HEALTHCARE | Age: 64
End: 2024-12-27
Payer: COMMERCIAL

## 2024-12-27 NOTE — CASE COMMUNICATION
Patient missed a visit from [facility] 12/27/24  Reason: pt req. cancel      For your records only.   Per CMS Guidance, MD must be notified of missed/cancelled visits; therefore the prescribed frequency was not met.

## 2024-12-28 ENCOUNTER — HOME CARE VISIT (OUTPATIENT)
Dept: HOME HEALTH SERVICES | Facility: HOME HEALTHCARE | Age: 64
End: 2024-12-28
Payer: COMMERCIAL

## 2024-12-28 VITALS
SYSTOLIC BLOOD PRESSURE: 112 MMHG | RESPIRATION RATE: 16 BRPM | TEMPERATURE: 97.2 F | DIASTOLIC BLOOD PRESSURE: 78 MMHG | OXYGEN SATURATION: 97 % | HEART RATE: 76 BPM

## 2024-12-28 PROCEDURE — G0151 HHCP-SERV OF PT,EA 15 MIN: HCPCS

## 2024-12-30 ENCOUNTER — HOME CARE VISIT (OUTPATIENT)
Dept: HOME HEALTH SERVICES | Facility: HOME HEALTHCARE | Age: 64
End: 2024-12-30
Payer: COMMERCIAL

## 2024-12-30 ENCOUNTER — TELEPHONE (OUTPATIENT)
Dept: PULMONOLOGY | Facility: CLINIC | Age: 64
End: 2024-12-30
Payer: COMMERCIAL

## 2024-12-30 VITALS
TEMPERATURE: 97.1 F | DIASTOLIC BLOOD PRESSURE: 70 MMHG | HEART RATE: 73 BPM | RESPIRATION RATE: 16 BRPM | OXYGEN SATURATION: 98 % | SYSTOLIC BLOOD PRESSURE: 104 MMHG

## 2024-12-30 DIAGNOSIS — F51.04 PSYCHOPHYSIOLOGICAL INSOMNIA: Primary | ICD-10-CM

## 2024-12-30 PROCEDURE — G0151 HHCP-SERV OF PT,EA 15 MIN: HCPCS

## 2024-12-30 RX ORDER — ESZOPICLONE 2 MG/1
2 TABLET, FILM COATED ORAL NIGHTLY
Qty: 30 TABLET | Refills: 2 | Status: SHIPPED | OUTPATIENT
Start: 2024-12-30 | End: 2025-03-30

## 2024-12-30 NOTE — TELEPHONE ENCOUNTER
Caller: Philomena Davis    Relationship: Self    Best call back number:  [phone]     Which medication are you concerned about: BJ     Who prescribed you this medication: CHANDNI    When did you start taking this medication: 2 MONTHS    What are your concerns: INS WILL NOT PAY FOR THE 2 TABLETS NIGHTLY THEY WANT A SCRIPT FOR 0.2MG  INS WILL NOT PAY FOR 0.1 X2 THEY WANT A 0.2 SCRIPT    How long have you had these concerns:     PLEASE CALL PT ASAP AND LET HER KNOW THAT THIS HAS BEEN DONE

## 2024-12-31 ENCOUNTER — HOME CARE VISIT (OUTPATIENT)
Dept: HOME HEALTH SERVICES | Facility: HOME HEALTHCARE | Age: 64
End: 2024-12-31
Payer: COMMERCIAL

## 2024-12-31 VITALS
OXYGEN SATURATION: 100 % | TEMPERATURE: 98.6 F | HEART RATE: 81 BPM | DIASTOLIC BLOOD PRESSURE: 76 MMHG | RESPIRATION RATE: 16 BRPM | SYSTOLIC BLOOD PRESSURE: 115 MMHG

## 2024-12-31 PROCEDURE — G0299 HHS/HOSPICE OF RN EA 15 MIN: HCPCS

## 2024-12-31 PROCEDURE — G0152 HHCP-SERV OF OT,EA 15 MIN: HCPCS

## 2025-01-01 ENCOUNTER — APPOINTMENT (OUTPATIENT)
Dept: CT IMAGING | Facility: HOSPITAL | Age: 65
End: 2025-01-01
Payer: COMMERCIAL

## 2025-01-01 ENCOUNTER — APPOINTMENT (OUTPATIENT)
Dept: GENERAL RADIOLOGY | Facility: HOSPITAL | Age: 65
End: 2025-01-01
Payer: COMMERCIAL

## 2025-01-01 ENCOUNTER — APPOINTMENT (OUTPATIENT)
Dept: INTERVENTIONAL RADIOLOGY/VASCULAR | Facility: HOSPITAL | Age: 65
End: 2025-01-01
Payer: COMMERCIAL

## 2025-01-01 ENCOUNTER — TELEPHONE (OUTPATIENT)
Dept: ORTHOPEDIC SURGERY | Facility: CLINIC | Age: 65
End: 2025-01-01

## 2025-01-01 ENCOUNTER — APPOINTMENT (OUTPATIENT)
Dept: CARDIOLOGY | Facility: HOSPITAL | Age: 65
End: 2025-01-01
Payer: COMMERCIAL

## 2025-01-01 ENCOUNTER — HOSPITAL ENCOUNTER (INPATIENT)
Facility: HOSPITAL | Age: 65
LOS: 12 days | End: 2025-04-16
Attending: EMERGENCY MEDICINE | Admitting: INTERNAL MEDICINE
Payer: COMMERCIAL

## 2025-01-01 VITALS
SYSTOLIC BLOOD PRESSURE: 105 MMHG | TEMPERATURE: 97.6 F | HEART RATE: 76 BPM | RESPIRATION RATE: 18 BRPM | OXYGEN SATURATION: 95 % | DIASTOLIC BLOOD PRESSURE: 65 MMHG

## 2025-01-01 VITALS
OXYGEN SATURATION: 96 % | HEART RATE: 97 BPM | HEIGHT: 65 IN | TEMPERATURE: 97 F | RESPIRATION RATE: 18 BRPM | WEIGHT: 184.3 LBS | SYSTOLIC BLOOD PRESSURE: 105 MMHG | DIASTOLIC BLOOD PRESSURE: 45 MMHG | BODY MASS INDEX: 30.71 KG/M2

## 2025-01-01 DIAGNOSIS — N39.0 ACUTE UTI (URINARY TRACT INFECTION): ICD-10-CM

## 2025-01-01 DIAGNOSIS — D72.829 LEUKOCYTOSIS, UNSPECIFIED TYPE: ICD-10-CM

## 2025-01-01 DIAGNOSIS — N39.0 SEPSIS SECONDARY TO UTI: ICD-10-CM

## 2025-01-01 DIAGNOSIS — A41.9 SEPSIS SECONDARY TO UTI: ICD-10-CM

## 2025-01-01 DIAGNOSIS — J18.9 PNEUMONIA OF LEFT LOWER LOBE DUE TO INFECTIOUS ORGANISM: ICD-10-CM

## 2025-01-01 DIAGNOSIS — N30.00 ACUTE CYSTITIS WITHOUT HEMATURIA: Primary | ICD-10-CM

## 2025-01-01 DIAGNOSIS — D64.9 ANEMIA, UNSPECIFIED TYPE: ICD-10-CM

## 2025-01-01 DIAGNOSIS — K74.60 HEPATIC CIRRHOSIS, UNSPECIFIED HEPATIC CIRRHOSIS TYPE, UNSPECIFIED WHETHER ASCITES PRESENT: ICD-10-CM

## 2025-01-01 DIAGNOSIS — J96.01 ACUTE RESPIRATORY FAILURE WITH HYPOXIA: ICD-10-CM

## 2025-01-01 DIAGNOSIS — N17.9 ACUTE KIDNEY INJURY: ICD-10-CM

## 2025-01-01 LAB
ABO GROUP BLD: NORMAL
ABO GROUP BLD: NORMAL
ALBUMIN SERPL-MCNC: 2.5 G/DL (ref 3.5–5.2)
ALBUMIN SERPL-MCNC: 2.6 G/DL (ref 3.5–5.2)
ALBUMIN SERPL-MCNC: 2.8 G/DL (ref 3.5–5.2)
ALBUMIN SERPL-MCNC: 2.8 G/DL (ref 3.5–5.2)
ALBUMIN SERPL-MCNC: 2.9 G/DL (ref 3.5–5.2)
ALBUMIN SERPL-MCNC: 2.9 G/DL (ref 3.5–5.2)
ALBUMIN SERPL-MCNC: 3 G/DL (ref 3.5–5.2)
ALBUMIN SERPL-MCNC: 3.1 G/DL (ref 3.5–5.2)
ALBUMIN SERPL-MCNC: 3.2 G/DL (ref 3.5–5.2)
ALBUMIN SERPL-MCNC: 3.3 G/DL (ref 3.5–5.2)
ALBUMIN SERPL-MCNC: 3.4 G/DL (ref 3.5–5.2)
ALBUMIN SERPL-MCNC: 3.6 G/DL (ref 3.5–5.2)
ALBUMIN SERPL-MCNC: 3.7 G/DL (ref 3.5–5.2)
ALBUMIN SERPL-MCNC: 3.7 G/DL (ref 3.5–5.2)
ALBUMIN SERPL-MCNC: 3.8 G/DL (ref 3.5–5.2)
ALBUMIN/GLOB SERPL: 0.7 G/DL
ALBUMIN/GLOB SERPL: 0.7 G/DL
ALBUMIN/GLOB SERPL: 1.1 G/DL
ALBUMIN/GLOB SERPL: 1.2 G/DL
ALBUMIN/GLOB SERPL: 1.3 G/DL
ALP SERPL-CCNC: 101 U/L (ref 39–117)
ALP SERPL-CCNC: 104 U/L (ref 39–117)
ALP SERPL-CCNC: 142 U/L (ref 39–117)
ALP SERPL-CCNC: 79 U/L (ref 39–117)
ALP SERPL-CCNC: 84 U/L (ref 39–117)
ALP SERPL-CCNC: 85 U/L (ref 39–117)
ALP SERPL-CCNC: 86 U/L (ref 39–117)
ALP SERPL-CCNC: 88 U/L (ref 39–117)
ALP SERPL-CCNC: 93 U/L (ref 39–117)
ALPHA-FETOPROTEIN: 4.52 NG/ML (ref 0–8.3)
ALT SERPL W P-5'-P-CCNC: 32 U/L (ref 1–33)
ALT SERPL W P-5'-P-CCNC: 35 U/L (ref 1–33)
ALT SERPL W P-5'-P-CCNC: 36 U/L (ref 1–33)
ALT SERPL W P-5'-P-CCNC: 44 U/L (ref 1–33)
ALT SERPL W P-5'-P-CCNC: 51 U/L (ref 1–33)
ALT SERPL W P-5'-P-CCNC: 61 U/L (ref 1–33)
ALT SERPL W P-5'-P-CCNC: 77 U/L (ref 1–33)
ALT SERPL W P-5'-P-CCNC: 81 U/L (ref 1–33)
ALT SERPL W P-5'-P-CCNC: 97 U/L (ref 1–33)
AMMONIA BLD-SCNC: 38 UMOL/L (ref 11–51)
AMMONIA BLD-SCNC: 38 UMOL/L (ref 11–51)
AMMONIA BLD-SCNC: 51 UMOL/L (ref 11–51)
ANION GAP SERPL CALCULATED.3IONS-SCNC: 12 MMOL/L (ref 5–15)
ANION GAP SERPL CALCULATED.3IONS-SCNC: 13 MMOL/L (ref 5–15)
ANION GAP SERPL CALCULATED.3IONS-SCNC: 13 MMOL/L (ref 5–15)
ANION GAP SERPL CALCULATED.3IONS-SCNC: 14 MMOL/L (ref 5–15)
ANION GAP SERPL CALCULATED.3IONS-SCNC: 15 MMOL/L (ref 5–15)
ANION GAP SERPL CALCULATED.3IONS-SCNC: 16 MMOL/L (ref 5–15)
ANION GAP SERPL CALCULATED.3IONS-SCNC: 17 MMOL/L (ref 5–15)
ANION GAP SERPL CALCULATED.3IONS-SCNC: 18 MMOL/L (ref 5–15)
ANION GAP SERPL CALCULATED.3IONS-SCNC: 8 MMOL/L (ref 5–15)
ANION GAP SERPL CALCULATED.3IONS-SCNC: 8 MMOL/L (ref 5–15)
AORTIC DIMENSIONLESS INDEX: 0.72 (DI)
ARTERIAL PATENCY WRIST A: ABNORMAL
ARTERIAL PATENCY WRIST A: POSITIVE
ASCENDING AORTA: 3.4 CM
AST SERPL-CCNC: 152 U/L (ref 1–32)
AST SERPL-CCNC: 154 U/L (ref 1–32)
AST SERPL-CCNC: 159 U/L (ref 1–32)
AST SERPL-CCNC: 173 U/L (ref 1–32)
AST SERPL-CCNC: 174 U/L (ref 1–32)
AST SERPL-CCNC: 186 U/L (ref 1–32)
AST SERPL-CCNC: 224 U/L (ref 1–32)
AST SERPL-CCNC: 250 U/L (ref 1–32)
AST SERPL-CCNC: 295 U/L (ref 1–32)
ATMOSPHERIC PRESS: ABNORMAL MM[HG]
AV MEAN PRESS GRAD SYS DOP V1V2: 9.4 MMHG
AV VMAX SYS DOP: 210 CM/SEC
B PARAPERT DNA SPEC QL NAA+PROBE: NOT DETECTED
B PERT DNA SPEC QL NAA+PROBE: NOT DETECTED
BACTERIA SPEC AEROBE CULT: ABNORMAL
BACTERIA SPEC AEROBE CULT: NORMAL
BACTERIA SPEC AEROBE CULT: NORMAL
BACTERIA SPEC RESP CULT: NO GROWTH
BACTERIA SPEC RESP CULT: NORMAL
BACTERIA UR QL AUTO: ABNORMAL /HPF
BACTERIA UR QL AUTO: ABNORMAL /HPF
BASE EXCESS BLDA CALC-SCNC: -0.9 MMOL/L (ref 0–2)
BASE EXCESS BLDA CALC-SCNC: -1.7 MMOL/L (ref 0–2)
BASE EXCESS BLDA CALC-SCNC: -2.1 MMOL/L (ref 0–2)
BASE EXCESS BLDA CALC-SCNC: -2.3 MMOL/L (ref 0–2)
BASE EXCESS BLDA CALC-SCNC: -3 MMOL/L (ref 0–2)
BASE EXCESS BLDA CALC-SCNC: -3 MMOL/L (ref 0–2)
BASE EXCESS BLDA CALC-SCNC: -5.7 MMOL/L (ref 0–2)
BASE EXCESS BLDA CALC-SCNC: -6.8 MMOL/L (ref 0–2)
BASE EXCESS BLDA CALC-SCNC: -6.9 MMOL/L (ref 0–2)
BASOPHILS # BLD AUTO: 0.03 10*3/MM3 (ref 0–0.2)
BASOPHILS # BLD AUTO: 0.04 10*3/MM3 (ref 0–0.2)
BASOPHILS # BLD AUTO: 0.07 10*3/MM3 (ref 0–0.2)
BASOPHILS # BLD AUTO: 0.09 10*3/MM3 (ref 0–0.2)
BASOPHILS # BLD AUTO: 0.11 10*3/MM3 (ref 0–0.2)
BASOPHILS NFR BLD AUTO: 0.1 % (ref 0–1.5)
BASOPHILS NFR BLD AUTO: 0.1 % (ref 0–1.5)
BASOPHILS NFR BLD AUTO: 0.2 % (ref 0–1.5)
BASOPHILS NFR BLD AUTO: 0.3 % (ref 0–1.5)
BASOPHILS NFR BLD AUTO: 0.4 % (ref 0–1.5)
BASOPHILS NFR BLD AUTO: 0.5 % (ref 0–1.5)
BDY SITE: ABNORMAL
BH BB BLOOD EXPIRATION DATE: NORMAL
BH BB BLOOD EXPIRATION DATE: NORMAL
BH BB BLOOD TYPE BARCODE: 5100
BH BB BLOOD TYPE BARCODE: 9500
BH BB DISPENSE STATUS: NORMAL
BH BB DISPENSE STATUS: NORMAL
BH BB PRODUCT CODE: NORMAL
BH BB PRODUCT CODE: NORMAL
BH BB UNIT NUMBER: NORMAL
BH BB UNIT NUMBER: NORMAL
BH CV ECHO MEAS - 2D AUTO EF SIEMENS: 63.4 %
BH CV ECHO MEAS - AO MAX PG: 17.6 MMHG
BH CV ECHO MEAS - AO ROOT DIAM: 3 CM
BH CV ECHO MEAS - AO V2 VTI: 42.2 CM
BH CV ECHO MEAS - AVA(I,D): 2.27 CM2
BH CV ECHO MEAS - IVS/LVPW: 1 CM
BH CV ECHO MEAS - IVSD: 0.9 CM
BH CV ECHO MEAS - LA DIMENSION: 4.8 CM
BH CV ECHO MEAS - LAT PEAK E' VEL: 11.2 CM/SEC
BH CV ECHO MEAS - LV MAX PG: 7.2 MMHG
BH CV ECHO MEAS - LV MEAN PG: 4.1 MMHG
BH CV ECHO MEAS - LV V1 MAX: 134.4 CM/SEC
BH CV ECHO MEAS - LV V1 VTI: 30.4 CM
BH CV ECHO MEAS - LVIDD: 5.2 CM
BH CV ECHO MEAS - LVIDS: 3 CM
BH CV ECHO MEAS - LVOT AREA: 3.1 CM2
BH CV ECHO MEAS - LVOT DIAM: 2 CM
BH CV ECHO MEAS - LVPWD: 0.9 CM
BH CV ECHO MEAS - MED PEAK E' VEL: 14.6 CM/SEC
BH CV ECHO MEAS - MR MAX PG: 67.5 MMHG
BH CV ECHO MEAS - MR MAX VEL: 410.9 CM/SEC
BH CV ECHO MEAS - MR MEAN PG: 45.7 MMHG
BH CV ECHO MEAS - MR VTI: 123.9 CM
BH CV ECHO MEAS - MV A MAX VEL: 112.5 CM/SEC
BH CV ECHO MEAS - MV DEC SLOPE: 473.2 CM/SEC2
BH CV ECHO MEAS - MV E MAX VEL: 153.6 CM/SEC
BH CV ECHO MEAS - MV E/A: 1.37
BH CV ECHO MEAS - MV MAX PG: 8.7 MMHG
BH CV ECHO MEAS - MV MEAN PG: 5.2 MMHG
BH CV ECHO MEAS - MV P1/2T: 99 MSEC
BH CV ECHO MEAS - MV V2 VTI: 33.9 CM
BH CV ECHO MEAS - MVA(P1/2T): 2.2 CM2
BH CV ECHO MEAS - MVA(VTI): 2.8 CM2
BH CV ECHO MEAS - PA ACC TIME: 0.12 SEC
BH CV ECHO MEAS - RAP SYSTOLE: 15 MMHG
BH CV ECHO MEAS - RVSP: 47 MMHG
BH CV ECHO MEAS - SV(LVOT): 95.5 ML
BH CV ECHO MEAS - SVI(LVOT): 48.4 ML/M2
BH CV ECHO MEAS - TAPSE (>1.6): 2.7 CM
BH CV ECHO MEAS - TR MAX PG: 31.5 MMHG
BH CV ECHO MEAS - TR MAX VEL: 280.5 CM/SEC
BH CV ECHO MEASUREMENTS AVERAGE E/E' RATIO: 11.91
BH CV XLRA - RV BASE: 4.1 CM
BH CV XLRA - RV LENGTH: 7.9 CM
BH CV XLRA - RV MID: 2.8 CM
BH CV XLRA - TDI S': 17.9 CM/SEC
BILIRUB CONJ SERPL-MCNC: 4.3 MG/DL (ref 0–0.3)
BILIRUB CONJ SERPL-MCNC: 5.8 MG/DL (ref 0–0.3)
BILIRUB INDIRECT SERPL-MCNC: 2.3 MG/DL
BILIRUB INDIRECT SERPL-MCNC: 2.8 MG/DL
BILIRUB SERPL-MCNC: 6.6 MG/DL (ref 0–1.2)
BILIRUB SERPL-MCNC: 6.7 MG/DL (ref 0–1.2)
BILIRUB SERPL-MCNC: 6.7 MG/DL (ref 0–1.2)
BILIRUB SERPL-MCNC: 6.9 MG/DL (ref 0–1.2)
BILIRUB SERPL-MCNC: 7.5 MG/DL (ref 0–1.2)
BILIRUB SERPL-MCNC: 7.6 MG/DL (ref 0–1.2)
BILIRUB SERPL-MCNC: 8.2 MG/DL (ref 0–1.2)
BILIRUB SERPL-MCNC: 8.5 MG/DL (ref 0–1.2)
BILIRUB SERPL-MCNC: 8.6 MG/DL (ref 0–1.2)
BILIRUB UR QL STRIP: ABNORMAL
BILIRUB UR QL STRIP: ABNORMAL
BLD GP AB SCN SERPL QL: NEGATIVE
BLD GP AB SCN SERPL QL: NEGATIVE
BODY TEMPERATURE: 37
BUN SERPL-MCNC: 13 MG/DL (ref 8–23)
BUN SERPL-MCNC: 14 MG/DL (ref 8–23)
BUN SERPL-MCNC: 14 MG/DL (ref 8–23)
BUN SERPL-MCNC: 15 MG/DL (ref 8–23)
BUN SERPL-MCNC: 16 MG/DL (ref 8–23)
BUN SERPL-MCNC: 17 MG/DL (ref 8–23)
BUN SERPL-MCNC: 17 MG/DL (ref 8–23)
BUN SERPL-MCNC: 18 MG/DL (ref 8–23)
BUN SERPL-MCNC: 19 MG/DL (ref 8–23)
BUN SERPL-MCNC: 20 MG/DL (ref 8–23)
BUN SERPL-MCNC: 21 MG/DL (ref 8–23)
BUN SERPL-MCNC: 22 MG/DL (ref 8–23)
BUN SERPL-MCNC: 22 MG/DL (ref 8–23)
BUN SERPL-MCNC: 28 MG/DL (ref 8–23)
BUN SERPL-MCNC: 28 MG/DL (ref 8–23)
BUN SERPL-MCNC: 31 MG/DL (ref 8–23)
BUN SERPL-MCNC: 38 MG/DL (ref 8–23)
BUN SERPL-MCNC: 38 MG/DL (ref 8–23)
BUN SERPL-MCNC: 45 MG/DL (ref 8–23)
BUN SERPL-MCNC: 47 MG/DL (ref 8–23)
BUN/CREAT SERPL: 12.6 (ref 7–25)
BUN/CREAT SERPL: 13.6 (ref 7–25)
BUN/CREAT SERPL: 13.6 (ref 7–25)
BUN/CREAT SERPL: 14 (ref 7–25)
BUN/CREAT SERPL: 15.2 (ref 7–25)
BUN/CREAT SERPL: 16.1 (ref 7–25)
BUN/CREAT SERPL: 16.7 (ref 7–25)
BUN/CREAT SERPL: 17.3 (ref 7–25)
BUN/CREAT SERPL: 17.6 (ref 7–25)
BUN/CREAT SERPL: 18.1 (ref 7–25)
BUN/CREAT SERPL: 18.2 (ref 7–25)
BUN/CREAT SERPL: 18.3 (ref 7–25)
BUN/CREAT SERPL: 18.3 (ref 7–25)
BUN/CREAT SERPL: 18.9 (ref 7–25)
BUN/CREAT SERPL: 20.5 (ref 7–25)
BUN/CREAT SERPL: 21.7 (ref 7–25)
BUN/CREAT SERPL: 22.8 (ref 7–25)
BUN/CREAT SERPL: 26.9 (ref 7–25)
BUN/CREAT SERPL: 28.8 (ref 7–25)
BUN/CREAT SERPL: 30 (ref 7–25)
BUN/CREAT SERPL: 34 (ref 7–25)
BUN/CREAT SERPL: 37 (ref 7–25)
BUN/CREAT SERPL: 39.1 (ref 7–25)
BUN/CREAT SERPL: 41.5 (ref 7–25)
C PNEUM DNA NPH QL NAA+NON-PROBE: NOT DETECTED
CA-I SERPL ISE-MCNC: 0.89 MMOL/L (ref 1.15–1.3)
CA-I SERPL ISE-MCNC: 1.02 MMOL/L (ref 1.15–1.3)
CA-I SERPL ISE-MCNC: 1.02 MMOL/L (ref 1.15–1.3)
CA-I SERPL ISE-MCNC: 1.05 MMOL/L (ref 1.15–1.3)
CA-I SERPL ISE-MCNC: 1.05 MMOL/L (ref 1.15–1.3)
CA-I SERPL ISE-MCNC: 1.06 MMOL/L (ref 1.15–1.3)
CA-I SERPL ISE-MCNC: 1.07 MMOL/L (ref 1.15–1.3)
CA-I SERPL ISE-MCNC: 1.07 MMOL/L (ref 1.15–1.3)
CA-I SERPL ISE-MCNC: 1.08 MMOL/L (ref 1.15–1.3)
CA-I SERPL ISE-MCNC: 1.09 MMOL/L (ref 1.15–1.3)
CA-I SERPL ISE-MCNC: 1.1 MMOL/L (ref 1.15–1.3)
CA-I SERPL ISE-MCNC: 1.11 MMOL/L (ref 1.15–1.3)
CA-I SERPL ISE-MCNC: 1.12 MMOL/L (ref 1.15–1.3)
CA-I SERPL ISE-MCNC: 1.12 MMOL/L (ref 1.15–1.3)
CALCIUM SPEC-SCNC: 7 MG/DL (ref 8.6–10.5)
CALCIUM SPEC-SCNC: 7.7 MG/DL (ref 8.6–10.5)
CALCIUM SPEC-SCNC: 7.9 MG/DL (ref 8.6–10.5)
CALCIUM SPEC-SCNC: 8 MG/DL (ref 8.6–10.5)
CALCIUM SPEC-SCNC: 8.1 MG/DL (ref 8.6–10.5)
CALCIUM SPEC-SCNC: 8.3 MG/DL (ref 8.6–10.5)
CALCIUM SPEC-SCNC: 8.4 MG/DL (ref 8.6–10.5)
CALCIUM SPEC-SCNC: 8.4 MG/DL (ref 8.6–10.5)
CALCIUM SPEC-SCNC: 8.5 MG/DL (ref 8.6–10.5)
CALCIUM SPEC-SCNC: 8.6 MG/DL (ref 8.6–10.5)
CALCIUM SPEC-SCNC: 8.7 MG/DL (ref 8.6–10.5)
CALCIUM SPEC-SCNC: 8.8 MG/DL (ref 8.6–10.5)
CALCIUM SPEC-SCNC: 9.1 MG/DL (ref 8.6–10.5)
CHLORIDE SERPL-SCNC: 100 MMOL/L (ref 98–107)
CHLORIDE SERPL-SCNC: 101 MMOL/L (ref 98–107)
CHLORIDE SERPL-SCNC: 102 MMOL/L (ref 98–107)
CHLORIDE SERPL-SCNC: 103 MMOL/L (ref 98–107)
CHLORIDE SERPL-SCNC: 104 MMOL/L (ref 98–107)
CHLORIDE SERPL-SCNC: 105 MMOL/L (ref 98–107)
CHLORIDE SERPL-SCNC: 106 MMOL/L (ref 98–107)
CHLORIDE SERPL-SCNC: 107 MMOL/L (ref 98–107)
CHLORIDE SERPL-SCNC: 108 MMOL/L (ref 98–107)
CLARITY UR: ABNORMAL
CLARITY UR: ABNORMAL
CO2 BLDA-SCNC: 21.3 MMOL/L (ref 22–33)
CO2 BLDA-SCNC: 21.6 MMOL/L (ref 22–33)
CO2 BLDA-SCNC: 21.6 MMOL/L (ref 22–33)
CO2 BLDA-SCNC: 23 MMOL/L (ref 22–33)
CO2 BLDA-SCNC: 23 MMOL/L (ref 22–33)
CO2 BLDA-SCNC: 23.2 MMOL/L (ref 22–33)
CO2 BLDA-SCNC: 23.7 MMOL/L (ref 22–33)
CO2 BLDA-SCNC: 24.2 MMOL/L (ref 22–33)
CO2 BLDA-SCNC: 25.2 MMOL/L (ref 22–33)
CO2 SERPL-SCNC: 16 MMOL/L (ref 22–29)
CO2 SERPL-SCNC: 17 MMOL/L (ref 22–29)
CO2 SERPL-SCNC: 18 MMOL/L (ref 22–29)
CO2 SERPL-SCNC: 19 MMOL/L (ref 22–29)
CO2 SERPL-SCNC: 20 MMOL/L (ref 22–29)
CO2 SERPL-SCNC: 22 MMOL/L (ref 22–29)
CO2 SERPL-SCNC: 24 MMOL/L (ref 22–29)
COD CRY URNS QL: ABNORMAL /HPF
COHGB MFR BLD: 0.9 % (ref 0–2)
COHGB MFR BLD: 1 % (ref 0–2)
COHGB MFR BLD: 1.1 % (ref 0–2)
COHGB MFR BLD: 1.4 % (ref 0–2)
COHGB MFR BLD: 1.5 % (ref 0–2)
COHGB MFR BLD: 1.7 % (ref 0–2)
COHGB MFR BLD: 1.7 % (ref 0–2)
COHGB MFR BLD: 1.8 % (ref 0–2)
COHGB MFR BLD: 1.9 % (ref 0–2)
COLOR UR: ABNORMAL
COLOR UR: ABNORMAL
CORTIS SERPL-MCNC: 6.47 MCG/DL
CREAT SERPL-MCNC: 0.46 MG/DL (ref 0.57–1)
CREAT SERPL-MCNC: 0.46 MG/DL (ref 0.57–1)
CREAT SERPL-MCNC: 0.47 MG/DL (ref 0.57–1)
CREAT SERPL-MCNC: 0.5 MG/DL (ref 0.57–1)
CREAT SERPL-MCNC: 0.52 MG/DL (ref 0.57–1)
CREAT SERPL-MCNC: 0.53 MG/DL (ref 0.57–1)
CREAT SERPL-MCNC: 0.57 MG/DL (ref 0.57–1)
CREAT SERPL-MCNC: 0.59 MG/DL (ref 0.57–1)
CREAT SERPL-MCNC: 0.69 MG/DL (ref 0.57–1)
CREAT SERPL-MCNC: 0.78 MG/DL (ref 0.57–1)
CREAT SERPL-MCNC: 0.82 MG/DL (ref 0.57–1)
CREAT SERPL-MCNC: 0.91 MG/DL (ref 0.57–1)
CREAT SERPL-MCNC: 1.05 MG/DL (ref 0.57–1)
CREAT SERPL-MCNC: 1.06 MG/DL (ref 0.57–1)
CREAT SERPL-MCNC: 1.1 MG/DL (ref 0.57–1)
CREAT SERPL-MCNC: 1.11 MG/DL (ref 0.57–1)
CREAT SERPL-MCNC: 1.26 MG/DL (ref 0.57–1)
CREAT SERPL-MCNC: 1.4 MG/DL (ref 0.57–1)
CREAT SERPL-MCNC: 1.57 MG/DL (ref 0.57–1)
CREAT SERPL-MCNC: 1.62 MG/DL (ref 0.57–1)
CREAT SERPL-MCNC: 1.62 MG/DL (ref 0.57–1)
CREAT SERPL-MCNC: 1.93 MG/DL (ref 0.57–1)
CREAT SERPL-MCNC: 2.08 MG/DL (ref 0.57–1)
CREAT SERPL-MCNC: 2.1 MG/DL (ref 0.57–1)
CREAT SERPL-MCNC: 2.58 MG/DL (ref 0.57–1)
CREAT SERPL-MCNC: 2.6 MG/DL (ref 0.57–1)
CREAT UR-MCNC: 79.1 MG/DL
CROSSMATCH INTERPRETATION: NORMAL
CROSSMATCH INTERPRETATION: NORMAL
D-LACTATE SERPL-SCNC: 1.3 MMOL/L (ref 0.5–2)
D-LACTATE SERPL-SCNC: 2 MMOL/L (ref 0.5–2)
DEPRECATED RDW RBC AUTO: 73.6 FL (ref 37–54)
DEPRECATED RDW RBC AUTO: 74.8 FL (ref 37–54)
DEPRECATED RDW RBC AUTO: 75.4 FL (ref 37–54)
DEPRECATED RDW RBC AUTO: 76 FL (ref 37–54)
DEPRECATED RDW RBC AUTO: 76.8 FL (ref 37–54)
DEPRECATED RDW RBC AUTO: 77.5 FL (ref 37–54)
DEPRECATED RDW RBC AUTO: 78.8 FL (ref 37–54)
DEPRECATED RDW RBC AUTO: 78.9 FL (ref 37–54)
DEPRECATED RDW RBC AUTO: 79.2 FL (ref 37–54)
DEPRECATED RDW RBC AUTO: 81 FL (ref 37–54)
DEPRECATED RDW RBC AUTO: 81.5 FL (ref 37–54)
DEPRECATED RDW RBC AUTO: 82.3 FL (ref 37–54)
DEPRECATED RDW RBC AUTO: 83.7 FL (ref 37–54)
DEPRECATED RDW RBC AUTO: 84.1 FL (ref 37–54)
EGFRCR SERPLBLD CKD-EPI 2021: 102 ML/MIN/1.73
EGFRCR SERPLBLD CKD-EPI 2021: 102.9 ML/MIN/1.73
EGFRCR SERPLBLD CKD-EPI 2021: 104.7 ML/MIN/1.73
EGFRCR SERPLBLD CKD-EPI 2021: 105.2 ML/MIN/1.73
EGFRCR SERPLBLD CKD-EPI 2021: 106.2 ML/MIN/1.73
EGFRCR SERPLBLD CKD-EPI 2021: 107.8 ML/MIN/1.73
EGFRCR SERPLBLD CKD-EPI 2021: 108.4 ML/MIN/1.73
EGFRCR SERPLBLD CKD-EPI 2021: 108.4 ML/MIN/1.73
EGFRCR SERPLBLD CKD-EPI 2021: 20.3 ML/MIN/1.73
EGFRCR SERPLBLD CKD-EPI 2021: 20.5 ML/MIN/1.73
EGFRCR SERPLBLD CKD-EPI 2021: 26.2 ML/MIN/1.73
EGFRCR SERPLBLD CKD-EPI 2021: 26.5 ML/MIN/1.73
EGFRCR SERPLBLD CKD-EPI 2021: 29 ML/MIN/1.73
EGFRCR SERPLBLD CKD-EPI 2021: 35.8 ML/MIN/1.73
EGFRCR SERPLBLD CKD-EPI 2021: 35.8 ML/MIN/1.73
EGFRCR SERPLBLD CKD-EPI 2021: 37.1 ML/MIN/1.73
EGFRCR SERPLBLD CKD-EPI 2021: 42.6 ML/MIN/1.73
EGFRCR SERPLBLD CKD-EPI 2021: 48.4 ML/MIN/1.73
EGFRCR SERPLBLD CKD-EPI 2021: 56.3 ML/MIN/1.73
EGFRCR SERPLBLD CKD-EPI 2021: 56.9 ML/MIN/1.73
EGFRCR SERPLBLD CKD-EPI 2021: 59.5 ML/MIN/1.73
EGFRCR SERPLBLD CKD-EPI 2021: 60.2 ML/MIN/1.73
EGFRCR SERPLBLD CKD-EPI 2021: 71.5 ML/MIN/1.73
EGFRCR SERPLBLD CKD-EPI 2021: 81 ML/MIN/1.73
EGFRCR SERPLBLD CKD-EPI 2021: 86 ML/MIN/1.73
EGFRCR SERPLBLD CKD-EPI 2021: 98.3 ML/MIN/1.73
EOSINOPHIL # BLD AUTO: 0 10*3/MM3 (ref 0–0.4)
EOSINOPHIL # BLD AUTO: 0 10*3/MM3 (ref 0–0.4)
EOSINOPHIL # BLD AUTO: 0.01 10*3/MM3 (ref 0–0.4)
EOSINOPHIL # BLD AUTO: 0.03 10*3/MM3 (ref 0–0.4)
EOSINOPHIL # BLD AUTO: 0.08 10*3/MM3 (ref 0–0.4)
EOSINOPHIL # BLD AUTO: 0.14 10*3/MM3 (ref 0–0.4)
EOSINOPHIL # BLD AUTO: 0.14 10*3/MM3 (ref 0–0.4)
EOSINOPHIL # BLD AUTO: 0.19 10*3/MM3 (ref 0–0.4)
EOSINOPHIL NFR BLD AUTO: 0 % (ref 0.3–6.2)
EOSINOPHIL NFR BLD AUTO: 0.2 % (ref 0.3–6.2)
EOSINOPHIL NFR BLD AUTO: 0.3 % (ref 0.3–6.2)
EOSINOPHIL NFR BLD AUTO: 0.8 % (ref 0.3–6.2)
EOSINOPHIL NFR BLD AUTO: 0.8 % (ref 0.3–6.2)
EOSINOPHIL NFR BLD AUTO: 1 % (ref 0.3–6.2)
EPAP: 0
ERYTHROCYTE [DISTWIDTH] IN BLOOD BY AUTOMATED COUNT: 19.7 % (ref 12.3–15.4)
ERYTHROCYTE [DISTWIDTH] IN BLOOD BY AUTOMATED COUNT: 19.9 % (ref 12.3–15.4)
ERYTHROCYTE [DISTWIDTH] IN BLOOD BY AUTOMATED COUNT: 20 % (ref 12.3–15.4)
ERYTHROCYTE [DISTWIDTH] IN BLOOD BY AUTOMATED COUNT: 20 % (ref 12.3–15.4)
ERYTHROCYTE [DISTWIDTH] IN BLOOD BY AUTOMATED COUNT: 20.2 % (ref 12.3–15.4)
ERYTHROCYTE [DISTWIDTH] IN BLOOD BY AUTOMATED COUNT: 20.4 % (ref 12.3–15.4)
ERYTHROCYTE [DISTWIDTH] IN BLOOD BY AUTOMATED COUNT: 20.5 % (ref 12.3–15.4)
ERYTHROCYTE [DISTWIDTH] IN BLOOD BY AUTOMATED COUNT: 20.6 % (ref 12.3–15.4)
ERYTHROCYTE [DISTWIDTH] IN BLOOD BY AUTOMATED COUNT: 20.6 % (ref 12.3–15.4)
ERYTHROCYTE [DISTWIDTH] IN BLOOD BY AUTOMATED COUNT: 20.8 % (ref 12.3–15.4)
ERYTHROCYTE [DISTWIDTH] IN BLOOD BY AUTOMATED COUNT: 21.1 % (ref 12.3–15.4)
ERYTHROCYTE [DISTWIDTH] IN BLOOD BY AUTOMATED COUNT: 21.2 % (ref 12.3–15.4)
FERRITIN SERPL-MCNC: 926.4 NG/ML (ref 13–150)
FIBRINOGEN PPP-MCNC: 214 MG/DL (ref 203–567)
FIBRINOGEN PPP-MCNC: 275 MG/DL (ref 203–567)
FLUAV SUBTYP SPEC NAA+PROBE: NOT DETECTED
FLUBV RNA ISLT QL NAA+PROBE: NOT DETECTED
FOLATE SERPL-MCNC: >20 NG/ML (ref 4.78–24.2)
FSP PPP LA-ACNC: ABNORMAL
GLOBULIN UR ELPH-MCNC: 2.9 GM/DL
GLOBULIN UR ELPH-MCNC: 3.2 GM/DL
GLOBULIN UR ELPH-MCNC: 3.3 GM/DL
GLOBULIN UR ELPH-MCNC: 3.3 GM/DL
GLOBULIN UR ELPH-MCNC: 3.4 GM/DL
GLOBULIN UR ELPH-MCNC: 3.4 GM/DL
GLOBULIN UR ELPH-MCNC: 3.7 GM/DL
GLUCOSE BLDC GLUCOMTR-MCNC: 110 MG/DL (ref 70–130)
GLUCOSE BLDC GLUCOMTR-MCNC: 111 MG/DL (ref 70–130)
GLUCOSE BLDC GLUCOMTR-MCNC: 113 MG/DL (ref 70–130)
GLUCOSE BLDC GLUCOMTR-MCNC: 114 MG/DL (ref 70–130)
GLUCOSE BLDC GLUCOMTR-MCNC: 114 MG/DL (ref 70–130)
GLUCOSE BLDC GLUCOMTR-MCNC: 117 MG/DL (ref 70–130)
GLUCOSE BLDC GLUCOMTR-MCNC: 117 MG/DL (ref 70–130)
GLUCOSE BLDC GLUCOMTR-MCNC: 118 MG/DL (ref 70–130)
GLUCOSE BLDC GLUCOMTR-MCNC: 120 MG/DL (ref 70–130)
GLUCOSE BLDC GLUCOMTR-MCNC: 122 MG/DL (ref 70–130)
GLUCOSE BLDC GLUCOMTR-MCNC: 123 MG/DL (ref 70–130)
GLUCOSE BLDC GLUCOMTR-MCNC: 128 MG/DL (ref 70–130)
GLUCOSE BLDC GLUCOMTR-MCNC: 129 MG/DL (ref 70–130)
GLUCOSE BLDC GLUCOMTR-MCNC: 132 MG/DL (ref 70–130)
GLUCOSE BLDC GLUCOMTR-MCNC: 139 MG/DL (ref 70–130)
GLUCOSE BLDC GLUCOMTR-MCNC: 68 MG/DL (ref 70–130)
GLUCOSE BLDC GLUCOMTR-MCNC: 71 MG/DL (ref 70–130)
GLUCOSE BLDC GLUCOMTR-MCNC: 76 MG/DL (ref 70–130)
GLUCOSE BLDC GLUCOMTR-MCNC: 79 MG/DL (ref 70–130)
GLUCOSE BLDC GLUCOMTR-MCNC: 85 MG/DL (ref 70–130)
GLUCOSE BLDC GLUCOMTR-MCNC: 86 MG/DL (ref 70–130)
GLUCOSE BLDC GLUCOMTR-MCNC: 87 MG/DL (ref 70–130)
GLUCOSE BLDC GLUCOMTR-MCNC: 87 MG/DL (ref 70–130)
GLUCOSE BLDC GLUCOMTR-MCNC: 99 MG/DL (ref 70–130)
GLUCOSE SERPL-MCNC: 102 MG/DL (ref 65–99)
GLUCOSE SERPL-MCNC: 103 MG/DL (ref 65–99)
GLUCOSE SERPL-MCNC: 104 MG/DL (ref 65–99)
GLUCOSE SERPL-MCNC: 104 MG/DL (ref 65–99)
GLUCOSE SERPL-MCNC: 105 MG/DL (ref 65–99)
GLUCOSE SERPL-MCNC: 106 MG/DL (ref 65–99)
GLUCOSE SERPL-MCNC: 110 MG/DL (ref 65–99)
GLUCOSE SERPL-MCNC: 114 MG/DL (ref 65–99)
GLUCOSE SERPL-MCNC: 115 MG/DL (ref 65–99)
GLUCOSE SERPL-MCNC: 115 MG/DL (ref 65–99)
GLUCOSE SERPL-MCNC: 121 MG/DL (ref 65–99)
GLUCOSE SERPL-MCNC: 122 MG/DL (ref 65–99)
GLUCOSE SERPL-MCNC: 139 MG/DL (ref 65–99)
GLUCOSE SERPL-MCNC: 146 MG/DL (ref 65–99)
GLUCOSE SERPL-MCNC: 148 MG/DL (ref 65–99)
GLUCOSE SERPL-MCNC: 174 MG/DL (ref 65–99)
GLUCOSE SERPL-MCNC: 71 MG/DL (ref 65–99)
GLUCOSE SERPL-MCNC: 77 MG/DL (ref 65–99)
GLUCOSE SERPL-MCNC: 85 MG/DL (ref 65–99)
GLUCOSE SERPL-MCNC: 85 MG/DL (ref 65–99)
GLUCOSE SERPL-MCNC: 86 MG/DL (ref 65–99)
GLUCOSE SERPL-MCNC: 95 MG/DL (ref 65–99)
GLUCOSE SERPL-MCNC: 97 MG/DL (ref 65–99)
GLUCOSE SERPL-MCNC: 97 MG/DL (ref 65–99)
GLUCOSE UR STRIP-MCNC: NEGATIVE MG/DL
GLUCOSE UR STRIP-MCNC: NEGATIVE MG/DL
GRAM STN SPEC: NORMAL
GRAN CASTS URNS QL MICRO: ABNORMAL /LPF
HADV DNA SPEC NAA+PROBE: NOT DETECTED
HCO3 BLDA-SCNC: 19.9 MMOL/L (ref 20–26)
HCO3 BLDA-SCNC: 20.2 MMOL/L (ref 20–26)
HCO3 BLDA-SCNC: 20.3 MMOL/L (ref 20–26)
HCO3 BLDA-SCNC: 21.9 MMOL/L (ref 20–26)
HCO3 BLDA-SCNC: 22 MMOL/L (ref 20–26)
HCO3 BLDA-SCNC: 22 MMOL/L (ref 20–26)
HCO3 BLDA-SCNC: 22.5 MMOL/L (ref 20–26)
HCO3 BLDA-SCNC: 23 MMOL/L (ref 20–26)
HCO3 BLDA-SCNC: 24 MMOL/L (ref 20–26)
HCOV 229E RNA SPEC QL NAA+PROBE: NOT DETECTED
HCOV HKU1 RNA SPEC QL NAA+PROBE: NOT DETECTED
HCOV NL63 RNA SPEC QL NAA+PROBE: NOT DETECTED
HCOV OC43 RNA SPEC QL NAA+PROBE: NOT DETECTED
HCT VFR BLD AUTO: 19.2 % (ref 34–46.6)
HCT VFR BLD AUTO: 20.9 % (ref 34–46.6)
HCT VFR BLD AUTO: 21.2 % (ref 34–46.6)
HCT VFR BLD AUTO: 21.8 % (ref 34–46.6)
HCT VFR BLD AUTO: 22 % (ref 34–46.6)
HCT VFR BLD AUTO: 22.7 % (ref 34–46.6)
HCT VFR BLD AUTO: 22.7 % (ref 34–46.6)
HCT VFR BLD AUTO: 22.8 % (ref 34–46.6)
HCT VFR BLD AUTO: 22.8 % (ref 34–46.6)
HCT VFR BLD AUTO: 22.9 % (ref 34–46.6)
HCT VFR BLD AUTO: 23 % (ref 34–46.6)
HCT VFR BLD AUTO: 23.3 % (ref 34–46.6)
HCT VFR BLD AUTO: 23.4 % (ref 34–46.6)
HCT VFR BLD AUTO: 23.9 % (ref 34–46.6)
HCT VFR BLD AUTO: 25.1 % (ref 34–46.6)
HCT VFR BLD AUTO: 25.1 % (ref 34–46.6)
HCT VFR BLD AUTO: 25.4 % (ref 34–46.6)
HCT VFR BLD AUTO: 25.9 % (ref 34–46.6)
HCT VFR BLD AUTO: 26 % (ref 34–46.6)
HCT VFR BLD AUTO: 26.2 % (ref 34–46.6)
HCT VFR BLD CALC: 22.8 % (ref 38–51)
HCT VFR BLD CALC: 23.3 % (ref 38–51)
HCT VFR BLD CALC: 23.5 % (ref 38–51)
HCT VFR BLD CALC: 23.5 % (ref 38–51)
HCT VFR BLD CALC: 23.8 % (ref 38–51)
HCT VFR BLD CALC: 24.1 % (ref 38–51)
HCT VFR BLD CALC: 24.2 % (ref 38–51)
HCT VFR BLD CALC: 24.6 % (ref 38–51)
HCT VFR BLD CALC: 24.6 % (ref 38–51)
HEMOCCULT STL QL: POSITIVE
HGB BLD-MCNC: 6.3 G/DL (ref 12–15.9)
HGB BLD-MCNC: 6.9 G/DL (ref 12–15.9)
HGB BLD-MCNC: 7 G/DL (ref 12–15.9)
HGB BLD-MCNC: 7.1 G/DL (ref 12–15.9)
HGB BLD-MCNC: 7.3 G/DL (ref 12–15.9)
HGB BLD-MCNC: 7.4 G/DL (ref 12–15.9)
HGB BLD-MCNC: 7.6 G/DL (ref 12–15.9)
HGB BLD-MCNC: 7.7 G/DL (ref 12–15.9)
HGB BLD-MCNC: 8 G/DL (ref 12–15.9)
HGB BLD-MCNC: 8.1 G/DL (ref 12–15.9)
HGB BLD-MCNC: 8.2 G/DL (ref 12–15.9)
HGB BLD-MCNC: 8.3 G/DL (ref 12–15.9)
HGB BLD-MCNC: 8.4 G/DL (ref 12–15.9)
HGB BLD-MCNC: 8.5 G/DL (ref 12–15.9)
HGB BLDA-MCNC: 7.4 G/DL (ref 14–18)
HGB BLDA-MCNC: 7.6 G/DL (ref 14–18)
HGB BLDA-MCNC: 7.7 G/DL (ref 14–18)
HGB BLDA-MCNC: 7.9 G/DL (ref 14–18)
HGB BLDA-MCNC: 7.9 G/DL (ref 14–18)
HGB BLDA-MCNC: 8 G/DL (ref 14–18)
HGB BLDA-MCNC: 8 G/DL (ref 14–18)
HGB UR QL STRIP.AUTO: ABNORMAL
HGB UR QL STRIP.AUTO: ABNORMAL
HMPV RNA NPH QL NAA+NON-PROBE: NOT DETECTED
HOLD SPECIMEN: NORMAL
HPIV1 RNA ISLT QL NAA+PROBE: NOT DETECTED
HPIV2 RNA SPEC QL NAA+PROBE: NOT DETECTED
HPIV3 RNA NPH QL NAA+PROBE: NOT DETECTED
HPIV4 P GENE NPH QL NAA+PROBE: NOT DETECTED
HYALINE CASTS UR QL AUTO: ABNORMAL /LPF
HYALINE CASTS UR QL AUTO: ABNORMAL /LPF
IMM GRANULOCYTES # BLD AUTO: 0.1 10*3/MM3 (ref 0–0.05)
IMM GRANULOCYTES # BLD AUTO: 0.12 10*3/MM3 (ref 0–0.05)
IMM GRANULOCYTES # BLD AUTO: 0.16 10*3/MM3 (ref 0–0.05)
IMM GRANULOCYTES # BLD AUTO: 0.32 10*3/MM3 (ref 0–0.05)
IMM GRANULOCYTES # BLD AUTO: 0.39 10*3/MM3 (ref 0–0.05)
IMM GRANULOCYTES # BLD AUTO: 0.4 10*3/MM3 (ref 0–0.05)
IMM GRANULOCYTES # BLD AUTO: 0.81 10*3/MM3 (ref 0–0.05)
IMM GRANULOCYTES # BLD AUTO: 1.01 10*3/MM3 (ref 0–0.05)
IMM GRANULOCYTES NFR BLD AUTO: 0.7 % (ref 0–0.5)
IMM GRANULOCYTES NFR BLD AUTO: 1.6 % (ref 0–0.5)
IMM GRANULOCYTES NFR BLD AUTO: 1.7 % (ref 0–0.5)
IMM GRANULOCYTES NFR BLD AUTO: 1.9 % (ref 0–0.5)
IMM GRANULOCYTES NFR BLD AUTO: 2.9 % (ref 0–0.5)
IMM GRANULOCYTES NFR BLD AUTO: 3.5 % (ref 0–0.5)
INHALED O2 CONCENTRATION: 35 %
INHALED O2 CONCENTRATION: 40 %
INHALED O2 CONCENTRATION: 70 %
INR PPP: 1.59 (ref 0.89–1.12)
INR PPP: 1.64 (ref 0.89–1.12)
INR PPP: 1.75 (ref 0.89–1.12)
INR PPP: 1.77 (ref 0.89–1.12)
INR PPP: 1.81 (ref 0.89–1.12)
INR PPP: 1.83 (ref 0.89–1.12)
IPAP: 0
IRON 24H UR-MRATE: 65 MCG/DL (ref 37–145)
IRON SATN MFR SERPL: 84 % (ref 20–50)
IVRT: 42 MS
KETONES UR QL STRIP: NEGATIVE
KETONES UR QL STRIP: NEGATIVE
L PNEUMO1 AG UR QL IA: NEGATIVE
LEFT ATRIUM VOLUME INDEX: 51.8 ML/M2
LEUKOCYTE ESTERASE UR QL STRIP.AUTO: ABNORMAL
LEUKOCYTE ESTERASE UR QL STRIP.AUTO: ABNORMAL
LIPASE SERPL-CCNC: 43 U/L (ref 13–60)
LYMPHOCYTES # BLD AUTO: 0.78 10*3/MM3 (ref 0.7–3.1)
LYMPHOCYTES # BLD AUTO: 0.9 10*3/MM3 (ref 0.7–3.1)
LYMPHOCYTES # BLD AUTO: 1.01 10*3/MM3 (ref 0.7–3.1)
LYMPHOCYTES # BLD AUTO: 1.08 10*3/MM3 (ref 0.7–3.1)
LYMPHOCYTES # BLD AUTO: 1.23 10*3/MM3 (ref 0.7–3.1)
LYMPHOCYTES # BLD AUTO: 1.37 10*3/MM3 (ref 0.7–3.1)
LYMPHOCYTES # BLD AUTO: 1.55 10*3/MM3 (ref 0.7–3.1)
LYMPHOCYTES # BLD AUTO: 1.61 10*3/MM3 (ref 0.7–3.1)
LYMPHOCYTES NFR BLD AUTO: 3.8 % (ref 19.6–45.3)
LYMPHOCYTES NFR BLD AUTO: 4 % (ref 19.6–45.3)
LYMPHOCYTES NFR BLD AUTO: 4.4 % (ref 19.6–45.3)
LYMPHOCYTES NFR BLD AUTO: 4.8 % (ref 19.6–45.3)
LYMPHOCYTES NFR BLD AUTO: 4.8 % (ref 19.6–45.3)
LYMPHOCYTES NFR BLD AUTO: 7 % (ref 19.6–45.3)
LYMPHOCYTES NFR BLD AUTO: 8 % (ref 19.6–45.3)
LYMPHOCYTES NFR BLD AUTO: 9.2 % (ref 19.6–45.3)
M PNEUMO IGG SER IA-ACNC: NOT DETECTED
MAGNESIUM SERPL-MCNC: 1.9 MG/DL (ref 1.6–2.4)
MAGNESIUM SERPL-MCNC: 2 MG/DL (ref 1.6–2.4)
MAGNESIUM SERPL-MCNC: 2.1 MG/DL (ref 1.6–2.4)
MAGNESIUM SERPL-MCNC: 2.2 MG/DL (ref 1.6–2.4)
MAGNESIUM SERPL-MCNC: 2.3 MG/DL (ref 1.6–2.4)
MAGNESIUM SERPL-MCNC: 2.4 MG/DL (ref 1.6–2.4)
MAGNESIUM SERPL-MCNC: 2.5 MG/DL (ref 1.6–2.4)
MAGNESIUM SERPL-MCNC: 2.5 MG/DL (ref 1.6–2.4)
MAGNESIUM SERPL-MCNC: 2.6 MG/DL (ref 1.6–2.4)
MCH RBC QN AUTO: 33.3 PG (ref 26.6–33)
MCH RBC QN AUTO: 33.5 PG (ref 26.6–33)
MCH RBC QN AUTO: 33.8 PG (ref 26.6–33)
MCH RBC QN AUTO: 34.1 PG (ref 26.6–33)
MCH RBC QN AUTO: 34.2 PG (ref 26.6–33)
MCH RBC QN AUTO: 34.3 PG (ref 26.6–33)
MCH RBC QN AUTO: 34.3 PG (ref 26.6–33)
MCH RBC QN AUTO: 34.4 PG (ref 26.6–33)
MCH RBC QN AUTO: 34.5 PG (ref 26.6–33)
MCH RBC QN AUTO: 34.7 PG (ref 26.6–33)
MCH RBC QN AUTO: 34.9 PG (ref 26.6–33)
MCH RBC QN AUTO: 34.9 PG (ref 26.6–33)
MCHC RBC AUTO-ENTMCNC: 31.7 G/DL (ref 31.5–35.7)
MCHC RBC AUTO-ENTMCNC: 31.7 G/DL (ref 31.5–35.7)
MCHC RBC AUTO-ENTMCNC: 31.9 G/DL (ref 31.5–35.7)
MCHC RBC AUTO-ENTMCNC: 32.3 G/DL (ref 31.5–35.7)
MCHC RBC AUTO-ENTMCNC: 32.5 G/DL (ref 31.5–35.7)
MCHC RBC AUTO-ENTMCNC: 32.6 G/DL (ref 31.5–35.7)
MCHC RBC AUTO-ENTMCNC: 32.8 G/DL (ref 31.5–35.7)
MCHC RBC AUTO-ENTMCNC: 33.1 G/DL (ref 31.5–35.7)
MCHC RBC AUTO-ENTMCNC: 33.2 G/DL (ref 31.5–35.7)
MCV RBC AUTO: 101.9 FL (ref 79–97)
MCV RBC AUTO: 102.7 FL (ref 79–97)
MCV RBC AUTO: 102.8 FL (ref 79–97)
MCV RBC AUTO: 103.3 FL (ref 79–97)
MCV RBC AUTO: 103.9 FL (ref 79–97)
MCV RBC AUTO: 104.3 FL (ref 79–97)
MCV RBC AUTO: 104.6 FL (ref 79–97)
MCV RBC AUTO: 106.4 FL (ref 79–97)
MCV RBC AUTO: 107.5 FL (ref 79–97)
MCV RBC AUTO: 107.7 FL (ref 79–97)
MCV RBC AUTO: 108 FL (ref 79–97)
MCV RBC AUTO: 108 FL (ref 79–97)
MCV RBC AUTO: 108.8 FL (ref 79–97)
MCV RBC AUTO: 110.2 FL (ref 79–97)
METHGB BLD QL: 0.3 % (ref 0–1.5)
METHGB BLD QL: 0.6 % (ref 0–1.5)
METHGB BLD QL: 0.6 % (ref 0–1.5)
METHGB BLD QL: 0.7 % (ref 0–1.5)
METHGB BLD QL: 0.8 % (ref 0–1.5)
METHGB BLD QL: 0.8 % (ref 0–1.5)
METHGB BLD QL: ABNORMAL
MODALITY: ABNORMAL
MONOCYTES # BLD AUTO: 1.37 10*3/MM3 (ref 0.1–0.9)
MONOCYTES # BLD AUTO: 1.46 10*3/MM3 (ref 0.1–0.9)
MONOCYTES # BLD AUTO: 2.09 10*3/MM3 (ref 0.1–0.9)
MONOCYTES # BLD AUTO: 2.26 10*3/MM3 (ref 0.1–0.9)
MONOCYTES # BLD AUTO: 2.38 10*3/MM3 (ref 0.1–0.9)
MONOCYTES # BLD AUTO: 2.4 10*3/MM3 (ref 0.1–0.9)
MONOCYTES # BLD AUTO: 2.8 10*3/MM3 (ref 0.1–0.9)
MONOCYTES # BLD AUTO: 2.9 10*3/MM3 (ref 0.1–0.9)
MONOCYTES NFR BLD AUTO: 10.1 % (ref 5–12)
MONOCYTES NFR BLD AUTO: 10.2 % (ref 5–12)
MONOCYTES NFR BLD AUTO: 10.5 % (ref 5–12)
MONOCYTES NFR BLD AUTO: 11.3 % (ref 5–12)
MONOCYTES NFR BLD AUTO: 11.6 % (ref 5–12)
MONOCYTES NFR BLD AUTO: 8.6 % (ref 5–12)
MONOCYTES NFR BLD AUTO: 9.1 % (ref 5–12)
MONOCYTES NFR BLD AUTO: 9.8 % (ref 5–12)
MRSA DNA SPEC QL NAA+PROBE: NEGATIVE
NEUTROPHILS NFR BLD AUTO: 10.76 10*3/MM3 (ref 1.7–7)
NEUTROPHILS NFR BLD AUTO: 13.54 10*3/MM3 (ref 1.7–7)
NEUTROPHILS NFR BLD AUTO: 16.09 10*3/MM3 (ref 1.7–7)
NEUTROPHILS NFR BLD AUTO: 17.19 10*3/MM3 (ref 1.7–7)
NEUTROPHILS NFR BLD AUTO: 18.87 10*3/MM3 (ref 1.7–7)
NEUTROPHILS NFR BLD AUTO: 20.12 10*3/MM3 (ref 1.7–7)
NEUTROPHILS NFR BLD AUTO: 22.63 10*3/MM3 (ref 1.7–7)
NEUTROPHILS NFR BLD AUTO: 23.41 10*3/MM3 (ref 1.7–7)
NEUTROPHILS NFR BLD AUTO: 79.9 % (ref 42.7–76)
NEUTROPHILS NFR BLD AUTO: 80.3 % (ref 42.7–76)
NEUTROPHILS NFR BLD AUTO: 81.6 % (ref 42.7–76)
NEUTROPHILS NFR BLD AUTO: 81.8 % (ref 42.7–76)
NEUTROPHILS NFR BLD AUTO: 81.9 % (ref 42.7–76)
NEUTROPHILS NFR BLD AUTO: 81.9 % (ref 42.7–76)
NEUTROPHILS NFR BLD AUTO: 82.4 % (ref 42.7–76)
NEUTROPHILS NFR BLD AUTO: 84.2 % (ref 42.7–76)
NITRITE UR QL STRIP: POSITIVE
NITRITE UR QL STRIP: POSITIVE
NRBC BLD AUTO-RTO: 0 /100 WBC (ref 0–0.2)
NRBC BLD AUTO-RTO: 0.1 /100 WBC (ref 0–0.2)
NT-PROBNP SERPL-MCNC: 4801 PG/ML (ref 0–900)
OXYHGB MFR BLDV: 84.6 % (ref 94–99)
OXYHGB MFR BLDV: 88.7 % (ref 94–99)
OXYHGB MFR BLDV: 93.1 % (ref 94–99)
OXYHGB MFR BLDV: 93.4 % (ref 94–99)
OXYHGB MFR BLDV: 94.2 % (ref 94–99)
OXYHGB MFR BLDV: 94.6 % (ref 94–99)
OXYHGB MFR BLDV: 95 % (ref 94–99)
OXYHGB MFR BLDV: 96.3 % (ref 94–99)
OXYHGB MFR BLDV: 96.8 % (ref 94–99)
PAW @ PEAK INSP FLOW SETTING VENT: 0 CMH2O
PCO2 BLDA: 33.6 MM HG (ref 35–45)
PCO2 BLDA: 37.4 MM HG (ref 35–45)
PCO2 BLDA: 37.5 MM HG (ref 35–45)
PCO2 BLDA: 37.9 MM HG (ref 35–45)
PCO2 BLDA: 38.1 MM HG (ref 35–45)
PCO2 BLDA: 39.7 MM HG (ref 35–45)
PCO2 BLDA: 41.7 MM HG (ref 35–45)
PCO2 BLDA: 45.2 MM HG (ref 35–45)
PCO2 BLDA: 47 MM HG (ref 35–45)
PCO2 TEMP ADJ BLD: 33.6 MM HG (ref 35–45)
PCO2 TEMP ADJ BLD: 37.4 MM HG (ref 35–45)
PCO2 TEMP ADJ BLD: 37.5 MM HG (ref 35–45)
PCO2 TEMP ADJ BLD: 37.9 MM HG (ref 35–45)
PCO2 TEMP ADJ BLD: 38.1 MM HG (ref 35–45)
PCO2 TEMP ADJ BLD: 39.7 MM HG (ref 35–45)
PCO2 TEMP ADJ BLD: 41.7 MM HG (ref 35–45)
PCO2 TEMP ADJ BLD: 45.2 MM HG (ref 35–45)
PCO2 TEMP ADJ BLD: 47 MM HG (ref 35–45)
PEEP RESPIRATORY: 10 CM[H2O]
PEEP RESPIRATORY: 5 CM[H2O]
PEEP RESPIRATORY: 8 CM[H2O]
PH BLDA: 7.24 PH UNITS (ref 7.35–7.45)
PH BLDA: 7.25 PH UNITS (ref 7.35–7.45)
PH BLDA: 7.3 PH UNITS (ref 7.35–7.45)
PH BLDA: 7.37 PH UNITS (ref 7.35–7.45)
PH BLDA: 7.38 PH UNITS (ref 7.35–7.45)
PH BLDA: 7.39 PH UNITS (ref 7.35–7.45)
PH BLDA: 7.42 PH UNITS (ref 7.35–7.45)
PH UR STRIP.AUTO: 5.5 [PH] (ref 5–8)
PH UR STRIP.AUTO: 5.5 [PH] (ref 5–8)
PH, TEMP CORRECTED: 7.24 PH UNITS
PH, TEMP CORRECTED: 7.25 PH UNITS
PH, TEMP CORRECTED: 7.3 PH UNITS
PH, TEMP CORRECTED: 7.37 PH UNITS
PH, TEMP CORRECTED: 7.38 PH UNITS
PH, TEMP CORRECTED: 7.39 PH UNITS
PH, TEMP CORRECTED: 7.42 PH UNITS
PHOSPHATE SERPL-MCNC: 3.1 MG/DL (ref 2.5–4.5)
PHOSPHATE SERPL-MCNC: 3.3 MG/DL (ref 2.5–4.5)
PHOSPHATE SERPL-MCNC: 3.5 MG/DL (ref 2.5–4.5)
PHOSPHATE SERPL-MCNC: 3.6 MG/DL (ref 2.5–4.5)
PHOSPHATE SERPL-MCNC: 3.6 MG/DL (ref 2.5–4.5)
PHOSPHATE SERPL-MCNC: 3.7 MG/DL (ref 2.5–4.5)
PHOSPHATE SERPL-MCNC: 3.7 MG/DL (ref 2.5–4.5)
PHOSPHATE SERPL-MCNC: 3.9 MG/DL (ref 2.5–4.5)
PHOSPHATE SERPL-MCNC: 4 MG/DL (ref 2.5–4.5)
PHOSPHATE SERPL-MCNC: 4.1 MG/DL (ref 2.5–4.5)
PHOSPHATE SERPL-MCNC: 4.2 MG/DL (ref 2.5–4.5)
PHOSPHATE SERPL-MCNC: 4.4 MG/DL (ref 2.5–4.5)
PHOSPHATE SERPL-MCNC: 4.4 MG/DL (ref 2.5–4.5)
PHOSPHATE SERPL-MCNC: 4.5 MG/DL (ref 2.5–4.5)
PHOSPHATE SERPL-MCNC: 4.6 MG/DL (ref 2.5–4.5)
PHOSPHATE SERPL-MCNC: 4.7 MG/DL (ref 2.5–4.5)
PLATELET # BLD AUTO: 107 10*3/MM3 (ref 140–450)
PLATELET # BLD AUTO: 125 10*3/MM3 (ref 140–450)
PLATELET # BLD AUTO: 127 10*3/MM3 (ref 140–450)
PLATELET # BLD AUTO: 135 10*3/MM3 (ref 140–450)
PLATELET # BLD AUTO: 142 10*3/MM3 (ref 140–450)
PLATELET # BLD AUTO: 148 10*3/MM3 (ref 140–450)
PLATELET # BLD AUTO: 155 10*3/MM3 (ref 140–450)
PLATELET # BLD AUTO: 166 10*3/MM3 (ref 140–450)
PLATELET # BLD AUTO: 171 10*3/MM3 (ref 140–450)
PLATELET # BLD AUTO: 193 10*3/MM3 (ref 140–450)
PLATELET # BLD AUTO: 221 10*3/MM3 (ref 140–450)
PLATELET # BLD AUTO: 246 10*3/MM3 (ref 140–450)
PLATELET # BLD AUTO: 247 10*3/MM3 (ref 140–450)
PLATELET # BLD AUTO: 258 10*3/MM3 (ref 140–450)
PLATELET # BLD AUTO: 313 10*3/MM3 (ref 140–450)
PMV BLD AUTO: 10.5 FL (ref 6–12)
PMV BLD AUTO: 10.6 FL (ref 6–12)
PMV BLD AUTO: 10.6 FL (ref 6–12)
PMV BLD AUTO: 10.8 FL (ref 6–12)
PMV BLD AUTO: 10.9 FL (ref 6–12)
PMV BLD AUTO: 11 FL (ref 6–12)
PMV BLD AUTO: 11 FL (ref 6–12)
PMV BLD AUTO: 11.1 FL (ref 6–12)
PMV BLD AUTO: 11.1 FL (ref 6–12)
PMV BLD AUTO: 11.3 FL (ref 6–12)
PMV BLD AUTO: 11.8 FL (ref 6–12)
PMV BLD AUTO: 12.2 FL (ref 6–12)
PO2 BLDA: 117 MM HG (ref 83–108)
PO2 BLDA: 54.6 MM HG (ref 83–108)
PO2 BLDA: 65.1 MM HG (ref 83–108)
PO2 BLDA: 74.8 MM HG (ref 83–108)
PO2 BLDA: 76.6 MM HG (ref 83–108)
PO2 BLDA: 81.6 MM HG (ref 83–108)
PO2 BLDA: 82.6 MM HG (ref 83–108)
PO2 BLDA: 86.8 MM HG (ref 83–108)
PO2 BLDA: 94 MM HG (ref 83–108)
PO2 TEMP ADJ BLD: 117 MM HG (ref 83–108)
PO2 TEMP ADJ BLD: 54.6 MM HG (ref 83–108)
PO2 TEMP ADJ BLD: 65.1 MM HG (ref 83–108)
PO2 TEMP ADJ BLD: 74.8 MM HG (ref 83–108)
PO2 TEMP ADJ BLD: 76.6 MM HG (ref 83–108)
PO2 TEMP ADJ BLD: 81.6 MM HG (ref 83–108)
PO2 TEMP ADJ BLD: 82.6 MM HG (ref 83–108)
PO2 TEMP ADJ BLD: 86.8 MM HG (ref 83–108)
PO2 TEMP ADJ BLD: 94 MM HG (ref 83–108)
POTASSIUM SERPL-SCNC: 3.6 MMOL/L (ref 3.5–5.2)
POTASSIUM SERPL-SCNC: 3.7 MMOL/L (ref 3.5–5.2)
POTASSIUM SERPL-SCNC: 3.8 MMOL/L (ref 3.5–5.2)
POTASSIUM SERPL-SCNC: 3.8 MMOL/L (ref 3.5–5.2)
POTASSIUM SERPL-SCNC: 3.9 MMOL/L (ref 3.5–5.2)
POTASSIUM SERPL-SCNC: 3.9 MMOL/L (ref 3.5–5.2)
POTASSIUM SERPL-SCNC: 4 MMOL/L (ref 3.5–5.2)
POTASSIUM SERPL-SCNC: 4.1 MMOL/L (ref 3.5–5.2)
POTASSIUM SERPL-SCNC: 4.2 MMOL/L (ref 3.5–5.2)
POTASSIUM SERPL-SCNC: 4.3 MMOL/L (ref 3.5–5.2)
POTASSIUM SERPL-SCNC: 4.4 MMOL/L (ref 3.5–5.2)
POTASSIUM SERPL-SCNC: 4.6 MMOL/L (ref 3.5–5.2)
POTASSIUM SERPL-SCNC: 4.7 MMOL/L (ref 3.5–5.2)
PROCALCITONIN SERPL-MCNC: 0.62 NG/ML (ref 0–0.25)
PROCALCITONIN SERPL-MCNC: 0.74 NG/ML (ref 0–0.25)
PROT SERPL-MCNC: 5.3 G/DL (ref 6–8.5)
PROT SERPL-MCNC: 5.8 G/DL (ref 6–8.5)
PROT SERPL-MCNC: 5.9 G/DL (ref 6–8.5)
PROT SERPL-MCNC: 6.3 G/DL (ref 6–8.5)
PROT SERPL-MCNC: 6.6 G/DL (ref 6–8.5)
PROT SERPL-MCNC: 6.9 G/DL (ref 6–8.5)
PROT SERPL-MCNC: 7 G/DL (ref 6–8.5)
PROT UR QL STRIP: ABNORMAL
PROT UR QL STRIP: ABNORMAL
PROTHROMBIN TIME: 20 SECONDS (ref 12.2–15.3)
PROTHROMBIN TIME: 20.5 SECONDS (ref 12.2–15.3)
PROTHROMBIN TIME: 21.6 SECONDS (ref 12.2–15.3)
PROTHROMBIN TIME: 21.8 SECONDS (ref 12.2–15.3)
PROTHROMBIN TIME: 22.2 SECONDS (ref 12.2–15.3)
PROTHROMBIN TIME: 22.4 SECONDS (ref 12.2–15.3)
QT INTERVAL: 342 MS
QT INTERVAL: 394 MS
QTC INTERVAL: 462 MS
QTC INTERVAL: 505 MS
RBC # BLD AUTO: 1.84 10*6/MM3 (ref 3.77–5.28)
RBC # BLD AUTO: 2.04 10*6/MM3 (ref 3.77–5.28)
RBC # BLD AUTO: 2.12 10*6/MM3 (ref 3.77–5.28)
RBC # BLD AUTO: 2.13 10*6/MM3 (ref 3.77–5.28)
RBC # BLD AUTO: 2.16 10*6/MM3 (ref 3.77–5.28)
RBC # BLD AUTO: 2.2 10*6/MM3 (ref 3.77–5.28)
RBC # BLD AUTO: 2.22 10*6/MM3 (ref 3.77–5.28)
RBC # BLD AUTO: 2.33 10*6/MM3 (ref 3.77–5.28)
RBC # BLD AUTO: 2.35 10*6/MM3 (ref 3.77–5.28)
RBC # BLD AUTO: 2.39 10*6/MM3 (ref 3.77–5.28)
RBC # BLD AUTO: 2.4 10*6/MM3 (ref 3.77–5.28)
RBC # BLD AUTO: 2.46 10*6/MM3 (ref 3.77–5.28)
RBC # UR STRIP: ABNORMAL /HPF
RBC # UR STRIP: ABNORMAL /HPF
REF LAB TEST METHOD: ABNORMAL
REF LAB TEST METHOD: ABNORMAL
RENAL EPI CELLS #/AREA URNS HPF: ABNORMAL /HPF
RETICS # AUTO: 0.05 10*6/MM3 (ref 0.02–0.13)
RETICS/RBC NFR AUTO: 1.88 % (ref 0.7–1.9)
RH BLD: POSITIVE
RH BLD: POSITIVE
RHINOVIRUS RNA SPEC NAA+PROBE: NOT DETECTED
RSV RNA NPH QL NAA+NON-PROBE: NOT DETECTED
S PNEUM AG SPEC QL LA: NEGATIVE
SARS-COV-2 RNA NPH QL NAA+NON-PROBE: NOT DETECTED
SODIUM SERPL-SCNC: 133 MMOL/L (ref 136–145)
SODIUM SERPL-SCNC: 134 MMOL/L (ref 136–145)
SODIUM SERPL-SCNC: 135 MMOL/L (ref 136–145)
SODIUM SERPL-SCNC: 136 MMOL/L (ref 136–145)
SODIUM SERPL-SCNC: 137 MMOL/L (ref 136–145)
SODIUM SERPL-SCNC: 138 MMOL/L (ref 136–145)
SODIUM SERPL-SCNC: 139 MMOL/L (ref 136–145)
SODIUM SERPL-SCNC: 140 MMOL/L (ref 136–145)
SODIUM SERPL-SCNC: 141 MMOL/L (ref 136–145)
SODIUM SERPL-SCNC: 141 MMOL/L (ref 136–145)
SODIUM SERPL-SCNC: 142 MMOL/L (ref 136–145)
SODIUM SERPL-SCNC: 143 MMOL/L (ref 136–145)
SODIUM UR-SCNC: <20 MMOL/L
SP GR UR STRIP: 1.02 (ref 1–1.03)
SP GR UR STRIP: 1.02 (ref 1–1.03)
SQUAMOUS #/AREA URNS HPF: ABNORMAL /HPF
SQUAMOUS #/AREA URNS HPF: ABNORMAL /HPF
T&S EXPIRATION DATE: NORMAL
T&S EXPIRATION DATE: NORMAL
TIBC SERPL-MCNC: 77 MCG/DL (ref 298–536)
TOTAL RATE: 0 BREATHS/MINUTE
TOTAL RATE: 0 BREATHS/MINUTE
TOTAL RATE: 18 BREATHS/MINUTE
TOTAL RATE: 20 BREATHS/MINUTE
TRANSFERRIN SERPL-MCNC: 52 MG/DL (ref 200–360)
UNIT  ABO: NORMAL
UNIT  ABO: NORMAL
UNIT  RH: NORMAL
UNIT  RH: NORMAL
UROBILINOGEN UR QL STRIP: ABNORMAL
UROBILINOGEN UR QL STRIP: ABNORMAL
VENTILATOR MODE: ABNORMAL
VIT B12 BLD-MCNC: 1630 PG/ML (ref 211–946)
VT ON VENT VENT: 0.35 ML
VT ON VENT VENT: 0.38 ML
WBC # UR STRIP: ABNORMAL /HPF
WBC # UR STRIP: ABNORMAL /HPF
WBC CASTS #/AREA URNS LPF: ABNORMAL /LPF
WBC CLUMPS # UR AUTO: ABNORMAL /HPF
WBC NRBC COR # BLD AUTO: 13.48 10*3/MM3 (ref 3.4–10.8)
WBC NRBC COR # BLD AUTO: 16.88 10*3/MM3 (ref 3.4–10.8)
WBC NRBC COR # BLD AUTO: 19.51 10*3/MM3 (ref 3.4–10.8)
WBC NRBC COR # BLD AUTO: 20.88 10*3/MM3 (ref 3.4–10.8)
WBC NRBC COR # BLD AUTO: 21 10*3/MM3 (ref 3.4–10.8)
WBC NRBC COR # BLD AUTO: 22.65 10*3/MM3 (ref 3.4–10.8)
WBC NRBC COR # BLD AUTO: 23.03 10*3/MM3 (ref 3.4–10.8)
WBC NRBC COR # BLD AUTO: 23.86 10*3/MM3 (ref 3.4–10.8)
WBC NRBC COR # BLD AUTO: 25.73 10*3/MM3 (ref 3.4–10.8)
WBC NRBC COR # BLD AUTO: 27.49 10*3/MM3 (ref 3.4–10.8)
WBC NRBC COR # BLD AUTO: 27.68 10*3/MM3 (ref 3.4–10.8)
WBC NRBC COR # BLD AUTO: 28.69 10*3/MM3 (ref 3.4–10.8)
WBC NRBC COR # BLD AUTO: 34.38 10*3/MM3 (ref 3.4–10.8)
WBC NRBC COR # BLD AUTO: 37.8 10*3/MM3 (ref 3.4–10.8)
WHOLE BLOOD HOLD COAG: NORMAL
WHOLE BLOOD HOLD SPECIMEN: NORMAL

## 2025-01-01 PROCEDURE — 70450 CT HEAD/BRAIN W/O DYE: CPT

## 2025-01-01 PROCEDURE — 85018 HEMOGLOBIN: CPT | Performed by: NURSE PRACTITIONER

## 2025-01-01 PROCEDURE — 94799 UNLISTED PULMONARY SVC/PX: CPT

## 2025-01-01 PROCEDURE — P9612 CATHETERIZE FOR URINE SPEC: HCPCS

## 2025-01-01 PROCEDURE — 36600 WITHDRAWAL OF ARTERIAL BLOOD: CPT

## 2025-01-01 PROCEDURE — 25010000002 FENTANYL 10 MCG/1 ML NS: Performed by: INTERNAL MEDICINE

## 2025-01-01 PROCEDURE — 25010000002 HYDROCORTISONE SOD SUC (PF) 100 MG RECONSTITUTED SOLUTION: Performed by: INTERNAL MEDICINE

## 2025-01-01 PROCEDURE — 86850 RBC ANTIBODY SCREEN: CPT | Performed by: FAMILY MEDICINE

## 2025-01-01 PROCEDURE — 87086 URINE CULTURE/COLONY COUNT: CPT | Performed by: PHYSICIAN ASSISTANT

## 2025-01-01 PROCEDURE — 99232 SBSQ HOSP IP/OBS MODERATE 35: CPT

## 2025-01-01 PROCEDURE — 80053 COMPREHEN METABOLIC PANEL: CPT | Performed by: PHYSICIAN ASSISTANT

## 2025-01-01 PROCEDURE — P9047 ALBUMIN (HUMAN), 25%, 50ML: HCPCS | Performed by: INTERNAL MEDICINE

## 2025-01-01 PROCEDURE — 87070 CULTURE OTHR SPECIMN AEROBIC: CPT | Performed by: NURSE PRACTITIONER

## 2025-01-01 PROCEDURE — 82948 REAGENT STRIP/BLOOD GLUCOSE: CPT

## 2025-01-01 PROCEDURE — 86923 COMPATIBILITY TEST ELECTRIC: CPT

## 2025-01-01 PROCEDURE — 80069 RENAL FUNCTION PANEL: CPT | Performed by: STUDENT IN AN ORGANIZED HEALTH CARE EDUCATION/TRAINING PROGRAM

## 2025-01-01 PROCEDURE — C1894 INTRO/SHEATH, NON-LASER: HCPCS | Performed by: STUDENT IN AN ORGANIZED HEALTH CARE EDUCATION/TRAINING PROGRAM

## 2025-01-01 PROCEDURE — 5A09357 ASSISTANCE WITH RESPIRATORY VENTILATION, LESS THAN 24 CONSECUTIVE HOURS, CONTINUOUS POSITIVE AIRWAY PRESSURE: ICD-10-PCS | Performed by: INTERNAL MEDICINE

## 2025-01-01 PROCEDURE — 84145 PROCALCITONIN (PCT): CPT | Performed by: PHYSICIAN ASSISTANT

## 2025-01-01 PROCEDURE — 25010000002 BUMETANIDE PER 0.5 MG: Performed by: STUDENT IN AN ORGANIZED HEALTH CARE EDUCATION/TRAINING PROGRAM

## 2025-01-01 PROCEDURE — 86900 BLOOD TYPING SEROLOGIC ABO: CPT

## 2025-01-01 PROCEDURE — 76937 US GUIDE VASCULAR ACCESS: CPT | Performed by: STUDENT IN AN ORGANIZED HEALTH CARE EDUCATION/TRAINING PROGRAM

## 2025-01-01 PROCEDURE — 87205 SMEAR GRAM STAIN: CPT | Performed by: NURSE PRACTITIONER

## 2025-01-01 PROCEDURE — 84100 ASSAY OF PHOSPHORUS: CPT

## 2025-01-01 PROCEDURE — 84100 ASSAY OF PHOSPHORUS: CPT | Performed by: PHYSICIAN ASSISTANT

## 2025-01-01 PROCEDURE — 71250 CT THORAX DX C-: CPT

## 2025-01-01 PROCEDURE — 25010000002 PIPERACILLIN SOD-TAZOBACTAM PER 1 G: Performed by: INTERNAL MEDICINE

## 2025-01-01 PROCEDURE — 85025 COMPLETE CBC W/AUTO DIFF WBC: CPT | Performed by: PHYSICIAN ASSISTANT

## 2025-01-01 PROCEDURE — 36558 INSERT TUNNELED CV CATH: CPT

## 2025-01-01 PROCEDURE — 82140 ASSAY OF AMMONIA: CPT

## 2025-01-01 PROCEDURE — 94664 DEMO&/EVAL PT USE INHALER: CPT

## 2025-01-01 PROCEDURE — 83735 ASSAY OF MAGNESIUM: CPT | Performed by: NURSE PRACTITIONER

## 2025-01-01 PROCEDURE — 82805 BLOOD GASES W/O2 SATURATION: CPT

## 2025-01-01 PROCEDURE — 85027 COMPLETE CBC AUTOMATED: CPT | Performed by: INTERNAL MEDICINE

## 2025-01-01 PROCEDURE — 93005 ELECTROCARDIOGRAM TRACING: CPT | Performed by: PHYSICIAN ASSISTANT

## 2025-01-01 PROCEDURE — 99291 CRITICAL CARE FIRST HOUR: CPT | Performed by: INTERNAL MEDICINE

## 2025-01-01 PROCEDURE — 25010000002 ALBUMIN HUMAN 25% PER 50 ML: Performed by: FAMILY MEDICINE

## 2025-01-01 PROCEDURE — 86900 BLOOD TYPING SEROLOGIC ABO: CPT | Performed by: INTERNAL MEDICINE

## 2025-01-01 PROCEDURE — 25010000002 POTASSIUM CHLORIDE PER 2 MEQ: Performed by: INTERNAL MEDICINE

## 2025-01-01 PROCEDURE — 86850 RBC ANTIBODY SCREEN: CPT | Performed by: INTERNAL MEDICINE

## 2025-01-01 PROCEDURE — 25010000002 FENTANYL CITRATE (PF) 50 MCG/ML SOLUTION

## 2025-01-01 PROCEDURE — 82330 ASSAY OF CALCIUM: CPT | Performed by: STUDENT IN AN ORGANIZED HEALTH CARE EDUCATION/TRAINING PROGRAM

## 2025-01-01 PROCEDURE — 71045 X-RAY EXAM CHEST 1 VIEW: CPT

## 2025-01-01 PROCEDURE — 87040 BLOOD CULTURE FOR BACTERIA: CPT | Performed by: PHYSICIAN ASSISTANT

## 2025-01-01 PROCEDURE — P9047 ALBUMIN (HUMAN), 25%, 50ML: HCPCS | Performed by: FAMILY MEDICINE

## 2025-01-01 PROCEDURE — 80053 COMPREHEN METABOLIC PANEL: CPT | Performed by: INTERNAL MEDICINE

## 2025-01-01 PROCEDURE — 84466 ASSAY OF TRANSFERRIN: CPT | Performed by: NURSE PRACTITIONER

## 2025-01-01 PROCEDURE — 3E033XZ INTRODUCTION OF VASOPRESSOR INTO PERIPHERAL VEIN, PERCUTANEOUS APPROACH: ICD-10-PCS | Performed by: INTERNAL MEDICINE

## 2025-01-01 PROCEDURE — 84100 ASSAY OF PHOSPHORUS: CPT | Performed by: INTERNAL MEDICINE

## 2025-01-01 PROCEDURE — 82375 ASSAY CARBOXYHB QUANT: CPT

## 2025-01-01 PROCEDURE — 83050 HGB METHEMOGLOBIN QUAN: CPT

## 2025-01-01 PROCEDURE — 77001 FLUOROGUIDE FOR VEIN DEVICE: CPT

## 2025-01-01 PROCEDURE — 83735 ASSAY OF MAGNESIUM: CPT | Performed by: STUDENT IN AN ORGANIZED HEALTH CARE EDUCATION/TRAINING PROGRAM

## 2025-01-01 PROCEDURE — 25010000002 FENTANYL CITRATE (PF) 50 MCG/ML SOLUTION: Performed by: INTERNAL MEDICINE

## 2025-01-01 PROCEDURE — 25010000002 MIDAZOLAM 1 MG/ML 100ML NS 100 MG/100ML SOLUTION: Performed by: INTERNAL MEDICINE

## 2025-01-01 PROCEDURE — 31500 INSERT EMERGENCY AIRWAY: CPT

## 2025-01-01 PROCEDURE — 85384 FIBRINOGEN ACTIVITY: CPT | Performed by: INTERNAL MEDICINE

## 2025-01-01 PROCEDURE — 85014 HEMATOCRIT: CPT | Performed by: NURSE PRACTITIONER

## 2025-01-01 PROCEDURE — 83880 ASSAY OF NATRIURETIC PEPTIDE: CPT | Performed by: PHYSICIAN ASSISTANT

## 2025-01-01 PROCEDURE — 85610 PROTHROMBIN TIME: CPT | Performed by: INTERNAL MEDICINE

## 2025-01-01 PROCEDURE — 25010000002 MORPHINE PER 10 MG: Performed by: INTERNAL MEDICINE

## 2025-01-01 PROCEDURE — 94761 N-INVAS EAR/PLS OXIMETRY MLT: CPT

## 2025-01-01 PROCEDURE — 31624 DX BRONCHOSCOPE/LAVAGE: CPT | Performed by: INTERNAL MEDICINE

## 2025-01-01 PROCEDURE — 85018 HEMOGLOBIN: CPT | Performed by: PHYSICIAN ASSISTANT

## 2025-01-01 PROCEDURE — 94003 VENT MGMT INPAT SUBQ DAY: CPT

## 2025-01-01 PROCEDURE — P9047 ALBUMIN (HUMAN), 25%, 50ML: HCPCS | Performed by: STUDENT IN AN ORGANIZED HEALTH CARE EDUCATION/TRAINING PROGRAM

## 2025-01-01 PROCEDURE — 36415 COLL VENOUS BLD VENIPUNCTURE: CPT

## 2025-01-01 PROCEDURE — 25810000003 SODIUM CHLORIDE 0.9 % SOLUTION 250 ML FLEX CONT: Performed by: PHYSICIAN ASSISTANT

## 2025-01-01 PROCEDURE — 0JH63XZ INSERTION OF TUNNELED VASCULAR ACCESS DEVICE INTO CHEST SUBCUTANEOUS TISSUE AND FASCIA, PERCUTANEOUS APPROACH: ICD-10-PCS | Performed by: STUDENT IN AN ORGANIZED HEALTH CARE EDUCATION/TRAINING PROGRAM

## 2025-01-01 PROCEDURE — 83735 ASSAY OF MAGNESIUM: CPT | Performed by: PHYSICIAN ASSISTANT

## 2025-01-01 PROCEDURE — 5A1D90Z PERFORMANCE OF URINARY FILTRATION, CONTINUOUS, GREATER THAN 18 HOURS PER DAY: ICD-10-PCS | Performed by: STUDENT IN AN ORGANIZED HEALTH CARE EDUCATION/TRAINING PROGRAM

## 2025-01-01 PROCEDURE — 85027 COMPLETE CBC AUTOMATED: CPT | Performed by: NURSE PRACTITIONER

## 2025-01-01 PROCEDURE — 25010000002 LIDOCAINE 1% - EPINEPHRINE 1:100000 1 %-1:100000 SOLUTION

## 2025-01-01 PROCEDURE — 25010000002 CEFTRIAXONE PER 250 MG: Performed by: INTERNAL MEDICINE

## 2025-01-01 PROCEDURE — 25010000002 PIPERACILLIN SOD-TAZOBACTAM PER 1 G: Performed by: PHYSICIAN ASSISTANT

## 2025-01-01 PROCEDURE — 02HV33Z INSERTION OF INFUSION DEVICE INTO SUPERIOR VENA CAVA, PERCUTANEOUS APPROACH: ICD-10-PCS | Performed by: INTERNAL MEDICINE

## 2025-01-01 PROCEDURE — 36558 INSERT TUNNELED CV CATH: CPT | Performed by: STUDENT IN AN ORGANIZED HEALTH CARE EDUCATION/TRAINING PROGRAM

## 2025-01-01 PROCEDURE — 93306 TTE W/DOPPLER COMPLETE: CPT | Performed by: INTERNAL MEDICINE

## 2025-01-01 PROCEDURE — 83605 ASSAY OF LACTIC ACID: CPT | Performed by: PHYSICIAN ASSISTANT

## 2025-01-01 PROCEDURE — 25810000003 SODIUM CHLORIDE 0.9 % SOLUTION 250 ML FLEX CONT: Performed by: INTERNAL MEDICINE

## 2025-01-01 PROCEDURE — 25010000002 BUMETANIDE PER 0.5 MG: Performed by: INTERNAL MEDICINE

## 2025-01-01 PROCEDURE — 77001 FLUOROGUIDE FOR VEIN DEVICE: CPT | Performed by: STUDENT IN AN ORGANIZED HEALTH CARE EDUCATION/TRAINING PROGRAM

## 2025-01-01 PROCEDURE — 25010000002 PIPERACILLIN SOD-TAZOBACTAM PER 1 G: Performed by: NURSE PRACTITIONER

## 2025-01-01 PROCEDURE — 85014 HEMATOCRIT: CPT | Performed by: PHYSICIAN ASSISTANT

## 2025-01-01 PROCEDURE — C1750 CATH, HEMODIALYSIS,LONG-TERM: HCPCS | Performed by: STUDENT IN AN ORGANIZED HEALTH CARE EDUCATION/TRAINING PROGRAM

## 2025-01-01 PROCEDURE — 82248 BILIRUBIN DIRECT: CPT | Performed by: STUDENT IN AN ORGANIZED HEALTH CARE EDUCATION/TRAINING PROGRAM

## 2025-01-01 PROCEDURE — 25010000002 PHENYLEPHRINE 10 MG/ML SOLUTION 5 ML VIAL

## 2025-01-01 PROCEDURE — 83690 ASSAY OF LIPASE: CPT | Performed by: PHYSICIAN ASSISTANT

## 2025-01-01 PROCEDURE — 25010000002 ALBUMIN HUMAN 25% PER 50 ML: Performed by: INTERNAL MEDICINE

## 2025-01-01 PROCEDURE — 25010000002 PHYTONADIONE 10 MG/ML SOLUTION 1 ML AMPULE: Performed by: INTERNAL MEDICINE

## 2025-01-01 PROCEDURE — 80053 COMPREHEN METABOLIC PANEL: CPT | Performed by: STUDENT IN AN ORGANIZED HEALTH CARE EDUCATION/TRAINING PROGRAM

## 2025-01-01 PROCEDURE — 31500 INSERT EMERGENCY AIRWAY: CPT | Performed by: INTERNAL MEDICINE

## 2025-01-01 PROCEDURE — 87449 NOS EACH ORGANISM AG IA: CPT | Performed by: NURSE PRACTITIONER

## 2025-01-01 PROCEDURE — 84100 ASSAY OF PHOSPHORUS: CPT | Performed by: STUDENT IN AN ORGANIZED HEALTH CARE EDUCATION/TRAINING PROGRAM

## 2025-01-01 PROCEDURE — 25010000002 HEPARIN (PORCINE) PER 1000 UNITS

## 2025-01-01 PROCEDURE — C1894 INTRO/SHEATH, NON-LASER: HCPCS

## 2025-01-01 PROCEDURE — 99285 EMERGENCY DEPT VISIT HI MDM: CPT

## 2025-01-01 PROCEDURE — 94640 AIRWAY INHALATION TREATMENT: CPT

## 2025-01-01 PROCEDURE — 5A1955Z RESPIRATORY VENTILATION, GREATER THAN 96 CONSECUTIVE HOURS: ICD-10-PCS | Performed by: INTERNAL MEDICINE

## 2025-01-01 PROCEDURE — 82570 ASSAY OF URINE CREATININE: CPT | Performed by: INTERNAL MEDICINE

## 2025-01-01 PROCEDURE — 25810000003 SODIUM CHLORIDE 0.9 % SOLUTION 250 ML FLEX CONT: Performed by: NURSE PRACTITIONER

## 2025-01-01 PROCEDURE — 99222 1ST HOSP IP/OBS MODERATE 55: CPT | Performed by: INTERNAL MEDICINE

## 2025-01-01 PROCEDURE — 25010000002 CALCIUM GLUCONATE 2-0.675 GM/100ML-% SOLUTION: Performed by: INTERNAL MEDICINE

## 2025-01-01 PROCEDURE — 85610 PROTHROMBIN TIME: CPT | Performed by: NURSE PRACTITIONER

## 2025-01-01 PROCEDURE — 82140 ASSAY OF AMMONIA: CPT | Performed by: FAMILY MEDICINE

## 2025-01-01 PROCEDURE — 25010000002 PHENYLEPHRINE 10 MG/ML SOLUTION 5 ML VIAL: Performed by: INTERNAL MEDICINE

## 2025-01-01 PROCEDURE — 25010000002 POTASSIUM CHLORIDE PER 2 MEQ: Performed by: STUDENT IN AN ORGANIZED HEALTH CARE EDUCATION/TRAINING PROGRAM

## 2025-01-01 PROCEDURE — 02H633Z INSERTION OF INFUSION DEVICE INTO RIGHT ATRIUM, PERCUTANEOUS APPROACH: ICD-10-PCS | Performed by: STUDENT IN AN ORGANIZED HEALTH CARE EDUCATION/TRAINING PROGRAM

## 2025-01-01 PROCEDURE — 99233 SBSQ HOSP IP/OBS HIGH 50: CPT | Performed by: FAMILY MEDICINE

## 2025-01-01 PROCEDURE — 85025 COMPLETE CBC W/AUTO DIFF WBC: CPT | Performed by: INTERNAL MEDICINE

## 2025-01-01 PROCEDURE — 97162 PT EVAL MOD COMPLEX 30 MIN: CPT

## 2025-01-01 PROCEDURE — P9047 ALBUMIN (HUMAN), 25%, 50ML: HCPCS | Performed by: NURSE PRACTITIONER

## 2025-01-01 PROCEDURE — 86901 BLOOD TYPING SEROLOGIC RH(D): CPT | Performed by: FAMILY MEDICINE

## 2025-01-01 PROCEDURE — 85362 FIBRIN DEGRADATION PRODUCTS: CPT | Performed by: INTERNAL MEDICINE

## 2025-01-01 PROCEDURE — 0BH17EZ INSERTION OF ENDOTRACHEAL AIRWAY INTO TRACHEA, VIA NATURAL OR ARTIFICIAL OPENING: ICD-10-PCS | Performed by: INTERNAL MEDICINE

## 2025-01-01 PROCEDURE — G0378 HOSPITAL OBSERVATION PER HR: HCPCS

## 2025-01-01 PROCEDURE — 85610 PROTHROMBIN TIME: CPT

## 2025-01-01 PROCEDURE — 82533 TOTAL CORTISOL: CPT | Performed by: FAMILY MEDICINE

## 2025-01-01 PROCEDURE — 85018 HEMOGLOBIN: CPT | Performed by: INTERNAL MEDICINE

## 2025-01-01 PROCEDURE — 25010000002 PROPOFOL 10 MG/ML EMULSION: Performed by: INTERNAL MEDICINE

## 2025-01-01 PROCEDURE — 25010000002 MIDAZOLAM PER 1 MG: Performed by: STUDENT IN AN ORGANIZED HEALTH CARE EDUCATION/TRAINING PROGRAM

## 2025-01-01 PROCEDURE — 93010 ELECTROCARDIOGRAM REPORT: CPT | Performed by: INTERNAL MEDICINE

## 2025-01-01 PROCEDURE — 25010000002 ALBUMIN HUMAN 25% PER 50 ML: Performed by: NURSE PRACTITIONER

## 2025-01-01 PROCEDURE — 80048 BASIC METABOLIC PNL TOTAL CA: CPT | Performed by: NURSE PRACTITIONER

## 2025-01-01 PROCEDURE — 36430 TRANSFUSION BLD/BLD COMPNT: CPT

## 2025-01-01 PROCEDURE — 99152 MOD SED SAME PHYS/QHP 5/>YRS: CPT

## 2025-01-01 PROCEDURE — 99233 SBSQ HOSP IP/OBS HIGH 50: CPT | Performed by: INTERNAL MEDICINE

## 2025-01-01 PROCEDURE — 25010000002 PROPOFOL 10 MG/ML EMULSION

## 2025-01-01 PROCEDURE — 43235 EGD DIAGNOSTIC BRUSH WASH: CPT | Performed by: INTERNAL MEDICINE

## 2025-01-01 PROCEDURE — 82105 ALPHA-FETOPROTEIN SERUM: CPT | Performed by: INTERNAL MEDICINE

## 2025-01-01 PROCEDURE — 25010000002 FENTANYL CITRATE (PF) 50 MCG/ML SOLUTION: Performed by: STUDENT IN AN ORGANIZED HEALTH CARE EDUCATION/TRAINING PROGRAM

## 2025-01-01 PROCEDURE — C1751 CATH, INF, PER/CENT/MIDLINE: HCPCS

## 2025-01-01 PROCEDURE — 25010000002 ALBUMIN HUMAN 25% PER 50 ML: Performed by: STUDENT IN AN ORGANIZED HEALTH CARE EDUCATION/TRAINING PROGRAM

## 2025-01-01 PROCEDURE — 85049 AUTOMATED PLATELET COUNT: CPT | Performed by: INTERNAL MEDICINE

## 2025-01-01 PROCEDURE — 25810000003 SODIUM CHLORIDE 0.9 % SOLUTION: Performed by: FAMILY MEDICINE

## 2025-01-01 PROCEDURE — 85014 HEMATOCRIT: CPT | Performed by: INTERNAL MEDICINE

## 2025-01-01 PROCEDURE — 83540 ASSAY OF IRON: CPT | Performed by: NURSE PRACTITIONER

## 2025-01-01 PROCEDURE — 87186 SC STD MICRODIL/AGAR DIL: CPT | Performed by: PHYSICIAN ASSISTANT

## 2025-01-01 PROCEDURE — 0B9J8ZX DRAINAGE OF LEFT LOWER LUNG LOBE, VIA NATURAL OR ARTIFICIAL OPENING ENDOSCOPIC, DIAGNOSTIC: ICD-10-PCS | Performed by: INTERNAL MEDICINE

## 2025-01-01 PROCEDURE — 82728 ASSAY OF FERRITIN: CPT | Performed by: NURSE PRACTITIONER

## 2025-01-01 PROCEDURE — 97166 OT EVAL MOD COMPLEX 45 MIN: CPT

## 2025-01-01 PROCEDURE — 85025 COMPLETE CBC W/AUTO DIFF WBC: CPT

## 2025-01-01 PROCEDURE — 25010000002 VANCOMYCIN 1.5-0.9 GM/500ML-% SOLUTION: Performed by: PHYSICIAN ASSISTANT

## 2025-01-01 PROCEDURE — 93005 ELECTROCARDIOGRAM TRACING: CPT | Performed by: INTERNAL MEDICINE

## 2025-01-01 PROCEDURE — 82746 ASSAY OF FOLIC ACID SERUM: CPT | Performed by: NURSE PRACTITIONER

## 2025-01-01 PROCEDURE — 86900 BLOOD TYPING SEROLOGIC ABO: CPT | Performed by: FAMILY MEDICINE

## 2025-01-01 PROCEDURE — 84300 ASSAY OF URINE SODIUM: CPT | Performed by: INTERNAL MEDICINE

## 2025-01-01 PROCEDURE — 81001 URINALYSIS AUTO W/SCOPE: CPT | Performed by: PHYSICIAN ASSISTANT

## 2025-01-01 PROCEDURE — 94660 CPAP INITIATION&MGMT: CPT

## 2025-01-01 PROCEDURE — 25810000003 LACTATED RINGERS SOLUTION: Performed by: PHYSICIAN ASSISTANT

## 2025-01-01 PROCEDURE — 25010000002 PHENYLEPHRINE 10 MG/ML SOLUTION 5 ML VIAL: Performed by: PHYSICIAN ASSISTANT

## 2025-01-01 PROCEDURE — P9016 RBC LEUKOCYTES REDUCED: HCPCS

## 2025-01-01 PROCEDURE — 0202U NFCT DS 22 TRGT SARS-COV-2: CPT | Performed by: PHYSICIAN ASSISTANT

## 2025-01-01 PROCEDURE — 81001 URINALYSIS AUTO W/SCOPE: CPT | Performed by: FAMILY MEDICINE

## 2025-01-01 PROCEDURE — 87088 URINE BACTERIA CULTURE: CPT | Performed by: PHYSICIAN ASSISTANT

## 2025-01-01 PROCEDURE — 99223 1ST HOSP IP/OBS HIGH 75: CPT | Performed by: INTERNAL MEDICINE

## 2025-01-01 PROCEDURE — 82248 BILIRUBIN DIRECT: CPT

## 2025-01-01 PROCEDURE — 25810000003 SODIUM CHLORIDE 0.9 % SOLUTION: Performed by: PHYSICIAN ASSISTANT

## 2025-01-01 PROCEDURE — 25010000002 DESMOPRESSIN ACETATE PF 4 MCG/ML SOLUTION 1 ML VIAL: Performed by: INTERNAL MEDICINE

## 2025-01-01 PROCEDURE — 82607 VITAMIN B-12: CPT | Performed by: NURSE PRACTITIONER

## 2025-01-01 PROCEDURE — C1769 GUIDE WIRE: HCPCS | Performed by: STUDENT IN AN ORGANIZED HEALTH CARE EDUCATION/TRAINING PROGRAM

## 2025-01-01 PROCEDURE — 94002 VENT MGMT INPAT INIT DAY: CPT

## 2025-01-01 PROCEDURE — 25010000002 MIDAZOLAM PER 1 MG: Performed by: INTERNAL MEDICINE

## 2025-01-01 PROCEDURE — 85025 COMPLETE CBC W/AUTO DIFF WBC: CPT | Performed by: NURSE PRACTITIONER

## 2025-01-01 PROCEDURE — 85045 AUTOMATED RETICULOCYTE COUNT: CPT | Performed by: NURSE PRACTITIONER

## 2025-01-01 PROCEDURE — 87641 MR-STAPH DNA AMP PROBE: CPT

## 2025-01-01 PROCEDURE — 74018 RADEX ABDOMEN 1 VIEW: CPT

## 2025-01-01 PROCEDURE — 80053 COMPREHEN METABOLIC PANEL: CPT | Performed by: NURSE PRACTITIONER

## 2025-01-01 PROCEDURE — 0DJ08ZZ INSPECTION OF UPPER INTESTINAL TRACT, VIA NATURAL OR ARTIFICIAL OPENING ENDOSCOPIC: ICD-10-PCS | Performed by: INTERNAL MEDICINE

## 2025-01-01 PROCEDURE — 93306 TTE W/DOPPLER COMPLETE: CPT

## 2025-01-01 PROCEDURE — 83735 ASSAY OF MAGNESIUM: CPT | Performed by: INTERNAL MEDICINE

## 2025-01-01 PROCEDURE — 86901 BLOOD TYPING SEROLOGIC RH(D): CPT | Performed by: INTERNAL MEDICINE

## 2025-01-01 PROCEDURE — 84145 PROCALCITONIN (PCT): CPT

## 2025-01-01 PROCEDURE — 82272 OCCULT BLD FECES 1-3 TESTS: CPT | Performed by: NURSE PRACTITIONER

## 2025-01-01 PROCEDURE — 25810000003 SODIUM CHLORIDE 0.9 % SOLUTION 250 ML FLEX CONT

## 2025-01-01 PROCEDURE — 99232 SBSQ HOSP IP/OBS MODERATE 35: CPT | Performed by: NURSE PRACTITIONER

## 2025-01-01 PROCEDURE — 99232 SBSQ HOSP IP/OBS MODERATE 35: CPT | Performed by: INTERNAL MEDICINE

## 2025-01-01 PROCEDURE — 80053 COMPREHEN METABOLIC PANEL: CPT

## 2025-01-01 RX ORDER — FENTANYL CITRATE 50 UG/ML
100 INJECTION, SOLUTION INTRAMUSCULAR; INTRAVENOUS ONCE
Refills: 0 | Status: COMPLETED | OUTPATIENT
Start: 2025-01-01 | End: 2025-01-01

## 2025-01-01 RX ORDER — PANTOPRAZOLE SODIUM 40 MG/1
40 TABLET, DELAYED RELEASE ORAL DAILY
Status: DISCONTINUED | OUTPATIENT
Start: 2025-01-01 | End: 2025-01-01

## 2025-01-01 RX ORDER — QUETIAPINE FUMARATE 100 MG/1
100 TABLET, FILM COATED ORAL NIGHTLY
Status: DISCONTINUED | OUTPATIENT
Start: 2025-01-01 | End: 2025-01-01

## 2025-01-01 RX ORDER — LACTULOSE 10 G/15ML
30 SOLUTION ORAL ONCE
Status: COMPLETED | OUTPATIENT
Start: 2025-01-01 | End: 2025-01-01

## 2025-01-01 RX ORDER — NOREPINEPHRINE BITARTRATE 0.03 MG/ML
.02-.3 INJECTION, SOLUTION INTRAVENOUS
Status: DISCONTINUED | OUTPATIENT
Start: 2025-01-01 | End: 2025-01-01

## 2025-01-01 RX ORDER — ALBUMIN (HUMAN) 12.5 G/50ML
12.5 SOLUTION INTRAVENOUS 2 TIMES DAILY
Status: COMPLETED | OUTPATIENT
Start: 2025-01-01 | End: 2025-01-01

## 2025-01-01 RX ORDER — MORPHINE SULFATE 2 MG/ML
2 INJECTION, SOLUTION INTRAMUSCULAR; INTRAVENOUS
Status: DISCONTINUED | OUTPATIENT
Start: 2025-01-01 | End: 2025-01-01 | Stop reason: HOSPADM

## 2025-01-01 RX ORDER — MIDODRINE HYDROCHLORIDE 10 MG/1
10 TABLET ORAL
Status: DISCONTINUED | OUTPATIENT
Start: 2025-01-01 | End: 2025-01-01

## 2025-01-01 RX ORDER — FENTANYL CITRATE 50 UG/ML
INJECTION, SOLUTION INTRAMUSCULAR; INTRAVENOUS
Status: DISPENSED
Start: 2025-01-01 | End: 2025-01-01

## 2025-01-01 RX ORDER — LORAZEPAM 1 MG/1
1 TABLET ORAL ONCE
Status: COMPLETED | OUTPATIENT
Start: 2025-01-01 | End: 2025-01-01

## 2025-01-01 RX ORDER — ROSUVASTATIN CALCIUM 10 MG/1
5 TABLET, COATED ORAL DAILY
Status: DISCONTINUED | OUTPATIENT
Start: 2025-01-01 | End: 2025-01-01

## 2025-01-01 RX ORDER — MIDAZOLAM HYDROCHLORIDE 1 MG/ML
INJECTION, SOLUTION INTRAMUSCULAR; INTRAVENOUS AS NEEDED
Status: COMPLETED | OUTPATIENT
Start: 2025-01-01 | End: 2025-01-01

## 2025-01-01 RX ORDER — SODIUM CHLORIDE 0.9 % (FLUSH) 0.9 %
10 SYRINGE (ML) INJECTION AS NEEDED
Status: DISCONTINUED | OUTPATIENT
Start: 2025-01-01 | End: 2025-01-01

## 2025-01-01 RX ORDER — BISACODYL 5 MG/1
5 TABLET, DELAYED RELEASE ORAL DAILY PRN
Status: DISCONTINUED | OUTPATIENT
Start: 2025-01-01 | End: 2025-01-01

## 2025-01-01 RX ORDER — ROCURONIUM BROMIDE 50 MG/5 ML
50 SYRINGE (ML) INTRAVENOUS ONCE
Status: COMPLETED | OUTPATIENT
Start: 2025-01-01 | End: 2025-01-01

## 2025-01-01 RX ORDER — ALBUMIN (HUMAN) 12.5 G/50ML
50 SOLUTION INTRAVENOUS 3 TIMES DAILY
Status: COMPLETED | OUTPATIENT
Start: 2025-01-01 | End: 2025-01-01

## 2025-01-01 RX ORDER — LACTULOSE 10 G/15ML
10 SOLUTION ORAL DAILY
Status: DISCONTINUED | OUTPATIENT
Start: 2025-01-01 | End: 2025-01-01

## 2025-01-01 RX ORDER — METOPROLOL SUCCINATE 25 MG/1
25 TABLET, EXTENDED RELEASE ORAL DAILY
Status: DISCONTINUED | OUTPATIENT
Start: 2025-01-01 | End: 2025-01-01

## 2025-01-01 RX ORDER — FENTANYL CITRATE 50 UG/ML
INJECTION, SOLUTION INTRAMUSCULAR; INTRAVENOUS AS NEEDED
Status: COMPLETED | OUTPATIENT
Start: 2025-01-01 | End: 2025-01-01

## 2025-01-01 RX ORDER — PANTOPRAZOLE SODIUM 40 MG/10ML
40 INJECTION, POWDER, LYOPHILIZED, FOR SOLUTION INTRAVENOUS
Status: DISCONTINUED | OUTPATIENT
Start: 2025-01-01 | End: 2025-01-01

## 2025-01-01 RX ORDER — PROPOFOL 10 MG/ML
VIAL (ML) INTRAVENOUS
Status: COMPLETED
Start: 2025-01-01 | End: 2025-01-01

## 2025-01-01 RX ORDER — ALBUTEROL SULFATE 90 UG/1
2 INHALANT RESPIRATORY (INHALATION) EVERY 4 HOURS PRN
Status: DISCONTINUED | OUTPATIENT
Start: 2025-01-01 | End: 2025-01-01 | Stop reason: SDUPTHER

## 2025-01-01 RX ORDER — CALCIUM GLUCONATE 20 MG/ML
2000 INJECTION, SOLUTION INTRAVENOUS ONCE
Status: COMPLETED | OUTPATIENT
Start: 2025-01-01 | End: 2025-01-01

## 2025-01-01 RX ORDER — POLYETHYLENE GLYCOL 3350 17 G/17G
17 POWDER, FOR SOLUTION ORAL DAILY PRN
Status: DISCONTINUED | OUTPATIENT
Start: 2025-01-01 | End: 2025-01-01

## 2025-01-01 RX ORDER — ALBUMIN (HUMAN) 12.5 G/50ML
25 SOLUTION INTRAVENOUS ONCE
Status: COMPLETED | OUTPATIENT
Start: 2025-01-01 | End: 2025-01-01

## 2025-01-01 RX ORDER — MIDAZOLAM IN NACL,ISO-OSMOT/PF 50 MG/50ML
1-10 INFUSION BOTTLE (ML) INTRAVENOUS
Status: DISCONTINUED | OUTPATIENT
Start: 2025-01-01 | End: 2025-01-01 | Stop reason: HOSPADM

## 2025-01-01 RX ORDER — ALBUMIN (HUMAN) 12.5 G/50ML
12.5 SOLUTION INTRAVENOUS ONCE
Status: COMPLETED | OUTPATIENT
Start: 2025-01-01 | End: 2025-01-01

## 2025-01-01 RX ORDER — NICOTINE POLACRILEX 4 MG
15 LOZENGE BUCCAL
Status: DISCONTINUED | OUTPATIENT
Start: 2025-01-01 | End: 2025-01-01

## 2025-01-01 RX ORDER — OXYMETAZOLINE HYDROCHLORIDE 0.05 G/100ML
2 SPRAY NASAL 2 TIMES DAILY
Status: DISCONTINUED | OUTPATIENT
Start: 2025-01-01 | End: 2025-01-01

## 2025-01-01 RX ORDER — SODIUM CHLORIDE 0.9 % (FLUSH) 0.9 %
10 SYRINGE (ML) INJECTION EVERY 12 HOURS SCHEDULED
Status: DISCONTINUED | OUTPATIENT
Start: 2025-01-01 | End: 2025-01-01

## 2025-01-01 RX ORDER — IBUPROFEN 600 MG/1
1 TABLET ORAL
Status: DISCONTINUED | OUTPATIENT
Start: 2025-01-01 | End: 2025-01-01

## 2025-01-01 RX ORDER — SODIUM CHLORIDE 9 MG/ML
40 INJECTION, SOLUTION INTRAVENOUS AS NEEDED
Status: DISCONTINUED | OUTPATIENT
Start: 2025-01-01 | End: 2025-01-01

## 2025-01-01 RX ORDER — AMOXICILLIN 250 MG
2 CAPSULE ORAL 2 TIMES DAILY PRN
Status: DISCONTINUED | OUTPATIENT
Start: 2025-01-01 | End: 2025-01-01

## 2025-01-01 RX ORDER — L.ACID,PARA/B.BIFIDUM/S.THERM 8B CELL
1 CAPSULE ORAL DAILY
Status: DISCONTINUED | OUTPATIENT
Start: 2025-01-01 | End: 2025-01-01

## 2025-01-01 RX ORDER — DESVENLAFAXINE 50 MG/1
50 TABLET, FILM COATED, EXTENDED RELEASE ORAL DAILY
Status: DISCONTINUED | OUTPATIENT
Start: 2025-01-01 | End: 2025-01-01

## 2025-01-01 RX ORDER — POTASSIUM CHLORIDE 29.8 MG/ML
20 INJECTION INTRAVENOUS ONCE
Status: DISCONTINUED | OUTPATIENT
Start: 2025-01-01 | End: 2025-01-01

## 2025-01-01 RX ORDER — VANCOMYCIN/0.9 % SOD CHLORIDE 1.5G/250ML
20 PLASTIC BAG, INJECTION (ML) INTRAVENOUS ONCE
Status: COMPLETED | OUTPATIENT
Start: 2025-01-01 | End: 2025-01-01

## 2025-01-01 RX ORDER — SODIUM CHLORIDE 0.9 % (FLUSH) 0.9 %
20 SYRINGE (ML) INJECTION AS NEEDED
Status: DISCONTINUED | OUTPATIENT
Start: 2025-01-01 | End: 2025-01-01

## 2025-01-01 RX ORDER — BISACODYL 5 MG/1
5 TABLET, DELAYED RELEASE ORAL DAILY PRN
Status: DISCONTINUED | OUTPATIENT
Start: 2025-01-01 | End: 2025-01-01 | Stop reason: ALTCHOICE

## 2025-01-01 RX ORDER — BUMETANIDE 0.25 MG/ML
2 INJECTION, SOLUTION INTRAMUSCULAR; INTRAVENOUS EVERY 12 HOURS
Status: DISCONTINUED | OUTPATIENT
Start: 2025-01-01 | End: 2025-01-01

## 2025-01-01 RX ORDER — BISACODYL 10 MG
10 SUPPOSITORY, RECTAL RECTAL DAILY PRN
Status: DISCONTINUED | OUTPATIENT
Start: 2025-01-01 | End: 2025-01-01

## 2025-01-01 RX ORDER — DEXTROSE MONOHYDRATE 25 G/50ML
25 INJECTION, SOLUTION INTRAVENOUS
Status: DISCONTINUED | OUTPATIENT
Start: 2025-01-01 | End: 2025-01-01

## 2025-01-01 RX ORDER — FOLIC ACID 1 MG/1
1 TABLET ORAL DAILY
Status: DISCONTINUED | OUTPATIENT
Start: 2025-01-01 | End: 2025-01-01

## 2025-01-01 RX ORDER — LACTULOSE 10 G/15ML
20 SOLUTION ORAL 2 TIMES DAILY
COMMUNITY
Start: 2025-01-01

## 2025-01-01 RX ORDER — HYDROCORTISONE SODIUM SUCCINATE 100 MG/2ML
50 INJECTION INTRAMUSCULAR; INTRAVENOUS EVERY 8 HOURS
Status: DISCONTINUED | OUTPATIENT
Start: 2025-01-01 | End: 2025-01-01

## 2025-01-01 RX ORDER — CALCIUM CHLORIDE, MAGNESIUM CHLORIDE, SODIUM CHLORIDE, SODIUM BICARBONATE, POTASSIUM CHLORIDE AND SODIUM PHOSPHATE DIBASIC DIHYDRATE 3.68; 3.05; 6.34; 3.09; .314; .187 G/L; G/L; G/L; G/L; G/L; G/L
1000 INJECTION INTRAVENOUS CONTINUOUS
Status: DISCONTINUED | OUTPATIENT
Start: 2025-01-01 | End: 2025-01-01

## 2025-01-01 RX ORDER — HYDROCODONE BITARTRATE AND ACETAMINOPHEN 5; 325 MG/1; MG/1
1 TABLET ORAL EVERY 4 HOURS PRN
Status: DISCONTINUED | OUTPATIENT
Start: 2025-01-01 | End: 2025-01-01

## 2025-01-01 RX ORDER — HYDROCORTISONE SODIUM SUCCINATE 100 MG/2ML
50 INJECTION INTRAMUSCULAR; INTRAVENOUS EVERY 6 HOURS
Status: DISCONTINUED | OUTPATIENT
Start: 2025-01-01 | End: 2025-01-01

## 2025-01-01 RX ORDER — CHLORHEXIDINE GLUCONATE ORAL RINSE 1.2 MG/ML
15 SOLUTION DENTAL EVERY 12 HOURS SCHEDULED
Status: DISCONTINUED | OUTPATIENT
Start: 2025-01-01 | End: 2025-01-01

## 2025-01-01 RX ORDER — MIDAZOLAM HYDROCHLORIDE 1 MG/ML
INJECTION, SOLUTION INTRAMUSCULAR; INTRAVENOUS
Status: DISPENSED
Start: 2025-01-01 | End: 2025-01-01

## 2025-01-01 RX ORDER — URSODIOL 300 MG/1
300 CAPSULE ORAL 2 TIMES DAILY
Status: DISCONTINUED | OUTPATIENT
Start: 2025-01-01 | End: 2025-01-01

## 2025-01-01 RX ORDER — HYDROCORTISONE SODIUM SUCCINATE 100 MG/2ML
25 INJECTION INTRAMUSCULAR; INTRAVENOUS EVERY 6 HOURS
Status: DISCONTINUED | OUTPATIENT
Start: 2025-01-01 | End: 2025-01-01

## 2025-01-01 RX ORDER — FENTANYL CITRATE 50 UG/ML
INJECTION, SOLUTION INTRAMUSCULAR; INTRAVENOUS
Status: COMPLETED
Start: 2025-01-01 | End: 2025-01-01

## 2025-01-01 RX ORDER — HEPARIN SODIUM 1000 [USP'U]/ML
INJECTION, SOLUTION INTRAVENOUS; SUBCUTANEOUS
Status: COMPLETED
Start: 2025-01-01 | End: 2025-01-01

## 2025-01-01 RX ORDER — POTASSIUM CHLORIDE 29.8 MG/ML
20 INJECTION INTRAVENOUS ONCE
Status: COMPLETED | OUTPATIENT
Start: 2025-01-01 | End: 2025-01-01

## 2025-01-01 RX ORDER — MIDAZOLAM HYDROCHLORIDE 1 MG/ML
2 INJECTION, SOLUTION INTRAMUSCULAR; INTRAVENOUS EVERY 4 HOURS PRN
Status: DISCONTINUED | OUTPATIENT
Start: 2025-01-01 | End: 2025-01-01 | Stop reason: HOSPADM

## 2025-01-01 RX ORDER — HYDROCODONE BITARTRATE AND ACETAMINOPHEN 5; 325 MG/1; MG/1
1 TABLET ORAL EVERY 4 HOURS PRN
Refills: 0 | Status: DISCONTINUED | OUTPATIENT
Start: 2025-01-01 | End: 2025-01-01

## 2025-01-01 RX ORDER — GABAPENTIN 100 MG/1
200 CAPSULE ORAL EVERY 12 HOURS SCHEDULED
Status: DISCONTINUED | OUTPATIENT
Start: 2025-01-01 | End: 2025-01-01

## 2025-01-01 RX ORDER — BUMETANIDE 0.25 MG/ML
1 INJECTION, SOLUTION INTRAMUSCULAR; INTRAVENOUS EVERY 12 HOURS
Status: DISCONTINUED | OUTPATIENT
Start: 2025-01-01 | End: 2025-01-01

## 2025-01-01 RX ORDER — LIDOCAINE HYDROCHLORIDE AND EPINEPHRINE 10; 10 MG/ML; UG/ML
INJECTION, SOLUTION INFILTRATION; PERINEURAL
Status: COMPLETED
Start: 2025-01-01 | End: 2025-01-01

## 2025-01-01 RX ORDER — HEPARIN SODIUM 1000 [USP'U]/ML
INJECTION, SOLUTION INTRAVENOUS; SUBCUTANEOUS AS NEEDED
Status: DISCONTINUED | OUTPATIENT
Start: 2025-01-01 | End: 2025-01-01

## 2025-01-01 RX ORDER — IPRATROPIUM BROMIDE AND ALBUTEROL SULFATE 2.5; .5 MG/3ML; MG/3ML
3 SOLUTION RESPIRATORY (INHALATION)
Status: DISCONTINUED | OUTPATIENT
Start: 2025-01-01 | End: 2025-01-01

## 2025-01-01 RX ORDER — POLYVINYL ALCOHOL 14 MG/ML
2 SOLUTION/ DROPS OPHTHALMIC
Status: DISCONTINUED | OUTPATIENT
Start: 2025-01-01 | End: 2025-01-01

## 2025-01-01 RX ORDER — IPRATROPIUM BROMIDE AND ALBUTEROL SULFATE 2.5; .5 MG/3ML; MG/3ML
3 SOLUTION RESPIRATORY (INHALATION) EVERY 4 HOURS PRN
Status: DISCONTINUED | OUTPATIENT
Start: 2025-01-01 | End: 2025-01-01

## 2025-01-01 RX ORDER — HYDROCORTISONE SODIUM SUCCINATE 100 MG/2ML
100 INJECTION INTRAMUSCULAR; INTRAVENOUS EVERY 8 HOURS
Status: DISCONTINUED | OUTPATIENT
Start: 2025-01-01 | End: 2025-01-01

## 2025-01-01 RX ORDER — ETOMIDATE 2 MG/ML
10 INJECTION INTRAVENOUS ONCE
Status: COMPLETED | OUTPATIENT
Start: 2025-01-01 | End: 2025-01-01

## 2025-01-01 RX ORDER — GLYCOPYRROLATE 0.2 MG/ML
0.4 INJECTION INTRAMUSCULAR; INTRAVENOUS EVERY 4 HOURS PRN
Status: DISCONTINUED | OUTPATIENT
Start: 2025-01-01 | End: 2025-01-01 | Stop reason: HOSPADM

## 2025-01-01 RX ORDER — VANCOMYCIN/0.9 % SOD CHLORIDE 1.5G/250ML
16.6 PLASTIC BAG, INJECTION (ML) INTRAVENOUS
Status: DISCONTINUED | OUTPATIENT
Start: 2025-01-01 | End: 2025-01-01

## 2025-01-01 RX ORDER — SODIUM CHLORIDE 9 MG/ML
125 INJECTION, SOLUTION INTRAVENOUS CONTINUOUS
Status: DISCONTINUED | OUTPATIENT
Start: 2025-01-01 | End: 2025-01-01

## 2025-01-01 RX ORDER — LATANOPROST 50 UG/ML
1 SOLUTION/ DROPS OPHTHALMIC NIGHTLY
Status: DISCONTINUED | OUTPATIENT
Start: 2025-01-01 | End: 2025-01-01

## 2025-01-01 RX ORDER — PANTOPRAZOLE SODIUM 40 MG/10ML
40 INJECTION, POWDER, LYOPHILIZED, FOR SOLUTION INTRAVENOUS EVERY 12 HOURS SCHEDULED
Status: DISCONTINUED | OUTPATIENT
Start: 2025-01-01 | End: 2025-01-01

## 2025-01-01 RX ORDER — ALBUMIN (HUMAN) 12.5 G/50ML
50 SOLUTION INTRAVENOUS 3 TIMES DAILY
Status: DISCONTINUED | OUTPATIENT
Start: 2025-01-01 | End: 2025-01-01

## 2025-01-01 RX ORDER — NITROGLYCERIN 0.4 MG/1
0.4 TABLET SUBLINGUAL
Status: DISCONTINUED | OUTPATIENT
Start: 2025-01-01 | End: 2025-01-01

## 2025-01-01 RX ORDER — FENTANYL CITRATE 50 UG/ML
50 INJECTION, SOLUTION INTRAMUSCULAR; INTRAVENOUS
Refills: 0 | Status: DISPENSED | OUTPATIENT
Start: 2025-01-01 | End: 2025-01-01

## 2025-01-01 RX ADMIN — IPRATROPIUM BROMIDE AND ALBUTEROL SULFATE 3 ML: 2.5; .5 SOLUTION RESPIRATORY (INHALATION) at 19:02

## 2025-01-01 RX ADMIN — IPRATROPIUM BROMIDE AND ALBUTEROL SULFATE 3 ML: 2.5; .5 SOLUTION RESPIRATORY (INHALATION) at 09:37

## 2025-01-01 RX ADMIN — MIDODRINE HYDROCHLORIDE 10 MG: 10 TABLET ORAL at 12:21

## 2025-01-01 RX ADMIN — URSODIOL 300 MG: 300 CAPSULE ORAL at 20:38

## 2025-01-01 RX ADMIN — Medication 10 ML: at 21:46

## 2025-01-01 RX ADMIN — MIDODRINE HYDROCHLORIDE 10 MG: 10 TABLET ORAL at 10:30

## 2025-01-01 RX ADMIN — RIFAXIMIN 550 MG: 550 TABLET ORAL at 08:27

## 2025-01-01 RX ADMIN — PANTOPRAZOLE SODIUM 40 MG: 40 TABLET, DELAYED RELEASE ORAL at 08:17

## 2025-01-01 RX ADMIN — LORAZEPAM 1 MG: 1 TABLET ORAL at 03:24

## 2025-01-01 RX ADMIN — SODIUM CHLORIDE 125 ML/HR: 900 INJECTION, SOLUTION INTRAVENOUS at 09:53

## 2025-01-01 RX ADMIN — IPRATROPIUM BROMIDE AND ALBUTEROL SULFATE 3 ML: 2.5; .5 SOLUTION RESPIRATORY (INHALATION) at 12:22

## 2025-01-01 RX ADMIN — CALCIUM CHLORIDE, MAGNESIUM CHLORIDE, SODIUM CHLORIDE, SODIUM BICARBONATE, POTASSIUM CHLORIDE AND SODIUM PHOSPHATE DIBASIC DIHYDRATE 1000 ML/HR: 3.68; 3.05; 6.34; 3.09; .314; .187 INJECTION INTRAVENOUS at 07:45

## 2025-01-01 RX ADMIN — CALCIUM CHLORIDE, MAGNESIUM CHLORIDE, SODIUM CHLORIDE, SODIUM BICARBONATE, POTASSIUM CHLORIDE AND SODIUM PHOSPHATE DIBASIC DIHYDRATE 1000 ML/HR: 3.68; 3.05; 6.34; 3.09; .314; .187 INJECTION INTRAVENOUS at 14:20

## 2025-01-01 RX ADMIN — CALCIUM CHLORIDE, MAGNESIUM CHLORIDE, SODIUM CHLORIDE, SODIUM BICARBONATE, POTASSIUM CHLORIDE AND SODIUM PHOSPHATE DIBASIC DIHYDRATE 1000 ML/HR: 3.68; 3.05; 6.34; 3.09; .314; .187 INJECTION INTRAVENOUS at 11:16

## 2025-01-01 RX ADMIN — ROSUVASTATIN 5 MG: 10 TABLET, FILM COATED ORAL at 08:20

## 2025-01-01 RX ADMIN — MIDAZOLAM HYDROCHLORIDE 2 MG/HR: 1 INJECTION, SOLUTION INTRAVENOUS at 11:15

## 2025-01-01 RX ADMIN — DEXTROSE MONOHYDRATE 25 G: 25 INJECTION, SOLUTION INTRAVENOUS at 23:39

## 2025-01-01 RX ADMIN — IPRATROPIUM BROMIDE AND ALBUTEROL SULFATE 3 ML: 2.5; .5 SOLUTION RESPIRATORY (INHALATION) at 13:10

## 2025-01-01 RX ADMIN — NOREPINEPHRINE BITARTRATE 0.24 MCG/KG/MIN: 0.03 INJECTION, SOLUTION INTRAVENOUS at 08:37

## 2025-01-01 RX ADMIN — ALBUMIN (HUMAN) 12.5 G: 0.25 INJECTION, SOLUTION INTRAVENOUS at 08:21

## 2025-01-01 RX ADMIN — Medication 10 ML: at 08:05

## 2025-01-01 RX ADMIN — VASOPRESSIN 0.03 UNITS/MIN: 0.2 SOLUTION INTRAVENOUS at 02:02

## 2025-01-01 RX ADMIN — PHENYLEPHRINE HYDROCHLORIDE 3 MCG/KG/MIN: 10 INJECTION INTRAVENOUS at 16:20

## 2025-01-01 RX ADMIN — LACTULOSE 10 G: 20 SOLUTION ORAL at 08:04

## 2025-01-01 RX ADMIN — PANTOPRAZOLE SODIUM 40 MG: 40 INJECTION, POWDER, FOR SOLUTION INTRAVENOUS at 20:23

## 2025-01-01 RX ADMIN — PHENYLEPHRINE HYDROCHLORIDE 2.5 MCG/KG/MIN: 10 INJECTION INTRAVENOUS at 17:59

## 2025-01-01 RX ADMIN — CALCIUM GLUCONATE 2000 MG: 20 INJECTION, SOLUTION INTRAVENOUS at 20:29

## 2025-01-01 RX ADMIN — CALCIUM CHLORIDE, MAGNESIUM CHLORIDE, SODIUM CHLORIDE, SODIUM BICARBONATE, POTASSIUM CHLORIDE AND SODIUM PHOSPHATE DIBASIC DIHYDRATE 1000 ML/HR: 3.68; 3.05; 6.34; 3.09; .314; .187 INJECTION INTRAVENOUS at 11:04

## 2025-01-01 RX ADMIN — LATANOPROST 1 DROP: 50 SOLUTION OPHTHALMIC at 20:39

## 2025-01-01 RX ADMIN — IPRATROPIUM BROMIDE AND ALBUTEROL SULFATE 3 ML: 2.5; .5 SOLUTION RESPIRATORY (INHALATION) at 15:07

## 2025-01-01 RX ADMIN — PIPERACILLIN AND TAZOBACTAM 4.5 G: 4; .5 INJECTION, POWDER, FOR SOLUTION INTRAVENOUS at 06:24

## 2025-01-01 RX ADMIN — THIAMINE HCL TAB 100 MG 100 MG: 100 TAB at 08:27

## 2025-01-01 RX ADMIN — NOREPINEPHRINE BITARTRATE 0.28 MCG/KG/MIN: 0.03 INJECTION, SOLUTION INTRAVENOUS at 02:59

## 2025-01-01 RX ADMIN — Medication 2 SPRAY: at 08:06

## 2025-01-01 RX ADMIN — FOLIC ACID 1 MG: 1 TABLET ORAL at 08:27

## 2025-01-01 RX ADMIN — LATANOPROST 1 DROP: 50 SOLUTION OPHTHALMIC at 20:07

## 2025-01-01 RX ADMIN — PIPERACILLIN AND TAZOBACTAM 3.38 G: 3; .375 INJECTION, POWDER, LYOPHILIZED, FOR SOLUTION INTRAVENOUS at 17:49

## 2025-01-01 RX ADMIN — IPRATROPIUM BROMIDE AND ALBUTEROL SULFATE 3 ML: 2.5; .5 SOLUTION RESPIRATORY (INHALATION) at 07:55

## 2025-01-01 RX ADMIN — ALBUMIN (HUMAN) 50 G: 0.25 INJECTION, SOLUTION INTRAVENOUS at 07:06

## 2025-01-01 RX ADMIN — LATANOPROST 1 DROP: 50 SOLUTION OPHTHALMIC at 21:47

## 2025-01-01 RX ADMIN — MIDODRINE HYDROCHLORIDE 10 MG: 10 TABLET ORAL at 11:19

## 2025-01-01 RX ADMIN — PANTOPRAZOLE SODIUM 40 MG: 40 TABLET, DELAYED RELEASE ORAL at 08:20

## 2025-01-01 RX ADMIN — HYDROCORTISONE SODIUM SUCCINATE 25 MG: 100 INJECTION, POWDER, FOR SOLUTION INTRAMUSCULAR; INTRAVENOUS at 13:01

## 2025-01-01 RX ADMIN — IPRATROPIUM BROMIDE AND ALBUTEROL SULFATE 3 ML: 2.5; .5 SOLUTION RESPIRATORY (INHALATION) at 08:55

## 2025-01-01 RX ADMIN — LACTULOSE 10 G: 20 SOLUTION ORAL at 08:05

## 2025-01-01 RX ADMIN — GABAPENTIN 200 MG: 100 CAPSULE ORAL at 08:04

## 2025-01-01 RX ADMIN — URSODIOL 300 MG: 300 CAPSULE ORAL at 20:06

## 2025-01-01 RX ADMIN — URSODIOL 300 MG: 300 CAPSULE ORAL at 22:12

## 2025-01-01 RX ADMIN — Medication 10 ML: at 08:39

## 2025-01-01 RX ADMIN — CALCIUM CHLORIDE, MAGNESIUM CHLORIDE, SODIUM CHLORIDE, SODIUM BICARBONATE, POTASSIUM CHLORIDE AND SODIUM PHOSPHATE DIBASIC DIHYDRATE 1000 ML/HR: 3.68; 3.05; 6.34; 3.09; .314; .187 INJECTION INTRAVENOUS at 12:50

## 2025-01-01 RX ADMIN — RIFAXIMIN 550 MG: 550 TABLET ORAL at 08:17

## 2025-01-01 RX ADMIN — PHENYLEPHRINE HYDROCHLORIDE 2.5 MCG/KG/MIN: 10 INJECTION INTRAVENOUS at 22:39

## 2025-01-01 RX ADMIN — Medication 10 ML: at 20:00

## 2025-01-01 RX ADMIN — URSODIOL 300 MG: 300 CAPSULE ORAL at 08:46

## 2025-01-01 RX ADMIN — URSODIOL 300 MG: 300 CAPSULE ORAL at 20:23

## 2025-01-01 RX ADMIN — Medication 10 ML: at 08:44

## 2025-01-01 RX ADMIN — CALCIUM CHLORIDE, MAGNESIUM CHLORIDE, SODIUM CHLORIDE, SODIUM BICARBONATE, POTASSIUM CHLORIDE AND SODIUM PHOSPHATE DIBASIC DIHYDRATE 1000 ML/HR: 3.68; 3.05; 6.34; 3.09; .314; .187 INJECTION INTRAVENOUS at 16:11

## 2025-01-01 RX ADMIN — URSODIOL 300 MG: 300 CAPSULE ORAL at 09:22

## 2025-01-01 RX ADMIN — NOREPINEPHRINE BITARTRATE 0.28 MCG/KG/MIN: 1 INJECTION, SOLUTION, CONCENTRATE INTRAVENOUS at 06:41

## 2025-01-01 RX ADMIN — GABAPENTIN 200 MG: 100 CAPSULE ORAL at 20:49

## 2025-01-01 RX ADMIN — HYDROCORTISONE SODIUM SUCCINATE 25 MG: 100 INJECTION, POWDER, FOR SOLUTION INTRAMUSCULAR; INTRAVENOUS at 02:50

## 2025-01-01 RX ADMIN — PHENYLEPHRINE HYDROCHLORIDE 1.8 MCG/KG/MIN: 10 INJECTION INTRAVENOUS at 00:02

## 2025-01-01 RX ADMIN — IPRATROPIUM BROMIDE AND ALBUTEROL SULFATE 3 ML: 2.5; .5 SOLUTION RESPIRATORY (INHALATION) at 15:46

## 2025-01-01 RX ADMIN — HYDROCORTISONE SODIUM SUCCINATE 50 MG: 100 INJECTION, POWDER, FOR SOLUTION INTRAMUSCULAR; INTRAVENOUS at 22:56

## 2025-01-01 RX ADMIN — MIDODRINE HYDROCHLORIDE 10 MG: 10 TABLET ORAL at 08:45

## 2025-01-01 RX ADMIN — CALCIUM CHLORIDE, MAGNESIUM CHLORIDE, SODIUM CHLORIDE, SODIUM BICARBONATE, POTASSIUM CHLORIDE AND SODIUM PHOSPHATE DIBASIC DIHYDRATE 1000 ML/HR: 3.68; 3.05; 6.34; 3.09; .314; .187 INJECTION INTRAVENOUS at 02:36

## 2025-01-01 RX ADMIN — IPRATROPIUM BROMIDE AND ALBUTEROL SULFATE 3 ML: 2.5; .5 SOLUTION RESPIRATORY (INHALATION) at 13:31

## 2025-01-01 RX ADMIN — IPRATROPIUM BROMIDE AND ALBUTEROL SULFATE 3 ML: 2.5; .5 SOLUTION RESPIRATORY (INHALATION) at 20:33

## 2025-01-01 RX ADMIN — CALCIUM CHLORIDE, MAGNESIUM CHLORIDE, SODIUM CHLORIDE, SODIUM BICARBONATE, POTASSIUM CHLORIDE AND SODIUM PHOSPHATE DIBASIC DIHYDRATE 1000 ML/HR: 3.68; 3.05; 6.34; 3.09; .314; .187 INJECTION INTRAVENOUS at 09:32

## 2025-01-01 RX ADMIN — DESVENLAFAXINE 50 MG: 50 TABLET, FILM COATED, EXTENDED RELEASE ORAL at 09:46

## 2025-01-01 RX ADMIN — CALCIUM CHLORIDE, MAGNESIUM CHLORIDE, SODIUM CHLORIDE, SODIUM BICARBONATE, POTASSIUM CHLORIDE AND SODIUM PHOSPHATE DIBASIC DIHYDRATE 1000 ML/HR: 3.68; 3.05; 6.34; 3.09; .314; .187 INJECTION INTRAVENOUS at 05:45

## 2025-01-01 RX ADMIN — MIDODRINE HYDROCHLORIDE 10 MG: 10 TABLET ORAL at 11:14

## 2025-01-01 RX ADMIN — HYDROCORTISONE SODIUM SUCCINATE 25 MG: 100 INJECTION, POWDER, FOR SOLUTION INTRAMUSCULAR; INTRAVENOUS at 21:45

## 2025-01-01 RX ADMIN — CALCIUM CHLORIDE, MAGNESIUM CHLORIDE, SODIUM CHLORIDE, SODIUM BICARBONATE, POTASSIUM CHLORIDE AND SODIUM PHOSPHATE DIBASIC DIHYDRATE 1000 ML/HR: 3.68; 3.05; 6.34; 3.09; .314; .187 INJECTION INTRAVENOUS at 21:10

## 2025-01-01 RX ADMIN — MIDODRINE HYDROCHLORIDE 10 MG: 10 TABLET ORAL at 11:47

## 2025-01-01 RX ADMIN — RIFAXIMIN 550 MG: 550 TABLET ORAL at 20:23

## 2025-01-01 RX ADMIN — MIDODRINE HYDROCHLORIDE 10 MG: 10 TABLET ORAL at 07:15

## 2025-01-01 RX ADMIN — ROSUVASTATIN 5 MG: 10 TABLET, FILM COATED ORAL at 08:36

## 2025-01-01 RX ADMIN — Medication 10 ML: at 20:51

## 2025-01-01 RX ADMIN — NOREPINEPHRINE BITARTRATE 0.24 MCG/KG/MIN: 0.03 INJECTION, SOLUTION INTRAVENOUS at 21:32

## 2025-01-01 RX ADMIN — RIFAXIMIN 550 MG: 550 TABLET ORAL at 20:14

## 2025-01-01 RX ADMIN — CALCIUM CHLORIDE, MAGNESIUM CHLORIDE, SODIUM CHLORIDE, SODIUM BICARBONATE, POTASSIUM CHLORIDE AND SODIUM PHOSPHATE DIBASIC DIHYDRATE 1000 ML/HR: 3.68; 3.05; 6.34; 3.09; .314; .187 INJECTION INTRAVENOUS at 05:38

## 2025-01-01 RX ADMIN — URSODIOL 300 MG: 300 CAPSULE ORAL at 08:27

## 2025-01-01 RX ADMIN — GABAPENTIN 200 MG: 100 CAPSULE ORAL at 08:27

## 2025-01-01 RX ADMIN — LATANOPROST 1 DROP: 50 SOLUTION OPHTHALMIC at 22:58

## 2025-01-01 RX ADMIN — NOREPINEPHRINE BITARTRATE 0.26 MCG/KG/MIN: 0.03 INJECTION, SOLUTION INTRAVENOUS at 18:32

## 2025-01-01 RX ADMIN — IPRATROPIUM BROMIDE AND ALBUTEROL SULFATE 3 ML: 2.5; .5 SOLUTION RESPIRATORY (INHALATION) at 19:05

## 2025-01-01 RX ADMIN — CALCIUM CHLORIDE, MAGNESIUM CHLORIDE, SODIUM CHLORIDE, SODIUM BICARBONATE, POTASSIUM CHLORIDE AND SODIUM PHOSPHATE DIBASIC DIHYDRATE 1000 ML/HR: 3.68; 3.05; 6.34; 3.09; .314; .187 INJECTION INTRAVENOUS at 16:01

## 2025-01-01 RX ADMIN — BUMETANIDE 2 MG: 0.25 INJECTION INTRAMUSCULAR; INTRAVENOUS at 16:21

## 2025-01-01 RX ADMIN — PANTOPRAZOLE SODIUM 40 MG: 40 TABLET, DELAYED RELEASE ORAL at 08:35

## 2025-01-01 RX ADMIN — NOREPINEPHRINE BITARTRATE 0.26 MCG/KG/MIN: 0.03 INJECTION, SOLUTION INTRAVENOUS at 01:06

## 2025-01-01 RX ADMIN — HYDROCORTISONE SODIUM SUCCINATE 50 MG: 100 INJECTION, POWDER, FOR SOLUTION INTRAMUSCULAR; INTRAVENOUS at 13:57

## 2025-01-01 RX ADMIN — Medication 1 PATCH: at 02:25

## 2025-01-01 RX ADMIN — HYDROCORTISONE SODIUM SUCCINATE 50 MG: 100 INJECTION, POWDER, FOR SOLUTION INTRAMUSCULAR; INTRAVENOUS at 20:37

## 2025-01-01 RX ADMIN — ROSUVASTATIN 5 MG: 10 TABLET, FILM COATED ORAL at 08:27

## 2025-01-01 RX ADMIN — CALCIUM CHLORIDE, MAGNESIUM CHLORIDE, SODIUM CHLORIDE, SODIUM BICARBONATE, POTASSIUM CHLORIDE AND SODIUM PHOSPHATE DIBASIC DIHYDRATE 1000 ML/HR: 3.68; 3.05; 6.34; 3.09; .314; .187 INJECTION INTRAVENOUS at 00:50

## 2025-01-01 RX ADMIN — PIPERACILLIN AND TAZOBACTAM 4.5 G: 4; .5 INJECTION, POWDER, FOR SOLUTION INTRAVENOUS at 22:59

## 2025-01-01 RX ADMIN — PIPERACILLIN AND TAZOBACTAM 4.5 G: 4; .5 INJECTION, POWDER, FOR SOLUTION INTRAVENOUS at 22:06

## 2025-01-01 RX ADMIN — PROPOFOL 10 MCG/KG/MIN: 10 INJECTION, EMULSION INTRAVENOUS at 23:47

## 2025-01-01 RX ADMIN — RIFAXIMIN 550 MG: 550 TABLET ORAL at 09:21

## 2025-01-01 RX ADMIN — ALBUMIN (HUMAN) 50 G: 0.25 INJECTION, SOLUTION INTRAVENOUS at 12:51

## 2025-01-01 RX ADMIN — PROPOFOL 40 MCG/KG/MIN: 10 INJECTION, EMULSION INTRAVENOUS at 03:47

## 2025-01-01 RX ADMIN — HYDROCORTISONE SODIUM SUCCINATE 25 MG: 100 INJECTION, POWDER, FOR SOLUTION INTRAMUSCULAR; INTRAVENOUS at 08:05

## 2025-01-01 RX ADMIN — IPRATROPIUM BROMIDE AND ALBUTEROL SULFATE 3 ML: 2.5; .5 SOLUTION RESPIRATORY (INHALATION) at 11:00

## 2025-01-01 RX ADMIN — RIFAXIMIN 550 MG: 550 TABLET ORAL at 14:42

## 2025-01-01 RX ADMIN — MUPIROCIN 1 APPLICATION: 20 OINTMENT TOPICAL at 08:26

## 2025-01-01 RX ADMIN — ETOMIDATE 10 MG: 2 INJECTION, SOLUTION INTRAVENOUS at 22:51

## 2025-01-01 RX ADMIN — ALBUMIN (HUMAN) 12.5 G: 0.25 INJECTION, SOLUTION INTRAVENOUS at 20:05

## 2025-01-01 RX ADMIN — CHLORHEXIDINE GLUCONATE 15 ML: 1.2 SOLUTION ORAL at 14:12

## 2025-01-01 RX ADMIN — MIDODRINE HYDROCHLORIDE 10 MG: 10 TABLET ORAL at 11:51

## 2025-01-01 RX ADMIN — QUETIAPINE FUMARATE 100 MG: 100 TABLET ORAL at 20:53

## 2025-01-01 RX ADMIN — CALCIUM CHLORIDE, MAGNESIUM CHLORIDE, SODIUM CHLORIDE, SODIUM BICARBONATE, POTASSIUM CHLORIDE AND SODIUM PHOSPHATE DIBASIC DIHYDRATE 1000 ML/HR: 3.68; 3.05; 6.34; 3.09; .314; .187 INJECTION INTRAVENOUS at 04:27

## 2025-01-01 RX ADMIN — VASOPRESSIN 0.03 UNITS/MIN: 0.2 SOLUTION INTRAVENOUS at 08:58

## 2025-01-01 RX ADMIN — MIDAZOLAM HYDROCHLORIDE 2 MG: 1 INJECTION, SOLUTION INTRAMUSCULAR; INTRAVENOUS at 13:03

## 2025-01-01 RX ADMIN — CALCIUM CHLORIDE, MAGNESIUM CHLORIDE, SODIUM CHLORIDE, SODIUM BICARBONATE, POTASSIUM CHLORIDE AND SODIUM PHOSPHATE DIBASIC DIHYDRATE 1000 ML/HR: 3.68; 3.05; 6.34; 3.09; .314; .187 INJECTION INTRAVENOUS at 00:30

## 2025-01-01 RX ADMIN — RIFAXIMIN 550 MG: 550 TABLET ORAL at 08:05

## 2025-01-01 RX ADMIN — PHENYLEPHRINE HYDROCHLORIDE 2 MCG/KG/MIN: 10 INJECTION INTRAVENOUS at 09:25

## 2025-01-01 RX ADMIN — GABAPENTIN 200 MG: 100 CAPSULE ORAL at 00:02

## 2025-01-01 RX ADMIN — ROSUVASTATIN 5 MG: 10 TABLET, FILM COATED ORAL at 09:22

## 2025-01-01 RX ADMIN — BUMETANIDE 1 MG: 0.25 INJECTION INTRAMUSCULAR; INTRAVENOUS at 04:42

## 2025-01-01 RX ADMIN — URSODIOL 300 MG: 300 CAPSULE ORAL at 08:20

## 2025-01-01 RX ADMIN — MIDODRINE HYDROCHLORIDE 10 MG: 10 TABLET ORAL at 06:37

## 2025-01-01 RX ADMIN — Medication 10 ML: at 22:56

## 2025-01-01 RX ADMIN — Medication 1 CAPSULE: at 08:06

## 2025-01-01 RX ADMIN — CALCIUM CHLORIDE, MAGNESIUM CHLORIDE, SODIUM CHLORIDE, SODIUM BICARBONATE, POTASSIUM CHLORIDE AND SODIUM PHOSPHATE DIBASIC DIHYDRATE 1000 ML/HR: 3.68; 3.05; 6.34; 3.09; .314; .187 INJECTION INTRAVENOUS at 17:30

## 2025-01-01 RX ADMIN — GABAPENTIN 200 MG: 100 CAPSULE ORAL at 22:57

## 2025-01-01 RX ADMIN — CALCIUM CHLORIDE, MAGNESIUM CHLORIDE, SODIUM CHLORIDE, SODIUM BICARBONATE, POTASSIUM CHLORIDE AND SODIUM PHOSPHATE DIBASIC DIHYDRATE 1000 ML/HR: 3.68; 3.05; 6.34; 3.09; .314; .187 INJECTION INTRAVENOUS at 03:48

## 2025-01-01 RX ADMIN — PIPERACILLIN AND TAZOBACTAM 4.5 G: 4; .5 INJECTION, POWDER, FOR SOLUTION INTRAVENOUS at 21:30

## 2025-01-01 RX ADMIN — GABAPENTIN 200 MG: 100 CAPSULE ORAL at 08:20

## 2025-01-01 RX ADMIN — SODIUM CHLORIDE, SODIUM LACTATE, POTASSIUM CHLORIDE, CALCIUM CHLORIDE 1000 ML: 20; 30; 600; 310 INJECTION, SOLUTION INTRAVENOUS at 19:21

## 2025-01-01 RX ADMIN — PHENYLEPHRINE HYDROCHLORIDE 3 MCG/KG/MIN: 10 INJECTION INTRAVENOUS at 09:48

## 2025-01-01 RX ADMIN — GABAPENTIN 200 MG: 100 CAPSULE ORAL at 12:14

## 2025-01-01 RX ADMIN — URSODIOL 300 MG: 300 CAPSULE ORAL at 00:30

## 2025-01-01 RX ADMIN — MIDODRINE HYDROCHLORIDE 10 MG: 10 TABLET ORAL at 11:26

## 2025-01-01 RX ADMIN — URSODIOL 300 MG: 300 CAPSULE ORAL at 21:45

## 2025-01-01 RX ADMIN — HYDROCORTISONE SODIUM SUCCINATE 25 MG: 100 INJECTION, POWDER, FOR SOLUTION INTRAMUSCULAR; INTRAVENOUS at 13:11

## 2025-01-01 RX ADMIN — DESVENLAFAXINE 50 MG: 50 TABLET, FILM COATED, EXTENDED RELEASE ORAL at 08:45

## 2025-01-01 RX ADMIN — PANTOPRAZOLE SODIUM 40 MG: 40 INJECTION, POWDER, FOR SOLUTION INTRAVENOUS at 06:07

## 2025-01-01 RX ADMIN — IPRATROPIUM BROMIDE AND ALBUTEROL SULFATE 3 ML: 2.5; .5 SOLUTION RESPIRATORY (INHALATION) at 16:15

## 2025-01-01 RX ADMIN — PHENYLEPHRINE HYDROCHLORIDE 3 MCG/KG/MIN: 10 INJECTION INTRAVENOUS at 04:57

## 2025-01-01 RX ADMIN — QUETIAPINE FUMARATE 100 MG: 100 TABLET ORAL at 20:23

## 2025-01-01 RX ADMIN — CALCIUM GLUCONATE 2000 MG: 20 INJECTION, SOLUTION INTRAVENOUS at 03:52

## 2025-01-01 RX ADMIN — Medication 1 CAPSULE: at 08:43

## 2025-01-01 RX ADMIN — HEPARIN SODIUM 4100 UNITS: 1000 INJECTION INTRAVENOUS; SUBCUTANEOUS at 15:22

## 2025-01-01 RX ADMIN — IPRATROPIUM BROMIDE AND ALBUTEROL SULFATE 3 ML: 2.5; .5 SOLUTION RESPIRATORY (INHALATION) at 21:03

## 2025-01-01 RX ADMIN — PHENYLEPHRINE HYDROCHLORIDE 1.5 MCG/KG/MIN: 10 INJECTION INTRAVENOUS at 16:48

## 2025-01-01 RX ADMIN — NOREPINEPHRINE BITARTRATE 0.14 MCG/KG/MIN: 0.03 INJECTION, SOLUTION INTRAVENOUS at 10:38

## 2025-01-01 RX ADMIN — HYDROCORTISONE SODIUM SUCCINATE 25 MG: 100 INJECTION, POWDER, FOR SOLUTION INTRAMUSCULAR; INTRAVENOUS at 20:27

## 2025-01-01 RX ADMIN — SODIUM CHLORIDE 500 ML: 9 INJECTION, SOLUTION INTRAVENOUS at 09:52

## 2025-01-01 RX ADMIN — URSODIOL 300 MG: 300 CAPSULE ORAL at 20:46

## 2025-01-01 RX ADMIN — PHENYLEPHRINE HYDROCHLORIDE 3 MCG/KG/MIN: 10 INJECTION INTRAVENOUS at 01:21

## 2025-01-01 RX ADMIN — MIDODRINE HYDROCHLORIDE 10 MG: 10 TABLET ORAL at 07:46

## 2025-01-01 RX ADMIN — FOLIC ACID 1 MG: 1 TABLET ORAL at 08:17

## 2025-01-01 RX ADMIN — PANTOPRAZOLE SODIUM 40 MG: 40 INJECTION, POWDER, FOR SOLUTION INTRAVENOUS at 05:09

## 2025-01-01 RX ADMIN — BUMETANIDE 1 MG: 0.25 INJECTION INTRAMUSCULAR; INTRAVENOUS at 02:27

## 2025-01-01 RX ADMIN — GABAPENTIN 200 MG: 100 CAPSULE ORAL at 23:58

## 2025-01-01 RX ADMIN — FOLIC ACID 1 MG: 1 TABLET ORAL at 08:06

## 2025-01-01 RX ADMIN — IPRATROPIUM BROMIDE AND ALBUTEROL SULFATE 3 ML: 2.5; .5 SOLUTION RESPIRATORY (INHALATION) at 11:36

## 2025-01-01 RX ADMIN — IPRATROPIUM BROMIDE AND ALBUTEROL SULFATE 3 ML: 2.5; .5 SOLUTION RESPIRATORY (INHALATION) at 12:30

## 2025-01-01 RX ADMIN — FENTANYL CITRATE 25 MCG: 50 INJECTION, SOLUTION INTRAMUSCULAR; INTRAVENOUS at 15:15

## 2025-01-01 RX ADMIN — ALBUMIN (HUMAN) 12.5 G: 0.25 INJECTION, SOLUTION INTRAVENOUS at 22:54

## 2025-01-01 RX ADMIN — ROSUVASTATIN 5 MG: 10 TABLET, FILM COATED ORAL at 08:45

## 2025-01-01 RX ADMIN — CALCIUM CHLORIDE, MAGNESIUM CHLORIDE, SODIUM CHLORIDE, SODIUM BICARBONATE, POTASSIUM CHLORIDE AND SODIUM PHOSPHATE DIBASIC DIHYDRATE 1000 ML/HR: 3.68; 3.05; 6.34; 3.09; .314; .187 INJECTION INTRAVENOUS at 22:43

## 2025-01-01 RX ADMIN — PHENYLEPHRINE HYDROCHLORIDE 1.5 MCG/KG/MIN: 10 INJECTION INTRAVENOUS at 04:16

## 2025-01-01 RX ADMIN — Medication 10 ML: at 20:34

## 2025-01-01 RX ADMIN — Medication 10 ML: at 08:55

## 2025-01-01 RX ADMIN — MIDAZOLAM HYDROCHLORIDE 0.5 MG: 1 INJECTION, SOLUTION INTRAMUSCULAR; INTRAVENOUS at 15:15

## 2025-01-01 RX ADMIN — RIFAXIMIN 550 MG: 550 TABLET ORAL at 20:31

## 2025-01-01 RX ADMIN — URSODIOL 300 MG: 300 CAPSULE ORAL at 08:45

## 2025-01-01 RX ADMIN — LACTULOSE 10 G: 20 SOLUTION ORAL at 08:44

## 2025-01-01 RX ADMIN — GABAPENTIN 200 MG: 100 CAPSULE ORAL at 21:46

## 2025-01-01 RX ADMIN — THROMBIN, TOPICAL (BOVINE) 5000 UNITS: KIT at 14:44

## 2025-01-01 RX ADMIN — PIPERACILLIN AND TAZOBACTAM 4.5 G: 4; .5 INJECTION, POWDER, FOR SOLUTION INTRAVENOUS at 21:47

## 2025-01-01 RX ADMIN — MIDODRINE HYDROCHLORIDE 10 MG: 10 TABLET ORAL at 17:28

## 2025-01-01 RX ADMIN — BUMETANIDE 1 MG: 0.25 INJECTION INTRAMUSCULAR; INTRAVENOUS at 15:06

## 2025-01-01 RX ADMIN — BUMETANIDE 1 MG: 0.25 INJECTION INTRAMUSCULAR; INTRAVENOUS at 14:49

## 2025-01-01 RX ADMIN — PHENYLEPHRINE HYDROCHLORIDE 3 MCG/KG/MIN: 10 INJECTION INTRAVENOUS at 03:12

## 2025-01-01 RX ADMIN — URSODIOL 300 MG: 300 CAPSULE ORAL at 23:58

## 2025-01-01 RX ADMIN — Medication 10 ML: at 08:29

## 2025-01-01 RX ADMIN — THIAMINE HCL TAB 100 MG 100 MG: 100 TAB at 08:04

## 2025-01-01 RX ADMIN — PHENYLEPHRINE HYDROCHLORIDE 2 MCG/KG/MIN: 10 INJECTION INTRAVENOUS at 17:28

## 2025-01-01 RX ADMIN — ALBUMIN (HUMAN) 12.5 G: 0.25 INJECTION, SOLUTION INTRAVENOUS at 08:48

## 2025-01-01 RX ADMIN — PHYTONADIONE 10 MG: 10 INJECTION, EMULSION INTRAMUSCULAR; INTRAVENOUS; SUBCUTANEOUS at 05:04

## 2025-01-01 RX ADMIN — HYDROCORTISONE SODIUM SUCCINATE 50 MG: 100 INJECTION, POWDER, FOR SOLUTION INTRAMUSCULAR; INTRAVENOUS at 12:21

## 2025-01-01 RX ADMIN — IPRATROPIUM BROMIDE AND ALBUTEROL SULFATE 3 ML: 2.5; .5 SOLUTION RESPIRATORY (INHALATION) at 13:47

## 2025-01-01 RX ADMIN — MIDODRINE HYDROCHLORIDE 10 MG: 10 TABLET ORAL at 08:35

## 2025-01-01 RX ADMIN — PIPERACILLIN AND TAZOBACTAM 4.5 G: 4; .5 INJECTION, POWDER, FOR SOLUTION INTRAVENOUS at 13:12

## 2025-01-01 RX ADMIN — MIDODRINE HYDROCHLORIDE 10 MG: 10 TABLET ORAL at 06:01

## 2025-01-01 RX ADMIN — URSODIOL 300 MG: 300 CAPSULE ORAL at 08:54

## 2025-01-01 RX ADMIN — IPRATROPIUM BROMIDE AND ALBUTEROL SULFATE 3 ML: 2.5; .5 SOLUTION RESPIRATORY (INHALATION) at 19:12

## 2025-01-01 RX ADMIN — VASOPRESSIN 0.03 UNITS/MIN: 0.2 SOLUTION INTRAVENOUS at 12:24

## 2025-01-01 RX ADMIN — PANTOPRAZOLE SODIUM 40 MG: 40 INJECTION, POWDER, FOR SOLUTION INTRAVENOUS at 05:57

## 2025-01-01 RX ADMIN — GABAPENTIN 200 MG: 100 CAPSULE ORAL at 20:07

## 2025-01-01 RX ADMIN — RIFAXIMIN 550 MG: 550 TABLET ORAL at 22:58

## 2025-01-01 RX ADMIN — IPRATROPIUM BROMIDE AND ALBUTEROL SULFATE 3 ML: 2.5; .5 SOLUTION RESPIRATORY (INHALATION) at 16:21

## 2025-01-01 RX ADMIN — CALCIUM CHLORIDE, MAGNESIUM CHLORIDE, SODIUM CHLORIDE, SODIUM BICARBONATE, POTASSIUM CHLORIDE AND SODIUM PHOSPHATE DIBASIC DIHYDRATE 1000 ML/HR: 3.68; 3.05; 6.34; 3.09; .314; .187 INJECTION INTRAVENOUS at 21:21

## 2025-01-01 RX ADMIN — THIAMINE HCL TAB 100 MG 100 MG: 100 TAB at 08:54

## 2025-01-01 RX ADMIN — PIPERACILLIN AND TAZOBACTAM 4.5 G: 4; .5 INJECTION, POWDER, FOR SOLUTION INTRAVENOUS at 14:54

## 2025-01-01 RX ADMIN — PANTOPRAZOLE SODIUM 40 MG: 40 TABLET, DELAYED RELEASE ORAL at 08:27

## 2025-01-01 RX ADMIN — CALCIUM CHLORIDE, MAGNESIUM CHLORIDE, SODIUM CHLORIDE, SODIUM BICARBONATE, POTASSIUM CHLORIDE AND SODIUM PHOSPHATE DIBASIC DIHYDRATE 1000 ML/HR: 3.68; 3.05; 6.34; 3.09; .314; .187 INJECTION INTRAVENOUS at 18:01

## 2025-01-01 RX ADMIN — CALCIUM CHLORIDE, MAGNESIUM CHLORIDE, SODIUM CHLORIDE, SODIUM BICARBONATE, POTASSIUM CHLORIDE AND SODIUM PHOSPHATE DIBASIC DIHYDRATE 1000 ML/HR: 3.68; 3.05; 6.34; 3.09; .314; .187 INJECTION INTRAVENOUS at 14:01

## 2025-01-01 RX ADMIN — IPRATROPIUM BROMIDE AND ALBUTEROL SULFATE 3 ML: 2.5; .5 SOLUTION RESPIRATORY (INHALATION) at 14:36

## 2025-01-01 RX ADMIN — HYDROCORTISONE SODIUM SUCCINATE 25 MG: 100 INJECTION, POWDER, FOR SOLUTION INTRAMUSCULAR; INTRAVENOUS at 03:59

## 2025-01-01 RX ADMIN — POTASSIUM CHLORIDE 20 MEQ: 29.8 INJECTION INTRAVENOUS at 00:54

## 2025-01-01 RX ADMIN — Medication 10 ML: at 08:46

## 2025-01-01 RX ADMIN — NOREPINEPHRINE BITARTRATE 0.18 MCG/KG/MIN: 0.03 INJECTION, SOLUTION INTRAVENOUS at 06:31

## 2025-01-01 RX ADMIN — Medication 1 CAPSULE: at 08:20

## 2025-01-01 RX ADMIN — PIPERACILLIN AND TAZOBACTAM 4.5 G: 4; .5 INJECTION, POWDER, FOR SOLUTION INTRAVENOUS at 23:57

## 2025-01-01 RX ADMIN — Medication 10 ML: at 09:25

## 2025-01-01 RX ADMIN — FOLIC ACID 1 MG: 1 TABLET ORAL at 08:46

## 2025-01-01 RX ADMIN — RIFAXIMIN 550 MG: 550 TABLET ORAL at 20:37

## 2025-01-01 RX ADMIN — DESVENLAFAXINE 50 MG: 50 TABLET, FILM COATED, EXTENDED RELEASE ORAL at 08:37

## 2025-01-01 RX ADMIN — HYDROCORTISONE SODIUM SUCCINATE 100 MG: 100 INJECTION, POWDER, FOR SOLUTION INTRAMUSCULAR; INTRAVENOUS at 04:53

## 2025-01-01 RX ADMIN — ALBUMIN (HUMAN) 50 G: 0.25 INJECTION, SOLUTION INTRAVENOUS at 20:09

## 2025-01-01 RX ADMIN — URSODIOL 300 MG: 300 CAPSULE ORAL at 08:05

## 2025-01-01 RX ADMIN — HYDROCORTISONE SODIUM SUCCINATE 25 MG: 100 INJECTION, POWDER, FOR SOLUTION INTRAMUSCULAR; INTRAVENOUS at 07:46

## 2025-01-01 RX ADMIN — IPRATROPIUM BROMIDE AND ALBUTEROL SULFATE 3 ML: 2.5; .5 SOLUTION RESPIRATORY (INHALATION) at 20:46

## 2025-01-01 RX ADMIN — CHLORHEXIDINE GLUCONATE 15 ML: 1.2 SOLUTION ORAL at 08:44

## 2025-01-01 RX ADMIN — HYDROCORTISONE SODIUM SUCCINATE 25 MG: 100 INJECTION, POWDER, FOR SOLUTION INTRAMUSCULAR; INTRAVENOUS at 14:54

## 2025-01-01 RX ADMIN — LATANOPROST 1 DROP: 50 SOLUTION OPHTHALMIC at 20:15

## 2025-01-01 RX ADMIN — CALCIUM CHLORIDE, MAGNESIUM CHLORIDE, SODIUM CHLORIDE, SODIUM BICARBONATE, POTASSIUM CHLORIDE AND SODIUM PHOSPHATE DIBASIC DIHYDRATE 1000 ML/HR: 3.68; 3.05; 6.34; 3.09; .314; .187 INJECTION INTRAVENOUS at 19:15

## 2025-01-01 RX ADMIN — Medication 1 CAPSULE: at 08:04

## 2025-01-01 RX ADMIN — THIAMINE HCL TAB 100 MG 100 MG: 100 TAB at 08:05

## 2025-01-01 RX ADMIN — FOLIC ACID 1 MG: 1 TABLET ORAL at 08:54

## 2025-01-01 RX ADMIN — Medication 1 CAPSULE: at 08:17

## 2025-01-01 RX ADMIN — GABAPENTIN 200 MG: 100 CAPSULE ORAL at 08:45

## 2025-01-01 RX ADMIN — PANTOPRAZOLE SODIUM 40 MG: 40 INJECTION, POWDER, FOR SOLUTION INTRAVENOUS at 21:45

## 2025-01-01 RX ADMIN — PHENYLEPHRINE HYDROCHLORIDE 2.7 MCG/KG/MIN: 10 INJECTION INTRAVENOUS at 09:43

## 2025-01-01 RX ADMIN — LACTULOSE 30 G: 20 SOLUTION ORAL at 14:41

## 2025-01-01 RX ADMIN — RIFAXIMIN 550 MG: 550 TABLET ORAL at 08:04

## 2025-01-01 RX ADMIN — QUETIAPINE FUMARATE 100 MG: 100 TABLET ORAL at 21:45

## 2025-01-01 RX ADMIN — Medication 1 PATCH: at 12:09

## 2025-01-01 RX ADMIN — FENTANYL CITRATE 50 MCG: 50 INJECTION, SOLUTION INTRAMUSCULAR; INTRAVENOUS at 12:14

## 2025-01-01 RX ADMIN — IPRATROPIUM BROMIDE AND ALBUTEROL SULFATE 3 ML: 2.5; .5 SOLUTION RESPIRATORY (INHALATION) at 07:20

## 2025-01-01 RX ADMIN — IPRATROPIUM BROMIDE AND ALBUTEROL SULFATE 3 ML: 2.5; .5 SOLUTION RESPIRATORY (INHALATION) at 07:14

## 2025-01-01 RX ADMIN — CEFTRIAXONE 2000 MG: 2 INJECTION, POWDER, FOR SOLUTION INTRAMUSCULAR; INTRAVENOUS at 17:28

## 2025-01-01 RX ADMIN — MUPIROCIN 1 APPLICATION: 20 OINTMENT TOPICAL at 20:14

## 2025-01-01 RX ADMIN — Medication 50 MCG/HR: at 10:44

## 2025-01-01 RX ADMIN — Medication 50 MG: at 22:52

## 2025-01-01 RX ADMIN — MIDODRINE HYDROCHLORIDE 10 MG: 10 TABLET ORAL at 17:39

## 2025-01-01 RX ADMIN — GABAPENTIN 200 MG: 100 CAPSULE ORAL at 00:30

## 2025-01-01 RX ADMIN — PIPERACILLIN AND TAZOBACTAM 4.5 G: 4; .5 INJECTION, POWDER, FOR SOLUTION INTRAVENOUS at 13:44

## 2025-01-01 RX ADMIN — MUPIROCIN 1 APPLICATION: 20 OINTMENT TOPICAL at 20:43

## 2025-01-01 RX ADMIN — URSODIOL 300 MG: 300 CAPSULE ORAL at 08:04

## 2025-01-01 RX ADMIN — HYDROCORTISONE SODIUM SUCCINATE 25 MG: 100 INJECTION, POWDER, FOR SOLUTION INTRAMUSCULAR; INTRAVENOUS at 08:27

## 2025-01-01 RX ADMIN — SODIUM CHLORIDE 1000 ML: 9 INJECTION, SOLUTION INTRAVENOUS at 17:26

## 2025-01-01 RX ADMIN — GABAPENTIN 200 MG: 100 CAPSULE ORAL at 09:22

## 2025-01-01 RX ADMIN — RIFAXIMIN 550 MG: 550 TABLET ORAL at 20:07

## 2025-01-01 RX ADMIN — HYDROCORTISONE SODIUM SUCCINATE 100 MG: 100 INJECTION, POWDER, FOR SOLUTION INTRAMUSCULAR; INTRAVENOUS at 13:50

## 2025-01-01 RX ADMIN — Medication 10 ML: at 10:08

## 2025-01-01 RX ADMIN — MIDODRINE HYDROCHLORIDE 10 MG: 10 TABLET ORAL at 08:27

## 2025-01-01 RX ADMIN — URSODIOL 300 MG: 300 CAPSULE ORAL at 08:18

## 2025-01-01 RX ADMIN — MORPHINE SULFATE 2 MG: 2 INJECTION, SOLUTION INTRAMUSCULAR; INTRAVENOUS at 13:04

## 2025-01-01 RX ADMIN — IPRATROPIUM BROMIDE AND ALBUTEROL SULFATE 3 ML: 2.5; .5 SOLUTION RESPIRATORY (INHALATION) at 07:00

## 2025-01-01 RX ADMIN — HYDROCORTISONE SODIUM SUCCINATE 100 MG: 100 INJECTION, POWDER, FOR SOLUTION INTRAMUSCULAR; INTRAVENOUS at 20:43

## 2025-01-01 RX ADMIN — MIDODRINE HYDROCHLORIDE 10 MG: 10 TABLET ORAL at 11:07

## 2025-01-01 RX ADMIN — MIDODRINE HYDROCHLORIDE 10 MG: 10 TABLET ORAL at 17:13

## 2025-01-01 RX ADMIN — NOREPINEPHRINE BITARTRATE 0.18 MCG/KG/MIN: 0.03 INJECTION, SOLUTION INTRAVENOUS at 20:44

## 2025-01-01 RX ADMIN — HYDROCORTISONE SODIUM SUCCINATE 25 MG: 100 INJECTION, POWDER, FOR SOLUTION INTRAMUSCULAR; INTRAVENOUS at 13:44

## 2025-01-01 RX ADMIN — CHLORHEXIDINE GLUCONATE 15 ML: 1.2 SOLUTION ORAL at 20:49

## 2025-01-01 RX ADMIN — RIFAXIMIN 550 MG: 550 TABLET ORAL at 20:49

## 2025-01-01 RX ADMIN — CALCIUM CHLORIDE, MAGNESIUM CHLORIDE, SODIUM CHLORIDE, SODIUM BICARBONATE, POTASSIUM CHLORIDE AND SODIUM PHOSPHATE DIBASIC DIHYDRATE 1000 ML/HR: 3.68; 3.05; 6.34; 3.09; .314; .187 INJECTION INTRAVENOUS at 23:32

## 2025-01-01 RX ADMIN — PIPERACILLIN AND TAZOBACTAM 3.38 G: 3; .375 INJECTION, POWDER, LYOPHILIZED, FOR SOLUTION INTRAVENOUS at 09:26

## 2025-01-01 RX ADMIN — HYDROCORTISONE SODIUM SUCCINATE 25 MG: 100 INJECTION, POWDER, FOR SOLUTION INTRAMUSCULAR; INTRAVENOUS at 08:53

## 2025-01-01 RX ADMIN — MIDODRINE HYDROCHLORIDE 10 MG: 10 TABLET ORAL at 08:05

## 2025-01-01 RX ADMIN — RIFAXIMIN 550 MG: 550 TABLET ORAL at 12:16

## 2025-01-01 RX ADMIN — Medication 10 ML: at 12:27

## 2025-01-01 RX ADMIN — RIFAXIMIN 550 MG: 550 TABLET ORAL at 08:46

## 2025-01-01 RX ADMIN — IPRATROPIUM BROMIDE AND ALBUTEROL SULFATE 3 ML: 2.5; .5 SOLUTION RESPIRATORY (INHALATION) at 13:28

## 2025-01-01 RX ADMIN — IPRATROPIUM BROMIDE AND ALBUTEROL SULFATE 3 ML: 2.5; .5 SOLUTION RESPIRATORY (INHALATION) at 07:15

## 2025-01-01 RX ADMIN — MIDODRINE HYDROCHLORIDE 10 MG: 10 TABLET ORAL at 11:33

## 2025-01-01 RX ADMIN — HYDROCORTISONE SODIUM SUCCINATE 50 MG: 100 INJECTION, POWDER, FOR SOLUTION INTRAMUSCULAR; INTRAVENOUS at 08:05

## 2025-01-01 RX ADMIN — IPRATROPIUM BROMIDE AND ALBUTEROL SULFATE 3 ML: 2.5; .5 SOLUTION RESPIRATORY (INHALATION) at 15:12

## 2025-01-01 RX ADMIN — Medication 1 CAPSULE: at 08:45

## 2025-01-01 RX ADMIN — CALCIUM CHLORIDE, MAGNESIUM CHLORIDE, SODIUM CHLORIDE, SODIUM BICARBONATE, POTASSIUM CHLORIDE AND SODIUM PHOSPHATE DIBASIC DIHYDRATE 1000 ML/HR: 3.68; 3.05; 6.34; 3.09; .314; .187 INJECTION INTRAVENOUS at 11:17

## 2025-01-01 RX ADMIN — PIPERACILLIN AND TAZOBACTAM 4.5 G: 4; .5 INJECTION, POWDER, FOR SOLUTION INTRAVENOUS at 05:32

## 2025-01-01 RX ADMIN — MUPIROCIN 1 APPLICATION: 20 OINTMENT TOPICAL at 20:37

## 2025-01-01 RX ADMIN — CALCIUM CHLORIDE, MAGNESIUM CHLORIDE, SODIUM CHLORIDE, SODIUM BICARBONATE, POTASSIUM CHLORIDE AND SODIUM PHOSPHATE DIBASIC DIHYDRATE 1000 ML/HR: 3.68; 3.05; 6.34; 3.09; .314; .187 INJECTION INTRAVENOUS at 08:51

## 2025-01-01 RX ADMIN — QUETIAPINE FUMARATE 100 MG: 100 TABLET ORAL at 00:02

## 2025-01-01 RX ADMIN — CEFTRIAXONE 2000 MG: 2 INJECTION, POWDER, FOR SOLUTION INTRAMUSCULAR; INTRAVENOUS at 17:34

## 2025-01-01 RX ADMIN — DESVENLAFAXINE 50 MG: 50 TABLET, FILM COATED, EXTENDED RELEASE ORAL at 09:25

## 2025-01-01 RX ADMIN — FOLIC ACID 1 MG: 1 TABLET ORAL at 08:05

## 2025-01-01 RX ADMIN — PIPERACILLIN AND TAZOBACTAM 3.38 G: 3; .375 INJECTION, POWDER, LYOPHILIZED, FOR SOLUTION INTRAVENOUS at 19:31

## 2025-01-01 RX ADMIN — HYDROCORTISONE SODIUM SUCCINATE 100 MG: 100 INJECTION, POWDER, FOR SOLUTION INTRAMUSCULAR; INTRAVENOUS at 20:06

## 2025-01-01 RX ADMIN — CALCIUM CHLORIDE, MAGNESIUM CHLORIDE, SODIUM CHLORIDE, SODIUM BICARBONATE, POTASSIUM CHLORIDE AND SODIUM PHOSPHATE DIBASIC DIHYDRATE 1000 ML/HR: 3.68; 3.05; 6.34; 3.09; .314; .187 INJECTION INTRAVENOUS at 19:38

## 2025-01-01 RX ADMIN — Medication 10 ML: at 20:08

## 2025-01-01 RX ADMIN — ROSUVASTATIN 5 MG: 10 TABLET, FILM COATED ORAL at 08:06

## 2025-01-01 RX ADMIN — POTASSIUM CHLORIDE 20 MEQ: 400 INJECTION, SOLUTION INTRAVENOUS at 20:14

## 2025-01-01 RX ADMIN — PIPERACILLIN AND TAZOBACTAM 3.38 G: 3; .375 INJECTION, POWDER, LYOPHILIZED, FOR SOLUTION INTRAVENOUS at 17:11

## 2025-01-01 RX ADMIN — HYDROCORTISONE SODIUM SUCCINATE 50 MG: 100 INJECTION, POWDER, FOR SOLUTION INTRAMUSCULAR; INTRAVENOUS at 06:37

## 2025-01-01 RX ADMIN — Medication 10 ML: at 20:15

## 2025-01-01 RX ADMIN — RIFAXIMIN 550 MG: 550 TABLET ORAL at 08:54

## 2025-01-01 RX ADMIN — IPRATROPIUM BROMIDE AND ALBUTEROL SULFATE 3 ML: 2.5; .5 SOLUTION RESPIRATORY (INHALATION) at 19:21

## 2025-01-01 RX ADMIN — IPRATROPIUM BROMIDE AND ALBUTEROL SULFATE 3 ML: 2.5; .5 SOLUTION RESPIRATORY (INHALATION) at 18:58

## 2025-01-01 RX ADMIN — LATANOPROST 1 DROP: 50 SOLUTION OPHTHALMIC at 20:50

## 2025-01-01 RX ADMIN — Medication 10 ML: at 20:27

## 2025-01-01 RX ADMIN — NOREPINEPHRINE BITARTRATE 0.2 MCG/KG/MIN: 0.03 INJECTION, SOLUTION INTRAVENOUS at 23:18

## 2025-01-01 RX ADMIN — NOREPINEPHRINE BITARTRATE 0.18 MCG/KG/MIN: 0.03 INJECTION, SOLUTION INTRAVENOUS at 15:19

## 2025-01-01 RX ADMIN — RIFAXIMIN 550 MG: 550 TABLET ORAL at 08:20

## 2025-01-01 RX ADMIN — Medication 10 ML: at 22:58

## 2025-01-01 RX ADMIN — PANTOPRAZOLE SODIUM 40 MG: 40 INJECTION, POWDER, FOR SOLUTION INTRAVENOUS at 08:54

## 2025-01-01 RX ADMIN — MIDODRINE HYDROCHLORIDE 10 MG: 10 TABLET ORAL at 08:04

## 2025-01-01 RX ADMIN — NOREPINEPHRINE BITARTRATE 0.2 MCG/KG/MIN: 0.03 INJECTION, SOLUTION INTRAVENOUS at 07:53

## 2025-01-01 RX ADMIN — CALCIUM CHLORIDE, MAGNESIUM CHLORIDE, SODIUM CHLORIDE, SODIUM BICARBONATE, POTASSIUM CHLORIDE AND SODIUM PHOSPHATE DIBASIC DIHYDRATE 1000 ML/HR: 3.68; 3.05; 6.34; 3.09; .314; .187 INJECTION INTRAVENOUS at 17:59

## 2025-01-01 RX ADMIN — IPRATROPIUM BROMIDE AND ALBUTEROL SULFATE 3 ML: 2.5; .5 SOLUTION RESPIRATORY (INHALATION) at 11:12

## 2025-01-01 RX ADMIN — THIAMINE HCL TAB 100 MG 100 MG: 100 TAB at 08:44

## 2025-01-01 RX ADMIN — GABAPENTIN 200 MG: 100 CAPSULE ORAL at 20:09

## 2025-01-01 RX ADMIN — LATANOPROST 1 DROP: 50 SOLUTION OPHTHALMIC at 20:24

## 2025-01-01 RX ADMIN — RIFAXIMIN 550 MG: 550 TABLET ORAL at 23:58

## 2025-01-01 RX ADMIN — ALBUMIN (HUMAN) 12.5 G: 0.25 INJECTION, SOLUTION INTRAVENOUS at 09:03

## 2025-01-01 RX ADMIN — NOREPINEPHRINE BITARTRATE 0.02 MCG/KG/MIN: 0.03 INJECTION, SOLUTION INTRAVENOUS at 09:41

## 2025-01-01 RX ADMIN — HYDROCORTISONE SODIUM SUCCINATE 25 MG: 100 INJECTION, POWDER, FOR SOLUTION INTRAMUSCULAR; INTRAVENOUS at 02:01

## 2025-01-01 RX ADMIN — MIDODRINE HYDROCHLORIDE 10 MG: 10 TABLET ORAL at 06:57

## 2025-01-01 RX ADMIN — HYDROCORTISONE SODIUM SUCCINATE 25 MG: 100 INJECTION, POWDER, FOR SOLUTION INTRAMUSCULAR; INTRAVENOUS at 08:43

## 2025-01-01 RX ADMIN — ALBUMIN (HUMAN) 25 G: 0.25 INJECTION, SOLUTION INTRAVENOUS at 06:48

## 2025-01-01 RX ADMIN — MIDODRINE HYDROCHLORIDE 10 MG: 10 TABLET ORAL at 16:37

## 2025-01-01 RX ADMIN — LATANOPROST 1 DROP: 50 SOLUTION OPHTHALMIC at 20:23

## 2025-01-01 RX ADMIN — Medication 10 ML: at 22:55

## 2025-01-01 RX ADMIN — URSODIOL 300 MG: 300 CAPSULE ORAL at 08:35

## 2025-01-01 RX ADMIN — FOLIC ACID 1 MG: 1 TABLET ORAL at 08:45

## 2025-01-01 RX ADMIN — FENTANYL CITRATE 50 MCG: 50 INJECTION, SOLUTION INTRAMUSCULAR; INTRAVENOUS at 02:40

## 2025-01-01 RX ADMIN — HYDROCORTISONE SODIUM SUCCINATE 100 MG: 100 INJECTION, POWDER, FOR SOLUTION INTRAMUSCULAR; INTRAVENOUS at 13:44

## 2025-01-01 RX ADMIN — URSODIOL 300 MG: 300 CAPSULE ORAL at 22:57

## 2025-01-01 RX ADMIN — CALCIUM CHLORIDE, MAGNESIUM CHLORIDE, SODIUM CHLORIDE, SODIUM BICARBONATE, POTASSIUM CHLORIDE AND SODIUM PHOSPHATE DIBASIC DIHYDRATE 1000 ML/HR: 3.68; 3.05; 6.34; 3.09; .314; .187 INJECTION INTRAVENOUS at 06:04

## 2025-01-01 RX ADMIN — PANTOPRAZOLE SODIUM 40 MG: 40 TABLET, DELAYED RELEASE ORAL at 09:25

## 2025-01-01 RX ADMIN — PHENYLEPHRINE HYDROCHLORIDE 2.7 MCG/KG/MIN: 10 INJECTION INTRAVENOUS at 14:31

## 2025-01-01 RX ADMIN — GABAPENTIN 200 MG: 100 CAPSULE ORAL at 20:42

## 2025-01-01 RX ADMIN — HYDROCORTISONE SODIUM SUCCINATE 50 MG: 100 INJECTION, POWDER, FOR SOLUTION INTRAMUSCULAR; INTRAVENOUS at 20:13

## 2025-01-01 RX ADMIN — PIPERACILLIN AND TAZOBACTAM 4.5 G: 4; .5 INJECTION, POWDER, FOR SOLUTION INTRAVENOUS at 05:41

## 2025-01-01 RX ADMIN — NOREPINEPHRINE BITARTRATE 0.02 MCG/KG/MIN: 0.03 INJECTION, SOLUTION INTRAVENOUS at 09:43

## 2025-01-01 RX ADMIN — THIAMINE HCL TAB 100 MG 100 MG: 100 TAB at 08:46

## 2025-01-01 RX ADMIN — MUPIROCIN 1 APPLICATION: 20 OINTMENT TOPICAL at 22:58

## 2025-01-01 RX ADMIN — QUETIAPINE FUMARATE 100 MG: 100 TABLET ORAL at 20:09

## 2025-01-01 RX ADMIN — MIDAZOLAM HYDROCHLORIDE 1 MG/HR: 1 INJECTION, SOLUTION INTRAVENOUS at 12:17

## 2025-01-01 RX ADMIN — QUETIAPINE FUMARATE 100 MG: 100 TABLET ORAL at 00:30

## 2025-01-01 RX ADMIN — LACTULOSE 10 G: 20 SOLUTION ORAL at 08:27

## 2025-01-01 RX ADMIN — Medication 10 ML: at 20:37

## 2025-01-01 RX ADMIN — NOREPINEPHRINE BITARTRATE 0.22 MCG/KG/MIN: 0.03 INJECTION, SOLUTION INTRAVENOUS at 02:58

## 2025-01-01 RX ADMIN — PIPERACILLIN AND TAZOBACTAM 4.5 G: 4; .5 INJECTION, POWDER, FOR SOLUTION INTRAVENOUS at 06:07

## 2025-01-01 RX ADMIN — PANTOPRAZOLE SODIUM 40 MG: 40 INJECTION, POWDER, FOR SOLUTION INTRAVENOUS at 08:04

## 2025-01-01 RX ADMIN — MIDODRINE HYDROCHLORIDE 10 MG: 10 TABLET ORAL at 12:14

## 2025-01-01 RX ADMIN — RIFAXIMIN 550 MG: 550 TABLET ORAL at 20:09

## 2025-01-01 RX ADMIN — MIDODRINE HYDROCHLORIDE 10 MG: 10 TABLET ORAL at 17:45

## 2025-01-01 RX ADMIN — PANTOPRAZOLE SODIUM 40 MG: 40 INJECTION, POWDER, FOR SOLUTION INTRAVENOUS at 05:10

## 2025-01-01 RX ADMIN — CALCIUM CHLORIDE, MAGNESIUM CHLORIDE, SODIUM CHLORIDE, SODIUM BICARBONATE, POTASSIUM CHLORIDE AND SODIUM PHOSPHATE DIBASIC DIHYDRATE 1000 ML/HR: 3.68; 3.05; 6.34; 3.09; .314; .187 INJECTION INTRAVENOUS at 21:22

## 2025-01-01 RX ADMIN — NOREPINEPHRINE BITARTRATE 0.18 MCG/KG/MIN: 0.03 INJECTION, SOLUTION INTRAVENOUS at 13:11

## 2025-01-01 RX ADMIN — VASOPRESSIN 0.03 UNITS/MIN: 0.2 SOLUTION INTRAVENOUS at 21:16

## 2025-01-01 RX ADMIN — Medication 10 ML: at 12:26

## 2025-01-01 RX ADMIN — IPRATROPIUM BROMIDE AND ALBUTEROL SULFATE 3 ML: 2.5; .5 SOLUTION RESPIRATORY (INHALATION) at 06:57

## 2025-01-01 RX ADMIN — PIPERACILLIN AND TAZOBACTAM 4.5 G: 4; .5 INJECTION, POWDER, FOR SOLUTION INTRAVENOUS at 21:45

## 2025-01-01 RX ADMIN — PIPERACILLIN AND TAZOBACTAM 3.38 G: 3; .375 INJECTION, POWDER, LYOPHILIZED, FOR SOLUTION INTRAVENOUS at 01:30

## 2025-01-01 RX ADMIN — IPRATROPIUM BROMIDE AND ALBUTEROL SULFATE 3 ML: 2.5; .5 SOLUTION RESPIRATORY (INHALATION) at 19:10

## 2025-01-01 RX ADMIN — MUPIROCIN 1 APPLICATION: 20 OINTMENT TOPICAL at 08:22

## 2025-01-01 RX ADMIN — LIDOCAINE HYDROCHLORIDE AND EPINEPHRINE 8 ML: 10; 10 INJECTION, SOLUTION INFILTRATION; PERINEURAL at 15:23

## 2025-01-01 RX ADMIN — GABAPENTIN 200 MG: 100 CAPSULE ORAL at 20:23

## 2025-01-01 RX ADMIN — RIFAXIMIN 550 MG: 550 TABLET ORAL at 21:46

## 2025-01-01 RX ADMIN — LACTULOSE 10 G: 20 SOLUTION ORAL at 08:54

## 2025-01-01 RX ADMIN — PHENYLEPHRINE HYDROCHLORIDE 2.7 MCG/KG/MIN: 10 INJECTION INTRAVENOUS at 21:56

## 2025-01-01 RX ADMIN — HYDROCORTISONE SODIUM SUCCINATE 25 MG: 100 INJECTION, POWDER, FOR SOLUTION INTRAMUSCULAR; INTRAVENOUS at 20:49

## 2025-01-01 RX ADMIN — HYDROCORTISONE SODIUM SUCCINATE 25 MG: 100 INJECTION, POWDER, FOR SOLUTION INTRAMUSCULAR; INTRAVENOUS at 20:32

## 2025-01-01 RX ADMIN — HYDROCORTISONE SODIUM SUCCINATE 25 MG: 100 INJECTION, POWDER, FOR SOLUTION INTRAMUSCULAR; INTRAVENOUS at 20:22

## 2025-01-01 RX ADMIN — MUPIROCIN 1 APPLICATION: 20 OINTMENT TOPICAL at 08:17

## 2025-01-01 RX ADMIN — PIPERACILLIN AND TAZOBACTAM 3.38 G: 3; .375 INJECTION, POWDER, LYOPHILIZED, FOR SOLUTION INTRAVENOUS at 02:05

## 2025-01-01 RX ADMIN — HYDROCORTISONE SODIUM SUCCINATE 25 MG: 100 INJECTION, POWDER, FOR SOLUTION INTRAMUSCULAR; INTRAVENOUS at 14:11

## 2025-01-01 RX ADMIN — HYDROCORTISONE SODIUM SUCCINATE 50 MG: 100 INJECTION, POWDER, FOR SOLUTION INTRAMUSCULAR; INTRAVENOUS at 12:00

## 2025-01-01 RX ADMIN — FENTANYL CITRATE 100 MCG: 50 INJECTION, SOLUTION INTRAMUSCULAR; INTRAVENOUS at 22:55

## 2025-01-01 RX ADMIN — GABAPENTIN 200 MG: 100 CAPSULE ORAL at 08:54

## 2025-01-01 RX ADMIN — ALBUMIN (HUMAN) 12.5 G: 0.25 INJECTION, SOLUTION INTRAVENOUS at 14:59

## 2025-01-01 RX ADMIN — IPRATROPIUM BROMIDE AND ALBUTEROL SULFATE 3 ML: 2.5; .5 SOLUTION RESPIRATORY (INHALATION) at 07:39

## 2025-01-01 RX ADMIN — IPRATROPIUM BROMIDE AND ALBUTEROL SULFATE 3 ML: 2.5; .5 SOLUTION RESPIRATORY (INHALATION) at 07:23

## 2025-01-01 RX ADMIN — CALCIUM CHLORIDE, MAGNESIUM CHLORIDE, SODIUM CHLORIDE, SODIUM BICARBONATE, POTASSIUM CHLORIDE AND SODIUM PHOSPHATE DIBASIC DIHYDRATE 1000 ML/HR: 3.68; 3.05; 6.34; 3.09; .314; .187 INJECTION INTRAVENOUS at 16:02

## 2025-01-01 RX ADMIN — MIDODRINE HYDROCHLORIDE 10 MG: 10 TABLET ORAL at 16:50

## 2025-01-01 RX ADMIN — FOLIC ACID 1 MG: 1 TABLET ORAL at 08:35

## 2025-01-01 RX ADMIN — PIPERACILLIN AND TAZOBACTAM 3.38 G: 3; .375 INJECTION, POWDER, LYOPHILIZED, FOR SOLUTION INTRAVENOUS at 03:22

## 2025-01-01 RX ADMIN — FOLIC ACID 1 MG: 1 TABLET ORAL at 08:04

## 2025-01-01 RX ADMIN — PANTOPRAZOLE SODIUM 40 MG: 40 TABLET, DELAYED RELEASE ORAL at 08:45

## 2025-01-01 RX ADMIN — ROSUVASTATIN 5 MG: 10 TABLET, FILM COATED ORAL at 08:17

## 2025-01-01 RX ADMIN — QUETIAPINE FUMARATE 100 MG: 100 TABLET ORAL at 20:14

## 2025-01-01 RX ADMIN — DESVENLAFAXINE 50 MG: 50 TABLET, FILM COATED, EXTENDED RELEASE ORAL at 08:20

## 2025-01-01 RX ADMIN — URSODIOL 300 MG: 300 CAPSULE ORAL at 20:49

## 2025-01-01 RX ADMIN — LATANOPROST 1 DROP: 50 SOLUTION OPHTHALMIC at 20:32

## 2025-01-01 RX ADMIN — GABAPENTIN 200 MG: 100 CAPSULE ORAL at 08:17

## 2025-01-01 RX ADMIN — IPRATROPIUM BROMIDE AND ALBUTEROL SULFATE 3 ML: 2.5; .5 SOLUTION RESPIRATORY (INHALATION) at 19:17

## 2025-01-01 RX ADMIN — CALCIUM CHLORIDE, MAGNESIUM CHLORIDE, SODIUM CHLORIDE, SODIUM BICARBONATE, POTASSIUM CHLORIDE AND SODIUM PHOSPHATE DIBASIC DIHYDRATE 1000 ML/HR: 3.68; 3.05; 6.34; 3.09; .314; .187 INJECTION INTRAVENOUS at 19:37

## 2025-01-01 RX ADMIN — HYDROCORTISONE SODIUM SUCCINATE 100 MG: 100 INJECTION, POWDER, FOR SOLUTION INTRAMUSCULAR; INTRAVENOUS at 06:47

## 2025-01-01 RX ADMIN — THIAMINE HCL TAB 100 MG 100 MG: 100 TAB at 08:20

## 2025-01-01 RX ADMIN — MIDAZOLAM HYDROCHLORIDE 2 MG/HR: 1 INJECTION, SOLUTION INTRAVENOUS at 18:33

## 2025-01-01 RX ADMIN — Medication 10 ML: at 20:25

## 2025-01-01 RX ADMIN — FOLIC ACID 1 MG: 1 TABLET ORAL at 09:23

## 2025-01-01 RX ADMIN — URSODIOL 300 MG: 300 CAPSULE ORAL at 12:17

## 2025-01-01 RX ADMIN — Medication 1 CAPSULE: at 08:26

## 2025-01-01 RX ADMIN — MIDODRINE HYDROCHLORIDE 10 MG: 10 TABLET ORAL at 06:41

## 2025-01-01 RX ADMIN — THIAMINE HCL TAB 100 MG 100 MG: 100 TAB at 09:22

## 2025-01-01 RX ADMIN — HYDROCORTISONE SODIUM SUCCINATE 25 MG: 100 INJECTION, POWDER, FOR SOLUTION INTRAMUSCULAR; INTRAVENOUS at 01:33

## 2025-01-01 RX ADMIN — FOLIC ACID 1 MG: 1 TABLET ORAL at 08:20

## 2025-01-01 RX ADMIN — PIPERACILLIN AND TAZOBACTAM 3.38 G: 3; .375 INJECTION, POWDER, LYOPHILIZED, FOR SOLUTION INTRAVENOUS at 09:54

## 2025-01-01 RX ADMIN — PIPERACILLIN AND TAZOBACTAM 4.5 G: 4; .5 INJECTION, POWDER, FOR SOLUTION INTRAVENOUS at 14:12

## 2025-01-01 RX ADMIN — PROPOFOL 40 MCG/KG/MIN: 10 INJECTION, EMULSION INTRAVENOUS at 08:19

## 2025-01-01 RX ADMIN — IPRATROPIUM BROMIDE AND ALBUTEROL SULFATE 3 ML: 2.5; .5 SOLUTION RESPIRATORY (INHALATION) at 16:25

## 2025-01-01 RX ADMIN — MIDODRINE HYDROCHLORIDE 10 MG: 10 TABLET ORAL at 18:23

## 2025-01-01 RX ADMIN — LATANOPROST 1 DROP: 50 SOLUTION OPHTHALMIC at 22:47

## 2025-01-01 RX ADMIN — MIDAZOLAM HYDROCHLORIDE 3 MG/HR: 1 INJECTION, SOLUTION INTRAVENOUS at 00:55

## 2025-01-01 RX ADMIN — Medication 10 ML: at 10:09

## 2025-01-01 RX ADMIN — MUPIROCIN 1 APPLICATION: 20 OINTMENT TOPICAL at 11:15

## 2025-01-01 RX ADMIN — LATANOPROST 1 DROP: 50 SOLUTION OPHTHALMIC at 20:11

## 2025-01-01 RX ADMIN — Medication 2 SPRAY: at 17:47

## 2025-01-01 RX ADMIN — MUPIROCIN 1 APPLICATION: 20 OINTMENT TOPICAL at 08:46

## 2025-01-01 RX ADMIN — NOREPINEPHRINE BITARTRATE 0.5 MCG/KG/MIN: 0.03 INJECTION, SOLUTION INTRAVENOUS at 05:39

## 2025-01-01 RX ADMIN — IPRATROPIUM BROMIDE AND ALBUTEROL SULFATE 3 ML: 2.5; .5 SOLUTION RESPIRATORY (INHALATION) at 12:39

## 2025-01-01 RX ADMIN — MIDODRINE HYDROCHLORIDE 10 MG: 10 TABLET ORAL at 11:15

## 2025-01-01 RX ADMIN — Medication 1 CAPSULE: at 08:34

## 2025-01-01 RX ADMIN — RIFAXIMIN 550 MG: 550 TABLET ORAL at 20:42

## 2025-01-01 RX ADMIN — NOREPINEPHRINE BITARTRATE 0.22 MCG/KG/MIN: 0.03 INJECTION, SOLUTION INTRAVENOUS at 14:01

## 2025-01-01 RX ADMIN — QUETIAPINE FUMARATE 100 MG: 100 TABLET ORAL at 22:57

## 2025-01-01 RX ADMIN — GABAPENTIN 200 MG: 100 CAPSULE ORAL at 08:43

## 2025-01-01 RX ADMIN — Medication 1 CAPSULE: at 08:54

## 2025-01-01 RX ADMIN — Medication 1 CAPSULE: at 08:27

## 2025-01-01 RX ADMIN — GABAPENTIN 200 MG: 100 CAPSULE ORAL at 20:32

## 2025-01-01 RX ADMIN — PIPERACILLIN AND TAZOBACTAM 3.38 G: 3; .375 INJECTION, POWDER, LYOPHILIZED, FOR SOLUTION INTRAVENOUS at 09:23

## 2025-01-01 RX ADMIN — THIAMINE HCL TAB 100 MG 100 MG: 100 TAB at 08:17

## 2025-01-01 RX ADMIN — IPRATROPIUM BROMIDE AND ALBUTEROL SULFATE 3 ML: 2.5; .5 SOLUTION RESPIRATORY (INHALATION) at 10:44

## 2025-01-01 RX ADMIN — MUPIROCIN 1 APPLICATION: 20 OINTMENT TOPICAL at 20:07

## 2025-01-01 RX ADMIN — Medication 1 CAPSULE: at 09:22

## 2025-01-01 RX ADMIN — PIPERACILLIN AND TAZOBACTAM 4.5 G: 4; .5 INJECTION, POWDER, FOR SOLUTION INTRAVENOUS at 05:55

## 2025-01-01 RX ADMIN — DESVENLAFAXINE 50 MG: 50 TABLET, FILM COATED, EXTENDED RELEASE ORAL at 08:27

## 2025-01-01 RX ADMIN — PIPERACILLIN AND TAZOBACTAM 4.5 G: 4; .5 INJECTION, POWDER, FOR SOLUTION INTRAVENOUS at 13:01

## 2025-01-01 RX ADMIN — IPRATROPIUM BROMIDE AND ALBUTEROL SULFATE 3 ML: 2.5; .5 SOLUTION RESPIRATORY (INHALATION) at 06:18

## 2025-01-01 RX ADMIN — MIDODRINE HYDROCHLORIDE 10 MG: 10 TABLET ORAL at 17:24

## 2025-01-01 RX ADMIN — GABAPENTIN 200 MG: 100 CAPSULE ORAL at 08:34

## 2025-01-01 RX ADMIN — LATANOPROST 1 DROP: 50 SOLUTION OPHTHALMIC at 00:31

## 2025-01-01 RX ADMIN — Medication 1500 MG: at 19:54

## 2025-01-01 RX ADMIN — MIDODRINE HYDROCHLORIDE 10 MG: 10 TABLET ORAL at 16:48

## 2025-01-01 RX ADMIN — DESMOPRESSIN ACETATE 30 MCG: 4 INJECTION, SOLUTION INTRAVENOUS; SUBCUTANEOUS at 11:31

## 2025-01-01 RX ADMIN — FENTANYL CITRATE 50 MCG: 50 INJECTION, SOLUTION INTRAMUSCULAR; INTRAVENOUS at 12:44

## 2025-01-01 RX ADMIN — CEFTRIAXONE 2000 MG: 2 INJECTION, POWDER, FOR SOLUTION INTRAMUSCULAR; INTRAVENOUS at 18:59

## 2025-01-01 RX ADMIN — URSODIOL 300 MG: 300 CAPSULE ORAL at 20:32

## 2025-01-01 RX ADMIN — QUETIAPINE FUMARATE 100 MG: 100 TABLET ORAL at 20:31

## 2025-01-01 RX ADMIN — THIAMINE HCL TAB 100 MG 100 MG: 100 TAB at 08:35

## 2025-01-01 RX ADMIN — NOREPINEPHRINE BITARTRATE 0.14 MCG/KG/MIN: 0.03 INJECTION, SOLUTION INTRAVENOUS at 00:57

## 2025-01-01 RX ADMIN — ALBUMIN (HUMAN) 12.5 G: 0.25 INJECTION, SOLUTION INTRAVENOUS at 20:38

## 2025-01-01 RX ADMIN — PHYTONADIONE 10 MG: 10 INJECTION, EMULSION INTRAMUSCULAR; INTRAVENOUS; SUBCUTANEOUS at 16:12

## 2025-01-01 RX ADMIN — RIFAXIMIN 550 MG: 550 TABLET ORAL at 08:45

## 2025-01-01 RX ADMIN — HYDROCORTISONE SODIUM SUCCINATE 50 MG: 100 INJECTION, POWDER, FOR SOLUTION INTRAMUSCULAR; INTRAVENOUS at 03:46

## 2025-01-01 RX ADMIN — Medication 10 ML: at 08:54

## 2025-01-01 RX ADMIN — Medication 50 MCG/HR: at 14:03

## 2025-01-01 RX ADMIN — IPRATROPIUM BROMIDE AND ALBUTEROL SULFATE 3 ML: 2.5; .5 SOLUTION RESPIRATORY (INHALATION) at 16:42

## 2025-01-01 RX ADMIN — Medication 50 MCG/HR: at 16:25

## 2025-01-01 RX ADMIN — PHENYLEPHRINE HYDROCHLORIDE 0.5 MCG/KG/MIN: 10 INJECTION INTRAVENOUS at 11:14

## 2025-01-01 RX ADMIN — LACTULOSE 10 G: 20 SOLUTION ORAL at 09:21

## 2025-01-01 RX ADMIN — GABAPENTIN 200 MG: 100 CAPSULE ORAL at 20:14

## 2025-01-01 RX ADMIN — IPRATROPIUM BROMIDE AND ALBUTEROL SULFATE 3 ML: 2.5; .5 SOLUTION RESPIRATORY (INHALATION) at 15:55

## 2025-01-01 RX ADMIN — IPRATROPIUM BROMIDE AND ALBUTEROL SULFATE 3 ML: 2.5; .5 SOLUTION RESPIRATORY (INHALATION) at 16:24

## 2025-01-01 RX ADMIN — HYDROCORTISONE SODIUM SUCCINATE 50 MG: 100 INJECTION, POWDER, FOR SOLUTION INTRAMUSCULAR; INTRAVENOUS at 04:42

## 2025-01-01 RX ADMIN — HYDROCORTISONE SODIUM SUCCINATE 25 MG: 100 INJECTION, POWDER, FOR SOLUTION INTRAMUSCULAR; INTRAVENOUS at 02:18

## 2025-01-01 RX ADMIN — PHENYLEPHRINE HYDROCHLORIDE 2 MCG/KG/MIN: 10 INJECTION INTRAVENOUS at 05:21

## 2025-01-01 NOTE — HOME HEALTH
Discharge Summary/Summary of Care Provided: yes  Patient received home health for diagnosis: medication education   Current level of functional ability: ambulates with walker  Living arrangements: lives alone in multi-story home, remains on the main floor   Progress towards goals and/or Were all goals met? all goals met, patient is independent in medication prep.  If not all goals met, barriers that prevented patient from meeting goals: NA  SDOH concerns (i.e. Caregiver availability, social isolation, environment, income, transportation access, food insecurity etc.) NONE  Follow-up appointment plans and community resources provided: NONE  Other imporant information. NA

## 2025-01-02 ENCOUNTER — HOME CARE VISIT (OUTPATIENT)
Dept: HOME HEALTH SERVICES | Facility: HOME HEALTHCARE | Age: 65
End: 2025-01-02
Payer: COMMERCIAL

## 2025-01-02 VITALS — OXYGEN SATURATION: 97 %

## 2025-01-02 PROCEDURE — G0151 HHCP-SERV OF PT,EA 15 MIN: HCPCS

## 2025-01-02 NOTE — Clinical Note
Physical Therapy Reassessment: 1/2/25    Summary of Care: Pt has been seen for 8  PT visits.  Pt has been seen for there ex, strengthening, balance training, Gt training and stair education and fall prevention education.    Progress thus far: 30 sec STS was at 4 today with Min A for folding chair . Tinetti was 12/28 and now 15/28. TUG test is at 24 sec with RW. Able to do 3 of 12 steps today.     Patient/CG Participation: participating in HEP regularly.     Remaining Problems: Continues to struggle with gt, strength, endurance, ADLs and functional mobility. High fall risk and unable to get upstairs secondary to quad and glut strength deficits for bathing.     Plan of Care Changes: None     Equipment Needs: None     PT Frequency and Duration: Cont POC 2w4.     Plan of Care Reviewed with:  Care team and Unique Brandt, DO

## 2025-01-06 ENCOUNTER — HOME CARE VISIT (OUTPATIENT)
Dept: HOME HEALTH SERVICES | Facility: HOME HEALTHCARE | Age: 65
End: 2025-01-06
Payer: COMMERCIAL

## 2025-01-06 VITALS
SYSTOLIC BLOOD PRESSURE: 106 MMHG | DIASTOLIC BLOOD PRESSURE: 78 MMHG | TEMPERATURE: 98.1 F | HEART RATE: 98 BPM | RESPIRATION RATE: 16 BRPM | OXYGEN SATURATION: 95 %

## 2025-01-06 PROCEDURE — G0151 HHCP-SERV OF PT,EA 15 MIN: HCPCS

## 2025-01-07 ENCOUNTER — HOME CARE VISIT (OUTPATIENT)
Dept: HOME HEALTH SERVICES | Facility: HOME HEALTHCARE | Age: 65
End: 2025-01-07
Payer: COMMERCIAL

## 2025-01-07 VITALS
DIASTOLIC BLOOD PRESSURE: 67 MMHG | HEART RATE: 96 BPM | SYSTOLIC BLOOD PRESSURE: 110 MMHG | TEMPERATURE: 97.1 F | OXYGEN SATURATION: 96 %

## 2025-01-07 PROCEDURE — G0152 HHCP-SERV OF OT,EA 15 MIN: HCPCS

## 2025-01-09 ENCOUNTER — HOME CARE VISIT (OUTPATIENT)
Dept: HOME HEALTH SERVICES | Facility: HOME HEALTHCARE | Age: 65
End: 2025-01-09
Payer: COMMERCIAL

## 2025-01-09 VITALS
RESPIRATION RATE: 16 BRPM | DIASTOLIC BLOOD PRESSURE: 82 MMHG | SYSTOLIC BLOOD PRESSURE: 124 MMHG | HEART RATE: 82 BPM | TEMPERATURE: 97.4 F | OXYGEN SATURATION: 98 %

## 2025-01-09 PROCEDURE — G0151 HHCP-SERV OF PT,EA 15 MIN: HCPCS

## 2025-01-14 ENCOUNTER — HOME CARE VISIT (OUTPATIENT)
Dept: HOME HEALTH SERVICES | Facility: HOME HEALTHCARE | Age: 65
End: 2025-01-14
Payer: COMMERCIAL

## 2025-01-14 VITALS
DIASTOLIC BLOOD PRESSURE: 80 MMHG | OXYGEN SATURATION: 95 % | RESPIRATION RATE: 16 BRPM | HEART RATE: 115 BPM | TEMPERATURE: 97.8 F | SYSTOLIC BLOOD PRESSURE: 116 MMHG

## 2025-01-14 PROCEDURE — G0151 HHCP-SERV OF PT,EA 15 MIN: HCPCS

## 2025-01-16 ENCOUNTER — HOME CARE VISIT (OUTPATIENT)
Dept: HOME HEALTH SERVICES | Facility: HOME HEALTHCARE | Age: 65
End: 2025-01-16
Payer: COMMERCIAL

## 2025-01-17 ENCOUNTER — HOME CARE VISIT (OUTPATIENT)
Dept: HOME HEALTH SERVICES | Facility: HOME HEALTHCARE | Age: 65
End: 2025-01-17
Payer: COMMERCIAL

## 2025-01-17 VITALS
HEART RATE: 101 BPM | TEMPERATURE: 97.7 F | RESPIRATION RATE: 16 BRPM | SYSTOLIC BLOOD PRESSURE: 110 MMHG | DIASTOLIC BLOOD PRESSURE: 72 MMHG | OXYGEN SATURATION: 96 %

## 2025-01-17 PROCEDURE — G0151 HHCP-SERV OF PT,EA 15 MIN: HCPCS

## 2025-01-21 ENCOUNTER — HOME CARE VISIT (OUTPATIENT)
Dept: HOME HEALTH SERVICES | Facility: HOME HEALTHCARE | Age: 65
End: 2025-01-21
Payer: COMMERCIAL

## 2025-01-21 VITALS
RESPIRATION RATE: 16 BRPM | OXYGEN SATURATION: 94 % | HEART RATE: 102 BPM | DIASTOLIC BLOOD PRESSURE: 70 MMHG | SYSTOLIC BLOOD PRESSURE: 110 MMHG

## 2025-01-21 PROCEDURE — G0151 HHCP-SERV OF PT,EA 15 MIN: HCPCS

## 2025-01-22 ENCOUNTER — HOME CARE VISIT (OUTPATIENT)
Dept: HOME HEALTH SERVICES | Facility: HOME HEALTHCARE | Age: 65
End: 2025-01-22
Payer: COMMERCIAL

## 2025-01-22 VITALS — DIASTOLIC BLOOD PRESSURE: 88 MMHG | HEART RATE: 110 BPM | SYSTOLIC BLOOD PRESSURE: 150 MMHG | OXYGEN SATURATION: 96 %

## 2025-01-22 PROCEDURE — G0152 HHCP-SERV OF OT,EA 15 MIN: HCPCS

## 2025-01-22 NOTE — HOME HEALTH
Physical Therapy Reassessment: 1/21/25    Summary of Care: Pt has been seen for 5  PT visits. Pt has been seen for there ex, strengthening, balance training, Gt training and stair education and fall prevention education.     Progress thus far: 30 sec STS was at 4 with Min A from folding chair, today at 3 with Min A. Tinetti was 15/28 and now 12/28. TUG test was at 24 sec with RW and today was 49.29 sec . Not able to perform steps today.     Patient/CG Participation: participating in HEP.     Remaining Problems: Continues to struggle with gt, strength, endurance, ADLs and functional mobility. High fall risk and unable to get upstairs secondary to quad and glut strength deficits for bathing. A couple of weeks ago the pt had two falls in the same day and as can be seen with her objective measures, she is worse. She requires skilled care to improve and to leave this pt without care could lead to other bigger functional deficits. She is unable to perform ADLs such as cooking, cleaning and bathing.      Plan of Care Changes: None     Equipment Needs: None     PT Frequency and Duration: Cont POC 2w4.     Plan of Care Reviewed with:  Care team and Unique Brandt DO

## 2025-01-24 ENCOUNTER — HOME CARE VISIT (OUTPATIENT)
Dept: HOME HEALTH SERVICES | Facility: HOME HEALTHCARE | Age: 65
End: 2025-01-24
Payer: COMMERCIAL

## 2025-01-27 ENCOUNTER — TELEMEDICINE (OUTPATIENT)
Dept: PSYCHIATRY | Facility: CLINIC | Age: 65
End: 2025-01-27
Payer: COMMERCIAL

## 2025-01-27 DIAGNOSIS — F41.1 GENERALIZED ANXIETY DISORDER: ICD-10-CM

## 2025-01-27 DIAGNOSIS — F33.1 MAJOR DEPRESSIVE DISORDER, RECURRENT EPISODE, MODERATE: Primary | ICD-10-CM

## 2025-01-27 PROCEDURE — 90791 PSYCH DIAGNOSTIC EVALUATION: CPT | Performed by: COUNSELOR
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
## 2025-01-28 ENCOUNTER — HOME CARE VISIT (OUTPATIENT)
Dept: HOME HEALTH SERVICES | Facility: HOME HEALTHCARE | Age: 65
End: 2025-01-28
Payer: COMMERCIAL

## 2025-01-28 VITALS
DIASTOLIC BLOOD PRESSURE: 78 MMHG | OXYGEN SATURATION: 98 % | TEMPERATURE: 97.1 F | HEART RATE: 88 BPM | RESPIRATION RATE: 16 BRPM | SYSTOLIC BLOOD PRESSURE: 110 MMHG

## 2025-01-28 PROCEDURE — G0151 HHCP-SERV OF PT,EA 15 MIN: HCPCS

## 2025-01-29 ENCOUNTER — HOME CARE VISIT (OUTPATIENT)
Dept: HOME HEALTH SERVICES | Facility: HOME HEALTHCARE | Age: 65
End: 2025-01-29
Payer: COMMERCIAL

## 2025-01-29 VITALS
RESPIRATION RATE: 16 BRPM | SYSTOLIC BLOOD PRESSURE: 136 MMHG | HEART RATE: 83 BPM | TEMPERATURE: 98.1 F | DIASTOLIC BLOOD PRESSURE: 68 MMHG | OXYGEN SATURATION: 98 %

## 2025-01-29 PROCEDURE — G0152 HHCP-SERV OF OT,EA 15 MIN: HCPCS

## 2025-01-30 ENCOUNTER — APPOINTMENT (OUTPATIENT)
Dept: GENERAL RADIOLOGY | Facility: HOSPITAL | Age: 65
End: 2025-01-30
Payer: COMMERCIAL

## 2025-01-30 ENCOUNTER — HOSPITAL ENCOUNTER (EMERGENCY)
Facility: HOSPITAL | Age: 65
Discharge: HOME | End: 2025-01-31
Attending: EMERGENCY MEDICINE
Payer: COMMERCIAL

## 2025-01-30 VITALS
TEMPERATURE: 98.7 F | BODY MASS INDEX: 26.98 KG/M2 | OXYGEN SATURATION: 96 % | DIASTOLIC BLOOD PRESSURE: 96 MMHG | SYSTOLIC BLOOD PRESSURE: 125 MMHG | WEIGHT: 158 LBS | RESPIRATION RATE: 16 BRPM | HEART RATE: 87 BPM | HEIGHT: 64 IN

## 2025-01-30 DIAGNOSIS — S92.331A CLOSED DISPLACED FRACTURE OF THIRD METATARSAL BONE OF RIGHT FOOT, INITIAL ENCOUNTER: Primary | ICD-10-CM
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
## 2025-01-30 DIAGNOSIS — M79.671 ACUTE FOOT PAIN, RIGHT: ICD-10-CM

## 2025-01-30 LAB
ALBUMIN SERPL-MCNC: 3.7 G/DL (ref 3.5–5.2)
ALBUMIN/GLOB SERPL: 0.9 G/DL
ALP SERPL-CCNC: 222 U/L (ref 39–117)
ALT SERPL W P-5'-P-CCNC: 26 U/L (ref 1–33)
ANION GAP SERPL CALCULATED.3IONS-SCNC: 11 MMOL/L (ref 5–15)
AST SERPL-CCNC: 113 U/L (ref 1–32)
BASOPHILS # BLD AUTO: 0.05 10*3/MM3 (ref 0–0.2)
BASOPHILS NFR BLD AUTO: 1.3 % (ref 0–1.5)
BILIRUB SERPL-MCNC: 0.9 MG/DL (ref 0–1.2)
BUN SERPL-MCNC: 5 MG/DL (ref 8–23)
BUN/CREAT SERPL: 7.9 (ref 7–25)
CALCIUM SPEC-SCNC: 9.3 MG/DL (ref 8.6–10.5)
CHLORIDE SERPL-SCNC: 103 MMOL/L (ref 98–107)
CO2 SERPL-SCNC: 27 MMOL/L (ref 22–29)
CREAT SERPL-MCNC: 0.63 MG/DL (ref 0.57–1)
DEPRECATED RDW RBC AUTO: 50 FL (ref 37–54)
EGFRCR SERPLBLD CKD-EPI 2021: 100.4 ML/MIN/1.73
EOSINOPHIL # BLD AUTO: 0.06 10*3/MM3 (ref 0–0.4)
EOSINOPHIL NFR BLD AUTO: 1.5 % (ref 0.3–6.2)
ERYTHROCYTE [DISTWIDTH] IN BLOOD BY AUTOMATED COUNT: 13.8 % (ref 12.3–15.4)
GLOBULIN UR ELPH-MCNC: 4.3 GM/DL
GLUCOSE SERPL-MCNC: 115 MG/DL (ref 65–99)
HCT VFR BLD AUTO: 40.2 % (ref 34–46.6)
HGB BLD-MCNC: 13.1 G/DL (ref 12–15.9)
IMM GRANULOCYTES # BLD AUTO: 0.01 10*3/MM3 (ref 0–0.05)
IMM GRANULOCYTES NFR BLD AUTO: 0.3 % (ref 0–0.5)
INR PPP: 1.22 (ref 0.89–1.12)
LYMPHOCYTES # BLD AUTO: 1.32 10*3/MM3 (ref 0.7–3.1)
LYMPHOCYTES NFR BLD AUTO: 33.7 % (ref 19.6–45.3)
MAGNESIUM SERPL-MCNC: 2.1 MG/DL (ref 1.6–2.4)
MCH RBC QN AUTO: 32.5 PG (ref 26.6–33)
MCHC RBC AUTO-ENTMCNC: 32.6 G/DL (ref 31.5–35.7)
MCV RBC AUTO: 99.8 FL (ref 79–97)
MONOCYTES # BLD AUTO: 0.46 10*3/MM3 (ref 0.1–0.9)
MONOCYTES NFR BLD AUTO: 11.7 % (ref 5–12)
NEUTROPHILS NFR BLD AUTO: 2.02 10*3/MM3 (ref 1.7–7)
NEUTROPHILS NFR BLD AUTO: 51.5 % (ref 42.7–76)
NRBC BLD AUTO-RTO: 0 /100 WBC (ref 0–0.2)
PLATELET # BLD AUTO: 133 10*3/MM3 (ref 140–450)
PMV BLD AUTO: 9.5 FL (ref 6–12)
POTASSIUM SERPL-SCNC: 3.8 MMOL/L (ref 3.5–5.2)
PROT SERPL-MCNC: 8 G/DL (ref 6–8.5)
PROTHROMBIN TIME: 15.5 SECONDS (ref 12.2–14.5)
RBC # BLD AUTO: 4.03 10*6/MM3 (ref 3.77–5.28)
SODIUM SERPL-SCNC: 141 MMOL/L (ref 136–145)
WBC NRBC COR # BLD AUTO: 3.92 10*3/MM3 (ref 3.4–10.8)

## 2025-01-30 PROCEDURE — 25010000002 ONDANSETRON PER 1 MG: Performed by: EMERGENCY MEDICINE

## 2025-01-30 PROCEDURE — 80053 COMPREHEN METABOLIC PANEL: CPT | Performed by: EMERGENCY MEDICINE

## 2025-01-30 PROCEDURE — 25010000002 KETOROLAC TROMETHAMINE PER 15 MG: Performed by: EMERGENCY MEDICINE

## 2025-01-30 PROCEDURE — 85610 PROTHROMBIN TIME: CPT | Performed by: EMERGENCY MEDICINE

## 2025-01-30 PROCEDURE — 99283 EMERGENCY DEPT VISIT LOW MDM: CPT

## 2025-01-30 PROCEDURE — 96375 TX/PRO/DX INJ NEW DRUG ADDON: CPT

## 2025-01-30 PROCEDURE — 85025 COMPLETE CBC W/AUTO DIFF WBC: CPT | Performed by: EMERGENCY MEDICINE

## 2025-01-30 PROCEDURE — 73610 X-RAY EXAM OF ANKLE: CPT

## 2025-01-30 PROCEDURE — 73630 X-RAY EXAM OF FOOT: CPT

## 2025-01-30 PROCEDURE — 83735 ASSAY OF MAGNESIUM: CPT | Performed by: EMERGENCY MEDICINE

## 2025-01-30 PROCEDURE — 96374 THER/PROPH/DIAG INJ IV PUSH: CPT

## 2025-01-30 RX ORDER — KETOROLAC TROMETHAMINE 30 MG/ML
30 INJECTION, SOLUTION INTRAMUSCULAR; INTRAVENOUS ONCE
Status: COMPLETED | OUTPATIENT
Start: 2025-01-30 | End: 2025-01-30

## 2025-01-30 RX ORDER — ONDANSETRON 2 MG/ML
4 INJECTION INTRAMUSCULAR; INTRAVENOUS ONCE
Status: COMPLETED | OUTPATIENT
Start: 2025-01-30 | End: 2025-01-30

## 2025-01-30 RX ADMIN — ONDANSETRON 4 MG: 2 INJECTION INTRAMUSCULAR; INTRAVENOUS at 23:20

## 2025-01-30 RX ADMIN — KETOROLAC TROMETHAMINE 30 MG: 30 INJECTION, SOLUTION INTRAMUSCULAR; INTRAVENOUS at 23:19

## 2025-01-31 RX ORDER — ONDANSETRON 4 MG/1
4 TABLET, ORALLY DISINTEGRATING ORAL EVERY 6 HOURS PRN
Qty: 12 TABLET | Refills: 0 | Status: SHIPPED | OUTPATIENT
Start: 2025-01-31

## 2025-01-31 RX ORDER — HYDROCODONE BITARTRATE AND ACETAMINOPHEN 5; 325 MG/1; MG/1
1 TABLET ORAL EVERY 6 HOURS PRN
Qty: 12 TABLET | Refills: 0 | Status: SHIPPED | OUTPATIENT
Start: 2025-01-31

## 2025-01-31 RX ORDER — NAPROXEN 500 MG/1
500 TABLET ORAL 2 TIMES DAILY PRN
Qty: 20 TABLET | Refills: 0 | Status: SHIPPED | OUTPATIENT
Start: 2025-01-31

## 2025-01-31 RX ORDER — HYDROCODONE BITARTRATE AND ACETAMINOPHEN 5; 325 MG/1; MG/1
1 TABLET ORAL ONCE
Status: COMPLETED | OUTPATIENT
Start: 2025-01-31 | End: 2025-01-31

## 2025-01-31 RX ADMIN — HYDROCODONE BITARTRATE AND ACETAMINOPHEN 1 TABLET: 5; 325 TABLET ORAL at 01:28

## 2025-01-31 NOTE — ED PROVIDER NOTES
Subjective   History of Present Illness  64 year old female presents to the emergency department with history of peripheral neuropathy brought by EMS with concerns about severe constant right foot pain with associated swelling for the past couple of hours, which started after she tripped and fell. Pain is worse with trying to put weight on her right foot. She denies loss of consciousness or other injuries. She states she tripped while using her walker at her residence. She states she uses a walker at baseline, and it fell over and she tripped and fell.       Review of Systems   Constitutional:  Negative for diaphoresis and fever.   HENT:  Negative for facial swelling and nosebleeds.    Eyes:  Negative for photophobia and discharge.   Respiratory:  Negative for stridor.    Musculoskeletal:  Positive for arthralgias, gait problem (uses a walker at baseline) and myalgias.   Neurological:  Positive for numbness (chronic numbness bilateral feet, peripheral neuropathy history). Negative for facial asymmetry and speech difficulty.       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 03/24/2017    Closed trimalleolar fracture of right ankle 05/24/2023    Digital mucous cyst of toe of left foot 03/24/2017    Ganglion cyst of left foot     Glaucoma     Hypertension     Wears glasses    Peripheral neuropathy    Allergies   Allergen Reactions    Buspirone Anxiety    Codeine Nausea Only    Mirtazapine Anxiety       Past Surgical History:   Procedure Laterality Date    ANKLE OPEN REDUCTION INTERNAL FIXATION Left 03/24/2017    Procedure: LEFT ANKLE OPEN REDUCTION INTERNAL FIXATION WITH ILIAC CREST BONE GRAFT , EXCISION CYST 4TH/5TH INNERSPACE LEFT FOOT;  Surgeon: Frank Larson MD;   Location:  [facility];  Service:     ANKLE OPEN REDUCTION INTERNAL FIXATION Right 05/25/2023    Procedure: ANKLE OPEN REDUCTION INTERNAL FIXATION;  Surgeon: Deandre Reynoso MD;  Location:  [facility];  Service: Orthopedics;  Laterality: Right;    AUGMENTATION MAMMAPLASTY Bilateral 1999    BREAST AUGMENTATION      BREAST EXCISIONAL BIOPSY Right 2014    DILATION AND CURETTAGE, DIAGNOSTIC / THERAPEUTIC      ENDOSCOPY N/A 11/30/2022    Procedure: ESOPHAGOGASTRODUODENOSCOPY;  Surgeon: Arelis Solano MD;  Location:  [facility];  Service: Gastroenterology;  Laterality: N/A;    ENDOSCOPY N/A 2/1/2024    Procedure: ESOPHAGOGASTRODUODENOSCOPY;  Surgeon: Brunner, Mark I, MD;  Location:  [facility];  Service: Gastroenterology;  Laterality: N/A;    CO RPR NON/MAL FEMUR DSTL H/N W/ILIAC/AUTOG BONE Left 03/24/2017    Procedure: ILIAC CREST BONE GRAFT;  Surgeon: Frank Larson MD;  Location:  [facility];  Service: Orthopedics    WRIST SURGERY         Family History   Problem Relation Age of Onset    Depression Mother     Cancer Mother     No Known Problems Father     No Known Problems Sister     Drug abuse Brother     Bipolar disorder Brother     Alcohol abuse Brother     ADD / ADHD Brother     No Known Problems Maternal Aunt     Dementia Paternal Aunt     No Known Problems Maternal Grandmother     Breast cancer Paternal Grandmother 68        met to brain    Dementia Cousin     No Known Problems Daughter     No Known Problems Son     Rheum arthritis Other     Heart disease Other     Cancer Other     Stroke Other     Hypertension Other     BRCA 1/2 Neg Hx     Colon cancer Neg Hx     Endometrial cancer Neg Hx     Ovarian cancer Neg Hx        Social History     Socioeconomic History    Marital status: Single   Tobacco Use    Smoking status: Former     Current packs/day: 0.00     Average packs/day: 1.5 packs/day for 20.0 years (30.0 ttl pk-yrs)     Types: Cigarettes     Start date: 3/22/1989     Quit date: 3/22/2009      Years since quitting: 15.8    Smokeless tobacco: Never   Vaping Use    Vaping status: Never Used   Substance and Sexual Activity    Alcohol use: Not Currently     Alcohol/week: 5.0 standard drinks of alcohol     Types: 5 Drinks containing 0.5 oz of alcohol per week    Drug use: No    Sexual activity: Defer     Lives alone      Objective   Physical Exam  Vitals and nursing note reviewed.   Constitutional:       Appearance: She is not diaphoretic.      Comments: BMI 27. Patient initially evaluated in the provider in triage area due to a full emergency department.    HENT:      Head: Normocephalic and atraumatic.      Mouth/Throat:      Mouth: Mucous membranes are moist.   Eyes:      General: No scleral icterus.     Comments: No photophobia.   Cardiovascular:      Rate and Rhythm: Normal rate.   Pulmonary:      Effort: Pulmonary effort is normal. No respiratory distress.      Breath sounds: No stridor.   Musculoskeletal:      Cervical back: Neck supple.      Comments: Edema right foot, no break in the skin overlying the area. Right dorsal midfoot tender to palpation. Brisk capillary refill distal right lower extremity. Right lower extremity distal neurovascular intact.    Skin:     General: Skin is warm and dry.      Coloration: Skin is not jaundiced.   Neurological:      Mental Status: She is alert.      Comments: Normal speech, no dysarthria. Moves all extremities. Sensation grossly intact to light touch. No facial droop.         Procedures           ED Course  ED Course as of 02/03/25 2032   u Jan 30, 2025 2206 Peripheral neuropathy [LD]   Fri Jan 31, 2025   0008 Walking b oot in place to right foot. Results and plan discussed with the patient. All questions addressed. Pain improved after IV Toradol. Will have RN assess gait, disposition pending, lives alone.  [LD]   0110 Per ED tech, the patient was able to stand and put weight on right foot with boot on. She is not meeting admission criteria at this time. She  is requesting additional analgesic which I will order for administration prior to discharge. Charge RN states she will have patient brought home by EMS so they can be sure she gets in her residence where she already has a walker she can use and uses chronically.  [LD]      ED Course User Index  [LD] Nela Nathan MD KASPER reviewed by Nela Nathan MD       Medical Decision Making  Differential diagnosis includes foot fracture, foot sprain, neuropathy, and others.     Problems Addressed:  Acute foot pain, right: complicated acute illness or injury  Closed displaced fracture of third metatarsal bone of right foot, initial encounter: complicated acute illness or injury  Ground-level fall: complicated acute illness or injury    Amount and/or Complexity of Data Reviewed  Independent Historian: EMS  Labs: ordered. Decision-making details documented in ED Course.  Radiology: ordered. Decision-making details documented in ED Course.    Risk  Prescription drug management.      Recent Results (from the past 24 hours)   CBC Auto Differential    Collection Time: 01/30/25 11:21 PM    Specimen: Blood   Result Value Ref Range    WBC 3.92 3.40 - 10.80 10*3/mm3    RBC 4.03 3.77 - 5.28 10*6/mm3    Hemoglobin 13.1 12.0 - 15.9 g/dL    Hematocrit 40.2 34.0 - 46.6 %    MCV 99.8 (H) 79.0 - 97.0 fL    MCH 32.5 26.6 - 33.0 pg    MCHC 32.6 31.5 - 35.7 g/dL    RDW 13.8 12.3 - 15.4 %    RDW-SD 50.0 37.0 - 54.0 fl    MPV 9.5 6.0 - 12.0 fL    Platelets 133 (L) 140 - 450 10*3/mm3    Neutrophil % 51.5 42.7 - 76.0 %    Lymphocyte % 33.7 19.6 - 45.3 %    Monocyte % 11.7 5.0 - 12.0 %    Eosinophil % 1.5 0.3 - 6.2 %    Basophil % 1.3 0.0 - 1.5 %    Immature Grans % 0.3 0.0 - 0.5 %    Neutrophils, Absolute 2.02 1.70 - 7.00 10*3/mm3    Lymphocytes, Absolute 1.32 0.70 - 3.10 10*3/mm3    Monocytes, Absolute 0.46 0.10 - 0.90 10*3/mm3    Eosinophils, Absolute 0.06 0.00 - 0.40 10*3/mm3    Basophils,  Absolute 0.05 0.00 - 0.20 10*3/mm3    Immature Grans, Absolute 0.01 0.00 - 0.05 10*3/mm3    nRBC 0.0 0.0 - 0.2 /100 WBC   Comprehensive Metabolic Panel    Collection Time: 01/30/25 11:21 PM    Specimen: Blood   Result Value Ref Range    Glucose 115 (H) 65 - 99 mg/dL    BUN 5 (L) 8 - 23 mg/dL    Creatinine 0.63 0.57 - 1.00 mg/dL    Sodium 141 136 - 145 mmol/L    Potassium 3.8 3.5 - 5.2 mmol/L    Chloride 103 98 - 107 mmol/L    CO2 27.0 22.0 - 29.0 mmol/L    Calcium 9.3 8.6 - 10.5 mg/dL    Total Protein 8.0 6.0 - 8.5 g/dL    Albumin 3.7 3.5 - 5.2 g/dL    ALT (SGPT) 26 1 - 33 U/L    AST (SGOT) 113 (H) 1 - 32 U/L    Alkaline Phosphatase 222 (H) 39 - 117 U/L    Total Bilirubin 0.9 0.0 - 1.2 mg/dL    Globulin 4.3 gm/dL    A/G Ratio 0.9 g/dL    BUN/Creatinine Ratio 7.9 7.0 - 25.0    Anion Gap 11.0 5.0 - 15.0 mmol/L    eGFR 100.4 >60.0 mL/min/1.73   Magnesium    Collection Time: 01/30/25 11:21 PM    Specimen: Blood   Result Value Ref Range    Magnesium 2.1 1.6 - 2.4 mg/dL   Protime-INR    Collection Time: 01/30/25 11:21 PM    Specimen: Blood   Result Value Ref Range    Protime 15.5 (H) 12.2 - 14.5 Seconds    INR 1.22 (H) 0.89 - 1.12     Note: In addition to lab results from this visit, the labs listed above may include labs taken at another facility or during a different encounter within the last 24 hours. Please correlate lab times with ED admission and discharge times for further clarification of the services performed during this visit.    XR Foot 3+ View Right   Final Result   Impression:   Acute displaced fracture of the distal third metatarsal.               Electronically Signed: Deandre Garza MD     1/30/2025 11:14 PM EST     Workstation ID: [id number]      XR Ankle 3+ View Right   Final Result   Impression:   Displaced fracture of the distal third metatarsal.               Electronically Signed: Deandre Garza MD     1/30/2025 11:15 PM EST     Workstation ID: [id number]        Vitals:    01/30/25 2158   BP: 125/96   BP  "Location: Right arm   Patient Position: Lying   Pulse: 87   Resp: 16   Temp: 98.7 °F (37.1 °C)   TempSrc: Oral   SpO2: 96%   Weight: 71.7 kg (158 lb)   Height: 162.6 cm (64\")     Medications   HYDROcodone-acetaminophen (NORCO) 5-325 MG per tablet 1 tablet (has no administration in time range)   ketorolac (TORADOL) injection 30 mg (30 mg Intravenous Given 1/30/25 2319)   ondansetron (ZOFRAN) injection 4 mg (4 mg Intravenous Given 1/30/25 2320)     ECG/EMG Results (last 24 hours)       ** No results found for the last 24 hours. **          No orders to display           Final diagnoses:   Closed displaced fracture of third metatarsal bone of right foot, initial encounter   Acute foot pain, right   Ground-level fall       ED Disposition  ED Disposition       ED Disposition   Discharge    Condition   Stable    Comment   Discharge home by EMS to be sure she gets into her residence and has her walker nearby               Unique Brandt,   [address] [address]  [phone]    Schedule an appointment as soon as possible for a visit in 1 week  primary care provider    Frank Larson MD  [address]  [address]  [place] [address]  [phone]    Call in 1 day  for close follow up appointment, this is an orthopedic surgery specialist or you can follow up with your orthopedic surgery specialist for further evaluation of metatarsal fracture.         Medication List        New Prescriptions      HYDROcodone-acetaminophen 5-325 MG per tablet  Commonly known as: NORCO  Take 1 tablet by mouth Every 6 (Six) Hours As Needed for Severe Pain.     naproxen 500 MG tablet  Commonly known as: NAPROSYN  Take 1 tablet by mouth 2 (Two) Times a Day As Needed for Moderate Pain.     ondansetron ODT 4 MG disintegrating tablet  Commonly known as: ZOFRAN-ODT  Place 1 tablet on the tongue Every 6 (Six) Hours As Needed for Nausea or Vomiting. As needed for nausea               Where to Get Your " Medications        These medications were sent to [facility] [id number] - [place], [place] - [address] AT [facility] [address] - [phone] PH - [phone] FX  [address] [place] [address]      Phone: [phone]   HYDROcodone-acetaminophen 5-325 MG per tablet  naproxen 500 MG tablet  ondansetron ODT 4 MG disintegrating tablet            Nela Nathan MD  02/03/25 2032

## 2025-02-05 ENCOUNTER — TELEMEDICINE (OUTPATIENT)
Dept: FAMILY MEDICINE CLINIC | Facility: CLINIC | Age: 65
End: 2025-02-05
Payer: COMMERCIAL

## 2025-02-05 DIAGNOSIS — G60.9 IDIOPATHIC PERIPHERAL NEUROPATHY: ICD-10-CM

## 2025-02-05 DIAGNOSIS — S92.331A CLOSED DISPLACED FRACTURE OF THIRD METATARSAL BONE OF RIGHT FOOT, INITIAL ENCOUNTER: Primary | ICD-10-CM

## 2025-02-05 DIAGNOSIS — K70.30 ALCOHOLIC CIRRHOSIS OF LIVER WITHOUT ASCITES: ICD-10-CM

## 2025-02-05 DIAGNOSIS — Z59.41 FOOD INSECURITY: ICD-10-CM

## 2025-02-05 PROCEDURE — 99214 OFFICE O/P EST MOD 30 MIN: CPT | Performed by: STUDENT IN AN ORGANIZED HEALTH CARE EDUCATION/TRAINING PROGRAM

## 2025-02-05 RX ORDER — HYDROCODONE BITARTRATE AND ACETAMINOPHEN 5; 325 MG/1; MG/1
1 TABLET ORAL EVERY 6 HOURS PRN
Qty: 20 TABLET | Refills: 0 | Status: SHIPPED | OUTPATIENT
Start: 2025-02-05 | End: 2025-02-10

## 2025-02-05 SDOH — ECONOMIC STABILITY - FOOD INSECURITY: FOOD INSECURITY: Z59.41

## 2025-02-05 NOTE — PROGRESS NOTES
Chief Complaint   Patient presents with    Foot Injury     Mode of Visit: My Chart Twilio Video  Location of patient: Home  Location of Provider: [facility]  Patient has chosen to receive care through a telehealth visit.  Does the patient consent to use a video/audio device for their medical care today: Yes  The visit included audio and video interaction: Yes    HPI:  Philomena Davis is a 64 y.o. female with alcoholic cirrhosis, severe peripheral neuropathy, insomnia, HTN, anxiety/depression who presents today for ER follow up.    Pt was seen in  ER on 1/30/25 after fall at home. Workup in ER revealed acute displaced fracture of 3rd metatarsal on right foot. She has had previous surgery on this foot as well by Dr. Decker years ago.   She was placed in a boot and ultimately discharged home.  Unfortunately pt has had recurrent falls at home due to the severity of her neuropathy.   She had just recently been admitted to [facility] rehab after hospital stay in November where she was admitted for lower ext weakness. She was there for two weeks and then discharged home with home health, PT/OT. She tells me she was making significant progress with PT until insurance stopped approving her visits recently. She was at a point where she could make it upstairs (lives in two story home) to her bedroom.   Now with her fracture she is unable to stand or ambulate even short distances. States that she cannot make it to the kitchen to prepare her food or get her medicines.  She had a neighbor come in and move her mattress downstairs to her living room.   She does not have any friends or family available to help her.  She no longer drives.    She is still having significant amount of pain from right foot. Was sent home from ER with short 3 day course of Norco 5-325mg andthis has been helping but she has run out. Unable to take NSAIDS due to cirrhosis.       PE:  There were no vitals filed for this visit.   There is no height  or weight on file to calculate BMI.  Unable to obtain vitals 2/2 telehealth visit.     Gen Appearance: NAD  HEENT: Normocephalic, EOMI  Psych: Intermittently tearful during exam  Neuro: Normal speech    Current Outpatient Medications   Medication Sig Dispense Refill    HYDROcodone-acetaminophen (NORCO) 5-325 MG per tablet Take 1 tablet by mouth Every 6 (Six) Hours As Needed for Severe Pain for up to 5 days. 20 tablet 0    albuterol sulfate  (90 Base) MCG/ACT inhaler Inhale 2 puffs Every 4 (Four) Hours As Needed for Wheezing. Indications: Chronic Obstructive Lung Disease 6.7 g 2    atorvastatin (LIPITOR) 10 MG tablet Take 10 mg by mouth Every Night. Indications: High Amount of Fats in the Blood      carvedilol (COREG) 6.25 MG tablet Take 1 tablet by mouth 2 (Two) Times a Day With Meals. Indications: High Blood Pressure      desvenlafaxine (PRISTIQ) 50 MG 24 hr tablet Take 1 tablet by mouth Daily. Indications: Major Depressive Disorder      eszopiclone (Lunesta) 2 MG tablet Take 1 tablet by mouth Every Night for 90 days. Take immediately before bedtime  Indications: Trouble Sleeping 30 tablet 2    gabapentin (NEURONTIN) 100 MG capsule Take 2 capsules by mouth Every 12 (Twelve) Hours.      LACTOBACILLUS PO Take 1 capsule by mouth Daily. Indications:      latanoprost (XALATAN) 0.005 % ophthalmic solution Administer 1 drop to both eyes Every Night. Indications: Wide-Angle Glaucoma 2.5 mL 5    midodrine (PROAMATINE) 5 MG tablet Take 1 tablet by mouth 2 (Two) Times a Day Before Meals. Indications: Disorder of Low Blood Pressure      nystatin (MYCOSTATIN) 749407 UNIT/GM cream Apply 1 Application topically to the appropriate area as directed Every 12 (Twelve) Hours.      ondansetron ODT (ZOFRAN-ODT) 4 MG disintegrating tablet Place 1 tablet on the tongue Every 6 (Six) Hours As Needed for Nausea or Vomiting. As needed for nausea 12 tablet 0    pantoprazole (PROTONIX) 40 MG EC tablet Take 1 tablet by mouth Daily.  Indications: Heartburn 60 tablet 5    rosuvastatin (CRESTOR) 5 MG tablet TAKE 1 TABLET BY MOUTH DAILY 90 tablet 0    ursodiol (ACTIGALL) 300 MG capsule Take 1 capsule by mouth 2 (Two) Times a Day. Indications: Liver Disease 60 capsule 5     No current facility-administered medications for this visit.        A/P:  Diagnoses and all orders for this visit:    1. Closed displaced fracture of third metatarsal bone of right foot, initial encounter (Primary)  Pain symptoms currently uncontrolled. Were relieved w Hydrocodone-Acetaminophen rx. Will refill for 5 days (at q6h PRN) until she has FU next week with Ortho.   PDMP reviewed, appropriate. If additional refills are needed will need in person visit for CSA/UDS.  Pt is unable to take NSAIDS due to liver disease. DO not exceed 2,000mg Tylenol/day.  Cont use of boot  Referral for home health/PT services ordered  -     HYDROcodone-acetaminophen (NORCO) 5-325 MG per tablet; Take 1 tablet by mouth Every 6 (Six) Hours As Needed for Severe Pain for up to 5 days.  Dispense: 20 tablet; Refill: 0  -     Ambulatory Referral to Home Health    2. Alcoholic cirrhosis of liver without ascites  3. Idiopathic peripheral neuropathy  Pt remains HIGH RISK for falls due to severity of her neuropathy and now with new foot fracture requiring use of boot.  -     Ambulatory Referral to Home Health    4. Food insecurity  Pt reports she's currently unable to ambulate to/from the kitchen to prepare meals or get her medications. She states that she does not have friends, neighbors or family that could assist her with this. She does not have financial resources to order delivery and uses food stamps for food.   I did advise patient that at this point it may be better to explore alternative living options (cheaper, one story home) but she declines. I advised that if she feels she's unable to care for herself the safest option would be to go to ER for hospital admission for potential placement. She  declines at this time.   Referral to Case Management.  -     Ambulatory Referral to Case Management Barriers to Care         No follow-ups on file.     Dictated Utilizing Dragon Dictation    Please note that portions of this note were completed with a voice recognition program.    Part of this note may be an electronic transcription/translation of spoken language to printed text using the Dragon Dictation System.

## 2025-02-06 ENCOUNTER — TELEPHONE (OUTPATIENT)
Dept: FAMILY MEDICINE CLINIC | Facility: CLINIC | Age: 65
End: 2025-02-06
Payer: COMMERCIAL

## 2025-02-06 ENCOUNTER — HOME HEALTH ADMISSION (OUTPATIENT)
Dept: HOME HEALTH SERVICES | Facility: HOME HEALTHCARE | Age: 65
End: 2025-02-06
Payer: COMMERCIAL

## 2025-02-06 ENCOUNTER — REFERRAL TRIAGE (OUTPATIENT)
Age: 65
End: 2025-02-06
Payer: COMMERCIAL

## 2025-02-06 NOTE — TELEPHONE ENCOUNTER
Caller: Philomena Davis    Relationship: Self    Best call back number:      [phone] (Mobile)     Who are you requesting to speak with (clinical staff, provider,  specific staff member):   CLINICAL     What was the call regarding:  PATIENT WOULD LIKE TO USE  [facility] FOR HER PHYSICAL THERAPY FOR HOME HEALTH   SHE HAS SEEN [facility] BEFORE     ALSO REQUESTING HOME HEALTH NURSING TO HELP WITH HER MEDICATIONS    ALSO SHE SAID [facility] SUGGESTED SHE HAVE A RAMP PUT IN WHERE SHE HAS TWO STEPS ON HER PORCH     PLEASE ADVISE IF THERE A COMPANY THAT DR MERA WOULD RECOMMEND FOR THIS       PLEASE CALL

## 2025-02-06 NOTE — TELEPHONE ENCOUNTER
Unable to reach Philomena Davis by phone or leave message.    Hub may relay message and document.    Unique Brandt, DO  You4 hours ago (10:03 AM)       She would need to talk with her insurance about a company for the ramp, I'm unsure. She could also ask the  when they call her. I've already placed home health orders through [facility] for her.

## 2025-02-07 NOTE — TELEPHONE ENCOUNTER
Unable to reach Philomena Davis by phone or leave message.     Hub may relay message and document.     Unique Brandt, DO  You4 hours ago (10:03 AM)         She would need to talk with her insurance about a company for the ramp, I'm unsure. She could also ask the  when they call her. I've already placed home health orders through [facility] for her.

## 2025-02-08 ENCOUNTER — HOME CARE VISIT (OUTPATIENT)
Dept: HOME HEALTH SERVICES | Facility: HOME HEALTHCARE | Age: 65
End: 2025-02-08
Payer: COMMERCIAL

## 2025-02-08 VITALS
SYSTOLIC BLOOD PRESSURE: 118 MMHG | RESPIRATION RATE: 16 BRPM | OXYGEN SATURATION: 94 % | TEMPERATURE: 97.5 F | DIASTOLIC BLOOD PRESSURE: 76 MMHG | HEART RATE: 101 BPM

## 2025-02-08 PROCEDURE — G0151 HHCP-SERV OF PT,EA 15 MIN: HCPCS

## 2025-02-08 NOTE — Clinical Note
"SOC Note: 2/8/25    Home Health ordered for: disciplines PT and OT    Reason for Hosp/Primary Dx/Co-morbidities: Pt is a 63 yo F with recent hospital visit secondary to fall in the kitchen at her home. Reaching up into cabinet above stove and fell. Has dx of Displaced fracture of third metatarsal bone. In a boot. Struggles with gt, balance, strength, endurance, ADLs and functional mobility.    Focus of Care:  Pt to been seen for there ex, strengthening, balance training, Gt training and fall prevention education. Displaced fracture of third metatarsal bone.     Patient's goal(s): \"I want to be able to the bathroom and fridge safely.\" \"I need to improve balance.\"     Current Functional status/mobility/DME: Ambulates with RW    HB status/Living Arrangements: Lives alone in 2 story home, but unable to get up stairs to bathroom with shower/bath.    Skin Integrity/wound status: none    Code Status: CPR    Fall Risk/Safety concerns: High    Educated on Emergency Plan, steps to take prior to going to the ER and when to Call Home Health First:  Yes     Medication issues/Concerns: None    Additional Problems/Concerns: none    SDOH Barriers (i.e. caregiver concerns, social isolation, transportation, food insecurity, environment, income etc.)/Need for MSW: Yes"

## 2025-02-10 ENCOUNTER — TELEPHONE (OUTPATIENT)
Dept: ORTHOPEDIC SURGERY | Facility: CLINIC | Age: 65
End: 2025-02-10

## 2025-02-10 ENCOUNTER — PATIENT OUTREACH (OUTPATIENT)
Age: 65
End: 2025-02-10
Payer: COMMERCIAL

## 2025-02-10 NOTE — TELEPHONE ENCOUNTER
Caller: Philomena Davis    Relationship to patient: Self    Chief complaint: RIGHT FOOT     Type of visit: NEW PATIENT     Requested date: 2-12-25     If rescheduling, when is the original appointment: 2-10-25     Additional notes:PATIENT WOKE UP NOT FEELING WELL ON STOMACH

## 2025-02-10 NOTE — OUTREACH NOTE
Social Work Assessment  Questions/Answers      Flowsheet Row Most Recent Value   Referral Source physician   Reason for Consult community resources   Preferred Language English   Advance Care Planning Reviewed no concerns identified   People in Home alone   Potentially Unsafe Housing Conditions none   In the past 12 months has the electric, gas, oil, or water company threatened to shut off services in your home? No   Primary Care Provided by self   Employment Status disabled   Source of Income social security   Medications assistive person   Meal Preparation assistive person   Housekeeping assistive person   Laundry assistive person   Shopping assistive person        SDOH updated and reviewed with the patient during this program:  --     Disabilities: At Risk (11/11/2024)    Disabilities     Concentrating, Remembering, or Making Decisions Difficulty: no     Doing Errands Independently Difficulty: yes      Financial Resource Strain: High Risk (11/11/2024)    Overall Financial Resource Strain (CARDIA)     Difficulty of Paying Living Expenses: Very hard      --     Food Insecurity: Food Insecurity Present (11/11/2024)    Hunger Vital Sign     Worried About Running Out of Food in the Last Year: Sometimes true     Ran Out of Food in the Last Year: Sometimes true      --     Housing Stability: Not At Risk (2/10/2025)    Housing Stability     Current Living Arrangements: home     Potentially Unsafe Housing Conditions: none      --     Transportation Needs: No Transportation Needs (2/8/2025)    OASIS : Transportation     Lack of Transportation (Medical): No     Lack of Transportation (Non-Medical): No     Patient Unable or Declines to Respond: No   Recent Concern: Transportation Needs - Unmet Transportation Needs (11/11/2024)    PRAPARE - Transportation     Lack of Transportation (Medical): Yes     Lack of Transportation (Non-Medical): Yes      --     Utilities: Not At Risk (2/10/2025)    [facility] Utilities     Threatened  with loss of utilities: No     Patient Outreach    SW contacted pt after receiving referral for help with a ramp. Pt reports that she does have a medicaid waiver and her caregiver helps with meal prep and housekeeping. SW also encouraged her to contact her medicaid waiver  to see if that can help pay for ramp. SW also provided information about the RAMP UP program. Pt denies additional questions or needs at this time. SW encouraged her to contact SW if questions arise.  Luciana CAT -   Ambulatory Case Management    2/10/2025, 14:30 EST

## 2025-02-12 ENCOUNTER — HOME CARE VISIT (OUTPATIENT)
Dept: HOME HEALTH SERVICES | Facility: HOME HEALTHCARE | Age: 65
End: 2025-02-12
Payer: COMMERCIAL

## 2025-02-12 ENCOUNTER — TELEPHONE (OUTPATIENT)
Dept: FAMILY MEDICINE CLINIC | Facility: CLINIC | Age: 65
End: 2025-02-12
Payer: COMMERCIAL

## 2025-02-12 VITALS
SYSTOLIC BLOOD PRESSURE: 110 MMHG | OXYGEN SATURATION: 94 % | TEMPERATURE: 97.5 F | DIASTOLIC BLOOD PRESSURE: 60 MMHG | HEART RATE: 85 BPM | RESPIRATION RATE: 16 BRPM

## 2025-02-12 DIAGNOSIS — S92.331S: ICD-10-CM

## 2025-02-12 DIAGNOSIS — S92.331S: Primary | ICD-10-CM

## 2025-02-12 DIAGNOSIS — G60.9 IDIOPATHIC PERIPHERAL NEUROPATHY: Primary | ICD-10-CM

## 2025-02-12 PROCEDURE — G0299 HHS/HOSPICE OF RN EA 15 MIN: HCPCS

## 2025-02-12 PROCEDURE — G0151 HHCP-SERV OF PT,EA 15 MIN: HCPCS

## 2025-02-12 RX ORDER — HYDROCODONE BITARTRATE AND ACETAMINOPHEN 5; 325 MG/1; MG/1
1 TABLET ORAL EVERY 6 HOURS PRN
Qty: 12 TABLET | Refills: 0 | Status: SHIPPED | OUTPATIENT
Start: 2025-02-12 | End: 2025-02-15

## 2025-02-12 NOTE — TELEPHONE ENCOUNTER
Antwan from [facility] physical therapy called needing orders for the patient. They are needing a home health nurse this week to check a rash and help with meds. He Is requesting a call back once this is done he said it is okay to leave a message.

## 2025-02-12 NOTE — TELEPHONE ENCOUNTER
Caller: Philomena Davis ANH    Relationship: Self    Best call back number: [phone]    Requested Prescriptions:   HYDROCODONE 5-325 MG        Pharmacy where request should be sent: [facility] [id number] - [place], [place] - [address] AT [facility] [address] - [phone] PH - [phone] FX     Last office visit with prescribing clinician: 10/25/2023   Last telemedicine visit with prescribing clinician: 2/5/2025   Next office visit with prescribing clinician: Visit date not found     Additional details provided by patient: PATIENT STATES SHE NEEDS A REFILL FOR PAIN     Does the patient have less than a 3 day supply:  [x] Yes  [] No    Would you like a call back once the refill request has been completed: [] Yes [x] No    If the office needs to give you a call back, can they leave a voicemail: [] Yes [x] No    Milton West Rep   02/12/25 11:47 EST

## 2025-02-12 NOTE — TELEPHONE ENCOUNTER
I sent in a 3 day supply. She will need to follow up with Ortho for any additional refills.     PDMP reviewed, appropriate. Last filled 2/5/25

## 2025-02-13 ENCOUNTER — TELEPHONE (OUTPATIENT)
Dept: FAMILY MEDICINE CLINIC | Facility: CLINIC | Age: 65
End: 2025-02-13
Payer: COMMERCIAL

## 2025-02-13 NOTE — TELEPHONE ENCOUNTER
Unable to reach Philomena Davis by phone or leave message.    Hub may relay message and document.    Unique Brandt, DO  You1 hour ago (12:42 PM)       Zinc oxide barrier ointment was recommended per note

## 2025-02-13 NOTE — TELEPHONE ENCOUNTER
Caller: Philomena Davis    Relationship: Self    Best call back number: [phone]     What is the best time to reach you: ANYTIME     Who are you requesting to speak with (clinical staff, provider,  specific staff member): CLINICAL STAFF    What was the call regarding: PATIENT IS CALLING AFTER HAVING A VISIT WITH HOME HEALTH. SHE SAYS THAT SHE HAS A SMALL RASH ON HER BUTTOCKS FROM WHERE HER SHORTS SEAM IS RUBBING HER AS SHE IS SITTING MORE OFTEN THAN USUAL. SHE SAYS THAT SHE WAS TOLD A MEDICATION THAT SHE COULD GET OVER THE COUNTER, BUT CANNOT REMEMBER THE NAME., SHE IS WANTING TO KNOW IF THE CLINICAL STAFF WOULD HAVE AN VERONICA OF A NAME OF A MEDICATION THAT SHE CAN GET. SHE KNOWS THAT IT IS ALONG THE LINES OF A DIAPER CREAM.     Is it okay if the provider responds through MyChart: YES

## 2025-02-17 ENCOUNTER — OFFICE VISIT (OUTPATIENT)
Dept: ORTHOPEDIC SURGERY | Facility: CLINIC | Age: 65
End: 2025-02-17
Payer: COMMERCIAL

## 2025-02-17 ENCOUNTER — TELEPHONE (OUTPATIENT)
Dept: ORTHOPEDIC SURGERY | Facility: CLINIC | Age: 65
End: 2025-02-17

## 2025-02-17 ENCOUNTER — HOSPITAL ENCOUNTER (OUTPATIENT)
Dept: CT IMAGING | Facility: HOSPITAL | Age: 65
Discharge: HOME | End: 2025-02-17
Admitting: ORTHOPAEDIC SURGERY
Payer: COMMERCIAL

## 2025-02-17 VITALS
DIASTOLIC BLOOD PRESSURE: 68 MMHG | HEIGHT: 64 IN | SYSTOLIC BLOOD PRESSURE: 110 MMHG | WEIGHT: 158 LBS | BODY MASS INDEX: 26.98 KG/M2

## 2025-02-17 DIAGNOSIS — M79.671 RIGHT FOOT PAIN: Primary | ICD-10-CM

## 2025-02-17 DIAGNOSIS — M79.671 RIGHT FOOT PAIN: ICD-10-CM

## 2025-02-17 PROCEDURE — 73700 CT LOWER EXTREMITY W/O DYE: CPT

## 2025-02-17 RX ORDER — METOPROLOL SUCCINATE 25 MG/1
TABLET, EXTENDED RELEASE ORAL
COMMUNITY
Start: 2024-12-04 | End: 2025-02-21 | Stop reason: SDUPTHER

## 2025-02-17 NOTE — PROGRESS NOTES
[facility] Orthopaedic Surgery Office Visit     Office Visit       Date: 02/17/2025   Patient Name: Philomena Davis  MRN: [id number]  YOB: 1960    Referring Physician: No ref. provider found     Chief Complaint:   Chief Complaint   Patient presents with    Right Foot - Pain       History of Present Illness: Philomena Davis is a 64 y.o. female who is here today with right foot pain.   suffered mechanical fall on January 30 injuring foot.  Unable to ambulate after injury.  Paramedics came and brought her to the hospital where she was told she had a distal third metatarsal fracture.  Patient with baseline neuropathy etiology unknown.    neuropathy has been going on for years and has given her balance issues for a while.  Does at home therapy to help with balance issues.  Ambulates with a walker at baseline.  Lives at home alone.  States currently living on the first floor of her house since her foot injury.  No other complaints.   is now ambulating in boot.  Does not cause any pain to ambulate anymore.    Subjective   Review of Systems: Review of Systems   Constitutional: Negative.    HENT: Negative.     Eyes: Negative.    Respiratory: Negative.     Cardiovascular: Negative.    Gastrointestinal: Negative.    Endocrine: Negative.    Genitourinary: Negative.    Musculoskeletal:  Positive for arthralgias.   Skin: Negative.    Allergic/Immunologic: Negative.    Neurological: Negative.    Hematological: Negative.    Psychiatric/Behavioral: Negative.          Past Medical History:   Past Medical History:   Diagnosis Date    Alcohol withdrawal 05/10/2019    Ankle sprain 2015    Annual physical exam 09/12/2023    Anxiety and depression     Arthritis of back 2020    Arthritis of neck 2021    Cirrhosis of liver     Closed displaced fracture of lateral malleolus of left fibula     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 03/24/2017    Closed  trimalleolar fracture of right ankle 05/24/2023    Digital mucous cyst of toe of left foot 03/24/2017    Fracture of ankle 2017    Fracture, clavicle 1969    Fracture, finger 1972    Fracture, foot 1/30/25    Ganglion cyst of left foot     Glaucoma     Hip arthrosis 2019    Hypertension     Knee sprain 2019    Knee swelling 2019    Low back strain 1/15/25    Neck strain 1/30/25    Periarthritis of shoulder 2019    Wears glasses        Past Surgical History:   Past Surgical History:   Procedure Laterality Date    ANKLE OPEN REDUCTION INTERNAL FIXATION Left 03/24/2017    Procedure: LEFT ANKLE OPEN REDUCTION INTERNAL FIXATION WITH ILIAC CREST BONE GRAFT , EXCISION CYST 4TH/5TH INNERSPACE LEFT FOOT;  Surgeon: Frank Larson MD;  Location:  [facility];  Service:     ANKLE OPEN REDUCTION INTERNAL FIXATION Right 05/25/2023    Procedure: ANKLE OPEN REDUCTION INTERNAL FIXATION;  Surgeon: Deandre Reynoso MD;  Location:  [facility];  Service: Orthopedics;  Laterality: Right;    AUGMENTATION MAMMAPLASTY Bilateral 1999    BREAST AUGMENTATION      BREAST EXCISIONAL BIOPSY Right 2014    DILATION AND CURETTAGE, DIAGNOSTIC / THERAPEUTIC      ENDOSCOPY N/A 11/30/2022    Procedure: ESOPHAGOGASTRODUODENOSCOPY;  Surgeon: Arelis Solano MD;  Location:  [facility];  Service: Gastroenterology;  Laterality: N/A;    ENDOSCOPY N/A 02/01/2024    Procedure: ESOPHAGOGASTRODUODENOSCOPY;  Surgeon: Brunner, Mark I, MD;  Location:  [facility];  Service: Gastroenterology;  Laterality: N/A;    FOOT SURGERY  2012    GA RPR NON/MAL FEMUR DSTL H/N W/ILIAC/AUTOG BONE Left 03/24/2017    Procedure: ILIAC CREST BONE GRAFT;  Surgeon: Frank Larson MD;  Location:  [facility];  Service: Orthopedics    WRIST SURGERY         Family History:   Family History   Problem Relation Age of Onset    Depression Mother     Cancer Mother     No Known Problems Father     No Known Problems Sister     Drug abuse Brother     Bipolar disorder Brother      Alcohol abuse Brother     ADD / ADHD Brother     No Known Problems Maternal Aunt     Dementia Paternal Aunt     No Known Problems Maternal Grandmother     Breast cancer Paternal Grandmother 68        met to brain    Dementia Cousin     No Known Problems Daughter     No Known Problems Son     Rheum arthritis Other     Heart disease Other     Cancer Other     Stroke Other     Hypertension Other     BRCA 1/2 Neg Hx     Colon cancer Neg Hx     Endometrial cancer Neg Hx     Ovarian cancer Neg Hx        Social History:   Social History     Socioeconomic History    Marital status: Single   Tobacco Use    Smoking status: Former     Current packs/day: 0.00     Average packs/day: 1.5 packs/day for 24.0 years (36.1 ttl pk-yrs)     Types: Cigarettes     Start date: 3/5/1985     Quit date: 3/22/2009     Years since quitting: 15.9     Passive exposure: Past    Smokeless tobacco: Never   Vaping Use    Vaping status: Never Used   Substance and Sexual Activity    Alcohol use: Yes     Alcohol/week: 5.0 standard drinks of alcohol     Types: 5 Glasses of wine per week    Drug use: No    Sexual activity: Not Currently     Partners: Male     Birth control/protection: Abstinence, Post-menopausal     Comment: Menopausal Post 22 Yrs       Medications:   Current Outpatient Medications:     albuterol sulfate  (90 Base) MCG/ACT inhaler, Inhale 2 puffs Every 4 (Four) Hours As Needed for Wheezing. Indications: Chronic Obstructive Lung Disease, Disp: 6.7 g, Rfl: 2    atorvastatin (LIPITOR) 10 MG tablet, Take 1 tablet by mouth Every Night. Indications: High Amount of Fats in the Blood, Disp: , Rfl:     desvenlafaxine (PRISTIQ) 50 MG 24 hr tablet, Take 1 tablet by mouth Daily. Indications: Major Depressive Disorder, Disp: , Rfl:     eszopiclone (Lunesta) 2 MG tablet, Take 1 tablet by mouth Every Night for 90 days. Take immediately before bedtime  Indications: Trouble Sleeping, Disp: 30 tablet, Rfl: 2    folic acid (FOLVITE) 1 MG tablet, Take  1 tablet by mouth Daily. Indications: Anemia From Inadequate Folic Acid, Disp: , Rfl:     gabapentin (NEURONTIN) 100 MG capsule, Take 2 capsules by mouth Every 12 (Twelve) Hours., Disp: , Rfl:     LACTOBACILLUS PO, Take 1 capsule by mouth Daily. Indications:, Disp: , Rfl:     latanoprost (XALATAN) 0.005 % ophthalmic solution, Administer 1 drop to both eyes Every Night. Indications: Wide-Angle Glaucoma, Disp: 2.5 mL, Rfl: 5    metoprolol succinate XL (TOPROL-XL) 25 MG 24 hr tablet, , Disp: , Rfl:     midodrine (PROAMATINE) 5 MG tablet, Take 1 tablet by mouth 2 (Two) Times a Day Before Meals. Indications: Disorder of Low Blood Pressure, Disp: , Rfl:     nystatin (MYCOSTATIN) 406370 UNIT/GM cream, Apply 1 Application topically to the appropriate area as directed Every 12 (Twelve) Hours., Disp: , Rfl:     ondansetron ODT (ZOFRAN-ODT) 4 MG disintegrating tablet, Place 1 tablet on the tongue Every 6 (Six) Hours As Needed for Nausea or Vomiting. As needed for nausea, Disp: 12 tablet, Rfl: 0    pantoprazole (PROTONIX) 40 MG EC tablet, Take 1 tablet by mouth Daily. Indications: Heartburn, Disp: 60 tablet, Rfl: 5    rosuvastatin (CRESTOR) 5 MG tablet, TAKE 1 TABLET BY MOUTH DAILY, Disp: 90 tablet, Rfl: 0    thiamine (VITAMIN B-1) 100 MG tablet tablet, Take 1 tablet by mouth Daily. Indications: Deficiency of Vitamin B1, Disp: , Rfl:     ursodiol (ACTIGALL) 300 MG capsule, Take 1 capsule by mouth 2 (Two) Times a Day. Indications: Liver Disease (Patient taking differently: Take 1 capsule by mouth 2 (Two) Times a Day. Two capsules in the morning and one capsule at night  Indications: Liver Disease), Disp: 60 capsule, Rfl: 5    Allergies:   Allergies   Allergen Reactions    Buspirone Anxiety    Codeine Nausea Only    Mirtazapine Anxiety       I reviewed the patient's chief complaint, history of present illness, review of systems, past medical history, surgical history, family history, social history, medications and allergy list.  "    Objective    Vital Signs:   Vitals:    02/17/25 0955   BP: 110/68   Weight: 71.7 kg (158 lb)   Height: 162.6 cm (64.02\")     Body mass index is 27.11 kg/m².    Ortho Exam:  right LE Foot and Ankle Exam:    Plantigrade foot. Neurological exam of the superficial peroneal, deep peroneal, plantar, sural and saphenous nerves demonstrates intact sensation and normal motor function.   4/10 sites felt on monofilament testing of foot.  Decreased forefoot and toes.  There is good perfusion to the toes.  Palpable DP and PT pulses.  The skin is intact throughout the foot and ankle without ulceration.   Able to range ankle, subtalar joint, midfoot and toes however somewhat limited by pain.  There is tenderness to palpation most pronounced about the distal third metatarsal with adjacent swelling and ecchymosis.  Boot removed for exam.  Able to stand putting full weight on right foot in clinic.  States this is not painful.  And able to take any steps because she does not have her walker and does not feel steady on her feet.    Results Review:   Imaging Results (Last 24 Hours)       Procedure Component Value Units Date/Time    XR Foot 2 View Right [[id number]] Resulted: 02/17/25 1122     Updated: 02/17/25 1122    Narrative:      Right Foot X-Ray 02/17/25   Indication: Pain  Views: 2 weight bearing , comparison to previous  Findings: xrays reviewed by me today in the office and show   redemonstration distal third metatarsal fracture on the right, there is   concern for fracture of the proximal first metatarsal and base of the   second metatarsal concerning for Lisfranc injury, left foot weightbearing   views taken for comparison, hardware visible about the right ankle   consistent with postsurgical changes, alignment of TMT joints 3 4 and 5   appears anatomic on oblique view      XR Foot 2 View Bilateral [[id number]] Resulted: 02/17/25 1121     Updated: 02/17/25 1121    Narrative:      Bilateral Foot X-Ray 02/17/25   Indication: " Pain  Views: 2 semiweight bearing , comparison to previous  Findings: xrays reviewed by me today in the office and show   redemonstration distal third metatarsal fracture on the right, there is   concern for fracture of the proximal first metatarsal and base of the   second metatarsal concerning for Lisfranc injury, left foot weightbearing   views taken for comparison, hardware visible about the right ankle   consistent with postsurgical changes, alignment of TMT joints 3 4 and 5   appears anatomic on oblique view              Assessment / Plan    Assessment/Plan:   Diagnoses and all orders for this visit:    1. Right foot pain (Primary)  -     XR Foot 2 View Bilateral  -     XR Foot 2 View Right  -     CT foot right wo contrast; Future      Discussed acute right foot injury with known third metatarsal fracture (an injury with risk of long term functional impairment) with patient as well as treatment options at length. Decision regarding surgical intervention considered.  Patient weightbearing as tolerated in short cam walking boot.  She was switched to a tall cam walking boot in clinic today.  There is area of concern around the base of the first and second metatarsals for Lisfranc injury.  Patient's neuropathy makes her more difficult to examine.  Stat CT ordered to characterize injury to the midfoot.  Patient can continue weightbearing as tolerated in tall cam walking boot.  Will plan to see patient for further management following completion of CT.  Provided with norcol for pain.    Follow Up:   Return for F/U following completion of advanced imaging.      David Noyola MD  [facility] Orthopedic Surgeon

## 2025-02-17 NOTE — TELEPHONE ENCOUNTER
PATIENT IS TO CALL BACK AND MAKE A CT F/U WITH DR. BUSTILLO. IF SHE CALLS BACK, PLEASE TRANSFER TO THE OFFICE. SHE NEEDS TO BE SEEN THIS WEEK.

## 2025-02-17 NOTE — TELEPHONE ENCOUNTER
Patient called stating she is needing some medication for her pain and stated that the boot that was given to her was too big and heavy for her foot     Please advise, thank you

## 2025-02-18 DIAGNOSIS — M79.671 RIGHT FOOT PAIN: Primary | ICD-10-CM

## 2025-02-18 RX ORDER — HYDROCODONE BITARTRATE AND ACETAMINOPHEN 5; 325 MG/1; MG/1
1 TABLET ORAL EVERY 4 HOURS PRN
Qty: 15 TABLET | Refills: 0 | Status: SHIPPED | OUTPATIENT
Start: 2025-02-18

## 2025-02-18 NOTE — TELEPHONE ENCOUNTER
Called patient. [facility] for her to return my call to let her know that Dr Noyola sent in some pain medication and discuss boot.     Nalini

## 2025-02-18 NOTE — TELEPHONE ENCOUNTER
Patient called back. I let her know that Dr Noyola sent some Rogers to her pharmacy. She states that the boot she got yesterday is a size medium due to the small not fitting around her calf. It is big and bulky and she is afraid that she will fall. I explained that the short boot does not immobilize the fracture as well as the tall boot. She understood that. I offered for her to come in to look at getting a black boot that may accommodate her calf better and be able to get into a small instead. With the weather, she declined, but if it improves, she may swing by to check out the different boot. Otherwise she will follow up after CT scan. She understands to call with any further problems or questions.     Nalini

## 2025-02-19 ENCOUNTER — TELEPHONE (OUTPATIENT)
Dept: ORTHOPEDIC SURGERY | Facility: CLINIC | Age: 65
End: 2025-02-19
Payer: COMMERCIAL

## 2025-02-19 NOTE — TELEPHONE ENCOUNTER
Received call back from patient, confirmed appointment. Patient stated she will give us a call if unable to make it she will give our office a call. Patient states in about March, she will only be able to have two transportation rides per month. She is unsure how she will make it to appointments. Patient states she already spoke to her provider regarding this, but have not received any updates. Per patient's request, sending her the information for her insurance company and transportation, so that she is able to call them directly. This is of informative nature.

## 2025-02-19 NOTE — TELEPHONE ENCOUNTER
Called patient and left voicemail to call back. Patient does need to come in for a follow up visit for her CT. After discussion with Dr. Noyola she will most likely need sx which needs to be scheduled in person. Dr. Noyola will go over in more detail during the visit.     HUB ok to relay and schedule. If there is no apportionment this week please transfer to office for scheduling.

## 2025-02-20 ENCOUNTER — HOME CARE VISIT (OUTPATIENT)
Dept: HOME HEALTH SERVICES | Facility: HOME HEALTHCARE | Age: 65
End: 2025-02-20
Payer: COMMERCIAL

## 2025-02-20 VITALS
SYSTOLIC BLOOD PRESSURE: 122 MMHG | OXYGEN SATURATION: 93 % | RESPIRATION RATE: 16 BRPM | HEART RATE: 94 BPM | DIASTOLIC BLOOD PRESSURE: 74 MMHG | TEMPERATURE: 98.6 F

## 2025-02-20 PROCEDURE — G0151 HHCP-SERV OF PT,EA 15 MIN: HCPCS

## 2025-02-21 ENCOUNTER — OFFICE VISIT (OUTPATIENT)
Dept: ORTHOPEDIC SURGERY | Facility: CLINIC | Age: 65
End: 2025-02-21
Payer: COMMERCIAL

## 2025-02-21 ENCOUNTER — TELEPHONE (OUTPATIENT)
Dept: ORTHOPEDIC SURGERY | Facility: CLINIC | Age: 65
End: 2025-02-21

## 2025-02-21 ENCOUNTER — HOME CARE VISIT (OUTPATIENT)
Dept: HOME HEALTH SERVICES | Facility: HOME HEALTHCARE | Age: 65
End: 2025-02-21
Payer: COMMERCIAL

## 2025-02-21 VITALS
DIASTOLIC BLOOD PRESSURE: 76 MMHG | BODY MASS INDEX: 26.99 KG/M2 | HEIGHT: 64 IN | SYSTOLIC BLOOD PRESSURE: 122 MMHG | WEIGHT: 158.07 LBS

## 2025-02-21 DIAGNOSIS — G62.9 PERIPHERAL POLYNEUROPATHY: ICD-10-CM

## 2025-02-21 DIAGNOSIS — S99.921A INJURY OF RIGHT FOOT, INITIAL ENCOUNTER: Primary | ICD-10-CM

## 2025-02-21 DIAGNOSIS — I48.92 ATRIAL FLUTTER, UNSPECIFIED TYPE: Primary | ICD-10-CM

## 2025-02-21 DIAGNOSIS — R20.2 PARESTHESIA: ICD-10-CM

## 2025-02-21 PROCEDURE — G0299 HHS/HOSPICE OF RN EA 15 MIN: HCPCS

## 2025-02-21 RX ORDER — METOPROLOL SUCCINATE 25 MG/1
25 TABLET, EXTENDED RELEASE ORAL DAILY
Qty: 90 TABLET | Refills: 3 | Status: SHIPPED | OUTPATIENT
Start: 2025-02-21

## 2025-02-21 RX ORDER — GABAPENTIN 100 MG/1
200 CAPSULE ORAL EVERY 12 HOURS SCHEDULED
Qty: 120 CAPSULE | Refills: 0 | Status: SHIPPED | OUTPATIENT
Start: 2025-02-21

## 2025-02-21 RX ORDER — GABAPENTIN 100 MG/1
200 CAPSULE ORAL EVERY 12 HOURS SCHEDULED
Qty: 120 CAPSULE | Refills: 0
Start: 2025-02-21 | End: 2025-02-21

## 2025-02-21 NOTE — TELEPHONE ENCOUNTER
Called and spoke to patient to answer her questions. She stated that she wanted some clarification because she was under the impression that she was going to be NWB in a cast for 6-8 weeks and she does not want that. She is single elderly woman who lives in a split level home alone with an elderly dog that is her world and she does not drive so she requires transportation. She stated that Dr Noyola also gave her an option of going to the ER to be admitted for 3 days before going to [facility] and she had already been to the ER and they sent her home so how would it be any different? Explained to patient that Dr Noyola is able to do a direct admit to a ER provider but it would still require her to go through the ER and she stated she would need to be transported via ambulance because she doesn't drive. She is comparing being NWB in a cast for 6-8 weeks with the possibility of incorrect healing versus having surgery, being NWB for the same amount of time with being admitted to a rehab facility. She doesn't know if she would tolerate it but is asking if she can remain NWB in a boot for 6-8 weeks instead of a cast? I Advised patient that the reason we prefer a cast is because it can reassure safe and adequate healing and stability versus a boot is easy to take on/off. I would reach out and call her back on Monday to see how the weekend went and advise of Dr Noyola's opinion. She verbalized understanding and was appreciative of care.

## 2025-02-21 NOTE — TELEPHONE ENCOUNTER
Caller: Philomena Davis    Relationship: Self    Best call back number: [phone]     Requested Prescriptions:   Requested Prescriptions     Pending Prescriptions Disp Refills    metoprolol succinate XL (TOPROL-XL) 25 MG 24 hr tablet      gabapentin (NEURONTIN) 100 MG capsule       Sig: Take 2 capsules by mouth Every 12 (Twelve) Hours. Indications: Neuropathic Pain        Pharmacy where request should be sent: [facility] [id number] [place] [address] AT [facility] [address]  - [phone]  - [phone] FX     Last office visit with prescribing clinician: 10/25/2023   Last telemedicine visit with prescribing clinician: 2/5/2025   Next office visit with prescribing clinician: Visit date not found     Additional details provided by patient: PATIENT IS WANTING TO KNOW IF SHE STILL NEEDS TO BE TAKING THESE MEDICATIONS.    Does the patient have less than a 3 day supply:  [] Yes  [x] No    Would you like a call back once the refill request has been completed: [] Yes [x] No    If the office needs to give you a call back, can they leave a voicemail: [] Yes [x] No    Milton Colón Rep   02/21/25 12:45 EST

## 2025-02-21 NOTE — PROGRESS NOTES
"                          [facility] [place]     Office Follow Up Visit     Date: 02/21/2025   Patient Name: Philomena Davis  MRN: [id number]  YOB: 1960  Chief Complaint:   Chief Complaint   Patient presents with    Right Foot - Follow-up     After CT scan 02/17/2025     History of Present Illness:   Philomena Davis is a 64 y.o. female who is here today for for follow-up for right foot injury.  Patient last seen in clinic on February 17.  Here for stat CT scan follow-up.  States has been weightbearing in tall boot provided by her at last visit.  Pain and swelling are improving.  Boot is uncomfortable for her and patient is requesting no boot and/or wearing short boot again.  Patient report lives at home by herself.  Does not have anyone that can help her at home.  Reports being concerned about being able to take care of herself while recovering from this injury.    Subjective   I reviewed the patient's chief complaint, history of present illness, review of systems, past medical history, surgical history, family history, social history, medications and allergy list   Objective    Vital Signs:   Vitals:    02/21/25 0933   BP: 122/76   Weight: 71.7 kg (158 lb 1.1 oz)   Height: 162.6 cm (64.02\")     Body mass index is 27.12 kg/m².    Ortho Exam:  right LE Foot and Ankle Exam:    Plantigrade foot. Neurological exam of the superficial peroneal, deep peroneal, plantar, sural and saphenous nerves demonstrates intact sensation and normal motor function.   4/10 sites felt on monofilament testing of foot.  Decreased forefoot and toes.  There is good perfusion to the toes.  Palpable DP and PT pulses.  The skin is intact throughout the foot and ankle without ulceration.   Able to range ankle, subtalar joint, midfoot and toes however somewhat limited by pain.  There is tenderness to palpation most pronounced about the distal third metatarsal with adjacent swelling and ecchymosis.  " Boot removed for exam.  Able to stand putting full weight on right foot in clinic.  States this is not painful.  And able to take any steps because she does not have her walker and does not feel steady on her feet.    Results Review:  CT foot right wo contrast  Narrative: CT FOOT RIGHT WO CONTRAST    Date of Exam: 2/17/2025 12:39 PM EST    Indication: concern for lisfranc injury, occult fracture.    Comparison: Foot radiographs 2/17/2025    Technique: Axial CT images were obtained of the right foot without contrast administration.  Sagittal and coronal reconstructions were performed.  Automated exposure control and iterative reconstruction methods were used.    Findings:  Bones and joints: Multiple midfoot fractures including the medial base of the first metatarsal as well as the proximal second metatarsal, medial base of the third metatarsal and the proximal fourth metatarsal. There are also fractures involving the   medial and lateral cuneiform along their lateral distal aspects. Distal fibular and tibial hardware is seen. Diffuse bone demineralization. Scattered degenerative changes. This is most advanced at the first metatarsophalangeal joint.    Soft tissues: Mild soft tissue swelling of the foot. Atrophy of the intrinsic foot muscles. Visualized tendons appear grossly intact. No obvious fluid collection or soft tissue mass.  Impression: Impression:    Multiple midfoot fractures involving the first through fourth metatarsal bases and the medial and lateral cuneiform. This includes fracture at the origin and insertion of the Lisfranc ligament concerning for underlying Lisfranc injury.    Electronically Signed: Alon Beauchamp MD    2/17/2025 4:30 PM EST    Workstation ID: [id number]    02/21/25 I have personally reviewed and interpreted the images from outside facility with the documented findings, multiple fractures proximal metatarsals    Assessment / Plan    Assessment/Plan:   Diagnoses and all orders for  this visit:    1. Injury of right foot, initial encounter (Primary)  -     Walker  Crutch Substitute, Lower Leg Platform (Knee Scooter/Walker)      Discussed lisfranc injury (an injury with risk of long term functional impairment) with patient as well as treatment options at length. Decision regarding surgical intervention considered. Also reviewed external note and imaging studies from February 17.  I discussed patient's proximal metatarsal fractures.  Patient does have what seems to be a bony Lisfranc injury.  Patient with multiple medical comorbidities and very sedentary making her not an optimal surgical candidate.  She and I talked about operative versus nonoperative treatment options.  We agreed that nonoperative treatment protected in a cast nonweightbearing would be the best treatment modality for her considering her other medical risks and sedentary lifestyle.  I did  patient on the posterior long-term risk to go to pain and swelling as well as deformity with both operative and nonoperative treatment.  Patient expressed understanding.  Patient also expresses today that she cannot be placed in a cast.  Patient needs to make some arrangements and think about her life during her recovery from this injury with the plan to return to clinic next week for short leg fiberglass cast placement.  We also plan to talk further about her ability to do life at home with possibly home health versus may be needing to get admitted to a rehab facility to which I told patient she would likely need to be admitted to the hospital first.  Patient expressed understanding and agreement with plan.  Patient was provided with a new boot in clinic today that was more comfortable than her previous boot.  Will plan to see her back next week for further management.  I do plan to get additional weightbearing x-rays of her foot next week.  I did  patient that if repeat x-rays show show bony displacement I am may be  recommending surgery.  Patient expressed understanding and agreement with plan.    Follow Up:   Return in about 1 week (around 2/28/2025) for F/u Recheck with images.      David Noyola MD  [facility] [place] Orthopedic Surgeon

## 2025-02-21 NOTE — TELEPHONE ENCOUNTER
Provider: KENY    Caller: Philomena Davis    Relationship to Patient: Self    Pharmacy:     Phone Number: [phone]    Reason for Call: PT CALLED TO SPEAK TO SOMEONE CLINICAL ABOUT AFTER VISIT QUESTIONS

## 2025-02-23 VITALS
SYSTOLIC BLOOD PRESSURE: 120 MMHG | DIASTOLIC BLOOD PRESSURE: 68 MMHG | TEMPERATURE: 98.7 F | OXYGEN SATURATION: 96 % | HEART RATE: 88 BPM | RESPIRATION RATE: 18 BRPM

## 2025-02-24 ENCOUNTER — TELEPHONE (OUTPATIENT)
Dept: CARDIOLOGY | Facility: CLINIC | Age: 65
End: 2025-02-24
Payer: COMMERCIAL

## 2025-02-24 NOTE — TELEPHONE ENCOUNTER
Cosmo Hess MD Jackson, Kristina, DANDY  Holter  Infrequent pac/pvc  Longest 6 beat nonsustained atrial tachycardia    Pt called and informed of the above results, verbalized understanding.

## 2025-02-25 ENCOUNTER — HOME CARE VISIT (OUTPATIENT)
Dept: HOME HEALTH SERVICES | Facility: HOME HEALTHCARE | Age: 65
End: 2025-02-25
Payer: COMMERCIAL

## 2025-02-25 ENCOUNTER — TELEPHONE (OUTPATIENT)
Dept: FAMILY MEDICINE CLINIC | Facility: CLINIC | Age: 65
End: 2025-02-25
Payer: COMMERCIAL

## 2025-02-25 VITALS
HEART RATE: 92 BPM | TEMPERATURE: 98.6 F | OXYGEN SATURATION: 96 % | SYSTOLIC BLOOD PRESSURE: 110 MMHG | DIASTOLIC BLOOD PRESSURE: 62 MMHG | RESPIRATION RATE: 16 BRPM

## 2025-02-25 VITALS
TEMPERATURE: 98.3 F | HEART RATE: 88 BPM | DIASTOLIC BLOOD PRESSURE: 53 MMHG | OXYGEN SATURATION: 94 % | SYSTOLIC BLOOD PRESSURE: 111 MMHG

## 2025-02-25 DIAGNOSIS — Z74.8 ASSISTANCE NEEDED WITH TRANSPORTATION: ICD-10-CM

## 2025-02-25 DIAGNOSIS — I48.92 ATRIAL FLUTTER, UNSPECIFIED TYPE: ICD-10-CM

## 2025-02-25 DIAGNOSIS — Z59.41 FOOD INSECURITY: Primary | ICD-10-CM

## 2025-02-25 DIAGNOSIS — Z59.9 FINANCIAL DIFFICULTIES: ICD-10-CM

## 2025-02-25 PROCEDURE — G0151 HHCP-SERV OF PT,EA 15 MIN: HCPCS

## 2025-02-25 PROCEDURE — G0299 HHS/HOSPICE OF RN EA 15 MIN: HCPCS

## 2025-02-25 SDOH — ECONOMIC STABILITY - FOOD INSECURITY: FOOD INSECURITY: Z59.41

## 2025-02-25 SDOH — ECONOMIC STABILITY - INCOME SECURITY: PROBLEM RELATED TO HOUSING AND ECONOMIC CIRCUMSTANCES, UNSPECIFIED: Z59.9

## 2025-02-25 NOTE — HOME HEALTH
Tried to call Ms. Hernandope re: MSW visit.  She did not answer.  I left a voice mail asking her to call me.

## 2025-02-25 NOTE — TELEPHONE ENCOUNTER
Caller: Philomena Davis    Relationship: Self    Best call back number: [phone]     What is the best time to reach you: TODAY    Who are you requesting to speak with (clinical staff, provider,  specific staff member): PCP/MA    Do you know the name of the person who called: PHILOMENA    What was the call regarding: PATIENT STATED HOME HEALTH WAS THERE YESTERDAY AND WAS GOING THROUGH HER MEDICATION. THEY CALLED FOR REFILLS OF     metoprolol succinate XL (TOPROL-XL) 25 MG   24 hr tablet      AS ONE AND PATIENT WANTS TO MAKE SURE SHE IS SUPPOSED TO BE TAKING THAT. PATIENT ALSO WANTED TO ADVISE PCP THAT SHE HAD BEEN WEARING A HEART MONITOR AND SEEN CARDIOLOGIST AND ALL WAS GOOD AND DOES NOT EVEN NEED A FOLLOW UP APPT. ORTHOPEDIC SURGEON WANTS HER TO GO INTO A HARD CAST AND BE NO WEIGHT BEARING ON RIGHT FOOT.PATIENT STATED SHE GOES FRIDAY TO HOSPITAL TO ADMIT THROUGH THE ER FOR THE HARD CAST AS SHE IS BY HERSELF AND NO WEIGHT BEARING AND THEN REHAB TO LEARN SCOOTER ETC. CALL PATIENT TO ADVISE ABOUT THE MEDICATION      Is it okay if the provider responds through MyChart: CALLBACK

## 2025-02-26 RX ORDER — METOPROLOL SUCCINATE 25 MG/1
25 TABLET, EXTENDED RELEASE ORAL DAILY
Qty: 90 TABLET | Refills: 3 | Status: SHIPPED | OUTPATIENT
Start: 2025-02-26

## 2025-02-26 NOTE — TELEPHONE ENCOUNTER
Patient informed to continue medication for metoprolol and that refills were sent.     Patient stated her  had quit. She needed to be set back up with another one.

## 2025-02-27 ENCOUNTER — REFERRAL TRIAGE (OUTPATIENT)
Age: 65
End: 2025-02-27
Payer: COMMERCIAL

## 2025-02-28 ENCOUNTER — OFFICE VISIT (OUTPATIENT)
Dept: ORTHOPEDIC SURGERY | Facility: CLINIC | Age: 65
End: 2025-02-28
Payer: COMMERCIAL

## 2025-02-28 ENCOUNTER — TELEPHONE (OUTPATIENT)
Dept: ORTHOPEDIC SURGERY | Facility: CLINIC | Age: 65
End: 2025-02-28

## 2025-02-28 VITALS
HEIGHT: 64 IN | WEIGHT: 158 LBS | DIASTOLIC BLOOD PRESSURE: 60 MMHG | BODY MASS INDEX: 26.98 KG/M2 | SYSTOLIC BLOOD PRESSURE: 112 MMHG

## 2025-02-28 DIAGNOSIS — S99.921A INJURY OF RIGHT FOOT, INITIAL ENCOUNTER: Primary | ICD-10-CM

## 2025-02-28 DIAGNOSIS — M79.671 RIGHT FOOT PAIN: ICD-10-CM

## 2025-02-28 RX ORDER — HYDROCODONE BITARTRATE AND ACETAMINOPHEN 5; 325 MG/1; MG/1
1 TABLET ORAL EVERY 4 HOURS PRN
Qty: 15 TABLET | Refills: 0 | Status: SHIPPED | OUTPATIENT
Start: 2025-02-28

## 2025-02-28 NOTE — PROGRESS NOTES
"                          [facility] [place] [facility] Office Follow Up     Office Follow Up Visit     Date: 02/28/2025   Patient Name: Philomena Davis  MRN: [id number]  YOB: 1960  Chief Complaint:   Chief Complaint   Patient presents with    Follow-up     1 week follow up--Injury of right foot     History of Present Illness:   Philomena Davis is a 64 y.o. female who is here today for follow-up for right foot injury including bony Lisfranc and third metatarsal fracture.  Last seen in clinic on February 21.  Since last clinic visit patient reports she has been weightbearing short distances in tall cam walking boot with use of walker.  States continues to be unable to maintain strict nonweightbearing status.  Once again reports has a very sedentary lifestyle and does not do much more than walking short distances around the house with a walker.    Subjective   I reviewed the patient's chief complaint, history of present illness, review of systems, past medical history, surgical history, family history, social history, medications and allergy list   Objective    Vital Signs:   Vitals:    02/28/25 0950   BP: 112/60   Weight: 71.7 kg (158 lb)   Height: 162.6 cm (64.02\")     Body mass index is 27.11 kg/m².    Ortho Exam:  right LE Foot and Ankle Exam:    Boot removed for exam.  Plantigrade foot. Neurological exam of the superficial peroneal, deep peroneal, plantar, sural and saphenous nerves demonstrates intact sensation and normal motor function.   4/10 sites felt on monofilament testing of foot.  Decreased forefoot and toes.  There is good perfusion to the toes.  Palpable DP and PT pulses.  The skin is intact throughout the foot and ankle without ulceration.   Able to range ankle, subtalar joint, midfoot and toes however somewhat limited by pain.  There is tenderness to palpation most pronounced about the distal third metatarsal with minimal adjacent swelling.  Once again able to put full weight on " right foot in clinic today with minimal discomfort.      Results Review:  XR Foot 2 View Bilateral  Bilateral Foot X-Ray 02/28/25   Indication: Pain  Views: 2 weight bearing , comparison to previous  Findings: xrays reviewed by me today in the office and show   redemonstration healing distal third metatarsal fracture on the right,   redemonstration proximal metatarsal fractures consistent with bony   Lisfranc.  Alignment maintained today on weightbearing exam similar to   previous weightbearing x-rays from February 17.  Once again hardware   visible about the right ankle consistent with postsurgical changes and   alignment of TMT joints 3 4 and 5 appears anatomic on oblique view,   contralateral foot xray taken for comparison.  XR Foot 3+ View Right  Right Foot X-Ray 02/28/25   Indication: Pain  Views: 3 weight bearing , comparison to previous  Findings: xrays reviewed by me today in the office and show   redemonstration healing distal third metatarsal fracture on the right,   redemonstration proximal metatarsal fractures consistent with bony   Lisfranc.  Alignment maintained today on weightbearing exam similar to   previous weightbearing x-rays from February 17.  Once again hardware   visible about the right ankle consistent with postsurgical changes and   alignment of TMT joints 3 4 and 5 appears anatomic on oblique view       Assessment / Plan    Assessment/Plan:   Diagnoses and all orders for this visit:    1. Injury of right foot, initial encounter (Primary)  -     XR Foot 3+ View Right  -     XR Foot 2 View Bilateral  -     Ambulatory Referral to Home Health (Outpatient)      Patient returns to clinic today for follow-up for right foot injury including bony Lisfranc with nondisplaced fractures of the proximal metatarsals as well as distal third metatarsal fracture.  Spent a long time in discussion with regard to patient's treatment options.  We discussed surgical as well as nonsurgical management.  Patient has  been weightbearing short distances in tall cam walking boot.  Patient is all so now approaching 4 weeks out from her injury.  X-rays today demonstrate foot seems to be healing in a stable position.  Patient also denies pain about the midfoot with her current activity level.  As such I do not think that surgical intervention is best for her considering her sedentary lifestyle and comorbidities.  We discussed her inability to maintain strict nonweightbearing status.  Patient placed in a short leg fiberglass cast in clinic today and given a cast shoe.  I recommended patient put as little weight as possible on extremity while she continues to heal.  Discussed that I will continue to follow her closely with x-rays to make sure her foot continues to maintain stable positioning.  Provided patient with referral for home health to help with her daily activities since she is having a difficult time considering her injury.  Will plan to see patient back in 1 week for repeat x-rays and reevaluation.  Counseled patient to contact the clinic sooner should any concerns arise.    Follow Up:   Return in about 1 week (around 3/7/2025).      David Noyola MD  [facility] [place] Orthopedic Surgeon

## 2025-02-28 NOTE — TELEPHONE ENCOUNTER
Caller: Philomena Davis    Relationship: Self    Best call back number: [phone]    What is the best time to reach you: ANY     Who are you requesting to speak with (clinical staff, provider,  specific staff member): CLINICAL     Do you know the name of the person who called: YES     What was the call regarding: PATIENT STATES THAT THE HARD CAST PLACED ON LEG AT APPT IN OFFICE 02/28/2025 HAS MADE HER ALMOST FALL TWICE WITH WALKER- PATIENT WOULD LIKE TO DISCUSS OTHER OPTIONS

## 2025-02-28 NOTE — TELEPHONE ENCOUNTER
Called patient back. I talked with her at length concerning her cast. She is afraid that she is going to fall. She states that she needs refill of pain medication. She is requesting something stronger than the Hydrocodone if possible. She states that she is not able to keep cast on. She is unable to elevate so her swelling is making the cast tight and her pain is worse. I offered to bring her in on Monday to attempt to augment the cast to help alleviate the pain. She is going to try to get transport to get her here on Monday.     Will you refill pain meds?    Nalini

## 2025-03-03 ENCOUNTER — TELEPHONE (OUTPATIENT)
Dept: FAMILY MEDICINE CLINIC | Facility: CLINIC | Age: 65
End: 2025-03-03
Payer: COMMERCIAL

## 2025-03-03 ENCOUNTER — TELEPHONE (OUTPATIENT)
Dept: ORTHOPEDIC SURGERY | Facility: CLINIC | Age: 65
End: 2025-03-03
Payer: COMMERCIAL

## 2025-03-03 ENCOUNTER — PATIENT OUTREACH (OUTPATIENT)
Age: 65
End: 2025-03-03
Payer: COMMERCIAL

## 2025-03-03 NOTE — TELEPHONE ENCOUNTER
HUB OK TO RELAY     I HAVE ADDED TWO EXTRA APPTS FOR 3/21 AND 3/28 AT 10:30AM FOR MS. JIMENEZ. HUB OK TO CHANGE THOSE IF PATIENT NEEDS THEM CHANGED.     PLEASE WARM TRANSFER HER TO OFFICE WITH ANY ISSUES OR IF SHE REQUESTS TO SPEAK WITH US!    THANKS!!

## 2025-03-03 NOTE — TELEPHONE ENCOUNTER
Caller: Philomena Davis    Relationship to patient: Self    Best call back number:     Chief complaint: RIGHT FOOT INJURY    Type of visit: FOLLOW UP    Requested date: WEEKLY ASAP    Additional notes:PATIENT HAS FOLLOW UP APPOINTMENT ON 3.7.25  AND I BOOKED ANOTHER ON 3.14.25 BUT WAS INFORMED SHE WOULD NEED TO COME IN WEEKLY FOR FOLLOW UP'S TILL THE FOR SEEABLE FUTURE. PATIENT IS NEEDING TO BE CONTACTED TO SCHEDULE THE REMAINDER OF THE FOLLOW UP'S.

## 2025-03-03 NOTE — TELEPHONE ENCOUNTER
Caller: Philomena Davis    Relationship to patient: Self      Best call back number: [phone]     Provider: DR MERA    Medication PA needed: PANTOPRAZOLE    Reason for call/Prior Auth: INSURANCE

## 2025-03-03 NOTE — TELEPHONE ENCOUNTER
Key: [id number]  Pantoprazole Sodium 40MG dr tablets  status: Question Response - N/ACreated: March 3rd, 2025    Additional Information Required  Member should be able to get the drug/product without a PA at this time.

## 2025-03-03 NOTE — TELEPHONE ENCOUNTER
Called patient back. She was not able to get transportation to office today. She is still working to get a ride for Friday at this time. She will call back if she is not able to get here for appointment on 03/07/2025. She was able to  pain medication over the weekend and that has helped with the pain.     Nalini

## 2025-03-03 NOTE — OUTREACH NOTE
Social Work Assessment  Questions/Answers      Flowsheet Row Most Recent Value   Referral Source physician   Reason for Consult community resources   Preferred Language English   Advance Care Planning Reviewed no concerns identified   Employment Status disabled   Source of Income social security   Medications assistive person   Meal Preparation assistive person   Housekeeping assistive person   Laundry assistive person   Shopping assistive person   If for any reason you need help with day-to-day activities such as bathing, preparing meals, shopping, managing finances, etc., do you get the help you need? I could use a little more help        SDOH updated and reviewed with the patient during this program:  --     Disabilities: At Risk (11/11/2024)    Disabilities     Concentrating, Remembering, or Making Decisions Difficulty: no     Doing Errands Independently Difficulty: yes      Financial Resource Strain: High Risk (11/11/2024)    Overall Financial Resource Strain (CARDIA)     Difficulty of Paying Living Expenses: Very hard      --     Food Insecurity: Food Insecurity Present (11/11/2024)    Hunger Vital Sign     Worried About Running Out of Food in the Last Year: Sometimes true     Ran Out of Food in the Last Year: Sometimes true      --     Housing Stability: Not At Risk (2/10/2025)    Housing Stability     Current Living Arrangements: home     Potentially Unsafe Housing Conditions: none      --     Transportation Needs: No Transportation Needs (2/8/2025)    OASIS : Transportation     Lack of Transportation (Medical): No     Lack of Transportation (Non-Medical): No     Patient Unable or Declines to Respond: No   Recent Concern: Transportation Needs - Unmet Transportation Needs (11/11/2024)    PRAPARE - Transportation     Lack of Transportation (Medical): Yes     Lack of Transportation (Non-Medical): Yes      --     Utilities: Not At Risk (2/10/2025)    [facility] Utilities     Threatened with loss of utilities: No      Patient Outreach    SW contacted pt after receiving a referral for help with transportation. Pt uses transport through Medicaid waiver but they only cover two trips a week. SW encouraged her to contact her  to see if she qualifies for more  Pt also says her home health  left the agency and she needs a . DANNA explained this SW role. She wants someone that can do home visits and work more intensely and hopes the home health  will be replaced soon. SW encouraged her to contact this SW can assist.  Luciana CAT -   Ambulatory Case Management    3/3/2025, 15:59 EST

## 2025-03-04 ENCOUNTER — HOME CARE VISIT (OUTPATIENT)
Dept: HOME HEALTH SERVICES | Facility: HOME HEALTHCARE | Age: 65
End: 2025-03-04
Payer: COMMERCIAL

## 2025-03-04 ENCOUNTER — TELEPHONE (OUTPATIENT)
Dept: ORTHOPEDIC SURGERY | Facility: CLINIC | Age: 65
End: 2025-03-04
Payer: COMMERCIAL

## 2025-03-04 VITALS
HEART RATE: 84 BPM | DIASTOLIC BLOOD PRESSURE: 58 MMHG | RESPIRATION RATE: 16 BRPM | SYSTOLIC BLOOD PRESSURE: 94 MMHG | OXYGEN SATURATION: 93 % | TEMPERATURE: 97.6 F

## 2025-03-04 PROCEDURE — G0151 HHCP-SERV OF PT,EA 15 MIN: HCPCS

## 2025-03-04 PROCEDURE — G0299 HHS/HOSPICE OF RN EA 15 MIN: HCPCS

## 2025-03-04 NOTE — TELEPHONE ENCOUNTER
"Called patient to check on her.    Patient stated that her cast is unbearable and causing her great discomfort. It is too heavy for her and she is unable to move around her home with a walker and she is basically bed bound. She asked if she could be placed in a boot or if she got her cast adjusted if she could get the cast lowered. She stated that the height is not good and rubbing her skin causing major discomfort discomfort. Patient stated that she could not be admitted to the hospital or go to [facility] due being unable to board her dog at this point in time. She stated \"that window of time has come and gone\". Patient also stated that she is currently only able to get transportation 2 times a month with her current insurance plan and might not be able to make it to her appointment on Friday. She is currently looking at other options for transportation. She also stated that she is having a hard time finding home health that accepts her insurance.     I told the patient that I would talk with her provider and call her back tomorrow with options. Patient verbalized understanding and was thankful for the help.   "

## 2025-03-04 NOTE — TELEPHONE ENCOUNTER
In speaking with Ms. Davis today in regard to her care following her visit with Dr. Noyola on 02/28/025. I have been trying to schedule home p.t. for her but had not been successful in finding one the was in network with her insurance. In looking at her past referrals I saw that she had just finished HHPT with  and that they had worked with her on her balance, gait, and maneuvering stairs.    During our conversation on 02/28/25 she sounded very upset and was stating that she could not move around in her cast without the fear of falling and that she was home alone. I told her that I would try to find someone to do HHPT with her.  Monday 03/03/25, Ms. Davis called and stated that she had not heard anything from home health in regards to going out to her home, I explained that she was not in network with the A and that I would need to reach out to another vendor. She became very upset and said that she was going to fall and break her collar bone or her arm due to the case. I asked if she wanted to come into the office and see Dr. Noyola she expressed concern about not being heard as a patient and that she did not have transportation anyway.  Tuesday 03/04/25 I called her back and stated that I had seen in her chart that she had been shown how to maneuver stairs and that they has worked on her gait and balance while she was doing p.t. with  HHPT and that there was nothing else that I could do for her as far as p.t. was concerned. I asked that she come in to see Dr. Noyola, she again said that she did have transportation nor could she get in/out of a vehicle. I advised her that our medical staff could assist her in getting in/out of a vehicle if she came through the [facility]. Ms. Davis became even more upset and started to cry. She stated that she needed to be in the hospital or in [facility] and that she would have to fall and call an ambulance in order to be admitted. After disconnecting with the patient Lio  followed up with me on the conversation and reached out Dr. Noyola in regard to the patients situation.

## 2025-03-04 NOTE — TELEPHONE ENCOUNTER
I spoke with Ms. Davis today 03/04/25, in regard to her care following her visit with Dr. Noyola on 02/28/25. She was very insistent about being admitted into the hospital or [facility] due to not being mobile in her cast and living alone. She stated that she did not have transportation to and from doctor appointments or assistance in getting in or out of a vehicle or down her stairs to get to a vehicle. In looking at her past referrals I saw that she had just finished HHPT with  and that they had worked with her on her balance, gait, and on how to maneuver stairs    In speaking with Ms. Davis 02/28/25 she stated that she was not mobile in her cast and was very fearful of falling, that she was home alone. She sounded very upset and kept saying that she should have been admitted to the hospital or sent to [facility]. I told her that I would try to find someone to do HHPT with her and I sent her referral to [facility] for scheduling.  Monday 03/03/25, Ms. Davis called and stated that she had not heard anything from home health in regard to coming to her home. After following up with [facility], I called her back and explained that she was not in network with the [facility] and that I would need to reach out to another vendor. She became very upset and said that she was going to fall and break her collar bone or her arm due to the cast and boot. I asked if she wanted to come into the office and see Dr. Noyola, she expressed concern about not being heard as a patient and that she did not have transportation anyway. I suggested that she call an uber or a cab she stated that she could not get up/down her steps or in/out of a vehicle.   Tuesday 03/04/25 I called her back and stated that I had seen in her chart that she had been shown how to maneuver stairs and that she had worked on her gait and balance while she was doing p.t. with  HHPT and that there was nothing else that I could do for her as far as p.t. was concerned. I again  asked that she come in to see Dr. Noyola, she again said that she did not have transportation nor could she get in/out of a vehicle. I advised her that our medical staff could assist her in getting in/out of a vehicle if she came through the [facility]. Ms. Davis became even more upset and started to cry. She stated that she needed to be in the hospital or in [facility] and that she would have to fall and call an ambulance in order to be admitted. After I disconnected with the patient Lio followed up with me on the conversation and asked that I document my conversations with Ms. Davis.

## 2025-03-04 NOTE — TELEPHONE ENCOUNTER
Caller: HernandopePhilomena    Relationship: Self    Best call back number: [phone]     What orders are you requesting (i.e. lab or imaging): LETTER    Where will you receive your lab/imaging services: [facility] [facility]. TRANSPORTATION [facility]ET  [place] - (FAX: [phone])    Additional notes: PATIENT IS REQUESTING A LETTER BE FAXED TO [facility]'S TRANSPORTATION CABINET FOR HER TO BE ABLE TO RECEIVE TRANSPORTATION TO HER VISITS. THE LETTER NEEDS TO CONTAIN HER DIAGNOSIS AS WELL AS THE DOCTOR SIGNATURE EXPLAINING WHY SHE CAN'T DRIVE.

## 2025-03-05 NOTE — TELEPHONE ENCOUNTER
Called patient and lvm to call back.     I spoke with Dr. Noyola about her case. Since Ms. Davis is scheduled to come this Friday, Dr. Noyola would be more than happy to discuss a boot vs a cast. If she needs to get her cast adjusted then she can do that during her Friday appointment. Ms. Davis is scheduled for weekly appointments to check on the status of her fx to ensure it is healing properly. If the patient falls or has difficulty, patient will need to call 911 to take her to the ED if she feels she cannot wait until her scheduled appointment to be seen or feels she has injured herself. We understand that due to being unable to board her dog she will have difficulty but we are concerned with the patients recovery and these are the best courses of action for her care. We also  understand the difficulties with her Transportation and will attempt to accommodate her if she needs rescheduling an appointment based on when her transportation is available, she will just need to contact the office.

## 2025-03-05 NOTE — THERAPY TREATMENT NOTE
Ilene Costello is a 15 year old male here with father presenting with:    Reported symptoms: patient was sitting in a chair at the library and fell backward on chair. This happened about 30 minutes. Patient states his right arm hit the wall. Patient reports numbness to lower arm and pain to upper arm. Patient states he did hit his head. Patient states he may have lost consciousness for a few seconds     PPE (mask: staff/patient):  yes/no    OTC medications: none    Work/school note needed: none    Patient would like communication of their results via:      Cell Phone:   Telephone Information:   Mobile [phone]     Okay to leave a message containing results? Yes    Visit Vitals  BP 98/63   Pulse 74   Temp 98 °F (36.7 °C) (Temporal)   Resp 18   Wt 45.4 kg (100 lb)   SpO2 98%        Patient Name: Philomena Davis  : 1960    MRN: [id number]                              Today's Date: 2024       Admit Date: 11/10/2024    Visit Dx:     ICD-10-CM ICD-9-CM   1. Weakness of both lower extremities  R29.898 729.89   2. Paresthesia  R20.2 782.0   3. Weakness  R53.1 780.79   4. Leukocytosis, unspecified type  D72.829 288.60   5. Alcoholic cirrhosis of liver without ascites  K70.30 571.2     Patient Active Problem List   Diagnosis    Essential hypertension    Generalized anxiety disorder    Hyperlipidemia    Glaucoma    Alcoholic cirrhosis of liver without ascites    Severe malnutrition    GERD without esophagitis    Sleep disturbance    Annual physical exam    Idiopathic peripheral neuropathy    Severe anxiety with panic    Alcohol use disorder    Cigarette nicotine dependence in remission    Chronic obstructive pulmonary disease    Closed fracture of left hand    Moderate malnutrition    Lower extremity weakness     Past Medical History:   Diagnosis Date    Alcohol withdrawal 05/10/2019    Annual physical exam 2023    Anxiety and depression     Cirrhosis of liver     Closed displaced fracture of lateral malleolus of left fibula     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    Closed trimalleolar fracture of right ankle 2023    Digital mucous cyst of toe of left foot 2017    Ganglion cyst of left foot     Glaucoma     Hypertension     Wears glasses      Past Surgical History:   Procedure Laterality Date    ANKLE OPEN REDUCTION INTERNAL FIXATION Left 2017    Procedure: LEFT ANKLE OPEN REDUCTION INTERNAL FIXATION WITH ILIAC CREST BONE GRAFT , EXCISION CYST 4TH/5TH INNERSPACE LEFT FOOT;  Surgeon: Frank Larson MD;  Location:  [facility];  Service:     ANKLE OPEN REDUCTION INTERNAL FIXATION Right 2023    Procedure: ANKLE OPEN REDUCTION INTERNAL FIXATION;  Surgeon: Deandre Reynoso MD;  Location:  [facility];  Service:  Orthopedics;  Laterality: Right;    AUGMENTATION MAMMAPLASTY Bilateral 1999    BREAST AUGMENTATION      BREAST EXCISIONAL BIOPSY Right 2014    DILATION AND CURETTAGE, DIAGNOSTIC / THERAPEUTIC      ENDOSCOPY N/A 11/30/2022    Procedure: ESOPHAGOGASTRODUODENOSCOPY;  Surgeon: Arelis Solano MD;  Location: [facility] ENDOSCOPY;  Service: Gastroenterology;  Laterality: N/A;    ENDOSCOPY N/A 2/1/2024    Procedure: ESOPHAGOGASTRODUODENOSCOPY;  Surgeon: Brunner, Mark I, MD;  Location:  [facility];  Service: Gastroenterology;  Laterality: N/A;    CO RPR NON/MAL FEMUR DSTL H/N W/ILIAC/AUTOG BONE Left 03/24/2017    Procedure: ILIAC CREST BONE GRAFT;  Surgeon: Frank Larson MD;  Location:  [facility];  Service: Orthopedics    WRIST SURGERY        General Information       Row Name 11/13/24 1544          Physical Therapy Time and Intention    Document Type therapy note (daily note)  -     Mode of Treatment physical therapy  -       Row Name 11/13/24 1544          General Information    Patient Profile Reviewed yes  -     Existing Precautions/Restrictions fall;other (see comments);orthostatic hypotension  painful neuropathy in B feet, paresthesias present in abdominal area - prefers gait belt placement lower below breast, MONITOR BP  -     Barriers to Rehab medically complex;previous functional deficit;cognitive status  -       Row Name 11/13/24 1544          Cognition    Orientation Status (Cognition) oriented x 4  -       Row Name 11/13/24 1544          Safety Issues/Impairments Affecting Functional Mobility    Safety Issues Affecting Function (Mobility) insight into deficits/self-awareness;safety precaution awareness;safety precautions follow-through/compliance;awareness of need for assistance  -     Impairments Affecting Function (Mobility) balance;coordination;endurance/activity tolerance;strength;pain;sensation/sensory awareness  -               User Key  (r) = Recorded By, (t) = Taken By, (c) =  Cosigned By      Initials Name Provider Type    AC Latoya Judge, PT Physical Therapist                   Mobility       Row Name 11/13/24 1545          Bed Mobility    Bed Mobility supine-sit  -AC     Supine-Sit [place] (Bed Mobility) minimum assist (75% patient effort);1 person assist;verbal cues;nonverbal cues (demo/gesture)  -     Assistive Device (Bed Mobility) bed rails;repositioning sheet  -AC     Comment, (Bed Mobility) Pt required minAx1 and repositioning sheet in order to transition to sitting EOB. /71 while sitting EOB.  -       Row Name 11/13/24 1545          Bed-Chair Transfer    Bed-Chair [place] (Transfers) minimum assist (75% patient effort);2 person assist;verbal cues  -     Assistive Device (Bed-Chair Transfers) other (see comments)  BUE support  -AC     Comment, (Bed-Chair Transfer) Pt ambulated to bedside chair w/ BUE support and minAx2. pt continues to demonstrate trunk instability  -       Row Name 11/13/24 154          Sit-Stand Transfer    Sit-Stand [place] (Transfers) minimum assist (75% patient effort);2 person assist  -AC     Assistive Device (Sit-Stand Transfers) other (see comments)  BUE support  -AC     Comment, (Sit-Stand Transfer) Pt completed 2 STS w/ minAx2 requiring extended time to promote standing balance and active glutes  -       Row Name 11/13/24 1541          Gait/Stairs (Locomotion)    [place] Level (Gait) minimum assist (75% patient effort);2 person assist  -AC     Assistive Device (Gait) other (see comments)  BUE support  -AC     Distance in Feet (Gait) 5  -AC     Deviations/Abnormal Patterns (Gait) obdulio decreased;festinating/shuffling;gait speed decreased;base of support, narrow;stride length decreased  -AC     Bilateral Gait Deviations heel strike decreased  -AC     Comment, (Gait/Stairs) Pt ambulated w/ BUE support and short shuffling steps. Pt fatigued quickly beginning to demonstrate trunk instability  -AC               User Key   (r) = Recorded By, (t) = Taken By, (c) = Cosigned By      Initials Name Provider Type     Latoya Judge PT Physical Therapist                   Obj/Interventions       Row Name 11/13/24 1551          Motor Skills    Therapeutic Exercise other (see comments);hip  posterior pelvic tilt  -       Row Name 11/13/24 1551          Hip (Therapeutic Exercise)    Hip (Therapeutic Exercise) isometric exercises  -     Hip Isometrics (Therapeutic Exercise) bilateral;gluteal sets;10 repetitions  -       Row Name 11/13/24 1551          Balance    Balance Assessment sitting static balance;sitting dynamic balance;standing static balance;standing dynamic balance  -     Static Sitting Balance contact guard  -     Dynamic Sitting Balance minimal assist  -     Position, Sitting Balance unsupported;sitting edge of bed  -     Static Standing Balance minimal assist;2-person assist  -     Dynamic Standing Balance moderate assist;2-person assist  -     Position/Device Used, Standing Balance supported;other (see comments)  BUE support  -     Balance Interventions sitting;standing;sit to stand;supported;static;dynamic  -               User Key  (r) = Recorded By, (t) = Taken By, (c) = Cosigned By      Initials Name Provider Type     Latoya Judge PT Physical Therapist                   Goals/Plan    No documentation.                  Clinical Impression       Row Name 11/13/24 1553          Pain Scale: FACES Pre/Post-Treatment    Pain: FACES Scale, Pretreatment 2-->hurts little bit  -     Posttreatment Pain Rating 2-->hurts little bit  -       Row Name 11/13/24 1553          Plan of Care Review    Plan of Care Reviewed With patient  -     Progress improving  -     Outcome Evaluation Pt demonstrated increased activity tolerance this date however continues to fatigue quickly resulting in trunk/hip instability. Pt continues to demonstrate slight hypotension (94/66) in standing however BP increased to 109/61 w/  minimal activity. Pt would benefit from continued skilled therapy while hospitalized to maximize functional independence. D/c rec remains IRF for best outcome.  -AC       Row Name 11/13/24 0243          Therapy Assessment/Plan (PT)    Rehab Potential (PT) good  -AC     Criteria for Skilled Interventions Met (PT) yes  -AC     Therapy Frequency (PT) daily  -AC     Predicted Duration of Therapy Intervention (PT) 10 days  -AC       Row Name 11/13/24 9363          Vital Signs    Pre Systolic BP Rehab 105  -AC     Pre Treatment Diastolic BP 71  -AC     Intra Systolic BP Rehab 94  -AC     Intra Treatment Diastolic BP 66  -AC     Post Systolic BP Rehab 109  -AC     Post Treatment Diastolic BP 61  -AC     Pre SpO2 (%) 96  -AC     O2 Delivery Pre Treatment room air  -AC     O2 Delivery Intra Treatment room air  -AC     Post SpO2 (%) 92  -AC     O2 Delivery Post Treatment room air  -AC     Pre Patient Position Supine  -AC     Intra Patient Position Standing  -AC     Post Patient Position Supine  -AC       Row Name 11/13/24 5912          Positioning and Restraints    Pre-Treatment Position in bed  -AC     Post Treatment Position bed  -AC     In Bed notified nsg;supine;call light within reach;encouraged to call for assist;exit alarm on;side rails up x2  -AC               User Key  (r) = Recorded By, (t) = Taken By, (c) = Cosigned By      Initials Name Provider Type    Latoya Atkinson, PT Physical Therapist                   Outcome Measures       Row Name 11/13/24 0605          How much help from another person do you currently need...    Turning from your back to your side while in flat bed without using bedrails? 4  -AC     Moving from lying on back to sitting on the side of a flat bed without bedrails? 3  -AC     Moving to and from a bed to a chair (including a wheelchair)? 3  -AC     Standing up from a chair using your arms (e.g., wheelchair, bedside chair)? 3  -AC     Climbing 3-5 steps with a railing? 2  -AC     To walk in  hospital room? 2  -AC     AM-PAC 6 Clicks Score (PT) 17  -     Highest Level of Mobility Goal 5 --> Static standing  -       Row Name 11/13/24 1556          Functional Assessment    Outcome Measure Options AM-PAC 6 Clicks Basic Mobility (PT)  -               User Key  (r) = Recorded By, (t) = Taken By, (c) = Cosigned By      Initials Name Provider Type    AC Latoya Judge, PT Physical Therapist                                 Physical Therapy Education       Title: PT OT SLP Therapies (In Progress)       Topic: Physical Therapy (In Progress)       Point: Mobility training (In Progress)       Learning Progress Summary            Patient Acceptance, E, NR by  at 11/13/2024 1557    Acceptance, E, NR by  at 11/11/2024 1013                      Point: Body mechanics (In Progress)       Learning Progress Summary            Patient Acceptance, E, NR by  at 11/13/2024 1557    Acceptance, E, NR by  at 11/11/2024 1013                      Point: Precautions (In Progress)       Learning Progress Summary            Patient Acceptance, E, NR by  at 11/13/2024 1557    Acceptance, E, NR by  at 11/11/2024 1013                                      User Key       Initials Effective Dates Name Provider Type Discipline     07/11/24 -  Latoya Judge, PT Physical Therapist PT                  PT Recommendation and Plan  Planned Therapy Interventions (PT): balance training, bed mobility training, gait training, patient/family education, strengthening, transfer training  Progress: improving  Outcome Evaluation: Pt demonstrated increased activity tolerance this date however continues to fatigue quickly resulting in trunk/hip instability. Pt continues to demonstrate slight hypotension (94/66) in standing however BP increased to 109/61 w/ minimal activity. Pt would benefit from continued skilled therapy while hospitalized to maximize functional independence. D/c rec remains IRF for best outcome.     Time Calculation:   PT  Evaluation Complexity  History, PT Evaluation Complexity: 1-2 personal factors and/or comorbidities  Examination of Body Systems (PT Eval Complexity): total of 3 or more elements  Clinical Presentation (PT Evaluation Complexity): evolving  Clinical Decision Making (PT Evaluation Complexity): moderate complexity  Overall Complexity (PT Evaluation Complexity): moderate complexity     PT Charges       Row Name 11/13/24 1557             Time Calculation    Start Time 1411  -AC      PT Received On 11/13/24  -AC      PT Goal Re-Cert Due Date 11/21/24  -AC         Time Calculation- PT    Total Timed Code Minutes- PT 24 minute(s)  -AC         Timed Charges    41983 - PT Therapeutic Exercise Minutes 10  -AC      02008 - PT Therapeutic Activity Minutes 14  -AC         Total Minutes    Timed Charges Total Minutes 24  -AC       Total Minutes 24  -AC                User Key  (r) = Recorded By, (t) = Taken By, (c) = Cosigned By      Initials Name Provider Type    AC Latoya Judge, PT Physical Therapist                  Therapy Charges for Today       Code Description Service Date Service Provider Modifiers Qty    20105016804 HC PT THER PROC EA 15 MIN 11/13/2024 Latoya Judge, PT GP 1    02853011785 HC PT THERAPEUTIC ACT EA 15 MIN 11/13/2024 Latoya Judge, PT GP 1    65752707159 HC PT THER SUPP EA 15 MIN 11/13/2024 Latoya Judge, PT GP 2            PT G-Codes  Outcome Measure Options: AM-PAC 6 Clicks Basic Mobility (PT)  AM-PAC 6 Clicks Score (PT): 17  AM-PAC 6 Clicks Score (OT): 16  PT Discharge Summary  Anticipated Discharge Disposition (PT): inpatient rehabilitation facility    Latoya Judge PT  11/13/2024

## 2025-03-06 ENCOUNTER — HOME CARE VISIT (OUTPATIENT)
Dept: HOME HEALTH SERVICES | Facility: HOME HEALTHCARE | Age: 65
End: 2025-03-06
Payer: COMMERCIAL

## 2025-03-06 ENCOUNTER — TELEPHONE (OUTPATIENT)
Dept: ORTHOPEDIC SURGERY | Facility: CLINIC | Age: 65
End: 2025-03-06
Payer: COMMERCIAL

## 2025-03-06 VITALS
DIASTOLIC BLOOD PRESSURE: 68 MMHG | OXYGEN SATURATION: 93 % | RESPIRATION RATE: 16 BRPM | TEMPERATURE: 98 F | HEART RATE: 98 BPM | SYSTOLIC BLOOD PRESSURE: 118 MMHG

## 2025-03-06 DIAGNOSIS — S99.921A INJURY OF RIGHT FOOT, INITIAL ENCOUNTER: Primary | ICD-10-CM

## 2025-03-06 PROCEDURE — G0151 HHCP-SERV OF PT,EA 15 MIN: HCPCS

## 2025-03-06 RX ORDER — OXYCODONE AND ACETAMINOPHEN 5; 325 MG/1; MG/1
1 TABLET ORAL EVERY 6 HOURS PRN
Qty: 15 TABLET | Refills: 0 | Status: SHIPPED | OUTPATIENT
Start: 2025-03-06

## 2025-03-06 NOTE — TELEPHONE ENCOUNTER
Patient called back and I advised her of the previous message I left. I made sure to tell the patient if she can find a ride that she will need to call to see if we can work her in and we will do our best. Patient verbalized understanding.

## 2025-03-06 NOTE — TELEPHONE ENCOUNTER
Patient calling in to request a different medication. Patient states that she is in a lot of pain and the hydrocodone is like asprin. Patient is requesting Percocet to be called in to the [facility] on [address]. Please advise patient.

## 2025-03-07 ENCOUNTER — TELEPHONE (OUTPATIENT)
Dept: ORTHOPEDIC SURGERY | Facility: CLINIC | Age: 65
End: 2025-03-07

## 2025-03-07 ENCOUNTER — TELEPHONE (OUTPATIENT)
Dept: SLEEP MEDICINE | Age: 65
End: 2025-03-07
Payer: COMMERCIAL

## 2025-03-07 DIAGNOSIS — F51.04 PSYCHOPHYSIOLOGICAL INSOMNIA: Primary | ICD-10-CM

## 2025-03-07 RX ORDER — QUETIAPINE FUMARATE 100 MG/1
100 TABLET, FILM COATED ORAL NIGHTLY
Qty: 30 TABLET | Refills: 0 | Status: SHIPPED | OUTPATIENT
Start: 2025-03-07 | End: 2025-04-06

## 2025-03-07 NOTE — TELEPHONE ENCOUNTER
I spoke with patient. I informed her of what dr arvizu had said about she needed to come in to discuss other options. Patient states that she has fell several times due to having the cast on and wants it off. Patient also states that she is having a hard time getting transportation to come to the [facility]. She also informed that she had to call the paramedics to come get her off the floor due to falling because of the cast. Patient states she was under the impression last visit that doctor arvizu would put her in the hospital for 3 days then send her to [facility] for rehab but that didn't happen. I informed patient that she could call me Monday 3/10/25 and I could try to work her in to see dr arvizu if she could get transportation. Patient states that she would try and that she appreciated me reaching out to her and helping her.

## 2025-03-07 NOTE — TELEPHONE ENCOUNTER
She was supposed to come in today to discuss. Not sure why she didn't make it to her appointment. She will need to follow up with me in the office to discuss her options.

## 2025-03-07 NOTE — TELEPHONE ENCOUNTER
Patient called wanting a refill on her medication. She is stating that the EMS came to her house last night for a broken foot but she thinks they took her medication (Lunesta) she wants provider to call in more of her medication. I advised that he could not refill until it is time to refill last refilled on 2/24/25. She wants someone to call her reference to this issue.

## 2025-03-07 NOTE — TELEPHONE ENCOUNTER
Patient returning called, stated she would prefer to stay on hold until CORY CHARLES is able to . Currently on hold, front office management aware.

## 2025-03-07 NOTE — TELEPHONE ENCOUNTER
Patient called requesting an early refill on  Lunesta due to hers being lost. Patient understands that she can not get Lunesta filled until March 25th, but would like something she could try to get her through until she can fill it she has tried Melatonin

## 2025-03-07 NOTE — TELEPHONE ENCOUNTER
I returned the patients phone call. I informed her that I got her schedule for Monday at 10:20. Patient states that she has transportation and definitely would be here. Patient stated that she greatly appreciated me returning her call and helping her through this.

## 2025-03-07 NOTE — TELEPHONE ENCOUNTER
Caller: Philomena Davis    Relationship to patient: Self    Best call back number: [phone]       Additional notes:SPOKE WITH PT- SHE STATES THAT SHE WILL AT THE APPOINTMENT ON MONDAY- 3.10.25 TO SEE DR. BUSTILLO-  PT STATES THAT SHE DOES HAVE TRANSPORTATION AND CAN ARRIVE BETWEEN 10- 10:30    TRIED TO CONTACT JOAQUINA OLIVAS AT PRACTICE- AT [facility]-      PLEASE CONTACT PT AND CONFIRM APPOINTMENT

## 2025-03-07 NOTE — TELEPHONE ENCOUNTER
Caller: Philomena Davis    Relationship: Self    Best call back number: [phone] (home)       What is the best time to reach you: ANYTIME    Who are you requesting to speak with (clinical staff, provider,  specific staff member): CLINICAL STAFF    Do you know the name of the person who called: INCOMING PATIENT CALL     What was the call regarding: PATIENT IS STATING THE CAST ON HER RIGHT LEG IS HEAVY. PATIENT WANTS THE CAST REMOVED. SHE WANTS TO GO BACK INTO A BOOT.     PATIENT FELL AND HAD TO CALL 911 FOR ASSISTANCE BECAUSE SHE COULD NOT GET UP ON HER OWN WITH THE CASE.       Is it okay if the provider responds through MyChart: NA

## 2025-03-07 NOTE — TELEPHONE ENCOUNTER
I have tried twice now to reach her to inform her of what dr arvizu said. It goes to voicemail every time.

## 2025-03-07 NOTE — TELEPHONE ENCOUNTER
Caller: Philomena Davis    Relationship to patient: Self    Best call back number: [phone]    Patient is needing: PATIENT STATES SHE HAS APPT 03/14/2025- HAS NO TRANSPORTATION AT THE MOMENT BUT STATE IF SHE CAN COME IN EARLIER CAN SHE BE A WALK IN ?

## 2025-03-10 ENCOUNTER — HOME CARE VISIT (OUTPATIENT)
Dept: HOME HEALTH SERVICES | Facility: HOME HEALTHCARE | Age: 65
End: 2025-03-10
Payer: COMMERCIAL

## 2025-03-10 ENCOUNTER — OFFICE VISIT (OUTPATIENT)
Dept: ORTHOPEDIC SURGERY | Facility: CLINIC | Age: 65
End: 2025-03-10
Payer: COMMERCIAL

## 2025-03-10 VITALS
TEMPERATURE: 97.5 F | OXYGEN SATURATION: 93 % | SYSTOLIC BLOOD PRESSURE: 114 MMHG | DIASTOLIC BLOOD PRESSURE: 58 MMHG | RESPIRATION RATE: 16 BRPM | HEART RATE: 93 BPM

## 2025-03-10 VITALS — WEIGHT: 158 LBS | BODY MASS INDEX: 26.98 KG/M2 | HEIGHT: 64 IN

## 2025-03-10 DIAGNOSIS — S99.921A INJURY OF RIGHT FOOT, INITIAL ENCOUNTER: Primary | ICD-10-CM

## 2025-03-10 DIAGNOSIS — M79.671 RIGHT FOOT PAIN: ICD-10-CM

## 2025-03-10 PROCEDURE — G0151 HHCP-SERV OF PT,EA 15 MIN: HCPCS

## 2025-03-10 NOTE — PROGRESS NOTES
"                          [facility] Follow Up     Office Follow Up Visit     Date: 03/10/2025   Patient Name: Philomena Davis  MRN: [id number]  YOB: 1960  Chief Complaint:   Chief Complaint   Patient presents with    Follow-up     1 week follow up -- Injury of right foot     History of Present Illness:   Philomena Davis is a 64 y.o. female who is here today for follow-up for her right foot injury.  Last seen in clinic on February 28.  Since last clinic visit patient reports she fell at home and was unable to get off the floor and had to call emergency medical services who put her back in bed.  States pain about her foot is well-controlled.  Reports continuing to have difficulty taking care of herself at home in light of her injury.  Reports 1 barrier to her seeking emergency medical care has been difficulty finding somebody to take care of her dog.  Reports previous home health services referral was denied due to her insurance.    Subjective   I reviewed the patient's chief complaint, history of present illness, review of systems, past medical history, surgical history, family history, social history, medications and allergy list   Objective    Vital Signs:   Vitals:    03/10/25 1035   Weight: 71.7 kg (158 lb)   Height: 162.6 cm (64\")     Body mass index is 27.12 kg/m².    Ortho Exam:  right LE Foot and Ankle Exam:    Boot removed for exam.  Plantigrade right foot.  There is good perfusion to the toes.  Palpable DP and PT pulses.  The skin is intact throughout the foot and ankle without ulceration.   Able to range ankle, subtalar joint, midfoot and toes.  There is tenderness to palpation most pronounced about the distal third metatarsal with minimal adjacent swelling.      Results Review:  XR Foot 2 View Bilateral  Bilateral Foot X-Ray 03/10/25   Indication: Pain  Views: 2 weight bearing , comparison to previous  Findings: xrays reviewed by me today in the office and " show   redemonstration healing distal third metatarsal fracture on the right with   similar alignment to previous, also redemonstration proximal metatarsal   fractures consistent with bony Lisfranc injury on the right with alignment   maintained today on weightbearing films similar to previous weightbearing   x-rays from February 28.  Can once again see hardware resolve the right   ankle consistent with postsurgical changes.  Alignment of TMT joints 3 4   and 5 of right foot appears anatomic on oblique view. contralateral left   foot xray taken for normal anatomy comparison      XR Foot 2 View Right  Right Foot X-Ray 03/10/25   Indication: Pain  Views: 2 weight bearing , comparison to previous  Findings: xrays reviewed by me today in the office and show   redemonstration healing distal third metatarsal fracture on the right with   similar alignment to previous, also redemonstration proximal metatarsal   fractures consistent with bony Lisfranc injury on the right with alignment   maintained today on weightbearing films similar to previous weightbearing   x-rays from February 28.  Can once again see hardware resolve the right   ankle consistent with postsurgical changes.  Alignment of TMT joints 3 4   and 5 of right foot appears anatomic on oblique view. contralateral left   foot xray taken for normal anatomy comparison        Assessment / Plan    Assessment/Plan:   Diagnoses and all orders for this visit:    1. Injury of right foot, initial encounter (Primary)  -     Cancel: XR Foot 3+ View Right  -     XR Foot 2 View Bilateral  -     XR Foot 2 View Right    2. Right foot pain  -     Cancel: XR Foot 3+ View Right  -     XR Foot 2 View Bilateral  -     XR Foot 2 View Right      Patient returns today for follow-up for her right foot injury with bony Lisfranc and third metatarsal fracture.  Spent a long time discussing with patient plan for continued conservative treatment.  We also spent a long time discussing her  recent fall and the topic of her being able to safely recover from her injury with respect to her current living situation.  I discussed with patient I am very concerned about her ability to take care of herself at home and recommended that the patient present to the emergency department to be evaluated for possible admittance to the hospital and/or assisting in coordinating placement in some type of rehab facility due to her limited resources at home.   Will plan to see patient back in 1 week for reevaluation.  Patient placed in a tall cam walking boot in clinic today.  Counseled patient that she should attempt to remain completely nonweightbearing in her cam boot during her recovery with use of assistive devices.    Follow Up:   Return in about 1 week (around 3/17/2025) for Recheck.      David Noyola MD  [facility] Orthopedic Surgeon

## 2025-03-12 ENCOUNTER — DOCUMENTATION (OUTPATIENT)
Dept: HOME HEALTH SERVICES | Facility: HOME HEALTHCARE | Age: 65
End: 2025-03-12
Payer: COMMERCIAL

## 2025-03-12 ENCOUNTER — HOSPITAL ENCOUNTER (INPATIENT)
Facility: HOSPITAL | Age: 65
LOS: 7 days | Discharge: NURSING, REHAB OR LONG-TERM CARE | End: 2025-03-19
Attending: EMERGENCY MEDICINE | Admitting: INTERNAL MEDICINE
Payer: COMMERCIAL

## 2025-03-12 ENCOUNTER — APPOINTMENT (OUTPATIENT)
Dept: GENERAL RADIOLOGY | Facility: HOSPITAL | Age: 65
End: 2025-03-12
Payer: COMMERCIAL

## 2025-03-12 DIAGNOSIS — S92.331D CLOSED DISPLACED FRACTURE OF THIRD METATARSAL BONE OF RIGHT FOOT WITH ROUTINE HEALING, SUBSEQUENT ENCOUNTER: ICD-10-CM

## 2025-03-12 DIAGNOSIS — E87.1 HYPONATREMIA: ICD-10-CM

## 2025-03-12 DIAGNOSIS — D64.9 CHRONIC ANEMIA: ICD-10-CM

## 2025-03-12 DIAGNOSIS — R29.6 FREQUENT FALLS: ICD-10-CM

## 2025-03-12 DIAGNOSIS — K70.30 ALCOHOLIC CIRRHOSIS, UNSPECIFIED WHETHER ASCITES PRESENT: Primary | ICD-10-CM

## 2025-03-12 DIAGNOSIS — F41.1 GENERALIZED ANXIETY DISORDER: ICD-10-CM

## 2025-03-12 DIAGNOSIS — I10 ESSENTIAL HYPERTENSION: ICD-10-CM

## 2025-03-12 DIAGNOSIS — E87.6 HYPOKALEMIA: ICD-10-CM

## 2025-03-12 PROBLEM — R26.81 UNSTEADY GAIT: Status: ACTIVE | Noted: 2025-03-12

## 2025-03-12 LAB
ALBUMIN SERPL-MCNC: 3.2 G/DL (ref 3.5–5.2)
ALBUMIN/GLOB SERPL: 0.7 G/DL
ALP SERPL-CCNC: 233 U/L (ref 39–117)
ALT SERPL W P-5'-P-CCNC: 45 U/L (ref 1–33)
AMPHET+METHAMPHET UR QL: NEGATIVE
AMPHETAMINES UR QL: NEGATIVE
ANION GAP SERPL CALCULATED.3IONS-SCNC: 16 MMOL/L (ref 5–15)
AST SERPL-CCNC: 195 U/L (ref 1–32)
BARBITURATES UR QL SCN: NEGATIVE
BASOPHILS # BLD AUTO: 0.05 10*3/MM3 (ref 0–0.2)
BASOPHILS NFR BLD AUTO: 0.4 % (ref 0–1.5)
BENZODIAZ UR QL SCN: NEGATIVE
BILIRUB SERPL-MCNC: 10.9 MG/DL (ref 0–1.2)
BUN SERPL-MCNC: 5 MG/DL (ref 8–23)
BUN/CREAT SERPL: 6.7 (ref 7–25)
BUPRENORPHINE SERPL-MCNC: NEGATIVE NG/ML
CALCIUM SPEC-SCNC: 8.9 MG/DL (ref 8.6–10.5)
CANNABINOIDS SERPL QL: NEGATIVE
CHLORIDE SERPL-SCNC: 86 MMOL/L (ref 98–107)
CO2 SERPL-SCNC: 26 MMOL/L (ref 22–29)
COCAINE UR QL: NEGATIVE
CREAT SERPL-MCNC: 0.75 MG/DL (ref 0.57–1)
DEPRECATED RDW RBC AUTO: 78.7 FL (ref 37–54)
EGFRCR SERPLBLD CKD-EPI 2021: 90.1 ML/MIN/1.73
EOSINOPHIL # BLD AUTO: 0.03 10*3/MM3 (ref 0–0.4)
EOSINOPHIL NFR BLD AUTO: 0.3 % (ref 0.3–6.2)
ERYTHROCYTE [DISTWIDTH] IN BLOOD BY AUTOMATED COUNT: 21.4 % (ref 12.3–15.4)
ETHANOL BLD-MCNC: 49 MG/DL (ref 0–10)
FENTANYL UR-MCNC: NEGATIVE NG/ML
FOLATE SERPL-MCNC: 7.58 NG/ML (ref 4.78–24.2)
GEN 5 1HR TROPONIN T REFLEX: 16 NG/L
GLOBULIN UR ELPH-MCNC: 4.8 GM/DL
GLUCOSE SERPL-MCNC: 106 MG/DL (ref 65–99)
HCT VFR BLD AUTO: 30.9 % (ref 34–46.6)
HGB BLD-MCNC: 10.6 G/DL (ref 12–15.9)
IMM GRANULOCYTES # BLD AUTO: 0.07 10*3/MM3 (ref 0–0.05)
IMM GRANULOCYTES NFR BLD AUTO: 0.6 % (ref 0–0.5)
LIPASE SERPL-CCNC: 17 U/L (ref 13–60)
LYMPHOCYTES # BLD AUTO: 1.19 10*3/MM3 (ref 0.7–3.1)
LYMPHOCYTES NFR BLD AUTO: 10.3 % (ref 19.6–45.3)
MACROCYTES BLD QL SMEAR: NORMAL
MAGNESIUM SERPL-MCNC: 1.9 MG/DL (ref 1.6–2.4)
MCH RBC QN AUTO: 35.6 PG (ref 26.6–33)
MCHC RBC AUTO-ENTMCNC: 34.3 G/DL (ref 31.5–35.7)
MCV RBC AUTO: 103.7 FL (ref 79–97)
METHADONE UR QL SCN: NEGATIVE
MICROCYTES BLD QL: NORMAL
MONOCYTES # BLD AUTO: 1.21 10*3/MM3 (ref 0.1–0.9)
MONOCYTES NFR BLD AUTO: 10.5 % (ref 5–12)
NEUTROPHILS NFR BLD AUTO: 77.9 % (ref 42.7–76)
NEUTROPHILS NFR BLD AUTO: 8.99 10*3/MM3 (ref 1.7–7)
NRBC BLD AUTO-RTO: 0 /100 WBC (ref 0–0.2)
NT-PROBNP SERPL-MCNC: 572 PG/ML (ref 0–900)
OPIATES UR QL: NEGATIVE
OXYCODONE UR QL SCN: POSITIVE
PCP UR QL SCN: NEGATIVE
PLAT MORPH BLD: NORMAL
PLATELET # BLD AUTO: 180 10*3/MM3 (ref 140–450)
PMV BLD AUTO: 10.5 FL (ref 6–12)
POLYCHROMASIA BLD QL SMEAR: NORMAL
POTASSIUM SERPL-SCNC: 2.7 MMOL/L (ref 3.5–5.2)
PROT SERPL-MCNC: 8 G/DL (ref 6–8.5)
QT INTERVAL: 314 MS
QTC INTERVAL: 456 MS
RBC # BLD AUTO: 2.98 10*6/MM3 (ref 3.77–5.28)
SODIUM SERPL-SCNC: 128 MMOL/L (ref 136–145)
TARGETS BLD QL SMEAR: NORMAL
TRICYCLICS UR QL SCN: NEGATIVE
TROPONIN T % DELTA: 0
TROPONIN T NUMERIC DELTA: 0 NG/L
TROPONIN T SERPL HS-MCNC: 16 NG/L
TSH SERPL DL<=0.05 MIU/L-ACNC: 3.37 UIU/ML (ref 0.27–4.2)
VIT B12 BLD-MCNC: 1566 PG/ML (ref 211–946)
WBC MORPH BLD: NORMAL
WBC NRBC COR # BLD AUTO: 11.54 10*3/MM3 (ref 3.4–10.8)

## 2025-03-12 PROCEDURE — 99285 EMERGENCY DEPT VISIT HI MDM: CPT

## 2025-03-12 PROCEDURE — 71045 X-RAY EXAM CHEST 1 VIEW: CPT

## 2025-03-12 PROCEDURE — 25010000002 MIDAZOLAM PER 1 MG: Performed by: EMERGENCY MEDICINE

## 2025-03-12 PROCEDURE — 82607 VITAMIN B-12: CPT | Performed by: INTERNAL MEDICINE

## 2025-03-12 PROCEDURE — 25010000002 ONDANSETRON PER 1 MG: Performed by: INTERNAL MEDICINE

## 2025-03-12 PROCEDURE — 85007 BL SMEAR W/DIFF WBC COUNT: CPT | Performed by: EMERGENCY MEDICINE

## 2025-03-12 PROCEDURE — 82746 ASSAY OF FOLIC ACID SERUM: CPT | Performed by: INTERNAL MEDICINE

## 2025-03-12 PROCEDURE — 84484 ASSAY OF TROPONIN QUANT: CPT | Performed by: EMERGENCY MEDICINE

## 2025-03-12 PROCEDURE — 36415 COLL VENOUS BLD VENIPUNCTURE: CPT

## 2025-03-12 PROCEDURE — 93005 ELECTROCARDIOGRAM TRACING: CPT | Performed by: EMERGENCY MEDICINE

## 2025-03-12 PROCEDURE — 80307 DRUG TEST PRSMV CHEM ANLYZR: CPT | Performed by: EMERGENCY MEDICINE

## 2025-03-12 PROCEDURE — 25010000002 THIAMINE HCL 200 MG/2ML SOLUTION: Performed by: FAMILY MEDICINE

## 2025-03-12 PROCEDURE — 83690 ASSAY OF LIPASE: CPT | Performed by: EMERGENCY MEDICINE

## 2025-03-12 PROCEDURE — 99223 1ST HOSP IP/OBS HIGH 75: CPT

## 2025-03-12 PROCEDURE — 25010000002 ENOXAPARIN PER 10 MG

## 2025-03-12 PROCEDURE — 83735 ASSAY OF MAGNESIUM: CPT | Performed by: EMERGENCY MEDICINE

## 2025-03-12 PROCEDURE — 80050 GENERAL HEALTH PANEL: CPT | Performed by: EMERGENCY MEDICINE

## 2025-03-12 PROCEDURE — 82077 ASSAY SPEC XCP UR&BREATH IA: CPT | Performed by: EMERGENCY MEDICINE

## 2025-03-12 PROCEDURE — 25810000003 SODIUM CHLORIDE 0.9 % SOLUTION: Performed by: FAMILY MEDICINE

## 2025-03-12 PROCEDURE — G0378 HOSPITAL OBSERVATION PER HR: HCPCS

## 2025-03-12 PROCEDURE — 25010000002 DIAZEPAM PER 5 MG: Performed by: FAMILY MEDICINE

## 2025-03-12 PROCEDURE — 83880 ASSAY OF NATRIURETIC PEPTIDE: CPT | Performed by: EMERGENCY MEDICINE

## 2025-03-12 RX ORDER — DIAZEPAM 10 MG/2ML
10 INJECTION, SOLUTION INTRAMUSCULAR; INTRAVENOUS
Status: DISPENSED | OUTPATIENT
Start: 2025-03-12 | End: 2025-03-17

## 2025-03-12 RX ORDER — MIDODRINE HYDROCHLORIDE 10 MG/1
5 TABLET ORAL
Status: DISCONTINUED | OUTPATIENT
Start: 2025-03-12 | End: 2025-03-13

## 2025-03-12 RX ORDER — ONDANSETRON 2 MG/ML
4 INJECTION INTRAMUSCULAR; INTRAVENOUS EVERY 6 HOURS PRN
Status: DISCONTINUED | OUTPATIENT
Start: 2025-03-12 | End: 2025-03-19 | Stop reason: HOSPADM

## 2025-03-12 RX ORDER — POLYETHYLENE GLYCOL 3350 17 G/17G
17 POWDER, FOR SOLUTION ORAL DAILY PRN
Status: DISCONTINUED | OUTPATIENT
Start: 2025-03-12 | End: 2025-03-19 | Stop reason: HOSPADM

## 2025-03-12 RX ORDER — DESVENLAFAXINE 50 MG/1
50 TABLET, FILM COATED, EXTENDED RELEASE ORAL DAILY
Status: DISCONTINUED | OUTPATIENT
Start: 2025-03-12 | End: 2025-03-19 | Stop reason: HOSPADM

## 2025-03-12 RX ORDER — HYDROCODONE BITARTRATE AND ACETAMINOPHEN 5; 325 MG/1; MG/1
1 TABLET ORAL EVERY 6 HOURS PRN
Status: DISCONTINUED | OUTPATIENT
Start: 2025-03-12 | End: 2025-03-19 | Stop reason: HOSPADM

## 2025-03-12 RX ORDER — GABAPENTIN 100 MG/1
200 CAPSULE ORAL EVERY 12 HOURS SCHEDULED
Status: DISCONTINUED | OUTPATIENT
Start: 2025-03-12 | End: 2025-03-19 | Stop reason: HOSPADM

## 2025-03-12 RX ORDER — ROSUVASTATIN CALCIUM 10 MG/1
5 TABLET, COATED ORAL DAILY
Status: DISCONTINUED | OUTPATIENT
Start: 2025-03-12 | End: 2025-03-19 | Stop reason: HOSPADM

## 2025-03-12 RX ORDER — BISACODYL 10 MG
10 SUPPOSITORY, RECTAL RECTAL DAILY PRN
Status: DISCONTINUED | OUTPATIENT
Start: 2025-03-12 | End: 2025-03-19 | Stop reason: HOSPADM

## 2025-03-12 RX ORDER — METOPROLOL SUCCINATE 25 MG/1
25 TABLET, EXTENDED RELEASE ORAL DAILY
Status: DISCONTINUED | OUTPATIENT
Start: 2025-03-12 | End: 2025-03-19 | Stop reason: HOSPADM

## 2025-03-12 RX ORDER — AMOXICILLIN 250 MG
2 CAPSULE ORAL 2 TIMES DAILY PRN
Status: DISCONTINUED | OUTPATIENT
Start: 2025-03-12 | End: 2025-03-19 | Stop reason: HOSPADM

## 2025-03-12 RX ORDER — ENOXAPARIN SODIUM 100 MG/ML
40 INJECTION SUBCUTANEOUS DAILY
Status: DISCONTINUED | OUTPATIENT
Start: 2025-03-12 | End: 2025-03-19 | Stop reason: HOSPADM

## 2025-03-12 RX ORDER — LATANOPROST 50 UG/ML
1 SOLUTION/ DROPS OPHTHALMIC NIGHTLY
Status: DISCONTINUED | OUTPATIENT
Start: 2025-03-12 | End: 2025-03-19 | Stop reason: HOSPADM

## 2025-03-12 RX ORDER — FOLIC ACID 1 MG/1
1 TABLET ORAL DAILY
Status: DISCONTINUED | OUTPATIENT
Start: 2025-03-12 | End: 2025-03-19 | Stop reason: HOSPADM

## 2025-03-12 RX ORDER — SODIUM CHLORIDE 0.9 % (FLUSH) 0.9 %
10 SYRINGE (ML) INJECTION AS NEEDED
Status: DISCONTINUED | OUTPATIENT
Start: 2025-03-12 | End: 2025-03-19 | Stop reason: HOSPADM

## 2025-03-12 RX ORDER — L.ACID,PARA/B.BIFIDUM/S.THERM 8B CELL
1 CAPSULE ORAL DAILY
Status: DISCONTINUED | OUTPATIENT
Start: 2025-03-12 | End: 2025-03-19 | Stop reason: HOSPADM

## 2025-03-12 RX ORDER — DIAZEPAM 10 MG/2ML
15 INJECTION, SOLUTION INTRAMUSCULAR; INTRAVENOUS
Status: DISPENSED | OUTPATIENT
Start: 2025-03-12 | End: 2025-03-17

## 2025-03-12 RX ORDER — POTASSIUM CHLORIDE 750 MG/1
30 CAPSULE, EXTENDED RELEASE ORAL ONCE
Status: COMPLETED | OUTPATIENT
Start: 2025-03-12 | End: 2025-03-12

## 2025-03-12 RX ORDER — DIAZEPAM 10 MG/2ML
20 INJECTION, SOLUTION INTRAMUSCULAR; INTRAVENOUS
Status: DISPENSED | OUTPATIENT
Start: 2025-03-12 | End: 2025-03-17

## 2025-03-12 RX ORDER — DIAZEPAM 5 MG/1
10 TABLET ORAL
Status: DISPENSED | OUTPATIENT
Start: 2025-03-12 | End: 2025-03-17

## 2025-03-12 RX ORDER — FOLIC ACID 1 MG/1
1 TABLET ORAL DAILY
Status: DISCONTINUED | OUTPATIENT
Start: 2025-03-12 | End: 2025-03-12

## 2025-03-12 RX ORDER — PANTOPRAZOLE SODIUM 40 MG/1
40 TABLET, DELAYED RELEASE ORAL DAILY
Status: DISCONTINUED | OUTPATIENT
Start: 2025-03-12 | End: 2025-03-13

## 2025-03-12 RX ORDER — BISACODYL 5 MG/1
5 TABLET, DELAYED RELEASE ORAL DAILY PRN
Status: DISCONTINUED | OUTPATIENT
Start: 2025-03-12 | End: 2025-03-19 | Stop reason: HOSPADM

## 2025-03-12 RX ORDER — THIAMINE HYDROCHLORIDE 100 MG/ML
200 INJECTION, SOLUTION INTRAMUSCULAR; INTRAVENOUS EVERY 8 HOURS SCHEDULED
Status: DISPENSED | OUTPATIENT
Start: 2025-03-12 | End: 2025-03-17

## 2025-03-12 RX ORDER — SODIUM CHLORIDE 9 MG/ML
100 INJECTION, SOLUTION INTRAVENOUS CONTINUOUS
Status: ACTIVE | OUTPATIENT
Start: 2025-03-12 | End: 2025-03-12

## 2025-03-12 RX ORDER — QUETIAPINE FUMARATE 100 MG/1
100 TABLET, FILM COATED ORAL NIGHTLY
Status: DISCONTINUED | OUTPATIENT
Start: 2025-03-12 | End: 2025-03-15

## 2025-03-12 RX ORDER — DIAZEPAM 5 MG/1
15 TABLET ORAL
Status: DISPENSED | OUTPATIENT
Start: 2025-03-12 | End: 2025-03-17

## 2025-03-12 RX ORDER — DIAZEPAM 5 MG/1
20 TABLET ORAL
Status: DISPENSED | OUTPATIENT
Start: 2025-03-12 | End: 2025-03-17

## 2025-03-12 RX ORDER — SODIUM CHLORIDE 0.9 % (FLUSH) 0.9 %
10 SYRINGE (ML) INJECTION EVERY 12 HOURS SCHEDULED
Status: DISCONTINUED | OUTPATIENT
Start: 2025-03-12 | End: 2025-03-19 | Stop reason: HOSPADM

## 2025-03-12 RX ORDER — MIDAZOLAM HYDROCHLORIDE 1 MG/ML
2 INJECTION, SOLUTION INTRAMUSCULAR; INTRAVENOUS ONCE
Status: COMPLETED | OUTPATIENT
Start: 2025-03-12 | End: 2025-03-12

## 2025-03-12 RX ADMIN — QUETIAPINE FUMARATE 100 MG: 100 TABLET ORAL at 21:06

## 2025-03-12 RX ADMIN — METOPROLOL SUCCINATE 25 MG: 25 TABLET, EXTENDED RELEASE ORAL at 10:08

## 2025-03-12 RX ADMIN — Medication 1 CAPSULE: at 10:11

## 2025-03-12 RX ADMIN — DIAZEPAM 15 MG: 5 TABLET ORAL at 10:06

## 2025-03-12 RX ADMIN — ONDANSETRON 4 MG: 2 INJECTION INTRAMUSCULAR; INTRAVENOUS at 15:44

## 2025-03-12 RX ADMIN — LATANOPROST 1 DROP: 50 SOLUTION OPHTHALMIC at 21:05

## 2025-03-12 RX ADMIN — PANTOPRAZOLE SODIUM 40 MG: 40 TABLET, DELAYED RELEASE ORAL at 10:08

## 2025-03-12 RX ADMIN — DIAZEPAM 20 MG: 10 INJECTION, SOLUTION INTRAMUSCULAR; INTRAVENOUS at 15:45

## 2025-03-12 RX ADMIN — THIAMINE HYDROCHLORIDE 200 MG: 100 INJECTION, SOLUTION INTRAMUSCULAR; INTRAVENOUS at 21:06

## 2025-03-12 RX ADMIN — THIAMINE HYDROCHLORIDE 200 MG: 100 INJECTION, SOLUTION INTRAMUSCULAR; INTRAVENOUS at 06:19

## 2025-03-12 RX ADMIN — POTASSIUM CHLORIDE 30 MEQ: 750 CAPSULE, EXTENDED RELEASE ORAL at 06:20

## 2025-03-12 RX ADMIN — FOLIC ACID 1 MG: 1 TABLET ORAL at 10:09

## 2025-03-12 RX ADMIN — Medication 10 ML: at 21:06

## 2025-03-12 RX ADMIN — GABAPENTIN 200 MG: 100 CAPSULE ORAL at 21:05

## 2025-03-12 RX ADMIN — MIDODRINE HYDROCHLORIDE 5 MG: 10 TABLET ORAL at 18:19

## 2025-03-12 RX ADMIN — Medication 10 ML: at 09:01

## 2025-03-12 RX ADMIN — DESVENLAFAXINE 50 MG: 50 TABLET, FILM COATED, EXTENDED RELEASE ORAL at 10:09

## 2025-03-12 RX ADMIN — ROSUVASTATIN 5 MG: 10 TABLET, FILM COATED ORAL at 10:10

## 2025-03-12 RX ADMIN — MIDAZOLAM HYDROCHLORIDE 2 MG: 1 INJECTION, SOLUTION INTRAMUSCULAR; INTRAVENOUS at 06:19

## 2025-03-12 RX ADMIN — ONDANSETRON 4 MG: 2 INJECTION INTRAMUSCULAR; INTRAVENOUS at 10:12

## 2025-03-12 RX ADMIN — ENOXAPARIN SODIUM 40 MG: 100 INJECTION SUBCUTANEOUS at 10:14

## 2025-03-12 RX ADMIN — GABAPENTIN 200 MG: 100 CAPSULE ORAL at 10:07

## 2025-03-12 RX ADMIN — SODIUM CHLORIDE 100 ML/HR: 9 INJECTION, SOLUTION INTRAVENOUS at 08:53

## 2025-03-12 NOTE — Clinical Note
Level of Care: Remote Telemetry [26]   Diagnosis: Unsteady gait [162766]   Admitting Physician: TIFFANY HULL [[id number]]   Certification: I Certify That Inpatient Hospital Services Are Medically Necessary For Greater Than 2 Midnights

## 2025-03-12 NOTE — PROGRESS NOTES
[facility] Medicine Services  ADMISSION FOLLOW-UP NOTE          Patient admitted after midnight, H&P by my partner performed earlier on today's date reviewed.  Interim findings, labs, and charting also reviewed.        The [facility] Hospital Problem List has been managed and updated to include any new diagnoses:  Active Hospital Problems    Diagnosis  POA    **Unsteady gait [R26.81]  Yes    Peripheral neuropathy [G62.9]  Yes    Alcohol use disorder [F10.90]  Yes    GERD without esophagitis [K21.9]  Yes    Hyperlipidemia [E78.5]  Yes    Glaucoma [H40.9]  Yes    Essential hypertension [I10]  Yes      Resolved Hospital Problems   No resolved problems to display.         ADDITIONAL PLAN:  - detailed assessment and plan from admission reviewed    Philomena Davis is a 64 y.o. female with significant medical history for HTN, HLD, chronic pain disorder, cirrhosis of the liver, COPD, GERD, and foot fracture who presents to [facility] with complaints of frequent falls injuries.     Lisfranc and third metatarsal fracture   Unsteady gait  ETOH neuropathy  Frequent falls  -Patient reports being sent in by her PT and by orthopedic surgery for us to help her because she lives alone and feels like she is unable to manage. Somehow though she manages to continue to drink ETOH heavily.   -Was recently seen by Dr. Noyola - CARLITAB RLE  -I d/w her that she may not have any other option than going home as she has medicaid and is unlikely to have skilled benefits. She is an observation patient and likely doesn't qualify for inpt rehab. Will have CM talk with her.  -PT/OT.     Alcohol abuse  ETOH neuropathy  -CIWA protocol  -Seizure precautions  -Continue thiamine and folic acid  -Addiction team     GERD  Cirrhosis  -Consider GI consult  -Continue PPI     HTN  HLD  -Continue statin  -Continue metoprolol     Depression  -Continue Pristiq     Peripheral neuropathy  -Continue gabapentin      Glaucoma  -Continue home eyedrops      Expected Discharge   Expected Discharge Date: 3/14/2025; Expected Discharge Time: 12:00 PM     Juliane Vieira II, DO  03/12/25

## 2025-03-12 NOTE — CASE MANAGEMENT/SOCIAL WORK
Discharge Planning Assessment  [facility]     Patient Name: Philomena Davis  MRN: [id number]  Today's Date: 3/12/2025    Admit Date: 3/12/2025    Plan: IDP   Discharge Needs Assessment       Row Name 03/12/25 1105       Living Environment    People in Home alone    Current Living Arrangements apartment    Primary Care Provided by self    Provides Primary Care For no one       Transition Planning    Patient/Family Anticipated Services at Transition     Transportation Anticipated public transportation;family or friend will provide       Discharge Needs Assessment    Readmission Within the Last 30 Days no previous admission in last 30 days    Equipment Currently Used at Home walker, standard    Concerns to be Addressed discharge planning                   Discharge Plan       Row Name 03/12/25 1105       Plan    Plan IDP    Plan Comments MSW met with Pt at bedside to complete IDP. Pt lives alone in [facility]. Pt confirmed demographics and reports PCP is Dr. Brandt. Pt has [employer] [place] insurance coverage. Pt independent/ moderate with ADLs; Pt has walker, [facility]. Pt has no O2. Pt’s preferred pharmacy is [facility]. Pt does not drive and reports no one is able to drive her when medically ready. Pt denied needs or concerns. CM will continue to follow.                  Selected Continued Care - Episodes Includes continued care and service providers with selected services from the active episodes listed below          Expected Discharge Date and Time       Expected Discharge Date Expected Discharge Time    Mar 14, 2025 12:00 PM           Demographic Summary       Row Name 03/12/25 1104       General Information    Arrived From home    Referral Source admission list;emergency department    Reason for Consult discharge planning    Preferred Language English                   Functional Status       Row Name 03/12/25 1105       Functional Status    Usual Activity Tolerance good    Current Activity  Tolerance moderate       Functional Status, IADL    Medications independent    Meal Preparation independent    Housekeeping independent    Laundry independent    Shopping independent                   Psychosocial    No documentation.                         SILKE Leal

## 2025-03-12 NOTE — ED PROVIDER NOTES
Subjective   History of Present Illness  Is a 64-year-old female with a history of cirrhosis and right foot fracture presenting to the emergency department for evaluation.  The patient has been dealing with a previous Lisfranc injury on the right foot after a fall.  She has been following up with orthopedics.  She has home physical therapy evaluating her.  The patient states that she just cannot get around as well as she used to at home.  She has neuropathy and it is difficult for her to be nonweightbearing.  She feels like she just cannot take care of herself.  However, the patient states that she is still able to ambulate around with her walker.  She is in a immobilizer on the right lower leg.  She denies any  falls today.  She does not have any headache or change in vision.  No focal weakness.  No chest pain.  No abdominal pain or vomiting    History provided by:  Patient and EMS personnel   used: No        Review of Systems   Constitutional:  Negative for chills and fever.   HENT:  Negative for congestion, ear pain and sore throat.    Eyes:  Negative for visual disturbance.   Respiratory:  Negative for shortness of breath.    Cardiovascular:  Negative for chest pain.   Gastrointestinal:  Negative for abdominal pain.   Genitourinary:  Negative for difficulty urinating.   Musculoskeletal:  Negative for arthralgias.   Skin:  Negative for rash.   Neurological:  Negative for dizziness, weakness and numbness.   Psychiatric/Behavioral:  Negative for agitation.        Past Medical History:   Diagnosis Date    Alcohol withdrawal 05/10/2019    Ankle sprain 2015    Annual physical exam 09/12/2023    Anxiety and depression     Arthritis of back 2020    Arthritis of neck 2021    Asthma 2022    Cataract 20/2/1999    Chronic pain disorder 2023    Cirrhosis of liver     Closed displaced fracture of lateral malleolus of left fibula     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  03/24/2017    Closed trimalleolar fracture of right ankle 05/24/2023    Colon polyp 2018    COPD (chronic obstructive pulmonary disease) 2022    Digital mucous cyst of toe of left foot 03/24/2017    Eating disorder 2022    Fracture of ankle 2017    Fracture, clavicle 1969    Fracture, finger 1972    Fracture, foot 1/30/25    Ganglion cyst of left foot     GERD (gastroesophageal reflux disease) 10/22    Glaucoma     Hip arthrosis 2019    Hyperlipidemia 1999    Hypertension     Knee sprain 2019    Knee swelling 2019    Low back strain 1/15/25    Neck strain 1/30/25    Panic disorder 1987    Periarthritis of shoulder 2019    Peripheral neuropathy     Pulmonary arterial hypertension 2021    Urinary tract infection 02/23/23    Varicella 1968    Wears glasses        Allergies   Allergen Reactions    Buspirone Anxiety    Codeine Nausea Only    Mirtazapine Anxiety       Past Surgical History:   Procedure Laterality Date    ANKLE OPEN REDUCTION INTERNAL FIXATION Left 03/24/2017    Procedure: LEFT ANKLE OPEN REDUCTION INTERNAL FIXATION WITH ILIAC CREST BONE GRAFT , EXCISION CYST 4TH/5TH INNERSPACE LEFT FOOT;  Surgeon: Frank Larson MD;  Location:  [facility];  Service:     ANKLE OPEN REDUCTION INTERNAL FIXATION Right 05/25/2023    Procedure: ANKLE OPEN REDUCTION INTERNAL FIXATION;  Surgeon: Deandre Reynoso MD;  Location:  [facility];  Service: Orthopedics;  Laterality: Right;    AUGMENTATION MAMMAPLASTY Bilateral 1999    BREAST AUGMENTATION      BREAST EXCISIONAL BIOPSY Right 2014    COLONOSCOPY  2018    DILATATION AND CURETTAGE  1987    DILATION AND CURETTAGE, DIAGNOSTIC / THERAPEUTIC      ENDOSCOPY N/A 11/30/2022    Procedure: ESOPHAGOGASTRODUODENOSCOPY;  Surgeon: Arelis Solano MD;  Location:  [facility];  Service: Gastroenterology;  Laterality: N/A;    ENDOSCOPY N/A 02/01/2024    Procedure: ESOPHAGOGASTRODUODENOSCOPY;  Surgeon: Brunner, Mark I, MD;  Location:  [facility];  Service: Gastroenterology;   Laterality: N/A;    FOOT SURGERY  2012    FRACTURE SURGERY  5 /26/2023    OH RPR NON/MAL FEMUR DSTL H/N W/ILIAC/AUTOG BONE Left 03/24/2017    Procedure: ILIAC CREST BONE GRAFT;  Surgeon: Frank Larson MD;  Location: [facility];  Service: Orthopedics    WISDOM TOOTH EXTRACTION  1992    WRIST SURGERY         Family History   Problem Relation Age of Onset    Depression Mother     Cancer Mother         Lung cancer    Vision loss Mother     COPD Father         Copd    No Known Problems Sister     Drug abuse Brother     Bipolar disorder Brother     Alcohol abuse Brother     ADD / ADHD Brother     Mental illness Brother     Asthma Maternal Aunt     Dementia Paternal Aunt     Stroke Maternal Grandmother     Vision loss Maternal Grandmother         Stroke    Breast cancer Paternal Grandmother 68        met to brain    Asthma Paternal Grandmother         Breast cancer    Dementia Cousin     No Known Problems Daughter     No Known Problems Son     Rheum arthritis Other     Heart disease Other     Cancer Other     Stroke Other     Hypertension Other     Asthma Maternal Grandfather         Pancreas canet    Heart disease Paternal Grandfather     COPD Maternal Aunt         COPD    COPD Maternal Uncle         Non-Preble Lymphoma    Heart disease Maternal Uncle     BRCA 1/2 Neg Hx     Colon cancer Neg Hx     Endometrial cancer Neg Hx     Ovarian cancer Neg Hx        Social History     Socioeconomic History    Marital status: Single   Tobacco Use    Smoking status: Former     Current packs/day: 0.00     Average packs/day: 1.5 packs/day for 24.0 years (36.1 ttl pk-yrs)     Types: Cigarettes     Start date: 3/5/1985     Quit date: 3/22/2009     Years since quitting: 15.9     Passive exposure: Past    Smokeless tobacco: Never   Vaping Use    Vaping status: Never Used   Substance and Sexual Activity    Alcohol use: Yes     Alcohol/week: 5.0 standard drinks of alcohol     Types: 5 Glasses of wine per week    Drug use: No     Sexual activity: Not Currently     Partners: Male     Birth control/protection: Abstinence, Post-menopausal     Comment: Menopausal Post 22 Yrs           Objective   Physical Exam  Vitals and nursing note reviewed.   Constitutional:       General: She is not in acute distress.     Appearance: She is not ill-appearing or toxic-appearing.   HENT:      Mouth/Throat:      Pharynx: No posterior oropharyngeal erythema.   Eyes:      Conjunctiva/sclera: Conjunctivae normal.      Pupils: Pupils are equal, round, and reactive to light.   Cardiovascular:      Rate and Rhythm: Normal rate and regular rhythm.   Pulmonary:      Effort: Pulmonary effort is normal. No respiratory distress.   Abdominal:      General: Abdomen is flat. There is no distension.      Palpations: There is no mass.      Tenderness: There is no abdominal tenderness. There is no guarding or rebound.   Musculoskeletal:         General: No deformity. Normal range of motion.   Skin:     General: Skin is warm.      Findings: No rash.   Neurological:      General: No focal deficit present.      Mental Status: She is alert and oriented to person, place, and time.      Motor: No weakness.         ECG 12 Lead      Date/Time: 3/12/2025 4:57 AM    Performed by: Paul Bowser MD  Authorized by: Paul Bowser MD  Interpreted by ED physician  Comparison: compared with previous ECG   Similar to previous ECG  Rhythm: sinus tachycardia  Rate: tachycardic  BPM: 127  QRS axis: normal  Conduction: conduction normal  Other findings comments: Nonspecific ST changes  Clinical impression: non-specific ECG  Comments: Interpretation:  EKG was directly visualized by myself, interpretations as documented in hospital course.               ED Course  ED Course as of 03/12/25 0549   Wed Mar 12, 2025   0459 BP: 120/67 [JK]   0459 Heart Rate: 72 [JK]   0459 Resp: 18 [JK]   0459 SpO2: 100 % [JK]   0459 Device (Oxygen Therapy): room air  Interpretation:  Patient's repeat  vitals, telemetry tracing, and pulse oximetry tracing were directly viewed and interpreted by myself.   O2 sat 100% on room air, interpreted as normal.  Telemetry rhythm strip revealed a rate of 72 bpm, interpreted as normal sinus rhythm [JK]   0545 CBC & Differential(!) [JK]   0545 Ethanol(!) [JK]   0545 Lipase [JK]   0546 TSH Rfx On Abnormal To Free T4 [JK]   0546 Magnesium [JK]   0546 Fentanyl, Urine - Urine, Clean Catch [JK]   0546 Comprehensive Metabolic Panel(!) [JK]   0546 Urine Drug Screen - Urine, Clean Catch(!)  Interpretation:  Laboratory studies were reviewed and interpreted directly by myself.  CBC shows a chronic anemia with a hemoglobin of 10.6, ethanol was elevated 49, CMP showed some hyponatremia with sodium 128, hypokalemia with potassium 2.7, elevated ALT at 45 and AST at 195, bilirubin was significantly to 10.9, urine drug screen was positive for oxycodone [JK]   0546 XR Chest 1 View  Interpretation:  Imaging was directly visualized by myself, per my interpretations, chest x-ray was unremarkable. [JK]   0546 Patient does have evidence of multiple metabolic derangements, this is likely stemming from her chronic alcohol use and cirrhosis.  This is also likely contributing to her frequent falls and difficulties with ambulating.  Given her overall poor health and past medical history, the patient would benefit from mission the hospital for further evaluation and treatment. [JK]   0547 Based on the patient's presentation, history and diffuse work-up in the emergency department, the patient is deemed appropriate for admission to the hospital for further evaluation and treatment.  This was discussed with the patient at bedside.  They are in agreement with the current medical management.    Admitting physician: Dr. Larson    Discussion was had with admitting physician regarding the laboratory and imaging findings.  We did discuss current therapeutics in the emergency department and progression of the  patient.  Working diagnosis was conveyed to the admitting physician, as well as current status and prognosis for the patient.  They are in agreement with these findings and have accepted admission.    Shared decision making:   After full review of the patient's clinical presentation, review of any work-up including but not limited to laboratory studies and radiology obtained, I had a discussion with the patient.  Treatment options were discussed as well as the risks, benefits and consequences.  I discussed all findings with the patient and family members if available.  During the discussion, treatment goals were understood by all as well as any misconceptions which were addressed with the patient.  Ample time was given for any questions they may have had.  They are in agreement with the treatment plan as well as final disposition. [JK]      ED Course User Index  [JK] Paul Bowser MD                                                       Medical Decision Making  This is a 64-year-old female with a history of hypertension and alcohol abuse presenting to the emergency department for evaluation.  Patient has a right foot fracture and feels she just cannot get around as well as she used to.  Seems like the patient has been following up with outpatient orthopedics and has home physical therapy already scheduled.  Her exam is benign.  Overall, the patient is nontoxic.  Afebrile.  IV access was established in the patient.  Placed on continuous telemetry monitoring.  Given the patient's presentation, differential is broad and will require further evaluation.  Workup initiated.      Differential diagnosis: Right foot fracture, alcohol abuse, cirrhosis, transaminitis, acute kidney injury, electrolyte abnormality, anemia      Amount and/or Complexity of Data Reviewed  Independent Historian: EMS  External Data Reviewed: labs, radiology, ECG and notes.     Details: External laboratories, imaging as well as notes were  reviewed personally by myself.  All relevant studies were used to guide decision making.     Date of previous record: 11/10/2024    Source of note: Admission Record    Summary:  Patient was seen and admitted for generalized weakness.  I did review basic laboratory studies on file as well as a previous chest x-ray and EKG.  Records reviewed      Labs: ordered. Decision-making details documented in ED Course.  Radiology: ordered and independent interpretation performed. Decision-making details documented in ED Course.  ECG/medicine tests: ordered and independent interpretation performed. Decision-making details documented in ED Course.    Risk  OTC drugs.  Prescription drug management.  Decision regarding hospitalization.        Final diagnoses:   Alcoholic cirrhosis, unspecified whether ascites present   Hyponatremia   Hypokalemia   Frequent falls   Chronic anemia   Essential hypertension   Closed displaced fracture of third metatarsal bone of right foot with routine healing, subsequent encounter       ED Disposition  ED Disposition       ED Disposition   Decision to Admit    Condition   --    Comment   Level of Care: Remote Telemetry [26]   Diagnosis: Unsteady gait [716581]   Admitting Physician: TIFFANY HULL [[id number]]   Certification: I Certify That Inpatient Hospital Services Are Medically Necessary For Greater Than 2 Midnights                 No follow-up provider specified.       Medication List      No changes were made to your prescriptions during this visit.            Paul Bowser MD  03/12/25 0549

## 2025-03-12 NOTE — H&P
[facility] Medicine Services  HISTORY AND PHYSICAL    Patient Name: Philomena Davis  : 1960  MRN: [id number]  Primary Care Physician: Unique Brandt DO  Date of admission: 3/12/2025    Subjective   Subjective     Chief Complaint:  Frequent falls    HPI:  Philomena Davis is a 64 y.o. female with significant medical history for HTN, HLD, chronic pain disorder, cirrhosis of the liver, COPD, GERD, and foot fracture who presents to [facility] with complaints of frequent falls injuries.     Patient presents to [facility] after sustaining multiple falls at home.  Her last reported fall was on , but patient has become increasingly more anxious about anticipated falls, and feels as though she can no longer care for herself.  She is following with outpatient orthopedics for a right foot fracture after sustaining a fall, her last follow-up was on March 10 th, she had her hard cast removed and is now in a walking boot.  She was advised by her orthopedic provider and physical therapy to report to ED for any subsequent falls.    Patient denies headache, fever, chest pain, or shortness of breath above baseline.  She does report intermittent nausea, 1 episode of vomiting on 3/11, and 3 episodes of diarrhea on 3/11.  She has no other acute complaints.  Patient does have a significant history for cirrhosis, and reports drinking 1 glass of wine at 9 PM on 3/11.    Review of Systems   Constitutional:  Positive for activity change. Negative for chills, fatigue and fever.   HENT: Negative.     Eyes: Negative.    Respiratory:  Negative for cough, chest tightness and shortness of breath.    Cardiovascular:  Positive for leg swelling. Negative for chest pain.   Gastrointestinal:  Positive for diarrhea, nausea and vomiting. Negative for abdominal distention and abdominal pain.   Endocrine: Negative.    Genitourinary: Negative.    Musculoskeletal:  Positive  for arthralgias and gait problem.   Skin:  Positive for color change (Scattered bruising).   Allergic/Immunologic: Negative.    Neurological:  Positive for weakness. Negative for dizziness, syncope, numbness and headaches.   Psychiatric/Behavioral:  The patient is nervous/anxious.               Personal History     Past Medical History:   Diagnosis Date    Alcohol withdrawal 05/10/2019    Ankle sprain 2015    Annual physical exam 09/12/2023    Anxiety and depression     Arthritis of back 2020    Arthritis of neck 2021    Asthma 2022    Cataract 20/2/1999    Chronic pain disorder 2023    Cirrhosis of liver     Closed displaced fracture of lateral malleolus of left fibula     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 03/24/2017    Closed trimalleolar fracture of right ankle 05/24/2023    Colon polyp 2018    COPD (chronic obstructive pulmonary disease) 2022    Digital mucous cyst of toe of left foot 03/24/2017    Eating disorder 2022    Fracture of ankle 2017    Fracture, clavicle 1969    Fracture, finger 1972    Fracture, foot 1/30/25    Ganglion cyst of left foot     GERD (gastroesophageal reflux disease) 10/22    Glaucoma     Hip arthrosis 2019    Hyperlipidemia 1999    Hypertension     Knee sprain 2019    Knee swelling 2019    Low back strain 1/15/25    Neck strain 1/30/25    Panic disorder 1987    Periarthritis of shoulder 2019    Peripheral neuropathy     Pulmonary arterial hypertension 2021    Urinary tract infection 02/23/23    Varicella 1968    Wears glasses              Past Surgical History:   Procedure Laterality Date    ANKLE OPEN REDUCTION INTERNAL FIXATION Left 03/24/2017    Procedure: LEFT ANKLE OPEN REDUCTION INTERNAL FIXATION WITH ILIAC CREST BONE GRAFT , EXCISION CYST 4TH/5TH INNERSPACE LEFT FOOT;  Surgeon: Frank Larson MD;  Location: [facility];  Service:     ANKLE OPEN REDUCTION INTERNAL FIXATION Right 05/25/2023    Procedure: ANKLE OPEN REDUCTION INTERNAL FIXATION;   Surgeon: Deandre Reynoso MD;  Location:  [facility];  Service: Orthopedics;  Laterality: Right;    AUGMENTATION MAMMAPLASTY Bilateral 1999    BREAST AUGMENTATION      BREAST EXCISIONAL BIOPSY Right 2014    COLONOSCOPY  2018    DILATATION AND CURETTAGE  1987    DILATION AND CURETTAGE, DIAGNOSTIC / THERAPEUTIC      ENDOSCOPY N/A 11/30/2022    Procedure: ESOPHAGOGASTRODUODENOSCOPY;  Surgeon: Arelis Solano MD;  Location:  [facility];  Service: Gastroenterology;  Laterality: N/A;    ENDOSCOPY N/A 02/01/2024    Procedure: ESOPHAGOGASTRODUODENOSCOPY;  Surgeon: Brunner, Mark I, MD;  Location:  [facility];  Service: Gastroenterology;  Laterality: N/A;    FOOT SURGERY  2012    FRACTURE SURGERY  5 /26/2023    [place] RPR NON/MAL FEMUR DSTL H/N W/ILIAC/AUTOG BONE Left 03/24/2017    Procedure: ILIAC CREST BONE GRAFT;  Surgeon: Frank Larson MD;  Location:  [facility];  Service: Orthopedics    WISDOM TOOTH EXTRACTION  1992    WRIST SURGERY         Family History: family history includes ADD / ADHD in her brother; Alcohol abuse in her brother; Asthma in her maternal aunt, maternal grandfather, and paternal grandmother; Bipolar disorder in her brother; Breast cancer (age of onset: 68) in her paternal grandmother; COPD in her father, maternal aunt, and maternal uncle; Cancer in her mother and another family member; Dementia in her cousin and paternal aunt; Depression in her mother; Drug abuse in her brother; Heart disease in her maternal uncle, paternal grandfather, and another family member; Hypertension in an other family member; Mental illness in her brother; No Known Problems in her daughter, sister, and son; Rheum arthritis in an other family member; Stroke in her maternal grandmother and another family member; Vision loss in her maternal grandmother and mother.     Social History:  reports that she quit smoking about 15 years ago. Her smoking use included cigarettes. She started smoking about 40 years ago. She has  a 36.1 pack-year smoking history. She has been exposed to tobacco smoke. She has never used smokeless tobacco. She reports current alcohol use of about 5.0 standard drinks of alcohol per week. She reports that she does not use drugs.  Social History     Social History Narrative    Not on file       Medications:  HYDROcodone-acetaminophen, Lactobacillus, QUEtiapine, albuterol sulfate HFA, atorvastatin, desvenlafaxine, eszopiclone, folic acid, gabapentin, latanoprost, metoprolol succinate XL, midodrine, naloxone, nystatin, ondansetron ODT, oxyCODONE-acetaminophen, pantoprazole, rosuvastatin, thiamine, and ursodiol    Allergies   Allergen Reactions    Buspirone Anxiety    Codeine Nausea Only    Mirtazapine Anxiety       Objective   Objective     Vital Signs:   Heart Rate:  [72] 72  Resp:  [18] 18  BP: (120)/(67) 120/67    Physical Exam  Constitutional:       Appearance: Normal appearance.   HENT:      Head: Normocephalic and atraumatic.   Eyes:      Extraocular Movements: Extraocular movements intact.      Pupils: Pupils are equal, round, and reactive to light.   Cardiovascular:      Rate and Rhythm: Regular rhythm. Tachycardia present.      Pulses: Normal pulses.      Heart sounds: Normal heart sounds.   Pulmonary:      Effort: Pulmonary effort is normal.      Breath sounds: Normal breath sounds.   Abdominal:      General: Bowel sounds are normal. There is no distension.      Palpations: Abdomen is soft.      Tenderness: There is no abdominal tenderness.   Musculoskeletal:      Right lower leg: Edema present.   Skin:     General: Skin is warm and dry.      Coloration: Skin is jaundiced.   Neurological:      General: No focal deficit present.      Mental Status: She is alert and oriented to person, place, and time.   Psychiatric:         Mood and Affect: Mood is anxious.          Result Review:  I have personally reviewed the results from the time of this admission to 3/12/2025 06:27 EDT and agree with these  findings:  [x]  Laboratory list / accordion  []  Microbiology  [x]  Radiology  [x]  EKG/Telemetry   []  Cardiology/Vascular   []  Pathology  [x]  Old records  []  Other:      LAB RESULTS:      Lab 03/12/25 0415   WBC 11.54*   HEMOGLOBIN 10.6*   HEMATOCRIT 30.9*   PLATELETS 180   NEUTROS ABS 8.99*   IMMATURE GRANS (ABS) 0.07*   LYMPHS ABS 1.19   MONOS ABS 1.21*   EOS ABS 0.03   .7*         Lab 03/12/25  0415   SODIUM 128*   POTASSIUM 2.7*   CHLORIDE 86*   CO2 26.0   ANION GAP 16.0*   BUN 5*   CREATININE 0.75   EGFR 90.1   GLUCOSE 106*   CALCIUM 8.9   MAGNESIUM 1.9   TSH 3.370         Lab 03/12/25 0415   TOTAL PROTEIN 8.0   ALBUMIN 3.2*   GLOBULIN 4.8   ALT (SGPT) 45*   AST (SGOT) 195*   BILIRUBIN 10.9*   ALK PHOS 233*   LIPASE 17         Lab 03/12/25  0540 03/12/25 0415   PROBNP  --  572.0   HSTROP T 16* 16*                 Brief Urine Lab Results  (Last result in the past 365 days)        Color   Clarity   Blood   Leuk Est   Nitrite   Protein   CREAT   Urine HCG        12/06/24 1545 Yellow   Clear   Negative   Negative   Negative   Negative                 Microbiology Results (last 10 days)       ** No results found for the last 240 hours. **            XR Chest 1 View  Result Date: 3/12/2025  XR CHEST 1 VW Date of Exam: 3/12/2025 4:01 AM EDT Indication: cough Comparison: Chest radiograph 12/6/2024 Findings: There are no airspace consolidations. No pleural fluid. No pneumothorax. The pulmonary vasculature appears within normal limits. The cardiac and mediastinal silhouette appear unremarkable. No acute osseous abnormality identified.     Impression: Impression: No active disease. Electronically Signed: Deandre Garza MD  3/12/2025 5:00 AM EDT  Workstation ID: [id number]    XR Foot 2 View Bilateral  Result Date: 3/10/2025  Bilateral Foot X-Ray 03/10/25 Indication: Pain Views: 2 weight bearing , comparison to previous Findings: xrays reviewed by me today in the office and show redemonstration healing distal  third metatarsal fracture on the right with similar alignment to previous, also redemonstration proximal metatarsal fractures consistent with bony Lisfranc injury on the right with alignment maintained today on weightbearing films similar to previous weightbearing x-rays from February 28.  Can once again see hardware resolve the right ankle consistent with postsurgical changes.  Alignment of TMT joints 3 4 and 5 of right foot appears anatomic on oblique view. contralateral left foot xray taken for normal anatomy comparison      XR Foot 2 View Right  Result Date: 3/10/2025  Right Foot X-Ray 03/10/25 Indication: Pain Views: 2 weight bearing , comparison to previous Findings: xrays reviewed by me today in the office and show redemonstration healing distal third metatarsal fracture on the right with similar alignment to previous, also redemonstration proximal metatarsal fractures consistent with bony Lisfranc injury on the right with alignment maintained today on weightbearing films similar to previous weightbearing x-rays from February 28.  Can once again see hardware resolve the right ankle consistent with postsurgical changes.  Alignment of TMT joints 3 4 and 5 of right foot appears anatomic on oblique view. contralateral left foot xray taken for normal anatomy comparison       Results for orders placed during the hospital encounter of 11/10/24    Adult Transthoracic Echo Complete W/ Cont if Necessary Per Protocol    Interpretation Summary    Left ventricular systolic function is normal. Estimated left ventricular EF = 65%    The cardiac valves are anatomically and functionally normal.      Assessment & Plan   Assessment & Plan       Unsteady gait    Essential hypertension    Hyperlipidemia    Glaucoma    GERD without esophagitis    Alcohol use disorder    Peripheral neuropathy    Philomena Davis is a 64 y.o. female with significant medical history for HTN, HLD, chronic pain disorder, cirrhosis of the liver,  COPD, GERD, and foot fracture who presents to [facility] with complaints of frequent falls injuries.     Unsteady gait  Frequent falls  -CT of head no acute findings  -CXR negative  -PT /OT consult  -Case management consult  -CBC and CMP in a.m.    Alcohol withdrawal  -CIWA protocol  -Seizure precautions  -Continue thiamine and folic acid  -Addiction team    GERD  Cirrhosis  -Consider GI consult  -Continue PPI    HTN  HLD  -Continue statin  -Continue metoprolol    Depression  -Continue Pristiq    Peripheral neuropathy  -Continue gabapentin    Glaucoma  -Continue home eyedrops      DVT prophylaxis: Lovenox    CODE STATUS: Full code  Code Status (Patient has no pulse and is not breathing): CPR (Attempt to Resuscitate)  Medical Interventions (Patient has pulse or is breathing): Full Support  Level Of Support Discussed With: Patient      Expected Discharge  Expected Discharge Date: 3/14/2025; Expected Discharge Time: 12:00 PM      This note has been completed as part of a split-shared workflow.     Signature: Electronically signed by CANDY Jose, 03/12/25, 6:20 AM EDT

## 2025-03-12 NOTE — ED NOTES
Philomena Davis    Nursing Report ED to Floor:  Mental status: A+Ox4  Ambulatory status: 1 Assist  Oxygen Therapy:  RA  Cardiac Rhythm: NSR  Admitted from: Home  Safety Concerns:  Falls  Precautions: N/A  Social Issues: N/A  ED Room #:  05    ED Nurse Phone Extension - [phone] or may call [phone].      HPI:   Chief Complaint   Patient presents with    Weakness - Generalized       Past Medical History:  Past Medical History:   Diagnosis Date    Alcohol withdrawal 05/10/2019    Ankle sprain 2015    Annual physical exam 09/12/2023    Anxiety and depression     Arthritis of back 2020    Arthritis of neck 2021    Asthma 2022    Cataract 20/2/1999    Chronic pain disorder 2023    Cirrhosis of liver     Closed displaced fracture of lateral malleolus of left fibula     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 03/24/2017    Closed trimalleolar fracture of right ankle 05/24/2023    Colon polyp 2018    COPD (chronic obstructive pulmonary disease) 2022    Digital mucous cyst of toe of left foot 03/24/2017    Eating disorder 2022    Fracture of ankle 2017    Fracture, clavicle 1969    Fracture, finger 1972    Fracture, foot 1/30/25    Ganglion cyst of left foot     GERD (gastroesophageal reflux disease) 10/22    Glaucoma     Hip arthrosis 2019    Hyperlipidemia 1999    Hypertension     Knee sprain 2019    Knee swelling 2019    Low back strain 1/15/25    Neck strain 1/30/25    Panic disorder 1987    Periarthritis of shoulder 2019    Peripheral neuropathy     Pulmonary arterial hypertension 2021    Urinary tract infection 02/23/23    Varicella 1968    Wears glasses         Past Surgical History:  Past Surgical History:   Procedure Laterality Date    ANKLE OPEN REDUCTION INTERNAL FIXATION Left 03/24/2017    Procedure: LEFT ANKLE OPEN REDUCTION INTERNAL FIXATION WITH ILIAC CREST BONE GRAFT , EXCISION CYST 4TH/5TH INNERSPACE LEFT FOOT;  Surgeon: Frank Larson MD;  Location: [facility];  Service:      ANKLE OPEN REDUCTION INTERNAL FIXATION Right 05/25/2023    Procedure: ANKLE OPEN REDUCTION INTERNAL FIXATION;  Surgeon: Deandre Reynoso MD;  Location:  [facility];  Service: Orthopedics;  Laterality: Right;    AUGMENTATION MAMMAPLASTY Bilateral 1999    BREAST AUGMENTATION      BREAST EXCISIONAL BIOPSY Right 2014    COLONOSCOPY  2018    DILATATION AND CURETTAGE  1987    DILATION AND CURETTAGE, DIAGNOSTIC / THERAPEUTIC      ENDOSCOPY N/A 11/30/2022    Procedure: ESOPHAGOGASTRODUODENOSCOPY;  Surgeon: Arelis Solano MD;  Location:  [facility];  Service: Gastroenterology;  Laterality: N/A;    ENDOSCOPY N/A 02/01/2024    Procedure: ESOPHAGOGASTRODUODENOSCOPY;  Surgeon: Brunner, Mark I, MD;  Location:  [facility];  Service: Gastroenterology;  Laterality: N/A;    FOOT SURGERY  2012    FRACTURE SURGERY  5 /26/2023    PA RPR NON/MAL FEMUR DSTL H/N W/ILIAC/AUTOG BONE Left 03/24/2017    Procedure: ILIAC CREST BONE GRAFT;  Surgeon: Frank Larson MD;  Location:  [facility];  Service: Orthopedics    WISDOM TOOTH EXTRACTION  1992    WRIST SURGERY          Admitting Doctor:   Juliane Vieira II, DO    Consulting Provider(s):  Consults       No orders found from 2/11/2025 to 3/13/2025.             Admitting Diagnosis:   The primary encounter diagnosis was Alcoholic cirrhosis, unspecified whether ascites present. Diagnoses of Hyponatremia, Hypokalemia, Frequent falls, Chronic anemia, Essential hypertension, and Closed displaced fracture of third metatarsal bone of right foot with routine healing, subsequent encounter were also pertinent to this visit.    Most Recent Vitals:   Vitals:    03/12/25 1104 03/12/25 1130 03/12/25 1300 03/12/25 1400   BP:  99/42 93/46 99/48   Pulse: 118 90 88 94   Resp:       Temp:       TempSrc:       SpO2:  100% 98% 99%   Weight:       Height:           Active LDAs/IV Access:   Lines, Drains & Airways       Active LDAs       Name Placement date Placement time Site Days    Peripheral IV  03/12/25 0431 Anterior;Left Forearm 03/12/25 0431  Forearm  less than 1                    Labs (abnormal labs have a star):   Labs Reviewed   COMPREHENSIVE METABOLIC PANEL - Abnormal; Notable for the following components:       Result Value    Glucose 106 (*)     BUN 5 (*)     Sodium 128 (*)     Potassium 2.7 (*)     Chloride 86 (*)     Albumin 3.2 (*)     ALT (SGPT) 45 (*)     AST (SGOT) 195 (*)     Alkaline Phosphatase 233 (*)     Total Bilirubin 10.9 (*)     BUN/Creatinine Ratio 6.7 (*)     Anion Gap 16.0 (*)     All other components within normal limits    Narrative:     GFR Categories in Chronic Kidney Disease (CKD)      GFR Category          GFR (mL/min/1.73)    Interpretation  G1                     90 or greater         Normal or high (1)  G2                      60-89                Mild decrease (1)  G3a                   45-59                Mild to moderate decrease  G3b                   30-44                Moderate to severe decrease  G4                    15-29                Severe decrease  G5                    14 or less           Kidney failure          (1)In the absence of evidence of kidney disease, neither GFR category G1 or G2 fulfill the criteria for CKD.    eGFR calculation 2021 CKD-EPI creatinine equation, which does not include race as a factor   TROPONIN - Abnormal; Notable for the following components:    HS Troponin T 16 (*)     All other components within normal limits    Narrative:     High Sensitive Troponin T Reference Range:  <14.0 ng/L- Negative Female for AMI  <22.0 ng/L- Negative Male for AMI  >=14 - Abnormal Female indicating possible myocardial injury.  >=22 - Abnormal Male indicating possible myocardial injury.   Clinicians would have to utilize clinical acumen, EKG, Troponin, and serial changes to determine if it is an Acute Myocardial Infarction or myocardial injury due to an underlying chronic condition.        ETHANOL - Abnormal; Notable for the following components:     Ethanol 49 (*)     All other components within normal limits    Narrative:     Elevated lactic acid concentration and lactate dehydrogenase(LD) activity may falsely elevate enzymatically determined ethanol levels. Not for legal purposes.    URINE DRUG SCREEN - Abnormal; Notable for the following components:    Oxycodone Screen, Urine Positive (*)     All other components within normal limits    Narrative:     Cutoff For Drugs Screened:    Amphetamines               500 ng/ml  Barbiturates               200 ng/ml  Benzodiazepines            150 ng/ml  Cocaine                    150 ng/ml  Methadone                  200 ng/ml  Opiates                    100 ng/ml  Phencyclidine               25 ng/ml  THC                         50 ng/ml  Methamphetamine            500 ng/ml  Tricyclic Antidepressants  300 ng/ml  Oxycodone                  100 ng/ml  Buprenorphine               10 ng/ml    The normal value for all drugs tested is negative. This report includes unconfirmed screening results, with the cutoff values listed, to be used for medical treatment purposes only.  Unconfirmed results must not be used for non-medical purposes such as employment or legal testing.  Clinical consideration should be applied to any drug of abuse test, particularly when unconfirmed results are used.     CBC WITH AUTO DIFFERENTIAL - Abnormal; Notable for the following components:    WBC 11.54 (*)     RBC 2.98 (*)     Hemoglobin 10.6 (*)     Hematocrit 30.9 (*)     .7 (*)     MCH 35.6 (*)     RDW 21.4 (*)     RDW-SD 78.7 (*)     Neutrophil % 77.9 (*)     Lymphocyte % 10.3 (*)     Immature Grans % 0.6 (*)     Neutrophils, Absolute 8.99 (*)     Monocytes, Absolute 1.21 (*)     Immature Grans, Absolute 0.07 (*)     All other components within normal limits    Narrative:     Appended report. These results have been appended to a previously verified report.   HIGH SENSITIVITIY TROPONIN T 1HR - Abnormal; Notable for the following  components:    HS Troponin T 16 (*)     All other components within normal limits    Narrative:     High Sensitive Troponin T Reference Range:  <14.0 ng/L- Negative Female for AMI  <22.0 ng/L- Negative Male for AMI  >=14 - Abnormal Female indicating possible myocardial injury.  >=22 - Abnormal Male indicating possible myocardial injury.   Clinicians would have to utilize clinical acumen, EKG, Troponin, and serial changes to determine if it is an Acute Myocardial Infarction or myocardial injury due to an underlying chronic condition.        VITAMIN B12 - Abnormal; Notable for the following components:    Vitamin B-12 1,566 (*)     All other components within normal limits    Narrative:     Results may be falsely increased if patient taking Biotin.     LIPASE - Normal   BNP (IN-HOUSE) - Normal    Narrative:     This assay is used as an aid in the diagnosis of individuals suspected of having heart failure. It can be used as an aid in the diagnosis of acute decompensated heart failure (ADHF) in patients presenting with signs and symptoms of ADHF to the emergency department (ED). In addition, NT-proBNP of <300 pg/mL indicates ADHF is not likely.    Age Range Result Interpretation  NT-proBNP Concentration (pg/mL:      <50             Positive            >450                   Gray                 300-450                    Negative             <300    50-75           Positive            >900                  Gray                300-900                  Negative            <300      >75             Positive            >1800                  Gray                300-1800                  Negative            <300   MAGNESIUM - Normal   TSH RFX ON ABNORMAL TO FREE T4 - Normal   FENTANYL, URINE - Normal    Narrative:     Negative Threshold:      Fentanyl 5 ng/mL     The normal value for the drug tested is negative. This report includes final unconfirmed screening results to be used for medical treatment purposes only.  Unconfirmed results must not be used for non-medical purposes such as employment or legal testing. Clinical consideration should be applied to any drug of abuse test, particularly when unconfirmed results are used.          FOLATE - Normal    Narrative:     Results may be falsely increased if patient taking Biotin.     SCAN SLIDE   URINALYSIS W/ CULTURE IF INDICATED   CBC AND DIFFERENTIAL    Narrative:     The following orders were created for panel order CBC & Differential.  Procedure                               Abnormality         Status                     ---------                               -----------         ------                     CBC Auto Differential[[id number]]        Abnormal            Final result               Scan Slide[[id number]]                                       Final result                 Please view results for these tests on the individual orders.       Meds Given in ED:   Medications   sodium chloride 0.9 % flush 10 mL (has no administration in time range)   Potassium Replacement - Follow Nurse / BPA Driven Protocol (has no administration in time range)   Magnesium Standard Dose Replacement - Follow Nurse / BPA Driven Protocol (has no administration in time range)   Phosphorus Replacement - Follow Nurse / BPA Driven Protocol (has no administration in time range)   Calcium Replacement - Follow Nurse / BPA Driven Protocol (has no administration in time range)   thiamine (B-1) injection 200 mg (200 mg Intravenous Given 3/12/25 0619)     Followed by   thiamine (VITAMIN B-1) tablet 100 mg (has no administration in time range)   diazePAM (VALIUM) tablet 10 mg ( Oral Not Given:  See Alt 3/12/25 1006)     Or   diazePAM (VALIUM) injection 10 mg ( Intravenous Not Given:  See Alt 3/12/25 1006)     Or   diazePAM (VALIUM) tablet 15 mg (15 mg Oral Given 3/12/25 1006)     Or   diazePAM (VALIUM) injection 15 mg ( Intravenous Not Given:  See Alt 3/12/25 1006)     Or   diazePAM (VALIUM) tablet 20 mg  ( Oral Not Given:  See Alt 3/12/25 1006)     Or   diazePAM (VALIUM) injection 20 mg ( Intravenous Not Given:  See Alt 3/12/25 1006)   sodium chloride 0.9 % infusion (100 mL/hr Intravenous Currently Infusing 3/12/25 1430)   desvenlafaxine (PRISTIQ) 24 hr tablet 50 mg (50 mg Oral Given 3/12/25 1009)   folic acid (FOLVITE) tablet 1 mg (1 mg Oral Given 3/12/25 1009)   lactobacillus acidophilus (RISAQUAD) capsule 1 capsule (1 capsule Oral Given 3/12/25 1011)   latanoprost (XALATAN) 0.005 % ophthalmic solution 1 drop (has no administration in time range)   metoprolol succinate XL (TOPROL-XL) 24 hr tablet 25 mg (25 mg Oral Given 3/12/25 1008)   midodrine (PROAMATINE) tablet 5 mg (has no administration in time range)   pantoprazole (PROTONIX) EC tablet 40 mg (40 mg Oral Given 3/12/25 1008)   QUEtiapine (SEROquel) tablet 100 mg (has no administration in time range)   rosuvastatin (CRESTOR) tablet 5 mg (5 mg Oral Given 3/12/25 1010)   sodium chloride 0.9 % flush 10 mL (10 mL Intravenous Given 3/12/25 0901)   sodium chloride 0.9 % flush 10 mL (has no administration in time range)   sennosides-docusate (PERICOLACE) 8.6-50 MG per tablet 2 tablet (has no administration in time range)     And   polyethylene glycol (MIRALAX) packet 17 g (has no administration in time range)     And   bisacodyl (DULCOLAX) EC tablet 5 mg (has no administration in time range)     And   bisacodyl (DULCOLAX) suppository 10 mg (has no administration in time range)   enoxaparin sodium (LOVENOX) syringe 40 mg (40 mg Subcutaneous Given 3/12/25 1014)   melatonin tablet 5 mg (has no administration in time range)   HYDROcodone-acetaminophen (NORCO) 5-325 MG per tablet 1 tablet (has no administration in time range)   gabapentin (NEURONTIN) capsule 200 mg (200 mg Oral Given 3/12/25 1007)   ondansetron (ZOFRAN) injection 4 mg (4 mg Intravenous Given 3/12/25 1012)   midazolam (VERSED) injection 2 mg (2 mg Intravenous Given 3/12/25 2586)   potassium chloride  (MICRO-K/KLOR-CON) CR capsule (30 mEq Oral Given 3/12/25 0620)     sodium chloride, 100 mL/hr, Last Rate: 100 mL/hr (03/12/25 1430)         Last NIH score:                                                          Dysphagia screening results:        [place] Coma Scale:  No data recorded     CIWA:        Restraint Type:            Isolation Status:  No active isolations

## 2025-03-12 NOTE — PLAN OF CARE
Goal Outcome Evaluation:              Outcome Evaluation: Patient was admitted to the unit A&O x4 able to voice wants and needs to staff, CIWA 2, denies pain and N&V at this time. Skin noted to have several bruising all over, large bruising under the left of the left breast, under right breast noted to be red. Has alarms on and in place with call light within reach.

## 2025-03-12 NOTE — PROGRESS NOTES
She is current with [facility] for SN/PT. Please forward resumption of care orders at discharge.

## 2025-03-12 NOTE — ED NOTES
Philomena Davis    Nursing Report ED to Floor:  Mental status: AOx4  Ambulatory status: assist x1  Oxygen Therapy:  RA  Cardiac Rhythm: sinus tach  Admitted from: home  Safety Concerns:  none  Precautions: none  Social Issues: none  ED Room #:  05    ED Nurse Phone Extension - [phone] or may call [phone].      HPI:   Chief Complaint   Patient presents with    Weakness - Generalized       Past Medical History:  Past Medical History:   Diagnosis Date    Alcohol withdrawal 05/10/2019    Ankle sprain 2015    Annual physical exam 09/12/2023    Anxiety and depression     Arthritis of back 2020    Arthritis of neck 2021    Asthma 2022    Cataract 20/2/1999    Chronic pain disorder 2023    Cirrhosis of liver     Closed displaced fracture of lateral malleolus of left fibula     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 03/24/2017    Closed trimalleolar fracture of right ankle 05/24/2023    Colon polyp 2018    COPD (chronic obstructive pulmonary disease) 2022    Digital mucous cyst of toe of left foot 03/24/2017    Eating disorder 2022    Fracture of ankle 2017    Fracture, clavicle 1969    Fracture, finger 1972    Fracture, foot 1/30/25    Ganglion cyst of left foot     GERD (gastroesophageal reflux disease) 10/22    Glaucoma     Hip arthrosis 2019    Hyperlipidemia 1999    Hypertension     Knee sprain 2019    Knee swelling 2019    Low back strain 1/15/25    Neck strain 1/30/25    Panic disorder 1987    Periarthritis of shoulder 2019    Peripheral neuropathy     Pulmonary arterial hypertension 2021    Urinary tract infection 02/23/23    Varicella 1968    Wears glasses         Past Surgical History:  Past Surgical History:   Procedure Laterality Date    ANKLE OPEN REDUCTION INTERNAL FIXATION Left 03/24/2017    Procedure: LEFT ANKLE OPEN REDUCTION INTERNAL FIXATION WITH ILIAC CREST BONE GRAFT , EXCISION CYST 4TH/5TH INNERSPACE LEFT FOOT;  Surgeon: Frank Larson MD;  Location: [facility];   "Service:     ANKLE OPEN REDUCTION INTERNAL FIXATION Right 05/25/2023    Procedure: ANKLE OPEN REDUCTION INTERNAL FIXATION;  Surgeon: Deandre Reynoso MD;  Location:  [facility];  Service: Orthopedics;  Laterality: Right;    AUGMENTATION MAMMAPLASTY Bilateral 1999    BREAST AUGMENTATION      BREAST EXCISIONAL BIOPSY Right 2014    COLONOSCOPY  2018    DILATATION AND CURETTAGE  1987    DILATION AND CURETTAGE, DIAGNOSTIC / THERAPEUTIC      ENDOSCOPY N/A 11/30/2022    Procedure: ESOPHAGOGASTRODUODENOSCOPY;  Surgeon: Arelis Solano MD;  Location:  [facility];  Service: Gastroenterology;  Laterality: N/A;    ENDOSCOPY N/A 02/01/2024    Procedure: ESOPHAGOGASTRODUODENOSCOPY;  Surgeon: Brunner, Mark I, MD;  Location:  [facility];  Service: Gastroenterology;  Laterality: N/A;    FOOT SURGERY  2012    FRACTURE SURGERY  5 /26/2023    SD RPR NON/MAL FEMUR DSTL H/N W/ILIAC/AUTOG BONE Left 03/24/2017    Procedure: ILIAC CREST BONE GRAFT;  Surgeon: Frank Larson MD;  Location:  [facility];  Service: Orthopedics    WISDOM TOOTH EXTRACTION  1992    WRIST SURGERY          Admitting Doctor:   Juliane Vieira II, DO    Consulting Provider(s):  Consults       No orders found from 2/11/2025 to 3/13/2025.             Admitting Diagnosis:   The primary encounter diagnosis was Alcoholic cirrhosis, unspecified whether ascites present. Diagnoses of Hyponatremia, Hypokalemia, Frequent falls, Chronic anemia, Essential hypertension, and Closed displaced fracture of third metatarsal bone of right foot with routine healing, subsequent encounter were also pertinent to this visit.    Most Recent Vitals:   Vitals:    03/12/25 0431 03/12/25 0642 03/12/25 0830 03/12/25 0900   BP: 120/67  119/66 112/57   Pulse: 72  115 105   Resp: 18      Temp:  98.2 °F (36.8 °C)     TempSrc:  Oral     SpO2: 100%  92% 92%   Weight: 68 kg (149 lb 14.6 oz)      Height: 162 cm (63.78\")          Active LDAs/IV Access:   Lines, Drains & Airways       Active " LDAs       Name Placement date Placement time Site Days    Peripheral IV 03/12/25 0431 Anterior;Left Forearm 03/12/25 0431  Forearm  less than 1                    Labs (abnormal labs have a star):   Labs Reviewed   COMPREHENSIVE METABOLIC PANEL - Abnormal; Notable for the following components:       Result Value    Glucose 106 (*)     BUN 5 (*)     Sodium 128 (*)     Potassium 2.7 (*)     Chloride 86 (*)     Albumin 3.2 (*)     ALT (SGPT) 45 (*)     AST (SGOT) 195 (*)     Alkaline Phosphatase 233 (*)     Total Bilirubin 10.9 (*)     BUN/Creatinine Ratio 6.7 (*)     Anion Gap 16.0 (*)     All other components within normal limits    Narrative:     GFR Categories in Chronic Kidney Disease (CKD)      GFR Category          GFR (mL/min/1.73)    Interpretation  G1                     90 or greater         Normal or high (1)  G2                      60-89                Mild decrease (1)  G3a                   45-59                Mild to moderate decrease  G3b                   30-44                Moderate to severe decrease  G4                    15-29                Severe decrease  G5                    14 or less           Kidney failure          (1)In the absence of evidence of kidney disease, neither GFR category G1 or G2 fulfill the criteria for CKD.    eGFR calculation 2021 CKD-EPI creatinine equation, which does not include race as a factor   TROPONIN - Abnormal; Notable for the following components:    HS Troponin T 16 (*)     All other components within normal limits    Narrative:     High Sensitive Troponin T Reference Range:  <14.0 ng/L- Negative Female for AMI  <22.0 ng/L- Negative Male for AMI  >=14 - Abnormal Female indicating possible myocardial injury.  >=22 - Abnormal Male indicating possible myocardial injury.   Clinicians would have to utilize clinical acumen, EKG, Troponin, and serial changes to determine if it is an Acute Myocardial Infarction or myocardial injury due to an underlying chronic  condition.        ETHANOL - Abnormal; Notable for the following components:    Ethanol 49 (*)     All other components within normal limits    Narrative:     Elevated lactic acid concentration and lactate dehydrogenase(LD) activity may falsely elevate enzymatically determined ethanol levels. Not for legal purposes.    URINE DRUG SCREEN - Abnormal; Notable for the following components:    Oxycodone Screen, Urine Positive (*)     All other components within normal limits    Narrative:     Cutoff For Drugs Screened:    Amphetamines               500 ng/ml  Barbiturates               200 ng/ml  Benzodiazepines            150 ng/ml  Cocaine                    150 ng/ml  Methadone                  200 ng/ml  Opiates                    100 ng/ml  Phencyclidine               25 ng/ml  THC                         50 ng/ml  Methamphetamine            500 ng/ml  Tricyclic Antidepressants  300 ng/ml  Oxycodone                  100 ng/ml  Buprenorphine               10 ng/ml    The normal value for all drugs tested is negative. This report includes unconfirmed screening results, with the cutoff values listed, to be used for medical treatment purposes only.  Unconfirmed results must not be used for non-medical purposes such as employment or legal testing.  Clinical consideration should be applied to any drug of abuse test, particularly when unconfirmed results are used.     CBC WITH AUTO DIFFERENTIAL - Abnormal; Notable for the following components:    WBC 11.54 (*)     RBC 2.98 (*)     Hemoglobin 10.6 (*)     Hematocrit 30.9 (*)     .7 (*)     MCH 35.6 (*)     RDW 21.4 (*)     RDW-SD 78.7 (*)     Neutrophil % 77.9 (*)     Lymphocyte % 10.3 (*)     Immature Grans % 0.6 (*)     Neutrophils, Absolute 8.99 (*)     Monocytes, Absolute 1.21 (*)     Immature Grans, Absolute 0.07 (*)     All other components within normal limits    Narrative:     Appended report. These results have been appended to a previously verified report.    HIGH SENSITIVITIY TROPONIN T 1HR - Abnormal; Notable for the following components:    HS Troponin T 16 (*)     All other components within normal limits    Narrative:     High Sensitive Troponin T Reference Range:  <14.0 ng/L- Negative Female for AMI  <22.0 ng/L- Negative Male for AMI  >=14 - Abnormal Female indicating possible myocardial injury.  >=22 - Abnormal Male indicating possible myocardial injury.   Clinicians would have to utilize clinical acumen, EKG, Troponin, and serial changes to determine if it is an Acute Myocardial Infarction or myocardial injury due to an underlying chronic condition.        LIPASE - Normal   BNP (IN-HOUSE) - Normal    Narrative:     This assay is used as an aid in the diagnosis of individuals suspected of having heart failure. It can be used as an aid in the diagnosis of acute decompensated heart failure (ADHF) in patients presenting with signs and symptoms of ADHF to the emergency department (ED). In addition, NT-proBNP of <300 pg/mL indicates ADHF is not likely.    Age Range Result Interpretation  NT-proBNP Concentration (pg/mL:      <50             Positive            >450                   Gray                 300-450                    Negative             <300    50-75           Positive            >900                  Gray                300-900                  Negative            <300      >75             Positive            >1800                  Gray                300-1800                  Negative            <300   MAGNESIUM - Normal   TSH RFX ON ABNORMAL TO FREE T4 - Normal   FENTANYL, URINE - Normal    Narrative:     Negative Threshold:      Fentanyl 5 ng/mL     The normal value for the drug tested is negative. This report includes final unconfirmed screening results to be used for medical treatment purposes only. Unconfirmed results must not be used for non-medical purposes such as employment or legal testing. Clinical consideration should be applied to any drug  of abuse test, particularly when unconfirmed results are used.          SCAN SLIDE   URINALYSIS W/ CULTURE IF INDICATED   VITAMIN B12   FOLATE   CBC AND DIFFERENTIAL    Narrative:     The following orders were created for panel order CBC & Differential.  Procedure                               Abnormality         Status                     ---------                               -----------         ------                     CBC Auto Differential[[id number]]        Abnormal            Final result               Scan Slide[[id number]]                                       Final result                 Please view results for these tests on the individual orders.       Meds Given in ED:   Medications   sodium chloride 0.9 % flush 10 mL (has no administration in time range)   Potassium Replacement - Follow Nurse / BPA Driven Protocol (has no administration in time range)   Magnesium Standard Dose Replacement - Follow Nurse / BPA Driven Protocol (has no administration in time range)   Phosphorus Replacement - Follow Nurse / BPA Driven Protocol (has no administration in time range)   Calcium Replacement - Follow Nurse / BPA Driven Protocol (has no administration in time range)   thiamine (B-1) injection 200 mg (200 mg Intravenous Given 3/12/25 0619)     Followed by   thiamine (VITAMIN B-1) tablet 100 mg (has no administration in time range)   diazePAM (VALIUM) tablet 10 mg (has no administration in time range)     Or   diazePAM (VALIUM) injection 10 mg (has no administration in time range)     Or   diazePAM (VALIUM) tablet 15 mg (has no administration in time range)     Or   diazePAM (VALIUM) injection 15 mg (has no administration in time range)     Or   diazePAM (VALIUM) tablet 20 mg (has no administration in time range)     Or   diazePAM (VALIUM) injection 20 mg (has no administration in time range)   sodium chloride 0.9 % infusion (100 mL/hr Intravenous New Bag 3/12/25 0832)   desvenlafaxine (PRISTIQ) 24 hr tablet 50 mg  (has no administration in time range)   folic acid (FOLVITE) tablet 1 mg (has no administration in time range)   lactobacillus acidophilus (RISAQUAD) capsule 1 capsule (has no administration in time range)   latanoprost (XALATAN) 0.005 % ophthalmic solution 1 drop (has no administration in time range)   metoprolol succinate XL (TOPROL-XL) 24 hr tablet 25 mg (has no administration in time range)   midodrine (PROAMATINE) tablet 5 mg (has no administration in time range)   pantoprazole (PROTONIX) EC tablet 40 mg (has no administration in time range)   QUEtiapine (SEROquel) tablet 100 mg (has no administration in time range)   rosuvastatin (CRESTOR) tablet 5 mg (has no administration in time range)   sodium chloride 0.9 % flush 10 mL (10 mL Intravenous Given 3/12/25 0901)   sodium chloride 0.9 % flush 10 mL (has no administration in time range)   sennosides-docusate (PERICOLACE) 8.6-50 MG per tablet 2 tablet (has no administration in time range)     And   polyethylene glycol (MIRALAX) packet 17 g (has no administration in time range)     And   bisacodyl (DULCOLAX) EC tablet 5 mg (has no administration in time range)     And   bisacodyl (DULCOLAX) suppository 10 mg (has no administration in time range)   enoxaparin sodium (LOVENOX) syringe 40 mg (has no administration in time range)   melatonin tablet 5 mg (has no administration in time range)   HYDROcodone-acetaminophen (NORCO) 5-325 MG per tablet 1 tablet (has no administration in time range)   gabapentin (NEURONTIN) capsule 200 mg (has no administration in time range)   ondansetron (ZOFRAN) injection 4 mg (has no administration in time range)   midazolam (VERSED) injection 2 mg (2 mg Intravenous Given 3/12/25 0619)   potassium chloride (MICRO-K/KLOR-CON) CR capsule (30 mEq Oral Given 3/12/25 0620)     sodium chloride, 100 mL/hr, Last Rate: 100 mL/hr (03/12/25 0853)         Last NIH score:                                                          Dysphagia screening  results:        [place] Coma Scale:  No data recorded     CIWA:        Restraint Type:            Isolation Status:  No active isolations

## 2025-03-13 ENCOUNTER — APPOINTMENT (OUTPATIENT)
Dept: CARDIOLOGY | Facility: HOSPITAL | Age: 65
End: 2025-03-13
Payer: COMMERCIAL

## 2025-03-13 PROBLEM — I95.9 HYPOTENSION: Status: ACTIVE | Noted: 2025-03-13

## 2025-03-13 LAB
ALBUMIN SERPL-MCNC: 3 G/DL (ref 3.5–5.2)
ALBUMIN SERPL-MCNC: 3 G/DL (ref 3.5–5.2)
ALBUMIN/GLOB SERPL: 1 G/DL
ALP SERPL-CCNC: 147 U/L (ref 39–117)
ALP SERPL-CCNC: 147 U/L (ref 39–117)
ALT SERPL W P-5'-P-CCNC: 26 U/L (ref 1–33)
ALT SERPL W P-5'-P-CCNC: 26 U/L (ref 1–33)
ANION GAP SERPL CALCULATED.3IONS-SCNC: 13 MMOL/L (ref 5–15)
AST SERPL-CCNC: 105 U/L (ref 1–32)
AST SERPL-CCNC: 105 U/L (ref 1–32)
BASOPHILS # BLD AUTO: 0.03 10*3/MM3 (ref 0–0.2)
BASOPHILS NFR BLD AUTO: 0.4 % (ref 0–1.5)
BH CV VAS HEPATIC PORTAL SPLEEN LENGTH: 14.2 CM
BH CV VAS HEPATIC PORTAL SPLEEN WIDTH: 6 CM
BH CV VAS HEPATIC PORTAL VEIN DIAMETER: 1.2 CM
BH CV VAS HEPATOPORTAL AORTA AP DIAM: 1.96 CM
BH CV VAS HEPATOPORTAL HEPATIC V LT DIRECTION: NORMAL
BH CV VAS HEPATOPORTAL HEPATIC V MID DIRECTION: NORMAL
BH CV VAS HEPATOPORTAL HEPATIC V RT DIRECTION: NORMAL
BH CV VAS HEPATOPORTAL PORTAL V LT INTRA DIRECTION: NORMAL
BH CV VAS HEPATOPORTAL PORTAL V MAIN INTRA DIRECTION: NORMAL
BH CV VAS HEPATOPORTAL PORTAL V PSV: 10.5 CM/S
BH CV VAS HEPATOPORTAL PORTAL V RT INTRA DIRECTION: NORMAL
BH CV VAS HEPATOPORTAL SMV DIRECTION: NORMAL
BH CV VAS HEPATOPORTAL SMV PSV: 20.5 CM/S
BH CV VAS HEPATOPORTAL SPLENIC DIRECTION: NORMAL
BH CV VAS HEPATOPORTAL SPLENIC V PSV: 32.2 CM/S
BH CV VAS SMA AORTA EDV: 20.2 CM/S
BH CV VAS SMA AORTA PSV: 86.2 CM/S
BH CV VAS SMA HEPATIC A RI: 0.71
BH CV VAS SMA HEPATIC EDV: 55.1 CM/S
BH CV VAS SMA HEPATIC PSV: 189 CM/S
BH CV VAS SMA SPLENIC A RI: 0.74
BH CV VAS SMA SPLENIC EDV: 40.5 CM/S
BH CV VAS SMA SPLENIC PSV: 157 CM/S
BILIRUB CONJ SERPL-MCNC: 5.9 MG/DL (ref 0–0.3)
BILIRUB INDIRECT SERPL-MCNC: 3.6 MG/DL
BILIRUB SERPL-MCNC: 9.5 MG/DL (ref 0–1.2)
BILIRUB SERPL-MCNC: 9.5 MG/DL (ref 0–1.2)
BUN SERPL-MCNC: 8 MG/DL (ref 8–23)
BUN/CREAT SERPL: 9.9 (ref 7–25)
CALCIUM SPEC-SCNC: 7.7 MG/DL (ref 8.6–10.5)
CHLORIDE SERPL-SCNC: 95 MMOL/L (ref 98–107)
CO2 SERPL-SCNC: 23 MMOL/L (ref 22–29)
CORTIS SERPL-MCNC: 8.77 MCG/DL
CREAT SERPL-MCNC: 0.81 MG/DL (ref 0.57–1)
D-LACTATE SERPL-SCNC: 1.7 MMOL/L (ref 0.5–2)
DEPRECATED RDW RBC AUTO: 85.8 FL (ref 37–54)
EGFRCR SERPLBLD CKD-EPI 2021: 82.2 ML/MIN/1.73
EOSINOPHIL # BLD AUTO: 0.03 10*3/MM3 (ref 0–0.4)
EOSINOPHIL NFR BLD AUTO: 0.4 % (ref 0.3–6.2)
ERYTHROCYTE [DISTWIDTH] IN BLOOD BY AUTOMATED COUNT: 22.6 % (ref 12.3–15.4)
FERRITIN SERPL-MCNC: 671.8 NG/ML (ref 13–150)
GLOBULIN UR ELPH-MCNC: 3.1 GM/DL
GLUCOSE SERPL-MCNC: 86 MG/DL (ref 65–99)
HCT VFR BLD AUTO: 23.9 % (ref 34–46.6)
HCT VFR BLD AUTO: 24 % (ref 34–46.6)
HGB BLD-MCNC: 7.8 G/DL (ref 12–15.9)
HGB BLD-MCNC: 7.8 G/DL (ref 12–15.9)
IMM GRANULOCYTES # BLD AUTO: 0.03 10*3/MM3 (ref 0–0.05)
IMM GRANULOCYTES NFR BLD AUTO: 0.4 % (ref 0–0.5)
IRON 24H UR-MRATE: 77 MCG/DL (ref 37–145)
IRON SATN MFR SERPL: 86 % (ref 20–50)
LYMPHOCYTES # BLD AUTO: 0.97 10*3/MM3 (ref 0.7–3.1)
LYMPHOCYTES NFR BLD AUTO: 13.3 % (ref 19.6–45.3)
MCH RBC QN AUTO: 35.1 PG (ref 26.6–33)
MCHC RBC AUTO-ENTMCNC: 32.5 G/DL (ref 31.5–35.7)
MCV RBC AUTO: 108.1 FL (ref 79–97)
MONOCYTES # BLD AUTO: 0.84 10*3/MM3 (ref 0.1–0.9)
MONOCYTES NFR BLD AUTO: 11.5 % (ref 5–12)
NEUTROPHILS NFR BLD AUTO: 5.39 10*3/MM3 (ref 1.7–7)
NEUTROPHILS NFR BLD AUTO: 74 % (ref 42.7–76)
NRBC BLD AUTO-RTO: 0 /100 WBC (ref 0–0.2)
PLATELET # BLD AUTO: 118 10*3/MM3 (ref 140–450)
PMV BLD AUTO: 10.7 FL (ref 6–12)
POTASSIUM SERPL-SCNC: 3.3 MMOL/L (ref 3.5–5.2)
POTASSIUM SERPL-SCNC: 4.9 MMOL/L (ref 3.5–5.2)
PROT SERPL-MCNC: 6.1 G/DL (ref 6–8.5)
PROT SERPL-MCNC: 6.1 G/DL (ref 6–8.5)
RBC # BLD AUTO: 2.22 10*6/MM3 (ref 3.77–5.28)
RETICS # AUTO: 0.06 10*6/MM3 (ref 0.02–0.13)
RETICS/RBC NFR AUTO: 2.61 % (ref 0.7–1.9)
SODIUM SERPL-SCNC: 131 MMOL/L (ref 136–145)
TIBC SERPL-MCNC: 89 MCG/DL (ref 298–536)
TRANSFERRIN SERPL-MCNC: 60 MG/DL (ref 200–360)
WBC NRBC COR # BLD AUTO: 7.29 10*3/MM3 (ref 3.4–10.8)

## 2025-03-13 PROCEDURE — 82248 BILIRUBIN DIRECT: CPT | Performed by: INTERNAL MEDICINE

## 2025-03-13 PROCEDURE — 83540 ASSAY OF IRON: CPT | Performed by: INTERNAL MEDICINE

## 2025-03-13 PROCEDURE — 25010000002 ALBUMIN HUMAN 25% PER 50 ML: Performed by: NURSE PRACTITIONER

## 2025-03-13 PROCEDURE — 85018 HEMOGLOBIN: CPT | Performed by: INTERNAL MEDICINE

## 2025-03-13 PROCEDURE — 25810000003 LACTATED RINGERS PER 1000 ML: Performed by: INTERNAL MEDICINE

## 2025-03-13 PROCEDURE — 25010000002 THIAMINE PER 100 MG: Performed by: FAMILY MEDICINE

## 2025-03-13 PROCEDURE — 80053 COMPREHEN METABOLIC PANEL: CPT | Performed by: INTERNAL MEDICINE

## 2025-03-13 PROCEDURE — 82533 TOTAL CORTISOL: CPT | Performed by: INTERNAL MEDICINE

## 2025-03-13 PROCEDURE — 25010000002 ENOXAPARIN PER 10 MG

## 2025-03-13 PROCEDURE — 85045 AUTOMATED RETICULOCYTE COUNT: CPT | Performed by: INTERNAL MEDICINE

## 2025-03-13 PROCEDURE — 25010000002 ALBUMIN HUMAN 25% PER 50 ML: Performed by: INTERNAL MEDICINE

## 2025-03-13 PROCEDURE — 25810000003 SODIUM CHLORIDE 0.9 % SOLUTION: Performed by: NURSE PRACTITIONER

## 2025-03-13 PROCEDURE — 84466 ASSAY OF TRANSFERRIN: CPT | Performed by: INTERNAL MEDICINE

## 2025-03-13 PROCEDURE — 82728 ASSAY OF FERRITIN: CPT | Performed by: INTERNAL MEDICINE

## 2025-03-13 PROCEDURE — 83605 ASSAY OF LACTIC ACID: CPT | Performed by: INTERNAL MEDICINE

## 2025-03-13 PROCEDURE — P9047 ALBUMIN (HUMAN), 25%, 50ML: HCPCS | Performed by: INTERNAL MEDICINE

## 2025-03-13 PROCEDURE — 99232 SBSQ HOSP IP/OBS MODERATE 35: CPT | Performed by: INTERNAL MEDICINE

## 2025-03-13 PROCEDURE — 85014 HEMATOCRIT: CPT | Performed by: INTERNAL MEDICINE

## 2025-03-13 PROCEDURE — 84132 ASSAY OF SERUM POTASSIUM: CPT | Performed by: INTERNAL MEDICINE

## 2025-03-13 PROCEDURE — 93975 VASCULAR STUDY: CPT

## 2025-03-13 PROCEDURE — 85025 COMPLETE CBC W/AUTO DIFF WBC: CPT

## 2025-03-13 PROCEDURE — P9047 ALBUMIN (HUMAN), 25%, 50ML: HCPCS | Performed by: NURSE PRACTITIONER

## 2025-03-13 RX ORDER — POTASSIUM CHLORIDE 1500 MG/1
40 TABLET, EXTENDED RELEASE ORAL EVERY 4 HOURS
Status: COMPLETED | OUTPATIENT
Start: 2025-03-13 | End: 2025-03-13

## 2025-03-13 RX ORDER — SODIUM CHLORIDE, SODIUM LACTATE, POTASSIUM CHLORIDE, CALCIUM CHLORIDE 600; 310; 30; 20 MG/100ML; MG/100ML; MG/100ML; MG/100ML
125 INJECTION, SOLUTION INTRAVENOUS CONTINUOUS
Status: ACTIVE | OUTPATIENT
Start: 2025-03-13 | End: 2025-03-14

## 2025-03-13 RX ORDER — ALBUMIN (HUMAN) 12.5 G/50ML
12.5 SOLUTION INTRAVENOUS ONCE
Status: COMPLETED | OUTPATIENT
Start: 2025-03-13 | End: 2025-03-13

## 2025-03-13 RX ORDER — MIDODRINE HYDROCHLORIDE 10 MG/1
10 TABLET ORAL
Status: DISCONTINUED | OUTPATIENT
Start: 2025-03-13 | End: 2025-03-13

## 2025-03-13 RX ORDER — MIDODRINE HYDROCHLORIDE 10 MG/1
10 TABLET ORAL
Status: DISCONTINUED | OUTPATIENT
Start: 2025-03-14 | End: 2025-03-19 | Stop reason: HOSPADM

## 2025-03-13 RX ORDER — ALBUMIN (HUMAN) 12.5 G/50ML
50 SOLUTION INTRAVENOUS ONCE
Status: COMPLETED | OUTPATIENT
Start: 2025-03-13 | End: 2025-03-13

## 2025-03-13 RX ORDER — PANTOPRAZOLE SODIUM 40 MG/1
40 TABLET, DELAYED RELEASE ORAL
Status: DISCONTINUED | OUTPATIENT
Start: 2025-03-13 | End: 2025-03-19 | Stop reason: HOSPADM

## 2025-03-13 RX ADMIN — FOLIC ACID 1 MG: 1 TABLET ORAL at 08:49

## 2025-03-13 RX ADMIN — THIAMINE HYDROCHLORIDE 200 MG: 100 INJECTION, SOLUTION INTRAMUSCULAR; INTRAVENOUS at 06:32

## 2025-03-13 RX ADMIN — LATANOPROST 1 DROP: 50 SOLUTION OPHTHALMIC at 20:02

## 2025-03-13 RX ADMIN — MIDODRINE HYDROCHLORIDE 10 MG: 10 TABLET ORAL at 23:35

## 2025-03-13 RX ADMIN — MIDODRINE HYDROCHLORIDE 10 MG: 10 TABLET ORAL at 16:38

## 2025-03-13 RX ADMIN — GABAPENTIN 200 MG: 100 CAPSULE ORAL at 08:49

## 2025-03-13 RX ADMIN — ENOXAPARIN SODIUM 40 MG: 100 INJECTION SUBCUTANEOUS at 08:49

## 2025-03-13 RX ADMIN — ROSUVASTATIN 5 MG: 10 TABLET, FILM COATED ORAL at 08:49

## 2025-03-13 RX ADMIN — MIDODRINE HYDROCHLORIDE 10 MG: 10 TABLET ORAL at 05:22

## 2025-03-13 RX ADMIN — SODIUM CHLORIDE, SODIUM LACTATE, POTASSIUM CHLORIDE, AND CALCIUM CHLORIDE 125 ML/HR: .6; .31; .03; .02 INJECTION, SOLUTION INTRAVENOUS at 17:46

## 2025-03-13 RX ADMIN — POTASSIUM CHLORIDE 40 MEQ: 20 TABLET, EXTENDED RELEASE ORAL at 12:07

## 2025-03-13 RX ADMIN — THIAMINE HYDROCHLORIDE 200 MG: 100 INJECTION, SOLUTION INTRAMUSCULAR; INTRAVENOUS at 16:38

## 2025-03-13 RX ADMIN — ALBUMIN (HUMAN) 50 G: 0.25 INJECTION, SOLUTION INTRAVENOUS at 08:44

## 2025-03-13 RX ADMIN — PANTOPRAZOLE SODIUM 40 MG: 40 TABLET, DELAYED RELEASE ORAL at 08:49

## 2025-03-13 RX ADMIN — SODIUM CHLORIDE, SODIUM LACTATE, POTASSIUM CHLORIDE, AND CALCIUM CHLORIDE 125 ML/HR: .6; .31; .03; .02 INJECTION, SOLUTION INTRAVENOUS at 11:07

## 2025-03-13 RX ADMIN — Medication 1 CAPSULE: at 08:49

## 2025-03-13 RX ADMIN — DESVENLAFAXINE 50 MG: 50 TABLET, FILM COATED, EXTENDED RELEASE ORAL at 08:49

## 2025-03-13 RX ADMIN — ALBUMIN (HUMAN) 12.5 G: 0.25 INJECTION, SOLUTION INTRAVENOUS at 04:10

## 2025-03-13 RX ADMIN — THIAMINE HYDROCHLORIDE 200 MG: 100 INJECTION, SOLUTION INTRAMUSCULAR; INTRAVENOUS at 21:46

## 2025-03-13 RX ADMIN — QUETIAPINE FUMARATE 100 MG: 100 TABLET ORAL at 20:02

## 2025-03-13 RX ADMIN — Medication 10 ML: at 20:04

## 2025-03-13 RX ADMIN — GABAPENTIN 200 MG: 100 CAPSULE ORAL at 20:02

## 2025-03-13 RX ADMIN — Medication 10 ML: at 08:50

## 2025-03-13 RX ADMIN — SODIUM CHLORIDE 500 ML: 900 INJECTION, SOLUTION INTRAVENOUS at 03:02

## 2025-03-13 RX ADMIN — SODIUM CHLORIDE 500 ML: 900 INJECTION, SOLUTION INTRAVENOUS at 00:48

## 2025-03-13 RX ADMIN — POTASSIUM CHLORIDE 40 MEQ: 20 TABLET, EXTENDED RELEASE ORAL at 16:38

## 2025-03-13 RX ADMIN — PANTOPRAZOLE SODIUM 40 MG: 40 TABLET, DELAYED RELEASE ORAL at 16:38

## 2025-03-13 RX ADMIN — SODIUM CHLORIDE 500 ML: 900 INJECTION, SOLUTION INTRAVENOUS at 04:12

## 2025-03-13 NOTE — PROGRESS NOTES
[facility] Medicine Services  PROGRESS NOTE    Patient Name: Philomena Davis  : 1960  MRN: [id number]    Date of Admission: 3/12/2025  Primary Care Physician: Unique Brandt DO    Subjective   Subjective     CC: f/u weakness    HPI: Overnight events noted. Patient up in bed eating. Awake and alert. Has been voiding well. Says she is weak but otherwise okay and has no complaints.      Objective   Objective     Vital Signs:   Temp:  [97.2 °F (36.2 °C)-98.2 °F (36.8 °C)] 98.2 °F (36.8 °C)  Heart Rate:  [] 75  Resp:  [18] 18  BP: ()/(40-68) 81/46  Flow (L/min) (Oxygen Therapy):  [2-4] 4     Physical Exam:  Constitutional: No acute distress, awake, chronically ill appearing  HENT: NCAT, mucous membranes moist  Respiratory: Clear to auscultation bilaterally, respiratory effort normal   Cardiovascular: RRR, no murmurs, rubs, or gallops  Gastrointestinal: Positive bowel sounds, soft, nontender, nondistended  Musculoskeletal: Right foot in boot  Psychiatric: Appropriate affect, cooperative  Neurologic: Oriented x 3, strength symmetric in all extremities, Cranial Nerves grossly intact to confrontation, speech clear  Skin: No rashes     Results Reviewed:  LAB RESULTS:      Lab 25  0943 25  0540 25   WBC 7.29  --  11.54*   HEMOGLOBIN 7.8*  --  10.6*   HEMATOCRIT 24.0*  --  30.9*   PLATELETS 118*  --  180   NEUTROS ABS 5.39  --  8.99*   IMMATURE GRANS (ABS) 0.03  --  0.07*   LYMPHS ABS 0.97  --  1.19   MONOS ABS 0.84  --  1.21*   EOS ABS 0.03  --  0.03   .1*  --  103.7*   HSTROP T  --  16* 16*         Lab 25   SODIUM 128*   POTASSIUM 2.7*   CHLORIDE 86*   CO2 26.0   ANION GAP 16.0*   BUN 5*   CREATININE 0.75   EGFR 90.1   GLUCOSE 106*   CALCIUM 8.9   MAGNESIUM 1.9   TSH 3.370         Lab 25   TOTAL PROTEIN 8.0   ALBUMIN 3.2*   GLOBULIN 4.8   ALT (SGPT) 45*   AST (SGOT) 195*   BILIRUBIN 10.9*   ALK PHOS 233*   LIPASE 17          Lab 03/12/25  0540 03/12/25  0415   PROBNP  --  572.0   HSTROP T 16* 16*             Lab 03/12/25  0804   FOLATE 7.58   VITAMIN B 12 1,566*         Brief Urine Lab Results  (Last result in the past 365 days)        Color   Clarity   Blood   Leuk Est   Nitrite   Protein   CREAT   Urine HCG        12/06/24 1545 Yellow   Clear   Negative   Negative   Negative   Negative                   Microbiology Results Abnormal       None            XR Chest 1 View  Result Date: 3/12/2025  XR CHEST 1 VW Date of Exam: 3/12/2025 4:01 AM EDT Indication: cough Comparison: Chest radiograph 12/6/2024 Findings: There are no airspace consolidations. No pleural fluid. No pneumothorax. The pulmonary vasculature appears within normal limits. The cardiac and mediastinal silhouette appear unremarkable. No acute osseous abnormality identified.     Impression: Impression: No active disease. Electronically Signed: Deandre Garza MD  3/12/2025 5:00 AM EDT  Workstation ID: [id number]      Results for orders placed during the hospital encounter of 11/10/24    Adult Transthoracic Echo Complete W/ Cont if Necessary Per Protocol    Interpretation Summary    Left ventricular systolic function is normal. Estimated left ventricular EF = 65%    The cardiac valves are anatomically and functionally normal.      Current medications:  Scheduled Meds:desvenlafaxine, 50 mg, Oral, Daily  [Held by provider] enoxaparin sodium, 40 mg, Subcutaneous, Daily  folic acid, 1 mg, Oral, Daily  gabapentin, 200 mg, Oral, Q12H  lactobacillus acidophilus, 1 capsule, Oral, Daily  latanoprost, 1 drop, Both Eyes, Nightly  [Held by provider] metoprolol succinate XL, 25 mg, Oral, Daily  midodrine, 10 mg, Oral, BID AC  pantoprazole, 40 mg, Oral, BID AC  QUEtiapine, 100 mg, Oral, Nightly  rosuvastatin, 5 mg, Oral, Daily  sodium chloride, 10 mL, Intravenous, Q12H  thiamine (B-1) IV, 200 mg, Intravenous, Q8H   Followed by  [START ON 3/17/2025] thiamine, 100 mg, Oral,  Daily      Continuous Infusions:   PRN Meds:.  senna-docusate sodium **AND** polyethylene glycol **AND** bisacodyl **AND** bisacodyl    Calcium Replacement - Follow Nurse / BPA Driven Protocol    diazePAM **OR** diazePAM **OR** diazePAM **OR** diazePAM **OR** diazePAM **OR** diazePAM    HYDROcodone-acetaminophen    Magnesium Standard Dose Replacement - Follow Nurse / BPA Driven Protocol    melatonin    ondansetron    Phosphorus Replacement - Follow Nurse / BPA Driven Protocol    Potassium Replacement - Follow Nurse / BPA Driven Protocol    [COMPLETED] Insert Peripheral IV **AND** sodium chloride    sodium chloride    Assessment & Plan   Assessment & Plan     Active Hospital Problems    Diagnosis  POA    **Unsteady gait [R26.81]  Yes    Falls [R29.6]  Not Applicable    Peripheral neuropathy [G62.9]  Yes    Alcohol use disorder [F10.90]  Yes    GERD without esophagitis [K21.9]  Yes    Hyperlipidemia [E78.5]  Yes    Glaucoma [H40.9]  Yes    Essential hypertension [I10]  Yes      Resolved Hospital Problems   No resolved problems to display.        Brief Hospital Course to date:  Philomena Davis is a 64 y.o. female with significant medical history for HTN, HLD, chronic pain disorder, cirrhosis of the liver, COPD, GERD, and foot fracture who presents to [facility] with complaints of frequent falls injuries.     ETOH hepatitis w/ cirrhosis  Hypotension  -Patient bili up to 10.9 from previously normal baseline. Suspect this is ETOH hepatitis (DF 27) - repeat labs are pending.  -Despite Nathalie Salgado RN attempting to quote our conversation early this morning she was unsuccessful in documenting this appropriately. I believe the patient has decompensated liver disease with portal hypertension which has caused splanchnic vasodilation which causes an expected drop in BP. I also think she is quite volume depleted from ETOH abuse and not eating/drinking for several days.  -Gave albumin 50g once and continue  IVF. Continue midodrine.  -Her repeat labs are still pending including cortisol, lactate.  -Check portal duplex.  -If labs worse, may need GI eval.    Acute/Chronic anemia  TCP  -Likely related to ETOH marrow suppession but anemia studies including FOBT are pending.  -No evidence of bleeding or reports of melena. Increase ppi to BID.  -Serial h/h  -Hold lovenox.    Lisfranc and third metatarsal fracture   Unsteady gait  ETOH neuropathy  Frequent falls  -Patient reports being sent in by her PT and by orthopedic surgery for us to help her because she lives alone and feels like she is unable to manage. Somehow though she manages to continue to drink ETOH heavily.   -Was recently seen by Dr. Noyola - SH[facility] RLE  -PT/OT.     Alcohol abuse  ETOH neuropathy  -CIWA protocol  -Seizure precautions  -Continue thiamine and folic acid  -Addiction team     HTN  HLD  -Continue statin  -Continue metoprolol     Depression  -Continue Pristiq     Peripheral neuropathy  -Continue gabapentin     Glaucoma  -Continue home eyedrops    Expected Discharge Location and Transportation:   Expected Discharge   Expected Discharge Date: 3/14/2025; Expected Discharge Time: 12:00 PM     VTE Prophylaxis:  Pharmacologic VTE prophylaxis orders are present.         AM-PAC 6 Clicks Score (PT): 16 (03/12/25 2000)    CODE STATUS:   Code Status and Medical Interventions: CPR (Attempt to Resuscitate); Full Support   Ordered at: 03/12/25 0617     Code Status (Patient has no pulse and is not breathing):    CPR (Attempt to Resuscitate)     Medical Interventions (Patient has pulse or is breathing):    Full Support     Level Of Support Discussed With:    Patient       Juliane Vieira II, DO  03/13/25

## 2025-03-13 NOTE — PAYOR COMM NOTE
"Philomena Davis (64 y.o. Female)       Date of Birth   1960    Social Security Number       Address   [address] [address]    Home Phone   [phone]    MRN   [id number]       Synagogue   [facility]    Marital Status   Single                            Admission Date   3/12/2025    Admission Type   Emergency    Admitting Provider   Juliane Vieira II, DO    Attending Provider   Juliane Vieira II, DO    Department, Room/Bed   [facility] 3F, S301/1       Discharge Date       Discharge Disposition       Discharge Destination                                 Attending Provider: Juliane Vieira II, DO    Allergies: Buspirone, Codeine, Mirtazapine    Isolation: None   Infection: None   Code Status: CPR    Ht: 162 cm (63.78\")   Wt: 68 kg (149 lb 14.6 oz)    Admission Cmt: None   Principal Problem: Unsteady gait [R26.81]                   Active Insurance as of 3/12/2025       Primary Coverage       Payor Plan Insurance Group Employer/Plan Group    [employer] [place] [employer] [place]ICA[place]        Payor Plan Address Payor Plan Phone Number Payor Plan Fax Number Effective Dates    [address] [address] [phone]  2018 - None Entered    [facility] [id number]         Subscriber Name Subscriber Birth Date Member ID       PHILOMENA DAVIS 1960 [id number]                     Emergency Contacts        (Rel.) Home Phone Work Phone Mobile Phone    NOÉ WEST (Relative) [phone] -- [phone]    JULIANE ZAIDI (Friend) [phone] -- [phone]    MARISA KIM (Friend) [phone] -- [phone]                 History & Physical        SkylerBrittaniCANDY at 25 0541              [facility] Medicine Services  HISTORY AND PHYSICAL    Patient Name: Philomena Davis  : 1960  MRN: [id number]  Primary Care Physician: Unique Brandt DO  Date of admission: 3/12/2025    Subjective  Subjective     Chief Complaint:  Frequent " falls    HPI:  Philomena Davis is a 64 y.o. female with significant medical history for HTN, HLD, chronic pain disorder, cirrhosis of the liver, COPD, GERD, and foot fracture who presents to [facility] with complaints of frequent falls injuries.     Patient presents to [facility] after sustaining multiple falls at home.  Her last reported fall was on March 6th, but patient has become increasingly more anxious about anticipated falls, and feels as though she can no longer care for herself.  She is following with outpatient orthopedics for a right foot fracture after sustaining a fall, her last follow-up was on March 10 th, she had her hard cast removed and is now in a walking boot.  She was advised by her orthopedic provider and physical therapy to report to ED for any subsequent falls.    Patient denies headache, fever, chest pain, or shortness of breath above baseline.  She does report intermittent nausea, 1 episode of vomiting on 3/11, and 3 episodes of diarrhea on 3/11.  She has no other acute complaints.  Patient does have a significant history for cirrhosis, and reports drinking 1 glass of wine at 9 PM on 3/11.    Review of Systems   Constitutional:  Positive for activity change. Negative for chills, fatigue and fever.   HENT: Negative.     Eyes: Negative.    Respiratory:  Negative for cough, chest tightness and shortness of breath.    Cardiovascular:  Positive for leg swelling. Negative for chest pain.   Gastrointestinal:  Positive for diarrhea, nausea and vomiting. Negative for abdominal distention and abdominal pain.   Endocrine: Negative.    Genitourinary: Negative.    Musculoskeletal:  Positive for arthralgias and gait problem.   Skin:  Positive for color change (Scattered bruising).   Allergic/Immunologic: Negative.    Neurological:  Positive for weakness. Negative for dizziness, syncope, numbness and headaches.   Psychiatric/Behavioral:  The patient is nervous/anxious.                Personal History     Past Medical History:   Diagnosis Date    Alcohol withdrawal 05/10/2019    Ankle sprain 2015    Annual physical exam 09/12/2023    Anxiety and depression     Arthritis of back 2020    Arthritis of neck 2021    Asthma 2022    Cataract 20/2/1999    Chronic pain disorder 2023    Cirrhosis of liver     Closed displaced fracture of lateral malleolus of left fibula     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 03/24/2017    Closed trimalleolar fracture of right ankle 05/24/2023    Colon polyp 2018    COPD (chronic obstructive pulmonary disease) 2022    Digital mucous cyst of toe of left foot 03/24/2017    Eating disorder 2022    Fracture of ankle 2017    Fracture, clavicle 1969    Fracture, finger 1972    Fracture, foot 1/30/25    Ganglion cyst of left foot     GERD (gastroesophageal reflux disease) 10/22    Glaucoma     Hip arthrosis 2019    Hyperlipidemia 1999    Hypertension     Knee sprain 2019    Knee swelling 2019    Low back strain 1/15/25    Neck strain 1/30/25    Panic disorder 1987    Periarthritis of shoulder 2019    Peripheral neuropathy     Pulmonary arterial hypertension 2021    Urinary tract infection 02/23/23    Varicella 1968    Wears glasses              Past Surgical History:   Procedure Laterality Date    ANKLE OPEN REDUCTION INTERNAL FIXATION Left 03/24/2017    Procedure: LEFT ANKLE OPEN REDUCTION INTERNAL FIXATION WITH ILIAC CREST BONE GRAFT , EXCISION CYST 4TH/5TH INNERSPACE LEFT FOOT;  Surgeon: Frank Larson MD;  Location:  [facility];  Service:     ANKLE OPEN REDUCTION INTERNAL FIXATION Right 05/25/2023    Procedure: ANKLE OPEN REDUCTION INTERNAL FIXATION;  Surgeon: Deandre Reynoso MD;  Location:  [facility];  Service: Orthopedics;  Laterality: Right;    AUGMENTATION MAMMAPLASTY Bilateral 1999    BREAST AUGMENTATION      BREAST EXCISIONAL BIOPSY Right 2014    COLONOSCOPY  2018    DILATATION AND CURETTAGE  1987    DILATION AND  CURETTAGE, DIAGNOSTIC / THERAPEUTIC      ENDOSCOPY N/A 11/30/2022    Procedure: ESOPHAGOGASTRODUODENOSCOPY;  Surgeon: Arelis Solano MD;  Location:  [facility];  Service: Gastroenterology;  Laterality: N/A;    ENDOSCOPY N/A 02/01/2024    Procedure: ESOPHAGOGASTRODUODENOSCOPY;  Surgeon: Brunner, Mark I, MD;  Location:  [facility];  Service: Gastroenterology;  Laterality: N/A;    FOOT SURGERY  2012    FRACTURE SURGERY  5 /26/2023    AL RPR NON/MAL FEMUR DSTL H/N W/ILIAC/AUTOG BONE Left 03/24/2017    Procedure: ILIAC CREST BONE GRAFT;  Surgeon: Frank Larson MD;  Location:  [facility];  Service: Orthopedics    WISDOM TOOTH EXTRACTION  1992    WRIST SURGERY         Family History: family history includes ADD / ADHD in her brother; Alcohol abuse in her brother; Asthma in her maternal aunt, maternal grandfather, and paternal grandmother; Bipolar disorder in her brother; Breast cancer (age of onset: 68) in her paternal grandmother; COPD in her father, maternal aunt, and maternal uncle; Cancer in her mother and another family member; Dementia in her cousin and paternal aunt; Depression in her mother; Drug abuse in her brother; Heart disease in her maternal uncle, paternal grandfather, and another family member; Hypertension in an other family member; Mental illness in her brother; No Known Problems in her daughter, sister, and son; Rheum arthritis in an other family member; Stroke in her maternal grandmother and another family member; Vision loss in her maternal grandmother and mother.     Social History:  reports that she quit smoking about 15 years ago. Her smoking use included cigarettes. She started smoking about 40 years ago. She has a 36.1 pack-year smoking history. She has been exposed to tobacco smoke. She has never used smokeless tobacco. She reports current alcohol use of about 5.0 standard drinks of alcohol per week. She reports that she does not use drugs.  Social History     Social History  Narrative    Not on file       Medications:  HYDROcodone-acetaminophen, Lactobacillus, QUEtiapine, albuterol sulfate HFA, atorvastatin, desvenlafaxine, eszopiclone, folic acid, gabapentin, latanoprost, metoprolol succinate XL, midodrine, naloxone, nystatin, ondansetron ODT, oxyCODONE-acetaminophen, pantoprazole, rosuvastatin, thiamine, and ursodiol    Allergies   Allergen Reactions    Buspirone Anxiety    Codeine Nausea Only    Mirtazapine Anxiety       Objective  Objective     Vital Signs:   Heart Rate:  [72] 72  Resp:  [18] 18  BP: (120)/(67) 120/67    Physical Exam  Constitutional:       Appearance: Normal appearance.   HENT:      Head: Normocephalic and atraumatic.   Eyes:      Extraocular Movements: Extraocular movements intact.      Pupils: Pupils are equal, round, and reactive to light.   Cardiovascular:      Rate and Rhythm: Regular rhythm. Tachycardia present.      Pulses: Normal pulses.      Heart sounds: Normal heart sounds.   Pulmonary:      Effort: Pulmonary effort is normal.      Breath sounds: Normal breath sounds.   Abdominal:      General: Bowel sounds are normal. There is no distension.      Palpations: Abdomen is soft.      Tenderness: There is no abdominal tenderness.   Musculoskeletal:      Right lower leg: Edema present.   Skin:     General: Skin is warm and dry.      Coloration: Skin is jaundiced.   Neurological:      General: No focal deficit present.      Mental Status: She is alert and oriented to person, place, and time.   Psychiatric:         Mood and Affect: Mood is anxious.          Result Review:  I have personally reviewed the results from the time of this admission to 3/12/2025 06:27 EDT and agree with these findings:  [x]  Laboratory list / accordion  []  Microbiology  [x]  Radiology  [x]  EKG/Telemetry   []  Cardiology/Vascular   []  Pathology  [x]  Old records  []  Other:      LAB RESULTS:      Lab 03/12/25  0415   WBC 11.54*   HEMOGLOBIN 10.6*   HEMATOCRIT 30.9*   PLATELETS 180    NEUTROS ABS 8.99*   IMMATURE GRANS (ABS) 0.07*   LYMPHS ABS 1.19   MONOS ABS 1.21*   EOS ABS 0.03   .7*         Lab 03/12/25  0415   SODIUM 128*   POTASSIUM 2.7*   CHLORIDE 86*   CO2 26.0   ANION GAP 16.0*   BUN 5*   CREATININE 0.75   EGFR 90.1   GLUCOSE 106*   CALCIUM 8.9   MAGNESIUM 1.9   TSH 3.370         Lab 03/12/25  0415   TOTAL PROTEIN 8.0   ALBUMIN 3.2*   GLOBULIN 4.8   ALT (SGPT) 45*   AST (SGOT) 195*   BILIRUBIN 10.9*   ALK PHOS 233*   LIPASE 17         Lab 03/12/25  0540 03/12/25  0415   PROBNP  --  572.0   HSTROP T 16* 16*                 Brief Urine Lab Results  (Last result in the past 365 days)        Color   Clarity   Blood   Leuk Est   Nitrite   Protein   CREAT   Urine HCG        12/06/24 1545 Yellow   Clear   Negative   Negative   Negative   Negative                 Microbiology Results (last 10 days)       ** No results found for the last 240 hours. **            XR Chest 1 View  Result Date: 3/12/2025  XR CHEST 1 VW Date of Exam: 3/12/2025 4:01 AM EDT Indication: cough Comparison: Chest radiograph 12/6/2024 Findings: There are no airspace consolidations. No pleural fluid. No pneumothorax. The pulmonary vasculature appears within normal limits. The cardiac and mediastinal silhouette appear unremarkable. No acute osseous abnormality identified.     Impression: Impression: No active disease. Electronically Signed: Deandre Garza MD  3/12/2025 5:00 AM EDT  Workstation ID: [id number]    XR Foot 2 View Bilateral  Result Date: 3/10/2025  Bilateral Foot X-Ray 03/10/25 Indication: Pain Views: 2 weight bearing , comparison to previous Findings: xrays reviewed by me today in the office and show redemonstration healing distal third metatarsal fracture on the right with similar alignment to previous, also redemonstration proximal metatarsal fractures consistent with bony Lisfranc injury on the right with alignment maintained today on weightbearing films similar to previous weightbearing x-rays from  February 28.  Can once again see hardware resolve the right ankle consistent with postsurgical changes.  Alignment of TMT joints 3 4 and 5 of right foot appears anatomic on oblique view. contralateral left foot xray taken for normal anatomy comparison      XR Foot 2 View Right  Result Date: 3/10/2025  Right Foot X-Ray 03/10/25 Indication: Pain Views: 2 weight bearing , comparison to previous Findings: xrays reviewed by me today in the office and show redemonstration healing distal third metatarsal fracture on the right with similar alignment to previous, also redemonstration proximal metatarsal fractures consistent with bony Lisfranc injury on the right with alignment maintained today on weightbearing films similar to previous weightbearing x-rays from February 28.  Can once again see hardware resolve the right ankle consistent with postsurgical changes.  Alignment of TMT joints 3 4 and 5 of right foot appears anatomic on oblique view. contralateral left foot xray taken for normal anatomy comparison       Results for orders placed during the hospital encounter of 11/10/24    Adult Transthoracic Echo Complete W/ Cont if Necessary Per Protocol    Interpretation Summary    Left ventricular systolic function is normal. Estimated left ventricular EF = 65%    The cardiac valves are anatomically and functionally normal.      Assessment & Plan  Assessment & Plan       Unsteady gait    Essential hypertension    Hyperlipidemia    Glaucoma    GERD without esophagitis    Alcohol use disorder    Peripheral neuropathy    Philomena Davis is a 64 y.o. female with significant medical history for HTN, HLD, chronic pain disorder, cirrhosis of the liver, COPD, GERD, and foot fracture who presents to [facility] with complaints of frequent falls injuries.     Unsteady gait  Frequent falls  -CT of head no acute findings  -CXR negative  -PT /OT consult  -Case management consult  -CBC and CMP in a.m.    Alcohol  withdrawal  -CIWA protocol  -Seizure precautions  -Continue thiamine and folic acid  -Addiction team    GERD  Cirrhosis  -Consider GI consult  -Continue PPI    HTN  HLD  -Continue statin  -Continue metoprolol    Depression  -Continue Pristiq    Peripheral neuropathy  -Continue gabapentin    Glaucoma  -Continue home eyedrops      DVT prophylaxis: Lovenox    CODE STATUS: Full code  Code Status (Patient has no pulse and is not breathing): CPR (Attempt to Resuscitate)  Medical Interventions (Patient has pulse or is breathing): Full Support  Level Of Support Discussed With: Patient      Expected Discharge  Expected Discharge Date: 3/14/2025; Expected Discharge Time: 12:00 PM      This note has been completed as part of a split-shared workflow.     Signature: Electronically signed by CANDY Jose, 03/12/25, 6:20 AM EDT                Electronically signed by Brittani Holland APRN at 03/12/25 0627       Vital Signs (last 2 days)       Date/Time Temp Temp src Pulse Resp BP Patient Position SpO2    03/13/25 1007 -- -- 75 -- 81/46 -- 92    03/13/25 1006 -- -- 77 -- -- -- 95    03/13/25 1005 -- -- 76 -- 82/49 -- 92    03/13/25 0945 -- -- -- -- 87/49 -- 93    03/13/25 0930 -- -- -- -- -- -- 94    03/13/25 0915 -- -- -- -- -- -- 93    03/13/25 0905 -- -- 80 -- 79/40 -- 92    03/13/25 0900 -- -- 79 -- -- -- 92    03/13/25 0855 -- -- 80 -- -- -- 93    03/13/25 0850 -- -- 77 -- 80/48 -- 96    03/13/25 0845 -- -- 73 -- -- -- 97    03/13/25 0840 -- -- 73 -- 80/44 -- 97    03/13/25 0830 -- -- -- -- 79/40 -- 95    03/13/25 0815 -- -- -- -- -- -- 92    03/13/25 0700 98.2 (36.8) Oral 77 18 75/41 Lying 95    03/13/25 0546 -- -- 77 -- 84/48 Lying 96    03/13/25 0530 -- -- 76 -- 78/42 -- 99    03/13/25 0525 -- -- 79 -- 78/43 -- 99    03/13/25 0522 -- -- 77 -- 82/40 -- --    03/13/25 0520 -- -- 75 -- 82/40 -- 100    03/13/25 0515 -- -- 77 -- 77/41 -- 100    03/13/25 0510 -- -- 76 -- 77/42 -- 100    03/13/25 0505 -- -- 77 -- 78/43 -- 100     25 0500 -- -- 74 -- 78/41 -- 100    255 -- -- 77 -- 74/46 -- 97    254 -- -- -- -- 77/42 -- --    25 -- -- 77 -- 77/42 -- 100    255 -- -- 74 -- 85/47 -- 98    250 -- -- 78 -- 77/41 -- 100    255 -- -- 75 -- 84/48 -- 99    25 -- -- 77 -- 81/43 -- 99    25 -- -- 77 -- 82/44 -- 99    25 -- -- 79 -- 83/49 -- 98    255 -- -- 81 -- 83/68 -- 96    25 -- -- 84 -- 69/41 -- 94    25 0249 -- -- -- -- 85/42 Lying --    25 0027 97.2 (36.2) Oral 75 18 83/54 Lying 97    25 1920 98.1 (36.7) Oral 74 18 -- Lying 100    25 1739 98 (36.7) Oral 73 18 104/58 Lying 84    25 1530 -- -- 103 -- 114/57 -- 96    25 1500 -- -- 85 -- 91/54 -- 100    25 1430 -- -- 87 -- 94/59 -- 99    25 1400 -- -- 94 -- 99/48 -- 99    25 1300 -- -- 88 -- 93/46 -- 98    25 1130 -- -- 90 -- 99/42 -- 100    25 1104 -- -- 118 -- -- -- --    25 1059 -- -- -- -- 124/63 -- 96    25 1030 -- -- 129 -- 124/52 -- 91    25 1008 -- -- 109 -- 112/81 -- --    25 1005 -- -- 109 -- 112/81 -- 92    25 0930 -- -- 107 -- 126/63 -- 90    25 0900 -- -- 105 -- 112/57 -- 92    25 0830 -- -- 115 -- 119/66 -- 92    25 0642 98.2 (36.8) Oral -- -- -- -- --    25 0431 -- -- 72 18 120/67 -- 100             Physician Progress Notes (all)        Juliane Vieira II, DO at 25 0906              [facility] Medicine Services  PROGRESS NOTE    Patient Name: Philomena Davis  : 1960  MRN: [id number]    Date of Admission: 3/12/2025  Primary Care Physician: Unique Brandt DO    Subjective   Subjective     CC: f/u weakness    HPI: Overnight events noted. Patient up in bed eating. Awake and alert. Has been voiding well. Says she is weak but otherwise okay and has no complaints.      Objective   Objective     Vital Signs:    Temp:  [97.2 °F (36.2 °C)-98.2 °F (36.8 °C)] 98.2 °F (36.8 °C)  Heart Rate:  [] 75  Resp:  [18] 18  BP: ()/(40-68) 81/46  Flow (L/min) (Oxygen Therapy):  [2-4] 4     Physical Exam:  Constitutional: No acute distress, awake, chronically ill appearing  HENT: NCAT, mucous membranes moist  Respiratory: Clear to auscultation bilaterally, respiratory effort normal   Cardiovascular: RRR, no murmurs, rubs, or gallops  Gastrointestinal: Positive bowel sounds, soft, nontender, nondistended  Musculoskeletal: Right foot in boot  Psychiatric: Appropriate affect, cooperative  Neurologic: Oriented x 3, strength symmetric in all extremities, Cranial Nerves grossly intact to confrontation, speech clear  Skin: No rashes     Results Reviewed:  LAB RESULTS:      Lab 03/13/25  0943 03/12/25  0540 03/12/25 0415   WBC 7.29  --  11.54*   HEMOGLOBIN 7.8*  --  10.6*   HEMATOCRIT 24.0*  --  30.9*   PLATELETS 118*  --  180   NEUTROS ABS 5.39  --  8.99*   IMMATURE GRANS (ABS) 0.03  --  0.07*   LYMPHS ABS 0.97  --  1.19   MONOS ABS 0.84  --  1.21*   EOS ABS 0.03  --  0.03   .1*  --  103.7*   HSTROP T  --  16* 16*         Lab 03/12/25  0415   SODIUM 128*   POTASSIUM 2.7*   CHLORIDE 86*   CO2 26.0   ANION GAP 16.0*   BUN 5*   CREATININE 0.75   EGFR 90.1   GLUCOSE 106*   CALCIUM 8.9   MAGNESIUM 1.9   TSH 3.370         Lab 03/12/25  0415   TOTAL PROTEIN 8.0   ALBUMIN 3.2*   GLOBULIN 4.8   ALT (SGPT) 45*   AST (SGOT) 195*   BILIRUBIN 10.9*   ALK PHOS 233*   LIPASE 17         Lab 03/12/25  0540 03/12/25  0415   PROBNP  --  572.0   HSTROP T 16* 16*             Lab 03/12/25  0804   FOLATE 7.58   VITAMIN B 12 1,566*         Brief Urine Lab Results  (Last result in the past 365 days)        Color   Clarity   Blood   Leuk Est   Nitrite   Protein   CREAT   Urine HCG        12/06/24 1545 Yellow   Clear   Negative   Negative   Negative   Negative                   Microbiology Results Abnormal       None            XR Chest 1  View  Result Date: 3/12/2025  XR CHEST 1 VW Date of Exam: 3/12/2025 4:01 AM EDT Indication: cough Comparison: Chest radiograph 12/6/2024 Findings: There are no airspace consolidations. No pleural fluid. No pneumothorax. The pulmonary vasculature appears within normal limits. The cardiac and mediastinal silhouette appear unremarkable. No acute osseous abnormality identified.     Impression: Impression: No active disease. Electronically Signed: Deandre Garza MD  3/12/2025 5:00 AM EDT  Workstation ID: [id number]      Results for orders placed during the hospital encounter of 11/10/24    Adult Transthoracic Echo Complete W/ Cont if Necessary Per Protocol    Interpretation Summary    Left ventricular systolic function is normal. Estimated left ventricular EF = 65%    The cardiac valves are anatomically and functionally normal.      Current medications:  Scheduled Meds:desvenlafaxine, 50 mg, Oral, Daily  [Held by provider] enoxaparin sodium, 40 mg, Subcutaneous, Daily  folic acid, 1 mg, Oral, Daily  gabapentin, 200 mg, Oral, Q12H  lactobacillus acidophilus, 1 capsule, Oral, Daily  latanoprost, 1 drop, Both Eyes, Nightly  [Held by provider] metoprolol succinate XL, 25 mg, Oral, Daily  midodrine, 10 mg, Oral, BID AC  pantoprazole, 40 mg, Oral, BID AC  QUEtiapine, 100 mg, Oral, Nightly  rosuvastatin, 5 mg, Oral, Daily  sodium chloride, 10 mL, Intravenous, Q12H  thiamine (B-1) IV, 200 mg, Intravenous, Q8H   Followed by  [START ON 3/17/2025] thiamine, 100 mg, Oral, Daily      Continuous Infusions:   PRN Meds:.  senna-docusate sodium **AND** polyethylene glycol **AND** bisacodyl **AND** bisacodyl    Calcium Replacement - Follow Nurse / BPA Driven Protocol    diazePAM **OR** diazePAM **OR** diazePAM **OR** diazePAM **OR** diazePAM **OR** diazePAM    HYDROcodone-acetaminophen    Magnesium Standard Dose Replacement - Follow Nurse / BPA Driven Protocol    melatonin    ondansetron    Phosphorus Replacement - Follow Nurse / BPA Driven  Protocol    Potassium Replacement - Follow Nurse / BPA Driven Protocol    [COMPLETED] Insert Peripheral IV **AND** sodium chloride    sodium chloride    Assessment & Plan   Assessment & Plan     Active Hospital Problems    Diagnosis  POA    **Unsteady gait [R26.81]  Yes    Falls [R29.6]  Not Applicable    Peripheral neuropathy [G62.9]  Yes    Alcohol use disorder [F10.90]  Yes    GERD without esophagitis [K21.9]  Yes    Hyperlipidemia [E78.5]  Yes    Glaucoma [H40.9]  Yes    Essential hypertension [I10]  Yes      Resolved Hospital Problems   No resolved problems to display.        Brief Hospital Course to date:  Philomena Davis is a 64 y.o. female with significant medical history for HTN, HLD, chronic pain disorder, cirrhosis of the liver, COPD, GERD, and foot fracture who presents to [facility] with complaints of frequent falls injuries.     ETOH hepatitis w/ cirrhosis  Hypotension  -Patient bili up to 10.9 from previously normal baseline. Suspect this is ETOH hepatitis (DF 27) - repeat labs are pending.  -Despite Nathalie Salgado RN attempting to quote our conversation early this morning she was unsuccessful in documenting this appropriately. I believe the patient has decompensated liver disease with portal hypertension which has caused splanchnic vasodilation which causes an expected drop in BP. I also think she is quite volume depleted from ETOH abuse and not eating/drinking for several days.  -Gave albumin 50g once and continue IVF. Continue midodrine.  -Her repeat labs are still pending including cortisol, lactate.  -Check portal duplex.  -If labs worse, may need GI eval.    Acute/Chronic anemia  TCP  -Likely related to ETOH marrow suppession but anemia studies including FOBT are pending.  -No evidence of bleeding or reports of melena. Increase ppi to BID.  -Serial h/h  -Hold lovenox.    Lisfranc and third metatarsal fracture   Unsteady gait  ETOH neuropathy  Frequent falls  -Patient reports  being sent in by her PT and by orthopedic surgery for us to help her because she lives alone and feels like she is unable to manage. Somehow though she manages to continue to drink ETOH heavily.   -Was recently seen by Dr. Noyola - SHARON GIRALDOE  -PT/OT.     Alcohol abuse  ETOH neuropathy  -CIWA protocol  -Seizure precautions  -Continue thiamine and folic acid  -Addiction team     HTN  HLD  -Continue statin  -Continue metoprolol     Depression  -Continue Pristiq     Peripheral neuropathy  -Continue gabapentin     Glaucoma  -Continue home eyedrops    Expected Discharge Location and Transportation:   Expected Discharge   Expected Discharge Date: 3/14/2025; Expected Discharge Time: 12:00 PM     VTE Prophylaxis:  Pharmacologic VTE prophylaxis orders are present.         AM-PAC 6 Clicks Score (PT): 16 (03/12/25 2000)    CODE STATUS:   Code Status and Medical Interventions: CPR (Attempt to Resuscitate); Full Support   Ordered at: 03/12/25 0617     Code Status (Patient has no pulse and is not breathing):    CPR (Attempt to Resuscitate)     Medical Interventions (Patient has pulse or is breathing):    Full Support     Level Of Support Discussed With:    Patient       Juliane Vieira II, DO  03/13/25       Electronically signed by Juliane Vieira II, DO at 03/13/25 1032       Juliane Vieira II, DO at 03/12/25 0802                [facility] Medicine Services  ADMISSION FOLLOW-UP NOTE          Patient admitted after midnight, H&P by my partner performed earlier on today's date reviewed.  Interim findings, labs, and charting also reviewed.        The [facility] Hospital Problem List has been managed and updated to include any new diagnoses:  Active Hospital Problems    Diagnosis  POA    **Unsteady gait [R26.81]  Yes    Peripheral neuropathy [G62.9]  Yes    Alcohol use disorder [F10.90]  Yes    GERD without esophagitis [K21.9]  Yes    Hyperlipidemia [E78.5]  Yes    Glaucoma [H40.9]  Yes    Essential hypertension  [I10]  Yes      Resolved Hospital Problems   No resolved problems to display.         ADDITIONAL PLAN:  - detailed assessment and plan from admission reviewed    Philomena Davis is a 64 y.o. female with significant medical history for HTN, HLD, chronic pain disorder, cirrhosis of the liver, COPD, GERD, and foot fracture who presents to [facility] with complaints of frequent falls injuries.     Lisfranc and third metatarsal fracture   Unsteady gait  ETOH neuropathy  Frequent falls  -Patient reports being sent in by her PT and by orthopedic surgery for us to help her because she lives alone and feels like she is unable to manage. Somehow though she manages to continue to drink ETOH heavily.   -Was recently seen by Dr. Noyola - SHARON RLE  -I d/w her that she may not have any other option than going home as she has medicaid and is unlikely to have skilled benefits. She is an observation patient and likely doesn't qualify for inpt rehab. Will have CM talk with her.  -PT/OT.     Alcohol abuse  ETOH neuropathy  -CIWA protocol  -Seizure precautions  -Continue thiamine and folic acid  -Addiction team     GERD  Cirrhosis  -Consider GI consult  -Continue PPI     HTN  HLD  -Continue statin  -Continue metoprolol     Depression  -Continue Pristiq     Peripheral neuropathy  -Continue gabapentin     Glaucoma  -Continue home eyedrops      Expected Discharge   Expected Discharge Date: 3/14/2025; Expected Discharge Time: 12:00 PM     Juliane Vieira II, DO  03/12/25        Electronically signed by Juliane Vieira II, DO at 03/12/25 2042

## 2025-03-13 NOTE — NURSING NOTE
"Pt consistently had low SBPs from arrival of shift and throughout the night. They ranged from 90/54- 75/38 (55). Pt denied any CIWA S&S at any time. There was also no report of chest pain or SOA from the patient. I called the exchange and spoke with Latoya GUPTA, and she ordered a total of 3 500ml bolus's and albumin  25% in effort to raise the BP. This was unsuccessful. I then escalated this to a STAT page to the hospitalist, Stefano Martinez, who stated, \"I would expect her pressure to be low. Let's give 10mg of midodrine instead of 5mg now instead of 0700. This was at 0530. Pressures remained low an hour after midodrine. Called the Attending pysician, Juliane Vieira, and he also stated, \"this patient would be expected to have a low BP. I will be there to see this patient this morning. No new orders. Pt still A&Ox4 at shift change. No complaints of pain, CIWA S&S, or  SOA, Hermes Juarez charge RN knows of my efforts to address the very low BP of this patient.  "

## 2025-03-14 ENCOUNTER — HOME CARE VISIT (OUTPATIENT)
Dept: HOME HEALTH SERVICES | Facility: HOME HEALTHCARE | Age: 65
End: 2025-03-14
Payer: COMMERCIAL

## 2025-03-14 LAB
AMORPH URATE CRY URNS QL MICRO: ABNORMAL /HPF
B PARAPERT DNA SPEC QL NAA+PROBE: NOT DETECTED
B PERT DNA SPEC QL NAA+PROBE: NOT DETECTED
BACTERIA UR QL AUTO: ABNORMAL /HPF
BILIRUB UR QL STRIP: ABNORMAL
C PNEUM DNA NPH QL NAA+NON-PROBE: NOT DETECTED
CLARITY UR: ABNORMAL
COLOR UR: ABNORMAL
FLUAV SUBTYP SPEC NAA+PROBE: NOT DETECTED
FLUBV RNA ISLT QL NAA+PROBE: NOT DETECTED
GLUCOSE UR STRIP-MCNC: NEGATIVE MG/DL
HADV DNA SPEC NAA+PROBE: NOT DETECTED
HCOV 229E RNA SPEC QL NAA+PROBE: NOT DETECTED
HCOV HKU1 RNA SPEC QL NAA+PROBE: NOT DETECTED
HCOV NL63 RNA SPEC QL NAA+PROBE: NOT DETECTED
HCOV OC43 RNA SPEC QL NAA+PROBE: NOT DETECTED
HCT VFR BLD AUTO: 24.3 % (ref 34–46.6)
HCT VFR BLD AUTO: 26.6 % (ref 34–46.6)
HGB BLD-MCNC: 8 G/DL (ref 12–15.9)
HGB BLD-MCNC: 8.7 G/DL (ref 12–15.9)
HGB UR QL STRIP.AUTO: NEGATIVE
HMPV RNA NPH QL NAA+NON-PROBE: NOT DETECTED
HPIV1 RNA ISLT QL NAA+PROBE: NOT DETECTED
HPIV2 RNA SPEC QL NAA+PROBE: NOT DETECTED
HPIV3 RNA NPH QL NAA+PROBE: NOT DETECTED
HPIV4 P GENE NPH QL NAA+PROBE: NOT DETECTED
HYALINE CASTS UR QL AUTO: ABNORMAL /LPF
KETONES UR QL STRIP: NEGATIVE
LEUKOCYTE ESTERASE UR QL STRIP.AUTO: ABNORMAL
M PNEUMO IGG SER IA-ACNC: NOT DETECTED
NITRITE UR QL STRIP: POSITIVE
PH UR STRIP.AUTO: 5.5 [PH] (ref 5–8)
PROT UR QL STRIP: ABNORMAL
RBC # UR STRIP: ABNORMAL /HPF
REF LAB TEST METHOD: ABNORMAL
RHINOVIRUS RNA SPEC NAA+PROBE: NOT DETECTED
RSV RNA NPH QL NAA+NON-PROBE: NOT DETECTED
SARS-COV-2 RNA NPH QL NAA+NON-PROBE: NOT DETECTED
SP GR UR STRIP: 1.02 (ref 1–1.03)
SQUAMOUS #/AREA URNS HPF: ABNORMAL /HPF
TRANS CELLS #/AREA URNS HPF: ABNORMAL /HPF
UROBILINOGEN UR QL STRIP: ABNORMAL
WBC # UR STRIP: ABNORMAL /HPF

## 2025-03-14 PROCEDURE — 25010000002 CEFTRIAXONE PER 250 MG: Performed by: INTERNAL MEDICINE

## 2025-03-14 PROCEDURE — 85018 HEMOGLOBIN: CPT | Performed by: INTERNAL MEDICINE

## 2025-03-14 PROCEDURE — 0202U NFCT DS 22 TRGT SARS-COV-2: CPT | Performed by: INTERNAL MEDICINE

## 2025-03-14 PROCEDURE — 85014 HEMATOCRIT: CPT | Performed by: INTERNAL MEDICINE

## 2025-03-14 PROCEDURE — 99232 SBSQ HOSP IP/OBS MODERATE 35: CPT | Performed by: INTERNAL MEDICINE

## 2025-03-14 PROCEDURE — 81001 URINALYSIS AUTO W/SCOPE: CPT | Performed by: EMERGENCY MEDICINE

## 2025-03-14 PROCEDURE — 97162 PT EVAL MOD COMPLEX 30 MIN: CPT

## 2025-03-14 PROCEDURE — 25010000002 THIAMINE PER 100 MG: Performed by: FAMILY MEDICINE

## 2025-03-14 PROCEDURE — 97165 OT EVAL LOW COMPLEX 30 MIN: CPT

## 2025-03-14 RX ORDER — ACETAMINOPHEN 500 MG
500 TABLET ORAL EVERY 6 HOURS PRN
Status: DISCONTINUED | OUTPATIENT
Start: 2025-03-14 | End: 2025-03-19 | Stop reason: HOSPADM

## 2025-03-14 RX ADMIN — GABAPENTIN 200 MG: 100 CAPSULE ORAL at 08:46

## 2025-03-14 RX ADMIN — THIAMINE HYDROCHLORIDE 200 MG: 100 INJECTION, SOLUTION INTRAMUSCULAR; INTRAVENOUS at 22:48

## 2025-03-14 RX ADMIN — QUETIAPINE FUMARATE 100 MG: 100 TABLET ORAL at 20:43

## 2025-03-14 RX ADMIN — THIAMINE HYDROCHLORIDE 200 MG: 100 INJECTION, SOLUTION INTRAMUSCULAR; INTRAVENOUS at 13:55

## 2025-03-14 RX ADMIN — Medication 1 CAPSULE: at 08:46

## 2025-03-14 RX ADMIN — MIDODRINE HYDROCHLORIDE 10 MG: 10 TABLET ORAL at 11:21

## 2025-03-14 RX ADMIN — GABAPENTIN 200 MG: 100 CAPSULE ORAL at 20:43

## 2025-03-14 RX ADMIN — THIAMINE HYDROCHLORIDE 200 MG: 100 INJECTION, SOLUTION INTRAMUSCULAR; INTRAVENOUS at 05:24

## 2025-03-14 RX ADMIN — DESVENLAFAXINE 50 MG: 50 TABLET, FILM COATED, EXTENDED RELEASE ORAL at 08:46

## 2025-03-14 RX ADMIN — PANTOPRAZOLE SODIUM 40 MG: 40 TABLET, DELAYED RELEASE ORAL at 16:34

## 2025-03-14 RX ADMIN — LATANOPROST 1 DROP: 50 SOLUTION OPHTHALMIC at 20:43

## 2025-03-14 RX ADMIN — MIDODRINE HYDROCHLORIDE 10 MG: 10 TABLET ORAL at 05:24

## 2025-03-14 RX ADMIN — Medication 10 ML: at 08:47

## 2025-03-14 RX ADMIN — MIDODRINE HYDROCHLORIDE 10 MG: 10 TABLET ORAL at 16:34

## 2025-03-14 RX ADMIN — FOLIC ACID 1 MG: 1 TABLET ORAL at 08:46

## 2025-03-14 RX ADMIN — ROSUVASTATIN 5 MG: 10 TABLET, FILM COATED ORAL at 08:46

## 2025-03-14 RX ADMIN — PANTOPRAZOLE SODIUM 40 MG: 40 TABLET, DELAYED RELEASE ORAL at 05:25

## 2025-03-14 RX ADMIN — SODIUM CHLORIDE 1000 MG: 900 INJECTION INTRAVENOUS at 08:47

## 2025-03-14 RX ADMIN — Medication 10 ML: at 20:43

## 2025-03-14 NOTE — PLAN OF CARE
Goal Outcome Evaluation:  Plan of Care Reviewed With: patient        Progress: no change  Outcome Evaluation: OT eval complete. Pt presents below baseline with decreased endurance, pain, balance deficits, dizziness, and decreased sensation. Pt unable to maintain NWB precautions on RLE. Max Ax2 for bed mobility. Pt reported dizziness. BP becoming more stable with functional activity. Nsg notified. O2 desat to 83% on room air. O2 placed back on in supine and pt educated on PLB. Pt deferred sitting in chair d/t feeling under the weather. Recommend IRF at D/C.    Anticipated Discharge Disposition (OT): inpatient rehabilitation facility

## 2025-03-14 NOTE — SIGNIFICANT NOTE
Called to evaluate patient for low blood pressure.  She was easy to awaken and appropriately conversant.  Suspect low blood pressure is related to her decompensated cirrhosis and has been an ongoing issue.  Will increase midodrine to TID with a dose this evening.  As long as she is mentating appropriately and MAP >55 without any evidence of end-organ damage (such as renal dysfunction), OK to remain on the floor tonight.

## 2025-03-14 NOTE — CASE MANAGEMENT/SOCIAL WORK
Continued Stay Note  [facility]     Patient Name: Philomena Davis  MRN: [id number]  Today's Date: 3/14/2025    Admit Date: 3/12/2025    Plan: TBD   Discharge Plan       Row Name 03/14/25 1147       Plan    Plan TBD    Patient/Family in Agreement with Plan yes    Plan Comments Discussed in MDR with MD. Pt will be here throughout the weekend. No current therapy recs on file. CM will cont to follow.                   Discharge Codes    No documentation.                 Expected Discharge Date and Time       Expected Discharge Date Expected Discharge Time    Mar 14, 2025 12:00 PM              Brittany Ritter RN

## 2025-03-14 NOTE — PROGRESS NOTES
Clinical Nutrition Assessment     Patient Name: Philomena Davis  YOB: 1960  MRN: [id number]  Date of Encounter: 03/13/25 20:34 EDT  Admission date: 3/12/2025  Reason for Visit: Identified at risk by screening criteria, MST score 2+, Malnutrition Severity Assessment    Assessment   Nutrition Assessment   Admission Diagnosis:  Unsteady gait [R26.81]  Falls [R29.6]  Hypotension [I95.9]    Problem List:    Unsteady gait    Essential hypertension    Hyperlipidemia    Glaucoma    GERD without esophagitis    Alcohol use disorder    Peripheral neuropathy    Falls    Hypotension      PMH:   She  has a past medical history of Alcohol withdrawal (05/10/2019), Ankle sprain (2015), Annual physical exam (09/12/2023), Anxiety and depression, Arthritis of back (2020), Arthritis of neck (2021), Asthma (2022), Cataract (20/2/1999), Chronic pain disorder (2023), Cirrhosis of liver, Closed displaced fracture of lateral malleolus of left fibula, 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 (03/24/2017), Closed trimalleolar fracture of right ankle (05/24/2023), Colon polyp (2018), COPD (chronic obstructive pulmonary disease) (2022), Digital mucous cyst of toe of left foot (03/24/2017), Eating disorder (2022), Fracture of ankle (2017), Fracture, clavicle (1969), Fracture, finger (1972), Fracture, foot (1/30/25), Ganglion cyst of left foot, GERD (gastroesophageal reflux disease) (10/22), Glaucoma, Hip arthrosis (2019), Hyperlipidemia (1999), Hypertension, Knee sprain (2019), Knee swelling (2019), Low back strain (1/15/25), Neck strain (1/30/25), Panic disorder (1987), Periarthritis of shoulder (2019), Peripheral neuropathy, Pulmonary arterial hypertension (2021), Urinary tract infection (02/23/23), Varicella (1968), and Wears glasses.    PSH:  She  has a past surgical history that includes Breast excisional biopsy (Right, 2014); Dilation and curettage, diagnostic / therapeutic; Breast  "Augmentation; Wrist surgery; pr rpr non/mal femur dstl h/n w/iliac/autog bone (Left, 03/24/2017); Ankle fracture surgery (Left, 03/24/2017); Augmentation mammaplasty (Bilateral, 1999); Esophagogastroduodenoscopy (N/A, 11/30/2022); Ankle fracture surgery (Right, 05/25/2023); Esophagogastroduodenoscopy (N/A, 02/01/2024); Foot surgery (2012); Colonoscopy (2018); [place] tooth extraction (1992); Dilation and curettage of uterus (1987); and Fracture surgery (5 /26/2023).    Applicable Nutrition History:       Anthropometrics     Height: Height: 162 cm (63.78\")  Last Filed Weight: Weight: 68 kg (149 lb 14.6 oz) (03/13/25 1515)  Method:  no method  BMI: BMI (Calculated): 25.9    UBW:  Per EMR wt of 176 lbs on 11/6/23, 148 lbs on 1/3/24 158 lbs on 3/10/25   Weight change: unable to confirm wt hx at this time    Nutrition Focused Physical Exam    Date: 3/13    Patient meets criteria for malnutrition diagnosis, see MSA note.     Subjective   Reported/Observed/Food/Nutrition Related History:     3/13  Pt allows minimal intake last 5 days. Rpt unable to take solid food at this time 2/2 discomfort from GERD. Allows uses Boost suppl at home.     Current Nutrition Prescription   PO: Diet: Cardiac, Liquid; Full Liquid; Healthy Heart (2-3 Na+); Fluid Consistency: Thin (IDDSI 0)  Oral Nutrition Supplement:   Intake: Insufficient data    Assessment & Plan   Nutrition Diagnosis   Date:  3/13            Updated:    Problem Malnutrition  moderate acute   Etiology Rpt GERD discomfort and illness   Signs/Symptoms Intake hx w some wasting   Status: New      Goal / Objectives:   Nutrition to support treatment and Establish PO      Nutrition Intervention      Follow treatment progress, Care plan reviewed, Advise alternate selection, Menu provided, Menu adjusted, Encourage intake, Supplement provided    Suggested may need swallow study.  Diet adj to full liquid 2/2 pt rpt unable to sandra solid food at this time  Boost all meals.  Follow for " intake progression as able.    Monitoring/Evaluation:   Per protocol, I&O, PO intake, Supplement intake, Pertinent labs, Weight, GI status, Symptoms    Tiny Sahu RD  Time Spent: 30 min

## 2025-03-14 NOTE — THERAPY EVALUATION
Patient Name: Philomena Davis  : 1960    MRN: [id number]                              Today's Date: 3/14/2025       Admit Date: 3/12/2025    Visit Dx:     ICD-10-CM ICD-9-CM   1. Alcoholic cirrhosis, unspecified whether ascites present  K70.30 571.2   2. Hyponatremia  E87.1 276.1   3. Hypokalemia  E87.6 276.8   4. Frequent falls  R29.6 V15.88   5. Chronic anemia  D64.9 285.9   6. Essential hypertension  I10 401.9   7. Closed displaced fracture of third metatarsal bone of right foot with routine healing, subsequent encounter  S92.331D V54.19     Patient Active Problem List   Diagnosis    Essential hypertension    Generalized anxiety disorder    Hyperlipidemia    Glaucoma    Alcoholic cirrhosis of liver without ascites    Severe malnutrition    GERD without esophagitis    Sleep disturbance    Annual physical exam    Idiopathic peripheral neuropathy    Severe anxiety with panic    Alcohol use disorder    Cigarette nicotine dependence in remission    Chronic obstructive pulmonary disease    Closed fracture of left hand    Moderate malnutrition    Lower extremity weakness    Orthostasis    Subclinical hypothyroidism    Atrial flutter    Peripheral neuropathy    Closed displaced fracture of third metatarsal bone of right foot    Injury of right foot    Unsteady gait    Falls    Hypotension     Past Medical History:   Diagnosis Date    Alcohol withdrawal 05/10/2019    Ankle sprain 2015    Annual physical exam 2023    Anxiety and depression     Arthritis of back     Arthritis of neck     Asthma     Cataract 1999    Chronic pain disorder     Cirrhosis of liver     Closed displaced fracture of lateral malleolus of left fibula     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    Closed trimalleolar fracture of right ankle 2023    Colon polyp     COPD (chronic obstructive pulmonary disease)     Digital mucous cyst of toe of left foot 2017     Eating disorder 2022    Fracture of ankle 2017    Fracture, clavicle 1969    Fracture, finger 1972    Fracture, foot 1/30/25    Ganglion cyst of left foot     GERD (gastroesophageal reflux disease) 10/22    Glaucoma     Hip arthrosis 2019    Hyperlipidemia 1999    Hypertension     Knee sprain 2019    Knee swelling 2019    Low back strain 1/15/25    Neck strain 1/30/25    Panic disorder 1987    Periarthritis of shoulder 2019    Peripheral neuropathy     Pulmonary arterial hypertension 2021    Urinary tract infection 02/23/23    Varicella 1968    Wears glasses      Past Surgical History:   Procedure Laterality Date    ANKLE OPEN REDUCTION INTERNAL FIXATION Left 03/24/2017    Procedure: LEFT ANKLE OPEN REDUCTION INTERNAL FIXATION WITH ILIAC CREST BONE GRAFT , EXCISION CYST 4TH/5TH INNERSPACE LEFT FOOT;  Surgeon: Frank Larson MD;  Location:  [facility];  Service:     ANKLE OPEN REDUCTION INTERNAL FIXATION Right 05/25/2023    Procedure: ANKLE OPEN REDUCTION INTERNAL FIXATION;  Surgeon: eDandre Reynoso MD;  Location:  [facility];  Service: Orthopedics;  Laterality: Right;    AUGMENTATION MAMMAPLASTY Bilateral 1999    BREAST AUGMENTATION      BREAST EXCISIONAL BIOPSY Right 2014    COLONOSCOPY  2018    DILATATION AND CURETTAGE  1987    DILATION AND CURETTAGE, DIAGNOSTIC / THERAPEUTIC      ENDOSCOPY N/A 11/30/2022    Procedure: ESOPHAGOGASTRODUODENOSCOPY;  Surgeon: Arelis Solano MD;  Location:  [facility];  Service: Gastroenterology;  Laterality: N/A;    ENDOSCOPY N/A 02/01/2024    Procedure: ESOPHAGOGASTRODUODENOSCOPY;  Surgeon: Brunner, Mark I, MD;  Location:  [facility];  Service: Gastroenterology;  Laterality: N/A;    FOOT SURGERY  2012    FRACTURE SURGERY  5 /26/2023    OH RPR NON/MAL FEMUR DSTL H/N W/ILIAC/AUTOG BONE Left 03/24/2017    Procedure: ILIAC CREST BONE GRAFT;  Surgeon: Frank Larson MD;  Location:  [facility];  Service: Orthopedics    WISDOM TOOTH EXTRACTION  1992    WRIST  SURGERY        General Information       Row Name 03/14/25 1426          OT Time and Intention    Document Type evaluation  -LR     Mode of Treatment occupational therapy  -LR       Row Name 03/14/25 1426          General Information    Patient Profile Reviewed yes  -LR     Prior Level of Function max assist:;bathing;min assist:;dressing;independent:;gait;bed mobility;transfer  pt reports that [facility] comes to her home to prepare meals and bathe her. Home health also comes to her home to assist with functional mobility and ADLs  -LR     Existing Precautions/Restrictions fall;other (see comments)  monitor BP, hypotensive  -LR     Barriers to Rehab medically complex;previous functional deficit  -LR       Row Name 03/14/25 1426          Living Environment    Current Living Arrangements apartment  -LR     People in Home alone  -LR       Row Name 03/14/25 1426          Home Main Entrance    Number of Stairs, Main Entrance three  -LR     Stair Railings, Main Entrance railing on right side (ascending)  -LR       Row Name 03/14/25 1426          Stairs Within Home, Primary    Number of Stairs, Within Home, Primary other (see comments)  all needs met on first floor  -LR       Row Name 03/14/25 1426          Cognition    Orientation Status (Cognition) oriented x 4  -LR       Row Name 03/14/25 1426          Safety Issues/Impairments Affecting Functional Mobility    Safety Issues Affecting Function (Mobility) safety precaution awareness;awareness of need for assistance;safety precautions follow-through/compliance;insight into deficits/self-awareness;sequencing abilities;judgment;problem-solving  -LR     Impairments Affecting Function (Mobility) balance;sensation/sensory awareness;endurance/activity tolerance;strength;shortness of breath;pain;range of motion (ROM)  -LR               User Key  (r) = Recorded By, (t) = Taken By, (c) = Cosigned By      Initials Name Provider Type    LR Tash Atwood, OT Occupational  Therapist                     Mobility/ADL's       Row Name 03/14/25 1500          Bed Mobility    Bed Mobility supine-sit  -LR     Supine-Sit [place] (Bed Mobility) maximum assist (25% patient effort);2 person assist  -LR     Bed Mobility, Safety Issues impaired trunk control for bed mobility  -LR     Assistive Device (Bed Mobility) bed rails;head of bed elevated  -LR       Row Name 03/14/25 1500          Transfers    Transfers sit-stand transfer;stand-sit transfer;bed-chair transfer  -LR     Comment, (Transfers) Pt refused to sit [facility] d/t feeling like she had the flu  -LR       Row Name 03/14/25 1500          Bed-Chair Transfer    Bed-Chair [place] (Transfers) unable to assess  -LR       Row Name 03/14/25 1500          Sit-Stand Transfer    Sit-Stand [place] (Transfers) minimum assist (75% patient effort);2 person assist  -LR     Assistive Device (Sit-Stand Transfers) walker, front-wheeled  -LR     Comment, (Sit-Stand Transfer) VC for hand placement. Unable to maintain NWB precautions  -LR       Row Name 03/14/25 1500          Stand-Sit Transfer    Stand-Sit [place] (Transfers) minimum assist (75% patient effort);2 person assist  -LR     Assistive Device (Stand-Sit Transfers) walker, front-wheeled  -LR       Row Name 03/14/25 1500          Functional Mobility    Patient was able to Ambulate no, other medical factors prevent ambulation  -LR     Reason Patient was unable to Ambulate --  unable to maintain NWB precautions  -LR       Row Name 03/14/25 1500          Activities of Daily Living    BADL Assessment/Intervention upper body dressing;lower body dressing  -LR       Row Name 03/14/25 1500          Mobility    Extremity Weight-bearing Status right lower extremity  -LR     Right Lower Extremity (Weight-bearing Status) non weight-bearing (NWB)   -LR       Row Name 03/14/25 1500          Upper Body Dressing Assessment/Training    [place] Level (Upper Body Dressing) don;front opening  garment;minimum assist (75% patient effort)  -LR     Position (Upper Body Dressing) edge of bed sitting  -LR       Row Name 03/14/25 1500          Lower Body Dressing Assessment/Training    [place] Level (Lower Body Dressing) other (see comments);moderate assist (50% patient effort)  pull up socks  -LR     Position (Lower Body Dressing) edge of bed sitting  -LR     Comment, (Lower Body Dressing) Assist to get into figure 4 sitting  -LR               User Key  (r) = Recorded By, (t) = Taken By, (c) = Cosigned By      Initials Name Provider Type    LR Tash Atwood, OT Occupational Therapist                   Obj/Interventions       Row Name 03/14/25 1516          Sensory Assessment (Somatosensory)    Sensory Assessment (Somatosensory) UE sensation intact;other (see comments)  neuropathy in BUEs  -LR       Row Name 03/14/25 1516          Vision Assessment/Intervention    Visual Impairment/Limitations WFL;corrective lenses full-time  -LR       Row Name 03/14/25 1516          Range of Motion Comprehensive    General Range of Motion bilateral upper extremity ROM WFL  -LR       Row Name 03/14/25 1516          Strength Comprehensive (MMT)    General Manual Muscle Testing (MMT) Assessment upper extremity strength deficits identified  -LR     Comment, General Manual Muscle Testing (MMT) Assessment BUE grossly 3/5 MMT  -LR       Row Name 03/14/25 1516          Balance    Balance Assessment sitting static balance;sitting dynamic balance;standing static balance  -LR     Static Sitting Balance contact guard;standby assist;other (see comments)  bouts of SBA  -LR     Dynamic Sitting Balance moderate assist;1-person assist  -LR     Position, Sitting Balance unsupported;sitting edge of bed  -LR     Static Standing Balance minimal assist;1-person assist;verbal cues  -LR     Balance Interventions sitting;standing;sit to stand;supported;static;occupation based/functional task  -LR               User Key  (r) = Recorded By, (t)  = Taken By, (c) = Cosigned By      Initials Name Provider Type    LR Tash Atwood, OT Occupational Therapist                   Goals/Plan       Row Name 03/14/25 1524          Transfer Goal 1 (OT)    Activity/Assistive Device (Transfer Goal 1, OT) sit-to-stand/stand-to-sit;bed-to-chair/chair-to-bed;commode, bedside without drop arms  -LR     [place] Level/Cues Needed (Transfer Goal 1, OT) moderate assist (50-74% patient effort);other (see comments)  x2  -LR     Time Frame (Transfer Goal 1, OT) long term goal (LTG);10 days  -LR     Progress/Outcome (Transfer Goal 1, OT) new goal;goal ongoing  -LR       Row Name 03/14/25 1524          Dressing Goal 1 (OT)    Activity/Device (Dressing Goal 1, OT) lower body dressing  -LR     [place]/Cues Needed (Dressing Goal 1, OT) minimum assist (75% or more patient effort);other (see comments)  with AE PRN  -LR     Time Frame (Dressing Goal 1, OT) short term goal (STG);1 week  -LR     Progress/Outcome (Dressing Goal 1, OT) new goal;goal ongoing  -LR       Row Name 03/14/25 1524          Grooming Goal 1 (OT)    Activity/Device (Grooming Goal 1, OT) wash face, hands;oral care  -LR     [place] (Grooming Goal 1, OT) contact guard required  -LR     Time Frame (Grooming Goal 1, OT) long term goal (LTG);10 days  -LR     Strategies/Barriers (Grooming Goal 1, OT) sink side  -LR     Progress/Outcome (Grooming Goal 1, OT) goal ongoing;new goal  -LR       Row Name 03/14/25 1524          Therapy Assessment/Plan (OT)    Planned Therapy Interventions (OT) activity tolerance training;adaptive equipment training;BADL retraining;occupation/activity based interventions;strengthening exercise;functional balance retraining;IADL retraining;passive ROM/stretching;transfer/mobility retraining;ROM/therapeutic exercise;patient/caregiver education/training;manual therapy/joint mobilization  -LR               User Key  (r) = Recorded By, (t) = Taken By, (c) = Cosigned By      Initials Name  Provider Type    LR Tash Atwood, OT Occupational Therapist                   Clinical Impression       Row Name 03/14/25 1519          Pain Assessment    Pretreatment Pain Rating 0/10 - no pain  -LR     Posttreatment Pain Rating 0/10 - no pain  -LR       Row Name 03/14/25 1519          Plan of Care Review    Plan of Care Reviewed With patient  -LR     Progress no change  -LR     Outcome Evaluation OT eval complete. Pt presents below baseline with decreased endurance, pain, balance deficits, dizziness, and decreased sensation. Pt unable to maintain NWB precautions on RLE. Max Ax2 for bed mobility. Pt reported dizziness. BP becoming more stable with functional activity. Nsg notified. O2 desat to 83% on room air. O2 placed back on in supine and pt educated on PLB. Pt deferred sitting in chair d/t feeling under the weather. Recommend IRF at D/C.  -LR       Row Name 03/14/25 1519          Therapy Assessment/Plan (OT)    Patient/Family Therapy Goal Statement (OT) PLOF  -LR     Criteria for Skilled Therapeutic Interventions Met (OT) yes;skilled treatment is necessary  -LR     Therapy Frequency (OT) daily  -LR     Predicted Duration of Therapy Intervention (OT) 10 days  -LR       Row Name 03/14/25 1519          Therapy Plan Review/Discharge Plan (OT)    Anticipated Discharge Disposition (OT) inpatient rehabilitation facility  -LR       Row Name 03/14/25 1519          Vital Signs    Pre Systolic BP Rehab 73  -LR     Pre Treatment Diastolic BP 44  -LR     Intra Systolic BP Rehab 102  -LR     Intra Treatment Diastolic BP 58  -LR     Post Systolic BP Rehab 101  -LR     Post Treatment Diastolic BP 60  -LR     Pre SpO2 (%) 96  -LR     O2 Delivery Pre Treatment nasal cannula  -LR     Intra SpO2 (%) 83  -LR     O2 Delivery Intra Treatment room air  -LR     Post SpO2 (%) 92  -LR     O2 Delivery Post Treatment nasal cannula  -LR     Pre Patient Position Supine  -LR     Intra Patient Position Standing  -LR     Post Patient  Position Supine  -LR       Row Name 03/14/25 1519          Positioning and Restraints    Pre-Treatment Position in bed  -LR     Post Treatment Position bed  -LR     In Bed notified nsg;call light within reach;encouraged to call for assist;fowlers;exit alarm on;legs elevated  -LR               User Key  (r) = Recorded By, (t) = Taken By, (c) = Cosigned By      Initials Name Provider Type    LR Tash Atwood, OT Occupational Therapist                   Outcome Measures       Row Name 03/14/25 1525          How much help from another is currently needed...    Putting on and taking off regular lower body clothing? 2  -LR     Bathing (including washing, rinsing, and drying) 2  -LR     Toileting (which includes using toilet bed pan or urinal) 2  -LR     Putting on and taking off regular upper body clothing 3  -LR     Taking care of personal grooming (such as brushing teeth) 3  -LR     Eating meals 3  -LR     AM-PAC 6 Clicks Score (OT) 15  -LR       Row Name 03/14/25 1504 03/14/25 1200       How much help from another person do you currently need...    Turning from your back to your side while in flat bed without using bedrails? 2  -KR 3  -CN    Moving from lying on back to sitting on the side of a flat bed without bedrails? 2  -KR 3  -CN    Moving to and from a bed to a chair (including a wheelchair)? 2  -KR 3  -CN    Standing up from a chair using your arms (e.g., wheelchair, bedside chair)? 2  -KR 3  -CN    Climbing 3-5 steps with a railing? 1  -KR 2  -CN    To walk in hospital room? 1  -KR 2  -CN    AM-PAC 6 Clicks Score (PT) 10  -KR 16  -CN    Highest Level of Mobility Goal 4 --> Transfer to chair/commode  -KR 5 --> Static standing  -CN      Row Name 03/14/25 0847          How much help from another person do you currently need...    Turning from your back to your side while in flat bed without using bedrails? 3  -DT     Moving from lying on back to sitting on the side of a flat bed without bedrails? 3  -DT      Moving to and from a bed to a chair (including a wheelchair)? 3  -DT     Standing up from a chair using your arms (e.g., wheelchair, bedside chair)? 3  -DT     Climbing 3-5 steps with a railing? 2  -DT     To walk in hospital room? 2  -DT     AM-PAC 6 Clicks Score (PT) 16  -DT     Highest Level of Mobility Goal 5 --> Static standing  -DT       Row Name 03/14/25 1525 03/14/25 1504       Functional Assessment    Outcome Measure Options AM-PAC 6 Clicks Daily Activity (OT)  -LR AM-PAC 6 Clicks Basic Mobility (PT)  -KR              User Key  (r) = Recorded By, (t) = Taken By, (c) = Cosigned By      Initials Name Provider Type    Destiny Herrera, RN Registered Nurse    Pedro Pablo Agrawal, RN Registered Nurse    Dina Mejia, PT Physical Therapist    LR Tash Atwood, OT Occupational Therapist                    Occupational Therapy Education       Title: PT OT SLP Therapies (In Progress)       Topic: Occupational Therapy (In Progress)       Point: ADL training (In Progress)       Learning Progress Summary            Patient Acceptance, E, NR by LR at 3/14/2025 1525                      Point: Home exercise program (In Progress)       Learning Progress Summary            Patient Acceptance, E, NR by LR at 3/14/2025 1525                      Point: Precautions (In Progress)       Learning Progress Summary            Patient Acceptance, E, NR by LR at 3/14/2025 1525                      Point: Body mechanics (In Progress)       Learning Progress Summary            Patient Acceptance, E, NR by LR at 3/14/2025 1525                                      User Key       Initials Effective Dates Name Provider Type Discipline     10/09/24 -  Tash Atwood, OT Occupational Therapist OT                  OT Recommendation and Plan  Planned Therapy Interventions (OT): activity tolerance training, adaptive equipment training, BADL retraining, occupation/activity based interventions, strengthening exercise,  functional balance retraining, IADL retraining, passive ROM/stretching, transfer/mobility retraining, ROM/therapeutic exercise, patient/caregiver education/training, manual therapy/joint mobilization  Therapy Frequency (OT): daily  Plan of Care Review  Plan of Care Reviewed With: patient  Progress: no change  Outcome Evaluation: OT eval complete. Pt presents below baseline with decreased endurance, pain, balance deficits, dizziness, and decreased sensation. Pt unable to maintain NWB precautions on RLE. Max Ax2 for bed mobility. Pt reported dizziness. BP becoming more stable with functional activity. Nsg notified. O2 desat to 83% on room air. O2 placed back on in supine and pt educated on PLB. Pt deferred sitting in chair d/t feeling under the weather. Recommend IRF at D/C.     Time Calculation:   Evaluation Complexity (OT)  Review Occupational Profile/Medical/Therapy History Complexity: brief/low complexity  Assessment, Occupational Performance/Identification of Deficit Complexity: 1-3 performance deficits  Clinical Decision Making Complexity (OT): problem focused assessment/low complexity  Overall Complexity of Evaluation (OT): low complexity     Time Calculation- OT       Row Name 03/14/25 1526             Time Calculation- OT    OT Start Time 1400  -LR      OT Received On 03/14/25  -LR      OT Goal Re-Cert Due Date 03/24/25  -LR         Untimed Charges    OT Eval/Re-eval Minutes 48  -LR         Total Minutes    Untimed Charges Total Minutes 48  -LR       Total Minutes 48  -LR                User Key  (r) = Recorded By, (t) = Taken By, (c) = Cosigned By      Initials Name Provider Type    LR Tash Atwood OT Occupational Therapist                  Therapy Charges for Today       Code Description Service Date Service Provider Modifiers Qty    [id number]  OT EVAL LOW COMPLEXITY 4 3/14/2025 Tash Atwood OT GO 1                 Tash Atwood OT  3/14/2025

## 2025-03-14 NOTE — CASE MANAGEMENT/SOCIAL WORK
Continued Stay Note  [facility]     Patient Name: Philomena Davis  MRN: [id number]  Today's Date: 3/14/2025    Admit Date: 3/12/2025    Plan: TBD   Discharge Plan       Row Name 03/14/25 1147       Plan    Plan TBD    Patient/Family in Agreement with Plan yes    Plan Comments Discussed in MDR with MD. Pt will be here throughout the weekend. No current therapy recs on file. CM will cont to follow.                   Discharge Codes    No documentation.                 Expected Discharge Date and Time       Expected Discharge Date Expected Discharge Time    Mar 14, 2025 12:00 PM              Brittany Ritter RN

## 2025-03-14 NOTE — PROGRESS NOTES
"    [facility] Medicine Services  PROGRESS NOTE    Patient Name: Philomena Davis  : 1960  MRN: [id number]    Date of Admission: 3/12/2025  Primary Care Physician: Unique Brandt DO    Subjective   Subjective     CC: f/u hypotension    HPI: Up in bed. Doing okay but now \"feels like I'm getting the flu.\" Eating some breakfast.       Objective   Objective     Vital Signs:   Temp:  [98.3 °F (36.8 °C)-99 °F (37.2 °C)] 99 °F (37.2 °C)  Heart Rate:  [76-90] 81  Resp:  [16-18] 16  BP: (72-99)/(44-60) 99/58  Flow (L/min) (Oxygen Therapy):  [2] 2     Physical Exam:  Constitutional: No acute distress, awake, alert  HENT: NCAT, mucous membranes moist  Respiratory: Clear to auscultation bilaterally, respiratory effort normal   Cardiovascular: RRR, no murmurs, rubs, or gallops  Gastrointestinal: Positive bowel sounds, soft, nontender, nondistended  Musculoskeletal: No bilateral ankle edema  Psychiatric: Appropriate affect, cooperative  Neurologic: Oriented x 3, strength symmetric in all extremities, Cranial Nerves grossly intact to confrontation, speech clear  Skin: No rashes     Results Reviewed:  LAB RESULTS:      Lab 25  0014 25  1725 25  1057 25  0943 25  0540 25  0415   WBC  --   --   --  7.29  --  11.54*   HEMOGLOBIN 8.0* 7.8*  --  7.8*  --  10.6*   HEMATOCRIT 24.3* 23.9*  --  24.0*  --  30.9*   PLATELETS  --   --   --  118*  --  180   NEUTROS ABS  --   --   --  5.39  --  8.99*   IMMATURE GRANS (ABS)  --   --   --  0.03  --  0.07*   LYMPHS ABS  --   --   --  0.97  --  1.19   MONOS ABS  --   --   --  0.84  --  1.21*   EOS ABS  --   --   --  0.03  --  0.03   MCV  --   --   --  108.1*  --  103.7*   LACTATE  --   --  1.7  --   --   --    HSTROP T  --   --   --   --  16* 16*         Lab 25  0943 25  0415   SODIUM  --  131* 128*   POTASSIUM 4.9 3.3* 2.7*   CHLORIDE  --  95* 86*   CO2  --  23.0 26.0   ANION GAP  --  13.0 16.0* "   BUN  --  8 5*   CREATININE  --  0.81 0.75   EGFR  --  82.2 90.1   GLUCOSE  --  86 106*   CALCIUM  --  7.7* 8.9   MAGNESIUM  --   --  1.9   TSH  --   --  3.370         Lab 03/13/25  0943 03/12/25  0415   TOTAL PROTEIN 6.1  6.1 8.0   ALBUMIN 3.0*  3.0* 3.2*   GLOBULIN 3.1 4.8   ALT (SGPT) 26  26 45*   AST (SGOT) 105*  105* 195*   BILIRUBIN 9.5*  9.5* 10.9*   INDIRECT BILIRUBIN 3.6  --    BILIRUBIN DIRECT 5.9*  --    ALK PHOS 147*  147* 233*   LIPASE  --  17         Lab 03/12/25  0540 03/12/25  0415   PROBNP  --  572.0   HSTROP T 16* 16*             Lab 03/13/25  0943 03/12/25  0804   IRON 77  --    IRON SATURATION (TSAT) 86*  --    TIBC 89*  --    TRANSFERRIN 60*  --    FERRITIN 671.80*  --    FOLATE  --  7.58   VITAMIN B 12  --  1,566*         Brief Urine Lab Results  (Last result in the past 365 days)        Color   Clarity   Blood   Leuk Est   Nitrite   Protein   CREAT   Urine HCG        03/14/25 0529 Orange   Cloudy   Negative   Small (1+)   Positive   Trace                   Microbiology Results Abnormal       None            Duplex Portal Hepatic Complete CAR  Result Date: 3/13/2025  DUPLEX PORTAL HEPATIC COMPLETE CAR Date of Exam: 3/13/2025 11:20 AM EDT Indication: portal vein thrombosis. Comparison: No comparisons available. Technique: Grayscale, color flow, and Doppler spectral waveform analysis was performed of the portal circulation. Findings: There is marked coarsening of the echotexture within the liver which limited the examination. Arterial blood flow was normal. Flow within the hepatic veins splenic veins IVC and SMV appear normal. Flow within the portal vein was difficult to assess. There is at least bidirectional flow present. Given the relatively slow flow, intraluminal thrombus cannot be excluded but was not clearly identified.     Impression: Impression: 1.Marked coarsening of the echotexture within the liver which limited the examination. 2.Flow within the portal vein was difficult to  assess. There is at least bidirectional flow present. Given the relatively slow flow, intraluminal thrombus cannot be excluded but was not clearly identified. Electronically Signed: Howard Tejada MD  3/13/2025 4:33 PM EDT  Workstation ID: [id number]      Results for orders placed during the hospital encounter of 11/10/24    Adult Transthoracic Echo Complete W/ Cont if Necessary Per Protocol    Interpretation Summary    Left ventricular systolic function is normal. Estimated left ventricular EF = 65%    The cardiac valves are anatomically and functionally normal.      Current medications:  Scheduled Meds:cefTRIAXone, 1,000 mg, Intravenous, Q24H  desvenlafaxine, 50 mg, Oral, Daily  [Held by provider] enoxaparin sodium, 40 mg, Subcutaneous, Daily  folic acid, 1 mg, Oral, Daily  gabapentin, 200 mg, Oral, Q12H  lactobacillus acidophilus, 1 capsule, Oral, Daily  latanoprost, 1 drop, Both Eyes, Nightly  [Held by provider] metoprolol succinate XL, 25 mg, Oral, Daily  midodrine, 10 mg, Oral, TID AC  pantoprazole, 40 mg, Oral, BID AC  QUEtiapine, 100 mg, Oral, Nightly  rosuvastatin, 5 mg, Oral, Daily  sodium chloride, 10 mL, Intravenous, Q12H  thiamine (B-1) IV, 200 mg, Intravenous, Q8H   Followed by  [START ON 3/17/2025] thiamine, 100 mg, Oral, Daily      Continuous Infusions:lactated ringers, 125 mL/hr, Last Rate: 125 mL/hr (03/13/25 1746)      PRN Meds:.  senna-docusate sodium **AND** polyethylene glycol **AND** bisacodyl **AND** bisacodyl    Calcium Replacement - Follow Nurse / BPA Driven Protocol    diazePAM **OR** diazePAM **OR** diazePAM **OR** diazePAM **OR** diazePAM **OR** diazePAM    HYDROcodone-acetaminophen    Magnesium Standard Dose Replacement - Follow Nurse / BPA Driven Protocol    melatonin    ondansetron    Phosphorus Replacement - Follow Nurse / BPA Driven Protocol    Potassium Replacement - Follow Nurse / BPA Driven Protocol    [COMPLETED] Insert Peripheral IV **AND** sodium chloride    sodium  chloride    Assessment & Plan   Assessment & Plan     Active Hospital Problems    Diagnosis  POA    **Unsteady gait [R26.81]  Yes    Hypotension [I95.9]  Yes    Falls [R29.6]  Not Applicable    Peripheral neuropathy [G62.9]  Yes    Alcohol use disorder [F10.90]  Yes    GERD without esophagitis [K21.9]  Yes    Hyperlipidemia [E78.5]  Yes    Glaucoma [H40.9]  Yes    Essential hypertension [I10]  Yes      Resolved Hospital Problems   No resolved problems to display.        Brief Hospital Course to date:  Philomena Davis is a 64 y.o. female with significant medical history for HTN, HLD, chronic pain disorder, cirrhosis of the liver, COPD, GERD, and foot fracture who presents to [facility] with complaints of frequent falls injuries.      ETOH hepatitis w/ cirrhosis  Hypotension  -Patient bili up to 10.9 from previously normal baseline. Suspect this is ETOH hepatitis (DF 27) - repeat labs are improving. Continue to monitor.  -Despite Nathalie Salgado RN attempting to quote our conversation early this morning she was unsuccessful in documenting this appropriately. I believe the patient has decompensated liver disease with portal hypertension which has caused splanchnic vasodilation which causes an expected drop in BP. I also think she is quite volume depleted from ETOH abuse and not eating/drinking for several days. Multiple physicians have d/w nursing that patient continues to be mentating well, having adequate UOP, no end organ damage and remains suitable for floor.  -Midodrine increased to TID.  -Check portal duplex.    Body aches  UTI  -Check respiratory PCR.  -Urine culture pending. CTX     Acute/Chronic anemia  TCP  -Likely related to ETOH marrow suppession but anemia studies including FOBT are pending.  -No evidence of bleeding or reports of melena. Increase ppi to BID.  -Serial h/h  -Hold lovenox.     Lisfranc and third metatarsal fracture   Unsteady gait  ETOH neuropathy  Frequent falls  -Patient  reports being sent in by her PT and by orthopedic surgery for us to help her because she lives alone and feels like she is unable to manage. Somehow though she manages to continue to drink ETOH heavily.   -Was recently seen by Dr. Noyola - SHARON HOGAN  -PT/OT.     Alcohol abuse  ETOH neuropathy  -CIWA protocol  -Seizure precautions  -Continue thiamine and folic acid  -Addiction team     HTN  HLD  -Continue statin  -Continue metoprolol     Depression  -Continue Pristiq     Peripheral neuropathy  -Continue gabapentin     Glaucoma  -Continue home eyedrops       Expected Discharge Location and Transportation:   Expected Discharge   Expected Discharge Date: 3/14/2025; Expected Discharge Time: 12:00 PM     VTE Prophylaxis:  Pharmacologic VTE prophylaxis orders are present.         AM-PAC 6 Clicks Score (PT): 16 (03/14/25 3008)    CODE STATUS:   Code Status and Medical Interventions: CPR (Attempt to Resuscitate); Full Support   Ordered at: 03/12/25 0617     Code Status (Patient has no pulse and is not breathing):    CPR (Attempt to Resuscitate)     Medical Interventions (Patient has pulse or is breathing):    Full Support     Level Of Support Discussed With:    Patient       Juliane Vieira II, DO  03/14/25

## 2025-03-14 NOTE — PLAN OF CARE
Goal Outcome Evaluation:  Plan of Care Reviewed With: patient           Outcome Evaluation: Pt presents with strength, balance, endurance, and safety awareness below baseline contributing to bed mobility, transfer, and ambulation deficits. Pt required 2-person assist for bed mobility this date and was unable to maintain NWB precautions in the RLE, as well as admitted to not maintaining NWB precautions at home. Pt will benefit from PT to address aforementioned deficits. PT rec IRF upon dc.    Anticipated Discharge Disposition (PT): inpatient rehabilitation facility

## 2025-03-14 NOTE — CASE MANAGEMENT/SOCIAL WORK
"Continued Stay Note  [facility]     Patient Name: Philomena Davis  MRN: [id number]  Today's Date: 3/14/2025    Admit Date: 3/12/2025    Plan: Home With AUD Resources   Discharge Plan       Row Name 03/14/25 1716       Plan    Plan Home With AUD Resources    Plan Comments Chief Complaint:  Frequent falls     HPI - copied from JASE Holland's note dated 02/12/25  Philomena Davis is a 64 y.o. female with significant medical history for HTN, HLD, chronic pain disorder, cirrhosis of the liver, COPD, GERD, and foot fracture who presents to [facility] with complaints of frequent falls injuries.      Patient presents to [facility] after sustaining multiple falls at home.  Her last reported fall was on March 6th, but patient has become increasingly more anxious about anticipated falls, and feels as though she can no longer care for herself.  She is following with outpatient orthopedics for a right foot fracture after sustaining a fall, her last follow-up was on March 10 th, she had her hard cast removed and is now in a walking boot.  She was advised by her orthopedic provider and physical therapy to report to ED for any subsequent falls.     Patient denies headache, fever, chest pain, or shortness of breath above baseline.  She does report intermittent nausea, 1 episode of vomiting on 3/11, and 3 episodes of diarrhea on 3/11.  She has no other acute complaints.  Patient does have a significant history for cirrhosis, and reports drinking 1 glass of wine at 9 PM on 3/11.     ETOH Last Ingestion: March 11, 2025  ETOH Level at Admission: 49  ETOH History: Patient reported she has been \"drinking for years\".  ETOH Current Consumption: 3-4 Drinks Per Day  CIWA: Highest - 24; Most Recent - 0 (12:00 p.m.) CIWA has been 0 since 8:00 p.m. on 03/12/25  Pertinent Labs: AST/ALT - 195/45, Bilirubin - 10.9  Withdrawal Potential: Appears to be minimal at this time.  Previous Treatment: N/A  Longest Length of " "Sobriety: Patient reported she has previously had nine years sober. She stated, \"I quit cold turkey\".  Current Stressors: I asked her if living alone was stressful and she replied, \"No, I like it\"; however, when asked about her drinking she says, \"I'm lonely. I drink to pass the time away\".  Motivation for Change: Appears minimal at this time.  Potential Barriers: Continuing to live alone. Reporting she is unable to care for herself.  Discussion Regarding Medications for AUD (MAT):  Advised that these medications do not \"stop\" alcohol use; instead, they may decrease the pleasure/reward from alcohol consumption.  Evaluated risks and benefits of medications.    This writer educated the patient on medications, in layperson's terms, at an 8th-grade comprehension level.    Medications discussed:  Acamprosate - discussed MERE/Glutamate and that the patient must take the medication 3 x daily- which may be a barrier to many patients.  Acamprosate is favored in severe liver disease.  Naltrexone/Vivitrol - discussed how this medication works by \"capping\" the receptor in the brain. Explained that the oral form, Naltrexone, is typically prescribed for 14 days; if tolerated, the IM form-Vivitrol can be administered after that period. The patient is aware that a person needs to be sober for approximately 10 days before initiation of this med.   Naltrexone/Vivitrol is not recommended in the setting of severe liver disease.  Baclofen - discussed the use for helping to balance MERE/glutamate to decrease cravings.  Antabuse - discussed that it is a deterrent medication and has to be taken daily for full effect. Compliance with this medication is a factor, as the patient can choose not to take it to avoid the unpleasant effects of combining alcohol and antabuse- nausea, vomiting, and dizziness.  Patient reports previously taking medication for AUD; however, she could not remember the name of it and none of the above sounded familiar " to her.  Harm Reduction: A comprehensive treatment resource packet was provided which includes: Inpatient, IOP, Clinics, [facility] (recovery community with coaches and a multitude of self-help support groups offered), and [facility].  The packet also contains our outreach numbers for assistance post discharge from the hospital.  Recommendations: That patient considers seeking MAT for her AUD.  Tentative Plan: Our team will continue to follow and offer recovery support and services.                   Discharge Codes    No documentation.                 Expected Discharge Date and Time       Expected Discharge Date Expected Discharge Time    Mar 14, 2025 12:00 PM              Ainsley Mistry    Addiction Treatment Navigator  Ext. [phone]

## 2025-03-14 NOTE — THERAPY EVALUATION
Patient Name: Philomena Davis  : 1960    MRN: [id number]                              Today's Date: 3/14/2025       Admit Date: 3/12/2025    Visit Dx:     ICD-10-CM ICD-9-CM   1. Alcoholic cirrhosis, unspecified whether ascites present  K70.30 571.2   2. Hyponatremia  E87.1 276.1   3. Hypokalemia  E87.6 276.8   4. Frequent falls  R29.6 V15.88   5. Chronic anemia  D64.9 285.9   6. Essential hypertension  I10 401.9   7. Closed displaced fracture of third metatarsal bone of right foot with routine healing, subsequent encounter  S92.331D V54.19     Patient Active Problem List   Diagnosis    Essential hypertension    Generalized anxiety disorder    Hyperlipidemia    Glaucoma    Alcoholic cirrhosis of liver without ascites    Severe malnutrition    GERD without esophagitis    Sleep disturbance    Annual physical exam    Idiopathic peripheral neuropathy    Severe anxiety with panic    Alcohol use disorder    Cigarette nicotine dependence in remission    Chronic obstructive pulmonary disease    Closed fracture of left hand    Moderate malnutrition    Lower extremity weakness    Orthostasis    Subclinical hypothyroidism    Atrial flutter    Peripheral neuropathy    Closed displaced fracture of third metatarsal bone of right foot    Injury of right foot    Unsteady gait    Falls    Hypotension     Past Medical History:   Diagnosis Date    Alcohol withdrawal 05/10/2019    Ankle sprain 2015    Annual physical exam 2023    Anxiety and depression     Arthritis of back     Arthritis of neck     Asthma     Cataract 1999    Chronic pain disorder     Cirrhosis of liver     Closed displaced fracture of lateral malleolus of left fibula     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    Closed trimalleolar fracture of right ankle 2023    Colon polyp     COPD (chronic obstructive pulmonary disease)     Digital mucous cyst of toe of left foot 2017     Eating disorder 2022    Fracture of ankle 2017    Fracture, clavicle 1969    Fracture, finger 1972    Fracture, foot 1/30/25    Ganglion cyst of left foot     GERD (gastroesophageal reflux disease) 10/22    Glaucoma     Hip arthrosis 2019    Hyperlipidemia 1999    Hypertension     Knee sprain 2019    Knee swelling 2019    Low back strain 1/15/25    Neck strain 1/30/25    Panic disorder 1987    Periarthritis of shoulder 2019    Peripheral neuropathy     Pulmonary arterial hypertension 2021    Urinary tract infection 02/23/23    Varicella 1968    Wears glasses      Past Surgical History:   Procedure Laterality Date    ANKLE OPEN REDUCTION INTERNAL FIXATION Left 03/24/2017    Procedure: LEFT ANKLE OPEN REDUCTION INTERNAL FIXATION WITH ILIAC CREST BONE GRAFT , EXCISION CYST 4TH/5TH INNERSPACE LEFT FOOT;  Surgeon: Frank Larson MD;  Location:  [facility];  Service:     ANKLE OPEN REDUCTION INTERNAL FIXATION Right 05/25/2023    Procedure: ANKLE OPEN REDUCTION INTERNAL FIXATION;  Surgeon: Deandre Reynoso MD;  Location:  [facility];  Service: Orthopedics;  Laterality: Right;    AUGMENTATION MAMMAPLASTY Bilateral 1999    BREAST AUGMENTATION      BREAST EXCISIONAL BIOPSY Right 2014    COLONOSCOPY  2018    DILATATION AND CURETTAGE  1987    DILATION AND CURETTAGE, DIAGNOSTIC / THERAPEUTIC      ENDOSCOPY N/A 11/30/2022    Procedure: ESOPHAGOGASTRODUODENOSCOPY;  Surgeon: Arelis Solano MD;  Location:  [facility];  Service: Gastroenterology;  Laterality: N/A;    ENDOSCOPY N/A 02/01/2024    Procedure: ESOPHAGOGASTRODUODENOSCOPY;  Surgeon: Brunner, Mark I, MD;  Location:  [facility];  Service: Gastroenterology;  Laterality: N/A;    FOOT SURGERY  2012    FRACTURE SURGERY  5 /26/2023    DE RPR NON/MAL FEMUR DSTL H/N W/ILIAC/AUTOG BONE Left 03/24/2017    Procedure: ILIAC CREST BONE GRAFT;  Surgeon: Frank Larson MD;  Location:  [facility];  Service: Orthopedics    WISDOM TOOTH EXTRACTION  1992    WRIST  SURGERY        General Information       Row Name 03/14/25 1451          Physical Therapy Time and Intention    Document Type evaluation  -KR     Mode of Treatment physical therapy  -KR       Row Name 03/14/25 1451          General Information    Patient Profile Reviewed yes  -KR     Prior Level of Function max assist:;bathing;min assist:;dressing;independent:;gait;transfer;bed mobility  Pt admits that she is unable to maintain NWB status in the home; pt reports that [facility] comes to her home to prepare meals and bathe her. Home health also comes to her home to assist with functional mobility and ADLs.  -KR     Existing Precautions/Restrictions fall;other (see comments)  hypotensive  -KR     Barriers to Rehab medically complex;previous functional deficit  -KR       Row Name 03/14/25 1451          Living Environment    Current Living Arrangements apartment  -KR     People in Home alone  -KR       Row Name 03/14/25 1451          Home Main Entrance    Number of Stairs, Main Entrance three  -KR     Stair Railings, Main Entrance railing on right side (ascending)  -KR       Row Name 03/14/25 1451          Stairs Within Home, Primary    Stairs, Within Home, Primary first floor bedroom set up  -KR       Row Name 03/14/25 1451          Cognition    Orientation Status (Cognition) oriented x 4  -KR       Row Name 03/14/25 1451          Safety Issues/Impairments Affecting Functional Mobility    Safety Issues Affecting Function (Mobility) insight into deficits/self-awareness;awareness of need for assistance;problem-solving;safety precaution awareness;sequencing abilities;safety precautions follow-through/compliance;unable to maintain weight-bearing restrictions;judgment  -KR     Impairments Affecting Function (Mobility) balance;sensation/sensory awareness;endurance/activity tolerance;strength;shortness of breath;pain;range of motion (ROM)  -KR               User Key  (r) = Recorded By, (t) = Taken By, (c) = Cosigned  By      Initials Name Provider Type    Dina Mejia PT Physical Therapist                   Mobility       Row Name 03/14/25 1454          Bed Mobility    Bed Mobility supine-sit;sit-supine;scooting/bridging  -KR     Scooting/Bridging [place] (Bed Mobility) dependent (less than 25% patient effort);2 person assist  -KR     Supine-Sit [place] (Bed Mobility) maximum assist (25% patient effort);2 person assist  -KR     Sit-Supine [place] (Bed Mobility) maximum assist (25% patient effort);2 person assist  -KR     Assistive Device (Bed Mobility) bed rails;head of bed elevated;repositioning sheet  -KR     Comment, (Bed Mobility) assist req'd at BLEs and trunk  -KR       Row Name 03/14/25 1454          Bed-Chair Transfer    Bed-Chair [place] (Transfers) unable to assess  -KR     Comment, (Bed-Chair Transfer) pt refuses [facility] d/t c/o feeling flu-rodney.  -KR       Row Name 03/14/25 1454          Sit-Stand Transfer    Sit-Stand [place] (Transfers) minimum assist (75% patient effort);2 person assist  -KR     Assistive Device (Sit-Stand Transfers) walker, front-wheeled  -KR     Comment, (Sit-Stand Transfer) 1x from chair with verbal and tactile cues for hand placement. Pt unable to maintain NWB precautions in standing despite max cueing.  -KR       Row Name 03/14/25 1454          Gait/Stairs (Locomotion)    [place] Level (Gait) unable to assess  -KR     Comment, (Gait/Stairs) pt unable to maintain NWB precautions  -KR       Row Name 03/14/25 1457          Mobility    Extremity Weight-bearing Status right lower extremity  -KR     Right Lower Extremity (Weight-bearing Status) non weight-bearing (NWB)  -KR               User Key  (r) = Recorded By, (t) = Taken By, (c) = Cosigned By      Initials Name Provider Type    Dina Mejia PT Physical Therapist                   Obj/Interventions       Row Name 03/14/25 1454          Range of Motion Comprehensive    General Range of Motion no range of  motion deficits identified  -       Row Name 03/14/25 1455          Strength Comprehensive (MMT)    General Manual Muscle Testing (MMT) Assessment lower extremity strength deficits identified  -KR     Comment, General Manual Muscle Testing (MMT) Assessment BLEs grossly 4/5, aside from R ankle DF/PF not tested  -KR       Row Name 03/14/25 1455          Balance    Balance Assessment sitting static balance;sitting dynamic balance;standing static balance  -KR     Static Sitting Balance contact guard  -KR     Dynamic Sitting Balance moderate assist;1-person assist  -KR     Position, Sitting Balance unsupported;sitting edge of bed  -KR     Static Standing Balance minimal assist;1-person assist;non-verbal cues (demo/gesture);verbal cues  -KR     Position/Device Used, Standing Balance supported;walker, front-wheeled  -KR     Balance Interventions sitting;standing;sit to stand;static;supported;dynamic  -KR       Row Name 03/14/25 145          Sensory Assessment (Somatosensory)    Sensory Assessment (Somatosensory) LE sensation intact  -KR               User Key  (r) = Recorded By, (t) = Taken By, (c) = Cosigned By      Initials Name Provider Type    KR Dina Saldivar, PT Physical Therapist                   Goals/Plan       Row Name 03/14/25 1501          Bed Mobility Goal 1 (PT)    Activity/Assistive Device (Bed Mobility Goal 1, PT) bed mobility activities, all  -KR     [place] Level/Cues Needed (Bed Mobility Goal 1, PT) minimum assist (75% or more patient effort)  -KR     Time Frame (Bed Mobility Goal 1, PT) short term goal (STG);1 week  -       Row Name 03/14/25 1501          Transfer Goal 1 (PT)    Activity/Assistive Device (Transfer Goal 1, PT) sit-to-stand/stand-to-sit;bed-to-chair/chair-to-bed;walker, rolling;wheelchair transfer  -KR     [place] Level/Cues Needed (Transfer Goal 1, PT) contact guard required  -KR     Time Frame (Transfer Goal 1, PT) long term goal (LTG);2 weeks  -KR      Strategies/Barriers (Transfers Goal 1, PT) maintaining NWB precautions  -KR       Row Name 03/14/25 1501          Problem Specific Goal 1 (PT)    Problem Specific Goal 1 (PT) Pt will self propel manual wc 50' with SBA for improved household navigation.  -KR     Time Frame (Problem Specific Goal 1, PT) long-term goal (LTG);2 weeks  -KR       Row Name 03/14/25 1501          Therapy Assessment/Plan (PT)    Planned Therapy Interventions (PT) balance training;bed mobility training;gait training;home exercise program;patient/family education;postural re-education;transfer training;stretching;strengthening;ROM (range of motion);neuromuscular re-education;stair training;wheelchair management/propulsion training  -KR               User Key  (r) = Recorded By, (t) = Taken By, (c) = Cosigned By      Initials Name Provider Type    KR Dina Saldivar, PT Physical Therapist                   Clinical Impression       Row Name 03/14/25 1453          Pain    Pretreatment Pain Rating 0/10 - no pain  -KR     Posttreatment Pain Rating 0/10 - no pain  -KR       Row Name 03/14/25 1453          Plan of Care Review    Plan of Care Reviewed With patient  -KR     Outcome Evaluation Pt presents with strength, balance, endurance, and safety awareness below baseline contributing to bed mobility, transfer, and ambulation deficits. Pt required 2-person assist for bed mobility this date and was unable to maintain NWB precautions in the RLE, as well as admitted to not maintaining NWB precautions at home. Pt will benefit from PT to address aforementioned deficits. PT rec IRF upon dc.  -KR       Row Name 03/14/25 0474          Therapy Assessment/Plan (PT)    Patient/Family Therapy Goals Statement (PT) to get better  -KR     Rehab Potential (PT) good  -KR     Criteria for Skilled Interventions Met (PT) skilled treatment is necessary;yes  -KR     Therapy Frequency (PT) daily  -KR     Predicted Duration of Therapy Intervention (PT) 2 weeks  -KR        Row Name 03/14/25 1456          Vital Signs    Pre Systolic BP Rehab 73  -KR     Pre Treatment Diastolic BP 44  -KR     Intra Systolic BP Rehab 102  -KR     Intra Treatment Diastolic BP 58  -KR     Post Systolic BP Rehab 101  -KR     Post Treatment Diastolic BP 60  -KR     Pre Patient Position Supine  -KR     Intra Patient Position Sitting  -KR     Post Patient Position Supine  -KR       Row Name 03/14/25 1456          Positioning and Restraints    Pre-Treatment Position in bed  -KR     Post Treatment Position bed  -KR     In Bed notified nsg;fowlers;call light within reach;encouraged to call for assist;exit alarm on;side rails up x2  -KR               User Key  (r) = Recorded By, (t) = Taken By, (c) = Cosigned By      Initials Name Provider Type    Dina Mejia, PT Physical Therapist                   Outcome Measures       Row Name 03/14/25 1504 03/14/25 1200       How much help from another person do you currently need...    Turning from your back to your side while in flat bed without using bedrails? 2  -KR 3  -CN    Moving from lying on back to sitting on the side of a flat bed without bedrails? 2  -KR 3  -CN    Moving to and from a bed to a chair (including a wheelchair)? 2  -KR 3  -CN    Standing up from a chair using your arms (e.g., wheelchair, bedside chair)? 2  -KR 3  -CN    Climbing 3-5 steps with a railing? 1  -KR 2  -CN    To walk in hospital room? 1  -KR 2  -CN    AM-PAC 6 Clicks Score (PT) 10  -KR 16  -CN    Highest Level of Mobility Goal 4 --> Transfer to chair/commode  -KR 5 --> Static standing  -CN      Row Name 03/14/25 0847          How much help from another person do you currently need...    Turning from your back to your side while in flat bed without using bedrails? 3  -DT     Moving from lying on back to sitting on the side of a flat bed without bedrails? 3  -DT     Moving to and from a bed to a chair (including a wheelchair)? 3  -DT     Standing up from a chair using your arms (e.g.,  wheelchair, bedside chair)? 3  -DT     Climbing 3-5 steps with a railing? 2  -DT     To walk in hospital room? 2  -DT     AM-PAC 6 Clicks Score (PT) 16  -DT     Highest Level of Mobility Goal 5 --> Static standing  -DT       Row Name 03/14/25 1504          Functional Assessment    Outcome Measure Options AM-PAC 6 Clicks Basic Mobility (PT)  -KR               User Key  (r) = Recorded By, (t) = Taken By, (c) = Cosigned By      Initials Name Provider Type    Destiny Herrera, RN Registered Nurse    Pedro Pablo Agrawal RN Registered Nurse    Dina Mejia, PT Physical Therapist                                 Physical Therapy Education       Title: PT OT SLP Therapies (In Progress)       Topic: Physical Therapy (Not Started)       Point: Mobility training (In Progress)       Learning Progress Summary            Patient Acceptance, E, NR by KR at 3/14/2025 1504                      Point: Body mechanics (In Progress)       Learning Progress Summary            Patient Acceptance, E, NR by KR at 3/14/2025 1504                      Point: Precautions (In Progress)       Learning Progress Summary            Patient Acceptance, E, NR by KR at 3/14/2025 1504                                      User Key       Initials Effective Dates Name Provider Type Discipline     12/30/22 -  Dina Saldivar, KEILA Physical Therapist PT                  PT Recommendation and Plan  Planned Therapy Interventions (PT): balance training, bed mobility training, gait training, home exercise program, patient/family education, postural re-education, transfer training, stretching, strengthening, ROM (range of motion), neuromuscular re-education, stair training, wheelchair management/propulsion training  Outcome Evaluation: Pt presents with strength, balance, endurance, and safety awareness below baseline contributing to bed mobility, transfer, and ambulation deficits. Pt required 2-person assist for bed mobility this date and was unable to  maintain NWB precautions in the RLE, as well as admitted to not maintaining NWB precautions at home. Pt will benefit from PT to address aforementioned deficits. PT rec IRF upon dc.     Time Calculation:   PT Evaluation Complexity  History, PT Evaluation Complexity: 3 or more personal factors and/or comorbidities  Examination of Body Systems (PT Eval Complexity): total of 4 or more elements  Clinical Presentation (PT Evaluation Complexity): evolving  Clinical Decision Making (PT Evaluation Complexity): moderate complexity  Overall Complexity (PT Evaluation Complexity): moderate complexity     PT Charges       Row Name 03/14/25 1505             Time Calculation    Start Time 1405  -KR      PT Received On 03/14/25  -KR      PT Goal Re-Cert Due Date 03/24/25  -KR         Untimed Charges    PT Eval/Re-eval Minutes 50  -KR         Total Minutes    Untimed Charges Total Minutes 50  -KR       Total Minutes 50  -KR                User Key  (r) = Recorded By, (t) = Taken By, (c) = Cosigned By      Initials Name Provider Type    Dina Mejia, PT Physical Therapist                  Therapy Charges for Today       Code Description Service Date Service Provider Modifiers Qty    15904613606 HC PT EVAL MOD COMPLEXITY 4 3/14/2025 Dina Saldivar, PT GP 1            PT G-Codes  Outcome Measure Options: AM-PAC 6 Clicks Basic Mobility (PT)  AM-PAC 6 Clicks Score (PT): 10  PT Discharge Summary  Anticipated Discharge Disposition (PT): inpatient rehabilitation facility    Dina Saldivar PT  3/14/2025

## 2025-03-14 NOTE — PROGRESS NOTES
Malnutrition Severity Assessment    Patient Name:  Philomena Davis  YOB: 1960  MRN: [id number]  Admit Date:  3/12/2025         Comments:      Malnutrition Severity Assessment  Malnutrition Type: Acute Disease or Injury - Related Malnutrition  Malnutrition Type (Last 8 Hours)       Malnutrition Severity Assessment       Row Name 03/13/25 2031       Malnutrition Severity Assessment    Malnutrition Type Acute Disease or Injury - Related Malnutrition      Row Name 03/13/25 2031       Insufficient Energy Intake     Insufficient Energy Intake Findings Severe    Insufficient Energy Intake  < or equal to 50% of est. energy requirement for > or equal to 5d)      Row Name 03/13/25 2031       Unintentional Weight Loss     Unintentional Weight Loss Findings --  unable to confirm      Row Name 03/13/25 2031       Muscle Loss    Loss of Muscle Mass Findings --  not able to evaluate all sites.    [place] Region --  mild    Clavicle Bone Region --  mild    Acromion Bone Region None    Scapular Bone Region --  mild    Dorsal Hand Region None    Patellar Region --  unable to determine at this time    Anterior Thigh Region --  unable to determine at this time    Posterior Calf Region --  unable to determine at this time      Row Name 03/13/25 2031       Fat Loss    Subcutaneous Fat Loss Findings Mild    Orbital Region  --  mild    Upper Arm Region None    Thoracic & Lumbar Region --  mild                    Electronically signed by:  Tiny Sahu RD  03/13/25 20:47 EDT

## 2025-03-14 NOTE — PLAN OF CARE
Goal Outcome Evaluation:  Plan of Care Reviewed With: patient        Progress: improving   VSS, A/Ox4. 3LNC. Pt continues to be hypotensive but asymptomatic. Midodrine administered. Skin and fall interventions in place. No complaints at this time. Diet advanced to regular. Will continue POC.       Problem: Adult Inpatient Plan of Care  Goal: Plan of Care Review  Outcome: Progressing  Flowsheets (Taken 3/14/2025 1559)  Progress: improving  Plan of Care Reviewed With: patient  Goal: Patient-Specific Goal (Individualized)  Outcome: Progressing  Goal: Absence of Hospital-Acquired Illness or Injury  Outcome: Progressing  Intervention: Identify and Manage Fall Risk  Recent Flowsheet Documentation  Taken 3/14/2025 1400 by Destiny Reece RN  Safety Promotion/Fall Prevention:   activity supervised   assistive device/personal items within reach   clutter free environment maintained   fall prevention program maintained   nonskid shoes/slippers when out of bed   lighting adjusted   toileting scheduled   safety round/check completed   room organization consistent  Taken 3/14/2025 1200 by Destiny Reece RN  Safety Promotion/Fall Prevention:   activity supervised   assistive device/personal items within reach   clutter free environment maintained   fall prevention program maintained   room organization consistent   safety round/check completed   toileting scheduled   nonskid shoes/slippers when out of bed   lighting adjusted  Intervention: Prevent Skin Injury  Recent Flowsheet Documentation  Taken 3/14/2025 1400 by Destiny Reece RN  Body Position:   right   turned  Skin Protection:   incontinence pads utilized   silicone foam dressing in place   skin sealant/moisture barrier applied   transparent dressing maintained  Taken 3/14/2025 1200 by Destiny Reece RN  Body Position: supine  Skin Protection:   incontinence pads utilized   silicone foam dressing in place   transparent dressing maintained   skin sealant/moisture barrier  applied  Intervention: Prevent and Manage VTE (Venous Thromboembolism) Risk  Recent Flowsheet Documentation  Taken 3/14/2025 1200 by Destiny Reece RN  VTE Prevention/Management:   bilateral   SCDs (sequential compression devices) off  Intervention: Prevent Infection  Recent Flowsheet Documentation  Taken 3/14/2025 1400 by Destiny Reece RN  Infection Prevention:   rest/sleep promoted   single patient room provided   visitors restricted/screened   hand hygiene promoted  Taken 3/14/2025 1200 by Destiny Reece RN  Infection Prevention:   rest/sleep promoted   visitors restricted/screened   single patient room provided   hand hygiene promoted  Goal: Optimal Comfort and Wellbeing  Outcome: Progressing  Intervention: Provide Person-Centered Care  Recent Flowsheet Documentation  Taken 3/14/2025 1200 by Destiny Reece RN  Trust Relationship/Rapport:   choices provided   care explained  Goal: Readiness for Transition of Care  Outcome: Progressing     Problem: Skin Injury Risk Increased  Goal: Skin Health and Integrity  Outcome: Progressing  Intervention: Optimize Skin Protection  Recent Flowsheet Documentation  Taken 3/14/2025 1400 by Destiny Reece RN  Activity Management: activity encouraged  Pressure Reduction Techniques:   heels elevated off bed   weight shift assistance provided  Head of Bed (HOB) Positioning: HOB elevated  Pressure Reduction Devices: pressure-redistributing mattress utilized  Skin Protection:   incontinence pads utilized   silicone foam dressing in place   skin sealant/moisture barrier applied   transparent dressing maintained  Taken 3/14/2025 1200 by Destiny Reece RN  Activity Management: activity encouraged  Pressure Reduction Techniques:   heels elevated off bed   weight shift assistance provided  Head of Bed (HOB) Positioning: [facility] elevated  Pressure Reduction Devices:   positioning supports utilized   heel offloading device utilized  Skin Protection:   incontinence pads utilized   silicone foam  dressing in place   transparent dressing maintained   skin sealant/moisture barrier applied     Problem: Comorbidity Management  Goal: Maintenance of COPD Symptom Control  Outcome: Progressing  Intervention: Maintain COPD (Chronic Obstructive Pulmonary Disease) Symptom Control  Recent Flowsheet Documentation  Taken 3/14/2025 1400 by Destiny Reece RN  Medication Review/Management: medications reviewed  Taken 3/14/2025 1200 by Destiny Reece RN  Medication Review/Management: medications reviewed  Goal: Maintenance of Seizure Control  Outcome: Progressing  Intervention: Maintain Seizure Symptom Control  Recent Flowsheet Documentation  Taken 3/14/2025 1400 by Destiny Reece RN  Medication Review/Management: medications reviewed  Taken 3/14/2025 1200 by Destiny Reece RN  Medication Review/Management: medications reviewed     Problem: Fall Injury Risk  Goal: Absence of Fall and Fall-Related Injury  Outcome: Progressing  Intervention: Identify and Manage Contributors  Recent Flowsheet Documentation  Taken 3/14/2025 1400 by Destiny Reece RN  Medication Review/Management: medications reviewed  Taken 3/14/2025 1200 by Destiny Reece RN  Medication Review/Management: medications reviewed  Intervention: Promote Injury-Free Environment  Recent Flowsheet Documentation  Taken 3/14/2025 1400 by Destiny Reece RN  Safety Promotion/Fall Prevention:   activity supervised   assistive device/personal items within reach   clutter free environment maintained   fall prevention program maintained   nonskid shoes/slippers when out of bed   lighting adjusted   toileting scheduled   safety round/check completed   room organization consistent  Taken 3/14/2025 1200 by Destiny Reece RN  Safety Promotion/Fall Prevention:   activity supervised   assistive device/personal items within reach   clutter free environment maintained   fall prevention program maintained   room organization consistent   safety round/check completed   toileting  scheduled   nonskid shoes/slippers when out of bed   lighting adjusted

## 2025-03-15 ENCOUNTER — HOME CARE VISIT (OUTPATIENT)
Dept: HOME HEALTH SERVICES | Facility: HOME HEALTHCARE | Age: 65
End: 2025-03-15
Payer: COMMERCIAL

## 2025-03-15 LAB
ALBUMIN SERPL-MCNC: 2.8 G/DL (ref 3.5–5.2)
ALBUMIN/GLOB SERPL: 0.8 G/DL
ALP SERPL-CCNC: 159 U/L (ref 39–117)
ALT SERPL W P-5'-P-CCNC: 24 U/L (ref 1–33)
ANION GAP SERPL CALCULATED.3IONS-SCNC: 11 MMOL/L (ref 5–15)
AST SERPL-CCNC: 115 U/L (ref 1–32)
BILIRUB SERPL-MCNC: 10 MG/DL (ref 0–1.2)
BUN SERPL-MCNC: 12 MG/DL (ref 8–23)
BUN/CREAT SERPL: 10.6 (ref 7–25)
CALCIUM SPEC-SCNC: 8.2 MG/DL (ref 8.6–10.5)
CHLORIDE SERPL-SCNC: 96 MMOL/L (ref 98–107)
CO2 SERPL-SCNC: 26 MMOL/L (ref 22–29)
CREAT SERPL-MCNC: 1.13 MG/DL (ref 0.57–1)
EGFRCR SERPLBLD CKD-EPI 2021: 55.1 ML/MIN/1.73
GLOBULIN UR ELPH-MCNC: 3.3 GM/DL
GLUCOSE SERPL-MCNC: 70 MG/DL (ref 65–99)
HCT VFR BLD AUTO: 26.1 % (ref 34–46.6)
HCT VFR BLD AUTO: 26.7 % (ref 34–46.6)
HCT VFR BLD AUTO: 28.1 % (ref 34–46.6)
HGB BLD-MCNC: 8.7 G/DL (ref 12–15.9)
HGB BLD-MCNC: 8.8 G/DL (ref 12–15.9)
HGB BLD-MCNC: 9.2 G/DL (ref 12–15.9)
POTASSIUM SERPL-SCNC: 4.4 MMOL/L (ref 3.5–5.2)
PROT SERPL-MCNC: 6.1 G/DL (ref 6–8.5)
SODIUM SERPL-SCNC: 133 MMOL/L (ref 136–145)

## 2025-03-15 PROCEDURE — 85018 HEMOGLOBIN: CPT | Performed by: INTERNAL MEDICINE

## 2025-03-15 PROCEDURE — 85014 HEMATOCRIT: CPT | Performed by: INTERNAL MEDICINE

## 2025-03-15 PROCEDURE — 99232 SBSQ HOSP IP/OBS MODERATE 35: CPT | Performed by: STUDENT IN AN ORGANIZED HEALTH CARE EDUCATION/TRAINING PROGRAM

## 2025-03-15 PROCEDURE — 25010000002 THIAMINE PER 100 MG: Performed by: FAMILY MEDICINE

## 2025-03-15 PROCEDURE — 80053 COMPREHEN METABOLIC PANEL: CPT | Performed by: INTERNAL MEDICINE

## 2025-03-15 PROCEDURE — 25010000002 CEFTRIAXONE PER 250 MG: Performed by: INTERNAL MEDICINE

## 2025-03-15 RX ORDER — ZOLPIDEM TARTRATE 5 MG/1
5 TABLET ORAL NIGHTLY PRN
Status: DISCONTINUED | OUTPATIENT
Start: 2025-03-15 | End: 2025-03-15

## 2025-03-15 RX ORDER — ALPRAZOLAM 0.5 MG
0.5 TABLET ORAL NIGHTLY PRN
Status: DISCONTINUED | OUTPATIENT
Start: 2025-03-15 | End: 2025-03-19 | Stop reason: HOSPADM

## 2025-03-15 RX ADMIN — MIDODRINE HYDROCHLORIDE 10 MG: 10 TABLET ORAL at 17:00

## 2025-03-15 RX ADMIN — HYDROCODONE BITARTRATE AND ACETAMINOPHEN 1 TABLET: 5; 325 TABLET ORAL at 23:11

## 2025-03-15 RX ADMIN — FOLIC ACID 1 MG: 1 TABLET ORAL at 08:15

## 2025-03-15 RX ADMIN — THIAMINE HYDROCHLORIDE 200 MG: 100 INJECTION, SOLUTION INTRAMUSCULAR; INTRAVENOUS at 22:17

## 2025-03-15 RX ADMIN — THIAMINE HYDROCHLORIDE 200 MG: 100 INJECTION, SOLUTION INTRAMUSCULAR; INTRAVENOUS at 05:57

## 2025-03-15 RX ADMIN — ROSUVASTATIN 5 MG: 10 TABLET, FILM COATED ORAL at 08:15

## 2025-03-15 RX ADMIN — LATANOPROST 1 DROP: 50 SOLUTION OPHTHALMIC at 20:11

## 2025-03-15 RX ADMIN — MIDODRINE HYDROCHLORIDE 10 MG: 10 TABLET ORAL at 11:46

## 2025-03-15 RX ADMIN — DESVENLAFAXINE 50 MG: 50 TABLET, FILM COATED, EXTENDED RELEASE ORAL at 08:15

## 2025-03-15 RX ADMIN — PANTOPRAZOLE SODIUM 40 MG: 40 TABLET, DELAYED RELEASE ORAL at 17:00

## 2025-03-15 RX ADMIN — Medication 10 ML: at 08:19

## 2025-03-15 RX ADMIN — SENNOSIDES, DOCUSATE SODIUM 2 TABLET: 50; 8.6 TABLET, FILM COATED ORAL at 20:11

## 2025-03-15 RX ADMIN — ALPRAZOLAM 0.5 MG: 0.5 TABLET ORAL at 23:08

## 2025-03-15 RX ADMIN — THIAMINE HYDROCHLORIDE 200 MG: 100 INJECTION, SOLUTION INTRAMUSCULAR; INTRAVENOUS at 14:30

## 2025-03-15 RX ADMIN — Medication 5 MG: at 23:11

## 2025-03-15 RX ADMIN — GABAPENTIN 200 MG: 100 CAPSULE ORAL at 20:11

## 2025-03-15 RX ADMIN — SENNOSIDES, DOCUSATE SODIUM 2 TABLET: 50; 8.6 TABLET, FILM COATED ORAL at 04:13

## 2025-03-15 RX ADMIN — SODIUM CHLORIDE 1000 MG: 900 INJECTION INTRAVENOUS at 08:15

## 2025-03-15 RX ADMIN — MIDODRINE HYDROCHLORIDE 10 MG: 10 TABLET ORAL at 08:15

## 2025-03-15 RX ADMIN — BISACODYL 5 MG: 5 TABLET, COATED ORAL at 04:13

## 2025-03-15 RX ADMIN — GABAPENTIN 200 MG: 100 CAPSULE ORAL at 08:16

## 2025-03-15 RX ADMIN — Medication 1 CAPSULE: at 08:15

## 2025-03-15 RX ADMIN — Medication 10 ML: at 20:11

## 2025-03-15 RX ADMIN — PANTOPRAZOLE SODIUM 40 MG: 40 TABLET, DELAYED RELEASE ORAL at 08:15

## 2025-03-15 NOTE — Clinical Note
Transfer Summary:    - Patient transferred to [facility]  - Reason for transfer: alcoholic cirrhosis  - Report called to hospital  on 3/13/25  - Summary of Care, treatment or services provided to the patient including disciplines seeing the patient: PT SN    - Patient progress toward goals: ongoing    - Communicable Disease? No

## 2025-03-15 NOTE — HOME HEALTH
This patient was admitted to [facility] as an inpatient status via ER on 3/13/25 with a diagnosis of alcoholic cirrhosis.

## 2025-03-15 NOTE — PROGRESS NOTES
[facility] Medicine Services  PROGRESS NOTE    Patient Name: Philomena Davis  : 1960  MRN: [id number]    Date of Admission: 3/12/2025  Primary Care Physician: Unique Brandt DO    Subjective   Subjective     CC: f/u hypotension    HPI: still feels pretty sick and not sleeping well at night      Objective   Objective     Vital Signs:   Temp:  [98 °F (36.7 °C)-99.8 °F (37.7 °C)] 99.8 °F (37.7 °C)  Heart Rate:  [77-89] 89  Resp:  [18] 18  BP: ()/(48-58) 103/54  Flow (L/min) (Oxygen Therapy):  [3] 3     Physical Exam:  Constitutional: No acute distress, awake, alert, chronically ill appearing  HENT: NCAT, mucous membranes moist  Respiratory: Clear to auscultation bilaterally, respiratory effort normal   Cardiovascular: RRR, no murmurs, rubs, or gallops  Gastrointestinal: Positive bowel sounds, soft, nontender, abd distended  Musculoskeletal: bilateral 1+ edema  Psychiatric: Appropriate affect, cooperative  Neurologic: Oriented x 3, strength symmetric in all extremities, Cranial Nerves grossly intact to confrontation, speech clear  Skin: jaundice     Results Reviewed:  LAB RESULTS:      Lab 03/15/25  0657 03/15/25  0020 25  1812 25  0014 25  1725 25  1057 25  0943 25  0540 25  0415   WBC  --   --   --   --   --   --  7.29  --  11.54*   HEMOGLOBIN 8.8* 8.7* 8.7* 8.0* 7.8*  --  7.8*  --  10.6*   HEMATOCRIT 26.7* 26.1* 26.6* 24.3* 23.9*  --  24.0*  --  30.9*   PLATELETS  --   --   --   --   --   --  118*  --  180   NEUTROS ABS  --   --   --   --   --   --  5.39  --  8.99*   IMMATURE GRANS (ABS)  --   --   --   --   --   --  0.03  --  0.07*   LYMPHS ABS  --   --   --   --   --   --  0.97  --  1.19   MONOS ABS  --   --   --   --   --   --  0.84  --  1.21*   EOS ABS  --   --   --   --   --   --  0.03  --  0.03   MCV  --   --   --   --   --   --  108.1*  --  103.7*   LACTATE  --   --   --   --   --  1.7  --   --   --    HSTROP T  --   --    --   --   --   --   --  16* 16*         Lab 03/15/25  0657 03/13/25 2007 03/13/25 0943 03/12/25  0415   SODIUM 133*  --  131* 128*   POTASSIUM 4.4 4.9 3.3* 2.7*   CHLORIDE 96*  --  95* 86*   CO2 26.0  --  23.0 26.0   ANION GAP 11.0  --  13.0 16.0*   BUN 12  --  8 5*   CREATININE 1.13*  --  0.81 0.75   EGFR 55.1*  --  82.2 90.1   GLUCOSE 70  --  86 106*   CALCIUM 8.2*  --  7.7* 8.9   MAGNESIUM  --   --   --  1.9   TSH  --   --   --  3.370         Lab 03/15/25  0657 03/13/25 0943 03/12/25 0415   TOTAL PROTEIN 6.1 6.1  6.1 8.0   ALBUMIN 2.8* 3.0*  3.0* 3.2*   GLOBULIN 3.3 3.1 4.8   ALT (SGPT) 24 26  26 45*   AST (SGOT) 115* 105*  105* 195*   BILIRUBIN 10.0* 9.5*  9.5* 10.9*   INDIRECT BILIRUBIN  --  3.6  --    BILIRUBIN DIRECT  --  5.9*  --    ALK PHOS 159* 147*  147* 233*   LIPASE  --   --  17         Lab 03/12/25 0540 03/12/25  0415   PROBNP  --  572.0   HSTROP T 16* 16*             Lab 03/13/25 0943 03/12/25  0804   IRON 77  --    IRON SATURATION (TSAT) 86*  --    TIBC 89*  --    TRANSFERRIN 60*  --    FERRITIN 671.80*  --    FOLATE  --  7.58   VITAMIN B 12  --  1,566*         Brief Urine Lab Results  (Last result in the past 365 days)        Color   Clarity   Blood   Leuk Est   Nitrite   Protein   CREAT   Urine HCG        03/14/25 0529 Orange   Cloudy   Negative   Small (1+)   Positive   Trace                   Microbiology Results Abnormal       None            Duplex Portal Hepatic Complete CAR  Result Date: 3/13/2025  DUPLEX PORTAL HEPATIC COMPLETE CAR Date of Exam: 3/13/2025 11:20 AM EDT Indication: portal vein thrombosis. Comparison: No comparisons available. Technique: Grayscale, color flow, and Doppler spectral waveform analysis was performed of the portal circulation. Findings: There is marked coarsening of the echotexture within the liver which limited the examination. Arterial blood flow was normal. Flow within the hepatic veins splenic veins IVC and SMV appear normal. Flow within the portal  vein was difficult to assess. There is at least bidirectional flow present. Given the relatively slow flow, intraluminal thrombus cannot be excluded but was not clearly identified.     Impression: Impression: 1.Marked coarsening of the echotexture within the liver which limited the examination. 2.Flow within the portal vein was difficult to assess. There is at least bidirectional flow present. Given the relatively slow flow, intraluminal thrombus cannot be excluded but was not clearly identified. Electronically Signed: Howard Tejada MD  3/13/2025 4:33 PM EDT  Workstation ID: [id number]      Results for orders placed during the hospital encounter of 11/10/24    Adult Transthoracic Echo Complete W/ Cont if Necessary Per Protocol    Interpretation Summary    Left ventricular systolic function is normal. Estimated left ventricular EF = 65%    The cardiac valves are anatomically and functionally normal.      Current medications:  Scheduled Meds:cefTRIAXone, 1,000 mg, Intravenous, Q24H  desvenlafaxine, 50 mg, Oral, Daily  [Held by provider] enoxaparin sodium, 40 mg, Subcutaneous, Daily  folic acid, 1 mg, Oral, Daily  gabapentin, 200 mg, Oral, Q12H  lactobacillus acidophilus, 1 capsule, Oral, Daily  latanoprost, 1 drop, Both Eyes, Nightly  [Held by provider] metoprolol succinate XL, 25 mg, Oral, Daily  midodrine, 10 mg, Oral, TID AC  pantoprazole, 40 mg, Oral, BID AC  rosuvastatin, 5 mg, Oral, Daily  sodium chloride, 10 mL, Intravenous, Q12H  thiamine (B-1) IV, 200 mg, Intravenous, Q8H   Followed by  [START ON 3/17/2025] thiamine, 100 mg, Oral, Daily      Continuous Infusions:     PRN Meds:.  acetaminophen    ALPRAZolam    senna-docusate sodium **AND** polyethylene glycol **AND** bisacodyl **AND** bisacodyl    Calcium Replacement - Follow Nurse / BPA Driven Protocol    diazePAM **OR** diazePAM **OR** diazePAM **OR** diazePAM **OR** diazePAM **OR** diazePAM    HYDROcodone-acetaminophen    Magnesium Standard Dose Replacement -  Follow Nurse / BPA Driven Protocol    melatonin    ondansetron    Phosphorus Replacement - Follow Nurse / BPA Driven Protocol    Potassium Replacement - Follow Nurse / BPA Driven Protocol    [COMPLETED] Insert Peripheral IV **AND** sodium chloride    sodium chloride    Assessment & Plan   Assessment & Plan     Active Hospital Problems    Diagnosis  POA    **Unsteady gait [R26.81]  Yes    Hypotension [I95.9]  Yes    Falls [R29.6]  Not Applicable    Peripheral neuropathy [G62.9]  Yes    Alcohol use disorder [F10.90]  Yes    GERD without esophagitis [K21.9]  Yes    Hyperlipidemia [E78.5]  Yes    Glaucoma [H40.9]  Yes    Essential hypertension [I10]  Yes      Resolved Hospital Problems   No resolved problems to display.        Brief Hospital Course to date:  Philomena Davis is a 64 y.o. female with significant medical history for HTN, HLD, chronic pain disorder, cirrhosis of the liver, COPD, GERD, and foot fracture who presents to [facility] with complaints of frequent falls injuries. Found to have R foot fracture, acute alcoholic hepatitis     ETOH hepatitis w/ cirrhosis  Hypotension  -Patient bili up to 10.9 from previously normal baseline. Suspect this is ETOH hepatitis (DF 27) - repeat labs are stable.  Continue to monitor. Watching for peak bili  -Midodrine increased to TID.  -portal duplex shows coarse echo texture of liver, c/w cirrhosis and flow in portal vein difficult to assess  --cont supportive care, high protein diet    Body aches  UTI  -resp PCR panel neg  -Urine culture pending. CTX for now  --suspect symptoms may be related to above     Acute/Chronic anemia  TCP  -Likely related to ETOH marrow suppession but anemia studies including FOBT are pending.  -No evidence of bleeding or reports of melena. Increased ppi to BID.  -Serial h/h  -Holding lovenox.     Lisfranc and third metatarsal fracture   Unsteady gait  ETOH neuropathy  Frequent falls  -Patient reports being sent in by her PT and  by orthopedic surgery for us to help her because she lives alone and feels like she is unable to manage. Somehow though she manages to continue to drink ETOH heavily.   -Was recently seen by Dr. Noyola - SHARON RLE  -PT/OT.     Alcohol abuse  ETOH neuropathy  -CIWA protocol  -Seizure precautions  -Continue thiamine and folic acid  -Addiction team     HTN  HLD  -Continue statin  -Continue metoprolol     Depression  -Continue Pristiq     Peripheral neuropathy  -Continue gabapentin     Glaucoma  -Continue home eyedrops       Expected Discharge Location and Transportation: rehab  Expected Discharge   Expected Discharge Date: 3/19/2025; Expected Discharge Time: 12:00 PM     VTE Prophylaxis:  Pharmacologic VTE prophylaxis orders are present.         AM-PAC 6 Clicks Score (PT): 10 (03/14/25 2000)    CODE STATUS:   Code Status and Medical Interventions: CPR (Attempt to Resuscitate); Full Support   Ordered at: 03/12/25 0617     Code Status (Patient has no pulse and is not breathing):    CPR (Attempt to Resuscitate)     Medical Interventions (Patient has pulse or is breathing):    Full Support     Level Of Support Discussed With:    Patient       Martine Cardona MD  03/15/25

## 2025-03-16 LAB
ALBUMIN SERPL-MCNC: 2.6 G/DL (ref 3.5–5.2)
ALBUMIN/GLOB SERPL: 0.9 G/DL
ALP SERPL-CCNC: 134 U/L (ref 39–117)
ALT SERPL W P-5'-P-CCNC: 22 U/L (ref 1–33)
ANION GAP SERPL CALCULATED.3IONS-SCNC: 9 MMOL/L (ref 5–15)
AST SERPL-CCNC: 101 U/L (ref 1–32)
BASOPHILS # BLD AUTO: 0.06 10*3/MM3 (ref 0–0.2)
BASOPHILS NFR BLD AUTO: 0.5 % (ref 0–1.5)
BILIRUB SERPL-MCNC: 8.2 MG/DL (ref 0–1.2)
BUN SERPL-MCNC: 10 MG/DL (ref 8–23)
BUN/CREAT SERPL: 15.2 (ref 7–25)
CALCIUM SPEC-SCNC: 7.7 MG/DL (ref 8.6–10.5)
CHLORIDE SERPL-SCNC: 101 MMOL/L (ref 98–107)
CO2 SERPL-SCNC: 25 MMOL/L (ref 22–29)
CREAT SERPL-MCNC: 0.66 MG/DL (ref 0.57–1)
DEPRECATED RDW RBC AUTO: 90.6 FL (ref 37–54)
EGFRCR SERPLBLD CKD-EPI 2021: 99.3 ML/MIN/1.73
EOSINOPHIL # BLD AUTO: 0.1 10*3/MM3 (ref 0–0.4)
EOSINOPHIL NFR BLD AUTO: 0.9 % (ref 0.3–6.2)
ERYTHROCYTE [DISTWIDTH] IN BLOOD BY AUTOMATED COUNT: 22.6 % (ref 12.3–15.4)
GLOBULIN UR ELPH-MCNC: 2.9 GM/DL
GLUCOSE SERPL-MCNC: 105 MG/DL (ref 65–99)
HCT VFR BLD AUTO: 25.8 % (ref 34–46.6)
HCT VFR BLD AUTO: 26.7 % (ref 34–46.6)
HCT VFR BLD AUTO: 30.3 % (ref 34–46.6)
HGB BLD-MCNC: 8.6 G/DL (ref 12–15.9)
HGB BLD-MCNC: 8.8 G/DL (ref 12–15.9)
HGB BLD-MCNC: 9.9 G/DL (ref 12–15.9)
IMM GRANULOCYTES # BLD AUTO: 0.11 10*3/MM3 (ref 0–0.05)
IMM GRANULOCYTES NFR BLD AUTO: 1 % (ref 0–0.5)
LYMPHOCYTES # BLD AUTO: 1.48 10*3/MM3 (ref 0.7–3.1)
LYMPHOCYTES NFR BLD AUTO: 13.2 % (ref 19.6–45.3)
MCH RBC QN AUTO: 37.4 PG (ref 26.6–33)
MCHC RBC AUTO-ENTMCNC: 33.3 G/DL (ref 31.5–35.7)
MCV RBC AUTO: 112.2 FL (ref 79–97)
MONOCYTES # BLD AUTO: 1.72 10*3/MM3 (ref 0.1–0.9)
MONOCYTES NFR BLD AUTO: 15.3 % (ref 5–12)
NEUTROPHILS NFR BLD AUTO: 69.1 % (ref 42.7–76)
NEUTROPHILS NFR BLD AUTO: 7.77 10*3/MM3 (ref 1.7–7)
NRBC BLD AUTO-RTO: 0 /100 WBC (ref 0–0.2)
PLATELET # BLD AUTO: 120 10*3/MM3 (ref 140–450)
PMV BLD AUTO: 11.1 FL (ref 6–12)
POTASSIUM SERPL-SCNC: 4 MMOL/L (ref 3.5–5.2)
PROT SERPL-MCNC: 5.5 G/DL (ref 6–8.5)
RBC # BLD AUTO: 2.3 10*6/MM3 (ref 3.77–5.28)
SODIUM SERPL-SCNC: 135 MMOL/L (ref 136–145)
WBC NRBC COR # BLD AUTO: 11.24 10*3/MM3 (ref 3.4–10.8)

## 2025-03-16 PROCEDURE — 85025 COMPLETE CBC W/AUTO DIFF WBC: CPT | Performed by: STUDENT IN AN ORGANIZED HEALTH CARE EDUCATION/TRAINING PROGRAM

## 2025-03-16 PROCEDURE — 99232 SBSQ HOSP IP/OBS MODERATE 35: CPT | Performed by: STUDENT IN AN ORGANIZED HEALTH CARE EDUCATION/TRAINING PROGRAM

## 2025-03-16 PROCEDURE — 25010000002 CEFTRIAXONE PER 250 MG: Performed by: INTERNAL MEDICINE

## 2025-03-16 PROCEDURE — 85014 HEMATOCRIT: CPT | Performed by: INTERNAL MEDICINE

## 2025-03-16 PROCEDURE — 25010000002 THIAMINE PER 100 MG: Performed by: FAMILY MEDICINE

## 2025-03-16 PROCEDURE — 80053 COMPREHEN METABOLIC PANEL: CPT | Performed by: STUDENT IN AN ORGANIZED HEALTH CARE EDUCATION/TRAINING PROGRAM

## 2025-03-16 PROCEDURE — 85018 HEMOGLOBIN: CPT | Performed by: INTERNAL MEDICINE

## 2025-03-16 RX ADMIN — DESVENLAFAXINE 50 MG: 50 TABLET, FILM COATED, EXTENDED RELEASE ORAL at 08:45

## 2025-03-16 RX ADMIN — ROSUVASTATIN 5 MG: 10 TABLET, FILM COATED ORAL at 08:45

## 2025-03-16 RX ADMIN — Medication 10 ML: at 22:00

## 2025-03-16 RX ADMIN — MIDODRINE HYDROCHLORIDE 10 MG: 10 TABLET ORAL at 18:21

## 2025-03-16 RX ADMIN — GABAPENTIN 200 MG: 100 CAPSULE ORAL at 08:51

## 2025-03-16 RX ADMIN — Medication 5 MG: at 21:56

## 2025-03-16 RX ADMIN — PANTOPRAZOLE SODIUM 40 MG: 40 TABLET, DELAYED RELEASE ORAL at 05:38

## 2025-03-16 RX ADMIN — LATANOPROST 1 DROP: 50 SOLUTION OPHTHALMIC at 21:59

## 2025-03-16 RX ADMIN — THIAMINE HYDROCHLORIDE 200 MG: 100 INJECTION, SOLUTION INTRAMUSCULAR; INTRAVENOUS at 14:49

## 2025-03-16 RX ADMIN — THIAMINE HYDROCHLORIDE 200 MG: 100 INJECTION, SOLUTION INTRAMUSCULAR; INTRAVENOUS at 05:38

## 2025-03-16 RX ADMIN — Medication 10 ML: at 14:51

## 2025-03-16 RX ADMIN — GABAPENTIN 200 MG: 100 CAPSULE ORAL at 21:56

## 2025-03-16 RX ADMIN — MIDODRINE HYDROCHLORIDE 10 MG: 10 TABLET ORAL at 11:46

## 2025-03-16 RX ADMIN — PANTOPRAZOLE SODIUM 40 MG: 40 TABLET, DELAYED RELEASE ORAL at 18:21

## 2025-03-16 RX ADMIN — ALPRAZOLAM 0.5 MG: 0.5 TABLET ORAL at 21:56

## 2025-03-16 RX ADMIN — Medication 1 CAPSULE: at 08:44

## 2025-03-16 RX ADMIN — SODIUM CHLORIDE 1000 MG: 900 INJECTION INTRAVENOUS at 08:45

## 2025-03-16 RX ADMIN — MIDODRINE HYDROCHLORIDE 10 MG: 10 TABLET ORAL at 05:38

## 2025-03-16 RX ADMIN — FOLIC ACID 1 MG: 1 TABLET ORAL at 08:44

## 2025-03-16 RX ADMIN — THIAMINE HYDROCHLORIDE 200 MG: 100 INJECTION, SOLUTION INTRAMUSCULAR; INTRAVENOUS at 21:56

## 2025-03-16 RX ADMIN — Medication 10 ML: at 11:47

## 2025-03-16 NOTE — PROGRESS NOTES
Psychiatric Medicine Services  PROGRESS NOTE    Patient Name: Philomena Davis  : 1960  MRN: [id number]    Date of Admission: 3/12/2025  Primary Care Physician: Unique Brandt DO    Subjective   Subjective     CC: f/u hypotension    HPI: Doing better today.  Slept okay last night, she is willing to try and sit in the chair later today      Objective   Objective     Vital Signs:   Temp:  [98 °F (36.7 °C)-99.8 °F (37.7 °C)] 98.1 °F (36.7 °C)  Heart Rate:  [] 91  Resp:  [16-18] 16  BP: ()/(54-87) 97/59  Flow (L/min) (Oxygen Therapy):  [3] 3     Physical Exam:  Constitutional: No acute distress, awake, alert, chronically ill appearing  HENT: NCAT, mucous membranes moist  Respiratory: Clear to auscultation bilaterally, respiratory effort normal   Cardiovascular: RRR, no murmurs, rubs, or gallops  Gastrointestinal: Positive bowel sounds, soft, nontender, abd distended  Musculoskeletal: bilateral 1+ edema  Psychiatric: Appropriate affect, cooperative  Neurologic: Oriented x 3, strength symmetric in all extremities, Cranial Nerves grossly intact to confrontation, speech clear  Skin: jaundice     Results Reviewed:  LAB RESULTS:      Lab 25  0702 03/15/25  2342 03/15/25  1653 03/15/25  0657 03/15/25  0020 25  1725 25  1057 25  0943 25  0540 25  0415   WBC 11.24*  --   --   --   --   --   --  7.29  --  11.54*   HEMOGLOBIN 8.6* 8.8* 9.2* 8.8* 8.7*   < >  --  7.8*  --  10.6*   HEMATOCRIT 25.8* 26.7* 28.1* 26.7* 26.1*   < >  --  24.0*  --  30.9*   PLATELETS 120*  --   --   --   --   --   --  118*  --  180   NEUTROS ABS 7.77*  --   --   --   --   --   --  5.39  --  8.99*   IMMATURE GRANS (ABS) 0.11*  --   --   --   --   --   --  0.03  --  0.07*   LYMPHS ABS 1.48  --   --   --   --   --   --  0.97  --  1.19   MONOS ABS 1.72*  --   --   --   --   --   --  0.84  --  1.21*   EOS ABS 0.10  --   --   --   --   --   --  0.03  --  0.03   .2*   --   --   --   --   --   --  108.1*  --  103.7*   LACTATE  --   --   --   --   --   --  1.7  --   --   --    HSTROP T  --   --   --   --   --   --   --   --  16* 16*    < > = values in this interval not displayed.         Lab 03/16/25  0702 03/15/25  0657 03/13/25 2007 03/13/25 0943 03/12/25 0415   SODIUM 135* 133*  --  131* 128*   POTASSIUM 4.0 4.4 4.9 3.3* 2.7*   CHLORIDE 101 96*  --  95* 86*   CO2 25.0 26.0  --  23.0 26.0   ANION GAP 9.0 11.0  --  13.0 16.0*   BUN 10 12  --  8 5*   CREATININE 0.66 1.13*  --  0.81 0.75   EGFR 99.3 55.1*  --  82.2 90.1   GLUCOSE 105* 70  --  86 106*   CALCIUM 7.7* 8.2*  --  7.7* 8.9   MAGNESIUM  --   --   --   --  1.9   TSH  --   --   --   --  3.370         Lab 03/16/25  0702 03/15/25  0657 03/13/25  09 03/12/25 0415   TOTAL PROTEIN 5.5* 6.1 6.1  6.1 8.0   ALBUMIN 2.6* 2.8* 3.0*  3.0* 3.2*   GLOBULIN 2.9 3.3 3.1 4.8   ALT (SGPT) 22 24 26  26 45*   AST (SGOT) 101* 115* 105*  105* 195*   BILIRUBIN 8.2* 10.0* 9.5*  9.5* 10.9*   INDIRECT BILIRUBIN  --   --  3.6  --    BILIRUBIN DIRECT  --   --  5.9*  --    ALK PHOS 134* 159* 147*  147* 233*   LIPASE  --   --   --  17         Lab 03/12/25 0540 03/12/25  0415   PROBNP  --  572.0   HSTROP T 16* 16*             Lab 03/13/25 0943 03/12/25  0804   IRON 77  --    IRON SATURATION (TSAT) 86*  --    TIBC 89*  --    TRANSFERRIN 60*  --    FERRITIN 671.80*  --    FOLATE  --  7.58   VITAMIN B 12  --  1,566*         Brief Urine Lab Results  (Last result in the past 365 days)        Color   Clarity   Blood   Leuk Est   Nitrite   Protein   CREAT   Urine HCG        03/14/25 0529 Orange   Cloudy   Negative   Small (1+)   Positive   Trace                   Microbiology Results Abnormal       None            No radiology results from the last 24 hrs      Results for orders placed during the hospital encounter of 11/10/24    Adult Transthoracic Echo Complete W/ Cont if Necessary Per Protocol    Interpretation Summary    Left ventricular  systolic function is normal. Estimated left ventricular EF = 65%    The cardiac valves are anatomically and functionally normal.      Current medications:  Scheduled Meds:desvenlafaxine, 50 mg, Oral, Daily  [Held by provider] enoxaparin sodium, 40 mg, Subcutaneous, Daily  folic acid, 1 mg, Oral, Daily  gabapentin, 200 mg, Oral, Q12H  lactobacillus acidophilus, 1 capsule, Oral, Daily  latanoprost, 1 drop, Both Eyes, Nightly  [Held by provider] metoprolol succinate XL, 25 mg, Oral, Daily  midodrine, 10 mg, Oral, TID AC  pantoprazole, 40 mg, Oral, BID AC  rosuvastatin, 5 mg, Oral, Daily  sodium chloride, 10 mL, Intravenous, Q12H  thiamine (B-1) IV, 200 mg, Intravenous, Q8H   Followed by  [START ON 3/17/2025] thiamine, 100 mg, Oral, Daily      Continuous Infusions:     PRN Meds:.  acetaminophen    ALPRAZolam    senna-docusate sodium **AND** polyethylene glycol **AND** bisacodyl **AND** bisacodyl    Calcium Replacement - Follow Nurse / BPA Driven Protocol    diazePAM **OR** diazePAM **OR** diazePAM **OR** diazePAM **OR** diazePAM **OR** diazePAM    HYDROcodone-acetaminophen    Magnesium Standard Dose Replacement - Follow Nurse / BPA Driven Protocol    melatonin    ondansetron    Phosphorus Replacement - Follow Nurse / BPA Driven Protocol    Potassium Replacement - Follow Nurse / BPA Driven Protocol    [COMPLETED] Insert Peripheral IV **AND** sodium chloride    sodium chloride    Assessment & Plan   Assessment & Plan     Active Hospital Problems    Diagnosis  POA    **Unsteady gait [R26.81]  Yes    Hypotension [I95.9]  Yes    Falls [R29.6]  Not Applicable    Peripheral neuropathy [G62.9]  Yes    Alcohol use disorder [F10.90]  Yes    GERD without esophagitis [K21.9]  Yes    Hyperlipidemia [E78.5]  Yes    Glaucoma [H40.9]  Yes    Essential hypertension [I10]  Yes      Resolved Hospital Problems   No resolved problems to display.        Brief Hospital Course to date:  Philomena Davis is a 64 y.o. female with significant  medical history for HTN, HLD, chronic pain disorder, cirrhosis of the liver, COPD, GERD, and foot fracture who presents to [facility] with complaints of frequent falls injuries. Found to have R foot fracture, acute alcoholic hepatitis     ETOH hepatitis w/ cirrhosis  Hypotension  -Patient bili up to 10.9 from previously normal baseline. Suspect this is ETOH hepatitis (DF 27) - repeat labs are stable.  Continue to monitor. Watching for peak bili.  Bili is starting to come down, LFTs overall improving.  -Midodrine increased to TID.  -portal duplex shows coarse echo texture of liver, c/w cirrhosis and flow in portal vein difficult to assess  --cont supportive care, high protein diet    Body aches  UTI  -resp PCR panel neg  -Urine culture pending. CTX for now  --suspect symptoms may be related to above     Acute/Chronic anemia  TCP  -Likely related to ETOH marrow suppession but anemia studies including FOBT are pending.  -No evidence of bleeding or reports of melena. Increased ppi to BID.  -Serial h/h has been stable  -Holding lovenox.     Lisfranc and third metatarsal fracture   Unsteady gait  ETOH neuropathy  Frequent falls  -Patient reports being sent in by her PT and by orthopedic surgery for us to help her because she lives alone and feels like she is unable to manage. Somehow though she manages to continue to drink ETOH heavily.   -Was recently seen by Dr. Noyola - SHARON RLE  -PT/OT.     Alcohol abuse  ETOH neuropathy  -CIWA protocol  -Seizure precautions  -Continue thiamine and folic acid  -Addiction team     HTN  HLD  -Continue statin  -Continue metoprolol     Depression  -Continue Pristiq     Peripheral neuropathy  -Continue gabapentin     Glaucoma  -Continue home eyedrops       Expected Discharge Location and Transportation: rehab  Expected Discharge   Expected Discharge Date: 3/19/2025; Expected Discharge Time: 12:00 PM     VTE Prophylaxis:  Pharmacologic VTE prophylaxis orders are present.          AM-PAC 6 Clicks Score (PT): 11 (03/15/25 2000)    CODE STATUS:   Code Status and Medical Interventions: CPR (Attempt to Resuscitate); Full Support   Ordered at: 03/12/25 0617     Code Status (Patient has no pulse and is not breathing):    CPR (Attempt to Resuscitate)     Medical Interventions (Patient has pulse or is breathing):    Full Support     Level Of Support Discussed With:    Patient       Martine Cardona MD  03/16/25

## 2025-03-17 ENCOUNTER — TELEPHONE (OUTPATIENT)
Dept: ORTHOPEDIC SURGERY | Facility: CLINIC | Age: 65
End: 2025-03-17
Payer: COMMERCIAL

## 2025-03-17 LAB
ALBUMIN SERPL-MCNC: 2.6 G/DL (ref 3.5–5.2)
ALBUMIN/GLOB SERPL: 0.9 G/DL
ALP SERPL-CCNC: 146 U/L (ref 39–117)
ALT SERPL W P-5'-P-CCNC: 35 U/L (ref 1–33)
ANION GAP SERPL CALCULATED.3IONS-SCNC: 10 MMOL/L (ref 5–15)
AST SERPL-CCNC: 130 U/L (ref 1–32)
BASOPHILS # BLD AUTO: 0.08 10*3/MM3 (ref 0–0.2)
BASOPHILS NFR BLD AUTO: 0.8 % (ref 0–1.5)
BILIRUB SERPL-MCNC: 7.9 MG/DL (ref 0–1.2)
BUN SERPL-MCNC: 9 MG/DL (ref 8–23)
BUN/CREAT SERPL: 8.4 (ref 7–25)
CALCIUM SPEC-SCNC: 8.1 MG/DL (ref 8.6–10.5)
CHLORIDE SERPL-SCNC: 99 MMOL/L (ref 98–107)
CO2 SERPL-SCNC: 26 MMOL/L (ref 22–29)
CREAT SERPL-MCNC: 1.07 MG/DL (ref 0.57–1)
DEPRECATED RDW RBC AUTO: 92.5 FL (ref 37–54)
EGFRCR SERPLBLD CKD-EPI 2021: 58.9 ML/MIN/1.73
EOSINOPHIL # BLD AUTO: 0.11 10*3/MM3 (ref 0–0.4)
EOSINOPHIL NFR BLD AUTO: 1.1 % (ref 0.3–6.2)
ERYTHROCYTE [DISTWIDTH] IN BLOOD BY AUTOMATED COUNT: 22.6 % (ref 12.3–15.4)
GLOBULIN UR ELPH-MCNC: 3 GM/DL
GLUCOSE SERPL-MCNC: 88 MG/DL (ref 65–99)
HCT VFR BLD AUTO: 26 % (ref 34–46.6)
HCT VFR BLD AUTO: 27.5 % (ref 34–46.6)
HCT VFR BLD AUTO: 29.8 % (ref 34–46.6)
HGB BLD-MCNC: 8.5 G/DL (ref 12–15.9)
HGB BLD-MCNC: 9 G/DL (ref 12–15.9)
HGB BLD-MCNC: 9.8 G/DL (ref 12–15.9)
IMM GRANULOCYTES # BLD AUTO: 0.09 10*3/MM3 (ref 0–0.05)
IMM GRANULOCYTES NFR BLD AUTO: 0.9 % (ref 0–0.5)
LYMPHOCYTES # BLD AUTO: 1.33 10*3/MM3 (ref 0.7–3.1)
LYMPHOCYTES NFR BLD AUTO: 12.7 % (ref 19.6–45.3)
MCH RBC QN AUTO: 37.2 PG (ref 26.6–33)
MCHC RBC AUTO-ENTMCNC: 32.7 G/DL (ref 31.5–35.7)
MCV RBC AUTO: 113.6 FL (ref 79–97)
MONOCYTES # BLD AUTO: 1.58 10*3/MM3 (ref 0.1–0.9)
MONOCYTES NFR BLD AUTO: 15.1 % (ref 5–12)
NEUTROPHILS NFR BLD AUTO: 69.4 % (ref 42.7–76)
NEUTROPHILS NFR BLD AUTO: 7.26 10*3/MM3 (ref 1.7–7)
NRBC BLD AUTO-RTO: 0 /100 WBC (ref 0–0.2)
PLATELET # BLD AUTO: 138 10*3/MM3 (ref 140–450)
PMV BLD AUTO: 10.9 FL (ref 6–12)
POTASSIUM SERPL-SCNC: 4 MMOL/L (ref 3.5–5.2)
PROT SERPL-MCNC: 5.6 G/DL (ref 6–8.5)
RBC # BLD AUTO: 2.42 10*6/MM3 (ref 3.77–5.28)
SODIUM SERPL-SCNC: 135 MMOL/L (ref 136–145)
WBC NRBC COR # BLD AUTO: 10.45 10*3/MM3 (ref 3.4–10.8)

## 2025-03-17 PROCEDURE — 85018 HEMOGLOBIN: CPT | Performed by: INTERNAL MEDICINE

## 2025-03-17 PROCEDURE — 97110 THERAPEUTIC EXERCISES: CPT

## 2025-03-17 PROCEDURE — 97530 THERAPEUTIC ACTIVITIES: CPT

## 2025-03-17 PROCEDURE — 85025 COMPLETE CBC W/AUTO DIFF WBC: CPT | Performed by: STUDENT IN AN ORGANIZED HEALTH CARE EDUCATION/TRAINING PROGRAM

## 2025-03-17 PROCEDURE — 80053 COMPREHEN METABOLIC PANEL: CPT | Performed by: STUDENT IN AN ORGANIZED HEALTH CARE EDUCATION/TRAINING PROGRAM

## 2025-03-17 PROCEDURE — 85014 HEMATOCRIT: CPT | Performed by: INTERNAL MEDICINE

## 2025-03-17 PROCEDURE — 99232 SBSQ HOSP IP/OBS MODERATE 35: CPT | Performed by: STUDENT IN AN ORGANIZED HEALTH CARE EDUCATION/TRAINING PROGRAM

## 2025-03-17 RX ADMIN — GABAPENTIN 200 MG: 100 CAPSULE ORAL at 20:10

## 2025-03-17 RX ADMIN — MIDODRINE HYDROCHLORIDE 10 MG: 10 TABLET ORAL at 18:15

## 2025-03-17 RX ADMIN — DESVENLAFAXINE 50 MG: 50 TABLET, FILM COATED, EXTENDED RELEASE ORAL at 08:15

## 2025-03-17 RX ADMIN — THIAMINE HCL TAB 100 MG 100 MG: 100 TAB at 08:17

## 2025-03-17 RX ADMIN — Medication 10 ML: at 08:17

## 2025-03-17 RX ADMIN — FOLIC ACID 1 MG: 1 TABLET ORAL at 08:15

## 2025-03-17 RX ADMIN — MIDODRINE HYDROCHLORIDE 10 MG: 10 TABLET ORAL at 12:11

## 2025-03-17 RX ADMIN — ALPRAZOLAM 0.5 MG: 0.5 TABLET ORAL at 20:10

## 2025-03-17 RX ADMIN — LATANOPROST 1 DROP: 50 SOLUTION OPHTHALMIC at 20:14

## 2025-03-17 RX ADMIN — PANTOPRAZOLE SODIUM 40 MG: 40 TABLET, DELAYED RELEASE ORAL at 08:13

## 2025-03-17 RX ADMIN — Medication 10 ML: at 20:14

## 2025-03-17 RX ADMIN — Medication 5 MG: at 20:10

## 2025-03-17 RX ADMIN — PANTOPRAZOLE SODIUM 40 MG: 40 TABLET, DELAYED RELEASE ORAL at 18:15

## 2025-03-17 RX ADMIN — ROSUVASTATIN 5 MG: 10 TABLET, FILM COATED ORAL at 08:15

## 2025-03-17 RX ADMIN — Medication 1 CAPSULE: at 08:13

## 2025-03-17 RX ADMIN — MIDODRINE HYDROCHLORIDE 10 MG: 10 TABLET ORAL at 08:14

## 2025-03-17 RX ADMIN — GABAPENTIN 200 MG: 100 CAPSULE ORAL at 08:13

## 2025-03-17 NOTE — TELEPHONE ENCOUNTER
I spoke to dr arvizu and called patient to inform her of what he said. Per dr arvizu she is to be nonweight bearing til follow up appt which I spoke with her and got her schedule for the 4th of April. Patient stated that she didn't know what [facility] transportation schedule was but she would find out and reach back out to us.

## 2025-03-17 NOTE — TELEPHONE ENCOUNTER
Caller: Philomena Davis    Relationship: Self    Best call back number: [phone]      What was the call regarding: PT CALLING TO CONFIRM HOW MUCH LONGER PT SHOULD WEAR THE R FOOT BOOT. PT STATES THAT SHE IS TRYING TO GET INTO [facility], AND [facility] IS NEEDING TO KNOW HOW MUCH LONGER THE BOOT IS TO BE WORN. PLEASE CONTACT PT TO DISCUSS.

## 2025-03-17 NOTE — PLAN OF CARE
Problem: Adult Inpatient Plan of Care  Goal: Plan of Care Review  Outcome: Progressing  Flowsheets (Taken 3/17/2025 0516)  Progress: improving  Plan of Care Reviewed With: patient  Goal: Patient-Specific Goal (Individualized)  Outcome: Progressing  Goal: Absence of Hospital-Acquired Illness or Injury  Outcome: Progressing  Intervention: Identify and Manage Fall Risk  Description: Perform standard risk assessment on admission using a validated tool or comprehensive approach appropriate to the patient; reassess fall risk frequently, with change in status or transfer to another level of care.Communicate risk to interprofessional healthcare team; ensure fall risk visible cue.Determine need for increased observation, equipment and environmental modification, as well as use of supportive, nonskid footwear.Adjust safety measures to individual needs and identified risk factors.Reinforce the importance of active participation with fall risk prevention, safety, and physical activity with the patient and family.Perform regular intentional rounding to assess need for position change, pain assessment and personal needs, including assistance with toileting.  Recent Flowsheet Documentation  Taken 3/17/2025 0230 by Kavitha Austin RN  Safety Promotion/Fall Prevention:   nonskid shoes/slippers when out of bed   safety round/check completed   room organization consistent   lighting adjusted   activity supervised   assistive device/personal items within reach   clutter free environment maintained  Taken 3/17/2025 0030 by Kavitha Austin RN  Safety Promotion/Fall Prevention:   nonskid shoes/slippers when out of bed   safety round/check completed   room organization consistent   lighting adjusted   activity supervised   assistive device/personal items within reach   clutter free environment maintained  Taken 3/16/2025 2215 by Kavitha Austin RN  Safety Promotion/Fall Prevention:   nonskid shoes/slippers when out of bed    safety round/check completed   room organization consistent   lighting adjusted   activity supervised   assistive device/personal items within reach   clutter free environment maintained  Taken 3/16/2025 2000 by Kavitha Austin RN  Safety Promotion/Fall Prevention:   nonskid shoes/slippers when out of bed   safety round/check completed   room organization consistent   lighting adjusted   activity supervised   assistive device/personal items within reach   clutter free environment maintained  Intervention: Prevent Skin Injury  Description: Perform a screening for skin injury risk, such as pressure or moisture-associated skin damage on admission and at regular intervals throughout hospital stay.Keep all areas of skin (especially folds) clean and dry.Maintain adequate skin hydration.Relieve and redistribute pressure and protect bony prominences and skin at risk for injury; implement measures based on patient-specific risk factors.Match turning and repositioning schedule to clinical condition.Encourage weight shift frequently; assist with reposition if unable to complete independently.Float heels off bed; avoid pressure on the Achilles tendon.Keep skin free from extended contact with medical devices.Optimize nutrition and hydration.Encourage functional activity and mobility, as early as tolerated.Use aids (e.g., slide boards, mechanical lift) during transfer.  Recent Flowsheet Documentation  Taken 3/17/2025 0230 by Kavitha Austin RN  Body Position:   position maintained   heels elevated  Taken 3/17/2025 0030 by Kavitha Austin RN  Body Position:   position maintained   heels elevated  Taken 3/16/2025 2215 by Kavitha Austin RN  Body Position:   position maintained   heels elevated  Taken 3/16/2025 2000 by Kavitha Austin RN  Body Position:   position maintained   heels elevated  Intervention: Prevent Infection  Description: Maintain skin and mucous membrane integrity; promote hand, oral and  pulmonary hygiene.Optimize fluid balance, nutrition, sleep and glycemic control to maximize infection resistance.Identify potential sources of infection early to prevent or mitigate progression of infection (e.g., wound, lines, devices).Evaluate ongoing need for invasive devices; remove promptly when no longer indicated.Review vaccination status.  Recent Flowsheet Documentation  Taken 3/17/2025 0230 by Kavitha Austin RN  Infection Prevention:   environmental surveillance performed   rest/sleep promoted   hand hygiene promoted  Taken 3/17/2025 0030 by Kavitha Austin RN  Infection Prevention:   environmental surveillance performed   rest/sleep promoted   hand hygiene promoted  Taken 3/16/2025 2215 by Kavitha Austin RN  Infection Prevention:   environmental surveillance performed   rest/sleep promoted   hand hygiene promoted  Taken 3/16/2025 2000 by Kavitha Austin RN  Infection Prevention:   environmental surveillance performed   rest/sleep promoted   hand hygiene promoted  Goal: Optimal Comfort and Wellbeing  Outcome: Progressing  Intervention: Provide Person-Centered Care  Description: Use a family-focused approach to care; encourage support system presence and participation.Develop trust and rapport by proactively providing information, encouraging questions, addressing concerns and offering reassurance.Acknowledge emotional response to hospitalization.Recognize and utilize personal coping strategies and strengths; develop goals via shared decision-making.Honor spiritual and cultural preferences.  Recent Flowsheet Documentation  Taken 3/16/2025 2000 by Kavitha Austin RN  Trust Relationship/Rapport:   care explained   choices provided   emotional support provided   empathic listening provided   questions answered   questions encouraged   reassurance provided   thoughts/feelings acknowledged  Goal: Readiness for Transition of Care  Outcome: Progressing     Problem: Skin Injury Risk  Increased  Goal: Skin Health and Integrity  Outcome: Progressing  Intervention: Optimize Skin Protection  Description: Perform a full pressure injury risk assessment, as indicated by screening, upon admission to care unit.Reassess skin (full inspection and injury risk, including skin temperature, consistency and color) frequently (e.g., scheduled interval, with change in condition) to provide optimal early detection and prevention.Maintain adequate tissue perfusion (e.g., encourage fluid balance; avoid crossing legs, constrictive clothing or devices) to promote tissue oxygenation.Maintain head of bed at lowest degree of elevation tolerated, considering medical condition and other restrictions. Use positioning supports to prevent sliding and friction. Consider low friction textiles.Avoid positioning onto an area that remains reddened or on bony prominences.Minimize incontinence and moisture (e.g., toileting schedule; moisture-wicking pad, diaper or incontinence collection device; skin moisture barrier).Cleanse skin promptly and gently, when soiled, utilizing a pH-balanced cleanser.Relieve and redistribute pressure (e.g., scheduled position changes, weight shifts, use of support surface, medical device repositioning, protective dressing application, use of positioning device, microclimate control, use of pressure-injury-monitorEncourage increased activity, such as sitting in a chair at the bedside or early mobilization, when able to tolerate. Avoid prolonged sitting.  Recent Flowsheet Documentation  Taken 3/17/2025 0230 by Kavitha Austin RN  Head of Bed (HOB) Positioning: HOB elevated  Taken 3/17/2025 0030 by Kavitha Austin RN  Head of Bed (HOB) Positioning: HOB elevated  Taken 3/16/2025 2215 by Kavitha Austin RN  Head of Bed (HOB) Positioning: HOB elevated  Taken 3/16/2025 2000 by Kavitha Austin RN  Head of Bed (HOB) Positioning: HOB elevated     Problem: Comorbidity Management  Goal: Maintenance  of COPD Symptom Control  Outcome: Progressing  Goal: Maintenance of Seizure Control  Outcome: Progressing     Problem: Fall Injury Risk  Goal: Absence of Fall and Fall-Related Injury  Outcome: Progressing  Intervention: Promote Injury-Free Environment  Description: Provide a safe, barrier-free environment that encourages independent activity.Keep care area uncluttered and well-lighted.Determine need for increased observation or monitoring.Avoid use of devices that minimize mobility, such as restraints or indwelling urinary catheter.  Recent Flowsheet Documentation  Taken 3/17/2025 0230 by Kavitha Austin RN  Safety Promotion/Fall Prevention:   nonskid shoes/slippers when out of bed   safety round/check completed   room organization consistent   lighting adjusted   activity supervised   assistive device/personal items within reach   clutter free environment maintained  Taken 3/17/2025 0030 by Kavitha Austin RN  Safety Promotion/Fall Prevention:   nonskid shoes/slippers when out of bed   safety round/check completed   room organization consistent   lighting adjusted   activity supervised   assistive device/personal items within reach   clutter free environment maintained  Taken 3/16/2025 2215 by Kavitha Austin RN  Safety Promotion/Fall Prevention:   nonskid shoes/slippers when out of bed   safety round/check completed   room organization consistent   lighting adjusted   activity supervised   assistive device/personal items within reach   clutter free environment maintained  Taken 3/16/2025 2000 by Kavitha Austin RN  Safety Promotion/Fall Prevention:   nonskid shoes/slippers when out of bed   safety round/check completed   room organization consistent   lighting adjusted   activity supervised   assistive device/personal items within reach   clutter free environment maintained     Problem: Adult Inpatient Plan of Care  Goal: Plan of Care Review  Outcome: Progressing  Flowsheets (Taken 3/17/2025  0516)  Progress: improving  Plan of Care Reviewed With: patient  Goal: Patient-Specific Goal (Individualized)  Outcome: Progressing  Goal: Absence of Hospital-Acquired Illness or Injury  Outcome: Progressing  Intervention: Identify and Manage Fall Risk  Description: Perform standard risk assessment on admission using a validated tool or comprehensive approach appropriate to the patient; reassess fall risk frequently, with change in status or transfer to another level of care.Communicate risk to interprofessional healthcare team; ensure fall risk visible cue.Determine need for increased observation, equipment and environmental modification, as well as use of supportive, nonskid footwear.Adjust safety measures to individual needs and identified risk factors.Reinforce the importance of active participation with fall risk prevention, safety, and physical activity with the patient and family.Perform regular intentional rounding to assess need for position change, pain assessment and personal needs, including assistance with toileting.  Recent Flowsheet Documentation  Taken 3/17/2025 0230 by Kavitha Austin RN  Safety Promotion/Fall Prevention:   nonskid shoes/slippers when out of bed   safety round/check completed   room organization consistent   lighting adjusted   activity supervised   assistive device/personal items within reach   clutter free environment maintained  Taken 3/17/2025 0030 by Kavitha Austin RN  Safety Promotion/Fall Prevention:   nonskid shoes/slippers when out of bed   safety round/check completed   room organization consistent   lighting adjusted   activity supervised   assistive device/personal items within reach   clutter free environment maintained  Taken 3/16/2025 2215 by Kavitha Austin RN  Safety Promotion/Fall Prevention:   nonskid shoes/slippers when out of bed   safety round/check completed   room organization consistent   lighting adjusted   activity supervised   assistive  device/personal items within reach   clutter free environment maintained  Taken 3/16/2025 2000 by Kavitha Austin RN  Safety Promotion/Fall Prevention:   nonskid shoes/slippers when out of bed   safety round/check completed   room organization consistent   lighting adjusted   activity supervised   assistive device/personal items within reach   clutter free environment maintained  Intervention: Prevent Skin Injury  Description: Perform a screening for skin injury risk, such as pressure or moisture-associated skin damage on admission and at regular intervals throughout hospital stay.Keep all areas of skin (especially folds) clean and dry.Maintain adequate skin hydration.Relieve and redistribute pressure and protect bony prominences and skin at risk for injury; implement measures based on patient-specific risk factors.Match turning and repositioning schedule to clinical condition.Encourage weight shift frequently; assist with reposition if unable to complete independently.Float heels off bed; avoid pressure on the Achilles tendon.Keep skin free from extended contact with medical devices.Optimize nutrition and hydration.Encourage functional activity and mobility, as early as tolerated.Use aids (e.g., slide boards, mechanical lift) during transfer.  Recent Flowsheet Documentation  Taken 3/17/2025 0230 by Kavitha Austin RN  Body Position:   position maintained   heels elevated  Taken 3/17/2025 0030 by Kavitha Austin RN  Body Position:   position maintained   heels elevated  Taken 3/16/2025 2215 by Kavitha Austin RN  Body Position:   position maintained   heels elevated  Taken 3/16/2025 2000 by Kavitha Austin RN  Body Position:   position maintained   heels elevated  Intervention: Prevent Infection  Description: Maintain skin and mucous membrane integrity; promote hand, oral and pulmonary hygiene.Optimize fluid balance, nutrition, sleep and glycemic control to maximize infection resistance.Identify  potential sources of infection early to prevent or mitigate progression of infection (e.g., wound, lines, devices).Evaluate ongoing need for invasive devices; remove promptly when no longer indicated.Review vaccination status.  Recent Flowsheet Documentation  Taken 3/17/2025 0230 by Kavitha Austin RN  Infection Prevention:   environmental surveillance performed   rest/sleep promoted   hand hygiene promoted  Taken 3/17/2025 0030 by Kavitha Austin RN  Infection Prevention:   environmental surveillance performed   rest/sleep promoted   hand hygiene promoted  Taken 3/16/2025 2215 by Kavitha Austin RN  Infection Prevention:   environmental surveillance performed   rest/sleep promoted   hand hygiene promoted  Taken 3/16/2025 2000 by Kavitha Austin RN  Infection Prevention:   environmental surveillance performed   rest/sleep promoted   hand hygiene promoted  Goal: Optimal Comfort and Wellbeing  Outcome: Progressing  Intervention: Provide Person-Centered Care  Description: Use a family-focused approach to care; encourage support system presence and participation.Develop trust and rapport by proactively providing information, encouraging questions, addressing concerns and offering reassurance.Acknowledge emotional response to hospitalization.Recognize and utilize personal coping strategies and strengths; develop goals via shared decision-making.Honor spiritual and cultural preferences.  Recent Flowsheet Documentation  Taken 3/16/2025 2000 by Kavitha Austin RN  Trust Relationship/Rapport:   care explained   choices provided   emotional support provided   empathic listening provided   questions answered   questions encouraged   reassurance provided   thoughts/feelings acknowledged  Goal: Readiness for Transition of Care  Outcome: Progressing     Problem: Skin Injury Risk Increased  Goal: Skin Health and Integrity  Outcome: Progressing  Intervention: Optimize Skin Protection  Description: Perform a full  pressure injury risk assessment, as indicated by screening, upon admission to care unit.Reassess skin (full inspection and injury risk, including skin temperature, consistency and color) frequently (e.g., scheduled interval, with change in condition) to provide optimal early detection and prevention.Maintain adequate tissue perfusion (e.g., encourage fluid balance; avoid crossing legs, constrictive clothing or devices) to promote tissue oxygenation.Maintain head of bed at lowest degree of elevation tolerated, considering medical condition and other restrictions. Use positioning supports to prevent sliding and friction. Consider low friction textiles.Avoid positioning onto an area that remains reddened or on bony prominences.Minimize incontinence and moisture (e.g., toileting schedule; moisture-wicking pad, diaper or incontinence collection device; skin moisture barrier).Cleanse skin promptly and gently, when soiled, utilizing a pH-balanced cleanser.Relieve and redistribute pressure (e.g., scheduled position changes, weight shifts, use of support surface, medical device repositioning, protective dressing application, use of positioning device, microclimate control, use of pressure-injury-monitorEncourage increased activity, such as sitting in a chair at the bedside or early mobilization, when able to tolerate. Avoid prolonged sitting.  Recent Flowsheet Documentation  Taken 3/17/2025 0230 by Kavitha Austin RN  Head of Bed (HOB) Positioning: HOB elevated  Taken 3/17/2025 0030 by Kavitha Austin RN  Head of Bed (HOB) Positioning: HOB elevated  Taken 3/16/2025 2215 by Kavitha Austin RN  Head of Bed (HOB) Positioning: HOB elevated  Taken 3/16/2025 2000 by Kavitha Austin RN  Head of Bed (HOB) Positioning: HOB elevated     Problem: Comorbidity Management  Goal: Maintenance of COPD Symptom Control  Outcome: Progressing  Goal: Maintenance of Seizure Control  Outcome: Progressing     Problem: Fall Injury  Risk  Goal: Absence of Fall and Fall-Related Injury  Outcome: Progressing  Intervention: Promote Injury-Free Environment  Description: Provide a safe, barrier-free environment that encourages independent activity.Keep care area uncluttered and well-lighted.Determine need for increased observation or monitoring.Avoid use of devices that minimize mobility, such as restraints or indwelling urinary catheter.  Recent Flowsheet Documentation  Taken 3/17/2025 0230 by Kavitha Austin RN  Safety Promotion/Fall Prevention:   nonskid shoes/slippers when out of bed   safety round/check completed   room organization consistent   lighting adjusted   activity supervised   assistive device/personal items within reach   clutter free environment maintained  Taken 3/17/2025 0030 by Kavitha Austin RN  Safety Promotion/Fall Prevention:   nonskid shoes/slippers when out of bed   safety round/check completed   room organization consistent   lighting adjusted   activity supervised   assistive device/personal items within reach   clutter free environment maintained  Taken 3/16/2025 2215 by Kavitha Austin RN  Safety Promotion/Fall Prevention:   nonskid shoes/slippers when out of bed   safety round/check completed   room organization consistent   lighting adjusted   activity supervised   assistive device/personal items within reach   clutter free environment maintained  Taken 3/16/2025 2000 by Kavitha Austin RN  Safety Promotion/Fall Prevention:   nonskid shoes/slippers when out of bed   safety round/check completed   room organization consistent   lighting adjusted   activity supervised   assistive device/personal items within reach   clutter free environment maintained   Goal Outcome Evaluation:  Plan of Care Reviewed With: patient        Progress: improving

## 2025-03-17 NOTE — CASE MANAGEMENT/SOCIAL WORK
Continued Stay Note   [place]     Patient Name: Philomena Davis  MRN: [id number]  Today's Date: 3/17/2025    Admit Date: 3/12/2025    Plan: IRF   Discharge Plan       Row Name 03/17/25 1009       Plan    Plan IRF    Patient/Family in Agreement with Plan yes    Plan Comments Spoke with patient at bedside r/t therapy recs for IRF with pt agreeable. Discussed with pt her plan once completes rehab and she states home she is not interested in LTC placement. Referral given to [facility] per discussion. Pt has been there previous and is aware of the process.    Final Discharge Disposition Code 62 - inpatient rehab facility                   Discharge Codes    No documentation.                 Expected Discharge Date and Time       Expected Discharge Date Expected Discharge Time    Mar 19, 2025 12:00 PM              Brittany Ritter RN

## 2025-03-17 NOTE — THERAPY TREATMENT NOTE
Patient Name: Philomena Davis  : 1960    MRN: [id number]                              Today's Date: 3/17/2025       Admit Date: 3/12/2025    Visit Dx:     ICD-10-CM ICD-9-CM   1. Alcoholic cirrhosis, unspecified whether ascites present  K70.30 571.2   2. Hyponatremia  E87.1 276.1   3. Hypokalemia  E87.6 276.8   4. Frequent falls  R29.6 V15.88   5. Chronic anemia  D64.9 285.9   6. Essential hypertension  I10 401.9   7. Closed displaced fracture of third metatarsal bone of right foot with routine healing, subsequent encounter  S92.331D V54.19     Patient Active Problem List   Diagnosis    Essential hypertension    Generalized anxiety disorder    Hyperlipidemia    Glaucoma    Alcoholic cirrhosis of liver without ascites    Severe malnutrition    GERD without esophagitis    Sleep disturbance    Annual physical exam    Idiopathic peripheral neuropathy    Severe anxiety with panic    Alcohol use disorder    Cigarette nicotine dependence in remission    Chronic obstructive pulmonary disease    Closed fracture of left hand    Moderate malnutrition    Lower extremity weakness    Orthostasis    Subclinical hypothyroidism    Atrial flutter    Peripheral neuropathy    Closed displaced fracture of third metatarsal bone of right foot    Injury of right foot    Unsteady gait    Falls    Hypotension     Past Medical History:   Diagnosis Date    Alcohol withdrawal 05/10/2019    Ankle sprain 2015    Annual physical exam 2023    Anxiety and depression     Arthritis of back     Arthritis of neck     Asthma     Cataract 1999    Chronic pain disorder     Cirrhosis of liver     Closed displaced fracture of lateral malleolus of left fibula     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    Closed trimalleolar fracture of right ankle 2023    Colon polyp     COPD (chronic obstructive pulmonary disease)     Digital mucous cyst of toe of left foot 2017     Eating disorder 2022    Fracture of ankle 2017    Fracture, clavicle 1969    Fracture, finger 1972    Fracture, foot 1/30/25    Ganglion cyst of left foot     GERD (gastroesophageal reflux disease) 10/22    Glaucoma     Hip arthrosis 2019    Hyperlipidemia 1999    Hypertension     Knee sprain 2019    Knee swelling 2019    Low back strain 1/15/25    Neck strain 1/30/25    Panic disorder 1987    Periarthritis of shoulder 2019    Peripheral neuropathy     Pulmonary arterial hypertension 2021    Urinary tract infection 02/23/23    Varicella 1968    Wears glasses      Past Surgical History:   Procedure Laterality Date    ANKLE OPEN REDUCTION INTERNAL FIXATION Left 03/24/2017    Procedure: LEFT ANKLE OPEN REDUCTION INTERNAL FIXATION WITH ILIAC CREST BONE GRAFT , EXCISION CYST 4TH/5TH INNERSPACE LEFT FOOT;  Surgeon: Frank Larson MD;  Location:  [facility];  Service:     ANKLE OPEN REDUCTION INTERNAL FIXATION Right 05/25/2023    Procedure: ANKLE OPEN REDUCTION INTERNAL FIXATION;  Surgeon: Deandre Reynoso MD;  Location:  [facility];  Service: Orthopedics;  Laterality: Right;    AUGMENTATION MAMMAPLASTY Bilateral 1999    BREAST AUGMENTATION      BREAST EXCISIONAL BIOPSY Right 2014    COLONOSCOPY  2018    DILATATION AND CURETTAGE  1987    DILATION AND CURETTAGE, DIAGNOSTIC / THERAPEUTIC      ENDOSCOPY N/A 11/30/2022    Procedure: ESOPHAGOGASTRODUODENOSCOPY;  Surgeon: Arelis Solano MD;  Location:  [facility];  Service: Gastroenterology;  Laterality: N/A;    ENDOSCOPY N/A 02/01/2024    Procedure: ESOPHAGOGASTRODUODENOSCOPY;  Surgeon: Brunner, Mark I, MD;  Location:  [facility];  Service: Gastroenterology;  Laterality: N/A;    FOOT SURGERY  2012    FRACTURE SURGERY  5 /26/2023    MN RPR NON/MAL FEMUR DSTL H/N W/ILIAC/AUTOG BONE Left 03/24/2017    Procedure: ILIAC CREST BONE GRAFT;  Surgeon: Frank Larson MD;  Location:  [facility];  Service: Orthopedics    WISDOM TOOTH EXTRACTION  1992    WRIST  SURGERY        General Information       Row Name 03/17/25 1350          Physical Therapy Time and Intention    Document Type therapy note (daily note)  -KR     Mode of Treatment physical therapy;individual therapy  -KR       Row Name 03/17/25 1350          General Information    Patient Profile Reviewed yes  -KR     Existing Precautions/Restrictions fall;non-weight bearing;right  NWB RLE in walking boot, however pt reports walking boot hurts and refuses to wear  -KR     Barriers to Rehab medically complex;previous functional deficit  -KR       Row Name 03/17/25 1350          Cognition    Orientation Status (Cognition) oriented x 4  -KR       Row Name 03/17/25 1350          Safety Issues/Impairments Affecting Functional Mobility    Safety Issues Affecting Function (Mobility) insight into deficits/self-awareness;awareness of need for assistance;judgment;problem-solving;safety precaution awareness;sequencing abilities;safety precautions follow-through/compliance;unable to maintain weight-bearing restrictions  -KR     Impairments Affecting Function (Mobility) balance;sensation/sensory awareness;endurance/activity tolerance;strength;shortness of breath;pain;range of motion (ROM)  -KR               User Key  (r) = Recorded By, (t) = Taken By, (c) = Cosigned By      Initials Name Provider Type    KR Dina Saldivar, PT Physical Therapist                   Mobility       Row Name 03/17/25 1351          Bed Mobility    Bed Mobility supine-sit  -KR     Scooting/Bridging [place] (Bed Mobility) minimum assist (75% patient effort);1 person assist  -KR     Assistive Device (Bed Mobility) bed rails;head of bed elevated  -KR     Comment, (Bed Mobility) assist req'd to scoot hips to EOB  -KR       Row Name 03/17/25 1351          Bed-Chair Transfer    Bed-Chair [place] (Transfers) minimum assist (75% patient effort);1 person assist  -KR     Assistive Device (Bed-Chair Transfers) walker, front-wheeled  -KR     Comment,  (Bed-Chair Transfer) steps to chair, pt unable to maintain NWB.  -KR       Row Name 03/17/25 1351          Sit-Stand Transfer    Sit-Stand [place] (Transfers) moderate assist (50% patient effort);1 person assist  -KR     Assistive Device (Sit-Stand Transfers) walker, front-wheeled  -KR     Comment, (Sit-Stand Transfer) 1x attempt from bed with maxA, pt unable to maintain NWB precautions. Able to stand from bed with modA, however with weight through RLE.  -KR       Row Name 03/17/25 1351          Gait/Stairs (Locomotion)    [place] Level (Gait) not tested  -KR     Comment, (Gait/Stairs) unable to maintain NWB precautions  -KR       Row Name 03/17/25 1351          Mobility    Extremity Weight-bearing Status right lower extremity  -KR     Right Lower Extremity (Weight-bearing Status) non weight-bearing (NWB)   -               User Key  (r) = Recorded By, (t) = Taken By, (c) = Cosigned By      Initials Name Provider Type    KR Dina Saldivar, PT Physical Therapist                   Obj/Interventions       Row Name 03/17/25 1402          Motor Skills    Therapeutic Exercise hip;knee  -       Row Name 03/17/25 1402          Hip (Therapeutic Exercise)    Hip (Therapeutic Exercise) strengthening exercise;isometric exercises  -     Hip Isometrics (Therapeutic Exercise) 10 repetitions;bilateral;gluteal sets  -     Hip Strengthening (Therapeutic Exercise) 10 repetitions;bilateral;aBduction;aDduction  -       Row Name 03/17/25 1402          Knee (Therapeutic Exercise)    Knee (Therapeutic Exercise) strengthening exercise;isometric exercises  -     Knee Isometrics (Therapeutic Exercise) 10 repetitions;bilateral;quad sets  -KR     Knee Strengthening (Therapeutic Exercise) 10 repetitions;bilateral;SLR (straight leg raise);LAQ (long arc quad)  -       Row Name 03/17/25 1402          Balance    Balance Assessment sitting static balance;sitting dynamic balance;standing static balance;standing dynamic balance   -KR     Static Sitting Balance standby assist  -KR     Dynamic Sitting Balance contact guard  -KR     Position, Sitting Balance unsupported;sitting edge of bed  -KR     Static Standing Balance contact guard  -KR     Dynamic Standing Balance moderate assist;1-person assist;verbal cues;non-verbal cues (demo/gesture)  -KR     Position/Device Used, Standing Balance supported;walker, front-wheeled  -KR     Balance Interventions sitting;supported;static;dynamic;sit to stand;standing  -KR               User Key  (r) = Recorded By, (t) = Taken By, (c) = Cosigned By      Initials Name Provider Type    Dina Mejia PT Physical Therapist                   Goals/Plan    No documentation.                  Clinical Impression       Row Name 03/17/25 1403          Pain    Pretreatment Pain Rating 0/10 - no pain  -KR     Posttreatment Pain Rating 0/10 - no pain  -KR       Row Name 03/17/25 1403          Plan of Care Review    Plan of Care Reviewed With patient  -KR     Progress improving  -KR     Outcome Evaluation Pt required less assist for bed mobility this date and able to transfer to the chair, however unable to maintain NWB precautions. Ortho MD following patient notified. Pt will continue to benefit from PT to address ongoing strength, balance, and endurance deficits. Continue to rec IRF upon dc.  -KR       Row Name 03/17/25 1403          Vital Signs    Pre Patient Position Supine  -KR     Intra Patient Position Standing  -KR     Post Patient Position Sitting  -KR       Row Name 03/17/25 1403          Positioning and Restraints    Pre-Treatment Position in bed  -KR     Post Treatment Position chair  -KR     In Chair notified nsg;reclined;call light within reach;encouraged to call for assist;exit alarm on;waffle cushion;legs elevated  -KR               User Key  (r) = Recorded By, (t) = Taken By, (c) = Cosigned By      Initials Name Provider Type    Dina Mejia, KEILA Physical Therapist                   Outcome  Measures       Row Name 03/17/25 1404          How much help from another person do you currently need...    Turning from your back to your side while in flat bed without using bedrails? 3  -KR     Moving from lying on back to sitting on the side of a flat bed without bedrails? 2  -KR     Moving to and from a bed to a chair (including a wheelchair)? 2  -KR     Standing up from a chair using your arms (e.g., wheelchair, bedside chair)? 2  -KR     Climbing 3-5 steps with a railing? 1  -KR     To walk in hospital room? 1  -KR     AM-PAC 6 Clicks Score (PT) 11  -KR     Highest Level of Mobility Goal 4 --> Transfer to chair/commode  -KR               User Key  (r) = Recorded By, (t) = Taken By, (c) = Cosigned By      Initials Name Provider Type    Dina Mejia, PT Physical Therapist                                 Physical Therapy Education       Title: PT OT SLP Therapies (In Progress)       Topic: Physical Therapy (Not Started)       Point: Mobility training (In Progress)       Learning Progress Summary            Patient Acceptance, E, NR by KR at 3/17/2025 1404    Acceptance, E, NR by KR at 3/14/2025 1504                      Point: Body mechanics (In Progress)       Learning Progress Summary            Patient Acceptance, E, NR by KR at 3/17/2025 1404    Acceptance, E, NR by KR at 3/14/2025 1504                      Point: Precautions (In Progress)       Learning Progress Summary            Patient Acceptance, E, NR by KR at 3/17/2025 1404    Acceptance, E, NR by KR at 3/14/2025 1504                                      User Key       Initials Effective Dates Name Provider Type Discipline    NOLAN 12/30/22 -  Dina Saldivar, KEILA Physical Therapist PT                  PT Recommendation and Plan  Planned Therapy Interventions (PT): balance training, bed mobility training, gait training, home exercise program, patient/family education, postural re-education, transfer training, stretching, strengthening, ROM (range  of motion), neuromuscular re-education, stair training, wheelchair management/propulsion training  Progress: improving  Outcome Evaluation: Pt required less assist for bed mobility this date and able to transfer to the chair, however unable to maintain NWB precautions. Ortho MD following patient notified. Pt will continue to benefit from PT to address ongoing strength, balance, and endurance deficits. Continue to rec IRF upon dc.     Time Calculation:   PT Evaluation Complexity  History, PT Evaluation Complexity: 3 or more personal factors and/or comorbidities  Examination of Body Systems (PT Eval Complexity): total of 4 or more elements  Clinical Presentation (PT Evaluation Complexity): evolving  Clinical Decision Making (PT Evaluation Complexity): moderate complexity  Overall Complexity (PT Evaluation Complexity): moderate complexity     PT Charges       Row Name 03/17/25 1404             Time Calculation    Start Time 1330  -KR      PT Received On 03/17/25  -KR         Timed Charges    80435 - PT Therapeutic Exercise Minutes 15  -KR      17784 - PT Therapeutic Activity Minutes 8  -KR         Total Minutes    Timed Charges Total Minutes 23  -KR       Total Minutes 23  -KR                User Key  (r) = Recorded By, (t) = Taken By, (c) = Cosigned By      Initials Name Provider Type    Dina Mejia PT Physical Therapist                  Therapy Charges for Today       Code Description Service Date Service Provider Modifiers Qty    42394895186 HC PT THER PROC EA 15 MIN 3/17/2025 Dina Saldivar, PT GP 1    70515193697 HC PT THERAPEUTIC ACT EA 15 MIN 3/17/2025 Dina Saldivar PT GP 1            PT G-Codes  Outcome Measure Options: AM-PAC 6 Clicks Daily Activity (OT)  AM-PAC 6 Clicks Score (PT): 11  AM-PAC 6 Clicks Score (OT): 15  PT Discharge Summary  Anticipated Discharge Disposition (PT): inpatient rehabilitation facility    Dina Saldivar PT  3/17/2025

## 2025-03-17 NOTE — PROGRESS NOTES
[facility] Medicine Services  PROGRESS NOTE    Patient Name: Philomena Davis  : 1960  MRN: [id number]    Date of Admission: 3/12/2025  Primary Care Physician: Unique Brandt DO    Subjective   Subjective     CC: f/u hypotension    HPI: Still feeling weak and, Fatigue.      Objective   Objective     Vital Signs:   Temp:  [97.6 °F (36.4 °C)-98.2 °F (36.8 °C)] 98.1 °F (36.7 °C)  Heart Rate:  [] 120  Resp:  [18] 18  BP: ()/(54-65) 105/62  Flow (L/min) (Oxygen Therapy):  [1-3] 1     Physical Exam:  Constitutional: No acute distress, awake, alert, chronically ill appearing  HENT: NCAT, mucous membranes moist  Respiratory: Clear to auscultation bilaterally, respiratory effort normal   Cardiovascular: RRR, no murmurs, rubs, or gallops  Gastrointestinal: Positive bowel sounds, soft, nontender, abd distended  Musculoskeletal: bilateral 1+ edema  Psychiatric: Appropriate affect, cooperative  Neurologic: Oriented x 3, strength symmetric in all extremities, Cranial Nerves grossly intact to confrontation, speech clear  Skin: jaundice    Unchanged from 3/16.    Results Reviewed:  LAB RESULTS:      Lab 25  1005 25  0005 25  1807 25  0702 03/15/25  2342 25  1725 25  1057 25  0943 25  0540 25  0415   WBC 10.45  --   --  11.24*  --   --   --  7.29  --  11.54*   HEMOGLOBIN 9.0* 8.5* 9.9* 8.6* 8.8*   < >  --  7.8*  --  10.6*   HEMATOCRIT 27.5* 26.0* 30.3* 25.8* 26.7*   < >  --  24.0*  --  30.9*   PLATELETS 138*  --   --  120*  --   --   --  118*  --  180   NEUTROS ABS 7.26*  --   --  7.77*  --   --   --  5.39  --  8.99*   IMMATURE GRANS (ABS) 0.09*  --   --  0.11*  --   --   --  0.03  --  0.07*   LYMPHS ABS 1.33  --   --  1.48  --   --   --  0.97  --  1.19   MONOS ABS 1.58*  --   --  1.72*  --   --   --  0.84  --  1.21*   EOS ABS 0.11  --   --  0.10  --   --   --  0.03  --  0.03   .6*  --   --  112.2*  --   --   --  108.1*   --  103.7*   LACTATE  --   --   --   --   --   --  1.7  --   --   --    HSTROP T  --   --   --   --   --   --   --   --  16* 16*    < > = values in this interval not displayed.         Lab 03/17/25  0906 03/16/25  0702 03/15/25  0657 03/13/25 2007 03/13/25  0943 03/12/25  0415   SODIUM 135* 135* 133*  --  131* 128*   POTASSIUM 4.0 4.0 4.4 4.9 3.3* 2.7*   CHLORIDE 99 101 96*  --  95* 86*   CO2 26.0 25.0 26.0  --  23.0 26.0   ANION GAP 10.0 9.0 11.0  --  13.0 16.0*   BUN 9 10 12  --  8 5*   CREATININE 1.07* 0.66 1.13*  --  0.81 0.75   EGFR 58.9* 99.3 55.1*  --  82.2 90.1   GLUCOSE 88 105* 70  --  86 106*   CALCIUM 8.1* 7.7* 8.2*  --  7.7* 8.9   MAGNESIUM  --   --   --   --   --  1.9   TSH  --   --   --   --   --  3.370         Lab 03/17/25  0906 03/16/25  0702 03/15/25  0657 03/13/25  0943 03/12/25  0415   TOTAL PROTEIN 5.6* 5.5* 6.1 6.1  6.1 8.0   ALBUMIN 2.6* 2.6* 2.8* 3.0*  3.0* 3.2*   GLOBULIN 3.0 2.9 3.3 3.1 4.8   ALT (SGPT) 35* 22 24 26  26 45*   AST (SGOT) 130* 101* 115* 105*  105* 195*   BILIRUBIN 7.9* 8.2* 10.0* 9.5*  9.5* 10.9*   INDIRECT BILIRUBIN  --   --   --  3.6  --    BILIRUBIN DIRECT  --   --   --  5.9*  --    ALK PHOS 146* 134* 159* 147*  147* 233*   LIPASE  --   --   --   --  17         Lab 03/12/25  0540 03/12/25  0415   PROBNP  --  572.0   HSTROP T 16* 16*             Lab 03/13/25  0943 03/12/25  0804   IRON 77  --    IRON SATURATION (TSAT) 86*  --    TIBC 89*  --    TRANSFERRIN 60*  --    FERRITIN 671.80*  --    FOLATE  --  7.58   VITAMIN B 12  --  1,566*         Brief Urine Lab Results  (Last result in the past 365 days)        Color   Clarity   Blood   Leuk Est   Nitrite   Protein   CREAT   Urine HCG        03/14/25 0529 Orange   Cloudy   Negative   Small (1+)   Positive   Trace                   Microbiology Results Abnormal       None            No radiology results from the last 24 hrs      Results for orders placed during the hospital encounter of 11/10/24    Adult Transthoracic  Echo Complete W/ Cont if Necessary Per Protocol    Interpretation Summary    Left ventricular systolic function is normal. Estimated left ventricular EF = 65%    The cardiac valves are anatomically and functionally normal.      Current medications:  Scheduled Meds:desvenlafaxine, 50 mg, Oral, Daily  [Held by provider] enoxaparin sodium, 40 mg, Subcutaneous, Daily  folic acid, 1 mg, Oral, Daily  gabapentin, 200 mg, Oral, Q12H  lactobacillus acidophilus, 1 capsule, Oral, Daily  latanoprost, 1 drop, Both Eyes, Nightly  [Held by provider] metoprolol succinate XL, 25 mg, Oral, Daily  midodrine, 10 mg, Oral, TID AC  pantoprazole, 40 mg, Oral, BID AC  rosuvastatin, 5 mg, Oral, Daily  sodium chloride, 10 mL, Intravenous, Q12H  thiamine, 100 mg, Oral, Daily      Continuous Infusions:     PRN Meds:.  acetaminophen    ALPRAZolam    senna-docusate sodium **AND** polyethylene glycol **AND** bisacodyl **AND** bisacodyl    Calcium Replacement - Follow Nurse / BPA Driven Protocol    HYDROcodone-acetaminophen    Magnesium Standard Dose Replacement - Follow Nurse / BPA Driven Protocol    melatonin    ondansetron    Phosphorus Replacement - Follow Nurse / BPA Driven Protocol    Potassium Replacement - Follow Nurse / BPA Driven Protocol    [COMPLETED] Insert Peripheral IV **AND** sodium chloride    sodium chloride    Assessment & Plan   Assessment & Plan     Active Hospital Problems    Diagnosis  POA    **Unsteady gait [R26.81]  Yes    Hypotension [I95.9]  Yes    Falls [R29.6]  Not Applicable    Peripheral neuropathy [G62.9]  Yes    Alcohol use disorder [F10.90]  Yes    GERD without esophagitis [K21.9]  Yes    Hyperlipidemia [E78.5]  Yes    Glaucoma [H40.9]  Yes    Essential hypertension [I10]  Yes      Resolved Hospital Problems   No resolved problems to display.        Brief Hospital Course to date:  Philomena Davis is a 64 y.o. female with significant medical history for HTN, HLD, chronic pain disorder, cirrhosis of the liver,  COPD, GERD, and foot fracture who presents to [facility] with complaints of frequent falls injuries. Found to have R foot fracture, acute alcoholic hepatitis     ETOH hepatitis w/ cirrhosis  Hypotension  -Patient bili up to 10.9 from previously normal baseline. Suspect this is ETOH hepatitis (DF 27) - repeat labs are stable.  Continue to monitor. Watching for peak bili.  Bili is starting to come down, LFTs overall improving.  -Midodrine increased to TID.  Blood pressures have been more stable, continue  -portal duplex shows coarse echo texture of liver, c/w cirrhosis and flow in portal vein difficult to assess  --cont supportive care, high protein diet    Body aches  UTI  -resp PCR panel neg  -Urine culture pending.  Status post ceftriaxone   --suspect symptoms may be related to above     Acute/Chronic anemia  TCP  -Likely related to ETOH marrow suppession but anemia studies including FOBT are pending.  -No evidence of bleeding or reports of melena. Increased ppi to BID.  -Serial h/h has been stable  -Holding lovenox.     [place] and third metatarsal fracture   Unsteady gait  ETOH neuropathy  Frequent falls  -Patient reports being sent in by her PT and by orthopedic surgery for us to help her because she lives alone and feels like she is unable to manage. Somehow though she manages to continue to drink ETOH heavily.   -Was recently seen by Dr. Noyola - [facility]  -PT/OT.     Alcohol abuse  ETOH neuropathy  -CIWA protocol  -Seizure precautions  -Continue thiamine and folic acid  -Addiction team     HTN  HLD  -Continue statin  -Continue metoprolol     Depression  -Continue Pristiq     Peripheral neuropathy  -Continue gabapentin     Glaucoma  -Continue home eyedrops       Expected Discharge Location and Transportation: rehab  Expected Discharge   Expected Discharge Date: 3/19/2025; Expected Discharge Time: 12:00 PM     VTE Prophylaxis:  Pharmacologic VTE prophylaxis orders are present.         AM-PAC 6  Clicks Score (PT): 11 (03/16/25 0800)    CODE STATUS:   Code Status and Medical Interventions: CPR (Attempt to Resuscitate); Full Support   Ordered at: 03/12/25 0617     Code Status (Patient has no pulse and is not breathing):    CPR (Attempt to Resuscitate)     Medical Interventions (Patient has pulse or is breathing):    Full Support     Level Of Support Discussed With:    Patient       Martine Cardona MD  03/17/25

## 2025-03-17 NOTE — PLAN OF CARE
Goal Outcome Evaluation:  Plan of Care Reviewed With: patient        Progress: improving  Outcome Evaluation: Pt required less assist for bed mobility this date and able to transfer to the chair, however unable to maintain NWB precautions. Ortho MD following patient notified. Pt will continue to benefit from PT to address ongoing strength, balance, and endurance deficits. Continue to rec IRF upon dc.    Anticipated Discharge Disposition (PT): inpatient rehabilitation facility

## 2025-03-17 NOTE — PROGRESS NOTES
Clinical Nutrition Assessment     Patient Name: Philomena Davis  YOB: 1960  MRN: [id number]  Date of Encounter: 03/17/25 15:01 EDT  Admission date: 3/12/2025  Reason for Visit: Follow-up protocol    Assessment   Nutrition Assessment   Admission Diagnosis:  Unsteady gait [R26.81]  Falls [R29.6]  Hypotension [I95.9]    Problem List:    Unsteady gait    Essential hypertension    Hyperlipidemia    Glaucoma    GERD without esophagitis    Alcohol use disorder    Peripheral neuropathy    Falls    Hypotension      PMH:   She  has a past medical history of Alcohol withdrawal (05/10/2019), Ankle sprain (2015), Annual physical exam (09/12/2023), Anxiety and depression, Arthritis of back (2020), Arthritis of neck (2021), Asthma (2022), Cataract (20/2/1999), Chronic pain disorder (2023), Cirrhosis of liver, Closed displaced fracture of lateral malleolus of left fibula, 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 (03/24/2017), Closed trimalleolar fracture of right ankle (05/24/2023), Colon polyp (2018), COPD (chronic obstructive pulmonary disease) (2022), Digital mucous cyst of toe of left foot (03/24/2017), Eating disorder (2022), Fracture of ankle (2017), Fracture, clavicle (1969), Fracture, finger (1972), Fracture, foot (1/30/25), Ganglion cyst of left foot, GERD (gastroesophageal reflux disease) (10/22), Glaucoma, Hip arthrosis (2019), Hyperlipidemia (1999), Hypertension, Knee sprain (2019), Knee swelling (2019), Low back strain (1/15/25), Neck strain (1/30/25), Panic disorder (1987), Periarthritis of shoulder (2019), Peripheral neuropathy, Pulmonary arterial hypertension (2021), Urinary tract infection (02/23/23), Varicella (1968), and Wears glasses.    PSH:  She  has a past surgical history that includes Breast excisional biopsy (Right, 2014); Dilation and curettage, diagnostic / therapeutic; Breast Augmentation; Wrist surgery; pr rpr non/mal femur dstl h/n w/iliac/autog  "bone (Left, 03/24/2017); Ankle fracture surgery (Left, 03/24/2017); Augmentation mammaplasty (Bilateral, 1999); Esophagogastroduodenoscopy (N/A, 11/30/2022); Ankle fracture surgery (Right, 05/25/2023); Esophagogastroduodenoscopy (N/A, 02/01/2024); Foot surgery (2012); Colonoscopy (2018); [place] tooth extraction (1992); Dilation and curettage of uterus (1987); and Fracture surgery (5 /26/2023).    Applicable Nutrition History:       Anthropometrics     Height: Height: 162 cm (63.78\")  Last Filed Weight: Weight: 68 kg (149 lb 14.6 oz) (03/13/25 1515)  Method:  no method  BMI: BMI (Calculated): 25.9    UBW:  Per EMR wt of 176 lbs on 11/6/23, 148 lbs on 1/3/24 158 lbs on 3/10/25   Weight change: unable to confirm wt hx at this time    Nutrition Focused Physical Exam    Date: 3/13    Patient meets criteria for malnutrition diagnosis, see MSA note.     Subjective   Reported/Observed/Food/Nutrition Related History:     3/17  Pt reports intake has slightly improved, was able to eat 50% grilled cheese and most of her tomato soup for lunch today. Pt states she is drinking Boost once daily and would like to have frequency decreased.     3/13  Pt allows minimal intake last 5 days. Rpt unable to take solid food at this time 2/2 discomfort from GERD. Allows uses Boost suppl at home.     Current Nutrition Prescription   PO: Diet: Cardiac, Regular/House; Healthy Heart (2-3 Na+); Fluid Consistency: Thin (IDDSI 0)  Oral Nutrition Supplement:   Intake:100/25/25% x 3 meals    Assessment & Plan   Nutrition Diagnosis   Date:  3/13            Updated:    Problem Malnutrition  moderate acute   Etiology Rpt GERD discomfort and illness   Signs/Symptoms Intake hx w some wasting   Status: New    Goal / Objectives:   Nutrition to support treatment and Establish PO      Nutrition Intervention      Follow treatment progress, Care plan reviewed, Adjusted supplement, Encourage intake    Adjusted ONS to Boost daily w/breakfast per pt " request    Monitoring/Evaluation:   Per protocol, PO intake, Supplement intake, Weight, GI status, Symptoms    Kia Vivar, MS,RD,LD  Time Spent: 15 min

## 2025-03-18 LAB
ALBUMIN SERPL-MCNC: 2.5 G/DL (ref 3.5–5.2)
ALBUMIN/GLOB SERPL: 0.7 G/DL
ALP SERPL-CCNC: 140 U/L (ref 39–117)
ALT SERPL W P-5'-P-CCNC: 46 U/L (ref 1–33)
ANION GAP SERPL CALCULATED.3IONS-SCNC: 8 MMOL/L (ref 5–15)
AST SERPL-CCNC: 160 U/L (ref 1–32)
BASOPHILS # BLD AUTO: 0.08 10*3/MM3 (ref 0–0.2)
BASOPHILS NFR BLD AUTO: 0.8 % (ref 0–1.5)
BILIRUB SERPL-MCNC: 7.9 MG/DL (ref 0–1.2)
BUN SERPL-MCNC: 10 MG/DL (ref 8–23)
BUN/CREAT SERPL: 8.8 (ref 7–25)
CALCIUM SPEC-SCNC: 8.7 MG/DL (ref 8.6–10.5)
CHLORIDE SERPL-SCNC: 99 MMOL/L (ref 98–107)
CO2 SERPL-SCNC: 27 MMOL/L (ref 22–29)
CREAT SERPL-MCNC: 1.14 MG/DL (ref 0.57–1)
DEPRECATED RDW RBC AUTO: 89.1 FL (ref 37–54)
EGFRCR SERPLBLD CKD-EPI 2021: 54.5 ML/MIN/1.73
EOSINOPHIL # BLD AUTO: 0.11 10*3/MM3 (ref 0–0.4)
EOSINOPHIL NFR BLD AUTO: 1.1 % (ref 0.3–6.2)
ERYTHROCYTE [DISTWIDTH] IN BLOOD BY AUTOMATED COUNT: 22.2 % (ref 12.3–15.4)
GLOBULIN UR ELPH-MCNC: 3.5 GM/DL
GLUCOSE SERPL-MCNC: 78 MG/DL (ref 65–99)
HCT VFR BLD AUTO: 26 % (ref 34–46.6)
HCT VFR BLD AUTO: 26.1 % (ref 34–46.6)
HCT VFR BLD AUTO: 27.6 % (ref 34–46.6)
HGB BLD-MCNC: 8.4 G/DL (ref 12–15.9)
HGB BLD-MCNC: 8.6 G/DL (ref 12–15.9)
HGB BLD-MCNC: 9 G/DL (ref 12–15.9)
IMM GRANULOCYTES # BLD AUTO: 0.09 10*3/MM3 (ref 0–0.05)
IMM GRANULOCYTES NFR BLD AUTO: 0.9 % (ref 0–0.5)
LYMPHOCYTES # BLD AUTO: 1.31 10*3/MM3 (ref 0.7–3.1)
LYMPHOCYTES NFR BLD AUTO: 13 % (ref 19.6–45.3)
MCH RBC QN AUTO: 36.8 PG (ref 26.6–33)
MCHC RBC AUTO-ENTMCNC: 33.1 G/DL (ref 31.5–35.7)
MCV RBC AUTO: 111.1 FL (ref 79–97)
MONOCYTES # BLD AUTO: 1.68 10*3/MM3 (ref 0.1–0.9)
MONOCYTES NFR BLD AUTO: 16.7 % (ref 5–12)
NEUTROPHILS NFR BLD AUTO: 6.78 10*3/MM3 (ref 1.7–7)
NEUTROPHILS NFR BLD AUTO: 67.5 % (ref 42.7–76)
NRBC BLD AUTO-RTO: 0 /100 WBC (ref 0–0.2)
PLATELET # BLD AUTO: 147 10*3/MM3 (ref 140–450)
PMV BLD AUTO: 10.6 FL (ref 6–12)
POTASSIUM SERPL-SCNC: 4 MMOL/L (ref 3.5–5.2)
PROT SERPL-MCNC: 6 G/DL (ref 6–8.5)
RBC # BLD AUTO: 2.34 10*6/MM3 (ref 3.77–5.28)
SODIUM SERPL-SCNC: 134 MMOL/L (ref 136–145)
WBC NRBC COR # BLD AUTO: 10.05 10*3/MM3 (ref 3.4–10.8)

## 2025-03-18 PROCEDURE — 99232 SBSQ HOSP IP/OBS MODERATE 35: CPT | Performed by: STUDENT IN AN ORGANIZED HEALTH CARE EDUCATION/TRAINING PROGRAM

## 2025-03-18 PROCEDURE — 97530 THERAPEUTIC ACTIVITIES: CPT

## 2025-03-18 PROCEDURE — 85018 HEMOGLOBIN: CPT | Performed by: INTERNAL MEDICINE

## 2025-03-18 PROCEDURE — 80053 COMPREHEN METABOLIC PANEL: CPT | Performed by: STUDENT IN AN ORGANIZED HEALTH CARE EDUCATION/TRAINING PROGRAM

## 2025-03-18 PROCEDURE — 97535 SELF CARE MNGMENT TRAINING: CPT

## 2025-03-18 PROCEDURE — 85014 HEMATOCRIT: CPT | Performed by: INTERNAL MEDICINE

## 2025-03-18 PROCEDURE — 85025 COMPLETE CBC W/AUTO DIFF WBC: CPT | Performed by: STUDENT IN AN ORGANIZED HEALTH CARE EDUCATION/TRAINING PROGRAM

## 2025-03-18 PROCEDURE — 25810000003 SODIUM CHLORIDE 0.9 % SOLUTION: Performed by: STUDENT IN AN ORGANIZED HEALTH CARE EDUCATION/TRAINING PROGRAM

## 2025-03-18 RX ORDER — SODIUM CHLORIDE 9 MG/ML
75 INJECTION, SOLUTION INTRAVENOUS CONTINUOUS
Status: ACTIVE | OUTPATIENT
Start: 2025-03-18 | End: 2025-03-18

## 2025-03-18 RX ADMIN — MIDODRINE HYDROCHLORIDE 10 MG: 10 TABLET ORAL at 17:51

## 2025-03-18 RX ADMIN — GABAPENTIN 200 MG: 100 CAPSULE ORAL at 19:35

## 2025-03-18 RX ADMIN — PANTOPRAZOLE SODIUM 40 MG: 40 TABLET, DELAYED RELEASE ORAL at 08:14

## 2025-03-18 RX ADMIN — PANTOPRAZOLE SODIUM 40 MG: 40 TABLET, DELAYED RELEASE ORAL at 17:51

## 2025-03-18 RX ADMIN — THIAMINE HCL TAB 100 MG 100 MG: 100 TAB at 08:14

## 2025-03-18 RX ADMIN — Medication 5 MG: at 19:34

## 2025-03-18 RX ADMIN — Medication 10 ML: at 12:32

## 2025-03-18 RX ADMIN — Medication 1 CAPSULE: at 08:14

## 2025-03-18 RX ADMIN — MIDODRINE HYDROCHLORIDE 10 MG: 10 TABLET ORAL at 08:14

## 2025-03-18 RX ADMIN — ALPRAZOLAM 0.5 MG: 0.5 TABLET ORAL at 19:35

## 2025-03-18 RX ADMIN — GABAPENTIN 200 MG: 100 CAPSULE ORAL at 08:14

## 2025-03-18 RX ADMIN — DESVENLAFAXINE 50 MG: 50 TABLET, FILM COATED, EXTENDED RELEASE ORAL at 08:14

## 2025-03-18 RX ADMIN — MIDODRINE HYDROCHLORIDE 10 MG: 10 TABLET ORAL at 12:29

## 2025-03-18 RX ADMIN — ROSUVASTATIN 5 MG: 10 TABLET, FILM COATED ORAL at 08:14

## 2025-03-18 RX ADMIN — FOLIC ACID 1 MG: 1 TABLET ORAL at 08:14

## 2025-03-18 RX ADMIN — Medication 10 ML: at 19:35

## 2025-03-18 RX ADMIN — LATANOPROST 1 DROP: 50 SOLUTION OPHTHALMIC at 19:35

## 2025-03-18 RX ADMIN — SODIUM CHLORIDE 75 ML/HR: 0.9 INJECTION, SOLUTION INTRAVENOUS at 10:14

## 2025-03-18 NOTE — PLAN OF CARE
Goal Outcome Evaluation:  Plan of Care Reviewed With: patient        Progress: improving  Outcome Evaluation: Pt demonstrates improving Ind with bed mobility, remains ModA for tranfers, and grossly ModA for LB tasks. Max cues for WB precautions throughout. Remains below functional baseline due to ongoing strength, balance, and endurance deficits - will cont IPOT per POC as tolerated. Rec d/c to IRF.    Anticipated Discharge Disposition (OT): inpatient rehabilitation facility

## 2025-03-18 NOTE — PROGRESS NOTES
[facility]  PROGRESS NOTE    Patient Name: Philomena Davis  : 1960  MRN: [id number]    Date of Admission: 3/12/2025  Primary Care Physician: Unique Brandt DO    Subjective   Subjective     CC: f/u hypotension    HPI: Overnight, denies any chest pain or palpitations.  Awaiting rehab decisions      Objective   Objective     Vital Signs:   Temp:  [97.6 °F (36.4 °C)-98.1 °F (36.7 °C)] 98.1 °F (36.7 °C)  Heart Rate:  [] 124  Resp:  [14-18] 18  BP: ()/(54-67) 91/62  Flow (L/min) (Oxygen Therapy):  [1] 1     Physical Exam:  Constitutional: No acute distress, awake, alert, chronically ill appearing  HENT: NCAT, mucous membranes moist  Respiratory: Clear to auscultation bilaterally, respiratory effort normal   Cardiovascular: Tachycardic, no murmurs, rubs, or gallops  Gastrointestinal: Positive bowel sounds, soft, nontender, abd distended  Musculoskeletal: bilateral 1+ edema  Psychiatric: Appropriate affect, cooperative  Neurologic: Oriented x 3, strength symmetric in all extremities, Cranial Nerves grossly intact to confrontation, speech clear  Skin: jaundice        Results Reviewed:  LAB RESULTS:      Lab 25  0646 25  2345 25  1534 25  1005 25  0005 25  1807 25  0702 25  1725 25  1057 25  0943 25  0540 25  0415   WBC 10.05  --   --  10.45  --   --  11.24*  --   --  7.29  --  11.54*   HEMOGLOBIN 8.6* 9.0* 9.8* 9.0* 8.5*   < > 8.6*   < >  --  7.8*  --  10.6*   HEMATOCRIT 26.0* 27.6* 29.8* 27.5* 26.0*   < > 25.8*   < >  --  24.0*  --  30.9*   PLATELETS 147  --   --  138*  --   --  120*  --   --  118*  --  180   NEUTROS ABS 6.78  --   --  7.26*  --   --  7.77*  --   --  5.39  --  8.99*   IMMATURE GRANS (ABS) 0.09*  --   --  0.09*  --   --  0.11*  --   --  0.03  --  0.07*   LYMPHS ABS 1.31  --   --  1.33  --   --  1.48  --   --  0.97  --  1.19   MONOS ABS 1.68*  --   --  1.58*  --    --  1.72*  --   --  0.84  --  1.21*   EOS ABS 0.11  --   --  0.11  --   --  0.10  --   --  0.03  --  0.03   .1*  --   --  113.6*  --   --  112.2*  --   --  108.1*  --  103.7*   LACTATE  --   --   --   --   --   --   --   --  1.7  --   --   --    HSTROP T  --   --   --   --   --   --   --   --   --   --  16* 16*    < > = values in this interval not displayed.         Lab 03/18/25  0646 03/17/25  0906 03/16/25  0702 03/15/25  0657 03/13/25  2007 03/13/25  0943 03/12/25  0415   SODIUM 134* 135* 135* 133*  --  131* 128*   POTASSIUM 4.0 4.0 4.0 4.4 4.9 3.3* 2.7*   CHLORIDE 99 99 101 96*  --  95* 86*   CO2 27.0 26.0 25.0 26.0  --  23.0 26.0   ANION GAP 8.0 10.0 9.0 11.0  --  13.0 16.0*   BUN 10 9 10 12  --  8 5*   CREATININE 1.14* 1.07* 0.66 1.13*  --  0.81 0.75   EGFR 54.5* 58.9* 99.3 55.1*  --  82.2 90.1   GLUCOSE 78 88 105* 70  --  86 106*   CALCIUM 8.7 8.1* 7.7* 8.2*  --  7.7* 8.9   MAGNESIUM  --   --   --   --   --   --  1.9   TSH  --   --   --   --   --   --  3.370         Lab 03/18/25  0646 03/17/25  0906 03/16/25  0702 03/15/25  0657 03/13/25  0943 03/12/25  0415   TOTAL PROTEIN 6.0 5.6* 5.5* 6.1 6.1  6.1 8.0   ALBUMIN 2.5* 2.6* 2.6* 2.8* 3.0*  3.0* 3.2*   GLOBULIN 3.5 3.0 2.9 3.3 3.1 4.8   ALT (SGPT) 46* 35* 22 24 26  26 45*   AST (SGOT) 160* 130* 101* 115* 105*  105* 195*   BILIRUBIN 7.9* 7.9* 8.2* 10.0* 9.5*  9.5* 10.9*   INDIRECT BILIRUBIN  --   --   --   --  3.6  --    BILIRUBIN DIRECT  --   --   --   --  5.9*  --    ALK PHOS 140* 146* 134* 159* 147*  147* 233*   LIPASE  --   --   --   --   --  17         Lab 03/12/25  0540 03/12/25  0415   PROBNP  --  572.0   HSTROP T 16* 16*             Lab 03/13/25  0943 03/12/25  0804   IRON 77  --    IRON SATURATION (TSAT) 86*  --    TIBC 89*  --    TRANSFERRIN 60*  --    FERRITIN 671.80*  --    FOLATE  --  7.58   VITAMIN B 12  --  1,566*         Brief Urine Lab Results  (Last result in the past 365 days)        Color   Clarity   Blood   Leuk Est   Nitrite    Protein   CREAT   Urine HCG        03/14/25 0529 Orange   Cloudy   Negative   Small (1+)   Positive   Trace                   Microbiology Results Abnormal       None            No radiology results from the last 24 hrs      Results for orders placed during the hospital encounter of 11/10/24    Adult Transthoracic Echo Complete W/ Cont if Necessary Per Protocol    Interpretation Summary    Left ventricular systolic function is normal. Estimated left ventricular EF = 65%    The cardiac valves are anatomically and functionally normal.      Current medications:  Scheduled Meds:desvenlafaxine, 50 mg, Oral, Daily  [Held by provider] enoxaparin sodium, 40 mg, Subcutaneous, Daily  folic acid, 1 mg, Oral, Daily  gabapentin, 200 mg, Oral, Q12H  lactobacillus acidophilus, 1 capsule, Oral, Daily  latanoprost, 1 drop, Both Eyes, Nightly  [Held by provider] metoprolol succinate XL, 25 mg, Oral, Daily  midodrine, 10 mg, Oral, TID AC  pantoprazole, 40 mg, Oral, BID AC  rosuvastatin, 5 mg, Oral, Daily  sodium chloride, 10 mL, Intravenous, Q12H  thiamine, 100 mg, Oral, Daily      Continuous Infusions:sodium chloride, 75 mL/hr, Last Rate: 75 mL/hr (03/18/25 1014)        PRN Meds:.  acetaminophen    ALPRAZolam    senna-docusate sodium **AND** polyethylene glycol **AND** bisacodyl **AND** bisacodyl    Calcium Replacement - Follow Nurse / BPA Driven Protocol    HYDROcodone-acetaminophen    Magnesium Standard Dose Replacement - Follow Nurse / BPA Driven Protocol    melatonin    ondansetron    Phosphorus Replacement - Follow Nurse / BPA Driven Protocol    Potassium Replacement - Follow Nurse / BPA Driven Protocol    [COMPLETED] Insert Peripheral IV **AND** sodium chloride    sodium chloride    Assessment & Plan   Assessment & Plan     Active Hospital Problems    Diagnosis  POA    **Unsteady gait [R26.81]  Yes    Hypotension [I95.9]  Yes    Falls [R29.6]  Not Applicable    Peripheral neuropathy [G62.9]  Yes    Alcohol use disorder [F10.90]   Yes    GERD without esophagitis [K21.9]  Yes    Hyperlipidemia [E78.5]  Yes    Glaucoma [H40.9]  Yes    Essential hypertension [I10]  Yes      Resolved Hospital Problems   No resolved problems to display.        Brief Hospital Course to date:  Philomena Davis is a 64 y.o. female with significant medical history for HTN, HLD, chronic pain disorder, cirrhosis of the liver, COPD, GERD, and foot fracture who presents to [facility] with complaints of frequent falls injuries. Found to have R foot fracture, acute alcoholic hepatitis.  Improving with supportive care     ETOH hepatitis w/ cirrhosis  Hypotension  -Patient bili up to 10.9 from previously normal baseline. Suspect this is ETOH hepatitis (DF 27) - repeat labs are stable.  Continue to monitor. Watching for peak bili.  Bili is starting to come down, LFTs overall improving.  -Midodrine increased to TID.  Blood pressures have been more stable, continue  -portal duplex shows coarse echo texture of liver, c/w cirrhosis and flow in portal vein difficult to assess  --cont supportive care, high protein diet    JAMMIE  - With elevated creatinine, tachycardia and mild bump in LFTs suspect she may be volume down.  Gentle fluids given her generalized anasarca    Body aches-resolved  UTI  -resp PCR panel neg  -Urine culture pending.  Status post ceftriaxone   --suspect symptoms may be related to above     Acute/Chronic anemia  TCP-resolved  -Likely related to ETOH marrow suppession   -No evidence of bleeding or reports of melena. Increased ppi to BID.  -Serial h/h has been stable  -SCD for now     Lisfranc and third metatarsal fracture   Unsteady gait  ETOH neuropathy  Frequent falls  -Patient reports being sent in by her PT and by orthopedic surgery for us to help her because she lives alone and feels like she is unable to manage. Somehow though she manages to continue to drink ETOH heavily.   -Was recently seen by Dr. Noyola - SHARON RLE  -PT/OT recommends  rehab     Alcohol abuse  ETOH neuropathy  -CIWA protocol  -Seizure precautions  -Continue thiamine and folic acid  -Addiction team     HTN  HLD  -Continue statin  -Continue metoprolol     Depression  -Continue Pristiq     Peripheral neuropathy  -Continue gabapentin     Glaucoma  -Continue home eyedrops       Expected Discharge Location and Transportation: rehab  Expected Discharge   Expected Discharge Date: 3/19/2025; Expected Discharge Time: 12:00 PM     VTE Prophylaxis:  Pharmacologic & mechanical VTE prophylaxis orders are present.         AM-PAC 6 Clicks Score (PT): 11 (03/17/25 2566)    CODE STATUS:   Code Status and Medical Interventions: CPR (Attempt to Resuscitate); Full Support   Ordered at: 03/12/25 0617     Code Status (Patient has no pulse and is not breathing):    CPR (Attempt to Resuscitate)     Medical Interventions (Patient has pulse or is breathing):    Full Support     Level Of Support Discussed With:    Patient       Martine Cardona MD  03/18/25

## 2025-03-18 NOTE — PLAN OF CARE
Problem: Adult Inpatient Plan of Care  Goal: Plan of Care Review  Outcome: Progressing  Flowsheets (Taken 3/18/2025 0636)  Progress: improving  Plan of Care Reviewed With: patient  Goal: Patient-Specific Goal (Individualized)  Outcome: Progressing  Goal: Absence of Hospital-Acquired Illness or Injury  Outcome: Progressing  Intervention: Identify and Manage Fall Risk  Description: Perform standard risk assessment on admission using a validated tool or comprehensive approach appropriate to the patient; reassess fall risk frequently, with change in status or transfer to another level of care.Communicate risk to interprofessional healthcare team; ensure fall risk visible cue.Determine need for increased observation, equipment and environmental modification, as well as use of supportive, nonskid footwear.Adjust safety measures to individual needs and identified risk factors.Reinforce the importance of active participation with fall risk prevention, safety, and physical activity with the patient and family.Perform regular intentional rounding to assess need for position change, pain assessment and personal needs, including assistance with toileting.  Recent Flowsheet Documentation  Taken 3/18/2025 0421 by Kavitha Austin RN  Safety Promotion/Fall Prevention:   nonskid shoes/slippers when out of bed   safety round/check completed   room organization consistent   lighting adjusted   activity supervised   assistive device/personal items within reach   clutter free environment maintained  Taken 3/18/2025 0230 by Kavitha Austin RN  Safety Promotion/Fall Prevention:   nonskid shoes/slippers when out of bed   safety round/check completed   room organization consistent   lighting adjusted   activity supervised   assistive device/personal items within reach   clutter free environment maintained  Taken 3/18/2025 0030 by Kavitha Austin RN  Safety Promotion/Fall Prevention:   nonskid shoes/slippers when out of bed    safety round/check completed   room organization consistent   lighting adjusted   activity supervised   assistive device/personal items within reach   clutter free environment maintained  Taken 3/17/2025 2230 by Kavitha Austin RN  Safety Promotion/Fall Prevention:   nonskid shoes/slippers when out of bed   safety round/check completed   room organization consistent   lighting adjusted   activity supervised   assistive device/personal items within reach   clutter free environment maintained  Taken 3/17/2025 2015 by Kavitha Austin RN  Safety Promotion/Fall Prevention:   nonskid shoes/slippers when out of bed   safety round/check completed   room organization consistent   lighting adjusted   activity supervised   assistive device/personal items within reach   clutter free environment maintained  Intervention: Prevent Skin Injury  Description: Perform a screening for skin injury risk, such as pressure or moisture-associated skin damage on admission and at regular intervals throughout hospital stay.Keep all areas of skin (especially folds) clean and dry.Maintain adequate skin hydration.Relieve and redistribute pressure and protect bony prominences and skin at risk for injury; implement measures based on patient-specific risk factors.Match turning and repositioning schedule to clinical condition.Encourage weight shift frequently; assist with reposition if unable to complete independently.Float heels off bed; avoid pressure on the Achilles tendon.Keep skin free from extended contact with medical devices.Optimize nutrition and hydration.Encourage functional activity and mobility, as early as tolerated.Use aids (e.g., slide boards, mechanical lift) during transfer.  Recent Flowsheet Documentation  Taken 3/18/2025 0421 by Kavitha Austin RN  Body Position: position changed independently  Taken 3/18/2025 0230 by Kavitha Austin RN  Body Position: position changed independently  Taken 3/18/2025 0030 by Norman  Kavitha MALIK RN  Body Position: position changed independently  Taken 3/17/2025 2230 by Kavitha Austin RN  Body Position: position changed independently  Taken 3/17/2025 2015 by Kavitha Austin RN  Body Position: position changed independently  Intervention: Prevent Infection  Description: Maintain skin and mucous membrane integrity; promote hand, oral and pulmonary hygiene.Optimize fluid balance, nutrition, sleep and glycemic control to maximize infection resistance.Identify potential sources of infection early to prevent or mitigate progression of infection (e.g., wound, lines, devices).Evaluate ongoing need for invasive devices; remove promptly when no longer indicated.Review vaccination status.  Recent Flowsheet Documentation  Taken 3/18/2025 0421 by Kavitha Austin RN  Infection Prevention:   environmental surveillance performed   rest/sleep promoted   hand hygiene promoted  Taken 3/18/2025 0230 by Kavitha Austin RN  Infection Prevention:   environmental surveillance performed   rest/sleep promoted   hand hygiene promoted  Taken 3/18/2025 0030 by Kavitha Austin RN  Infection Prevention:   environmental surveillance performed   rest/sleep promoted   hand hygiene promoted  Taken 3/17/2025 2230 by Kavitha Austin RN  Infection Prevention:   environmental surveillance performed   rest/sleep promoted   hand hygiene promoted  Taken 3/17/2025 2015 by Kavitha Austin RN  Infection Prevention:   environmental surveillance performed   rest/sleep promoted   hand hygiene promoted  Goal: Optimal Comfort and Wellbeing  Outcome: Progressing  Goal: Readiness for Transition of Care  Outcome: Progressing     Problem: Skin Injury Risk Increased  Goal: Skin Health and Integrity  Outcome: Progressing  Intervention: Optimize Skin Protection  Description: Perform a full pressure injury risk assessment, as indicated by screening, upon admission to care unit.Reassess skin (full inspection and injury risk,  including skin temperature, consistency and color) frequently (e.g., scheduled interval, with change in condition) to provide optimal early detection and prevention.Maintain adequate tissue perfusion (e.g., encourage fluid balance; avoid crossing legs, constrictive clothing or devices) to promote tissue oxygenation.Maintain head of bed at lowest degree of elevation tolerated, considering medical condition and other restrictions. Use positioning supports to prevent sliding and friction. Consider low friction textiles.Avoid positioning onto an area that remains reddened or on bony prominences.Minimize incontinence and moisture (e.g., toileting schedule; moisture-wicking pad, diaper or incontinence collection device; skin moisture barrier).Cleanse skin promptly and gently, when soiled, utilizing a pH-balanced cleanser.Relieve and redistribute pressure (e.g., scheduled position changes, weight shifts, use of support surface, medical device repositioning, protective dressing application, use of positioning device, microclimate control, use of pressure-injury-monitorEncourage increased activity, such as sitting in a chair at the bedside or early mobilization, when able to tolerate. Avoid prolonged sitting.  Recent Flowsheet Documentation  Taken 3/18/2025 0421 by Kavitha Austin RN  Head of Bed (HOB) Positioning: HOB elevated  Taken 3/18/2025 0230 by Kavitha Austin RN  Head of Bed (HOB) Positioning: HOB elevated  Taken 3/18/2025 0030 by Kavitha Austin RN  Head of Bed (HOB) Positioning: HOB elevated  Taken 3/17/2025 2230 by Kavitha Austin RN  Head of Bed (HOB) Positioning: HOB elevated  Taken 3/17/2025 2015 by Kavitha Austin RN  Head of Bed (HOB) Positioning: HOB elevated     Problem: Comorbidity Management  Goal: Maintenance of COPD Symptom Control  Outcome: Progressing  Goal: Maintenance of Seizure Control  Outcome: Progressing     Problem: Fall Injury Risk  Goal: Absence of Fall and Fall-Related  Injury  Outcome: Progressing  Intervention: Promote Injury-Free Environment  Description: Provide a safe, barrier-free environment that encourages independent activity.Keep care area uncluttered and well-lighted.Determine need for increased observation or monitoring.Avoid use of devices that minimize mobility, such as restraints or indwelling urinary catheter.  Recent Flowsheet Documentation  Taken 3/18/2025 0421 by Kavitha Austin RN  Safety Promotion/Fall Prevention:   nonskid shoes/slippers when out of bed   safety round/check completed   room organization consistent   lighting adjusted   activity supervised   assistive device/personal items within reach   clutter free environment maintained  Taken 3/18/2025 0230 by Kavitha Austin RN  Safety Promotion/Fall Prevention:   nonskid shoes/slippers when out of bed   safety round/check completed   room organization consistent   lighting adjusted   activity supervised   assistive device/personal items within reach   clutter free environment maintained  Taken 3/18/2025 0030 by Kavitha Austin RN  Safety Promotion/Fall Prevention:   nonskid shoes/slippers when out of bed   safety round/check completed   room organization consistent   lighting adjusted   activity supervised   assistive device/personal items within reach   clutter free environment maintained  Taken 3/17/2025 2230 by Kavitha Austin RN  Safety Promotion/Fall Prevention:   nonskid shoes/slippers when out of bed   safety round/check completed   room organization consistent   lighting adjusted   activity supervised   assistive device/personal items within reach   clutter free environment maintained  Taken 3/17/2025 2015 by Kavitha Austin RN  Safety Promotion/Fall Prevention:   nonskid shoes/slippers when out of bed   safety round/check completed   room organization consistent   lighting adjusted   activity supervised   assistive device/personal items within reach   clutter free environment  maintained   Goal Outcome Evaluation:  Plan of Care Reviewed With: patient        Progress: improving

## 2025-03-18 NOTE — THERAPY TREATMENT NOTE
Patient Name: Philomena Davis  : 1960    MRN: [id number]                              Today's Date: 3/18/2025       Admit Date: 3/12/2025    Visit Dx:     ICD-10-CM ICD-9-CM   1. Alcoholic cirrhosis, unspecified whether ascites present  K70.30 571.2   2. Hyponatremia  E87.1 276.1   3. Hypokalemia  E87.6 276.8   4. Frequent falls  R29.6 V15.88   5. Chronic anemia  D64.9 285.9   6. Essential hypertension  I10 401.9   7. Closed displaced fracture of third metatarsal bone of right foot with routine healing, subsequent encounter  S92.331D V54.19     Patient Active Problem List   Diagnosis    Essential hypertension    Generalized anxiety disorder    Hyperlipidemia    Glaucoma    Alcoholic cirrhosis of liver without ascites    Severe malnutrition    GERD without esophagitis    Sleep disturbance    Annual physical exam    Idiopathic peripheral neuropathy    Severe anxiety with panic    Alcohol use disorder    Cigarette nicotine dependence in remission    Chronic obstructive pulmonary disease    Closed fracture of left hand    Moderate malnutrition    Lower extremity weakness    Orthostasis    Subclinical hypothyroidism    Atrial flutter    Peripheral neuropathy    Closed displaced fracture of third metatarsal bone of right foot    Injury of right foot    Unsteady gait    Falls    Hypotension     Past Medical History:   Diagnosis Date    Alcohol withdrawal 05/10/2019    Ankle sprain 2015    Annual physical exam 2023    Anxiety and depression     Arthritis of back     Arthritis of neck     Asthma     Cataract 1999    Chronic pain disorder     Cirrhosis of liver     Closed displaced fracture of lateral malleolus of left fibula     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    Closed trimalleolar fracture of right ankle 2023    Colon polyp     COPD (chronic obstructive pulmonary disease)     Digital mucous cyst of toe of left foot 2017     Eating disorder 2022    Fracture of ankle 2017    Fracture, clavicle 1969    Fracture, finger 1972    Fracture, foot 1/30/25    Ganglion cyst of left foot     GERD (gastroesophageal reflux disease) 10/22    Glaucoma     Hip arthrosis 2019    Hyperlipidemia 1999    Hypertension     Knee sprain 2019    Knee swelling 2019    Low back strain 1/15/25    Neck strain 1/30/25    Panic disorder 1987    Periarthritis of shoulder 2019    Peripheral neuropathy     Pulmonary arterial hypertension 2021    Urinary tract infection 02/23/23    Varicella 1968    Wears glasses      Past Surgical History:   Procedure Laterality Date    ANKLE OPEN REDUCTION INTERNAL FIXATION Left 03/24/2017    Procedure: LEFT ANKLE OPEN REDUCTION INTERNAL FIXATION WITH ILIAC CREST BONE GRAFT , EXCISION CYST 4TH/5TH INNERSPACE LEFT FOOT;  Surgeon: Frank Larson MD;  Location:  [facility];  Service:     ANKLE OPEN REDUCTION INTERNAL FIXATION Right 05/25/2023    Procedure: ANKLE OPEN REDUCTION INTERNAL FIXATION;  Surgeon: Deandre Reynoso MD;  Location:  [facility];  Service: Orthopedics;  Laterality: Right;    AUGMENTATION MAMMAPLASTY Bilateral 1999    BREAST AUGMENTATION      BREAST EXCISIONAL BIOPSY Right 2014    COLONOSCOPY  2018    DILATATION AND CURETTAGE  1987    DILATION AND CURETTAGE, DIAGNOSTIC / THERAPEUTIC      ENDOSCOPY N/A 11/30/2022    Procedure: ESOPHAGOGASTRODUODENOSCOPY;  Surgeon: Arelis Solano MD;  Location:  [facility];  Service: Gastroenterology;  Laterality: N/A;    ENDOSCOPY N/A 02/01/2024    Procedure: ESOPHAGOGASTRODUODENOSCOPY;  Surgeon: Brunner, Mark I, MD;  Location:  [facility];  Service: Gastroenterology;  Laterality: N/A;    FOOT SURGERY  2012    FRACTURE SURGERY  5 /26/2023    ND RPR NON/MAL FEMUR DSTL H/N W/ILIAC/AUTOG BONE Left 03/24/2017    Procedure: ILIAC CREST BONE GRAFT;  Surgeon: Frank Larson MD;  Location:  [facility];  Service: Orthopedics    WISDOM TOOTH EXTRACTION  1992    WRIST  SURGERY        General Information       Row Name 03/18/25 1014          OT Time and Intention    Document Type therapy note (daily note)  -CS     Mode of Treatment occupational therapy  -       Row Name 03/18/25 1014          General Information    Patient Profile Reviewed yes  -CS     Existing Precautions/Restrictions fall;non-weight bearing;right;other (see comments)  -CS     Barriers to Rehab medically complex;previous functional deficit  -CS       Row Name 03/18/25 1014          Cognition    Orientation Status (Cognition) oriented x 4  -CS       Row Name 03/18/25 1014          Safety Issues/Impairments Affecting Functional Mobility    Safety Issues Affecting Function (Mobility) insight into deficits/self-awareness;safety precaution awareness;safety precautions follow-through/compliance;judgment  -CS     Impairments Affecting Function (Mobility) balance;sensation/sensory awareness;endurance/activity tolerance;strength;pain;range of motion (ROM)  -CS               User Key  (r) = Recorded By, (t) = Taken By, (c) = Cosigned By      Initials Name Provider Type     Ion Roberts OT Occupational Therapist                     Mobility/ADL's       Row Name 03/18/25 1015          Bed Mobility    Bed Mobility supine-sit;scooting/bridging  -     Scooting/Bridging [place] (Bed Mobility) minimum assist (75% patient effort);1 person assist  -     Supine-Sit [place] (Bed Mobility) 1 person assist;nonverbal cues (demo/gesture);verbal cues;minimum assist (75% patient effort)  -     Assistive Device (Bed Mobility) bed rails;head of bed elevated  -       Row Name 03/18/25 1015          Transfers    Transfers sit-stand transfer;bed-chair transfer  -CS     Comment, (Transfers) STS x 2 w/ RW, SPT to recliner with BUE support  -       Row Name 03/18/25 1015          Bed-Chair Transfer    Bed-Chair [place] (Transfers) moderate assist (50% patient effort);2 person assist;verbal cues;nonverbal cues  (demo/gesture)  -     Assistive Device (Bed-Chair Transfers) other (see comments)  -CS       Row Name 03/18/25 1015          Sit-Stand Transfer    Sit-Stand [place] (Transfers) moderate assist (50% patient effort);1 person assist;1 person to manage equipment;verbal cues;nonverbal cues (demo/gesture)  -     Assistive Device (Sit-Stand Transfers) walker, front-wheeled  -CS       Row Name 03/18/25 1015          Functional Mobility    Functional Mobility- Comment declined further/forward mobility  -CS     Patient was able to Ambulate yes  -CS       Row Name 03/18/25 1015          Mobility    Extremity Weight-bearing Status right lower extremity  -CS     Right Lower Extremity (Weight-bearing Status) non weight-bearing (NWB)  refuses boot  -CS       Row Name 03/18/25 1015          Upper Body Dressing Assessment/Training    [place] Level (Upper Body Dressing) don;front opening garment;minimum assist (75% patient effort)  -     Position (Upper Body Dressing) edge of bed sitting  -       Row Name 03/18/25 1015          Lower Body Dressing Assessment/Training    [place] Level (Lower Body Dressing) don;socks  -CS     Position (Lower Body Dressing) edge of bed sitting  -               User Key  (r) = Recorded By, (t) = Taken By, (c) = Cosigned By      Initials Name Provider Type     Ion Roberts, OT Occupational Therapist                   Obj/Interventions       Row Name 03/18/25 1022          Motor Skills    Motor Skills functional endurance;motor control/coordination interventions  -     Functional Endurance remains impaired, stable on RA this dated  -     Motor Control/Coordination Interventions gross motor coordination activities;therapeutic exercise/ROM  -     Therapeutic Exercise shoulder;elbow/forearm;other (see comments)  simple BUE AROM warm-up EOB, plantar/dorsi-flexion in supine prior to mobiilty  -       Row Name 03/18/25 1022          Balance    Balance Assessment sitting  static balance;sitting dynamic balance;standing dynamic balance;standing static balance  -CS     Static Sitting Balance standby assist  -CS     Dynamic Sitting Balance contact guard  -CS     Position, Sitting Balance unsupported;sitting edge of bed  -CS     Static Standing Balance minimal assist;1-person assist;1 person to manage equipment  -CS     Dynamic Standing Balance contact guard;moderate assist;1-person assist;1 person to manage equipment  -CS     Position/Device Used, Standing Balance unsupported;walker, front-wheeled  -CS     Balance Interventions sitting;sit to stand;standing;occupation based/functional task  -CS               User Key  (r) = Recorded By, (t) = Taken By, (c) = Cosigned By      Initials Name Provider Type    CS Ion Roberts OT Occupational Therapist                   Goals/Plan    No documentation.                  Clinical Impression       Row Name 03/18/25 1024          Plan of Care Review    Plan of Care Reviewed With patient  -CS     Progress improving  -CS     Outcome Evaluation Pt demonstrates improving Ind with bed mobility, remains ModA for tranfers, and grossly ModA for LB tasks. Max cues for WB precautions throughout. Remains below functional baseline due to ongoing strength, balance, and endurance deficits - will cont IPOT per POC as tolerated. Rec d/c to IRF.  -       Row Name 03/18/25 1024          Therapy Plan Review/Discharge Plan (OT)    Anticipated Discharge Disposition (OT) inpatient rehabilitation facility  -       Row Name 03/18/25 1024          Vital Signs    Pre Systolic BP Rehab --  RN cleared for tx  -CS     O2 Delivery Pre Treatment nasal cannula  -CS     O2 Delivery Intra Treatment nasal cannula  -CS     O2 Delivery Post Treatment nasal cannula  -CS     Pre Patient Position Supine  -CS     Intra Patient Position Standing  -CS     Post Patient Position Sitting  -CS       Row Name 03/18/25 1024          Positioning and Restraints    Pre-Treatment Position  in bed  -CS     Post Treatment Position chair  -CS     In Chair notified nsg;reclined;sitting;call light within reach;encouraged to call for assist;exit alarm on;RUE elevated;LUE elevated;legs elevated  -CS               User Key  (r) = Recorded By, (t) = Taken By, (c) = Cosigned By      Initials Name Provider Type    CS Ion Roberts OT Occupational Therapist                   Outcome Measures       Row Name 03/18/25 1028          How much help from another is currently needed...    Putting on and taking off regular lower body clothing? 2  -CS     Bathing (including washing, rinsing, and drying) 2  -CS     Toileting (which includes using toilet bed pan or urinal) 2  -CS     Putting on and taking off regular upper body clothing 3  -CS     Taking care of personal grooming (such as brushing teeth) 3  -CS     Eating meals 4  -CS     AM-PAC 6 Clicks Score (OT) 16  -CS       Row Name 03/18/25 1028          Functional Assessment    Outcome Measure Options AM-PAC 6 Clicks Daily Activity (OT);Modified [place]  -               User Key  (r) = Recorded By, (t) = Taken By, (c) = Cosigned By      Initials Name Provider Type    CS Ion Roberts OT Occupational Therapist                    Occupational Therapy Education       Title: PT OT SLP Therapies (In Progress)       Topic: Occupational Therapy (Done)       Point: ADL training (Done)       Learning Progress Summary            Patient Acceptance, E,D, VU,NR by  at 3/18/2025 1029                      Point: Home exercise program (Done)       Learning Progress Summary            Patient Acceptance, E,D, VU,NR by CS at 3/18/2025 1029                      Point: Precautions (Done)       Learning Progress Summary            Patient Acceptance, E,D, VU,NR by CS at 3/18/2025 1029                      Point: Body mechanics (Done)       Learning Progress Summary            Patient Acceptance, E,D, VU,NR by  at 3/18/2025 1029                                      User Key        Initials Effective Dates Name Provider Type Discipline    CS 06/16/21 -  Ion Roberts OT Occupational Therapist OT                  OT Recommendation and Plan     Plan of Care Review  Plan of Care Reviewed With: patient  Progress: improving  Outcome Evaluation: Pt demonstrates improving Ind with bed mobility, remains ModA for tranfers, and grossly ModA for LB tasks. Max cues for WB precautions throughout. Remains below functional baseline due to ongoing strength, balance, and endurance deficits - will cont IPOT per POC as tolerated. Rec d/c to IRF.     Time Calculation:         Time Calculation- OT       Row Name 03/18/25 1029             Time Calculation- OT    OT Start Time 0946  -CS      OT Received On 03/18/25  -CS      OT Goal Re-Cert Due Date 03/24/25  -CS         Timed Charges    39843 - OT Therapeutic Activity Minutes 20  -CS      45437 - OT Self Care/Mgmt Minutes 6  -CS         Total Minutes    Timed Charges Total Minutes 26  -CS       Total Minutes 26  -CS                User Key  (r) = Recorded By, (t) = Taken By, (c) = Cosigned By      Initials Name Provider Type    CS Ion Roberts OT Occupational Therapist                  Therapy Charges for Today       Code Description Service Date Service Provider Modifiers Qty    01823443415 HC OT THERAPEUTIC ACT EA 15 MIN 3/18/2025 Ion Roberts, OT GO 1    71650344433 HC OT SELF CARE/MGMT/TRAIN EA 15 MIN 3/18/2025 Ion Roberts, OT GO 1    89910733530 HC OT THER SUPP EA 15 MIN 3/18/2025 Ion Roberts, OT GO 1    24709133179 HC OT THER SUPP EA 15 MIN 3/18/2025 Ion Roberts OT GO 1                 Ion Roberts OT  3/18/2025

## 2025-03-19 VITALS
HEART RATE: 88 BPM | OXYGEN SATURATION: 91 % | RESPIRATION RATE: 17 BRPM | TEMPERATURE: 97.9 F | SYSTOLIC BLOOD PRESSURE: 102 MMHG | WEIGHT: 149.91 LBS | DIASTOLIC BLOOD PRESSURE: 67 MMHG | HEIGHT: 64 IN | BODY MASS INDEX: 25.59 KG/M2

## 2025-03-19 LAB
ALBUMIN SERPL-MCNC: 2.4 G/DL (ref 3.5–5.2)
ALBUMIN/GLOB SERPL: 0.7 G/DL
ALP SERPL-CCNC: 142 U/L (ref 39–117)
ALT SERPL W P-5'-P-CCNC: 54 U/L (ref 1–33)
ANION GAP SERPL CALCULATED.3IONS-SCNC: 9 MMOL/L (ref 5–15)
ANISOCYTOSIS BLD QL: NORMAL
AST SERPL-CCNC: 163 U/L (ref 1–32)
BASOPHILS # BLD AUTO: 0.07 10*3/MM3 (ref 0–0.2)
BASOPHILS NFR BLD AUTO: 0.7 % (ref 0–1.5)
BILIRUB SERPL-MCNC: 7.7 MG/DL (ref 0–1.2)
BUN SERPL-MCNC: 8 MG/DL (ref 8–23)
BUN/CREAT SERPL: 8.2 (ref 7–25)
CALCIUM SPEC-SCNC: 8.5 MG/DL (ref 8.6–10.5)
CHLORIDE SERPL-SCNC: 101 MMOL/L (ref 98–107)
CO2 SERPL-SCNC: 27 MMOL/L (ref 22–29)
CREAT SERPL-MCNC: 0.98 MG/DL (ref 0.57–1)
DEPRECATED RDW RBC AUTO: 90.6 FL (ref 37–54)
EGFRCR SERPLBLD CKD-EPI 2021: 65.4 ML/MIN/1.73
EOSINOPHIL # BLD AUTO: 0.12 10*3/MM3 (ref 0–0.4)
EOSINOPHIL NFR BLD AUTO: 1.3 % (ref 0.3–6.2)
ERYTHROCYTE [DISTWIDTH] IN BLOOD BY AUTOMATED COUNT: 21.4 % (ref 12.3–15.4)
GLOBULIN UR ELPH-MCNC: 3.6 GM/DL
GLUCOSE SERPL-MCNC: 101 MG/DL (ref 65–99)
HCT VFR BLD AUTO: 26 % (ref 34–46.6)
HCT VFR BLD AUTO: 28.6 % (ref 34–46.6)
HGB BLD-MCNC: 8.4 G/DL (ref 12–15.9)
HGB BLD-MCNC: 9.1 G/DL (ref 12–15.9)
IMM GRANULOCYTES # BLD AUTO: 0.07 10*3/MM3 (ref 0–0.05)
IMM GRANULOCYTES NFR BLD AUTO: 0.7 % (ref 0–0.5)
LYMPHOCYTES # BLD AUTO: 1.17 10*3/MM3 (ref 0.7–3.1)
LYMPHOCYTES NFR BLD AUTO: 12.3 % (ref 19.6–45.3)
MACROCYTES BLD QL SMEAR: NORMAL
MCH RBC QN AUTO: 36.8 PG (ref 26.6–33)
MCHC RBC AUTO-ENTMCNC: 31.8 G/DL (ref 31.5–35.7)
MCV RBC AUTO: 115.8 FL (ref 79–97)
MONOCYTES # BLD AUTO: 1.57 10*3/MM3 (ref 0.1–0.9)
MONOCYTES NFR BLD AUTO: 16.5 % (ref 5–12)
NEUTROPHILS NFR BLD AUTO: 6.52 10*3/MM3 (ref 1.7–7)
NEUTROPHILS NFR BLD AUTO: 68.5 % (ref 42.7–76)
NRBC BLD AUTO-RTO: 0 /100 WBC (ref 0–0.2)
PLAT MORPH BLD: NORMAL
PLATELET # BLD AUTO: 147 10*3/MM3 (ref 140–450)
PMV BLD AUTO: 10.6 FL (ref 6–12)
POTASSIUM SERPL-SCNC: 3.3 MMOL/L (ref 3.5–5.2)
PROT SERPL-MCNC: 6 G/DL (ref 6–8.5)
RBC # BLD AUTO: 2.47 10*6/MM3 (ref 3.77–5.28)
SODIUM SERPL-SCNC: 137 MMOL/L (ref 136–145)
TARGETS BLD QL SMEAR: NORMAL
WBC MORPH BLD: NORMAL
WBC NRBC COR # BLD AUTO: 9.52 10*3/MM3 (ref 3.4–10.8)

## 2025-03-19 PROCEDURE — 85018 HEMOGLOBIN: CPT | Performed by: INTERNAL MEDICINE

## 2025-03-19 PROCEDURE — 80053 COMPREHEN METABOLIC PANEL: CPT | Performed by: STUDENT IN AN ORGANIZED HEALTH CARE EDUCATION/TRAINING PROGRAM

## 2025-03-19 PROCEDURE — 85007 BL SMEAR W/DIFF WBC COUNT: CPT | Performed by: STUDENT IN AN ORGANIZED HEALTH CARE EDUCATION/TRAINING PROGRAM

## 2025-03-19 PROCEDURE — 85014 HEMATOCRIT: CPT | Performed by: INTERNAL MEDICINE

## 2025-03-19 PROCEDURE — 99239 HOSP IP/OBS DSCHRG MGMT >30: CPT | Performed by: INTERNAL MEDICINE

## 2025-03-19 PROCEDURE — 85025 COMPLETE CBC W/AUTO DIFF WBC: CPT | Performed by: STUDENT IN AN ORGANIZED HEALTH CARE EDUCATION/TRAINING PROGRAM

## 2025-03-19 RX ORDER — MIDODRINE HYDROCHLORIDE 10 MG/1
10 TABLET ORAL
Start: 2025-03-19

## 2025-03-19 RX ORDER — ALPRAZOLAM 0.5 MG
0.5 TABLET ORAL NIGHTLY PRN
Start: 2025-03-19 | End: 2025-03-20

## 2025-03-19 RX ADMIN — Medication 1 CAPSULE: at 08:55

## 2025-03-19 RX ADMIN — MIDODRINE HYDROCHLORIDE 10 MG: 10 TABLET ORAL at 08:55

## 2025-03-19 RX ADMIN — FOLIC ACID 1 MG: 1 TABLET ORAL at 08:55

## 2025-03-19 RX ADMIN — PANTOPRAZOLE SODIUM 40 MG: 40 TABLET, DELAYED RELEASE ORAL at 08:55

## 2025-03-19 RX ADMIN — THIAMINE HCL TAB 100 MG 100 MG: 100 TAB at 08:55

## 2025-03-19 RX ADMIN — DESVENLAFAXINE 50 MG: 50 TABLET, FILM COATED, EXTENDED RELEASE ORAL at 08:55

## 2025-03-19 RX ADMIN — GABAPENTIN 200 MG: 100 CAPSULE ORAL at 08:55

## 2025-03-19 RX ADMIN — MIDODRINE HYDROCHLORIDE 10 MG: 10 TABLET ORAL at 11:30

## 2025-03-19 RX ADMIN — Medication 10 ML: at 08:56

## 2025-03-19 RX ADMIN — ROSUVASTATIN 5 MG: 10 TABLET, FILM COATED ORAL at 08:55

## 2025-03-19 NOTE — DISCHARGE SUMMARY
[facility] Hospital Medicine Services  DISCHARGE SUMMARY    Patient Name: Philomena Davis  : 1960  MRN: [id number]    Date of Admission: 3/12/2025  3:34 AM  Date of Discharge:  3/19/2025  Primary Care Physician: Unique Brandt DO    Consults       No orders found from 2025 to 3/13/2025.            Hospital Course     Presenting Problem: frequent falls     Active Hospital Problems    Diagnosis  POA   • **Unsteady gait [R26.81]  Yes   • Hypotension [I95.9]  Yes   • Falls [R29.6]  Not Applicable   • Peripheral neuropathy [G62.9]  Yes   • Alcohol use disorder [F10.90]  Yes   • GERD without esophagitis [K21.9]  Yes   • Hyperlipidemia [E78.5]  Yes   • Glaucoma [H40.9]  Yes   • Essential hypertension [I10]  Yes      Resolved Hospital Problems   No resolved problems to display.          Hospital Course:  Philomena Davis is a 64 y.o. female with significant medical history for HTN, HLD, chronic pain disorder, cirrhosis of the liver, COPD, GERD, and foot fracture who presented to [facility] with complaints of frequent falls and was found to have R foot fracture, acute alcoholic hepatitis.      ETOH hepatitis w/ cirrhosis  Hypotension  -Patient bili up to 10.9 from previously normal baseline. Suspect this is ETOH hepatitis (DF 27) - repeat labs are improving.  Bili is starting to come down, LFTs overall improving.  -Midodrine increased to TID.  Blood pressures have been more stable, continue  -portal duplex shows coarse echo texture of liver, c/w cirrhosis and flow in portal vein difficult to assess  --cont supportive care, high protein diet     Elevated Creatinine  - s/p gentle fluids for 24 hours given her generalized anasarca  -- Cr from today still PENDING, but if improved okay to go to rehab     Body aches-resolved  ? UTI  -resp PCR panel neg  -Urine culture was never sent, but she is status post ceftriaxone   --suspect symptoms may be related to above      Acute/Chronic anemia  TCP-resolved  -Likely related to ETOH marrow suppession   -No evidence of bleeding nor reports of melena. Increased ppi to BID.  -Serial h/h has been stable  -SCDs only while inpatient (held LMWH for DVT ppx)     Lisfranc and third metatarsal fracture   Unsteady gait  ETOH neuropathy  Frequent falls  -Patient reports being sent in by her PT and by orthopedic surgery for us to help her because she lives alone and feels like she is unable to manage. Somehow though she manages to continue to drink ETOH heavily.   -Was recently seen by Dr. Noyola - [facility]  -PT/OT recommends rehab- to [facility] today      Alcohol abuse  ETOH neuropathy  -CIWA protocol was started on admission, has not needed any PRNs in the last few days   -Continue thiamine and folic acid  -Addiction team consulted     Insomnia  -- stopped Lunesta as not on formulary here, low dose PRN Ativan      HTN  HLD  -Continue statin  -Continue metoprolol, Midodrine dose increased to TID for orthostasis      Depression  -Continue Pristiq     Peripheral neuropathy  -Continue gabapentin     Glaucoma  -Continue home eyedrops      Discharge Follow Up Recommendations for outpatient labs/diagnostics:   Follow up with PCP within one week of discharge from [facility]    Day of Discharge     HPI:   Doing well this am, states that she is going to [facility] today.     Review of Systems  Gen- No fevers, chills  CV- No chest pain, palpitations  Resp- No cough, dyspnea  GI- No N/V/D, abd pain     Vital Signs:   Temp:  [97.6 °F (36.4 °C)-98.3 °F (36.8 °C)] 97.9 °F (36.6 °C)  Heart Rate:  [] 88  Resp:  [15-17] 17  BP: ()/(51-67) 102/67  Flow (L/min) (Oxygen Therapy):  [1] 1      Physical Exam:  Constitutional: No acute distress, awake, alert, chronically ill appearing  HENT: NCAT, mucous membranes moist  Respiratory: Clear to auscultation bilaterally, respiratory effort normal   Cardiovascular: Tachycardic, no murmurs, rubs, or gallops  Gastrointestinal:  Positive bowel sounds, soft, nontender, abd distended  Musculoskeletal: bilateral 1+ edema  Psychiatric: Appropriate affect, cooperative  Neurologic: Oriented x 3, strength symmetric in all extremities, Cranial Nerves grossly intact to confrontation, speech clear  Skin: jaundice    Pertinent  and/or Most Recent Results     LAB RESULTS:      Lab 03/19/25  0016 03/18/25  1809 03/18/25  0646 03/17/25  2345 03/17/25  1534 03/17/25  1005 03/16/25  1807 03/16/25  0702 03/13/25  1725 03/13/25  1057 03/13/25  0943   WBC  --   --  10.05  --   --  10.45  --  11.24*  --   --  7.29   HEMOGLOBIN 8.4* 8.4* 8.6* 9.0* 9.8* 9.0*   < > 8.6*   < >  --  7.8*   HEMATOCRIT 26.0* 26.1* 26.0* 27.6* 29.8* 27.5*   < > 25.8*   < >  --  24.0*   PLATELETS  --   --  147  --   --  138*  --  120*  --   --  118*   NEUTROS ABS  --   --  6.78  --   --  7.26*  --  7.77*  --   --  5.39   IMMATURE GRANS (ABS)  --   --  0.09*  --   --  0.09*  --  0.11*  --   --  0.03   LYMPHS ABS  --   --  1.31  --   --  1.33  --  1.48  --   --  0.97   MONOS ABS  --   --  1.68*  --   --  1.58*  --  1.72*  --   --  0.84   EOS ABS  --   --  0.11  --   --  0.11  --  0.10  --   --  0.03   MCV  --   --  111.1*  --   --  113.6*  --  112.2*  --   --  108.1*   LACTATE  --   --   --   --   --   --   --   --   --  1.7  --     < > = values in this interval not displayed.         Lab 03/18/25  0646 03/17/25  0906 03/16/25  0702 03/15/25  0657 03/13/25 2007 03/13/25  0943   SODIUM 134* 135* 135* 133*  --  131*   POTASSIUM 4.0 4.0 4.0 4.4 4.9 3.3*   CHLORIDE 99 99 101 96*  --  95*   CO2 27.0 26.0 25.0 26.0  --  23.0   ANION GAP 8.0 10.0 9.0 11.0  --  13.0   BUN 10 9 10 12  --  8   CREATININE 1.14* 1.07* 0.66 1.13*  --  0.81   EGFR 54.5* 58.9* 99.3 55.1*  --  82.2   GLUCOSE 78 88 105* 70  --  86   CALCIUM 8.7 8.1* 7.7* 8.2*  --  7.7*         Lab 03/18/25  0646 03/17/25  0906 03/16/25  0702 03/15/25  0657 03/13/25  0943   TOTAL PROTEIN 6.0 5.6* 5.5* 6.1 6.1  6.1   ALBUMIN 2.5* 2.6*  2.6* 2.8* 3.0*  3.0*   GLOBULIN 3.5 3.0 2.9 3.3 3.1   ALT (SGPT) 46* 35* 22 24 26  26   AST (SGOT) 160* 130* 101* 115* 105*  105*   BILIRUBIN 7.9* 7.9* 8.2* 10.0* 9.5*  9.5*   INDIRECT BILIRUBIN  --   --   --   --  3.6   BILIRUBIN DIRECT  --   --   --   --  5.9*   ALK PHOS 140* 146* 134* 159* 147*  147*                 Lab 03/13/25  0943   IRON 77   IRON SATURATION (TSAT) 86*   TIBC 89*   TRANSFERRIN 60*   FERRITIN 671.80*         Brief Urine Lab Results  (Last result in the past 365 days)        Color   Clarity   Blood   Leuk Est   Nitrite   Protein   CREAT   Urine HCG        03/14/25 0529 Orange   Cloudy   Negative   Small (1+)   Positive   Trace                 Microbiology Results (last 10 days)       Procedure Component Value - Date/Time    COVID PRE-OP / PRE-PROCEDURE SCREENING ORDER (NO ISOLATION) - Swab, Nasopharynx [[id number]]  (Normal) Collected: 03/14/25 1634    Lab Status: Final result Specimen: Swab from Nasopharynx Updated: 03/14/25 1735    Narrative:      The following orders were created for panel order COVID PRE-OP / PRE-PROCEDURE SCREENING ORDER (NO ISOLATION) - Swab, Nasopharynx.  Procedure                               Abnormality         Status                     ---------                               -----------         ------                     Respiratory Panel PCR w/...[[id number]]  Normal              Final result                 Please view results for these tests on the individual orders.    Respiratory Panel PCR w/COVID-19(SARS-CoV-2) LAURA/VIMAL/ROSENDO/PAD/COR/EPIFANIO In-House, NP Swab in UTM/VTM, 2 HR TAT - Swab, Nasopharynx [[id number]]  (Normal) Collected: 03/14/25 1634    Lab Status: Final result Specimen: Swab from Nasopharynx Updated: 03/14/25 1735     ADENOVIRUS, PCR Not Detected     Coronavirus 229E Not Detected     Coronavirus HKU1 Not Detected     Coronavirus NL63 Not Detected     Coronavirus OC43 Not Detected     COVID19 Not Detected     Human Metapneumovirus Not Detected      Human Rhinovirus/Enterovirus Not Detected     Influenza A PCR Not Detected     Influenza B PCR Not Detected     Parainfluenza Virus 1 Not Detected     Parainfluenza Virus 2 Not Detected     Parainfluenza Virus 3 Not Detected     Parainfluenza Virus 4 Not Detected     RSV, PCR Not Detected     Bordetella pertussis pcr Not Detected     Bordetella parapertussis PCR Not Detected     Chlamydophila pneumoniae PCR Not Detected     Mycoplasma pneumo by PCR Not Detected    Narrative:      In the setting of a positive respiratory panel with a viral infection PLUS a negative procalcitonin without other underlying concern for bacterial infection, consider observing off antibiotics or discontinuation of antibiotics and continue supportive care. If the respiratory panel is positive for atypical bacterial infection (Bordetella pertussis, Chlamydophila pneumoniae, or Mycoplasma pneumoniae), consider antibiotic de-escalation to target atypical bacterial infection.            Duplex Portal Hepatic Complete CAR  Result Date: 3/13/2025  DUPLEX PORTAL HEPATIC COMPLETE CAR Date of Exam: 3/13/2025 11:20 AM EDT Indication: portal vein thrombosis. Comparison: No comparisons available. Technique: Grayscale, color flow, and Doppler spectral waveform analysis was performed of the portal circulation. Findings: There is marked coarsening of the echotexture within the liver which limited the examination. Arterial blood flow was normal. Flow within the hepatic veins splenic veins IVC and SMV appear normal. Flow within the portal vein was difficult to assess. There is at least bidirectional flow present. Given the relatively slow flow, intraluminal thrombus cannot be excluded but was not clearly identified.     Impression: 1.Marked coarsening of the echotexture within the liver which limited the examination. 2.Flow within the portal vein was difficult to assess. There is at least bidirectional flow present. Given the relatively slow flow,  intraluminal thrombus cannot be excluded but was not clearly identified. Electronically Signed: Howard Tejada MD  3/13/2025 4:33 PM EDT  Workstation ID: [id number]    XR Chest 1 View  Result Date: 3/12/2025  XR CHEST 1 VW Date of Exam: 3/12/2025 4:01 AM EDT Indication: cough Comparison: Chest radiograph 12/6/2024 Findings: There are no airspace consolidations. No pleural fluid. No pneumothorax. The pulmonary vasculature appears within normal limits. The cardiac and mediastinal silhouette appear unremarkable. No acute osseous abnormality identified.     Impression: No active disease. Electronically Signed: Deandre Garza MD  3/12/2025 5:00 AM EDT  Workstation ID: [id number]    XR Foot 2 View Bilateral  Result Date: 3/10/2025  Bilateral Foot X-Ray 03/10/25 Indication: Pain Views: 2 weight bearing , comparison to previous Findings: xrays reviewed by me today in the office and show redemonstration healing distal third metatarsal fracture on the right with similar alignment to previous, also redemonstration proximal metatarsal fractures consistent with bony Lisfranc injury on the right with alignment maintained today on weightbearing films similar to previous weightbearing x-rays from February 28.  Can once again see hardware resolve the right ankle consistent with postsurgical changes.  Alignment of TMT joints 3 4 and 5 of right foot appears anatomic on oblique view. contralateral left foot xray taken for normal anatomy comparison      XR Foot 2 View Right  Result Date: 3/10/2025  Right Foot X-Ray 03/10/25 Indication: Pain Views: 2 weight bearing , comparison to previous Findings: xrays reviewed by me today in the office and show redemonstration healing distal third metatarsal fracture on the right with similar alignment to previous, also redemonstration proximal metatarsal fractures consistent with bony Lisfranc injury on the right with alignment maintained today on weightbearing films similar to previous weightbearing  x-rays from February 28.  Can once again see hardware resolve the right ankle consistent with postsurgical changes.  Alignment of TMT joints 3 4 and 5 of right foot appears anatomic on oblique view. contralateral left foot xray taken for normal anatomy comparison       Results for orders placed during the hospital encounter of 03/12/25    Duplex Portal Hepatic Complete CAR    Interpretation Summary  DUPLEX PORTAL HEPATIC COMPLETE CAR    Date of Exam: 3/13/2025 11:20 AM EDT    Indication: portal vein thrombosis.    Comparison: No comparisons available.    Technique: Grayscale, color flow, and Doppler spectral waveform analysis was performed of the portal circulation.      Findings:  There is marked coarsening of the echotexture within the liver which limited the examination. Arterial blood flow was normal. Flow within the hepatic veins splenic veins IVC and SMV appear normal. Flow within the portal vein was difficult to assess.  There is at least bidirectional flow present. Given the relatively slow flow, intraluminal thrombus cannot be excluded but was not clearly identified.    Impression  Impression:  1.Marked coarsening of the echotexture within the liver which limited the examination.  2.Flow within the portal vein was difficult to assess. There is at least bidirectional flow present. Given the relatively slow flow, intraluminal thrombus cannot be excluded but was not clearly identified.        Electronically Signed: Howard Tejada MD  3/13/2025 4:33 PM EDT  Workstation ID: [id number]      Results for orders placed during the hospital encounter of 03/12/25    Duplex Portal Hepatic Complete CAR    Interpretation Summary  DUPLEX PORTAL HEPATIC COMPLETE CAR    Date of Exam: 3/13/2025 11:20 AM EDT    Indication: portal vein thrombosis.    Comparison: No comparisons available.    Technique: Grayscale, color flow, and Doppler spectral waveform analysis was performed of the portal circulation.      Findings:  There is  marked coarsening of the echotexture within the liver which limited the examination. Arterial blood flow was normal. Flow within the hepatic veins splenic veins IVC and SMV appear normal. Flow within the portal vein was difficult to assess.  There is at least bidirectional flow present. Given the relatively slow flow, intraluminal thrombus cannot be excluded but was not clearly identified.    Impression  Impression:  1.Marked coarsening of the echotexture within the liver which limited the examination.  2.Flow within the portal vein was difficult to assess. There is at least bidirectional flow present. Given the relatively slow flow, intraluminal thrombus cannot be excluded but was not clearly identified.        Electronically Signed: Howard Tejada MD  3/13/2025 4:33 PM EDT  Workstation ID: [id number]      Results for orders placed during the hospital encounter of 11/10/24    Adult Transthoracic Echo Complete W/ Cont if Necessary Per Protocol    Interpretation Summary  •  Left ventricular systolic function is normal. Estimated left ventricular EF = 65%  •  The cardiac valves are anatomically and functionally normal.      Plan for Follow-up of Pending Labs/Results:   Pending Labs       Order Current Status    CBC & Differential Collected (03/19/25 1121)    CBC Auto Differential Collected (03/19/25 1121)    Comprehensive Metabolic Panel Collected (03/19/25 1121)    Hemoglobin & Hematocrit, Blood Collected (03/19/25 1121)          Discharge Details        Discharge Medications        New Medications        Instructions Start Date   ALPRAZolam 0.5 MG tablet  Commonly known as: XANAX   0.5 mg, Oral, Nightly PRN             Changes to Medications        Instructions Start Date   midodrine 10 MG tablet  Commonly known as: PROAMATINE  What changed:   medication strength  how much to take  when to take this   10 mg, Oral, 3 Times Daily Before Meals      ursodiol 300 MG capsule  Commonly known as: ACTIGALL  What changed:  additional instructions   300 mg, Oral, 2 Times Daily             Continue These Medications        Instructions Start Date   albuterol sulfate  (90 Base) MCG/ACT inhaler  Commonly known as: PROVENTIL HFA;VENTOLIN HFA;PROAIR HFA   2 puffs, Inhalation, Every 4 Hours PRN      desvenlafaxine 50 MG 24 hr tablet  Commonly known as: PRISTIQ   50 mg, Oral, Daily      folic acid 1 MG tablet  Commonly known as: FOLVITE   1 mg, Daily      gabapentin 100 MG capsule  Commonly known as: NEURONTIN   200 mg, Oral, Every 12 Hours Scheduled      HYDROcodone-acetaminophen 5-325 MG per tablet  Commonly known as: NORCO   1 tablet, Oral, Every 4 Hours PRN      LACTOBACILLUS PO   1 capsule, Daily      latanoprost 0.005 % ophthalmic solution  Commonly known as: XALATAN   1 drop, Both Eyes, Nightly      metoprolol succinate XL 25 MG 24 hr tablet  Commonly known as: TOPROL-XL   25 mg, Oral, Daily      naloxone 4 MG/0.1ML nasal spray  Commonly known as: NARCAN   Call 911. Don't prime. [place] in 1 nostril for overdose. Repeat in 2-3 minutes in other nostril if no or minimal breathing/responsiveness.      ondansetron ODT 4 MG disintegrating tablet  Commonly known as: ZOFRAN-ODT   4 mg, Translingual, Every 6 Hours PRN, As needed for nausea      pantoprazole 40 MG EC tablet  Commonly known as: PROTONIX   40 mg, Oral, Daily      rosuvastatin 5 MG tablet  Commonly known as: CRESTOR   5 mg, Oral, Daily      thiamine 100 MG tablet tablet  Commonly known as: VITAMIN B-1   100 mg, Daily             Stop These Medications      atorvastatin 10 MG tablet  Commonly known as: LIPITOR     eszopiclone 2 MG tablet  Commonly known as: Lunesta     nystatin [id number] UNIT/GM cream  Commonly known as: MYCOSTATIN     oxyCODONE-acetaminophen 5-325 MG per tablet  Commonly known as: PERCOCET     QUEtiapine 100 MG tablet  Commonly known as: SEROquel              Allergies   Allergen Reactions   • Buspirone Anxiety   • Codeine Nausea Only   • Mirtazapine Anxiety          Discharge Disposition:  Rehab Facility or Unit (DC - External)    Diet:  Hospital:  Diet Order   Procedures   • Diet: Cardiac, Regular/House; Healthy Heart (2-3 Na+); Fluid Consistency: Thin (IDDSI 0)            Activity:      Restrictions or Other Recommendations:         CODE STATUS:    Code Status and Medical Interventions: CPR (Attempt to Resuscitate); Full Support   Ordered at: 03/12/25 0617     Code Status (Patient has no pulse and is not breathing):    CPR (Attempt to Resuscitate)     Medical Interventions (Patient has pulse or is breathing):    Full Support     Level Of Support Discussed With:    Patient       Future Appointments   Date Time Provider Department Center   3/21/2025 10:30 AM David Noyola MD [facility]   3/24/2025 11:00 AM Judy Curry [facility]  [facility]   3/31/2025 11:00 AM Judy Curry [facility]  [facility]   4/4/2025 11:00 AM David Noyola MD [facility]   4[facility]2025 10:00 [facility]   4/28/2025 11:00 AM Marko Eldridge APRN [facility]       Additional Instructions for the Follow-ups that You Need to Schedule       Discharge Follow-up with PCP   As directed       Currently Documented PCP:    Unique Brandt DO    PCP Phone Number:    [phone]     Follow Up Details: in one week with PCP                      Tracie Guillermo MD  03/19/25      Time Spent on Discharge:  I spent  35  minutes on this discharge activity which included: face-to-face encounter with the patient, reviewing the data in the system, coordination of the care with the nursing staff as well as consultants, documentation, and entering orders.

## 2025-03-19 NOTE — DISCHARGE PLACEMENT REQUEST
"Philomena Davis (64 y.o. Female)     Lizeth -[phone]      Date of Birth   1960    Social Security Number       Address   [address] [address]    Home Phone   [phone]    MRN   [id number]       [facility]   [facility]    Marital Status   Single                            Admission Date   3/12/2025    Admission Type   Emergency    Admitting Provider   Tracie Guillermo MD    Attending Provider   Tracie Guillermo MD    Department, Room/Bed   [address], S301/1       Discharge Date       Discharge Disposition   Rehab Facility or Unit (DC - External)    Discharge Destination                                 Attending Provider: Tracie Guillermo MD    Allergies: Buspirone, Codeine, Mirtazapine    Isolation: None   Infection: None   Code Status: CPR    Ht: 162 cm (63.78\")   Wt: 68 kg (149 lb 14.6 oz)    Admission Cmt: None   Principal Problem: Unsteady gait [R26.81]                   Active Insurance as of 3/12/2025       Primary Coverage       Payor Plan Insurance Group Employer/Plan Group    [employer] [place] [employer] [place]ICA[place]        Payor Plan Address Payor Plan Phone Number Payor Plan Fax Number Effective Dates    [address] [address] [phone]  2018 - None Entered    [facility] [id number]         Subscriber Name Subscriber Birth Date Member ID       PHILOMENA DAVIS 1960 [id number]                     Emergency Contacts        (Rel.) Home Phone Work Phone Mobile Phone    NOÉ WEST (Relative) [phone] -- [phone]    MARCELLO ZAIDI (Friend) [phone] -- [phone]    MARISA KIM (Friend) [phone] -- [phone]                 Discharge Summary        Tracie Guillermo MD at [address] [place] [address] Medicine Services  DISCHARGE SUMMARY    Patient Name: Philomena Davis  : 1960  MRN: [id number]    Date of Admission: 3/12/2025  3:34 AM  Date of Discharge:  " 3/19/2025  Primary Care Physician: Unique Brandt, DO    Consults       No orders found from 2/11/2025 to 3/13/2025.            Hospital Course     Presenting Problem: frequent falls     Active Hospital Problems    Diagnosis  POA    **Unsteady gait [R26.81]  Yes    Hypotension [I95.9]  Yes    Falls [R29.6]  Not Applicable    Peripheral neuropathy [G62.9]  Yes    Alcohol use disorder [F10.90]  Yes    GERD without esophagitis [K21.9]  Yes    Hyperlipidemia [E78.5]  Yes    Glaucoma [H40.9]  Yes    Essential hypertension [I10]  Yes      Resolved Hospital Problems   No resolved problems to display.          Hospital Course:  Philomena Davis is a 64 y.o. female with significant medical history for HTN, HLD, chronic pain disorder, cirrhosis of the liver, COPD, GERD, and foot fracture who presented to [facility] with complaints of frequent falls and was found to have R foot fracture, acute alcoholic hepatitis.      ETOH hepatitis w/ cirrhosis  Hypotension  -Patient bili up to 10.9 from previously normal baseline. Suspect this is ETOH hepatitis (DF 27) - repeat labs are improving.  Bili is starting to come down, LFTs overall improving.  -Midodrine increased to TID.  Blood pressures have been more stable, continue  -portal duplex shows coarse echo texture of liver, c/w cirrhosis and flow in portal vein difficult to assess  --cont supportive care, high protein diet     Elevated Creatinine  - s/p gentle fluids for 24 hours given her generalized anasarca  -- Cr from today still PENDING, but if improved okay to go to rehab     Body aches-resolved  ? UTI  -resp PCR panel neg  -Urine culture was never sent, but she is status post ceftriaxone   --suspect symptoms may be related to above     Acute/Chronic anemia  TCP-resolved  -Likely related to ETOH marrow suppession   -No evidence of bleeding nor reports of melena. Increased ppi to BID.  -Serial h/h has been stable  -SCDs only while inpatient (held [facility] for  DVT ppx)     Lisfranc and third metatarsal fracture   Unsteady gait  ETOH neuropathy  Frequent falls  -Patient reports being sent in by her PT and by orthopedic surgery for us to help her because she lives alone and feels like she is unable to manage. Somehow though she manages to continue to drink ETOH heavily.   -Was recently seen by Dr. Noyola - SHARON RLE  -PT/OT recommends rehab- to [facility] today      Alcohol abuse  ETOH neuropathy  -CIWA protocol was started on admission, has not needed any PRNs in the last few days   -Continue thiamine and folic acid  -Addiction team consulted     Insomnia  -- stopped Lunesta as not on formulary here, low dose PRN Ativan      HTN  HLD  -Continue statin  -Continue metoprolol, Midodrine dose increased to TID for orthostasis      Depression  -Continue Pristiq     Peripheral neuropathy  -Continue gabapentin     Glaucoma  -Continue home eyedrops      Discharge Follow Up Recommendations for outpatient labs/diagnostics:   Follow up with PCP within one week of discharge from [facility]    Day of Discharge     HPI:   Doing well this am, states that she is going to [facility] today.     Review of Systems  Gen- No fevers, chills  CV- No chest pain, palpitations  Resp- No cough, dyspnea  GI- No N/V/D, abd pain     Vital Signs:   Temp:  [97.6 °F (36.4 °C)-98.3 °F (36.8 °C)] 97.9 °F (36.6 °C)  Heart Rate:  [] 88  Resp:  [15-17] 17  BP: ()/(51-67) 102/67  Flow (L/min) (Oxygen Therapy):  [1] 1      Physical Exam:  Constitutional: No acute distress, awake, alert, chronically ill appearing  HENT: NCAT, mucous membranes moist  Respiratory: Clear to auscultation bilaterally, respiratory effort normal   Cardiovascular: Tachycardic, no murmurs, rubs, or gallops  Gastrointestinal: Positive bowel sounds, soft, nontender, abd distended  Musculoskeletal: bilateral 1+ edema  Psychiatric: Appropriate affect, cooperative  Neurologic: Oriented x 3, strength symmetric in all extremities, Cranial Nerves grossly  intact to confrontation, speech clear  Skin: jaundice    Pertinent  and/or Most Recent Results     LAB RESULTS:      Lab 03/19/25  0016 03/18/25  1809 03/18/25  0646 03/17/25  2345 03/17/25  1534 03/17/25  1005 03/16/25  1807 03/16/25  0702 03/13/25  1725 03/13/25  1057 03/13/25  0943   WBC  --   --  10.05  --   --  10.45  --  11.24*  --   --  7.29   HEMOGLOBIN 8.4* 8.4* 8.6* 9.0* 9.8* 9.0*   < > 8.6*   < >  --  7.8*   HEMATOCRIT 26.0* 26.1* 26.0* 27.6* 29.8* 27.5*   < > 25.8*   < >  --  24.0*   PLATELETS  --   --  147  --   --  138*  --  120*  --   --  118*   NEUTROS ABS  --   --  6.78  --   --  7.26*  --  7.77*  --   --  5.39   IMMATURE GRANS (ABS)  --   --  0.09*  --   --  0.09*  --  0.11*  --   --  0.03   LYMPHS ABS  --   --  1.31  --   --  1.33  --  1.48  --   --  0.97   MONOS ABS  --   --  1.68*  --   --  1.58*  --  1.72*  --   --  0.84   EOS ABS  --   --  0.11  --   --  0.11  --  0.10  --   --  0.03   MCV  --   --  111.1*  --   --  113.6*  --  112.2*  --   --  108.1*   LACTATE  --   --   --   --   --   --   --   --   --  1.7  --     < > = values in this interval not displayed.         Lab 03/18/25  0646 03/17/25  0906 03/16/25  0702 03/15/25  0657 03/13/25  2007 03/13/25  0943   SODIUM 134* 135* 135* 133*  --  131*   POTASSIUM 4.0 4.0 4.0 4.4 4.9 3.3*   CHLORIDE 99 99 101 96*  --  95*   CO2 27.0 26.0 25.0 26.0  --  23.0   ANION GAP 8.0 10.0 9.0 11.0  --  13.0   BUN 10 9 10 12  --  8   CREATININE 1.14* 1.07* 0.66 1.13*  --  0.81   EGFR 54.5* 58.9* 99.3 55.1*  --  82.2   GLUCOSE 78 88 105* 70  --  86   CALCIUM 8.7 8.1* 7.7* 8.2*  --  7.7*         Lab 03/18/25  0646 03/17/25  0906 03/16/25  0702 03/15/25  0657 03/13/25  0943   TOTAL PROTEIN 6.0 5.6* 5.5* 6.1 6.1  6.1   ALBUMIN 2.5* 2.6* 2.6* 2.8* 3.0*  3.0*   GLOBULIN 3.5 3.0 2.9 3.3 3.1   ALT (SGPT) 46* 35* 22 24 26  26   AST (SGOT) 160* 130* 101* 115* 105*  105*   BILIRUBIN 7.9* 7.9* 8.2* 10.0* 9.5*  9.5*   INDIRECT BILIRUBIN  --   --   --   --  3.6    BILIRUBIN DIRECT  --   --   --   --  5.9*   ALK PHOS 140* 146* 134* 159* 147*  147*                 Lab 03/13/25  0943   IRON 77   IRON SATURATION (TSAT) 86*   TIBC 89*   TRANSFERRIN 60*   FERRITIN 671.80*         Brief Urine Lab Results  (Last result in the past 365 days)        Color   Clarity   Blood   Leuk Est   Nitrite   Protein   CREAT   Urine HCG        03/14/25 0529 Orange   Cloudy   Negative   Small (1+)   Positive   Trace                 Microbiology Results (last 10 days)       Procedure Component Value - Date/Time    COVID PRE-OP / PRE-PROCEDURE SCREENING ORDER (NO ISOLATION) - Swab, Nasopharynx [[id number]]  (Normal) Collected: 03/14/25 1634    Lab Status: Final result Specimen: Swab from Nasopharynx Updated: 03/14/25 1735    Narrative:      The following orders were created for panel order COVID PRE-OP / PRE-PROCEDURE SCREENING ORDER (NO ISOLATION) - Swab, Nasopharynx.  Procedure                               Abnormality         Status                     ---------                               -----------         ------                     Respiratory Panel PCR w/...[[id number]]  Normal              Final result                 Please view results for these tests on the individual orders.    Respiratory Panel PCR w/COVID-19(SARS-CoV-2) LAURA/VIMAL/ROSENDO/PAD/COR/EPIFANIO In-House, NP Swab in UTM/VTM, 2 HR TAT - Swab, Nasopharynx [[id number]]  (Normal) Collected: 03/14/25 1634    Lab Status: Final result Specimen: Swab from Nasopharynx Updated: 03/14/25 1735     ADENOVIRUS, PCR Not Detected     Coronavirus 229E Not Detected     Coronavirus HKU1 Not Detected     Coronavirus NL63 Not Detected     Coronavirus OC43 Not Detected     COVID19 Not Detected     Human Metapneumovirus Not Detected     Human Rhinovirus/Enterovirus Not Detected     Influenza A PCR Not Detected     Influenza B PCR Not Detected     Parainfluenza Virus 1 Not Detected     Parainfluenza Virus 2 Not Detected     Parainfluenza Virus 3 Not Detected      Parainfluenza Virus 4 Not Detected     RSV, PCR Not Detected     Bordetella pertussis pcr Not Detected     Bordetella parapertussis PCR Not Detected     Chlamydophila pneumoniae PCR Not Detected     Mycoplasma pneumo by PCR Not Detected    Narrative:      In the setting of a positive respiratory panel with a viral infection PLUS a negative procalcitonin without other underlying concern for bacterial infection, consider observing off antibiotics or discontinuation of antibiotics and continue supportive care. If the respiratory panel is positive for atypical bacterial infection (Bordetella pertussis, Chlamydophila pneumoniae, or Mycoplasma pneumoniae), consider antibiotic de-escalation to target atypical bacterial infection.            Duplex Portal Hepatic Complete CAR  Result Date: 3/13/2025  DUPLEX PORTAL HEPATIC COMPLETE CAR Date of Exam: 3/13/2025 11:20 AM EDT Indication: portal vein thrombosis. Comparison: No comparisons available. Technique: Grayscale, color flow, and Doppler spectral waveform analysis was performed of the portal circulation. Findings: There is marked coarsening of the echotexture within the liver which limited the examination. Arterial blood flow was normal. Flow within the hepatic veins splenic veins IVC and SMV appear normal. Flow within the portal vein was difficult to assess. There is at least bidirectional flow present. Given the relatively slow flow, intraluminal thrombus cannot be excluded but was not clearly identified.     Impression: 1.Marked coarsening of the echotexture within the liver which limited the examination. 2.Flow within the portal vein was difficult to assess. There is at least bidirectional flow present. Given the relatively slow flow, intraluminal thrombus cannot be excluded but was not clearly identified. Electronically Signed: Howard Tejada MD  3/13/2025 4:33 PM EDT  Workstation ID: [id number]    XR Chest 1 View  Result Date: 3/12/2025  XR CHEST 1 VW Date of Exam:  3/12/2025 4:01 AM EDT Indication: cough Comparison: Chest radiograph 12/6/2024 Findings: There are no airspace consolidations. No pleural fluid. No pneumothorax. The pulmonary vasculature appears within normal limits. The cardiac and mediastinal silhouette appear unremarkable. No acute osseous abnormality identified.     Impression: No active disease. Electronically Signed: Deandre Garza MD  3/12/2025 5:00 AM EDT  Workstation ID: [id number]    XR Foot 2 View Bilateral  Result Date: 3/10/2025  Bilateral Foot X-Ray 03/10/25 Indication: Pain Views: 2 weight bearing , comparison to previous Findings: xrays reviewed by me today in the office and show redemonstration healing distal third metatarsal fracture on the right with similar alignment to previous, also redemonstration proximal metatarsal fractures consistent with bony Lisfranc injury on the right with alignment maintained today on weightbearing films similar to previous weightbearing x-rays from February 28.  Can once again see hardware resolve the right ankle consistent with postsurgical changes.  Alignment of TMT joints 3 4 and 5 of right foot appears anatomic on oblique view. contralateral left foot xray taken for normal anatomy comparison      XR Foot 2 View Right  Result Date: 3/10/2025  Right Foot X-Ray 03/10/25 Indication: Pain Views: 2 weight bearing , comparison to previous Findings: xrays reviewed by me today in the office and show redemonstration healing distal third metatarsal fracture on the right with similar alignment to previous, also redemonstration proximal metatarsal fractures consistent with bony Lisfranc injury on the right with alignment maintained today on weightbearing films similar to previous weightbearing x-rays from February 28.  Can once again see hardware resolve the right ankle consistent with postsurgical changes.  Alignment of TMT joints 3 4 and 5 of right foot appears anatomic on oblique view. contralateral left foot xray taken for  normal anatomy comparison       Results for orders placed during the hospital encounter of 03/12/25    Duplex Portal Hepatic Complete CAR    Interpretation Summary  DUPLEX PORTAL HEPATIC COMPLETE CAR    Date of Exam: 3/13/2025 11:20 AM EDT    Indication: portal vein thrombosis.    Comparison: No comparisons available.    Technique: Grayscale, color flow, and Doppler spectral waveform analysis was performed of the portal circulation.      Findings:  There is marked coarsening of the echotexture within the liver which limited the examination. Arterial blood flow was normal. Flow within the hepatic veins splenic veins IVC and SMV appear normal. Flow within the portal vein was difficult to assess.  There is at least bidirectional flow present. Given the relatively slow flow, intraluminal thrombus cannot be excluded but was not clearly identified.    Impression  Impression:  1.Marked coarsening of the echotexture within the liver which limited the examination.  2.Flow within the portal vein was difficult to assess. There is at least bidirectional flow present. Given the relatively slow flow, intraluminal thrombus cannot be excluded but was not clearly identified.        Electronically Signed: Howard Tejada MD  3/13/2025 4:33 PM EDT  Workstation ID: [id number]      Results for orders placed during the hospital encounter of 03/12/25    Duplex Portal Hepatic Complete CAR    Interpretation Summary  DUPLEX PORTAL HEPATIC COMPLETE CAR    Date of Exam: 3/13/2025 11:20 AM EDT    Indication: portal vein thrombosis.    Comparison: No comparisons available.    Technique: Grayscale, color flow, and Doppler spectral waveform analysis was performed of the portal circulation.      Findings:  There is marked coarsening of the echotexture within the liver which limited the examination. Arterial blood flow was normal. Flow within the hepatic veins splenic veins IVC and SMV appear normal. Flow within the portal vein was difficult to  assess.  There is at least bidirectional flow present. Given the relatively slow flow, intraluminal thrombus cannot be excluded but was not clearly identified.    Impression  Impression:  1.Marked coarsening of the echotexture within the liver which limited the examination.  2.Flow within the portal vein was difficult to assess. There is at least bidirectional flow present. Given the relatively slow flow, intraluminal thrombus cannot be excluded but was not clearly identified.        Electronically Signed: Howard Tejada MD  3/13/2025 4:33 PM EDT  Workstation ID: [id number]      Results for orders placed during the hospital encounter of 11/10/24    Adult Transthoracic Echo Complete W/ Cont if Necessary Per Protocol    Interpretation Summary    Left ventricular systolic function is normal. Estimated left ventricular EF = 65%    The cardiac valves are anatomically and functionally normal.      Plan for Follow-up of Pending Labs/Results:   Pending Labs       Order Current Status    CBC & Differential Collected (03/19/25 1121)    CBC Auto Differential Collected (03/19/25 1121)    Comprehensive Metabolic Panel Collected (03/19/25 1121)    Hemoglobin & Hematocrit, Blood Collected (03/19/25 1121)          Discharge Details        Discharge Medications        New Medications        Instructions Start Date   ALPRAZolam 0.5 MG tablet  Commonly known as: XANAX   0.5 mg, Oral, Nightly PRN             Changes to Medications        Instructions Start Date   midodrine 10 MG tablet  Commonly known as: PROAMATINE  What changed:   medication strength  how much to take  when to take this   10 mg, Oral, 3 Times Daily Before Meals      ursodiol 300 MG capsule  Commonly known as: ACTIGALL  What changed: additional instructions   300 mg, Oral, 2 Times Daily             Continue These Medications        Instructions Start Date   albuterol sulfate  (90 Base) MCG/ACT inhaler  Commonly known as: PROVENTIL HFA;VENTOLIN HFA;PROAIR HFA   2  puffs, Inhalation, Every 4 Hours PRN      desvenlafaxine 50 MG 24 hr tablet  Commonly known as: PRISTIQ   50 mg, Oral, Daily      folic acid 1 MG tablet  Commonly known as: FOLVITE   1 mg, Daily      gabapentin 100 MG capsule  Commonly known as: NEURONTIN   200 mg, Oral, Every 12 Hours Scheduled      HYDROcodone-acetaminophen 5-325 MG per tablet  Commonly known as: NORCO   1 tablet, Oral, Every 4 Hours PRN      LACTOBACILLUS PO   1 capsule, Daily      latanoprost 0.005 % ophthalmic solution  Commonly known as: XALATAN   1 drop, Both Eyes, Nightly      metoprolol succinate XL 25 MG 24 hr tablet  Commonly known as: TOPROL-XL   25 mg, Oral, Daily      naloxone 4 MG/0.1ML nasal spray  Commonly known as: NARCAN   Call 911. Don't prime. [place] in 1 nostril for overdose. Repeat in 2-3 minutes in other nostril if no or minimal breathing/responsiveness.      ondansetron ODT 4 MG disintegrating tablet  Commonly known as: ZOFRAN-ODT   4 mg, Translingual, Every 6 Hours PRN, As needed for nausea      pantoprazole 40 MG EC tablet  Commonly known as: PROTONIX   40 mg, Oral, Daily      rosuvastatin 5 MG tablet  Commonly known as: CRESTOR   5 mg, Oral, Daily      thiamine 100 MG tablet tablet  Commonly known as: VITAMIN B-1   100 mg, Daily             Stop These Medications      atorvastatin 10 MG tablet  Commonly known as: LIPITOR     eszopiclone 2 MG tablet  Commonly known as: Lunesta     nystatin 487246 UNIT/GM cream  Commonly known as: MYCOSTATIN     oxyCODONE-acetaminophen 5-325 MG per tablet  Commonly known as: PERCOCET     QUEtiapine 100 MG tablet  Commonly known as: SEROquel              Allergies   Allergen Reactions    Buspirone Anxiety    Codeine Nausea Only    Mirtazapine Anxiety         Discharge Disposition:  Rehab Facility or Unit (DC - External)    Diet:  Hospital:  Diet Order   Procedures    Diet: Cardiac, Regular/House; Healthy Heart (2-3 Na+); Fluid Consistency: Thin (IDDSI 0)            Activity:      Restrictions  or Other Recommendations:         CODE STATUS:    Code Status and Medical Interventions: CPR (Attempt to Resuscitate); Full Support   Ordered at: 03/12/25 0617     Code Status (Patient has no pulse and is not breathing):    CPR (Attempt to Resuscitate)     Medical Interventions (Patient has pulse or is breathing):    Full Support     Level Of Support Discussed With:    Patient       Future Appointments   Date Time Provider Department Center   3/21/2025 10:30 AM David Noyola MD [facility]   3/24/2025 11:00 AM Judy Curry [facility]  COR2 COR   3/31/2025 11:00 AM Judy Curry [facility] E  COR2 COR   4/4/2025 11:00 AM David Noyola MD [facility]   4/7/2025 10:00 AM Judy Curry [facility] E  COR2 COR   4/28/2025 11:00 AM Marko Eldridge APRN [facility]       Additional Instructions for the Follow-ups that You Need to Schedule       Discharge Follow-up with PCP   As directed       Currently Documented PCP:    Unique Brandt DO    PCP Phone Number:    [phone]     Follow Up Details: in one week with PCP                      Tracie Guillermo MD  03/19/25      Time Spent on Discharge:  I spent  35  minutes on this discharge activity which included: face-to-face encounter with the patient, reviewing the data in the system, coordination of the care with the nursing staff as well as consultants, documentation, and entering orders.           Electronically signed by Tracie Guillermo MD at 03/19/25 7278

## 2025-03-19 NOTE — CASE MANAGEMENT/SOCIAL WORK
Case Management Discharge Note      Final Note: Pt to [facility] today 3/19/2025 Med Unit. Nurse to call report to [phone]. CM to fax dc summary to [phone]. [facility]         Selected Continued Care - Admitted Since 3/12/2025       Destination Coordination complete.      Service Provider Services Address Phone Fax Patient Preferred    [facility] [address] [facility] [address] [phone] --              Durable Medical Equipment    No services have been selected for the patient.                Dialysis/Infusion    No services have been selected for the patient.                Home Medical Care    No services have been selected for the patient.                Therapy    No services have been selected for the patient.                Community Resources    No services have been selected for the patient.                Community & DME    No services have been selected for the patient.                    Selected Continued Care - Episodes Includes continued care and service providers with selected services from the active episodes listed below          Transportation Services  [facility]C [facility] ([facility]    Final Discharge Disposition Code: 62 - inpatient rehab facility

## 2025-03-19 NOTE — PLAN OF CARE
Goal Outcome Evaluation:        NSR on 1L NC. Pt denies pain through shift. BP soft, pt remains asymptomatic; scheduled Midrodrine given. Pt being transferred to  Rehab. Report called to DANDY Frank. IV removed, tip intact. Plan of care complete.

## 2025-03-19 NOTE — PLAN OF CARE
Problem: Adult Inpatient Plan of Care  Goal: Plan of Care Review  Outcome: Progressing  Flowsheets (Taken 3/19/2025 0526)  Progress: improving  Plan of Care Reviewed With: patient  Goal: Patient-Specific Goal (Individualized)  Outcome: Progressing  Goal: Absence of Hospital-Acquired Illness or Injury  Outcome: Progressing  Intervention: Identify and Manage Fall Risk  Description: Perform standard risk assessment on admission using a validated tool or comprehensive approach appropriate to the patient; reassess fall risk frequently, with change in status or transfer to another level of care.Communicate risk to interprofessional healthcare team; ensure fall risk visible cue.Determine need for increased observation, equipment and environmental modification, as well as use of supportive, nonskid footwear.Adjust safety measures to individual needs and identified risk factors.Reinforce the importance of active participation with fall risk prevention, safety, and physical activity with the patient and family.Perform regular intentional rounding to assess need for position change, pain assessment and personal needs, including assistance with toileting.  Recent Flowsheet Documentation  Taken 3/19/2025 0430 by Kavitha Austin RN  Safety Promotion/Fall Prevention:   nonskid shoes/slippers when out of bed   safety round/check completed   room organization consistent   lighting adjusted   activity supervised   assistive device/personal items within reach   clutter free environment maintained  Taken 3/19/2025 0237 by Kavitha Austin RN  Safety Promotion/Fall Prevention:   nonskid shoes/slippers when out of bed   safety round/check completed   room organization consistent   lighting adjusted   activity supervised   assistive device/personal items within reach   clutter free environment maintained  Taken 3/19/2025 0030 by Kavitha Austin RN  Safety Promotion/Fall Prevention:   nonskid shoes/slippers when out of bed    safety round/check completed   room organization consistent   lighting adjusted   activity supervised   assistive device/personal items within reach   clutter free environment maintained  Taken 3/18/2025 2230 by Kavitha Austin RN  Safety Promotion/Fall Prevention:   nonskid shoes/slippers when out of bed   safety round/check completed   room organization consistent   lighting adjusted   activity supervised   assistive device/personal items within reach   clutter free environment maintained  Intervention: Prevent Skin Injury  Description: Perform a screening for skin injury risk, such as pressure or moisture-associated skin damage on admission and at regular intervals throughout hospital stay.Keep all areas of skin (especially folds) clean and dry.Maintain adequate skin hydration.Relieve and redistribute pressure and protect bony prominences and skin at risk for injury; implement measures based on patient-specific risk factors.Match turning and repositioning schedule to clinical condition.Encourage weight shift frequently; assist with reposition if unable to complete independently.Float heels off bed; avoid pressure on the Achilles tendon.Keep skin free from extended contact with medical devices.Optimize nutrition and hydration.Encourage functional activity and mobility, as early as tolerated.Use aids (e.g., slide boards, mechanical lift) during transfer.  Recent Flowsheet Documentation  Taken 3/19/2025 0430 by Kavitha Austin RN  Body Position:   position maintained   heels elevated  Taken 3/19/2025 0237 by Kavitha Austin RN  Body Position:   position maintained   heels elevated  Taken 3/19/2025 0030 by Kavitha Austin RN  Body Position:   position maintained   heels elevated  Taken 3/18/2025 2230 by Kavitha Austin RN  Body Position:   position maintained   legs elevated  Intervention: Prevent Infection  Description: Maintain skin and mucous membrane integrity; promote hand, oral and pulmonary  hygiene.Optimize fluid balance, nutrition, sleep and glycemic control to maximize infection resistance.Identify potential sources of infection early to prevent or mitigate progression of infection (e.g., wound, lines, devices).Evaluate ongoing need for invasive devices; remove promptly when no longer indicated.Review vaccination status.  Recent Flowsheet Documentation  Taken 3/19/2025 0430 by Kavitha Austin RN  Infection Prevention:   environmental surveillance performed   rest/sleep promoted   hand hygiene promoted  Taken 3/19/2025 0237 by Kavitha Austin RN  Infection Prevention:   environmental surveillance performed   rest/sleep promoted   hand hygiene promoted  Taken 3/19/2025 0030 by Kavitha Austin RN  Infection Prevention:   environmental surveillance performed   rest/sleep promoted   hand hygiene promoted  Taken 3/18/2025 2230 by Kavitha Austin RN  Infection Prevention:   environmental surveillance performed   rest/sleep promoted   hand hygiene promoted  Goal: Optimal Comfort and Wellbeing  Outcome: Progressing  Intervention: Provide Person-Centered Care  Description: Use a family-focused approach to care; encourage support system presence and participation.Develop trust and rapport by proactively providing information, encouraging questions, addressing concerns and offering reassurance.Acknowledge emotional response to hospitalization.Recognize and utilize personal coping strategies and strengths; develop goals via shared decision-making.Honor spiritual and cultural preferences.  Recent Flowsheet Documentation  Taken 3/18/2025 2031 by Kavitha Austin RN  Trust Relationship/Rapport:   care explained   choices provided   emotional support provided   empathic listening provided   questions answered   questions encouraged   reassurance provided   thoughts/feelings acknowledged  Goal: Readiness for Transition of Care  Outcome: Progressing     Problem: Skin Injury Risk Increased  Goal: Skin  Health and Integrity  Outcome: Progressing  Intervention: Optimize Skin Protection  Description: Perform a full pressure injury risk assessment, as indicated by screening, upon admission to care unit.Reassess skin (full inspection and injury risk, including skin temperature, consistency and color) frequently (e.g., scheduled interval, with change in condition) to provide optimal early detection and prevention.Maintain adequate tissue perfusion (e.g., encourage fluid balance; avoid crossing legs, constrictive clothing or devices) to promote tissue oxygenation.Maintain head of bed at lowest degree of elevation tolerated, considering medical condition and other restrictions. Use positioning supports to prevent sliding and friction. Consider low friction textiles.Avoid positioning onto an area that remains reddened or on bony prominences.Minimize incontinence and moisture (e.g., toileting schedule; moisture-wicking pad, diaper or incontinence collection device; skin moisture barrier).Cleanse skin promptly and gently, when soiled, utilizing a pH-balanced cleanser.Relieve and redistribute pressure (e.g., scheduled position changes, weight shifts, use of support surface, medical device repositioning, protective dressing application, use of positioning device, microclimate control, use of pressure-injury-monitorEncourage increased activity, such as sitting in a chair at the bedside or early mobilization, when able to tolerate. Avoid prolonged sitting.  Recent Flowsheet Documentation  Taken 3/19/2025 0430 by Kavitha Austin RN  Head of Bed (HOB) Positioning: HOB elevated  Taken 3/19/2025 0237 by Kavitha Austin RN  Head of Bed (HOB) Positioning: HOB elevated  Taken 3/19/2025 0030 by Kavitha Austin RN  Head of Bed (HOB) Positioning: HOB elevated  Taken 3/18/2025 2230 by Kavitha Austin RN  Head of Bed (HOB) Positioning: HOB elevated     Problem: Comorbidity Management  Goal: Maintenance of COPD Symptom  Control  Outcome: Progressing  Goal: Maintenance of Seizure Control  Outcome: Progressing     Problem: Fall Injury Risk  Goal: Absence of Fall and Fall-Related Injury  Outcome: Progressing  Intervention: Promote Injury-Free Environment  Description: Provide a safe, barrier-free environment that encourages independent activity.Keep care area uncluttered and well-lighted.Determine need for increased observation or monitoring.Avoid use of devices that minimize mobility, such as restraints or indwelling urinary catheter.  Recent Flowsheet Documentation  Taken 3/19/2025 0430 by Kavitha Austin RN  Safety Promotion/Fall Prevention:   nonskid shoes/slippers when out of bed   safety round/check completed   room organization consistent   lighting adjusted   activity supervised   assistive device/personal items within reach   clutter free environment maintained  Taken 3/19/2025 0237 by Kavitha Austin RN  Safety Promotion/Fall Prevention:   nonskid shoes/slippers when out of bed   safety round/check completed   room organization consistent   lighting adjusted   activity supervised   assistive device/personal items within reach   clutter free environment maintained  Taken 3/19/2025 0030 by Kavitha Austin RN  Safety Promotion/Fall Prevention:   nonskid shoes/slippers when out of bed   safety round/check completed   room organization consistent   lighting adjusted   activity supervised   assistive device/personal items within reach   clutter free environment maintained  Taken 3/18/2025 2230 by Kavitha Austin RN  Safety Promotion/Fall Prevention:   nonskid shoes/slippers when out of bed   safety round/check completed   room organization consistent   lighting adjusted   activity supervised   assistive device/personal items within reach   clutter free environment maintained   Goal Outcome Evaluation:  Plan of Care Reviewed With: patient        Progress: improving

## 2025-03-24 NOTE — TELEPHONE ENCOUNTER
The [facility] received a fax that requires your attention. The document has been indexed to the patient’s chart for your review.      Reason for sending: MED DENIAL    Documents Description: MED [id number]    Name of Sender: [employer]    Date Indexed: 3.18.25    Notes (if needed):

## 2025-03-30 LAB
QT INTERVAL: 314 MS
QTC INTERVAL: 456 MS

## 2025-03-31 ENCOUNTER — TELEMEDICINE (OUTPATIENT)
Dept: PSYCHIATRY | Facility: CLINIC | Age: 65
End: 2025-03-31
Payer: COMMERCIAL

## 2025-03-31 DIAGNOSIS — Z53.21 PATIENT LEFT WITHOUT BEING SEEN: Primary | ICD-10-CM

## 2025-03-31 NOTE — PROGRESS NOTES
This document has been electronically signed by LATOYA Ponce  March 31, 2025      Part of this note may be an electronic transcription/translation of spoken language to printed text using the Dragon Dictation System.The patient left the office before care was provided and did not complete the visit  due to currently being admitted in the hospital during time of appointment .

## 2025-04-03 DIAGNOSIS — F51.04 PSYCHOPHYSIOLOGICAL INSOMNIA: ICD-10-CM

## 2025-04-03 RX ORDER — QUETIAPINE FUMARATE 100 MG/1
100 TABLET, FILM COATED ORAL NIGHTLY
Qty: 30 TABLET | Refills: 0 | Status: ON HOLD | OUTPATIENT
Start: 2025-04-03

## 2025-04-04 PROBLEM — N39.0 SEPSIS SECONDARY TO UTI: Status: ACTIVE | Noted: 2025-04-04

## 2025-04-04 PROBLEM — J18.9 PNEUMONIA: Status: ACTIVE | Noted: 2025-04-04

## 2025-04-04 PROBLEM — R13.10 DYSPHAGIA: Status: ACTIVE | Noted: 2025-04-04

## 2025-04-04 PROBLEM — A41.9 SEPSIS SECONDARY TO UTI: Status: ACTIVE | Noted: 2025-04-04

## 2025-04-04 PROBLEM — R13.10 DYSPHAGIA: Status: RESOLVED | Noted: 2025-04-04 | Resolved: 2025-04-04

## 2025-04-04 PROBLEM — A41.9 SEPSIS: Status: ACTIVE | Noted: 2025-04-04

## 2025-04-04 PROBLEM — N39.0 ACUTE UTI (URINARY TRACT INFECTION): Status: ACTIVE | Noted: 2025-04-04

## 2025-04-04 NOTE — Clinical Note
Level of Care: Telemetry [5]   Diagnosis: Dysphagia [[id number]]   Admitting Physician: GEORGE SHAIKH III [[id number]]   Attending Physician: GEORGE SHAIKH III [[id number]]   Is patient appropriate for Inpatient Observation Unit?: Yes [1]   Bed Request Comments: tele obs

## 2025-04-04 NOTE — ED PROVIDER NOTES
Subjective   History of Present Illness  Pt is a 65 yo female presenting to ED by EMS with complaints of abnormal labs. PMHx HTN, HLD, COPD, Neuropathy, Cirrhosis, Glaucoma and Asthma. Pt recently admitted 3/12 to 3/19 for falls, hyponatremia, hypokalemia, multiple falls and right foot fracture. She has been at [facility] for rehab. Per report patient sent to ED for elevated WBC at 20 and fever up to 101.5 today. Pt reports new cough and nasal congestion today. She has had some non bloody diarrhea but denies vomiting or abdominal pain. She does not wear 02 at baseline. Per records patient was treated with Rocephin during last admission for possible UTI and no urine culture available. No ETOH since before last admission. No tobacco use.     History provided by:  Patient, medical records and EMS personnel      Review of Systems   Constitutional:  Positive for chills and fever.   HENT:  Positive for congestion. Negative for sore throat and trouble swallowing.    Eyes:  Negative for pain and visual disturbance.   Respiratory:  Positive for cough. Negative for shortness of breath.    Cardiovascular:  Negative for chest pain and leg swelling.   Gastrointestinal:  Positive for diarrhea. Negative for abdominal pain, constipation, nausea and vomiting.   Genitourinary:  Negative for difficulty urinating, dysuria, flank pain and hematuria.   Musculoskeletal:  Positive for arthralgias (on going). Negative for back pain.   Skin:  Negative for rash and wound.   Neurological:  Negative for dizziness, syncope, speech difficulty, weakness, numbness and headaches.   Psychiatric/Behavioral:  Negative for confusion.    All other systems reviewed and are negative.      Past Medical History:   Diagnosis Date    Alcohol withdrawal 05/10/2019    Ankle sprain 2015    Annual physical exam 09/12/2023    Anxiety and depression     Arthritis of back 2020    Arthritis of neck 2021    Asthma 2022    Cataract 20/2/1999    Chronic pain disorder  2023    Cirrhosis of liver     Closed displaced fracture of lateral malleolus of left fibula     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 03/24/2017    Closed trimalleolar fracture of right ankle 05/24/2023    Colon polyp 2018    COPD (chronic obstructive pulmonary disease) 2022    Digital mucous cyst of toe of left foot 03/24/2017    Eating disorder 2022    Fracture of ankle 2017    Fracture, clavicle 1969    Fracture, finger 1972    Fracture, foot 1/30/25    Ganglion cyst of left foot     GERD (gastroesophageal reflux disease) 10/22    Glaucoma     Hip arthrosis 2019    Hyperlipidemia 1999    Hypertension     Knee sprain 2019    Knee swelling 2019    Low back strain 1/15/25    Neck strain 1/30/25    Panic disorder 1987    Periarthritis of shoulder 2019    Peripheral neuropathy     Pulmonary arterial hypertension 2021    Urinary tract infection 02/23/23    Varicella 1968    Wears glasses        Allergies   Allergen Reactions    Buspirone Anxiety    Codeine Nausea Only    Mirtazapine Anxiety       Past Surgical History:   Procedure Laterality Date    ANKLE OPEN REDUCTION INTERNAL FIXATION Left 03/24/2017    Procedure: LEFT ANKLE OPEN REDUCTION INTERNAL FIXATION WITH ILIAC CREST BONE GRAFT , EXCISION CYST 4TH/5TH INNERSPACE LEFT FOOT;  Surgeon: Frank Larson MD;  Location:  [facility];  Service:     ANKLE OPEN REDUCTION INTERNAL FIXATION Right 05/25/2023    Procedure: ANKLE OPEN REDUCTION INTERNAL FIXATION;  Surgeon: Deandre Reynoso MD;  Location:  [facility];  Service: Orthopedics;  Laterality: Right;    AUGMENTATION MAMMAPLASTY Bilateral 1999    BREAST AUGMENTATION      BREAST EXCISIONAL BIOPSY Right 2014    COLONOSCOPY  2018    DILATATION AND CURETTAGE  1987    DILATION AND CURETTAGE, DIAGNOSTIC / THERAPEUTIC      ENDOSCOPY N/A 11/30/2022    Procedure: ESOPHAGOGASTRODUODENOSCOPY;  Surgeon: Arelis Solano MD;  Location:  [facility];  Service: Gastroenterology;  Laterality: N/A;     ENDOSCOPY N/A 2024    Procedure: ESOPHAGOGASTRODUODENOSCOPY;  Surgeon: Brunner, Mark I, MD;  Location:  [facility];  Service: Gastroenterology;  Laterality: N/A;    FOOT SURGERY  2012    FRACTURE SURGERY      MD RPR NON/MAL FEMUR DSTL H/N W/ILIAC/AUTOG BONE Left 2017    Procedure: ILIAC CREST BONE GRAFT;  Surgeon: Frank aLrson MD;  Location:  [facility];  Service: Orthopedics    WISDOM TOOTH EXTRACTION      WRIST SURGERY         Family History   Problem Relation Age of Onset    Depression Mother     Cancer Mother         Lung cancer    Vision loss Mother     COPD Father         Copd    No Known Problems Sister     Drug abuse Brother     Bipolar disorder Brother     Alcohol abuse Brother     ADD / ADHD Brother     Mental illness Brother     Asthma Maternal Aunt     Dementia Paternal Aunt     Stroke Maternal Grandmother     Vision loss Maternal Grandmother         Stroke    Breast cancer Paternal Grandmother 68        met to brain    Asthma Paternal Grandmother         Breast cancer    Dementia Cousin     No Known Problems Daughter     No Known Problems Son     Rheum arthritis Other     Heart disease Other     Cancer Other     Stroke Other     Hypertension Other     Asthma Maternal Grandfather         Pancreas canet    Heart disease Paternal Grandfather     COPD Maternal Aunt         COPD    COPD Maternal Uncle         Non-[place] Lymphoma    Heart disease Maternal Uncle     BRCA 1/2 Neg Hx     Colon cancer Neg Hx     Endometrial cancer Neg Hx     Ovarian cancer Neg Hx        Social History     Socioeconomic History    Marital status: Single   Tobacco Use    Smoking status: Former     Current packs/day: 0.00     Average packs/day: 1.5 packs/day for 24.0 years (36.1 ttl pk-yrs)     Types: Cigarettes     Start date: 3/5/1985     Quit date: 3/22/2009     Years since quittin.0     Passive exposure: Past    Smokeless tobacco: Never   Vaping Use    Vaping status: Never Used    Substance and Sexual Activity    Alcohol use: Yes     Alcohol/week: 5.0 standard drinks of alcohol     Types: 5 Glasses of wine per week    Drug use: No    Sexual activity: Not Currently     Partners: Male     Birth control/protection: Abstinence, Post-menopausal     Comment: Menopausal Post 22 Yrs           Objective   Physical Exam  Vitals and nursing note reviewed.   Constitutional:       Appearance: She is well-developed.   HENT:      Head: Atraumatic.      Nose: Nose normal.   Eyes:      General: Lids are normal.      Conjunctiva/sclera: Conjunctivae normal.      Pupils: Pupils are equal, round, and reactive to light.   Cardiovascular:      Rate and Rhythm: Regular rhythm. Tachycardia present.      Heart sounds: Normal heart sounds.   Pulmonary:      Effort: Pulmonary effort is normal.      Breath sounds: Normal breath sounds. No wheezing.   Abdominal:      General: There is no distension.      Palpations: Abdomen is soft.      Tenderness: There is no abdominal tenderness. There is no guarding or rebound.   Musculoskeletal:         General: No tenderness. Normal range of motion.      Cervical back: Normal range of motion and neck supple.   Skin:     General: Skin is warm and dry.      Coloration: Skin is jaundiced.      Findings: No erythema or rash.   Neurological:      Mental Status: She is alert and oriented to person, place, and time.      Sensory: No sensory deficit.   Psychiatric:         Speech: Speech normal.         Behavior: Behavior normal.         Procedures           ED Course  ED Course as of 04/04/25 2133 Fri Apr 04, 2025   1617  I personally reviewed and interpreted labs results and went over with patient.   I personally reviewed and interpreted vitals.   [RT]   1802 I personally and independently reviewed CXR and agree with the radiology interpretation specifically left basilar opacity.  [RT]   1817 CBC & Differential(!)  I reviewed the CBC.  The anemia is relatively stable compared to  multiple prior values.  Acute leukocytosis noted. [RS]   1900 COVID PRE-OP / PRE-PROCEDURE SCREENING ORDER (NO ISOLATION) - Swab, Nasopharynx  Viral screening panel is negative.  [RS]   1953 Lactate: 1.3 [RT]   1953 Glucose: 95 [RT]   1953 Creatinine(!): 1.11 [RT]   1953 Potassium: 4.2  stable [RT]   1954 Anion Gap: 8.0 [RT]   1954 Procalcitonin(!): 0.62  Antibiotics in process  [RT]   2024 Leukocytes, UA(!): Large (3+) [RT]   2024 Nitrite, UA(!): Positive [RT]   2024 WBC, UA(!): Too Numerous to Count [RT]   2024 Bacteria, UA(!): 4+ [RT]   2025 Added urine culture.  [RT]   2031 Discussed patient with Dr. Hamilton who is agreeable with ED course and tx plan for admission.  [RT]   2032 Updated patient on results and tx plan for admission.  [RT]   2050 Discussed admission with hospitalist Dr. Laguerre.  [RT]      ED Course User Index  [RS] Dusty Hamilton MD  [RT] Nathalie Beckford PA      Recent Results (from the past 24 hours)   Comprehensive Metabolic Panel    Collection Time: 04/04/25  5:24 PM    Specimen: Blood   Result Value Ref Range    Glucose 95 65 - 99 mg/dL    BUN 14 8 - 23 mg/dL    Creatinine 1.11 (H) 0.57 - 1.00 mg/dL    Sodium 135 (L) 136 - 145 mmol/L    Potassium 4.2 3.5 - 5.2 mmol/L    Chloride 103 98 - 107 mmol/L    CO2 24.0 22.0 - 29.0 mmol/L    Calcium 8.8 8.6 - 10.5 mg/dL    Total Protein 6.3 6.0 - 8.5 g/dL    Albumin 2.6 (L) 3.5 - 5.2 g/dL    ALT (SGPT) 36 (H) 1 - 33 U/L    AST (SGOT) 186 (H) 1 - 32 U/L    Alkaline Phosphatase 101 39 - 117 U/L    Total Bilirubin 8.5 (H) 0.0 - 1.2 mg/dL    Globulin 3.7 gm/dL    A/G Ratio 0.7 g/dL    BUN/Creatinine Ratio 12.6 7.0 - 25.0    Anion Gap 8.0 5.0 - 15.0 mmol/L    eGFR 56.3 (L) >60.0 mL/min/1.73   Lipase    Collection Time: 04/04/25  5:24 PM    Specimen: Blood   Result Value Ref Range    Lipase 43 13 - 60 U/L   Lactic Acid, Plasma    Collection Time: 04/04/25  5:24 PM    Specimen: Blood   Result Value Ref Range    Lactate 1.3 0.5 - 2.0 mmol/L    Procalcitonin    Collection Time: 04/04/25  5:24 PM    Specimen: Blood   Result Value Ref Range    Procalcitonin 0.62 (H) 0.00 - 0.25 ng/mL   CBC Auto Differential    Collection Time: 04/04/25  5:24 PM    Specimen: Blood   Result Value Ref Range    WBC 23.03 (H) 3.40 - 10.80 10*3/mm3    RBC 2.40 (L) 3.77 - 5.28 10*6/mm3    Hemoglobin 8.1 (L) 12.0 - 15.9 g/dL    Hematocrit 25.1 (L) 34.0 - 46.6 %    .6 (H) 79.0 - 97.0 fL    MCH 33.8 (H) 26.6 - 33.0 pg    MCHC 32.3 31.5 - 35.7 g/dL    RDW 21.2 (H) 12.3 - 15.4 %    RDW-SD 81.5 (H) 37.0 - 54.0 fl    MPV 10.6 6.0 - 12.0 fL    Platelets 221 140 - 450 10*3/mm3    Neutrophil % 81.9 (H) 42.7 - 76.0 %    Lymphocyte % 7.0 (L) 19.6 - 45.3 %    Monocyte % 9.1 5.0 - 12.0 %    Eosinophil % 0.8 0.3 - 6.2 %    Basophil % 0.5 0.0 - 1.5 %    Immature Grans % 0.7 (H) 0.0 - 0.5 %    Neutrophils, Absolute 18.87 (H) 1.70 - 7.00 10*3/mm3    Lymphocytes, Absolute 1.61 0.70 - 3.10 10*3/mm3    Monocytes, Absolute 2.09 (H) 0.10 - 0.90 10*3/mm3    Eosinophils, Absolute 0.19 0.00 - 0.40 10*3/mm3    Basophils, Absolute 0.11 0.00 - 0.20 10*3/mm3    Immature Grans, Absolute 0.16 (H) 0.00 - 0.05 10*3/mm3    nRBC 0.0 0.0 - 0.2 /100 WBC   Green Top (Gel)    Collection Time: 04/04/25  5:24 PM   Result Value Ref Range    Extra Tube Hold for add-ons.    Lavender Top    Collection Time: 04/04/25  5:24 PM   Result Value Ref Range    Extra Tube hold for add-on    Gold Top - SST    Collection Time: 04/04/25  5:24 PM   Result Value Ref Range    Extra Tube Hold for add-ons.    Gray Top    Collection Time: 04/04/25  5:24 PM   Result Value Ref Range    Extra Tube Hold for add-ons.    Light Blue Top    Collection Time: 04/04/25  5:24 PM   Result Value Ref Range    Extra Tube Hold for add-ons.    Respiratory Panel PCR w/COVID-19(SARS-CoV-2) [facility]-House, NP Swab in [facility], 2 HR TAT - Swab, Nasopharynx    Collection Time: 04/04/25  5:24 PM    Specimen: Nasopharynx; Swab   Result Value  Ref Range    ADENOVIRUS, PCR Not Detected Not Detected    Coronavirus 229E Not Detected Not Detected    Coronavirus HKU1 Not Detected Not Detected    Coronavirus NL63 Not Detected Not Detected    Coronavirus OC43 Not Detected Not Detected    COVID19 Not Detected Not Detected - Ref. Range    Human Metapneumovirus Not Detected Not Detected    Human Rhinovirus/Enterovirus Not Detected Not Detected    Influenza A PCR Not Detected Not Detected    Influenza B PCR Not Detected Not Detected    Parainfluenza Virus 1 Not Detected Not Detected    Parainfluenza Virus 2 Not Detected Not Detected    Parainfluenza Virus 3 Not Detected Not Detected    Parainfluenza Virus 4 Not Detected Not Detected    RSV, PCR Not Detected Not Detected    Bordetella pertussis pcr Not Detected Not Detected    Bordetella parapertussis PCR Not Detected Not Detected    Chlamydophila pneumoniae PCR Not Detected Not Detected    Mycoplasma pneumo by PCR Not Detected Not Detected   MRSA Screen, PCR (Inpatient) - Swab, Nares    Collection Time: 04/04/25  7:29 PM    Specimen: Nares; Swab   Result Value Ref Range    MRSA PCR Negative Negative   Urinalysis With Microscopic If Indicated (No Culture) - Straight Cath    Collection Time: 04/04/25  7:53 PM    Specimen: Straight Cath; Urine   Result Value Ref Range    Color, UA Dark Yellow (A) Yellow, Straw    Appearance, UA Turbid (A) Clear    pH, UA 5.5 5.0 - 8.0    Specific Gravity, UA 1.019 1.001 - 1.030    Glucose, UA Negative Negative    Ketones, UA Negative Negative    Bilirubin, UA Large (3+) (A) Negative    Blood, UA Large (3+) (A) Negative    Protein,  mg/dL (2+) (A) Negative    Leuk Esterase, UA Large (3+) (A) Negative    Nitrite, UA Positive (A) Negative    Urobilinogen, UA 0.2 E.U./dL 0.2 - 1.0 E.U./dL   Urinalysis, Microscopic Only - Straight Cath    Collection Time: 04/04/25  7:53 PM    Specimen: Straight Cath; Urine   Result Value Ref Range    RBC, UA 3-5 (A) None Seen, 0-2 /HPF    WBC, UA Too  Numerous to Count (A) None Seen, 0-2 /HPF    Bacteria, UA 4+ (A) None Seen, Trace /HPF    Squamous Epithelial Cells, UA 7-12 (A) None Seen, 0-2 /HPF    Hyaline Casts, UA None Seen 0 - 6 /LPF    Granular Casts, UA 0-2 None Seen /LPF    WBC Clumps, UA Large/3+ None Seen /HPF    Methodology Manual Light Microscopy    ECG 12 Lead Rhythm Change    Collection Time: 04/04/25  8:50 PM   Result Value Ref Range    QT Interval 342 ms    QTC Interval 462 ms     Note: In addition to lab results from this visit, the labs listed above may include labs taken at another facility or during a different encounter within the last 24 hours. Please correlate lab times with ED admission and discharge times for further clarification of the services performed during this visit.    XR Chest 1 View   Final Result   Impression:   1.Mild perihilar and interstitial opacities may reflect mild edema.   2.Left basilar opacity with likely small left pleural effusion may reflect infection or atelectasis.   3.No PICC is seen.            Electronically Signed: Alon Beauchamp MD     4/4/2025 5:46 PM EDT     Workstation ID: [id number]        Vitals:    04/04/25 2030 04/04/25 2045 04/04/25 2100 04/04/25 2115   BP: 105/60  99/69    Pulse: 112 112 114 111   Resp:       Temp:       TempSrc:       SpO2: 90% 99% 96% 96%   Weight:       Height:         Medications   sodium chloride 0.9 % flush 10 mL (has no administration in time range)   sodium chloride 0.9 % bolus 1,000 mL (0 mL Intravenous Stopped 4/4/25 1920)   piperacillin-tazobactam (ZOSYN) 3.375 g IVPB in 100 mL NS MBP (CD) (0 g Intravenous Stopped 4/4/25 1954)   vancomycin IVPB 1500 mg in 0.9% NaCl (Premix) 500 mL (0 mg Intravenous Stopped 4/4/25 2130)   lactated ringers bolus 1,000 mL (0 mL Intravenous Stopped 4/4/25 2115)     ECG/EMG Results (last 24 hours)       ** No results found for the last 24 hours. **          ECG 12 Lead Rhythm Change   Preliminary Result   Test Reason : Rhythm Change   Blood  Pressure :   */*   mmHG   Vent. Rate : 110 BPM     Atrial Rate : 110 BPM      P-R Int : 144 ms          QRS Dur :  90 ms       QT Int : 342 ms       P-R-T Axes :  64  91  45 degrees     QTcB Int : 462 ms      Sinus tachycardia with premature supraventricular complexes   Rightward axis   Low voltage QRS   Cannot rule out Anterior infarct , age undetermined   Abnormal ECG   When compared with ECG of 12-Mar-2025 03:52,   premature supraventricular complexes are now present   T wave amplitude has decreased in Lateral leads      Referred By: ED MD           Confirmed By:                  Medical Decision Making  Pt is a 63 yo female presenting to ED with complaints of abnormal labs.  I had a discussion with the patient / family regarding ED course, diagnosis, diagnostic results and treatment plan including medications and admission.    DDx  Pneumonia, UTI, Sepsis, Cirrhosis, JAMMIE, Electrolyte abnormality, Anemia     Problems Addressed:  Acute cystitis without hematuria: complicated acute illness or injury  Anemia, unspecified type: complicated acute illness or injury  Hepatic cirrhosis, unspecified hepatic cirrhosis type, unspecified whether ascites present: complicated acute illness or injury  Leukocytosis, unspecified type: complicated acute illness or injury  Pneumonia of left lower lobe due to infectious organism: complicated acute illness or injury    Amount and/or Complexity of Data Reviewed  External Data Reviewed: notes.     Details: Reviewed previous non ED visits including prior labs, imaging, available notes, medications, allergies and surgical hx.   [facility] records  Recent admission as noted in HPI  Labs: ordered. Decision-making details documented in ED Course.  Radiology: ordered and independent interpretation performed. Decision-making details documented in ED Course.  ECG/medicine tests: ordered and independent interpretation performed. Decision-making details documented in ED  Course.    Risk  Prescription drug management.  Decision regarding hospitalization.        Final diagnoses:   Acute cystitis without hematuria   Pneumonia of left lower lobe due to infectious organism   Leukocytosis, unspecified type   Anemia, unspecified type   Hepatic cirrhosis, unspecified hepatic cirrhosis type, unspecified whether ascites present       ED Disposition  ED Disposition       ED Disposition   Decision to Admit    Condition   --    Comment   Level of Care: Telemetry [5]   Diagnosis: Sepsis secondary to UTI [163019]   Admitting Physician: GEORGE SHAIKH III [[id number]]   Attending Physician: GEORGE SHAIKH III [[id number]]   Is patient appropriate for Inpatient Observation Unit?: No [0]   Bed Request Comments: tele obs                 No follow-up provider specified.       Medication List      No changes were made to your prescriptions during this visit.            Nathalie Beckford PA  04/04/25 3724

## 2025-04-05 NOTE — THERAPY EVALUATION
Patient Name: Philomena Davis  : 1960    MRN: [id number]                              Today's Date: 2025       Admit Date: 2025    Visit Dx:     ICD-10-CM ICD-9-CM   1. Acute cystitis without hematuria  N30.00 595.0   2. Pneumonia of left lower lobe due to infectious organism  J18.9 486   3. Leukocytosis, unspecified type  D72.829 288.60   4. Anemia, unspecified type  D64.9 285.9   5. Hepatic cirrhosis, unspecified hepatic cirrhosis type, unspecified whether ascites present  K74.60 571.5     Patient Active Problem List   Diagnosis    Essential hypertension    Generalized anxiety disorder    Hyperlipidemia    Glaucoma    Alcoholic cirrhosis of liver without ascites    Severe malnutrition    GERD without esophagitis    Sleep disturbance    Annual physical exam    Idiopathic peripheral neuropathy    Severe anxiety with panic    Alcohol use disorder    Cigarette nicotine dependence in remission    Chronic obstructive pulmonary disease    Closed fracture of left hand    Moderate malnutrition    Lower extremity weakness    Orthostasis    Subclinical hypothyroidism    Atrial flutter    Peripheral neuropathy    Closed displaced fracture of third metatarsal bone of right foot    Injury of right foot    Unsteady gait    Falls    Hypotension    Acute UTI (urinary tract infection)    Pneumonia    Sepsis    Sepsis secondary to UTI     Past Medical History:   Diagnosis Date    Alcohol withdrawal 05/10/2019    Ankle sprain 2015    Annual physical exam 2023    Anxiety and depression     Arthritis of back     Arthritis of neck     Asthma     Cataract 1999    Chronic pain disorder     Cirrhosis of liver     Closed displaced fracture of lateral malleolus of left fibula     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    Closed trimalleolar fracture of right ankle 2023    Colon polyp     COPD (chronic obstructive pulmonary disease)     Digital  mucous cyst of toe of left foot 03/24/2017    Eating disorder 2022    Fracture of ankle 2017    Fracture, clavicle 1969    Fracture, finger 1972    Fracture, foot 1/30/25    Ganglion cyst of left foot     GERD (gastroesophageal reflux disease) 10/22    Glaucoma     Hip arthrosis 2019    Hyperlipidemia 1999    Hypertension     Knee sprain 2019    Knee swelling 2019    Low back strain 1/15/25    Neck strain 1/30/25    Panic disorder 1987    Periarthritis of shoulder 2019    Peripheral neuropathy     Pulmonary arterial hypertension 2021    Urinary tract infection 02/23/23    Varicella 1968    Wears glasses      Past Surgical History:   Procedure Laterality Date    ANKLE OPEN REDUCTION INTERNAL FIXATION Left 03/24/2017    Procedure: LEFT ANKLE OPEN REDUCTION INTERNAL FIXATION WITH ILIAC CREST BONE GRAFT , EXCISION CYST 4TH/5TH INNERSPACE LEFT FOOT;  Surgeon: Frank Larson MD;  Location:  [facility];  Service:     ANKLE OPEN REDUCTION INTERNAL FIXATION Right 05/25/2023    Procedure: ANKLE OPEN REDUCTION INTERNAL FIXATION;  Surgeon: Deandre Reynoso MD;  Location:  [facility];  Service: Orthopedics;  Laterality: Right;    AUGMENTATION MAMMAPLASTY Bilateral 1999    BREAST AUGMENTATION      BREAST EXCISIONAL BIOPSY Right 2014    COLONOSCOPY  2018    DILATATION AND CURETTAGE  1987    DILATION AND CURETTAGE, DIAGNOSTIC / THERAPEUTIC      ENDOSCOPY N/A 11/30/2022    Procedure: ESOPHAGOGASTRODUODENOSCOPY;  Surgeon: Arelis Solano MD;  Location:  [facility];  Service: Gastroenterology;  Laterality: N/A;    ENDOSCOPY N/A 02/01/2024    Procedure: ESOPHAGOGASTRODUODENOSCOPY;  Surgeon: Brunner, Mark I, MD;  Location:  [facility];  Service: Gastroenterology;  Laterality: N/A;    FOOT SURGERY  2012    FRACTURE SURGERY  5 /26/2023    IN RPR NON/MAL FEMUR DSTL H/N W/ILIAC/AUTOG BONE Left 03/24/2017    Procedure: ILIAC CREST BONE GRAFT;  Surgeon: Frank Larson MD;  Location:  [facility];  Service: Orthopedics     WISDOM TOOTH EXTRACTION  1992    WRIST SURGERY        General Information       Row Name 04/05/25 0949          Physical Therapy Time and Intention    Document Type evaluation  -KR     Mode of Treatment physical therapy  -KR       Row Name 04/05/25 0949          General Information    Patient Profile Reviewed yes  -KR     Prior Level of Function --   pt arrived from [facility] where she was requiring 2-person assist to SPT bed to chair, independent with wc mobility, requiring assist for sponge bathing.  -KR     Existing Precautions/Restrictions fall;non-weight bearing;right;oxygen therapy device and L/min;other (see comments)  NWB RLE 2/2 Lisfranc and 3rd met fx  -KR     Barriers to Rehab medically complex;previous functional deficit  -KR       Row Name 04/05/25 0949          Living Environment    Current Living Arrangements apartment  -KR     People in Home alone;other (see comments)  independence living assist with bathing and meal prep; home health  -KR       Row Name 04/05/25 0949          Home Main Entrance    Number of Stairs, Main Entrance three  -KR     Stair Railings, Main Entrance railing on right side (ascending)  -KR       Row Name 04/05/25 0949          Stairs Within Home, Primary    Number of Stairs, Within Home, Primary none  -KR       Row Name 04/05/25 0949          Cognition    Orientation Status (Cognition) oriented x 3  -KR       Row Name 04/05/25 0949          Safety Issues/Impairments Affecting Functional Mobility    Safety Issues Affecting Function (Mobility) insight into deficits/self-awareness;awareness of need for assistance;judgment;safety precaution awareness;problem-solving;sequencing abilities;safety precautions follow-through/compliance  -KR     Impairments Affecting Function (Mobility) balance;endurance/activity tolerance;strength;sensation/sensory awareness;postural/trunk control;pain  -KR               User Key  (r) = Recorded By, (t) = Taken By, (c) = Cosigned By      Initials Name  Provider Type    Dina Mejia PT Physical Therapist                   Mobility       Row Name 04/05/25 0951          Bed Mobility    Bed Mobility sit-supine;scooting/bridging  -KR     Scooting/Bridging Foard (Bed Mobility) dependent (less than 25% patient effort);2 person assist  -KR     Sit-Supine Foard (Bed Mobility) maximum assist (25% patient effort);2 person assist  -KR     Assistive Device (Bed Mobility) repositioning sheet;head of bed elevated  -KR     Comment, (Bed Mobility) assist req'd at trunk and BLEs to position for comfort and pressure relife in bed.  -KR       Row Name 04/05/25 0951          Bed-Chair Transfer    Bed-Chair Foard (Transfers) unable to assess  -KR     Comment, (Bed-Chair Transfer) hypotensive  -KR       Row Name 04/05/25 0951          Sit-Stand Transfer    Sit-Stand Foard (Transfers) unable to assess  -KR       Row Name 04/05/25 0951          Mobility    Extremity Weight-bearing Status right lower extremity  -KR     Right Lower Extremity (Weight-bearing Status) non weight-bearing (NWB)  -KR               User Key  (r) = Recorded By, (t) = Taken By, (c) = Cosigned By      Initials Name Provider Type    Dina Mejia, KEILA Physical Therapist                   Obj/Interventions       Row Name 04/05/25 0952          Range of Motion Comprehensive    General Range of Motion no range of motion deficits identified  -KR       Row Name 04/05/25 0952          Strength Comprehensive (MMT)    General Manual Muscle Testing (MMT) Assessment lower extremity strength deficits identified  -KR     Comment, General Manual Muscle Testing (MMT) Assessment LLE grossly 4/5 aside from L hip flexion 2/5; R hip flexion 2/5, R knee extension and flexion 4/5, R ankle DF/PF not formally assessed however able to demo 3/5  -KR       Row Name 04/05/25 0952          Balance    Balance Assessment sitting static balance;sitting dynamic balance  -KR     Static Sitting Balance minimal  assist;other (see comments)  [place] progressing to CGA following cues for anterior weight shift and improved DORIS  -KR     Dynamic Sitting Balance minimal assist  -KR     Position, Sitting Balance unsupported;sitting edge of bed  -KR     Balance Interventions sitting;supported;static;dynamic  -KR       Row Name 04/05/25 0952          Sensory Assessment (Somatosensory)    Bilateral LE Sensory Assessment general sensation;impaired;light touch awareness;light touch localization;proprioception;other (see comments)  pt reports baseline neuropathy  -KR               User Key  (r) = Recorded By, (t) = Taken By, (c) = Cosigned By      Initials Name Provider Type    Dina Mejia PT Physical Therapist                   Goals/Plan       Row Name 04/05/25 0958          Bed Mobility Goal 1 (PT)    Activity/Assistive Device (Bed Mobility Goal 1, PT) bed mobility activities, all  -KR     [place] Level/Cues Needed (Bed Mobility Goal 1, PT) moderate assist (50-74% patient effort)  -KR     Time Frame (Bed Mobility Goal 1, PT) short term goal (STG);1 week  -KR       Row Name 04/05/25 0929          Transfer Goal 1 (PT)    Activity/Assistive Device (Transfer Goal 1, PT) sit-to-stand/stand-to-sit;bed-to-chair/chair-to-bed  -KR     [place] Level/Cues Needed (Transfer Goal 1, PT) moderate assist (50-74% patient effort)  -KR     Time Frame (Transfer Goal 1, PT) long term goal (LTG);2 weeks  -KR       Row Name 04/05/25 0989          Therapy Assessment/Plan (PT)    Planned Therapy Interventions (PT) balance training;bed mobility training;gait training;home exercise program;neuromuscular re-education;patient/family education;postural re-education;transfer training;stretching;strengthening;ROM (range of motion)  -KR               User Key  (r) = Recorded By, (t) = Taken By, (c) = Cosigned By      Initials Name Provider Type    Dina Mejia PT Physical Therapist                   Clinical Impression       Row Name 04/05/25  0957          Pain    Pretreatment Pain Rating 0/10 - no pain  -KR     Posttreatment Pain Rating 0/10 - no pain  -KR       Row Name 04/05/25 0957          Plan of Care Review    Plan of Care Reviewed With patient  -KR     Outcome Evaluation Pt presents with strength, balance, and endurance below baseline with overall evaluation limited by hypotension. BP 80/43 supine, 94/49 seated EOB, and 75/40 once returned to supine at end of evaluation. Pt will benefit from PT to address aforementioned deficits. Rec SNF upon dc.  -KR       Row Name 04/05/25 0957          Therapy Assessment/Plan (PT)    Patient/Family Therapy Goals Statement (PT) to get better  -KR     Rehab Potential (PT) fair  -KR     Criteria for Skilled Interventions Met (PT) skilled treatment is necessary;yes  -KR     Therapy Frequency (PT) daily  -KR     Predicted Duration of Therapy Intervention (PT) 2 weeks  -KR       Row Name 04/05/25 0957          Vital Signs    Pre Systolic BP Rehab 80  -KR     Pre Treatment Diastolic BP 43  -KR     Intra Systolic BP Rehab 94  -KR     Intra Treatment Diastolic BP 49  -KR     Post Systolic BP Rehab 75   -KR     Post Treatment Diastolic BP 40   -KR     Intra SpO2 (%) 95  -KR     O2 Delivery Intra Treatment nasal cannula  -KR     Post SpO2 (%) 95  -KR     O2 Delivery Post Treatment nasal cannula  -KR     Pre Patient Position Supine  -KR     Intra Patient Position Sitting  -KR     Post Patient Position Supine  -KR       Row Name 04/05/25 0957          Positioning and Restraints    Pre-Treatment Position in bed  -KR     Post Treatment Position bed  -KR     In Bed notified nsg;fowlers;call light within reach;encouraged to call for assist;exit alarm on;side rails up x2;SCD pump applied  -KR               User Key  (r) = Recorded By, (t) = Taken By, (c) = Cosigned By      Initials Name Provider Type    Dina Mejia, PT Physical Therapist                   Outcome Measures       Row Name 04/05/25 0959 04/04/25 3193        How much help from another person do you currently need...    Turning from your back to your side while in flat bed without using bedrails? 2  -KR 3  -CC    Moving from lying on back to sitting on the side of a flat bed without bedrails? 2  -KR 3  -CC    Moving to and from a bed to a chair (including a wheelchair)? 2  -KR 3  -CC    Standing up from a chair using your arms (e.g., wheelchair, bedside chair)? 2  -KR 3  -CC    Climbing 3-5 steps with a railing? 1  -KR 2  -CC    To walk in hospital room? 1  -KR 2  -CC    AM-PAC 6 Clicks Score (PT) 10  -KR 16  -CC    Highest Level of Mobility Goal 4 --> Transfer to chair/commode  -KR 5 --> Static standing  -CC      Row Name 04/05/25 0959          Functional Assessment    Outcome Measure Options AM-PAC 6 Clicks Basic Mobility (PT)  -KR               User Key  (r) = Recorded By, (t) = Taken By, (c) = Cosigned By      Initials Name Provider Type    KR Dina Saldivar, PT Physical Therapist    CC Anisha Coles, RN Registered Nurse                                 Physical Therapy Education       Title: PT OT SLP Therapies (In Progress)       Topic: Physical Therapy (In Progress)       Point: Mobility training (In Progress)       Learning Progress Summary            Patient Acceptance, E, NR by  at 4/5/2025 0959                      Point: Home exercise program (Not Started)       Learner Progress:  Not documented in this visit.              Point: Body mechanics (In Progress)       Learning Progress Summary            Patient Acceptance, E, NR by KR at 4/5/2025 0959                      Point: Precautions (In Progress)       Learning Progress Summary            Patient Acceptance, E, NR by KR at 4/5/2025 0959                                      User Key       Initials Effective Dates Name Provider Type Discipline     12/30/22 -  Dina Saldivar, KEILA Physical Therapist PT                  PT Recommendation and Plan  Planned Therapy Interventions (PT): balance training,  bed mobility training, gait training, home exercise program, neuromuscular re-education, patient/family education, postural re-education, transfer training, stretching, strengthening, ROM (range of motion)  Outcome Evaluation: Pt presents with strength, balance, and endurance below baseline with overall evaluation limited by hypotension. BP 80/43 supine, 94/49 seated EOB, and 75/40 once returned to supine at end of evaluation. Pt will benefit from PT to address aforementioned deficits. Rec SNF upon dc.     Time Calculation:   PT Evaluation Complexity  History, PT Evaluation Complexity: 3 or more personal factors and/or comorbidities  Examination of Body Systems (PT Eval Complexity): total of 4 or more elements  Clinical Presentation (PT Evaluation Complexity): evolving  Clinical Decision Making (PT Evaluation Complexity): moderate complexity  Overall Complexity (PT Evaluation Complexity): moderate complexity     PT Charges       Row Name 04/05/25 1000             Time Calculation    Start Time 0920  -KR      PT Received On 04/05/25  -KR      PT Goal Re-Cert Due Date 04/15/25  -KR         Untimed Charges    PT Eval/Re-eval Minutes 50  -KR         Total Minutes    Untimed Charges Total Minutes 50  -KR       Total Minutes 50  -KR                User Key  (r) = Recorded By, (t) = Taken By, (c) = Cosigned By      Initials Name Provider Type    KR Dina Saldivar, PT Physical Therapist                  Therapy Charges for Today       Code Description Service Date Service Provider Modifiers Qty    [id number]  PT EVAL MOD COMPLEXITY 4 4/5/2025 Dina Saldivar, PT GP 1            PT G-Codes  Outcome Measure Options: AM-PAC 6 Clicks Basic Mobility (PT)  AM-PAC 6 Clicks Score (PT): 10  PT Discharge Summary  Anticipated Discharge Disposition (PT): skilled nursing facility    Dina Saldivar PT  4/5/2025

## 2025-04-05 NOTE — CASE MANAGEMENT/SOCIAL WORK
Continued Stay Note  [facility]     Patient Name: Philomena Davis  MRN: [id number]  Today's Date: 4/5/2025    Admit Date: 4/4/2025        Discharge Plan       Row Name 04/05/25 1000       Plan    Plan Comments Pt here from [facility]. Would need to return by 1540 today in order to not need a new precert. Per provider she will not be medically ready to discharge today.                   Discharge Codes    No documentation.                       Brittany Ritter RN

## 2025-04-05 NOTE — CONSULTS
[facility] Consult    Referring Provider: Rosana Ibrahim MD    PCP: Unique Brandt DO    Reason for Consultation: Decompensated cirrhosis    Chief complaint: Fever    History of present illness:    Philomena Davis is a 64 y.o. female who is admitted with cough, fever, and URI symptoms.  She is found to have leukocytosis, hypoxia, and is treated for left lower lobe pneumonia.  She was recently discharged to [facility] rehab following a 1-week admission for alcoholic hepatitis, electrolyte disturbances, JAMMIE, and recent right foot fracture.    Allergies:  Buspirone, Codeine, and Mirtazapine    Scheduled Meds:  albumin human, 50 g, Intravenous, TID  desvenlafaxine, 50 mg, Oral, Daily  folic acid, 1 mg, Oral, Daily  gabapentin, 200 mg, Oral, Q12H  ipratropium-albuterol, 3 mL, Nebulization, 4x Daily - RT  lactobacillus acidophilus, 1 capsule, Oral, Daily  lactulose, 10 g, Oral, Daily  latanoprost, 1 drop, Both Eyes, Nightly  metoprolol succinate XL, 25 mg, Oral, Daily  midodrine, 10 mg, Oral, TID AC  pantoprazole, 40 mg, Oral, Daily  piperacillin-tazobactam, 3.375 g, Intravenous, Q8H  QUEtiapine, 100 mg, Oral, Nightly  rifAXIMin, 550 mg, Oral, Q12H  rosuvastatin, 5 mg, Oral, Daily  sodium chloride, 10 mL, Intravenous, Q12H  thiamine, 100 mg, Oral, Daily  ursodiol, 300 mg, Oral, BID         Infusions:  sodium chloride, 125 mL/hr, Last Rate: 125 mL/hr (04/05/25 0953)        PRN Meds:    senna-docusate sodium **AND** polyethylene glycol **AND** bisacodyl **AND** bisacodyl    Calcium Replacement - Follow Nurse / BPA Driven Protocol    HYDROcodone-acetaminophen    ipratropium-albuterol    Magnesium Standard Dose Replacement - Follow Nurse / BPA Driven Protocol    nitroglycerin    Phosphorus Replacement - Follow Nurse / BPA Driven Protocol    Potassium Replacement - Follow Nurse / BPA Driven Protocol    [COMPLETED] Insert Peripheral IV **AND** sodium chloride    sodium chloride    sodium chloride    Home  Meds:  Medications Prior to Admission   Medication Sig Dispense Refill Last Dose/Taking    desvenlafaxine (PRISTIQ) 50 MG 24 hr tablet Take 1 tablet by mouth Daily. Indications: Major Depressive Disorder   4/4/2025    folic acid (FOLVITE) 1 MG tablet Take 1 tablet by mouth Daily. Indications: Anemia From Inadequate Folic Acid   4/4/2025    gabapentin (NEURONTIN) 100 MG capsule Take 2 capsules by mouth Every 12 (Twelve) Hours. Indications: Neuropathic Pain 120 capsule 0 4/4/2025    lactulose (CHRONULAC) 10 GM/15ML solution Take 30 mL by mouth 2 (Two) Times a Day.   4/4/2025    latanoprost (XALATAN) 0.005 % ophthalmic solution Administer 1 drop to both eyes Every Night. Indications: Wide-Angle Glaucoma 2.5 mL 5 4/3/2025    metoprolol succinate XL (TOPROL-XL) 25 MG 24 hr tablet Take 1 tablet by mouth Daily. Indications: Atrial Flutter 90 tablet 3 4/4/2025    midodrine (PROAMATINE) 10 MG tablet Take 1 tablet by mouth 3 (Three) Times a Day Before Meals. Indications: Disorder of Low Blood Pressure   4/4/2025    pantoprazole (PROTONIX) 40 MG EC tablet Take 1 tablet by mouth Daily. Indications: Heartburn 60 tablet 5 4/4/2025    QUEtiapine (SEROquel) 100 MG tablet TAKE 1 TABLET BY MOUTH EVERY NIGHT FOR 30 DAYS. 30 tablet 0 4/3/2025    rosuvastatin (CRESTOR) 5 MG tablet TAKE 1 TABLET BY MOUTH DAILY 90 tablet 0 4/4/2025    thiamine (VITAMIN B-1) 100 MG tablet tablet Take 1 tablet by mouth Daily. Indications: Deficiency of Vitamin B1   4/4/2025    ursodiol (ACTIGALL) 300 MG capsule Take 1 capsule by mouth 2 (Two) Times a Day. Indications: Liver Disease 60 capsule 5 4/4/2025    albuterol sulfate  (90 Base) MCG/ACT inhaler Inhale 2 puffs Every 4 (Four) Hours As Needed for Wheezing. Indications: Chronic Obstructive Lung Disease 6.7 g 2 Unknown    HYDROcodone-acetaminophen (NORCO) 5-325 MG per tablet Take 1 tablet by mouth Every 4 (Four) Hours As Needed (pain). Indications: Pain 15 tablet 0 Unknown    LACTOBACILLUS PO Take 1  capsule by mouth Daily. Indications:       naloxone (NARCAN) 4 MG/0.1ML nasal spray Call 911. Don't prime. [place] in 1 nostril for overdose. Repeat in 2-3 minutes in other nostril if no or minimal breathing/responsiveness.  Indications: Opioid Overdose 2 each 0 Unknown    ondansetron ODT (ZOFRAN-ODT) 4 MG disintegrating tablet Place 1 tablet on the tongue Every 6 (Six) Hours As Needed for Nausea or Vomiting. As needed for nausea 12 tablet 0 Unknown       ROS: Review of Systems   Constitutional:  Positive for fatigue and fever.   HENT:  Negative for trouble swallowing.    Respiratory:  Positive for cough.    Cardiovascular:  Positive for leg swelling.   Gastrointestinal:  Negative for abdominal pain, blood in stool, nausea and vomiting.   Skin:  Positive for color change.   Psychiatric/Behavioral:  Positive for decreased concentration. Negative for agitation and behavioral problems.        PAST MED HX:  Past Medical History:   Diagnosis Date    Alcohol withdrawal 05/10/2019    Ankle sprain 2015    Annual physical exam 09/12/2023    Anxiety and depression     Arthritis of back 2020    Arthritis of neck 2021    Asthma 2022    Cataract 20/2/1999    Chronic pain disorder 2023    Cirrhosis of liver     Closed displaced fracture of lateral malleolus of left fibula     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 03/24/2017    Closed trimalleolar fracture of right ankle 05/24/2023    Colon polyp 2018    COPD (chronic obstructive pulmonary disease) 2022    Digital mucous cyst of toe of left foot 03/24/2017    Eating disorder 2022    Fracture of ankle 2017    Fracture, clavicle 1969    Fracture, finger 1972    Fracture, foot 1/30/25    Ganglion cyst of left foot     GERD (gastroesophageal reflux disease) 10/22    Glaucoma     Hip arthrosis 2019    Hyperlipidemia 1999    Hypertension     Knee sprain 2019    Knee swelling 2019    Low back strain 1/15/25    Neck strain 1/30/25    Panic disorder 1987     Periarthritis of shoulder 2019    Peripheral neuropathy     Pulmonary arterial hypertension 2021    Urinary tract infection 02/23/23    Varicella 1968    Wears glasses        PAST SURG HX:  Past Surgical History:   Procedure Laterality Date    ANKLE OPEN REDUCTION INTERNAL FIXATION Left 03/24/2017    Procedure: LEFT ANKLE OPEN REDUCTION INTERNAL FIXATION WITH ILIAC CREST BONE GRAFT , EXCISION CYST 4TH/5TH INNERSPACE LEFT FOOT;  Surgeon: Frank Larson MD;  Location:  [facility];  Service:     ANKLE OPEN REDUCTION INTERNAL FIXATION Right 05/25/2023    Procedure: ANKLE OPEN REDUCTION INTERNAL FIXATION;  Surgeon: Deandre Reynoso MD;  Location:  [facility];  Service: Orthopedics;  Laterality: Right;    AUGMENTATION MAMMAPLASTY Bilateral 1999    BREAST AUGMENTATION      BREAST EXCISIONAL BIOPSY Right 2014    COLONOSCOPY  2018    DILATATION AND CURETTAGE  1987    DILATION AND CURETTAGE, DIAGNOSTIC / THERAPEUTIC      ENDOSCOPY N/A 11/30/2022    Procedure: ESOPHAGOGASTRODUODENOSCOPY;  Surgeon: Arelis Solano MD;  Location:  [facility];  Service: Gastroenterology;  Laterality: N/A;    ENDOSCOPY N/A 02/01/2024    Procedure: ESOPHAGOGASTRODUODENOSCOPY;  Surgeon: Brunner, Mark I, MD;  Location:  [facility];  Service: Gastroenterology;  Laterality: N/A;    FOOT SURGERY  2012    FRACTURE SURGERY  5 /26/2023    IA RPR NON/MAL FEMUR DSTL H/N W/ILIAC/AUTOG BONE Left 03/24/2017    Procedure: ILIAC CREST BONE GRAFT;  Surgeon: Frank Larson MD;  Location:  [facility];  Service: Orthopedics    WISDOM TOOTH EXTRACTION  1992    WRIST SURGERY         FAM HX:  Family History   Problem Relation Age of Onset    Depression Mother     Cancer Mother         Lung cancer    Vision loss Mother     COPD Father         Copd    No Known Problems Sister     Drug abuse Brother     Bipolar disorder Brother     Alcohol abuse Brother     ADD / ADHD Brother     Mental illness Brother     Asthma Maternal Aunt     Dementia Paternal Aunt  "    Stroke Maternal Grandmother     Vision loss Maternal Grandmother         Stroke    Breast cancer Paternal Grandmother 68        met to brain    Asthma Paternal Grandmother         Breast cancer    Dementia Cousin     No Known Problems Daughter     No Known Problems Son     Rheum arthritis Other     Heart disease Other     Cancer Other     Stroke Other     Hypertension Other     Asthma Maternal Grandfather         Pancreas canet    Heart disease Paternal Grandfather     COPD Maternal Aunt         COPD    COPD Maternal Uncle         Non-Story Lymphoma    Heart disease Maternal Uncle     BRCA 1/2 Neg Hx     Colon cancer Neg Hx     Endometrial cancer Neg Hx     Ovarian cancer Neg Hx        SOC HX:  Social History     Socioeconomic History    Marital status: Single   Tobacco Use    Smoking status: Former     Current packs/day: 0.00     Average packs/day: 1.5 packs/day for 24.0 years (36.1 ttl pk-yrs)     Types: Cigarettes     Start date: 3/5/1985     Quit date: 3/22/2009     Years since quittin.0     Passive exposure: Past    Smokeless tobacco: Never   Vaping Use    Vaping status: Never Used   Substance and Sexual Activity    Alcohol use: Not Currently     Alcohol/week: 5.0 standard drinks of alcohol     Types: 5 Glasses of wine per week    Drug use: No    Sexual activity: Not Currently     Partners: Male     Birth control/protection: Abstinence, Post-menopausal     Comment: Menopausal Post 22 Yrs       PHYSICAL EXAM  BP (!) 85/45 (BP Location: Left arm, Patient Position: Lying)   Pulse 113   Temp 97.9 °F (36.6 °C) (Oral)   Resp 18   Ht 165.1 cm (65\")   Wt 90.6 kg (199 lb 11.2 oz)   LMP 2016 (Approximate)   SpO2 97%   BMI 33.23 kg/m²   Wt Readings from Last 3 Encounters:   25 90.6 kg (199 lb 11.2 oz)   25 68 kg (149 lb 14.6 oz)   03/10/25 71.7 kg (158 lb)   ,body mass index is 33.23 kg/m².  Physical Exam  Constitutional:       General: She is not in acute distress.     Appearance: She " is ill-appearing. She is not toxic-appearing.   HENT:      Nose: Nose normal.      Mouth/Throat:      Mouth: Mucous membranes are moist.   Eyes:      General: Scleral icterus present.      Conjunctiva/sclera: Conjunctivae normal.   Cardiovascular:      Rate and Rhythm: Tachycardia present.      Pulses: Normal pulses.   Pulmonary:      Effort: Pulmonary effort is normal. No respiratory distress.   Abdominal:      General: Bowel sounds are normal. There is no distension.      Palpations: Abdomen is soft.      Tenderness: There is no abdominal tenderness. There is no guarding.   Musculoskeletal:      Cervical back: Normal range of motion.      Right lower leg: Edema present.      Left lower leg: Edema present.      Comments: Sarcopenia.   Skin:     General: Skin is warm and dry.      Coloration: Skin is jaundiced.      Comments: Spider telangiectasias on chest.   Neurological:      General: No focal deficit present.      Mental Status: She is alert and oriented to person, place, and time.      Comments: Minimal asterixis.   Psychiatric:         Mood and Affect: Mood normal.         Behavior: Behavior normal.     Results Review:   I reviewed the patient's new clinical results.    Lab Results   Component Value Date    WBC 16.88 (H) 04/05/2025    HGB 8.0 (L) 04/05/2025    HGB 8.5 (L) 04/04/2025    HGB 8.1 (L) 04/04/2025    HCT 25.1 (L) 04/05/2025    .7 (H) 04/05/2025     04/05/2025       Lab Results   Component Value Date    INR 1.75 (H) 04/05/2025    INR 1.22 (H) 01/30/2025    INR 1.18 (H) 11/10/2024       Lab Results   Component Value Date    GLUCOSE 97 04/05/2025    BUN 15 04/05/2025    CREATININE 1.10 (H) 04/05/2025    EGFRIFNONA 114 05/15/2019    BCR 13.6 04/05/2025     (L) 04/05/2025    K 4.0 04/05/2025    CO2 22.0 04/05/2025    CALCIUM 8.6 04/05/2025    ALBUMIN 2.5 (L) 04/05/2025    ALKPHOS 84 04/05/2025    BILITOT 8.2 (H) 04/05/2025    BILIDIR 5.9 (H) 03/13/2025    ALT 35 (H) 04/05/2025    AST  174 (H) 04/05/2025     ASSESSMENTS/PLANS    1.  Recent acute alcoholic hepatitis.  Has not drank alcohol since 3/12/2025.  2.  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 MELD-Na = 24.  Portal gastropathy, but no varices on 2/1/2024 EGD.  3.  Hepatic encephalopathy, grade 1.  4.  Coagulopathy of liver disease, worsening.  Suspect vitamin K deficiency after recent courses of antibiotics.  5.  Alcoholic polyneuropathy.  6.  Left lower lobe pneumonia with sepsis syndrome.  7.  Recent right foot fracture.  8.  Recent LA grade D reflux esophagitis on EGD 2/1/2024.  9.  Recent gastric antrum ulcer with pigment spot 2/1/2024.  10.  Sarcopenia and severe deconditioning.  Nonambulatory.    >> Continue lactulose.  >> Continue rifaximin.  >> IV vitamin K, and repeat INR tomorrow.  >> Twice daily PPI.  >> Surveillance EGD to assess ulcer healing on Monday, for 4/7/2025.    Prognosis is guarded.  MELD predicts 15% 90-day mortality, but malnutrition/sarcopenia and frailty multiply mortality by 2.5X.  Needs close follow-up with CLAUDE Cox at [facility] hepatology.    I discussed the patient's findings and my recommendations with patient.    Mark I. Brunner, MD  04/05/25  15:01 EDT

## 2025-04-05 NOTE — PLAN OF CARE
Goal Outcome Evaluation:  Plan of Care Reviewed With: patient           Outcome Evaluation: Pt presents with strength, balance, and endurance below baseline with overall evaluation limited by hypotension. BP 80/43 supine, 94/49 seated EOB, and 75/40 once returned to supine at end of evaluation. Pt will benefit from PT to address aforementioned deficits. Rec SNF upon dc.    Anticipated Discharge Disposition (PT): skilled nursing facility

## 2025-04-05 NOTE — PLAN OF CARE
Goal Outcome Evaluation:  Plan of Care Reviewed With: patient        Progress: no change  Outcome Evaluation: 3L O2. Pt NWB RLE due to foot fracture. PT/OT consulted. Afebrile overnight. IV abx infused as ordered. Pt disoriented to time/situation. Unsure if this is baseline or related to sepsis/UTI. Sputum sample ordered. Gave pt collection container and instructed on it but unable to collect it so far. Stool sample also pending when pt has BM. Fall, skin, and DVT precautions in place.  Problem: Adult Inpatient Plan of Care  Goal: Plan of Care Review  Outcome: Progressing  Flowsheets (Taken 4/5/2025 0438)  Progress: no change  Outcome Evaluation: 3L O2. Pt NWB RLE due to foot fracture. PT/OT consulted. Afebrile overnight. IV abx infused as ordered. Pt disoriented to time/situation. Unsure if this is baseline or related to sepsis/UTI. Sputum sample ordered. Gave pt collection container and instructed on it but unable to collect it so far. Stool sample also pending when pt has BM.  Plan of Care Reviewed With: patient  Goal: Patient-Specific Goal (Individualized)  Outcome: Progressing  Goal: Absence of Hospital-Acquired Illness or Injury  Outcome: Progressing  Intervention: Identify and Manage Fall Risk  Recent Flowsheet Documentation  Taken 4/5/2025 0200 by Anisha Coles, RN  Safety Promotion/Fall Prevention:   assistive device/personal items within reach   clutter free environment maintained   fall prevention program maintained   lighting adjusted   gait belt   nonskid shoes/slippers when out of bed   room organization consistent   safety round/check completed   toileting scheduled  Taken 4/5/2025 0000 by Anisha Coles, RN  Safety Promotion/Fall Prevention:   assistive device/personal items within reach   clutter free environment maintained   fall prevention program maintained   gait belt   lighting adjusted   nonskid shoes/slippers when out of bed   room organization consistent   safety round/check  completed   toileting scheduled  Taken 4/4/2025 2230 by Anisha Coles RN  Safety Promotion/Fall Prevention:   assistive device/personal items within reach   clutter free environment maintained   fall prevention program maintained   gait belt   lighting adjusted   nonskid shoes/slippers when out of bed   room organization consistent   safety round/check completed   toileting scheduled  Intervention: Prevent Skin Injury  Recent Flowsheet Documentation  Taken 4/5/2025 0400 by Anisha Coles RN  Skin Protection:   incontinence pads utilized   protective footwear used   silicone foam dressing in place   transparent dressing maintained  Taken 4/5/2025 0200 by Anisha Coles RN  Body Position:   right   side-lying  Skin Protection:   incontinence pads utilized   protective footwear used   silicone foam dressing in place   transparent dressing maintained  Taken 4/5/2025 0000 by Anisha Coles RN  Body Position:   turned   right  Skin Protection:   incontinence pads utilized   protective footwear used   silicone foam dressing in place   transparent dressing maintained  Taken 4/4/2025 2230 by Anisha Coles RN  Body Position: supine  Skin Protection: (skin assessed and silicone dressings applied)   incontinence pads utilized   pouching devices used   silicone foam dressing in place   transparent dressing maintained   other (see comments)  Intervention: Prevent and Manage VTE (Venous Thromboembolism) Risk  Recent Flowsheet Documentation  Taken 4/5/2025 0400 by Anisha Coles RN  VTE Prevention/Management:   bilateral   SCDs (sequential compression devices) on  Taken 4/5/2025 0200 by Anisha Coles RN  VTE Prevention/Management:   bilateral   SCDs (sequential compression devices) on  Taken 4/5/2025 0000 by Anisha Coles RN  VTE Prevention/Management:   bilateral   SCDs (sequential compression devices) on  Taken 4/4/2025 2230 by Anisha Coles RN  VTE Prevention/Management:    bilateral   SCDs (sequential compression devices) on  Intervention: Prevent Infection  Recent Flowsheet Documentation  Taken 4/5/2025 0200 by Anisha Coles RN  Infection Prevention:   environmental surveillance performed   hand hygiene promoted   personal protective equipment utilized   rest/sleep promoted  Taken 4/5/2025 0000 by Anisha Cloes RN  Infection Prevention:   environmental surveillance performed   hand hygiene promoted   personal protective equipment utilized   rest/sleep promoted  Taken 4/4/2025 2230 by Anisha Coles RN  Infection Prevention:   environmental surveillance performed   hand hygiene promoted   personal protective equipment utilized   rest/sleep promoted  Goal: Optimal Comfort and Wellbeing  Outcome: Progressing  Goal: Readiness for Transition of Care  Outcome: Progressing  Intervention: Mutually Develop Transition Plan  Recent Flowsheet Documentation  Taken 4/4/2025 2254 by Anisha Coles RN  Transportation Anticipated: health plan transportation  Patient/Family Anticipated Services at Transition:      rehabilitation services  Patient/Family Anticipates Transition to: inpatient rehabilitation facility  Taken 4/4/2025 2248 by Anisha Coles RN  Equipment Currently Used at Home:   walker, standard   oxygen   orthotic device     Problem: Fall Injury Risk  Goal: Absence of Fall and Fall-Related Injury  Outcome: Progressing  Intervention: Identify and Manage Contributors  Recent Flowsheet Documentation  Taken 4/5/2025 0200 by Anisha Coles RN  Medication Review/Management: medications reviewed  Taken 4/5/2025 0000 by Anisha Coles RN  Medication Review/Management: medications reviewed  Taken 4/4/2025 2230 by Anisha Coles RN  Medication Review/Management: medications reviewed  Intervention: Promote Injury-Free Environment  Recent Flowsheet Documentation  Taken 4/5/2025 0200 by Anisha Coles RN  Safety Promotion/Fall Prevention:    assistive device/personal items within reach   clutter free environment maintained   fall prevention program maintained   lighting adjusted   gait belt   nonskid shoes/slippers when out of bed   room organization consistent   safety round/check completed   toileting scheduled  Taken 4/5/2025 0000 by Anisha Coles RN  Safety Promotion/Fall Prevention:   assistive device/personal items within reach   clutter free environment maintained   fall prevention program maintained   gait belt   lighting adjusted   nonskid shoes/slippers when out of bed   room organization consistent   safety round/check completed   toileting scheduled  Taken 4/4/2025 2230 by Anisha Coles RN  Safety Promotion/Fall Prevention:   assistive device/personal items within reach   clutter free environment maintained   fall prevention program maintained   gait belt   lighting adjusted   nonskid shoes/slippers when out of bed   room organization consistent   safety round/check completed   toileting scheduled     Problem: Skin Injury Risk Increased  Goal: Skin Health and Integrity  Outcome: Progressing  Intervention: Optimize Skin Protection  Recent Flowsheet Documentation  Taken 4/5/2025 0400 by Anisha Coles RN  Pressure Reduction Techniques:   frequent weight shift encouraged   pressure points protected   weight shift assistance provided  Pressure Reduction Devices:   foam padding utilized   positioning supports utilized   pressure-redistributing mattress utilized  Skin Protection:   incontinence pads utilized   protective footwear used   silicone foam dressing in place   transparent dressing maintained  Taken 4/5/2025 0200 by Anisha Coles RN  Activity Management: activity encouraged  Pressure Reduction Techniques:   frequent weight shift encouraged   pressure points protected   weight shift assistance provided  Head of Bed (HOB) Positioning: HOB elevated  Pressure Reduction Devices:   foam padding utilized   positioning  supports utilized   pressure-redistributing mattress utilized  Skin Protection:   incontinence pads utilized   protective footwear used   silicone foam dressing in place   transparent dressing maintained  Taken 4/5/2025 0000 by Anisha Coles RN  Activity Management: activity encouraged  Pressure Reduction Techniques:   frequent weight shift encouraged   pressure points protected   weight shift assistance provided  Head of Bed (HOB) Positioning: HOB elevated  Pressure Reduction Devices:   foam padding utilized   positioning supports utilized   pressure-redistributing mattress utilized  Skin Protection:   incontinence pads utilized   protective footwear used   silicone foam dressing in place   transparent dressing maintained  Taken 4/4/2025 2230 by Anisha Coles RN  Activity Management: activity encouraged  Pressure Reduction Techniques:   frequent weight shift encouraged   pressure points protected   weight shift assistance provided  Head of Bed (HOB) Positioning: HOB elevated  Pressure Reduction Devices:   foam padding utilized   positioning supports utilized   pressure-redistributing mattress utilized  Skin Protection: (skin assessed and silicone dressings applied)   incontinence pads utilized   pouching devices used   silicone foam dressing in place   transparent dressing maintained   other (see comments)     Problem: Comorbidity Management  Goal: Maintenance of COPD Symptom Control  Outcome: Progressing  Intervention: Maintain COPD (Chronic Obstructive Pulmonary Disease) Symptom Control  Recent Flowsheet Documentation  Taken 4/5/2025 0200 by Anisha Coles, RN  Medication Review/Management: medications reviewed  Taken 4/5/2025 0000 by Anisha Coles RN  Medication Review/Management: medications reviewed  Taken 4/4/2025 2230 by Anisha Coles RN  Medication Review/Management: medications reviewed  Goal: Blood Pressure in Desired Range  Outcome: Progressing  Intervention: Maintain Blood  Pressure Management  Recent Flowsheet Documentation  Taken 4/5/2025 0200 by Anisha Coles, RN  Medication Review/Management: medications reviewed  Taken 4/5/2025 0000 by Anisha Coles, RN  Medication Review/Management: medications reviewed  Taken 4/4/2025 2230 by Anisha Coles, RN  Medication Review/Management: medications reviewed

## 2025-04-05 NOTE — PLAN OF CARE
Goal Outcome Evaluation:           Progress: declining     Pt has been hypotensive entire shift, Midodrine/albumin/boluses given, pt continues to be hypotensive, remains on 3 L NC, afebrile, no c/o pain but has been non-compliant with certain nursing care. Dudley cath inserted approx 1730, NS at 125 ml.hr. Will continue to observe and provide care.

## 2025-04-05 NOTE — PROGRESS NOTES
[facility] Medicine Services  PROGRESS NOTE    Patient Name: Philomena Davis  : 1960  MRN: [id number]    Date of Admission: 2025  Primary Care Physician: Unique Brandt DO    Subjective   Subjective     CC:  Pneumonia    HPI:  Patient tells me she is short of breath and coughing.  Nonproductive.  She is a little confused and has not had a bowel movement since yesterday morning.  I reordered lactulose for her.  We discussed her CODE STATUS and she was confused and unable to clearly state her wishes.  She is listed as a full code.  She told me she did not want to be on life support machines but then told me she wanted CPR.  She was back and forth with her nurse as a witness today and we decided to attempt conversation again later after she has had her lactulose and hopefully has had a bowel movement.  She remains hypotensive despite fluids.  Will add albumin.  Patient tells me she has not been out of bed in about a week.      Objective   Objective     Vital Signs:   Temp:  [97.3 °F (36.3 °C)-99 °F (37.2 °C)] 97.3 °F (36.3 °C)  Heart Rate:  [] 111  Resp:  [18-20] 20  BP: ()/(43-82) 84/43  Flow (L/min) (Oxygen Therapy):  [3] 3     Physical Exam:  Constitutional: No acute distress, awake, alert  HENT: NCAT, mucous membranes moist  Respiratory: Decreased bilaterally, respiratory effort normal   Cardiovascular: RRR  Gastrointestinal: Positive bowel sounds, soft, nontender, mildly distended  Musculoskeletal: Positive bilateral lower extremity edema  Psychiatric: Appropriate affect, cooperative  Neurologic: Oriented to person and place but obviously confused and unable to have CODE STATUS discussion, strength symmetric in all extremities, Cranial Nerves grossly intact to confrontation, speech clear  Skin: Significant jaundice      Results Reviewed:  LAB RESULTS:      Lab 25  0842 25  2341 25  1724   WBC 16.88*  --  23.03*   HEMOGLOBIN 8.0* 8.5* 8.1*    HEMATOCRIT 25.1* 26.2* 25.1*   PLATELETS 171  --  221   NEUTROS ABS 13.54*  --  18.87*   IMMATURE GRANS (ABS) 0.12*  --  0.16*   LYMPHS ABS 1.55  --  1.61   MONOS ABS 1.46*  --  2.09*   EOS ABS 0.14  --  0.19   .7*  --  104.6*   PROCALCITONIN  --   --  0.62*   LACTATE  --   --  1.3   PROTIME 21.6*  --   --          Lab 04/05/25  0842 04/04/25  1724   SODIUM 135* 135*   POTASSIUM 4.0 4.2   CHLORIDE 105 103   CO2 22.0 24.0   ANION GAP 8.0 8.0   BUN 15 14   CREATININE 1.10* 1.11*   EGFR 56.9* 56.3*   GLUCOSE 97 95   CALCIUM 8.6 8.8   MAGNESIUM 2.0  --          Lab 04/05/25  0842 04/04/25  1724   TOTAL PROTEIN 5.9* 6.3   ALBUMIN 2.5* 2.6*   GLOBULIN 3.4 3.7   ALT (SGPT) 35* 36*   AST (SGOT) 174* 186*   BILIRUBIN 8.2* 8.5*   ALK PHOS 84 101   LIPASE  --  43         Lab 04/05/25  0842   PROTIME 21.6*   INR 1.75*             Lab 04/04/25  1724   IRON 65   IRON SATURATION (TSAT) 84*   TIBC 77*   TRANSFERRIN 52*   FERRITIN 926.40*         Brief Urine Lab Results  (Last result in the past 365 days)        Color   Clarity   Blood   Leuk Est   Nitrite   Protein   CREAT   Urine HCG        04/05/25 1039 Dark Yellow   Turbid   Large (3+)   Moderate (2+)   Positive   30 mg/dL (1+)                   Microbiology Results Abnormal       Procedure Component Value - Date/Time    Urine Culture - Urine, Straight Cath [[id number]]  (Abnormal) Collected: 04/04/25 1953    Lab Status: Preliminary result Specimen: Urine from Straight Cath Updated: 04/05/25 1101     Urine Culture >100,000 CFU/mL Escherichia coli    Narrative:      Colonization of the urinary tract without infection is common. Treatment is discouraged unless the patient is symptomatic, pregnant, or undergoing an invasive urologic procedure.            XR Chest 1 View  Result Date: 4/4/2025  XR CHEST 1 VW Date of Exam: 4/4/2025 5:20 PM EDT Indication: cough Comparison: 3/12/2025 Findings: No PICC is seen. Borderline cardiomegaly. Mild perihilar interstitial opacities may  reflect mild edema. Left basilar opacity with likely small left pleural effusion. No pneumothorax. No evidence of acute osseous abnormalities. Visualized upper abdomen is  unremarkable.     Impression: Impression: 1.Mild perihilar and interstitial opacities may reflect mild edema. 2.Left basilar opacity with likely small left pleural effusion may reflect infection or atelectasis. 3.No PICC is seen. Electronically Signed: Alon Beauchamp MD  4/4/2025 5:46 PM EDT  Workstation ID: [id number]      Results for orders placed during the hospital encounter of 11/10/24    Adult Transthoracic Echo Complete W/ Cont if Necessary Per Protocol    Interpretation Summary    Left ventricular systolic function is normal. Estimated left ventricular EF = 65%    The cardiac valves are anatomically and functionally normal.      Current medications:  Scheduled Meds:albumin human, 50 g, Intravenous, TID  desvenlafaxine, 50 mg, Oral, Daily  folic acid, 1 mg, Oral, Daily  gabapentin, 200 mg, Oral, Q12H  ipratropium-albuterol, 3 mL, Nebulization, 4x Daily - RT  lactobacillus acidophilus, 1 capsule, Oral, Daily  lactulose, 10 g, Oral, Daily  latanoprost, 1 drop, Both Eyes, Nightly  metoprolol succinate XL, 25 mg, Oral, Daily  midodrine, 10 mg, Oral, TID AC  pantoprazole, 40 mg, Oral, Daily  piperacillin-tazobactam, 3.375 g, Intravenous, Q8H  QUEtiapine, 100 mg, Oral, Nightly  rosuvastatin, 5 mg, Oral, Daily  sodium chloride, 10 mL, Intravenous, Q12H  thiamine, 100 mg, Oral, Daily  ursodiol, 300 mg, Oral, BID      Continuous Infusions:sodium chloride, 125 mL/hr, Last Rate: 125 mL/hr (04/05/25 0953)      PRN Meds:.  senna-docusate sodium **AND** polyethylene glycol **AND** bisacodyl **AND** bisacodyl    Calcium Replacement - Follow Nurse / BPA Driven Protocol    HYDROcodone-acetaminophen    ipratropium-albuterol    Magnesium Standard Dose Replacement - Follow Nurse / BPA Driven Protocol    nitroglycerin    Phosphorus Replacement - Follow Nurse /  BPA Driven Protocol    Potassium Replacement - Follow Nurse / BPA Driven Protocol    [COMPLETED] Insert Peripheral IV **AND** sodium chloride    sodium chloride    sodium chloride    Assessment & Plan   Assessment & Plan     Active Hospital Problems    Diagnosis  POA    **Sepsis secondary to UTI [A41.9, N39.0]  Yes    Acute UTI (urinary tract infection) [N39.0]  Unknown    Pneumonia [J18.9]  Unknown    Sepsis [A41.9]  Unknown    Hypotension [I95.9]  Yes    Peripheral neuropathy [G62.9]  Yes    Alcohol use disorder [F10.90]  Yes    Alcoholic cirrhosis of liver without ascites [K70.30]  Yes    Essential hypertension [I10]  Yes    Generalized anxiety disorder [F41.1]  Yes    Hyperlipidemia [E78.5]  Yes    Glaucoma [H40.9]  Yes      Resolved Hospital Problems    Diagnosis Date Resolved POA    Dysphagia [R13.10] 04/04/2025 Yes        Brief Hospital Course to date:  Philomena Davis is a 64 y.o. female with a history of HTN, HLD, chronic pain disorder, cirrhosis of the liver, COPD, GERD, for fracture, was here 3/12/2025 - 3/19/2025 with EtOH hepatitis with cirrhosis, electrolyte abnormalities, Lisfranc and third metatarsal fracture, and was discharged from here to [facility] for rehab.  Patient is here with sepsis, pneumonia, and a UTI.     Assessment and Plan:     Acute respiratory failure with hypoxia  Sepsis  Crystalloid refractory hypotension  Pneumonia (POA)-HCAP  --Chest x-ray shows mild perihilar and interstitial opacities may reflect mild edema.  Left basilar opacity with likely small left pleural effusion may reflect infection or atelectasis.  -- Respiratory panel PCR negative  -- WBC 23.03, procalcitonin 0.62  -- O2 sat 87% on room air  -- was given a liter of saline in the ED  -- BC x 2 pending  -- MRSA PCR pending  -- Urine Legionella and S.  Pneumo Ag pending  -- Sputum culture pending  -- Continuous pulse ox with supplemental oxygen as needed  -- Continue Zosyn, discontinue vancomycin as MRSA PCR negative  --  CT of the chest ordered on 4/5/2025     Acute UTI (present on admission)  -- UA: Appearance turbid, blood large, nitrite positive, leukocytes large, protein 100, bilirubin large, RBC 3-5, WBC TNTC, bacteria 4+, squamous 7-12  -- Urine culture pending/requested straight cath UA  -- zosyn     Elevated serum creatinine  Crystalloid refractory hypotension  -- Creatinine 1.11  -- Baseline creatinine ~ 0.7-0.9  -- was given a liter of saline in the ED  --Additional bolus ordered 4/5/2025 followed by normal saline maintenance  -- Will schedule albumin x 3 today given crystalloid refractory hypotension     Elevated LFTs/hyperbilirubinemia  Coagulopathy  EtOH hepatitis with cirrhosis, appears decompensated  Hepatic encephalopathy  Hypotension  -- Alk phos 101, , ALT 36, bili 8.5  -- Continue midodrine 3 times daily  -- High-protein diet  --MELD score 25  --GI consult requested  --INR 1.75  --Resume lactulose     Acute on chronic anemia  -- Hemoglobin 8.1, hematocrit 25.1  -- Likely related to EtOH marrow suppression  -- PPI   -- no overt signs of bleeding     Lisfranc and third metatarsal fracture  Unsteady gait  Frequent falls  EtOH neuropathy  -- NWB RLE  -- PT/OT consult  -- Consult case management     EtOH abuse  -- Continue thiamine and folic acid  -- No alcohol since previous admission     HTN  HLD  -- Continue metoprolol, midodrine dose 3 times daily for orthostasis  -- Continue statin     Depression  -- Continue Pristiq     Peripheral neuropathy  -- Continue gabapentin     Glaucoma  -- Home eyedrops    Expected Discharge Location and Transportation: rehab  Expected Discharge   Expected Discharge Date: 4/7/2025; Expected Discharge Time:      VTE Prophylaxis:  Mechanical VTE prophylaxis orders are present.         AM-PAC 6 Clicks Score (PT): 10 (04/05/25 6865)    CODE STATUS:   Code Status and Medical Interventions: CPR (Attempt to Resuscitate); Full Support   Ordered at: 04/04/25 5713     Code Status (Patient  has no pulse and is not breathing):    CPR (Attempt to Resuscitate)     Medical Interventions (Patient has pulse or is breathing):    Full Support     Level Of Support Discussed With:    Patient       Rosana Ibrahim MD  04/05/25

## 2025-04-05 NOTE — ED NOTES
Philomena Davis    Nursing Report ED to Floor:  Mental status: A&Ox4  Ambulatory status: Non-Ambulatory in ED  Oxygen Therapy:  3 LPM NC - PRN  Cardiac Rhythm: NSR  Admitted from: [facility]/ED  Safety Concerns:  Fall Risk  Precautions: None  Social Issues: None  ED Room #:  07    ED Nurse Phone Extension - [phone] or may call [phone].      HPI:   Chief Complaint   Patient presents with    Abnormal Lab       Past Medical History:  Past Medical History:   Diagnosis Date    Alcohol withdrawal 05/10/2019    Ankle sprain 2015    Annual physical exam 09/12/2023    Anxiety and depression     Arthritis of back 2020    Arthritis of neck 2021    Asthma 2022    Cataract 20/2/1999    Chronic pain disorder 2023    Cirrhosis of liver     Closed displaced fracture of lateral malleolus of left fibula     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 03/24/2017    Closed trimalleolar fracture of right ankle 05/24/2023    Colon polyp 2018    COPD (chronic obstructive pulmonary disease) 2022    Digital mucous cyst of toe of left foot 03/24/2017    Eating disorder 2022    Fracture of ankle 2017    Fracture, clavicle 1969    Fracture, finger 1972    Fracture, foot 1/30/25    Ganglion cyst of left foot     GERD (gastroesophageal reflux disease) 10/22    Glaucoma     Hip arthrosis 2019    Hyperlipidemia 1999    Hypertension     Knee sprain 2019    Knee swelling 2019    Low back strain 1/15/25    Neck strain 1/30/25    Panic disorder 1987    Periarthritis of shoulder 2019    Peripheral neuropathy     Pulmonary arterial hypertension 2021    Urinary tract infection 02/23/23    Varicella 1968    Wears glasses         Past Surgical History:  Past Surgical History:   Procedure Laterality Date    ANKLE OPEN REDUCTION INTERNAL FIXATION Left 03/24/2017    Procedure: LEFT ANKLE OPEN REDUCTION INTERNAL FIXATION WITH ILIAC CREST BONE GRAFT , EXCISION CYST 4TH/5TH INNERSPACE LEFT FOOT;  Surgeon: Frank Larson MD;   Location:  [facility];  Service:     ANKLE OPEN REDUCTION INTERNAL FIXATION Right 05/25/2023    Procedure: ANKLE OPEN REDUCTION INTERNAL FIXATION;  Surgeon: Deandre Reynoso MD;  Location:  [facility];  Service: Orthopedics;  Laterality: Right;    AUGMENTATION MAMMAPLASTY Bilateral 1999    BREAST AUGMENTATION      BREAST EXCISIONAL BIOPSY Right 2014    COLONOSCOPY  2018    DILATATION AND CURETTAGE  1987    DILATION AND CURETTAGE, DIAGNOSTIC / THERAPEUTIC      ENDOSCOPY N/A 11/30/2022    Procedure: ESOPHAGOGASTRODUODENOSCOPY;  Surgeon: Arelis Solano MD;  Location: [facility]COPY;  Service: Gastroenterology;  Laterality: N/A;    ENDOSCOPY N/A 02/01/2024    Procedure: ESOPHAGOGASTRODUODENOSCOPY;  Surgeon: Brunner, Mark I, MD;  Location:  [facility];  Service: Gastroenterology;  Laterality: N/A;    FOOT SURGERY  2012    FRACTURE SURGERY  5 /26/2023    IA RPR NON/MAL FEMUR DSTL H/N W/ILIAC/AUTOG BONE Left 03/24/2017    Procedure: ILIAC CREST BONE GRAFT;  Surgeon: Frank Larson MD;  Location: [facility] OR;  Service: Orthopedics    WISDOM TOOTH EXTRACTION  1992    WRIST SURGERY          Admitting Doctor:   Ricky Laguerre III, DO    Consulting Provider(s):  Consults       No orders found from 3/6/2025 to 4/5/2025.             Admitting Diagnosis:   The primary encounter diagnosis was Acute cystitis without hematuria. Diagnoses of Pneumonia of left lower lobe due to infectious organism, Leukocytosis, unspecified type, Anemia, unspecified type, and Hepatic cirrhosis, unspecified hepatic cirrhosis type, unspecified whether ascites present were also pertinent to this visit.    Most Recent Vitals:   Vitals:    04/04/25 2045 04/04/25 2100 04/04/25 2115 04/04/25 2130   BP:  99/69  94/75   Pulse: 112 114 111 112   Resp:       Temp:       TempSrc:       SpO2: 99% 96% 96% 98%   Weight:       Height:           Active LDAs/IV Access:   Lines, Drains & Airways       Active LDAs       Name Placement date Placement time  Site Days    PICC Single Lumen 04/04/25 Right 04/04/25  1930  --  less than 1    Peripheral IV 04/04/25 Left;Posterior Forearm 04/04/25  --  Forearm  less than 1    External Urinary Catheter --  --  --  --                    Labs (abnormal labs have a star):   Labs Reviewed   COMPREHENSIVE METABOLIC PANEL - Abnormal; Notable for the following components:       Result Value    Creatinine 1.11 (*)     Sodium 135 (*)     Albumin 2.6 (*)     ALT (SGPT) 36 (*)     AST (SGOT) 186 (*)     Total Bilirubin 8.5 (*)     eGFR 56.3 (*)     All other components within normal limits    Narrative:     GFR Categories in Chronic Kidney Disease (CKD)      GFR Category          GFR (mL/min/1.73)    Interpretation  G1                     90 or greater         Normal or high (1)  G2                      60-89                Mild decrease (1)  G3a                   45-59                Mild to moderate decrease  G3b                   30-44                Moderate to severe decrease  G4                    15-29                Severe decrease  G5                    14 or less           Kidney failure          (1)In the absence of evidence of kidney disease, neither GFR category G1 or G2 fulfill the criteria for CKD.    eGFR calculation 2021 CKD-EPI creatinine equation, which does not include race as a factor   URINALYSIS W/ MICROSCOPIC IF INDICATED (NO CULTURE) - Abnormal; Notable for the following components:    Color, UA Dark Yellow (*)     Appearance, UA Turbid (*)     Bilirubin, UA Large (3+) (*)     Blood, UA Large (3+) (*)     Protein,  mg/dL (2+) (*)     Leuk Esterase, UA Large (3+) (*)     Nitrite, UA Positive (*)     All other components within normal limits   PROCALCITONIN - Abnormal; Notable for the following components:    Procalcitonin 0.62 (*)     All other components within normal limits    Narrative:     As a Marker for Sepsis (Non-Neonates):    1. <0.5 ng/mL represents a low risk of severe sepsis and/or septic  "shock.  2. >2 ng/mL represents a high risk of severe sepsis and/or septic shock.    As a Marker for Lower Respiratory Tract Infections that require antibiotic therapy:    PCT on Admission    Antibiotic Therapy       6-12 Hrs later    >0.5                Strongly Recommended  >0.25 - <0.5        Recommended   0.1 - 0.25          Discouraged              Remeasure/reassess PCT  <0.1                Strongly Discouraged     Remeasure/reassess PCT    As 28 day mortality risk marker: \"Change in Procalcitonin Result\" (>80% or <=80%) if Day 0 (or Day 1) and Day 4 values are available. Refer to [email or web address]    Change in PCT <=80%  A decrease of PCT levels below or equal to 80% defines a positive change in PCT test result representing a higher risk for 28-day all-cause mortality of patients diagnosed with severe sepsis for septic shock.    Change in PCT >80%  A decrease of PCT levels of more than 80% defines a negative change in PCT result representing a lower risk for 28-day all-cause mortality of patients diagnosed with severe sepsis or septic shock.      CBC WITH AUTO DIFFERENTIAL - Abnormal; Notable for the following components:    WBC 23.03 (*)     RBC 2.40 (*)     Hemoglobin 8.1 (*)     Hematocrit 25.1 (*)     .6 (*)     MCH 33.8 (*)     RDW 21.2 (*)     RDW-SD 81.5 (*)     Neutrophil % 81.9 (*)     Lymphocyte % 7.0 (*)     Immature Grans % 0.7 (*)     Neutrophils, Absolute 18.87 (*)     Monocytes, Absolute 2.09 (*)     Immature Grans, Absolute 0.16 (*)     All other components within normal limits   URINALYSIS, MICROSCOPIC ONLY - Abnormal; Notable for the following components:    RBC, UA 3-5 (*)     WBC, UA Too Numerous to Count (*)     Bacteria, UA 4+ (*)     Squamous Epithelial Cells, UA 7-12 (*)     All other components within normal limits   RESPIRATORY PANEL PCR W/ COVID-19 (SARS-COV-2), NP SWAB IN UTM/VTP, 2 HR TAT - Normal    Narrative:     In the setting of a positive respiratory " panel with a viral infection PLUS a negative procalcitonin without other underlying concern for bacterial infection, consider observing off antibiotics or discontinuation of antibiotics and continue supportive care. If the respiratory panel is positive for atypical bacterial infection (Bordetella pertussis, Chlamydophila pneumoniae, or Mycoplasma pneumoniae), consider antibiotic de-escalation to target atypical bacterial infection.   MRSA SCREEN, PCR - Normal    Narrative:     The negative predictive value of this diagnostic test is high and should only be used to consider de-escalating anti-MRSA therapy. A positive result may indicate colonization with MRSA and must be correlated clinically.  MRSA Negative   LIPASE - Normal   LACTIC ACID, PLASMA - Normal   COVID PRE-OP / PRE-PROCEDURE SCREENING ORDER (NO ISOLATION)    Narrative:     The following orders were created for panel order COVID PRE-OP / PRE-PROCEDURE SCREENING ORDER (NO ISOLATION) - Swab, Nasopharynx.  Procedure                               Abnormality         Status                     ---------                               -----------         ------                     Respiratory Panel PCR w/...[[id number]]  Normal              Final result                 Please view results for these tests on the individual orders.   BLOOD CULTURE   BLOOD CULTURE   URINE CULTURE   RAINBOW DRAW    Narrative:     The following orders were created for panel order [place] Draw.  Procedure                               Abnormality         Status                     ---------                               -----------         ------                     Green Top (Gel)[[id number]]                                  Final result               Lavender Top[[id number]]                                     Final result               Gold Top - SST[[id number]]                                   Final result               Lo Top[[id number]]                                         Final  result               Light Blue Top[[id number]]                                   Final result                 Please view results for these tests on the individual orders.   CBC AND DIFFERENTIAL    Narrative:     The following orders were created for panel order CBC & Differential.  Procedure                               Abnormality         Status                     ---------                               -----------         ------                     CBC Auto Differential[[id number]]        Abnormal            Final result               Scan Slide[[id number]]                                                                    Please view results for these tests on the individual orders.   GREEN TOP   LAVENDER TOP   GOLD TOP - SST   GRAY TOP   LIGHT BLUE TOP       Meds Given in ED:   Medications   sodium chloride 0.9 % flush 10 mL (has no administration in time range)   sodium chloride 0.9 % bolus 1,000 mL (0 mL Intravenous Stopped 4/4/25 1920)   piperacillin-tazobactam (ZOSYN) 3.375 g IVPB in 100 mL NS MBP (CD) (0 g Intravenous Stopped 4/4/25 1954)   vancomycin IVPB 1500 mg in 0.9% NaCl (Premix) 500 mL (0 mg Intravenous Stopped 4/4/25 2130)   lactated ringers bolus 1,000 mL (0 mL Intravenous Stopped 4/4/25 2115)           Last NIH score:                                                          Dysphagia screening results:        [place] Coma Scale:  No data recorded     CIWA:        Restraint Type:            Isolation Status:  No active isolations

## 2025-04-05 NOTE — PLAN OF CARE
Goal Outcome Evaluation:  Plan of Care Reviewed With: patient        Progress: no change  Outcome Evaluation: OT eval complete. Pt presents w/ significant generalized weakness, orthostatic BP w/ c/o dizziness, and BLE ROM limitations d/t significant swelling. Recommend cont skilled IPOT POC to promote return to PLOF. Recommend pt DC to SNF based on current level of performance.    Anticipated Discharge Disposition (OT): skilled nursing facility

## 2025-04-05 NOTE — THERAPY EVALUATION
Patient Name: Philomena Davis  : 1960    MRN: [id number]                              Today's Date: 2025       Admit Date: 2025    Visit Dx:     ICD-10-CM ICD-9-CM   1. Acute cystitis without hematuria  N30.00 595.0   2. Pneumonia of left lower lobe due to infectious organism  J18.9 486   3. Leukocytosis, unspecified type  D72.829 288.60   4. Anemia, unspecified type  D64.9 285.9   5. Hepatic cirrhosis, unspecified hepatic cirrhosis type, unspecified whether ascites present  K74.60 571.5     Patient Active Problem List   Diagnosis    Essential hypertension    Generalized anxiety disorder    Hyperlipidemia    Glaucoma    Alcoholic cirrhosis of liver without ascites    Severe malnutrition    GERD without esophagitis    Sleep disturbance    Annual physical exam    Idiopathic peripheral neuropathy    Severe anxiety with panic    Alcohol use disorder    Cigarette nicotine dependence in remission    Chronic obstructive pulmonary disease    Closed fracture of left hand    Moderate malnutrition    Lower extremity weakness    Orthostasis    Subclinical hypothyroidism    Atrial flutter    Peripheral neuropathy    Closed displaced fracture of third metatarsal bone of right foot    Injury of right foot    Unsteady gait    Falls    Hypotension    Acute UTI (urinary tract infection)    Pneumonia    Sepsis    Sepsis secondary to UTI     Past Medical History:   Diagnosis Date    Alcohol withdrawal 05/10/2019    Ankle sprain 2015    Annual physical exam 2023    Anxiety and depression     Arthritis of back     Arthritis of neck     Asthma     Cataract 1999    Chronic pain disorder     Cirrhosis of liver     Closed displaced fracture of lateral malleolus of left fibula     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    Closed trimalleolar fracture of right ankle 2023    Colon polyp     COPD (chronic obstructive pulmonary disease)     Digital  mucous cyst of toe of left foot 03/24/2017    Eating disorder 2022    Fracture of ankle 2017    Fracture, clavicle 1969    Fracture, finger 1972    Fracture, foot 1/30/25    Ganglion cyst of left foot     GERD (gastroesophageal reflux disease) 10/22    Glaucoma     Hip arthrosis 2019    Hyperlipidemia 1999    Hypertension     Knee sprain 2019    Knee swelling 2019    Low back strain 1/15/25    Neck strain 1/30/25    Panic disorder 1987    Periarthritis of shoulder 2019    Peripheral neuropathy     Pulmonary arterial hypertension 2021    Urinary tract infection 02/23/23    Varicella 1968    Wears glasses      Past Surgical History:   Procedure Laterality Date    ANKLE OPEN REDUCTION INTERNAL FIXATION Left 03/24/2017    Procedure: LEFT ANKLE OPEN REDUCTION INTERNAL FIXATION WITH ILIAC CREST BONE GRAFT , EXCISION CYST 4TH/5TH INNERSPACE LEFT FOOT;  Surgeon: Frank Larson MD;  Location:  [facility];  Service:     ANKLE OPEN REDUCTION INTERNAL FIXATION Right 05/25/2023    Procedure: ANKLE OPEN REDUCTION INTERNAL FIXATION;  Surgeon: Deandre Reynoso MD;  Location:  [facility];  Service: Orthopedics;  Laterality: Right;    AUGMENTATION MAMMAPLASTY Bilateral 1999    BREAST AUGMENTATION      BREAST EXCISIONAL BIOPSY Right 2014    COLONOSCOPY  2018    DILATATION AND CURETTAGE  1987    DILATION AND CURETTAGE, DIAGNOSTIC / THERAPEUTIC      ENDOSCOPY N/A 11/30/2022    Procedure: ESOPHAGOGASTRODUODENOSCOPY;  Surgeon: Arelis Solano MD;  Location:  [facility];  Service: Gastroenterology;  Laterality: N/A;    ENDOSCOPY N/A 02/01/2024    Procedure: ESOPHAGOGASTRODUODENOSCOPY;  Surgeon: Brunner, Mark I, MD;  Location:  [facility];  Service: Gastroenterology;  Laterality: N/A;    FOOT SURGERY  2012    FRACTURE SURGERY  5 /26/2023    [facility] RPR NON/MAL FEMUR DSTL H/N W/ILIAC/AUTOG BONE Left 03/24/2017    Procedure: ILIAC CREST BONE GRAFT;  Surgeon: Frank Larson MD;  Location:  [facility];  Service: Orthopedics     WISDOM TOOTH EXTRACTION  1992    WRIST SURGERY        General Information       Row Name 04/05/25 0956          OT Time and Intention    Document Type evaluation  -CS     Mode of Treatment occupational therapy  -CS       Row Name 04/05/25 0956          General Information    Patient Profile Reviewed yes  -CS     Prior Level of Function mod assist:;max assist:;transfer;ADL's  -CS     Existing Precautions/Restrictions fall;non-weight bearing;right;oxygen therapy device and L/min;other (see comments)  NWB RLE 2/2 Lisfranc and 3rd met fx  -CS     Barriers to Rehab medically complex;previous functional deficit  -CS       Row Name 04/05/25 0956          Living Environment    Current Living Arrangements apartment  -CS     People in Home alone  -CS       Row Name 04/05/25 0956          Home Main Entrance    Number of Stairs, Main Entrance three  -CS       Row Name 04/05/25 0956          Cognition    Orientation Status (Cognition) oriented x 3  -CS       Row Name 04/05/25 0956          Safety Issues/Impairments Affecting Functional Mobility    Impairments Affecting Function (Mobility) balance;endurance/activity tolerance;strength;sensation/sensory awareness;postural/trunk control;pain  -CS               User Key  (r) = Recorded By, (t) = Taken By, (c) = Cosigned By      Initials Name Provider Type    CS Delfina Canada OT Occupational Therapist                     Mobility/ADL's       Row Name 04/05/25 0957          Bed Mobility    Bed Mobility supine-sit;sit-supine  -CS     Supine-Sit [place] (Bed Mobility) maximum assist (25% patient effort);2 person assist;verbal cues  -CS     Sit-Supine [place] (Bed Mobility) maximum assist (25% patient effort);2 person assist;verbal cues  -CS     Assistive Device (Bed Mobility) bed rails;head of bed elevated;repositioning sheet  -CS       Row Name 04/05/25 0957          Transfers    Comment, (Transfers) Deferred d/t low BP  -CS       Row Name 04/05/25 0957          Activities  of Daily Living    BADL Assessment/Intervention lower body dressing;feeding  -CS       Row Name 04/05/25 0957          Mobility    Extremity Weight-bearing Status right lower extremity  -CS     Right Lower Extremity (Weight-bearing Status) non weight-bearing (NWB)  -CS       Row Name 04/05/25 0957          Lower Body Dressing Assessment/Training    [place] Level (Lower Body Dressing) don;socks;dependent (less than 25% patient effort)  -CS     Position (Lower Body Dressing) supine  -CS       Row Name 04/05/25 0957          Self-Feeding Assessment/Training    [place] Level (Feeding) liquids to mouth;set up  -CS     Position (Feeding) sitting up in bed  -CS               User Key  (r) = Recorded By, (t) = Taken By, (c) = Cosigned By      Initials Name Provider Type    Delfina Evangelista OT Occupational Therapist                   Obj/Interventions       Row Name 04/05/25 0957          Sensory Assessment (Somatosensory)    Sensory Assessment (Somatosensory) UE sensation intact  -       Row Name 04/05/25 0957          Range of Motion Comprehensive    General Range of Motion bilateral upper extremity ROM WFL  -       Row Name 04/05/25 0957          Strength Comprehensive (MMT)    Comment, General Manual Muscle Testing (MMT) Assessment BUE grossly 3+/5  -CS       Row Name 04/05/25 0957          Balance    Balance Assessment sitting static balance;sitting dynamic balance  -CS     Static Sitting Balance minimal assist  -CS     Dynamic Sitting Balance minimal assist  -CS     Position, Sitting Balance sitting edge of bed  -CS     Balance Interventions sitting;static;dynamic;dynamic reaching;occupation based/functional task;weight shifting activity  -CS               User Key  (r) = Recorded By, (t) = Taken By, (c) = Cosigned By      Initials Name Provider Type    Delfina Evangelista OT Occupational Therapist                   Goals/Plan       Row Name 04/05/25 1001          Transfer Goal 1 (OT)     Activity/Assistive Device (Transfer Goal 1, OT) sit-to-stand/stand-to-sit;toilet  -CS     [place] Level/Cues Needed (Transfer Goal 1, OT) minimum assist (75% or more patient effort)  -CS     Time Frame (Transfer Goal 1, OT) long term goal (LTG);10 days  -CS     Progress/Outcome (Transfer Goal 1, OT) goal ongoing  -CS       Row Name 04/05/25 1001          Dressing Goal 1 (OT)    Activity/Device (Dressing Goal 1, OT) lower body dressing  -CS     [place]/Cues Needed (Dressing Goal 1, OT) moderate assist (50-74% patient effort)  -CS     Time Frame (Dressing Goal 1, OT) long term goal (LTG);10 days  -CS     Strategies/Barriers (Dressing Goal 1, OT) don/doff socks w/ AAD  -CS     Progress/Outcome (Dressing Goal 1, OT) goal ongoing  -CS       Row Name 04/05/25 1001          Toileting Goal 1 (OT)    Activity/Device (Toileting Goal 1, OT) adjust/manage clothing;perform perineal hygiene  -CS     [place] Level/Cues Needed (Toileting Goal 1, OT) moderate assist (50-74% patient effort)  -CS     Time Frame (Toileting Goal 1, OT) short term goal (STG);5 days  -CS     Progress/Outcome (Toileting Goal 1, OT) goal ongoing  -CS       Row Name 04/05/25 1001          Therapy Assessment/Plan (OT)    Planned Therapy Interventions (OT) activity tolerance training;adaptive equipment training;BADL retraining;functional balance retraining;occupation/activity based interventions;ROM/therapeutic exercise;strengthening exercise;transfer/mobility retraining;patient/caregiver education/training  -CS               User Key  (r) = Recorded By, (t) = Taken By, (c) = Cosigned By      Initials Name Provider Type    CS Delfina Canada OT Occupational Therapist                   Clinical Impression       Row Name 04/05/25 0902          Pain Assessment    Pretreatment Pain Rating 0/10 - no pain  -CS     Posttreatment Pain Rating 0/10 - no pain  -CS       Row Name 04/05/25 0909          Plan of Care Review    Plan of Care Reviewed With  patient  -CS     Progress no change  -CS     Outcome Evaluation OT eval complete. Pt presents w/ significant generalized weakness, orthostatic BP w/ c/o dizziness, and BLE ROM limitations d/t significant swelling. Recommend cont skilled IPOT POC to promote return to PLOF. Recommend pt DC to SNF based on current level of performance.  -CS       Row Name 04/05/25 0959          Therapy Assessment/Plan (OT)    Patient/Family Therapy Goal Statement (OT) Return to PLOF  -CS     Rehab Potential (OT) good  -CS     Criteria for Skilled Therapeutic Interventions Met (OT) yes;skilled treatment is necessary  -CS     Therapy Frequency (OT) daily  -CS     Predicted Duration of Therapy Intervention (OT) 10 days  -CS       Row Name 04/05/25 0959          Therapy Plan Review/Discharge Plan (OT)    Anticipated Discharge Disposition (OT) skilled nursing facility  -CS       Row Name 04/05/25 0959          Vital Signs    Pre Systolic BP Rehab 80  -CS     Pre Treatment Diastolic BP 43  -CS     Intra Systolic BP Rehab 94  -CS     Intra Treatment Diastolic BP 46  -CS     Post Systolic BP Rehab 75   -CS     Post Treatment Diastolic BP 40   RN notified  -CS     Pretreatment Heart Rate (beats/min) 116  -CS     Posttreatment Heart Rate (beats/min) 116  -CS     Pre SpO2 (%) 95  -CS     O2 Delivery Pre Treatment nasal cannula  -CS     Post SpO2 (%) 95  -CS     O2 Delivery Post Treatment nasal cannula  -CS     Pre Patient Position Supine  -CS     Intra Patient Position Sitting  -CS     Post Patient Position Supine  -CS       Row Name 04/05/25 0959          Positioning and Restraints    Pre-Treatment Position in bed  -CS     Post Treatment Position bed  -CS     In Bed notified nsg;fowlers;call light within reach;encouraged to call for assist;exit alarm on;RUE elevated;LUE elevated;legs elevated;heels elevated;SCD pump applied  -CS               User Key  (r) = Recorded By, (t) = Taken By, (c) = Cosigned By      Initials Name Provider Type    CS  Delfina Canada, [facility] Occupational Therapist                   Outcome Measures       Row Name 04/05/25 1001          How much help from another is currently needed...    Putting on and taking off regular lower body clothing? 1  -CS     Bathing (including washing, rinsing, and drying) 2  -CS     Toileting (which includes using toilet bed pan or urinal) 1  -CS     Putting on and taking off regular upper body clothing 2  -CS     Taking care of personal grooming (such as brushing teeth) 3  -CS     Eating meals 4  -CS     AM-PAC 6 Clicks Score (OT) 13  -CS       Row Name 04/05/25 0959 04/04/25 2250       How much help from another person do you currently need...    Turning from your back to your side while in flat bed without using bedrails? 2  -KR 3  -CC    Moving from lying on back to sitting on the side of a flat bed without bedrails? 2  -KR 3  -CC    Moving to and from a bed to a chair (including a wheelchair)? 2  -KR 3  -CC    Standing up from a chair using your arms (e.g., wheelchair, bedside chair)? 2  -KR 3  -CC    Climbing 3-5 steps with a railing? 1  -KR 2  -CC    To walk in hospital room? 1  -KR 2  -CC    AM-PAC 6 Clicks Score (PT) 10  -KR 16  -CC    Highest Level of Mobility Goal 4 --> Transfer to chair/commode  -KR 5 --> Static standing  -CC      Row Name 04/05/25 1001 04/05/25 0959       Functional Assessment    Outcome Measure Options AM-PAC 6 Clicks Daily Activity (OT)  -CS AM-PAC 6 Clicks Basic Mobility (PT)  -KR              User Key  (r) = Recorded By, (t) = Taken By, (c) = Cosigned By      Initials Name Provider Type    Delfina Evangelista, OT Occupational Therapist    Dina Mejia, PT Physical Therapist    CC Anisha Coles, RN Registered Nurse                    Occupational Therapy Education       Title: PT OT SLP Therapies (In Progress)       Topic: Occupational Therapy (In Progress)       Point: ADL training (In Progress)       Learning Progress Summary            Patient Acceptance, E, NR  by  at 4/5/2025 1002                      Point: Precautions (In Progress)       Learning Progress Summary            Patient Acceptance, E, NR by  at 4/5/2025 1002                      Point: Body mechanics (In Progress)       Learning Progress Summary            Patient Acceptance, E, NR by  at 4/5/2025 1002                                      User Key       Initials Effective Dates Name Provider Type Discipline     09/02/21 -  Delfina Canada, OT Occupational Therapist OT                  OT Recommendation and Plan  Planned Therapy Interventions (OT): activity tolerance training, adaptive equipment training, BADL retraining, functional balance retraining, occupation/activity based interventions, ROM/therapeutic exercise, strengthening exercise, transfer/mobility retraining, patient/caregiver education/training  Therapy Frequency (OT): daily  Plan of Care Review  Plan of Care Reviewed With: patient  Progress: no change  Outcome Evaluation: OT eval complete. Pt presents w/ significant generalized weakness, orthostatic BP w/ c/o dizziness, and BLE ROM limitations d/t significant swelling. Recommend cont skilled IPOT POC to promote return to PLOF. Recommend pt DC to SNF based on current level of performance.     Time Calculation:   Evaluation Complexity (OT)  Review Occupational Profile/Medical/Therapy History Complexity: expanded/moderate complexity  Assessment, Occupational Performance/Identification of Deficit Complexity: 5 or more performance deficits  Clinical Decision Making Complexity (OT): detailed assessment/moderate complexity  Overall Complexity of Evaluation (OT): moderate complexity     Time Calculation- OT       Row Name 04/05/25 1002             Time Calculation- OT    OT Start Time 0905  -CS      OT Received On 04/05/25  -CS      OT Goal Re-Cert Due Date 04/15/25  -CS         Untimed Charges    OT Eval/Re-eval Minutes 46  -CS         Total Minutes    Untimed Charges Total Minutes 46  -CS        Total Minutes 46  -CS                User Key  (r) = Recorded By, (t) = Taken By, (c) = Cosigned By      Initials Name Provider Type    CS Delfina Canada OT Occupational Therapist                  Therapy Charges for Today       Code Description Service Date Service Provider Modifiers Qty    [id number] HC OT EVAL MOD COMPLEXITY 4 4/5/2025 Delfina Canada OT GO 1                 Delfina Canada OT  4/5/2025

## 2025-04-05 NOTE — H&P
[facility] Medicine Services  HISTORY AND PHYSICAL    Patient Name: Philomena Davis  : 1960  MRN: [id number]  Primary Care Physician: Unique Brandt DO  Date of admission: 2025    Subjective   Subjective     Chief Complaint:  Fevers    HPI:  Philomena Davis is a 64 y.o. female with a history of HTN, HLD, chronic pain disorder, cirrhosis of the liver, COPD, GERD, for fracture, was here 3/12/2025 - 3/19/2025 with EtOH hepatitis with cirrhosis, electrolyte abnormalities, Lisfranc and third metatarsal fracture, and was discharged from here to [facility] for rehab.  Patient was transferred here today for an elevated WBC of 20 and fevers of 101.5 today.  Patient reports that yesterday while doing PT she was having some shortness of air.  Today she has been experiencing fevers, chills, myalgias, loss of appetite, sore throat, congestion, productive cough, shortness of air, wheezing, difficulty urinating, and generalized weakness.  She has been experiencing diarrhea for the past 4 to 5 days but does take lactulose daily.  She denies chest pain, abdominal pain, nausea, vomiting, dysuria, headaches, dizziness, or any other issues at this time.    UA consistent with UTI.  Respiratory panel PCR negative.  Chest x-ray shows mild perihilar interstitial opacities, left basilar opacity with likely small left pleural effusion.  Patient was given a liter of saline and started on vancomycin and Zosyn in the ED.  Patient is being admitted to the hospitalist for further evaluation management    Review of Systems   Constitutional:  Positive for appetite change, chills, fatigue and fever.   HENT:  Positive for congestion and sore throat.    Eyes: Negative.    Respiratory:  Positive for cough, shortness of breath and wheezing.    Cardiovascular:  Positive for leg swelling. Negative for chest pain and palpitations.   Gastrointestinal:  Positive for diarrhea. Negative for abdominal pain, blood in  stool, constipation, nausea and vomiting.   Endocrine: Negative.    Genitourinary:  Positive for difficulty urinating. Negative for dysuria, frequency, hematuria and urgency.   Musculoskeletal:  Positive for myalgias.   Skin: Negative.    Allergic/Immunologic: Negative.    Neurological:  Positive for weakness. Negative for dizziness, syncope, light-headedness and headaches.   Hematological: Negative.    Psychiatric/Behavioral: Negative.                  Personal History     Past Medical History:   Diagnosis Date    Alcohol withdrawal 05/10/2019    Ankle sprain 2015    Annual physical exam 09/12/2023    Anxiety and depression     Arthritis of back 2020    Arthritis of neck 2021    Asthma 2022    Cataract 20/2/1999    Chronic pain disorder 2023    Cirrhosis of liver     Closed displaced fracture of lateral malleolus of left fibula     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 03/24/2017    Closed trimalleolar fracture of right ankle 05/24/2023    Colon polyp 2018    COPD (chronic obstructive pulmonary disease) 2022    Digital mucous cyst of toe of left foot 03/24/2017    Eating disorder 2022    Fracture of ankle 2017    Fracture, clavicle 1969    Fracture, finger 1972    Fracture, foot 1/30/25    Ganglion cyst of left foot     GERD (gastroesophageal reflux disease) 10/22    Glaucoma     Hip arthrosis 2019    Hyperlipidemia 1999    Hypertension     Knee sprain 2019    Knee swelling 2019    Low back strain 1/15/25    Neck strain 1/30/25    Panic disorder 1987    Periarthritis of shoulder 2019    Peripheral neuropathy     Pulmonary arterial hypertension 2021    Urinary tract infection 02/23/23    Varicella 1968    Wears glasses              Past Surgical History:   Procedure Laterality Date    ANKLE OPEN REDUCTION INTERNAL FIXATION Left 03/24/2017    Procedure: LEFT ANKLE OPEN REDUCTION INTERNAL FIXATION WITH ILIAC CREST BONE GRAFT , EXCISION CYST 4TH/5TH INNERSPACE LEFT FOOT;  Surgeon: Frank  Zander Larson MD;  Location:  [facility];  Service:     ANKLE OPEN REDUCTION INTERNAL FIXATION Right 05/25/2023    Procedure: ANKLE OPEN REDUCTION INTERNAL FIXATION;  Surgeon: Deandre Reynoso MD;  Location: [facility];  Service: Orthopedics;  Laterality: Right;    AUGMENTATION MAMMAPLASTY Bilateral 1999    BREAST AUGMENTATION      BREAST EXCISIONAL BIOPSY Right 2014    COLONOSCOPY  2018    DILATATION AND CURETTAGE  1987    DILATION AND CURETTAGE, DIAGNOSTIC / THERAPEUTIC      ENDOSCOPY N/A 11/30/2022    Procedure: ESOPHAGOGASTRODUODENOSCOPY;  Surgeon: Arelis Solano MD;  Location:  [facility];  Service: Gastroenterology;  Laterality: N/A;    ENDOSCOPY N/A 02/01/2024    Procedure: ESOPHAGOGASTRODUODENOSCOPY;  Surgeon: Brunner, Mark I, MD;  Location: [facility];  Service: Gastroenterology;  Laterality: N/A;    FOOT SURGERY  2012    FRACTURE SURGERY  5 /26/2023    OH RPR NON/MAL FEMUR DSTL H/N W/ILIAC/AUTOG BONE Left 03/24/2017    Procedure: ILIAC CREST BONE GRAFT;  Surgeon: Frank Larson MD;  Location:  [facility];  Service: Orthopedics    WISDOM TOOTH EXTRACTION  1992    WRIST SURGERY         Family History:  family history includes ADD / ADHD in her brother; Alcohol abuse in her brother; Asthma in her maternal aunt, maternal grandfather, and paternal grandmother; Bipolar disorder in her brother; Breast cancer (age of onset: 68) in her paternal grandmother; COPD in her father, maternal aunt, and maternal uncle; Cancer in her mother and another family member; Dementia in her cousin and paternal aunt; Depression in her mother; Drug abuse in her brother; Heart disease in her maternal uncle, paternal grandfather, and another family member; Hypertension in an other family member; Mental illness in her brother; No Known Problems in her daughter, sister, and son; Rheum arthritis in an other family member; Stroke in her maternal grandmother and another family member; Vision loss in her maternal  grandmother and mother.     Social History:  reports that she quit smoking about 16 years ago. Her smoking use included cigarettes. She started smoking about 40 years ago. She has a 36.1 pack-year smoking history. She has been exposed to tobacco smoke. She has never used smokeless tobacco. She reports current alcohol use of about 5.0 standard drinks of alcohol per week. She reports that she does not use drugs.  Social History     Social History Narrative    Not on file       Medications:  HYDROcodone-acetaminophen, Lactobacillus, QUEtiapine, albuterol sulfate HFA, desvenlafaxine, folic acid, gabapentin, latanoprost, metoprolol succinate XL, midodrine, naloxone, ondansetron ODT, pantoprazole, rosuvastatin, thiamine, and ursodiol    Allergies   Allergen Reactions    Buspirone Anxiety    Codeine Nausea Only    Mirtazapine Anxiety       Objective   Objective     Vital Signs:   Temp:  [99 °F (37.2 °C)] 99 °F (37.2 °C)  Heart Rate:  [] 112  Resp:  [18-20] 18  BP: ()/(44-82) 94/75  Flow (L/min) (Oxygen Therapy):  [3] 3    Physical Exam  Constitutional:       Appearance: She is ill-appearing. She is not diaphoretic.   HENT:      Head: Normocephalic.      Nose: Nose normal.      Mouth/Throat:      Mouth: Mucous membranes are moist.   Eyes:      General: Scleral icterus present.   Cardiovascular:      Rate and Rhythm: Regular rhythm. Tachycardia present.      Pulses: Normal pulses.      Heart sounds: Normal heart sounds. No murmur heard.     No friction rub. No gallop.   Pulmonary:      Effort: Pulmonary effort is normal. No respiratory distress.      Breath sounds: Wheezing and rhonchi present.      Comments: Wheezing bilateral upper lobes, scattered rhonchi, diminished left lower  Abdominal:      General: There is distension.      Palpations: Abdomen is soft.      Tenderness: There is no abdominal tenderness. There is no guarding.      Hernia: No hernia is present.   Musculoskeletal:         General: Swelling  (2-3+ pitting edema BLE) present.      Cervical back: Normal range of motion.   Skin:     General: Skin is warm and dry.      Capillary Refill: Capillary refill takes 2 to 3 seconds.      Coloration: Skin is jaundiced.      Findings: Bruising (BUE) present.   Neurological:      General: No focal deficit present.      Mental Status: She is alert and oriented to person, place, and time. Mental status is at baseline.      Cranial Nerves: No cranial nerve deficit.   Psychiatric:         Mood and Affect: Mood normal.         Behavior: Behavior normal.            Result Review:  I have personally reviewed the results from the time of this admission to 4/4/2025 21:47 EDT and agree with these findings:  [x]  Laboratory list / accordion  [x]  Microbiology  [x]  Radiology  [x]  EKG/Telemetry   []  Cardiology/Vascular   []  Pathology  [x]  Old records  []  Other:  Most notable findings include:     LAB RESULTS:      Lab 04/04/25  1724   WBC 23.03*   HEMOGLOBIN 8.1*   HEMATOCRIT 25.1*   PLATELETS 221   NEUTROS ABS 18.87*   IMMATURE GRANS (ABS) 0.16*   LYMPHS ABS 1.61   MONOS ABS 2.09*   EOS ABS 0.19   .6*   PROCALCITONIN 0.62*   LACTATE 1.3         Lab 04/04/25  1724   SODIUM 135*   POTASSIUM 4.2   CHLORIDE 103   CO2 24.0   ANION GAP 8.0   BUN 14   CREATININE 1.11*   EGFR 56.3*   GLUCOSE 95   CALCIUM 8.8         Lab 04/04/25  1724   TOTAL PROTEIN 6.3   ALBUMIN 2.6*   GLOBULIN 3.7   ALT (SGPT) 36*   AST (SGOT) 186*   BILIRUBIN 8.5*   ALK PHOS 101   LIPASE 43                     Brief Urine Lab Results  (Last result in the past 365 days)        Color   Clarity   Blood   Leuk Est   Nitrite   Protein   CREAT   Urine HCG        04/04/25 1953 Dark Yellow   Turbid   Large (3+)   Large (3+)   Positive   100 mg/dL (2+)                 Microbiology Results (last 10 days)       Procedure Component Value - Date/Time    MRSA Screen, PCR (Inpatient) - Swab, Nares [[id number]]  (Normal) Collected: 04/04/25 1929    Lab Status: Final  result Specimen: Swab from Nares Updated: 04/04/25 2119     MRSA PCR Negative    Narrative:      The negative predictive value of this diagnostic test is high and should only be used to consider de-escalating anti-MRSA therapy. A positive result may indicate colonization with MRSA and must be correlated clinically.  MRSA Negative    COVID PRE-OP / PRE-PROCEDURE SCREENING ORDER (NO ISOLATION) - Swab, Nasopharynx [[id number]]  (Normal) Collected: 04/04/25 1724    Lab Status: Final result Specimen: Swab from Nasopharynx Updated: 04/04/25 1832    Narrative:      The following orders were created for panel order COVID PRE-OP / PRE-PROCEDURE SCREENING ORDER (NO ISOLATION) - Swab, Nasopharynx.  Procedure                               Abnormality         Status                     ---------                               -----------         ------                     Respiratory Panel PCR w/...[[id number]]  Normal              Final result                 Please view results for these tests on the individual orders.    Respiratory Panel PCR w/COVID-19(SARS-CoV-2) [facility] In-House, NP Swab in UTM/VTM, 2 HR TAT - Swab, Nasopharynx [[id number]]  (Normal) Collected: 04/04/25 1724    Lab Status: Final result Specimen: Swab from Nasopharynx Updated: 04/04/25 1832     ADENOVIRUS, PCR Not Detected     Coronavirus 229E Not Detected     Coronavirus HKU1 Not Detected     Coronavirus NL63 Not Detected     Coronavirus OC43 Not Detected     COVID19 Not Detected     Human Metapneumovirus Not Detected     Human Rhinovirus/Enterovirus Not Detected     Influenza A PCR Not Detected     Influenza B PCR Not Detected     Parainfluenza Virus 1 Not Detected     Parainfluenza Virus 2 Not Detected     Parainfluenza Virus 3 Not Detected     Parainfluenza Virus 4 Not Detected     RSV, PCR Not Detected     Bordetella pertussis pcr Not Detected     Bordetella parapertussis PCR Not Detected     Chlamydophila pneumoniae PCR Not Detected      Mycoplasma pneumo by PCR Not Detected    Narrative:      In the setting of a positive respiratory panel with a viral infection PLUS a negative procalcitonin without other underlying concern for bacterial infection, consider observing off antibiotics or discontinuation of antibiotics and continue supportive care. If the respiratory panel is positive for atypical bacterial infection (Bordetella pertussis, Chlamydophila pneumoniae, or Mycoplasma pneumoniae), consider antibiotic de-escalation to target atypical bacterial infection.            XR Chest 1 View  Result Date: 4/4/2025  XR CHEST 1 VW Date of Exam: 4/4/2025 5:20 PM EDT Indication: cough Comparison: 3/12/2025 Findings: No PICC is seen. Borderline cardiomegaly. Mild perihilar interstitial opacities may reflect mild edema. Left basilar opacity with likely small left pleural effusion. No pneumothorax. No evidence of acute osseous abnormalities. Visualized upper abdomen is  unremarkable.     Impression: Impression: 1.Mild perihilar and interstitial opacities may reflect mild edema. 2.Left basilar opacity with likely small left pleural effusion may reflect infection or atelectasis. 3.No PICC is seen. Electronically Signed: Alon Beauchamp MD  4/4/2025 5:46 PM EDT  Workstation ID: [id number]      Results for orders placed during the hospital encounter of 11/10/24    Adult Transthoracic Echo Complete W/ Cont if Necessary Per Protocol    Interpretation Summary    Left ventricular systolic function is normal. Estimated left ventricular EF = 65%    The cardiac valves are anatomically and functionally normal.      Assessment & Plan   Assessment & Plan       Sepsis secondary to UTI    Essential hypertension    Generalized anxiety disorder    Hyperlipidemia    Glaucoma    Alcoholic cirrhosis of liver without ascites    Alcohol use disorder    Peripheral neuropathy    Hypotension    Acute UTI (urinary tract infection)    Pneumonia    Sepsis    Philomena Davis is a 64  y.o. female with a history of HTN, HLD, chronic pain disorder, cirrhosis of the liver, COPD, GERD, for fracture, was here 3/12/2025 - 3/19/2025 with EtOH hepatitis with cirrhosis, electrolyte abnormalities, Lisfranc and third metatarsal fracture, and was discharged from here to [facility] for rehab.  Patient is here with sepsis, pneumonia, and a UTI.    Assessment and Plan:    Acute respiratory failure with hypoxia  Sepsis  Pneumonia (POA)  --Chest x-ray shows mild perihilar and interstitial opacities may reflect mild edema.  Left basilar opacity with likely small left pleural effusion may reflect infection or atelectasis.  -- Respiratory panel PCR negative  -- WBC 23.03, procalcitonin 0.62  -- O2 sat 87% on room air  -- was given a liter of saline in the ED  -- BC x 2 pending  -- MRSA PCR pending  -- Urine Legionella and S.  Pneumo Ag  -- Sputum culture  -- Continuous pulse ox with supplemental oxygen as needed  -- Vancomycin and Zosyn  -- AM labs    Acute UTI (present on admission)  -- UA: Appearance turbid, blood large, nitrite positive, leukocytes large, protein 100, bilirubin large, RBC 3-5, WBC TNTC, bacteria 4+, squamous 7-12  -- Urine culture pending  -- zosyn  -- am labs    Elevated serum creatinine  -- Creatinine 1.11  -- Baseline creatinine ~ 0.7-0.9  -- was given a liter of saline in the ED  -- bmp in the am    Elevated LFTs  EtOH hepatitis with cirrhosis  Hypotension  -- Alk phos 101, , ALT 36, bili 8.5  -- Continue midodrine 3 times daily  -- High-protein diet    Acute on chronic anemia  -- Hemoglobin 8.1, hematocrit 25.1  -- Likely related to EtOH marrow suppression  -- PPI   -- no overt signs of bleeding  -- anemia profile  -- check stool for occult blood  -- cbc in the am    Lisfranc and third metatarsal fracture  Unsteady gait  Frequent falls  EtOH neuropathy  -- NWB RLE  -- PT/OT consult  -- Consult case management    EtOH abuse  -- Continue thiamine and folic acid  -- No alcohol since previous  admission    HTN  HLD  -- Continue metoprolol, midodrine dose 3 times daily for orthostasis  -- Continue statin    Depression  -- Continue Pristiq    Peripheral neuropathy  -- Continue gabapentin    Glaucoma  -- Home eyedrops    DVT prophylaxis:  Mechanical    CODE STATUS:    Code Status (Patient has no pulse and is not breathing): CPR (Attempt to Resuscitate)  Medical Interventions (Patient has pulse or is breathing): Full Support  Level Of Support Discussed With: Patient      Expected Discharge  TBD  Expected discharge date/ time has not been documented.      This note has been completed as part of a split-shared workflow.     Signature: Electronically signed by CANDY Rivera, 04/04/25, 9:47 PM EDT.       Attending   Admission Attestation       I have performed an independent face-to-face diagnostic evaluation including performing an independent physical examination.  I approve of the documented plan of care above that was reviewed and developed with the advanced practice clinician (APC) and take responsibility for that plan along with its associated risks.  I have updated the HPI as appropriate.    Brief HPI    64F states that she has noticed increased shortness of breath ever since awakening today (Friday 4/4).  She confirms occasional cough, production of white or clear sputum, no leda blood.  She took a temperature at home when she had a fever, got a reading of 101.5F.  She was recently admitted here, specifically 3/12 to 3/19 receiving treatment for hepatitis secondary to alcohol use, cirrhosis.  She was discharged to [facility] after that admission.  She states that she was doing PT yesterday and had some shortness of breath, but it resolved, before returning this morning.  She also confirms myalgias and sensation of wheezing.  No dysuria/frequency/hesitancy.    Attending Physical Exam:  Temp:  [97.4 °F (36.3 °C)-99 °F (37.2 °C)] 97.8 °F (36.6 °C)  Heart Rate:  [] 86  Resp:  [18-20]  18  BP: ()/(44-82) 100/55  Flow (L/min) (Oxygen Therapy):  [3] 3    Constitutional: Awake, alert, NAD.  Appears chronically ill.  Eyes: PERRLA, bilateral scleral icterus, no conjunctival injection  HENT: NCAT, mucous membranes moist  Neck: Supple, no thyromegaly, no lymphadenopathy, trachea midline  Respiratory: Diffuse wheeze and overall coarse breath sounds to auscultation bilaterally, nonlabored respirations   Cardiovascular: RRR on my exam, no murmurs, rubs, or gallops, palpable pedal pulses bilaterally  Gastrointestinal: Positive bowel sounds, soft, nontender, mild distention but patient states this is baseline.  Musculoskeletal: +2 bilateral distal lower extremity edema, no clubbing or cyanosis to extremities  Psychiatric: Appropriate affect, cooperative  Neurologic: Oriented x 3, strength symmetric in all extremities, Cranial Nerves grossly intact to confrontation, speech clear  Skin: No rashes, normal turgor.  Mild jaundice of the face and anterior neck.    Result Review:  I have personally reviewed the results from the time of this admission to 4/5/2025 03:17 EDT and agree with these findings:  [x]  Laboratory list / accordion  []  Microbiology  [x]  Radiology  [x]  EKG/Telemetry   []  Cardiology/Vascular   []  Pathology  []  Old records  []  Other:  Most notable findings include: Reviewed chest x-ray which is a single AP view and by my read shows very mild increase in interstitial markings, also opacity in the left lower lobe likely pneumonia.  I reviewed EKG which by my read shows sinus tachycardia with ventricular rate approximately 110 bpm, borderline right but still normal axis by my read, nonspecific ST/T wave changes.    Assessment and Plan:    See assessment and plan documented by APC above and updated/edited by me as appropriate.      Total time spent: 35 minutes  Time spent includes time reviewing chart, face-to-face time, counseling patient/family/caregiver, ordering  medications/tests/procedures, communicating with other health care professionals, documenting clinical information in the electronic health record, and coordination of care.       Ricky Laguerre III, DO  04/05/25

## 2025-04-05 NOTE — PROGRESS NOTES
"Pharmacy Consult-Vancomycin Dosing  Philomena Davis is a  64 y.o. female receiving vancomycin therapy.     Indication: sepsis, UTI, pneumonia  Consulting Provider: hospitalist  ID Consult: No    Goal AUC: 400 - 600 mg/L*hr    Current Antimicrobial Therapy  Anti-Infectives (From admission, onward)      Ordered     Dose/Rate Route Frequency Start Stop    04/04/25 2259  vancomycin IVPB 1500 mg in 0.9% NaCl (Premix) 500 mL        Ordering Provider: Freddie Foley, PharmD   Placed in \"And\" Linked Group    16.6 mg/kg × 90.6 kg  333.3 mL/hr over 90 Minutes Intravenous Every 24 Hours Scheduled 04/05/25 1800 04/11/25 1759    04/04/25 2219  piperacillin-tazobactam (ZOSYN) 3.375 g IVPB in 100 mL NS MBP (CD)        Ordering Provider: Ana Rosa Bañuelos APRN    3.375 g  over 4 Hours Intravenous Every 8 Hours 04/05/25 0200 04/10/25 0159    04/04/25 2219  Pharmacy to dose vancomycin        Ordering Provider: Ana Rosa Bañuelos APRN     Not Applicable Continuous PRN 04/04/25 2219 04/11/25 2218    04/04/25 1859  piperacillin-tazobactam (ZOSYN) 3.375 g IVPB in 100 mL NS MBP (CD)        Ordering Provider: Nathalie Beckford PA    3.375 g  over 30 Minutes Intravenous Once 04/04/25 1915 04/04/25 1954 04/04/25 1859  vancomycin IVPB 1500 mg in 0.9% NaCl (Premix) 500 mL        Ordering Provider: Nathalie Beckford PA    20 mg/kg × 71.7 kg  333.3 mL/hr over 90 Minutes Intravenous Once 04/04/25 1915 04/04/25 2130            Allergies  Allergies as of 04/04/2025 - Reviewed 04/04/2025   Allergen Reaction Noted    Buspirone Anxiety 12/06/2022    Codeine Nausea Only 05/10/2019    Mirtazapine Anxiety 12/07/2022       Labs  Results from last 7 days   Lab Units 04/04/25  1724   BUN mg/dL 14   CREATININE mg/dL 1.11*     Results from last 7 days   Lab Units 04/04/25  1724   WBC 10*3/mm3 23.03*       Evaluation of Dosing  Last Dose Received in the ED/Outside Facility:               Vancomycin 1500 mg IV 4/4 at 19:54  Is Patient on " "Dialysis or Renal Replacement:               No    Ht - 165.1 cm (65\")  Wt - 90.6 kg (199 lb 11.2 oz)    Estimated Creatinine Clearance: 58.4 mL/min (A) (by C-G formula based on SCr of 1.11 mg/dL (H)).  Intake & Output (last 3 days)         04/02 0701 04/03 0700 04/03 0701  04/04 0700 04/04 0701 04/05 0700    IV Piggyback   2600    Total Intake(mL/kg)   2600 (28.7)    Net   +2600                   Microbiology and Radiology  Microbiology Results (last 10 days)       Procedure Component Value - Date/Time    MRSA Screen, PCR (Inpatient) - Swab, Nares [[id number]]  (Normal) Collected: 04/04/25 1929    Lab Status: Final result Specimen: Swab from Nares Updated: 04/04/25 2119     MRSA PCR Negative    Narrative:      The negative predictive value of this diagnostic test is high and should only be used to consider de-escalating anti-MRSA therapy. A positive result may indicate colonization with MRSA and must be correlated clinically.  MRSA Negative    COVID PRE-OP / PRE-PROCEDURE SCREENING ORDER (NO ISOLATION) - Swab, Nasopharynx [[id number]]  (Normal) Collected: 04/04/25 1724    Lab Status: Final result Specimen: Swab from Nasopharynx Updated: 04/04/25 1832    Narrative:      The following orders were created for panel order COVID PRE-OP / PRE-PROCEDURE SCREENING ORDER (NO ISOLATION) - Swab, Nasopharynx.  Procedure                               Abnormality         Status                     ---------                               -----------         ------                     Respiratory Panel PCR w/...[[id number]]  Normal              Final result                 Please view results for these tests on the individual orders.    Respiratory Panel PCR w/COVID-19(SARS-CoV-2) LAURA/VIMAL/ROSENDO/PAD/COR/EPIFANIO In-House, NP Swab in UTM/VTM, 2 HR TAT - Swab, Nasopharynx [[id number]]  (Normal) Collected: 04/04/25 1724    Lab Status: Final result Specimen: Swab from Nasopharynx Updated: 04/04/25 1832     ADENOVIRUS, PCR Not Detected     " Coronavirus 229E Not Detected     Coronavirus HKU1 Not Detected     Coronavirus NL63 Not Detected     Coronavirus OC43 Not Detected     COVID19 Not Detected     Human Metapneumovirus Not Detected     Human Rhinovirus/Enterovirus Not Detected     Influenza A PCR Not Detected     Influenza B PCR Not Detected     Parainfluenza Virus 1 Not Detected     Parainfluenza Virus 2 Not Detected     Parainfluenza Virus 3 Not Detected     Parainfluenza Virus 4 Not Detected     RSV, PCR Not Detected     Bordetella pertussis pcr Not Detected     Bordetella parapertussis PCR Not Detected     Chlamydophila pneumoniae PCR Not Detected     Mycoplasma pneumo by PCR Not Detected    Narrative:      In the setting of a positive respiratory panel with a viral infection PLUS a negative procalcitonin without other underlying concern for bacterial infection, consider observing off antibiotics or discontinuation of antibiotics and continue supportive care. If the respiratory panel is positive for atypical bacterial infection (Bordetella pertussis, Chlamydophila pneumoniae, or Mycoplasma pneumoniae), consider antibiotic de-escalation to target atypical bacterial infection.            Reported Vancomycin Levels                   [employer] AUC Calculation:  Current AUC:   -- mg/L*hr    Predicted Steady State AUC on Current Dose: -- mg/L*hr  _________________________________  Predicted Steady State AUC on New Dose:   472 mg/L*hr    Assessment/Plan:  1. Vancomycin 1500 mg IV q 24 hours    2. Vancomycin trough is scheduled 4/6 at 16:00 prior to the 3rd dose. Predicted Steady State AUC at current dose: 472 mg/L*hr.      Freddie Foley, PharmD, BCPS  4/4/2025  23:00 EDT

## 2025-04-06 PROBLEM — N17.9 ACUTE KIDNEY INJURY: Status: ACTIVE | Noted: 2025-04-06

## 2025-04-06 NOTE — PLAN OF CARE
Goal Outcome Evaluation:           Progress: declining  Outcome Evaluation: Patient remains alert and oriented but more lethargic as the shift has progressed. Patient with an episode of A.fib but converted back out without any intervention. Aaron remains on and currently infusing @ 2.5 to maintain MAP >65. UOP with only 170ml out,no BM. Patient remains on 3L NC. No complaints of pain.

## 2025-04-06 NOTE — NURSING NOTE
Prior to central line removal, order for the removal of catheter was verified, patient was assessed, necessary materials were gathered and patient was educated regarding procedure .    Patient was positioned supine to ensure that the insertion site was at or below the level of the heart.    Hands were washed, clean gloves were applied and central line dressing was gently removed. Catheter exit site was not cultured.     A new pair of clean gloves were then applied. Insertion site was cleansed with 2% Chlorhexidine swab using a circular motion beginning at the insertion site and moving outward for 30 seconds and allowed to dry.     Clamp on line was not present.     Patient was instructed to hold breath as catheter was withdrawn.     The central line was grasped at the insertion site and slowly pulled outward parallel to the skin. Resistance was not met.    After central line was completely removed, a sterile 4x4 gauze pad was used to apply light pressure until bleeding stopped. At that time, petroleum-based gauze and a sterile occlusive dressing was applied to exit site.     Patient was instructed to keep dressing in place for at least 24 hours and to remain in a flat or reclining position for 30 minutes post-removal.     Catheter was inspected after removal and was intact. Tip of central line was not sent for culture. Patient tolerated procedure.

## 2025-04-06 NOTE — PLAN OF CARE
Goal Outcome Evaluation:      Neuro:  Disoriented to time.   Sleeping between care.     Respiratory:  2L NC.   Lung sounds coarse, frequent congested cough.     CT chest completed.     Cardiac:  ST on monitor.  Palpable pedal pulses.  Edema+.     SBP 75-96. MAP >60.   Albumin continued.  Midodrine continued.       GI/:  +BS, BMx2.  Occult stool +.     UOP - 200 ml. Concentrated. Dark.     Other:   C/o pain w/ incontinence care only.

## 2025-04-06 NOTE — PROGRESS NOTES
[facility] Medicine Services  PROGRESS NOTE    Patient Name: Philomena Davis  : 1960  MRN: [id number]    Date of Admission: 2025  Primary Care Physician: Unique Brandt DO    Subjective   Subjective     CC:  Pneumonia/sepsis    HPI:   - Patient tells me she is short of breath and coughing.  Nonproductive.  She is a little confused and has not had a bowel movement since yesterday morning.  I reordered lactulose for her.  We discussed her CODE STATUS and she was confused and unable to clearly state her wishes.  She is listed as a full code.  She told me she did not want to be on life support machines but then told me she wanted CPR.  She was back and forth with her nurse as a witness today and we decided to attempt conversation again later after she has had her lactulose and hopefully has had a bowel movement.  She remains hypotensive despite fluids.  Will add albumin.  Patient tells me she has not been out of bed in about a week.     -patient continues to have severe hypotension despite fluids and albumin yesterday.  Hemoglobin dropped to 7.6.  GI consulted and planning EGD surveillance on Monday.  However given crystalloid refractory severe hypotension will discuss transfer to ICU with intensivist today.  She was able to have 2 bowel movements after the lactulose yesterday.  Mortality risk is high given her MELD score      Objective   Objective     Vital Signs:   Temp:  [97.3 °F (36.3 °C)-98.1 °F (36.7 °C)] 98 °F (36.7 °C)  Heart Rate:  [] 95  Resp:  [16-20] 20  BP: (74-98)/(42-60) 87/47  Flow (L/min) (Oxygen Therapy):  [1-3] 2     Physical Exam:  Constitutional: No acute distress, awake, alert  HENT: NCAT, mucous membranes moist  Respiratory: Decreased bilaterally, respiratory effort normal   Cardiovascular: RRR  Gastrointestinal: Positive bowel sounds, soft, nontender, mildly distended  Musculoskeletal: Positive bilateral lower extremity  edema  Psychiatric: Appropriate affect, cooperative  Neurologic: Oriented to person and place, generally weak in all extremities, Cranial Nerves grossly intact to confrontation, speech clear  Skin: Significant jaundice      Results Reviewed:  LAB RESULTS:      Lab 04/05/25  1838 04/05/25  0842 04/04/25  2341 04/04/25  1724   WBC  --  16.88*  --  23.03*   HEMOGLOBIN 7.6* 8.0* 8.5* 8.1*   HEMATOCRIT 23.9* 25.1* 26.2* 25.1*   PLATELETS  --  171  --  221   NEUTROS ABS  --  13.54*  --  18.87*   IMMATURE GRANS (ABS)  --  0.12*  --  0.16*   LYMPHS ABS  --  1.55  --  1.61   MONOS ABS  --  1.46*  --  2.09*   EOS ABS  --  0.14  --  0.19   MCV  --  107.7*  --  104.6*   PROCALCITONIN  --   --   --  0.62*   LACTATE  --   --   --  1.3   PROTIME  --  21.6*  --   --          Lab 04/05/25  0842 04/04/25  1724   SODIUM 135* 135*   POTASSIUM 4.0 4.2   CHLORIDE 105 103   CO2 22.0 24.0   ANION GAP 8.0 8.0   BUN 15 14   CREATININE 1.10* 1.11*   EGFR 56.9* 56.3*   GLUCOSE 97 95   CALCIUM 8.6 8.8   MAGNESIUM 2.0  --          Lab 04/05/25  0842 04/04/25  1724   TOTAL PROTEIN 5.9* 6.3   ALBUMIN 2.5* 2.6*   GLOBULIN 3.4 3.7   ALT (SGPT) 35* 36*   AST (SGOT) 174* 186*   BILIRUBIN 8.2* 8.5*   ALK PHOS 84 101   LIPASE  --  43         Lab 04/05/25  0842   PROTIME 21.6*   INR 1.75*             Lab 04/04/25  2245 04/04/25  1724   IRON  --  65   IRON SATURATION (TSAT)  --  84*   TIBC  --  77*   TRANSFERRIN  --  52*   FERRITIN  --  926.40*   FOLATE >20.00  --    VITAMIN B 12 1,630*  --          Brief Urine Lab Results  (Last result in the past 365 days)        Color   Clarity   Blood   Leuk Est   Nitrite   Protein   CREAT   Urine HCG        04/05/25 1039 Dark Yellow   Turbid   Large (3+)   Moderate (2+)   Positive   30 mg/dL (1+)                   Microbiology Results Abnormal       Procedure Component Value - Date/Time    Urine Culture - Urine, Straight Cath [[id number]]  (Abnormal) Collected: 04/04/25 1953    Lab Status: Preliminary result Specimen:  Urine from Straight Cath Updated: 04/05/25 1101     Urine Culture >100,000 CFU/mL Escherichia coli    Narrative:      Colonization of the urinary tract without infection is common. Treatment is discouraged unless the patient is symptomatic, pregnant, or undergoing an invasive urologic procedure.            CT Chest Without Contrast Diagnostic  Result Date: 4/6/2025  CT CHEST WO CONTRAST DIAGNOSTIC Date of Exam: 4/5/2025 10:26 PM EDT Indication: pna vs edema. Comparison: Chest radiograph 4/4/2025 and chest CT 64[address] [id number]. Technique: Axial CT images were obtained of the chest without contrast administration.  Reconstructed coronal and sagittal images were also obtained. Automated exposure control and iterative construction methods were used. Findings: The thyroid, trachea and esophagus appear within normal limits. The heart is enlarged. There is mild coronary artery calcification. Diminished attenuation of the blood pool suggests anemia. There are mildly enlarged mediastinal lymph nodes including a right paratracheal lymph node on axial image 33 measuring 10 mm in the short axis. There is mild aortic and aortic branch vessel atherosclerosis. No pericardial effusion. There are moderate bilateral pleural effusions with significant passive atelectasis affecting the entire left lower lobe and a large portion of the right lower lobe. There is patchy bilateral airspace disease most pronounced at the lung periphery along with intermixed interstitial thickening. This is favored to reflect pneumonia, but pulmonary edema could have a similar appearance. Correlate with laboratory values and symptoms to differentiate. Airways are patent. There are bilateral breast implants. There is body wall edema. The liver is enlarged and infiltrated with fat. Liver margins also appear lobulated worrisome for cirrhosis. The spleen is enlarged measuring 12.2 x 8.8 cm in the axial plane. No acute findings in the upper abdomen. Mild spinal  degenerative changes. No acute osseous abnormality.     Impression: Impression: 1.Moderate bilateral pleural effusions with significant passive atelectasis affecting the entire left lower lobe and a large portion of the right lower lobe. 2.Patchy bilateral airspace disease and interstitial thickening. This is favored to reflect pneumonia, but pulmonary edema could have a similar appearance. Correlate with laboratory values and symptoms to differentiate. 3.Cardiomegaly and coronary artery disease. 4.Hepatosplenomegaly and hepatic steatosis. Lobulated liver margins worrisome for cirrhosis. 5.Body wall edema. Electronically Signed: Ravi Gordon MD  4/6/2025 3:27 AM EDT  Workstation ID: [id number]    XR Chest 1 View  Result Date: 4/4/2025  XR CHEST 1 VW Date of Exam: 4/4/2025 5:20 PM EDT Indication: cough Comparison: 3/12/2025 Findings: No PICC is seen. Borderline cardiomegaly. Mild perihilar interstitial opacities may reflect mild edema. Left basilar opacity with likely small left pleural effusion. No pneumothorax. No evidence of acute osseous abnormalities. Visualized upper abdomen is  unremarkable.     Impression: Impression: 1.Mild perihilar and interstitial opacities may reflect mild edema. 2.Left basilar opacity with likely small left pleural effusion may reflect infection or atelectasis. 3.No PICC is seen. Electronically Signed: Alon Beauchamp MD  4/4/2025 5:46 PM EDT  Workstation ID: [id number]      Results for orders placed during the hospital encounter of 11/10/24    Adult Transthoracic Echo Complete W/ Cont if Necessary Per Protocol    Interpretation Summary    Left ventricular systolic function is normal. Estimated left ventricular EF = 65%    The cardiac valves are anatomically and functionally normal.      Current medications:  Scheduled Meds:desvenlafaxine, 50 mg, Oral, Daily  folic acid, 1 mg, Oral, Daily  gabapentin, 200 mg, Oral, Q12H  ipratropium-albuterol, 3 mL, Nebulization, 4x Daily -  RT  lactobacillus acidophilus, 1 capsule, Oral, Daily  lactulose, 10 g, Oral, Daily  latanoprost, 1 drop, Both Eyes, Nightly  metoprolol succinate XL, 25 mg, Oral, Daily  midodrine, 10 mg, Oral, TID AC  pantoprazole, 40 mg, Oral, Daily  piperacillin-tazobactam, 3.375 g, Intravenous, Q8H  QUEtiapine, 100 mg, Oral, Nightly  rifAXIMin, 550 mg, Oral, Q12H  rosuvastatin, 5 mg, Oral, Daily  sodium chloride, 10 mL, Intravenous, Q12H  thiamine, 100 mg, Oral, Daily  ursodiol, 300 mg, Oral, BID      Continuous Infusions:     PRN Meds:.  senna-docusate sodium **AND** polyethylene glycol **AND** bisacodyl **AND** bisacodyl    Calcium Replacement - Follow Nurse / BPA Driven Protocol    HYDROcodone-acetaminophen    ipratropium-albuterol    Magnesium Standard Dose Replacement - Follow Nurse / BPA Driven Protocol    nitroglycerin    Phosphorus Replacement - Follow Nurse / BPA Driven Protocol    Potassium Replacement - Follow Nurse / BPA Driven Protocol    [COMPLETED] Insert Peripheral IV **AND** sodium chloride    sodium chloride    sodium chloride    Assessment & Plan   Assessment & Plan     Active Hospital Problems    Diagnosis  POA    **Sepsis secondary to UTI [A41.9, N39.0]  Yes    Acute UTI (urinary tract infection) [N39.0]  Unknown    Pneumonia [J18.9]  Unknown    Sepsis [A41.9]  Unknown    Hypotension [I95.9]  Yes    Peripheral neuropathy [G62.9]  Yes    Alcohol use disorder [F10.90]  Yes    Alcoholic cirrhosis of liver without ascites [K70.30]  Yes    Essential hypertension [I10]  Yes    Generalized anxiety disorder [F41.1]  Yes    Hyperlipidemia [E78.5]  Yes    Glaucoma [H40.9]  Yes      Resolved Hospital Problems    Diagnosis Date Resolved POA    Dysphagia [R13.10] 04/04/2025 Yes        Brief Hospital Course to date:  Philomena Davis is a 64 y.o. female with a history of HTN, HLD, chronic pain disorder, cirrhosis of the liver, COPD, GERD, for fracture, was here 3/12/2025 - 3/19/2025 with EtOH hepatitis with  cirrhosis, electrolyte abnormalities, Lisfranc and third metatarsal fracture, and was discharged from here to [facility] for rehab.  Patient is here with sepsis, pneumonia, and a UTI.     Assessment and Plan:     Acute respiratory failure with hypoxia  Sepsis  Crystalloid refractory hypotension  Pneumonia (POA)-HCAP  --Chest x-ray shows mild perihilar and interstitial opacities may reflect mild edema.  Left basilar opacity with likely small left pleural effusion may reflect infection or atelectasis.  -- Respiratory panel PCR negative  -- WBC 23.03, procalcitonin 0.62  -- O2 sat 87% on room air  -- was given a liter of saline in the ED  -- BC x 2 pending  -- MRSA PCR pending  -- Urine Legionella and S.  Pneumo Ag pending  -- Sputum culture pending  -- Continuous pulse ox with supplemental oxygen as needed  -- Continue Zosyn, discontinue vancomycin as MRSA PCR negative  -- CT of the chest ordered on 4/5/2025: Pneumonia, atelectasis, effusions  --Left message for intensivist regarding possible transfer to ICU     Acute UTI (present on admission)  -- UA: Appearance turbid, blood large, nitrite positive, leukocytes large, protein 100, bilirubin large, RBC 3-5, WBC TNTC, bacteria 4+, squamous 7-12  -- Urine culture pending/requested straight cath UA  -- zosyn     Elevated serum creatinine  Crystalloid refractory hypotension  -- Creatinine 1.11  -- Baseline creatinine ~ 0.7-0.9  -- was given a liter of saline in the ED  --Additional bolus ordered 4/5/2025 followed by normal saline maintenance  -- Given albumin x 3 on 4/5/2025 given crystalloid refractory hypotension     Elevated LFTs/hyperbilirubinemia  Coagulopathy  EtOH hepatitis with cirrhosis, appears decompensated  Hepatic encephalopathy  Hypotension  -- Alk phos 101, , ALT 36, bili 8.5  -- Continue midodrine 3 times daily  -- High-protein diet  --MELD score 25  --GI consult requested  --INR 1.75  --Resume lactulose     Acute on chronic anemia  -- Hemoglobin 8.5-8  0.0-7.6  --Blood transfusion ordered for 6/20/2025  -- Likely related to EtOH, history of ulcer, marrow suppression  -- PPI   -- GI consulted and planning EGD on Monday     Lisfranc and third metatarsal fracture  Unsteady gait  Frequent falls  EtOH neuropathy  -- NWB RLE  -- PT/OT consult  -- Consult case management     EtOH abuse  -- Continue thiamine and folic acid  -- No alcohol since previous admission     HTN  HLD  -- Hold metoprolol given hypotension  -- midodrine dose 3 times daily for orthostasis  -- Continue statin     Depression  -- Continue Pristiq     Peripheral neuropathy  -- Continue gabapentin     Glaucoma  -- Home eyedrops    Expected Discharge Location and Transportation: rehab  Expected Discharge   Expected Discharge Date: 4/7/2025; Expected Discharge Time:      VTE Prophylaxis:  Mechanical VTE prophylaxis orders are present.         AM-PAC 6 Clicks Score (PT): 10 (04/05/25 2000)    CODE STATUS:   Code Status and Medical Interventions: CPR (Attempt to Resuscitate); Full Support   Ordered at: 04/04/25 9872     Code Status (Patient has no pulse and is not breathing):    CPR (Attempt to Resuscitate)     Medical Interventions (Patient has pulse or is breathing):    Full Support     Level Of Support Discussed With:    Patient       Rosana Ibrahim MD  04/06/25

## 2025-04-06 NOTE — PROGRESS NOTES
Intensive Care Follow-up     Hospital:  LOS: 1 day   Ms. Philomena Davis, 64 y.o. female is followed for:   Sepsis secondary to UTI        Subjective   Interval History:  Philomena Davis is a 64 y.o. female with past medical history significant for hypertension, hyperlipidemia, chronic pain disorder, cirrhosis of the liver, COPD, GERD. Patient was admitted to [facility] from 3/12/25 to 3/19/25 with alcoholic hepatitis with cirrhosis, electrolyte abnormalities, and Lisfranc and third metatarsal fracture. She was discharged to [facility] for rehab.     Patient returned to [facility] on 4/4/25 due to elevated WBC and fevers. UA was consistent with UTI. Respiratory PCR negative. CXR with mild perihilar interstitial opacities, left basilar opacity with likely small left pleural effusion. Patient was started on vancomycin and Zosyn.     Since arrival, patient has been hypotensive. She has received IVF resuscitations as well as albumin. Patient had drop in hemoglobin to 7.6. Fecal occult positive. GI is following and plans for EGD on Monday.     Due to ongoing hypotension despite IVF and albumin, decision was made to transfer patient to ICU. On arrival, patient is tolerating 3L NC with 97% O2 saturation. SBP in 80s. She remains on Zosyn for UTI and pneumonia. Urine and respiratory cultures pending.      Time spent 8 minutes  Electronically signed by Eileen Carvalho PA-C, 04/06/25, 10:43 AM EDT.     The patient was seen upon arrival to the intensive care unit.  She is alert and oriented without specific complaints of pain or discomfort.  She does note that she is very anxious currently and she is worried that she is going to get sicker.  I do note that recently she has had a severely depressed cortisol in times of stress which may indicate relative adrenal insufficiency.  Her current repeat cortisol from this morning is 6.4 which I believe is an adequate for the level of duress she is under.    The patient's past medical, surgical and  "social history were reviewed and updated in Epic as appropriate.        Objective     Infusions:  phenylephrine, 0.5-3 mcg/kg/min, Last Rate: 0.5 mcg/kg/min (04/06/25 1114)      Medications:  desvenlafaxine, 50 mg, Oral, Daily  folic acid, 1 mg, Oral, Daily  gabapentin, 200 mg, Oral, Q12H  hydrocortisone sodium succinate, 100 mg, Intravenous, Q8H  ipratropium-albuterol, 3 mL, Nebulization, 4x Daily - RT  lactobacillus acidophilus, 1 capsule, Oral, Daily  lactulose, 10 g, Oral, Daily  latanoprost, 1 drop, Both Eyes, Nightly  [Held by provider] metoprolol succinate XL, 25 mg, Oral, Daily  midodrine, 10 mg, Oral, TID AC  mupirocin, 1 Application, Each Nare, BID  pantoprazole, 40 mg, Oral, Daily  piperacillin-tazobactam, 3.375 g, Intravenous, Q8H  QUEtiapine, 100 mg, Oral, Nightly  rifAXIMin, 550 mg, Oral, Q12H  rosuvastatin, 5 mg, Oral, Daily  sodium chloride, 10 mL, Intravenous, Q12H  sodium chloride, 10 mL, Intravenous, Q12H  sodium chloride, 10 mL, Intravenous, Q12H  thiamine, 100 mg, Oral, Daily  ursodiol, 300 mg, Oral, BID        Vital Sign Min/Max for last 24 hours  Temp  Min: 97.3 °F (36.3 °C)  Max: 98.1 °F (36.7 °C)   BP  Min: 45/23  Max: 100/55   Pulse  Min: 88  Max: 114   Resp  Min: 16  Max: 20   SpO2  Min: 89 %  Max: 99 %   Flow (L/min) (Oxygen Therapy)  Min: 1  Max: 3       Input/Output for last 24 hour shift  04/05 0701 - 04/06 0700  In: 2741 [P.O.:385; I.V.:1556]  Out: 550 [Urine:550]      Objective:  General Appearance:  Uncomfortable, ill-appearing, in no acute distress and not in pain.    Vital signs: (most recent): Blood pressure (!) 82/51, pulse 95, temperature 97.6 °F (36.4 °C), temperature source Oral, resp. rate 20, height 165.1 cm (65\"), weight 90.6 kg (199 lb 11.2 oz), last menstrual period 01/22/2016, SpO2 92%.    HEENT: Normal HEENT exam.    Lungs:  Normal effort and normal respiratory rate.  There are decreased breath sounds.  No rales, wheezes or rhonchi.    Heart: Normal rate.  Regular " rhythm.  S1 normal and S2 normal.  No murmur.   Chest: Symmetric chest wall expansion.   Abdomen: Abdomen is soft.  There are no signs of ascites.  (Patient does have abdominal wall edema with some blister formation.).  Bowel sounds are normal.   There is no abdominal tenderness.   There is no mass.   Extremities: Normal range of motion.  There is dependent edema.    Neurological: Patient is alert and oriented to person, place and time.    Skin:  Warm.  No rash.               Results from last 7 days   Lab Units 04/06/25  1130 04/05/25  1838 04/05/25  0842 04/04/25  2341 04/04/25  1724   WBC 10*3/mm3 13.48*  --  16.88*  --  23.03*   HEMOGLOBIN g/dL 7.3* 7.6* 8.0*   < > 8.1*   PLATELETS 10*3/mm3 148  --  171  --  221    < > = values in this interval not displayed.     Results from last 7 days   Lab Units 04/06/25  1130 04/05/25  0842 04/04/25  1724   SODIUM mmol/L 134* 135* 135*   POTASSIUM mmol/L 3.8 4.0 4.2   CO2 mmol/L 19.0* 22.0 24.0   BUN mg/dL 19 15 14   CREATININE mg/dL 1.40* 1.10* 1.11*   MAGNESIUM mg/dL 2.0 2.0  --    PHOSPHORUS mg/dL 3.1  --   --    GLUCOSE mg/dL 104* 97 95     Estimated Creatinine Clearance: 46.3 mL/min (A) (by C-G formula based on SCr of 1.4 mg/dL (H)).            I reviewed the patient's results and images.     Assessment & Plan   Impression        Sepsis secondary to UTI    Alcoholic cirrhosis of liver without ascites    Alcohol use disorder    Peripheral neuropathy    Acute UTI (urinary tract infection)    Pneumonia    Acute kidney injury       Plan        Patient will be monitored closely in the intensive care unit for any worsening.  Given that she does have a low cortisol and is currently requiring pressors to maintain her blood pressure, I suspect that she may have some relative adrenal insufficiency given her overall level illness.  We will go ahead and start Solu-Cortef for now.  Fluids as needed.  Wean oxygen as tolerated.  Not currently in respiratory distress.  She does have  what appears to be some infiltrates in the bilateral lungs which could be consistent with pneumonia versus pulmonary edema.  I suspect that there is likely a attribution from both.  We will continue on with Zosyn for now to cover both possible pneumonia and her underlying urinary tract infection.  Monitor renal function closely.  We will watch for signs of alcohol withdrawal.  This patient remains at very high risk of worsening.    Plan of care and goals reviewed with mulitdisciplinary/antibiotic stewardship team during rounds.   I discussed the patient's findings and my recommendations with patient, nursing staff, and primary care team     High level of risk due to:  decision regarding escalation of level of hospital care.        Carl Tuttle MD, [facility]  Pulmonology and Critical Care Medicine

## 2025-04-06 NOTE — PROGRESS NOTES
Patient was transferred to ICU for persistent hypotension, not responsive to fluids and albumin.  There were initial plans for surveillance EGD tomorrow to follow-up gastric ulcer healing.  EGD may be prudent for worsening anemia as well.  Will reevaluate in a.m. to determine if patient is stable for such.

## 2025-04-07 ENCOUNTER — ANESTHESIA (OUTPATIENT)
Dept: GASTROENTEROLOGY | Facility: HOSPITAL | Age: 65
End: 2025-04-07
Payer: COMMERCIAL

## 2025-04-07 ENCOUNTER — ANESTHESIA EVENT (OUTPATIENT)
Dept: GASTROENTEROLOGY | Facility: HOSPITAL | Age: 65
End: 2025-04-07
Payer: COMMERCIAL

## 2025-04-07 PROCEDURE — 25010000002 FENTANYL CITRATE (PF) 100 MCG/2ML SOLUTION

## 2025-04-07 PROCEDURE — 25810000003 LACTATED RINGERS PER 1000 ML

## 2025-04-07 PROCEDURE — 25010000002 LIDOCAINE 1 % SOLUTION

## 2025-04-07 PROCEDURE — 25010000002 PROPOFOL 10 MG/ML EMULSION

## 2025-04-07 RX ORDER — SODIUM CHLORIDE, SODIUM LACTATE, POTASSIUM CHLORIDE, CALCIUM CHLORIDE 600; 310; 30; 20 MG/100ML; MG/100ML; MG/100ML; MG/100ML
INJECTION, SOLUTION INTRAVENOUS CONTINUOUS PRN
Status: DISCONTINUED | OUTPATIENT
Start: 2025-04-07 | End: 2025-04-07 | Stop reason: SURG

## 2025-04-07 RX ORDER — LIDOCAINE HYDROCHLORIDE 10 MG/ML
INJECTION, SOLUTION INFILTRATION; PERINEURAL AS NEEDED
Status: DISCONTINUED | OUTPATIENT
Start: 2025-04-07 | End: 2025-04-07 | Stop reason: SURG

## 2025-04-07 RX ORDER — PROPOFOL 10 MG/ML
VIAL (ML) INTRAVENOUS AS NEEDED
Status: DISCONTINUED | OUTPATIENT
Start: 2025-04-07 | End: 2025-04-07 | Stop reason: SURG

## 2025-04-07 RX ORDER — FENTANYL CITRATE 50 UG/ML
INJECTION, SOLUTION INTRAMUSCULAR; INTRAVENOUS AS NEEDED
Status: DISCONTINUED | OUTPATIENT
Start: 2025-04-07 | End: 2025-04-07 | Stop reason: SURG

## 2025-04-07 RX ADMIN — LIDOCAINE HYDROCHLORIDE 50 MG: 10 INJECTION, SOLUTION INFILTRATION; PERINEURAL at 16:27

## 2025-04-07 RX ADMIN — SODIUM CHLORIDE, POTASSIUM CHLORIDE, SODIUM LACTATE AND CALCIUM CHLORIDE: 600; 310; 30; 20 INJECTION, SOLUTION INTRAVENOUS at 16:27

## 2025-04-07 RX ADMIN — PROPOFOL 50 MG: 10 INJECTION, EMULSION INTRAVENOUS at 16:27

## 2025-04-07 RX ADMIN — FENTANYL CITRATE 100 MCG: 50 INJECTION, SOLUTION INTRAMUSCULAR; INTRAVENOUS at 16:27

## 2025-04-07 NOTE — PLAN OF CARE
Goal Outcome Evaluation:           Progress: improving     Pt afebrile,4L NC.300 ml UOP. Ativan given x1 for anxiety. Pt decreased to 2 mcg/kg/hr due to increase in BP. No c/o pain. Pt NPO at 0000 due to possible EGD today. Labs noted.

## 2025-04-07 NOTE — BRIEF OP NOTE
ESOPHAGOGASTRODUODENOSCOPY  Progress Note    Philomena Davis  4/7/2025    EGD shows complete healing of LA grade D reflux esophagitis that was noted on exam 8 weeks ago.  There are diminutive grade 1 esophageal varices without high risk stigmata.  There is diffuse moderate portal gastropathy.  The prior gastric antrum ulcer has healed.  Duodenum is normal.  No blood seen in the upper GI tract.    >> Continue PPI.    Mark I. Brunner, MD     Date: 4/7/2025  Time: 16:43 EDT

## 2025-04-07 NOTE — PROGRESS NOTES
"Critical Care Note     LOS: 2 days   Patient Care Team:  Unique Brandt DO as PCP - General (Family Medicine)  Leana Garcia PA as Physician Assistant (Gastroenterology)  Judy Curry LPCC as  (Behavioral Health)    Chief Complaint/Reason for visit:    Chief Complaint   Patient presents with    Abnormal Lab     Sepsis secondary to UTI    Alcoholic cirrhosis of liver without ascites    Alcohol use disorder    Peripheral neuropathy    Acute UTI (urinary tract infection)    Pneumonia    Acute kidney injury/  Hepatorenal syndrome    Subjective     Interval History:     She is afebrile.  She is maintaining sinus rhythm but had some episodes of paroxysmal atrial fibrillation yesterday.  She remains on phenylephrine 2 mics per kilogram per minute to support her blood pressure.  On 4 L nasal cannula saturation is 95% - 100%.  Only 530 mL of urine yesterday.  She has had 4 stools today with lactulose and it is currently on hold.  She remains somnolent.    Review of Systems:    All systems were reviewed and negative except as noted in subjective.    Medical history, surgical history, social history, family history reviewed    Objective     Intake/Output:    Intake/Output Summary (Last 24 hours) at 4/7/2025 1325  Last data filed at 4/7/2025 1148  Gross per 24 hour   Intake 3454.2 ml   Output 580 ml   Net 2874.2 ml       Nutrition:  NPO Diet NPO Type: Sips with Meds    Infusions:  phenylephrine, 0.5-3 mcg/kg/min, Last Rate: 2 mcg/kg/min (04/07/25 1625)  phenylephrine, 0.5-3 mcg/kg/min, Last Rate: 2 mcg/kg/min (04/07/25 5191)  vasopressin, 0.03 Units/min, Last Rate: Stopped (04/06/25 4176)        Mechanical Ventilator Settings:                                                Telemetry: Sinus rhythm             Vital Signs  Blood pressure 103/57, pulse 76, temperature 97.6 °F (36.4 °C), temperature source Oral, resp. rate 24, height 165.1 cm (65\"), weight 90.6 kg (199 lb 11.8 oz), last menstrual period " 01/22/2016, SpO2 100%.    Physical Exam:  General Appearance:  Middle-aged woman semiupright in bed sleeping   Head:     Eyes:             Ears:     Throat:    Neck: Trachea midline, no crepitus   Back:      Lungs:   Decreased breath sounds lower lobes bilaterally, clear anteriorly    Heart:  Regular rhythm, S1, S2 auscultated    Abdomen:   distended with a fluid wave   Rectal:   Deferred   Extremities: 1-2+ edema no visible rash   Pulses:    Skin: No visible rash   Lymph nodes:    Neurologic: Sleeping      Results Review:     I reviewed the patient's new clinical results.   Results from last 7 days   Lab Units 04/07/25  0505 04/06/25  1130 04/05/25  0842 04/04/25  1724   SODIUM mmol/L 133* 134* 135* 135*   POTASSIUM mmol/L 4.1 3.8 4.0 4.2   CHLORIDE mmol/L 103 103 105 103   CO2 mmol/L 18.0* 19.0* 22.0 24.0   BUN mg/dL 22 19 15 14   CREATININE mg/dL 1.57* 1.40* 1.10* 1.11*   CALCIUM mg/dL 8.4* 8.7 8.6 8.8   BILIRUBIN mg/dL  --  7.6* 8.2* 8.5*   ALK PHOS U/L  --  88 84 101   ALT (SGPT) U/L  --  32 35* 36*   AST (SGOT) U/L  --  154* 174* 186*   GLUCOSE mg/dL 148* 104* 97 95     Results from last 7 days   Lab Units 04/07/25  0505 04/06/25  1130 04/05/25  1838 04/05/25  0842   WBC 10*3/mm3 20.88* 13.48*  --  16.88*   HEMOGLOBIN g/dL 8.2* 7.3* 7.6* 8.0*   HEMATOCRIT % 25.9* 23.0* 23.9* 25.1*   PLATELETS 10*3/mm3 258 148  --  171         Lab Results   Component Value Date    BLOODCX No growth at 2 days 04/04/2025    BLOODCX No growth at 2 days 04/04/2025     Lab Results   Component Value Date    URINECX >100,000 CFU/mL Escherichia coli (A) 04/04/2025       I reviewed the patient's new imaging including images and reports.  CT CHEST WO CONTRAST DIAGNOSTIC    Date of Exam: 4/5/2025 10:26 PM EDT    Indication: pna vs edema.    Comparison: Chest radiograph 4/4/2025 and chest CT [id number] 22.    Technique: Axial CT images were obtained of the chest without contrast administration.  Reconstructed coronal and sagittal images were  also obtained. Automated exposure control and iterative construction methods were used.      Findings:  The thyroid, trachea and esophagus appear within normal limits. The heart is enlarged. There is mild coronary artery calcification. Diminished attenuation of the blood pool suggests anemia. There are mildly enlarged mediastinal lymph nodes including a  right paratracheal lymph node on axial image 33 measuring 10 mm in the short axis. There is mild aortic and aortic branch vessel atherosclerosis. No pericardial effusion.    There are moderate bilateral pleural effusions with significant passive atelectasis affecting the entire left lower lobe and a large portion of the right lower lobe. There is patchy bilateral airspace disease most pronounced at the lung periphery along  with intermixed interstitial thickening. This is favored to reflect pneumonia, but pulmonary edema could have a similar appearance. Correlate with laboratory values and symptoms to differentiate. Airways are patent.    There are bilateral breast implants. There is body wall edema. The liver is enlarged and infiltrated with fat. Liver margins also appear lobulated worrisome for cirrhosis. The spleen is enlarged measuring 12.2 x 8.8 cm in the axial plane. No acute  findings in the upper abdomen. Mild spinal degenerative changes. No acute osseous abnormality.   Impression:     Impression:  1.Moderate bilateral pleural effusions with significant passive atelectasis affecting the entire left lower lobe and a large portion of the right lower lobe.  2.Patchy bilateral airspace disease and interstitial thickening. This is favored to reflect pneumonia, but pulmonary edema could have a similar appearance. Correlate with laboratory values and symptoms to differentiate.  3.Cardiomegaly and coronary artery disease.  4.Hepatosplenomegaly and hepatic steatosis. Lobulated liver margins worrisome for cirrhosis.  5.Body wall edema.            Electronically  Signed: Ravi Gordon MD   4/6/2025 3:27 AM EDT       Interpretation SummaryEchocardiogram April 6, 2025         Left ventricular systolic function is normal. Automated 2D EF = 63.4%    Left atrial volume is severely increased.    Estimated right ventricular systolic pressure from tricuspid regurgitation is moderately elevated (45-55 mmHg). Calculated right ventricular systolic pressure from tricuspid regurgitation is 47 mmHg.        All medications reviewed.   desvenlafaxine, 50 mg, Oral, Daily  folic acid, 1 mg, Oral, Daily  gabapentin, 200 mg, Oral, Q12H  hydrocortisone sodium succinate, 100 mg, Intravenous, Q8H  ipratropium-albuterol, 3 mL, Nebulization, 4x Daily - RT  lactobacillus acidophilus, 1 capsule, Oral, Daily  lactulose, 10 g, Oral, Daily  latanoprost, 1 drop, Both Eyes, Nightly  [Held by provider] metoprolol succinate XL, 25 mg, Oral, Daily  midodrine, 10 mg, Oral, TID AC  mupirocin, 1 Application, Each Nare, BID  pantoprazole, 40 mg, Oral, Daily  piperacillin-tazobactam, 3.375 g, Intravenous, Q8H  QUEtiapine, 100 mg, Oral, Nightly  rifAXIMin, 550 mg, Oral, Q12H  rosuvastatin, 5 mg, Oral, Daily  sodium chloride, 10 mL, Intravenous, Q12H  sodium chloride, 10 mL, Intravenous, Q12H  sodium chloride, 10 mL, Intravenous, Q12H  thiamine, 100 mg, Oral, Daily  ursodiol, 300 mg, Oral, BID          Assessment & Plan       Sepsis secondary to UTI    Alcoholic cirrhosis of liver without ascites    Alcohol use disorder    Peripheral neuropathy    Acute UTI (urinary tract infection)    Pneumonia    Acute kidney injury    64-year-old woman with hypertension, chronic pain, liver cirrhosis, COPD hospitalized in mid March with alcoholic hepatitis and cirrhosis.  She also had a third metatarsal fracture.  She was discharged to [facility] for rehabilitation.  She returned to [facility] April 4 with elevated white count fever and hypotension.  Urinalysis was consistent with a urinary tract infection.   Respiratory panel PCR was negative.  Chest x-ray revealed left basilar opacity and effusion, possible edema.  She was placed on vancomycin and Zosyn.  She became hypotensive, Unresponsive to volume resuscitation and albumin.  She was placed on phenylephrine because of resting tachycardia.      Hemoglobin had dropped to 7.6 and she had a history of a recent GI bleed so she was given a unit of packed red cells.  Today hemoglobin is 8.2.  Renal function has worsened and is currently creatinine of 1.57, compared to 1.14 on admission.  With her cirrhosis and gross total body overload with ascites and edema, I am concerned about possible hepatorenal syndrome.    #1 sepsis, suspected urinary source urine cultures positive for E. coli resistant to ampicillin.  She also has basilar infiltrates that may be compressive atelectasis from effusions versus pneumonia.  She is currently on Zosyn but I can downgrade her to ceftriaxone.  White count has remained 20,000, but she is afebrile.  She continues to require vasopressors, phenylephrine 2 mics per kilogram per minute.  Her serum cortisol was technically normal at 6.47 but that is inappropriate for her stress and hypotension.    -Support blood pressure with phenylephrine   -initiate midodrine   -stress steroids  -Transition Zosyn to Rocephin    #2 cirrhosis with ascites, secondary to ETOH, recent GI bleed.She has bilateral effusions, ascites, hepatomegaly and body wall edema.  Monitor    - H&H and transfuse if less than 7.  She did have a recent gastric ulcer.  -EGD tomorrow  - Rifaximin  - thiamine, folic acid  -Check coagulation studies    #3 acute kidney injury    -Possibly hypotension from sepsis  - Need to consider hepatorenal syndrome  -Will ask nephrology to evaluate          VTE Prophylaxis:SCDS    Stress Ulcer Prophylaxis:protonix    Yecenia Clifford MD  04/07/25  13:25 EDT      Time: Critical care 40 min  I personally provided care to this critically ill patient  as documented above.  Critical care time does not include time spent on separately billed procedures.  None of my critical care time was concurrent with other critical care providers.

## 2025-04-07 NOTE — ANESTHESIA POSTPROCEDURE EVALUATION
Patient: Philomena Davis    Procedure Summary       Date: 04/07/25 Room / Location:  [facility] /  [facility]    Anesthesia Start: 1615 Anesthesia Stop:     Procedure: ESOPHAGOGASTRODUODENOSCOPY Diagnosis:     Surgeons: Brunner, Mark I, MD Provider: Antwan Noonan MD    Anesthesia Type: general, MAC ASA Status: 3          106/55  97%  HR 80    Anesthesia Type: general, MAC    Vitals  No vitals data found for the desired time range.          Post Anesthesia Care and Evaluation    Patient location during evaluation: ICU  Patient participation: complete - patient participated  Level of consciousness: awake and alert  Pain management: adequate    Airway patency: patent  Anesthetic complications: No anesthetic complications  PONV Status: none  Cardiovascular status: hemodynamically stable and acceptable  Respiratory status: nonlabored ventilation, acceptable and nasal cannula  Hydration status: acceptable

## 2025-04-07 NOTE — CASE MANAGEMENT/SOCIAL WORK
Continued Stay Note  [facility]     Patient Name: Philomena Davis  MRN: [id number]  Today's Date: 4/7/2025    Admit Date: 4/4/2025    Plan: [facility]   Discharge Plan       Row Name 04/07/25 1105       Plan    Plan [facility]    Patient/Family in Agreement with Plan yes    Plan Comments Discussed in MDR. Patient remains on 4-5L NC and Aaron gtt. Plan is [facility]. CM will continue to follow.    Final Discharge Disposition Code 62 - inpatient rehab facility                   Discharge Codes    No documentation.                 Expected Discharge Date and Time       Expected Discharge Date Expected Discharge Time    Apr 11, 2025               Kishor Landers RN

## 2025-04-07 NOTE — ANESTHESIA PREPROCEDURE EVALUATION
Anesthesia Evaluation     Patient summary reviewed and Nursing notes reviewed   no history of anesthetic complications:   NPO Solid Status: > 8 hours  NPO Liquid Status: > 2 hours           Airway   Mallampati: II  TM distance: >3 FB  Neck ROM: full  No difficulty expected  Dental - normal exam     Pulmonary - normal exam   (+) pneumonia , a smoker (09) Former, cigarettes, COPD (MDI) moderate, asthma,  Cardiovascular - normal exam    ECG reviewed    (+) hypertension, dysrhythmias Atrial Flutter, hyperlipidemia    ROS comment: ECG SR  frequent PACS some with aberrant conduction Prolonged QT    ECHO  EF>63% LA vol severely increased.    RVSP mod elevated (47)           Neuro/Psych  (+) numbness, psychiatric history  GI/Hepatic/Renal/Endo    (+) GERD, liver disease (etoh) history of elevated LFT cirrhosis, renal disease (creat upto 1.57 - k normal this am)- ARF, diabetes mellitus, thyroid problem hypothyroidism    Musculoskeletal     Abdominal  - normal exam    Bowel sounds: normal.   Substance History   (+) alcohol use     OB/GYN negative ob/gyn ROS         Other   arthritis,     ROS/Med Hx Other: R takes midodrine at home   HCT 25 - stable 24 hours -  Neosynephrine       Phys Exam Other: [place] 3 G1 V 2017 ankle Gianluca               Anesthesia Plan    ASA 3     general and MAC     (Aim for MAP >70 likely unstable )  intravenous induction     Anesthetic plan, risks, benefits, and alternatives have been provided, discussed and informed consent has been obtained with: patient.    Plan discussed with CRNA.      CODE STATUS:    Code Status (Patient has no pulse and is not breathing): CPR (Attempt to Resuscitate)  Medical Interventions (Patient has pulse or is breathing): Full Support  Level Of Support Discussed With: Patient

## 2025-04-07 NOTE — PAYOR COMM NOTE
"Latoya Le RN  [facility]  Utilization Management  P:[phone]  F:[phone]    Admitted 25  Obs-IP on 25  Fax 1 of 2    Philomena Davis (64 y.o. Female)       Date of Birth   1960    Social Security Number       Address   [address] [address]    Home Phone   [phone]    MRN   [id number]       Restoration   [facility]    Marital Status   Single                            Admission Date   2025    Admission Type   Urgent    Admitting Provider   Carl Tuttle MD    Attending Provider   Yecenia Clifford MD    Department, Room/Bed   [facility] 2A ICU, N203/1       Discharge Date       Discharge Disposition       Discharge Destination                                 Attending Provider: Yecenia Clifford MD    Allergies: Buspirone, Codeine, Mirtazapine    Isolation: None   Infection: None   Code Status: CPR    Ht: 165.1 cm (65\")   Wt: 90.6 kg (199 lb 11.8 oz)    Admission Cmt: None   Principal Problem: Sepsis secondary to UTI [A41.9,N39.0]                   Active Insurance as of 2025       Primary Coverage       Payor Plan Insurance Group Employer/Plan Group    [employer] [place] [employer] [place]ICA[place]        Payor Plan Address Payor Plan Phone Number Payor Plan Fax Number Effective Dates    [address] [address] [phone]  2018 - None Entered    [facility] [id number]         Subscriber Name Subscriber Birth Date Member ID       PHILOMENA DAVIS 1960 [id number]                   History & Physical        Ricky Laguerre III, DO at 25              [facility] Medicine Services  HISTORY AND PHYSICAL    Patient Name: Philomena Davis  : 1960  MRN: [id number]  Primary Care Physician: Unique Brandt DO  Date of admission: 2025    Subjective  Subjective     Chief Complaint:  Fevers    HPI:  Philomena Davis is a 64 y.o. female with a history of HTN, HLD, " chronic pain disorder, cirrhosis of the liver, COPD, GERD, for fracture, was here 3/12/2025 - 3/19/2025 with EtOH hepatitis with cirrhosis, electrolyte abnormalities, Lisfranc and third metatarsal fracture, and was discharged from here to [facility] for rehab.  Patient was transferred here today for an elevated WBC of 20 and fevers of 101.5 today.  Patient reports that yesterday while doing PT she was having some shortness of air.  Today she has been experiencing fevers, chills, myalgias, loss of appetite, sore throat, congestion, productive cough, shortness of air, wheezing, difficulty urinating, and generalized weakness.  She has been experiencing diarrhea for the past 4 to 5 days but does take lactulose daily.  She denies chest pain, abdominal pain, nausea, vomiting, dysuria, headaches, dizziness, or any other issues at this time.    UA consistent with UTI.  Respiratory panel PCR negative.  Chest x-ray shows mild perihilar interstitial opacities, left basilar opacity with likely small left pleural effusion.  Patient was given a liter of saline and started on vancomycin and Zosyn in the ED.  Patient is being admitted to the hospitalist for further evaluation management    Review of Systems   Constitutional:  Positive for appetite change, chills, fatigue and fever.   HENT:  Positive for congestion and sore throat.    Eyes: Negative.    Respiratory:  Positive for cough, shortness of breath and wheezing.    Cardiovascular:  Positive for leg swelling. Negative for chest pain and palpitations.   Gastrointestinal:  Positive for diarrhea. Negative for abdominal pain, blood in stool, constipation, nausea and vomiting.   Endocrine: Negative.    Genitourinary:  Positive for difficulty urinating. Negative for dysuria, frequency, hematuria and urgency.   Musculoskeletal:  Positive for myalgias.   Skin: Negative.    Allergic/Immunologic: Negative.    Neurological:  Positive for weakness. Negative for dizziness, syncope,  light-headedness and headaches.   Hematological: Negative.    Psychiatric/Behavioral: Negative.                  Personal History     Past Medical History:   Diagnosis Date    Alcohol withdrawal 05/10/2019    Ankle sprain 2015    Annual physical exam 09/12/2023    Anxiety and depression     Arthritis of back 2020    Arthritis of neck 2021    Asthma 2022    Cataract 20/2/1999    Chronic pain disorder 2023    Cirrhosis of liver     Closed displaced fracture of lateral malleolus of left fibula     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 03/24/2017    Closed trimalleolar fracture of right ankle 05/24/2023    Colon polyp 2018    COPD (chronic obstructive pulmonary disease) 2022    Digital mucous cyst of toe of left foot 03/24/2017    Eating disorder 2022    Fracture of ankle 2017    Fracture, clavicle 1969    Fracture, finger 1972    Fracture, foot 1/30/25    Ganglion cyst of left foot     GERD (gastroesophageal reflux disease) 10/22    Glaucoma     Hip arthrosis 2019    Hyperlipidemia 1999    Hypertension     Knee sprain 2019    Knee swelling 2019    Low back strain 1/15/25    Neck strain 1/30/25    Panic disorder 1987    Periarthritis of shoulder 2019    Peripheral neuropathy     Pulmonary arterial hypertension 2021    Urinary tract infection 02/23/23    Varicella 1968    Wears glasses              Past Surgical History:   Procedure Laterality Date    ANKLE OPEN REDUCTION INTERNAL FIXATION Left 03/24/2017    Procedure: LEFT ANKLE OPEN REDUCTION INTERNAL FIXATION WITH ILIAC CREST BONE GRAFT , EXCISION CYST 4TH/5TH INNERSPACE LEFT FOOT;  Surgeon: Frank Larson MD;  Location:  [facility];  Service:     ANKLE OPEN REDUCTION INTERNAL FIXATION Right 05/25/2023    Procedure: ANKLE OPEN REDUCTION INTERNAL FIXATION;  Surgeon: Deandre Reynoso MD;  Location:  [facility];  Service: Orthopedics;  Laterality: Right;    AUGMENTATION MAMMAPLASTY Bilateral 1999    BREAST AUGMENTATION      BREAST  EXCISIONAL BIOPSY Right 2014    COLONOSCOPY  2018    DILATATION AND CURETTAGE  1987    DILATION AND CURETTAGE, DIAGNOSTIC / THERAPEUTIC      ENDOSCOPY N/A 11/30/2022    Procedure: ESOPHAGOGASTRODUODENOSCOPY;  Surgeon: Arelis Solano MD;  Location:  [facility];  Service: Gastroenterology;  Laterality: N/A;    ENDOSCOPY N/A 02/01/2024    Procedure: ESOPHAGOGASTRODUODENOSCOPY;  Surgeon: Brunner, Mark I, MD;  Location:  [facility];  Service: Gastroenterology;  Laterality: N/A;    FOOT SURGERY  2012    FRACTURE SURGERY  5 /26/2023    MA RPR NON/MAL FEMUR DSTL H/N W/ILIAC/AUTOG BONE Left 03/24/2017    Procedure: ILIAC CREST BONE GRAFT;  Surgeon: Frank Larson MD;  Location:  [facility];  Service: Orthopedics    WISDOM TOOTH EXTRACTION  1992    WRIST SURGERY         Family History:  family history includes ADD / ADHD in her brother; Alcohol abuse in her brother; Asthma in her maternal aunt, maternal grandfather, and paternal grandmother; Bipolar disorder in her brother; Breast cancer (age of onset: 68) in her paternal grandmother; COPD in her father, maternal aunt, and maternal uncle; Cancer in her mother and another family member; Dementia in her cousin and paternal aunt; Depression in her mother; Drug abuse in her brother; Heart disease in her maternal uncle, paternal grandfather, and another family member; Hypertension in an other family member; Mental illness in her brother; No Known Problems in her daughter, sister, and son; Rheum arthritis in an other family member; Stroke in her maternal grandmother and another family member; Vision loss in her maternal grandmother and mother.     Social History:  reports that she quit smoking about 16 years ago. Her smoking use included cigarettes. She started smoking about 40 years ago. She has a 36.1 pack-year smoking history. She has been exposed to tobacco smoke. She has never used smokeless tobacco. She reports current alcohol use of about 5.0 standard drinks  of alcohol per week. She reports that she does not use drugs.  Social History     Social History Narrative    Not on file       Medications:  HYDROcodone-acetaminophen, Lactobacillus, QUEtiapine, albuterol sulfate HFA, desvenlafaxine, folic acid, gabapentin, latanoprost, metoprolol succinate XL, midodrine, naloxone, ondansetron ODT, pantoprazole, rosuvastatin, thiamine, and ursodiol    Allergies   Allergen Reactions    Buspirone Anxiety    Codeine Nausea Only    Mirtazapine Anxiety       Objective  Objective     Vital Signs:   Temp:  [99 °F (37.2 °C)] 99 °F (37.2 °C)  Heart Rate:  [] 112  Resp:  [18-20] 18  BP: ()/(44-82) 94/75  Flow (L/min) (Oxygen Therapy):  [3] 3    Physical Exam  Constitutional:       Appearance: She is ill-appearing. She is not diaphoretic.   HENT:      Head: Normocephalic.      Nose: Nose normal.      Mouth/Throat:      Mouth: Mucous membranes are moist.   Eyes:      General: Scleral icterus present.   Cardiovascular:      Rate and Rhythm: Regular rhythm. Tachycardia present.      Pulses: Normal pulses.      Heart sounds: Normal heart sounds. No murmur heard.     No friction rub. No gallop.   Pulmonary:      Effort: Pulmonary effort is normal. No respiratory distress.      Breath sounds: Wheezing and rhonchi present.      Comments: Wheezing bilateral upper lobes, scattered rhonchi, diminished left lower  Abdominal:      General: There is distension.      Palpations: Abdomen is soft.      Tenderness: There is no abdominal tenderness. There is no guarding.      Hernia: No hernia is present.   Musculoskeletal:         General: Swelling (2-3+ pitting edema BLE) present.      Cervical back: Normal range of motion.   Skin:     General: Skin is warm and dry.      Capillary Refill: Capillary refill takes 2 to 3 seconds.      Coloration: Skin is jaundiced.      Findings: Bruising (BUE) present.   Neurological:      General: No focal deficit present.      Mental Status: She is alert and  oriented to person, place, and time. Mental status is at baseline.      Cranial Nerves: No cranial nerve deficit.   Psychiatric:         Mood and Affect: Mood normal.         Behavior: Behavior normal.            Result Review:  I have personally reviewed the results from the time of this admission to 4/4/2025 21:47 EDT and agree with these findings:  [x]  Laboratory list / accordion  [x]  Microbiology  [x]  Radiology  [x]  EKG/Telemetry   []  Cardiology/Vascular   []  Pathology  [x]  Old records  []  Other:  Most notable findings include:     LAB RESULTS:      Lab 04/04/25  1724   WBC 23.03*   HEMOGLOBIN 8.1*   HEMATOCRIT 25.1*   PLATELETS 221   NEUTROS ABS 18.87*   IMMATURE GRANS (ABS) 0.16*   LYMPHS ABS 1.61   MONOS ABS 2.09*   EOS ABS 0.19   .6*   PROCALCITONIN 0.62*   LACTATE 1.3         Lab 04/04/25  1724   SODIUM 135*   POTASSIUM 4.2   CHLORIDE 103   CO2 24.0   ANION GAP 8.0   BUN 14   CREATININE 1.11*   EGFR 56.3*   GLUCOSE 95   CALCIUM 8.8         Lab 04/04/25  1724   TOTAL PROTEIN 6.3   ALBUMIN 2.6*   GLOBULIN 3.7   ALT (SGPT) 36*   AST (SGOT) 186*   BILIRUBIN 8.5*   ALK PHOS 101   LIPASE 43                     Brief Urine Lab Results  (Last result in the past 365 days)        Color   Clarity   Blood   Leuk Est   Nitrite   Protein   CREAT   Urine HCG        04/04/25 1953 Dark Yellow   Turbid   Large (3+)   Large (3+)   Positive   100 mg/dL (2+)                 Microbiology Results (last 10 days)       Procedure Component Value - Date/Time    MRSA Screen, PCR (Inpatient) - Swab, Nares [[id number]]  (Normal) Collected: 04/04/25 1929    Lab Status: Final result Specimen: Swab from Nares Updated: 04/04/25 2119     MRSA PCR Negative    Narrative:      The negative predictive value of this diagnostic test is high and should only be used to consider de-escalating anti-MRSA therapy. A positive result may indicate colonization with MRSA and must be correlated clinically.  MRSA Negative    COVID PRE-OP /  PRE-PROCEDURE SCREENING ORDER (NO ISOLATION) - Swab, Nasopharynx [[id number]]  (Normal) Collected: 04/04/25 1724    Lab Status: Final result Specimen: Swab from Nasopharynx Updated: 04/04/25 1832    Narrative:      The following orders were created for panel order COVID PRE-OP / PRE-PROCEDURE SCREENING ORDER (NO ISOLATION) - Swab, Nasopharynx.  Procedure                               Abnormality         Status                     ---------                               -----------         ------                     Respiratory Panel PCR w/...[[id number]]  Normal              Final result                 Please view results for these tests on the individual orders.    Respiratory Panel PCR w/COVID-19(SARS-CoV-2) [facility] In-House, NP Swab in UTM/VTM, 2 HR TAT - Swab, Nasopharynx [[id number]]  (Normal) Collected: 04/04/25 1724    Lab Status: Final result Specimen: Swab from Nasopharynx Updated: 04/04/25 1832     ADENOVIRUS, PCR Not Detected     Coronavirus 229E Not Detected     Coronavirus HKU1 Not Detected     Coronavirus NL63 Not Detected     Coronavirus OC43 Not Detected     COVID19 Not Detected     Human Metapneumovirus Not Detected     Human Rhinovirus/Enterovirus Not Detected     Influenza A PCR Not Detected     Influenza B PCR Not Detected     Parainfluenza Virus 1 Not Detected     Parainfluenza Virus 2 Not Detected     Parainfluenza Virus 3 Not Detected     Parainfluenza Virus 4 Not Detected     RSV, PCR Not Detected     Bordetella pertussis pcr Not Detected     Bordetella parapertussis PCR Not Detected     Chlamydophila pneumoniae PCR Not Detected     Mycoplasma pneumo by PCR Not Detected    Narrative:      In the setting of a positive respiratory panel with a viral infection PLUS a negative procalcitonin without other underlying concern for bacterial infection, consider observing off antibiotics or discontinuation of antibiotics and continue supportive care. If the respiratory panel is positive  for atypical bacterial infection (Bordetella pertussis, Chlamydophila pneumoniae, or Mycoplasma pneumoniae), consider antibiotic de-escalation to target atypical bacterial infection.            XR Chest 1 View  Result Date: 4/4/2025  XR CHEST 1 VW Date of Exam: 4/4/2025 5:20 PM EDT Indication: cough Comparison: 3/12/2025 Findings: No PICC is seen. Borderline cardiomegaly. Mild perihilar interstitial opacities may reflect mild edema. Left basilar opacity with likely small left pleural effusion. No pneumothorax. No evidence of acute osseous abnormalities. Visualized upper abdomen is  unremarkable.     Impression: Impression: 1.Mild perihilar and interstitial opacities may reflect mild edema. 2.Left basilar opacity with likely small left pleural effusion may reflect infection or atelectasis. 3.No PICC is seen. Electronically Signed: Alon Beauchamp MD  4/4/2025 5:46 PM EDT  Workstation ID: [id number]      Results for orders placed during the hospital encounter of 11/10/24    Adult Transthoracic Echo Complete W/ Cont if Necessary Per Protocol    Interpretation Summary    Left ventricular systolic function is normal. Estimated left ventricular EF = 65%    The cardiac valves are anatomically and functionally normal.      Assessment & Plan  Assessment & Plan       Sepsis secondary to UTI    Essential hypertension    Generalized anxiety disorder    Hyperlipidemia    Glaucoma    Alcoholic cirrhosis of liver without ascites    Alcohol use disorder    Peripheral neuropathy    Hypotension    Acute UTI (urinary tract infection)    Pneumonia    Sepsis    Philomena Davis is a 64 y.o. female with a history of HTN, HLD, chronic pain disorder, cirrhosis of the liver, COPD, GERD, for fracture, was here 3/12/2025 - 3/19/2025 with EtOH hepatitis with cirrhosis, electrolyte abnormalities, Lisfranc and third metatarsal fracture, and was discharged from here to [facility] for rehab.  Patient is here with sepsis, pneumonia, and a  UTI.    Assessment and Plan:    Acute respiratory failure with hypoxia  Sepsis  Pneumonia (POA)  --Chest x-ray shows mild perihilar and interstitial opacities may reflect mild edema.  Left basilar opacity with likely small left pleural effusion may reflect infection or atelectasis.  -- Respiratory panel PCR negative  -- WBC 23.03, procalcitonin 0.62  -- O2 sat 87% on room air  -- was given a liter of saline in the ED  -- BC x 2 pending  -- MRSA PCR pending  -- Urine Legionella and S.  Pneumo Ag  -- Sputum culture  -- Continuous pulse ox with supplemental oxygen as needed  -- Vancomycin and Zosyn  -- AM labs    Acute UTI (present on admission)  -- UA: Appearance turbid, blood large, nitrite positive, leukocytes large, protein 100, bilirubin large, RBC 3-5, WBC TNTC, bacteria 4+, squamous 7-12  -- Urine culture pending  -- zosyn  -- am labs    Elevated serum creatinine  -- Creatinine 1.11  -- Baseline creatinine ~ 0.7-0.9  -- was given a liter of saline in the ED  -- bmp in the am    Elevated LFTs  EtOH hepatitis with cirrhosis  Hypotension  -- Alk phos 101, , ALT 36, bili 8.5  -- Continue midodrine 3 times daily  -- High-protein diet    Acute on chronic anemia  -- Hemoglobin 8.1, hematocrit 25.1  -- Likely related to EtOH marrow suppression  -- PPI   -- no overt signs of bleeding  -- anemia profile  -- check stool for occult blood  -- cbc in the am    Lisfranc and third metatarsal fracture  Unsteady gait  Frequent falls  EtOH neuropathy  -- NWB RLE  -- PT/OT consult  -- Consult case management    EtOH abuse  -- Continue thiamine and folic acid  -- No alcohol since previous admission    HTN  HLD  -- Continue metoprolol, midodrine dose 3 times daily for orthostasis  -- Continue statin    Depression  -- Continue Pristiq    Peripheral neuropathy  -- Continue gabapentin    Glaucoma  -- Home eyedrops    DVT prophylaxis:  Mechanical    CODE STATUS:    Code Status (Patient has no pulse and is not breathing): CPR  (Attempt to Resuscitate)  Medical Interventions (Patient has pulse or is breathing): Full Support  Level Of Support Discussed With: Patient      Expected Discharge  TBD  Expected discharge date/ time has not been documented.      This note has been completed as part of a split-shared workflow.     Signature: Electronically signed by CANDY Rivera, 04/04/25, 9:47 PM EDT.       Attending   Admission Attestation       I have performed an independent face-to-face diagnostic evaluation including performing an independent physical examination.  I approve of the documented plan of care above that was reviewed and developed with the advanced practice clinician (APC) and take responsibility for that plan along with its associated risks.  I have updated the HPI as appropriate.    Brief HPI    64F states that she has noticed increased shortness of breath ever since awakening today (Friday 4/4).  She confirms occasional cough, production of white or clear sputum, no leda blood.  She took a temperature at home when she had a fever, got a reading of 101.5F.  She was recently admitted here, specifically 3/12 to 3/19 receiving treatment for hepatitis secondary to alcohol use, cirrhosis.  She was discharged to [facility] after that admission.  She states that she was doing PT yesterday and had some shortness of breath, but it resolved, before returning this morning.  She also confirms myalgias and sensation of wheezing.  No dysuria/frequency/hesitancy.    Attending Physical Exam:  Temp:  [97.4 °F (36.3 °C)-99 °F (37.2 °C)] 97.8 °F (36.6 °C)  Heart Rate:  [] 86  Resp:  [18-20] 18  BP: ()/(44-82) 100/55  Flow (L/min) (Oxygen Therapy):  [3] 3    Constitutional: Awake, alert, NAD.  Appears chronically ill.  Eyes: PERRLA, bilateral scleral icterus, no conjunctival injection  HENT: NCAT, mucous membranes moist  Neck: Supple, no thyromegaly, no lymphadenopathy, trachea midline  Respiratory: Diffuse wheeze and  overall coarse breath sounds to auscultation bilaterally, nonlabored respirations   Cardiovascular: RRR on my exam, no murmurs, rubs, or gallops, palpable pedal pulses bilaterally  Gastrointestinal: Positive bowel sounds, soft, nontender, mild distention but patient states this is baseline.  Musculoskeletal: +2 bilateral distal lower extremity edema, no clubbing or cyanosis to extremities  Psychiatric: Appropriate affect, cooperative  Neurologic: Oriented x 3, strength symmetric in all extremities, Cranial Nerves grossly intact to confrontation, speech clear  Skin: No rashes, normal turgor.  Mild jaundice of the face and anterior neck.    Result Review:  I have personally reviewed the results from the time of this admission to 4/5/2025 03:17 EDT and agree with these findings:  [x]  Laboratory list / accordion  []  Microbiology  [x]  Radiology  [x]  EKG/Telemetry   []  Cardiology/Vascular   []  Pathology  []  Old records  []  Other:  Most notable findings include: Reviewed chest x-ray which is a single AP view and by my read shows very mild increase in interstitial markings, also opacity in the left lower lobe likely pneumonia.  I reviewed EKG which by my read shows sinus tachycardia with ventricular rate approximately 110 bpm, borderline right but still normal axis by my read, nonspecific ST/T wave changes.    Assessment and Plan:    See assessment and plan documented by APC above and updated/edited by me as appropriate.      Total time spent: 35 minutes  Time spent includes time reviewing chart, face-to-face time, counseling patient/family/caregiver, ordering medications/tests/procedures, communicating with other health care professionals, documenting clinical information in the electronic health record, and coordination of care.       Ricky Laguerre III, DO  04/05/25                         Electronically signed by Ricky Laguerre III, DO at 04/05/25 6192          Emergency Department Notes         Nusrat Ferris, RN at 04/04/25 2150           Philomena Davis    Nursing Report ED to Floor:  Mental status: A&Ox4  Ambulatory status: Non-Ambulatory in ED  Oxygen Therapy:  3 LPM NC - PRN  Cardiac Rhythm: NSR  Admitted from: [facility]/ED  Safety Concerns:  Fall Risk  Precautions: None  Social Issues: None  ED Room #:  07    ED Nurse Phone Extension - [phone] or may call [phone].      HPI:   Chief Complaint   Patient presents with    Abnormal Lab       Past Medical History:  Past Medical History:   Diagnosis Date    Alcohol withdrawal 05/10/2019    Ankle sprain 2015    Annual physical exam 09/12/2023    Anxiety and depression     Arthritis of back 2020    Arthritis of neck 2021    Asthma 2022    Cataract 20/2/1999    Chronic pain disorder 2023    Cirrhosis of liver     Closed displaced fracture of lateral malleolus of left fibula     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 03/24/2017    Closed trimalleolar fracture of right ankle 05/24/2023    Colon polyp 2018    COPD (chronic obstructive pulmonary disease) 2022    Digital mucous cyst of toe of left foot 03/24/2017    Eating disorder 2022    Fracture of ankle 2017    Fracture, clavicle 1969    Fracture, finger 1972    Fracture, foot 1/30/25    Ganglion cyst of left foot     GERD (gastroesophageal reflux disease) 10/22    Glaucoma     Hip arthrosis 2019    Hyperlipidemia 1999    Hypertension     Knee sprain 2019    Knee swelling 2019    Low back strain 1/15/25    Neck strain 1/30/25    Panic disorder 1987    Periarthritis of shoulder 2019    Peripheral neuropathy     Pulmonary arterial hypertension 2021    Urinary tract infection 02/23/23    Varicella 1968    Wears glasses         Past Surgical History:  Past Surgical History:   Procedure Laterality Date    ANKLE OPEN REDUCTION INTERNAL FIXATION Left 03/24/2017    Procedure: LEFT ANKLE OPEN REDUCTION INTERNAL FIXATION WITH ILIAC CREST BONE GRAFT , EXCISION CYST 4TH/5TH INNERSPACE LEFT  FOOT;  Surgeon: Frank Larson MD;  Location:  [facility];  Service:     ANKLE OPEN REDUCTION INTERNAL FIXATION Right 05/25/2023    Procedure: ANKLE OPEN REDUCTION INTERNAL FIXATION;  Surgeon: Deandre Reynoso MD;  Location:  [facility];  Service: Orthopedics;  Laterality: Right;    AUGMENTATION MAMMAPLASTY Bilateral 1999    BREAST AUGMENTATION      BREAST EXCISIONAL BIOPSY Right 2014    COLONOSCOPY  2018    DILATATION AND CURETTAGE  1987    DILATION AND CURETTAGE, DIAGNOSTIC / THERAPEUTIC      ENDOSCOPY N/A 11/30/2022    Procedure: ESOPHAGOGASTRODUODENOSCOPY;  Surgeon: Arelis Solano MD;  Location:  [facility];  Service: Gastroenterology;  Laterality: N/A;    ENDOSCOPY N/A 02/01/2024    Procedure: ESOPHAGOGASTRODUODENOSCOPY;  Surgeon: Brunner, Mark I, MD;  Location:  [facility];  Service: Gastroenterology;  Laterality: N/A;    FOOT SURGERY  2012    FRACTURE SURGERY  5 /26/2023    FL RPR NON/MAL FEMUR DSTL H/N W/ILIAC/AUTOG BONE Left 03/24/2017    Procedure: ILIAC CREST BONE GRAFT;  Surgeon: Frank Larson MD;  Location:  [facility];  Service: Orthopedics    WISDOM TOOTH EXTRACTION  1992    WRIST SURGERY          Admitting Doctor:   Ricky Laguerre III, DO    Consulting Provider(s):  Consults       No orders found from 3/6/2025 to 4/5/2025.             Admitting Diagnosis:   The primary encounter diagnosis was Acute cystitis without hematuria. Diagnoses of Pneumonia of left lower lobe due to infectious organism, Leukocytosis, unspecified type, Anemia, unspecified type, and Hepatic cirrhosis, unspecified hepatic cirrhosis type, unspecified whether ascites present were also pertinent to this visit.    Most Recent Vitals:   Vitals:    04/04/25 2045 04/04/25 2100 04/04/25 2115 04/04/25 2130   BP:  99/69  94/75   Pulse: 112 114 111 112   Resp:       Temp:       TempSrc:       SpO2: 99% 96% 96% 98%   Weight:       Height:           Active LDAs/IV Access:   Lines, Drains & Airways       Active  LDAs       Name Placement date Placement time Site Days    PICC Single Lumen 04/04/25 Right 04/04/25  1930  --  less than 1    Peripheral IV 04/04/25 Left;Posterior Forearm 04/04/25  --  Forearm  less than 1    External Urinary Catheter --  --  --  --                    Labs (abnormal labs have a star):   Labs Reviewed   COMPREHENSIVE METABOLIC PANEL - Abnormal; Notable for the following components:       Result Value    Creatinine 1.11 (*)     Sodium 135 (*)     Albumin 2.6 (*)     ALT (SGPT) 36 (*)     AST (SGOT) 186 (*)     Total Bilirubin 8.5 (*)     eGFR 56.3 (*)     All other components within normal limits    Narrative:     GFR Categories in Chronic Kidney Disease (CKD)      GFR Category          GFR (mL/min/1.73)    Interpretation  G1                     90 or greater         Normal or high (1)  G2                      60-89                Mild decrease (1)  G3a                   45-59                Mild to moderate decrease  G3b                   30-44                Moderate to severe decrease  G4                    15-29                Severe decrease  G5                    14 or less           Kidney failure          (1)In the absence of evidence of kidney disease, neither GFR category G1 or G2 fulfill the criteria for CKD.    eGFR calculation 2021 CKD-EPI creatinine equation, which does not include race as a factor   URINALYSIS W/ MICROSCOPIC IF INDICATED (NO CULTURE) - Abnormal; Notable for the following components:    Color, UA Dark Yellow (*)     Appearance, UA Turbid (*)     Bilirubin, UA Large (3+) (*)     Blood, UA Large (3+) (*)     Protein,  mg/dL (2+) (*)     Leuk Esterase, UA Large (3+) (*)     Nitrite, UA Positive (*)     All other components within normal limits   PROCALCITONIN - Abnormal; Notable for the following components:    Procalcitonin 0.62 (*)     All other components within normal limits    Narrative:     As a Marker for Sepsis (Non-Neonates):    1. <0.5 ng/mL represents a  "low risk of severe sepsis and/or septic shock.  2. >2 ng/mL represents a high risk of severe sepsis and/or septic shock.    As a Marker for Lower Respiratory Tract Infections that require antibiotic therapy:    PCT on Admission    Antibiotic Therapy       6-12 Hrs later    >0.5                Strongly Recommended  >0.25 - <0.5        Recommended   0.1 - 0.25          Discouraged              Remeasure/reassess PCT  <0.1                Strongly Discouraged     Remeasure/reassess PCT    As 28 day mortality risk marker: \"Change in Procalcitonin Result\" (>80% or <=80%) if Day 0 (or Day 1) and Day 4 values are available. Refer to [email or web address]    Change in PCT <=80%  A decrease of PCT levels below or equal to 80% defines a positive change in PCT test result representing a higher risk for 28-day all-cause mortality of patients diagnosed with severe sepsis for septic shock.    Change in PCT >80%  A decrease of PCT levels of more than 80% defines a negative change in PCT result representing a lower risk for 28-day all-cause mortality of patients diagnosed with severe sepsis or septic shock.      CBC WITH AUTO DIFFERENTIAL - Abnormal; Notable for the following components:    WBC 23.03 (*)     RBC 2.40 (*)     Hemoglobin 8.1 (*)     Hematocrit 25.1 (*)     .6 (*)     MCH 33.8 (*)     RDW 21.2 (*)     RDW-SD 81.5 (*)     Neutrophil % 81.9 (*)     Lymphocyte % 7.0 (*)     Immature Grans % 0.7 (*)     Neutrophils, Absolute 18.87 (*)     Monocytes, Absolute 2.09 (*)     Immature Grans, Absolute 0.16 (*)     All other components within normal limits   URINALYSIS, MICROSCOPIC ONLY - Abnormal; Notable for the following components:    RBC, UA 3-5 (*)     WBC, UA Too Numerous to Count (*)     Bacteria, UA 4+ (*)     Squamous Epithelial Cells, UA 7-12 (*)     All other components within normal limits   RESPIRATORY PANEL PCR W/ COVID-19 (SARS-COV-2), NP SWAB IN UTM/VTP, 2 HR TAT - Normal    Narrative:     " In the setting of a positive respiratory panel with a viral infection PLUS a negative procalcitonin without other underlying concern for bacterial infection, consider observing off antibiotics or discontinuation of antibiotics and continue supportive care. If the respiratory panel is positive for atypical bacterial infection (Bordetella pertussis, Chlamydophila pneumoniae, or Mycoplasma pneumoniae), consider antibiotic de-escalation to target atypical bacterial infection.   MRSA SCREEN, PCR - Normal    Narrative:     The negative predictive value of this diagnostic test is high and should only be used to consider de-escalating anti-MRSA therapy. A positive result may indicate colonization with MRSA and must be correlated clinically.  MRSA Negative   LIPASE - Normal   LACTIC ACID, PLASMA - Normal   COVID PRE-OP / PRE-PROCEDURE SCREENING ORDER (NO ISOLATION)    Narrative:     The following orders were created for panel order COVID PRE-OP / PRE-PROCEDURE SCREENING ORDER (NO ISOLATION) - Swab, Nasopharynx.  Procedure                               Abnormality         Status                     ---------                               -----------         ------                     Respiratory Panel PCR w/...[[id number]]  Normal              Final result                 Please view results for these tests on the individual orders.   BLOOD CULTURE   BLOOD CULTURE   URINE CULTURE   RAINBOW DRAW    Narrative:     The following orders were created for panel order [place] Draw.  Procedure                               Abnormality         Status                     ---------                               -----------         ------                     Green Top (Gel)[[id number]]                                  Final result               Lavender Top[[id number]]                                     Final result               Gold Top - SST[[id number]]                                   Final result               Lo Top[[id number]]                                          Final result               Light Blue Top[[id number]]                                   Final result                 Please view results for these tests on the individual orders.   CBC AND DIFFERENTIAL    Narrative:     The following orders were created for panel order CBC & Differential.  Procedure                               Abnormality         Status                     ---------                               -----------         ------                     CBC Auto Differential[[id number]]        Abnormal            Final result               Scan Slide[[id number]]                                                                    Please view results for these tests on the individual orders.   GREEN TOP   LAVENDER TOP   GOLD TOP - SST   GRAY TOP   LIGHT BLUE TOP       Meds Given in ED:   Medications   sodium chloride 0.9 % flush 10 mL (has no administration in time range)   sodium chloride 0.9 % bolus 1,000 mL (0 mL Intravenous Stopped 4/4/25 1920)   piperacillin-tazobactam (ZOSYN) 3.375 g IVPB in 100 mL NS MBP (CD) (0 g Intravenous Stopped 4/4/25 1954)   vancomycin IVPB 1500 mg in 0.9% NaCl (Premix) 500 mL (0 mg Intravenous Stopped 4/4/25 2130)   lactated ringers bolus 1,000 mL (0 mL Intravenous Stopped 4/4/25 2115)           Last NIH score:                                                          Dysphagia screening results:        [place] Coma Scale:  No data recorded     CIWA:        Restraint Type:            Isolation Status:  No active isolations          Electronically signed by Nusrat Ferris RN at 04/04/25 2151       Nathalie Beckford PA at 04/04/25 1614       Attestation signed by Dusty Hamilton MD at 04/04/25 2306          SHARED APC NON FACE TO FACE: I performed a substantive part of the MDM during the patient's E/M visit. I personally made or approved the documented management plan and acknowledge its risk of complications.  and Refer to my separate independent  documentation.  Dusty Hamilton MD 4/4/2025 23:08 EDT                             Subjective   History of Present Illness  Pt is a 65 yo female presenting to ED by EMS with complaints of abnormal labs. PMHx HTN, HLD, COPD, Neuropathy, Cirrhosis, Glaucoma and Asthma. Pt recently admitted 3/12 to 3/19 for falls, hyponatremia, hypokalemia, multiple falls and right foot fracture. She has been at [facility] for rehab. Per report patient sent to ED for elevated WBC at 20 and fever up to 101.5 today. Pt reports new cough and nasal congestion today. She has had some non bloody diarrhea but denies vomiting or abdominal pain. She does not wear 02 at baseline. Per records patient was treated with Rocephin during last admission for possible UTI and no urine culture available. No ETOH since before last admission. No tobacco use.     History provided by:  Patient, medical records and EMS personnel      Review of Systems   Constitutional:  Positive for chills and fever.   HENT:  Positive for congestion. Negative for sore throat and trouble swallowing.    Eyes:  Negative for pain and visual disturbance.   Respiratory:  Positive for cough. Negative for shortness of breath.    Cardiovascular:  Negative for chest pain and leg swelling.   Gastrointestinal:  Positive for diarrhea. Negative for abdominal pain, constipation, nausea and vomiting.   Genitourinary:  Negative for difficulty urinating, dysuria, flank pain and hematuria.   Musculoskeletal:  Positive for arthralgias (on going). Negative for back pain.   Skin:  Negative for rash and wound.   Neurological:  Negative for dizziness, syncope, speech difficulty, weakness, numbness and headaches.   Psychiatric/Behavioral:  Negative for confusion.    All other systems reviewed and are negative.      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 03/24/2017    Closed trimalleolar fracture of right ankle 05/24/2023    Colon polyp 2018    COPD (chronic obstructive pulmonary disease) 2022    Digital mucous cyst of toe of left foot 03/24/2017    Eating disorder 2022    Fracture of ankle 2017    Fracture, clavicle 1969    Fracture, finger 1972    Fracture, foot 1/30/25    Ganglion cyst of left foot     GERD (gastroesophageal reflux disease) 10/22    Glaucoma     Hip arthrosis 2019    Hyperlipidemia 1999    Hypertension     Knee sprain 2019    Knee swelling 2019    Low back strain 1/15/25    Neck strain 1/30/25    Panic disorder 1987    Periarthritis of shoulder 2019    Peripheral neuropathy     Pulmonary arterial hypertension 2021    Urinary tract infection 02/23/23    Varicella 1968    Wears glasses        Allergies   Allergen Reactions    Buspirone Anxiety    Codeine Nausea Only    Mirtazapine Anxiety       Past Surgical History:   Procedure Laterality Date    ANKLE OPEN REDUCTION INTERNAL FIXATION Left 03/24/2017    Procedure: LEFT ANKLE OPEN REDUCTION INTERNAL FIXATION WITH ILIAC CREST BONE GRAFT , EXCISION CYST 4TH/5TH INNERSPACE LEFT FOOT;  Surgeon: Frank Larson MD;  Location:  [facility];  Service:     ANKLE OPEN REDUCTION INTERNAL FIXATION Right 05/25/2023    Procedure: ANKLE OPEN REDUCTION INTERNAL FIXATION;  Surgeon: Deandre Reynoso MD;  Location:  [facility];  Service: Orthopedics;  Laterality: Right;    AUGMENTATION MAMMAPLASTY Bilateral 1999    BREAST AUGMENTATION      BREAST EXCISIONAL BIOPSY Right 2014    COLONOSCOPY  2018    DILATATION AND CURETTAGE  1987    DILATION AND CURETTAGE, DIAGNOSTIC / THERAPEUTIC      ENDOSCOPY N/A 11/30/2022    Procedure: ESOPHAGOGASTRODUODENOSCOPY;  Surgeon: Russ  Arelis Romero MD;  Location:  [facility];  Service: Gastroenterology;  Laterality: N/A;    ENDOSCOPY N/A 2024    Procedure: ESOPHAGOGASTRODUODENOSCOPY;  Surgeon: Brunner, Mark I, MD;  Location:  [facility];  Service: Gastroenterology;  Laterality: N/A;    FOOT SURGERY      FRACTURE SURGERY      [place] RPR NON/MAL FEMUR DSTL H/N W/ILIAC/AUTOG BONE Left 2017    Procedure: ILIAC CREST BONE GRAFT;  Surgeon: Frank Larson MD;  Location:  [facility];  Service: Orthopedics    WISDOM TOOTH EXTRACTION      WRIST SURGERY         Family History   Problem Relation Age of Onset    Depression Mother     Cancer Mother         Lung cancer    Vision loss Mother     COPD Father         Copd    No Known Problems Sister     Drug abuse Brother     Bipolar disorder Brother     Alcohol abuse Brother     ADD / ADHD Brother     Mental illness Brother     Asthma Maternal Aunt     Dementia Paternal Aunt     Stroke Maternal Grandmother     Vision loss Maternal Grandmother         Stroke    Breast cancer Paternal Grandmother 68        met to brain    Asthma Paternal Grandmother         Breast cancer    Dementia Cousin     No Known Problems Daughter     No Known Problems Son     Rheum arthritis Other     Heart disease Other     Cancer Other     Stroke Other     Hypertension Other     Asthma Maternal Grandfather         Pancreas canet    Heart disease Paternal Grandfather     COPD Maternal Aunt         COPD    COPD Maternal Uncle         Non-[place] Lymphoma    Heart disease Maternal Uncle     BRCA 1/2 Neg Hx     Colon cancer Neg Hx     Endometrial cancer Neg Hx     Ovarian cancer Neg Hx        Social History     Socioeconomic History    Marital status: Single   Tobacco Use    Smoking status: Former     Current packs/day: 0.00     Average packs/day: 1.5 packs/day for 24.0 years (36.1 ttl pk-yrs)     Types: Cigarettes     Start date: 3/5/1985     Quit date: 3/22/2009     Years since quittin.0     Passive  exposure: Past    Smokeless tobacco: Never   Vaping Use    Vaping status: Never Used   Substance and Sexual Activity    Alcohol use: Yes     Alcohol/week: 5.0 standard drinks of alcohol     Types: 5 Glasses of wine per week    Drug use: No    Sexual activity: Not Currently     Partners: Male     Birth control/protection: Abstinence, Post-menopausal     Comment: Menopausal Post 24 Yrs           Objective   Physical Exam  Vitals and nursing note reviewed.   Constitutional:       Appearance: She is well-developed.   HENT:      Head: Atraumatic.      Nose: Nose normal.   Eyes:      General: Lids are normal.      Conjunctiva/sclera: Conjunctivae normal.      Pupils: Pupils are equal, round, and reactive to light.   Cardiovascular:      Rate and Rhythm: Regular rhythm. Tachycardia present.      Heart sounds: Normal heart sounds.   Pulmonary:      Effort: Pulmonary effort is normal.      Breath sounds: Normal breath sounds. No wheezing.   Abdominal:      General: There is no distension.      Palpations: Abdomen is soft.      Tenderness: There is no abdominal tenderness. There is no guarding or rebound.   Musculoskeletal:         General: No tenderness. Normal range of motion.      Cervical back: Normal range of motion and neck supple.   Skin:     General: Skin is warm and dry.      Coloration: Skin is jaundiced.      Findings: No erythema or rash.   Neurological:      Mental Status: She is alert and oriented to person, place, and time.      Sensory: No sensory deficit.   Psychiatric:         Speech: Speech normal.         Behavior: Behavior normal.         Procedures          ED Course  ED Course as of 04/04/25 2133 Fri Apr 04, 2025 1617  I personally reviewed and interpreted labs results and went over with patient.   I personally reviewed and interpreted vitals.   [RT]   1802 I personally and independently reviewed CXR and agree with the radiology interpretation specifically left basilar opacity.  [RT]   1817 CBC &  Differential(!)  I reviewed the CBC.  The anemia is relatively stable compared to multiple prior values.  Acute leukocytosis noted. [RS]   1900 COVID PRE-OP / PRE-PROCEDURE SCREENING ORDER (NO ISOLATION) - Swab, Nasopharynx  Viral screening panel is negative.  [RS]   1953 Lactate: 1.3 [RT]   1953 Glucose: 95 [RT]   1953 Creatinine(!): 1.11 [RT]   1953 Potassium: 4.2  stable [RT]   1954 Anion Gap: 8.0 [RT]   1954 Procalcitonin(!): 0.62  Antibiotics in process  [RT]   2024 Leukocytes, UA(!): Large (3+) [RT]   2024 Nitrite, UA(!): Positive [RT]   2024 WBC, UA(!): Too Numerous to Count [RT]   2024 Bacteria, UA(!): 4+ [RT]   2025 Added urine culture.  [RT]   2031 Discussed patient with Dr. Hamilton who is agreeable with ED course and tx plan for admission.  [RT]   2032 Updated patient on results and tx plan for admission.  [RT]   2050 Discussed admission with hospitalist Dr. Laguerre.  [RT]      ED Course User Index  [RS] Dusty Hamilton MD  [RT] Nathalie Beckford PA      Recent Results (from the past 24 hours)   Comprehensive Metabolic Panel    Collection Time: 04/04/25  5:24 PM    Specimen: Blood   Result Value Ref Range    Glucose 95 65 - 99 mg/dL    BUN 14 8 - 23 mg/dL    Creatinine 1.11 (H) 0.57 - 1.00 mg/dL    Sodium 135 (L) 136 - 145 mmol/L    Potassium 4.2 3.5 - 5.2 mmol/L    Chloride 103 98 - 107 mmol/L    CO2 24.0 22.0 - 29.0 mmol/L    Calcium 8.8 8.6 - 10.5 mg/dL    Total Protein 6.3 6.0 - 8.5 g/dL    Albumin 2.6 (L) 3.5 - 5.2 g/dL    ALT (SGPT) 36 (H) 1 - 33 U/L    AST (SGOT) 186 (H) 1 - 32 U/L    Alkaline Phosphatase 101 39 - 117 U/L    Total Bilirubin 8.5 (H) 0.0 - 1.2 mg/dL    Globulin 3.7 gm/dL    A/G Ratio 0.7 g/dL    BUN/Creatinine Ratio 12.6 7.0 - 25.0    Anion Gap 8.0 5.0 - 15.0 mmol/L    eGFR 56.3 (L) >60.0 mL/min/1.73   Lipase    Collection Time: 04/04/25  5:24 PM    Specimen: Blood   Result Value Ref Range    Lipase 43 13 - 60 U/L   Lactic Acid, Plasma    Collection Time: 04/04/25  5:24 PM     Specimen: Blood   Result Value Ref Range    Lactate 1.3 0.5 - 2.0 mmol/L   Procalcitonin    Collection Time: 04/04/25  5:24 PM    Specimen: Blood   Result Value Ref Range    Procalcitonin 0.62 (H) 0.00 - 0.25 ng/mL   CBC Auto Differential    Collection Time: 04/04/25  5:24 PM    Specimen: Blood   Result Value Ref Range    WBC 23.03 (H) 3.40 - 10.80 10*3/mm3    RBC 2.40 (L) 3.77 - 5.28 10*6/mm3    Hemoglobin 8.1 (L) 12.0 - 15.9 g/dL    Hematocrit 25.1 (L) 34.0 - 46.6 %    .6 (H) 79.0 - 97.0 fL    MCH 33.8 (H) 26.6 - 33.0 pg    MCHC 32.3 31.5 - 35.7 g/dL    RDW 21.2 (H) 12.3 - 15.4 %    RDW-SD 81.5 (H) 37.0 - 54.0 fl    MPV 10.6 6.0 - 12.0 fL    Platelets 221 140 - 450 10*3/mm3    Neutrophil % 81.9 (H) 42.7 - 76.0 %    Lymphocyte % 7.0 (L) 19.6 - 45.3 %    Monocyte % 9.1 5.0 - 12.0 %    Eosinophil % 0.8 0.3 - 6.2 %    Basophil % 0.5 0.0 - 1.5 %    Immature Grans % 0.7 (H) 0.0 - 0.5 %    Neutrophils, Absolute 18.87 (H) 1.70 - 7.00 10*3/mm3    Lymphocytes, Absolute 1.61 0.70 - 3.10 10*3/mm3    Monocytes, Absolute 2.09 (H) 0.10 - 0.90 10*3/mm3    Eosinophils, Absolute 0.19 0.00 - 0.40 10*3/mm3    Basophils, Absolute 0.11 0.00 - 0.20 10*3/mm3    Immature Grans, Absolute 0.16 (H) 0.00 - 0.05 10*3/mm3    nRBC 0.0 0.0 - 0.2 /100 WBC   Green Top (Gel)    Collection Time: 04/04/25  5:24 PM   Result Value Ref Range    Extra Tube Hold for add-ons.    Lavender Top    Collection Time: 04/04/25  5:24 PM   Result Value Ref Range    Extra Tube hold for add-on    Gold Top - SST    Collection Time: 04/04/25  5:24 PM   Result Value Ref Range    Extra Tube Hold for add-ons.    Gray Top    Collection Time: 04/04/25  5:24 PM   Result Value Ref Range    Extra Tube Hold for add-ons.    Light Blue Top    Collection Time: 04/04/25  5:24 PM   Result Value Ref Range    Extra Tube Hold for add-ons.    Respiratory Panel PCR w/COVID-19(SARS-CoV-2) [facility] In-House, NP Swab in UTM/VTM, 2 HR TAT - Swab, Nasopharynx     Collection Time: 04/04/25  5:24 PM    Specimen: Nasopharynx; Swab   Result Value Ref Range    ADENOVIRUS, PCR Not Detected Not Detected    Coronavirus 229E Not Detected Not Detected    Coronavirus HKU1 Not Detected Not Detected    Coronavirus NL63 Not Detected Not Detected    Coronavirus OC43 Not Detected Not Detected    COVID19 Not Detected Not Detected - Ref. Range    Human Metapneumovirus Not Detected Not Detected    Human Rhinovirus/Enterovirus Not Detected Not Detected    Influenza A PCR Not Detected Not Detected    Influenza B PCR Not Detected Not Detected    Parainfluenza Virus 1 Not Detected Not Detected    Parainfluenza Virus 2 Not Detected Not Detected    Parainfluenza Virus 3 Not Detected Not Detected    Parainfluenza Virus 4 Not Detected Not Detected    RSV, PCR Not Detected Not Detected    Bordetella pertussis pcr Not Detected Not Detected    Bordetella parapertussis PCR Not Detected Not Detected    Chlamydophila pneumoniae PCR Not Detected Not Detected    Mycoplasma pneumo by PCR Not Detected Not Detected   MRSA Screen, PCR (Inpatient) - Swab, Nares    Collection Time: 04/04/25  7:29 PM    Specimen: Nares; Swab   Result Value Ref Range    MRSA PCR Negative Negative   Urinalysis With Microscopic If Indicated (No Culture) - Straight Cath    Collection Time: 04/04/25  7:53 PM    Specimen: Straight Cath; Urine   Result Value Ref Range    Color, UA Dark Yellow (A) Yellow, Straw    Appearance, UA Turbid (A) Clear    pH, UA 5.5 5.0 - 8.0    Specific Gravity, UA 1.019 1.001 - 1.030    Glucose, UA Negative Negative    Ketones, UA Negative Negative    Bilirubin, UA Large (3+) (A) Negative    Blood, UA Large (3+) (A) Negative    Protein,  mg/dL (2+) (A) Negative    Leuk Esterase, UA Large (3+) (A) Negative    Nitrite, UA Positive (A) Negative    Urobilinogen, UA 0.2 E.U./dL 0.2 - 1.0 E.U./dL   Urinalysis, Microscopic Only - Straight Cath    Collection Time: 04/04/25  7:53 PM    Specimen: Straight Cath;  Urine   Result Value Ref Range    RBC, UA 3-5 (A) None Seen, 0-2 /HPF    WBC, UA Too Numerous to Count (A) None Seen, 0-2 /HPF    Bacteria, UA 4+ (A) None Seen, Trace /HPF    Squamous Epithelial Cells, UA 7-12 (A) None Seen, 0-2 /HPF    Hyaline Casts, UA None Seen 0 - 6 /LPF    Granular Casts, UA 0-2 None Seen /LPF    WBC Clumps, UA Large/3+ None Seen /HPF    Methodology Manual Light Microscopy    ECG 12 Lead Rhythm Change    Collection Time: 04/04/25  8:50 PM   Result Value Ref Range    QT Interval 342 ms    QTC Interval 462 ms     Note: In addition to lab results from this visit, the labs listed above may include labs taken at another facility or during a different encounter within the last 24 hours. Please correlate lab times with ED admission and discharge times for further clarification of the services performed during this visit.    XR Chest 1 View   Final Result   Impression:   1.Mild perihilar and interstitial opacities may reflect mild edema.   2.Left basilar opacity with likely small left pleural effusion may reflect infection or atelectasis.   3.No PICC is seen.            Electronically Signed: Alon Beauchamp MD     4/4/2025 5:46 PM EDT     Workstation ID: [id number]        Vitals:    04/04/25 2030 04/04/25 2045 04/04/25 2100 04/04/25 2115   BP: 105/60  99/69    Pulse: 112 112 114 111   Resp:       Temp:       TempSrc:       SpO2: 90% 99% 96% 96%   Weight:       Height:         Medications   sodium chloride 0.9 % flush 10 mL (has no administration in time range)   sodium chloride 0.9 % bolus 1,000 mL (0 mL Intravenous Stopped 4/4/25 1920)   piperacillin-tazobactam (ZOSYN) 3.375 g IVPB in 100 mL NS MBP (CD) (0 g Intravenous Stopped 4/4/25 1954)   vancomycin IVPB 1500 mg in 0.9% NaCl (Premix) 500 mL (0 mg Intravenous Stopped 4/4/25 2130)   lactated ringers bolus 1,000 mL (0 mL Intravenous Stopped 4/4/25 2115)     ECG/EMG Results (last 24 hours)       ** No results found for the last 24 hours. **           ECG 12 Lead Rhythm Change   Preliminary Result   Test Reason : Rhythm Change   Blood Pressure :   */*   mmHG   Vent. Rate : 110 BPM     Atrial Rate : 110 BPM      P-R Int : 144 ms          QRS Dur :  90 ms       QT Int : 342 ms       P-R-T Axes :  64  91  45 degrees     QTcB Int : 462 ms      Sinus tachycardia with premature supraventricular complexes   Rightward axis   Low voltage QRS   Cannot rule out Anterior infarct , age undetermined   Abnormal ECG   When compared with ECG of 12-Mar-2025 03:52,   premature supraventricular complexes are now present   T wave amplitude has decreased in Lateral leads      Referred By: ED MD           Confirmed By:                  Medical Decision Making  Pt is a 65 yo female presenting to ED with complaints of abnormal labs.  I had a discussion with the patient / family regarding ED course, diagnosis, diagnostic results and treatment plan including medications and admission.    DDx  Pneumonia, UTI, Sepsis, Cirrhosis, JAMMIE, Electrolyte abnormality, Anemia     Problems Addressed:  Acute cystitis without hematuria: complicated acute illness or injury  Anemia, unspecified type: complicated acute illness or injury  Hepatic cirrhosis, unspecified hepatic cirrhosis type, unspecified whether ascites present: complicated acute illness or injury  Leukocytosis, unspecified type: complicated acute illness or injury  Pneumonia of left lower lobe due to infectious organism: complicated acute illness or injury    Amount and/or Complexity of Data Reviewed  External Data Reviewed: notes.     Details: Reviewed previous non ED visits including prior labs, imaging, available notes, medications, allergies and surgical hx.   [facility] records  Recent admission as noted in HPI  Labs: ordered. Decision-making details documented in ED Course.  Radiology: ordered and independent interpretation performed. Decision-making details documented in ED Course.  ECG/medicine tests: ordered and independent  interpretation performed. Decision-making details documented in ED Course.    Risk  Prescription drug management.  Decision regarding hospitalization.        Final diagnoses:   Acute cystitis without hematuria   Pneumonia of left lower lobe due to infectious organism   Leukocytosis, unspecified type   Anemia, unspecified type   Hepatic cirrhosis, unspecified hepatic cirrhosis type, unspecified whether ascites present       ED Disposition  ED Disposition       ED Disposition   Decision to Admit    Condition   --    Comment   Level of Care: Telemetry [5]   Diagnosis: Sepsis secondary to UTI [508858]   Admitting Physician: GEORGE SHAIKH III [[id number]]   Attending Physician: GEORGE SHAIKH III [[id number]]   Is patient appropriate for Inpatient Observation Unit?: No [0]   Bed Request Comments: tele obs                 No follow-up provider specified.       Medication List      No changes were made to your prescriptions during this visit.            Nathalie Beckford PA  04/04/25 2133      Electronically signed by Dusty Hamilton MD at 04/04/25 2308       Vital Signs (last 4 days)       Date/Time Temp Temp src Pulse Resp BP Patient Position SpO2    04/07/25 1445 -- -- 77 -- 102/56 -- 100    04/07/25 1430 -- -- 76 -- 79/52 -- 100    04/07/25 1415 -- -- 76 -- 103/63 -- 99    04/07/25 1400 -- -- 80 18 111/60 -- 100    04/07/25 1345 -- -- 78 -- 102/57 -- 84    04/07/25 1330 -- -- 81 -- 106/60 -- 82    04/07/25 1315 -- -- 83 -- 102/61 -- 100    04/07/25 1310 -- -- 76 -- 102/61 -- 100    04/07/25 1300 -- -- 76 -- -- -- 100    04/07/25 1245 -- -- 76 -- 108/56 -- 100    04/07/25 1230 -- -- 77 -- 104/62 -- 100    04/07/25 1215 -- -- 80 -- 110/59 -- 100    04/07/25 1200 97.8 (36.6) Oral 80 20 -- Lying 96    04/07/25 1145 -- -- 78 -- 91/59 -- 95    04/07/25 1000 -- -- 80 24 103/57 Lying 100    04/07/25 0945 -- -- 80 -- 81/57 -- 98    04/07/25 0930 -- -- 80 -- 106/62 -- 97    04/07/25 0915 -- -- 82 -- -- -- 96     04/07/25 0900 -- -- 83 -- -- -- 95    04/07/25 0845 -- -- 84 -- 110/67 -- 91    04/07/25 0830 -- -- 81 -- 110/60 -- 92    04/07/25 0815 -- -- 82 -- 99/54 -- 93    04/07/25 0800 97.6 (36.4) Oral 78 22 102/57 Lying 92    04/07/25 0745 -- -- 77 -- 98/62 -- 92    04/07/25 0730 -- -- 78 -- 100/54 -- 92    04/07/25 0715 -- -- 78 -- 103/55 -- 92    04/07/25 0700 -- -- 81 -- 104/62 -- 96    04/07/25 0657 -- -- 77 24 104/62 -- 96    04/07/25 0645 -- -- 93 -- 103/63 -- 96    04/07/25 0630 -- -- 93 -- 100/63 -- 97    04/07/25 0615 -- -- 92 -- 98/61 -- 96    04/07/25 0600 -- -- 92 -- 106/63 -- 96    04/07/25 0545 -- -- 97 -- 99/57 -- 96    04/07/25 0530 -- -- 95 -- 111/60 -- 96    04/07/25 0515 -- -- 96 -- 102/63 -- 97    04/07/25 0500 -- -- 96 -- 112/70 -- 97    04/07/25 0445 -- -- 98 -- 122/65 -- 98    04/07/25 0430 -- -- 99 -- 113/77 -- 95    04/07/25 0415 -- -- 101 -- -- -- 93    04/07/25 0400 -- -- 97 -- 110/69 -- 95    04/07/25 0345 -- -- 105 -- -- -- 94    04/07/25 0341 97.6 (36.4) Oral 103 22 111/61 Lying 96    04/07/25 0330 -- -- 82 -- 108/50 -- 95    04/07/25 0315 -- -- 83 -- 112/61 -- 94    04/07/25 0300 -- -- 84 -- 110/60 -- 93    04/07/25 0245 -- -- 86 -- 109/59 -- 91    04/07/25 0230 -- -- 87 -- -- -- 93    04/07/25 0215 -- -- 84 -- 92/76 -- 93    04/07/25 0200 -- -- 86 -- 92/62 -- 92    04/07/25 0145 -- -- 86 -- 101/60 -- 92    04/07/25 0130 -- -- 87 -- 91/54 -- 90    04/07/25 0115 -- -- 86 -- 117/99 -- 93    04/07/25 0100 -- -- 88 -- 111/62 -- 97    04/07/25 0045 -- -- 85 -- 105/57 -- 96    04/07/25 0030 -- -- 82 -- 93/49 -- 93    04/07/25 0015 -- -- 81 -- 105/60 -- 93    04/07/25 0000 97.8 (36.6) Oral 81 20 107/54 Lying 93    04/06/25 2345 -- -- 82 -- 106/54 -- 93    04/06/25 2330 -- -- 81 -- 105/61 -- 93    04/06/25 2315 -- -- 81 -- 101/51 -- 93    04/06/25 2300 -- -- 81 -- 100/55 -- 93    04/06/25 2245 -- -- 78 -- 83/47 -- 92    04/06/25 2230 -- -- 81 -- 94/48 -- 94    04/06/25 2215 -- -- 82 -- 98/50 -- 92     04/06/25 2200 -- -- 86 -- 103/52 -- 94    04/06/25 2145 -- -- 92 -- 99/52 -- 93    04/06/25 2139 -- -- 91 -- -- -- 83    04/06/25 2130 -- -- 87 -- 97/50 -- 94    04/06/25 2115 -- -- 86 -- 111/61 -- 93    04/06/25 2103 -- -- 85 18 -- Lying 94    04/06/25 2100 -- -- 87 -- 104/63 -- 93    04/06/25 2045 -- -- 84 -- 111/58 -- 94    04/06/25 2030 -- -- 87 -- 111/63 -- 94    04/06/25 2015 -- -- 80 -- -- -- 94    04/06/25 2000 97.8 (36.6) Oral 80 16 98/50 Lying 94    04/06/25 1800 -- -- 92 22 105/54 Lying 93    04/06/25 1745 -- -- 92 -- 107/57 -- 93    04/06/25 1730 -- -- 89 -- 101/40 -- 93    04/06/25 1715 -- -- 81 -- 93/52 -- 95    04/06/25 1713 -- -- -- -- 88/46 -- --    04/06/25 1700 -- -- 82 -- 88/46 -- 95    04/06/25 1645 -- -- 82 -- 87/47 -- 94    04/06/25 1630 -- -- 82 -- 84/49 -- 98    04/06/25 1624 -- -- 84 20 -- -- 94    04/06/25 1615 -- -- 84 -- 85/45 -- 94    04/06/25 1600 -- -- 89 20 103/52 Lying 93    04/06/25 1545 -- -- 87 -- 85/45 -- 95    04/06/25 1500 -- -- 90 -- 89/54 -- 98    04/06/25 1400 -- -- 96 20 85/55 Lying 93    04/06/25 1345 -- -- 93 -- 96/55 -- 93    04/06/25 1331 -- -- 95 20 82/51 -- 92    04/06/25 1330 -- -- -- -- -- -- 93    04/06/25 1315 -- -- -- -- 100/55 -- 92    04/06/25 1302 -- -- -- -- 90/57 -- --    04/06/25 1300 -- -- 95 -- 45/23 -- 93    04/06/25 1245 -- -- 96 -- 89/64 -- 93    04/06/25 1230 -- -- 96 -- 84/66 -- 92    04/06/25 1200 97.6 (36.4) Oral 92 20 97/54 Lying 95    04/06/25 1100 -- -- 97 -- -- -- 94    04/06/25 1010 97.6 (36.4) Oral 100 20 97/51 Lying 90    04/06/25 0937 -- -- 95 20 -- -- 94    04/06/25 0900 -- -- 96 -- -- -- 91    04/06/25 0648 98 (36.7) Oral 95 20 87/47 Lying 94    04/06/25 0630 -- -- 95 -- 84/46 -- 96    04/06/25 0601 -- -- 94 -- 81/55  Lying 93    BP: Midodrine given at 04/06/25 0601    04/06/25 0600 -- -- 96 -- -- -- --    04/06/25 0500 -- -- 100 -- -- -- 92    04/06/25 0400 -- -- 97 -- -- -- 94    04/06/25 0336 98.1 (36.7) Oral 97 20 96/60 Lying 89     04/06/25 0300 -- -- 103 -- -- -- 93    04/06/25 0200 -- -- 100 -- -- -- 96    04/06/25 0100 -- -- 95 -- -- -- 97    04/06/25 0000 -- -- 92 -- -- -- 94    04/05/25 2300 -- -- 99 -- -- -- 96    04/05/25 2240 -- -- 99 -- -- -- 96    04/05/25 2238 97.3 (36.3) Oral 103 20 98/60 Lying --    04/05/25 2200 -- -- 113 -- -- -- 98    04/05/25 2102 -- -- -- -- 95/57 -- --    04/05/25 2100 -- -- 92 -- 74/58 -- 98    04/05/25 2033 -- -- 89 20 -- -- 98    04/05/25 2000 -- -- 88 -- -- -- 98    04/05/25 1933 97.4 (36.3) Oral 88 20 91/52 Lying 99    04/05/25 1800 -- -- 113 -- -- -- 99    04/05/25 1739 -- -- 114 -- 93/49 Lying --    04/05/25 1700 -- -- 114 -- -- -- 97    04/05/25 1600 -- -- 111 -- -- -- 97    04/05/25 1500 -- -- 114 -- -- -- 96    04/05/25 1445 97.5 (36.4) Oral 113 16 85/45 Lying 97    04/05/25 1400 -- -- 91 -- -- -- 97    04/05/25 1351 -- -- 91 -- 75/42 Lying 96    04/05/25 1300 -- -- 113 -- -- -- 96    04/05/25 1230 -- -- 114 18 -- Lying 97    04/05/25 1200 97.9 (36.6) Oral 114 18 86/45 Lying --    04/05/25 1136 97.3 (36.3) Oral -- 20 -- Lying --    04/05/25 1100 -- -- 91 -- -- -- 94    04/05/25 1000 -- -- 111 -- -- -- 96    04/05/25 0900 -- -- 118 -- -- -- 92    04/05/25 0853 97.6 (36.4) Oral 116 20 84/43  Lying 95    BP: Second take at 04/05/25 0853    04/05/25 0800 -- -- 94 -- -- -- 92    04/05/25 0755 -- -- 86 -- -- -- --    04/05/25 0700 -- -- 85 -- -- -- 100    04/05/25 0600 -- -- 85 -- -- -- 100    04/05/25 0400 -- -- 94 -- -- -- 98    04/05/25 0350 98.1 (36.7) Oral 86 18 92/48 Lying 100    04/05/25 0200 -- -- 86 -- -- -- 97    04/04/25 2340 97.8 (36.6) Oral 87 18 100/55 Lying 100    04/04/25 2225 97.4 (36.3) Oral 91 20 108/48 Lying 98    04/04/25 22:10:05 -- -- 90 18 97/64 -- 97    04/04/25 2200 -- -- 92 -- -- -- 95    04/04/25 2145 -- -- 89 -- -- -- 97    04/04/25 2130 -- -- 112 -- 94/75 -- 98    04/04/25 2115 -- -- 111 -- -- -- 96    04/04/25 2100 -- -- 114 -- 99/69 -- 96    04/04/25 2045 -- -- 112 -- --  -- 99    04/04/25 2030 -- -- 112 -- 105/60 -- 90    04/04/25 2027 -- -- 114 18 105/60 -- 98    04/04/25 2015 -- -- 112 -- -- -- 94    04/04/25 2000 -- -- 112 -- -- -- 96    04/04/25 1945 -- -- 113 -- -- -- 95    04/04/25 1930 -- -- 111 -- 105/58 -- 96    04/04/25 1915 -- -- 93 -- -- -- 93    04/04/25 1900 -- -- 92 -- 89/44 -- 95    04/04/25 1845 -- -- 90 -- -- -- 96    04/04/25 1830 -- -- 93 -- 100/58 -- 95    04/04/25 1815 -- -- 91 -- -- -- 96    04/04/25 1800 -- -- 95 -- 101/66 -- 96    04/04/25 1745 -- -- 95 -- -- -- 87    04/04/25 1730 -- -- 90 -- 100/50 -- 95    04/04/25 1715 -- -- 93 -- -- -- 95    04/04/25 1700 -- -- 97 -- 102/57 -- 95    04/04/25 1645 -- -- 101 -- -- -- 94    04/04/25 1630 -- -- 109 -- 96/82 -- 96    04/04/25 1609 99 (37.2) Oral 99 20 95/52 -- 95        All medication doses during the admission are shown, including meds that are no longer on order.  Scheduled Meds Sorted by Name  for Philomena Davis as of 03/29/25 through 4/7/25    1 Day 3 Days 7 Days 10 Days < Today >   Legend:       Medications 03/29 03/30 03/31 04/01 04/02 04/03 04/04 04/05 04/06 04/07   albumin human 25 % IV SOLN 12.5 g  Dose: 12.5 g  Freq: 2 Times Daily Route: IV  Indications of Use: HEPATORENAL SYNDROME  Start: 04/07/25 1515 End: 04/10/25 0859             1515     2100        albumin human 25 % IV SOLN 50 g  Dose: 50 g  Freq: 3 times daily Route: IV  Indications of Use: Crystalloid Refractory Shock  Start: 04/05/25 1200 End: 04/06/25 0806           1251 2009 0706 [C]         albumin human 25 % IV SOLN 50 g  Dose: 50 g  Freq: 3 times daily Route: IV  Indications of Use: Crystalloid Refractory Shock  Start: 04/05/25 1200 End: 04/05/25 1056           1056-D/C'd        bumetanide (BUMEX) injection 1 mg  Dose: 1 mg  Freq: Every 12 Hours Route: IV  Start: 04/07/25 1515 End: 04/10/25 1514   Admin Instructions:                1515        cefTRIAXone (ROCEPHIN) 2,000 mg in sodium chloride 0.9 % 100 mL MBP  Dose:  2,000 mg  Freq: Every 24 Hours Route: IV  Indications Comment: Urinary tract infection with E. coli  Start: 04/07/25 1800 End: 04/14/25 1759   Admin Instructions:                1800        desvenlafaxine (PRISTIQ) 24 hr tablet 50 mg  Dose: 50 mg  Freq: Daily Route: PO  Indications of Use: MAJOR DEPRESSIVE DISORDER  Start: 04/05/25 0900   Admin Instructions:              0837      0925      0827        folic acid (FOLVITE) tablet 1 mg  Dose: 1 mg  Freq: Daily Route: PO  Indications of Use: FOLATE DEFICIENCY ANEMIA  Start: 04/05/25 0900           0835      0923      0827        gabapentin (NEURONTIN) capsule 200 mg  Dose: 200 mg  Freq: Every 12 Hours Scheduled Route: PO  Indications of Use: NEUROPATHIC PAIN  Start: 04/04/25 2315   Admin Instructions:              0030     0834     2009 0922 2042 0827 2100        Hydrocortisone Sod Suc (PF) (Solu-CORTEF) injection 100 mg  Dose: 100 mg  Freq: Every 8 Hours Route: IV  Start: 04/06/25 1300   Admin Instructions:               1344     2043      0647     1350     2100        ipratropium-albuterol (DUO-NEB) nebulizer solution 3 mL  Dose: 3 mL  Freq: 4 Times Daily - RT Route: NEBULIZATION  Start: 04/04/25 2315   Admin Instructions:                   0757     1230     (9215) [C]     2034      0937     1331     1624     2103      0657          1310     1630     2030        lactated ringers bolus 1,000 mL  Dose: 1,000 mL  Freq: Once Route: IV  Last Dose: Stopped (04/04/25 2115)  Start: 04/04/25 1915 End: 04/04/25 2115 1921 1954 2115           lactobacillus acidophilus (RISAQUAD) capsule 1 capsule  Dose: 1 capsule  Freq: Daily Route: PO  Indications Comment:    Start: 04/05/25 0900           0834      0922      0866        lactulose (CHRONULAC) 10 GM/15ML solution 10 g  Dose: 10 g  Freq: Daily Route: PO  Start: 04/05/25 1100   Admin Instructions:              [phone] (8529)        lactulose (CHRONULAC) 10 GM/15ML solution 30  g  Dose: 30 g  Freq: Once Route: PO  Start: 04/05/25 1315 End: 04/05/25 1441   Admin Instructions:              1441          latanoprost (XALATAN) 0.005 % ophthalmic solution 1 drop  Dose: 1 drop  Freq: Nightly Route: Both Eyes  Indications of Use: OPEN-ANGLE GLAUCOMA  Start: 04/04/25 2315           0031     2011 2247 2100        LORazepam (ATIVAN) tablet 1 mg  Dose: 1 mg  Freq: Once Route: PO  Start: 04/07/25 0315 End: 04/07/25 0324   Admin Instructions:                0324        metoprolol succinate XL (TOPROL-XL) 24 hr tablet 25 mg  Dose: 25 mg  Freq: Daily Route: PO  Indications of Use: ATRIAL FLUTTER  Start: 04/05/25 0900   Admin Instructions:              0836      0842 [C]     0900 0900        midodrine (PROAMATINE) tablet 10 mg  Dose: 10 mg  Freq: 3 Times Daily Before Meals Route: PO  Indications of Use: HYPOTENSION  Start: 04/05/25 0730           0835     1151     1739      0601     1030     1713      0827     1126     1730        mupirocin (BACTROBAN) 2 % nasal ointment 1 Application  Dose: 1 Application  Freq: 2 Times Daily Route: EACH NARE  Start: 04/06/25 1145 End: 04/11/25 0859   Admin Instructions:               1115     2043      0826     2100        pantoprazole (PROTONIX) EC tablet 40 mg  Dose: 40 mg  Freq: Daily Route: PO  Indications of Use: HEARTBURN  Start: 04/05/25 0900   Admin Instructions:              0835      0925      0827        phytonadione (AQUA-MEPHYTON, VITAMIN K) 10 mg in sodium chloride 0.9 % 50 mL IVPB  Dose: 10 mg  Freq: Once Route: IV  Last Dose: 10 mg (04/05/25 1612)  Start: 04/05/25 1630 End: 04/05/25 1642           1612          piperacillin-tazobactam (ZOSYN) 3.375 g IVPB in 100 mL NS MBP (CD)  Dose: 3.375 g  Freq: Every 8 Hours Route: IV  Indications of Use: PNEUMONIA,UNCOMPLICATED CYSTITIS  Start: 04/05/25 0200 End: 04/07/25 1336           0322     0954     1749      0205     0923     1711      0130     0926     1336-D/C'd      piperacillin-tazobactam  (ZOSYN) 3.375 g IVPB in 100 mL NS MBP (CD)  Dose: 3.375 g  Freq: Once Route: IV  Indications of Use: EMPIRIC  Last Dose: Stopped (04/04/25 1954)  Start: 04/04/25 1915 End: 04/04/25 1954 1931 1954           QUEtiapine (SEROquel) tablet 100 mg  Dose: 100 mg  Freq: Nightly Route: PO  Start: 04/04/25 2315   Admin Instructions:              0030     2009      (3658)      2100        riFAXIMin (XIFAXAN) tablet 550 mg  Dose: 550 mg  Freq: Every 12 Hours Scheduled Route: PO  Indications of Use: HEPATIC ENCEPHALOPATHY  Start: 04/05/25 1300   Admin Instructions:              1442     2009      0921     2042      0827     2100        rosuvastatin (CRESTOR) tablet 5 mg  Dose: 5 mg  Freq: Daily Route: PO  Indications of Use: HYPERLIPIDEMIA  Start: 04/05/25 0900   Admin Instructions:              0836      0922      0827        sepsis fluid LR bolus 2,151 mL  Dose: 30 mL/kg  Weight Dosing Info: 71.7 kg  Freq: Once Route: IV  Start: 04/04/25 1915 End: 04/04/25 1859   Admin Instructions:             1859-D/C'd         sodium chloride 0.9 % bolus 1,000 mL  Dose: 1,000 mL  Freq: Once Route: IV  Last Dose: Stopped (04/04/25 1920)  Start: 04/04/25 1700 End: 04/04/25 1920          1726     1920           sodium chloride 0.9 % bolus 500 mL  Dose: 500 mL  Freq: Once Route: IV  Last Dose: 500 mL (04/05/25 0952)  Start: 04/05/25 1015 End: 04/05/25 1052           0952          sodium chloride 0.9 % flush 10 mL  Dose: 10 mL  Freq: Every 12 Hours Scheduled Route: IV  Start: 04/06/25 1245   Admin Instructions:               1226 2000 0829 2100        sodium chloride 0.9 % flush 10 mL  Dose: 10 mL  Freq: Every 12 Hours Scheduled Route: IV  Start: 04/06/25 1245   Admin Instructions:               1226 2000 0829 2100        sodium chloride 0.9 % flush 10 mL  Dose: 10 mL  Freq: Every 12 Hours Scheduled Route: IV  Start: 04/06/25 1245   Admin Instructions:               1227     0310 3814 7113         sodium chloride 0.9 % flush 10 mL  Dose: 10 mL  Freq: Every 12 Hours Scheduled Route: IV  Start: 04/04/25 2315 End: 04/06/25 1054 0991 (2011) 3650 3046-D/C'd       thiamine (VITAMIN B-1) tablet 100 mg  Dose: 100 mg  Freq: Daily Route: PO  Indications of Use: THIAMINE DEFICIENCY  Start: 04/05/25 0900           0835      0971      0883        ursodiol (ACTIGALL) capsule 300 mg  Dose: 300 mg  Freq: 2 Times Daily Route: PO  Indications of Use: HEPATIC DISEASE  Start: 04/04/25 2315           0030     0835     2212      7515 (1510) 3401 6461         vancomycin IVPB 1500 mg in 0.9% NaCl (Premix) 500 mL  Dose: 16.6 mg/kg  Weight Dosing Info: 90.6 kg  Freq: Every 24 Hours Scheduled Route: IV  Indications of Use: EMPIRIC,PNEUMONIA,URINARY TRACT INFECTION  Start: 04/05/25 1800 End: 04/05/25 1159           1159-D/C'd        vancomycin IVPB 1500 mg in 0.9% NaCl (Premix) 500 mL  Dose: 20 mg/kg  Weight Dosing Info: 71.7 kg  Freq: Once Route: IV  Indications of Use: EMPIRIC  Last Dose: Stopped (04/04/25 2130)  Start: 04/04/25 1915 End: 04/04/25 2130 1954 2130                       Continuous Meds Sorted by Name  for Philomena Davis as of 03/29/25 through 4/7/25  Legend:       Medications 03/29 03/30 03/31 04/01 04/02 04/03 04/04 04/05 04/06 04/07   Pharmacy to dose vancomycin  Freq: Continuous PRN Route: XX  PRN Reason: Consult  Indications of Use: PNEUMONIA  Start: 04/04/25 2219 End: 04/05/25 1204           1204-D/C'd        phenylephrine (SHERMAN-SYNEPHRINE) 100 mg in sodium chloride 0.9 % 250 mL infusion  Rate: 6.8-40.77 mL/hr Dose: 0.5-3 mcg/kg/min  Weight Dosing Info: 90.6 kg  Freq: Titrated Route: IV  Last Dose: 2 mcg/kg/min (04/07/25 0925)  Start: 04/07/25 0900   Admin Instructions:                0925        phenylephrine (SHERMAN-SYNEPHRINE) 50 mg in sodium chloride 0.9 % 250 mL infusion  Rate: 13.6-81.5 mL/hr Dose: 0.5-3 mcg/kg/min  Weight Dosing Info: 90.6 kg  Freq:  Titrated Route: IV  Last Dose: 2 mcg/kg/min (04/07/25 0521)  Start: 04/06/25 1115   Admin Instructions:               1114          1430     1549     1645     1759     2239     2246      0121     0254     0403     0521        sodium chloride 0.9 % infusion  Rate: 125 mL/hr Dose: 125 mL/hr  Freq: Continuous Route: IV  Last Dose: Stopped (04/06/25 0356)  Start: 04/05/25 1015 End: 04/06/25 0353           0953     1900      0353-D/C'd  0356         vasopressin (PITRESSIN) 20 units in 100 mL NS infusion  Rate: 9 mL/hr Dose: 0.03 Units/min  Freq: Continuous Route: IV  Last Dose: Stopped (04/06/25 1804)  Start: 04/06/25 1800            1804                     PRN Meds Sorted by Name  for Philomena Davis as of 03/29/25 through 4/7/25  Legend:       Medications 03/29 03/30 03/31 04/01 04/02 04/03 04/04 04/05 04/06 04/07   albuterol sulfate HFA (PROVENTIL HFA;VENTOLIN HFA;PROAIR HFA) inhaler 2 puff  Dose: 2 puff  Freq: Every 4 Hours PRN Route: IN  PRN Reason: Wheezing  Indications of Use: CHRONIC OBSTRUCTIVE PULMONARY DISEASE  Start: 04/04/25 2219 End: 04/04/25 2227   Admin Instructions:             2227-D/C'd          sennosides-docusate (PERICOLACE) 8.6-50 MG per tablet 2 tablet  Dose: 2 tablet  Freq: 2 Times Daily PRN Route: PO  PRN Reason: Constipation  Start: 04/04/25 2219   Admin Instructions:                   And   polyethylene glycol (MIRALAX) packet 17 g  Dose: 17 g  Freq: Daily PRN Route: PO  PRN Reason: Constipation  PRN Comment: Use if senna-docusate is ineffective  Start: 04/04/25 2219   Admin Instructions:                   And   bisacodyl (DULCOLAX) EC tablet 5 mg  Dose: 5 mg  Freq: Daily PRN Route: PO  PRN Reason: Constipation  PRN Comment: Use if polyethylene glycol is ineffective  Start: 04/04/25 2219   Admin Instructions:                   And   bisacodyl (DULCOLAX) suppository 10 mg  Dose: 10 mg  Freq: Daily PRN Route: RE  PRN Reason: Constipation  PRN Comment: Use if bisacodyl oral is  ineffective  Start: 04/04/25 2219   Admin Instructions:                   Calcium Replacement - Follow Nurse / BPA Driven Protocol  Freq: As Needed Route: XX  PRN Reason: Other  Start: 04/04/25 2219   Admin Instructions:                   HYDROcodone-acetaminophen (NORCO) 5-325 MG per tablet 1 tablet  Dose: 1 tablet  Freq: Every 4 Hours PRN Route: PO  PRN Comment: pain  Indications of Use: PAIN  Start: 04/04/25 2219   Admin Instructions:                   ipratropium-albuterol (DUO-NEB) nebulizer solution 3 mL  Dose: 3 mL  Freq: Every 4 Hours PRN Route: NEBULIZATION  PRN Reasons: Shortness of Air,Wheezing  Start: 04/04/25 2219                Magnesium Standard Dose Replacement - Follow Nurse / BPA Driven Protocol  Freq: As Needed Route: XX  PRN Reason: Other  Start: 04/04/25 2219   Admin Instructions:                   nitroglycerin (NITROSTAT) SL tablet 0.4 mg  Dose: 0.4 mg  Freq: Every 5 Minutes PRN Route: SL  PRN Reason: Chest Pain  Start: 04/04/25 2219   Admin Instructions:                   Pharmacy to dose vancomycin  Freq: Continuous PRN Route: XX  PRN Reason: Consult  Indications of Use: PNEUMONIA  Start: 04/04/25 2219 End: 04/05/25 1204           1204-D/C'd        Phosphorus Replacement - Follow Nurse / BPA Driven Protocol  Freq: As Needed Route: XX  PRN Reason: Other  Start: 04/04/25 2219   Admin Instructions:                   Potassium Replacement - Follow Nurse / BPA Driven Protocol  Freq: As Needed Route: XX  PRN Reason: Other  Start: 04/04/25 2219   Admin Instructions:                   sodium chloride 0.9 % flush 10 mL  Dose: 10 mL  Freq: As Needed Route: IV  PRN Reason: Line Care  PRN Comment: After Medication Administration  Start: 04/06/25 1150                sodium chloride 0.9 % flush 10 mL  Dose: 10 mL  Freq: As Needed Route: IV  PRN Reason: Line Care  Start: 04/04/25 2219                 sodium chloride 0.9 % flush 10 mL  Dose: 10 mL  Freq: As Needed Route: IV  PRN Reason: Line Care  Start:  04/04/25 1633 End: 04/06/25 1056            1056-D/C'd       sodium chloride 0.9 % flush 20 mL  Dose: 20 mL  Freq: As Needed Route: IV  PRN Reason: Line Care  PRN Comment: After Blood Draws or Blood Product Administration  Start: 04/06/25 1150                sodium chloride 0.9 % infusion 40 mL  Dose: 40 mL  Freq: As Needed Route: IV  PRN Reason: Line Care  Start: 04/06/25 1150   Admin Instructions:                   sodium chloride 0.9 % infusion 40 mL  Dose: 40 mL  Freq: As Needed Route: IV  PRN Reason: Line Care  Start: 04/04/25 2219   Admin Instructions:

## 2025-04-07 NOTE — CONSULTS
NAL Consult Note    Philomena Davis  1960  [id number]    Date of Admit:  4/4/2025  Date of Consult: 4/7/2025    Reason for Consultation: Acute kidney injury.     History of present illness:    Patient is a 64 y.o.  Yr old female with past medical history of alcohol liver cirrhosis, hypertension, dyslipidemia, COPD, GERD and multiple medical problems as below; who was recently admitted from 3/12 to 3/19/2025 after fall and found to have right Lisfranc and third metatarsal fracture.  Patient was discharged to [facility] rehab; came back to hospital on 4/4 as she was found to be febrile and noted to have leukocytosis.  Patient was also complaining of shortness of breath and diarrhea for last few days prior to admission.  On admission patient was noted to be hypotensive.  Upgraded to ICU.  started on broad-spectrum antibiotics and on pressors.  Labs on admission notable for creatinine 1.1 mg/dl; during the hospital course creatinine up to 1.5 mg/dL prompted nephrology consult.     Past Medical History:   Diagnosis Date    Alcohol withdrawal 05/10/2019    Ankle sprain 2015    Annual physical exam 09/12/2023    Anxiety and depression     Arthritis of back 2020    Arthritis of neck 2021    Asthma 2022    Cataract 20/2/1999    Chronic pain disorder 2023    Cirrhosis of liver     Closed displaced fracture of lateral malleolus of left fibula     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 03/24/2017    Closed trimalleolar fracture of right ankle 05/24/2023    Colon polyp 2018    COPD (chronic obstructive pulmonary disease) 2022    Digital mucous cyst of toe of left foot 03/24/2017    Eating disorder 2022    Fracture of ankle 2017    Fracture, clavicle 1969    Fracture, finger 1972    Fracture, foot 1/30/25    Ganglion cyst of left foot     GERD (gastroesophageal reflux disease) 10/22    Glaucoma     Hip arthrosis 2019    Hyperlipidemia 1999    Hypertension     Knee sprain 2019    Knee swelling      Low back strain 1/15/25    Neck strain 25    Panic disorder 1987    Periarthritis of shoulder     Peripheral neuropathy     Pulmonary arterial hypertension     Urinary tract infection 23    Varicella 1968    Wears glasses        Past Surgical History:   Procedure Laterality Date    ANKLE OPEN REDUCTION INTERNAL FIXATION Left 2017    Procedure: LEFT ANKLE OPEN REDUCTION INTERNAL FIXATION WITH ILIAC CREST BONE GRAFT , EXCISION CYST 4TH/5TH INNERSPACE LEFT FOOT;  Surgeon: Frank Larson MD;  Location:  [facility];  Service:     ANKLE OPEN REDUCTION INTERNAL FIXATION Right 2023    Procedure: ANKLE OPEN REDUCTION INTERNAL FIXATION;  Surgeon: Deandre Reynoso MD;  Location:  [facility];  Service: Orthopedics;  Laterality: Right;    AUGMENTATION MAMMAPLASTY Bilateral     BREAST AUGMENTATION      BREAST EXCISIONAL BIOPSY Right 2014    COLONOSCOPY  2018    DILATATION AND CURETTAGE  1987    DILATION AND CURETTAGE, DIAGNOSTIC / THERAPEUTIC      ENDOSCOPY N/A 2022    Procedure: ESOPHAGOGASTRODUODENOSCOPY;  Surgeon: Arelis Solano MD;  Location:  [facility];  Service: Gastroenterology;  Laterality: N/A;    ENDOSCOPY N/A 2024    Procedure: ESOPHAGOGASTRODUODENOSCOPY;  Surgeon: Brunner, Mark I, MD;  Location:  [facility];  Service: Gastroenterology;  Laterality: N/A;    FOOT SURGERY      FRACTURE SURGERY      PA RPR NON/MAL FEMUR DSTL H/N W/ILIAC/AUTOG BONE Left 2017    Procedure: ILIAC CREST BONE GRAFT;  Surgeon: Frank Larson MD;  Location:  [facility];  Service: Orthopedics    WISDOM TOOTH EXTRACTION      WRIST SURGERY         Social History     Socioeconomic History    Marital status: Single   Tobacco Use    Smoking status: Former     Current packs/day: 0.00     Average packs/day: 1.5 packs/day for 24.0 years (36.1 ttl pk-yrs)     Types: Cigarettes     Start date: 3/5/1985     Quit date: 3/22/2009     Years since quittin.0      Passive exposure: Past    Smokeless tobacco: Never   Vaping Use    Vaping status: Never Used   Substance and Sexual Activity    Alcohol use: Not Currently     Alcohol/week: 5.0 standard drinks of alcohol     Types: 5 Glasses of wine per week    Drug use: No    Sexual activity: Not Currently     Partners: Male     Birth control/protection: Abstinence, Post-menopausal     Comment: Menopausal Post 24 Yrs       family history includes ADD / ADHD in her brother; Alcohol abuse in her brother; Asthma in her maternal aunt, maternal grandfather, and paternal grandmother; Bipolar disorder in her brother; Breast cancer (age of onset: 68) in her paternal grandmother; COPD in her father, maternal aunt, and maternal uncle; Cancer in her mother and another family member; Dementia in her cousin and paternal aunt; Depression in her mother; Drug abuse in her brother; Heart disease in her maternal uncle, paternal grandfather, and another family member; Hypertension in an other family member; Mental illness in her brother; No Known Problems in her daughter, sister, and son; Rheum arthritis in an other family member; Stroke in her maternal grandmother and another family member; Vision loss in her maternal grandmother and mother.    Allergies   Allergen Reactions    Buspirone Anxiety    Codeine Nausea Only    Mirtazapine Anxiety       Medication:    Current Facility-Administered Medications:     sennosides-docusate (PERICOLACE) 8.6-50 MG per tablet 2 tablet, 2 tablet, Oral, BID PRN **AND** polyethylene glycol (MIRALAX) packet 17 g, 17 g, Oral, Daily PRN **AND** bisacodyl (DULCOLAX) EC tablet 5 mg, 5 mg, Oral, Daily PRN **AND** bisacodyl (DULCOLAX) suppository 10 mg, 10 mg, Rectal, Daily PRN, Ana Rosa Bañuelos, CANDY    Calcium Replacement - Follow Nurse / BPA Driven Protocol, , Not Applicable, PRN, Ana Rosa Bañuelos, CANDY    desvenlafaxine (PRISTIQ) 24 hr tablet 50 mg, 50 mg, Oral, Daily, Ana Rosa Bañuelos APRN, 50 mg at  04/07/25 0827    folic acid (FOLVITE) tablet 1 mg, 1 mg, Oral, Daily, Ana Rosa Bañuelos APRN, 1 mg at 04/07/25 0827    gabapentin (NEURONTIN) capsule 200 mg, 200 mg, Oral, Q12H, Ana Rosa Bañuelos APRN, 200 mg at 04/07/25 0827    HYDROcodone-acetaminophen (NORCO) 5-325 MG per tablet 1 tablet, 1 tablet, Oral, Q4H PRN, Ana Rosa Bañuelos APRN    Hydrocortisone Sod Suc (PF) (Solu-CORTEF) injection 100 mg, 100 mg, Intravenous, Q8H, Carl Tuttle MD, 100 mg at 04/07/25 0647    ipratropium-albuterol (DUO-NEB) nebulizer solution 3 mL, 3 mL, Nebulization, Q4H PRN, Aan Rosa Bañuelos APRN    ipratropium-albuterol (DUO-NEB) nebulizer solution 3 mL, 3 mL, Nebulization, 4x Daily - RT, Ana Rosa Bañuelos APRN, 3 mL at 04/07/25 0657    lactobacillus acidophilus (RISAQUAD) capsule 1 capsule, 1 capsule, Oral, Daily, Ana Rosa Bañuelos APRN, 1 capsule at 04/07/25 0826    lactulose (CHRONULAC) 10 GM/15ML solution 10 g, 10 g, Oral, Daily, Rosana Ibrahim MD, 10 g at 04/06/25 0921    latanoprost (XALATAN) 0.005 % ophthalmic solution 1 drop, 1 drop, Both Eyes, Nightly, Ana Rosa Bañuelos APRN, 1 drop at 04/06/25 2247    Magnesium Standard Dose Replacement - Follow Nurse / BPA Driven Protocol, , Not Applicable, PRN, Ana Rosa Bañuelos APRN    [Held by provider] metoprolol succinate XL (TOPROL-XL) 24 hr tablet 25 mg, 25 mg, Oral, Daily, Ana Rosa Bañuelos APRN    midodrine (PROAMATINE) tablet 10 mg, 10 mg, Oral, TID AC, Ana Rosa Bañuelos APRN, 10 mg at 04/07/25 0827    mupirocin (BACTROBAN) 2 % nasal ointment 1 Application, 1 Application, Each Nare, BID, Eileen Carvalho PA-C, 1 Application at 04/07/25 0826    nitroglycerin (NITROSTAT) SL tablet 0.4 mg, 0.4 mg, Sublingual, Q5 Min PRN, Ana Rosa Bñauelos APRN    pantoprazole (PROTONIX) EC tablet 40 mg, 40 mg, Oral, Daily, Ana Rosa Bañuelos APRN, 40 mg at 04/07/25 0827    phenylephrine (SHREMAN-SYNEPHRINE) 100 mg in sodium chloride 0.9 % 250 mL  infusion, 0.5-3 mcg/kg/min, Intravenous, Titrated, Patrice Tran PharmD, Last Rate: 27.2 mL/hr at 04/07/25 0925, 2 mcg/kg/min at 04/07/25 0925    phenylephrine (SHERMAN-SYNEPHRINE) 50 mg in sodium chloride 0.9 % 250 mL infusion, 0.5-3 mcg/kg/min, Intravenous, Titrated, Eileen Carvalho PA-C, Last Rate: 54.4 mL/hr at 04/07/25 0521, 2 mcg/kg/min at 04/07/25 0521    Phosphorus Replacement - Follow Nurse / BPA Driven Protocol, , Not Applicable, PRN, Ana Rosa Bañuelos APRN    piperacillin-tazobactam (ZOSYN) 3.375 g IVPB in 100 mL NS MBP (CD), 3.375 g, Intravenous, Q8H, Ana Rosa Bañuelos APRN, 3.375 g at 04/07/25 0926    Potassium Replacement - Follow Nurse / BPA Driven Protocol, , Not Applicable, PRN, Ana Rosa Bañuelos APRN    QUEtiapine (SEROquel) tablet 100 mg, 100 mg, Oral, Nightly, Ana Rosa Bañuelos APRN, 100 mg at 04/05/25 2009    riFAXIMin (XIFAXAN) tablet 550 mg, 550 mg, Oral, Q12H, Rosana Ibrahim MD, 550 mg at 04/07/25 0827    rosuvastatin (CRESTOR) tablet 5 mg, 5 mg, Oral, Daily, Ana Rosa Bañuelos APRN, 5 mg at 04/07/25 0827    sodium chloride 0.9 % flush 10 mL, 10 mL, Intravenous, PRN, Ana Rosa Bañuelos APRN    sodium chloride 0.9 % flush 10 mL, 10 mL, Intravenous, Q12H, Eileen Carvalho PA-C, 10 mL at 04/07/25 0829    sodium chloride 0.9 % flush 10 mL, 10 mL, Intravenous, Q12H, Eileen Carvalho PA-C, 10 mL at 04/07/25 0829    sodium chloride 0.9 % flush 10 mL, 10 mL, Intravenous, Q12H, Eileen Carvalho PA-C, 10 mL at 04/07/25 0829    sodium chloride 0.9 % flush 10 mL, 10 mL, Intravenous, PRN, Eileen Carvalho PA-C    sodium chloride 0.9 % flush 20 mL, 20 mL, Intravenous, PRN, Eileen Carvalho PA-C    sodium chloride 0.9 % infusion 40 mL, 40 mL, Intravenous, PRN, Ana Rosa Bañuelos APRN    sodium chloride 0.9 % infusion 40 mL, 40 mL, Intravenous, PRN, Eileen Carvalho PA-C    thiamine (VITAMIN B-1) tablet 100 mg, 100 mg, Oral, Daily, Ana Rosa Bañuelos APRN, 100 mg at 04/07/25 0827    ursodiol (ACTIGALL)  capsule 300 mg, 300 mg, Oral, BID, Nestor Bañuelosmarielle TONG, APRN, 300 mg at 04/07/25 0827    vasopressin (PITRESSIN) 20 units in 100 mL NS infusion, 0.03 Units/min, Intravenous, Continuous, Eileen Carvalho PA-C, Held at 04/06/25 1804    Medications Prior to Admission   Medication Sig Dispense Refill Last Dose/Taking    desvenlafaxine (PRISTIQ) 50 MG 24 hr tablet Take 1 tablet by mouth Daily. Indications: Major Depressive Disorder   4/4/2025    folic acid (FOLVITE) 1 MG tablet Take 1 tablet by mouth Daily. Indications: Anemia From Inadequate Folic Acid   4/4/2025    gabapentin (NEURONTIN) 100 MG capsule Take 2 capsules by mouth Every 12 (Twelve) Hours. Indications: Neuropathic Pain 120 capsule 0 4/4/2025    lactulose (CHRONULAC) 10 GM/15ML solution Take 30 mL by mouth 2 (Two) Times a Day.   4/4/2025    latanoprost (XALATAN) 0.005 % ophthalmic solution Administer 1 drop to both eyes Every Night. Indications: Wide-Angle Glaucoma 2.5 mL 5 4/3/2025    metoprolol succinate XL (TOPROL-XL) 25 MG 24 hr tablet Take 1 tablet by mouth Daily. Indications: Atrial Flutter 90 tablet 3 4/4/2025    midodrine (PROAMATINE) 10 MG tablet Take 1 tablet by mouth 3 (Three) Times a Day Before Meals. Indications: Disorder of Low Blood Pressure   4/4/2025    pantoprazole (PROTONIX) 40 MG EC tablet Take 1 tablet by mouth Daily. Indications: Heartburn 60 tablet 5 4/4/2025    rosuvastatin (CRESTOR) 5 MG tablet TAKE 1 TABLET BY MOUTH DAILY 90 tablet 0 4/4/2025    thiamine (VITAMIN B-1) 100 MG tablet tablet Take 1 tablet by mouth Daily. Indications: Deficiency of Vitamin B1   4/4/2025    ursodiol (ACTIGALL) 300 MG capsule Take 1 capsule by mouth 2 (Two) Times a Day. Indications: Liver Disease 60 capsule 5 4/4/2025    albuterol sulfate  (90 Base) MCG/ACT inhaler Inhale 2 puffs Every 4 (Four) Hours As Needed for Wheezing. Indications: Chronic Obstructive Lung Disease 6.7 g 2 Unknown    HYDROcodone-acetaminophen (NORCO) 5-325 MG per tablet Take 1  "tablet by mouth Every 4 (Four) Hours As Needed (pain). Indications: Pain 15 tablet 0 Unknown    LACTOBACILLUS PO Take 1 capsule by mouth Daily. Indications:       naloxone (NARCAN) 4 MG/0.1ML nasal spray Call 911. Don't prime. [place] in 1 nostril for overdose. Repeat in 2-3 minutes in other nostril if no or minimal breathing/responsiveness.  Indications: Opioid Overdose 2 each 0 Unknown    ondansetron ODT (ZOFRAN-ODT) 4 MG disintegrating tablet Place 1 tablet on the tongue Every 6 (Six) Hours As Needed for Nausea or Vomiting. As needed for nausea 12 tablet 0 Unknown    QUEtiapine (SEROquel) 100 MG tablet TAKE 1 TABLET BY MOUTH EVERY NIGHT FOR 30 DAYS. (Patient not taking: Reported on 4/5/2025) 30 tablet 0 Not Taking       Review of Systems:  Full review of systems reviewed and negative otherwise for acute complaints    Physical Exam:   Vital Signs   Blood pressure 103/57, pulse 80, temperature 97.6 °F (36.4 °C), temperature source Oral, resp. rate 24, height 165.1 cm (65\"), weight 90.6 kg (199 lb 11.8 oz), last menstrual period 01/22/2016, SpO2 100%.     GENERAL: Awake and alert, in no acute distress.   HEENT: Normocephalic, atraumatic.    NECK: Supple   HEART: RRR; No murmur, rubs, gallops. ++  edema  LUNGS: Clear to auscultation bilaterally   ABDOMEN: Soft, nontender, nondistended.   JOINTS:  Full range of motion, no redness or tenderness.  EXT:  No cyanosis, clubbing or edema.  : No [place]   SKIN: Warm and dry without rash  NEURO: No focal neurological deficits. Strength equal bilateral  PSYCHIATRIC: Cooperative with PE    Laboratory Data  Results from last 7 days   Lab Units 04/07/25  0505 04/06/25  1130 04/05/25  1838   HEMOGLOBIN g/dL 8.2* 7.3* 7.6*   HEMATOCRIT % 25.9* 23.0* 23.9*     Results from last 7 days   Lab Units 04/07/25  0505 04/06/25  1130 04/05/25  0842 04/04/25  1724   SODIUM mmol/L 133* 134* 135* 135*   POTASSIUM mmol/L 4.1 3.8 4.0 4.2   CHLORIDE mmol/L 103 103 105 103   CO2 mmol/L 18.0* 19.0* " 22.0 24.0   BUN mg/dL 22 19 15 14   CREATININE mg/dL 1.57* 1.40* 1.10* 1.11*   CALCIUM mg/dL 8.4* 8.7 8.6 8.8   PHOSPHORUS mg/dL  --  3.1  --   --    MAGNESIUM mg/dL 2.0 2.0 2.0  --    ALBUMIN g/dL  --  3.7 2.5* 2.6*     Results from last 7 days   Lab Units 04/07/25  0505   GLUCOSE mg/dL 148*     Results from last 7 days   Lab Units 04/05/25  1039   COLOR UA  Dark Yellow*   CLARITY UA  Turbid*   PH, URINE  5.5   SPECIFIC GRAVITY, URINE  1.018   GLUCOSE UA  Negative   KETONES UA  Negative   BILIRUBIN UA  Large (3+)*   PROTEIN UA  30 mg/dL (1+)*   BLOOD UA  Large (3+)*   LEUKOCYTES UA  Moderate (2+)*   NITRITE UA  Positive*     Results from last 7 days   Lab Units 04/06/25  1130   ALK PHOS U/L 88   BILIRUBIN mg/dL 7.6*   ALT (SGPT) U/L 32   AST (SGOT) U/L 154*     Estimated Creatinine Clearance: 41.3 mL/min (A) (by C-G formula based on SCr of 1.57 mg/dL (H)).    Radiology:  CT chest: Impression: 4/5/25  1.Moderate bilateral pleural effusions with significant passive atelectasis affecting the entire left lower lobe and a large portion of the right lower lobe.  2.Patchy bilateral airspace disease and interstitial thickening. This is favored to reflect pneumonia, but pulmonary edema could have a similar appearance. Correlate with laboratory values and symptoms to differentiate.  3.Cardiomegaly and coronary artery disease.  4.Hepatosplenomegaly and hepatic steatosis. Lobulated liver margins worrisome for cirrhosis.  5.Body wall edema.    Chest X ray: 4/4/25   Impression:  1.Mild perihilar and interstitial opacities may reflect mild edema.  2.Left basilar opacity with likely small left pleural effusion may reflect infection or atelectasis.    Impression:     Acute kidney injury  Sepsis /E. coli UTI.   Hyponatremia  Metabolic acidosis  Fluid overload  Anemia   Liver cirrhosis    PLAN: Thank you for asking us to see Philomena Davis, I recommend the following:     Acute kidney injury:   - Creatinine on admission 1.1  mg/dL.  - Creatinine up to 1.5. [Patient looks volume up on exam]   - Likely acute kidney injury due to ATN from hypotension where hepatorenal syndrome cannot be ruled out in the setting of liver cirrhosis.   - Patient is very volume overloaded on exam; will start Bumex 1 mg twice daily with albumin.   - Strict ROSI's.   - Dose meds for GFR  - Avoid nephrotoxins  - No indication for dialysis at this time     Sepsis/E. coli UTI:  - Status ongoing Zosyn on admission.. now on ceftriaxone.    - Currently on pressor.     Hyponatremia /fluid overload  - Diuretics as above.     Metabolic acidosis:  - Will monitor closely.     Liver cirrhosis/anemia:   - Transfuse as needed to keep hemoglobin above 7.     Thank you for the consult, will follow with you closely.     High risk and critically ill patient    Rashawn Donald MD  4/7/2025  11:01 EDT

## 2025-04-08 NOTE — PROGRESS NOTES
"Critical Care Note     LOS: 3 days   Patient Care Team:  Unique Brandt DO as PCP - General (Family Medicine)  Leana Garcia PA as Physician Assistant (Gastroenterology)  Judy Curry LPCC as  (Behavioral Health)    Chief Complaint/Reason for visit:    Chief Complaint   Patient presents with    Abnormal Lab     Sepsis secondary to UTI    Alcoholic cirrhosis of liver without ascites    Alcohol use disorder    Peripheral neuropathy    Acute UTI (urinary tract infection)    Pneumonia    Acute kidney injury/  Hepatorenal syndrome    Subjective     Interval History:     She underwent an EGD yesterday that revealed a healed gastric ulcer with varices that are not actively bleeding.  She remains afebrile.  She is maintaining sinus rhythm.  She continues to require vasopressors, phenylephrine 1.5 mics per kilogram per minute.  Urine output is poor, only 380 mL yesterday.  However she had 7 stools yesterday.  Lactulose is on hold.    Review of Systems:    All systems were reviewed and negative except as noted in subjective.    Medical history, surgical history, social history, family history reviewed    Objective     Intake/Output:    Intake/Output Summary (Last 24 hours) at 4/8/2025 1116  Last data filed at 4/8/2025 0936  Gross per 24 hour   Intake 1002.7 ml   Output 440 ml   Net 562.7 ml       Nutrition:  Diet: Regular/House, Cardiac; Low Sodium (2g); Fluid Consistency: Thin (IDDSI 0)    Infusions:  phenylephrine, 0.5-3 mcg/kg/min, Last Rate: 1.5 mcg/kg/min (04/08/25 4846)  vasopressin, 0.03 Units/min, Last Rate: Stopped (04/06/25 2574)        Mechanical Ventilator Settings:                                                Telemetry: Sinus rhythm             Vital Signs  Blood pressure 106/65, pulse 97, temperature 97.7 °F (36.5 °C), temperature source Oral, resp. rate 18, height 165.1 cm (65\"), weight 90.6 kg (199 lb 11.8 oz), last menstrual period 01/22/2016, SpO2 98%.    Physical Exam:  General " Appearance:  Middle-aged woman semiupright in bed sleeping   Head:  Atraumatic   Eyes:          Jaundice   Ears:     Throat: Oral mucosa moist   Neck: Trachea midline, no crepitus   Back:      Lungs:   Decreased breath sounds lower lobes bilaterally, clear anteriorly    Heart:  Regular rhythm, S1, S2 auscultated    Abdomen:   distended with a fluid wave and edema of her abdominal wall   Rectal:   Deferred   Extremities: 2+ edema no visible rash   Pulses:    Skin: No visible rash   Lymph nodes:    Neurologic: Alert, generalized weakness      Results Review:     I reviewed the patient's new clinical results.   Results from last 7 days   Lab Units 04/08/25  0812 04/07/25  0505 04/06/25  1130 04/05/25  0842   SODIUM mmol/L 135* 133* 134* 135*   POTASSIUM mmol/L 3.8 4.1 3.8 4.0   CHLORIDE mmol/L 102 103 103 105   CO2 mmol/L 19.0* 18.0* 19.0* 22.0   BUN mg/dL 31* 22 19 15   CREATININE mg/dL 1.93* 1.57* 1.40* 1.10*   CALCIUM mg/dL 8.5* 8.4* 8.7 8.6   BILIRUBIN mg/dL 7.5*  --  7.6* 8.2*   ALK PHOS U/L 104  --  88 84   ALT (SGPT) U/L 44*  --  32 35*   AST (SGOT) U/L 152*  --  154* 174*   GLUCOSE mg/dL 139* 148* 104* 97     Results from last 7 days   Lab Units 04/08/25  0812 04/07/25  0505 04/06/25  1130   WBC 10*3/mm3 23.86* 20.88* 13.48*   HEMOGLOBIN g/dL 8.3* 8.2* 7.3*   HEMATOCRIT % 26.0* 25.9* 23.0*   PLATELETS 10*3/mm3 313 258 148         Lab Results   Component Value Date    BLOODCX No growth at 3 days 04/04/2025    BLOODCX No growth at 3 days 04/04/2025     Lab Results   Component Value Date    URINECX >100,000 CFU/mL Escherichia coli (A) 04/04/2025       I reviewed the patient's new imaging including images and reports.  CT CHEST WO CONTRAST DIAGNOSTIC    Date of Exam: 4/5/2025 10:26 PM EDT    Indication: pna vs edema.    Comparison: Chest radiograph 4/4/2025 and chest CT [id number].    Technique: Axial CT images were obtained of the chest without contrast administration.  Reconstructed coronal and sagittal images were  also obtained. Automated exposure control and iterative construction methods were used.      Findings:  The thyroid, trachea and esophagus appear within normal limits. The heart is enlarged. There is mild coronary artery calcification. Diminished attenuation of the blood pool suggests anemia. There are mildly enlarged mediastinal lymph nodes including a  right paratracheal lymph node on axial image 33 measuring 10 mm in the short axis. There is mild aortic and aortic branch vessel atherosclerosis. No pericardial effusion.    There are moderate bilateral pleural effusions with significant passive atelectasis affecting the entire left lower lobe and a large portion of the right lower lobe. There is patchy bilateral airspace disease most pronounced at the lung periphery along  with intermixed interstitial thickening. This is favored to reflect pneumonia, but pulmonary edema could have a similar appearance. Correlate with laboratory values and symptoms to differentiate. Airways are patent.    There are bilateral breast implants. There is body wall edema. The liver is enlarged and infiltrated with fat. Liver margins also appear lobulated worrisome for cirrhosis. The spleen is enlarged measuring 12.2 x 8.8 cm in the axial plane. No acute  findings in the upper abdomen. Mild spinal degenerative changes. No acute osseous abnormality.   Impression:     Impression:  1.Moderate bilateral pleural effusions with significant passive atelectasis affecting the entire left lower lobe and a large portion of the right lower lobe.  2.Patchy bilateral airspace disease and interstitial thickening. This is favored to reflect pneumonia, but pulmonary edema could have a similar appearance. Correlate with laboratory values and symptoms to differentiate.  3.Cardiomegaly and coronary artery disease.  4.Hepatosplenomegaly and hepatic steatosis. Lobulated liver margins worrisome for cirrhosis.  5.Body wall edema.            Electronically  Signed: Ravi Gordon MD   4/6/2025 3:27 AM EDT       Interpretation SummaryEchocardiogram April 6, 2025         Left ventricular systolic function is normal. Automated 2D EF = 63.4%    Left atrial volume is severely increased.    Estimated right ventricular systolic pressure from tricuspid regurgitation is moderately elevated (45-55 mmHg). Calculated right ventricular systolic pressure from tricuspid regurgitation is 47 mmHg.        All medications reviewed.   albumin human, 12.5 g, Intravenous, BID  bumetanide, 1 mg, Intravenous, Q12H  cefTRIAXone, 2,000 mg, Intravenous, Q24H  desvenlafaxine, 50 mg, Oral, Daily  folic acid, 1 mg, Oral, Daily  gabapentin, 200 mg, Oral, Q12H  hydrocortisone sodium succinate, 100 mg, Intravenous, Q8H  ipratropium-albuterol, 3 mL, Nebulization, 4x Daily - RT  lactobacillus acidophilus, 1 capsule, Oral, Daily  lactulose, 10 g, Oral, Daily  latanoprost, 1 drop, Both Eyes, Nightly  midodrine, 10 mg, Oral, TID AC  mupirocin, 1 Application, Each Nare, BID  pantoprazole, 40 mg, Oral, Daily  QUEtiapine, 100 mg, Oral, Nightly  rifAXIMin, 550 mg, Oral, Q12H  rosuvastatin, 5 mg, Oral, Daily  sodium chloride, 10 mL, Intravenous, Q12H  sodium chloride, 10 mL, Intravenous, Q12H  sodium chloride, 10 mL, Intravenous, Q12H  thiamine, 100 mg, Oral, Daily  ursodiol, 300 mg, Oral, BID          Assessment & Plan       Sepsis secondary to UTI    Alcoholic cirrhosis of liver without ascites    Alcohol use disorder    Peripheral neuropathy    Acute UTI (urinary tract infection)    Pneumonia    Acute kidney injury    64-year-old woman with hypertension, chronic pain, liver cirrhosis, COPD hospitalized in mid March with alcoholic hepatitis and cirrhosis.  She also had a third metatarsal fracture.  She was discharged to [facility] for rehabilitation.  She returned to [facility] April 4 with elevated white count fever and hypotension.  Urinalysis was consistent with a urinary tract infection.   Respiratory panel PCR was negative.  Chest x-ray revealed left basilar opacity and effusion, possible edema.  She was placed on vancomycin and Zosyn.  She became hypotensive, Unresponsive to volume resuscitation and albumin.  She was placed on phenylephrine because of resting tachycardia.      Hemoglobin had dropped to 7.6 and she had a history of a recent GI bleed so she was given a unit of packed red cells.  Renal function has worsened and is currently creatinine of 1. 93, compared to 1.14 on admission.  With her cirrhosis and gross total body overload with ascites and edema, I am concerned about possible hepatorenal syndrome.  Nephrology was consulted and agree that she likely has hepatorenal syndrome.    #1 sepsis, suspected urinary source urine cultures positive for E. coli resistant to ampicillin.  She also has basilar infiltrates that may be compressive atelectasis from effusions versus pneumonia.  She was initially on Zosyn but transition to ceftriaxone on April 7.  White count has remained 23, but she is afebrile.  He is receiving stress steroids which may be contributing to her leukocytosis.  She continues to require vasopressors, phenylephrine 0.5 mics per kilogram per minute.  Her serum cortisol was technically normal at 6.47 but that is inappropriate for her stress and hypotension.    -Support blood pressure with phenylephrine   - midodrine 10 mg 3 times daily  -stress steroids  - Rocephin  - Add vasopressin if needed    #2 cirrhosis with ascites, secondary to ETOH, recent GI bleed.EGD April 7 revealed gastric ulcer that was healed, grade 1 esophagitis, grade 1 esophageal varices.  She has bilateral effusions, ascites, hepatomegaly and body wall edema.  She also has a coagulopathy and hyperbilirubinemia.  It is only slightly decreased at 3.6.  INR is 1.77, platelet count is preserved at 258.      -Monitor H&H and transfuse if less than 7.    - Rifaximin  - thiamine, folic acid  -Lactulose on hold  secondary to diarrhea    #3 acute kidney injury    -Possibly hypotension from sepsis  - Worrisome for hepatorenal syndrome with low urine sodium  - Creatinine is worse today at 1.93  -Bumex 1 mg twice daily  - Nephrology evaluating daily for possible renal replacement          VTE Prophylaxis:SCDS    Stress Ulcer Prophylaxis:protonix    Yecenia Clifford MD  04/08/25  11:16 EDT      Time: Critical care 35 min  I personally provided care to this critically ill patient as documented above.  Critical care time does not include time spent on separately billed procedures.  None of my critical care time was concurrent with other critical care providers.

## 2025-04-08 NOTE — PLAN OF CARE
Goal Outcome Evaluation:         Progress:     Patient remains alert and oriented and on 3L NC,afebrile. Aaron weaned down to 1.5 mcg/kg/hr. No C/o of pain. Minimal UOP, Bumex given x 1, albumin given x 1.

## 2025-04-08 NOTE — PROGRESS NOTES
"   LOS: 3 days    Patient Care Team:  Unique Brandt DO as PCP - General (Family Medicine)  Leana Garcia PA as Physician Assistant (Gastroenterology)  Judy Curry LPCC as  (Behavioral Health)    Chief Complaint: Septic shock  64-year-old female alcoholic liver cirrhosis, readmitted with febrile illness leukocytosis shortness of breath and diarrhea, admitted in ICU with hypotension placed on IV pressor admit creatinine 1.1 mg/dL slowly worsening.  Subjective     Interval History:   Slowly deterioration renal function.   High risk complex patient with multiple medical problems  Patient on IV pressors and antibiotic  Dr. Brunner's and Dr. Clifford note seen      Review of Systems:   Complain generalized weakness and edema.    Objective     Vital Sign Min/Max for last 24 hours  Temp  Min: 97.7 °F (36.5 °C)  Max: 98.3 °F (36.8 °C)   BP  Min: 79/52  Max: 125/82   Pulse  Min: 74  Max: 105   Resp  Min: 12  Max: 20   SpO2  Min: 79 %  Max: 100 %   Flow (L/min) (Oxygen Therapy)  Min: 3  Max: 4   No data recorded     Flowsheet Rows      Flowsheet Row First Filed Value   Admission Height 165.1 cm (65\") Documented at 04/04/2025 1609   Admission Weight 71.7 kg (158 lb) Documented at 04/04/2025 1609            I/O this shift:  In: 60.9 [I.V.:60.9]  Out: 110 [Urine:110]  I/O last 3 completed shifts:  In: 3920.9 [I.V.:3595.9; Other:40; IV Piggyback:285]  Out: 740 [Urine:740]    Physical Exam:  General Appearance: Alert, oriented, no obvious distress.  Mild distress  female  Eyes: PER, EOMI.  Neck: Supple no JVD.  Lungs: Clear auscultation, no rales rhonchi's, equal chest movement, nonlabored.  Heart: No gallop, murmur, rub, RRR.  Abdomen: Soft, n abdomen distended, positive ascites, nontender.   Extremities: Bilateral lower extremity 4+ edema, no cyanosis.  Neuro: No focal deficit, moving all extremities, alert oriented X 3  Skin: Positive for spider angiomas noted on the chest wall.  Skin changes " "are noted with ecchymosis, shiny lower extremity edema.    : Dudley catheter in place    WBC WBC   Date Value Ref Range Status   04/08/2025 23.86 (H) 3.40 - 10.80 10*3/mm3 Final   04/07/2025 20.88 (H) 3.40 - 10.80 10*3/mm3 Final   04/06/2025 13.48 (H) 3.40 - 10.80 10*3/mm3 Final      HGB Hemoglobin   Date Value Ref Range Status   04/08/2025 8.3 (L) 12.0 - 15.9 g/dL Final   04/07/2025 8.2 (L) 12.0 - 15.9 g/dL Final   04/06/2025 7.3 (L) 12.0 - 15.9 g/dL Final   04/05/2025 7.6 (L) 12.0 - 15.9 g/dL Final      HCT Hematocrit   Date Value Ref Range Status   04/08/2025 26.0 (L) 34.0 - 46.6 % Final   04/07/2025 25.9 (L) 34.0 - 46.6 % Final   04/06/2025 23.0 (L) 34.0 - 46.6 % Final   04/05/2025 23.9 (L) 34.0 - 46.6 % Final      Platlets No results found for: \"LABPLAT\"   MCV MCV   Date Value Ref Range Status   04/08/2025 108.8 (H) 79.0 - 97.0 fL Final   04/07/2025 110.2 (H) 79.0 - 97.0 fL Final   04/06/2025 108.0 (H) 79.0 - 97.0 fL Final          Sodium Sodium   Date Value Ref Range Status   04/08/2025 135 (L) 136 - 145 mmol/L Final   04/07/2025 133 (L) 136 - 145 mmol/L Final   04/06/2025 134 (L) 136 - 145 mmol/L Final      Potassium Potassium   Date Value Ref Range Status   04/08/2025 3.8 3.5 - 5.2 mmol/L Final   04/07/2025 4.1 3.5 - 5.2 mmol/L Final   04/06/2025 3.8 3.5 - 5.2 mmol/L Final      Chloride Chloride   Date Value Ref Range Status   04/08/2025 102 98 - 107 mmol/L Final   04/07/2025 103 98 - 107 mmol/L Final   04/06/2025 103 98 - 107 mmol/L Final      CO2 CO2   Date Value Ref Range Status   04/08/2025 19.0 (L) 22.0 - 29.0 mmol/L Final   04/07/2025 18.0 (L) 22.0 - 29.0 mmol/L Final   04/06/2025 19.0 (L) 22.0 - 29.0 mmol/L Final      BUN BUN   Date Value Ref Range Status   04/08/2025 31 (H) 8 - 23 mg/dL Final   04/07/2025 22 8 - 23 mg/dL Final   04/06/2025 19 8 - 23 mg/dL Final      Creatinine Creatinine   Date Value Ref Range Status   04/08/2025 1.93 (H) 0.57 - 1.00 mg/dL Final   04/07/2025 1.57 (H) 0.57 - 1.00 " "mg/dL Final   04/06/2025 1.40 (H) 0.57 - 1.00 mg/dL Final      Calcium Calcium   Date Value Ref Range Status   04/08/2025 8.5 (L) 8.6 - 10.5 mg/dL Final   04/07/2025 8.4 (L) 8.6 - 10.5 mg/dL Final   04/06/2025 8.7 8.6 - 10.5 mg/dL Final      PO4 No results found for: \"CAPO4\"   Albumin Albumin   Date Value Ref Range Status   04/08/2025 3.6 3.5 - 5.2 g/dL Final   04/06/2025 3.7 3.5 - 5.2 g/dL Final      Magnesium Magnesium   Date Value Ref Range Status   04/08/2025 2.1 1.6 - 2.4 mg/dL Final   04/07/2025 2.0 1.6 - 2.4 mg/dL Final   04/06/2025 2.0 1.6 - 2.4 mg/dL Final      Uric Acid No results found for: \"URICACID\"        Results Review:     I reviewed the patient's new clinical results.    albumin human, 12.5 g, Intravenous, BID  bumetanide, 1 mg, Intravenous, Q12H  cefTRIAXone, 2,000 mg, Intravenous, Q24H  desvenlafaxine, 50 mg, Oral, Daily  folic acid, 1 mg, Oral, Daily  gabapentin, 200 mg, Oral, Q12H  hydrocortisone sodium succinate, 100 mg, Intravenous, Q8H  ipratropium-albuterol, 3 mL, Nebulization, 4x Daily - RT  lactobacillus acidophilus, 1 capsule, Oral, Daily  lactulose, 10 g, Oral, Daily  latanoprost, 1 drop, Both Eyes, Nightly  midodrine, 10 mg, Oral, TID AC  mupirocin, 1 Application, Each Nare, BID  pantoprazole, 40 mg, Oral, Daily  QUEtiapine, 100 mg, Oral, Nightly  rifAXIMin, 550 mg, Oral, Q12H  rosuvastatin, 5 mg, Oral, Daily  sodium chloride, 10 mL, Intravenous, Q12H  sodium chloride, 10 mL, Intravenous, Q12H  sodium chloride, 10 mL, Intravenous, Q12H  thiamine, 100 mg, Oral, Daily  ursodiol, 300 mg, Oral, BID      phenylephrine, 0.5-3 mcg/kg/min, Last Rate: 1.5 mcg/kg/min (04/08/25 0477)  vasopressin, 0.03 Units/min, Last Rate: Stopped (04/06/25 9040)        Medication Review: Reviewed    Assessment & Plan       Sepsis secondary to UTI    Alcoholic cirrhosis of liver without ascites    Alcohol use disorder    Peripheral neuropathy    Acute UTI (urinary tract infection)    Pneumonia    Acute kidney " injury      1.  Acute kidney injury: Creatinine worsening since admission.  Admit creatinine 1.1 mg/dL,, yesterday increased to 1.5, today's creatinine 1.97 likely diagnosis hepatorenal syndrome with hypotension in the setting of liver cirrhosis patient has been given IV Bumex and albumin.  2.  Cardiorenal syndrome: Cirrhosis of liver.  3.  Metabolic acidosis  4.  Hyponatremia  5.  Volume overload  6.  Cirrhosis of liver  7.  Sepsis/E. coli UTI: On Zosyn on admission and now on ceftriaxone and IV pressors.    Plan:  Avoid nephrotoxic medications.  Keep MAP greater than 60 mmHg for renal perfusion  Check volume status.  Check labs in the morning.  Daily evaluation for renal replacement therapy will be done.  Adjust medication for the new GFR.  Case discussed with the medical staff taking care of the patient.  High risk complex patient with multiple medical problems.  Okay to give IV albumin with diuretics, monitor ammonia level with IV albumin.  Okay to give blood transfusion if hemoglobin less than 7    Jose Austin MD  04/08/25  10:32 EDT

## 2025-04-08 NOTE — PROGRESS NOTES
"GI Daily Progress Note  Subjective:    Chief Complaint:  follow-up decompensated alcoholic cirrhosis    Patient sitting up in bed, reports feeling well today. She is alert and oriented, with no signs of HE on exam. She reports she is tolerating PO. Remains on phenylephrine for BP support.     Objective:    /82 (BP Location: Right arm, Patient Position: Lying)   Pulse 101   Temp 97.7 °F (36.5 °C) (Oral)   Resp 18   Ht 165.1 cm (65\")   Wt 90.6 kg (199 lb 11.8 oz)   LMP 01/22/2016 (Approximate)   SpO2 95%   BMI 33.24 kg/m²     Physical Exam  Vitals reviewed.   Constitutional:       General: She is awake.   Cardiovascular:      Rate and Rhythm: Normal rate.   Pulmonary:      Effort: Pulmonary effort is normal.   Abdominal:      General: Bowel sounds are normal. There is distension.      Palpations: Abdomen is soft.      Tenderness: There is no abdominal tenderness.   Musculoskeletal:      Right lower leg: Edema present.      Left lower leg: Edema present.   Skin:     General: Skin is warm and dry.      Coloration: Skin is jaundiced.   Neurological:      Mental Status: She is alert.         Lab  Lab Results   Component Value Date    WBC 23.86 (H) 04/08/2025    HGB 8.3 (L) 04/08/2025    HGB 8.2 (L) 04/07/2025    HGB 7.3 (L) 04/06/2025    .8 (H) 04/08/2025     04/08/2025    INR 1.77 (H) 04/06/2025    INR 1.75 (H) 04/05/2025    INR 1.22 (H) 01/30/2025    INR 1.18 (H) 11/10/2024    INR 1.10 01/31/2024       Lab Results   Component Value Date    GLUCOSE 139 (H) 04/08/2025    BUN 31 (H) 04/08/2025    CREATININE 1.93 (H) 04/08/2025    EGFRIFNONA 114 05/15/2019    BCR 16.1 04/08/2025     (L) 04/08/2025    K 3.8 04/08/2025    CO2 19.0 (L) 04/08/2025    CALCIUM 8.5 (L) 04/08/2025    ALBUMIN 3.6 04/08/2025    ALKPHOS 104 04/08/2025    BILITOT 7.5 (H) 04/08/2025    BILIDIR 5.9 (H) 03/13/2025    ALT 44 (H) 04/08/2025     (H) 04/08/2025       Assessment:      Sepsis secondary to UTI    " Alcoholic cirrhosis of liver without ascites    Alcohol use disorder    Peripheral neuropathy    Acute UTI (urinary tract infection)    Pneumonia    Acute kidney injury      Decompensated alcoholic cirrhosis/ recent alcoholic hepatitis  Hypotension and worsening creatine concerning for HRS  Grade 1 EV  No alcohol since 3/12/25  MELD- Na = 24  Portal gastropathy but no varices  Probable hepatorenal syndrome, worsening creatinine  Nephrology following  Coagulopathy of liver disease  Hepatic encephalopathy, grade 1. Appears improved.   Sarcopenia, deconditioning. Nonambulatory status  Alcoholic polyneuropathy  LLL PNA with sepsis  Recent LA grade D reflux esophagitis/ gastric antrum ulcer  Healed on EGD 4/7 with no blood seen in GI tract      Plan:    Continue lactulose and rifaximin for HE. No evidence of HE on exam today.   Continue BID PPI  Repeat INR in AM. Monitor H/H. Transfuse for hgb < 7.  Ongoing follow-up with [facility] hepatology.    CANDY Colon  04/08/25  09:10 EDT

## 2025-04-08 NOTE — PLAN OF CARE
Goal Outcome Evaluation:           Progress: no change  Outcome Evaluation: Patient remains alert and oriented x4. VSS, remains on 3L NC. Patient continues on mikhail @ 1.5, unable to wean. Multiple BMs, UOP 260ml. No complaints of pain.

## 2025-04-09 NOTE — CASE MANAGEMENT/SOCIAL WORK
Continued Stay Note   [place]     Patient Name: Philomena Davis  MRN: [id number]  Today's Date: 4/9/2025    Admit Date: 4/4/2025    Plan: [facility]   Discharge Plan       Row Name 04/09/25 1235       Plan    Plan [facility]    Patient/Family in Agreement with Plan yes    Plan Comments Discussed Ms. Davis in MDR. Patient remains on 3L NC and pressors. Nephrology is following. Discharge plan is to return to [facility] once medically ready.  will continue to follow up.    Final Discharge Disposition Code 62 - inpatient rehab facility                   Discharge Codes    No documentation.                 Expected Discharge Date and Time       Expected Discharge Date Expected Discharge Time    Apr 11, 2025               Reena Hodges RN

## 2025-04-09 NOTE — PROGRESS NOTES
"   LOS: 4 days    Patient Care Team:  Unique Brandt DO as PCP - General (Family Medicine)  Leana Garcia PA as Physician Assistant (Gastroenterology)  Judy Curry LPCC as  (Behavioral Health)    Chief Complaint: Septic shock  64-year-old female alcoholic liver cirrhosis, readmitted with febrile illness leukocytosis shortness of breath and diarrhea, admitted in ICU with hypotension placed on IV pressor admit creatinine 1.1 mg/dL slowly worsening.    Subjective     Seen and examined at bedside.  Denies chest pain or shortness of breath.   Very volume overloaded.  Severe anasarca.   Will increase diuretics today.  Heading towards dialysis.   On pressors.    Review of Systems:   Complain generalized weakness and edema.    Objective     Vital Sign Min/Max for last 24 hours  Temp  Min: 96.7 °F (35.9 °C)  Max: 97.6 °F (36.4 °C)   BP  Min: 66/34  Max: 126/68   Pulse  Min: 79  Max: 105   Resp  Min: 12  Max: 20   SpO2  Min: 74 %  Max: 99 %   Flow (L/min) (Oxygen Therapy)  Min: 3  Max: 4   Weight  Min: 99.4 kg (219 lb 2.2 oz)  Max: 99.4 kg (219 lb 2.2 oz)     Flowsheet Rows      Flowsheet Row First Filed Value   Admission Height 165.1 cm (65\") Documented at 04/04/2025 1609   Admission Weight 71.7 kg (158 lb) Documented at 04/04/2025 1609            No intake/output data recorded.  I/O last 3 completed shifts:  In: 1796.5 [P.O.:360; I.V.:1234.9; Other:10; IV Piggyback:191.6]  Out: 810 [Urine:810]    Physical Exam:  General Appearance: Alert, oriented, no obvious distress.  Mild distress  female  Eyes: PER, EOMI.  Neck: Supple no JVD.  Lungs: Clear auscultation, no rales rhonchi's, equal chest movement, nonlabored.  Heart: No gallop, murmur, rub, RRR.  Abdomen: Soft, n abdomen distended, positive ascites, nontender.   Extremities: Bilateral lower extremity 4+ edema, no cyanosis.  Neuro: No focal deficit, moving all extremities, alert oriented X 3  Skin: Positive for spider angiomas noted on the " "chest wall.  Skin changes are noted with ecchymosis, shiny lower extremity edema.    : Dudley catheter in place    WBC WBC   Date Value Ref Range Status   04/09/2025 19.51 (H) 3.40 - 10.80 10*3/mm3 Final   04/08/2025 23.86 (H) 3.40 - 10.80 10*3/mm3 Final   04/07/2025 20.88 (H) 3.40 - 10.80 10*3/mm3 Final      HGB Hemoglobin   Date Value Ref Range Status   04/09/2025 7.6 (L) 12.0 - 15.9 g/dL Final   04/08/2025 8.3 (L) 12.0 - 15.9 g/dL Final   04/07/2025 8.2 (L) 12.0 - 15.9 g/dL Final      HCT Hematocrit   Date Value Ref Range Status   04/09/2025 23.4 (L) 34.0 - 46.6 % Final   04/08/2025 26.0 (L) 34.0 - 46.6 % Final   04/07/2025 25.9 (L) 34.0 - 46.6 % Final      Platlets No results found for: \"LABPLAT\"   MCV MCV   Date Value Ref Range Status   04/09/2025 106.4 (H) 79.0 - 97.0 fL Final   04/08/2025 108.8 (H) 79.0 - 97.0 fL Final   04/07/2025 110.2 (H) 79.0 - 97.0 fL Final          Sodium Sodium   Date Value Ref Range Status   04/09/2025 136 136 - 145 mmol/L Final   04/08/2025 135 (L) 136 - 145 mmol/L Final   04/07/2025 133 (L) 136 - 145 mmol/L Final      Potassium Potassium   Date Value Ref Range Status   04/09/2025 3.9 3.5 - 5.2 mmol/L Final   04/08/2025 3.8 3.5 - 5.2 mmol/L Final   04/07/2025 4.1 3.5 - 5.2 mmol/L Final      Chloride Chloride   Date Value Ref Range Status   04/09/2025 104 98 - 107 mmol/L Final   04/08/2025 102 98 - 107 mmol/L Final   04/07/2025 103 98 - 107 mmol/L Final      CO2 CO2   Date Value Ref Range Status   04/09/2025 19.0 (L) 22.0 - 29.0 mmol/L Final   04/08/2025 19.0 (L) 22.0 - 29.0 mmol/L Final   04/07/2025 18.0 (L) 22.0 - 29.0 mmol/L Final      BUN BUN   Date Value Ref Range Status   04/09/2025 38 (H) 8 - 23 mg/dL Final   04/08/2025 31 (H) 8 - 23 mg/dL Final   04/07/2025 22 8 - 23 mg/dL Final      Creatinine Creatinine   Date Value Ref Range Status   04/09/2025 2.10 (H) 0.57 - 1.00 mg/dL Final   04/08/2025 1.93 (H) 0.57 - 1.00 mg/dL Final   04/07/2025 1.57 (H) 0.57 - 1.00 mg/dL Final    " "  Calcium Calcium   Date Value Ref Range Status   04/09/2025 8.5 (L) 8.6 - 10.5 mg/dL Final   04/08/2025 8.5 (L) 8.6 - 10.5 mg/dL Final   04/07/2025 8.4 (L) 8.6 - 10.5 mg/dL Final      PO4 No results found for: \"CAPO4\"   Albumin Albumin   Date Value Ref Range Status   04/09/2025 3.6 3.5 - 5.2 g/dL Final   04/08/2025 3.6 3.5 - 5.2 g/dL Final      Magnesium Magnesium   Date Value Ref Range Status   04/09/2025 2.2 1.6 - 2.4 mg/dL Final   04/08/2025 2.1 1.6 - 2.4 mg/dL Final   04/07/2025 2.0 1.6 - 2.4 mg/dL Final      Uric Acid No results found for: \"URICACID\"        Results Review:     I reviewed the patient's new clinical results.    albumin human, 12.5 g, Intravenous, BID  bumetanide, 2 mg, Intravenous, Q12H  cefTRIAXone, 2,000 mg, Intravenous, Q24H  desvenlafaxine, 50 mg, Oral, Daily  folic acid, 1 mg, Oral, Daily  gabapentin, 200 mg, Oral, Q12H  hydrocortisone sodium succinate, 50 mg, Intravenous, Q8H  ipratropium-albuterol, 3 mL, Nebulization, 4x Daily - RT  lactobacillus acidophilus, 1 capsule, Oral, Daily  lactulose, 10 g, Oral, Daily  latanoprost, 1 drop, Both Eyes, Nightly  midodrine, 10 mg, Oral, TID AC  mupirocin, 1 Application, Each Nare, BID  pantoprazole, 40 mg, Oral, Daily  QUEtiapine, 100 mg, Oral, Nightly  rifAXIMin, 550 mg, Oral, Q12H  rosuvastatin, 5 mg, Oral, Daily  sodium chloride, 10 mL, Intravenous, Q12H  sodium chloride, 10 mL, Intravenous, Q12H  sodium chloride, 10 mL, Intravenous, Q12H  thiamine, 100 mg, Oral, Daily  ursodiol, 300 mg, Oral, BID      phenylephrine, 0.5-3 mcg/kg/min, Last Rate: 2.4 mcg/kg/min (04/09/25 8693)  vasopressin, 0.03 Units/min, Last Rate: 0.03 Units/min (04/09/25 1224)        Medication Review: Reviewed    Assessment & Plan       Sepsis secondary to UTI    Alcoholic cirrhosis of liver without ascites    Alcohol use disorder    Peripheral neuropathy    Acute UTI (urinary tract infection)    Pneumonia    Acute kidney injury      1.  Acute kidney injury: Creatinine " worsening since admission.  Admit creatinine 1.1 mg/dL,, yesterday increased to 1.5, today's creatinine 1.97 likely diagnosis hepatorenal syndrome with hypotension in the setting of liver cirrhosis patient has been given IV Bumex and albumin.  2.  Cardiorenal syndrome: Cirrhosis of liver.  3.  Metabolic acidosis  4.  Hyponatremia  5.  Volume overload  6.  Cirrhosis of liver  7.  Sepsis/E. coli UTI: On Zosyn on admission and now on ceftriaxone and IV pressors.    Plan:  Will increase diuretics.  Heading towards dialysis in next 24 -48 hours  Avoid nephrotoxic medications.  Keep MAP greater than 60 mmHg for renal perfusion  Check volume status.  Check labs in the morning.  Daily evaluation for renal replacement therapy will be done.  Adjust medication for the new GFR.  Case discussed with the medical staff taking care of the patient.  High risk complex patient with multiple medical problems.      Rashawn Donald MD  04/09/25  19:39 EDT

## 2025-04-09 NOTE — PROGRESS NOTES
"GI Daily Progress Note  Subjective:    Chief Complaint:  Follow-up decompensated alcoholic cirrhosis/ recent alcoholic hepatitis    Patient sleeping in bed, with worsening jaundice. She is on phenylephrine and vasopressin for BP support. She had multiple loose stools overnight, with lactulose on hold.    Objective:    /61   Pulse 84   Temp 96.7 °F (35.9 °C) (Axillary)   Resp 16   Ht 165.1 cm (65\")   Wt 99.4 kg (219 lb 2.2 oz) Comment: weighed w/  scds on and 5 pillows and 2 thin blankets  LMP 01/22/2016 (Approximate)   SpO2 96%   BMI 36.47 kg/m²     Physical Exam  Vitals reviewed.   Constitutional:       General: She is sleeping.   Abdominal:      Palpations: Abdomen is soft.   Skin:     General: Skin is warm.      Capillary Refill: Capillary refill takes less than 2 seconds.      Coloration: Skin is jaundiced.      Findings: Bruising present.         Lab  Lab Results   Component Value Date    WBC 19.51 (H) 04/09/2025    HGB 7.6 (L) 04/09/2025    HGB 8.3 (L) 04/08/2025    HGB 8.2 (L) 04/07/2025    .4 (H) 04/09/2025     04/09/2025    INR 1.64 (H) 04/09/2025    INR 1.77 (H) 04/06/2025    INR 1.75 (H) 04/05/2025    INR 1.22 (H) 01/30/2025    INR 1.18 (H) 11/10/2024       Lab Results   Component Value Date    GLUCOSE 146 (H) 04/09/2025    BUN 38 (H) 04/09/2025    CREATININE 2.10 (H) 04/09/2025    EGFRIFNONA 114 05/15/2019    BCR 18.1 04/09/2025     04/09/2025    K 3.9 04/09/2025    CO2 19.0 (L) 04/09/2025    CALCIUM 8.5 (L) 04/09/2025    ALBUMIN 3.6 04/09/2025    ALKPHOS 93 04/09/2025    BILITOT 6.7 (H) 04/09/2025    BILIDIR 5.9 (H) 03/13/2025    ALT 51 (H) 04/09/2025     (H) 04/09/2025       Assessment:      Sepsis secondary to UTI    Alcoholic cirrhosis of liver without ascites    Alcohol use disorder    Peripheral neuropathy    Acute UTI (urinary tract infection)    Pneumonia    Acute kidney injury      Decompensated alcoholic cirrhosis/ recent alcoholic hepatitis  Hypotension " and worsening creatine concerning for HRS  Grade 1 EV  No alcohol since 3/12/25  MELD- Na = 28 points  Portal gastropathy but no varices  Probable hepatorenal syndrome, worsening creatinine, low urine sodium  Nephrology following  Bumex  Hypotension, requiring pressors  Coagulopathy of liver disease  Hepatic encephalopathy, grade 1. Appears improved.   Sarcopenia, deconditioning. Nonambulatory status  Alcoholic polyneuropathy  Sepsis, suspected urinary source vs PNA  Recent LA grade D reflux esophagitis/ gastric antrum ulcer  Healed on EGD 4/7 with no blood seen in GI tract    Plan:    Continue lactulose and rifaximin for HE. Hold lactulose for excessive bowel movements. She had 3 loose stools overnight with lactulose on hold.   Continue BID PPI  Transfuse for hgb < 7. INR is stable/improving today.   Ongoing follow-up with [facility].    CANDY Colon  04/09/25  12:49 EDT

## 2025-04-09 NOTE — PLAN OF CARE
"Goal Outcome Evaluation:  Plan of Care Reviewed With: patient        Progress: no change  Outcome Evaluation: Pt oriented x4. VSS, and remains afebrile. Pt had 3  loose stools, lactulose is still on hold. Pt appears more jaundice this morning. Pt states that she does not like how seroquel makes her feel, she states, \"it just knocks me out\". IV medicated gtts are as follows: Phenylephrine 3mg/kg/min, vasopressin 0.03unit/min. Total uop for the shift was 275ml despite receiving bumex 1mg. Edema is severe 4+ and beginning to weep from left flank. Labs noted.                             "

## 2025-04-09 NOTE — PLAN OF CARE
-Patient remains alert and oriented, however appears to be slower to respond at times  -Vitals stable, Afebrile, Requiring Neosynephrine and Vasopressin for BP support, 4.5L NC  -More shortness of breath this evening... worsening lung sounds... Maintaining sats > 90%  -Chest X-Ray ordered for AM  -BIPAP ordered for HS and PRN.. Patient opted to eat dinner then continue to assess SOA before wearing BIPAP at this time  -Patient is very edematous (dependent, legs, feet, abdomen)   -Creatnine continues to climb, total ... Bumex increased to 2mg Q 12H  -Patient prognosis and POC reviewed with patient, by both ICU MD and nephrology.  -Patient aware of potential need for Dialysis/CRRT

## 2025-04-09 NOTE — PROGRESS NOTES
"Critical Care Note     LOS: 4 days   Patient Care Team:  Unique Brandt DO as PCP - General (Family Medicine)  Leana Garcia PA as Physician Assistant (Gastroenterology)  Judy Curry LPCC as  (Behavioral Health)    Chief Complaint/Reason for visit:    Chief Complaint   Patient presents with    Abnormal Lab     Sepsis secondary to UTI    Alcoholic cirrhosis of liver without ascites    Alcohol use disorder    Peripheral neuropathy    Acute UTI (urinary tract infection)    Pneumonia    Acute kidney injury/  Hepatorenal syndrome    Subjective     Interval History:     She continues to be very volume overloaded and renal function continues to decline.  Blood pressure is also worsened and she is now on vasopressin and maxed on phenylephrine.  On 3 L nasal cannula her saturations 94%.  She only had 535 mL of urine yesterday.    Review of Systems:    All systems were reviewed and negative except as noted in subjective.    Medical history, surgical history, social history, family history reviewed    Objective     Intake/Output:    Intake/Output Summary (Last 24 hours) at 4/9/2025 1710  Last data filed at 4/9/2025 1200  Gross per 24 hour   Intake 1203.15 ml   Output 500 ml   Net 703.15 ml       Nutrition:  Diet: Regular/House, Cardiac; Low Sodium (2g); Fluid Consistency: Thin (IDDSI 0)    Infusions:  phenylephrine, 0.5-3 mcg/kg/min, Last Rate: 2.7 mcg/kg/min (04/09/25 1627)  vasopressin, 0.03 Units/min, Last Rate: 0.03 Units/min (04/09/25 1224)        Mechanical Ventilator Settings:                                                Telemetry: Sinus rhythm             Vital Signs  Blood pressure 101/71, pulse 84, temperature 96.9 °F (36.1 °C), temperature source Axillary, resp. rate 18, height 165.1 cm (65\"), weight 99.4 kg (219 lb 2.2 oz), last menstrual period 01/22/2016, SpO2 94%.    Physical Exam:  General Appearance:  Middle-aged woman sitting upright in bed 80   Head:  Atraumatic   Eyes:          " Jaundice   Ears:     Throat: Oral mucosa moist   Neck: Trachea midline, no crepitus   Back:      Lungs:   Decreased breath sounds lower lobes bilaterally, some crackles in the left and right mid chest posteriorly, clear anteriorly    Heart:  Regular rhythm, S1, S2 auscultated    Abdomen:   distended with a fluid wave and edema of her abdominal wall   Rectal:   Deferred   Extremities: 3+ edema no visible rash   Pulses:    Skin: No visible rash   Lymph nodes:    Neurologic: Alert, generalized weakness      Results Review:     I reviewed the patient's new clinical results.   Results from last 7 days   Lab Units 04/09/25  0304 04/08/25  0812 04/07/25  0505 04/06/25  1130   SODIUM mmol/L 136 135* 133* 134*   POTASSIUM mmol/L 3.9 3.8 4.1 3.8   CHLORIDE mmol/L 104 102 103 103   CO2 mmol/L 19.0* 19.0* 18.0* 19.0*   BUN mg/dL 38* 31* 22 19   CREATININE mg/dL 2.10* 1.93* 1.57* 1.40*   CALCIUM mg/dL 8.5* 8.5* 8.4* 8.7   BILIRUBIN mg/dL 6.7* 7.5*  --  7.6*   ALK PHOS U/L 93 104  --  88   ALT (SGPT) U/L 51* 44*  --  32   AST (SGOT) U/L 159* 152*  --  154*   GLUCOSE mg/dL 146* 139* 148* 104*     Results from last 7 days   Lab Units 04/09/25  0304 04/08/25  0812 04/07/25  0505   WBC 10*3/mm3 19.51* 23.86* 20.88*   HEMOGLOBIN g/dL 7.6* 8.3* 8.2*   HEMATOCRIT % 23.4* 26.0* 25.9*   PLATELETS 10*3/mm3 193 313 258         Lab Results   Component Value Date    BLOODCX No growth at 4 days 04/04/2025    BLOODCX No growth at 4 days 04/04/2025     Lab Results   Component Value Date    URINECX >100,000 CFU/mL Escherichia coli (A) 04/04/2025       I reviewed the patient's new imaging including images and reports.      CT CHEST WO CONTRAST DIAGNOSTIC    Date of Exam: 4/5/2025 10:26 PM EDT    Indication: pna vs edema.    Comparison: Chest radiograph 4/4/2025 and chest CT 699[address] 22.    Technique: Axial CT images were obtained of the chest without contrast administration.  Reconstructed coronal and sagittal images were also obtained. Automated  exposure control and iterative construction methods were used.      Findings:  The thyroid, trachea and esophagus appear within normal limits. The heart is enlarged. There is mild coronary artery calcification. Diminished attenuation of the blood pool suggests anemia. There are mildly enlarged mediastinal lymph nodes including a  right paratracheal lymph node on axial image 33 measuring 10 mm in the short axis. There is mild aortic and aortic branch vessel atherosclerosis. No pericardial effusion.    There are moderate bilateral pleural effusions with significant passive atelectasis affecting the entire left lower lobe and a large portion of the right lower lobe. There is patchy bilateral airspace disease most pronounced at the lung periphery along  with intermixed interstitial thickening. This is favored to reflect pneumonia, but pulmonary edema could have a similar appearance. Correlate with laboratory values and symptoms to differentiate. Airways are patent.    There are bilateral breast implants. There is body wall edema. The liver is enlarged and infiltrated with fat. Liver margins also appear lobulated worrisome for cirrhosis. The spleen is enlarged measuring 12.2 x 8.8 cm in the axial plane. No acute  findings in the upper abdomen. Mild spinal degenerative changes. No acute osseous abnormality.   Impression:     Impression:  1.Moderate bilateral pleural effusions with significant passive atelectasis affecting the entire left lower lobe and a large portion of the right lower lobe.  2.Patchy bilateral airspace disease and interstitial thickening. This is favored to reflect pneumonia, but pulmonary edema could have a similar appearance. Correlate with laboratory values and symptoms to differentiate.  3.Cardiomegaly and coronary artery disease.  4.Hepatosplenomegaly and hepatic steatosis. Lobulated liver margins worrisome for cirrhosis.  5.Body wall edema.            Electronically Signed: Ravi Gordon MD    4/6/2025 3:27 AM EDT       Interpretation SummaryEchocardiogram April 6, 2025         Left ventricular systolic function is normal. Automated 2D EF = 63.4%    Left atrial volume is severely increased.    Estimated right ventricular systolic pressure from tricuspid regurgitation is moderately elevated (45-55 mmHg). Calculated right ventricular systolic pressure from tricuspid regurgitation is 47 mmHg.        All medications reviewed.   albumin human, 12.5 g, Intravenous, BID  bumetanide, 2 mg, Intravenous, Q12H  cefTRIAXone, 2,000 mg, Intravenous, Q24H  desvenlafaxine, 50 mg, Oral, Daily  folic acid, 1 mg, Oral, Daily  gabapentin, 200 mg, Oral, Q12H  hydrocortisone sodium succinate, 50 mg, Intravenous, Q8H  ipratropium-albuterol, 3 mL, Nebulization, 4x Daily - RT  lactobacillus acidophilus, 1 capsule, Oral, Daily  lactulose, 10 g, Oral, Daily  latanoprost, 1 drop, Both Eyes, Nightly  midodrine, 10 mg, Oral, TID AC  mupirocin, 1 Application, Each Nare, BID  pantoprazole, 40 mg, Oral, Daily  QUEtiapine, 100 mg, Oral, Nightly  rifAXIMin, 550 mg, Oral, Q12H  rosuvastatin, 5 mg, Oral, Daily  sodium chloride, 10 mL, Intravenous, Q12H  sodium chloride, 10 mL, Intravenous, Q12H  sodium chloride, 10 mL, Intravenous, Q12H  thiamine, 100 mg, Oral, Daily  ursodiol, 300 mg, Oral, BID          Assessment & Plan       Sepsis secondary to UTI    Alcoholic cirrhosis of liver without ascites    Alcohol use disorder    Peripheral neuropathy    Acute UTI (urinary tract infection)    Pneumonia    Acute kidney injury    64-year-old woman with hypertension, chronic pain, liver cirrhosis, COPD hospitalized in mid March with alcoholic hepatitis and cirrhosis.  She also had a third metatarsal fracture.  She was discharged to [facility] for rehabilitation.  She returned to [facility] April 4 with elevated white count fever and hypotension.  Urinalysis was consistent with a urinary tract infection.  Respiratory panel PCR was  negative.  Chest x-ray revealed left basilar opacity and effusion, possible edema.  She was placed on vancomycin and Zosyn.  She became hypotensive, Unresponsive to volume resuscitation and albumin.  She was placed on phenylephrine because of resting tachycardia.  She is now maxed on phenylephrine and vasopressin was added.    Hemoglobin had dropped to 7.6 and she had a history of a recent GI bleed so she was given a unit of packed red cells.  She underwent an EGD that revealed a nonbleeding ulcer and varices.  Renal function has worsened and is currently creatinine of 2.1, compared to 1.14 on admission.  With her cirrhosis and gross total body overload with ascites and edema, I am concerned about possible hepatorenal syndrome.  Nephrology was consulted and agree that she likely has hepatorenal syndrome.    #1 sepsis, suspected urinary source urine cultures positive for E. coli resistant to ampicillin.  She also has basilar infiltrates that may be compressive atelectasis from effusions versus pneumonia.  She was initially on Zosyn but transition to ceftriaxone on April 7.  White count has remained elevated, but she is afebrile.  He is receiving stress steroids which may be contributing to her leukocytosis.  She continues to require vasopressors increased doses.  Her serum cortisol was technically normal at 6.47 but that is inappropriate for her stress and hypotension.    -Support blood pressure with phenylephrine and vasopressin  - midodrine 10 mg 3 times daily  -stress steroids, decrease to 50 mg every 6 hours on April 8  - Rocephin    #2 cirrhosis with ascites, secondary to ETOH, recent GI bleed.EGD April 7 revealed gastric ulcer that was healed, grade 1 esophagitis, grade 1 esophageal varices.  She has bilateral effusions, ascites, hepatomegaly and body wall edema.  She also has a coagulopathy and hyperbilirubinemia.  It is only slightly decreased at 3.6.  INR is 1.77, platelet count is preserved at  258.      -Monitor H&H and transfuse if less than 7.    - Rifaximin  - thiamine, folic acid  -Lactulose on hold secondary to diarrhea    #3 acute kidney injury    -Possibly hypotension from sepsis  - Worrisome for hepatorenal syndrome with low urine sodium  - Creatinine is worse today at 2.10  -Bumex 1 mg twice daily, increase to 2 mg  - Nephrology evaluating daily for possible renal replacement  - Clinically I feel we are headed to hemodialysis.  She is massively volume overloaded and now oxygenation is starting to decline.    I discussed her case with gastroenterology who feel her mortality is quite high with her cirrhosis, hepatorenal syndrome, MELD score 28.  She likely has a mortality in excess of 60%.  She has a very poor understanding of the severity of her disease      VTE Prophylaxis:SCDS    Stress Ulcer Prophylaxis:protonix    Yecenia Clifford MD  04/09/25  17:10 EDT      Time: Critical care 35 min  I personally provided care to this critically ill patient as documented above.  Critical care time does not include time spent on separately billed procedures.  None of my critical care time was concurrent with other critical care providers.

## 2025-04-10 NOTE — PROGRESS NOTES
"   LOS: 5 days    Patient Care Team:  Unique Brandt DO as PCP - General (Family Medicine)  Leana Garcia PA as Physician Assistant (Gastroenterology)  Judy Curry LPCC as  (Behavioral Health)    Chief Complaint: Septic shock  64-year-old female alcoholic liver cirrhosis, readmitted with febrile illness leukocytosis shortness of breath and diarrhea, admitted in ICU with hypotension placed on IV pressor admit creatinine 1.1 mg/dL slowly worsening.    Subjective     Seen and examined at bedside, remains critically ill. On 2 pressors.   Severely volume overloaded .  Almost 10 L positive since admission.   S/p diuretics with no improvement in urine output.   Extensively discussed with the patient.  Intensivist as well.   Will place dialysis catheter initiate CRRT.       Objective     Vital Sign Min/Max for last 24 hours  Temp  Min: 96.6 °F (35.9 °C)  Max: 97.6 °F (36.4 °C)   BP  Min: 83/57  Max: 126/68   Pulse  Min: 77  Max: 105   Resp  Min: 16  Max: 20   SpO2  Min: 89 %  Max: 99 %   Flow (L/min) (Oxygen Therapy)  Min: 3  Max: 6   No data recorded     Flowsheet Rows      Flowsheet Row First Filed Value   Admission Height 165.1 cm (65\") Documented at 04/04/2025 1609   Admission Weight 71.7 kg (158 lb) Documented at 04/04/2025 1609            I/O this shift:  In: 300.3 [I.V.:250.3; IV Piggyback:50]  Out: 35 [Urine:35]  I/O last 3 completed shifts:  In: 2032.1 [P.O.:360; I.V.:1330.5; IV Piggyback:341.6]  Out: 790 [Urine:790]    Physical Exam:  General Appearance: Alert, oriented, no obvious distress.  female  Eyes: PER, EOMI.  Neck: Supple no JVD.  Lungs: Clear auscultation, no rales rhonchi's, equal chest movement, nonlabored.  Heart: No gallop, murmur, rub, RRR.  Abdomen: Soft, n abdomen distended, positive ascites, nontender.   Extremities: Bilateral lower extremity 4+ edema, no cyanosis.  Neuro: No focal deficit, moving all extremities, alert oriented X 3  Skin: Positive for spider " "angiomas noted on the chest wall.  Skin changes are noted with ecchymosis, shiny lower extremity edema.    : Dudley catheter in place    WBC WBC   Date Value Ref Range Status   04/10/2025 21.00 (H) 3.40 - 10.80 10*3/mm3 Final   04/09/2025 19.51 (H) 3.40 - 10.80 10*3/mm3 Final   04/08/2025 23.86 (H) 3.40 - 10.80 10*3/mm3 Final      HGB Hemoglobin   Date Value Ref Range Status   04/10/2025 7.4 (L) 12.0 - 15.9 g/dL Final   04/09/2025 7.6 (L) 12.0 - 15.9 g/dL Final   04/08/2025 8.3 (L) 12.0 - 15.9 g/dL Final      HCT Hematocrit   Date Value Ref Range Status   04/10/2025 22.8 (L) 34.0 - 46.6 % Final   04/09/2025 23.4 (L) 34.0 - 46.6 % Final   04/08/2025 26.0 (L) 34.0 - 46.6 % Final      Platlets No results found for: \"LABPLAT\"   MCV MCV   Date Value Ref Range Status   04/10/2025 107.5 (H) 79.0 - 97.0 fL Final   04/09/2025 106.4 (H) 79.0 - 97.0 fL Final   04/08/2025 108.8 (H) 79.0 - 97.0 fL Final          Sodium Sodium   Date Value Ref Range Status   04/10/2025 136 136 - 145 mmol/L Final   04/09/2025 136 136 - 145 mmol/L Final   04/08/2025 135 (L) 136 - 145 mmol/L Final      Potassium Potassium   Date Value Ref Range Status   04/10/2025 3.9 3.5 - 5.2 mmol/L Final   04/09/2025 3.9 3.5 - 5.2 mmol/L Final   04/08/2025 3.8 3.5 - 5.2 mmol/L Final      Chloride Chloride   Date Value Ref Range Status   04/10/2025 103 98 - 107 mmol/L Final   04/09/2025 104 98 - 107 mmol/L Final   04/08/2025 102 98 - 107 mmol/L Final      CO2 CO2   Date Value Ref Range Status   04/10/2025 18.0 (L) 22.0 - 29.0 mmol/L Final   04/09/2025 19.0 (L) 22.0 - 29.0 mmol/L Final   04/08/2025 19.0 (L) 22.0 - 29.0 mmol/L Final      BUN BUN   Date Value Ref Range Status   04/10/2025 45 (H) 8 - 23 mg/dL Final   04/09/2025 38 (H) 8 - 23 mg/dL Final   04/08/2025 31 (H) 8 - 23 mg/dL Final      Creatinine Creatinine   Date Value Ref Range Status   04/10/2025 2.60 (H) 0.57 - 1.00 mg/dL Final   04/09/2025 2.10 (H) 0.57 - 1.00 mg/dL Final   04/08/2025 1.93 (H) 0.57 - " "1.00 mg/dL Final      Calcium Calcium   Date Value Ref Range Status   04/10/2025 8.5 (L) 8.6 - 10.5 mg/dL Final   04/09/2025 8.5 (L) 8.6 - 10.5 mg/dL Final   04/08/2025 8.5 (L) 8.6 - 10.5 mg/dL Final      PO4 No results found for: \"CAPO4\"   Albumin Albumin   Date Value Ref Range Status   04/10/2025 3.7 3.5 - 5.2 g/dL Final   04/09/2025 3.6 3.5 - 5.2 g/dL Final   04/08/2025 3.6 3.5 - 5.2 g/dL Final      Magnesium Magnesium   Date Value Ref Range Status   04/09/2025 2.2 1.6 - 2.4 mg/dL Final   04/08/2025 2.1 1.6 - 2.4 mg/dL Final      Uric Acid No results found for: \"URICACID\"        Results Review:     I reviewed the patient's new clinical results.    cefTRIAXone, 2,000 mg, Intravenous, Q24H  desvenlafaxine, 50 mg, Oral, Daily  folic acid, 1 mg, Oral, Daily  gabapentin, 200 mg, Oral, Q12H  heparin (porcine), , ,   hydrocortisone sodium succinate, 50 mg, Intravenous, Q8H  ipratropium-albuterol, 3 mL, Nebulization, 4x Daily - RT  lactobacillus acidophilus, 1 capsule, Oral, Daily  lactulose, 10 g, Oral, Daily  latanoprost, 1 drop, Both Eyes, Nightly  lidocaine 1% - EPINEPHrine 1:[id number], , ,   midodrine, 10 mg, Oral, TID AC  mupirocin, 1 Application, Each Nare, BID  pantoprazole, 40 mg, Oral, Daily  QUEtiapine, 100 mg, Oral, Nightly  rifAXIMin, 550 mg, Oral, Q12H  rosuvastatin, 5 mg, Oral, Daily  sodium chloride, 10 mL, Intravenous, Q12H  sodium chloride, 10 mL, Intravenous, Q12H  sodium chloride, 10 mL, Intravenous, Q12H  thiamine, 100 mg, Oral, Daily  ursodiol, 300 mg, Oral, BID      norepinephrine, 0.02-0.3 mcg/kg/min, Last Rate: 0.02 mcg/kg/min (04/10/25 0946)  phenylephrine, 0.5-3 mcg/kg/min, Last Rate: 2.7 mcg/kg/min (04/10/25 1431)  vasopressin, 0.03 Units/min, Last Rate: Stopped (04/10/25 0946)        Medication Review: Reviewed    Assessment & Plan       Sepsis secondary to UTI    Alcoholic cirrhosis of liver without ascites    Alcohol use disorder    Peripheral neuropathy    Acute UTI (urinary tract " infection)    Pneumonia    Acute kidney injury      1.  Acute kidney injury: Creatinine worsening since admission.  Admit creatinine 1.1 mg/dL,, yesterday increased to 1.5, today's creatinine 1.97 likely diagnosis hepatorenal syndrome with hypotension in the setting of liver cirrhosis patient has been given IV Bumex and albumin.  2.  Cardiorenal syndrome: Cirrhosis of liver.  3.  Metabolic acidosis  4.  Hyponatremia  5.  Volume overload  6.  Cirrhosis of liver  7.  Sepsis/E. coli UTI: On Zosyn on admission and now on ceftriaxone and IV pressors.    Plan:  Acute Renal failure.  Having generalized anasarca.  10 L positive since admission.  No improvement in urine output with IV diuretics.  Currently on 2 pressors.  Will start CRRT.  Need dialysis access.  Discussed with the ICU team. [Tried to call the family; did not answer; left a message; will call again ]   Avoid nephrotoxic medications.  Keep MAP greater than 60 mmHg for renal perfusion  Daily evaluation for renal replacement therapy will be done.  Adjust medication for the new GFR.    Case discussed with the medical staff taking care of the patient.    High risk complex patient with multiple medical problems.    Rashawn Donald MD  04/10/25  14:33 EDT

## 2025-04-10 NOTE — PROGRESS NOTES
Clinical Nutrition Assessment     Patient Name: Philomena Davis  YOB: 1960  MRN: [id number]  Date of Encounter: 04/10/25 13:43 EDT  Admission date: 4/4/2025  Reason for Visit: LOS    Assessment   Nutrition Assessment   Admission Diagnosis:  Dysphagia [R13.10]  Sepsis secondary to UTI [A41.9, N39.0]  Sepsis [A41.9]    Problem List:    Sepsis secondary to UTI    Alcoholic cirrhosis of liver without ascites    Alcohol use disorder    Peripheral neuropathy    Acute UTI (urinary tract infection)    Pneumonia    Acute kidney injury      PMH:   She  has a past medical history of Alcohol withdrawal (05/10/2019), Ankle sprain (2015), Anxiety and depression, Arthritis of back (2020), Arthritis of neck (2021), Asthma (2022), Cataract (20/2/1999), Chronic pain disorder (2023), Cirrhosis of liver, Closed displaced fracture of lateral malleolus of left fibula, 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 (03/24/2017), Closed trimalleolar fracture of right ankle (05/24/2023), Colon polyp (2018), COPD (chronic obstructive pulmonary disease) (2022), Digital mucous cyst of toe of left foot (03/24/2017), Eating disorder (2022), Fracture, clavicle (1969), Fracture, finger (1972), Fracture, foot (1/30/25), Ganglion cyst of left foot, GERD (gastroesophageal reflux disease) (10/22), Glaucoma, Hip arthrosis (2019), Hyperlipidemia (1999), Hypertension, Knee sprain (2019), Knee swelling (2019), Low back strain (1/15/25), Neck strain (1/30/25), Panic disorder (1987), Periarthritis of shoulder (2019), Peripheral neuropathy, Pulmonary arterial hypertension (2021), Urinary tract infection (02/23/23), Varicella (1968), and Wears glasses.    PSH:  She  has a past surgical history that includes Breast excisional biopsy (Right, 2014); Dilation and curettage, diagnostic / therapeutic; Wrist surgery; pr rpr non/mal femur dstl h/n w/iliac/autog bone (Left, 03/24/2017); Ankle fracture surgery (Left,  "03/24/2017); Augmentation mammaplasty (Bilateral, 1999); Esophagogastroduodenoscopy (N/A, 11/30/2022); Ankle fracture surgery (Right, 05/25/2023); Esophagogastroduodenoscopy (N/A, 02/01/2024); Foot surgery (2012); Colonoscopy (2018); [place] tooth extraction (1992); Dilation and curettage of uterus (1987); and Esophagogastroduodenoscopy (N/A, 04/07/2025).    Applicable Nutrition History:     Anthropometrics     Height: Height: 165.1 cm (65\")  Last Filed Weight: Weight: 99.4 kg (219 lb 2.2 oz) (weighed w/  scds on and 5 pillows and 2 thin blankets) (04/09/25 0645)  Method: Weight Method: Bed scale  BMI: BMI (Calculated): 36.5    UBW:     Weight       Weight (kg) Weight (lbs) Weight Method Visit Report   5/31/2024 67.132 kg  148 lb      11/5/2024 67.132 kg  148 lb      11/10/2024 67.132 kg  148 lb  Stated     11/11/2024 67.1 kg  147 lb 14.9 oz      12/6/2024 71.668 kg  158 lb  Stated     1/30/2025 71.668 kg  158 lb  Stated     2/17/2025 71.668 kg  158 lb   --    2/21/2025 71.7 kg  158 lb 1.1 oz   --    2/28/2025 71.668 kg  158 lb   --    3/10/2025 71.668 kg  158 lb   --    3/12/2025 68.04 kg  150 lb       68 kg  149 lb 14.6 oz      3/13/2025 68 kg  149 lb 14.6 oz      4/4/2025 71.668 kg  158 lb  Bed scale      90.583 kg  199 lb 11.2 oz      4/6/2025 90.6 kg  199 lb 11.8 oz      4/9/2025 99.4 kg  219 lb 2.2 oz  Bed scale         Weight change: No significant changes / ascites may be masking loss    Nutrition Focused Physical Exam    Date:  4/10       Unable to perform due to Pt unable to participate at time of visit     Subjective   Reported/Observed/Food/Nutrition Related History:     Visited with patient for LOS, admit 4/4 with sepsis/UTI. Patient with advanced cirrhosis. Currently requiring multiple vasopressors. She states she is actually eating okay and tolerating. Denies any issues eating at home or known weight changes. Pt denies further dietary needs/preferences or nutritional questions/concerns at this time, " DORIAN.     Current Nutrition Prescription   PO: Diet: Regular/House, Cardiac; Low Sodium (2g); Fluid Consistency: Thin (IDDSI 0)  Oral Nutrition Supplement:   Intake: N/A NPO    Assessment & Plan   Nutrition Diagnosis   Date:  4/10            Updated:    Problem Increased nutrient needs    Etiology Metabolic demands of cirrhosis   Signs/Symptoms Med hx   Status: New    Goal:   Nutrition to support treatment and Tolerate PO      Nutrition Intervention      Follow treatment progress, Care plan reviewed, Advise alternate selection, Advised available snacks, Interview for preferences, Menu provided, Encourage intake    Currently eating okay, will monitor vasopressor requirements and PO tolerance to alter diet, consider ONS as appropriate.     Monitoring/Evaluation:   Per protocol, I&O, PO intake, Pertinent labs, Weight, GI status, Symptoms, POC/GOC    Vanessa Martins RD, CNSC  Time Spent: 20m

## 2025-04-10 NOTE — PLAN OF CARE
Goal Outcome Evaluation:           Progress: declining  Outcome Evaluation: Pt. remains hypotensive despite two pressors and scheduled midodrine PO. Breathing is labored with accessory muscle use; coarse crackles and expiratory wheezes heard in all lung lobes. Attempted Bipap, but patient refused after only wearing approximately 5 minutes. UOP remains minimal with 190 mL after Bumex given yesterday and only 100 mL aditional throughout the shift. Unable to administer AM dose of Bumex due to hypotension. Abdominal and lower extremity edema remains severe with shiny, taut skin. Dialysis is urgently needed with rising creatinine and declining overall status.

## 2025-04-10 NOTE — PROGRESS NOTES
"Critical Care Note     LOS: 5 days   Patient Care Team:  Unique Brandt DO as PCP - General (Family Medicine)  Leana Garcia PA as Physician Assistant (Gastroenterology)  Judy Curry LPCC as  (Behavioral Health)    Chief Complaint/Reason for visit:    Chief Complaint   Patient presents with    Abnormal Lab     Sepsis secondary to UTI    Alcoholic cirrhosis of liver without ascites    Alcohol use disorder    Peripheral neuropathy    Acute UTI (urinary tract infection)    Pneumonia    Acute kidney injury/  Hepatorenal syndrome    Subjective     Interval History:     Renal function has worsened.  Oxygen requirement has increased, now on 6 L with a saturation of 96%.  Attempted to wear BiPAP last night but could not tolerate it.  Annette on vasopressin 0.03 units/min, phenylephrine 2.7 mics per kilogram per minute.  Urine output only 515 mL yesterday.  She does remain afebrile.    Review of Systems:    All systems were reviewed and negative except as noted in subjective.    Medical history, surgical history, social history, family history reviewed    Objective     Intake/Output:    Intake/Output Summary (Last 24 hours) at 4/10/2025 1049  Last data filed at 4/10/2025 0800  Gross per 24 hour   Intake 1455.25 ml   Output 450 ml   Net 1005.25 ml       Nutrition:  Diet: Regular/House, Cardiac; Low Sodium (2g); Fluid Consistency: Thin (IDDSI 0)    Infusions:  norepinephrine, 0.02-0.3 mcg/kg/min, Last Rate: 0.02 mcg/kg/min (04/10/25 0946)  phenylephrine, 0.5-3 mcg/kg/min, Last Rate: 2.7 mcg/kg/min (04/10/25 0943)  vasopressin, 0.03 Units/min, Last Rate: 0.03 Units/min (04/10/25 0858)        Mechanical Ventilator Settings:                                                Telemetry: Sinus rhythm             Vital Signs  Blood pressure 113/68, pulse 94, temperature 97.4 °F (36.3 °C), temperature source Axillary, resp. rate 18, height 165.1 cm (65\"), weight 99.4 kg (219 lb 2.2 oz), last menstrual period " 01/22/2016, SpO2 94%.    Physical Exam:  General Appearance:  Middle-aged woman sitting upright in bed no shortness of breath at rest but with movement   Head:  Atraumatic   Eyes:          Jaundice   Ears:     Throat: Oral mucosa moist   Neck: Trachea midline, no crepitus   Back:      Lungs:   Decreased breath sounds lower lobes bilaterally, some crackles in the left and right mid chest posteriorly, right crackles anteriorly    Heart:  Regular rhythm, S1, S2 auscultated    Abdomen:   distended with a fluid wave and edema of her abdominal wall   Rectal:   Deferred   Extremities: 3+ edema no visible rash   Pulses:    Skin: No visible rash   Lymph nodes:    Neurologic: Alert, generalized weakness, increased      Results Review:     I reviewed the patient's new clinical results.   Results from last 7 days   Lab Units 04/10/25  0407 04/09/25  0304 04/08/25  0812   SODIUM mmol/L 136 136 135*   POTASSIUM mmol/L 3.9 3.9 3.8   CHLORIDE mmol/L 103 104 102   CO2 mmol/L 18.0* 19.0* 19.0*   BUN mg/dL 45* 38* 31*   CREATININE mg/dL 2.60* 2.10* 1.93*   CALCIUM mg/dL 8.5* 8.5* 8.5*   BILIRUBIN mg/dL 6.7* 6.7* 7.5*   ALK PHOS U/L 85 93 104   ALT (SGPT) U/L 61* 51* 44*   AST (SGOT) U/L 173* 159* 152*   GLUCOSE mg/dL 122* 146* 139*     Results from last 7 days   Lab Units 04/10/25  0407 04/09/25  0304 04/08/25  0812   WBC 10*3/mm3 21.00* 19.51* 23.86*   HEMOGLOBIN g/dL 7.4* 7.6* 8.3*   HEMATOCRIT % 22.8* 23.4* 26.0*   PLATELETS 10*3/mm3 166 193 313         Lab Results   Component Value Date    BLOODCX No growth at 5 days 04/04/2025    BLOODCX No growth at 5 days 04/04/2025     Lab Results   Component Value Date    URINECX >100,000 CFU/mL Escherichia coli (A) 04/04/2025       I reviewed the patient's new imaging including images and reports.    Chest x-ray today reveals worsening edema, persistent effusion      CT CHEST WO CONTRAST DIAGNOSTIC    Date of Exam: 4/5/2025 10:26 PM EDT    Indication: pna vs edema.    Comparison: Chest  radiograph 4/4/2025 and chest CT 7[address] [id number].    Technique: Axial CT images were obtained of the chest without contrast administration.  Reconstructed coronal and sagittal images were also obtained. Automated exposure control and iterative construction methods were used.      Findings:  The thyroid, trachea and esophagus appear within normal limits. The heart is enlarged. There is mild coronary artery calcification. Diminished attenuation of the blood pool suggests anemia. There are mildly enlarged mediastinal lymph nodes including a  right paratracheal lymph node on axial image 33 measuring 10 mm in the short axis. There is mild aortic and aortic branch vessel atherosclerosis. No pericardial effusion.    There are moderate bilateral pleural effusions with significant passive atelectasis affecting the entire left lower lobe and a large portion of the right lower lobe. There is patchy bilateral airspace disease most pronounced at the lung periphery along  with intermixed interstitial thickening. This is favored to reflect pneumonia, but pulmonary edema could have a similar appearance. Correlate with laboratory values and symptoms to differentiate. Airways are patent.    There are bilateral breast implants. There is body wall edema. The liver is enlarged and infiltrated with fat. Liver margins also appear lobulated worrisome for cirrhosis. The spleen is enlarged measuring 12.2 x 8.8 cm in the axial plane. No acute  findings in the upper abdomen. Mild spinal degenerative changes. No acute osseous abnormality.   Impression:     Impression:  1.Moderate bilateral pleural effusions with significant passive atelectasis affecting the entire left lower lobe and a large portion of the right lower lobe.  2.Patchy bilateral airspace disease and interstitial thickening. This is favored to reflect pneumonia, but pulmonary edema could have a similar appearance. Correlate with laboratory values and symptoms to  differentiate.  3.Cardiomegaly and coronary artery disease.  4.Hepatosplenomegaly and hepatic steatosis. Lobulated liver margins worrisome for cirrhosis.  5.Body wall edema.            Electronically Signed: Ravi Gordon MD   4/6/2025 3:27 AM EDT       Interpretation SummaryEchocardiogram April 6, 2025         Left ventricular systolic function is normal. Automated 2D EF = 63.4%    Left atrial volume is severely increased.    Estimated right ventricular systolic pressure from tricuspid regurgitation is moderately elevated (45-55 mmHg). Calculated right ventricular systolic pressure from tricuspid regurgitation is 47 mmHg.        All medications reviewed.   cefTRIAXone, 2,000 mg, Intravenous, Q24H  desvenlafaxine, 50 mg, Oral, Daily  folic acid, 1 mg, Oral, Daily  gabapentin, 200 mg, Oral, Q12H  hydrocortisone sodium succinate, 50 mg, Intravenous, Q8H  ipratropium-albuterol, 3 mL, Nebulization, 4x Daily - RT  lactobacillus acidophilus, 1 capsule, Oral, Daily  lactulose, 10 g, Oral, Daily  latanoprost, 1 drop, Both Eyes, Nightly  midodrine, 10 mg, Oral, TID AC  mupirocin, 1 Application, Each Nare, BID  pantoprazole, 40 mg, Oral, Daily  QUEtiapine, 100 mg, Oral, Nightly  rifAXIMin, 550 mg, Oral, Q12H  rosuvastatin, 5 mg, Oral, Daily  sodium chloride, 10 mL, Intravenous, Q12H  sodium chloride, 10 mL, Intravenous, Q12H  sodium chloride, 10 mL, Intravenous, Q12H  thiamine, 100 mg, Oral, Daily  ursodiol, 300 mg, Oral, BID          Assessment & Plan       Sepsis secondary to UTI    Alcoholic cirrhosis of liver without ascites    Alcohol use disorder    Peripheral neuropathy    Acute UTI (urinary tract infection)    Pneumonia    Acute kidney injury    64-year-old woman with hypertension, chronic pain, liver cirrhosis, COPD hospitalized in mid March with alcoholic hepatitis and cirrhosis.  She also had a third metatarsal fracture.  She was discharged to [facility] for rehabilitation.  She returned to [facility] April 4 with elevated white count fever and hypotension.  Urinalysis was consistent with a urinary tract infection.  Urine culture grew E. coli resistant to ampicillin.  Respiratory panel PCR was negative.  Blood cultures were negative respiratory panel PCR was negative.  Chest x-ray revealed left basilar opacity and effusion, possible edema.  She was placed on vancomycin and Zosyn.  She became hypotensive, Unresponsive to volume resuscitation and albumin.  She was placed on phenylephrine because of resting tachycardia.  She is now maxed on phenylephrine and vasopressin was added.    Hemoglobin had dropped to 7.6 and she had a history of a recent GI bleed so she was given a unit of packed red cells.  She underwent an EGD that revealed a nonbleeding ulcer and varices.  Renal function has worsened and is currently creatinine of 2.6, compared to 1.14 on admission.  With her cirrhosis and gross total body overload with ascites and edema, I am concerned about possible hepatorenal syndrome.  Nephrology was consulted and agree that she likely has hepatorenal syndrome.    #1 sepsis, suspected urinary source urine cultures positive for E. coli resistant to ampicillin.  She also has basilar infiltrates that may be compressive atelectasis from effusions versus pneumonia.  However now it is clinically progressive edema from her volume overload.  Blood cultures are negative.  She was initially on Zosyn but transition to ceftriaxone on April 7.  White count has remained elevated, but she is afebrile.  He is receiving stress steroids which may be contributing to her leukocytosis.  She continues to require vasopressors increased doses.  Her serum cortisol was technically normal at 6.47 but that is inappropriate for her stress and hypotension.    -Start norepinephrine for blood pressure support, continue phenylephrine and wean vasopressin  - midodrine 10 mg 3 times daily  -stress steroids, decrease to 50 mg every 6 hours  on April 8  - Rocephin for urinary tract infection    #2 cirrhosis with ascites, secondary to ETOH, recent GI bleed.EGD April 7 revealed gastric ulcer that was healed, grade 1 esophagitis, grade 1 esophageal varices.  She has bilateral effusions, ascites, hepatomegaly and body wall edema.  She also has a coagulopathy and hyperbilirubinemia.  Bilirubin remains elevated at 6.7.  Transaminases are only mildly elevated with an ALT of 61, AST of 173.  INR is only mildly elevated at 1.59.  Platelet count is adequate at 166.      -Monitor H&H and transfuse if less than 7.    - Rifaximin  - thiamine, folic acid  -Lactulose on hold secondary to diarrhea    #3 acute kidney injury    - Worrisome for hepatorenal syndrome with low urine sodium  - Creatinine is worse today at 2.6  -Response to loop diuretics  - Nephrology evaluating daily for possible renal replacement, and are in agreement that she needs dialysis  - Asked IR to place a tunneled access as I think the dialysis will be long-term.  Her urinary tract infection has been treated and blood cultures were negative.  Count elevation is likely from her stress steroids.  Clinically I feel her x-ray is all volume overload rather than pneumonia.  If they decline placement then I will place a temporary access    I discussed her case with gastroenterology who feel her mortality is quite high with her cirrhosis, hepatorenal syndrome, MELD score 28.  She likely has a mortality in excess of 60%.  She has a very poor understanding of the severity of her disease.  However she continues to want all measures.      VTE Prophylaxis:SCDS    Stress Ulcer Prophylaxis:protonix    Patient wanted me to call her aunt.  I was able to reach Roxie and let her know about the liver cirrhosis with hepatitis and kidney failure and how gravely ill Ms. Davis is.  She was shocked to know that she had cirrhosis and very sad that she was this ill at this young age.  Her aunt has COPD which is oxygen  dependent and is unable to come to visit.  She would like to be updated.    Yecenia Clifford MD  04/10/25  10:49 EDT      Time: Critical care 40 min  I personally provided care to this critically ill patient as documented above.  Critical care time does not include time spent on separately billed procedures.  None of my critical care time was concurrent with other critical care providers.

## 2025-04-10 NOTE — PLAN OF CARE
-Patient still declining today, Vitals stable but requiring BP support with Aaron + Levophed   -Minimal UOP, No BM  -Has been oriented however some moments of confusion on items in room/illogical questions   -O2 Currently at 6LNC and patient is more labored even though maintaining sats  -Audible crackles even without auscultation   -NP Chrissy at bedside to assess patient at 1845, monitoring closely for need for intubation   -Tunneled Dialysis cath placed today in IR, CRRT started this evening at 1730  -Poor prognosis was discussed with patient by ICU MD and nephrology  -Patient remains more emotions, anxious and scared about treatment plan and prognosis  -Belmar at patients bedside as well at 1850 to spend time with patient and offer emotional support

## 2025-04-10 NOTE — NURSING NOTE
15.5 Fr tunneled dialysis catheter placed by Dr. Huerta in IR. Patient tolerated procedure well. 25mcg Fentanyl and 0.5mg Versed administered. Total sedation time: 10 minutes. Bedside report given to DANDY Ham.

## 2025-04-11 NOTE — PROGRESS NOTES
"   LOS: 6 days    Patient Care Team:  Unique Brandt DO as PCP - General (Family Medicine)  Leana Garcia PA as Physician Assistant (Gastroenterology)  Judy Curry LPCC as  (Behavioral Health)    Chief Complaint: Septic shock  64-year-old female alcoholic liver cirrhosis, readmitted with febrile illness leukocytosis shortness of breath and diarrhea, admitted in ICU with hypotension placed on IV pressor admit creatinine 1.1 mg/dL slowly worsening.    Subjective     Critically ill. Overnight patient got intubated.  Currently on pressors.  CRRT ongoing.  No issues with CRRT per nurse.  Remains anuric.    Objective     Vital Sign Min/Max for last 24 hours  Temp  Min: 93.2 °F (34 °C)  Max: 96.4 °F (35.8 °C)   BP  Min: 83/43  Max: 120/96   Pulse  Min: 66  Max: 92   Resp  Min: 18  Max: 21   SpO2  Min: 88 %  Max: 100 %   Flow (L/min) (Oxygen Therapy)  Min: 5  Max: 6   No data recorded     Flowsheet Rows      Flowsheet Row First Filed Value   Admission Height 165.1 cm (65\") Documented at 04/04/2025 1609   Admission Weight 71.7 kg (158 lb) Documented at 04/04/2025 1609            I/O this shift:  In: 634 [I.V.:381.9; NG/GT:120; IV Piggyback:132.1]  Out: 1882 [Urine:10]  I/O last 3 completed shifts:  In: 2755.7 [I.V.:1719.7; Blood:367; Other:60; IV Piggyback:609]  Out: 2825 [Urine:358]    Physical Exam:  General Appearance:  female, intubated  Eyes: PER, EOMI.  Neck: +  JVD.  Lungs: Mechanical breath sounds  Heart: No gallop, murmur, rub, RRR.  Abdomen: Distended.  Nontender  Extremities: Bilateral lower extremity 4+ edema, no cyanosis.  Neuro: No focal deficit, moving all extremities, alert oriented X 3  Skin: Positive for spider angiomas noted on the chest wall.  Skin changes are noted with ecchymosis, shiny lower extremity edema.    : Dudley catheter in place    WBC WBC   Date Value Ref Range Status   04/11/2025 28.69 (H) 3.40 - 10.80 10*3/mm3 Final   04/11/2025 27.68 (H) 3.40 - 10.80 " "10*3/mm3 Final   04/10/2025 21.00 (H) 3.40 - 10.80 10*3/mm3 Final   04/09/2025 19.51 (H) 3.40 - 10.80 10*3/mm3 Final      HGB Hemoglobin   Date Value Ref Range Status   04/11/2025 8.4 (L) 12.0 - 15.9 g/dL Final   04/11/2025 7.3 (L) 12.0 - 15.9 g/dL Final   04/10/2025 7.4 (L) 12.0 - 15.9 g/dL Final   04/09/2025 7.6 (L) 12.0 - 15.9 g/dL Final      HCT Hematocrit   Date Value Ref Range Status   04/11/2025 25.4 (L) 34.0 - 46.6 % Final   04/11/2025 22.9 (L) 34.0 - 46.6 % Final   04/10/2025 22.8 (L) 34.0 - 46.6 % Final   04/09/2025 23.4 (L) 34.0 - 46.6 % Final      Platlets No results found for: \"LABPLAT\"   MCV MCV   Date Value Ref Range Status   04/11/2025 103.3 (H) 79.0 - 97.0 fL Final   04/11/2025 108.0 (H) 79.0 - 97.0 fL Final   04/10/2025 107.5 (H) 79.0 - 97.0 fL Final   04/09/2025 106.4 (H) 79.0 - 97.0 fL Final          Sodium Sodium   Date Value Ref Range Status   04/11/2025 137 136 - 145 mmol/L Final   04/11/2025 136 136 - 145 mmol/L Final   04/11/2025 136 136 - 145 mmol/L Final   04/11/2025 136 136 - 145 mmol/L Final   04/10/2025 136 136 - 145 mmol/L Final   04/10/2025 136 136 - 145 mmol/L Final   04/09/2025 136 136 - 145 mmol/L Final      Potassium Potassium   Date Value Ref Range Status   04/11/2025 4.0 3.5 - 5.2 mmol/L Final   04/11/2025 4.3 3.5 - 5.2 mmol/L Final   04/11/2025 4.3 3.5 - 5.2 mmol/L Final   04/11/2025 4.4 3.5 - 5.2 mmol/L Final   04/10/2025 3.7 3.5 - 5.2 mmol/L Final   04/10/2025 3.9 3.5 - 5.2 mmol/L Final   04/09/2025 3.9 3.5 - 5.2 mmol/L Final      Chloride Chloride   Date Value Ref Range Status   04/11/2025 102 98 - 107 mmol/L Final   04/11/2025 102 98 - 107 mmol/L Final   04/11/2025 102 98 - 107 mmol/L Final   04/11/2025 103 98 - 107 mmol/L Final   04/10/2025 104 98 - 107 mmol/L Final   04/10/2025 103 98 - 107 mmol/L Final   04/09/2025 104 98 - 107 mmol/L Final      CO2 CO2   Date Value Ref Range Status   04/11/2025 18.0 (L) 22.0 - 29.0 mmol/L Final   04/11/2025 19.0 (L) 22.0 - 29.0 mmol/L " "Final   04/11/2025 19.0 (L) 22.0 - 29.0 mmol/L Final   04/11/2025 18.0 (L) 22.0 - 29.0 mmol/L Final   04/10/2025 16.0 (L) 22.0 - 29.0 mmol/L Final   04/10/2025 18.0 (L) 22.0 - 29.0 mmol/L Final   04/09/2025 19.0 (L) 22.0 - 29.0 mmol/L Final      BUN BUN   Date Value Ref Range Status   04/11/2025 21 8 - 23 mg/dL Final   04/11/2025 28 (H) 8 - 23 mg/dL Final   04/11/2025 28 (H) 8 - 23 mg/dL Final   04/11/2025 38 (H) 8 - 23 mg/dL Final   04/10/2025 47 (H) 8 - 23 mg/dL Final   04/10/2025 45 (H) 8 - 23 mg/dL Final   04/09/2025 38 (H) 8 - 23 mg/dL Final      Creatinine Creatinine   Date Value Ref Range Status   04/11/2025 1.26 (H) 0.57 - 1.00 mg/dL Final   04/11/2025 1.62 (H) 0.57 - 1.00 mg/dL Final   04/11/2025 1.62 (H) 0.57 - 1.00 mg/dL Final   04/11/2025 2.08 (H) 0.57 - 1.00 mg/dL Final   04/10/2025 2.58 (H) 0.57 - 1.00 mg/dL Final   04/10/2025 2.60 (H) 0.57 - 1.00 mg/dL Final   04/09/2025 2.10 (H) 0.57 - 1.00 mg/dL Final      Calcium Calcium   Date Value Ref Range Status   04/11/2025 7.9 (L) 8.6 - 10.5 mg/dL Final   04/11/2025 8.8 8.6 - 10.5 mg/dL Final   04/11/2025 8.8 8.6 - 10.5 mg/dL Final   04/11/2025 8.5 (L) 8.6 - 10.5 mg/dL Final   04/10/2025 8.0 (L) 8.6 - 10.5 mg/dL Final   04/10/2025 8.5 (L) 8.6 - 10.5 mg/dL Final   04/09/2025 8.5 (L) 8.6 - 10.5 mg/dL Final      PO4 No results found for: \"CAPO4\"   Albumin Albumin   Date Value Ref Range Status   04/11/2025 3.2 (L) 3.5 - 5.2 g/dL Final   04/11/2025 3.6 3.5 - 5.2 g/dL Final   04/11/2025 3.6 3.5 - 5.2 g/dL Final   04/11/2025 3.8 3.5 - 5.2 g/dL Final   04/10/2025 3.6 3.5 - 5.2 g/dL Final   04/10/2025 3.7 3.5 - 5.2 g/dL Final   04/09/2025 3.6 3.5 - 5.2 g/dL Final      Magnesium Magnesium   Date Value Ref Range Status   04/11/2025 2.1 1.6 - 2.4 mg/dL Final   04/11/2025 2.3 1.6 - 2.4 mg/dL Final   04/11/2025 2.1 1.6 - 2.4 mg/dL Final   04/10/2025 1.9 1.6 - 2.4 mg/dL Final   04/09/2025 2.2 1.6 - 2.4 mg/dL Final      Uric Acid No results found for: \"URICACID\" "        Results Review:     I reviewed the patient's new clinical results.    chlorhexidine, 15 mL, Mouth/Throat, Q12H  folic acid, 1 mg, Per G Tube, Daily  gabapentin, 200 mg, Per G Tube, Q12H  hydrocortisone sodium succinate, 25 mg, Intravenous, Q6H  ipratropium-albuterol, 3 mL, Nebulization, 4x Daily - RT  lactobacillus acidophilus, 1 capsule, Per G Tube, Daily  lactulose, 10 g, Per G Tube, Daily  latanoprost, 1 drop, Both Eyes, Nightly  midodrine, 10 mg, Per G Tube, TID AC  pantoprazole, 40 mg, Intravenous, Q AM  piperacillin-tazobactam, 4.5 g, Intravenous, Q8H  QUEtiapine, 100 mg, Per G Tube, Nightly  rifAXIMin, 550 mg, Per G Tube, Q12H  rosuvastatin, 5 mg, Per G Tube, Daily  sodium chloride, 10 mL, Intravenous, Q12H  sodium chloride, 10 mL, Intravenous, Q12H  sodium chloride, 10 mL, Intravenous, Q12H  thiamine, 100 mg, Per G Tube, Daily  ursodiol, 300 mg, Nasogastric, BID      fentanyl 10 mcg/mL,  mcg/hr, Last Rate: 50 mcg/hr (04/11/25 1625)  midazolam, 1-10 mg/hr, Last Rate: 5 mg/hr (04/11/25 1304)  norepinephrine, 0.02-0.3 mcg/kg/min, Last Rate: 0.06 mcg/kg/min (04/11/25 1233)  phenylephrine, 0.5-3 mcg/kg/min, Last Rate: Stopped (04/11/25 1305)  Phoxillum BK4/2.5, 1,000 mL/hr, Last Rate: 1,000 mL/hr (04/11/25 1420)  Phoxillum BK4/2.5, 1,000 mL/hr, Last Rate: 1,000 mL/hr (04/11/25 1420)  Phoxillum BK4/2.5, 1,000 mL/hr, Last Rate: 1,000 mL/hr (04/11/25 1420)  vasopressin, 0.03 Units/min, Last Rate: Stopped (04/10/25 0946)        Medication Review: Reviewed    Assessment & Plan       Sepsis secondary to UTI    Alcoholic cirrhosis of liver without ascites    Alcohol use disorder    Peripheral neuropathy    Acute UTI (urinary tract infection)    Pneumonia    Acute kidney injury      1.  Acute kidney injury: Creatinine worsening since admission.  Admit creatinine 1.1 mg/dL,, yesterday increased to 1.5, today's creatinine 1.97 likely diagnosis hepatorenal syndrome with hypotension in the setting of liver  cirrhosis patient has been given IV Bumex and albumin.  2.  Cardiorenal syndrome: Cirrhosis of liver.  3.  Metabolic acidosis  4.  Hyponatremia  5.  Volume overload  6.  Cirrhosis of liver  7.  Sepsis/E. coli UTI: On Zosyn on admission and now on ceftriaxone and IV pressors.    Plan:  Acute Renal failure.  Anuric. currently on 2 pressors.  Started on CRRT/10.   Continue CRRT.  UF  as tolerated  Supplement electrolyte as per CRRT protocol  Avoid nephrotoxic medications.  Keep MAP greater than 60 mmHg for renal perfusion  Adjust medication for the new GFR.    Case discussed with the medical staff taking care of the patient.  Prognosis guarded.  High risk complex patient with multiple medical problems.    Rashawn Donald MD  04/11/25  18:14 EDT

## 2025-04-11 NOTE — PROCEDURES
ENDOTRACHEAL INTUBATION:     NAME: Philomena Davis MRN: [id number] : 1960-64 y.o.-female    A time out was held prior to the procedure    Indication: Acute respiratory failure    Consent: The procedure was done emergently as medically necessary     Performed by: Magdalena Sánchez MD    Medication: Etomidate, Rocuronium    Description of procedure:    Preoxygenation was done using NRB. Patient was placed into sniffing position. The vocal cords were visualized with a size 4 curved laryngoscope blade. A size 7.5 ETT was inserted without any difficulty (view - grade I). Immediately after tube insertion equal breath sounds were heard bilaterally. There was a positive color change on the CO2 indicator after ETT insertion. A stat CXR was ordered to confirm tube placement.       Signed: Magdalena Sánchez MD  4/10/2025 23:04 EDT

## 2025-04-11 NOTE — PROCEDURES
FIBEROPTIC BRONCHOSCOPY THERAPEUTIC/DIAGNOSTIC:       Name Philomena Davis MRN: [id number] : 1960-64 y.o.-female    A time out was held prior to initiating the procedure    Indication: acute respiratory failure, aspiration of blood into airways from nose bleed    Consent: Procedure done emergently as medical necessity    Performed by: Magdalena Sánchez MD    Medication: patient was sedated for intubation, additional fentanyl was given    Description of procedure:    The fiberoptic bronchoscope was introduced through the endotracheal tube to the level of the main patrick. There were bloody colored thin secretions in both sides of the bronchial tree. These were gently suctioned. It was noted that the mucosa of the bilateral bronchial tree looked erythematous, edematous and very friable especially in the LLL. Bronchoalveolar lavage was performed: At the level of the left lower lobe a total of 20 mL of normal saline was instilled and subsequently suctioned together with any dislodged secretions. The BAL sample sent for cultures. There was minimal irritation of the airway associated with the procedure there was no active bleeding at the end of the procedure.     Samples LLL BAL sent for cell count and culture    Signed: Magdalena Sánchez MD  4/10/2025 23:05 EDT

## 2025-04-11 NOTE — NURSING NOTE
CRRT rounds completed. Treatment plan  reviewed with  DANDY Shi. Orders and documentation reviewed. Prescription for CRRT machine reviewed with nurse- any needed adjustments made at this time.  Dressing to dialysis access visualized and intact. Primary nurse encouraged to call [phone]  or on call dialysis nurse for assistance or any questions.

## 2025-04-11 NOTE — PROGRESS NOTES
Critical Care Note     LOS: 6 days   Patient Care Team:  Unique Brandt DO as PCP - General (Family Medicine)  Leana Garcia PA as Physician Assistant (Gastroenterology)  Judy Curry LPCC as  (Behavioral Health)    Chief Complaint/Reason for visit:    Chief Complaint   Patient presents with    Abnormal Lab     Sepsis secondary to UTI    Alcoholic cirrhosis of liver without ascites    Alcohol use disorder    Peripheral neuropathy    Acute UTI (urinary tract infection)    Pneumonia    Acute kidney injury/  Hepatorenal syndrome  Acute respiratory failure    Subjective     Interval History:     Tunneled access placed yesterday and hemodialysis initiated.  Respiratory status deteriorated and she was intubated overnight.  She was initially on a rate of 20 tidal volume 380 PEEP of 10 and 80% FiO2.  FiO2 is weaned to 40%.  SaO2 is 97%.  Hemoglobin drifted down and she received 1 unit of packed red cells.  Hemoglobin improved to 8.4.  She is oozing from her dialysis access.  No fever but white count did increase.  She is maintaining sinus rhythm.  She remains on norepinephrine 0.06 mics per kilogram per minute to support blood pressure.  Phenylephrine was stopped and vasopressin was stopped.  Bleeding from her access site    Review of Systems:    All systems were reviewed and negative except as noted in subjective.    Medical history, surgical history, social history, family history reviewed    Objective     Intake/Output:    Intake/Output Summary (Last 24 hours) at 4/11/2025 1307  Last data filed at 4/11/2025 1200  Gross per 24 hour   Intake 2389.56 ml   Output 3598 ml   Net -1208.44 ml       Nutrition:  NPO Diet NPO Type: Strict NPO    Infusions:  fentanyl 10 mcg/mL,  mcg/hr  midazolam, 1-10 mg/hr, Last Rate: 5 mg/hr (04/11/25 1304)  norepinephrine, 0.02-0.3 mcg/kg/min, Last Rate: 0.06 mcg/kg/min (04/11/25 1233)  phenylephrine, 0.5-3 mcg/kg/min, Last Rate: Stopped (04/11/25 1305)  Phoxillum  "BK4/2.5, 1,000 mL/hr, Last Rate: 1,000 mL/hr (04/11/25 0932)  Phoxillum BK4/2.5, 1,000 mL/hr, Last Rate: 1,000 mL/hr (04/11/25 0932)  Phoxillum BK4/2.5, 1,000 mL/hr, Last Rate: 1,000 mL/hr (04/11/25 0932)  vasopressin, 0.03 Units/min, Last Rate: Stopped (04/10/25 0946)        Mechanical Ventilator Settings:            Resp Rate (Set): 20     FiO2 (%): 40 %  PEEP/CPAP (cm H2O): 10 cm H20    Minute Ventilation (L/min) (Obs): 7.44 L/min  Resp Rate (Observed) Vent: 20  I:E Ratio (Set): 1:2.33  I:E Ratio (Obs): 1:2.3    PIP Observed (cm H2O): 26 cm H2O  Plateau Pressure (cm H2O): (S) 34 cm H2O    Telemetry: Sinus rhythm             Vital Signs  Blood pressure 114/58, pulse 90, temperature 95.7 °F (35.4 °C), temperature source Bladder, resp. rate 20, height 165.1 cm (65\"), weight 99.4 kg (219 lb 2.2 oz), last menstrual period 01/22/2016, SpO2 97%.    Physical Exam:  General Appearance:  Middle-aged woman, sedated   Head:  Atraumatic   Eyes:          Jaundice   Ears:     Throat: Orally intubated, OG tube   Neck: Trachea midline, no crepitus, right tunneled access   Back:      Lungs:   Improved breath sounds anteriorly.  Still diminished lateral posterior bases    Heart:  Regular rhythm, S1, S2 auscultated    Abdomen:   distended with a fluid wave and edema of her abdominal wall   Rectal:   Deferred   Extremities: 3+ edema no visible rash   Pulses:    Skin: No visible rash   Lymph nodes:    Neurologic: Sedated      Results Review:     I reviewed the patient's new clinical results.   Results from last 7 days   Lab Units 04/11/25  0830 04/11/25  0026 04/10/25  1636 04/10/25  0407 04/09/25  0304   SODIUM mmol/L 136  136 136 136 136 136   POTASSIUM mmol/L 4.3  4.3 4.4 3.7 3.9 3.9   CHLORIDE mmol/L 102  102 103 104 103 104   CO2 mmol/L 19.0*  19.0* 18.0* 16.0* 18.0* 19.0*   BUN mg/dL 28*  28* 38* 47* 45* 38*   CREATININE mg/dL 1.62*  1.62* 2.08* 2.58* 2.60* 2.10*   CALCIUM mg/dL 8.8  8.8 8.5* 8.0* 8.5* 8.5*   BILIRUBIN " mg/dL 6.9*  --   --  6.7* 6.7*   ALK PHOS U/L 86  --   --  85 93   ALT (SGPT) U/L 77*  --   --  61* 51*   AST (SGOT) U/L 224*  --   --  173* 159*   GLUCOSE mg/dL 85  85 97 114* 122* 146*     Results from last 7 days   Lab Units 04/11/25  0830 04/11/25  0026 04/10/25  0407   WBC 10*3/mm3 28.69* 27.68* 21.00*   HEMOGLOBIN g/dL 8.4* 7.3* 7.4*   HEMATOCRIT % 25.4* 22.9* 22.8*   PLATELETS 10*3/mm3 247 246 166     Results from last 7 days   Lab Units 04/11/25  0404   PH, ARTERIAL pH units 7.297*   PO2 ART mm Hg 81.6*   PCO2, ARTERIAL mm Hg 41.7   HCO3 ART mmol/L 20.3     Lab Results   Component Value Date    BLOODCX No growth at 5 days 04/04/2025    BLOODCX No growth at 5 days 04/04/2025     Lab Results   Component Value Date    URINECX >100,000 CFU/mL Escherichia coli (A) 04/04/2025       I reviewed the patient's new imaging including images and reports.    Chest x-ray edema, effusions, as cath in place, ET tube above the patrick      CT CHEST WO CONTRAST DIAGNOSTIC    Date of Exam: 4/5/2025 10:26 PM EDT    Indication: pna vs edema.    Comparison: Chest radiograph 4/4/2025 and chest CT 12[address] 22.    Technique: Axial CT images were obtained of the chest without contrast administration.  Reconstructed coronal and sagittal images were also obtained. Automated exposure control and iterative construction methods were used.      Findings:  The thyroid, trachea and esophagus appear within normal limits. The heart is enlarged. There is mild coronary artery calcification. Diminished attenuation of the blood pool suggests anemia. There are mildly enlarged mediastinal lymph nodes including a  right paratracheal lymph node on axial image 33 measuring 10 mm in the short axis. There is mild aortic and aortic branch vessel atherosclerosis. No pericardial effusion.    There are moderate bilateral pleural effusions with significant passive atelectasis affecting the entire left lower lobe and a large portion of the right lower lobe. There  is patchy bilateral airspace disease most pronounced at the lung periphery along  with intermixed interstitial thickening. This is favored to reflect pneumonia, but pulmonary edema could have a similar appearance. Correlate with laboratory values and symptoms to differentiate. Airways are patent.    There are bilateral breast implants. There is body wall edema. The liver is enlarged and infiltrated with fat. Liver margins also appear lobulated worrisome for cirrhosis. The spleen is enlarged measuring 12.2 x 8.8 cm in the axial plane. No acute  findings in the upper abdomen. Mild spinal degenerative changes. No acute osseous abnormality.   Impression:     Impression:  1.Moderate bilateral pleural effusions with significant passive atelectasis affecting the entire left lower lobe and a large portion of the right lower lobe.  2.Patchy bilateral airspace disease and interstitial thickening. This is favored to reflect pneumonia, but pulmonary edema could have a similar appearance. Correlate with laboratory values and symptoms to differentiate.  3.Cardiomegaly and coronary artery disease.  4.Hepatosplenomegaly and hepatic steatosis. Lobulated liver margins worrisome for cirrhosis.  5.Body wall edema.            Electronically Signed: Ravi Gordon MD   4/6/2025 3:27 AM EDT       Interpretation SummaryEchocardiogram April 6, 2025         Left ventricular systolic function is normal. Automated 2D EF = 63.4%    Left atrial volume is severely increased.    Estimated right ventricular systolic pressure from tricuspid regurgitation is moderately elevated (45-55 mmHg). Calculated right ventricular systolic pressure from tricuspid regurgitation is 47 mmHg.        All medications reviewed.   chlorhexidine, 15 mL, Mouth/Throat, Q12H  folic acid, 1 mg, Per G Tube, Daily  gabapentin, 200 mg, Per G Tube, Q12H  hydrocortisone sodium succinate, 50 mg, Intravenous, Q6H  ipratropium-albuterol, 3 mL, Nebulization, 4x Daily -  RT  lactobacillus acidophilus, 1 capsule, Per G Tube, Daily  lactulose, 10 g, Per G Tube, Daily  latanoprost, 1 drop, Both Eyes, Nightly  midodrine, 10 mg, Per G Tube, TID AC  pantoprazole, 40 mg, Intravenous, Q AM  piperacillin-tazobactam, 4.5 g, Intravenous, Q8H  QUEtiapine, 100 mg, Per G Tube, Nightly  rifAXIMin, 550 mg, Per G Tube, Q12H  rosuvastatin, 5 mg, Per G Tube, Daily  sodium chloride, 10 mL, Intravenous, Q12H  sodium chloride, 10 mL, Intravenous, Q12H  sodium chloride, 10 mL, Intravenous, Q12H  thiamine, 100 mg, Per G Tube, Daily  ursodiol, 300 mg, Nasogastric, BID          Assessment & Plan       Sepsis secondary to UTI    Alcoholic cirrhosis of liver without ascites    Alcohol use disorder    Peripheral neuropathy    Acute UTI (urinary tract infection)    Pneumonia    Acute kidney injury    64-year-old woman with hypertension, chronic pain, liver cirrhosis, COPD hospitalized in mid March with alcoholic hepatitis and cirrhosis.  She also had a third metatarsal fracture.  She was discharged to [facility] for rehabilitation.  She returned to [facility] April 4 with elevated white count fever and hypotension.  Urinalysis was consistent with a urinary tract infection.  Urine culture grew E. coli resistant to ampicillin.  Respiratory panel PCR was negative.  Blood cultures were negative respiratory panel PCR was negative.  Chest x-ray revealed left basilar opacity and effusion, possible edema.  She was placed on vancomycin and Zosyn.  She became hypotensive, Unresponsive to volume resuscitation and albumin.  She was placed on phenylephrine because of resting tachycardia.      Hemoglobin had dropped to 7.6 and she had a history of a recent GI bleed so she was given a unit of packed red cells.  She underwent an EGD that revealed a nonbleeding ulcer and varices.  She had oozing from her dialysis access and required 1 unit of packed red cells on April 10.  Renal function has worsened and is  currently creatinine of 2.6, compared to 1.14 on admission.  With her cirrhosis and gross total body overload with ascites and edema, I am concerned about possible hepatorenal syndrome.  Nephrology was consulted and agree that she likely has hepatorenal syndrome.  Dialysis access placed and CRRT initiated April 10    #1 sepsis, suspected urinary source urine cultures positive for E. coli resistant to ampicillin.  She also has basilar infiltrates that may be compressive atelectasis from effusions versus pneumonia.  However now it is clinically progressive edema from her volume overload.  Blood cultures are negative.  She was initially on Zosyn but transition to ceftriaxone on April 7.  White count has remained elevated, but she is afebrile.  He is receiving stress steroids which may be contributing to her leukocytosis.  She continues to require vasopressors but multiple reasons.  Her serum cortisol was technically normal at 6.47 but that is inappropriate for her stress and hypotension.    -Change vasopressor to norepinephrine  - midodrine 10 mg 3 times daily  -stress steroids, decrease to 50 mg every 6 hours on April 8, decrease to 25 mg every 6 hours  - Rocephin for urinary tract infection, completed.  Empiric Zosyn initiated last night with intubation    #2 cirrhosis with ascites, secondary to ETOH, recent GI bleed.EGD April 7 revealed gastric ulcer that was healed, grade 1 esophagitis, grade 1 esophageal varices.  She has bilateral effusions, ascites, hepatomegaly and body wall edema.  She also has a coagulopathy and hyperbilirubinemia.  Bilirubin remains elevated at 6.9.  Transaminases are only mildly elevated with an ALT of 77, AST of 224.  INR is only  elevated at 1.83.  Platelet count is adequate at 247.      -Monitor H&H and transfuse if less than 7.    - Rifaximin  - thiamine, folic acid  -Lactulose on hold secondary to diarrhea    #3 acute kidney injury      - Worrisome for hepatorenal syndrome with low  urine sodium  -Tunneled access placed April 10, CRRT initiated-    #4 acute respiratory failure, progressive increased oxygen requirement over the week.  Now in florid edema and intubated on the evening of April 10.    Current ventilator settings rate 20, tidal volume 380, PEEP 10, 40%  Respiratory culture sent and empiric Zosyn initiated  Chest ray looks like pulmonary edema and effusion  No fever but white count is up to 27  - Monitor ABG, chest x-ray  - Assess for readiness to wean daily    VTE Prophylaxis:SCDS    Stress Ulcer Prophylaxis:protonix    Before she was intubated patient wanted her aunt and uncle to be decision-makers.  On April 10 I called and told them about her illness and clinical situation.  At that time she was able to make decisions for herself.  However now that she is intubated and sedated they will need to make decisions moving forward    Yecenia Clifford MD  04/11/25  13:07 EDT      Time: Critical care 45 min  I personally provided care to this critically ill patient as documented above.  Critical care time does not include time spent on separately billed procedures.  None of my critical care time was concurrent with other critical care providers.

## 2025-04-11 NOTE — PLAN OF CARE
Goal Outcome Evaluation:           Progress: declining  Outcome Evaluation: CRRT started yesterday and continues with 125 mL/hr UF. Due to declining respiratory status, pt. was intubated last night without issue. Propofol started for sedation with PRN Fentanyl IVP (given x1). Aaron continues to maintain MAP >65, but has been weaned slightly to 2.4 mcg/kg/min. Pt has been hypothermic since the start of dialysis yesterday despite CRRT line warmer and forced air warming blanket at 43 degrees Celsius. Right IJ tunneled dialysis catheter placed yesterday and has been bleeding significantly since. Juice patch placed at insertion site with dressing change at 0225 and was saturated by 0600. Dressing changed again with a new Juice patch and then a SafeGuard dressing was placed over the site for direct pressure. Vitamin K given for hypercoagulopathy. One unit PRBC given for low hgb/hct.

## 2025-04-11 NOTE — SIGNIFICANT NOTE
Patient's aunt, uncle, cousin are present at the bedside.  They are willing to make medical decisions.  They are in touch with the patient monthly but have not seen her often through the last 5 years.  Her aunt and uncle are in their 80s and her aunt is not well.  They would like to be telephoned and updated.  I discussed the gravity of her liver failure, renal failure and at least 60% or more risk of death.

## 2025-04-11 NOTE — PLAN OF CARE
Goal Outcome Evaluation:                                  Pt is currently on a PEEP of 10 and unable to wean from vent at this time. Will continue to monitor.

## 2025-04-11 NOTE — CASE MANAGEMENT/SOCIAL WORK
Continued Stay Note   Xiomara     Patient Name: Philomena Davis  MRN: [id number]  Today's Date: 4/11/2025    Admit Date: 4/4/2025    Plan: TBD   Discharge Plan       Row Name 04/11/25 1137       Plan    Plan TBD    Patient/Family in Agreement with Plan yes    Plan Comments Discussed Ms. Davis in MDR. Patient is on Mechanical Ventilation and sedation. Patient on CRRT. Discharge plan TBD. CM  will continue to follow.                   Discharge Codes    No documentation.                 Expected Discharge Date and Time       Expected Discharge Date Expected Discharge Time    Apr 14, 2025               Reena Hodges RN

## 2025-04-11 NOTE — PROGRESS NOTES
I visited this patient per RN referral. The patient was experiencing spiritual distress. I offered pastoral presence, prayer, and sacred text support at the bedside. I will continue to follow closely, but please re-consult if a new need arises.

## 2025-04-11 NOTE — PLAN OF CARE
Goal Outcome Evaluation:  Plan of Care Reviewed With: patient, family        Progress: no change  Outcome Evaluation: .     MV continues, rate 20, FiO2 40%, PEEP 10  CRRT continues, rate 125, pt tolerating  Pressor switched to levo, infusing at 0.08  Sedation switched to versed infusing @ 5 and fentanyl infusing at 50  UOP 10 mL   No BM this shift   Restraints remain in place  Family updated at bedside

## 2025-04-12 NOTE — PLAN OF CARE
Goal Outcome Evaluation:  Plan of Care Reviewed With: family     -Pt remains on CRRT; continuing to tolerate . UOP 0ml.   -Dialysis cath dressing change at 1200 for excessive bleeding; levon patch & pressure dressing applied. No noted bleeding outside of dressing since.   -Continuing MV; Peep 8 (down from 10), FIO2 40%, Rate 18 (down from 20)  -Current GTTs: Fent @ 50, Levo @ 0.12  -Started trophic feeds; tolerating thusfar. No BM this shift.   -Cousin, Madelin, updated at bedside & friend, Nikki, updated via phone call. Per family request, necklace at bedside locked up with security.   -BUE restraints in place for safety       Problem: Restraint, Nonviolent  Goal: Absence of Harm or Injury  Outcome: Progressing  Intervention: Implement Least Restrictive Safety Strategies  Recent Flowsheet Documentation  Taken 4/12/2025 1600 by Amna Guadalupe RN  Medical Device Protection:   IV pole/bag removed from visual field   tubing secured  Diversional Activities: television  Taken 4/12/2025 1400 by Amna Guadalupe RN  Medical Device Protection:   IV pole/bag removed from visual field   tubing secured  Diversional Activities: television  Taken 4/12/2025 1200 by Amna Guadalupe RN  Medical Device Protection:   IV pole/bag removed from visual field   tubing secured  Diversional Activities: television  Taken 4/12/2025 1000 by Amna Guadalupe RN  Medical Device Protection:   IV pole/bag removed from visual field   torso covered  Diversional Activities: television  Taken 4/12/2025 0800 by Amna Guadalupe RN  Medical Device Protection:   IV pole/bag removed from visual field   tubing secured  Diversional Activities: television  Intervention: Protect Dignity, Rights and Personal Wellbeing  Recent Flowsheet Documentation  Taken 4/12/2025 1600 by Amna Guadalupe RN  Trust Relationship/Rapport:   care explained   reassurance provided  Taken 4/12/2025 1200 by Amna Guadalupe RN Trust  Relationship/Rapport:   care explained   reassurance provided  Taken 4/12/2025 1000 by Amna Guadalupe RN  Trust Relationship/Rapport:   care explained   reassurance provided  Taken 4/12/2025 0800 by Amna Guadalupe RN  Trust Relationship/Rapport:   care explained   reassurance provided  Intervention: Protect Skin and Joint Integrity  Recent Flowsheet Documentation  Taken 4/12/2025 1600 by Amna Guadalupe RN  Body Position:   left   turned  Taken 4/12/2025 1400 by Amna Guadalupe RN  Body Position:   turned   supine  Taken 4/12/2025 1200 by Amna Guadalupe RN  Body Position:   weight shifting   unstable with turn  Skin Protection:   incontinence pads utilized   silicone foam dressing in place  Taken 4/12/2025 1000 by Amna Guadalupe RN  Body Position:   right   turned  Taken 4/12/2025 0800 by Amna Guadalupe RN  Body Position:   turned   supine  Skin Protection:   incontinence pads utilized   silicone foam dressing in place

## 2025-04-12 NOTE — PROGRESS NOTES
Critical Care Note     LOS: 7 days   Patient Care Team:  Unique Brandt DO as PCP - General (Family Medicine)  Leana Garcia PA as Physician Assistant (Gastroenterology)  Judy Curry LPCC as  (Behavioral Health)    Chief Complaint/Reason for visit:    Chief Complaint   Patient presents with    Abnormal Lab     Sepsis secondary to UTI    Alcoholic cirrhosis of liver without ascites    Alcohol use disorder    Peripheral neuropathy    Acute UTI (urinary tract infection)    Pneumonia    Acute kidney injury/  Hepatorenal syndrome  Acute respiratory failure    Subjective     Interval History:     She has a warming blanket in place.  Remains on norepinephrine 0.12 mics per kilogram per minute.  CRRT ongoing and able to pull off 4.2 L yesterday.    Review of Systems:    All systems were reviewed and negative except as noted in subjective.    Medical history, surgical history, social history, family history reviewed    Objective     Intake/Output:    Intake/Output Summary (Last 24 hours) at 4/12/2025 1315  Last data filed at 4/12/2025 1100  Gross per 24 hour   Intake 1160.85 ml   Output 3720 ml   Net -2559.15 ml       Nutrition:  NPO Diet NPO Type: Tube Feeding    Infusions:  fentanyl 10 mcg/mL,  mcg/hr, Last Rate: 50 mcg/hr (04/11/25 1625)  midazolam, 1-10 mg/hr, Last Rate: Stopped (04/12/25 0815)  norepinephrine, 0.02-0.3 mcg/kg/min, Last Rate: 0.12 mcg/kg/min (04/12/25 1204)  phenylephrine, 0.5-3 mcg/kg/min, Last Rate: Stopped (04/11/25 1305)  Phoxillum BK4/2.5, 1,000 mL/hr, Last Rate: 1,000 mL/hr (04/12/25 1104)  Phoxillum BK4/2.5, 1,000 mL/hr, Last Rate: 1,000 mL/hr (04/12/25 1104)  Phoxillum BK4/2.5, 1,000 mL/hr, Last Rate: 1,000 mL/hr (04/12/25 1104)        Mechanical Ventilator Settings:            Resp Rate (Set): 18     FiO2 (%): 40 %  PEEP/CPAP (cm H2O): 8 cm H20    Minute Ventilation (L/min) (Obs): 7.28 L/min  Resp Rate (Observed) Vent: 19  I:E Ratio (Set): 1:2.70  I:E Ratio (Obs):  "1:2.7    PIP Observed (cm H2O): 24 cm H2O  Plateau Pressure (cm H2O): 25 cm H2O    Telemetry: Sinus rhythm             Vital Signs  Blood pressure 111/51, pulse 90, temperature 95.7 °F (35.4 °C), temperature source Bladder, resp. rate 18, height 165.1 cm (65\"), weight 99.4 kg (219 lb 2.2 oz), last menstrual period 01/22/2016, SpO2 100%.    Physical Exam:  General Appearance:  Middle-aged woman, sedated   Head:  Atraumatic   Eyes:          Jaundice   Ears:     Throat: Orally intubated, OG tube   Neck: Trachea midline, no crepitus, right tunneled access, still oozing from the site little but significantly better and compression device has been removed.   Back:      Lungs:   Improved breath sounds anteriorly.  Still diminished lateral posterior bases    Heart:  Regular rhythm, S1, S2 auscultated    Abdomen:   distended with a fluid wave and edema of her abdominal wall   Rectal:   Deferred   Extremities: 3+ edema, particularly her thighs and flank no visible rash   Pulses:    Skin: No visible rash   Lymph nodes:    Neurologic: Sedated      Results Review:     I reviewed the patient's new clinical results.   Results from last 7 days   Lab Units 04/12/25  0923 04/12/25  0009 04/11/25  1621 04/11/25  0830 04/10/25  1636 04/10/25  0407   SODIUM mmol/L 137 139 137 136  136   < > 136   POTASSIUM mmol/L 4.1 4.2 4.0 4.3  4.3   < > 3.9   CHLORIDE mmol/L 100 105 102 102  102   < > 103   CO2 mmol/L 20.0* 20.0* 18.0* 19.0*  19.0*   < > 18.0*   BUN mg/dL 16 20 21 28*  28*   < > 45*   CREATININE mg/dL 1.05* 1.06* 1.26* 1.62*  1.62*   < > 2.60*   CALCIUM mg/dL 8.4* 7.9* 7.9* 8.8  8.8   < > 8.5*   BILIRUBIN mg/dL  --  6.6*  --  6.9*  --  6.7*   ALK PHOS U/L  --  79  --  86  --  85   ALT (SGPT) U/L  --  81*  --  77*  --  61*   AST (SGOT) U/L  --  250*  --  224*  --  173*   GLUCOSE mg/dL 77 174* 71 85  85   < > 122*    < > = values in this interval not displayed.     Results from last 7 days   Lab Units 04/12/25  0923 " 04/12/25  0613 04/11/25  0830 04/11/25  0026   WBC 10*3/mm3  --  22.65* 28.69* 27.68*   HEMOGLOBIN g/dL 7.6* 7.4* 8.4* 7.3*   HEMATOCRIT % 22.7* 22.8* 25.4* 22.9*   PLATELETS 10*3/mm3  --  127* 247 246     Results from last 7 days   Lab Units 04/12/25  0316   PH, ARTERIAL pH units 7.424   PO2 ART mm Hg 94.0   PCO2, ARTERIAL mm Hg 33.6*   HCO3 ART mmol/L 22.0     Lab Results   Component Value Date    BLOODCX No growth at 5 days 04/04/2025    BLOODCX No growth at 5 days 04/04/2025     Lab Results   Component Value Date    URINECX >100,000 CFU/mL Escherichia coli (A) 04/04/2025       I reviewed the patient's new imaging including images and reports.    XR CHEST 1 VW    Date of Exam: 4/12/2025 3:46 AM EDT    Indication: Respiratory failure, pulmonary edema    Comparison: 4/10/2025    Findings:  Tubes and lines are unchanged and in good position. Heart size is stable. There are persistent bilateral infiltrates with a basilar predominance. These are slightly improved in the upper lobes. Small left pleural effusion is likely present. No  pneumothorax.   Impression:     Slight improvement in aeration of the upper lobes, otherwise stable.        Electronically Signed: Forest White MD   4/12/2025 5:52 AM EDT   Workstation ID: [id number]         CT CHEST WO CONTRAST DIAGNOSTIC    Date of Exam: 4/5/2025 10:26 PM EDT    Indication: pna vs edema.    Comparison: Chest radiograph 4/4/2025 and chest CT [id number] 22.    Technique: Axial CT images were obtained of the chest without contrast administration.  Reconstructed coronal and sagittal images were also obtained. Automated exposure control and iterative construction methods were used.      Findings:  The thyroid, trachea and esophagus appear within normal limits. The heart is enlarged. There is mild coronary artery calcification. Diminished attenuation of the blood pool suggests anemia. There are mildly enlarged mediastinal lymph nodes including a  right paratracheal lymph node on  axial image 33 measuring 10 mm in the short axis. There is mild aortic and aortic branch vessel atherosclerosis. No pericardial effusion.    There are moderate bilateral pleural effusions with significant passive atelectasis affecting the entire left lower lobe and a large portion of the right lower lobe. There is patchy bilateral airspace disease most pronounced at the lung periphery along  with intermixed interstitial thickening. This is favored to reflect pneumonia, but pulmonary edema could have a similar appearance. Correlate with laboratory values and symptoms to differentiate. Airways are patent.    There are bilateral breast implants. There is body wall edema. The liver is enlarged and infiltrated with fat. Liver margins also appear lobulated worrisome for cirrhosis. The spleen is enlarged measuring 12.2 x 8.8 cm in the axial plane. No acute  findings in the upper abdomen. Mild spinal degenerative changes. No acute osseous abnormality.   Impression:     Impression:  1.Moderate bilateral pleural effusions with significant passive atelectasis affecting the entire left lower lobe and a large portion of the right lower lobe.  2.Patchy bilateral airspace disease and interstitial thickening. This is favored to reflect pneumonia, but pulmonary edema could have a similar appearance. Correlate with laboratory values and symptoms to differentiate.  3.Cardiomegaly and coronary artery disease.  4.Hepatosplenomegaly and hepatic steatosis. Lobulated liver margins worrisome for cirrhosis.  5.Body wall edema.            Electronically Signed: Ravi Gordon MD   4/6/2025 3:27 AM EDT       Interpretation SummaryEchocardiogram April 6, 2025         Left ventricular systolic function is normal. Automated 2D EF = 63.4%    Left atrial volume is severely increased.    Estimated right ventricular systolic pressure from tricuspid regurgitation is moderately elevated (45-55 mmHg). Calculated right ventricular systolic pressure from  tricuspid regurgitation is 47 mmHg.        All medications reviewed.   folic acid, 1 mg, Per G Tube, Daily  gabapentin, 200 mg, Per G Tube, Q12H  hydrocortisone sodium succinate, 25 mg, Intravenous, Q6H  ipratropium-albuterol, 3 mL, Nebulization, 4x Daily - RT  lactobacillus acidophilus, 1 capsule, Per G Tube, Daily  lactulose, 10 g, Per G Tube, Daily  latanoprost, 1 drop, Both Eyes, Nightly  midodrine, 10 mg, Per G Tube, TID AC  pantoprazole, 40 mg, Intravenous, Q AM  piperacillin-tazobactam, 4.5 g, Intravenous, Q8H  QUEtiapine, 100 mg, Per G Tube, Nightly  rifAXIMin, 550 mg, Per G Tube, Q12H  [Held by provider] rosuvastatin, 5 mg, Per G Tube, Daily  sodium chloride, 10 mL, Intravenous, Q12H  sodium chloride, 10 mL, Intravenous, Q12H  sodium chloride, 10 mL, Intravenous, Q12H  thiamine, 100 mg, Per G Tube, Daily  ursodiol, 300 mg, Nasogastric, BID          Assessment & Plan       Sepsis secondary to UTI    Alcoholic cirrhosis of liver without ascites    Alcohol use disorder    Peripheral neuropathy    Acute UTI (urinary tract infection)    Pneumonia    Acute kidney injury    64-year-old woman with hypertension, chronic pain, liver cirrhosis, COPD hospitalized in mid March with alcoholic hepatitis and cirrhosis.  She also had a third metatarsal fracture.  She was discharged to [facility] for rehabilitation.  She returned to [facility] April 4 with elevated white count fever and hypotension.  Urinalysis was consistent with a urinary tract infection.  Urine culture grew E. coli resistant to ampicillin.  Respiratory panel PCR was negative.  Blood cultures were negative respiratory panel PCR was negative.  Chest x-ray revealed left basilar opacity and effusion, possible edema.  She was placed on vancomycin and Zosyn.  She became hypotensive, Unresponsive to volume resuscitation and albumin.  She was placed on phenylephrine because of resting tachycardia.      Hemoglobin had dropped to 7.6 and she had a  history of a recent GI bleed so she was given a unit of packed red cells.  She underwent an EGD that revealed a nonbleeding ulcer and varices.  She had oozing from her dialysis access and required 1 unit of packed red cells on April 10.  Renal function has worsened and is currently creatinine of 2.6, compared to 1.14 on admission.  With her cirrhosis and gross total body overload with ascites and edema, I am concerned about possible hepatorenal syndrome.  Nephrology was consulted and agree that she likely has hepatorenal syndrome.  Dialysis access placed and CRRT initiated April 10    #1 sepsis, suspected urinary source urine cultures positive for E. coli resistant to ampicillin.  She also has basilar infiltrates that may be compressive atelectasis from effusions versus pneumonia.  However now it s clinically progressive edema from her volume overload.  Respiratory culture had no organisms on Gram stain and rare white cells.  Clinically I suspect lung infiltrates are all volume overload.  Blood cultures are negative.  She was initially on Zosyn but transition to ceftriaxone on April 7.  White count has remained elevated, but she is afebrile, and now hypothermic since starting CRRT.  She is receiving stress steroids which may be contributing to her leukocytosis.  She continues to require vasopressors but for multiple reasons.  Her serum cortisol was technically normal at 6.47 but that is inappropriate for her stress and hypotension.    -Change vasopressor to norepinephrine  - midodrine 10 mg 3 times daily  -stress steroids, decrease to 25 mg every 6 hours   - Rocephin for urinary tract infection, completed.  Empiric Zosyn initiated 4/10 with intubation    #2 cirrhosis with ascites, secondary to ETOH, recent GI bleed.EGD April 7 revealed gastric ulcer that was healed, grade 1 esophagitis, grade 1 esophageal varices.  She has bilateral effusions, ascites, hepatomegaly and body wall edema.  She also has a coagulopathy  and hyperbilirubinemia.  Bilirubin remains elevated at 6.6.  Transaminases are only mildly elevated with an ALT of 81, AST of 250.  INR is only  elevated at 1.83.  Platelet count is adequate at 127.      -Monitor H&H and transfuse if less than 7.    - Rifaximin  - thiamine, folic acid  -Lactulose on hold secondary to diarrhea    #3 acute kidney injury      - Worrisome for hepatorenal syndrome with low urine sodium  -Tunneled access placed April 10, CRRT initiated  -Pulling 100 and hour  - Remains on some vasopressor support    #4 acute respiratory failure, progressive increased oxygen requirement over the week.  Now in florid edema and intubated on the evening of April 10.    Current ventilator settings rate 20, tidal volume 380, PEEP 10, 40%, ABG pH 7.42, CO2 33, O2 94.  This indicates some over ventilation and will decrease her rate to 18  Respiratory culture sent and empiric Zosyn initiated  Chest ray looks like pulmonary edema and effusion, it is clinically improving with dialysis  No fever but white count increased to 27, now 22.  Some of this may be her stress steroid coverage  - Monitor ABG, chest x-ray  - Assess for readiness to wean daily    VTE Prophylaxis:SCDS    Stress Ulcer Prophylaxis:protonix    Before she was intubated patient wanted her aunt and uncle to be decision-makers.  With them yesterday and they are willing at this time to make decisions for her      Yecenianorberto Clifford MD  04/12/25  13:15 EDT      Time: Critical care 40 min  I personally provided care to this critically ill patient as documented above.  Critical care time does not include time spent on separately billed procedures.  None of my critical care time was concurrent with other critical care providers.

## 2025-04-12 NOTE — PROGRESS NOTES
Clinical Nutrition Assessment     Patient Name: Philomena Davis  YOB: 1960  MRN: [id number]  Date of Encounter: 04/12/25 09:45 EDT  Admission date: 4/4/2025  Reason for Visit: LOS    Assessment   Nutrition Assessment   Admission Diagnosis:  Dysphagia [R13.10]  Sepsis secondary to UTI [A41.9, N39.0]  Sepsis [A41.9]    Problem List:    Sepsis secondary to UTI    Alcoholic cirrhosis of liver without ascites    Alcohol use disorder    Peripheral neuropathy    Acute UTI (urinary tract infection)    Pneumonia    Acute kidney injury      PMH:   She  has a past medical history of Alcohol withdrawal (05/10/2019), Ankle sprain (2015), Anxiety and depression, Arthritis of back (2020), Arthritis of neck (2021), Asthma (2022), Cataract (20/2/1999), Chronic pain disorder (2023), Cirrhosis of liver, Closed displaced fracture of lateral malleolus of left fibula, 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 (03/24/2017), Closed trimalleolar fracture of right ankle (05/24/2023), Colon polyp (2018), COPD (chronic obstructive pulmonary disease) (2022), Digital mucous cyst of toe of left foot (03/24/2017), Eating disorder (2022), Fracture, clavicle (1969), Fracture, finger (1972), Fracture, foot (1/30/25), Ganglion cyst of left foot, GERD (gastroesophageal reflux disease) (10/22), Glaucoma, Hip arthrosis (2019), Hyperlipidemia (1999), Hypertension, Knee sprain (2019), Knee swelling (2019), Low back strain (1/15/25), Neck strain (1/30/25), Panic disorder (1987), Periarthritis of shoulder (2019), Peripheral neuropathy, Pulmonary arterial hypertension (2021), Urinary tract infection (02/23/23), Varicella (1968), and Wears glasses.    PSH:  She  has a past surgical history that includes Breast excisional biopsy (Right, 2014); Dilation and curettage, diagnostic / therapeutic; Wrist surgery; pr rpr non/mal femur dstl h/n w/iliac/autog bone (Left, 03/24/2017); Ankle fracture surgery (Left,  "03/24/2017); Augmentation mammaplasty (Bilateral, 1999); Esophagogastroduodenoscopy (N/A, 11/30/2022); Ankle fracture surgery (Right, 05/25/2023); Esophagogastroduodenoscopy (N/A, 02/01/2024); Foot surgery (2012); Colonoscopy (2018); [place] tooth extraction (1992); Dilation and curettage of uterus (1987); and Esophagogastroduodenoscopy (N/A, 04/07/2025).    Applicable Nutrition History:   (4/10) CRRT started   (4/10) Intubated       Anthropometrics     Height: Height: 165.1 cm (65\")  Last Filed Weight: Weight: 99.4 kg (219 lb 2.2 oz) (weighed w/  scds on and 5 pillows and 2 thin blankets) (04/09/25 0645)  Method: Weight Method: Bed scale  BMI: BMI (Calculated): 36.5    UBW:     Weight       Weight (kg) Weight (lbs) Weight Method Visit Report   5/31/2024 67.132 kg  148 lb      11/5/2024 67.132 kg  148 lb      11/10/2024 67.132 kg  148 lb  Stated     11/11/2024 67.1 kg  147 lb 14.9 oz      12/6/2024 71.668 kg  158 lb  Stated     1/30/2025 71.668 kg  158 lb  Stated     2/17/2025 71.668 kg  158 lb   --    2/21/2025 71.7 kg  158 lb 1.1 oz   --    2/28/2025 71.668 kg  158 lb   --    3/10/2025 71.668 kg  158 lb   --    3/12/2025 68.04 kg  150 lb       68 kg  149 lb 14.6 oz      3/13/2025 68 kg  149 lb 14.6 oz      4/4/2025 71.668 kg  158 lb  Bed scale      90.583 kg  199 lb 11.2 oz      4/6/2025 90.6 kg  199 lb 11.8 oz      4/9/2025 99.4 kg  219 lb 2.2 oz  Bed scale         Weight change: No significant changes / ascites may be masking loss    Labs    Labs Reviewed: Yes     Results from last 7 days   Lab Units 04/12/25  0009 04/11/25  1621 04/11/25  0830 04/10/25  1636 04/10/25  0407 04/07/25  0505 04/06/25  1130   GLUCOSE mg/dL 174* 71 85  85   < > 122*   < > 104*   BUN mg/dL 20 21 28*  28*   < > 45*   < > 19   CREATININE mg/dL 1.06* 1.26* 1.62*  1.62*   < > 2.60*   < > 1.40*   SODIUM mmol/L 139 137 136  136   < > 136   < > 134*   CHLORIDE mmol/L 105 102 102  102   < > 103   < > 103   POTASSIUM mmol/L 4.2 4.0 4.3  " 4.3   < > 3.9   < > 3.8   PHOSPHORUS mg/dL 4.0 3.5 4.0   < >  --    < > 3.1   MAGNESIUM mg/dL 2.5* 2.1 2.3   < >  --    < > 2.0   ALT (SGPT) U/L 81*  --  77*  --  61*   < > 32   LACTATE mmol/L  --   --   --   --   --   --  2.0    < > = values in this interval not displayed.       Results from last 7 days   Lab Units 04/12/25  0923 04/12/25  0009 04/11/25  1621 04/11/25  0830   ALBUMIN g/dL  --  3.3*  3.4* 3.2* 3.6  3.6   IONIZED CALCIUM mmol/L 1.06* 1.02* 1.02* 1.09*       Results from last 7 days   Lab Units 04/12/25  0536 04/12/25  0018 04/11/25  2319 04/11/25  2037 04/11/25  1843 04/11/25  1135   GLUCOSE mg/dL 87 132* 68* 71 76 79     Lab Results   Lab Value Date/Time    HGBA1C 5.60 09/12/2023 0936    HGBA1C 5.30 05/24/2023 0152    HGBA1C 4.9 02/13/2023 1006         Results from last 7 days   Lab Units 04/06/25  1130   PROBNP pg/mL 4,801.0*       Medications    Medications Reviewed: Yes    Scheduled Meds:chlorhexidine, 15 mL, Mouth/Throat, Q12H  folic acid, 1 mg, Per G Tube, Daily  gabapentin, 200 mg, Per G Tube, Q12H  hydrocortisone sodium succinate, 25 mg, Intravenous, Q6H  ipratropium-albuterol, 3 mL, Nebulization, 4x Daily - RT  lactobacillus acidophilus, 1 capsule, Per G Tube, Daily  lactulose, 10 g, Per G Tube, Daily  latanoprost, 1 drop, Both Eyes, Nightly  midodrine, 10 mg, Per G Tube, TID AC  pantoprazole, 40 mg, Intravenous, Q AM  piperacillin-tazobactam, 4.5 g, Intravenous, Q8H  QUEtiapine, 100 mg, Per G Tube, Nightly  rifAXIMin, 550 mg, Per G Tube, Q12H  [Held by provider] rosuvastatin, 5 mg, Per G Tube, Daily  sodium chloride, 10 mL, Intravenous, Q12H  sodium chloride, 10 mL, Intravenous, Q12H  sodium chloride, 10 mL, Intravenous, Q12H  thiamine, 100 mg, Per G Tube, Daily  ursodiol, 300 mg, Nasogastric, BID      Continuous Infusions:fentanyl 10 mcg/mL,  mcg/hr, Last Rate: 50 mcg/hr (04/11/25 1625)  midazolam, 1-10 mg/hr, Last Rate: Stopped (04/12/25 0815)  norepinephrine, 0.02-0.3 mcg/kg/min,  Last Rate: 0.14 mcg/kg/min (04/12/25 0846)  phenylephrine, 0.5-3 mcg/kg/min, Last Rate: Stopped (04/11/25 1305)  Phoxillum BK4/2.5, 1,000 mL/hr, Last Rate: 1,000 mL/hr (04/12/25 0604)  Phoxillum BK4/2.5, 1,000 mL/hr, Last Rate: 1,000 mL/hr (04/12/25 0604)  Phoxillum BK4/2.5, 1,000 mL/hr, Last Rate: 1,000 mL/hr (04/12/25 0604)  vasopressin, 0.03 Units/min, Last Rate: Stopped (04/10/25 0946)      PRN Meds:.  [DISCONTINUED] senna-docusate sodium **AND** [DISCONTINUED] polyethylene glycol **AND** [DISCONTINUED] bisacodyl **AND** bisacodyl    Calcium Replacement - Follow Nurse / BPA Driven Protocol    dextrose    dextrose    fentaNYL    fentaNYL citrate (PF)    glucagon (human recombinant)    heparin (porcine)    HYDROcodone-acetaminophen    ipratropium-albuterol    Magnesium Standard Dose Replacement - Follow Nurse / BPA Driven Protocol    midazolam    nitroglycerin    Phosphorus Replacement - Follow Nurse / BPA Driven Protocol    polyvinyl alcohol    Potassium Replacement - Follow Nurse / BPA Driven Protocol    sodium chloride    sodium chloride    sodium chloride    sodium chloride    sodium chloride    Intake/Ouptut 24 hrs (0701 - 0700)   I&O's Reviewed: Yes     Last documented BM (4/10)     Nutrition Focused Physical Exam    Date:  4/10       Unable to perform due to Pt unable to participate at time of visit     Subjective   Reported/Observed/Food/Nutrition Related History:   4/12  Consult for tube feeding assessment.  Patient remains intubated and sedated. CRRT on going @ 125ml/hr UF.  Continues on pressors.     4/10  Visited with patient for LOS, admit 4/4 with sepsis/UTI. Patient with advanced cirrhosis. Currently requiring multiple vasopressors. She states she is actually eating okay and tolerating. Denies any issues eating at home or known weight changes. Pt denies further dietary needs/preferences or nutritional questions/concerns at this time, NKFA.     Needs Assessment   Date: 4/12    Height used:Height:  "165.1 cm (65\")  Weights used: 158/71.8kg (most likely dry weight) BMI 26.3       Estimated Calorie needs: ~  1200Kcal/day (initial)   Method:  20-22Kcals/KG= 1436- 1580   Method:  PSU (ve 7.07, tmax 35.4) = 1147  Method:  MSJ: 1279    Estimated Protein needs: ~126 g PRO/day (while on CRRT)   Method: 1.5- 2.0g/Kg= 108- 144    Estimated Fluid needs: ~ ml/day   Per clinical status      Current Nutrition Prescription   PO: NPO Diet NPO Type: Tube Feeding  Oral Nutrition Supplement:   Intake: N/A NPO    EN: [employer] Renal  Goal Rate: 15ml/hr   Water Flushes: none  Modular: None  Route: NG  Tube: Large bore    At goal over: 20Hrs/day     Rx will supply:   Goal Volume 300  mL/day     Flush Volume 0 mL/day     Energy 600 Kcal/day 50 % Est Need   Protein 27 g/day 21 % Est Need   Fiber 0 g/day     Water in   mL     Total Water 216 mL     Meet DRI No        --------------------------------------------------------------------------  Product/Rate verified at bedside: No  Infusing Rate at time of visit: EN just ordered     Average Delivery from Charting: Insufficient data   Volume  mL/day   % Goal Vol.   Flush Volume  mL/day     Energy  Kcal/day  % Est Need   Protein  g/day  % Est Need   Fiber  g/day     Water in  EN  mL     Total Water  mL     Meet DRI N/A          Assessment & Plan   Nutrition Diagnosis   Date:  4/10            Updated:    Problem Increased nutrient needs    Etiology Metabolic demands of cirrhosis   Signs/Symptoms Med hx   Status: New    Goal:   Nutrition to support treatment and Tolerate PO      Nutrition Intervention      Follow treatment progress, Care plan reviewed, Nutrition support order placed      Remains on high pressors dose, continue with trophic feeds for now   Will add water flushes of 30ml Q4hrs for help maintain tube patency.   Monitor clinical and adjust EN as indicated     Monitoring/Evaluation:   Per protocol, I&O, PO intake, Pertinent labs, Weight, GI status, Symptoms, POC/GOC    Rosey " DENEEN Rivero RD, [facility]  Time Spent: 30m

## 2025-04-12 NOTE — PLAN OF CARE
Goal Outcome Evaluation:     - Pt remains on vent, PEEP 10, FiO2 40%  - CRRT continued, rate at 125 mL/hr UF  - Pt not following commands  - Opens eyes to pain and withdraws from pain. GCS 7   - Levo infusing @ 0.12, Fentanyl infusing @ 50, Versed infusing @ 1   - Hemodialysis cath dressing changed   - Restraints remain in place   -D50 given x1  - UOP 2mL, No BM

## 2025-04-12 NOTE — PROGRESS NOTES
"   LOS: 7 days    Patient Care Team:  Unique Brandt DO as PCP - General (Family Medicine)  Leana Garcia PA as Physician Assistant (Gastroenterology)  Judy Curry LPCC as  (Behavioral Health)    Chief Complaint: Septic shock  64-year-old female alcoholic liver cirrhosis, readmitted with febrile illness leukocytosis shortness of breath and diarrhea, admitted in ICU with hypotension placed on IV pressor admit creatinine 1.1 mg/dL slowly worsening.    Subjective     Critically ill, intubated and sedated  Currently on 1 pressor  CRRT ongoing.  Tolerating fluid removal pretty well.    No issues with CRRT circuit in last 24 hours  No family member at bedside    Objective     Vital Sign Min/Max for last 24 hours  Temp  Min: 95.2 °F (35.1 °C)  Max: 96.1 °F (35.6 °C)   BP  Min: 72/34  Max: 118/60   Pulse  Min: 83  Max: 107   Resp  Min: 18  Max: 21   SpO2  Min: 94 %  Max: 100 %   No data recorded   No data recorded     Flowsheet Rows      Flowsheet Row First Filed Value   Admission Height 165.1 cm (65\") Documented at 04/04/2025 1609   Admission Weight 71.7 kg (158 lb) Documented at 04/04/2025 1609            I/O this shift:  In: 607.8 [I.V.:273.8; Other:160; NG/GT:61; IV Piggyback:113]  Out: 1512   I/O last 3 completed shifts:  In: 2883.7 [I.V.:1679.3; Blood:367; Other:60; NG/GT:305; IV Piggyback:472.4]  Out: 6639 [Urine:40]    Physical Exam:  General Appearance:  female, intubated, jaundiced  Eyes: PER, EOMI.  Neck: +  JVD.  Lungs: Mechanical breath sounds  Heart: No gallop, murmur, rub, RRR.  Abdomen: Distended.  Nontender  Extremities: Bilateral lower extremity 4+ edema, no cyanosis.  Neuro: No focal deficit, moving all extremities, alert oriented X 3  Skin: Positive for spider angiomas noted on the chest wall.  Skin changes are noted with ecchymosis, shiny lower extremity edema.    : Dudley catheter in place    WBC WBC   Date Value Ref Range Status   04/12/2025 22.65 (H) 3.40 - 10.80 " "10*3/mm3 Final   04/11/2025 28.69 (H) 3.40 - 10.80 10*3/mm3 Final   04/11/2025 27.68 (H) 3.40 - 10.80 10*3/mm3 Final   04/10/2025 21.00 (H) 3.40 - 10.80 10*3/mm3 Final      HGB Hemoglobin   Date Value Ref Range Status   04/12/2025 7.6 (L) 12.0 - 15.9 g/dL Final   04/12/2025 7.4 (L) 12.0 - 15.9 g/dL Final   04/11/2025 8.4 (L) 12.0 - 15.9 g/dL Final   04/11/2025 7.3 (L) 12.0 - 15.9 g/dL Final   04/10/2025 7.4 (L) 12.0 - 15.9 g/dL Final      HCT Hematocrit   Date Value Ref Range Status   04/12/2025 22.7 (L) 34.0 - 46.6 % Final   04/12/2025 22.8 (L) 34.0 - 46.6 % Final   04/11/2025 25.4 (L) 34.0 - 46.6 % Final   04/11/2025 22.9 (L) 34.0 - 46.6 % Final   04/10/2025 22.8 (L) 34.0 - 46.6 % Final      Platlets No results found for: \"LABPLAT\"   MCV MCV   Date Value Ref Range Status   04/12/2025 102.7 (H) 79.0 - 97.0 fL Final   04/11/2025 103.3 (H) 79.0 - 97.0 fL Final   04/11/2025 108.0 (H) 79.0 - 97.0 fL Final   04/10/2025 107.5 (H) 79.0 - 97.0 fL Final          Sodium Sodium   Date Value Ref Range Status   04/12/2025 137 136 - 145 mmol/L Final   04/12/2025 139 136 - 145 mmol/L Final   04/11/2025 137 136 - 145 mmol/L Final   04/11/2025 136 136 - 145 mmol/L Final   04/11/2025 136 136 - 145 mmol/L Final   04/11/2025 136 136 - 145 mmol/L Final   04/10/2025 136 136 - 145 mmol/L Final   04/10/2025 136 136 - 145 mmol/L Final      Potassium Potassium   Date Value Ref Range Status   04/12/2025 4.1 3.5 - 5.2 mmol/L Final   04/12/2025 4.2 3.5 - 5.2 mmol/L Final   04/11/2025 4.0 3.5 - 5.2 mmol/L Final   04/11/2025 4.3 3.5 - 5.2 mmol/L Final   04/11/2025 4.3 3.5 - 5.2 mmol/L Final   04/11/2025 4.4 3.5 - 5.2 mmol/L Final   04/10/2025 3.7 3.5 - 5.2 mmol/L Final   04/10/2025 3.9 3.5 - 5.2 mmol/L Final      Chloride Chloride   Date Value Ref Range Status   04/12/2025 100 98 - 107 mmol/L Final   04/12/2025 105 98 - 107 mmol/L Final   04/11/2025 102 98 - 107 mmol/L Final   04/11/2025 102 98 - 107 mmol/L Final   04/11/2025 102 98 - 107 mmol/L " "Final   04/11/2025 103 98 - 107 mmol/L Final   04/10/2025 104 98 - 107 mmol/L Final   04/10/2025 103 98 - 107 mmol/L Final      CO2 CO2   Date Value Ref Range Status   04/12/2025 20.0 (L) 22.0 - 29.0 mmol/L Final   04/12/2025 20.0 (L) 22.0 - 29.0 mmol/L Final   04/11/2025 18.0 (L) 22.0 - 29.0 mmol/L Final   04/11/2025 19.0 (L) 22.0 - 29.0 mmol/L Final   04/11/2025 19.0 (L) 22.0 - 29.0 mmol/L Final   04/11/2025 18.0 (L) 22.0 - 29.0 mmol/L Final   04/10/2025 16.0 (L) 22.0 - 29.0 mmol/L Final   04/10/2025 18.0 (L) 22.0 - 29.0 mmol/L Final      BUN BUN   Date Value Ref Range Status   04/12/2025 16 8 - 23 mg/dL Final   04/12/2025 20 8 - 23 mg/dL Final   04/11/2025 21 8 - 23 mg/dL Final   04/11/2025 28 (H) 8 - 23 mg/dL Final   04/11/2025 28 (H) 8 - 23 mg/dL Final   04/11/2025 38 (H) 8 - 23 mg/dL Final   04/10/2025 47 (H) 8 - 23 mg/dL Final   04/10/2025 45 (H) 8 - 23 mg/dL Final      Creatinine Creatinine   Date Value Ref Range Status   04/12/2025 1.05 (H) 0.57 - 1.00 mg/dL Final   04/12/2025 1.06 (H) 0.57 - 1.00 mg/dL Final   04/11/2025 1.26 (H) 0.57 - 1.00 mg/dL Final   04/11/2025 1.62 (H) 0.57 - 1.00 mg/dL Final   04/11/2025 1.62 (H) 0.57 - 1.00 mg/dL Final   04/11/2025 2.08 (H) 0.57 - 1.00 mg/dL Final   04/10/2025 2.58 (H) 0.57 - 1.00 mg/dL Final   04/10/2025 2.60 (H) 0.57 - 1.00 mg/dL Final      Calcium Calcium   Date Value Ref Range Status   04/12/2025 8.4 (L) 8.6 - 10.5 mg/dL Final   04/12/2025 7.9 (L) 8.6 - 10.5 mg/dL Final   04/11/2025 7.9 (L) 8.6 - 10.5 mg/dL Final   04/11/2025 8.8 8.6 - 10.5 mg/dL Final   04/11/2025 8.8 8.6 - 10.5 mg/dL Final   04/11/2025 8.5 (L) 8.6 - 10.5 mg/dL Final   04/10/2025 8.0 (L) 8.6 - 10.5 mg/dL Final   04/10/2025 8.5 (L) 8.6 - 10.5 mg/dL Final      PO4 No results found for: \"CAPO4\"   Albumin Albumin   Date Value Ref Range Status   04/12/2025 3.3 (L) 3.5 - 5.2 g/dL Final   04/12/2025 3.4 (L) 3.5 - 5.2 g/dL Final   04/12/2025 3.3 (L) 3.5 - 5.2 g/dL Final   04/11/2025 3.2 (L) 3.5 - 5.2 " "g/dL Final   04/11/2025 3.6 3.5 - 5.2 g/dL Final   04/11/2025 3.6 3.5 - 5.2 g/dL Final   04/11/2025 3.8 3.5 - 5.2 g/dL Final   04/10/2025 3.6 3.5 - 5.2 g/dL Final   04/10/2025 3.7 3.5 - 5.2 g/dL Final      Magnesium Magnesium   Date Value Ref Range Status   04/12/2025 2.4 1.6 - 2.4 mg/dL Final   04/12/2025 2.5 (H) 1.6 - 2.4 mg/dL Final   04/11/2025 2.1 1.6 - 2.4 mg/dL Final   04/11/2025 2.3 1.6 - 2.4 mg/dL Final   04/11/2025 2.1 1.6 - 2.4 mg/dL Final   04/10/2025 1.9 1.6 - 2.4 mg/dL Final      Uric Acid No results found for: \"URICACID\"        Results Review:     I reviewed the patient's new clinical results.    folic acid, 1 mg, Per G Tube, Daily  gabapentin, 200 mg, Per G Tube, Q12H  hydrocortisone sodium succinate, 25 mg, Intravenous, Q6H  ipratropium-albuterol, 3 mL, Nebulization, 4x Daily - RT  lactobacillus acidophilus, 1 capsule, Per G Tube, Daily  lactulose, 10 g, Per G Tube, Daily  latanoprost, 1 drop, Both Eyes, Nightly  midodrine, 10 mg, Per G Tube, TID AC  pantoprazole, 40 mg, Intravenous, Q AM  piperacillin-tazobactam, 4.5 g, Intravenous, Q8H  QUEtiapine, 100 mg, Per G Tube, Nightly  rifAXIMin, 550 mg, Per G Tube, Q12H  sodium chloride, 10 mL, Intravenous, Q12H  sodium chloride, 10 mL, Intravenous, Q12H  sodium chloride, 10 mL, Intravenous, Q12H  thiamine, 100 mg, Per G Tube, Daily  ursodiol, 300 mg, Nasogastric, BID      fentanyl 10 mcg/mL,  mcg/hr, Last Rate: 50 mcg/hr (04/11/25 1625)  midazolam, 1-10 mg/hr, Last Rate: Stopped (04/12/25 0815)  norepinephrine, 0.02-0.3 mcg/kg/min, Last Rate: 0.12 mcg/kg/min (04/12/25 1204)  phenylephrine, 0.5-3 mcg/kg/min, Last Rate: Stopped (04/11/25 1305)  Phoxillum BK4/2.5, 1,000 mL/hr, Last Rate: 1,000 mL/hr (04/12/25 1602)  Phoxillum BK4/2.5, 1,000 mL/hr, Last Rate: 1,000 mL/hr (04/12/25 1601)  Phoxillum BK4/2.5, 1,000 mL/hr, Last Rate: 1,000 mL/hr (04/12/25 1601)        Medication Review: Reviewed    Assessment & Plan       Sepsis secondary to UTI    " Alcoholic cirrhosis of liver without ascites    Alcohol use disorder    Peripheral neuropathy    Acute UTI (urinary tract infection)    Pneumonia    Acute kidney injury      1.  Acute kidney injury: Creatinine worsening since admission.  Admit creatinine 1.1 mg/dL,, yesterday increased to 1.5, today's creatinine 1.97 likely diagnosis hepatorenal syndrome with hypotension in the setting of liver cirrhosis patient has been given IV Bumex and albumin.  2.  Cardiorenal syndrome: Cirrhosis of liver.  3.  Metabolic acidosis  4.  Hyponatremia  5.  Volume overload  6.  Cirrhosis of liver  7.  Sepsis/E. coli UTI: On Zosyn on admission and now on ceftriaxone and IV pressors.    Plan:  Acute Renal failure.  Anuric. currently on 2 pressors.  Started on CRRT/10.   Continue CRRT.  UF  as tolerated  Supplement electrolyte as per CRRT protocol  Avoid nephrotoxic medications.  Keep MAP greater than 60 mmHg for renal perfusion  Adjust medication for the new GFR.    Case discussed with the medical staff taking care of the patient.  Prognosis guarded.  High risk complex patient with multiple medical problems.    Rashawn Donald MD  04/12/25  16:11 EDT

## 2025-04-12 NOTE — NURSING NOTE
CRRT rounds completed. Treatment plan  reviewed with  DANDY Woo. Orders and documentation reviewed. Prescription for CRRT machine reviewed with nurse- any needed adjustments made at this time.  Dressing to dialysis access visualized and intact. Dressing change due 4/19/25. .  Primary nurse encouraged to call [phone]  or on call dialysis nurse for assistance or any questions.

## 2025-04-13 NOTE — PROGRESS NOTES
Critical Care Note     LOS: 8 days   Patient Care Team:  Unique Brandt DO as PCP - General (Family Medicine)  Leana Garcia PA as Physician Assistant (Gastroenterology)  Judy Curry LPCC as  (Behavioral Health)    Chief Complaint/Reason for visit:    Chief Complaint   Patient presents with    Abnormal Lab     Sepsis secondary to UTI    Alcoholic cirrhosis of liver without ascites    Alcohol use disorder    Peripheral neuropathy    Acute UTI (urinary tract infection)    Pneumonia    Acute kidney injury/  Hepatorenal syndrome  Acute respiratory failure    Subjective     Interval History:     Continues to ooze from her dialysis access site.  She required additional transfusion overnight.  Given a dose of DDAVP and thrombin gel applied to the site.  She remains on CRRT with ultrafiltration of 200 mL/h.  Afebrile.  Maintaining sinus rhythm.  Sedation was decreased and she is currently on fentanyl 50 mcg/h, Versed 3 mg/h.  She is requiring norepinephrine at 0.18 mics per kilogram per minute.    Review of Systems:    All systems were reviewed and negative except as noted in subjective.    Medical history, surgical history, social history, family history reviewed    Objective     Intake/Output:    Intake/Output Summary (Last 24 hours) at 4/13/2025 1700  Last data filed at 4/13/2025 1500  Gross per 24 hour   Intake 2275.17 ml   Output 3485 ml   Net -1209.83 ml       Nutrition:  NPO Diet NPO Type: Tube Feeding    Infusions:  fentanyl 10 mcg/mL,  mcg/hr, Last Rate: 50 mcg/hr (04/13/25 1044)  midazolam, 1-10 mg/hr, Last Rate: 3 mg/hr (04/13/25 0838)  norepinephrine, 0.02-0.3 mcg/kg/min, Last Rate: 0.18 mcg/kg/min (04/13/25 1519)  Phoxillum BK4/2.5, 1,000 mL/hr, Last Rate: 1,000 mL/hr (04/13/25 1401)  Phoxillum BK4/2.5, 1,000 mL/hr, Last Rate: 1,000 mL/hr (04/13/25 1401)  Phoxillum BK4/2.5, 1,000 mL/hr, Last Rate: 1,000 mL/hr (04/13/25 1401)        Mechanical Ventilator Settings:            Resp  "Rate (Set): 18     FiO2 (%): 35 %  PEEP/CPAP (cm H2O): 8 cm H20    Minute Ventilation (L/min) (Obs): 6.66 L/min  Resp Rate (Observed) Vent: 18  I:E Ratio (Set): 1:2.70  I:E Ratio (Obs): 1:2.7    PIP Observed (cm H2O): 24 cm H2O  Plateau Pressure (cm H2O): 21 cm H2O    Telemetry: Sinus rhythm             Vital Signs  Blood pressure 105/53, pulse 89, temperature 96.3 °F (35.7 °C), temperature source Bladder, resp. rate 18, height 165.1 cm (65\"), weight 99.4 kg (219 lb 2.2 oz), last menstrual period 01/22/2016, SpO2 97%.    Physical Exam:  General Appearance:  Middle-aged woman, sedated   Head:  Atraumatic   Eyes:          Jaundice   Ears:     Throat: Orally intubated, OG tube   Neck: Trachea midline, no crepitus, right tunneled access, still oozing from the site with some clotted blood but oozing fresh blood continuously.   Back:      Lungs:   Improved breath sounds anteriorly.  Still diminished lateral posterior bases    Heart:  Regular rhythm, S1, S2 auscultated    Abdomen:   distended with a fluid wave and edema of her abdominal wall, somewhat better   Rectal:   Deferred   Extremities: 3+ edema, particularly her thighs and flank no visible rash   Pulses:    Skin: No visible rash   Lymph nodes:    Neurologic: Sedated      Results Review:     I reviewed the patient's new clinical results.   Results from last 7 days   Lab Units 04/13/25  0911 04/13/25  0052 04/12/25  1548 04/12/25  0923 04/12/25  0009 04/11/25  1621 04/11/25  0830 04/10/25  1636 04/10/25  0407   SODIUM mmol/L 136 138 137   < > 139   < > 136  136   < > 136   POTASSIUM mmol/L 4.7 4.3 4.2   < > 4.2   < > 4.3  4.3   < > 3.9   CHLORIDE mmol/L 101 104 104   < > 105   < > 102  102   < > 103   CO2 mmol/L 20.0* 19.0* 19.0*   < > 20.0*   < > 19.0*  19.0*   < > 18.0*   BUN mg/dL 16 16 15   < > 20   < > 28*  28*   < > 45*   CREATININE mg/dL 0.78 0.91 0.82   < > 1.06*   < > 1.62*  1.62*   < > 2.60*   CALCIUM mg/dL 9.1 7.7* 8.5*   < > 7.9*   < > 8.8  8.8   < " > 8.5*   BILIRUBIN mg/dL  --   --   --   --  6.6*  --  6.9*  --  6.7*   ALK PHOS U/L  --   --   --   --  79  --  86  --  85   ALT (SGPT) U/L  --   --   --   --  81*  --  77*  --  61*   AST (SGOT) U/L  --   --   --   --  250*  --  224*  --  173*   GLUCOSE mg/dL 121* 105* 86   < > 174*   < > 85  85   < > 122*    < > = values in this interval not displayed.     Results from last 7 days   Lab Units 04/13/25  1103 04/13/25  0504 04/12/25  0923 04/12/25  0613 04/11/25  0830   WBC 10*3/mm3  --  25.73*  --  22.65* 28.69*   HEMOGLOBIN g/dL 7.7* 6.9* 7.6* 7.4* 8.4*   HEMATOCRIT % 23.3* 21.2* 22.7* 22.8* 25.4*   PLATELETS 10*3/mm3 142 155  --  127* 247     Results from last 7 days   Lab Units 04/13/25  0316   PH, ARTERIAL pH units 7.370   PO2 ART mm Hg 76.6*   PCO2, ARTERIAL mm Hg 38.1   HCO3 ART mmol/L 22.0     Lab Results   Component Value Date    BLOODCX No growth at 5 days 04/04/2025    BLOODCX No growth at 5 days 04/04/2025     Lab Results   Component Value Date    URINECX >100,000 CFU/mL Escherichia coli (A) 04/04/2025       I reviewed the patient's new imaging including images and reports.    XR CHEST 1 VW    Date of Exam: 4/12/2025 3:46 AM EDT    Indication: Respiratory failure, pulmonary edema    Comparison: 4/10/2025    Findings:  Tubes and lines are unchanged and in good position. Heart size is stable. There are persistent bilateral infiltrates with a basilar predominance. These are slightly improved in the upper lobes. Small left pleural effusion is likely present. No  pneumothorax.   Impression:     Slight improvement in aeration of the upper lobes, otherwise stable.        Electronically Signed: Forest White MD   4/12/2025 5:52 AM EDT   Workstation ID: [id number]         CT CHEST WO CONTRAST DIAGNOSTIC    Date of Exam: 4/5/2025 10:26 PM EDT    Indication: pna vs edema.    Comparison: Chest radiograph 4/4/2025 and chest CT 7[address] 22.    Technique: Axial CT images were obtained of the chest without contrast  administration.  Reconstructed coronal and sagittal images were also obtained. Automated exposure control and iterative construction methods were used.      Findings:  The thyroid, trachea and esophagus appear within normal limits. The heart is enlarged. There is mild coronary artery calcification. Diminished attenuation of the blood pool suggests anemia. There are mildly enlarged mediastinal lymph nodes including a  right paratracheal lymph node on axial image 33 measuring 10 mm in the short axis. There is mild aortic and aortic branch vessel atherosclerosis. No pericardial effusion.    There are moderate bilateral pleural effusions with significant passive atelectasis affecting the entire left lower lobe and a large portion of the right lower lobe. There is patchy bilateral airspace disease most pronounced at the lung periphery along  with intermixed interstitial thickening. This is favored to reflect pneumonia, but pulmonary edema could have a similar appearance. Correlate with laboratory values and symptoms to differentiate. Airways are patent.    There are bilateral breast implants. There is body wall edema. The liver is enlarged and infiltrated with fat. Liver margins also appear lobulated worrisome for cirrhosis. The spleen is enlarged measuring 12.2 x 8.8 cm in the axial plane. No acute  findings in the upper abdomen. Mild spinal degenerative changes. No acute osseous abnormality.   Impression:     Impression:  1.Moderate bilateral pleural effusions with significant passive atelectasis affecting the entire left lower lobe and a large portion of the right lower lobe.  2.Patchy bilateral airspace disease and interstitial thickening. This is favored to reflect pneumonia, but pulmonary edema could have a similar appearance. Correlate with laboratory values and symptoms to differentiate.  3.Cardiomegaly and coronary artery disease.  4.Hepatosplenomegaly and hepatic steatosis. Lobulated liver margins worrisome for  cirrhosis.  5.Body wall edema.            Electronically Signed: Ravi Gordon MD   4/6/2025 3:27 AM EDT       Interpretation SummaryEchocardiogram April 6, 2025         Left ventricular systolic function is normal. Automated 2D EF = 63.4%    Left atrial volume is severely increased.    Estimated right ventricular systolic pressure from tricuspid regurgitation is moderately elevated (45-55 mmHg). Calculated right ventricular systolic pressure from tricuspid regurgitation is 47 mmHg.        All medications reviewed.   folic acid, 1 mg, Per G Tube, Daily  gabapentin, 200 mg, Per G Tube, Q12H  hydrocortisone sodium succinate, 25 mg, Intravenous, Q6H  ipratropium-albuterol, 3 mL, Nebulization, 4x Daily - RT  lactobacillus acidophilus, 1 capsule, Per G Tube, Daily  lactulose, 10 g, Per G Tube, Daily  latanoprost, 1 drop, Both Eyes, Nightly  midodrine, 10 mg, Per G Tube, TID AC  pantoprazole, 40 mg, Intravenous, Q AM  piperacillin-tazobactam, 4.5 g, Intravenous, Q8H  QUEtiapine, 100 mg, Per G Tube, Nightly  rifAXIMin, 550 mg, Per G Tube, Q12H  sodium chloride, 10 mL, Intravenous, Q12H  sodium chloride, 10 mL, Intravenous, Q12H  sodium chloride, 10 mL, Intravenous, Q12H  thiamine, 100 mg, Per G Tube, Daily  ursodiol, 300 mg, Nasogastric, BID          Assessment & Plan       Sepsis secondary to UTI    Alcoholic cirrhosis of liver without ascites    Alcohol use disorder    Peripheral neuropathy    Acute UTI (urinary tract infection)    Pneumonia    Acute kidney injury    64-year-old woman with hypertension, chronic pain, liver cirrhosis, COPD hospitalized in mid March with alcoholic hepatitis and cirrhosis.  She also had a third metatarsal fracture.  She was discharged to [facility] for rehabilitation.  She returned to [facility] April 4 with elevated white count fever and hypotension.  Urinalysis was consistent with a urinary tract infection.  Urine culture grew E. coli resistant to ampicillin.   Respiratory panel PCR was negative.  Blood cultures were negative respiratory panel PCR was negative.  Chest x-ray revealed left basilar opacity and effusion, possible edema.  She was placed on vancomycin and Zosyn.  She became hypotensive, Unresponsive to volume resuscitation and albumin.  She was placed on phenylephrine because of resting tachycardia.  Phenylephrine was changed to norepinephrine for better blood pressure management.    Hemoglobin had dropped to 7.6 and she had a history of a recent GI bleed so she was given a unit of packed red cells.  She underwent an EGD that revealed a nonbleeding ulcer and varices.  She had oozing from her dialysis access and required 1 unit of packed red cells on April 10.  Renal function has worsened and she became progressively volume overloaded.  Nephrology was consulted and agreed that she had hepatorenal syndrome.  Dialysis access was placed and CRRT initiated on April 10.  He has been oozing from the dialysis access site and has required additional transfusions.        #1 sepsis, suspected urinary source urine cultures positive for E. coli resistant to ampicillin.  She also has basilar infiltrates that may be compressive atelectasis from effusions versus pneumonia.  However now it s clinically progressive edema from her volume overload.  Respiratory culture had no organisms on Gram stain and rare white cells.  Clinically I suspect lung infiltrates are all volume overload.  Blood cultures are negative.  She was initially on Zosyn but transition to ceftriaxone on April 7.  White count has remained elevated, but she is afebrile, and now hypothermic since starting CRRT.  She is receiving stress steroids which may be contributing to her leukocytosis.  She continues to require vasopressors but for multiple reasons.  Her serum cortisol was technically normal at 6.47 but that is inappropriate for her stress and hypotension.    -Change vasopressor to norepinephrine  - midodrine  10 mg 3 times daily  -stress steroids, decrease to 25 mg every 6 hours and will likely stop  - Rocephin for urinary tract infection, completed.  Empiric Zosyn initiated 4/10 with intubation, with negative cultures we will complete a 5-day course    #2 cirrhosis with ascites, secondary to ETOH, recent GI bleed.EGD April 7 revealed gastric ulcer that was healed, grade 1 esophagitis, grade 1 esophageal varices.  She has bilateral effusions, ascites, hepatomegaly and body wall edema.  She also has a coagulopathy and hyperbilirubinemia.  Bilirubin remains elevated at 6.6.  Transaminases are only mildly elevated with an ALT of 81, AST of 250.  INR is only  elevated at 1.83.  Platelet count is adequate at 155.  She does continue to ooze from her dialysis site      -Monitor H&H and transfuse if less than 7.    -DDAVP for ongoing bleeding from her dialysis access  -Gel to the dialysis access site  - Rifaximin  - thiamine, folic acid  -Lactulose on hold secondary to diarrhea    #3 acute kidney injury      - Worrisome for hepatorenal syndrome with low urine sodium  -Tunneled access placed April 10, CRRT initiated  -Pulling 200 and hour  - Remains on some vasopressor support, norepinephrine 0.18 mics per kilogram per minute    #4 acute respiratory failure, progressive increased oxygen requirement over the week.  Now in florid edema and intubated on the evening of April 10.    Current ventilator settings rate 18, tidal volume 380, PEEP 8, 35%, ABG pH 7.37, CO2 38, O2 76.     Respiratory culture sent and empiric Zosyn initiated.  Culture is negative.  White count is still elevated at 25.7 but she is on stress steroids.  Chest ray looks like pulmonary edema and effusion, it is clinically improving with dialysis    - Monitor ABG, chest x-ray  -Complete 5 days of Zosyn  - Assess for readiness to wean daily    #5 nutrition    Has an NG tube in place.  Will initiate trophic feedings    VTE Prophylaxis:SCDS    Stress Ulcer  Prophylaxis:protonix    Before she was intubated patient wanted her aunt and uncle to be decision-makers.  With them yesterday and they are willing at this time to make decisions for her      Yecenia MARY Clifford MD  04/13/25  17:00 EDT      Time: Critical care 40 min  I personally provided care to this critically ill patient as documented above.  Critical care time does not include time spent on separately billed procedures.  None of my critical care time was concurrent with other critical care providers.

## 2025-04-13 NOTE — PLAN OF CARE
Goal Outcome Evaluation:  Plan of Care Reviewed With: patient    - Hypotensive through the night requiring levophed up to 0.3; current infusing @ 0.22.    - Versed @ 2.    - Two dressing changes to dialysis port relating to bleeding at site.    - Hemoglobin 6.9; pt to receive 1 unit transfusion packed red blood cells.    - Tolerating CRRT well otherwise, tolerating volumes pulled.    - Required forced air warmer throughout night to maintain core temperature >35.6.    - Novosource renal @ 15, 30ml water flush q4h. All residuals < 30ml and returned to patient.    - No urine output this shift.    - No bowel movements this shift.

## 2025-04-13 NOTE — PROGRESS NOTES
"GI Daily Progress Note  Subjective:    Chief Complaint: Follow-up decompensated cirrhosis.    Patient remains intubated and sedated.  She is on CRRT and pressor support.  No bowel movement for a couple of days.  Minimal urine output.  Bleeding from dialysis catheter site.  Treated for hypothermia.  On antibiotics for pneumonia and urosepsis.    Objective:    BP (!) 78/36   Pulse 92   Temp 96.1 °F (35.6 °C) (Bladder)   Resp 18   Ht 165.1 cm (65\")   Wt 99.4 kg (219 lb 2.2 oz) Comment: weighed w/  scds on and 5 pillows and 2 thin blankets  LMP 01/22/2016 (Approximate)   SpO2 96%   BMI 36.47 kg/m²     Physical Exam  Vitals and nursing note reviewed.   Constitutional:       General: She is not in acute distress.     Appearance: She is ill-appearing. She is not diaphoretic.      Comments: Sedated   HENT:      Head: Normocephalic.      Nose: Nose normal.   Eyes:      General: Scleral icterus present.   Cardiovascular:      Rate and Rhythm: Normal rate.   Pulmonary:      Effort: No respiratory distress.   Abdominal:      General: There is no distension.   Musculoskeletal:      Comments: Sarcopenia.   Skin:     Coloration: Skin is jaundiced.     Lab  Lab Results   Component Value Date    WBC 25.73 (H) 04/13/2025    HGB 7.7 (L) 04/13/2025    HGB 6.9 (C) 04/13/2025    HGB 7.6 (L) 04/12/2025    .9 (H) 04/13/2025     04/13/2025    INR 1.81 (H) 04/13/2025    INR 1.83 (H) 04/11/2025    INR 1.59 (H) 04/10/2025    INR 1.64 (H) 04/09/2025    INR 1.77 (H) 04/06/2025     Lab Results   Component Value Date    GLUCOSE 121 (H) 04/13/2025    BUN 16 04/13/2025    CREATININE 0.78 04/13/2025    EGFRIFNONA 114 05/15/2019    BCR 20.5 04/13/2025     04/13/2025    K 4.7 04/13/2025    CO2 20.0 (L) 04/13/2025    CALCIUM 9.1 04/13/2025    ALBUMIN 3.3 (L) 04/13/2025    ALKPHOS 79 04/12/2025    BILITOT 6.6 (H) 04/12/2025    BILIDIR 4.3 (H) 04/12/2025    ALT 81 (H) 04/12/2025     (H) 04/12/2025      Latest Reference " Range & Units 04/13/25 05:04   Ammonia 11 - 51 umol/L 38     Assessment:    1.  Acute alcoholic hepatitis diagnosed 1 month ago.  Not much improvement in bilirubin since that time.  Has not drank alcohol since 3/12/2025. MELD-Na = 34.  2.  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  3.  Grade 1 Encephalopathy, improved on Xifaxan and lactulose.  Ammonia is normal.  4.  Coagulopathy of liver disease, worsening.  5.  Hepatorenal syndrome type I, oliguric. Now on CRRT.  6.  Respiratory failure.  Remains on mechanical ventilation since 4/10/2025.  7.  Pneumonia and urosepsis.  8.  Hypotension since admission.  Remains on pressor support.  9.  Nonambulatory, foot fracture, severely deconditioned and with sarcopenia.  10.  History of LA grade D reflux esophagitis and antrum ulcer in February 2024, both healed on surveillance EGD 4/7/2025.    Plan:    >> Continue lactulose and Xifaxan.  >> Continue twice daily PPI.  >> Agree with trophic feeds, and advance as able.  >> Prognosis is grim.  Currently not a transplant candidate with infections, nonambulatory, and alcohol recidivism.  Current MELD predicts 66% 90-day mortality.  Mortality is likely much higher given frailty, malnutrition, sarcopenia, and multiorgan failure.  Patient is appropriate for no CPR and comfort measures discussion.    Addendum: I had a long discussion with patient's contact Madelin Null.  Discussed poor prognosis, and multiorgan failure despite maximum support.  Recommend consider no CPR.  Recommend family meeting with the patient's aunt and uncle (designated by patient to be her decision makers) in the next day or 2.    Mark I. Brunner, MD  04/13/25  16:45 EDT

## 2025-04-13 NOTE — PROGRESS NOTES
"   LOS: 8 days    Patient Care Team:  Unique Brandt DO as PCP - General (Family Medicine)  Leana Garcia PA as Physician Assistant (Gastroenterology)  Judy Curry LPCC as  (Behavioral Health)    Chief Complaint: Septic shock  64-year-old female alcoholic liver cirrhosis, readmitted with febrile illness leukocytosis shortness of breath and diarrhea, admitted in ICU with hypotension placed on IV pressor admit creatinine 1.1 mg/dL slowly worsening.    Subjective     Seen and examined at bedside, remains critically ill, intubated and sedated, s/p blood transfusion.currently on pressor, on CRRT, tolerating well. CRRT circuit changed yesterday. Remains anuric.  Nurse at bedside. Discussed in detail.     Objective     Vital Sign Min/Max for last 24 hours  Temp  Min: 96.1 °F (35.6 °C)  Max: 98.4 °F (36.9 °C)   BP  Min: 78/36  Max: 120/57   Pulse  Min: 81  Max: 123   Resp  Min: 18  Max: 22   SpO2  Min: 93 %  Max: 100 %   No data recorded   No data recorded     Flowsheet Rows      Flowsheet Row First Filed Value   Admission Height 165.1 cm (65\") Documented at 04/04/2025 1609   Admission Weight 71.7 kg (158 lb) Documented at 04/04/2025 1609            I/O this shift:  In: 921 [I.V.:286.2; Blood:385.1; Other:110; NG/GT:67; IV Piggyback:72.7]  Out: 996 [Urine:3]  I/O last 3 completed shifts:  In: 2539.3 [I.V.:1517; Other:381; NG/GT:313; IV Piggyback:328.3]  Out: 6299 [Urine:2]    Physical Exam:  General Appearance:  female, intubated, jaundiced, sick looking.   Eyes: PER, EOMI.  Neck: +  JVD.  Lungs: Mechanical breath sounds  Heart: No gallop, murmur, rub, RRR.  Abdomen: Distended.  Nontender  Extremities: Bilateral lower extremity 4+ edema, no cyanosis.  Neuro: No focal deficit, moving all extremities, alert oriented X 3  Skin: Positive for spider angiomas noted on the chest wall.  Skin changes are noted with ecchymosis, shiny lower extremity edema.    : Dudley catheter in place    WBC WBC " "  Date Value Ref Range Status   04/13/2025 25.73 (H) 3.40 - 10.80 10*3/mm3 Final   04/12/2025 22.65 (H) 3.40 - 10.80 10*3/mm3 Final   04/11/2025 28.69 (H) 3.40 - 10.80 10*3/mm3 Final   04/11/2025 27.68 (H) 3.40 - 10.80 10*3/mm3 Final      HGB Hemoglobin   Date Value Ref Range Status   04/13/2025 7.7 (L) 12.0 - 15.9 g/dL Final   04/13/2025 6.9 (C) 12.0 - 15.9 g/dL Final   04/12/2025 7.6 (L) 12.0 - 15.9 g/dL Final   04/12/2025 7.4 (L) 12.0 - 15.9 g/dL Final   04/11/2025 8.4 (L) 12.0 - 15.9 g/dL Final   04/11/2025 7.3 (L) 12.0 - 15.9 g/dL Final      HCT Hematocrit   Date Value Ref Range Status   04/13/2025 23.3 (L) 34.0 - 46.6 % Final   04/13/2025 21.2 (L) 34.0 - 46.6 % Final   04/12/2025 22.7 (L) 34.0 - 46.6 % Final   04/12/2025 22.8 (L) 34.0 - 46.6 % Final   04/11/2025 25.4 (L) 34.0 - 46.6 % Final   04/11/2025 22.9 (L) 34.0 - 46.6 % Final      Platlets No results found for: \"LABPLAT\"   MCV MCV   Date Value Ref Range Status   04/13/2025 103.9 (H) 79.0 - 97.0 fL Final   04/12/2025 102.7 (H) 79.0 - 97.0 fL Final   04/11/2025 103.3 (H) 79.0 - 97.0 fL Final   04/11/2025 108.0 (H) 79.0 - 97.0 fL Final          Sodium Sodium   Date Value Ref Range Status   04/13/2025 136 136 - 145 mmol/L Final   04/13/2025 138 136 - 145 mmol/L Final   04/12/2025 137 136 - 145 mmol/L Final   04/12/2025 137 136 - 145 mmol/L Final   04/12/2025 139 136 - 145 mmol/L Final   04/11/2025 137 136 - 145 mmol/L Final   04/11/2025 136 136 - 145 mmol/L Final   04/11/2025 136 136 - 145 mmol/L Final   04/11/2025 136 136 - 145 mmol/L Final   04/10/2025 136 136 - 145 mmol/L Final      Potassium Potassium   Date Value Ref Range Status   04/13/2025 4.7 3.5 - 5.2 mmol/L Final     Comment:     Slight hemolysis detected by analyzer. Result may be falsely elevated.   04/13/2025 4.3 3.5 - 5.2 mmol/L Final   04/12/2025 4.2 3.5 - 5.2 mmol/L Final   04/12/2025 4.1 3.5 - 5.2 mmol/L Final   04/12/2025 4.2 3.5 - 5.2 mmol/L Final   04/11/2025 4.0 3.5 - 5.2 mmol/L Final "   04/11/2025 4.3 3.5 - 5.2 mmol/L Final   04/11/2025 4.3 3.5 - 5.2 mmol/L Final   04/11/2025 4.4 3.5 - 5.2 mmol/L Final   04/10/2025 3.7 3.5 - 5.2 mmol/L Final      Chloride Chloride   Date Value Ref Range Status   04/13/2025 101 98 - 107 mmol/L Final   04/13/2025 104 98 - 107 mmol/L Final   04/12/2025 104 98 - 107 mmol/L Final   04/12/2025 100 98 - 107 mmol/L Final   04/12/2025 105 98 - 107 mmol/L Final   04/11/2025 102 98 - 107 mmol/L Final   04/11/2025 102 98 - 107 mmol/L Final   04/11/2025 102 98 - 107 mmol/L Final   04/11/2025 103 98 - 107 mmol/L Final   04/10/2025 104 98 - 107 mmol/L Final      CO2 CO2   Date Value Ref Range Status   04/13/2025 20.0 (L) 22.0 - 29.0 mmol/L Final   04/13/2025 19.0 (L) 22.0 - 29.0 mmol/L Final   04/12/2025 19.0 (L) 22.0 - 29.0 mmol/L Final   04/12/2025 20.0 (L) 22.0 - 29.0 mmol/L Final   04/12/2025 20.0 (L) 22.0 - 29.0 mmol/L Final   04/11/2025 18.0 (L) 22.0 - 29.0 mmol/L Final   04/11/2025 19.0 (L) 22.0 - 29.0 mmol/L Final   04/11/2025 19.0 (L) 22.0 - 29.0 mmol/L Final   04/11/2025 18.0 (L) 22.0 - 29.0 mmol/L Final   04/10/2025 16.0 (L) 22.0 - 29.0 mmol/L Final      BUN BUN   Date Value Ref Range Status   04/13/2025 16 8 - 23 mg/dL Final   04/13/2025 16 8 - 23 mg/dL Final   04/12/2025 15 8 - 23 mg/dL Final   04/12/2025 16 8 - 23 mg/dL Final   04/12/2025 20 8 - 23 mg/dL Final   04/11/2025 21 8 - 23 mg/dL Final   04/11/2025 28 (H) 8 - 23 mg/dL Final   04/11/2025 28 (H) 8 - 23 mg/dL Final   04/11/2025 38 (H) 8 - 23 mg/dL Final   04/10/2025 47 (H) 8 - 23 mg/dL Final      Creatinine Creatinine   Date Value Ref Range Status   04/13/2025 0.78 0.57 - 1.00 mg/dL Final   04/13/2025 0.91 0.57 - 1.00 mg/dL Final   04/12/2025 0.82 0.57 - 1.00 mg/dL Final   04/12/2025 1.05 (H) 0.57 - 1.00 mg/dL Final   04/12/2025 1.06 (H) 0.57 - 1.00 mg/dL Final   04/11/2025 1.26 (H) 0.57 - 1.00 mg/dL Final   04/11/2025 1.62 (H) 0.57 - 1.00 mg/dL Final   04/11/2025 1.62 (H) 0.57 - 1.00 mg/dL Final  "  04/11/2025 2.08 (H) 0.57 - 1.00 mg/dL Final   04/10/2025 2.58 (H) 0.57 - 1.00 mg/dL Final      Calcium Calcium   Date Value Ref Range Status   04/13/2025 9.1 8.6 - 10.5 mg/dL Final   04/13/2025 7.7 (L) 8.6 - 10.5 mg/dL Final   04/12/2025 8.5 (L) 8.6 - 10.5 mg/dL Final   04/12/2025 8.4 (L) 8.6 - 10.5 mg/dL Final   04/12/2025 7.9 (L) 8.6 - 10.5 mg/dL Final   04/11/2025 7.9 (L) 8.6 - 10.5 mg/dL Final   04/11/2025 8.8 8.6 - 10.5 mg/dL Final   04/11/2025 8.8 8.6 - 10.5 mg/dL Final   04/11/2025 8.5 (L) 8.6 - 10.5 mg/dL Final   04/10/2025 8.0 (L) 8.6 - 10.5 mg/dL Final      PO4 No results found for: \"CAPO4\"   Albumin Albumin   Date Value Ref Range Status   04/13/2025 3.3 (L) 3.5 - 5.2 g/dL Final   04/13/2025 3.3 (L) 3.5 - 5.2 g/dL Final   04/12/2025 3.3 (L) 3.5 - 5.2 g/dL Final   04/12/2025 3.3 (L) 3.5 - 5.2 g/dL Final   04/12/2025 3.4 (L) 3.5 - 5.2 g/dL Final   04/12/2025 3.3 (L) 3.5 - 5.2 g/dL Final   04/11/2025 3.2 (L) 3.5 - 5.2 g/dL Final   04/11/2025 3.6 3.5 - 5.2 g/dL Final   04/11/2025 3.6 3.5 - 5.2 g/dL Final   04/11/2025 3.8 3.5 - 5.2 g/dL Final   04/10/2025 3.6 3.5 - 5.2 g/dL Final      Magnesium Magnesium   Date Value Ref Range Status   04/13/2025 2.6 (H) 1.6 - 2.4 mg/dL Final   04/13/2025 2.4 1.6 - 2.4 mg/dL Final   04/12/2025 2.3 1.6 - 2.4 mg/dL Final   04/12/2025 2.4 1.6 - 2.4 mg/dL Final   04/12/2025 2.5 (H) 1.6 - 2.4 mg/dL Final   04/11/2025 2.1 1.6 - 2.4 mg/dL Final   04/11/2025 2.3 1.6 - 2.4 mg/dL Final   04/11/2025 2.1 1.6 - 2.4 mg/dL Final   04/10/2025 1.9 1.6 - 2.4 mg/dL Final      Uric Acid No results found for: \"URICACID\"        Results Review:     I reviewed the patient's new clinical results.    folic acid, 1 mg, Per G Tube, Daily  gabapentin, 200 mg, Per G Tube, Q12H  hydrocortisone sodium succinate, 25 mg, Intravenous, Q6H  ipratropium-albuterol, 3 mL, Nebulization, 4x Daily - RT  lactobacillus acidophilus, 1 capsule, Per G Tube, Daily  lactulose, 10 g, Per G Tube, Daily  latanoprost, 1 drop, " Both Eyes, Nightly  midodrine, 10 mg, Per G Tube, TID AC  pantoprazole, 40 mg, Intravenous, Q AM  piperacillin-tazobactam, 4.5 g, Intravenous, Q8H  QUEtiapine, 100 mg, Per G Tube, Nightly  rifAXIMin, 550 mg, Per G Tube, Q12H  sodium chloride, 10 mL, Intravenous, Q12H  sodium chloride, 10 mL, Intravenous, Q12H  sodium chloride, 10 mL, Intravenous, Q12H  thiamine, 100 mg, Per G Tube, Daily  ursodiol, 300 mg, Nasogastric, BID      fentanyl 10 mcg/mL,  mcg/hr, Last Rate: 50 mcg/hr (04/13/25 1044)  midazolam, 1-10 mg/hr, Last Rate: 3 mg/hr (04/13/25 0838)  norepinephrine, 0.02-0.3 mcg/kg/min, Last Rate: 0.18 mcg/kg/min (04/13/25 1519)  Phoxillum BK4/2.5, 1,000 mL/hr, Last Rate: 1,000 mL/hr (04/13/25 1401)  Phoxillum BK4/2.5, 1,000 mL/hr, Last Rate: 1,000 mL/hr (04/13/25 1401)  Phoxillum BK4/2.5, 1,000 mL/hr, Last Rate: 1,000 mL/hr (04/13/25 1401)        Medication Review: Reviewed    Assessment & Plan       Sepsis secondary to UTI    Alcoholic cirrhosis of liver without ascites    Alcohol use disorder    Peripheral neuropathy    Acute UTI (urinary tract infection)    Pneumonia    Acute kidney injury      1.  Acute kidney injury: Creatinine worsening since admission.  Admit creatinine 1.1 mg/dL,, yesterday increased to 1.5, today's creatinine 1.97 likely diagnosis hepatorenal syndrome with hypotension in the setting of liver cirrhosis patient has been given IV Bumex and albumin.  2.  Cardiorenal syndrome: Cirrhosis of liver.  3.  Metabolic acidosis  4.  Hyponatremia  5.  Volume overload  6.  Cirrhosis of liver  7.  Sepsis/E. coli UTI: On Zosyn on admission and now on ceftriaxone and IV pressors.    Plan:  Acute Renal failure.  Anuric. currently on 2 pressors.  Started on CRRT 4/10.   Continue CRRT.  UF  as tolerated  Supplement electrolyte as per CRRT protocol  Avoid nephrotoxic medications.  Keep MAP greater than 60 mmHg for renal perfusion  Adjust medication for the new GFR.    Case discussed with the  medical staff taking care of the patient.  Prognosis guarded.    High risk complex patient with multiple medical problems.    Rashawn Donald MD  04/13/25  15:41 EDT

## 2025-04-13 NOTE — PLAN OF CARE
Goal Outcome Evaluation:  Plan of Care Reviewed With: family, friend      -1u PRBC at 0715; HGB redraw 7.7. Tunnelled cath continuing to ooze blood/serous fluid. Provider at bedside for 1040 dressing change w/ levon patch x2 & CHG dressing. 50mcg DDAVP at 1130. Site continuing to ooze; topical Thrombin-soaked gauze & CHG dressing applied at 1500. HGB recheck at 1600 7.4. Site still oozing; reinforced w/ 4x4 gauze.   -Continuing CRRT.  all shift. UOP 3ml.   -Tolerating trophic TF; no BM this shift.   -GI & RN updated Nikki (friend) at bedside & Madelin (cousin) via phone call, Andrea (uncle) called & requested intensivist call for update; provider notified. May benefit from family meeting to discuss GOC.   -Warming blanket & blood warmer continued. T 35.6-35.7   -SR-ST on tele; brief burst of afib vs SVT at 0812 w/ HR 190s lasting aprox. 3-5 seconds &  spontaneously resolved; APRN notified; tele strip saved & in chartlet.   -Levo currently @ 0.16, Fent @ 50, Versed @ 3. Versed bolus x3 & Fentanyl x1 this shift for agitation/vent asynchrony w/ turning/repositioning.  -BUE restraints in place for safety.        Problem: Restraint, Nonviolent  Goal: Absence of Harm or Injury  Outcome: Not Progressing  Intervention: Implement Least Restrictive Safety Strategies  Recent Flowsheet Documentation  Taken 4/13/2025 1600 by Amna Guadalupe RN  Medical Device Protection:   IV pole/bag removed from visual field   tubing secured  Diversional Activities: television  Taken 4/13/2025 1400 by Amna Guadalupe RN  Medical Device Protection:   IV pole/bag removed from visual field   tubing secured  Diversional Activities: television  Taken 4/13/2025 1200 by Amna Guadalupe RN  Medical Device Protection:   IV pole/bag removed from visual field   tubing secured  Diversional Activities: television  Taken 4/13/2025 1000 by Amna Guadalupe RN  Medical Device Protection:   IV pole/bag removed from visual field   tubing  secured  Diversional Activities: television  Taken 4/13/2025 0800 by Amna Guadalupe RN  Medical Device Protection:   IV pole/bag removed from visual field   tubing secured  Diversional Activities: television  Intervention: Protect Dignity, Rights and Personal Wellbeing  Recent Flowsheet Documentation  Taken 4/13/2025 1600 by Amna Guadalupe RN  Trust Relationship/Rapport:   care explained   reassurance provided  Taken 4/13/2025 1400 by Amna Guadalupe RN  Trust Relationship/Rapport:   care explained   reassurance provided  Taken 4/13/2025 1200 by Amna Guadalupe RN  Trust Relationship/Rapport:   care explained   reassurance provided  Taken 4/13/2025 0800 by Amna Guadalupe RN  Trust Relationship/Rapport:   care explained   reassurance provided  Intervention: Protect Skin and Joint Integrity  Recent Flowsheet Documentation  Taken 4/13/2025 1600 by Amna Guadalupe RN  Body Position:   right   turned  Skin Protection:   incontinence pads utilized   silicone foam dressing in place  Taken 4/13/2025 1400 by Amna Guadalupe RN  Body Position:   turned   supine  Taken 4/13/2025 1200 by Amna Guadalupe RN  Body Position: (hypotensive & hypoxic w/ last turn; weight shifted & will try full turn next round)   right   side-lying   unstable with turn   weight shifting  Taken 4/13/2025 1000 by Amna Guadalupe RN  Body Position:   turned   left  Taken 4/13/2025 0800 by Amna Guadalupe RN  Body Position:   turned   supine  Skin Protection:   incontinence pads utilized   silicone foam dressing in place

## 2025-04-14 PROBLEM — J96.01 ACUTE RESPIRATORY FAILURE WITH HYPOXIA: Status: ACTIVE | Noted: 2025-01-01

## 2025-04-14 NOTE — NURSING NOTE
CRRT rounds completed in ICU. Spoke with ICU RN Calin  All supplies at bedside;Reviewed documentation,  prescription and plan of care with RN. Hct value updated on [employer]. Catheter site and dressing checked and date to be changed reviewed.

## 2025-04-14 NOTE — PROGRESS NOTES
Clinical Nutrition Assessment     Patient Name: Philomena Davis  YOB: 1960  MRN: [id number]  Date of Encounter: 04/14/25 09:00 EDT  Admission date: 4/4/2025  Reason for Visit: Follow-up protocol, EN    Assessment   Nutrition Assessment   Admission Diagnosis:  Dysphagia [R13.10]  Sepsis secondary to UTI [A41.9, N39.0]  Sepsis [A41.9]    Problem List:    Sepsis secondary to UTI    Alcoholic cirrhosis of liver without ascites    Alcohol use disorder    Peripheral neuropathy    Acute UTI (urinary tract infection)    Pneumonia    Acute kidney injury      PMH:   She  has a past medical history of Alcohol withdrawal (05/10/2019), Ankle sprain (2015), Anxiety and depression, Arthritis of back (2020), Arthritis of neck (2021), Asthma (2022), Cataract (20/2/1999), Chronic pain disorder (2023), Cirrhosis of liver, Closed displaced fracture of lateral malleolus of left fibula, 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 (03/24/2017), Closed trimalleolar fracture of right ankle (05/24/2023), Colon polyp (2018), COPD (chronic obstructive pulmonary disease) (2022), Digital mucous cyst of toe of left foot (03/24/2017), Eating disorder (2022), Fracture, clavicle (1969), Fracture, finger (1972), Fracture, foot (1/30/25), Ganglion cyst of left foot, GERD (gastroesophageal reflux disease) (10/22), Glaucoma, Hip arthrosis (2019), Hyperlipidemia (1999), Hypertension, Knee sprain (2019), Knee swelling (2019), Low back strain (1/15/25), Neck strain (1/30/25), Panic disorder (1987), Periarthritis of shoulder (2019), Peripheral neuropathy, Pulmonary arterial hypertension (2021), Urinary tract infection (02/23/23), Varicella (1968), and Wears glasses.    PSH:  She  has a past surgical history that includes Breast excisional biopsy (Right, 2014); Dilation and curettage, diagnostic / therapeutic; Wrist surgery; pr rpr non/mal femur dstl h/n w/iliac/autog bone (Left, 03/24/2017); Ankle fracture  "surgery (Left, 03/24/2017); Augmentation mammaplasty (Bilateral, 1999); Esophagogastroduodenoscopy (N/A, 11/30/2022); Ankle fracture surgery (Right, 05/25/2023); Esophagogastroduodenoscopy (N/A, 02/01/2024); Foot surgery (2012); Colonoscopy (2018); [place] tooth extraction (1992); Dilation and curettage of uterus (1987); and Esophagogastroduodenoscopy (N/A, 04/07/2025).    Applicable Nutrition History:   (4/10) CRRT started   (4/10) Intubated   (4/12) Trophic EN initiated- Novasource Renal @ 15 ml/hr    Anthropometrics     Height: Height: 165.1 cm (65\")  Last Filed Weight: Weight: 99.4 kg (219 lb 2.2 oz) (weighed w/  scds on and 5 pillows and 2 thin blankets) (04/09/25 0645)  Method: Weight Method: Bed scale  BMI: BMI (Calculated): 36.5    UBW:     Weight       Weight (kg) Weight (lbs) Weight Method Visit Report   5/31/2024 67.132 kg  148 lb      11/5/2024 67.132 kg  148 lb      11/10/2024 67.132 kg  148 lb  Stated     11/11/2024 67.1 kg  147 lb 14.9 oz      12/6/2024 71.668 kg  158 lb  Stated     1/30/2025 71.668 kg  158 lb  Stated     2/17/2025 71.668 kg  158 lb   --    2/21/2025 71.7 kg  158 lb 1.1 oz   --    2/28/2025 71.668 kg  158 lb   --    3/10/2025 71.668 kg  158 lb   --    3/12/2025 68.04 kg  150 lb       68 kg  149 lb 14.6 oz      3/13/2025 68 kg  149 lb 14.6 oz      4/4/2025 71.668 kg  158 lb  Bed scale      90.583 kg  199 lb 11.2 oz      4/6/2025 90.6 kg  199 lb 11.8 oz      4/9/2025 99.4 kg  219 lb 2.2 oz  Bed scale         Weight change: No significant changes / ascites may be masking loss    Labs    Labs Reviewed: Yes     Results from last 7 days   Lab Units 04/14/25  0816 04/14/25  0419 04/13/25  2340 04/13/25  1612 04/12/25  0923 04/12/25  0009 04/11/25  1621 04/11/25  0830   GLUCOSE mg/dL 102*  --  102* 106*   < > 174*   < > 85  85   BUN mg/dL 14  --  13 15   < > 20   < > 28*  28*   CREATININE mg/dL 0.52*  --  0.57 0.69   < > 1.06*   < > 1.62*  1.62*   SODIUM mmol/L 142  --  141 142   < > 139   " < > 136  136   CHLORIDE mmol/L 107  --  108* 105   < > 105   < > 102  102   POTASSIUM mmol/L 4.0  --  3.6 4.0   < > 4.2   < > 4.3  4.3   PHOSPHORUS mg/dL 3.6  --  3.7 4.2   < > 4.0   < > 4.0   MAGNESIUM mg/dL 2.1  --  2.2 2.5*   < > 2.5*   < > 2.3   ALT (SGPT) U/L  --  97*  --   --   --  81*  --  77*    < > = values in this interval not displayed.       Results from last 7 days   Lab Units 04/14/25  0816 04/14/25  0419 04/14/25  0007 04/13/25  2340 04/13/25  1612   ALBUMIN g/dL 2.8* 3.0*  --  2.8* 3.0*   IONIZED CALCIUM mmol/L 1.05*  --  1.08*  --  1.08*       Results from last 7 days   Lab Units 04/14/25  0540 04/13/25  2322 04/13/25  1715 04/13/25  1118 04/13/25  0620 04/12/25  2355   GLUCOSE mg/dL 117 110 128 117 114 114     Lab Results   Lab Value Date/Time    HGBA1C 5.60 09/12/2023 0936    HGBA1C 5.30 05/24/2023 0152    HGBA1C 4.9 02/13/2023 1006                 Medications    Medications Reviewed: Yes    Scheduled Meds:folic acid, 1 mg, Per G Tube, Daily  gabapentin, 200 mg, Per G Tube, Q12H  hydrocortisone sodium succinate, 25 mg, Intravenous, Q6H  ipratropium-albuterol, 3 mL, Nebulization, 4x Daily - RT  lactobacillus acidophilus, 1 capsule, Per G Tube, Daily  lactulose, 10 g, Per G Tube, Daily  latanoprost, 1 drop, Both Eyes, Nightly  midodrine, 10 mg, Per G Tube, TID AC  pantoprazole, 40 mg, Intravenous, Q AM  piperacillin-tazobactam, 4.5 g, Intravenous, Q8H  potassium chloride, 20 mEq, Intravenous, Once  QUEtiapine, 100 mg, Per G Tube, Nightly  rifAXIMin, 550 mg, Per G Tube, Q12H  sodium chloride, 10 mL, Intravenous, Q12H  sodium chloride, 10 mL, Intravenous, Q12H  sodium chloride, 10 mL, Intravenous, Q12H  thiamine, 100 mg, Per G Tube, Daily  ursodiol, 300 mg, Nasogastric, BID      Continuous Infusions:fentanyl 10 mcg/mL,  mcg/hr, Last Rate: 50 mcg/hr (04/13/25 1044)  midazolam, 1-10 mg/hr, Last Rate: 2 mg/hr (04/14/25 0500)  norepinephrine, 0.02-0.3 mcg/kg/min, Last Rate: 0.2 mcg/kg/min  "(04/14/25 0820)  Phoxillum BK4/2.5, 1,000 mL/hr, Last Rate: 1,000 mL/hr (04/14/25 0538)  Phoxillum BK4/2.5, 1,000 mL/hr, Last Rate: 1,000 mL/hr (04/14/25 0538)  Phoxillum BK4/2.5, 1,000 mL/hr, Last Rate: 1,000 mL/hr (04/14/25 0538)      PRN Meds:.  [DISCONTINUED] senna-docusate sodium **AND** [DISCONTINUED] polyethylene glycol **AND** [DISCONTINUED] bisacodyl **AND** bisacodyl    Calcium Replacement - Follow Nurse / BPA Driven Protocol    dextrose    fentaNYL    fentaNYL citrate (PF)    glucagon (human recombinant)    heparin (porcine)    HYDROcodone-acetaminophen    ipratropium-albuterol    Magnesium Standard Dose Replacement - Follow Nurse / BPA Driven Protocol    midazolam    nitroglycerin    Phosphorus Replacement - Follow Nurse / BPA Driven Protocol    polyvinyl alcohol    Potassium Replacement - Follow Nurse / BPA Driven Protocol    sodium chloride    sodium chloride    sodium chloride    Intake/Ouptut 24 hrs (0701 - 0700)   I&O's Reviewed: Yes     Last documented BM (4/10)     Nutrition Focused Physical Exam    Date:  4/10       Unable to perform due to Pt unable to participate at time of visit     Subjective   Reported/Observed/Food/Nutrition Related History:     4/14  Patient remains intubated, sedated, CRRT and pressor support ongoing. Tolerating trophic EN. Bowels moved. Electrolytes stable.     4/12  Consult for tube feeding assessment.  Patient remains intubated and sedated. CRRT on going @ 125ml/hr UF.  Continues on pressors.     4/10  Visited with patient for LOS, admit 4/4 with sepsis/UTI. Patient with advanced cirrhosis. Currently requiring multiple vasopressors. She states she is actually eating okay and tolerating. Denies any issues eating at home or known weight changes. Pt denies further dietary needs/preferences or nutritional questions/concerns at this time, NKFA.     Needs Assessment   Date: 4/12    Height used:Height: 165.1 cm (65\")  Weights used: 158/71.8kg (most likely dry weight) BMI 26.3 "       Estimated Calorie needs: ~  1200Kcal/day (initial)   Method:  20-22Kcals/KG= 1436- 1580   Method:  PSU : (ve 7.07, tmax 35.4) = 1147  Method:  MSJ: 1279    Estimated Protein needs: ~126 g PRO/day (while on CRRT)   Method: 1.5- 2.0g/Kg= 108- 144    Estimated Fluid needs: ~ ml/day   Per clinical status      Current Nutrition Prescription   PO: NPO Diet NPO Type: Tube Feeding  Oral Nutrition Supplement:   Intake: N/A NPO    EN: NovaSource Renal  Goal Rate: 15 ml/hr   Water Flushes: 30 ml Q 4 hr  Modular: None  Route: NG  Tube: Large bore    At goal over: 20Hrs/day     Rx will supply:   Goal Volume 300  mL/day     Flush Volume 150 mL/day     Energy 600 Kcal/day 50 % Est Need   Protein 27 g/day 21 % Est Need   Fiber 0 g/day     Water in   mL     Total Water 366 mL     Meet DRI No        --------------------------------------------------------------------------  Product/Rate verified at bedside: Yes  Infusing Rate at time of visit: 15 ml/hr    Average Delivery from [facility] Day:   Volume 325 mL/day   % Goal Vol.   Flush Volume 305 mL/day     Energy  Kcal/day  % Est Need   Protein  g/day  % Est Need   Fiber  g/day     Water in  EN  mL     Total Water  mL     Meet DRI N/A          Assessment & Plan   Nutrition Diagnosis   Date:  4/10            Updated:    Problem Increased nutrient needs    Etiology Metabolic demands of cirrhosis   Signs/Symptoms Med hx   Status: Active Receiving EN    Goal:   Nutrition to support treatment and Maintain EN    Nutrition Intervention      Follow treatment progress, Care plan reviewed, Nutrition support order placed    Continue trophic EN:  Novasource Renal at goal 15 ml/hr, water flushes 30 ml Q 4 hr.    Remains on high pressors dose, continue with trophic feeds for now   Monitor clinical and adjust EN as indicated     Monitoring/Evaluation:   Per protocol, I&O, PO intake, Pertinent labs, Weight, GI status, Symptoms, POC/GOC    Vanessa Martins RD, CNSC  Time Spent:  30m

## 2025-04-14 NOTE — NURSING NOTE
Pt keys given to family member Madelin Null. Relative is going to try and locate any documents regarding living will or POA if there are any such documents at patient home. Family meeting scheduled for tomorrow 4/15 @1100.

## 2025-04-14 NOTE — PLAN OF CARE
Goal Outcome Evaluation:  Plan of Care Reviewed With: patient   - Pt still does not follow commands; does withdraw from painful stimuli.    - Levophed @ 0.18 this AM. Fentanyl @ 50, Versed @ 2.    - Calcium gluconate 2g IVPB replacement, Potassium 20meq IVBP replacement.    - Versed 1mg bolus from bag x1 during bathing.    - Hemodialysis port continues to bleed, dressing reinforced throughout night.    - Hemoglobin 7.1 this AM despite bleeding.    - No issues relating to CRRT; patient tolerating well.    - One small bowel movement; loose brown. No leda or obvious bleeding noted.    - No urine output for this shift.

## 2025-04-14 NOTE — PROGRESS NOTES
"  GI Daily Progress Note  Subjective:    Chief Complaint: Follow up decompensated liver disease    Chart reviewed.  Patient remains intubated and sedated on mechanical ventilator and ongoing CRRT.  Noted that pupils were unequal earlier requiring stat CT of head.  Nursing reports minimal urine output.  Additionally, continues to have bleeding from hemodialysis access site.    Objective:    /47   Pulse 84   Temp 95.8 °F (35.4 °C) (Axillary)   Resp 18   Ht 165.1 cm (65\")   Wt 99.4 kg (219 lb 2.2 oz) Comment: weighed w/  scds on and 5 pillows and 2 thin blankets  LMP 01/22/2016 (Approximate)   SpO2 97%   BMI 36.47 kg/m²     Physical Exam  Vitals and nursing note reviewed.   Constitutional:       General: She is not in acute distress.     Appearance: She is ill-appearing. She is not toxic-appearing.      Interventions: She is sedated and intubated.      Comments: Ill-appearing.  Ashen appearing.   Eyes:      General: Scleral icterus present.   Cardiovascular:      Rate and Rhythm: Normal rate and regular rhythm.      Pulses: Normal pulses.      Heart sounds: Normal heart sounds.   Pulmonary:      Effort: She is intubated.      Comments: Patient is intubated and on mechanical ventilator.  Equal bilateral chest expansion noted.  No adventitious breath sounds noted to auscultation.  Abdominal:      General: Abdomen is flat. Bowel sounds are normal. There is no distension.      Palpations: Abdomen is soft. There is no mass.      Tenderness: There is no abdominal tenderness. There is no guarding or rebound.      Hernia: No hernia is present.   Skin:     Coloration: Skin is jaundiced and pale.         Lab  I have personally reviewed most recent cardiac tracings, lab results, and radiology images and interpretations and agree with findings.    Lab Results   Component Value Date    WBC 27.49 (H) 04/14/2025    HGB 7.1 (L) 04/14/2025    HGB 7.0 (L) 04/13/2025    HGB 7.4 (L) 04/13/2025    .8 (H) 04/14/2025    "  (L) 04/14/2025    INR 1.81 (H) 04/13/2025    INR 1.83 (H) 04/11/2025    INR 1.59 (H) 04/10/2025    INR 1.64 (H) 04/09/2025    INR 1.77 (H) 04/06/2025       Lab Results   Component Value Date    GLUCOSE 102 (H) 04/14/2025    BUN 14 04/14/2025    CREATININE 0.52 (L) 04/14/2025    EGFRIFNONA 114 05/15/2019    BCR 26.9 (H) 04/14/2025     04/14/2025    K 4.0 04/14/2025    CO2 18.0 (L) 04/14/2025    CALCIUM 7.7 (L) 04/14/2025    ALBUMIN 2.8 (L) 04/14/2025    ALKPHOS 142 (H) 04/14/2025    BILITOT 8.6 (H) 04/14/2025    BILIDIR 5.8 (H) 04/14/2025    ALT 97 (H) 04/14/2025     (H) 04/14/2025       Assessment:      Sepsis secondary to UTI    Alcoholic cirrhosis of liver without ascites    Alcohol use disorder    Peripheral neuropathy    Acute UTI (urinary tract infection)    Pneumonia    Acute kidney injury    1.  Acute alcoholic hepatitis diagnosed 1 month ago.  Not much improvement in bilirubin since that time.  Has not drank alcohol since 3/12/2025. MELD-Na = 34.  2.  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  3.  Grade 1 Encephalopathy, improved on Xifaxan and lactulose.  Ammonia is normal.  4.  Coagulopathy of liver disease, worsening.  5.  Hepatorenal syndrome type I, oliguric. Now on CRRT.  6.  Respiratory failure.  Remains on mechanical ventilation since 4/10/2025.  7.  Pneumonia and urosepsis.  8.  Hypotension since admission.  Remains on pressor support.  9.  Nonambulatory, foot fracture, severely deconditioned and with sarcopenia.  10.  History of LA grade D reflux esophagitis and antrum ulcer in February 2024, both healed on surveillance EGD 4/7/2025.    Plan:  Patient remains critically ill.    >> Continue hepatic encephalopathy management  >> Continue twice daily PPI  >> Monitor H&H and transfuse per protocol    Patient is medically complex and high risk of further decline including death.  Patient's MELD score is 34 points which correlates to a 66% chance of mortality over the next 90 days.   Strongly suspect the patient's mortality is higher than 66% in setting of multiorgan failure, sarcopenia, malnutrition.  Additionally, patient is not a transplant candidate secondary to alcohol recidivism, poor functional status, and infection.  Recommend ongoing goals of care discussions with patient's family and POA's as her prognosis is poor and is not likely that she will survive this hospitalization; should patient survive this hospitalization, long-term prognosis remains poor.    Santosh Georges, CANDY  04/14/25  12:11 EDT

## 2025-04-14 NOTE — PROGRESS NOTES
Intensive Care Follow-up     Hospital:  LOS: 9 days   Ms. Philomena Davis, 64 y.o. female is followed for:   <principal problem not specified>        Subjective     64-year-old woman with hypertension, chronic pain, liver cirrhosis, COPD hospitalized in mid March with alcoholic hepatitis and cirrhosis.  She also had a third metatarsal fracture.  She was discharged to [facility] for rehabilitation.  [facility] with elevated white count fever and hypotension.  Urinalysis was consistent with a urinary tract infection.  Urine culture grew E. coli resistant to ampicillin.  Respiratory panel PCR was negative.  Blood cultures were negative respiratory panel PCR was negative.  Chest x-ray revealed left basilar opacity and effusion, possible edema.  She was placed on vancomycin and Zosyn.  She became hypotensive, Unresponsive to volume resuscitation and albumin.  She was placed on phenylephrine because of resting tachycardia.  Phenylephrine was changed to norepinephrine for better blood pressure management.     Hemoglobin had dropped to 7.6 and she had a history of a recent GI bleed so she was given a unit of packed red cells.  She underwent an EGD that revealed a nonbleeding ulcer and varices.  She had oozing from her dialysis access and required 1 unit of packed red cells on April 10.  Renal function has worsened and she became progressively volume overloaded.  Nephrology was consulted and agreed that she had hepatorenal syndrome.  Dialysis access was placed and CRRT initiated on April 10.     Interval History:  The chart has been reviewed.  The patient has remained afebrile.  Culture data has been reviewed and remains unchanged.  Specifically, E. coli in the urine as well as negative sputum cultures currently.  Upon examination today she is grimacing with noxious stimuli but is not following any commands.  She continues on CRRT.  During exam it was noted that she had had  dilated left pupil that was only marginally reactive to light.  The right pupil was much smaller approximately 2 mm and reactive to light.  This was a new finding this morning.  CT head was ordered stat and shows no acute insult.  Chest x-ray has been reviewed and continues to have bibasilar infiltrates right greater than left.    The patient's past medical, surgical and social history were reviewed and updated in Epic as appropriate.        Objective     Infusions:  fentanyl 10 mcg/mL,  mcg/hr, Last Rate: 50 mcg/hr (04/13/25 1044)  midazolam, 1-10 mg/hr, Last Rate: 2 mg/hr (04/14/25 1115)  norepinephrine, 0.02-0.3 mcg/kg/min, Last Rate: 0.18 mcg/kg/min (04/14/25 1030)  Phoxillum BK4/2.5, 1,000 mL/hr, Last Rate: 1,000 mL/hr (04/14/25 1117)  Phoxillum BK4/2.5, 1,000 mL/hr, Last Rate: 1,000 mL/hr (04/14/25 1117)  Phoxillum BK4/2.5, 1,000 mL/hr, Last Rate: 1,000 mL/hr (04/14/25 1116)      Medications:  folic acid, 1 mg, Per G Tube, Daily  gabapentin, 200 mg, Per G Tube, Q12H  hydrocortisone sodium succinate, 25 mg, Intravenous, Q6H  ipratropium-albuterol, 3 mL, Nebulization, 4x Daily - RT  lactobacillus acidophilus, 1 capsule, Per G Tube, Daily  lactulose, 10 g, Per G Tube, Daily  latanoprost, 1 drop, Both Eyes, Nightly  midodrine, 10 mg, Per G Tube, TID AC  pantoprazole, 40 mg, Intravenous, Q12H  piperacillin-tazobactam, 4.5 g, Intravenous, Q8H  potassium chloride, 20 mEq, Intravenous, Once  QUEtiapine, 100 mg, Per G Tube, Nightly  rifAXIMin, 550 mg, Per G Tube, Q12H  sodium chloride, 10 mL, Intravenous, Q12H  sodium chloride, 10 mL, Intravenous, Q12H  sodium chloride, 10 mL, Intravenous, Q12H  thiamine, 100 mg, Per G Tube, Daily  ursodiol, 300 mg, Nasogastric, BID        Vital Sign Min/Max for last 24 hours  Temp  Min: 94.8 °F (34.9 °C)  Max: 96.8 °F (36 °C)   BP  Min: 78/36  Max: 119/58   Pulse  Min: 81  Max: 112   Resp  Min: 18  Max: 18   SpO2  Min: 86 %  Max: 100 %   No data recorded       Input/Output for  "last 24 hour shift  04/13 0701 - 04/14 0700  In: 2595.6 [I.V.:1158]  Out: 4866 [Urine:3]   Mode: VC+/AC  FiO2 (%):  [35 %] 35 %  S RR:  [18] 18  S VT:  [380 mL] 380 mL  PEEP/CPAP (cm H2O):  [8 cm H20] 8 cm H20  MAP (cm H2O):  [12-13] 12  Objective:  General Appearance:  Ill-appearing.    Vital signs: (most recent): Blood pressure 109/45, pulse 84, temperature 94.8 °F (34.9 °C), temperature source Bladder, resp. rate 18, height 165.1 cm (65\"), weight 99.4 kg (219 lb 2.2 oz), last menstrual period 01/22/2016, SpO2 97%.    HEENT: (Endotracheal tube in place.  Right IJ tunneled hemodialysis catheter)    Lungs:  Normal effort and normal respiratory rate.  There are decreased breath sounds.  No rales, wheezes or rhonchi.    Heart: Normal rate.  Regular rhythm.  S1 normal and S2 normal.  No murmur.   Chest: Symmetric chest wall expansion.   Abdomen: Abdomen is soft.  There are no signs of ascites.  (Abdominal wall edema).  Hypoactive bowel sounds.   There is no abdominal tenderness.   There is no mass.   Extremities: Normal range of motion.  There is dependent edema.    Neurological: (Unresponsive on ventilator).    Pupils:  Pupils are equal, round, and reactive to light.  (Scleral icterus).    Skin:  Warm.  (Jaundice)              Results from last 7 days   Lab Units 04/14/25  0419 04/13/25  2340 04/13/25  1612 04/13/25  1103 04/13/25  0504 04/12/25  0923 04/12/25  0613   WBC 10*3/mm3 27.49*  --   --   --  25.73*  --  22.65*   HEMOGLOBIN g/dL 7.1* 7.0* 7.4* 7.7* 6.9*   < > 7.4*   PLATELETS 10*3/mm3 125*  --   --  142 155  --  127*    < > = values in this interval not displayed.     Results from last 7 days   Lab Units 04/14/25  0816 04/13/25  2340 04/13/25  1612   SODIUM mmol/L 142 141 142   POTASSIUM mmol/L 4.0 3.6 4.0   CO2 mmol/L 18.0* 17.0* 19.0*   BUN mg/dL 14 13 15   CREATININE mg/dL 0.52* 0.57 0.69   MAGNESIUM mg/dL 2.1 2.2 2.5*   PHOSPHORUS mg/dL 3.6 3.7 4.2   GLUCOSE mg/dL 102* 102* 106*     Estimated Creatinine " Clearance: 131 mL/min (A) (by C-G formula based on SCr of 0.52 mg/dL (L)).    Results from last 7 days   Lab Units 04/14/25  0409   PH, ARTERIAL pH units 7.386   PCO2, ARTERIAL mm Hg 37.5   PO2 ART mm Hg 74.8*       Images:   As per HPI    I reviewed the patient's results and images.     Assessment & Plan   Impression        Alcoholic cirrhosis of liver without ascites    Alcohol use disorder    Peripheral neuropathy    Pneumonia    Sepsis secondary to UTI    Acute kidney injury    Acute respiratory failure with hypoxia       Plan        Maintain current ventilatory support.  The patient is not further weanable at this point due to her mental status.  Continue with renal replacement therapy.  Hemoglobin is roughly stable.  No transfusion for now.  It is unclear what has precipitated the unequal pupils.  There continues to be concern for possible ongoing neurological insult however this may simply be metabolic disturbance from her underlying liver and kidney disease.  Continue rifaximin and other therapy.  Continue with current antimicrobial therapy.  Overall this patient is critically ill and I believe at imminent risk of death.  Her prognosis is quite poor and I do not expect her to survive this hospitalization.  I will plan to discuss this with her family when they are available today.    Plan of care and goals reviewed with mulitdisciplinary/antibiotic stewardship team during rounds.   I discussed the patient's findings and my recommendations with nursing staff     High level of risk due to:  drug(s) requiring intensive monitoring for toxicity and parenteral controlled substances.    Time spent Critical care 34 min (exclusive of procedure time)  including high complexity decision making to assess, manipulate, and support vital organ system failure in this individual who has impairment of one or more vital organ systems such that there is a high probability of imminent or life threatening deterioration in the  patient’s condition.      Carl Tuttle MD, [facility]  Pulmonology and Critical Care Medicine

## 2025-04-14 NOTE — PLAN OF CARE
Goal Outcome Evaluation:      VSS. Pt remains on vent. ACVC+, FiO2 35%, PEEP 8, Rate 18, . Levo @ 0.18. Fent @50. Versed @2. CRRT UF increased from 125 to 150 per Nephrology as long as pt tolerates. Pt +4 edema lower extremity. Minimal to no UOP. BM x1. Stat head CT for Non-reactive and increased in size of L pupil. Results negative. Pupils still remain asymmetrical but are reactive sluggishly. Dialysis access continuously oozing. Dressing changed with charge RN and MD @ bedside. Surgicel and safe guard placed, see charting.  KAILASH watts 35.8. Plan is goals of care and family meeting tomorrow 4/15 @1100. Family updated.

## 2025-04-14 NOTE — CASE MANAGEMENT/SOCIAL WORK
Continued Stay Note   [facility]     Patient Name: Philomena Davis  MRN: [id number]  Today's Date: 4/14/2025    Admit Date: 4/4/2025    Plan: TBD   Discharge Plan       Row Name 04/14/25 1431       Plan    Plan TBD    Patient/Family in Agreement with Plan yes    Plan Comments Discussed Ms. Davis in MDR. Patient remains on Mechanical Ventilation, sedation and CRRT. Discharge plan TBD. CM will continue to follow up.                   Discharge Codes    No documentation.                 Expected Discharge Date and Time       Expected Discharge Date Expected Discharge Time    Apr 14, 2025               Reena Hodges RN

## 2025-04-14 NOTE — PROGRESS NOTES
"   LOS: 9 days    Patient Care Team:  Unique Brandt DO as PCP - General (Family Medicine)  Leana Garcia PA as Physician Assistant (Gastroenterology)  Judy Curry LPCC as  (Behavioral Health)    Chief Complaint: Septic shock  64-year-old female alcoholic liver cirrhosis, readmitted with febrile illness leukocytosis shortness of breath and diarrhea, admitted in ICU with hypotension placed on IV pressor admit creatinine 1.1 mg/dL slowly worsening.    Subjective     Seen and examined at bedside, remains intubated sedated on pressors CRRT ongoing. .   Head CT scan done earlier today negative.   No major overnight issues per nurse.  Tolerating CRRT.     Objective     Vital Sign Min/Max for last 24 hours  Temp  Min: 94.8 °F (34.9 °C)  Max: 96.8 °F (36 °C)   BP  Min: 85/42  Max: 119/58   Pulse  Min: 81  Max: 112   Resp  Min: 18  Max: 18   SpO2  Min: 86 %  Max: 97 %   No data recorded   No data recorded     Flowsheet Rows      Flowsheet Row First Filed Value   Admission Height 165.1 cm (65\") Documented at 04/04/2025 1609   Admission Weight 71.7 kg (158 lb) Documented at 04/04/2025 1609            I/O this shift:  In: 528 [I.V.:417; Other:30; NG/GT:81]  Out: 1322   I/O last 3 completed shifts:  In: 3662.9 [I.V.:1800.2; Blood:385.1; Other:481; NG/GT:445; IV Piggyback:551.6]  Out: 7048 [Urine:3]    Physical Exam:  General Appearance:  female, intubated, jaundiced, sick looking.   Eyes: PER, EOMI.  Neck: +  JVD.  Lungs: Mechanical breath sounds  Heart: No gallop, murmur, rub, RRR.  Abdomen: Distended.  Nontender  Extremities: Bilateral lower extremity 4+ edema, no cyanosis.  Neuro: Unable to assess  Skin: Positive for spider angiomas noted on the chest wall.  Skin changes are noted with ecchymosis, shiny lower extremity edema.    : Dudley catheter in place    WBC WBC   Date Value Ref Range Status   04/14/2025 27.49 (H) 3.40 - 10.80 10*3/mm3 Final   04/13/2025 25.73 (H) 3.40 - 10.80 10*3/mm3 " "Final   04/12/2025 22.65 (H) 3.40 - 10.80 10*3/mm3 Final      HGB Hemoglobin   Date Value Ref Range Status   04/14/2025 7.1 (L) 12.0 - 15.9 g/dL Final   04/13/2025 7.0 (L) 12.0 - 15.9 g/dL Final   04/13/2025 7.4 (L) 12.0 - 15.9 g/dL Final   04/13/2025 7.7 (L) 12.0 - 15.9 g/dL Final   04/13/2025 6.9 (C) 12.0 - 15.9 g/dL Final   04/12/2025 7.6 (L) 12.0 - 15.9 g/dL Final   04/12/2025 7.4 (L) 12.0 - 15.9 g/dL Final      HCT Hematocrit   Date Value Ref Range Status   04/14/2025 21.8 (L) 34.0 - 46.6 % Final   04/13/2025 20.9 (C) 34.0 - 46.6 % Final   04/13/2025 22.7 (L) 34.0 - 46.6 % Final   04/13/2025 23.3 (L) 34.0 - 46.6 % Final   04/13/2025 21.2 (L) 34.0 - 46.6 % Final   04/12/2025 22.7 (L) 34.0 - 46.6 % Final   04/12/2025 22.8 (L) 34.0 - 46.6 % Final      Platlets No results found for: \"LABPLAT\"   MCV MCV   Date Value Ref Range Status   04/14/2025 102.8 (H) 79.0 - 97.0 fL Final   04/13/2025 103.9 (H) 79.0 - 97.0 fL Final   04/12/2025 102.7 (H) 79.0 - 97.0 fL Final          Sodium Sodium   Date Value Ref Range Status   04/14/2025 142 136 - 145 mmol/L Final   04/13/2025 141 136 - 145 mmol/L Final   04/13/2025 142 136 - 145 mmol/L Final   04/13/2025 136 136 - 145 mmol/L Final   04/13/2025 138 136 - 145 mmol/L Final   04/12/2025 137 136 - 145 mmol/L Final   04/12/2025 137 136 - 145 mmol/L Final   04/12/2025 139 136 - 145 mmol/L Final   04/11/2025 137 136 - 145 mmol/L Final      Potassium Potassium   Date Value Ref Range Status   04/14/2025 4.0 3.5 - 5.2 mmol/L Final   04/13/2025 3.6 3.5 - 5.2 mmol/L Final   04/13/2025 4.0 3.5 - 5.2 mmol/L Final   04/13/2025 4.7 3.5 - 5.2 mmol/L Final     Comment:     Slight hemolysis detected by analyzer. Result may be falsely elevated.   04/13/2025 4.3 3.5 - 5.2 mmol/L Final   04/12/2025 4.2 3.5 - 5.2 mmol/L Final   04/12/2025 4.1 3.5 - 5.2 mmol/L Final   04/12/2025 4.2 3.5 - 5.2 mmol/L Final   04/11/2025 4.0 3.5 - 5.2 mmol/L Final      Chloride Chloride   Date Value Ref Range Status "   04/14/2025 107 98 - 107 mmol/L Final   04/13/2025 108 (H) 98 - 107 mmol/L Final   04/13/2025 105 98 - 107 mmol/L Final   04/13/2025 101 98 - 107 mmol/L Final   04/13/2025 104 98 - 107 mmol/L Final   04/12/2025 104 98 - 107 mmol/L Final   04/12/2025 100 98 - 107 mmol/L Final   04/12/2025 105 98 - 107 mmol/L Final   04/11/2025 102 98 - 107 mmol/L Final      CO2 CO2   Date Value Ref Range Status   04/14/2025 18.0 (L) 22.0 - 29.0 mmol/L Final   04/13/2025 17.0 (L) 22.0 - 29.0 mmol/L Final   04/13/2025 19.0 (L) 22.0 - 29.0 mmol/L Final   04/13/2025 20.0 (L) 22.0 - 29.0 mmol/L Final   04/13/2025 19.0 (L) 22.0 - 29.0 mmol/L Final   04/12/2025 19.0 (L) 22.0 - 29.0 mmol/L Final   04/12/2025 20.0 (L) 22.0 - 29.0 mmol/L Final   04/12/2025 20.0 (L) 22.0 - 29.0 mmol/L Final   04/11/2025 18.0 (L) 22.0 - 29.0 mmol/L Final      BUN BUN   Date Value Ref Range Status   04/14/2025 14 8 - 23 mg/dL Final   04/13/2025 13 8 - 23 mg/dL Final   04/13/2025 15 8 - 23 mg/dL Final   04/13/2025 16 8 - 23 mg/dL Final   04/13/2025 16 8 - 23 mg/dL Final   04/12/2025 15 8 - 23 mg/dL Final   04/12/2025 16 8 - 23 mg/dL Final   04/12/2025 20 8 - 23 mg/dL Final   04/11/2025 21 8 - 23 mg/dL Final      Creatinine Creatinine   Date Value Ref Range Status   04/14/2025 0.52 (L) 0.57 - 1.00 mg/dL Final   04/13/2025 0.57 0.57 - 1.00 mg/dL Final   04/13/2025 0.69 0.57 - 1.00 mg/dL Final   04/13/2025 0.78 0.57 - 1.00 mg/dL Final   04/13/2025 0.91 0.57 - 1.00 mg/dL Final   04/12/2025 0.82 0.57 - 1.00 mg/dL Final   04/12/2025 1.05 (H) 0.57 - 1.00 mg/dL Final   04/12/2025 1.06 (H) 0.57 - 1.00 mg/dL Final   04/11/2025 1.26 (H) 0.57 - 1.00 mg/dL Final      Calcium Calcium   Date Value Ref Range Status   04/14/2025 7.7 (L) 8.6 - 10.5 mg/dL Final   04/13/2025 7.0 (L) 8.6 - 10.5 mg/dL Final   04/13/2025 8.3 (L) 8.6 - 10.5 mg/dL Final   04/13/2025 9.1 8.6 - 10.5 mg/dL Final   04/13/2025 7.7 (L) 8.6 - 10.5 mg/dL Final   04/12/2025 8.5 (L) 8.6 - 10.5 mg/dL Final  "  04/12/2025 8.4 (L) 8.6 - 10.5 mg/dL Final   04/12/2025 7.9 (L) 8.6 - 10.5 mg/dL Final   04/11/2025 7.9 (L) 8.6 - 10.5 mg/dL Final      PO4 No results found for: \"CAPO4\"   Albumin Albumin   Date Value Ref Range Status   04/14/2025 2.8 (L) 3.5 - 5.2 g/dL Final   04/14/2025 3.0 (L) 3.5 - 5.2 g/dL Final   04/13/2025 2.8 (L) 3.5 - 5.2 g/dL Final   04/13/2025 3.0 (L) 3.5 - 5.2 g/dL Final   04/13/2025 3.3 (L) 3.5 - 5.2 g/dL Final   04/13/2025 3.3 (L) 3.5 - 5.2 g/dL Final   04/12/2025 3.3 (L) 3.5 - 5.2 g/dL Final   04/12/2025 3.3 (L) 3.5 - 5.2 g/dL Final   04/12/2025 3.4 (L) 3.5 - 5.2 g/dL Final   04/12/2025 3.3 (L) 3.5 - 5.2 g/dL Final   04/11/2025 3.2 (L) 3.5 - 5.2 g/dL Final      Magnesium Magnesium   Date Value Ref Range Status   04/14/2025 2.1 1.6 - 2.4 mg/dL Final   04/13/2025 2.2 1.6 - 2.4 mg/dL Final   04/13/2025 2.5 (H) 1.6 - 2.4 mg/dL Final   04/13/2025 2.6 (H) 1.6 - 2.4 mg/dL Final   04/13/2025 2.4 1.6 - 2.4 mg/dL Final   04/12/2025 2.3 1.6 - 2.4 mg/dL Final   04/12/2025 2.4 1.6 - 2.4 mg/dL Final   04/12/2025 2.5 (H) 1.6 - 2.4 mg/dL Final   04/11/2025 2.1 1.6 - 2.4 mg/dL Final      Uric Acid No results found for: \"URICACID\"        Results Review:     I reviewed the patient's new clinical results.    folic acid, 1 mg, Per G Tube, Daily  gabapentin, 200 mg, Per G Tube, Q12H  hydrocortisone sodium succinate, 25 mg, Intravenous, Q6H  ipratropium-albuterol, 3 mL, Nebulization, 4x Daily - RT  lactobacillus acidophilus, 1 capsule, Per G Tube, Daily  lactulose, 10 g, Per G Tube, Daily  latanoprost, 1 drop, Both Eyes, Nightly  midodrine, 10 mg, Per G Tube, TID AC  pantoprazole, 40 mg, Intravenous, Q12H  piperacillin-tazobactam, 4.5 g, Intravenous, Q8H  potassium chloride, 20 mEq, Intravenous, Once  QUEtiapine, 100 mg, Per G Tube, Nightly  rifAXIMin, 550 mg, Per G Tube, Q12H  sodium chloride, 10 mL, Intravenous, Q12H  sodium chloride, 10 mL, Intravenous, Q12H  sodium chloride, 10 mL, Intravenous, Q12H  thiamine, 100 mg, " Per G Tube, Daily  ursodiol, 300 mg, Nasogastric, BID      fentanyl 10 mcg/mL,  mcg/hr, Last Rate: 50 mcg/hr (04/13/25 1044)  midazolam, 1-10 mg/hr, Last Rate: 2 mg/hr (04/14/25 1115)  norepinephrine, 0.02-0.3 mcg/kg/min, Last Rate: 0.18 mcg/kg/min (04/14/25 1311)  Phoxillum BK4/2.5, 1,000 mL/hr, Last Rate: 1,000 mL/hr (04/14/25 1117)  Phoxillum BK4/2.5, 1,000 mL/hr, Last Rate: 1,000 mL/hr (04/14/25 1117)  Phoxillum BK4/2.5, 1,000 mL/hr, Last Rate: 1,000 mL/hr (04/14/25 1116)        Medication Review: Reviewed    Assessment & Plan       Alcoholic cirrhosis of liver without ascites    Alcohol use disorder    Peripheral neuropathy    Pneumonia    Sepsis secondary to UTI    Acute kidney injury    Acute respiratory failure with hypoxia      1.  Acute kidney injury: Creatinine worsening since admission.  Admit creatinine 1.1 mg/dL,, yesterday increased to 1.5, today's creatinine 1.97 likely diagnosis hepatorenal syndrome with hypotension in the setting of liver cirrhosis patient has been given IV Bumex and albumin.  2.  Cardiorenal syndrome: Cirrhosis of liver.  3.  Metabolic acidosis  4.  Hyponatremia  5.  Volume overload  6.  Cirrhosis of liver  7.  Sepsis/E. coli UTI: On Zosyn on admission and now on ceftriaxone and IV pressors.    Plan:  Acute Renal failure.  Anuric. currently on 2 pressors.  Started on CRRT 4/10.   Continue CRRT.  UF  as tolerated. [UF can be adjusted if tolerating UF she is very edematous; , discussed with the nurse]   Supplement electrolyte as per CRRT protocol  Avoid nephrotoxic medications.  Keep MAP greater than 60 mmHg for renal perfusion  Adjust medication for the new GFR.    Case discussed with the medical staff taking care of the patient.  Prognosis guarded.    High risk complex patient with multiple medical problems.    Rashawn Donald MD  04/14/25  14:50 EDT

## 2025-04-15 NOTE — PROCEDURES
Critical Care    Performed by: Carl Tuttle MD  Authorized by: Carl Tuttle MD  Total critical care time: 40 minutes  Critical care time was exclusive of separately billable procedures and treating other patients.  Critical care was necessary to treat or prevent imminent or life-threatening deterioration of the following conditions: CNS failure or compromise, circulatory failure, respiratory failure, renal failure and hepatic failure.  Critical care was time spent personally by me on the following activities: blood draw for specimens, development of treatment plan with patient or surrogate, evaluation of patient's response to treatment, pulse oximetry, ventilator management and re-evaluation of patient's condition.  Comments: Chart has been reviewed in full.  Patient remains essentially nonresponsive on the ventilator.  She is dependent on CRRT at this time and remains on a pressor.  I was able to have a discussion with the the patient's designated decision makers who are her aunt and uncle and I explained to them that at this point the patient has no chance of meaningful recovery from her multisystem failure and I feel that despite full support at this time she will continue to dwindle.  They would like to have a bit of time today to have family and friends visit with the patient and then we will likely plan for palliative withdrawal of all support including renal replacement therapy and mechanical ventilation.  Orders have been placed to this effect.

## 2025-04-15 NOTE — PLAN OF CARE
Goal Outcome Evaluation:      VSS. Remains on levo. Fent and versed gtt. CRRT with . BM x1. No UOP. Family meeting with MD today. Pt code status changed to no CPR. Continuing with full support. TF per order. Pt temp low. Warming blanket and CRRT warmer in place. Peep down to 5 from 8. Family updated.

## 2025-04-15 NOTE — PROGRESS NOTES
"   LOS: 10 days    Patient Care Team:  Unique Brandt DO as PCP - General (Family Medicine)  Leana Garcia PA as Physician Assistant (Gastroenterology)  Judy Curry LPCC as  (Behavioral Health)    Chief Complaint: Septic shock  64-year-old female alcoholic liver cirrhosis, readmitted with febrile illness leukocytosis shortness of breath and diarrhea, admitted in ICU with hypotension placed on IV pressor admit creatinine 1.1 mg/dL slowly worsening.    Subjective     Continues to be on vent,  pressors and CRRT   No major overnight issues per nurse.  Tolerating CRRT.     Objective     Vital Sign Min/Max for last 24 hours  Temp  Min: 94.8 °F (34.9 °C)  Max: 97.8 °F (36.6 °C)   BP  Min: 90/41  Max: 118/54   Pulse  Min: 81  Max: 114   Resp  Min: 18  Max: 20   SpO2  Min: 94 %  Max: 100 %   No data recorded   Weight  Min: 83.6 kg (184 lb 4.9 oz)  Max: 83.6 kg (184 lb 4.9 oz)     Flowsheet Rows      Flowsheet Row First Filed Value   Admission Height 165.1 cm (65\") Documented at 04/04/2025 1609   Admission Weight 71.7 kg (158 lb) Documented at 04/04/2025 1609            No intake/output data recorded.  I/O last 3 completed shifts:  In: 3077.4 [I.V.:1900.4; Other:330; NG/GT:465; IV Piggyback:382]  Out: 7590 [Urine:4]    Physical Exam:  Gen: intubated and sedated.    HENT: NC, AT  NECK: Supple, ETT in place  LUNGS: Coarse breath sound on vent, non labored respirtation   CVS: S1/S2 audible, RRR, no murmur   Abd: Soft, NT, ND, BS+   Ext: Trace edema, no cyanosis   CNS: Intubated and sedated.   Psy: Intubated and sedated.   Skin: Positive for spider angiomas noted on the chest wall.  Skin changes are noted with ecchymosis, shiny lower extremity edema.    : Dudley catheter in place    WBC WBC   Date Value Ref Range Status   04/15/2025 34.38 (C) 3.40 - 10.80 10*3/mm3 Final   04/14/2025 27.49 (H) 3.40 - 10.80 10*3/mm3 Final   04/13/2025 25.73 (H) 3.40 - 10.80 10*3/mm3 Final      HGB Hemoglobin   Date Value Ref " "Range Status   04/15/2025 7.3 (L) 12.0 - 15.9 g/dL Final   04/14/2025 7.1 (L) 12.0 - 15.9 g/dL Final   04/13/2025 7.0 (L) 12.0 - 15.9 g/dL Final   04/13/2025 7.4 (L) 12.0 - 15.9 g/dL Final   04/13/2025 7.7 (L) 12.0 - 15.9 g/dL Final   04/13/2025 6.9 (C) 12.0 - 15.9 g/dL Final   04/12/2025 7.6 (L) 12.0 - 15.9 g/dL Final      HCT Hematocrit   Date Value Ref Range Status   04/15/2025 22.0 (L) 34.0 - 46.6 % Final   04/14/2025 21.8 (L) 34.0 - 46.6 % Final   04/13/2025 20.9 (C) 34.0 - 46.6 % Final   04/13/2025 22.7 (L) 34.0 - 46.6 % Final   04/13/2025 23.3 (L) 34.0 - 46.6 % Final   04/13/2025 21.2 (L) 34.0 - 46.6 % Final   04/12/2025 22.7 (L) 34.0 - 46.6 % Final      Platlets No results found for: \"LABPLAT\"   MCV MCV   Date Value Ref Range Status   04/15/2025 101.9 (H) 79.0 - 97.0 fL Final   04/14/2025 102.8 (H) 79.0 - 97.0 fL Final   04/13/2025 103.9 (H) 79.0 - 97.0 fL Final          Sodium Sodium   Date Value Ref Range Status   04/15/2025 143 136 - 145 mmol/L Final   04/14/2025 140 136 - 145 mmol/L Final   04/14/2025 140 136 - 145 mmol/L Final   04/14/2025 142 136 - 145 mmol/L Final   04/13/2025 141 136 - 145 mmol/L Final   04/13/2025 142 136 - 145 mmol/L Final   04/13/2025 136 136 - 145 mmol/L Final   04/13/2025 138 136 - 145 mmol/L Final   04/12/2025 137 136 - 145 mmol/L Final   04/12/2025 137 136 - 145 mmol/L Final      Potassium Potassium   Date Value Ref Range Status   04/15/2025 4.0 3.5 - 5.2 mmol/L Final   04/14/2025 4.3 3.5 - 5.2 mmol/L Final   04/14/2025 4.0 3.5 - 5.2 mmol/L Final   04/14/2025 4.0 3.5 - 5.2 mmol/L Final   04/13/2025 3.6 3.5 - 5.2 mmol/L Final   04/13/2025 4.0 3.5 - 5.2 mmol/L Final   04/13/2025 4.7 3.5 - 5.2 mmol/L Final     Comment:     Slight hemolysis detected by analyzer. Result may be falsely elevated.   04/13/2025 4.3 3.5 - 5.2 mmol/L Final   04/12/2025 4.2 3.5 - 5.2 mmol/L Final   04/12/2025 4.1 3.5 - 5.2 mmol/L Final      Chloride Chloride   Date Value Ref Range Status   04/15/2025 108 " (H) 98 - 107 mmol/L Final   04/14/2025 106 98 - 107 mmol/L Final   04/14/2025 105 98 - 107 mmol/L Final   04/14/2025 107 98 - 107 mmol/L Final   04/13/2025 108 (H) 98 - 107 mmol/L Final   04/13/2025 105 98 - 107 mmol/L Final   04/13/2025 101 98 - 107 mmol/L Final   04/13/2025 104 98 - 107 mmol/L Final   04/12/2025 104 98 - 107 mmol/L Final   04/12/2025 100 98 - 107 mmol/L Final      CO2 CO2   Date Value Ref Range Status   04/15/2025 19.0 (L) 22.0 - 29.0 mmol/L Final   04/14/2025 20.0 (L) 22.0 - 29.0 mmol/L Final   04/14/2025 19.0 (L) 22.0 - 29.0 mmol/L Final   04/14/2025 18.0 (L) 22.0 - 29.0 mmol/L Final   04/13/2025 17.0 (L) 22.0 - 29.0 mmol/L Final   04/13/2025 19.0 (L) 22.0 - 29.0 mmol/L Final   04/13/2025 20.0 (L) 22.0 - 29.0 mmol/L Final   04/13/2025 19.0 (L) 22.0 - 29.0 mmol/L Final   04/12/2025 19.0 (L) 22.0 - 29.0 mmol/L Final   04/12/2025 20.0 (L) 22.0 - 29.0 mmol/L Final      BUN BUN   Date Value Ref Range Status   04/15/2025 16 8 - 23 mg/dL Final   04/14/2025 17 8 - 23 mg/dL Final   04/14/2025 15 8 - 23 mg/dL Final   04/14/2025 14 8 - 23 mg/dL Final   04/13/2025 13 8 - 23 mg/dL Final   04/13/2025 15 8 - 23 mg/dL Final   04/13/2025 16 8 - 23 mg/dL Final   04/13/2025 16 8 - 23 mg/dL Final   04/12/2025 15 8 - 23 mg/dL Final   04/12/2025 16 8 - 23 mg/dL Final      Creatinine Creatinine   Date Value Ref Range Status   04/15/2025 0.47 (L) 0.57 - 1.00 mg/dL Final   04/14/2025 0.59 0.57 - 1.00 mg/dL Final   04/14/2025 0.50 (L) 0.57 - 1.00 mg/dL Final   04/14/2025 0.52 (L) 0.57 - 1.00 mg/dL Final   04/13/2025 0.57 0.57 - 1.00 mg/dL Final   04/13/2025 0.69 0.57 - 1.00 mg/dL Final   04/13/2025 0.78 0.57 - 1.00 mg/dL Final   04/13/2025 0.91 0.57 - 1.00 mg/dL Final   04/12/2025 0.82 0.57 - 1.00 mg/dL Final   04/12/2025 1.05 (H) 0.57 - 1.00 mg/dL Final      Calcium Calcium   Date Value Ref Range Status   04/15/2025 7.9 (L) 8.6 - 10.5 mg/dL Final   04/14/2025 8.6 8.6 - 10.5 mg/dL Final   04/14/2025 8.1 (L) 8.6 - 10.5  "mg/dL Final   04/14/2025 7.7 (L) 8.6 - 10.5 mg/dL Final   04/13/2025 7.0 (L) 8.6 - 10.5 mg/dL Final   04/13/2025 8.3 (L) 8.6 - 10.5 mg/dL Final   04/13/2025 9.1 8.6 - 10.5 mg/dL Final   04/13/2025 7.7 (L) 8.6 - 10.5 mg/dL Final   04/12/2025 8.5 (L) 8.6 - 10.5 mg/dL Final   04/12/2025 8.4 (L) 8.6 - 10.5 mg/dL Final      PO4 No results found for: \"CAPO4\"   Albumin Albumin   Date Value Ref Range Status   04/15/2025 2.9 (L) 3.5 - 5.2 g/dL Final   04/14/2025 3.0 (L) 3.5 - 5.2 g/dL Final   04/14/2025 2.9 (L) 3.5 - 5.2 g/dL Final   04/14/2025 2.8 (L) 3.5 - 5.2 g/dL Final   04/14/2025 3.0 (L) 3.5 - 5.2 g/dL Final   04/13/2025 2.8 (L) 3.5 - 5.2 g/dL Final   04/13/2025 3.0 (L) 3.5 - 5.2 g/dL Final   04/13/2025 3.3 (L) 3.5 - 5.2 g/dL Final   04/13/2025 3.3 (L) 3.5 - 5.2 g/dL Final   04/12/2025 3.3 (L) 3.5 - 5.2 g/dL Final   04/12/2025 3.3 (L) 3.5 - 5.2 g/dL Final      Magnesium Magnesium   Date Value Ref Range Status   04/15/2025 2.2 1.6 - 2.4 mg/dL Final   04/14/2025 2.4 1.6 - 2.4 mg/dL Final   04/14/2025 2.2 1.6 - 2.4 mg/dL Final   04/14/2025 2.1 1.6 - 2.4 mg/dL Final   04/13/2025 2.2 1.6 - 2.4 mg/dL Final   04/13/2025 2.5 (H) 1.6 - 2.4 mg/dL Final   04/13/2025 2.6 (H) 1.6 - 2.4 mg/dL Final   04/13/2025 2.4 1.6 - 2.4 mg/dL Final   04/12/2025 2.3 1.6 - 2.4 mg/dL Final   04/12/2025 2.4 1.6 - 2.4 mg/dL Final      Uric Acid No results found for: \"URICACID\"        Results Review:     I reviewed the patient's new clinical results.    folic acid, 1 mg, Per G Tube, Daily  gabapentin, 200 mg, Per G Tube, Q12H  hydrocortisone sodium succinate, 25 mg, Intravenous, Q6H  ipratropium-albuterol, 3 mL, Nebulization, 4x Daily - RT  lactobacillus acidophilus, 1 capsule, Per G Tube, Daily  lactulose, 10 g, Per G Tube, Daily  latanoprost, 1 drop, Both Eyes, Nightly  midodrine, 10 mg, Per G Tube, TID AC  pantoprazole, 40 mg, Intravenous, Q12H  piperacillin-tazobactam, 4.5 g, Intravenous, Q8H  potassium chloride, 20 mEq, Intravenous, " Once  QUEtiapine, 100 mg, Per G Tube, Nightly  rifAXIMin, 550 mg, Per G Tube, Q12H  sodium chloride, 10 mL, Intravenous, Q12H  sodium chloride, 10 mL, Intravenous, Q12H  sodium chloride, 10 mL, Intravenous, Q12H  thiamine, 100 mg, Per G Tube, Daily  ursodiol, 300 mg, Nasogastric, BID      fentanyl 10 mcg/mL,  mcg/hr, Last Rate: 50 mcg/hr (04/13/25 1044)  midazolam, 1-10 mg/hr, Last Rate: 2 mg/hr (04/14/25 1115)  norepinephrine, 0.02-0.3 mcg/kg/min, Last Rate: 0.24 mcg/kg/min (04/15/25 0647)  Phoxillum BK4/2.5, 1,000 mL/hr, Last Rate: 1,000 mL/hr (04/15/25 0745)  Phoxillum BK4/2.5, 1,000 mL/hr, Last Rate: 1,000 mL/hr (04/15/25 0745)  Phoxillum BK4/2.5, 1,000 mL/hr, Last Rate: 1,000 mL/hr (04/15/25 0745)        Medication Review: Reviewed    Assessment & Plan       Alcoholic cirrhosis of liver without ascites    Alcohol use disorder    Peripheral neuropathy    Pneumonia    Sepsis secondary to UTI    Acute kidney injury    Acute respiratory failure with hypoxia      1.  JAMMIE- anuric -Creatinine worsening since admission.  Admit creatinine 1.1 mg/dL,, yesterday increased to 1.5, today's creatinine 1.97 likely diagnosis hepatorenal syndrome with hypotension in the setting of liver cirrhosis - Started on CRRT 4/10.  2.  Cardiorenal syndrome: Cirrhosis of liver.  3.  Metabolic acidosis  4.  Hyponatremia  5.  Volume overload  6.  Cirrhosis of liver  7.  Sepsis/E. coli UTI: On Zosyn on admission and now on ceftriaxone and IV pressors.    Plan:  Continue CRRT.  UF  as tolerated.   Supplement electrolyte as per CRRT protocol  Avoid nephrotoxic medications.  Keep MAP greater than 60 mmHg for renal perfusion  Adjust medication for the new GFR.    Case discussed with RN at bedside. .  Prognosis guarded. Family meeting today to set Goal of treatment.     High risk complex patient with multiple medical problems.    Adonis Lowry MD  04/15/25  08:19 EDT

## 2025-04-15 NOTE — PLAN OF CARE
Goal Outcome Evaluation:  Plan of Care Reviewed With: patient    - Levophed @ 0.24. Versed @ 2, Fentanyl @ 50.    - Bleeding from hemodialysis port improving after dayshift interventions; oozing around dressing remains.    - Pupils equal @ 0200 and appear to have remained equal since.    - Hypothermic; air warmer and fluid warmer continuously excluding bathing hour.    - Tolerating 150ml volume pulls with CRRT well.    - Hemoglobin stable this AM; 7.3. WBC trending up; critical this AM 34.38.    - No changes regarding ventilation settings.    - Urine output for 12h: 4ml.    - No bowel movements this shift.

## 2025-04-15 NOTE — NURSING NOTE
CRRT rounds completed. Treatment plan  reviewed with  DANDY Layton. Orders and documentation reviewed. Prescription for CRRT machine reviewed with nurse- any needed adjustments made at this time.  Dressing to dialysis access visualized and intact. Dressing change due 4/20/25. Primary nurse encouraged to call [phone]  or on call dialysis nurse for assistance or any questions.

## 2025-04-15 NOTE — PLAN OF CARE
Goal Outcome Evaluation:      Unable to meet requirements for liberation of mechanical liberation.

## 2025-04-16 NOTE — NURSING NOTE
Pt TOD [id number]. [facility] and charge RN made aware. Post mortem care completed. Lines removed. Pt family visited again and left. Stated we can reach out to family on file with further questions.

## 2025-04-16 NOTE — PROCEDURES
Critical Care    Performed by: Carl Tuttle MD  Authorized by: Carl Tuttle MD  Total critical care time: 35 minutes  Critical care time was exclusive of separately billable procedures and treating other patients.  Critical care was necessary to treat or prevent imminent or life-threatening deterioration of the following conditions: cardiac failure, respiratory failure, renal failure and hepatic failure.  Critical care was time spent personally by me on the following activities: blood draw for specimens, development of treatment plan with patient or surrogate, examination of patient, pulse oximetry, re-evaluation of patient's condition and ventilator management.  Comments: Overnight the patient did continue to worsen and required escalation of her pressors.  Blood counts have dropped as well.  Conversation was had with the family and at this point they would like to proceed with withdrawal of care with palliative extubation and pain control.  Will go ahead and initiate full comfort measures at this time.

## 2025-04-16 NOTE — NURSING NOTE
Security called for retrieval of pt belongings to be given to family. Belongings are being signed out by pt cousin Madelin. Aunt and Uncle (decision makers) are aware of this and gave approval over the phone for Madelin to take the items.

## 2025-04-16 NOTE — NURSING NOTE
Pt comfort measures per MD orders. CRRT stopped and blood returned. TF stopped. Pt extubated. OG removed. Gtts stopped. PRN medications given. Family @ bedside with patient. Pt appears to be comfortable and free of pain.    14-Jan-2021 18:59

## 2025-04-16 NOTE — PROGRESS NOTES
"   LOS: 11 days    Patient Care Team:  Unique Brandt DO as PCP - General (Family Medicine)  Leana Garcia PA as Physician Assistant (Gastroenterology)  Judy Curry LPCC as  (Behavioral Health)    Chief Complaint: Septic shock  64-year-old female alcoholic liver cirrhosis, readmitted with febrile illness leukocytosis shortness of breath and diarrhea, admitted in ICU with hypotension placed on IV pressor admit creatinine 1.1 mg/dL slowly worsening.    Subjective     Continues to be on vent,  pressors and CRRT   No major overnight issues per nurse.  Tolerating CRRT.     Objective     Vital Sign Min/Max for last 24 hours  Temp  Min: 96.3 °F (35.7 °C)  Max: 97 °F (36.1 °C)   BP  Min: 55/25  Max: 125/65   Pulse  Min: 87  Max: 133   Resp  Min: 18  Max: 20   SpO2  Min: 89 %  Max: 98 %   No data recorded   No data recorded     Flowsheet Rows      Flowsheet Row First Filed Value   Admission Height 165.1 cm (65\") Documented at 04/04/2025 1609   Admission Weight 71.7 kg (158 lb) Documented at 04/04/2025 1609            I/O this shift:  In: 152 [I.V.:40; NG/GT:12; IV Piggyback:100]  Out: 1   I/O last 3 completed shifts:  In: 3089.5 [I.V.:2129.4; Other:255; NG/GT:494; IV Piggyback:211.1]  Out: 7431 [Urine:4]    Physical Exam:  Gen: intubated and sedated.    HENT: NC, AT  NECK: Supple, ETT in place  LUNGS: Coarse breath sound on vent, non labored respirtation   CVS: S1/S2 audible, RRR, no murmur   Abd: Soft, NT, ND, BS+   Ext: Trace edema, no cyanosis   CNS: Intubated and sedated.   Psy: Intubated and sedated.   Skin: Positive for spider angiomas noted on the chest wall.  Skin changes are noted with ecchymosis, shiny lower extremity edema.    : Dudley catheter in place    WBC WBC   Date Value Ref Range Status   04/16/2025 37.80 (C) 3.40 - 10.80 10*3/mm3 Final   04/15/2025 34.38 (C) 3.40 - 10.80 10*3/mm3 Final   04/14/2025 27.49 (H) 3.40 - 10.80 10*3/mm3 Final      HGB Hemoglobin   Date Value Ref Range " "Status   04/16/2025 6.3 (C) 12.0 - 15.9 g/dL Final   04/15/2025 7.3 (L) 12.0 - 15.9 g/dL Final   04/14/2025 7.1 (L) 12.0 - 15.9 g/dL Final   04/13/2025 7.0 (L) 12.0 - 15.9 g/dL Final   04/13/2025 7.4 (L) 12.0 - 15.9 g/dL Final   04/13/2025 7.7 (L) 12.0 - 15.9 g/dL Final      HCT Hematocrit   Date Value Ref Range Status   04/16/2025 19.2 (C) 34.0 - 46.6 % Final   04/15/2025 22.0 (L) 34.0 - 46.6 % Final   04/14/2025 21.8 (L) 34.0 - 46.6 % Final   04/13/2025 20.9 (C) 34.0 - 46.6 % Final   04/13/2025 22.7 (L) 34.0 - 46.6 % Final   04/13/2025 23.3 (L) 34.0 - 46.6 % Final      Platlets No results found for: \"LABPLAT\"   MCV MCV   Date Value Ref Range Status   04/16/2025 104.3 (H) 79.0 - 97.0 fL Final   04/15/2025 101.9 (H) 79.0 - 97.0 fL Final   04/14/2025 102.8 (H) 79.0 - 97.0 fL Final          Sodium Sodium   Date Value Ref Range Status   04/16/2025 140 136 - 145 mmol/L Final   04/15/2025 141 136 - 145 mmol/L Final   04/15/2025 142 136 - 145 mmol/L Final   04/15/2025 143 136 - 145 mmol/L Final   04/14/2025 140 136 - 145 mmol/L Final   04/14/2025 140 136 - 145 mmol/L Final   04/14/2025 142 136 - 145 mmol/L Final   04/13/2025 141 136 - 145 mmol/L Final   04/13/2025 142 136 - 145 mmol/L Final   04/13/2025 136 136 - 145 mmol/L Final      Potassium Potassium   Date Value Ref Range Status   04/16/2025 4.1 3.5 - 5.2 mmol/L Final   04/15/2025 4.6 3.5 - 5.2 mmol/L Final   04/15/2025 4.2 3.5 - 5.2 mmol/L Final     Comment:     Slight hemolysis detected by analyzer. Result may be falsely elevated.   04/15/2025 4.0 3.5 - 5.2 mmol/L Final   04/14/2025 4.3 3.5 - 5.2 mmol/L Final   04/14/2025 4.0 3.5 - 5.2 mmol/L Final   04/14/2025 4.0 3.5 - 5.2 mmol/L Final   04/13/2025 3.6 3.5 - 5.2 mmol/L Final   04/13/2025 4.0 3.5 - 5.2 mmol/L Final   04/13/2025 4.7 3.5 - 5.2 mmol/L Final     Comment:     Slight hemolysis detected by analyzer. Result may be falsely elevated.      Chloride Chloride   Date Value Ref Range Status   04/16/2025 108 (H) " 98 - 107 mmol/L Final   04/15/2025 108 (H) 98 - 107 mmol/L Final   04/15/2025 108 (H) 98 - 107 mmol/L Final   04/15/2025 108 (H) 98 - 107 mmol/L Final   04/14/2025 106 98 - 107 mmol/L Final   04/14/2025 105 98 - 107 mmol/L Final   04/14/2025 107 98 - 107 mmol/L Final   04/13/2025 108 (H) 98 - 107 mmol/L Final   04/13/2025 105 98 - 107 mmol/L Final   04/13/2025 101 98 - 107 mmol/L Final      CO2 CO2   Date Value Ref Range Status   04/16/2025 20.0 (L) 22.0 - 29.0 mmol/L Final   04/15/2025 20.0 (L) 22.0 - 29.0 mmol/L Final   04/15/2025 20.0 (L) 22.0 - 29.0 mmol/L Final   04/15/2025 19.0 (L) 22.0 - 29.0 mmol/L Final   04/14/2025 20.0 (L) 22.0 - 29.0 mmol/L Final   04/14/2025 19.0 (L) 22.0 - 29.0 mmol/L Final   04/14/2025 18.0 (L) 22.0 - 29.0 mmol/L Final   04/13/2025 17.0 (L) 22.0 - 29.0 mmol/L Final   04/13/2025 19.0 (L) 22.0 - 29.0 mmol/L Final   04/13/2025 20.0 (L) 22.0 - 29.0 mmol/L Final      BUN BUN   Date Value Ref Range Status   04/16/2025 22 8 - 23 mg/dL Final   04/15/2025 18 8 - 23 mg/dL Final   04/15/2025 17 8 - 23 mg/dL Final   04/15/2025 16 8 - 23 mg/dL Final   04/14/2025 17 8 - 23 mg/dL Final   04/14/2025 15 8 - 23 mg/dL Final   04/14/2025 14 8 - 23 mg/dL Final   04/13/2025 13 8 - 23 mg/dL Final   04/13/2025 15 8 - 23 mg/dL Final   04/13/2025 16 8 - 23 mg/dL Final      Creatinine Creatinine   Date Value Ref Range Status   04/16/2025 0.53 (L) 0.57 - 1.00 mg/dL Final   04/15/2025 0.46 (L) 0.57 - 1.00 mg/dL Final   04/15/2025 0.46 (L) 0.57 - 1.00 mg/dL Final   04/15/2025 0.47 (L) 0.57 - 1.00 mg/dL Final   04/14/2025 0.59 0.57 - 1.00 mg/dL Final   04/14/2025 0.50 (L) 0.57 - 1.00 mg/dL Final   04/14/2025 0.52 (L) 0.57 - 1.00 mg/dL Final   04/13/2025 0.57 0.57 - 1.00 mg/dL Final   04/13/2025 0.69 0.57 - 1.00 mg/dL Final   04/13/2025 0.78 0.57 - 1.00 mg/dL Final      Calcium Calcium   Date Value Ref Range Status   04/16/2025 8.5 (L) 8.6 - 10.5 mg/dL Final   04/15/2025 8.6 8.6 - 10.5 mg/dL Final   04/15/2025 7.7  "(L) 8.6 - 10.5 mg/dL Final   04/15/2025 7.9 (L) 8.6 - 10.5 mg/dL Final   04/14/2025 8.6 8.6 - 10.5 mg/dL Final   04/14/2025 8.1 (L) 8.6 - 10.5 mg/dL Final   04/14/2025 7.7 (L) 8.6 - 10.5 mg/dL Final   04/13/2025 7.0 (L) 8.6 - 10.5 mg/dL Final   04/13/2025 8.3 (L) 8.6 - 10.5 mg/dL Final   04/13/2025 9.1 8.6 - 10.5 mg/dL Final      PO4 No results found for: \"CAPO4\"   Albumin Albumin   Date Value Ref Range Status   04/16/2025 3.1 (L) 3.5 - 5.2 g/dL Final   04/15/2025 3.0 (L) 3.5 - 5.2 g/dL Final   04/15/2025 3.0 (L) 3.5 - 5.2 g/dL Final   04/15/2025 2.9 (L) 3.5 - 5.2 g/dL Final   04/14/2025 3.0 (L) 3.5 - 5.2 g/dL Final   04/14/2025 2.9 (L) 3.5 - 5.2 g/dL Final   04/14/2025 2.8 (L) 3.5 - 5.2 g/dL Final   04/14/2025 3.0 (L) 3.5 - 5.2 g/dL Final   04/13/2025 2.8 (L) 3.5 - 5.2 g/dL Final   04/13/2025 3.0 (L) 3.5 - 5.2 g/dL Final   04/13/2025 3.3 (L) 3.5 - 5.2 g/dL Final      Magnesium Magnesium   Date Value Ref Range Status   04/16/2025 2.3 1.6 - 2.4 mg/dL Final   04/15/2025 2.4 1.6 - 2.4 mg/dL Final   04/15/2025 2.2 1.6 - 2.4 mg/dL Final   04/15/2025 2.2 1.6 - 2.4 mg/dL Final   04/14/2025 2.4 1.6 - 2.4 mg/dL Final   04/14/2025 2.2 1.6 - 2.4 mg/dL Final   04/14/2025 2.1 1.6 - 2.4 mg/dL Final   04/13/2025 2.2 1.6 - 2.4 mg/dL Final   04/13/2025 2.5 (H) 1.6 - 2.4 mg/dL Final      Uric Acid No results found for: \"URICACID\"        Results Review:     I reviewed the patient's new clinical results.    folic acid, 1 mg, Per G Tube, Daily  gabapentin, 200 mg, Per G Tube, Q12H  hydrocortisone sodium succinate, 25 mg, Intravenous, Q6H  ipratropium-albuterol, 3 mL, Nebulization, 4x Daily - RT  lactobacillus acidophilus, 1 capsule, Per G Tube, Daily  lactulose, 10 g, Per G Tube, Daily  latanoprost, 1 drop, Both Eyes, Nightly  midodrine, 10 mg, Per G Tube, TID AC  pantoprazole, 40 mg, Intravenous, Q12H  potassium chloride, 20 mEq, Intravenous, Once  QUEtiapine, 100 mg, Per G Tube, Nightly  rifAXIMin, 550 mg, Per G Tube, Q12H  sodium " chloride, 10 mL, Intravenous, Q12H  sodium chloride, 10 mL, Intravenous, Q12H  sodium chloride, 10 mL, Intravenous, Q12H  thiamine, 100 mg, Per G Tube, Daily  ursodiol, 300 mg, Nasogastric, BID      fentanyl 10 mcg/mL,  mcg/hr, Last Rate: 50 mcg/hr (04/15/25 1403)  midazolam, 1-10 mg/hr, Last Rate: 2 mg/hr (04/15/25 1833)  norepinephrine, 0.02-0.5 mcg/kg/min, Last Rate: 0.44 mcg/kg/min (04/16/25 0713)  norepinephrine, 0.02-0.3 mcg/kg/min, Last Rate: Stopped (04/16/25 0642)  Phoxillum BK4/2.5, 1,000 mL/hr, Last Rate: 1,000 mL/hr (04/16/25 0545)  Phoxillum BK4/2.5, 1,000 mL/hr, Last Rate: 1,000 mL/hr (04/16/25 0545)  Phoxillum BK4/2.5, 1,000 mL/hr, Last Rate: 1,000 mL/hr (04/16/25 0545)  vasopressin, 0.03 Units/min        Medication Review: Reviewed    Assessment & Plan       Alcoholic cirrhosis of liver without ascites    Alcohol use disorder    Peripheral neuropathy    Pneumonia    Sepsis secondary to UTI    Acute kidney injury    Acute respiratory failure with hypoxia      1.  JAMMIE- anuric -Creatinine worsening since admission.  Admit creatinine 1.1 mg/dL,, yesterday increased to 1.5, today's creatinine 1.97 likely diagnosis hepatorenal syndrome with hypotension in the setting of liver cirrhosis - Started on CRRT 4/10.  2.  Cardiorenal syndrome: Cirrhosis of liver.  3.  Metabolic acidosis  4.  Hyponatremia  5.  Volume overload  6.  Cirrhosis of liver  7.  Sepsis/E. coli UTI: On Zosyn on admission and now on ceftriaxone and IV pressors.    Plan:  Continue CRRT.  UF as tolerated.   Supplement electrolyte as per CRRT protocol  Avoid nephrotoxic medications.  Keep MAP greater than 60 mmHg for renal perfusion  Adjust medication for the new GFR.    Case discussed with RN at bedside. .  Prognosis guarded. Family meeting today again  to set Goal of treatment.     High risk complex patient with multiple medical problems.    Adonis Lowry MD  04/16/25  08:34 EDT

## 2025-04-16 NOTE — NURSING NOTE
RN spoke with pt decision makers. Aunt and Uncle are decision makers, and informed me over the phone that they wish to withdrawal care and make the patient comfortable. Pt cousin @bedside also spoke with Uncle and all members of family are agreeable. Informing MD of pt wishes and awaiting orders.

## 2025-04-16 NOTE — CASE MANAGEMENT/SOCIAL WORK
Continued Stay Note  [facility]     Patient Name: Philomena Davis  MRN: [id number]  Today's Date: 4/16/2025    Admit Date: 4/4/2025    Plan: TBD   Discharge Plan       Row Name 04/16/25 1116       Plan    Plan TBD    Patient/Family in Agreement with Plan other (see comments)    Plan Comments Pt was discussed in Medical Rounds today. Pt is currently on mechanical vent and CRRT. Awaiting pt's cousing to arrive.  will continue to follow.                   Discharge Codes    No documentation.                 Expected Discharge Date and Time       Expected Discharge Date Expected Discharge Time    Apr 14, 2025               Dalia Mcintyre RN

## 2025-04-16 NOTE — SIGNIFICANT NOTE
Exam confirms with auscultation zero audible heart tones and zero audible respirations. Ms.Theresa Maryjane Davis was pronounced dead on 4/16/2025 at 1350pm.  MD notified by Patient's RN.    Rupali Isaacs RN  Clinical House Supervisor  4/16/2025 14:43 EDT

## 2025-04-16 NOTE — PLAN OF CARE
Goal Outcome Evaluation:  Plan of Care Reviewed With: patient        Progress: declining  Outcome Evaluation: Remains intubated and sedated. Levo max increased to 0.5 mcgs/kg/min this am due to sbp dropping to the 50's. Suspect hypotension is due to changing out drip and not a spontaneous drop. Vaso ordered, not started due to improvement in BP. One dose of albumin administered. Fentanyl and Versed continued for sedation. CRRT filter changed x2, one for >72 hours, second due to complications with machine. Blood returned on both occasions.Tolerated UF of 150 other than the episodes of hypotension this am. Temp 96-97, warming blanket remains in place. Two large BM's this shift. No urine output. Friend updated.

## 2025-04-16 NOTE — DISCHARGE SUMMARY
Death Summary    Patient name: Philomena Davis  CSN: [id number]  MRN: [id number]  : 1960  Today's date: 2025     Date of Admission: 2025  Date and Time of Death:  2025    Admitting Physician:  Carl Tutlte MD    Primary Care Provider: Unique Brandt DO    Consultations:   Nephrology  Gastroenterology    Admission Diagnosis:      Diagnoses at the Time of Death:     Alcoholic cirrhosis of liver without ascites    Alcohol use disorder    Peripheral neuropathy    Pneumonia    Sepsis secondary to UTI    Acute kidney injury    Acute respiratory failure with hypoxia    Procedures:  Procedure(s):  ESOPHAGOGASTRODUODENOSCOPY   1217 Critical Care  04/15 1146 Critical Care    History of Present Illness  Hospital Course:  Ms. Davis is 64-year-old woman with hypertension, chronic pain, liver cirrhosis, COPD hospitalized in mid March with alcoholic hepatitis and cirrhosis and a third metatarsal fracture.  She was discharged to [facility] for rehabilitation.  She returned to [facility]  with elevated white count fever and hypotension.  Urinalysis was consistent with a urinary tract infection.  Urine culture grew E. coli resistant to ampicillin.  Respiratory panel PCR was negative.  Blood cultures were negative. Respiratory panel PCR was negative.  Chest x-ray revealed left basilar opacity and effusion, possible edema.  She was placed on vancomycin and Zosyn.  She became hypotensive, Unresponsive to volume resuscitation and albumin.  She was placed on phenylephrine because of resting tachycardia.  Phenylephrine was changed to norepinephrine for better blood pressure management.     Hemoglobin dropped to 7.6 and she had a history of a recent GI bleed so she was given a unit of packed red cells.  GI was consulted and she underwent an EGD that revealed a nonbleeding ulcer and varices.  She had oozing from her dialysis access and required 1 unit of packed red cells on  April 10.      Renal function worsened and she became progressively volume overloaded.  Nephrology was consulted and agreed that she had hepatorenal syndrome.  Dialysis access was placed and CRRT initiated on April 10.      On 4/15 she was not able to follow commands and only able to grimace to noxious stimuli. On exam she was noted to have dilated left pupil that was only marginally reactive to light. A stat CT H was ordered without acute process.     She was appropriately treated for hepatic encephalopathy and continued on CRRT.     Her mental status prevented her from being further weaned from vent.     On 4/15, patient remained unresponsive, dependent on CRRT, requiring pressors. A goals of care conversation was had with family (aunt, uncle). They were counseled that she has little chance for a meaningful recovery given multisystem failure.    Overnight, she continued to worsen and required escalation of pressors.     On , the decision was made to withdrawal all support and change to comfort measures.     Patient was pronounced  at 1350.      Electronically signed by CANDY Chaidez, 25, 3:06 PM EDT.  Pulmonary and Critical Care Medicine   25 15:05 EDT

## 2025-04-17 ENCOUNTER — PATIENT OUTREACH (OUTPATIENT)
Age: 65
End: 2025-04-17
Payer: COMMERCIAL

## 2025-04-17 NOTE — OUTREACH NOTE
Reviewed chart, SW will close referral.  Luciana CAT -   Ambulatory Case Management    4/17/2025, 09:28 EDT

## (undated) DEVICE — ST LINER SAFECAP GRN RED CP STRL

## (undated) DEVICE — ST NERV BLCK CONT CONTIPLEX ECHO CLSD 18G 4IN

## (undated) DEVICE — SOL LR 1000ML

## (undated) DEVICE — MEDI-VAC NON-CONDUCTIVE SUCTION TUBING: Brand: CARDINAL HEALTH

## (undated) DEVICE — UNDERCAST PADDING: Brand: DEROYAL

## (undated) DEVICE — SOLIDIFIER LIQ PREMISORB 1500CC

## (undated) DEVICE — NON-OCCLUSIVE GAUZE STRIP OVERWRAP,3% BISMUTH TRIBROMOPHENATE IN OIL EMULSION: Brand: XEROFORM

## (undated) DEVICE — CVR HNDL LT SURG ACCSSRY BLU STRL

## (undated) DEVICE — SUT ETHLN 3/0 FS1 30IN 669H

## (undated) DEVICE — BIT DRL QC SS 2.0X100MM

## (undated) DEVICE — MEDI-VAC YANKAUER SUCTION HANDLE W/BULBOUS TIP: Brand: CARDINAL HEALTH

## (undated) DEVICE — INTENDED USE FOR SURGICAL MARKING ON INTACT SKIN, ALSO PROVIDES A PERMANENT METHOD OF IDENTIFYING OBJECTS IN THE OPERATING ROOM: Brand: WRITESITE® REGULAR TIP SKIN MARKER

## (undated) DEVICE — SHEET,DRAPE,53X77,STERILE: Brand: MEDLINE

## (undated) DEVICE — SCRB SURG BACTOSHIELD CHG 4PCT 4OZ

## (undated) DEVICE — SINGLE-USE BIOPSY FORCEPS: Brand: RADIAL JAW 4

## (undated) DEVICE — DRAPE,TOP,102X53,STERILE: Brand: MEDLINE

## (undated) DEVICE — GOWN,REINF,POLY,ECL,PP SLV,XXL: Brand: MEDLINE

## (undated) DEVICE — UNDERGLV SURG BIOGEL INDICAT PI SZ8.5 BLU

## (undated) DEVICE — SYR LUERLOK 50ML

## (undated) DEVICE — THE BITE BLOCK MAXI, LATEX FREE STRAP IS USED TO PROTECT THE ENDOSCOPE INSERTION TUBE FROM BEING BITTEN BY THE PATIENT.

## (undated) DEVICE — KT ORCA ORCAPOD DISP STRL

## (undated) DEVICE — BNDG ELAS ELITE V/CLOSE 4IN 5YD LF STRL

## (undated) DEVICE — DRP C/ARMOR

## (undated) DEVICE — BIT DRL QC SS 2.5X110MM

## (undated) DEVICE — ADAPT ST INFUS ADMIN SYR 70IN

## (undated) DEVICE — AIRWY 90MM NO9

## (undated) DEVICE — ENDOGATOR HYBRID TUBING KIT FOR USE WITH ENDOGATOR IRRIGATION PUMP, OLYMPUS PUMP, GI4000 ESU, AND TORRENT IRRIGATION PUMP.: Brand: ENDOGATOR KIT

## (undated) DEVICE — DRAPE,U/ SHT,SPLIT,PLAS,STERIL: Brand: MEDLINE

## (undated) DEVICE — KT BEDSD ENZYM PRECLN 500ML

## (undated) DEVICE — SWABSTK SKINPREP PVPI PRE/SAT 8IN STRL

## (undated) DEVICE — COVER,C-ARM,41X74: Brand: MEDLINE

## (undated) DEVICE — BNDG ESMARK 6INX9FT STRL

## (undated) DEVICE — KT BIOGUARD SXN VLV AIR/H20 4PC DISP

## (undated) DEVICE — ENCORE® LATEX MICRO SIZE 8, STERILE LATEX POWDER-FREE SURGICAL GLOVE: Brand: ENCORE

## (undated) DEVICE — CONTN GRAD MEAS TRIANG 32OZ BLK

## (undated) DEVICE — PATIENT RETURN ELECTRODE, SINGLE-USE, CONTACT QUALITY MONITORING, ADULT, WITH 9FT CORD, FOR PATIENTS WEIGING OVER 33LBS. (15KG): Brand: MEGADYNE

## (undated) DEVICE — Device

## (undated) DEVICE — GLV SURG SENSICARE PI LF PF 6.5

## (undated) DEVICE — GLV SURG SENSICARE PI LF PF 7.0

## (undated) DEVICE — GLV SURG PREMIERPRO MIC LTX PF SZ8 BRN

## (undated) DEVICE — PK EXTREM LOWR 10

## (undated) DEVICE — CONTAINER,SPECIMEN,OR STERILE,4OZ: Brand: MEDLINE

## (undated) DEVICE — OCCLUSIVE GAUZE STRIP OVERWRAP,3% BISMUTH TRIBROMOPHENATE IN PETROLATUM BLEND: Brand: XEROFORM

## (undated) DEVICE — IRRIGATOR BULB ASEPTO 60CC STRL

## (undated) DEVICE — TBG SXN CONN/F UNIV 1/4IN 10FT LF STRL

## (undated) DEVICE — SUT ETHLN 3/0 PC5 18IN 1893G

## (undated) DEVICE — C-ARM: Brand: UNBRANDED

## (undated) DEVICE — 3M™ IOBAN™ 2 ANTIMICROBIAL INCISE DRAPE 6650EZ: Brand: IOBAN™ 2

## (undated) DEVICE — ADAPT CLN LUM OLYMP AIR/H20

## (undated) DEVICE — PREMIUM DRY TRAY LF: Brand: MEDLINE INDUSTRIES, INC.

## (undated) DEVICE — SOL IRR H2O BTL 1000ML STRL

## (undated) DEVICE — INTRO ACCSR BLNT TP

## (undated) DEVICE — COVER,MAYO STAND,STERILE: Brand: MEDLINE

## (undated) DEVICE — TUBING,OXYGEN,CRUSH RES,7',CLEAR,UC: Brand: MEDLINE INDUSTRIES, INC.

## (undated) DEVICE — BIT DRL FAST 2MM

## (undated) DEVICE — HYBRID CO2 TUBING/CAP SET FOR OLYMPUS® SCOPES & CO2 SOURCE: Brand: ERBE

## (undated) DEVICE — SOL IRR H2O BO 1000ML STRL

## (undated) DEVICE — GLV SURG PREMIERPRO MIC LTX PF SZ7 BRN

## (undated) DEVICE — TBG CO2 CLEVERCAP FOR OLYMPUS SCOPES

## (undated) DEVICE — GLV SURG SENSICARE PI MIC PF SZ9 LF STRL

## (undated) DEVICE — BITEBLOCK MAXIBITE W STRAP 60FLF

## (undated) DEVICE — BNDG ELAS ELITE V/CLOSE 6IN 5YD LF STRL

## (undated) DEVICE — "MH-438 A/W VLVE F/140 EVIS-140": Brand: AIR/WATER VALVE

## (undated) DEVICE — LUBE JELLY ENDO 1.4OZ

## (undated) DEVICE — SOL ISO/ALC RUB 70PCT 4OZ

## (undated) DEVICE — FIRST STEP BEDSIDE ADD WATER KIT - RESEALABLE STAND-UP POUCH, ENDOSCOPIC CLEANING PAD - 1 POUCH: Brand: FIRST STEP BEDSIDE ADD WATER KIT - RESEALABLE STAND-UP POUCH, ENDOSCOPIC CLEANIN

## (undated) DEVICE — GUIDEPIN SMOTH TROC TP 1.6MM 6IN

## (undated) DEVICE — TUBING, SUCTION, 1/4" X 10', STRAIGHT: Brand: MEDLINE

## (undated) DEVICE — KT PUMP PAIN ONQ CBLOC SELCTAFLO 400ML

## (undated) DEVICE — SHEET,T,THYROID,STERILE: Brand: MEDLINE

## (undated) DEVICE — AIR/WATER CLEANING VALVES: Brand: AIR/WATER CLEANING VALVES

## (undated) DEVICE — NERVE BLOCK SUPPORT KIT/BLUE: Brand: MEDLINE INDUSTRIES, INC.

## (undated) DEVICE — CANNULA,ADULT,SOFT-TOUCH,7TUBE,SC: Brand: MEDLINE

## (undated) DEVICE — 1010 S-DRAPE TOWEL DRAPE 10/BX: Brand: STERI-DRAPE™

## (undated) DEVICE — GLV SURG PREMIERPRO MIC LTX PF SZ7.5 BRN

## (undated) DEVICE — TBG PENCL TELESCP MEGADYNE SMOKE EVAC 10FT

## (undated) DEVICE — SPNG GZ WOVN 4X4IN 12PLY 10/BX STRL

## (undated) DEVICE — SUT MONOCRYL PLS ANTIB UND 3/0  PS1 27IN

## (undated) DEVICE — Device: Brand: AIR/WATER CHANNEL CLEANING ADAPTER

## (undated) DEVICE — GLV SURG SENSICARE PI LF PF 7.5

## (undated) DEVICE — BIT DRL QC SS 2.7X100MM

## (undated) DEVICE — ANTIBACTERIAL UNDYED BRAIDED (POLYGLACTIN 910), SYNTHETIC ABSORBABLE SURGICAL SUTURE: Brand: COATED VICRYL

## (undated) DEVICE — GOWN,PREVENTION PLUS,XXLARGE,STERILE: Brand: MEDLINE

## (undated) DEVICE — LUBE JELLY FOIL PACK 1.4 OZ: Brand: MEDLINE INDUSTRIES, INC.

## (undated) DEVICE — GLV SURG PREMIERPRO MIC LTX PF SZ6.5 BRN

## (undated) DEVICE — KT PUMP INFUBLOCK MDL 2100 PMKITSOLIS

## (undated) DEVICE — APPL CHLORAPREP W/TINT 26ML BLU

## (undated) DEVICE — GLV SURG SENSICARE PI ORTHO SZ8.5 LF STRL

## (undated) DEVICE — "MH-443 SUCTION VALVE F/EVIS140 EVIS160": Brand: SUCTION VALVE

## (undated) DEVICE — ANTIBACTERIAL UNDYED BRAIDED (POLYGLACTIN 910), SYNTHETIC ABSORBABLE SUTURE: Brand: COATED VICRYL